# Patient Record
Sex: MALE | Race: BLACK OR AFRICAN AMERICAN | NOT HISPANIC OR LATINO | Employment: OTHER | URBAN - METROPOLITAN AREA
[De-identification: names, ages, dates, MRNs, and addresses within clinical notes are randomized per-mention and may not be internally consistent; named-entity substitution may affect disease eponyms.]

---

## 2017-01-17 ENCOUNTER — HOSPITAL ENCOUNTER (EMERGENCY)
Facility: HOSPITAL | Age: 77
Discharge: HOME | End: 2017-01-17
Attending: EMERGENCY MEDICINE
Payer: MEDICARE

## 2017-01-17 VITALS
RESPIRATION RATE: 20 BRPM | WEIGHT: 170 LBS | TEMPERATURE: 98 F | BODY MASS INDEX: 25.76 KG/M2 | DIASTOLIC BLOOD PRESSURE: 65 MMHG | HEIGHT: 68 IN | OXYGEN SATURATION: 98 % | HEART RATE: 87 BPM | SYSTOLIC BLOOD PRESSURE: 131 MMHG

## 2017-01-17 DIAGNOSIS — S62.633B OPEN DISPLACED FRACTURE OF DISTAL PHALANX OF LEFT MIDDLE FINGER, INITIAL ENCOUNTER: Primary | ICD-10-CM

## 2017-01-17 DIAGNOSIS — S62.635B OPEN DISPLACED FRACTURE OF DISTAL PHALANX OF LEFT RING FINGER, INITIAL ENCOUNTER: ICD-10-CM

## 2017-01-17 PROCEDURE — 25000003 PHARM REV CODE 250: Performed by: EMERGENCY MEDICINE

## 2017-01-17 PROCEDURE — 99284 EMERGENCY DEPT VISIT MOD MDM: CPT | Mod: 25

## 2017-01-17 PROCEDURE — 63600175 PHARM REV CODE 636 W HCPCS: Performed by: EMERGENCY MEDICINE

## 2017-01-17 PROCEDURE — 12042 INTMD RPR N-HF/GENIT2.6-7.5: CPT

## 2017-01-17 PROCEDURE — 96372 THER/PROPH/DIAG INJ SC/IM: CPT

## 2017-01-17 PROCEDURE — 12032 INTMD RPR S/A/T/EXT 2.6-7.5: CPT

## 2017-01-17 RX ORDER — CLOPIDOGREL BISULFATE 75 MG/1
75 TABLET ORAL DAILY
Qty: 30 TABLET | Refills: 11 | Status: SHIPPED | OUTPATIENT
Start: 2017-01-17 | End: 2021-09-01

## 2017-01-17 RX ORDER — DOXYCYCLINE 100 MG/1
100 CAPSULE ORAL 2 TIMES DAILY
Qty: 20 CAPSULE | Refills: 0 | Status: SHIPPED | OUTPATIENT
Start: 2017-01-17 | End: 2017-01-27

## 2017-01-17 RX ORDER — ASPIRIN 81 MG/1
81 TABLET ORAL DAILY
Qty: 30 TABLET | Refills: 11 | Status: SHIPPED | OUTPATIENT
Start: 2017-01-17 | End: 2023-08-29

## 2017-01-17 RX ORDER — LIDOCAINE HYDROCHLORIDE 10 MG/ML
5 INJECTION INFILTRATION; PERINEURAL
Status: COMPLETED | OUTPATIENT
Start: 2017-01-17 | End: 2017-01-17

## 2017-01-17 RX ORDER — SULFAMETHOXAZOLE AND TRIMETHOPRIM 800; 160 MG/1; MG/1
1 TABLET ORAL 2 TIMES DAILY
Qty: 14 TABLET | Refills: 0 | Status: SHIPPED | OUTPATIENT
Start: 2017-01-17 | End: 2017-01-24

## 2017-01-17 RX ORDER — AMLODIPINE BESYLATE 10 MG/1
10 TABLET ORAL DAILY
Qty: 60 TABLET | Refills: 11 | Status: ON HOLD | OUTPATIENT
Start: 2017-01-17 | End: 2024-02-28 | Stop reason: HOSPADM

## 2017-01-17 RX ORDER — ATORVASTATIN CALCIUM 80 MG/1
80 TABLET, FILM COATED ORAL DAILY
Qty: 30 TABLET | Refills: 11 | Status: SHIPPED | OUTPATIENT
Start: 2017-01-17 | End: 2021-09-01

## 2017-01-17 RX ORDER — CEFTRIAXONE 1 G/1
1 INJECTION, POWDER, FOR SOLUTION INTRAMUSCULAR; INTRAVENOUS
Status: COMPLETED | OUTPATIENT
Start: 2017-01-17 | End: 2017-01-17

## 2017-01-17 RX ADMIN — LIDOCAINE HYDROCHLORIDE 5 ML: 10 INJECTION, SOLUTION INFILTRATION; PERINEURAL at 01:01

## 2017-01-17 RX ADMIN — CEFTRIAXONE 1 G: 1 INJECTION, POWDER, FOR SOLUTION INTRAMUSCULAR; INTRAVENOUS at 02:01
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
## 2017-01-17 NOTE — ED AVS SNAPSHOT
[facility]  [address]  [place] [address]               Jevon Rajan   2017  1:04 PM   ED    Description:  Male : 1940   Department:  [facility]           Your Care was Coordinated By:     Provider Role From To    Panda Santiago MD Attending Provider 17 [id number] --      Reason for Visit     Laceration           Diagnoses this Visit        Comments    Open displaced fracture of distal phalanx of left middle finger, initial encounter    -  Primary     Open displaced fracture of distal phalanx of left ring finger, initial encounter           ED Disposition     ED Disposition Condition Comment    Discharge             To Do List           Follow-up Information     Follow up with Agustin Feliciano MD In 3 days.    Specialty:  Orthopedic Surgery    Contact information:    [address]  [address]  [place] [address]  [phone]         These Medications        Disp Refills Start End    clopidogrel (PLAVIX) 75 mg tablet 30 tablet 11 2017    Take 1 tablet (75 mg total) by mouth once daily. - Oral    aspirin (ECOTRIN) 81 MG EC tablet 30 tablet 11 2017    Take 1 tablet (81 mg total) by mouth once daily. - Oral    amlodipine (NORVASC) 10 MG tablet 60 tablet 11 2017    Take 1 tablet (10 mg total) by mouth once daily. - Oral    atorvastatin (LIPITOR) 80 MG tablet 30 tablet 11 2017    Take 1 tablet (80 mg total) by mouth once daily. - Oral    sulfamethoxazole-trimethoprim 800-160mg (BACTRIM DS) 800-160 mg Tab 14 tablet 0 2017    Take 1 tablet by mouth 2 (two) times daily. - Oral    doxycycline (VIBRAMYCIN) 100 MG Cap 20 capsule 0 2017    Take 1 capsule (100 mg total) by mouth 2 (two) times daily. - Oral      [facility] -  Assistance  Registered nurses in the [facility] provide clinical advisement,  health education, appointment booking, and other advisory services.  Call for this free service at [phone].             Medications           Message regarding Medications     Verify the changes and/or additions to your medication regime listed below are the same as discussed with your clinician today.  If any of these changes or additions are incorrect, please notify your healthcare provider.        START taking these NEW medications        Refills    sulfamethoxazole-trimethoprim 800-160mg (BACTRIM DS) 800-160 mg Tab 0    Sig: Take 1 tablet by mouth 2 (two) times daily.    Class: Print    Route: Oral    doxycycline (VIBRAMYCIN) 100 MG Cap 0    Sig: Take 1 capsule (100 mg total) by mouth 2 (two) times daily.    Class: Print    Route: Oral      These medications were administered today        Dose Freq    lidocaine HCL 10 mg/ml (1%) injection 5 mL 5 mL ED 1 Time    Si mLs by Infiltration route ED 1 Time.    Class: Normal    Route: Infiltration    cefTRIAXone injection 1 g 1 g ED 1 Time    Sig: Inject 1 g into the muscle ED 1 Time.    Class: Normal    Route: Intramuscular      STOP taking these medications     furosemide (LASIX) 40 MG tablet Take 1 tablet (40 mg total) by mouth once daily.           Verify that the below list of medications is an accurate representation of the medications you are currently taking.  If none reported, the list may be blank. If incorrect, please contact your healthcare provider. Carry this list with you in case of emergency.           Current Medications     acarbose (PRECOSE) 50 MG Tab Take 100 mg by mouth 3 (three) times daily with meals.     amlodipine (NORVASC) 10 MG tablet Take 1 tablet (10 mg total) by mouth once daily.    aspirin (ECOTRIN) 81 MG EC tablet Take 1 tablet (81 mg total) by mouth once daily.    atorvastatin (LIPITOR) 80 MG tablet Take 1 tablet (80 mg total) by mouth once daily.    carvedilol (COREG) 6.25 MG tablet Take 6.25 mg by mouth 2 (two) times daily  "with meals.    clopidogrel (PLAVIX) 75 mg tablet Take 1 tablet (75 mg total) by mouth once daily.    doxycycline (VIBRAMYCIN) 100 MG Cap Take 1 capsule (100 mg total) by mouth 2 (two) times daily.    finasteride (PROSCAR) 5 mg tablet Take 5 mg by mouth every evening.     glipiZIDE (GLUCOTROL) 10 MG TR24 Take 5 mg by mouth 2 (two) times daily.     insulin NPH-insulin regular, 70/30, (NOVOLIN 70/30) 100 unit/mL (70-30) injection Inject 10 Units into the skin 2 (two) times daily.    levothyroxine (SYNTHROID) 25 MCG tablet Take 25 mcg by mouth once daily.    losartan (COZAAR) 50 MG tablet Take 50 mg by mouth once daily.    metoprolol succinate (TOPROL-XL) 50 MG 24 hr tablet Take 1 tablet (50 mg total) by mouth once daily.    sulfamethoxazole-trimethoprim 800-160mg (BACTRIM DS) 800-160 mg Tab Take 1 tablet by mouth 2 (two) times daily.    tacrolimus (PROTOPIC) 0.1 % ointment Apply topically 2 (two) times daily.    tamsulosin (FLOMAX) 0.4 mg Cp24 Take 0.4 mg by mouth once daily.           Clinical Reference Information           Your Vitals Were     BP Pulse Temp Resp Height Weight    131/65 87 98 °F (36.7 °C) 20 5' 8" (1.727 m) 77.1 kg (170 lb)    SpO2 BMI             98% 25.85 kg/m2         Allergies as of 1/17/2017        Reactions    Ace Inhibitors Itching    Is not sure which medication it was      Immunizations Administered on Date of Encounter - 1/17/2017     None      ED Micro, Lab, POCT     None      ED Imaging Orders     Start Ordered       Status Ordering Provider    01/17/17 1314 01/17/17 1314  X-Ray Hand 3 view Left  1 time imaging      Final result       [facility] Sign-Up     Activating your [facility] account is as easy as 1-2-3!     1) Visit [email or web address], select Sign Up Now, enter this activation code and your date of birth, then select Next.  [email or web address]  Expires: 3/3/2017  2:43 PM      2) Create a username and password to use when you visit [facility] in the future and select a security question in " case you lose your password and select Next.    3) Enter your e-mail address and click Sign Up!    Additional Information  If you have questions, please e-mail [email or web address] or call [phone] to talk to our [facility] staff. Remember, [facility] is NOT to be used for urgent needs. For medical emergencies, dial 911.          [facility] complies with applicable Federal civil rights laws and does not discriminate on the basis of race, color, national origin, age, disability, or sex.        Language Assistance Services     ATTENTION: Language assistance services are available, free of charge. Please call [phone].      ATENCIÓN: Si habla español, tiene a hernandez disposición servicios gratuitos de asistencia lingüística. Llame al [phone].     CHÚ Ý: N?u b?n nói Ti?ng Vi?t, có các d?ch v? h? tr? ngôn ng? mi?n phí dành cho b?n. G?i s? [phone].

## 2019-04-05 ENCOUNTER — HOSPITAL ENCOUNTER (EMERGENCY)
Facility: HOSPITAL | Age: 79
Discharge: HOME | End: 2019-04-05
Attending: FAMILY MEDICINE
Payer: OTHER GOVERNMENT

## 2019-04-05 VITALS
OXYGEN SATURATION: 95 % | DIASTOLIC BLOOD PRESSURE: 70 MMHG | BODY MASS INDEX: 24.25 KG/M2 | HEART RATE: 83 BPM | WEIGHT: 160 LBS | TEMPERATURE: 98 F | HEIGHT: 68 IN | RESPIRATION RATE: 17 BRPM | SYSTOLIC BLOOD PRESSURE: 154 MMHG

## 2019-04-05 DIAGNOSIS — E16.2 HYPOGLYCEMIA: ICD-10-CM

## 2019-04-05 DIAGNOSIS — E11.649 HYPOGLYCEMIA ASSOCIATED WITH DIABETES: Primary | ICD-10-CM

## 2019-04-05 LAB
ALBUMIN SERPL BCP-MCNC: 4.9 G/DL (ref 3.5–5.2)
ALP SERPL-CCNC: 61 U/L (ref 38–126)
ALT SERPL W/O P-5'-P-CCNC: 18 U/L (ref 10–44)
ANION GAP SERPL CALC-SCNC: 16 MMOL/L (ref 8–16)
APTT BLDCRRT: 34.8 SEC (ref 21–32)
AST SERPL-CCNC: 27 U/L (ref 15–46)
BASOPHILS # BLD AUTO: 0.02 K/UL (ref 0–0.2)
BASOPHILS NFR BLD: 0.2 % (ref 0–1.9)
BILIRUB SERPL-MCNC: 0.7 MG/DL (ref 0.1–1)
BUN SERPL-MCNC: 46 MG/DL (ref 2–20)
CALCIUM SERPL-MCNC: 10.1 MG/DL (ref 8.7–10.5)
CHLORIDE SERPL-SCNC: 96 MMOL/L (ref 95–110)
CO2 SERPL-SCNC: 28 MMOL/L (ref 23–29)
CREAT SERPL-MCNC: 9.56 MG/DL (ref 0.5–1.4)
DIFFERENTIAL METHOD: ABNORMAL
EOSINOPHIL # BLD AUTO: 0 K/UL (ref 0–0.5)
EOSINOPHIL NFR BLD: 0.1 % (ref 0–8)
ERYTHROCYTE [DISTWIDTH] IN BLOOD BY AUTOMATED COUNT: 14 % (ref 11.5–14.5)
EST. GFR  (AFRICAN AMERICAN): 5.3 ML/MIN/1.73 M^2
EST. GFR  (NON AFRICAN AMERICAN): 4.6 ML/MIN/1.73 M^2
GLUCOSE SERPL-MCNC: 55 MG/DL (ref 70–110)
HCT VFR BLD AUTO: 36.4 % (ref 40–54)
HGB BLD-MCNC: 12.2 G/DL (ref 14–18)
INR PPP: 1.1 (ref 0.8–1.2)
LACTATE SERPL-SCNC: 1.5 MMOL/L (ref 0.5–2.2)
LYMPHOCYTES # BLD AUTO: 1.4 K/UL (ref 1–4.8)
LYMPHOCYTES NFR BLD: 10.9 % (ref 18–48)
MCH RBC QN AUTO: 32 PG (ref 27–31)
MCHC RBC AUTO-ENTMCNC: 33.5 G/DL (ref 32–36)
MCV RBC AUTO: 96 FL (ref 82–98)
MONOCYTES # BLD AUTO: 1 K/UL (ref 0.3–1)
MONOCYTES NFR BLD: 7.2 % (ref 4–15)
NEUTROPHILS # BLD AUTO: 10.7 K/UL (ref 1.8–7.7)
NEUTROPHILS NFR BLD: 81.4 % (ref 38–73)
PLATELET # BLD AUTO: 157 K/UL (ref 150–350)
PMV BLD AUTO: 10.6 FL (ref 9.2–12.9)
POCT GLUCOSE: 154 MG/DL (ref 70–110)
POCT GLUCOSE: 242 MG/DL (ref 70–110)
POCT GLUCOSE: 27 MG/DL (ref 70–110)
POCT GLUCOSE: 35 MG/DL (ref 70–110)
POTASSIUM SERPL-SCNC: 3.6 MMOL/L (ref 3.5–5.1)
PROT SERPL-MCNC: 8.9 G/DL (ref 6–8.4)
PROTHROMBIN TIME: 11.9 SEC (ref 9–12.5)
RBC # BLD AUTO: 3.81 M/UL (ref 4.6–6.2)
SODIUM SERPL-SCNC: 140 MMOL/L (ref 136–145)
TROPONIN I SERPL DL<=0.01 NG/ML-MCNC: 0.03 NG/ML (ref 0.01–0.03)
WBC # BLD AUTO: 13.17 K/UL (ref 3.9–12.7)

## 2019-04-05 PROCEDURE — 93010 ELECTROCARDIOGRAM REPORT: CPT | Mod: ,,, | Performed by: INTERNAL MEDICINE

## 2019-04-05 PROCEDURE — 83605 ASSAY OF LACTIC ACID: CPT | Mod: ER

## 2019-04-05 PROCEDURE — 93005 ELECTROCARDIOGRAM TRACING: CPT | Mod: ER

## 2019-04-05 PROCEDURE — 85610 PROTHROMBIN TIME: CPT | Mod: ER

## 2019-04-05 PROCEDURE — 25000003 PHARM REV CODE 250: Mod: ER | Performed by: FAMILY MEDICINE

## 2019-04-05 PROCEDURE — 84484 ASSAY OF TROPONIN QUANT: CPT | Mod: ER

## 2019-04-05 PROCEDURE — 87040 BLOOD CULTURE FOR BACTERIA: CPT | Mod: ER

## 2019-04-05 PROCEDURE — 80053 COMPREHEN METABOLIC PANEL: CPT | Mod: ER

## 2019-04-05 PROCEDURE — 96374 THER/PROPH/DIAG INJ IV PUSH: CPT | Mod: ER

## 2019-04-05 PROCEDURE — 85025 COMPLETE CBC W/AUTO DIFF WBC: CPT | Mod: ER

## 2019-04-05 PROCEDURE — 85730 THROMBOPLASTIN TIME PARTIAL: CPT | Mod: ER

## 2019-04-05 PROCEDURE — 93010 EKG 12-LEAD: ICD-10-PCS | Mod: ,,, | Performed by: INTERNAL MEDICINE

## 2019-04-05 PROCEDURE — 99284 EMERGENCY DEPT VISIT MOD MDM: CPT | Mod: 25,ER

## 2019-04-05 RX ORDER — DEXTROSE 50 % IN WATER (D50W) INTRAVENOUS SYRINGE
Status: DISCONTINUED
Start: 2019-04-05 | End: 2019-04-06 | Stop reason: HOSPADM

## 2019-04-05 RX ORDER — DEXTROSE 50 % IN WATER (D50W) INTRAVENOUS SYRINGE
25
Status: COMPLETED | OUTPATIENT
Start: 2019-04-05 | End: 2019-04-05

## 2019-04-05 RX ORDER — DEXTROSE 50 % IN WATER (D50W) INTRAVENOUS SYRINGE
25
Status: DISCONTINUED | OUTPATIENT
Start: 2019-04-05 | End: 2019-04-05

## 2019-04-05 RX ORDER — GLUCAGON 1 MG
1 KIT INJECTION
Status: DISCONTINUED | OUTPATIENT
Start: 2019-04-05 | End: 2019-04-05

## 2019-04-05 RX ADMIN — DEXTROSE MONOHYDRATE 25 G: 25 INJECTION, SOLUTION INTRAVENOUS at 08:04
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
## 2019-04-12 LAB
BACTERIA BLD CULT: NORMAL
BACTERIA BLD CULT: NORMAL

## 2019-11-23 DIAGNOSIS — R50.9 FEVER OF UNKNOWN ORIGIN: Primary | ICD-10-CM

## 2019-11-23 PROBLEM — I82.4Z2 ACUTE DEEP VEIN THROMBOSIS (DVT) OF DISTAL VEIN OF LEFT LOWER EXTREMITY: Status: ACTIVE | Noted: 2019-11-23

## 2019-11-24 PROBLEM — R31.9 HEMATURIA: Status: ACTIVE | Noted: 2019-11-24

## 2019-11-24 PROBLEM — Z99.2 ESRD ON HEMODIALYSIS: Status: ACTIVE | Noted: 2019-11-24

## 2019-11-24 PROBLEM — R82.90 ABNORMAL URINALYSIS: Status: ACTIVE | Noted: 2019-11-24

## 2019-11-24 PROBLEM — N18.6 ESRD ON HEMODIALYSIS: Status: ACTIVE | Noted: 2019-11-24

## 2019-11-24 PROBLEM — R50.9 FEVER: Status: ACTIVE | Noted: 2019-11-24

## 2019-11-25 PROBLEM — N28.89 RENAL MASS, LEFT: Status: ACTIVE | Noted: 2019-11-25

## 2019-11-26 ENCOUNTER — HOSPITAL ENCOUNTER (INPATIENT)
Facility: HOSPITAL | Age: 79
LOS: 2 days | Discharge: HOME | DRG: 252 | End: 2019-11-28
Attending: SURGERY | Admitting: SURGERY
Payer: MEDICARE

## 2019-11-26 DIAGNOSIS — T82.868A THROMBOSIS OF KIDNEY DIALYSIS ARTERIOVENOUS GRAFT, INITIAL ENCOUNTER: ICD-10-CM

## 2019-11-26 DIAGNOSIS — Z99.2 ESRD ON DIALYSIS: ICD-10-CM

## 2019-11-26 DIAGNOSIS — N18.6 ESRD ON DIALYSIS: ICD-10-CM

## 2019-11-26 DIAGNOSIS — T82.868A: Primary | ICD-10-CM

## 2019-11-26 PROBLEM — R31.9 HEMATURIA: Status: RESOLVED | Noted: 2019-11-24 | Resolved: 2019-11-26

## 2019-11-26 PROCEDURE — 11000001 HC ACUTE MED/SURG PRIVATE ROOM

## 2019-11-26 NOTE — Clinical Note
30 ml injected throughout the case. 20 mL total wasted during the case. 50 mL total used in the case.

## 2019-11-26 NOTE — Clinical Note
Angiography performed post intervention of the middle Lft AV fistula. via hand injection with 5 mL of contrast.

## 2019-11-27 PROBLEM — N18.6 ESRD ON DIALYSIS: Status: ACTIVE | Noted: 2019-11-27

## 2019-11-27 PROBLEM — T82.868A: Status: ACTIVE | Noted: 2019-11-27

## 2019-11-27 PROBLEM — Z99.2 ESRD ON DIALYSIS: Status: ACTIVE | Noted: 2019-11-27

## 2019-11-27 LAB
ALBUMIN SERPL BCP-MCNC: 2.6 G/DL (ref 3.5–5.2)
ALP SERPL-CCNC: 48 U/L (ref 55–135)
ALT SERPL W/O P-5'-P-CCNC: 10 U/L (ref 10–44)
ANION GAP SERPL CALC-SCNC: 14 MMOL/L (ref 8–16)
APTT BLDCRRT: 31.4 SEC (ref 21–32)
AST SERPL-CCNC: 16 U/L (ref 10–40)
BASOPHILS # BLD AUTO: 0.03 K/UL (ref 0–0.2)
BASOPHILS NFR BLD: 0.4 % (ref 0–1.9)
BILIRUB SERPL-MCNC: 0.4 MG/DL (ref 0.1–1)
BUN SERPL-MCNC: 77 MG/DL (ref 8–23)
CALCIUM SERPL-MCNC: 9 MG/DL (ref 8.7–10.5)
CHLORIDE SERPL-SCNC: 104 MMOL/L (ref 95–110)
CO2 SERPL-SCNC: 21 MMOL/L (ref 23–29)
CREAT SERPL-MCNC: 12.8 MG/DL (ref 0.5–1.4)
DIFFERENTIAL METHOD: ABNORMAL
EOSINOPHIL # BLD AUTO: 0.2 K/UL (ref 0–0.5)
EOSINOPHIL NFR BLD: 2.9 % (ref 0–8)
ERYTHROCYTE [DISTWIDTH] IN BLOOD BY AUTOMATED COUNT: 16.2 % (ref 11.5–14.5)
EST. GFR  (AFRICAN AMERICAN): 3.7 ML/MIN/1.73 M^2
EST. GFR  (NON AFRICAN AMERICAN): 3.2 ML/MIN/1.73 M^2
GLUCOSE SERPL-MCNC: 159 MG/DL (ref 70–110)
HCT VFR BLD AUTO: 26.6 % (ref 40–54)
HGB BLD-MCNC: 8 G/DL (ref 14–18)
IMM GRANULOCYTES # BLD AUTO: 0.03 K/UL (ref 0–0.04)
IMM GRANULOCYTES NFR BLD AUTO: 0.4 % (ref 0–0.5)
INR PPP: 1 (ref 0.8–1.2)
LYMPHOCYTES # BLD AUTO: 1.6 K/UL (ref 1–4.8)
LYMPHOCYTES NFR BLD: 20.3 % (ref 18–48)
MAGNESIUM SERPL-MCNC: 2.3 MG/DL (ref 1.6–2.6)
MCH RBC QN AUTO: 29.1 PG (ref 27–31)
MCHC RBC AUTO-ENTMCNC: 30.1 G/DL (ref 32–36)
MCV RBC AUTO: 97 FL (ref 82–98)
MONOCYTES # BLD AUTO: 0.7 K/UL (ref 0.3–1)
MONOCYTES NFR BLD: 9.1 % (ref 4–15)
NEUTROPHILS # BLD AUTO: 5.3 K/UL (ref 1.8–7.7)
NEUTROPHILS NFR BLD: 66.9 % (ref 38–73)
NRBC BLD-RTO: 0 /100 WBC
PHOSPHATE SERPL-MCNC: 7.2 MG/DL (ref 2.7–4.5)
PLATELET # BLD AUTO: 252 K/UL (ref 150–350)
PMV BLD AUTO: 9.7 FL (ref 9.2–12.9)
POCT GLUCOSE: 173 MG/DL (ref 70–110)
POTASSIUM SERPL-SCNC: 5.1 MMOL/L (ref 3.5–5.1)
PROT SERPL-MCNC: 6.8 G/DL (ref 6–8.4)
PROTHROMBIN TIME: 10.3 SEC (ref 9–12.5)
RBC # BLD AUTO: 2.75 M/UL (ref 4.6–6.2)
SODIUM SERPL-SCNC: 139 MMOL/L (ref 136–145)
WBC # BLD AUTO: 7.87 K/UL (ref 3.9–12.7)

## 2019-11-27 PROCEDURE — 80053 COMPREHEN METABOLIC PANEL: CPT

## 2019-11-27 PROCEDURE — 36906 THRMBC/NFS DIALYSIS CIRCUIT: CPT | Mod: ,,, | Performed by: SURGERY

## 2019-11-27 PROCEDURE — 85025 COMPLETE CBC W/AUTO DIFF WBC: CPT

## 2019-11-27 PROCEDURE — 63600175 PHARM REV CODE 636 W HCPCS: Performed by: SURGERY

## 2019-11-27 PROCEDURE — 63600175 PHARM REV CODE 636 W HCPCS: Performed by: STUDENT IN AN ORGANIZED HEALTH CARE EDUCATION/TRAINING PROGRAM

## 2019-11-27 PROCEDURE — 36415 COLL VENOUS BLD VENIPUNCTURE: CPT

## 2019-11-27 PROCEDURE — 99152 MOD SED SAME PHYS/QHP 5/>YRS: CPT | Mod: ,,, | Performed by: SURGERY

## 2019-11-27 PROCEDURE — 84100 ASSAY OF PHOSPHORUS: CPT

## 2019-11-27 PROCEDURE — 99223 1ST HOSP IP/OBS HIGH 75: CPT | Mod: 25,AI,, | Performed by: SURGERY

## 2019-11-27 PROCEDURE — 36906 PR MECH THROMBECTOMY/INFUS, DIALYSIS CIRCUIT W/TRANSCATH PLCMNT, STENT: ICD-10-PCS | Mod: ,,, | Performed by: SURGERY

## 2019-11-27 PROCEDURE — 36906 THRMBC/NFS DIALYSIS CIRCUIT: CPT | Performed by: SURGERY

## 2019-11-27 PROCEDURE — 11000001 HC ACUTE MED/SURG PRIVATE ROOM

## 2019-11-27 PROCEDURE — 99153 MOD SED SAME PHYS/QHP EA: CPT | Performed by: SURGERY

## 2019-11-27 PROCEDURE — C1876 STENT, NON-COA/NON-COV W/DEL: HCPCS | Performed by: SURGERY

## 2019-11-27 PROCEDURE — 85730 THROMBOPLASTIN TIME PARTIAL: CPT

## 2019-11-27 PROCEDURE — C1769 GUIDE WIRE: HCPCS | Performed by: SURGERY

## 2019-11-27 PROCEDURE — 99223 1ST HOSP IP/OBS HIGH 75: CPT | Mod: 25,,, | Performed by: NURSE PRACTITIONER

## 2019-11-27 PROCEDURE — 83735 ASSAY OF MAGNESIUM: CPT

## 2019-11-27 PROCEDURE — C1725 CATH, TRANSLUMIN NON-LASER: HCPCS | Performed by: SURGERY

## 2019-11-27 PROCEDURE — C1894 INTRO/SHEATH, NON-LASER: HCPCS | Performed by: SURGERY

## 2019-11-27 PROCEDURE — 99152 PR MOD CONSCIOUS SEDATION, SAME PHYS, 5+ YRS, FIRST 15 MIN: ICD-10-PCS | Mod: ,,, | Performed by: SURGERY

## 2019-11-27 PROCEDURE — 94761 N-INVAS EAR/PLS OXIMETRY MLT: CPT

## 2019-11-27 PROCEDURE — C1757 CATH, THROMBECTOMY/EMBOLECT: HCPCS | Performed by: SURGERY

## 2019-11-27 PROCEDURE — 85610 PROTHROMBIN TIME: CPT

## 2019-11-27 PROCEDURE — 99152 MOD SED SAME PHYS/QHP 5/>YRS: CPT | Performed by: SURGERY

## 2019-11-27 PROCEDURE — 99223 PR INITIAL HOSPITAL CARE,LEVL III: ICD-10-PCS | Mod: 25,,, | Performed by: NURSE PRACTITIONER

## 2019-11-27 PROCEDURE — 90935 PR HEMODIALYSIS, ONE EVALUATION: ICD-10-PCS | Mod: ,,, | Performed by: NURSE PRACTITIONER

## 2019-11-27 PROCEDURE — 99223 PR INITIAL HOSPITAL CARE,LEVL III: ICD-10-PCS | Mod: 25,AI,, | Performed by: SURGERY

## 2019-11-27 PROCEDURE — 27201423 OPTIME MED/SURG SUP & DEVICES STERILE SUPPLY: Performed by: SURGERY

## 2019-11-27 PROCEDURE — 90935 HEMODIALYSIS ONE EVALUATION: CPT

## 2019-11-27 PROCEDURE — 90935 HEMODIALYSIS ONE EVALUATION: CPT | Mod: ,,, | Performed by: NURSE PRACTITIONER

## 2019-11-27 DEVICE — LIFESTENT®  BILIARY STENT, 9MM X 40MM (80CM CATHETER LENGTH)
Type: IMPLANTABLE DEVICE | Site: ARM | Status: FUNCTIONAL
Brand: LIFESTENT® BILIARY STENT

## 2019-11-27 DEVICE — LIFESTENT®  BILIARY STENT, 8MM X 40MM (80CM CATHETER LENGTH)
Type: IMPLANTABLE DEVICE | Site: ARM | Status: FUNCTIONAL
Brand: LIFESTENT® BILIARY STENT

## 2019-11-27 RX ORDER — OXYCODONE HYDROCHLORIDE 5 MG/1
5 TABLET ORAL EVERY 4 HOURS PRN
Status: DISCONTINUED | OUTPATIENT
Start: 2019-11-27 | End: 2019-11-28 | Stop reason: HOSPADM

## 2019-11-27 RX ORDER — SODIUM CHLORIDE 9 MG/ML
INJECTION, SOLUTION INTRAVENOUS CONTINUOUS
Status: DISCONTINUED | OUTPATIENT
Start: 2019-11-27 | End: 2019-11-27

## 2019-11-27 RX ORDER — TALC
6 POWDER (GRAM) TOPICAL NIGHTLY PRN
Status: DISCONTINUED | OUTPATIENT
Start: 2019-11-27 | End: 2019-11-28 | Stop reason: HOSPADM

## 2019-11-27 RX ORDER — LIDOCAINE HYDROCHLORIDE 10 MG/ML
1 INJECTION, SOLUTION EPIDURAL; INFILTRATION; INTRACAUDAL; PERINEURAL ONCE
Status: DISCONTINUED | OUTPATIENT
Start: 2019-11-27 | End: 2019-11-28 | Stop reason: HOSPADM

## 2019-11-27 RX ORDER — ACETAMINOPHEN 325 MG/1
650 TABLET ORAL EVERY 4 HOURS PRN
Status: DISCONTINUED | OUTPATIENT
Start: 2019-11-27 | End: 2019-11-28 | Stop reason: HOSPADM

## 2019-11-27 RX ORDER — MIDAZOLAM HYDROCHLORIDE 1 MG/ML
INJECTION, SOLUTION INTRAMUSCULAR; INTRAVENOUS
Status: DISCONTINUED | OUTPATIENT
Start: 2019-11-27 | End: 2019-11-27 | Stop reason: HOSPADM

## 2019-11-27 RX ORDER — SODIUM CHLORIDE 0.9 % (FLUSH) 0.9 %
10 SYRINGE (ML) INJECTION
Status: DISCONTINUED | OUTPATIENT
Start: 2019-11-27 | End: 2019-11-28 | Stop reason: HOSPADM

## 2019-11-27 RX ORDER — OXYCODONE HYDROCHLORIDE 5 MG/1
5 TABLET ORAL EVERY 4 HOURS PRN
Qty: 21 TABLET | Refills: 0 | Status: SHIPPED | OUTPATIENT
Start: 2019-11-27 | End: 2019-11-27 | Stop reason: HOSPADM

## 2019-11-27 RX ORDER — FENTANYL CITRATE 50 UG/ML
INJECTION, SOLUTION INTRAMUSCULAR; INTRAVENOUS
Status: DISCONTINUED | OUTPATIENT
Start: 2019-11-27 | End: 2019-11-27 | Stop reason: HOSPADM

## 2019-11-27 RX ORDER — SODIUM CHLORIDE 9 MG/ML
INJECTION, SOLUTION INTRAVENOUS ONCE
Status: DISCONTINUED | OUTPATIENT
Start: 2019-11-27 | End: 2019-11-28 | Stop reason: HOSPADM

## 2019-11-27 RX ORDER — HEPARIN SODIUM 1000 [USP'U]/ML
INJECTION, SOLUTION INTRAVENOUS; SUBCUTANEOUS
Status: DISCONTINUED | OUTPATIENT
Start: 2019-11-27 | End: 2019-11-27 | Stop reason: HOSPADM

## 2019-11-27 RX ORDER — ONDANSETRON 8 MG/1
8 TABLET, ORALLY DISINTEGRATING ORAL EVERY 8 HOURS PRN
Status: DISCONTINUED | OUTPATIENT
Start: 2019-11-27 | End: 2019-11-28 | Stop reason: HOSPADM

## 2019-11-27 RX ORDER — ACETAMINOPHEN 325 MG/1
650 TABLET ORAL EVERY 8 HOURS PRN
Status: DISCONTINUED | OUTPATIENT
Start: 2019-11-27 | End: 2019-11-28 | Stop reason: HOSPADM

## 2019-11-27 RX ADMIN — SODIUM CHLORIDE: 0.9 INJECTION, SOLUTION INTRAVENOUS at 02:11

## 2019-11-27 NOTE — ASSESSMENT & PLAN NOTE
Mr. Rajan is our 80yo male with ESRD (T,Th,S), HTN, DM2, and a history of previous MI's who presents with a thrombosed AV fistula.  Imaging confirms thrombus throughout the AV fistula.      - Will plan for fistulogram on 11/27/19 with placement of tunneled hemodialysis catheter to allow for dialysis  - Consult nephrology for ESRD and dialysis management  - Consent obtained, case request submitted  - NPO  - PRN pain/nausea meds  - SCDs

## 2019-11-27 NOTE — DISCHARGE SUMMARY
[facility]  Vascular Surgery  Discharge Summary      Patient Name: Jevon Rajan  MRN: [id number]  Admission Date: 11/26/2019  Hospital Length of Stay: 2 days  Discharge Date and Time:  11/28/2019 5:14 PM  Attending Physician: MELANY Brenner III, MD   Discharging Provider: Amy Bhatia MD  Primary Care Provider: Felipe Martino MD     HPI: Mr. Rajan is our 80yo male with a history of DM2, HTN, Hyperlipidemia, ESRD (T,Th,S) who initially presented at outside hospital for leg pain and fever.  He states that the pain was located under his left knee and was constant.  The ED at the outside hospital obtained an venous US which they described the findings as echogenic noncompressible partially occlusive mass within the left superficial femoral vein.  The patient states that he had some mild leg swelling at that time but both the pain and swelling have resolved since then.  At the outside hospital he was started on the heparin drip.  On Saturday when he was to get his dialysis the patient states that they ran into issues accessing his fistula however outside notes state that the issues with dialysis began 11/25.  An ultrasound evaluation of the fistula identified thrombus throughout.  Due to this he has been transferred to [facility] for evaluation and treatment.  The patient denies having issues with accessing the fistula until Saturday.  He denies any arm swelling, pain, or paraesthesia.  The patient still produces urine and denies any pain or burning on urination.  No headaches, fevers, chills, chest pain, SOB, abdominal pain, or extremity weakness/numbness.     He endorses having had 2 previous MI's in the past the last occurring in 1999.  He has never had a stroke.       He smoked 2 packs of cigarettes a day for 20 years and quit 20 years ago.    Procedure(s) (LRB):  Fistulogram, AVG declot, possible permacath (Left)     Hospital Course: Jevon Rajan underwent the above procedure on 11/27/19 as  treatment for thrombosed fistula. He tolerated the procedure well and his post-op course was uncomplicated. Prior to discharge home on 11/28/2019 his pain was well controlled on oral medications, tolerated diet, ambulated, spontaneously voided and experienced return of bowel function. He was discharged home in good condition on POD#1.      Consults:   Consults (From admission, onward)        Status Ordering Provider     Inpatient consult to Nephrology  Once     Provider:  (Not yet assigned)    Completed DARÍO MARTINEZ          Significant Diagnostic Studies: Labs:   CMP   Recent Labs   Lab 11/27/19  0145      K 5.1      CO2 21*   *   BUN 77*   CREATININE 12.8*   CALCIUM 9.0   PROT 6.8   ALBUMIN 2.6*   BILITOT 0.4   ALKPHOS 48*   AST 16   ALT 10   ANIONGAP 14   ESTGFRAFRICA 3.7*   EGFRNONAA 3.2*    and CBC   Recent Labs   Lab 11/27/19  0145   WBC 7.87   HGB 8.0*   HCT 26.6*        Physical exam:  General: NAD  Neuro: AAOx4  Cardio: S1 and S2, RRR  Resp: Moving air appropriately, breathing even and unlabored  Abd: Soft, NT, ND  Ext: Warm and well perfused, LUE AVG with palpable thrill    Pending Diagnostic Studies:     None        Final Active Diagnoses:    Diagnosis Date Noted POA    PRINCIPAL PROBLEM:  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    Specialty:  Vascular Surgery  Why:  Post-op  Contact information:  [address] [place] [facility] [id number]  [phone]                   Patient Instructions:      Diet renal     Lifting restrictions   Order Comments: Do not lift anything heavier than 10lbs for six weeks.     Notify your health care provider if you experience any of the following:   temperature >100.4     Notify your health care provider if you experience any of the following:  severe uncontrolled pain     Notify your health care provider if you experience any of the following:  redness, tenderness, or signs of infection (pain, swelling, redness, odor or green/yellow discharge around incision site)     Remove dressing in 48 hours     Activity as tolerated     Shower on day dressing removed (No bath)   Order Comments: Shower after 48 hours after surgery     Medications:  Reconciled Home Medications:      Medication List      CONTINUE taking these medications    acarbose 50 MG Tab  Commonly known as:  PRECOSE  Take 100 mg by mouth 3 (three) times daily with meals.     amLODIPine 10 MG tablet  Commonly known as:  NORVASC  Take 1 tablet (10 mg total) by mouth once daily.     apixaban 5 mg Tab  Commonly known as:  ELIQUIS  Take 1 tablet (5 mg total) by mouth 2 (two) times daily.     aspirin 81 MG EC tablet  Commonly known as:  ECOTRIN  Take 1 tablet (81 mg total) by mouth once daily.     atorvastatin 80 MG tablet  Commonly known as:  LIPITOR  Take 1 tablet (80 mg total) by mouth once daily.     carvedilol 6.25 MG tablet  Commonly known as:  COREG  Take 6.25 mg by mouth 2 (two) times daily with meals.     clopidogrel 75 mg tablet  Commonly known as:  PLAVIX  Take 1 tablet (75 mg total) by mouth once daily.     finasteride 5 mg tablet  Commonly known as:  PROSCAR  Take 5 mg by mouth every evening.     glipiZIDE 10 MG Tr24  Commonly known as:  GLUCOTROL  Take 5 mg by mouth 2 (two) times daily.     insulin NPH-insulin regular (70/30) 100 unit/mL (70-30) injection  Commonly known as:  NOVOLIN 70/30  Inject 10 Units into the skin 2 (two) times daily.     levothyroxine 25 MCG tablet  Commonly known as:  SYNTHROID  Take 25 mcg by mouth once daily.     losartan 50 MG tablet  Commonly known as:  COZAAR  Take 50 mg by mouth once daily.     metoprolol succinate 50 MG 24 hr tablet  Commonly known as:   TOPROL-XL  Take 1 tablet (50 mg total) by mouth once daily.     tacrolimus 0.1 % ointment  Commonly known as:  PROTOPIC  Apply topically 2 (two) times daily.     tamsulosin 0.4 mg Cap  Commonly known as:  FLOMAX  Take 0.4 mg by mouth once daily.            Amy Bhatia MD  Vascular Surgery  [facility]

## 2019-11-27 NOTE — PLAN OF CARE
11/27/19 0959   Discharge Assessment   Assessment Type Discharge Planning Assessment   Confirmed/corrected address and phone number on facesheet? Yes   Assessment information obtained from? Patient   Expected Length of Stay (days) 3   Communicated expected length of stay with patient/caregiver yes   Prior to hospitilization cognitive status: Alert/Oriented   Prior to hospitalization functional status: Independent   Current cognitive status: Alert/Oriented   Current Functional Status: Independent   Facility Arrived From: [facility]   Lives With alone   Able to Return to Prior Arrangements yes   Is patient able to care for self after discharge? Yes   Who are your caregiver(s) and their phone number(s)? pt lives alone, he states his brothers and sisters help him if he needs it.  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 No   Patient currently receives any other outside agency services? No   Equipment Currently Used at Home none   Do you have any problems affording any of your prescribed medications? No  [facility]   Is the patient taking medications as prescribed? yes   Transportation Anticipated family or friend will provide  (He states he came here in an ambulance and will try to call his sister, )   Dialysis Name and Scheduled days [facility], Tues, Thurs and Sat   Does the patient receive services at the [facility]? No   Discharge Plan A Home   Discharge Plan B Home Health   DME Needed Upon Discharge  none   Patient/Family in Agreement with Plan yes     Extended Emergency Contact Information  Primary Emergency Contact: Bhavna Smyth [place] [address] [place]  Home Phone: [phone]  Relation:  Sister  Secondary Emergency Contact: Tracey [place] [address] [place]  Home Phone: [phone]  Relation: Sister    Felipe Martino MD    Future Appointments   Date Time Provider Department Center   11/27/2019 12:45 PM DIALYSIS, [facility]

## 2019-11-27 NOTE — BRIEF OP NOTE
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 Perc declot, L AVG (possis)  2. Stent placement x 2, L AVG (8x40, 9x40 Lifestents)  3. Conscious Sedation      Anesthesia: RN IV Sedation    Description of Procedure: Succesful declot;  Due to signif mid pseudoaneurysm and distal stents, will likely need a completely new AVG in the near future    Description of the findings of the procedure: MAy cannulate on the venous-half side of the AVG (nearer the axilla)    Estimated Blood Loss: 10cc         Specimens:   Specimen (12h ago, onward)    None

## 2019-11-27 NOTE — HPI
The patient is an 79 year-old AAM with a medical history of T2DM, HTN, hypothyroidism, BPH, and ESRD( on HD, T/TH/S)  who presented to [facility] on 11/23 with complaints of L leg pain x~3 days and fever (101.3). He was found to have a DVT of his L superficial femoral vein on US. He was subsequently started on a heparin gtt but transitioned to Eliquis a day later after developing hematuria. Dialysis was attempted Saturday and Monday via his AVG. The patient states that the HD nurses had issues accessing his fistula both days.  An ultrasound evaluation of the fistula identified thrombus throughout. The patient was transferred to [facility] for a declot. According to the patient, he had a declot 1 month ago (sutures still in place). He currently denies any arm swelling, pain, or paraesthesia. 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 He is scheduled for a L perc declot today and possible perm-a-cath placement if declot is unsuccessful. The patient is a T/Th/Sat patient at [facility] in [place]. His typically dialyzes for 4 hrs via WILLIS AVG. His dry weight is around 73 kg. He still makes good urine, he states that he urinates 3-4x/day. Nephrology consulted for management of ESRD and HD treatment.

## 2019-11-27 NOTE — PLAN OF CARE
Problem: Adult Inpatient Plan of Care  Goal: Plan of Care Review  Outcome: Ongoing, Progressing  Goal: Patient-Specific Goal (Individualization)  Outcome: Ongoing, Progressing  Goal: Absence of Hospital-Acquired Illness or Injury  Outcome: Ongoing, Progressing  Goal: Optimal Comfort and Wellbeing  Outcome: Ongoing, Progressing  Goal: Readiness for Transition of Care  Outcome: Ongoing, Progressing  Goal: Rounds/Family Conference  Outcome: Ongoing, Progressing     Problem: Diabetes Comorbidity  Goal: Blood Glucose Level Within Desired Range  Outcome: Ongoing, Progressing     Problem: Fall Injury Risk  Goal: Absence of Fall and Fall-Related Injury  Outcome: Ongoing, Progressing     Problem: Skin Injury Risk Increased  Goal: Skin Health and Integrity  Outcome: Ongoing, Progressing     Problem: Device-Related Complication Risk (Hemodialysis)  Goal: Safe, Effective Therapy Delivery  Outcome: Ongoing, Progressing     Problem: Hemodynamic Instability (Hemodialysis)  Goal: Vital Signs Remain in Desired Range  Outcome: Ongoing, Progressing     Problem: Infection (Hemodialysis)  Goal: Absence of Infection Signs/Symptoms  Outcome: Ongoing, Progressing

## 2019-11-27 NOTE — HPI
Mr. Rajan is our 78yo male with a history of DM2, HTN, Hyperlipidemia, ESRD (T,Th,S) who initially presented at outside hospital for leg pain and fever.  He states that the pain was located under his left knee and was constant.  The ED at the outside hospital obtained an venous US which they described the findings as echogenic noncompressible partially occlusive mass within the left superficial femoral vein.  The patient states that he had some mild leg swelling at that time but both the pain and swelling have resolved since then.  At the outside hospital he was started on the heparin drip.  On Saturday when he was to get his dialysis the patient states that they ran into issues accessing his fistula.  An ultrasound evaluation of the fistula identified thrombus throughout.  Due to this he has been transferred to [facility] for evaluation and treatment.  The patient denies having issues with accessing the fistula until Saturday.  He denies any arm swelling, pain, or paraesthesia.  The patient still produces urine and denies any pain or burning on urination.  No headaches, fevers, chills, chest pain, SOB, abdominal pain, or extremity weakness/numbness.    He endorses having had 2 previous MI's in the past the last occurring in 1999.  He has never had a stroke.      He smoked 2 packs of cigarettes a day for 20 years and quit 20 years ago.

## 2019-11-27 NOTE — ASSESSMENT & PLAN NOTE
The patient is a 80 yo AAM who was transferred to [facility] for a declot after being found with nonfunctioning AVG at [facility]. The patient was admitted there with LLE DVT.     T/Th/Sat  [facility]   4 hrs  73 kg  Oliguric     Assessment:  - Will plan for HD today after declot procedure today with vascular. Will dialyze for metabolic clearance and volume management.  - Target UF 2-3 L as tolerated, keep MAP >65.  - Recommend renal diet when appropriate  - Phos 7.2, restart binders with meals when appropriate  - Recommend daily renal function panels   - Hb 8.0, likely receives NAKIA outpatient, will obtain OP records   - Continue to monitor intake and output, daily weights   - Avoid nephrotoxic medication and renal dose medications to GFR  - No urgent need for RRT at this moment  - Will discuss with staff

## 2019-11-27 NOTE — H&P
[facility]  Vascular Surgery  History and Physical     Patient Name: Jevon Rajan  MRN: [id number]  Admission Date: 11/26/2019  Code Status: Full Code   Attending Physician: Sunil Brenner MD  Primary Care Physician: Felipe Martino MD    Subjective:     Chief Complaint/Reason for Admission: Thrombosed AV Fistula     HPI:  Mr. Rajan is our 80yo male with a history of DM2, HTN, Hyperlipidemia, ESRD (T,Th,S) who initially presented at outside hospital for leg pain and fever.  He states that the pain was located under his left knee and was constant.  The ED at the outside hospital obtained an venous US which they described the findings as echogenic noncompressible partially occlusive mass within the left superficial femoral vein.  The patient states that he had some mild leg swelling at that time but both the pain and swelling have resolved since then.  At the outside hospital he was started on the heparin drip.  On Saturday when he was to get his dialysis the patient states that they ran into issues accessing his fistula however outside notes state that the issues with dialysis began 11/25.  An ultrasound evaluation of the fistula identified thrombus throughout.  Due to this he has been transferred to [facility] for evaluation and treatment.  The patient denies having issues with accessing the fistula until Saturday.  He denies any arm swelling, pain, or paraesthesia.  The patient still produces urine and denies any pain or burning on urination.  No headaches, fevers, chills, chest pain, SOB, abdominal pain, or extremity weakness/numbness.    He endorses having had 2 previous MI's in the past the last occurring in 1999.  He has never had a stroke.      He smoked 2 packs of cigarettes a day for 20 years and quit 20 years ago.    Medications Prior to Admission   Medication Sig Dispense Refill Last Dose    acarbose (PRECOSE) 50 MG Tab Take 100 mg by mouth 3 (three) times daily with meals.    Taking     amlodipine (NORVASC) 10 MG tablet Take 1 tablet (10 mg total) by mouth once daily. 60 tablet 11     apixaban (ELIQUIS) 5 mg Tab Take 1 tablet (5 mg total) by mouth 2 (two) times daily. 60 tablet 1     aspirin (ECOTRIN) 81 MG EC tablet Take 1 tablet (81 mg total) by mouth once daily. 30 tablet 11     atorvastatin (LIPITOR) 80 MG tablet Take 1 tablet (80 mg total) by mouth once daily. 30 tablet 11     carvedilol (COREG) 6.25 MG tablet Take 6.25 mg by mouth 2 (two) times daily with meals.       clopidogrel (PLAVIX) 75 mg tablet Take 1 tablet (75 mg total) by mouth once daily. 30 tablet 11     finasteride (PROSCAR) 5 mg tablet Take 5 mg by mouth every evening.    Taking    glipiZIDE (GLUCOTROL) 10 MG TR24 Take 5 mg by mouth 2 (two) times daily.    Taking    insulin NPH-insulin regular, 70/30, (NOVOLIN 70/30) 100 unit/mL (70-30) injection Inject 10 Units into the skin 2 (two) times daily.       levothyroxine (SYNTHROID) 25 MCG tablet Take 25 mcg by mouth once daily.   Taking    losartan (COZAAR) 50 MG tablet Take 50 mg by mouth once daily.   Taking    metoprolol succinate (TOPROL-XL) 50 MG 24 hr tablet Take 1 tablet (50 mg total) by mouth once daily. 30 tablet 11 Taking    tacrolimus (PROTOPIC) 0.1 % ointment Apply topically 2 (two) times daily.       tamsulosin (FLOMAX) 0.4 mg Cp24 Take 0.4 mg by mouth once daily.   Taking       Review of patient's allergies indicates:   Allergen Reactions    Ace inhibitors Itching     Is not sure which medication it was       Past Medical History:   Diagnosis Date    BPH (benign prostatic hyperplasia)     Diabetes mellitus     Hemodialysis patient     Hypertension     Thyroid disease      Past Surgical History:   Procedure Laterality Date    bilateral foot surgery      eyelid surgery      INSERTION OF DIALYSIS CATHETER      MOH's  12/5/13    right hand surgery      stents       Family History     None        Tobacco Use    Smoking status: Never Smoker     Smokeless tobacco: Never Used   Substance and Sexual Activity    Alcohol use: No    Drug use: No    Sexual activity: Not on file     Review of Systems   Constitutional: Negative for activity change, chills, fatigue and fever.   HENT: Negative.    Eyes: Negative.    Respiratory: Positive for cough. Negative for chest tightness and shortness of breath.    Cardiovascular: Negative for chest pain and leg swelling.   Gastrointestinal: Negative for abdominal distention, abdominal pain, blood in stool, constipation, diarrhea, nausea and vomiting.   Genitourinary: Negative for dysuria.   Musculoskeletal: Negative for back pain.   Skin: Negative for color change and rash.   Neurological: Negative for dizziness, syncope, numbness and headaches.     Objective:     Vital Signs (Most Recent):  Temp: 97.9 °F (36.6 °C) (11/26/19 2336)  Pulse: 86 (11/26/19 2336)  Resp: 16 (11/26/19 2336)  BP: 132/63 (11/26/19 2336)  SpO2: 98 % (11/26/19 2336) Vital Signs (24h Range):  Temp:  [97.1 °F (36.2 °C)-98.2 °F (36.8 °C)] 97.9 °F (36.6 °C)  Pulse:  [77-86] 86  Resp:  [16-19] 16  SpO2:  [93 %-98 %] 98 %  BP: (109-132)/(55-63) 132/63        There is no height or weight on file to calculate BMI.    Physical Exam   Constitutional: He is oriented to person, place, and time. He appears well-developed.   HENT:   Head: Normocephalic and atraumatic.   Eyes: EOM are normal.   Neck: Normal range of motion.   Cardiovascular: Normal rate, regular rhythm and intact distal pulses.   Pulses:       Carotid pulses are 2+ on the right side, and 2+ on the left side.       Radial pulses are 2+ on the right side, and 2+ on the left side.        Femoral pulses are 2+ on the right side, and 2+ on the left side.       Popliteal pulses are 2+ on the right side, and 2+ on the left side.        Dorsalis pedis pulses are 2+ on the right side, and 2+ on the left side.        Posterior tibial pulses are 2+ on the right side, and 2+ on the left side.   AV fistula of  left upper arm.  Pulsatile in quality.  No thrill identified.   Pulmonary/Chest: Effort normal and breath sounds normal.   Abdominal: Soft. He exhibits no distension. There is no tenderness.   Musculoskeletal: Normal range of motion.   Neurological: He is alert and oriented to person, place, and time.   Skin: Skin is warm and dry.       Significant Labs:  ABGs: No results for input(s): PH, PCO2, PO2, HCO3, POCSATURATED, BE in the last 48 hours.  BMP:   Recent Labs   Lab 11/26/19  0619   *      K 4.7      CO2 21*   BUN 66*   CREATININE 11.04*   CALCIUM 9.2     CBC:   Recent Labs   Lab 11/26/19 0619   WBC 7.62   RBC 2.75*   HGB 8.1*   HCT 26.1*      MCV 95   MCH 29.5   MCHC 31.0*     Coagulation: No results for input(s): LABPROT, INR, APTT in the last 48 hours.  LFTs:   Recent Labs   Lab 11/26/19 0619   ALBUMIN 3.5       Significant Diagnostics:  Reviewed.    Assessment and Plan:     Thrombosis of kidney dialysis arteriovenous graft  Mr. Rajan is our 80yo male with ESRD (T,Th,S), HTN, DM2, and a history of previous MI's who presents with a thrombosed AV fistula.  Imaging confirms thrombus throughout the AV fistula.      - Will plan for fistulogram on 11/27/19 with placement of tunneled hemodialysis catheter to allow for dialysis  - Consult nephrology for ESRD and dialysis management  - Consent obtained, case request submitted  - NPO  - PRN pain/nausea meds  - SCDs        Daryl Harden MD  Vascular Surgery  [facility]

## 2019-11-27 NOTE — CONSULTS
[facility]  Nephrology  Consult Note    Patient Name: Jevon Rajan  MRN: [id number]  Admission Date: 11/26/2019  Hospital Length of Stay: 1 days  Attending Provider: MELANY Brenner III, MD   Primary Care Physician: Felipe Martino MD  Principal Problem:<principal problem not specified>    Inpatient consult to Nephrology  Consult performed by: Sadie Preez, MATT, FNP-C  Consult ordered by: Daryl Harden MD  Reason for consult: ESRD Management        Subjective:     HPI: The patient is an 79 year-old AAM with a medical history of T2DM, HTN, hypothyroidism, BPH, and ESRD( on HD, T/TH/S)  who presented to [facility] on 11/23 with complaints of L leg pain x~3 days and fever (101.3). He was found to have a DVT of his L superficial femoral vein on US. He was subsequently started on a heparin gtt but transitioned to Eliquis a day later after developing hematuria. Dialysis was attempted Saturday and Monday via his AVG. The patient states that the HD nurses had issues accessing his fistula both days.  An ultrasound evaluation of the fistula identified thrombus throughout. [place] [facility] [place]. According to the patient, he had a declot 1 month ago (sutures still in place). He currently denies any arm swelling, pain, or paraesthesia. 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 He is scheduled for a L perc declot today and possible perm-a-cath placement if declot is unsuccessful. The patient is a T/Th/Sat patient at [facility] in [place]. His typically dialyzes for 4 hrs via WILLIS AVG. His dry weight is around 73 kg. He still makes good urine, he states that he urinates 3-4x/day. Nephrology consulted for management of ESRD and HD treatment.     Past Medical History:   Diagnosis Date    BPH (benign prostatic hyperplasia)     Diabetes mellitus     Hemodialysis patient     Hypertension     Thyroid disease        Past Surgical History:    Procedure Laterality Date    bilateral foot surgery      eyelid surgery      INSERTION OF DIALYSIS CATHETER      [facility]  [place]    right hand surgery      stents         Review of patient's allergies indicates:   Allergen Reactions    Ace inhibitors Itching     Is not sure which medication it was     Current Facility-Administered Medications   Medication Frequency    0.9%  NaCl infusion Once    acetaminophen tablet 650 mg Q8H PRN    acetaminophen tablet 650 mg Q4H PRN    lidocaine (PF) 10 mg/ml (1%) injection 10 mg Once    melatonin tablet 6 mg Nightly PRN    ondansetron disintegrating tablet 8 mg Q8H PRN    oxyCODONE immediate release tablet 5 mg Q4H PRN    sodium chloride 0.9% flush 10 mL PRN     Family History     None        Tobacco Use    Smoking status: Never Smoker    Smokeless tobacco: Never Used   Substance and Sexual Activity    Alcohol use: No    Drug use: No    Sexual activity: Not on file     Review of Systems   Constitutional: Negative for activity change, chills, fatigue and fever.   HENT: Negative for hearing loss.    Eyes: Negative.  Negative for visual disturbance.   Respiratory: Negative for cough, chest tightness and shortness of breath.    Cardiovascular: Negative for chest pain and leg swelling.   Gastrointestinal: Negative for abdominal distention, abdominal pain, blood in stool, constipation, diarrhea, nausea and vomiting.   Genitourinary: Positive for decreased urine volume. Negative for dysuria.   Musculoskeletal: Negative for back pain.   Skin: Negative for color change and rash.   Neurological: Negative for dizziness, syncope, numbness and headaches.   Psychiatric/Behavioral: Negative for agitation, behavioral problems, confusion and decreased concentration.     Objective:     Vital Signs (Most Recent):  Temp: 98.3 °F (36.8 °C) (11/27/19 0425)  Pulse: 82 (11/27/19 0800)  Resp: 18 (11/27/19 0425)  BP: 136/67 (11/27/19 0800)  SpO2: 95 % (11/27/19 0800) Vital Signs (24h  Range):  Temp:  [97.1 °F (36.2 °C)-98.3 °F (36.8 °C)] 98.3 °F (36.8 °C)  Pulse:  [79-86] 82  Resp:  [16-18] 18  SpO2:  [94 %-98 %] 95 %  BP: (109-136)/(55-70) 136/67        There is no height or weight on file to calculate BMI.  There is no height or weight on file to calculate BSA.    No intake/output data recorded.    Physical Exam   Constitutional: He is oriented to person, place, and time. He appears well-developed.   HENT:   Head: Normocephalic and atraumatic.   Right Ear: External ear normal.   Left Ear: External ear normal.   Eyes: EOM are normal.   Neck: Normal range of motion.   Cardiovascular: Normal rate, regular rhythm and intact distal pulses.   AV fistula of left upper arm. No thrill identified.   Pulmonary/Chest: Effort normal and breath sounds normal. He has no decreased breath sounds. He has no rhonchi.   Abdominal: Soft. He exhibits no distension. There is no tenderness.   Musculoskeletal: Normal range of motion. He exhibits no edema or deformity.   Neurological: He is alert and oriented to person, place, and time.   Skin: Skin is warm and dry.       Significant Labs:  CBC:   Recent Labs   Lab 11/27/19  0145   WBC 7.87   RBC 2.75*   HGB 8.0*   HCT 26.6*      MCV 97   MCH 29.1   MCHC 30.1*     CMP:   Recent Labs   Lab 11/27/19  0145   *   CALCIUM 9.0   ALBUMIN 2.6*   PROT 6.8      K 5.1   CO2 21*      BUN 77*   CREATININE 12.8*   ALKPHOS 48*   ALT 10   AST 16   BILITOT 0.4         Assessment/Plan:     ESRD on dialysis  The patient is a 78 yo AAM who was transferred to [facility] for a declot after being found with nonfunctioning AVG at [facility]. The patient was admitted there with LLE DVT.     T/Th/Sat  [facility]   4 hrs  73 kg  Oliguric     Assessment:  - Will plan for HD today after declot procedure today with vascular. Will dialyze for metabolic clearance and volume management.  - Target UF 2-3 L as tolerated, keep MAP >65.  - Recommend renal diet when appropriate  - Phos 7.2,  restart binders with meals when appropriate  - Recommend daily renal function panels   - Hb 8.0, likely receives NAKIA outpatient, will obtain OP records   - Continue to monitor intake and output, daily weights   - Avoid nephrotoxic medication and renal dose medications to GFR  - No urgent need for RRT at this moment  - Will discuss with staff      Thrombosis of kidney dialysis arteriovenous graft  - Being followed by primary care team     Thank you for your consult. I will follow-up with patient. Please contact us if you have any additional questions.    Sadie Perez DNP, FNP-C  Nephrology  [facility]

## 2019-11-27 NOTE — SUBJECTIVE & OBJECTIVE
Past Medical History:   Diagnosis Date    BPH (benign prostatic hyperplasia)     Diabetes mellitus     Hemodialysis patient     Hypertension     Thyroid disease        Past Surgical History:   Procedure Laterality Date    bilateral foot surgery      eyelid surgery      INSERTION OF DIALYSIS CATHETER      [facility]  12/5/13    right hand surgery      stents         Review of patient's allergies indicates:   Allergen Reactions    Ace inhibitors Itching     Is not sure which medication it was     Current Facility-Administered Medications   Medication Frequency    0.9%  NaCl infusion Once    acetaminophen tablet 650 mg Q8H PRN    acetaminophen tablet 650 mg Q4H PRN    lidocaine (PF) 10 mg/ml (1%) injection 10 mg Once    melatonin tablet 6 mg Nightly PRN    ondansetron disintegrating tablet 8 mg Q8H PRN    oxyCODONE immediate release tablet 5 mg Q4H PRN    sodium chloride 0.9% flush 10 mL PRN     Family History     None        Tobacco Use    Smoking status: Never Smoker    Smokeless tobacco: Never Used   Substance and Sexual Activity    Alcohol use: No    Drug use: No    Sexual activity: Not on file     Review of Systems   Constitutional: Negative for activity change, chills, fatigue and fever.   HENT: Negative for hearing loss.    Eyes: Negative.  Negative for visual disturbance.   Respiratory: Negative for cough, chest tightness and shortness of breath.    Cardiovascular: Negative for chest pain and leg swelling.   Gastrointestinal: Negative for abdominal distention, abdominal pain, blood in stool, constipation, diarrhea, nausea and vomiting.   Genitourinary: Positive for decreased urine volume. Negative for dysuria.   Musculoskeletal: Negative for back pain.   Skin: Negative for color change and rash.   Neurological: Negative for dizziness, syncope, numbness and headaches.   Psychiatric/Behavioral: Negative for agitation, behavioral problems, confusion and decreased concentration.     Objective:      Vital Signs (Most Recent):  Temp: 98.3 °F (36.8 °C) (11/27/19 0425)  Pulse: 82 (11/27/19 0800)  Resp: 18 (11/27/19 0425)  BP: 136/67 (11/27/19 0800)  SpO2: 95 % (11/27/19 0800) Vital Signs (24h Range):  Temp:  [97.1 °F (36.2 °C)-98.3 °F (36.8 °C)] 98.3 °F (36.8 °C)  Pulse:  [79-86] 82  Resp:  [16-18] 18  SpO2:  [94 %-98 %] 95 %  BP: (109-136)/(55-70) 136/67        There is no height or weight on file to calculate BMI.  There is no height or weight on file to calculate BSA.    No intake/output data recorded.    Physical Exam   Constitutional: He is oriented to person, place, and time. He appears well-developed.   HENT:   Head: Normocephalic and atraumatic.   Right Ear: External ear normal.   Left Ear: External ear normal.   Eyes: EOM are normal.   Neck: Normal range of motion.   Cardiovascular: Normal rate, regular rhythm and intact distal pulses.   AV fistula of left upper arm. No thrill identified.   Pulmonary/Chest: Effort normal and breath sounds normal. He has no decreased breath sounds. He has no rhonchi.   Abdominal: Soft. He exhibits no distension. There is no tenderness.   Musculoskeletal: Normal range of motion. He exhibits no edema or deformity.   Neurological: He is alert and oriented to person, place, and time.   Skin: Skin is warm and dry.       Significant Labs:  CBC:   Recent Labs   Lab 11/27/19 0145   WBC 7.87   RBC 2.75*   HGB 8.0*   HCT 26.6*      MCV 97   MCH 29.1   MCHC 30.1*     CMP:   Recent Labs   Lab 11/27/19 0145   *   CALCIUM 9.0   ALBUMIN 2.6*   PROT 6.8      K 5.1   CO2 21*      BUN 77*   CREATININE 12.8*   ALKPHOS 48*   ALT 10   AST 16   BILITOT 0.4

## 2019-11-27 NOTE — PLAN OF CARE
11/27/19 1113   Post-Acute Status   Post-Acute Authorization Other   Other Status No Post-Acute Service Needs       SW is following this Pt for DC planning needs. There are no identified needs at this time.      Lisa Huff LMSW  [facility]  [id number]

## 2019-11-27 NOTE — SUBJECTIVE & OBJECTIVE
Medications Prior to Admission   Medication Sig Dispense Refill Last Dose    acarbose (PRECOSE) 50 MG Tab Take 100 mg by mouth 3 (three) times daily with meals.    Taking    amlodipine (NORVASC) 10 MG tablet Take 1 tablet (10 mg total) by mouth once daily. 60 tablet 11     apixaban (ELIQUIS) 5 mg Tab Take 1 tablet (5 mg total) by mouth 2 (two) times daily. 60 tablet 1     aspirin (ECOTRIN) 81 MG EC tablet Take 1 tablet (81 mg total) by mouth once daily. 30 tablet 11     atorvastatin (LIPITOR) 80 MG tablet Take 1 tablet (80 mg total) by mouth once daily. 30 tablet 11     carvedilol (COREG) 6.25 MG tablet Take 6.25 mg by mouth 2 (two) times daily with meals.       clopidogrel (PLAVIX) 75 mg tablet Take 1 tablet (75 mg total) by mouth once daily. 30 tablet 11     finasteride (PROSCAR) 5 mg tablet Take 5 mg by mouth every evening.    Taking    glipiZIDE (GLUCOTROL) 10 MG TR24 Take 5 mg by mouth 2 (two) times daily.    Taking    insulin NPH-insulin regular, 70/30, (NOVOLIN 70/30) 100 unit/mL (70-30) injection Inject 10 Units into the skin 2 (two) times daily.       levothyroxine (SYNTHROID) 25 MCG tablet Take 25 mcg by mouth once daily.   Taking    losartan (COZAAR) 50 MG tablet Take 50 mg by mouth once daily.   Taking    metoprolol succinate (TOPROL-XL) 50 MG 24 hr tablet Take 1 tablet (50 mg total) by mouth once daily. 30 tablet 11 Taking    tacrolimus (PROTOPIC) 0.1 % ointment Apply topically 2 (two) times daily.       tamsulosin (FLOMAX) 0.4 mg Cp24 Take 0.4 mg by mouth once daily.   Taking       Review of patient's allergies indicates:   Allergen Reactions    Ace inhibitors Itching     Is not sure which medication it was       Past Medical History:   Diagnosis Date    BPH (benign prostatic hyperplasia)     Diabetes mellitus     Hemodialysis patient     Hypertension     Thyroid disease      Past Surgical History:   Procedure Laterality Date    bilateral foot surgery      eyelid surgery       INSERTION OF DIALYSIS CATHETER      [facility]    right hand surgery      stents       Family History     None        Tobacco Use    Smoking status: Never Smoker    Smokeless tobacco: Never Used   Substance and Sexual Activity    Alcohol use: No    Drug use: No    Sexual activity: Not on file     Review of Systems   Constitutional: Negative for activity change, chills, fatigue and fever.   HENT: Negative.    Eyes: Negative.    Respiratory: Positive for cough. Negative for chest tightness and shortness of breath.    Cardiovascular: Negative for chest pain and leg swelling.   Gastrointestinal: Negative for abdominal distention, abdominal pain, blood in stool, constipation, diarrhea, nausea and vomiting.   Genitourinary: Negative for dysuria.   Musculoskeletal: Negative for back pain.   Skin: Negative for color change and rash.   Neurological: Negative for dizziness, syncope, numbness and headaches.     Objective:     Vital Signs (Most Recent):  Temp: 97.9 °F (36.6 °C) (11/26/19 2336)  Pulse: 86 (11/26/19 2336)  Resp: 16 (11/26/19 2336)  BP: 132/63 (11/26/19 2336)  SpO2: 98 % (11/26/19 2336) Vital Signs (24h Range):  Temp:  [97.1 °F (36.2 °C)-98.2 °F (36.8 °C)] 97.9 °F (36.6 °C)  Pulse:  [77-86] 86  Resp:  [16-19] 16  SpO2:  [93 %-98 %] 98 %  BP: (109-132)/(55-63) 132/63        There is no height or weight on file to calculate BMI.    Physical Exam   Constitutional: He is oriented to person, place, and time. He appears well-developed.   HENT:   Head: Normocephalic and atraumatic.   Eyes: EOM are normal.   Neck: Normal range of motion.   Cardiovascular: Normal rate, regular rhythm and intact distal pulses.   Pulses:       Carotid pulses are 2+ on the right side, and 2+ on the left side.       Radial pulses are 2+ on the right side, and 2+ on the left side.        Femoral pulses are 2+ on the right side, and 2+ on the left side.       Popliteal pulses are 2+ on the right side, and 2+ on the left side.         Dorsalis pedis pulses are 2+ on the right side, and 2+ on the left side.        Posterior tibial pulses are 2+ on the right side, and 2+ on the left side.   AV fistula of left upper arm.  Pulsatile in quality.  No thrill identified.   Pulmonary/Chest: Effort normal and breath sounds normal.   Abdominal: Soft. He exhibits no distension. There is no tenderness.   Musculoskeletal: Normal range of motion.   Neurological: He is alert and oriented to person, place, and time.   Skin: Skin is warm and dry.       Significant Labs:  ABGs: No results for input(s): PH, PCO2, PO2, HCO3, POCSATURATED, BE in the last 48 hours.  BMP:   Recent Labs   Lab 11/26/19 0619   *      K 4.7      CO2 21*   BUN 66*   CREATININE 11.04*   CALCIUM 9.2     CBC:   Recent Labs   Lab 11/26/19 0619   WBC 7.62   RBC 2.75*   HGB 8.1*   HCT 26.1*      MCV 95   MCH 29.5   MCHC 31.0*     Coagulation: No results for input(s): LABPROT, INR, APTT in the last 48 hours.  LFTs:   Recent Labs   Lab 11/26/19 0619   ALBUMIN 3.5       Significant Diagnostics:  None.

## 2019-11-28 VITALS
DIASTOLIC BLOOD PRESSURE: 54 MMHG | TEMPERATURE: 97 F | HEART RATE: 90 BPM | RESPIRATION RATE: 18 BRPM | OXYGEN SATURATION: 95 % | SYSTOLIC BLOOD PRESSURE: 104 MMHG

## 2019-11-28 PROCEDURE — 94761 N-INVAS EAR/PLS OXIMETRY MLT: CPT

## 2019-11-28 NOTE — NURSING
Pt discharged. Discharged instructions reviewed and explained, pt verbalized understanding. IV removed, catheter tip intact. Pt waiting on transportation. Will continue to monitor pt.

## 2019-11-28 NOTE — PROGRESS NOTES
Transported from unit to [facility] in wheelchair by writer.    Report given to Giovanni Ramos

## 2019-11-28 NOTE — PROGRESS NOTES
Hd Tx ended. 2L removed during 3.5Hr Tx. Tolerated well. Blood returned via WILLIS AVG. 15g needles removed x2. Gauze and tape applied. Pressure held for 5mins. Hemostasis achieved.

## 2019-12-02 ENCOUNTER — PATIENT OUTREACH (OUTPATIENT)
Dept: ADMINISTRATIVE | Facility: CLINIC | Age: 79
End: 2019-12-02

## 2019-12-02 NOTE — OP NOTE
11/27/2019      Pre-operative Diagnosis:    1. ESRD on Dialysis   2. AV graft thrombosis    Post-operative Diagnosis:   same.    Procedures:  1. Perc declot, L AVG (possis)  2. Stent placement x 2, L AVG (8x40, 9x40 Lifestents)  3. Conscious Sedation    Surgeon: MELANY Brenner III, MD, MD     Assistants: Jesus Moreland MD    Anesthesia: RN IV sedation    Findings/Key elements:   Succesful declot;  Due to signif mid pseudoaneurysm and distal stents, will likely need a completely new AVG in the near future    MAy cannulate on the venous-half side of the AVG (nearer the axilla)           Procedure Details:    After an informed consent was obtained, the patient was brought to the Cath Lab and placed in the supine psition. The L upper extremity was prepped and draped in the standard surgical fashion. The area overlying the planned access site was anesthetized using 1% lidocaine.  The distal aspect of the L AVG (by the arterial anastomosis) was accessed in the Venous facing direction with a micropuncture needle and wire, with confirmation of placement with fluoroscopy. This was followed by the micropuncture sheath. The micropuncture wire was exchanged for a J wire and the micropuncture sheath was exchanged for a 6 [place] short sheath. A 0.035 in Glidewire with Glide cathteer was used to advance wire into the central venous system. The patient was anticoagulated with IV heparin. A 7x40 dorado balloon was used at the outflow stent. Next the Possis angioget catheter was used in pulse spray mode to instill tPA throughout entire length of graft.      Next attention turned to arterial facing direction of the LAVG. The area overlying the planned access site was anesthetized using 1% lidocaine. Using palpation, the distal aspect of the L AVG was accessed in an Arterial facing direction with a micropuncture needle and wire, with placement confirmed by fluoroscopy, followed by the micropuncture sheath. The micropuncture wire was  exchanged for a J wire and the micropuncture sheath was exchanged for a 6 [place]ari short sheath. The J wire was exchanged for an 0.018 Advantage wire and with the assistance of an angled glide catheter the wire was threaded through the thrombosed AV graft and into the brachial artery. An #4 over the wire kelly was placed through the arterial facing sheath over the wire and carefully inflated while drawing back toward the sheath to perform thrombectomy of the arterial facing end of the AV graft . This step was repeated three times til all thrombus was felt to be removed.   Next the Possis Angiojet AVX catheter was placed through the venous facing sheath and the clot was aspirated with percutaneous mechanical thrombectomy. This was repeated several times to ensure adequate thrombectomy.  Follow up fistulogram demonstrated residual thrombus throughout the midportion of the graft, as well as a re-stenosis in the proximal stent. The angiojet was used again, but persistent thrombus was noted. At this point we placed an 8x40 and 9x40 life stent in the AV graft, extending into the prior stent. It was post dilated with the 7x40 dorado. Completion fistulogram demonstrated good flow, with brisk washout of contrast. Two U stitches of 3-0 Monocryl were placed around sheaths and sheaths were removed.  Pressure held on access sites for 5 minutes with good hemostasis.  Sterile dressing applied and patient taken to recovery    Dr Brenner was scrubbed and present for the entire procedure.    MODERATE SEDATION IN CATH LAB   CARMELO Brenner III, MD was present for moderate sedation  CARMELO Brenner III, MD monitored the patients cardio respiratory functions during the moderate sedation   See nurses notes for Intra-service start and end times and for the log of the name of the RN who administered the medicines for the moderate sedation, including their doseage and route.    Time of sedation:  60 mins.    EBL:  10 mL            Complications:  None; patient tolerated the procedure well.           Disposition: stable to recovery.    Jesus Moreland MD   Fellow, Vascular/Endovascular Surgery

## 2019-12-02 NOTE — PATIENT INSTRUCTIONS
Caring for Your Hemodialysis Access  A problem such as an infection or a blood clot may make the access unusable. Typically, this happens more often with an arteriovenous graft than with an arteriovenous fistula. If this happens, youll need a new access. To help your access last, you will need to follow certain guidelines.  Watching for problems  Call your healthcare provider right away if you:  Cant feel the blood flowing in the access (this sensation is called a thrill)  Have pain or numbness in your hand or arm  Have bleeding, redness, bluish discoloration, or warmth around your access  Notice your access suddenly bulging out more than usual (a slight bulge is normal)  Have a fever of 100.4°F (38°C) or higher, or as directed by your healthcare provider   Follow these and any other guidelines youre given  Dont wear tight clothes or jewelry around your access.  Dont let anyone take your blood pressure on or draw blood from the arm with the access. Also, dont let anyone put IV lines into it.  Protect your access from being hit or cut.  Wash your hands often and keep the area around the access clean.  Do not carry anything heavy or do anything that would put pressure on the access.  Feeling for your thrill    If you put your fingers over your access, you should feel the blood rushing through it. This is called a thrill, and it feels like a vibration. Feel for the thrill as often as you're told, usually once or twice a day. If you can't feel it, tell your healthcare provider right away. Blood may not be flowing through your access the way it should.  Important numbers  Write the names and numbers of your healthcare providers below. That way you will know how to get in touch with them.  Doctor:  Name ___________________ Phone ___________________  Surgeon:  Name ___________________ Phone ___________________  Dialysis Center:  Name ___________________ Phone ___________________   © 9492-7520 [employer],  [employer]. [address] [place] [address]. All rights reserved. This information is not intended as a substitute for professional medical care. Always follow your healthcare professional's instructions.     Hemodialysis Access Bleeding  You have a hemodialysis access in your arm, either an arteriovenous (AV) fistula or an artery to vein graft. It has been bleeding. Blood needs to flow freely through the fistula or graft. As part of your treatment, you are also taking medicine that thins your blood. This makes you bleed more easily. It is important to stop your fistula or graft from bleeding as soon as possible--you can quickly bleed to death from a rapidly bleeding hemodialysis access.     For a quickly bleeding fistula or graft, use firm pressure with a gauze or cloth until it stops.   Home care  If your fistula or graft site is bleeding:  · If it is bleeding quickly, immediately put pressure right on the spot that is bleeding with a gauze or cloth. Push hard till it stops. Then, get medical help right away.  · If it is just oozing a small amount of blood, follow these directions:  · Wash your hands. Dry them on a clean towel.  · Put a small piece of sterile gauze on the area that is bleeding.  · Press gently with your fingertips only on the area that is bleeding. Hold your fingers there for 30 minutes. Do not press on the whole forearm. Do not wrap anything around the wrist or arm, including bandages.  · After 30 minutes, take your fingers off the area that was bleeding.  · Wash and dry your hands again.  · If it is still bleeding, call your healthcare provider or go to a hospital.  General access site care  The following guidelines will help you care for your access site:  · Wash your hands often. Keep the access site clean.  · Check the fistula or graft for the pulsing feeling (thrill) every morning and night.  · Do not:  ¨ Let anyone take your blood pressure on your hemodialysis access arm.  ¨ Let anyone  take blood from or inject medication into the arm.  ¨ Wear jewelry or tight sleeves over the access site.  ¨ Wiggle the fistula or graft, or pick at your skin near the access.  ¨ Carry anything over the arm or lift heavy objects with that arm.  ¨ Sleep on your arm with the access site.  Follow-up care  Follow up with your healthcare provider, or as advised.  Call 911  Call 911 if any of the following occur:  · Pain, cold, or numbness in the hand that won't go away  · Pale color of the hand that won't go back to pink  · Any major bleeding, or minor bleeding from the access site that won't stop  When to seek medical advice  Call your healthcare provider right away if you have any of the following:  · Fever of 100.4°F (38°C) or higher, or as directed by your healthcare provider  · Red, hot, or swollen area around the access site  · Light-colored fluid coming from the area around the access site  · Pain or hardness around the access site  · Loss of pulsing feeling (thrill) over the fistula or graft    © 0178-7544 [employer]. [address] [place] [address]. All rights reserved. This information is not intended as a substitute for professional medical care. Always follow your healthcare professional's instructions.

## 2020-02-25 ENCOUNTER — HOSPITAL ENCOUNTER (EMERGENCY)
Facility: HOSPITAL | Age: 80
Discharge: HOME | End: 2020-02-25
Attending: EMERGENCY MEDICINE
Payer: MEDICARE

## 2020-02-25 VITALS
SYSTOLIC BLOOD PRESSURE: 147 MMHG | TEMPERATURE: 98 F | DIASTOLIC BLOOD PRESSURE: 69 MMHG | HEART RATE: 74 BPM | RESPIRATION RATE: 15 BRPM | OXYGEN SATURATION: 94 %

## 2020-02-25 DIAGNOSIS — R41.82 AMS (ALTERED MENTAL STATUS): ICD-10-CM

## 2020-02-25 DIAGNOSIS — E16.2 HYPOGLYCEMIA: Primary | ICD-10-CM

## 2020-02-25 LAB
ALBUMIN SERPL BCP-MCNC: 3.6 G/DL (ref 3.5–5.2)
ALP SERPL-CCNC: 45 U/L (ref 55–135)
ALT SERPL W/O P-5'-P-CCNC: 7 U/L (ref 10–44)
ANION GAP SERPL CALC-SCNC: 19 MMOL/L (ref 8–16)
AST SERPL-CCNC: 17 U/L (ref 10–40)
BASOPHILS # BLD AUTO: 0.04 K/UL (ref 0–0.2)
BASOPHILS NFR BLD: 0.6 % (ref 0–1.9)
BILIRUB SERPL-MCNC: 0.3 MG/DL (ref 0.1–1)
BUN SERPL-MCNC: 53 MG/DL (ref 8–23)
CALCIUM SERPL-MCNC: 8.8 MG/DL (ref 8.7–10.5)
CHLORIDE SERPL-SCNC: 102 MMOL/L (ref 95–110)
CO2 SERPL-SCNC: 20 MMOL/L (ref 23–29)
CREAT SERPL-MCNC: 11.3 MG/DL (ref 0.5–1.4)
DIFFERENTIAL METHOD: ABNORMAL
EOSINOPHIL # BLD AUTO: 0.1 K/UL (ref 0–0.5)
EOSINOPHIL NFR BLD: 0.8 % (ref 0–8)
ERYTHROCYTE [DISTWIDTH] IN BLOOD BY AUTOMATED COUNT: 16.4 % (ref 11.5–14.5)
EST. GFR  (AFRICAN AMERICAN): 4 ML/MIN/1.73 M^2
EST. GFR  (NON AFRICAN AMERICAN): 4 ML/MIN/1.73 M^2
GLUCOSE SERPL-MCNC: 31 MG/DL (ref 70–110)
HCT VFR BLD AUTO: 33 % (ref 40–54)
HGB BLD-MCNC: 10.4 G/DL (ref 14–18)
IMM GRANULOCYTES # BLD AUTO: 0.01 K/UL (ref 0–0.04)
IMM GRANULOCYTES NFR BLD AUTO: 0.1 % (ref 0–0.5)
LYMPHOCYTES # BLD AUTO: 1.6 K/UL (ref 1–4.8)
LYMPHOCYTES NFR BLD: 21.8 % (ref 18–48)
MCH RBC QN AUTO: 29.4 PG (ref 27–31)
MCHC RBC AUTO-ENTMCNC: 31.5 G/DL (ref 32–36)
MCV RBC AUTO: 93 FL (ref 82–98)
MONOCYTES # BLD AUTO: 0.5 K/UL (ref 0.3–1)
MONOCYTES NFR BLD: 6.7 % (ref 4–15)
NEUTROPHILS # BLD AUTO: 5 K/UL (ref 1.8–7.7)
NEUTROPHILS NFR BLD: 70 % (ref 38–73)
NRBC BLD-RTO: 0 /100 WBC
PLATELET # BLD AUTO: 217 K/UL (ref 150–350)
PMV BLD AUTO: 9.5 FL (ref 9.2–12.9)
POCT GLUCOSE: 123 MG/DL (ref 70–110)
POCT GLUCOSE: 31 MG/DL (ref 70–110)
POTASSIUM SERPL-SCNC: 3.7 MMOL/L (ref 3.5–5.1)
PROT SERPL-MCNC: 7.5 G/DL (ref 6–8.4)
RBC # BLD AUTO: 3.54 M/UL (ref 4.6–6.2)
SODIUM SERPL-SCNC: 141 MMOL/L (ref 136–145)
WBC # BLD AUTO: 7.2 K/UL (ref 3.9–12.7)

## 2020-02-25 PROCEDURE — 25000003 PHARM REV CODE 250

## 2020-02-25 PROCEDURE — 36410 VNPNXR 3YR/> PHY/QHP DX/THER: CPT

## 2020-02-25 PROCEDURE — 93005 ELECTROCARDIOGRAM TRACING: CPT

## 2020-02-25 PROCEDURE — 93010 ELECTROCARDIOGRAM REPORT: CPT | Mod: ,,, | Performed by: INTERNAL MEDICINE

## 2020-02-25 PROCEDURE — 93010 EKG 12-LEAD: ICD-10-PCS | Mod: ,,, | Performed by: INTERNAL MEDICINE

## 2020-02-25 PROCEDURE — 85025 COMPLETE CBC W/AUTO DIFF WBC: CPT

## 2020-02-25 PROCEDURE — 80053 COMPREHEN METABOLIC PANEL: CPT

## 2020-02-25 PROCEDURE — 96374 THER/PROPH/DIAG INJ IV PUSH: CPT

## 2020-02-25 PROCEDURE — 99285 EMERGENCY DEPT VISIT HI MDM: CPT | Mod: 25

## 2020-02-25 PROCEDURE — 82962 GLUCOSE BLOOD TEST: CPT

## 2020-02-25 RX ORDER — DEXTROSE 50 % IN WATER (D50W) INTRAVENOUS SYRINGE
Status: COMPLETED
Start: 2020-02-25 | End: 2020-02-25

## 2020-02-25 RX ADMIN — DEXTROSE MONOHYDRATE 50 ML: 25 INJECTION, SOLUTION INTRAVENOUS at 09:02

## 2020-02-25 NOTE — ED NOTES
"Pt sitting up eating meal tray, states last night " my sugar was 300, I took an extra pill, then went to sleep"   "

## 2020-02-25 NOTE — ED TRIAGE NOTES
Pt is a dialysis pt, when pt did not show up to dialysis today, dialysis nurse called pt's sister, sister went into house and found pt on floor and called EMS, pt combative, upon arrival, CBG=31, ER MD at bedside upon arrival, 2- PIV's started and amp D50 given, pt then returned to baseline and aaox3

## 2020-02-25 NOTE — ED PROVIDER NOTES
"Encounter Date: 2/25/2020    SCRIBE #1 NOTE: IBarney, am scribing for, and in the presence of, Jessie Mclaughlin MD.       History     Chief Complaint   Patient presents with    Altered Mental Status     Found on floor next to bed by family member after he did not arrive at dialysis today. EMS reports combative at scene with initial low O2 sats. Improved with high-flow O2. On arrival, awake, non-verbal.      Jevon Rajan is a 79 y.o. male who  has a past medical history of BPH (benign prostatic hyperplasia), Diabetes mellitus, Hemodialysis patient, Hypertension, and Thyroid disease.    The patient presents to the ED via EMS due to AMS. Patient was found by his sister at his home, on the floor next to his bed after he did not arrive at dialysis today. EMS reports patient was combative at the scene. His initial SpO2 was reportedly "low" with improvement on high-flow O2. On arrival to the ER, patient is nonverbal but groaning and thrashing around.    The history is provided by the EMS personnel.     Review of patient's allergies indicates:   Allergen Reactions    Ace inhibitors Itching     Is not sure which medication it was     Past Medical History:   Diagnosis Date    BPH (benign prostatic hyperplasia)     Diabetes mellitus     Hemodialysis patient     Hypertension     Thyroid disease      Past Surgical History:   Procedure Laterality Date    bilateral foot surgery      eyelid surgery      FISTULOGRAM Left 11/27/2019    Procedure: Fistulogram;  Surgeon: MELANY Brenner III, MD;  Location: [facility] OR [address] [place] [address];  Service: Peripheral Vascular;  Laterality: Left;    FISTULOGRAM Left 11/27/2019    Procedure: Fistulogram, AVG declot, possible permacath;  Surgeon: MELANY Brenner III, MD;  Location: [facility] CATH LAB;  Service: Peripheral Vascular;  Laterality: Left;    INSERTION OF DIALYSIS CATHETER      [facility]    right hand surgery      stents       History reviewed. No pertinent family history.  Social " History     Tobacco Use    Smoking status: Never Smoker    Smokeless tobacco: Never Used   Substance Use Topics    Alcohol use: No    Drug use: No     Review of Systems   Unable to perform ROS: Mental status change       Physical Exam     Initial Vitals   BP Pulse Resp Temp SpO2   02/25/20 0913 02/25/20 0856 02/25/20 0856 02/25/20 0914 02/25/20 0913   (!) 180/79 86 14 98.3 °F (36.8 °C) (!) 94 %      MAP       --                Physical Exam    Nursing note and vitals reviewed.  Constitutional: He appears well-developed and well-nourished.   Unresponsive.  Thrashing around   HENT:   Head: Normocephalic and atraumatic.   Eyes: EOM are normal. Pupils are equal, round, and reactive to light.   Neck: Normal range of motion. Neck supple.   Cardiovascular: Normal rate, regular rhythm, normal heart sounds and intact distal pulses.   Pulmonary/Chest: Breath sounds normal. No respiratory distress. He has no wheezes.   Abdominal: Soft. He exhibits no distension.   Musculoskeletal: Normal range of motion. He exhibits no edema.   Dialysis access to LUE with palpable thrill   Neurological:   Unresponsive   Skin: Skin is warm and dry.   Severe ectopic dermatitis         ED Course   External Jugular IV  Date/Time: 2/25/2020 8:57 AM  Performed by: Jessie Mclaughlin MD  Authorized by: Jessie Mclaughlin MD   Location (Ext Jugular): Left.  Catheter Size: 18 ga.  Number of attempts: 1  Fixation/Dressing: Taped in place.  Patient tolerance: Patient tolerated the procedure well with no immediate complications        Labs Reviewed   CBC W/ AUTO DIFFERENTIAL - Abnormal; Notable for the following components:       Result Value    RBC 3.54 (*)     Hemoglobin 10.4 (*)     Hematocrit 33.0 (*)     Mean Corpuscular Hemoglobin Conc 31.5 (*)     RDW 16.4 (*)     All other components within normal limits   COMPREHENSIVE METABOLIC PANEL - Abnormal; Notable for the following components:    CO2 20 (*)     Glucose 31 (*)     BUN, Bld 53 (*)     Creatinine  11.3 (*)     Alkaline Phosphatase 45 (*)     ALT 7 (*)     Anion Gap 19 (*)     eGFR if  4 (*)     eGFR if non  4 (*)     All other components within normal limits    Narrative:      GLU  critical result(s) called and verbal readback obtained from   Heike Rausch RN by EDYTA 2020 09:51   POCT GLUCOSE - Abnormal; Notable for the following components:    POCT Glucose 31 (*)     All other components within normal limits   POCT GLUCOSE - Abnormal; Notable for the following components:    POCT Glucose 123 (*)     All other components within normal limits   POCT GLUCOSE MONITORING CONTINUOUS     EKG Readings: (Independently Interpreted)    EK:55 a.m.  Rate 85 beats per minute  Normal sinus rhythm nonspecific ST T wave changes.       Imaging Results          X-Ray Chest 1 View (Final result)  Result time 20 09:22:58    Final result by Maddy Maki MD (20 09:22:58)                 Impression:      No acute cardiopulmonary abnormality.      Electronically signed by: Maddy Maki  Date:    2020  Time:    09:22             Narrative:    EXAMINATION:  XR CHEST 1 VIEW    CLINICAL HISTORY:  AMS;    TECHNIQUE:  Single view of the chest was obtained.    COMPARISON:  Multiple priors, most recent 2019    FINDINGS:  Atherosclerotic calcification of the aortic arch.    Normal cardiomediastinal contour. No focal consolidation, pleural effusion or pneumothorax.                                 Medical Decision Making:   Clinical Tests:   Lab Tests: Ordered and Reviewed  Radiological Study: Reviewed and Ordered  Medical Tests: Reviewed and Ordered                   ED Course as of  114   Tue 2020   0908 EMS reported accucheck of 84 en route. Accucheck in the ED was 31. Patient given an amp of D50 and he was awakened, knows his name,  and is wondering what happened.    [ST]      ED Course User Index  [ST] Jessie Mclaughlin MD                Clinical  Impression:       ICD-10-CM ICD-9-CM   1. Hypoglycemia E16.2 251.2   2. AMS (altered mental status) R41.82 780.97               ED Disposition Condition    Discharge Stable        ED Prescriptions     None        Follow-up Information     Follow up With Specialties Details Why Contact Info    Felipe Martino MD Dermatology Schedule an appointment as soon as possible for a visit   [address]  [place] [address]  [phone]                      I, Jessie Mclaughlin, personally performed the services described in this documentation. All medical record entries made by the scribe were at my direction and in my presence.  I have reviewed the chart and agree that the record reflects my personal performance and is accurate and complete. Jessie Mclaughlin M.D. 11:49 AM02/25/2020                 Jessie Mclaughlin MD  02/25/20 1150

## 2020-04-15 ENCOUNTER — TELEPHONE (OUTPATIENT)
Dept: UROLOGY | Facility: CLINIC | Age: 80
End: 2020-04-15

## 2020-04-15 NOTE — TELEPHONE ENCOUNTER
----- Message from Natalia Sommer sent at 4/15/2020  8:06 AM CDT -----  Contact: patient  Type:  Needs Medical Advice    Who Called:  Jevon  Would the patient rather a call back or a response via [facility]?  Call back  Best Call Back Number: [phone]  Additional Information:  Needs to make an appointment as his catheter was placed on yesterday

## 2021-03-08 ENCOUNTER — OFFICE VISIT (OUTPATIENT)
Dept: PODIATRY | Facility: CLINIC | Age: 81
End: 2021-03-08
Payer: MEDICARE

## 2021-03-08 VITALS
WEIGHT: 155 LBS | HEART RATE: 83 BPM | HEIGHT: 68 IN | SYSTOLIC BLOOD PRESSURE: 138 MMHG | BODY MASS INDEX: 23.49 KG/M2 | DIASTOLIC BLOOD PRESSURE: 67 MMHG

## 2021-03-08 DIAGNOSIS — Z79.4 TYPE 2 DIABETES MELLITUS WITH CHRONIC KIDNEY DISEASE ON CHRONIC DIALYSIS, WITH LONG-TERM CURRENT USE OF INSULIN: Primary | ICD-10-CM

## 2021-03-08 DIAGNOSIS — N18.6 TYPE 2 DIABETES MELLITUS WITH CHRONIC KIDNEY DISEASE ON CHRONIC DIALYSIS, WITH LONG-TERM CURRENT USE OF INSULIN: Primary | ICD-10-CM

## 2021-03-08 DIAGNOSIS — E11.22 TYPE 2 DIABETES MELLITUS WITH CHRONIC KIDNEY DISEASE ON CHRONIC DIALYSIS, WITH LONG-TERM CURRENT USE OF INSULIN: Primary | ICD-10-CM

## 2021-03-08 DIAGNOSIS — Z99.2 TYPE 2 DIABETES MELLITUS WITH CHRONIC KIDNEY DISEASE ON CHRONIC DIALYSIS, WITH LONG-TERM CURRENT USE OF INSULIN: Primary | ICD-10-CM

## 2021-03-08 DIAGNOSIS — M21.619 BUNION: ICD-10-CM

## 2021-03-08 DIAGNOSIS — M20.40 HAMMER TOE, UNSPECIFIED LATERALITY: ICD-10-CM

## 2021-03-08 PROCEDURE — 1125F AMNT PAIN NOTED PAIN PRSNT: CPT | Mod: S$GLB,,, | Performed by: PODIATRIST

## 2021-03-08 PROCEDURE — 99203 PR OFFICE/OUTPT VISIT, NEW, LEVL III, 30-44 MIN: ICD-10-PCS | Mod: S$GLB,,, | Performed by: PODIATRIST

## 2021-03-08 PROCEDURE — 99999 PR PBB SHADOW E&M-EST. PATIENT-LVL III: CPT | Mod: PBBFAC,,, | Performed by: PODIATRIST

## 2021-03-08 PROCEDURE — 1159F PR MEDICATION LIST DOCUMENTED IN MEDICAL RECORD: ICD-10-PCS | Mod: S$GLB,,, | Performed by: PODIATRIST

## 2021-03-08 PROCEDURE — 1159F MED LIST DOCD IN RCRD: CPT | Mod: S$GLB,,, | Performed by: PODIATRIST

## 2021-03-08 PROCEDURE — 3288F FALL RISK ASSESSMENT DOCD: CPT | Mod: CPTII,S$GLB,, | Performed by: PODIATRIST

## 2021-03-08 PROCEDURE — 1101F PT FALLS ASSESS-DOCD LE1/YR: CPT | Mod: CPTII,S$GLB,, | Performed by: PODIATRIST

## 2021-03-08 PROCEDURE — 99203 OFFICE O/P NEW LOW 30 MIN: CPT | Mod: S$GLB,,, | Performed by: PODIATRIST

## 2021-03-08 PROCEDURE — 1101F PR PT FALLS ASSESS DOC 0-1 FALLS W/OUT INJ PAST YR: ICD-10-PCS | Mod: CPTII,S$GLB,, | Performed by: PODIATRIST

## 2021-03-08 PROCEDURE — 99999 PR PBB SHADOW E&M-EST. PATIENT-LVL III: ICD-10-PCS | Mod: PBBFAC,,, | Performed by: PODIATRIST

## 2021-03-08 PROCEDURE — 3288F PR FALLS RISK ASSESSMENT DOCUMENTED: ICD-10-PCS | Mod: CPTII,S$GLB,, | Performed by: PODIATRIST

## 2021-03-08 PROCEDURE — 1125F PR PAIN SEVERITY QUANTIFIED, PAIN PRESENT: ICD-10-PCS | Mod: S$GLB,,, | Performed by: PODIATRIST

## 2021-03-08 RX ORDER — CICLOPIROX 80 MG/ML
SOLUTION TOPICAL NIGHTLY
Qty: 1 BOTTLE | Refills: 3 | Status: SHIPPED | OUTPATIENT
Start: 2021-03-08

## 2021-09-01 ENCOUNTER — HOSPITAL ENCOUNTER (EMERGENCY)
Facility: HOSPITAL | Age: 81
Discharge: ANOTHER HOSPITAL | End: 2021-09-03
Attending: EMERGENCY MEDICINE
Payer: MEDICARE

## 2021-09-01 DIAGNOSIS — Z99.2 ESRD ON DIALYSIS: Primary | ICD-10-CM

## 2021-09-01 DIAGNOSIS — N18.6 ESRD ON DIALYSIS: Primary | ICD-10-CM

## 2021-09-01 LAB
BUN SERPL-MCNC: 69 MG/DL (ref 6–30)
CHLORIDE SERPL-SCNC: 102 MMOL/L (ref 95–110)
CREAT SERPL-MCNC: 18.2 MG/DL (ref 0.5–1.4)
CTP QC/QA: YES
GLUCOSE SERPL-MCNC: 177 MG/DL (ref 70–110)
GLUCOSE SERPL-MCNC: 80 MG/DL (ref 70–110)
HCT VFR BLD CALC: 28 %PCV (ref 36–54)
POC IONIZED CALCIUM: 1.07 MMOL/L (ref 1.06–1.42)
POC TCO2 (MEASURED): 23 MMOL/L (ref 23–29)
POTASSIUM BLD-SCNC: 4.4 MMOL/L (ref 3.5–5.1)
SAMPLE: ABNORMAL
SARS-COV-2 RDRP RESP QL NAA+PROBE: NEGATIVE
SODIUM BLD-SCNC: 138 MMOL/L (ref 136–145)

## 2021-09-01 PROCEDURE — U0002 COVID-19 LAB TEST NON-CDC: HCPCS | Performed by: EMERGENCY MEDICINE

## 2021-09-01 PROCEDURE — 99283 EMERGENCY DEPT VISIT LOW MDM: CPT | Mod: 25

## 2021-09-01 PROCEDURE — 82962 GLUCOSE BLOOD TEST: CPT

## 2021-09-01 PROCEDURE — 80047 BASIC METABLC PNL IONIZED CA: CPT

## 2021-09-01 PROCEDURE — 99284 PR EMERGENCY DEPT VISIT,LEVEL IV: ICD-10-PCS | Mod: CS,,, | Performed by: EMERGENCY MEDICINE

## 2021-09-01 PROCEDURE — 99284 EMERGENCY DEPT VISIT MOD MDM: CPT | Mod: CS,,, | Performed by: EMERGENCY MEDICINE

## 2021-09-02 LAB
BUN SERPL-MCNC: 72 MG/DL (ref 6–30)
CHLORIDE SERPL-SCNC: 104 MMOL/L (ref 95–110)
CREAT SERPL-MCNC: 19.2 MG/DL (ref 0.5–1.4)
ESTIMATED AVG GLUCOSE: 146 MG/DL (ref 68–131)
GLUCOSE SERPL-MCNC: 87 MG/DL (ref 70–110)
HBA1C MFR BLD: 6.7 % (ref 4–5.6)
HCT VFR BLD CALC: 32 %PCV (ref 36–54)
POC IONIZED CALCIUM: 1.14 MMOL/L (ref 1.06–1.42)
POC TCO2 (MEASURED): 21 MMOL/L (ref 23–29)
POCT GLUCOSE: 143 MG/DL (ref 70–110)
POTASSIUM BLD-SCNC: 4.6 MMOL/L (ref 3.5–5.1)
SAMPLE: ABNORMAL
SODIUM BLD-SCNC: 138 MMOL/L (ref 136–145)
TSH SERPL DL<=0.005 MIU/L-ACNC: 2.55 UIU/ML (ref 0.4–4)

## 2021-09-02 PROCEDURE — 84443 ASSAY THYROID STIM HORMONE: CPT | Performed by: EMERGENCY MEDICINE

## 2021-09-02 PROCEDURE — 80047 BASIC METABLC PNL IONIZED CA: CPT

## 2021-09-02 PROCEDURE — 82962 GLUCOSE BLOOD TEST: CPT

## 2021-09-02 PROCEDURE — 83036 HEMOGLOBIN GLYCOSYLATED A1C: CPT | Performed by: EMERGENCY MEDICINE

## 2021-09-02 RX ORDER — PANTOPRAZOLE SODIUM 40 MG/1
40 TABLET, DELAYED RELEASE ORAL DAILY
Qty: 30 TABLET | Refills: 11 | Status: SHIPPED | OUTPATIENT
Start: 2021-09-02 | End: 2022-09-02

## 2021-09-03 ENCOUNTER — HOSPITAL ENCOUNTER (EMERGENCY)
Facility: HOSPITAL | Age: 81
Discharge: HOME | End: 2021-09-04
Attending: EMERGENCY MEDICINE
Payer: MEDICARE

## 2021-09-03 VITALS
HEART RATE: 77 BPM | DIASTOLIC BLOOD PRESSURE: 63 MMHG | RESPIRATION RATE: 18 BRPM | TEMPERATURE: 98 F | OXYGEN SATURATION: 94 % | SYSTOLIC BLOOD PRESSURE: 136 MMHG

## 2021-09-03 DIAGNOSIS — Z13.9 ENCOUNTER FOR MEDICAL SCREENING EXAMINATION: Primary | ICD-10-CM

## 2021-09-03 LAB
BUN SERPL-MCNC: 84 MG/DL (ref 6–30)
CHLORIDE SERPL-SCNC: 107 MMOL/L (ref 95–110)
CREAT SERPL-MCNC: >20 MG/DL (ref 0.5–1.4)
GLUCOSE SERPL-MCNC: 77 MG/DL (ref 70–110)
HCT VFR BLD CALC: 29 %PCV (ref 36–54)
POC IONIZED CALCIUM: 1.11 MMOL/L (ref 1.06–1.42)
POC TCO2 (MEASURED): 19 MMOL/L (ref 23–29)
POCT GLUCOSE: 177 MG/DL (ref 70–110)
POTASSIUM BLD-SCNC: 4.5 MMOL/L (ref 3.5–5.1)
SAMPLE: ABNORMAL
SODIUM BLD-SCNC: 139 MMOL/L (ref 136–145)

## 2021-09-03 PROCEDURE — 99281 EMR DPT VST MAYX REQ PHY/QHP: CPT | Mod: ,,, | Performed by: EMERGENCY MEDICINE

## 2021-09-03 PROCEDURE — 99282 EMERGENCY DEPT VISIT SF MDM: CPT

## 2021-09-03 PROCEDURE — 99281 PR EMERGENCY DEPT VISIT,LEVEL I: ICD-10-PCS | Mod: ,,, | Performed by: EMERGENCY MEDICINE

## 2021-09-03 PROCEDURE — 80047 BASIC METABLC PNL IONIZED CA: CPT

## 2021-09-04 VITALS
RESPIRATION RATE: 16 BRPM | HEART RATE: 72 BPM | DIASTOLIC BLOOD PRESSURE: 80 MMHG | OXYGEN SATURATION: 99 % | SYSTOLIC BLOOD PRESSURE: 132 MMHG | TEMPERATURE: 98 F

## 2021-09-04 RX ORDER — ATORVASTATIN CALCIUM 40 MG/1
80 TABLET, FILM COATED ORAL DAILY
Status: DISCONTINUED | OUTPATIENT
Start: 2021-09-04 | End: 2021-09-04 | Stop reason: HOSPADM

## 2021-09-04 RX ORDER — CARVEDILOL 3.12 MG/1
6.25 TABLET ORAL 2 TIMES DAILY WITH MEALS
Status: DISCONTINUED | OUTPATIENT
Start: 2021-09-04 | End: 2021-09-04 | Stop reason: HOSPADM

## 2021-11-08 ENCOUNTER — TELEPHONE (OUTPATIENT)
Dept: NEUROLOGY | Facility: CLINIC | Age: 81
End: 2021-11-08
Payer: MEDICARE

## 2021-11-29 ENCOUNTER — DOCUMENTATION ONLY (OUTPATIENT)
Dept: NEPHROLOGY | Facility: CLINIC | Age: 81
End: 2021-11-29
Payer: MEDICARE

## 2022-08-13 ENCOUNTER — HOSPITAL ENCOUNTER (EMERGENCY)
Facility: HOSPITAL | Age: 82
Discharge: HOME | End: 2022-08-13
Attending: FAMILY MEDICINE
Payer: MEDICARE

## 2022-08-13 VITALS
BODY MASS INDEX: 22.81 KG/M2 | DIASTOLIC BLOOD PRESSURE: 65 MMHG | WEIGHT: 150 LBS | RESPIRATION RATE: 15 BRPM | TEMPERATURE: 98 F | SYSTOLIC BLOOD PRESSURE: 130 MMHG | OXYGEN SATURATION: 100 % | HEART RATE: 62 BPM

## 2022-08-13 DIAGNOSIS — M79.642 BILATERAL HAND PAIN: Primary | ICD-10-CM

## 2022-08-13 DIAGNOSIS — M25.512 ACUTE PAIN OF LEFT SHOULDER: ICD-10-CM

## 2022-08-13 DIAGNOSIS — M79.641 BILATERAL HAND PAIN: Primary | ICD-10-CM

## 2022-08-13 LAB
ALBUMIN SERPL BCP-MCNC: 4.3 G/DL (ref 3.5–5.2)
ALP SERPL-CCNC: 51 U/L (ref 38–126)
ALT SERPL W/O P-5'-P-CCNC: 15 U/L (ref 10–44)
ANION GAP SERPL CALC-SCNC: 13 MMOL/L (ref 8–16)
AST SERPL-CCNC: 15 U/L (ref 15–46)
BASOPHILS # BLD AUTO: 0.03 K/UL (ref 0–0.2)
BASOPHILS NFR BLD: 0.5 % (ref 0–1.9)
BILIRUB SERPL-MCNC: 0.6 MG/DL (ref 0.1–1)
CALCIUM SERPL-MCNC: 9.2 MG/DL (ref 8.7–10.5)
CHLORIDE SERPL-SCNC: 95 MMOL/L (ref 95–110)
CO2 SERPL-SCNC: 31 MMOL/L (ref 23–29)
CREAT SERPL-MCNC: 8.6 MG/DL (ref 0.5–1.4)
DIFFERENTIAL METHOD: ABNORMAL
EOSINOPHIL # BLD AUTO: 0.1 K/UL (ref 0–0.5)
EOSINOPHIL NFR BLD: 1.3 % (ref 0–8)
ERYTHROCYTE [DISTWIDTH] IN BLOOD BY AUTOMATED COUNT: 17.5 % (ref 11.5–14.5)
EST. GFR  (NO RACE VARIABLE): 5.6 ML/MIN/1.73 M^2
GLUCOSE SERPL-MCNC: 146 MG/DL (ref 70–110)
HCT VFR BLD AUTO: 38.2 % (ref 40–54)
HGB BLD-MCNC: 12.2 G/DL (ref 14–18)
IMM GRANULOCYTES # BLD AUTO: 0.02 K/UL (ref 0–0.04)
IMM GRANULOCYTES NFR BLD AUTO: 0.3 % (ref 0–0.5)
LYMPHOCYTES # BLD AUTO: 1.2 K/UL (ref 1–4.8)
LYMPHOCYTES NFR BLD: 18.1 % (ref 18–48)
MCH RBC QN AUTO: 29.8 PG (ref 27–31)
MCHC RBC AUTO-ENTMCNC: 31.9 G/DL (ref 32–36)
MCV RBC AUTO: 93 FL (ref 82–98)
MONOCYTES # BLD AUTO: 0.5 K/UL (ref 0.3–1)
MONOCYTES NFR BLD: 8.2 % (ref 4–15)
NEUTROPHILS # BLD AUTO: 4.5 K/UL (ref 1.8–7.7)
NEUTROPHILS NFR BLD: 71.6 % (ref 38–73)
NRBC BLD-RTO: 0 /100 WBC
PLATELET # BLD AUTO: 213 K/UL (ref 150–450)
PMV BLD AUTO: 10 FL (ref 9.2–12.9)
POTASSIUM SERPL-SCNC: 4 MMOL/L (ref 3.5–5.1)
PROT SERPL-MCNC: 8.1 G/DL (ref 6–8.4)
RBC # BLD AUTO: 4.09 M/UL (ref 4.6–6.2)
SODIUM SERPL-SCNC: 139 MMOL/L (ref 136–145)
URATE SERPL-MCNC: 3.6 MG/DL (ref 3.4–7)
UUN UR-MCNC: 34 MG/DL (ref 2–20)
WBC # BLD AUTO: 6.34 K/UL (ref 3.9–12.7)

## 2022-08-13 PROCEDURE — 99284 EMERGENCY DEPT VISIT MOD MDM: CPT | Mod: 25,ER

## 2022-08-13 PROCEDURE — 63600175 PHARM REV CODE 636 W HCPCS: Mod: ER | Performed by: FAMILY MEDICINE

## 2022-08-13 PROCEDURE — 96374 THER/PROPH/DIAG INJ IV PUSH: CPT | Mod: ER

## 2022-08-13 PROCEDURE — 80053 COMPREHEN METABOLIC PANEL: CPT | Mod: ER | Performed by: FAMILY MEDICINE

## 2022-08-13 PROCEDURE — 85025 COMPLETE CBC W/AUTO DIFF WBC: CPT | Mod: ER | Performed by: FAMILY MEDICINE

## 2022-08-13 PROCEDURE — 96375 TX/PRO/DX INJ NEW DRUG ADDON: CPT | Mod: ER

## 2022-08-13 PROCEDURE — 84550 ASSAY OF BLOOD/URIC ACID: CPT | Performed by: FAMILY MEDICINE

## 2022-08-13 RX ORDER — KETOROLAC TROMETHAMINE 30 MG/ML
10 INJECTION, SOLUTION INTRAMUSCULAR; INTRAVENOUS
Status: COMPLETED | OUTPATIENT
Start: 2022-08-13 | End: 2022-08-13

## 2022-08-13 RX ORDER — KETOROLAC TROMETHAMINE 30 MG/ML
10 INJECTION, SOLUTION INTRAMUSCULAR; INTRAVENOUS
Status: DISCONTINUED | OUTPATIENT
Start: 2022-08-13 | End: 2022-08-13

## 2022-08-13 RX ORDER — DEXAMETHASONE SODIUM PHOSPHATE 4 MG/ML
4 INJECTION, SOLUTION INTRA-ARTICULAR; INTRALESIONAL; INTRAMUSCULAR; INTRAVENOUS; SOFT TISSUE
Status: COMPLETED | OUTPATIENT
Start: 2022-08-13 | End: 2022-08-13

## 2022-08-13 RX ORDER — TRAMADOL HYDROCHLORIDE 50 MG/1
50 TABLET ORAL EVERY 8 HOURS PRN
Qty: 9 TABLET | Refills: 0 | Status: SHIPPED | OUTPATIENT
Start: 2022-08-13

## 2022-08-13 RX ADMIN — DEXAMETHASONE SODIUM PHOSPHATE 4 MG: 4 INJECTION, SOLUTION INTRA-ARTICULAR; INTRALESIONAL; INTRAMUSCULAR; INTRAVENOUS; SOFT TISSUE at 02:08

## 2022-08-13 RX ADMIN — KETOROLAC TROMETHAMINE 10 MG: 30 INJECTION, SOLUTION INTRAMUSCULAR; INTRAVENOUS at 01:08

## 2022-08-13 NOTE — ED PROVIDER NOTES
Encounter Date: 8/13/2022       History     Chief Complaint   Patient presents with    Arm Pain           Hand Pain     I am having both my hands are hurting. I am having left shoulder pain also for two days. [facility] creme for my pain but it didn't help.      82-year-old male with history of end-stage renal disease on dialysis presents from dialysis by EMS complaining of pain in his hands and left shoulder for last 2 days.  Denies injury.  Pain in his Left 2nd MCP joint area.  Right 5th digit, left shoulder.  Pain increases with movement of joints.  Patient had old graft in his left plantar aspect.  [facility] and was given cream to apply to his hands which did not make him better.    The history is provided by the patient.     Review of patient's allergies indicates:   Allergen Reactions    Ace inhibitors Itching     Is not sure which medication it was    Captopril      Past Medical History:   Diagnosis Date    BPH (benign prostatic hyperplasia)     Diabetes mellitus     Hemodialysis patient     Hypertension     Thyroid disease      Past Surgical History:   Procedure Laterality Date    bilateral foot surgery      eyelid surgery      FISTULOGRAM Left 11/27/2019    Procedure: Fistulogram;  Surgeon: MELANY Brenner III, MD;  Location: [facility] OR [address] [place] [address];  Service: Peripheral Vascular;  Laterality: Left;    FISTULOGRAM Left 11/27/2019    Procedure: Fistulogram, AVG declot, possible permacath;  Surgeon: MELANY Brenner III, MD;  Location: [facility] CATH LAB;  Service: Peripheral Vascular;  Laterality: Left;    INSERTION OF DIALYSIS CATHETER      [facility]    right hand surgery      stents       History reviewed. No pertinent family history.  Social History     Tobacco Use    Smoking status: Never Smoker    Smokeless tobacco: Never Used   Substance Use Topics    Alcohol use: No    Drug use: No     Review of Systems   Constitutional:  Negative for fever.   HENT: Negative for sore throat.    Respiratory: Negative for shortness of breath.    Cardiovascular: Negative for chest pain.   Gastrointestinal: Negative for nausea.   Genitourinary: Negative for dysuria.   Musculoskeletal: Positive for arthralgias. Negative for back pain.   Skin: Negative for rash.   Neurological: Negative for weakness.   Hematological: Does not bruise/bleed easily.   All other systems reviewed and are negative.      Physical Exam     Initial Vitals [08/13/22 1252]   BP Pulse Resp Temp SpO2   130/65 62 15 97.6 °F (36.4 °C) 100 %      MAP       --         Physical Exam    Nursing note and vitals reviewed.  Constitutional: He appears well-developed and well-nourished. He is not diaphoretic. No distress.   HENT:   Head: Normocephalic.   Eyes: Conjunctivae are normal.   Cardiovascular: Normal rate.   Pulmonary/Chest: Breath sounds normal. No respiratory distress. He has no wheezes.   Abdominal: Abdomen is soft. Bowel sounds are normal.   Musculoskeletal:         General: Normal range of motion.      Comments: Left 1st MCP joint pain and tenderness with mild swelling.  Right 5th digit- with flexion deformity.  Left palm with previous graft intact.    Left shoulder tenderness with movement of shoulder.  Dialysis graft in left arm.       Neurological: He is oriented to person, place, and time. He has normal strength. GCS score is 15. GCS eye subscore is 4. GCS verbal subscore is 5. GCS motor subscore is 6.   Skin: Skin is warm. Capillary refill takes less than 2 seconds.   Psychiatric: He has a normal mood and affect.         ED Course   Procedures  Labs Reviewed   CBC W/ AUTO DIFFERENTIAL - Abnormal; Notable for the following components:       Result Value    RBC 4.09 (*)     Hemoglobin 12.2 (*)     Hematocrit 38.2 (*)     MCHC 31.9 (*)     RDW 17.5 (*)     All other components within normal limits   COMPREHENSIVE METABOLIC PANEL - Abnormal; Notable for the following components:     CO2 31 (*)     Glucose 146 (*)     BUN 34 (*)     Creatinine 8.60 (*)     eGFR 5.6 (*)     All other components within normal limits   URIC ACID          Imaging Results    None          Medications   ketorolac injection 9.999 mg (9.999 mg Intravenous Given 8/13/22 1314)   dexamethasone injection 4 mg (4 mg Intravenous Given 8/13/22 1410)     Medical Decision Making:   Initial Assessment:   History of arthritis with joint pains.- more for last 2 days.  No chest pain or shortness of breath.  Receive dialysis today.  Differential Diagnosis:   Arthritis, gout, muscle strain.  Clinical Tests:   Radiological Study: Ordered and Reviewed  ED Management:  Arthritis treated with Toradol in ED.  Along with Decadron.  Advised to control his blood sugar since it can go up with steroid.  , pain medication as needed.  Follow-up with primary care physician.  ED with any worsening symptoms.                      Clinical Impression:   Final diagnoses:  [M79.641, M79.642] Bilateral hand pain (Primary)  [M25.512] Acute pain of left shoulder          ED Disposition Condition    Discharge Stable        ED Prescriptions     Medication Sig Dispense Start Date End Date Auth. Provider    traMADoL (ULTRAM) 50 mg tablet Take 1 tablet (50 mg total) by mouth every 8 (eight) hours as needed for Pain. 9 tablet 8/13/2022  Sebastien Jessica MD        Follow-up Information     Follow up With Specialties Details Why Contact Info    Felipe Martino MD Dermatology Schedule an appointment as soon as possible for a visit in 1 week If symptoms worsen [address]  [place] [address]  [phone]             Sebastien Jessica MD  08/13/22 4858

## 2022-11-04 ENCOUNTER — TELEPHONE (OUTPATIENT)
Dept: ADMINISTRATIVE | Facility: OTHER | Age: 82
End: 2022-11-04
Payer: MEDICARE

## 2022-12-25 ENCOUNTER — HOSPITAL ENCOUNTER (EMERGENCY)
Facility: HOSPITAL | Age: 82
Discharge: HOME | End: 2022-12-25
Attending: FAMILY MEDICINE
Payer: MEDICARE

## 2022-12-25 VITALS
BODY MASS INDEX: 24.01 KG/M2 | HEIGHT: 67 IN | WEIGHT: 153 LBS | RESPIRATION RATE: 20 BRPM | HEART RATE: 114 BPM | SYSTOLIC BLOOD PRESSURE: 134 MMHG | TEMPERATURE: 98 F | OXYGEN SATURATION: 99 % | DIASTOLIC BLOOD PRESSURE: 78 MMHG

## 2022-12-25 DIAGNOSIS — Z99.2 ESRD ON DIALYSIS: ICD-10-CM

## 2022-12-25 DIAGNOSIS — E87.5 HYPERKALEMIA: ICD-10-CM

## 2022-12-25 DIAGNOSIS — M79.642 PAIN OF LEFT HAND: Primary | ICD-10-CM

## 2022-12-25 DIAGNOSIS — N18.6 ESRD ON DIALYSIS: ICD-10-CM

## 2022-12-25 LAB
ALBUMIN SERPL BCP-MCNC: 4.4 G/DL (ref 3.5–5.2)
ALP SERPL-CCNC: 66 U/L (ref 38–126)
ALT SERPL W/O P-5'-P-CCNC: 12 U/L (ref 10–44)
ANION GAP SERPL CALC-SCNC: 13 MMOL/L (ref 8–16)
ANION GAP SERPL CALC-SCNC: 15 MMOL/L (ref 8–16)
AST SERPL-CCNC: 14 U/L (ref 15–46)
BASOPHILS # BLD AUTO: 0.04 K/UL (ref 0–0.2)
BASOPHILS NFR BLD: 0.4 % (ref 0–1.9)
BILIRUB SERPL-MCNC: 0.4 MG/DL (ref 0.1–1)
CALCIUM SERPL-MCNC: 8.5 MG/DL (ref 8.7–10.5)
CALCIUM SERPL-MCNC: 9.1 MG/DL (ref 8.7–10.5)
CHLORIDE SERPL-SCNC: 105 MMOL/L (ref 95–110)
CHLORIDE SERPL-SCNC: 105 MMOL/L (ref 95–110)
CO2 SERPL-SCNC: 23 MMOL/L (ref 23–29)
CO2 SERPL-SCNC: 23 MMOL/L (ref 23–29)
CREAT SERPL-MCNC: 13.92 MG/DL (ref 0.5–1.4)
CREAT SERPL-MCNC: 14.39 MG/DL (ref 0.5–1.4)
DIFFERENTIAL METHOD: ABNORMAL
EOSINOPHIL # BLD AUTO: 0.1 K/UL (ref 0–0.5)
EOSINOPHIL NFR BLD: 0.9 % (ref 0–8)
ERYTHROCYTE [DISTWIDTH] IN BLOOD BY AUTOMATED COUNT: 15.9 % (ref 11.5–14.5)
EST. GFR  (NO RACE VARIABLE): 3 ML/MIN/1.73 M^2
EST. GFR  (NO RACE VARIABLE): 3.1 ML/MIN/1.73 M^2
GLUCOSE SERPL-MCNC: 149 MG/DL (ref 70–110)
GLUCOSE SERPL-MCNC: 154 MG/DL (ref 70–110)
HCT VFR BLD AUTO: 29.6 % (ref 40–54)
HGB BLD-MCNC: 9.3 G/DL (ref 14–18)
IMM GRANULOCYTES # BLD AUTO: 0.03 K/UL (ref 0–0.04)
IMM GRANULOCYTES NFR BLD AUTO: 0.3 % (ref 0–0.5)
LYMPHOCYTES # BLD AUTO: 1.6 K/UL (ref 1–4.8)
LYMPHOCYTES NFR BLD: 15.3 % (ref 18–48)
MCH RBC QN AUTO: 31.1 PG (ref 27–31)
MCHC RBC AUTO-ENTMCNC: 31.4 G/DL (ref 32–36)
MCV RBC AUTO: 99 FL (ref 82–98)
MONOCYTES # BLD AUTO: 0.8 K/UL (ref 0.3–1)
MONOCYTES NFR BLD: 7.1 % (ref 4–15)
NEUTROPHILS # BLD AUTO: 8 K/UL (ref 1.8–7.7)
NEUTROPHILS NFR BLD: 76 % (ref 38–73)
NRBC BLD-RTO: 0 /100 WBC
PLATELET # BLD AUTO: 244 K/UL (ref 150–450)
PMV BLD AUTO: 11.1 FL (ref 9.2–12.9)
POTASSIUM SERPL-SCNC: 5.1 MMOL/L (ref 3.5–5.1)
POTASSIUM SERPL-SCNC: 5.4 MMOL/L (ref 3.5–5.1)
PROT SERPL-MCNC: 7.2 G/DL (ref 6–8.4)
RBC # BLD AUTO: 2.99 M/UL (ref 4.6–6.2)
SODIUM SERPL-SCNC: 141 MMOL/L (ref 136–145)
SODIUM SERPL-SCNC: 143 MMOL/L (ref 136–145)
UUN UR-MCNC: 57 MG/DL (ref 2–20)
UUN UR-MCNC: 58 MG/DL (ref 2–20)
WBC # BLD AUTO: 10.5 K/UL (ref 3.9–12.7)

## 2022-12-25 PROCEDURE — 94640 AIRWAY INHALATION TREATMENT: CPT | Mod: ER

## 2022-12-25 PROCEDURE — 80053 COMPREHEN METABOLIC PANEL: CPT | Mod: ER | Performed by: FAMILY MEDICINE

## 2022-12-25 PROCEDURE — 80048 BASIC METABOLIC PNL TOTAL CA: CPT | Mod: ER,XB | Performed by: FAMILY MEDICINE

## 2022-12-25 PROCEDURE — 99900035 HC TECH TIME PER 15 MIN (STAT): Mod: ER

## 2022-12-25 PROCEDURE — 96375 TX/PRO/DX INJ NEW DRUG ADDON: CPT | Mod: ER

## 2022-12-25 PROCEDURE — 63600175 PHARM REV CODE 636 W HCPCS: Mod: ER | Performed by: FAMILY MEDICINE

## 2022-12-25 PROCEDURE — 93010 EKG 12-LEAD: ICD-10-PCS | Mod: ,,, | Performed by: INTERNAL MEDICINE

## 2022-12-25 PROCEDURE — 25000003 PHARM REV CODE 250: Mod: ER | Performed by: FAMILY MEDICINE

## 2022-12-25 PROCEDURE — 99285 EMERGENCY DEPT VISIT HI MDM: CPT | Mod: 25,ER

## 2022-12-25 PROCEDURE — 96365 THER/PROPH/DIAG IV INF INIT: CPT | Mod: ER

## 2022-12-25 PROCEDURE — 25000242 PHARM REV CODE 250 ALT 637 W/ HCPCS: Mod: ER | Performed by: FAMILY MEDICINE

## 2022-12-25 PROCEDURE — 85025 COMPLETE CBC W/AUTO DIFF WBC: CPT | Mod: ER | Performed by: FAMILY MEDICINE

## 2022-12-25 PROCEDURE — 93010 ELECTROCARDIOGRAM REPORT: CPT | Mod: ,,, | Performed by: INTERNAL MEDICINE

## 2022-12-25 PROCEDURE — 93005 ELECTROCARDIOGRAM TRACING: CPT | Mod: ER

## 2022-12-25 RX ORDER — ALBUTEROL SULFATE 2.5 MG/.5ML
10 SOLUTION RESPIRATORY (INHALATION)
Status: COMPLETED | OUTPATIENT
Start: 2022-12-25 | End: 2022-12-25

## 2022-12-25 RX ORDER — HYDROCODONE BITARTRATE AND ACETAMINOPHEN 5; 325 MG/1; MG/1
1 TABLET ORAL EVERY 8 HOURS PRN
Qty: 9 TABLET | Refills: 0 | Status: SHIPPED | OUTPATIENT
Start: 2022-12-25

## 2022-12-25 RX ORDER — CALCIUM GLUCONATE 20 MG/ML
1 INJECTION, SOLUTION INTRAVENOUS
Status: COMPLETED | OUTPATIENT
Start: 2022-12-25 | End: 2022-12-25

## 2022-12-25 RX ORDER — SODIUM BICARBONATE 1 MEQ/ML
50 SYRINGE (ML) INTRAVENOUS
Status: COMPLETED | OUTPATIENT
Start: 2022-12-25 | End: 2022-12-25

## 2022-12-25 RX ORDER — HYDROMORPHONE HYDROCHLORIDE 1 MG/ML
1 INJECTION, SOLUTION INTRAMUSCULAR; INTRAVENOUS; SUBCUTANEOUS
Status: COMPLETED | OUTPATIENT
Start: 2022-12-25 | End: 2022-12-25

## 2022-12-25 RX ADMIN — CALCIUM GLUCONATE 1 G: 20 INJECTION, SOLUTION INTRAVENOUS at 08:12

## 2022-12-25 RX ADMIN — SODIUM BICARBONATE 50 MEQ: 84 INJECTION, SOLUTION INTRAVENOUS at 08:12

## 2022-12-25 RX ADMIN — SODIUM ZIRCONIUM CYCLOSILICATE 10 G: 10 POWDER, FOR SUSPENSION ORAL at 08:12

## 2022-12-25 RX ADMIN — HYDROMORPHONE HYDROCHLORIDE 1 MG: 1 INJECTION, SOLUTION INTRAMUSCULAR; INTRAVENOUS; SUBCUTANEOUS at 07:12

## 2022-12-25 RX ADMIN — ALBUTEROL SULFATE 10 MG: 2.5 SOLUTION RESPIRATORY (INHALATION) at 08:12

## 2022-12-26 NOTE — ED PROVIDER NOTES
Encounter Date: 12/25/2022       History     Chief Complaint   Patient presents with    Arm Pain     Pt C/O LUE pain.  Pt denies injury. Dialysis shunt in place to LUE. Thrills and Bruits present.      82-year-old male with history of ESRD on dialysis complains of his left dorsum of hand pain since this morning.  Denies injury.  No weakness.  Pain radiating to upper arm.  Denies pain in his shunt.  Normal sensation of distal fingers.  Did not take any medication for pain.  He did miss dialysis on Saturday due to lack of transportation.  He denies nausea, vomiting, abdominal pain.  No shortness of breath.  No chest pain.    The history is provided by the patient.   Review of patient's allergies indicates:   Allergen Reactions    Ace inhibitors Itching     Is not sure which medication it was    Captopril      Past Medical History:   Diagnosis Date    BPH (benign prostatic hyperplasia)     Diabetes mellitus     Hemodialysis patient     Hypertension     Thyroid disease      Past Surgical History:   Procedure Laterality Date    bilateral foot surgery      eyelid surgery      FISTULOGRAM Left 11/27/2019    Procedure: Fistulogram;  Surgeon: MELANY Brenner III, MD;  Location: [facility] OR [address] [place] [address];  Service: Peripheral Vascular;  Laterality: Left;    FISTULOGRAM Left 11/27/2019    Procedure: Fistulogram, AVG declot, possible permacath;  Surgeon: MELANY Brenner III, MD;  Location: [facility] CATH LAB;  Service: Peripheral Vascular;  Laterality: Left;    INSERTION OF DIALYSIS CATHETER      [facility]    right hand surgery      stents       No family history on file.  Social History     Tobacco Use    Smoking status: Never    Smokeless tobacco: Never   Substance Use Topics    Alcohol use: No    Drug use: No     Review of Systems   Constitutional:  Negative for fever.   HENT:  Negative for sore throat.    Respiratory:  Negative for cough and shortness of breath.    Cardiovascular:  Negative for chest pain.   Gastrointestinal:   Negative for nausea.   Genitourinary:  Negative for dysuria.   Musculoskeletal:  Negative for back pain.   Skin:  Negative for rash.   Neurological:  Negative for weakness.   Hematological:  Does not bruise/bleed easily.   All other systems reviewed and are negative.    Physical Exam     Initial Vitals [12/25/22 1832]   BP Pulse Resp Temp SpO2   (!) 146/71 88 19 97.8 °F (36.6 °C) 100 %      MAP       --         Physical Exam    Nursing note and vitals reviewed.  Constitutional: He appears well-developed and well-nourished. He is not diaphoretic. No distress.   HENT:   Head: Normocephalic.   Nose: Nose normal.   Mouth/Throat: Oropharynx is clear and moist.   Eyes: Conjunctivae and EOM are normal. Pupils are equal, round, and reactive to light.   Cardiovascular:  Normal rate and regular rhythm.           Pulmonary/Chest: No respiratory distress. He has no wheezes. He has rales.   Abdominal: Abdomen is soft. Bowel sounds are normal. He exhibits no distension.   Musculoskeletal:         General: Normal range of motion.      Left hand: Tenderness present. Normal capillary refill. Normal pulse.      Comments: Left hand dorsal aspect with pain and tenderness.  Mild swelling.  Normal distal sensation.  Radial and ulnar palpable.  Old graft to thenar eminence.     Neurological: He is oriented to person, place, and time. He has normal strength. GCS score is 15. GCS eye subscore is 4. GCS verbal subscore is 5. GCS motor subscore is 6.   Skin: Skin is warm. Capillary refill takes less than 2 seconds.   Psychiatric: He has a normal mood and affect.       ED Course   Procedures  Labs Reviewed   COMPREHENSIVE METABOLIC PANEL - Abnormal; Notable for the following components:       Result Value    Potassium 5.4 (*)     Glucose 149 (*)     BUN 57 (*)     Creatinine 13.92 (*)     Calcium 8.5 (*)     AST 14 (*)     eGFR 3.1 (*)     All other components within normal limits   CBC W/ AUTO DIFFERENTIAL - Abnormal; Notable for the  following components:    RBC 2.99 (*)     Hemoglobin 9.3 (*)     Hematocrit 29.6 (*)     MCV 99 (*)     MCH 31.1 (*)     MCHC 31.4 (*)     RDW 15.9 (*)     Gran # (ANC) 8.0 (*)     Gran % 76.0 (*)     Lymph % 15.3 (*)     All other components within normal limits   BASIC METABOLIC PANEL - Abnormal; Notable for the following components:    Glucose 154 (*)     BUN 58 (*)     Creatinine 14.39 (*)     eGFR 3.0 (*)     All other components within normal limits     EKG Readings: (Independently Interpreted)   Initial Reading: No STEMI. Rhythm: Normal Sinus Rhythm. Heart Rate: 86. Ectopy: No Ectopy. Conduction: Normal. ST Segments: Non-Specific ST Segment Depression. T Waves: Normal. Clinical Impression: Normal Sinus Rhythm     Imaging Results              X-Ray Hand 3 View Left (Final result)  Result time 12/25/22 19:05:35      Final result by Jazlyn Way MD (12/25/22 19:05:35)                   Impression:      No acute process      Electronically signed by: Seamus Hobson  Date:    12/25/2022  Time:    19:05               Narrative:    EXAMINATION:  XR HAND COMPLETE 3 VIEW LEFT    CLINICAL HISTORY:  pain;    TECHNIQUE:  PA, lateral, and oblique views of the left hand were performed.    COMPARISON:  None    FINDINGS:  No acute fracture or dislocation.  Postsurgical changes in the region of the 1st 2nd metacarpal soft tissues.  Punctate density along the 2nd proximal phalanx and distal phalanx of the thumb of uncertain clinical origin.  Mild degenerative joint disease.  No acute process.                                       Medications   HYDROmorphone injection 1 mg (1 mg Intravenous Given 12/25/22 1908)   sodium zirconium cyclosilicate packet 10 g (10 g Oral Given 12/25/22 2023)   sodium bicarbonate 8.4 % (1 mEq/mL) injection 50 mEq (50 mEq Intravenous Given 12/25/22 2019)   calcium gluconate 1 g in NS IVPB (premixed) (0 g Intravenous Stopped 12/25/22 2119)   albuterol sulfate nebulizer solution 10 mg (10 mg  Nebulization Given 12/25/22 2041)     Medical Decision Making:   Initial Assessment:   Left hand pain and tenderness.  No injury.  Differential Diagnosis:   Contusion, fracture, dislocation,  Gout  Clinical Tests:   Lab Tests: Ordered and Reviewed  Radiological Study: Ordered and Reviewed  Medical Tests: Ordered and Reviewed  ED Management:  Looks like patient has old injury in his 1st and 2nd metacarpal area.  With some arthritis.  No acute fracture.  Pain improved.  Slightly elevated potassium to 5.4.  Patient is given medications for his hyperkalemia along with prescription for Kayexalate.  He is not in any respiratory distress or hypoxia.  Does not require immediate dialysis.   Patient has dialysis on Tuesday.  He is advised to contact dialysis tomorrow if they can get him for dialysis..  Otherwise advised to follow-up ED with any shortness of breath.                        Clinical Impression:   Final diagnoses:  [M79.642] Pain of left hand (Primary)  [E87.5] Hyperkalemia  [N18.6, Z99.2] ESRD on dialysis        ED Disposition Condition    Discharge Stable          ED Prescriptions       Medication Sig Dispense Start Date End Date Auth. Provider    sodium polystyrene (KAYEXALATE) 15 gram/60 mL Susp Take 20 mLs (5 g total) by mouth once daily. 60 mL 12/25/2022 -- Sebastien Jessica MD    HYDROcodone-acetaminophen (NORCO) 5-325 mg per tablet Take 1 tablet by mouth every 8 (eight) hours as needed for Pain. 9 tablet 12/25/2022 -- Sebastien Jessica MD          Follow-up Information       Follow up With Specialties Details Why Contact Info    Felipe Martino MD Dermatology Schedule an appointment as soon as possible for a visit in 3 days Call dialysis tomorrow. [facility] [address]  [phone]               Sebastien Jessica MD  12/26/22 0139       Sebastien Jessica MD  12/26/22 0140       Sebastien Jessica MD  12/26/22 0140

## 2023-03-28 PROBLEM — J81.0 ACUTE PULMONARY EDEMA: Status: ACTIVE | Noted: 2023-03-28

## 2023-03-28 PROBLEM — R79.89 ELEVATED TROPONIN: Status: ACTIVE | Noted: 2023-03-28

## 2023-03-29 PROBLEM — J81.0 ACUTE PULMONARY EDEMA: Status: RESOLVED | Noted: 2023-03-28 | Resolved: 2023-03-29

## 2023-03-29 PROBLEM — R79.89 ELEVATED TROPONIN: Status: RESOLVED | Noted: 2023-03-28 | Resolved: 2023-03-29

## 2023-08-29 ENCOUNTER — OFFICE VISIT (OUTPATIENT)
Dept: CARDIOLOGY | Facility: CLINIC | Age: 83
End: 2023-08-29
Payer: OTHER GOVERNMENT

## 2023-08-29 VITALS
OXYGEN SATURATION: 98 % | SYSTOLIC BLOOD PRESSURE: 126 MMHG | WEIGHT: 151.88 LBS | HEART RATE: 86 BPM | DIASTOLIC BLOOD PRESSURE: 71 MMHG | BODY MASS INDEX: 23.02 KG/M2 | HEIGHT: 68 IN

## 2023-08-29 DIAGNOSIS — I70.0 AORTIC ATHEROSCLEROSIS: ICD-10-CM

## 2023-08-29 DIAGNOSIS — I25.10 CORONARY ARTERY DISEASE INVOLVING NATIVE CORONARY ARTERY OF NATIVE HEART WITHOUT ANGINA PECTORIS: ICD-10-CM

## 2023-08-29 DIAGNOSIS — Z99.2 TYPE 2 DIABETES MELLITUS WITH CHRONIC KIDNEY DISEASE ON CHRONIC DIALYSIS, WITH LONG-TERM CURRENT USE OF INSULIN: ICD-10-CM

## 2023-08-29 DIAGNOSIS — I25.118 CORONARY ARTERY DISEASE WITH STABLE ANGINA PECTORIS, UNSPECIFIED VESSEL OR LESION TYPE, UNSPECIFIED WHETHER NATIVE OR TRANSPLANTED HEART: Primary | ICD-10-CM

## 2023-08-29 DIAGNOSIS — Z99.2 ESRD ON HEMODIALYSIS: ICD-10-CM

## 2023-08-29 DIAGNOSIS — E11.22 TYPE 2 DIABETES MELLITUS WITH CHRONIC KIDNEY DISEASE ON CHRONIC DIALYSIS, WITH LONG-TERM CURRENT USE OF INSULIN: ICD-10-CM

## 2023-08-29 DIAGNOSIS — E78.5 HYPERLIPIDEMIA, UNSPECIFIED HYPERLIPIDEMIA TYPE: Chronic | ICD-10-CM

## 2023-08-29 DIAGNOSIS — Z79.4 TYPE 2 DIABETES MELLITUS WITH CHRONIC KIDNEY DISEASE ON CHRONIC DIALYSIS, WITH LONG-TERM CURRENT USE OF INSULIN: ICD-10-CM

## 2023-08-29 DIAGNOSIS — N18.6 TYPE 2 DIABETES MELLITUS WITH CHRONIC KIDNEY DISEASE ON CHRONIC DIALYSIS, WITH LONG-TERM CURRENT USE OF INSULIN: ICD-10-CM

## 2023-08-29 DIAGNOSIS — N18.6 ESRD ON HEMODIALYSIS: ICD-10-CM

## 2023-08-29 DIAGNOSIS — I10 ESSENTIAL HYPERTENSION: Chronic | ICD-10-CM

## 2023-08-29 PROBLEM — I82.4Z2 ACUTE DEEP VEIN THROMBOSIS (DVT) OF DISTAL VEIN OF LEFT LOWER EXTREMITY: Status: RESOLVED | Noted: 2019-11-23 | Resolved: 2023-08-29

## 2023-08-29 PROCEDURE — 99999 PR PBB SHADOW E&M-EST. PATIENT-LVL III: CPT | Mod: PBBFAC,,, | Performed by: INTERNAL MEDICINE

## 2023-08-29 PROCEDURE — 99204 OFFICE O/P NEW MOD 45 MIN: CPT | Mod: S$PBB,,, | Performed by: INTERNAL MEDICINE

## 2023-08-29 PROCEDURE — 99213 OFFICE O/P EST LOW 20 MIN: CPT | Mod: PBBFAC,PN | Performed by: INTERNAL MEDICINE

## 2023-08-29 PROCEDURE — 99204 PR OFFICE/OUTPT VISIT, NEW, LEVL IV, 45-59 MIN: ICD-10-PCS | Mod: S$PBB,,, | Performed by: INTERNAL MEDICINE

## 2023-08-29 PROCEDURE — 99999 PR PBB SHADOW E&M-EST. PATIENT-LVL III: ICD-10-PCS | Mod: PBBFAC,,, | Performed by: INTERNAL MEDICINE

## 2023-08-29 NOTE — PROGRESS NOTES
[facility] Cardiology     Subjective:    Patient ID:  Jevon Rajan is a 83 y.o. male who presents for evaluation of Hemodialysis Access, Hypertension, Hyperlipidemia, Coronary Artery Disease, and Diabetes Mellitus    Review of patient's allergies indicates:   Allergen Reactions    Ace inhibitors Itching     Is not sure which medication it was    Captopril       [facility] care to this [facility].  In 2014 he had stent to LAD.  He states he has had 2 heart attacks.  He does not get angina.  There was 90% left anterior descending artery stenosis undergoing stenting.  There was also a circumflex marginal branch stenosis of 70%.  There are no recent evaluations for ejection fraction.  He has a history of systolic dysfunction.  His last echo in the chart is from 2014-normal ejection fraction.    He is diabetic.  He has been on hemodialysis for 3 years.  He lives alone.  He states he walks 2 miles a day.  Last hemoglobin A1c 6.2.  He does take insulin.  For hypertension he is on carvedilol and losartan.  He is on high-dose atorvastatin.  There are no labs available cholesterol wise.  He may have had a DVT in the past, he used to take Eliquis he no longer takes it.  He is not a smoker.  He is retired .  He was in the [employer].  He lives alone.  He has been  from his wife for more than 20 years and she lives in [place].        Review of Systems   Constitutional: Negative for chills, decreased appetite, diaphoresis, fever, malaise/fatigue, night sweats, weight gain and weight loss.   HENT:  Negative for congestion, ear discharge, ear pain, hearing loss, hoarse voice, nosebleeds, odynophagia, sore throat, stridor and tinnitus.    Eyes:  Negative for blurred vision, discharge, double vision, pain, photophobia, redness, vision loss in left eye, vision loss in right eye, visual disturbance and  visual halos.   Cardiovascular:  Negative for chest pain, claudication, cyanosis, dyspnea on exertion, irregular heartbeat, leg swelling, near-syncope, orthopnea, palpitations, paroxysmal nocturnal dyspnea and syncope.   Respiratory:  Negative for cough, hemoptysis, shortness of breath, sleep disturbances due to breathing, snoring, sputum production and wheezing.    Endocrine: Negative for cold intolerance, heat intolerance, polydipsia, polyphagia and polyuria.   Hematologic/Lymphatic: Negative for adenopathy and bleeding problem. Does not bruise/bleed easily.   Skin:  Negative for color change, dry skin, flushing, itching, nail changes, poor wound healing, rash, skin cancer, suspicious lesions and unusual hair distribution.   Musculoskeletal:  Negative for arthritis, back pain, falls, gout, joint pain, joint swelling, muscle cramps, muscle weakness, myalgias, neck pain and stiffness.   Gastrointestinal:  Negative for bloating, abdominal pain, anorexia, change in bowel habit, bowel incontinence, constipation, diarrhea, dysphagia, excessive appetite, flatus, heartburn, hematemesis, hematochezia, hemorrhoids, jaundice, melena, nausea and vomiting.   Genitourinary:  Negative for bladder incontinence, decreased libido, dysuria, flank pain, frequency, genital sores, hematuria, hesitancy, incomplete emptying, nocturia and urgency.   Neurological:  Negative for aphonia, brief paralysis, difficulty with concentration, disturbances in coordination, excessive daytime sleepiness, dizziness, focal weakness, headaches, light-headedness, loss of balance, numbness, paresthesias, seizures, sensory change, tremors, vertigo and weakness.   Psychiatric/Behavioral:  Negative for altered mental status, depression, hallucinations, memory loss, substance abuse, suicidal ideas and thoughts of violence. The patient does not have insomnia and is not nervous/anxious.    Allergic/Immunologic: Negative for hives and persistent infections.       "  Objective:       Vitals:    08/29/23 1436   BP: 126/71   Pulse: 86   SpO2: 98%   Weight: 68.9 kg (151 lb 14.4 oz)   Height: 5' 8" (1.727 m)    Physical Exam  Constitutional:       General: He is not in acute distress.     Appearance: He is well-developed. He is not diaphoretic.   HENT:      Head: Normocephalic and atraumatic.      Nose: Nose normal.   Eyes:      General: No scleral icterus.        Right eye: No discharge.      Conjunctiva/sclera: Conjunctivae normal.      Pupils: Pupils are equal, round, and reactive to light.   Neck:      Thyroid: No thyromegaly.      Vascular: No JVD.      Trachea: No tracheal deviation.   Cardiovascular:      Rate and Rhythm: Normal rate and regular rhythm.      Pulses:           Carotid pulses are 2+ on the right side and 2+ on the left side.       Radial pulses are 2+ on the right side and 2+ on the left side.        Dorsalis pedis pulses are 2+ on the right side and 2+ on the left side.        Posterior tibial pulses are 2+ on the right side and 2+ on the left side.      Heart sounds: Normal heart sounds. No murmur heard.     No friction rub. No gallop.   Pulmonary:      Effort: Pulmonary effort is normal. No respiratory distress.      Breath sounds: Normal breath sounds. No stridor. No wheezing or rales.   Chest:      Chest wall: No tenderness.   Abdominal:      General: Bowel sounds are normal. There is no distension.      Palpations: Abdomen is soft. There is no mass.      Tenderness: There is no abdominal tenderness. There is no guarding or rebound.   Musculoskeletal:         General: No tenderness. Normal range of motion.      Cervical back: Normal range of motion and neck supple.   Lymphadenopathy:      Cervical: No cervical adenopathy.   Skin:     General: Skin is warm and dry.      Coloration: Skin is not pale.      Findings: No erythema or rash.   Neurological:      Mental Status: He is alert and oriented to person, place, and time.      Cranial Nerves: No cranial " nerve deficit.      Coordination: Coordination normal.   Psychiatric:         Behavior: Behavior normal.         Thought Content: Thought content normal.         Judgment: Judgment normal.           Assessment:       1. 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 Coronary artery disease involving native coronary artery of native heart without angina pectoris    3. Hyperlipidemia, unspecified hyperlipidemia type    4. Unspecified essential hypertension    5. ESRD on hemodialysis    6. Type 2 diabetes mellitus with chronic kidney disease on chronic dialysis, with long-term current use of insulin    7. Aortic atherosclerosis      Results for orders placed or performed during the hospital encounter of 06/15/23   CBC auto differential   Result Value Ref Range    WBC 8.06 3.90 - 12.70 K/uL    RBC 3.42 (L) 4.60 - 6.20 M/uL    Hemoglobin 10.4 (L) 14.0 - 18.0 g/dL    Hematocrit 32.3 (L) 40.0 - 54.0 %    MCV 94 82 - 98 fL    MCH 30.4 27.0 - 31.0 pg    MCHC 32.2 32.0 - 36.0 g/dL    RDW 19.3 (H) 11.5 - 14.5 %    Platelets 228 150 - 450 K/uL    MPV 10.1 9.2 - 12.9 fL    Immature Granulocytes 0.2 0.0 - 0.5 %    Gran # (ANC) 5.7 1.8 - 7.7 K/uL    Immature Grans (Abs) 0.02 0.00 - 0.04 K/uL    Lymph # 1.5 1.0 - 4.8 K/uL    Mono # 0.6 0.3 - 1.0 K/uL    Eos # 0.2 0.0 - 0.5 K/uL    Baso # 0.05 0.00 - 0.20 K/uL    nRBC 0 0 /100 WBC    Gran % 70.6 38.0 - 73.0 %    Lymph % 18.7 18.0 - 48.0 %    Mono % 7.4 4.0 - 15.0 %    Eosinophil % 2.5 0.0 - 8.0 %    Basophil % 0.6 0.0 - 1.9 %    Differential Method Automated    Comprehensive metabolic panel   Result Value Ref Range    Sodium 139 136 - 145 mmol/L    Potassium 3.8 3.5 - 5.1 mmol/L    Chloride 95 95 - 110 mmol/L    CO2 24 23 - 29 mmol/L    Glucose 174 (H) 70 - 110 mg/dL    BUN 60 (H) 2 - 20 mg/dL    Creatinine 11.89 (H) 0.50 - 1.40 mg/dL    Calcium 9.1 8.7 - 10.5 mg/dL    Total Protein 7.7  6.0 - 8.4 g/dL    Albumin 4.2 3.5 - 5.2 g/dL    Total Bilirubin 0.5 0.1 - 1.0 mg/dL    Alkaline Phosphatase 60 38 - 126 U/L    AST 19 15 - 46 U/L    ALT 22 10 - 44 U/L    Anion Gap 20 (H) 8 - 16 mmol/L    eGFR 3.8 (A) >60 mL/min/1.73 m^2   Troponin I   Result Value Ref Range    Troponin I 0.045 (H) 0.012 - 0.034 ng/mL         Current Outpatient Medications:     acarbose (PRECOSE) 50 MG Tab, Take 100 mg by mouth 3 (three) times daily with meals. , Disp: , Rfl:     amlodipine (NORVASC) 10 MG tablet, Take 1 tablet (10 mg total) by mouth once daily., Disp: 60 tablet, Rfl: 11    atorvastatin (LIPITOR) 80 MG tablet, Take 1 tablet (80 mg total) by mouth once daily., Disp: 30 tablet, Rfl: 11    carvedilol (COREG) 6.25 MG tablet, Take 6.25 mg by mouth 2 (two) times daily with meals., Disp: , Rfl:     ciclopirox (PENLAC) 8 % Soln, Apply topically nightly., Disp: 1 Bottle, Rfl: 3    clopidogrel (PLAVIX) 75 mg tablet, Take 1 tablet (75 mg total) by mouth once daily., Disp: 30 tablet, Rfl: 11    finasteride (PROSCAR) 5 mg tablet, Take 5 mg by mouth every evening. , Disp: , Rfl:     glipiZIDE (GLUCOTROL) 10 MG TR24, Take 5 mg by mouth 2 (two) times daily. , Disp: , Rfl:     HYDROcodone-acetaminophen (NORCO) 5-325 mg per tablet, Take 1 tablet by mouth every 8 (eight) hours as needed for Pain., Disp: 9 tablet, Rfl: 0    insulin NPH-insulin regular, 70/30, (NOVOLIN 70/30) 100 unit/mL (70-30) injection, Inject 10 Units into the skin 2 (two) times daily., Disp: , Rfl:     levothyroxine (SYNTHROID) 25 MCG tablet, Take 25 mcg by mouth once daily., Disp: , Rfl:     lidocaine (LIDODERM) 5 %, Place 1 patch onto the skin once daily. Remove & Discard patch within 12 hours or as directed by MD, Disp: 30 patch, Rfl: 0    losartan (COZAAR) 50 MG tablet, Take 50 mg by mouth once daily., Disp: , Rfl:     metoprolol succinate (TOPROL-XL) 50 MG 24 hr tablet, Take 1 tablet (50 mg total) by mouth once daily., Disp: 30 tablet, Rfl: 11    ondansetron  (ZOFRAN-ODT) 4 MG TbDL, Take 1 tablet (4 mg total) by mouth every 6 (six) hours as needed (nausea)., Disp: 15 tablet, Rfl: 0    pantoprazole (PROTONIX) 40 MG tablet, Take 1 tablet (40 mg total) by mouth once daily., Disp: 30 tablet, Rfl: 11    sodium polystyrene (KAYEXALATE) 15 gram/60 mL Susp, Take 20 mLs (5 g total) by mouth once daily., Disp: 60 mL, Rfl: 0    tacrolimus (PROTOPIC) 0.1 % ointment, Apply topically 2 (two) times daily., Disp: , Rfl:     tamsulosin (FLOMAX) 0.4 mg Cap, Take 1 capsule (0.4 mg total) by mouth once daily., Disp: 30 capsule, Rfl: 0    traMADoL (ULTRAM) 50 mg tablet, Take 1 tablet (50 mg total) by mouth every 8 (eight) hours as needed for Pain., Disp: 9 tablet, Rfl: 0     Lab Results   Component Value Date    WBC 8.06 06/15/2023    RBC 3.42 (L) 06/15/2023    HGB 10.4 (L) 06/15/2023    HCT 32.3 (L) 06/15/2023    MCV 94 06/15/2023    MCH 30.4 06/15/2023    MCHC 32.2 06/15/2023    RDW 19.3 (H) 06/15/2023     06/15/2023    MPV 10.1 06/15/2023    GRAN 5.7 06/15/2023    GRAN 70.6 06/15/2023    LYMPH 1.5 06/15/2023    LYMPH 18.7 06/15/2023    MONO 0.6 06/15/2023    MONO 7.4 06/15/2023    EOS 0.2 06/15/2023    BASO 0.05 06/15/2023    EOSINOPHIL 2.5 06/15/2023    BASOPHIL 0.6 06/15/2023    MG 1.8 05/14/2023        CMP  Lab Results   Component Value Date     06/15/2023    K 3.8 06/15/2023    CL 95 06/15/2023    CO2 24 06/15/2023     (H) 06/15/2023    BUN 60 (H) 06/15/2023    CREATININE 11.89 (H) 06/15/2023    CALCIUM 9.1 06/15/2023    PROT 7.7 06/15/2023    ALBUMIN 4.2 06/15/2023    BILITOT 0.5 06/15/2023    ALKPHOS 60 06/15/2023    AST 19 06/15/2023    ALT 22 06/15/2023    ANIONGAP 20 (H) 06/15/2023    ESTGFRAFRICA 3 05/27/2022    EGFRNONAA 4.0 (A) 06/06/2020        Lab Results   Component Value Date    LABBLOO No growth after 5 days. 11/23/2019    LABURIN No significant growth 11/24/2019            Results for orders placed or performed during the hospital encounter of  06/15/23   EKG 12-lead    Collection Time: 06/15/23 11:13 AM    Narrative    Test Reason : R07.9,    Vent. Rate : 081 BPM     Atrial Rate : 081 BPM     P-R Int : 154 ms          QRS Dur : 098 ms      QT Int : 388 ms       P-R-T Axes : 049 -35 116 degrees     QTc Int : 450 ms    Normal sinus rhythm  Possible Left atrial enlargement  Left axis deviation  Cannot rule out Anterior infarct ,age undetermined  Abnormal ECG  When compared with ECG of 14-MAY-2023 21:24,  The axis Shifted left  Confirmed by Pedro Luis RIVERA MD, Gurjit SILVER ([id number]) on 6/16/2023 8:55:59 AM    Referred By: RUTHY MYLES           Confirmed By:Gurjit Cloud III, MD                  Plan:       Problem List Items Addressed This Visit          Cardiac/Vascular    Unspecified essential hypertension (Chronic)     He will continue amlodipine, losartan, carvedilol.  Condition controlled.         HLD (hyperlipidemia) (Chronic)     The patient is on atorvastatin 80 mg daily.  There are no labs [facility]  It is well tolerated.         Coronary artery disease involving native coronary artery of native heart without angina pectoris - Primary     He has normal exercise tolerance.  He is on blood pressure therapy including beta-blockers and calcium channel blockers.    He has previous stent to LAD 2014.  There is remote PCI 1999.  Current ejection fraction unknown.  Echo ordered.  He reports 2 heart attacks in the past.    He is on clopidogrel chronically.         Aortic atherosclerosis     Condition stable.            Renal/    ESRD on hemodialysis     He has been on dialysis for 3 years.  He is diabetic.  It is tolerated well.            Endocrine    Type 2 diabetes mellitus with chronic kidney disease on chronic dialysis, with long-term current use of insulin     Current A1c 6.2.  He takes glipizide and insulin.  He lives alone.  Does his own cooking.  The bus to dialysis.                 Plavix will be continued.   He is currently not taking aspirin or Eliquis.  Echocardiogram ordered.  I do not know what his ejection fraction is currently.    Thank you for allowing me to participate in your patient's care.             Gonzalez Rosas MD  08/30/2023   2:55 PM

## 2023-08-30 PROBLEM — I70.0 AORTIC ATHEROSCLEROSIS: Status: ACTIVE | Noted: 2023-08-30

## 2023-08-30 NOTE — ASSESSMENT & PLAN NOTE
He has normal exercise tolerance.  He is on blood pressure therapy including beta-blockers and calcium channel blockers.    He has previous stent to LAD 2014.  There is remote PCI 1999.  Current ejection fraction unknown.  Echo ordered.  He reports 2 heart attacks in the past.    He is on clopidogrel chronically.

## 2023-08-30 NOTE — ASSESSMENT & PLAN NOTE
Current A1c 6.2.  He takes glipizide and insulin.  He lives alone.  Does his own cooking.  The bus to dialysis.

## 2023-08-30 NOTE — ASSESSMENT & PLAN NOTE
The patient is on atorvastatin 80 mg daily.  There are no labs available [facility].  It is well tolerated.

## 2023-09-11 ENCOUNTER — TELEPHONE (OUTPATIENT)
Dept: PODIATRY | Facility: CLINIC | Age: 83
End: 2023-09-11
Payer: MEDICARE

## 2023-09-11 NOTE — TELEPHONE ENCOUNTER
CHIEF COMPLAINT:    Chief Complaint   Patient presents with   • Derm Problem        SUBJECTIVE:    Ryan Melendez is a 62 year old male, who presents with a complaint of rash for the last day.  He used a new soap and noted the rash started on his hands. It is now in the armpits, groin, feet. Denies trunk evolvement.  He used topical banking soda slurry that was cold to help with the itching.  Difficulty sleeping due to itch.  Denies fever  No other URI symptoms.     HISTORIES:    ALLERGIES:  No Known Allergies    Current Outpatient Medications   Medication Sig Dispense Refill   • atorvastatin (LIPITOR) 20 MG tablet Take 1 tablet by mouth daily. 90 tablet 3   • doxazosin (CARDURA) 8 MG tablet Take 1 tablet by mouth daily. 90 tablet 3   • lisinopril-hydroCHLOROthiazide (PRINZIDE,ZESTORETIC) 20-25 MG per tablet Take 1 tablet by mouth daily. 90 tablet 3   • methylPREDNISolone (MEDROL DOSEPAK) 4 MG tablet Take 1 tablet by mouth as directed. follow package directions 21 tablet 0     No current facility-administered medications for this visit.        I have reviewed the past medical history, medications and allergies listed in the medical record as obtained by my nursing staff and support staff.  I also reviewed RN's note today.    OBJECTIVE:    PHYSICAL EXAM:    Visit Vitals  /78   Pulse 60   Temp 97 °F (36.1 °C)   Resp 12   Ht 6' 2\" (1.88 m)   Wt 104.8 kg   SpO2 97%   BMI 29.66 kg/m²      General:  Well-hydrated, well-nourished male who appears in no acute distress.    Skin: macular papular rash on  Arms, under the arms and some on the lower legs. Not appreciated on the feet.  There was positive varicose veins on the left ankle.     ASSESSMENT:  There are no diagnoses linked to this encounter.  Contact dermatitis (see patient handout)   Will forward to PCP for follow up about the nubmness of the LE and hands during sleep,. History of HTN and HDL.   Looks like he has vericose veins as well..     Supervising Physician  Called patient to make them aware that Dr. Kirby will be in the [place] office not [place].  Left patient a message on her voicemail at 9/11/23 @ 4:33.    Fina   Dr. Musa Elam

## 2023-09-13 ENCOUNTER — OFFICE VISIT (OUTPATIENT)
Dept: PODIATRY | Facility: CLINIC | Age: 83
End: 2023-09-13
Payer: OTHER GOVERNMENT

## 2023-09-13 VITALS — BODY MASS INDEX: 23.95 KG/M2 | WEIGHT: 158 LBS | HEIGHT: 68 IN

## 2023-09-13 DIAGNOSIS — M21.612 BILATERAL BUNIONS: ICD-10-CM

## 2023-09-13 DIAGNOSIS — N18.6 TYPE 2 DIABETES MELLITUS WITH CHRONIC KIDNEY DISEASE ON CHRONIC DIALYSIS, WITH LONG-TERM CURRENT USE OF INSULIN: Primary | ICD-10-CM

## 2023-09-13 DIAGNOSIS — E11.22 TYPE 2 DIABETES MELLITUS WITH CHRONIC KIDNEY DISEASE ON CHRONIC DIALYSIS, WITH LONG-TERM CURRENT USE OF INSULIN: Primary | ICD-10-CM

## 2023-09-13 DIAGNOSIS — L84 CALLUS OF FOOT: ICD-10-CM

## 2023-09-13 DIAGNOSIS — Z99.2 TYPE 2 DIABETES MELLITUS WITH CHRONIC KIDNEY DISEASE ON CHRONIC DIALYSIS, WITH LONG-TERM CURRENT USE OF INSULIN: Primary | ICD-10-CM

## 2023-09-13 DIAGNOSIS — B35.1 ONYCHOMYCOSIS DUE TO DERMATOPHYTE: ICD-10-CM

## 2023-09-13 DIAGNOSIS — E11.42 TYPE 2 DM WITH DIABETIC NEUROPATHY AFFECTING BOTH SIDES OF BODY: ICD-10-CM

## 2023-09-13 DIAGNOSIS — M21.611 BILATERAL BUNIONS: ICD-10-CM

## 2023-09-13 DIAGNOSIS — Z79.4 TYPE 2 DIABETES MELLITUS WITH CHRONIC KIDNEY DISEASE ON CHRONIC DIALYSIS, WITH LONG-TERM CURRENT USE OF INSULIN: Primary | ICD-10-CM

## 2023-09-13 PROCEDURE — 11057 PARNG/CUTG B9 HYPRKR LES >4: CPT | Mod: S$PBB,,, | Performed by: PODIATRIST

## 2023-09-13 PROCEDURE — 99213 PR OFFICE/OUTPT VISIT, EST, LEVL III, 20-29 MIN: ICD-10-PCS | Mod: S$PBB,25,, | Performed by: PODIATRIST

## 2023-09-13 PROCEDURE — 99213 OFFICE O/P EST LOW 20 MIN: CPT | Mod: S$PBB,25,, | Performed by: PODIATRIST

## 2023-09-13 PROCEDURE — 11721 DEBRIDE NAIL 6 OR MORE: CPT | Mod: 59,PBBFAC,PN | Performed by: PODIATRIST

## 2023-09-13 PROCEDURE — 99215 OFFICE O/P EST HI 40 MIN: CPT | Mod: PBBFAC,PN | Performed by: PODIATRIST

## 2023-09-13 PROCEDURE — 99999 PR PBB SHADOW E&M-EST. PATIENT-LVL V: CPT | Mod: PBBFAC,,, | Performed by: PODIATRIST

## 2023-09-13 PROCEDURE — 99999 PR PBB SHADOW E&M-EST. PATIENT-LVL V: ICD-10-PCS | Mod: PBBFAC,,, | Performed by: PODIATRIST

## 2023-09-13 PROCEDURE — 11057 ROUTINE FOOT CARE: ICD-10-PCS | Mod: S$PBB,,, | Performed by: PODIATRIST

## 2023-09-13 PROCEDURE — 11721 ROUTINE FOOT CARE: ICD-10-PCS | Mod: S$PBB,59,, | Performed by: PODIATRIST

## 2023-09-13 NOTE — PROGRESS NOTES
"[facility]  [address], [address]  [place] [address]  Dept: [phone]  Dept Fax: [phone]    Ed Schneider Jr., DPM     Assessment:   MDM    Coding  1. Type 2 diabetes mellitus with chronic kidney disease on chronic dialysis, with long-term current use of insulin  Foot Exam Performed    Ambulatory referral/consult to Diabetes Education    Routine Foot Care      2. Type 2 DM with diabetic neuropathy affecting both sides of body  Foot Exam Performed    Ambulatory referral/consult to Diabetes Education    DIABETIC SHOES FOR HOME USE    Routine Foot Care      3. Bilateral bunions  X-Ray Foot Complete Bilateral    Foot Exam Performed    DIABETIC SHOES FOR HOME USE    Routine Foot Care      4. Callus of foot  Foot Exam Performed    DIABETIC SHOES FOR HOME USE    Routine Foot Care      5. Onychomycosis due to dermatophyte  Routine Foot Care          Plan:     Routine Foot Care    Date/Time: 9/13/2023 1:59 PM    Performed by: Ed Schneider Jr., DPM  Authorized by: Ed Schneider Jr., DPM    Time out: Immediately prior to procedure a "time out" was called to verify the correct patient, procedure, equipment, support staff and site/side marked as required.    Consent Done?:  Yes (Verbal)  Hyperkeratotic Skin Lesions?: Yes    Number of trimmed lesions:  6  Location(s):  Left 1st Metatarsal Head, Right 1st Metatarsal Head, Left 2nd Metatarsal Head, Right 2nd Metatarsal Head, Left 3rd Metatarsal Head and Right 3rd Metatarsal Head    Nail Care Type:  Debride  Location(s): All  (Left 1st Toe, Left 3rd Toe, Left 2nd Toe, Left 4th Toe, Left 5th Toe, Right 1st Toe, Right 2nd Toe, Right 3rd Toe, Right 4th Toe and Right 5th Toe)  Patient tolerance:  Patient tolerated the procedure well with no immediate complications    1. Type 2 diabetes mellitus with chronic kidney disease on chronic dialysis, with long-term current use of insulin  -     Foot Exam " Performed  -     Ambulatory referral/consult to Diabetes Education; Future; Expected date: 09/20/2023  -     Routine Foot Care    2. Type 2 DM with diabetic neuropathy affecting both sides of body  -     Foot Exam Performed  -     Ambulatory referral/consult to Diabetes Education; Future; Expected date: 09/20/2023  -     DIABETIC SHOES FOR HOME USE  -     Routine Foot Care    3. Bilateral bunions  -     X-Ray Foot Complete Bilateral; Future; Expected date: 09/13/2023  -     Foot Exam Performed  -     DIABETIC SHOES FOR HOME USE  -     Routine Foot Care    4. Callus of foot  -     Foot Exam Performed  -     DIABETIC SHOES FOR HOME USE  -     Routine Foot Care    5. Onychomycosis due to dermatophyte  -     Routine Foot Care      Jevon was seen today for diabetic foot exam.    Diagnoses and all orders for this visit:    Type 2 diabetes mellitus with chronic kidney disease on chronic dialysis, with long-term current use of insulin  -     Foot Exam Performed  -     Ambulatory referral/consult to Diabetes Education; Future  -     Routine Foot Care    Type 2 DM with diabetic neuropathy affecting both sides of body  -     Foot Exam Performed  -     Ambulatory referral/consult to Diabetes Education; Future  -     DIABETIC SHOES FOR HOME USE  -     Routine Foot Care    Bilateral bunions  -     X-Ray Foot Complete Bilateral; Future  -     Foot Exam Performed  -     DIABETIC SHOES FOR HOME USE  -     Routine Foot Care    Callus of foot  -     Foot Exam Performed  -     DIABETIC SHOES FOR HOME USE  -     Routine Foot Care    Onychomycosis due to dermatophyte  -     Routine Foot Care      ADA Risk Classification: LOPS with or without deformity - 1: rtc 3-6 months     -pt seen, evaluated, and managed  -dx discussed in detail. All questions/concerns addressed  -all tx options discussed. All alternatives, risks, benefits of all txs discussed  -comprehensive diabetic foot exam with risk assessment performed today  -the patient was  educated about the diagnosis and discussed reducing caloric intake, increase physical activity  -We discussed conservative care options possible including but not limited to shoe wear and/or padding, bracing/strapping, at home ROM, formal PT, medical therapy, injection therapy  -xr/imaging on way out--> will review at nxt visit  -labs reviewed by me: a1c of 6.2. recs as below  -implemented icing/stretching regimen  -offloading pads dispensed  -Shoe inspection. Diabetic Foot Education. Patient reminded of the importance of good nutrition and blood sugar control to help prevent podiatric complications of diabetes. Patient instructed on proper foot hygeine. We discussed wearing proper shoe gear, daily foot inspections, never walking without protective shoe gear, never putting sharp instruments to feet.  -rxs dispensed: dm shoe rx  -referrals: dm education  -WB: wbat        Follow up in about 6 months (around 3/13/2024), or if symptoms worsen or fail to improve.    Subjective:      Patient ID: Jevon Rajan is a 83 y.o. male.    Chief Complaint:   Chief Complaint   Patient presents with    Diabetic Foot Exam     Diabteic foot exam   Last anitra with pcp n/a       Jevon Rajan presents to the clinic upon referral from Dr. Quick  for evaluation and treatment of diabetic feet. Patient relates no major problem with feet. Only complaints today consist of needing foot exam.      HPI    Last Podiatry Enc: Visit date not found  Last Enc w/ Me: Visit date not found    Outside reports reviewed: historical medical records.  Family hx: as below  Past Medical History:   Diagnosis Date    BPH (benign prostatic hyperplasia)     Thyroid disease      Past Surgical History:   Procedure Laterality Date    bilateral foot surgery      eyelid surgery      FISTULOGRAM Left 11/27/2019    Procedure: Fistulogram;  Surgeon: MELANY Brenner III, MD;  Location: [facility] OR [address] [place] [address];  Service: Peripheral Vascular;  Laterality: Left;    FISTULOGRAM  Left 11/27/2019    Procedure: Fistulogram, AVG declot, possible permacath;  Surgeon: MELANY Brenner III, MD;  Location: [facility] CATH LAB;  Service: Peripheral Vascular;  Laterality: Left;    INSERTION OF DIALYSIS CATHETER      [facility]  12/5/13    right hand surgery      stents       No family history on file.  Current Outpatient Medications   Medication Sig Dispense Refill    acarbose (PRECOSE) 50 MG Tab Take 100 mg by mouth 3 (three) times daily with meals.       carvedilol (COREG) 6.25 MG tablet Take 6.25 mg by mouth 2 (two) times daily with meals.      ciclopirox (PENLAC) 8 % Soln Apply topically nightly. 1 Bottle 3    finasteride (PROSCAR) 5 mg tablet Take 5 mg by mouth every evening.       glipiZIDE (GLUCOTROL) 10 MG TR24 Take 5 mg by mouth 2 (two) times daily.       HYDROcodone-acetaminophen (NORCO) 5-325 mg per tablet Take 1 tablet by mouth every 8 (eight) hours as needed for Pain. 9 tablet 0    insulin NPH-insulin regular, 70/30, (NOVOLIN 70/30) 100 unit/mL (70-30) injection Inject 10 Units into the skin 2 (two) times daily.      levothyroxine (SYNTHROID) 25 MCG tablet Take 25 mcg by mouth once daily.      lidocaine (LIDODERM) 5 % Place 1 patch onto the skin once daily. Remove & Discard patch within 12 hours or as directed by MD 30 patch 0    losartan (COZAAR) 50 MG tablet Take 50 mg by mouth once daily.      ondansetron (ZOFRAN-ODT) 4 MG TbDL Take 1 tablet (4 mg total) by mouth every 6 (six) hours as needed (nausea). 15 tablet 0    sodium polystyrene (KAYEXALATE) 15 gram/60 mL Susp Take 20 mLs (5 g total) by mouth once daily. 60 mL 0    tacrolimus (PROTOPIC) 0.1 % ointment Apply topically 2 (two) times daily.      tamsulosin (FLOMAX) 0.4 mg Cap Take 1 capsule (0.4 mg total) by mouth once daily. 30 capsule 0    traMADoL (ULTRAM) 50 mg tablet Take 1 tablet (50 mg total) by mouth every 8 (eight) hours as needed for Pain. 9 tablet 0    amlodipine (NORVASC) 10 MG tablet Take 1 tablet (10 mg total) by mouth once  daily. 60 tablet 11    atorvastatin (LIPITOR) 80 MG tablet Take 1 tablet (80 mg total) by mouth once daily. 30 tablet 11    clopidogrel (PLAVIX) 75 mg tablet Take 1 tablet (75 mg total) by mouth once daily. 30 tablet 11    metoprolol succinate (TOPROL-XL) 50 MG 24 hr tablet Take 1 tablet (50 mg total) by mouth once daily. 30 tablet 11    pantoprazole (PROTONIX) 40 MG tablet Take 1 tablet (40 mg total) by mouth once daily. 30 tablet 11     No current facility-administered medications for this visit.     Review of patient's allergies indicates:   Allergen Reactions    Ace inhibitors Itching     Is not sure which medication it was    Captopril      Social History     Socioeconomic History    Marital status: Single   Tobacco Use    Smoking status: Never    Smokeless tobacco: Never   Substance and Sexual Activity    Alcohol use: No    Drug use: No    Sexual activity: Not Currently       ROS    REVIEW OF SYSTEMS: Negative as documented below as well as positive findings in bold.       Constitutional  Respiratory  Gastrointestinal  Skin   - Fever - Cough - Heartburn - Rash   - Chills - Spit blood - Nausea - Itching   - Weight Loss - Shortness of breath - Vomiting - Nail pain   - Malaise/Fatigue - Wheezing - Abdominal Pain  Wound/Ulcer   - Weight Gain   - Blood in Stool  Poor wound healing       - Diarrhea          Cardiovascular  Genitourinary  Neurological  HEENT   - Chest Pain - Dysuria - Burning Sensation of feet - Headache   - Palpitations - Hematuria - Tingling / Paresthesia - Congestion   - Pain at night in legs - Flank Pain - Dizziness - Sore Throat   - Cramping   - Tremor - Blurred Vision   - Leg Swelling   - Sensory Change - Double Vision   - Dizzy when standing   - Speech Change - Eye Redness       - Focal Weakness - Dry Eyes       - Loss of Consciousness          Endocrine  Musculoskeletal  Psychiatric   - Cold intolerance - Muscle Pain - Depression   - Heat intolerance - Neck Pain - Insomnia   - Anemia - Joint  "Pain - Memory Loss   -  Easy bruising, bleeding - Heel pain - Anxiety      Toe Pain        Leg/Ankle/Foot Pain         Objective:     Ht 5' 8" (1.727 m)   Wt 71.7 kg (158 lb)   BMI 24.02 kg/m²   Vitals:    09/13/23 1333   Weight: 71.7 kg (158 lb)   Height: 5' 8" (1.727 m)   PainSc:   9   PainLoc: Foot       Physical Exam    General Appearance:   Patient appears well developed, well nourished  Patient appears stated age    Psychiatric:   Patient is oriented to time, place, and person.  Patient has appropriate mood and affect    Neck:  Trachea Midline  No visible masses    Respiratory/Ears:  No distress or labored breathing.  Able to differentiate between normal talking voice and whisper.  Able to follow commands    Eyes:  Visual Acuity intact  Lids and conjunctivae normal. No discoloration noted.    Physical Exam  Ortho Exam  Ortho/SPM Exam  Foot Exam  Foot/Ankle Musculoskeletal Exam    B/l LE exam con't:  V:  DP 2/4, PT 2/4   CRT< 3s to all digits tested   Tibial and popliteal lymph nodes are w/o abnormality    hair growth absent bilaterally, coloration increased, edema absent bilaterally, varicosities absent bilaterally    N:  Patient displays normal ankle reflexes   sensory deficit present    Ortho: +Motor EHL/FHL/TA/GA   no TTP of foot or ankles   Compartments soft/compressible. No pain on passive stretch of big toe. No calf  Pain.   observed lateral deviation of the hallux with bunion deformity present b/l and hammertoe deformity present 2-5 b/l    Derm:  skin intact, skin warm and dry, skin without ulcers or lesions, skin without induration, nails normal, nails discolored, no erythema, and no ecchymosis    Imaging / Labs:    Hemoglobin A1C   Date Value Ref Range Status   03/28/2023 6.2 (H) 4.0 - 5.6 % Final     Comment:     ADA Screening Guidelines:  5.7-6.4%  Consistent with prediabetes  >or=6.5%  Consistent with diabetes    High levels of fetal hemoglobin interfere with the HbA1C  assay. Heterozygous " hemoglobin variants (HbS, HgC, etc)do  not significantly interfere with this assay.   However, presence of multiple variants may affect accuracy.     05/25/2022 6.6 (H) <=6.5 % Final   09/02/2021 6.7 (H) 4.0 - 5.6 % Final     Comment:     ADA Screening Guidelines:  5.7-6.4%  Consistent with prediabetes  >or=6.5%  Consistent with diabetes    High levels of fetal hemoglobin interfere with the HbA1C  assay. Heterozygous hemoglobin variants (HbS, HgC, etc)do  not significantly interfere with this assay.   However, presence of multiple variants may affect accuracy.     11/23/2019 5.4 4.0 - 5.6 % Final     Comment:     ADA Screening Guidelines:  5.7-6.4%  Consistent with prediabetes  >or=6.5%  Consistent with diabetes  High levels of fetal hemoglobin interfere with the HbA1C  assay. Heterozygous hemoglobin variants (HbS, HgC, etc)do  not significantly interfere with this assay.   However, presence of multiple variants may affect accuracy.             Note: This was dictated using a computer transcription program. Although proofread, it may contain computer transcription errors and phonetic errors. Other human proofreading errors may also exist. Corrections may be performed at a later time. Please contact us for any clarification if needed.    Ed Schneider DPM  [facility]

## 2023-09-13 NOTE — PATIENT INSTRUCTIONS
Diabetes: Inspecting Your Feet      Diabetes increases your chances of developing foot problems. So inspect your feet every day. This helps you find small skin irritations before they become serious ulcers or infections. If you have trouble seeing the bottoms of your feet, use a mirror or ask a family member or friend to help.  How to check your feet  Below are tips to help you look for foot problems. Try to check your feet at the same time each day, such as when you get out of bed in the morning:  Check the top of each foot. The tops of toes, back of the heel, and outer edge of the foot can get a lot of rubbing from poor-fitting shoes.  Check the bottom of each foot. Daily wear and tear often leads to problems at pressure spots.  Check the toes and nails. Fungal infections often occur between toes. Toenail problems can also be a sign of fungal infections or lead to breaks in the skin.  Check your shoes, too. Loose objects inside a shoe can injure the foot. Use your hand to feel inside your shoes for things like ashwini, loose stitching, or rough areas that could irritate your skin.  Warning signs  Look for any color changes in the foot. Redness with streaks can signal a severe infection, which needs immediate medical attention. Tell your healthcare provider right away if you have any of these problems:  Swelling, sometimes with color changes, may be a sign of poor blood flow or infection. Symptoms include tenderness and an increase in the size of your foot.  Warm or hot areas on your feet may be signs of infection. A foot that is cold may not be getting enough blood.  Sensations such as burning, tingling, or pins and needles can be signs of a problem. Also check for areas that may be numb.  Hot spots are caused by friction or pressure. Look for hot spots in areas that get a lot of rubbing. Hot spots can turn into blisters, calluses, or sores.  Cracks and sores are caused by dry or irritated skin. They are a sign  that the skin is breaking down, which can lead to infection.  Toenail problems to watch for include nails growing into the skin (ingrown toenail) and causing redness or pain. Thick, yellow, or discolored nails can signal a fungal infection.  Drainage and odor can develop from untreated sores and ulcers. Call your healthcare provider right away if you notice white or yellow drainage, bleeding, or unpleasant odor.   Date Last Reviewed: 6/1/2016 © 2000-2017 [employer] [address] [place] [id number]. All rights reserved. This information is not intended as a substitute for professional medical care. Always follow your healthcare professional's instructions.    Long-Term Complications of Diabetes    Diabetes can cause health problems over time. These are called complications. They are more likely to happen if your blood sugar is often too high. Over time, high blood sugar can damage blood vessels in your body. It is important to keep your blood sugar in your target range. This can help prevent or delay complications from diabetes.  Possible complications  Complications of diabetes include:    Eye problems, including damage to the blood vessels in the eyes (retinopathy), pressure in the eye (glaucoma), and clouding of the eye's lens (a cataract). Eye problems can eventually lead to irreversible blindness.   Tooth and gum problems (periodontal disease), causing loss of teeth and bone  Blood vessel (vascular) disease leading to circulation problems, heart attack or stroke, or a need for amputation of a limb   Problems with sexual function leading to erectile dysfunction in men and sexual discomfort in women   Kidney disease (nephropathy) can eventually lead to kidney failure, which may require dialysis or kidney transplant   Nerve problems (neuropathy), causing pain or loss of feeling in your feet and other parts of your body, potentially leading to an amputation of a limb   High blood pressure  (hypertension), putting strain on your heart and blood vessels  Serious infections, possibly leading to loss of toes, feet, or limbs    How to avoid complications  The serious consequences of these complications may be avoidable for most people with diabetes by managing your blood glucose, blood pressure, and cholesterol levels. This can help you feel better and stay healthy. You can manage diabetes by tracking your blood sugar. You can also eat healthy and exercise to avoid gaining weight. And you should take medicine if directed by your healthcare provider.      Diabetes Foot Care Guidelines  Diabetic foot care is essential as diabetes can be dangerous to your feet--even a small cut can produce serious consequences. Diabetes may cause nerve damage that takes away the feeling in your feet. Diabetes may also reduce blood flow to the feet, making it harder to heal an injury or resist infection. Because of these problems, you may not notice a foreign object in your shoe. As a result, you could develop a blister or a sore. This could lead to an infection or a nonhealing wound that could put you at risk for an amputation.    To avoid serious foot problems that could result in losing a toe, foot or leg, follow these guidelines.    Inspect your feet daily. Check for cuts, blisters, redness, swelling or nail problems. Use a magnifying hand mirror to look at the bottom of your feet. Call your doctor if you notice anything.    Bathe feet in lukewarm, never hot, water. Keep your feet clean by washing them daily. Use only lukewarm water--the temperature you would use on a  baby.    Be gentle when bathing your feet. Wash them using a soft washcloth or sponge. Dry by blotting or patting and carefully dry between the toes.    Moisturize your feet but not between your toes. Use a moisturizer daily to keep dry skin from itching or cracking. But don't moisturize between the toes--that could encourage a fungal  infection.    Cut nails carefully. Cut them straight across and file the edges. Dont cut nails too short, as this could lead to ingrown toenails. If you have concerns about your nails, consult your doctor.    Never treat corns or calluses yourself. No bathroom surgery or medicated pads. Visit your doctor for appropriate treatment.    Wear clean, dry socks. Change them daily.    Consider socks made specifically for patients living with diabetes. These socks have extra cushioning, do not have elastic tops, are higher than the ankle and are made from fibers that wick moisture away from the skin.    Wear socks to bed. If your feet get cold at night, wear socks. Never use a heating pad or a hot water bottle.    Shake out your shoes and feel the inside before wearing. Remember, your feet may not be able to feel a pebble or other foreign object, so always inspect your shoes before putting them on.    Keep your feet warm and dry. Dont let your feet get wet in snow or rain. Wear warm socks and shoes in winter.    Consider using an antiperspirant on the soles of your feet. This is helpful if you have excessive sweating of the feet.    Never walk barefoot. Not even at home! Always wear shoes or slippers. You could step on something and get a scratch or cut.    Take care of your diabetes. Keep your blood sugar levels under control.    Do not smoke. Smoking restricts blood flow in your feet.    Get periodic foot exams. Seeing your foot and ankle surgeon on a regular basis can help prevent the foot complications of diabetes.

## 2023-09-14 ENCOUNTER — TELEPHONE (OUTPATIENT)
Dept: DIABETES | Facility: CLINIC | Age: 83
End: 2023-09-14
Payer: MEDICARE

## 2023-09-20 ENCOUNTER — TELEPHONE (OUTPATIENT)
Dept: FAMILY MEDICINE | Facility: CLINIC | Age: 83
End: 2023-09-20
Payer: MEDICARE

## 2023-09-20 NOTE — TELEPHONE ENCOUNTER
Called [employer] in regards of forms on patient. Spoke with Linda in regards of patient. Linda informed me that they are needing you to sign off on patient's form, so that he can get his shoes. Informed Linda that Dr. Bell has never seen this patient and has never treated him at all for diabetes . She informed me that on 9/13/2023 that   Dr. Bell signed off on an order for patient's shoes that is why they are needing her to sign forms for patient. Please advise forms on your desk.

## 2023-09-20 NOTE — TELEPHONE ENCOUNTER
Please relay to them that I am not PCP pt has NO PCP within [facility] so this will need to be reprocessed with whomever he is seeing to manage his diabetes. If an order was cosigned this was by mistake and will need to be reordered with his PCP or diabetic doctor cosign

## 2023-09-20 NOTE — TELEPHONE ENCOUNTER
Called [facility] and spoke Linda in regards of forms for patient. Informed her that pt has never seen Dr. Bell. Pt does not see anyone within [facility] for PCP. [facility] verbalized understanding of message.

## 2024-01-15 PROBLEM — R06.02 SOB (SHORTNESS OF BREATH): Status: ACTIVE | Noted: 2024-01-15

## 2024-01-15 PROBLEM — R79.89 ELEVATED TROPONIN LEVEL NOT DUE TO ACUTE CORONARY SYNDROME: Status: ACTIVE | Noted: 2024-01-15

## 2024-01-15 PROBLEM — R07.9 CHEST PAIN: Status: ACTIVE | Noted: 2024-01-15

## 2024-01-15 PROBLEM — D64.9 SYMPTOMATIC ANEMIA: Status: ACTIVE | Noted: 2024-01-15

## 2024-01-15 PROBLEM — E03.4 HYPOTHYROIDISM DUE TO ACQUIRED ATROPHY OF THYROID: Status: ACTIVE | Noted: 2024-01-15

## 2024-01-16 PROBLEM — R06.02 SOB (SHORTNESS OF BREATH): Status: ACTIVE | Noted: 2024-01-16

## 2024-01-18 PROBLEM — D64.9 SYMPTOMATIC ANEMIA: Status: RESOLVED | Noted: 2024-01-15 | Resolved: 2024-01-18

## 2024-01-18 PROBLEM — R07.9 CHEST PAIN: Status: RESOLVED | Noted: 2024-01-15 | Resolved: 2024-01-18

## 2024-02-25 PROBLEM — K92.1 BLOOD IN STOOL: Status: ACTIVE | Noted: 2024-02-25

## 2024-02-26 PROBLEM — R16.0 LIVER MASS: Status: ACTIVE | Noted: 2024-02-26

## 2024-02-28 ENCOUNTER — TELEPHONE (OUTPATIENT)
Dept: OPHTHALMOLOGY | Facility: CLINIC | Age: 84
End: 2024-02-28
Payer: MEDICARE

## 2024-02-28 ENCOUNTER — TELEPHONE (OUTPATIENT)
Dept: FAMILY MEDICINE | Facility: CLINIC | Age: 84
End: 2024-02-28
Payer: MEDICARE

## 2024-02-28 ENCOUNTER — TELEPHONE (OUTPATIENT)
Dept: HEPATOLOGY | Facility: CLINIC | Age: 84
End: 2024-02-28
Payer: MEDICARE

## 2024-02-28 ENCOUNTER — TELEPHONE (OUTPATIENT)
Dept: INTERVENTIONAL RADIOLOGY/VASCULAR | Facility: CLINIC | Age: 84
End: 2024-02-28
Payer: MEDICARE

## 2024-02-28 NOTE — TELEPHONE ENCOUNTER
"Patient was contacted to schedule an appointment from referral.  The patient stated "he will be discharge from the hospital on today."  An appointment have been scheduled for Tuesday, March 12, 2024 at 10:30AM.  A copy of the appointment have been mailed out.    "

## 2024-02-28 NOTE — TELEPHONE ENCOUNTER
----- Message from Aidee Gibson sent at 2/28/2024 12:40 PM CST -----  Regarding: Appt Request  Donna  about scheduling pt from referral for Blepharitis of left lower eyelid, unspecified type to ophthalmology.    Pts call back- [phone]

## 2024-02-28 NOTE — TELEPHONE ENCOUNTER
----- Message from Donna Weaver LMSW sent at 2/28/2024 12:05 PM CST -----  Regarding: Hospital follow up appointment  Good afternoon, patient will be discharged to home today.  Please contact patient with a one week hospital follow up appointment.    Thanks

## 2024-02-29 ENCOUNTER — TELEPHONE (OUTPATIENT)
Dept: GASTROENTEROLOGY | Facility: CLINIC | Age: 84
End: 2024-02-29
Payer: MEDICARE

## 2024-02-29 NOTE — TELEPHONE ENCOUNTER
LM for pt to call office back at [phone] to schedule an office visit with NP SG in 3-4 weeks for HFU/discuss colonoscopy.

## 2024-03-04 ENCOUNTER — TELEPHONE (OUTPATIENT)
Dept: INTERVENTIONAL RADIOLOGY/VASCULAR | Facility: CLINIC | Age: 84
End: 2024-03-04
Payer: MEDICARE

## 2024-03-08 ENCOUNTER — TELEPHONE (OUTPATIENT)
Dept: OPHTHALMOLOGY | Facility: CLINIC | Age: 84
End: 2024-03-08
Payer: MEDICARE

## 2024-03-08 NOTE — TELEPHONE ENCOUNTER
----- Message from Teodora Avery sent at 3/8/2024 11:22 AM CST -----  Good morning,    The patient have a referral from med surg with a diagnosis of Blepharitis of left lower eyelid, unspecified type. Please assist with scheduling the patient.    Thank You

## 2024-03-12 ENCOUNTER — TELEPHONE (OUTPATIENT)
Dept: HEPATOLOGY | Facility: CLINIC | Age: 84
End: 2024-03-12

## 2024-03-12 NOTE — TELEPHONE ENCOUNTER
Patient was contacted to reschedule missed appointment.  No answer, an voicemail was left with the call back number .

## 2024-04-01 PROBLEM — I15.0 RENOVASCULAR HYPERTENSION: Status: ACTIVE | Noted: 2024-04-01

## 2024-04-01 PROBLEM — D64.9 SYMPTOMATIC ANEMIA: Status: RESOLVED | Noted: 2024-01-15 | Resolved: 2024-04-01

## 2024-04-17 PROBLEM — R06.02 SOB (SHORTNESS OF BREATH): Status: RESOLVED | Noted: 2024-01-16 | Resolved: 2024-04-17

## 2024-04-17 PROBLEM — R79.89 ELEVATED TROPONIN LEVEL NOT DUE TO ACUTE CORONARY SYNDROME: Status: RESOLVED | Noted: 2024-01-15 | Resolved: 2024-04-17

## 2024-04-17 PROBLEM — R50.9 FEVER: Status: RESOLVED | Noted: 2019-11-24 | Resolved: 2024-04-17

## 2024-05-28 ENCOUNTER — HOSPITAL ENCOUNTER (OUTPATIENT)
Facility: HOSPITAL | Age: 84
Discharge: HOME | End: 2024-05-29
Attending: STUDENT IN AN ORGANIZED HEALTH CARE EDUCATION/TRAINING PROGRAM | Admitting: FAMILY MEDICINE
Payer: MEDICARE

## 2024-05-28 DIAGNOSIS — E87.70 FLUID OVERLOAD: ICD-10-CM

## 2024-05-28 DIAGNOSIS — E87.5 HYPERKALEMIA: Primary | ICD-10-CM

## 2024-05-28 DIAGNOSIS — R07.9 CHEST PAIN: ICD-10-CM

## 2024-05-28 DIAGNOSIS — E83.39 HYPERPHOSPHATEMIA: ICD-10-CM

## 2024-05-28 DIAGNOSIS — R06.02 SHORTNESS OF BREATH: ICD-10-CM

## 2024-05-28 LAB
ALBUMIN SERPL BCP-MCNC: 3.9 G/DL (ref 3.5–5.2)
ALP SERPL-CCNC: 64 U/L (ref 55–135)
ALT SERPL W/O P-5'-P-CCNC: 25 U/L (ref 10–44)
ANION GAP SERPL CALC-SCNC: 20 MMOL/L (ref 8–16)
AST SERPL-CCNC: 21 U/L (ref 10–40)
BASOPHILS # BLD AUTO: 0.03 K/UL (ref 0–0.2)
BASOPHILS NFR BLD: 0.3 % (ref 0–1.9)
BILIRUB SERPL-MCNC: 0.6 MG/DL (ref 0.1–1)
BNP SERPL-MCNC: 1591 PG/ML (ref 0–99)
BUN SERPL-MCNC: 84 MG/DL (ref 8–23)
CALCIUM SERPL-MCNC: 10.3 MG/DL (ref 8.7–10.5)
CHLORIDE SERPL-SCNC: 98 MMOL/L (ref 95–110)
CO2 SERPL-SCNC: 22 MMOL/L (ref 23–29)
CREAT SERPL-MCNC: 12.7 MG/DL (ref 0.5–1.4)
CTP QC/QA: YES
DIFFERENTIAL METHOD BLD: ABNORMAL
EOSINOPHIL # BLD AUTO: 0.3 K/UL (ref 0–0.5)
EOSINOPHIL NFR BLD: 2.8 % (ref 0–8)
ERYTHROCYTE [DISTWIDTH] IN BLOOD BY AUTOMATED COUNT: 18.4 % (ref 11.5–14.5)
EST. GFR  (NO RACE VARIABLE): 3 ML/MIN/1.73 M^2
GLUCOSE SERPL-MCNC: 153 MG/DL (ref 70–110)
HCT VFR BLD AUTO: 27 % (ref 40–54)
HGB BLD-MCNC: 8.6 G/DL (ref 14–18)
IMM GRANULOCYTES # BLD AUTO: 0.03 K/UL (ref 0–0.04)
IMM GRANULOCYTES NFR BLD AUTO: 0.3 % (ref 0–0.5)
LYMPHOCYTES # BLD AUTO: 0.8 K/UL (ref 1–4.8)
LYMPHOCYTES NFR BLD: 8.3 % (ref 18–48)
MAGNESIUM SERPL-MCNC: 2.5 MG/DL (ref 1.6–2.6)
MCH RBC QN AUTO: 30.6 PG (ref 27–31)
MCHC RBC AUTO-ENTMCNC: 31.9 G/DL (ref 32–36)
MCV RBC AUTO: 96 FL (ref 82–98)
MONOCYTES # BLD AUTO: 0.5 K/UL (ref 0.3–1)
MONOCYTES NFR BLD: 5.2 % (ref 4–15)
NEUTROPHILS # BLD AUTO: 7.5 K/UL (ref 1.8–7.7)
NEUTROPHILS NFR BLD: 83.1 % (ref 38–73)
NRBC BLD-RTO: 0 /100 WBC
PHOSPHATE SERPL-MCNC: 7.1 MG/DL (ref 2.7–4.5)
PLATELET # BLD AUTO: 161 K/UL (ref 150–450)
PMV BLD AUTO: 11.4 FL (ref 9.2–12.9)
POTASSIUM SERPL-SCNC: 5.9 MMOL/L (ref 3.5–5.1)
PROT SERPL-MCNC: 8 G/DL (ref 6–8.4)
RBC # BLD AUTO: 2.81 M/UL (ref 4.6–6.2)
SARS-COV-2 RDRP RESP QL NAA+PROBE: NEGATIVE
SODIUM SERPL-SCNC: 140 MMOL/L (ref 136–145)
TROPONIN I SERPL DL<=0.01 NG/ML-MCNC: 0.05 NG/ML (ref 0–0.03)
WBC # BLD AUTO: 8.99 K/UL (ref 3.9–12.7)

## 2024-05-28 PROCEDURE — 84100 ASSAY OF PHOSPHORUS: CPT | Performed by: STUDENT IN AN ORGANIZED HEALTH CARE EDUCATION/TRAINING PROGRAM

## 2024-05-28 PROCEDURE — 84484 ASSAY OF TROPONIN QUANT: CPT | Performed by: STUDENT IN AN ORGANIZED HEALTH CARE EDUCATION/TRAINING PROGRAM

## 2024-05-28 PROCEDURE — 83735 ASSAY OF MAGNESIUM: CPT | Performed by: STUDENT IN AN ORGANIZED HEALTH CARE EDUCATION/TRAINING PROGRAM

## 2024-05-28 PROCEDURE — G0378 HOSPITAL OBSERVATION PER HR: HCPCS

## 2024-05-28 PROCEDURE — 93005 ELECTROCARDIOGRAM TRACING: CPT

## 2024-05-28 PROCEDURE — 99285 EMERGENCY DEPT VISIT HI MDM: CPT | Mod: 25

## 2024-05-28 PROCEDURE — 80053 COMPREHEN METABOLIC PANEL: CPT | Performed by: STUDENT IN AN ORGANIZED HEALTH CARE EDUCATION/TRAINING PROGRAM

## 2024-05-28 PROCEDURE — 93010 ELECTROCARDIOGRAM REPORT: CPT | Mod: ,,, | Performed by: INTERNAL MEDICINE

## 2024-05-28 PROCEDURE — 83880 ASSAY OF NATRIURETIC PEPTIDE: CPT | Performed by: STUDENT IN AN ORGANIZED HEALTH CARE EDUCATION/TRAINING PROGRAM

## 2024-05-28 PROCEDURE — 85025 COMPLETE CBC W/AUTO DIFF WBC: CPT | Performed by: STUDENT IN AN ORGANIZED HEALTH CARE EDUCATION/TRAINING PROGRAM

## 2024-05-28 PROCEDURE — 87635 SARS-COV-2 COVID-19 AMP PRB: CPT | Performed by: STUDENT IN AN ORGANIZED HEALTH CARE EDUCATION/TRAINING PROGRAM

## 2024-05-28 RX ORDER — SODIUM CHLORIDE 0.9 % (FLUSH) 0.9 %
10 SYRINGE (ML) INJECTION EVERY 12 HOURS PRN
Status: DISCONTINUED | OUTPATIENT
Start: 2024-05-29 | End: 2024-05-29 | Stop reason: HOSPADM

## 2024-05-28 RX ORDER — LOSARTAN POTASSIUM 50 MG/1
50 TABLET ORAL DAILY
Status: DISCONTINUED | OUTPATIENT
Start: 2024-05-29 | End: 2024-05-29 | Stop reason: HOSPADM

## 2024-05-28 RX ORDER — FUROSEMIDE 10 MG/ML
80 INJECTION INTRAMUSCULAR; INTRAVENOUS ONCE
Status: COMPLETED | OUTPATIENT
Start: 2024-05-28 | End: 2024-05-29

## 2024-05-28 RX ORDER — GLUCAGON 1 MG
1 KIT INJECTION
Status: DISCONTINUED | OUTPATIENT
Start: 2024-05-29 | End: 2024-05-29 | Stop reason: HOSPADM

## 2024-05-28 RX ORDER — FINASTERIDE 5 MG/1
5 TABLET, FILM COATED ORAL NIGHTLY
Status: DISCONTINUED | OUTPATIENT
Start: 2024-05-29 | End: 2024-05-29 | Stop reason: HOSPADM

## 2024-05-28 RX ORDER — LEVOTHYROXINE SODIUM 25 UG/1
25 TABLET ORAL
Status: DISCONTINUED | OUTPATIENT
Start: 2024-05-29 | End: 2024-05-29

## 2024-05-28 RX ORDER — PANTOPRAZOLE SODIUM 40 MG/1
40 TABLET, DELAYED RELEASE ORAL DAILY
Status: DISCONTINUED | OUTPATIENT
Start: 2024-05-29 | End: 2024-05-29 | Stop reason: HOSPADM

## 2024-05-28 RX ORDER — NALOXONE HCL 0.4 MG/ML
0.02 VIAL (ML) INJECTION
Status: DISCONTINUED | OUTPATIENT
Start: 2024-05-29 | End: 2024-05-29 | Stop reason: HOSPADM

## 2024-05-28 RX ORDER — ISOSORBIDE MONONITRATE 30 MG/1
30 TABLET, EXTENDED RELEASE ORAL NIGHTLY
Status: DISCONTINUED | OUTPATIENT
Start: 2024-05-29 | End: 2024-05-29 | Stop reason: HOSPADM

## 2024-05-28 RX ORDER — CLOPIDOGREL BISULFATE 75 MG/1
75 TABLET ORAL DAILY
Status: DISCONTINUED | OUTPATIENT
Start: 2024-05-29 | End: 2024-05-29 | Stop reason: HOSPADM

## 2024-05-28 RX ORDER — INSULIN ASPART 100 [IU]/ML
0-5 INJECTION, SOLUTION INTRAVENOUS; SUBCUTANEOUS
Status: DISCONTINUED | OUTPATIENT
Start: 2024-05-29 | End: 2024-05-29 | Stop reason: HOSPADM

## 2024-05-28 RX ORDER — IBUPROFEN 200 MG
24 TABLET ORAL
Status: DISCONTINUED | OUTPATIENT
Start: 2024-05-29 | End: 2024-05-29 | Stop reason: HOSPADM

## 2024-05-28 RX ORDER — IBUPROFEN 200 MG
16 TABLET ORAL
Status: DISCONTINUED | OUTPATIENT
Start: 2024-05-29 | End: 2024-05-29 | Stop reason: HOSPADM

## 2024-05-28 RX ORDER — NAPROXEN SODIUM 220 MG/1
81 TABLET, FILM COATED ORAL NIGHTLY
Status: DISCONTINUED | OUTPATIENT
Start: 2024-05-29 | End: 2024-05-29 | Stop reason: HOSPADM

## 2024-05-29 VITALS
OXYGEN SATURATION: 98 % | TEMPERATURE: 97 F | HEART RATE: 78 BPM | HEIGHT: 68 IN | RESPIRATION RATE: 17 BRPM | SYSTOLIC BLOOD PRESSURE: 143 MMHG | BODY MASS INDEX: 25.04 KG/M2 | DIASTOLIC BLOOD PRESSURE: 65 MMHG

## 2024-05-29 PROBLEM — E87.70 FLUID OVERLOAD: Status: RESOLVED | Noted: 2024-05-28 | Resolved: 2024-05-29

## 2024-05-29 PROBLEM — E87.5 HYPERKALEMIA: Status: RESOLVED | Noted: 2024-05-28 | Resolved: 2024-05-29

## 2024-05-29 LAB
ALBUMIN SERPL BCP-MCNC: 3.4 G/DL (ref 3.5–5.2)
ALP SERPL-CCNC: 50 U/L (ref 55–135)
ALT SERPL W/O P-5'-P-CCNC: 21 U/L (ref 10–44)
ANION GAP SERPL CALC-SCNC: 17 MMOL/L (ref 8–16)
AST SERPL-CCNC: 13 U/L (ref 10–40)
BASOPHILS # BLD AUTO: 0.02 K/UL (ref 0–0.2)
BASOPHILS NFR BLD: 0.3 % (ref 0–1.9)
BILIRUB SERPL-MCNC: 0.7 MG/DL (ref 0.1–1)
BUN SERPL-MCNC: 85 MG/DL (ref 8–23)
CALCIUM SERPL-MCNC: 9.9 MG/DL (ref 8.7–10.5)
CHLORIDE SERPL-SCNC: 102 MMOL/L (ref 95–110)
CO2 SERPL-SCNC: 24 MMOL/L (ref 23–29)
CREAT SERPL-MCNC: 12.9 MG/DL (ref 0.5–1.4)
DIFFERENTIAL METHOD BLD: ABNORMAL
EOSINOPHIL # BLD AUTO: 0.3 K/UL (ref 0–0.5)
EOSINOPHIL NFR BLD: 3.4 % (ref 0–8)
ERYTHROCYTE [DISTWIDTH] IN BLOOD BY AUTOMATED COUNT: 18.4 % (ref 11.5–14.5)
EST. GFR  (NO RACE VARIABLE): 3 ML/MIN/1.73 M^2
GLUCOSE SERPL-MCNC: 75 MG/DL (ref 70–110)
HCT VFR BLD AUTO: 24.5 % (ref 40–54)
HGB BLD-MCNC: 7.8 G/DL (ref 14–18)
IMM GRANULOCYTES # BLD AUTO: 0.02 K/UL (ref 0–0.04)
IMM GRANULOCYTES NFR BLD AUTO: 0.3 % (ref 0–0.5)
LYMPHOCYTES # BLD AUTO: 1.5 K/UL (ref 1–4.8)
LYMPHOCYTES NFR BLD: 19.4 % (ref 18–48)
MCH RBC QN AUTO: 30.6 PG (ref 27–31)
MCHC RBC AUTO-ENTMCNC: 31.8 G/DL (ref 32–36)
MCV RBC AUTO: 96 FL (ref 82–98)
MONOCYTES # BLD AUTO: 0.6 K/UL (ref 0.3–1)
MONOCYTES NFR BLD: 8 % (ref 4–15)
NEUTROPHILS # BLD AUTO: 5.2 K/UL (ref 1.8–7.7)
NEUTROPHILS NFR BLD: 68.6 % (ref 38–73)
NRBC BLD-RTO: 0 /100 WBC
OHS QRS DURATION: 86 MS
OHS QTC CALCULATION: 449 MS
PLATELET # BLD AUTO: 149 K/UL (ref 150–450)
PMV BLD AUTO: 10.5 FL (ref 9.2–12.9)
POCT GLUCOSE: 245 MG/DL (ref 70–110)
POTASSIUM SERPL-SCNC: 5.1 MMOL/L (ref 3.5–5.1)
PROT SERPL-MCNC: 6.7 G/DL (ref 6–8.4)
RBC # BLD AUTO: 2.55 M/UL (ref 4.6–6.2)
SODIUM SERPL-SCNC: 143 MMOL/L (ref 136–145)
TROPONIN I SERPL DL<=0.01 NG/ML-MCNC: 0.05 NG/ML (ref 0–0.03)
WBC # BLD AUTO: 7.63 K/UL (ref 3.9–12.7)

## 2024-05-29 PROCEDURE — 80053 COMPREHEN METABOLIC PANEL: CPT | Performed by: STUDENT IN AN ORGANIZED HEALTH CARE EDUCATION/TRAINING PROGRAM

## 2024-05-29 PROCEDURE — 96375 TX/PRO/DX INJ NEW DRUG ADDON: CPT | Mod: 59

## 2024-05-29 PROCEDURE — 90935 HEMODIALYSIS ONE EVALUATION: CPT

## 2024-05-29 PROCEDURE — 25000003 PHARM REV CODE 250: Performed by: STUDENT IN AN ORGANIZED HEALTH CARE EDUCATION/TRAINING PROGRAM

## 2024-05-29 PROCEDURE — 25000003 PHARM REV CODE 250: Performed by: FAMILY MEDICINE

## 2024-05-29 PROCEDURE — 96365 THER/PROPH/DIAG IV INF INIT: CPT

## 2024-05-29 PROCEDURE — 63600175 PHARM REV CODE 636 W HCPCS: Performed by: STUDENT IN AN ORGANIZED HEALTH CARE EDUCATION/TRAINING PROGRAM

## 2024-05-29 PROCEDURE — 85025 COMPLETE CBC W/AUTO DIFF WBC: CPT | Performed by: STUDENT IN AN ORGANIZED HEALTH CARE EDUCATION/TRAINING PROGRAM

## 2024-05-29 PROCEDURE — G0378 HOSPITAL OBSERVATION PER HR: HCPCS

## 2024-05-29 PROCEDURE — 82962 GLUCOSE BLOOD TEST: CPT

## 2024-05-29 PROCEDURE — 84484 ASSAY OF TROPONIN QUANT: CPT | Performed by: STUDENT IN AN ORGANIZED HEALTH CARE EDUCATION/TRAINING PROGRAM

## 2024-05-29 PROCEDURE — G0257 UNSCHED DIALYSIS ESRD PT HOS: HCPCS

## 2024-05-29 RX ORDER — ATORVASTATIN CALCIUM 20 MG/1
80 TABLET, FILM COATED ORAL DAILY
Status: DISCONTINUED | OUTPATIENT
Start: 2024-05-29 | End: 2024-05-29 | Stop reason: HOSPADM

## 2024-05-29 RX ORDER — SODIUM CHLORIDE 9 MG/ML
INJECTION, SOLUTION INTRAVENOUS
Status: DISCONTINUED | OUTPATIENT
Start: 2024-05-29 | End: 2024-05-29 | Stop reason: HOSPADM

## 2024-05-29 RX ORDER — LEVOTHYROXINE SODIUM 50 UG/1
50 TABLET ORAL
Status: DISCONTINUED | OUTPATIENT
Start: 2024-05-30 | End: 2024-05-29 | Stop reason: HOSPADM

## 2024-05-29 RX ORDER — HEPARIN SODIUM 5000 [USP'U]/ML
5000 INJECTION, SOLUTION INTRAVENOUS; SUBCUTANEOUS EVERY 8 HOURS
Status: DISCONTINUED | OUTPATIENT
Start: 2024-05-29 | End: 2024-05-29 | Stop reason: HOSPADM

## 2024-05-29 RX ORDER — LEVOTHYROXINE SODIUM 25 UG/1
25 TABLET ORAL ONCE
Status: DISCONTINUED | OUTPATIENT
Start: 2024-05-29 | End: 2024-05-29 | Stop reason: HOSPADM

## 2024-05-29 RX ORDER — SEVELAMER CARBONATE 800 MG/1
800 TABLET, FILM COATED ORAL
Status: DISCONTINUED | OUTPATIENT
Start: 2024-05-29 | End: 2024-05-29 | Stop reason: HOSPADM

## 2024-05-29 RX ORDER — MUPIROCIN 20 MG/G
OINTMENT TOPICAL 2 TIMES DAILY
Status: DISCONTINUED | OUTPATIENT
Start: 2024-05-29 | End: 2024-05-29 | Stop reason: HOSPADM

## 2024-05-29 RX ORDER — SODIUM CHLORIDE 9 MG/ML
INJECTION, SOLUTION INTRAVENOUS ONCE
Status: DISCONTINUED | OUTPATIENT
Start: 2024-05-29 | End: 2024-05-29 | Stop reason: HOSPADM

## 2024-05-29 RX ADMIN — HUMAN INSULIN 5 UNITS: 100 INJECTION, SOLUTION SUBCUTANEOUS at 12:05

## 2024-05-29 RX ADMIN — PANTOPRAZOLE SODIUM 40 MG: 40 TABLET, DELAYED RELEASE ORAL at 08:05

## 2024-05-29 RX ADMIN — LOSARTAN POTASSIUM 50 MG: 50 TABLET, FILM COATED ORAL at 08:05

## 2024-05-29 RX ADMIN — CLOPIDOGREL BISULFATE 75 MG: 75 TABLET ORAL at 08:05

## 2024-05-29 RX ADMIN — FUROSEMIDE 80 MG: 10 INJECTION, SOLUTION INTRAVENOUS at 12:05

## 2024-05-29 RX ADMIN — DEXTROSE MONOHYDRATE 500 ML: 100 INJECTION, SOLUTION INTRAVENOUS at 12:05

## 2024-05-29 RX ADMIN — LEVOTHYROXINE SODIUM 25 MCG: 25 TABLET ORAL at 06:05

## 2024-05-29 NOTE — PROGRESS NOTES
05/29/24 1345   Vital Signs   Temp 97.3 °F (36.3 °C)   Pulse 71   /63   Post-Hemodialysis Assessment   Rinseback Volume (mL) 250 mL   Blood Volume Processed (Liters) 72.5 L   Dialyzer Clearance Lightly streaked   Duration of Treatment 180 minutes   Total UF (mL) 2500 mL   Net Fluid Removal 2000   Patient Response to Treatment tolerated well   Post-Hemodialysis Comments needles pulled x2 pressure held till hemostasis achieved no bleeding noted at sites new dressing applied to each site

## 2024-05-29 NOTE — H&P
[facility] Medicine  History & Physical    Patient Name: Jevon Rajan  MRN: [id number]  Patient Class: OP- Observation  Admission Date: 5/28/2024  Attending Physician: Sina Ceron MD  Primary Care Provider: Melia Primary Doctor         Patient information was obtained from patient, past medical records, and ER records.     Subjective:     Principal Problem:Fluid overload    Chief Complaint:   Chief Complaint   Patient presents with    Shortness of Breath     83 y/o M to the ER via EMS with c/o worsening SOB after missing dialysis today. Pt reports his van transportation did not come pick him up.         HPI: Mr. Jevon Rajan is a 83y/o M w/ PMH BPH, DM2, HTN, ESRD on HD T/Th/Sat via left brachial AVF, anemia, CAD, and hypothyroidism who presented to the hospital complaining of dyspnea in the setting of missed dialysis session today due to transportation issues. Denies any chest pain, fever, chills, nausea, vomiting, cough.     In the ED, EKG shows normal sinus rhythm with no hyperkalemia induced EKG changes.  Labs were notable for K 5.9, creatinine 12.7.     Admitted for fluid overload.     Past Medical History:   Diagnosis Date    BPH (benign prostatic hyperplasia)     Coronary artery disease     Diabetes mellitus, type 2     ESRD (end stage renal disease) on dialysis     ESRD on HD TTS via left brachial AVF    Hypertension     Hypothyroidism, unspecified     Symptomatic anemia 01/15/2024       Past Surgical History:   Procedure Laterality Date    bilateral foot surgery      ESOPHAGOGASTRODUODENOSCOPY N/A 2/27/2024    Procedure: EGD (ESOPHAGOGASTRODUODENOSCOPY);  Surgeon: Gemma Almendarez MD;  Location: [facility];  Service: Endoscopy;  Laterality: N/A;    eyelid surgery      FISTULOGRAM Left 11/27/2019    Procedure: Fistulogram;  Surgeon: MELANY Brenner III, MD;  Location: [address];  Service: Peripheral Vascular;  Laterality: Left;    FISTULOGRAM Left 11/27/2019    Procedure:  Fistulogram, AVG declot, possible permacath;  Surgeon: MELANY Brenner III, MD;  Location: [facility] CATH LAB;  Service: Peripheral Vascular;  Laterality: Left;    INSERTION OF DIALYSIS CATHETER      [facility]'s  12/5/13    right hand surgery      stents         Review of patient's allergies indicates:   Allergen Reactions    Ace inhibitors Itching     Is not sure which medication it was    Captopril        No current facility-administered medications on file prior to encounter.     Current Outpatient Medications on File Prior to Encounter   Medication Sig    acarbose (PRECOSE) 50 MG Tab Take 100 mg by mouth 3 (three) times daily with meals.     aspirin 81 MG Chew Take 1 tablet (81 mg total) by mouth every evening.    ciclopirox (PENLAC) 8 % Soln Apply topically nightly.    clopidogrel (PLAVIX) 75 mg tablet Take 1 tablet (75 mg total) by mouth once daily.    finasteride (PROSCAR) 5 mg tablet Take 5 mg by mouth every evening.     glipiZIDE (GLUCOTROL) 10 MG TR24 Take 5 mg by mouth 2 (two) times daily.     HYDROcodone-acetaminophen (NORCO) 5-325 mg per tablet Take 1 tablet by mouth every 8 (eight) hours as needed for Pain.    isosorbide mononitrate (IMDUR) 30 MG 24 hr tablet Take 1 tablet (30 mg total) by mouth every evening.    levothyroxine (SYNTHROID) 25 MCG tablet Take 25 mcg by mouth once daily.    lidocaine (LIDODERM) 5 % Place 1 patch onto the skin once daily. Remove & Discard patch within 12 hours or as directed by MD    losartan (COZAAR) 50 MG tablet Take 50 mg by mouth once daily.    ondansetron (ZOFRAN-ODT) 4 MG TbDL Take 1 tablet (4 mg total) by mouth every 8 (eight) hours as needed (nausea).    pantoprazole (PROTONIX) 40 MG tablet Take 1 tablet (40 mg total) by mouth once daily.    sodium polystyrene (KAYEXALATE) 15 gram/60 mL Susp Take 20 mLs (5 g total) by mouth once daily.    traMADoL (ULTRAM) 50 mg tablet Take 1 tablet (50 mg total) by mouth every 8 (eight) hours as needed for Pain.     Family History    None        Tobacco Use    Smoking status: Never    Smokeless tobacco: Never   Substance and Sexual Activity    Alcohol use: No    Drug use: No    Sexual activity: Not Currently     Review of Systems 12 point ROS negative except for HPI  Objective:     Vital Signs (Most Recent):  Temp: 98.4 °F (36.9 °C) (05/28/24 2030)  Pulse: 79 (05/28/24 2151)  Resp: 19 (05/28/24 2151)  BP: 133/60 (05/28/24 2151)  SpO2: 100 % (05/28/24 2151) Vital Signs (24h Range):  Temp:  [98.4 °F (36.9 °C)] 98.4 °F (36.9 °C)  Pulse:  [79-83] 79  Resp:  [14-20] 19  SpO2:  [97 %-100 %] 100 %  BP: (133-150)/(60-78) 133/60        Body mass index is 25.04 kg/m².     Physical Exam  Constitutional:       Comments: Alert, Oriented, No acute distress   HENT:      Head: Normocephalic and atraumatic.   Eyes:      Conjunctiva/sclera: Conjunctivae normal.   Cardiovascular:      Rate and Rhythm: Normal rate and regular rhythm.   Pulmonary:      Comments: Normal effort, decreased breath sounds bilateral lower lobe  Abdominal:      Comments: Soft, Non-tender, Non-distended   Musculoskeletal:      Cervical back: Normal range of motion.      Comments: No peripheral edema   Skin:     Comments: Dry, Intact, Warm, Capillary refill <2 seconds.    Neurological:      Mental Status: He is alert and oriented to person, place, and time.      Comments: Normal speech   Psychiatric:         Mood and Affect: Mood and affect normal.                Significant Labs: All pertinent labs within the past 24 hours have been reviewed.    Significant Imaging: I have reviewed all pertinent imaging results/findings within the past 24 hours.  Assessment/Plan:     * Fluid overload  Most likely secondary to missed dialysis.    - nephrology consulted to resume dialysis sessions.  - will give IV lasix 80mg dose.       Hyperkalemia  No EKG changes. Will give regular insulin, d10 and lasix.         Hypothyroidism due to acquired atrophy of thyroid  Continue levothyroxine      ESRD on  hemodialysis  Nephrology consulted to resume hemodialysis.    Coronary artery disease involving native coronary artery of native heart without angina pectoris  Continue aspirin and Plavix    Type 2 diabetes mellitus, without long-term current use of insulin  Hold home antihyperglycemics.  Continue mild sliding scale.      HLD (hyperlipidemia)        HTN (hypertension)  - continue home BP meds.       VTE Risk Mitigation (From admission, onward)           Ordered     IP VTE HIGH RISK PATIENT  Once         05/28/24 2302     Place sequential compression device  Until discontinued         05/28/24 2302                       On 05/28/2024, patient should be placed in hospital observation services under my care.             Sina Ceron MD  Department of Hospital Medicine  [facility] - Emergency Dept

## 2024-05-29 NOTE — ASSESSMENT & PLAN NOTE
Most likely secondary to missed dialysis.    - nephrology consulted to resume dialysis sessions.  - will give IV lasix 80mg dose.

## 2024-05-29 NOTE — ED PROVIDER NOTES
ED Provider Note - 5/28/2024    History     Chief Complaint   Patient presents with    Shortness of Breath     85 y/o M to the ER via EMS with c/o worsening SOB after missing dialysis today. Pt reports his van transportation did not come pick him up.        HPI     Jevon Rajan is a 84 y.o. year old male with past medical and surgical history as seen below, presenting with chief complaint of shortness of breath. Missed dialysis this morning. His transportation did not show up. Has no other means to get to dialysis. Began feeling SOB this evening so called ambulance to come to ED.        Past Medical History:   Diagnosis Date    BPH (benign prostatic hyperplasia)     Coronary artery disease     Diabetes mellitus, type 2     ESRD (end stage renal disease) on dialysis     ESRD on HD TTS via left brachial AVF    Hypertension     Hypothyroidism, unspecified     Symptomatic anemia 01/15/2024     Past Surgical History:   Procedure Laterality Date    bilateral foot surgery      ESOPHAGOGASTRODUODENOSCOPY N/A 2/27/2024    Procedure: EGD (ESOPHAGOGASTRODUODENOSCOPY);  Surgeon: Gemma Almendarez MD;  Location: [facility] ENDO;  Service: Endoscopy;  Laterality: N/A;    eyelid surgery      FISTULOGRAM Left 11/27/2019    Procedure: Fistulogram;  Surgeon: MELANY Brenner III, MD;  Location: [facility] OR [address] [place] [address];  Service: Peripheral Vascular;  Laterality: Left;    FISTULOGRAM Left 11/27/2019    Procedure: Fistulogram, AVG declot, possible permacath;  Surgeon: MELANY Brenner III, MD;  Location: [facility] CATH LAB;  Service: Peripheral Vascular;  Laterality: Left;    INSERTION OF DIALYSIS CATHETER      [facility]'s  12/5/13    right hand surgery      stents           No family history on file.  Social History     Tobacco Use    Smoking status: Never    Smokeless tobacco: Never   Substance Use Topics    Alcohol use: No    Drug use: No     Social Determinants of Health with Concerns     Food Insecurity: Patient Declined (12/9/2023)    [facility]  [facility], [facility]    Hunger Vital Sign     Worried About Running Out of Food in the Last Year: Patient declined     Ran Out of Food in the Last Year: Patient declined   Physical Activity: Not on file   Stress: Stress Concern Present (11/27/2023)    [facility], [facility]    [facility] of Occupational Health - Occupational Stress Questionnaire     Feeling of Stress : To some extent   Housing Stability: Unknown (12/9/2023)    [facility], [facility]    Housing Stability Vital Sign     Unable to Pay for Housing in the Last Year: Patient refused     Number of Places Lived in the Last Year: 1     In the last 12 months, was there a time when you did not have a steady place to sleep or slept in a shelter (including now)?: No   Health Literacy: Not on file   Social Isolation: Not on file      Review of patient's allergies indicates:   Allergen Reactions    Ace inhibitors Itching     Is not sure which medication it was    Captopril        Review of Systems     A full Review of Systems (ROS) was performed and was negative unless otherwise stated in the HPI.      Physical Exam     Vitals:    05/28/24 2015 05/28/24 2017 05/28/24 2030 05/28/24 2035   BP:  (!) 150/78 133/61 133/61   Pulse:  83 81 83   Resp:  20 14 20   Temp:   98.4 °F (36.9 °C)    TempSrc:   Oral    SpO2:  100% 97% 99%   Height: 5' 8" (1.727 m)           Physical Exam    Nursing note and vitals reviewed.  Constitutional: He appears well-developed and well-nourished. No distress. "   HENT:   Head: Normocephalic and atraumatic.   Right Ear: External ear normal.   Left Ear: External ear normal.   Nose: Nose normal.   Mouth/Throat: Oropharynx is clear and moist.   Eyes: Conjunctivae and EOM are normal. Pupils are equal, round, and reactive to light.   Neck: Neck supple.   Normal range of motion.  Cardiovascular:  Normal rate, regular rhythm and intact distal pulses.           No murmur heard.  Palpable thrill LUE   Pulmonary/Chest: Breath sounds normal. No stridor. No respiratory distress. He has no wheezes. He has no rhonchi. He has no rales.   Abdominal: Abdomen is soft. Bowel sounds are normal. There is no abdominal tenderness.   Musculoskeletal:         General: No edema. Normal range of motion.      Cervical back: Normal range of motion and neck supple.     Neurological: He is alert and oriented to person, place, and time. He has normal strength. No cranial nerve deficit or sensory deficit.   Skin: Skin is warm and dry. No rash noted.   Psychiatric: He has a normal mood and affect. Thought content normal.         Lab Results- Independently reviewed by myself      Labs Reviewed   CBC W/ AUTO DIFFERENTIAL - Abnormal; Notable for the following components:       Result Value    RBC 2.81 (*)     Hemoglobin 8.6 (*)     Hematocrit 27.0 (*)     MCHC 31.9 (*)     RDW 18.4 (*)     Lymph # 0.8 (*)     Gran % 83.1 (*)     Lymph % 8.3 (*)     All other components within normal limits   B-TYPE NATRIURETIC PEPTIDE - Abnormal; Notable for the following components:    BNP 1,591 (*)     All other components within normal limits   COMPREHENSIVE METABOLIC PANEL - Abnormal; Notable for the following components:    Potassium 5.9 (*)     CO2 22 (*)     Glucose 153 (*)     BUN 84 (*)     Creatinine 12.7 (*)     eGFR 3 (*)     Anion Gap 20 (*)     All other components within normal limits   TROPONIN I - Abnormal; Notable for the following components:    Troponin I 0.049 (*)     All other components within normal  limits   PHOSPHORUS - Abnormal; Notable for the following components:    Phosphorus 7.1 (*)     All other components within normal limits   TROPONIN I - Abnormal; Notable for the following components:    Troponin I 0.049 (*)     All other components within normal limits   CBC W/ AUTO DIFFERENTIAL - Abnormal; Notable for the following components:    RBC 2.55 (*)     Hemoglobin 7.8 (*)     Hematocrit 24.5 (*)     MCHC 31.8 (*)     RDW 18.4 (*)     Platelets 149 (*)     All other components within normal limits   COMPREHENSIVE METABOLIC PANEL - Abnormal; Notable for the following components:    BUN 85 (*)     Creatinine 12.9 (*)     Albumin 3.4 (*)     Alkaline Phosphatase 50 (*)     eGFR 3 (*)     Anion Gap 17 (*)     All other components within normal limits   POCT GLUCOSE - Abnormal; Notable for the following components:    POCT Glucose 245 (*)     All other components within normal limits   MAGNESIUM   SARS-COV-2 RDRP GENE           Imaging     Imaging Results              X-Ray Chest 1 View (Final result)  Result time 05/28/24 22:32:08      Final result by Delfin Mclaughlin DO (05/28/24 22:32:08)                   Impression:      No acute abnormality.      Electronically signed by: Delfin Mclaughlin  Date:    05/28/2024  Time:    22:32               Narrative:    EXAMINATION:  XR CHEST 1 VIEW    CLINICAL HISTORY:  shortness of breath;    TECHNIQUE:  Single frontal view of the chest was performed.    COMPARISON:  05/16/2024.    FINDINGS:  The lungs are well expanded and clear. No focal opacities are seen. The pleural spaces are clear. The cardiac silhouette is enlarged.  There are calcifications of the aortic arch.  The visualized osseous structures demonstrate degenerative changes.  There is a vascular stent.                                    X-Rays:   Independently Interpreted Readings:   Chest X-Ray: No acute abnormalities.  No infiltrates.      EKG Readings: (Independently Interpreted)   Initial Reading: No  STEMI. Rhythm: Normal Sinus Rhythm. Heart Rate: 79. Ectopy: No Ectopy. ST Segment Depression: V4, V5, V6, II, III and AVR. Axis: Normal.           ED Course         Procedures         Orders Placed This Encounter    X-Ray Chest 1 View    CBC Auto Differential    Comprehensive Metabolic Panel    Troponin I    Brain Natriuretic Peptide    Magnesium    Phosphorus    Cardiac Monitoring - Adult    Pulse Oximetry Continuous    POCT COVID-19 Rapid Screening    EKG 12-lead                      Medical Decision Making       The patient's list of active medical problems, social history, medications, and allergies as documented per RN staff has been reviewed.           Medical Decision Making  85 yo M presenting with SOB after missing dialysis    Ddx: hyperkalemia, hyperphosphatemia, pneumonia, acs, PE, ptx, chf    Due to inability to manage reschedule and transportation to dialysis on short notice, d/w HM to continue eval and management for nonemergent dialysis and monitoring of electrolytes.    Amount and/or Complexity of Data Reviewed  Independent Historian: EMS  External Data Reviewed: labs and notes.  Labs: ordered.  Radiology: ordered and independent interpretation performed.  ECG/medicine tests: ordered and independent interpretation performed.    Risk  Decision regarding hospitalization.  Diagnosis or treatment significantly limited by social determinants of health.                      Clinical Impression           Disposition   ED Disposition Condition    Observation Stable            Diagnosis    ICD-10-CM ICD-9-CM   1. Hyperkalemia  E87.5 276.7   2. Shortness of breath  R06.02 786.05   3. Hyperphosphatemia  E83.39 275.3   4. Chest pain  R07.9 786.50   5. Fluid overload  E87.70 276.69           Zaid Dominique MD        05/28/2024          DISCLAIMER: This note was prepared with [employer] voice recognition transcription software. Garbled syntax, mangled pronouns, and other bizarre constructions may be attributed to that  software system.       Zaid Dominique MD  05/29/24 2078

## 2024-05-29 NOTE — PROGRESS NOTES
NEPHROLOGY CONSULT NOTE    HPI & INTERVAL HISTORY:    Past Medical History:   Diagnosis Date    BPH (benign prostatic hyperplasia)     Coronary artery disease     Diabetes mellitus, type 2     ESRD (end stage renal disease) on dialysis     ESRD on HD TTS via left brachial AVF    Hypertension     Hypothyroidism, unspecified     Symptomatic anemia 01/15/2024      Past Surgical History:   Procedure Laterality Date    bilateral foot surgery      ESOPHAGOGASTRODUODENOSCOPY N/A 2/27/2024    Procedure: EGD (ESOPHAGOGASTRODUODENOSCOPY);  Surgeon: Gemma Almendarez MD;  Location: [facility] ENDO;  Service: Endoscopy;  Laterality: N/A;    eyelid surgery      FISTULOGRAM Left 11/27/2019    Procedure: Fistulogram;  Surgeon: MELANY Brenner III, MD;  Location: [facility] OR [address] [place] [address];  Service: Peripheral Vascular;  Laterality: Left;    FISTULOGRAM Left 11/27/2019    Procedure: Fistulogram, AVG declot, possible permacath;  Surgeon: MELANY Brenner III, MD;  Location: [facility] CATH LAB;  Service: Peripheral Vascular;  Laterality: Left;    INSERTION OF DIALYSIS CATHETER      [facility]  12/5/13    right hand surgery      stents        Review of patient's allergies indicates:   Allergen Reactions    Ace inhibitors Itching     Is not sure which medication it was    Captopril       (Not in a hospital admission)      Social History     Socioeconomic History    Marital status: Single   Tobacco Use    Smoking status: Never    Smokeless tobacco: Never   Substance and Sexual Activity    Alcohol use: No    Drug use: No    Sexual activity: Not Currently     Social Determinants of Health     Financial Resource Strain: Low Risk  (12/9/2023)    Received from [facility], [facility]    Overall Financial Resource Strain (CARDIA)     Difficulty of Paying Living Expenses: Not hard at all   Food Insecurity: Patient  Declined (12/9/2023)    [facility], [facility]    Hunger Vital Sign     Worried About Running Out of Food in the Last Year: Patient declined     Ran Out of Food in the Last Year: Patient declined   Transportation Needs: No Transportation Needs (12/9/2023)    [facility]    PRAPARE - Transportation     Lack of Transportation (Medical): No     Lack of Transportation (Non-Medical): No   Stress: Stress Concern Present (11/27/2023)    [facility], [facility]    [employer] [place] [employer] - Occupational Stress Questionnaire     Feeling of Stress : To some extent   Housing Stability: Unknown (12/9/2023)    [facility], [facility]    Housing Stability Vital Sign     Unable to Pay for Housing in the Last Year: Patient refused     Number of Places Lived in the Last Year: 1     In the last 12 months, was there a time when you did not have a steady place to sleep or slept in a shelter (including now)?: No        MEDS   sodium chloride 0.9%   Intravenous Once    aspirin  81 mg Oral QHS    clopidogreL  75 mg Oral Daily    finasteride  5 mg Oral QHS    isosorbide mononitrate  30 mg Oral QHS    levothyroxine  25 mcg Oral Before breakfast    losartan  50 mg Oral Daily    mupirocin   Nasal BID    pantoprazole  40 mg Oral Daily               CONTINOUS INFUSIONS:    No  "intake or output data in the 24 hours ending 05/29/24 0959     HEMODYNAMICS:    Temp:  [98.2 °F (36.8 °C)-98.4 °F (36.9 °C)] 98.2 °F (36.8 °C)  Pulse:  [77-86] 77  Resp:  [13-20] 14  SpO2:  [95 %-100 %] 100 %  BP: (121-157)/(60-78) 125/61   General :   NAD  SOB on admission  No cough  No CP  No SOB  No fever  No chills  No nausea  No vomiting  No diarrhea  Cardiology : pulse 77  Pulmonary: diminished breath sounds  Abdomen soft   Extremities : No edema  Skin: dry  LABS   Lab Results   Component Value Date    WBC 7.63 05/29/2024    HGB 7.8 (L) 05/29/2024    HCT 24.5 (L) 05/29/2024    MCV 96 05/29/2024     (L) 05/29/2024        Recent Labs   Lab 05/29/24  0645   GLU 75   CALCIUM 9.9   ALBUMIN 3.4*   PROT 6.7      K 5.1   CO2 24      BUN 85*   CREATININE 12.9*   ALKPHOS 50*   ALT 21   AST 13   BILITOT 0.7      Lab Results   Component Value Date    .7 (H) 01/17/2024    CALCIUM 9.9 05/29/2024    PHOS 7.1 (H) 05/28/2024      Lab Results   Component Value Date    IRON 47 11/28/2023    TIBC 208 11/28/2023        ABG  No results for input(s): "PH", "PO2", "PCO2", "HCO3", "BE" in the last 168 hours.      IMAGING:  CXR    ASSESSMENT / PLAN  ESRD  Diabetes Mellitus  Left arm AV fistula  Hyperkalemia  K 5.9 - 5.1  Gap Metabolic Acidosis  Metabolic bone disease  Hyperphosphatemia  Anemia multifactorial   Hb 7.8  Poor nutrition  Albumin 3.4  BNP 1,591  Troponin 0.049  Blood Pressure 125/61  Weight daily  I and O  Avoid nephrotoxic agents, hypotension, hypovolemia  Renal diet as tolerated  Dialysis   "

## 2024-05-29 NOTE — HPI
Mr. Jevon Rajan is a 85y/o M w/ PMH BPH, DM2, HTN, ESRD on HD T/Th/Sat via left brachial AVF, anemia, CAD, and hypothyroidism who presented to the hospital complaining of dyspnea in the setting of missed dialysis session today due to transportation issues. Denies any chest pain, fever, chills, nausea, vomiting, cough.     In the ED, EKG shows normal sinus rhythm with no hyperkalemia induced EKG changes.  Labs were notable for K 5.9, creatinine 12.7.     Admitted for fluid overload.

## 2024-05-29 NOTE — DISCHARGE SUMMARY
"[facility]  [facility] Medicine  Discharge Summary      Patient Name: Jevon Rajan  MRN: [id number]  MARCY: [id number]  Patient Class: OP- Observation  Admission Date: 5/28/2024  Hospital Length of Stay: 0 days  Discharge Date and Time: 5/29/24  Attending Physician: Naida Hooks MD   Discharging Provider: Naida Hooks MD  Primary Care Provider: Melia, Primary Doctor    Primary Care Team: Networked reference to record PCT     HPI:   Mr. Jevon Rajan is a 83y/o M w/ PMH BPH, DM2, HTN, ESRD on HD T/Th/Sat via left brachial AVF, anemia, CAD, and hypothyroidism who presented to the hospital complaining of dyspnea in the setting of missed dialysis session today due to transportation issues. Denies any chest pain, fever, chills, nausea, vomiting, cough.     In the ED, EKG shows normal sinus rhythm with no hyperkalemia induced EKG changes.  Labs were notable for K 5.9, creatinine 12.7.     Admitted for fluid overload.     * No surgery found *      Hospital Course:    84-year-old male with PMH of BPH, type 2 diabetes mellitus, hypertension, ESRD on HD TTS via AVF, CAD, hypothyroidism presented to ER with shortness of breath after missing 1 session of HD.  States he missed the bus for HD but usually is compliant with the sessions.  Otherwise with fairly stable lab work keeping in with ESRD diagnosis and stable vital signs.  Patient okay to DC after HD session per Nephrology.    /63   Pulse 71   Temp 97.3 °F (36.3 °C)   Resp 16   Ht 5' 8" (1.727 m)   SpO2 95%   BMI 25.04 kg/m²     Physical Exam  Constitutional:       Comments: Alert, Oriented, No acute distress   Cardiovascular:      Rate and Rhythm: Normal rate and regular rhythm.   Pulmonary:      Comments: Normal effort, decreased breath sounds bilateral lower lobe  Abdominal:      Comments: Soft, Non-tender, Non-distended   Musculoskeletal:      Comments: No peripheral edema   Skin:     Comments: Dry, Intact, Warm,   Neurological:      Mental " Status: He is alert and oriented to person, place, and time.   Psychiatric:         Mood and Affect: Mood and affect normal.      Goals of Care Treatment Preferences:  Code Status: Full Code          What is most important right now is to focus on avoiding the hospital.  Accordingly, we have decided that the best plan to meet the patient's goals includes continuing with treatment.      Consults:   Consults (From admission, onward)          Status Ordering Provider     Inpatient consult to [facility] (Teofilo Ceron, Adalid)  Once        Provider:  (Not yet assigned)    KYLEE Guevara            Final Active Diagnoses:    Diagnosis Date Noted POA    Hypothyroidism due to acquired atrophy of thyroid [E03.4] 01/15/2024 Yes    ESRD on hemodialysis [N18.6, Z99.2] 11/24/2019 Not Applicable    Type 2 diabetes mellitus, without long-term current use of insulin [E11.9] 12/30/2014 Yes    Coronary artery disease involving native coronary artery of native heart without angina pectoris [I25.10] 12/30/2014 Yes    HTN (hypertension) [I10] 12/29/2014 Yes      Problems Resolved During this Admission:    Diagnosis Date Noted Date Resolved POA    PRINCIPAL PROBLEM:  Fluid overload [E87.70] 05/28/2024 05/29/2024 Yes    Hyperkalemia [E87.5] 05/28/2024 05/29/2024 Yes       Discharged Condition: stable    Disposition: Home or Self Care    Follow Up:    Patient Instructions:   No discharge procedures on file.    Significant Diagnostic Studies: Labs: BMP:   Recent Labs   Lab 05/28/24  2217 05/29/24  0645   * 75    143   K 5.9* 5.1   CL 98 102   CO2 22* 24   BUN 84* 85*   CREATININE 12.7* 12.9*   CALCIUM 10.3 9.9   MG 2.5  --     and CBC   Recent Labs   Lab 05/28/24  2151 05/29/24  0645   WBC 8.99 7.63   HGB 8.6* 7.8*   HCT 27.0* 24.5*    149*       Pending Diagnostic Studies:       None           Medications:  Reconciled Home Medications:      Medication List        CONTINUE taking these  medications      acarbose 50 MG Tab  Commonly known as: PRECOSE  Take 100 mg by mouth 3 (three) times daily with meals.     aspirin 81 MG Chew  Take 1 tablet (81 mg total) by mouth every evening.     ciclopirox 8 % Soln  Commonly known as: PENLAC  Apply topically nightly.     clopidogreL 75 mg tablet  Commonly known as: PLAVIX  Take 1 tablet (75 mg total) by mouth once daily.     finasteride 5 mg tablet  Commonly known as: PROSCAR  Take 5 mg by mouth every evening.     glipiZIDE 10 MG Tr24  Commonly known as: GLUCOTROL  Take 5 mg by mouth 2 (two) times daily.     HYDROcodone-acetaminophen 5-325 mg per tablet  Commonly known as: NORCO  Take 1 tablet by mouth every 8 (eight) hours as needed for Pain.     isosorbide mononitrate 30 MG 24 hr tablet  Commonly known as: IMDUR  Take 1 tablet (30 mg total) by mouth every evening.     levothyroxine 25 MCG tablet  Commonly known as: SYNTHROID  Take 25 mcg by mouth once daily.     LIDOcaine 5 %  Commonly known as: LIDODERM  Place 1 patch onto the skin once daily. Remove & Discard patch within 12 hours or as directed by MD     losartan 50 MG tablet  Commonly known as: COZAAR  Take 50 mg by mouth once daily.     ondansetron 4 MG Tbdl  Commonly known as: ZOFRAN-ODT  Take 1 tablet (4 mg total) by mouth every 8 (eight) hours as needed (nausea).     pantoprazole 40 MG tablet  Commonly known as: PROTONIX  Take 1 tablet (40 mg total) by mouth once daily.     sodium polystyrene sulfonate 15 gram/60 mL Susp 0.25 g/mL oral liquid  Commonly known as: Kayexalate  Take 20 mLs (5 g total) by mouth once daily.     traMADoL 50 mg tablet  Commonly known as: ULTRAM  Take 1 tablet (50 mg total) by mouth every 8 (eight) hours as needed for Pain.              Indwelling Lines/Drains at time of discharge:   Lines/Drains/Airways       Drain  Duration                  Hemodialysis AV Fistula Left upper arm -- days                    Time spent on the discharge of patient: 38 minutes         Naida RENDON  MD [place]  Department of Hospital Medicine  [facility] - Emergency Dept

## 2024-05-29 NOTE — SUBJECTIVE & OBJECTIVE
Past Medical History:   Diagnosis Date    BPH (benign prostatic hyperplasia)     Coronary artery disease     Diabetes mellitus, type 2     ESRD (end stage renal disease) on dialysis     ESRD on HD TTS via left brachial AVF    Hypertension     Hypothyroidism, unspecified     Symptomatic anemia 01/15/2024       Past Surgical History:   Procedure Laterality Date    bilateral foot surgery      ESOPHAGOGASTRODUODENOSCOPY N/A 2/27/2024    Procedure: EGD (ESOPHAGOGASTRODUODENOSCOPY);  Surgeon: Gemma Almendarez MD;  Location: [facility] ENDO;  Service: Endoscopy;  Laterality: N/A;    eyelid surgery      FISTULOGRAM Left 11/27/2019    Procedure: Fistulogram;  Surgeon: MELANY Brenner III, MD;  Location: [facility] OR [address] [place] [address];  Service: Peripheral Vascular;  Laterality: Left;    FISTULOGRAM Left 11/27/2019    Procedure: Fistulogram, AVG declot, possible permacath;  Surgeon: MELANY Brenner III, MD;  Location: [facility] CATH LAB;  Service: Peripheral Vascular;  Laterality: Left;    INSERTION OF DIALYSIS CATHETER      [facility]/5/13    right hand surgery      stents         Review of patient's allergies indicates:   Allergen Reactions    Ace inhibitors Itching     Is not sure which medication it was    Captopril        No current facility-administered medications on file prior to encounter.     Current Outpatient Medications on File Prior to Encounter   Medication Sig    acarbose (PRECOSE) 50 MG Tab Take 100 mg by mouth 3 (three) times daily with meals.     aspirin 81 MG Chew Take 1 tablet (81 mg total) by mouth every evening.    ciclopirox (PENLAC) 8 % Soln Apply topically nightly.    clopidogrel (PLAVIX) 75 mg tablet Take 1 tablet (75 mg total) by mouth once daily.    finasteride (PROSCAR) 5 mg tablet Take 5 mg by mouth every evening.     glipiZIDE (GLUCOTROL) 10 MG TR24 Take 5 mg by mouth 2 (two) times daily.     HYDROcodone-acetaminophen (NORCO) 5-325 mg per tablet Take 1 tablet by mouth every 8 (eight) hours as needed for Pain.     isosorbide mononitrate (IMDUR) 30 MG 24 hr tablet Take 1 tablet (30 mg total) by mouth every evening.    levothyroxine (SYNTHROID) 25 MCG tablet Take 25 mcg by mouth once daily.    lidocaine (LIDODERM) 5 % Place 1 patch onto the skin once daily. Remove & Discard patch within 12 hours or as directed by MD    losartan (COZAAR) 50 MG tablet Take 50 mg by mouth once daily.    ondansetron (ZOFRAN-ODT) 4 MG TbDL Take 1 tablet (4 mg total) by mouth every 8 (eight) hours as needed (nausea).    pantoprazole (PROTONIX) 40 MG tablet Take 1 tablet (40 mg total) by mouth once daily.    sodium polystyrene (KAYEXALATE) 15 gram/60 mL Susp Take 20 mLs (5 g total) by mouth once daily.    traMADoL (ULTRAM) 50 mg tablet Take 1 tablet (50 mg total) by mouth every 8 (eight) hours as needed for Pain.     Family History    None       Tobacco Use    Smoking status: Never    Smokeless tobacco: Never   Substance and Sexual Activity    Alcohol use: No    Drug use: No    Sexual activity: Not Currently     Review of Systems 12 point ROS negative except for HPI  Objective:     Vital Signs (Most Recent):  Temp: 98.4 °F (36.9 °C) (05/28/24 2030)  Pulse: 79 (05/28/24 2151)  Resp: 19 (05/28/24 2151)  BP: 133/60 (05/28/24 2151)  SpO2: 100 % (05/28/24 2151) Vital Signs (24h Range):  Temp:  [98.4 °F (36.9 °C)] 98.4 °F (36.9 °C)  Pulse:  [79-83] 79  Resp:  [14-20] 19  SpO2:  [97 %-100 %] 100 %  BP: (133-150)/(60-78) 133/60        Body mass index is 25.04 kg/m².     Physical Exam  Constitutional:       Comments: Alert, Oriented, No acute distress   HENT:      Head: Normocephalic and atraumatic.   Eyes:      Conjunctiva/sclera: Conjunctivae normal.   Cardiovascular:      Rate and Rhythm: Normal rate and regular rhythm.   Pulmonary:      Comments: Normal effort, decreased breath sounds bilateral lower lobe  Abdominal:      Comments: Soft, Non-tender, Non-distended   Musculoskeletal:      Cervical back: Normal range of motion.      Comments: No peripheral  edema   Skin:     Comments: Dry, Intact, Warm, Capillary refill <2 seconds.    Neurological:      Mental Status: He is alert and oriented to person, place, and time.      Comments: Normal speech   Psychiatric:         Mood and Affect: Mood and affect normal.                Significant Labs: All pertinent labs within the past 24 hours have been reviewed.    Significant Imaging: I have reviewed all pertinent imaging results/findings within the past 24 hours.

## 2024-05-29 NOTE — HOSPITAL COURSE
" 84-year-old male with PMH of BPH, type 2 diabetes mellitus, hypertension, ESRD on HD TTS via AVF, CAD, hypothyroidism presented to ER with shortness of breath after missing 1 session of HD.  States he missed the bus for HD but usually is compliant with the sessions.  Otherwise with fairly stable lab work keeping in with ESRD diagnosis and stable vital signs.  Patient okay to DC after HD session per Nephrology.    /63   Pulse 71   Temp 97.3 °F (36.3 °C)   Resp 16   Ht 5' 8" (1.727 m)   SpO2 95%   BMI 25.04 kg/m²     Physical Exam  Constitutional:       Comments: Alert, Oriented, No acute distress   Cardiovascular:      Rate and Rhythm: Normal rate and regular rhythm.   Pulmonary:      Comments: Normal effort, decreased breath sounds bilateral lower lobe  Abdominal:      Comments: Soft, Non-tender, Non-distended   Musculoskeletal:      Comments: No peripheral edema   Skin:     Comments: Dry, Intact, Warm,   Neurological:      Mental Status: He is alert and oriented to person, place, and time.   Psychiatric:         Mood and Affect: Mood and affect normal.   "

## 2024-05-29 NOTE — PLAN OF CARE
"[facility] - Emergency Dept  Final Discharge Note    SW spoke with pt at bedside. SW addressed the fact that pt states he "missed the bus that usually takes him." Pt believes this may have been due to the holiday. Pt usually is picked up by senior care bus and knows the regular .     Pt lives independently, reporting he lives alone but often has his younger neighbor "like a daughter" Aida look in on him in [place]. No DME needs noted. Pt goes to TTS [facility] usually. No home health needs. Pt will need ambulatory van assistance to get home. ETA 6:30p  [facility] Care Provider: [facility]    Admission Diagnosis: Hyperkalemia [E87.5]    Admission Date: 5/28/2024  Expected Discharge Date: 5/29/2024    Transition of Care Barriers: (P) None    Payor: [employer] MEDICARE / Plan: [employer] MEDICARE HMO / Product Type: Capitation /     Extended Emergency Contact Information  Primary Emergency Contact: Bhavna [place] [address] [facility]  Home Phone: [phone]  Relation: Sister  Secondary Emergency Contact: Tracey Clark [place] [address] [facility]  Home Phone: [phone]  Relation: Sister    Discharge Plan A: (P) Home  Discharge Plan B: (P) Home with family      [facility] [address] [facility] [place] - [address]  [address]  [facility] [address]  Phone: [phone] Fax: [phone]    [facility] [address]  [address] [facility] [address]  Phone: [phone] Fax: [phone]      Initial Assessment (most recent)       Adult Discharge Assessment - 05/29/24 1508          Discharge Assessment    Assessment Type Discharge Planning Assessment     Confirmed/corrected address, phone number and insurance Yes     Confirmed Demographics Correct on Facesheet     Source of Information patient     People in Home alone     Do you expect to return to your current living situation? Yes     Do " you have help at home or someone to help you manage your care at home? Yes     Prior to hospitilization cognitive status: Alert/Oriented (P)      Current cognitive status: Alert/Oriented (P)      Walking or Climbing Stairs Difficulty no (P)      Dressing/Bathing Difficulty no (P)      Home Layout Able to live on 1st floor (P)      Equipment Currently Used at Home none (P)      Do you currently have service(s) that help you manage your care at home? No (P)      Do you take prescription medications? Yes (P)      Do you have prescription coverage? Yes (P)      Coverage [employer] Medicare (P)      Do you have any problems affording any of your prescribed medications? No (P)      Is the patient taking medications as prescribed? yes (P)      Who is going to help you get home at discharge? pt will need a ride home (P)      How do you get to doctors appointments? health plan transportation;public transportation (P)      Are you on dialysis? Yes (P)      Dialysis Name and Scheduled days [facility]) TTS (P)      Do you take coumadin? No (P)      Discharge Plan A Home (P)      Discharge Plan B Home with family (P)      DME Needed Upon Discharge  none (P)      Discharge Plan discussed with: Patient (P)      Transition of Care Barriers None (P)         Physical Activity    On average, how many days per week do you engage in moderate to strenuous exercise (like a brisk walk)? 1 day (P)      On average, how many minutes do you engage in exercise at this level? 20 min (P)         Transportation Needs    Has the lack of transportation kept you from medical appointments, meetings, work or from getting things needed for daily living? No (P)         Social Isolation    How often do you feel lonely or isolated from those around you?  Rarely (P)

## 2024-05-30 ENCOUNTER — PATIENT OUTREACH (OUTPATIENT)
Dept: ADMINISTRATIVE | Facility: CLINIC | Age: 84
End: 2024-05-30
Payer: MEDICARE

## 2024-05-30 LAB
OHS QRS DURATION: 98 MS
OHS QTC CALCULATION: 444 MS

## 2024-05-30 NOTE — PROGRESS NOTES
C3 nurse attempted to contact Jevon Rajan  for a TCC post hospital discharge follow up call. No answer. The patient does not have a scheduled HOSFU appointment, and the pt does not have an [facility] PCP.

## 2024-05-31 NOTE — PROGRESS NOTES
3rd attempt made to reach patient for TCC call. Left voicemail please call [phone] leave first name, last name, and  and I will return your call.

## 2024-05-31 NOTE — PROGRESS NOTES
2nd attempt made to reach patient for TCC call. Left voicemail please call [phone] leave first name, last name, and  and I will return your call.

## 2024-06-01 ENCOUNTER — HOSPITAL ENCOUNTER (INPATIENT)
Facility: HOSPITAL | Age: 84
LOS: 1 days | Discharge: HOME | DRG: 291 | End: 2024-06-03
Attending: STUDENT IN AN ORGANIZED HEALTH CARE EDUCATION/TRAINING PROGRAM | Admitting: INTERNAL MEDICINE
Payer: MEDICARE

## 2024-06-01 DIAGNOSIS — Z99.2 ESRD ON DIALYSIS: Primary | ICD-10-CM

## 2024-06-01 DIAGNOSIS — R06.02 SHORTNESS OF BREATH: ICD-10-CM

## 2024-06-01 DIAGNOSIS — R07.9 CHEST PAIN: ICD-10-CM

## 2024-06-01 DIAGNOSIS — N18.6 ESRD ON DIALYSIS: Primary | ICD-10-CM

## 2024-06-01 LAB
ALBUMIN SERPL BCP-MCNC: 3.6 G/DL (ref 3.5–5.2)
ALP SERPL-CCNC: 49 U/L (ref 55–135)
ALT SERPL W/O P-5'-P-CCNC: 13 U/L (ref 10–44)
ANION GAP SERPL CALC-SCNC: 17 MMOL/L (ref 8–16)
AST SERPL-CCNC: 17 U/L (ref 10–40)
BASOPHILS # BLD AUTO: 0.05 K/UL (ref 0–0.2)
BASOPHILS NFR BLD: 0.7 % (ref 0–1.9)
BILIRUB SERPL-MCNC: 0.5 MG/DL (ref 0.1–1)
BNP SERPL-MCNC: 843 PG/ML (ref 0–99)
BUN SERPL-MCNC: 70 MG/DL (ref 8–23)
CALCIUM SERPL-MCNC: 9.8 MG/DL (ref 8.7–10.5)
CHLORIDE SERPL-SCNC: 97 MMOL/L (ref 95–110)
CO2 SERPL-SCNC: 26 MMOL/L (ref 23–29)
CREAT SERPL-MCNC: 10.9 MG/DL (ref 0.5–1.4)
DIFFERENTIAL METHOD BLD: ABNORMAL
EOSINOPHIL # BLD AUTO: 0.3 K/UL (ref 0–0.5)
EOSINOPHIL NFR BLD: 4.1 % (ref 0–8)
ERYTHROCYTE [DISTWIDTH] IN BLOOD BY AUTOMATED COUNT: 18.3 % (ref 11.5–14.5)
EST. GFR  (NO RACE VARIABLE): 4 ML/MIN/1.73 M^2
GLUCOSE SERPL-MCNC: 185 MG/DL (ref 70–110)
HCT VFR BLD AUTO: 23.3 % (ref 40–54)
HGB BLD-MCNC: 7.5 G/DL (ref 14–18)
IMM GRANULOCYTES # BLD AUTO: 0.02 K/UL (ref 0–0.04)
IMM GRANULOCYTES NFR BLD AUTO: 0.3 % (ref 0–0.5)
LYMPHOCYTES # BLD AUTO: 0.9 K/UL (ref 1–4.8)
LYMPHOCYTES NFR BLD: 13.3 % (ref 18–48)
MAGNESIUM SERPL-MCNC: 2.1 MG/DL (ref 1.6–2.6)
MCH RBC QN AUTO: 30.9 PG (ref 27–31)
MCHC RBC AUTO-ENTMCNC: 32.2 G/DL (ref 32–36)
MCV RBC AUTO: 96 FL (ref 82–98)
MONOCYTES # BLD AUTO: 0.7 K/UL (ref 0.3–1)
MONOCYTES NFR BLD: 9.7 % (ref 4–15)
NEUTROPHILS # BLD AUTO: 4.9 K/UL (ref 1.8–7.7)
NEUTROPHILS NFR BLD: 71.9 % (ref 38–73)
NRBC BLD-RTO: 0 /100 WBC
PHOSPHATE SERPL-MCNC: 6.1 MG/DL (ref 2.7–4.5)
PLATELET # BLD AUTO: 169 K/UL (ref 150–450)
PMV BLD AUTO: 11.4 FL (ref 9.2–12.9)
POTASSIUM SERPL-SCNC: 5.1 MMOL/L (ref 3.5–5.1)
PROT SERPL-MCNC: 7.4 G/DL (ref 6–8.4)
RBC # BLD AUTO: 2.43 M/UL (ref 4.6–6.2)
SODIUM SERPL-SCNC: 140 MMOL/L (ref 136–145)
TROPONIN I SERPL DL<=0.01 NG/ML-MCNC: 0.05 NG/ML (ref 0–0.03)
WBC # BLD AUTO: 6.79 K/UL (ref 3.9–12.7)

## 2024-06-01 PROCEDURE — 99285 EMERGENCY DEPT VISIT HI MDM: CPT | Mod: 25

## 2024-06-01 PROCEDURE — 93010 ELECTROCARDIOGRAM REPORT: CPT | Mod: ,,, | Performed by: INTERNAL MEDICINE

## 2024-06-01 PROCEDURE — 84100 ASSAY OF PHOSPHORUS: CPT | Performed by: STUDENT IN AN ORGANIZED HEALTH CARE EDUCATION/TRAINING PROGRAM

## 2024-06-01 PROCEDURE — 93005 ELECTROCARDIOGRAM TRACING: CPT

## 2024-06-01 PROCEDURE — 80053 COMPREHEN METABOLIC PANEL: CPT | Performed by: STUDENT IN AN ORGANIZED HEALTH CARE EDUCATION/TRAINING PROGRAM

## 2024-06-01 PROCEDURE — 83735 ASSAY OF MAGNESIUM: CPT | Performed by: STUDENT IN AN ORGANIZED HEALTH CARE EDUCATION/TRAINING PROGRAM

## 2024-06-01 PROCEDURE — 83880 ASSAY OF NATRIURETIC PEPTIDE: CPT | Performed by: STUDENT IN AN ORGANIZED HEALTH CARE EDUCATION/TRAINING PROGRAM

## 2024-06-01 PROCEDURE — 84484 ASSAY OF TROPONIN QUANT: CPT | Performed by: STUDENT IN AN ORGANIZED HEALTH CARE EDUCATION/TRAINING PROGRAM

## 2024-06-01 PROCEDURE — 85025 COMPLETE CBC W/AUTO DIFF WBC: CPT | Performed by: STUDENT IN AN ORGANIZED HEALTH CARE EDUCATION/TRAINING PROGRAM

## 2024-06-02 PROBLEM — Z75.8 DISCHARGE PLANNING ISSUES: Status: ACTIVE | Noted: 2024-06-02

## 2024-06-02 PROBLEM — E11.9 DM2 (DIABETES MELLITUS, TYPE 2): Status: ACTIVE | Noted: 2024-06-02

## 2024-06-02 LAB
ANION GAP SERPL CALC-SCNC: 17 MMOL/L (ref 8–16)
BASOPHILS # BLD AUTO: 0.03 K/UL (ref 0–0.2)
BASOPHILS NFR BLD: 0.5 % (ref 0–1.9)
BUN SERPL-MCNC: 72 MG/DL (ref 8–23)
CALCIUM SERPL-MCNC: 9.6 MG/DL (ref 8.7–10.5)
CHLORIDE SERPL-SCNC: 99 MMOL/L (ref 95–110)
CO2 SERPL-SCNC: 23 MMOL/L (ref 23–29)
CREAT SERPL-MCNC: 11.2 MG/DL (ref 0.5–1.4)
DIFFERENTIAL METHOD BLD: ABNORMAL
EOSINOPHIL # BLD AUTO: 0.2 K/UL (ref 0–0.5)
EOSINOPHIL NFR BLD: 3.3 % (ref 0–8)
ERYTHROCYTE [DISTWIDTH] IN BLOOD BY AUTOMATED COUNT: 18.1 % (ref 11.5–14.5)
EST. GFR  (NO RACE VARIABLE): 4 ML/MIN/1.73 M^2
GLUCOSE SERPL-MCNC: 109 MG/DL (ref 70–110)
HCT VFR BLD AUTO: 23.1 % (ref 40–54)
HGB BLD-MCNC: 7.1 G/DL (ref 14–18)
IMM GRANULOCYTES # BLD AUTO: 0.02 K/UL (ref 0–0.04)
IMM GRANULOCYTES NFR BLD AUTO: 0.3 % (ref 0–0.5)
LYMPHOCYTES # BLD AUTO: 1.3 K/UL (ref 1–4.8)
LYMPHOCYTES NFR BLD: 19.8 % (ref 18–48)
MAGNESIUM SERPL-MCNC: 2.3 MG/DL (ref 1.6–2.6)
MCH RBC QN AUTO: 30.1 PG (ref 27–31)
MCHC RBC AUTO-ENTMCNC: 30.7 G/DL (ref 32–36)
MCV RBC AUTO: 98 FL (ref 82–98)
MONOCYTES # BLD AUTO: 0.7 K/UL (ref 0.3–1)
MONOCYTES NFR BLD: 11 % (ref 4–15)
NEUTROPHILS # BLD AUTO: 4.3 K/UL (ref 1.8–7.7)
NEUTROPHILS NFR BLD: 65.1 % (ref 38–73)
NRBC BLD-RTO: 0 /100 WBC
PHOSPHATE SERPL-MCNC: 6 MG/DL (ref 2.7–4.5)
PLATELET # BLD AUTO: ABNORMAL K/UL (ref 150–450)
PLATELET BLD QL SMEAR: ABNORMAL
PMV BLD AUTO: 11.9 FL (ref 9.2–12.9)
POCT GLUCOSE: 103 MG/DL (ref 70–110)
POCT GLUCOSE: 157 MG/DL (ref 70–110)
POCT GLUCOSE: 94 MG/DL (ref 70–110)
POCT GLUCOSE: 96 MG/DL (ref 70–110)
POTASSIUM SERPL-SCNC: 4.9 MMOL/L (ref 3.5–5.1)
RBC # BLD AUTO: 2.36 M/UL (ref 4.6–6.2)
SODIUM SERPL-SCNC: 139 MMOL/L (ref 136–145)
WBC # BLD AUTO: 6.63 K/UL (ref 3.9–12.7)

## 2024-06-02 PROCEDURE — 63600175 PHARM REV CODE 636 W HCPCS: Performed by: REGISTERED NURSE

## 2024-06-02 PROCEDURE — 96372 THER/PROPH/DIAG INJ SC/IM: CPT | Performed by: REGISTERED NURSE

## 2024-06-02 PROCEDURE — 93005 ELECTROCARDIOGRAM TRACING: CPT

## 2024-06-02 PROCEDURE — 84100 ASSAY OF PHOSPHORUS: CPT | Performed by: REGISTERED NURSE

## 2024-06-02 PROCEDURE — 83735 ASSAY OF MAGNESIUM: CPT | Performed by: REGISTERED NURSE

## 2024-06-02 PROCEDURE — 93010 ELECTROCARDIOGRAM REPORT: CPT | Mod: ,,, | Performed by: INTERNAL MEDICINE

## 2024-06-02 PROCEDURE — 80048 BASIC METABOLIC PNL TOTAL CA: CPT | Performed by: REGISTERED NURSE

## 2024-06-02 PROCEDURE — 11000001 HC ACUTE MED/SURG PRIVATE ROOM

## 2024-06-02 PROCEDURE — 36415 COLL VENOUS BLD VENIPUNCTURE: CPT | Performed by: REGISTERED NURSE

## 2024-06-02 PROCEDURE — 85025 COMPLETE CBC W/AUTO DIFF WBC: CPT | Performed by: REGISTERED NURSE

## 2024-06-02 PROCEDURE — 25000003 PHARM REV CODE 250: Performed by: INTERNAL MEDICINE

## 2024-06-02 RX ORDER — IBUPROFEN 200 MG
16 TABLET ORAL
Status: DISCONTINUED | OUTPATIENT
Start: 2024-06-02 | End: 2024-06-02 | Stop reason: SDUPTHER

## 2024-06-02 RX ORDER — SODIUM CHLORIDE 9 MG/ML
INJECTION, SOLUTION INTRAVENOUS
Status: DISCONTINUED | OUTPATIENT
Start: 2024-06-02 | End: 2024-06-03 | Stop reason: HOSPADM

## 2024-06-02 RX ORDER — HEPARIN SODIUM 5000 [USP'U]/ML
5000 INJECTION, SOLUTION INTRAVENOUS; SUBCUTANEOUS EVERY 8 HOURS
Status: DISCONTINUED | OUTPATIENT
Start: 2024-06-02 | End: 2024-06-03 | Stop reason: HOSPADM

## 2024-06-02 RX ORDER — GLUCAGON 1 MG
1 KIT INJECTION
Status: DISCONTINUED | OUTPATIENT
Start: 2024-06-02 | End: 2024-06-02 | Stop reason: SDUPTHER

## 2024-06-02 RX ORDER — SODIUM CHLORIDE 0.9 % (FLUSH) 0.9 %
10 SYRINGE (ML) INJECTION EVERY 12 HOURS PRN
Status: DISCONTINUED | OUTPATIENT
Start: 2024-06-02 | End: 2024-06-03 | Stop reason: HOSPADM

## 2024-06-02 RX ORDER — GLUCAGON 1 MG
1 KIT INJECTION
Status: DISCONTINUED | OUTPATIENT
Start: 2024-06-02 | End: 2024-06-03 | Stop reason: HOSPADM

## 2024-06-02 RX ORDER — IBUPROFEN 200 MG
24 TABLET ORAL
Status: DISCONTINUED | OUTPATIENT
Start: 2024-06-02 | End: 2024-06-03 | Stop reason: HOSPADM

## 2024-06-02 RX ORDER — NALOXONE HCL 0.4 MG/ML
0.02 VIAL (ML) INJECTION
Status: DISCONTINUED | OUTPATIENT
Start: 2024-06-02 | End: 2024-06-03 | Stop reason: HOSPADM

## 2024-06-02 RX ORDER — INSULIN ASPART 100 [IU]/ML
0-5 INJECTION, SOLUTION INTRAVENOUS; SUBCUTANEOUS
Status: DISCONTINUED | OUTPATIENT
Start: 2024-06-02 | End: 2024-06-03 | Stop reason: HOSPADM

## 2024-06-02 RX ORDER — SODIUM CHLORIDE 9 MG/ML
INJECTION, SOLUTION INTRAVENOUS ONCE
Status: DISCONTINUED | OUTPATIENT
Start: 2024-06-02 | End: 2024-06-03 | Stop reason: HOSPADM

## 2024-06-02 RX ORDER — IBUPROFEN 200 MG
24 TABLET ORAL
Status: DISCONTINUED | OUTPATIENT
Start: 2024-06-02 | End: 2024-06-02 | Stop reason: SDUPTHER

## 2024-06-02 RX ORDER — MUPIROCIN 20 MG/G
OINTMENT TOPICAL 2 TIMES DAILY
Status: DISCONTINUED | OUTPATIENT
Start: 2024-06-02 | End: 2024-06-03 | Stop reason: HOSPADM

## 2024-06-02 RX ORDER — NITROGLYCERIN 0.4 MG/1
0.4 TABLET SUBLINGUAL EVERY 5 MIN PRN
Status: DISCONTINUED | OUTPATIENT
Start: 2024-06-02 | End: 2024-06-03 | Stop reason: HOSPADM

## 2024-06-02 RX ORDER — IBUPROFEN 200 MG
16 TABLET ORAL
Status: DISCONTINUED | OUTPATIENT
Start: 2024-06-02 | End: 2024-06-03 | Stop reason: HOSPADM

## 2024-06-02 RX ADMIN — HEPARIN SODIUM 5000 UNITS: 5000 INJECTION INTRAVENOUS; SUBCUTANEOUS at 09:06

## 2024-06-02 RX ADMIN — HEPARIN SODIUM 5000 UNITS: 5000 INJECTION INTRAVENOUS; SUBCUTANEOUS at 06:06

## 2024-06-02 RX ADMIN — HEPARIN SODIUM 5000 UNITS: 5000 INJECTION INTRAVENOUS; SUBCUTANEOUS at 02:06

## 2024-06-02 RX ADMIN — MUPIROCIN: 20 OINTMENT TOPICAL at 08:06

## 2024-06-02 NOTE — ASSESSMENT & PLAN NOTE
Chronic, controlled. Latest blood pressure and vitals reviewed-     Temp:  [97.7 °F (36.5 °C)-97.9 °F (36.6 °C)]   Pulse:  [81-88]   Resp:  [13-19]   BP: (127-148)/(61-67)   SpO2:  [96 %-99 %] .   Home meds for hypertension were reviewed and noted below.   Hypertension Medications               isosorbide mononitrate (IMDUR) 30 MG 24 hr tablet Take 1 tablet (30 mg total) by mouth every evening.    losartan (COZAAR) 50 MG tablet Take 50 mg by mouth once daily.            While in the hospital, will manage blood pressure as follows; Continue home antihypertensive regimen    Will utilize p.r.n. blood pressure medication only if patient's blood pressure greater than 180/110 and he develops symptoms such as worsening chest pain or shortness of breath.

## 2024-06-02 NOTE — PLAN OF CARE
Problem: Adult Inpatient Plan of Care  Goal: Plan of Care Review  Outcome: Progressing      VIRTUAL NURSE: Pt arrived to unit. Permission received per patient to turn camera to view patient. VIP model explained; patient informed this VN will be working with bedside nurse and the rest of the care team. Plan of care reviewed with patient.  Educated patient on VTE, fall risk and fall risk precautions in place. Call light within reach, side rails up x2. Admission questions completed. Patient instucted to ask staff for assistance. Patient verbalized complete understanding. Patient denies complaints or any needs at this time. Will continue to be available and intervene as needed.  Message bed side nurse that the telemetry is not coming in.    Labs, notes, orders, and careplan reviewed.

## 2024-06-02 NOTE — CONSULTS
NEPHROLOGY CONSULT NOTE    HPI & INTERVAL HISTORY:    Past Medical History:   Diagnosis Date    BPH (benign prostatic hyperplasia)     Coronary artery disease     Diabetes mellitus, type 2     ESRD (end stage renal disease) on dialysis     ESRD on HD TTS via left brachial AVF    Hypertension     Hypothyroidism, unspecified     Symptomatic anemia 01/15/2024      Past Surgical History:   Procedure Laterality Date    bilateral foot surgery      ESOPHAGOGASTRODUODENOSCOPY N/A 2/27/2024    Procedure: EGD (ESOPHAGOGASTRODUODENOSCOPY);  Surgeon: Gemma Almendarez MD;  Location: [facility] ENDO;  Service: Endoscopy;  Laterality: N/A;    eyelid surgery      FISTULOGRAM Left 11/27/2019    Procedure: Fistulogram;  Surgeon: MELANY Brenner III, MD;  Location: [facility] OR [address] [place] [address];  Service: Peripheral Vascular;  Laterality: Left;    FISTULOGRAM Left 11/27/2019    Procedure: Fistulogram, AVG declot, possible permacath;  Surgeon: MELANY Brenner III, MD;  Location: [facility] CATH LAB;  Service: Peripheral Vascular;  Laterality: Left;    INSERTION OF DIALYSIS CATHETER      [facility]'s  12/5/13    right hand surgery      stents        Review of patient's allergies indicates:   Allergen Reactions    Ace inhibitors Itching     Is not sure which medication it was    Captopril       Medications Prior to Admission   Medication Sig Dispense Refill Last Dose    acarbose (PRECOSE) 50 MG Tab Take 100 mg by mouth 3 (three) times daily with meals.        aspirin 81 MG Chew Take 1 tablet (81 mg total) by mouth every evening. 90 tablet 0     ciclopirox (PENLAC) 8 % Soln Apply topically nightly. 1 Bottle 3     clopidogrel (PLAVIX) 75 mg tablet Take 1 tablet (75 mg total) by mouth once daily. 30 tablet 11     finasteride (PROSCAR) 5 mg tablet Take 5 mg by mouth every evening.        glipiZIDE (GLUCOTROL) 10 MG TR24 Take 5 mg by mouth 2 (two) times daily.        HYDROcodone-acetaminophen (NORCO) 5-325 mg per tablet Take 1 tablet by mouth every 8 (eight) hours as  needed for Pain. 9 tablet 0     isosorbide mononitrate (IMDUR) 30 MG 24 hr tablet Take 1 tablet (30 mg total) by mouth every evening. 30 tablet 2     levothyroxine (SYNTHROID) 25 MCG tablet Take 25 mcg by mouth once daily.       lidocaine (LIDODERM) 5 % Place 1 patch onto the skin once daily. Remove & Discard patch within 12 hours or as directed by MD 30 patch 0     losartan (COZAAR) 50 MG tablet Take 50 mg by mouth once daily.       ondansetron (ZOFRAN-ODT) 4 MG TbDL Take 1 tablet (4 mg total) by mouth every 8 (eight) hours as needed (nausea). 12 tablet 0     pantoprazole (PROTONIX) 40 MG tablet Take 1 tablet (40 mg total) by mouth once daily. 90 tablet 0     sodium polystyrene (KAYEXALATE) 15 gram/60 mL Susp Take 20 mLs (5 g total) by mouth once daily. 60 mL 0     traMADoL (ULTRAM) 50 mg tablet Take 1 tablet (50 mg total) by mouth every 8 (eight) hours as needed for Pain. 9 tablet 0        Social History     Socioeconomic History    Marital status: Single   Tobacco Use    Smoking status: Never    Smokeless tobacco: Never   Substance and Sexual Activity    Alcohol use: No    Drug use: No    Sexual activity: Not Currently     Social Determinants of Health     Financial Resource Strain: Low Risk  (12/9/2023)    Received from [employer] [facility], [employer] [facility]    Overall Financial Resource Strain (CARDIA)     Difficulty of Paying Living Expenses: Not hard at all   Food Insecurity: Patient Declined (12/9/2023)    Received from [employer] [facility], [employer] [facility]    Hunger Vital Sign     Worried About Running Out of Food in the Last Year: Patient declined     Ran Out of Food in the Last Year: Patient declined   Transportation Needs: No Transportation  Needs (5/29/2024)    TRANSPORTATION NEEDS     Transportation : No   Physical Activity: Insufficiently Active (5/29/2024)    Exercise Vital Sign     Days of Exercise per Week: 1 day     Minutes of Exercise per Session: 20 min   Stress: Stress Concern Present (11/27/2023)    Received from [facility]idiaries [facility], [facility]    [employer] [place] [employer] - Occupational Stress Questionnaire     Feeling of Stress : To some extent   Housing Stability: Unknown (12/9/2023)    Received from [facility], [facility]    Housing Stability Vital Sign     Unable to Pay for Housing in the Last Year: Patient refused     Number of Places Lived in the Last Year: 1     In the last 12 months, was there a time when you did not have a steady place to sleep or slept in a shelter (including now)?: No        MEDS   heparin (porcine)  5,000 Units Subcutaneous Q8H             CONTINOUS INFUSIONS:      Intake/Output Summary (Last 24 hours) at 6/2/2024 1128  Last data filed at 6/2/2024 0837  Gross per 24 hour   Intake --   Output 300 ml   Net -300 ml        HEMODYNAMICS:    Temp:  [97.2 °F (36.2 °C)-97.9 °F (36.6 °C)] 97.8 °F (36.6 °C)  Pulse:  [78-88] 78  Resp:  [13-19] 18  SpO2:  [96 %-99 %] 98 %  BP: (127-157)/(61-70) 133/67   General :   Admit with SOB  No CP  No cough  No fever  No chills   Cardiology : pulse 78  Pulmonary : RR 18  Pulse oximeter 98 % O2  Abdomen soft   Extremities edema     LABS   Lab Results   Component Value Date    WBC 6.63 06/02/2024    HGB 7.1 (L) 06/02/2024    HCT 23.1 (L) 06/02/2024    MCV 98 06/02/2024    PLT SEE COMMENT 06/02/2024        Recent Labs   Lab 06/01/24  2210 06/02/24  0244   * 109   CALCIUM 9.8 9.6  "  ALBUMIN 3.6  --    PROT 7.4  --     139   K 5.1 4.9   CO2 26 23   CL 97 99   BUN 70* 72*   CREATININE 10.9* 11.2*   ALKPHOS 49*  --    ALT 13  --    AST 17  --    BILITOT 0.5  --       Lab Results   Component Value Date    .7 (H) 01/17/2024    CALCIUM 9.6 06/02/2024    PHOS 6.0 (H) 06/02/2024      Lab Results   Component Value Date    IRON 47 11/28/2023    TIBC 208 11/28/2023        ABG  No results for input(s): "PH", "PO2", "PCO2", "HCO3", "BE" in the last 168 hours.      IMAGING:  CXR    ASSESSMENT / PLAN  ESRD  Left arm AV fistula  K 4.9  Metabolic bone disease  Hyperphosphatemia 6  Binders  Nutrition  Albumin 3.6  Anemia multifactorial  Hb 7.1  Iron stydy  Hypertension   Blood pressure 133/67  2023  Estimated ejection fraction of 50 - 55%. Grade I diastolic dysfunction.     Troponin 0.053  CXR  No abnormality  Weight daily  I and O  Renal diet as tolerated  Dialysis  "

## 2024-06-02 NOTE — ED TRIAGE NOTES
Jevon Rajan, a 84 y.o. male presents to the ED w/ complaint of SOB after missing dialysis this morning due to not having enough money for the bus.    Triage note:  Chief Complaint   Patient presents with    Shortness of Breath     X1 hour. Missed dialysis today. Denies CP.      Review of patient's allergies indicates:   Allergen Reactions    Ace inhibitors Itching     Is not sure which medication it was    Captopril      Past Medical History:   Diagnosis Date    BPH (benign prostatic hyperplasia)     Coronary artery disease     Diabetes mellitus, type 2     ESRD (end stage renal disease) on dialysis     ESRD on HD TTS via left brachial AVF    Hypertension     Hypothyroidism, unspecified     Symptomatic anemia 01/15/2024

## 2024-06-02 NOTE — PLAN OF CARE
Inpatient Upgrade Note    Jevon Rajan has warranted treatment spanning two or more midnights of hospital level care for the management of heart failure and end stage kidney failure . He continues to require monitoring of vital signs, further evaluation by consultants, and hemodialysis . His condition is also complicated by the following comorbidities: Hypertension and Diabetes.

## 2024-06-02 NOTE — PLAN OF CARE
Problem: Adult Inpatient Plan of Care  Goal: Plan of Care Review  Outcome: Progressing     Problem: Hemodialysis  Goal: Safe, Effective Therapy Delivery  Outcome: Progressing     Problem: Fall Injury Risk  Goal: Absence of Fall and Fall-Related Injury  Outcome: Progressing

## 2024-06-02 NOTE — ASSESSMENT & PLAN NOTE
Social work consulted for discharge planning  Patient without phone and transportation issues to dialysis

## 2024-06-02 NOTE — HPI
Patient is a 84 y.o. year old male with history of ESRD, DM 2 who presented to ED today with shortness breath Starting over the past hour.  Missed dialysis today.  Patient was recently seen in ED 3 days ago for the same presentation as he was not able to pay for transportation to dialysis, tonight he did not have money to pay for transportation again.  Patient also has been complaints of nausea and vomiting which started an hour prior to arrival.  Labs consistent with ESRD patient is a TTS.  No need for emergent dialysis we will consult Nephrology have HD in a.m..  Patient be admitted to Hospital Medicine.  Social work will be consulted for discharge planning and assistance with transportation

## 2024-06-02 NOTE — ASSESSMENT & PLAN NOTE
Creatine stable for now. BMP reviewed- noted Estimated Creatinine Clearance: 4.7 mL/min (A) (based on SCr of 10.9 mg/dL (H)). according to latest data. 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  Monitor UOP and serial BMP and adjust therapy as needed. Renally dose meds. Avoid nephrotoxic medications and procedures.

## 2024-06-02 NOTE — ASSESSMENT & PLAN NOTE
"Patient's FSGs are controlled on current medication regimen.  Last A1c reviewed-   Lab Results   Component Value Date    HGBA1C 5.6 04/01/2024     Most recent fingerstick glucose reviewed- No results for input(s): "POCTGLUCOSE" in the last 24 hours.  Current correctional scale  Low  Maintain anti-hyperglycemic dose as follows-   Antihyperglycemics (From admission, onward)      Start     Stop Route Frequency Ordered    06/02/24 0404  insulin aspart U-100 pen 0-5 Units         -- SubQ Before meals & nightly PRN 06/02/24 0306          Hold Oral hypoglycemics while patient is in the hospital.  "

## 2024-06-02 NOTE — H&P
[facility] Medicine  History & Physical    Patient Name: Jevon Rajan  MRN: [id number]  Patient Class: OP- Observation  Admission Date: 6/1/2024  Attending Physician: Naida Hooks MD   Primary Care Provider: Melia Primary Doctor         Patient information was obtained from patient and ER records.     Subjective:     Principal Problem:ESRD on dialysis    Chief Complaint:   Chief Complaint   Patient presents with    Shortness of Breath     X1 hour. Missed dialysis today. Denies CP.         HPI: Patient is a 84 y.o. year old male with history of ESRD, DM 2 who presented to ED today with shortness breath Starting over the past hour.  Missed dialysis today.  Patient was recently seen in ED 3 days ago for the same presentation as he was not able to pay for transportation to dialysis, tonight he did not have money to pay for transportation again.  Patient also has been complaints of nausea and vomiting which started an hour prior to arrival.  Labs consistent with ESRD patient is a TTS.  No need for emergent dialysis we will consult Nephrology have HD in a.m..  Patient be admitted to Hospital Medicine.  Social work will be consulted for discharge planning and assistance with transportation    Past Medical History:   Diagnosis Date    BPH (benign prostatic hyperplasia)     Coronary artery disease     Diabetes mellitus, type 2     ESRD (end stage renal disease) on dialysis     ESRD on HD TTS via left brachial AVF    Hypertension     Hypothyroidism, unspecified     Symptomatic anemia 01/15/2024       Past Surgical History:   Procedure Laterality Date    bilateral foot surgery      ESOPHAGOGASTRODUODENOSCOPY N/A 2/27/2024    Procedure: EGD (ESOPHAGOGASTRODUODENOSCOPY);  Surgeon: Gemma Almendarez MD;  Location: [facility];  Service: Endoscopy;  Laterality: N/A;    eyelid surgery      FISTULOGRAM Left 11/27/2019    Procedure: Fistulogram;  Surgeon: MELANY Brenner III, MD;  Location: [facility] OR [address] [place] [address];   Service: Peripheral Vascular;  Laterality: Left;    FISTULOGRAM Left 11/27/2019    Procedure: Fistulogram, AVG declot, possible permacath;  Surgeon: MELANY Brenner III, MD;  Location: [facility] CATH LAB;  Service: Peripheral Vascular;  Laterality: Left;    INSERTION OF DIALYSIS CATHETER      [facility]'s  12/5/13    right hand surgery      stents         Review of patient's allergies indicates:   Allergen Reactions    Ace inhibitors Itching     Is not sure which medication it was    Captopril        No current facility-administered medications on file prior to encounter.     Current Outpatient Medications on File Prior to Encounter   Medication Sig    acarbose (PRECOSE) 50 MG Tab Take 100 mg by mouth 3 (three) times daily with meals.     aspirin 81 MG Chew Take 1 tablet (81 mg total) by mouth every evening.    ciclopirox (PENLAC) 8 % Soln Apply topically nightly.    clopidogrel (PLAVIX) 75 mg tablet Take 1 tablet (75 mg total) by mouth once daily.    finasteride (PROSCAR) 5 mg tablet Take 5 mg by mouth every evening.     glipiZIDE (GLUCOTROL) 10 MG TR24 Take 5 mg by mouth 2 (two) times daily.     HYDROcodone-acetaminophen (NORCO) 5-325 mg per tablet Take 1 tablet by mouth every 8 (eight) hours as needed for Pain.    isosorbide mononitrate (IMDUR) 30 MG 24 hr tablet Take 1 tablet (30 mg total) by mouth every evening.    levothyroxine (SYNTHROID) 25 MCG tablet Take 25 mcg by mouth once daily.    lidocaine (LIDODERM) 5 % Place 1 patch onto the skin once daily. Remove & Discard patch within 12 hours or as directed by MD    losartan (COZAAR) 50 MG tablet Take 50 mg by mouth once daily.    ondansetron (ZOFRAN-ODT) 4 MG TbDL Take 1 tablet (4 mg total) by mouth every 8 (eight) hours as needed (nausea).    pantoprazole (PROTONIX) 40 MG tablet Take 1 tablet (40 mg total) by mouth once daily.    sodium polystyrene (KAYEXALATE) 15 gram/60 mL Susp Take 20 mLs (5 g total) by mouth once daily.    traMADoL (ULTRAM) 50 mg tablet Take 1 tablet  (50 mg total) by mouth every 8 (eight) hours as needed for Pain.     Family History    None       Tobacco Use    Smoking status: Never    Smokeless tobacco: Never   Substance and Sexual Activity    Alcohol use: No    Drug use: No    Sexual activity: Not Currently     Review of Systems   All other systems reviewed and are negative.    Objective:     Vital Signs (Most Recent):  Temp: 97.7 °F (36.5 °C) (06/02/24 0139)  Pulse: 82 (06/02/24 0209)  Resp: 16 (06/02/24 0139)  BP: 137/61 (06/02/24 0139)  SpO2: 96 % (06/02/24 0139) Vital Signs (24h Range):  Temp:  [97.7 °F (36.5 °C)-97.9 °F (36.6 °C)] 97.7 °F (36.5 °C)  Pulse:  [81-88] 82  Resp:  [13-19] 16  SpO2:  [96 %-99 %] 96 %  BP: (127-148)/(61-67) 137/61     Weight: 69.9 kg (154 lb 1.6 oz)  Body mass index is 23.43 kg/m².     Physical Exam  Vitals reviewed.   Constitutional:       Appearance: He is ill-appearing (chronic).   HENT:      Head: Normocephalic.      Nose: Nose normal.      Mouth/Throat:      Pharynx: Oropharynx is clear.   Eyes:      Pupils: Pupils are equal, round, and reactive to light.   Cardiovascular:      Rate and Rhythm: Normal rate and regular rhythm.      Pulses: Normal pulses.      Heart sounds: Normal heart sounds.   Pulmonary:      Effort: Pulmonary effort is normal.   Abdominal:      General: Bowel sounds are normal.      Palpations: Abdomen is soft.   Musculoskeletal:         General: Normal range of motion.      Cervical back: Normal range of motion.   Skin:     General: Skin is warm.      Capillary Refill: Capillary refill takes less than 2 seconds.   Neurological:      General: No focal deficit present.      Mental Status: He is lethargic.   Psychiatric:         Mood and Affect: Mood normal.         Behavior: Behavior normal.              CRANIAL NERVES     CN III, IV, VI   Pupils are equal, round, and reactive to light.       Significant Labs: All pertinent labs within the past 24 hours have been reviewed.  Recent Lab Results          06/01/24  2210        Albumin 3.6       ALP 49       ALT 13       Anion Gap 17       AST 17       Baso # 0.05       Basophil % 0.7       BILIRUBIN TOTAL 0.5  Comment: For infants and newborns, interpretation of results should be based  on gestational age, weight and in agreement with clinical  observations.    Premature Infant recommended reference ranges:  Up to 24 hours.............<8.0 mg/dL  Up to 48 hours............<12.0 mg/dL  3-5 days..................<15.0 mg/dL  6-29 days.................<15.0 mg/dL           Comment: Values of less than 100 pg/ml are consistent with non-CHF populations.       BUN 70       Calcium 9.8       Chloride 97       CO2 26       Creatinine 10.9       Differential Method Automated       eGFR 4       Eos # 0.3       Eos % 4.1       Glucose 185       Gran # (ANC) 4.9       Gran % 71.9       Hematocrit 23.3       Hemoglobin 7.5       Immature Grans (Abs) 0.02  Comment: Mild elevation in immature granulocytes is non specific and   can be seen in a variety of conditions including stress response,   acute inflammation, trauma and pregnancy. Correlation with other   laboratory and clinical findings is essential.         Immature Granulocytes 0.3       Lymph # 0.9       Lymph % 13.3       Magnesium  2.1       MCH 30.9       MCHC 32.2       MCV 96       Mono # 0.7       Mono % 9.7       MPV 11.4       nRBC 0       Phosphorus Level 6.1       Platelet Count 169       Potassium 5.1       PROTEIN TOTAL 7.4       RBC 2.43       RDW 18.3       Sodium 140       Troponin I 0.053  Comment: The reference interval for Troponin I represents the 99th percentile   cutoff   for our facility and is consistent with 3rd generation assay   performance.         WBC 6.79               Significant Imaging: I have reviewed all pertinent imaging results/findings within the past 24 hours.  I have reviewed and interpreted all pertinent imaging results/findings within the past 24 hours.  Assessment/Plan:     *  "ESRD on dialysis  Creatine stable for now. BMP reviewed- noted Estimated Creatinine Clearance: 4.7 mL/min (A) (based on SCr of 10.9 mg/dL (H)). according to latest data. 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  Monitor UOP and serial BMP and adjust therapy as needed. Renally dose meds. Avoid nephrotoxic medications and procedures.    Discharge planning issues  Social work consulted for discharge planning  Patient without phone and transportation issues to dialysis      DM2 (diabetes mellitus, type 2)  Patient's FSGs are controlled on current medication regimen.  Last A1c reviewed-   Lab Results   Component Value Date    HGBA1C 5.6 04/01/2024     Most recent fingerstick glucose reviewed- No results for input(s): "POCTGLUCOSE" in the last 24 hours.  Current correctional scale  Low  Maintain anti-hyperglycemic dose as follows-   Antihyperglycemics (From admission, onward)      Start     Stop Route Frequency Ordered    06/02/24 0404  insulin aspart U-100 pen 0-5 Units         -- SubQ Before meals & nightly PRN 06/02/24 0306          Hold Oral hypoglycemics while patient is in the hospital.    HTN (hypertension)  Chronic, controlled. Latest blood pressure and vitals reviewed-     Temp:  [97.7 °F (36.5 °C)-97.9 °F (36.6 °C)]   Pulse:  [81-88]   Resp:  [13-19]   BP: (127-148)/(61-67)   SpO2:  [96 %-99 %] .   Home meds for hypertension were reviewed and noted below.   Hypertension Medications               isosorbide mononitrate (IMDUR) 30 MG 24 hr tablet Take 1 tablet (30 mg total) by mouth every evening.    losartan (COZAAR) 50 MG tablet Take 50 mg by mouth once daily.            While in the hospital, will manage blood pressure as follows; Continue home antihypertensive regimen    Will utilize p.r.n. blood pressure medication only if patient's blood pressure greater than 180/110 and he develops symptoms such as worsening chest pain or shortness of breath.      VTE Risk Mitigation (From admission, onward)           " Ordered     heparin (porcine) injection 5,000 Units  Every 8 hours         06/02/24 0210     IP VTE HIGH RISK PATIENT  Once         06/02/24 0210     Place sequential compression device  Until discontinued         06/02/24 0210                         On 06/02/2024, patient should be placed in hospital observation services under my care in collaboration with Dr. Hooks.           Zane Rice, NP  Department of [facility] Medicine  [facility]

## 2024-06-02 NOTE — ED PROVIDER NOTES
ED Provider Note - 6/1/2024    History     Chief Complaint   Patient presents with    Shortness of Breath     X1 hour. Missed dialysis today. Denies CP.        HPI     Jevon Rajan is a 84 y.o. year old male with past medical and surgical history as seen below, presenting with chief complaint of shortness of breath.  Starting over the past hour.  Missed dialysis today.  Known to myself from visit 3 days ago when patient missed dialysis due to transportation not coming by.  Tonight he did not have money to pay for transportation.  Also complaining of some nausea and vomiting that started 1-2 hours prior to presentation.        Past Medical History:   Diagnosis Date    BPH (benign prostatic hyperplasia)     Coronary artery disease     Diabetes mellitus, type 2     ESRD (end stage renal disease) on dialysis     ESRD on HD TTS via left brachial AVF    Hypertension     Hypothyroidism, unspecified     Symptomatic anemia 01/15/2024     Past Surgical History:   Procedure Laterality Date    bilateral foot surgery      ESOPHAGOGASTRODUODENOSCOPY N/A 2/27/2024    Procedure: EGD (ESOPHAGOGASTRODUODENOSCOPY);  Surgeon: Gemma Almendarez MD;  Location: [facility] ENDO;  Service: Endoscopy;  Laterality: N/A;    eyelid surgery      FISTULOGRAM Left 11/27/2019    Procedure: Fistulogram;  Surgeon: MELANY Brenner III, MD;  Location: [facility] OR [address] [place] [address];  Service: Peripheral Vascular;  Laterality: Left;    FISTULOGRAM Left 11/27/2019    Procedure: Fistulogram, AVG declot, possible permacath;  Surgeon: MELANY Brenner III, MD;  Location: [facility] CATH LAB;  Service: Peripheral Vascular;  Laterality: Left;    INSERTION OF DIALYSIS CATHETER      [facility]  [place]    right hand surgery      stents           No family history on file.  Social History     Tobacco Use    Smoking status: Never    Smokeless tobacco: Never   Substance Use Topics    Alcohol use: No    Drug use: No     Social Determinants of Health with Concerns     Food Insecurity: Patient  Declined (12/9/2023)    Received from [facility], [facility]    Hunger Vital Sign     Worried About Running Out of Food in the Last Year: Patient declined     Ran Out of Food in the Last Year: Patient declined   Physical Activity: Insufficiently Active (5/29/2024)    Exercise Vital Sign     Days of Exercise per Week: 1 day     Minutes of Exercise per Session: 20 min   Stress: Stress Concern Present (11/27/2023)    [facility], [facility]    [employer] [place] [employer] - Occupational Stress Questionnaire     Feeling of Stress : To some extent   Housing Stability: Unknown (12/9/2023)    Received from [facility], [facility]    Housing Stability Vital Sign     Unable to Pay for Housing in the Last Year: Patient refused     Number of Places Lived in the Last Year: 1     In the last 12 months, was there a time when you did not have a steady place to sleep or slept in a shelter (including now)?: No   Health Literacy: Not on file      Review of patient's allergies indicates:   Allergen Reactions    Ace inhibitors Itching     Is not sure which medication it was    Captopril        Review of Systems     A full Review of Systems (ROS) was performed and was negative unless otherwise stated in the HPI.      Physical Exam     Vitals:    06/02/24 0813 06/02/24 1207 06/02/24 1214 06/02/24 1606   BP: 133/67 (!) 124/58     BP Location: Right arm Right arm     Patient Position: Lying Lying     Pulse: 78 77 90 80   Resp: 18 20     Temp: 97.8 °F (36.6 °C) 97.7 °F (36.5  °C)     TempSrc: Oral Oral     SpO2: 98% 96%     Weight:       Height:            Physical Exam    Nursing note and vitals reviewed.  Constitutional: He appears well-developed and well-nourished. No distress.   HENT:   Head: Normocephalic and atraumatic.   Right Ear: External ear normal.   Left Ear: External ear normal.   Nose: Nose normal.   Mouth/Throat: Oropharynx is clear and moist.   Eyes: Conjunctivae and EOM are normal. Pupils are equal, round, and reactive to light.   Neck: Neck supple.   Normal range of motion.  Cardiovascular:  Normal rate and regular rhythm.           No murmur heard.  Pulmonary/Chest: Breath sounds normal. No stridor. No respiratory distress. He has no wheezes. He has no rhonchi. He has no rales.   Abdominal: Abdomen is soft. Bowel sounds are normal. There is no abdominal tenderness.   Musculoskeletal:         General: No edema. Normal range of motion.      Cervical back: Normal range of motion and neck supple.      Comments: Palpable thrill left arm     Neurological: He is alert and oriented to person, place, and time. He has normal strength. No cranial nerve deficit or sensory deficit.   Skin: Skin is warm and dry. No rash noted.   Psychiatric: He has a normal mood and affect. Thought content normal.         Lab Results- Independently reviewed by myself      Labs Reviewed   CBC W/ AUTO DIFFERENTIAL - Abnormal; Notable for the following components:       Result Value    RBC 2.43 (*)     Hemoglobin 7.5 (*)     Hematocrit 23.3 (*)     RDW 18.3 (*)     Lymph # 0.9 (*)     Lymph % 13.3 (*)     All other components within normal limits   COMPREHENSIVE METABOLIC PANEL - Abnormal; Notable for the following components:    Glucose 185 (*)     BUN 70 (*)     Creatinine 10.9 (*)     Alkaline Phosphatase 49 (*)     eGFR 4 (*)     Anion Gap 17 (*)     All other components within normal limits   TROPONIN I - Abnormal; Notable for the following components:    Troponin I 0.053 (*)     All other  components within normal limits   B-TYPE NATRIURETIC PEPTIDE - Abnormal; Notable for the following components:     (*)     All other components within normal limits   PHOSPHORUS - Abnormal; Notable for the following components:    Phosphorus 6.1 (*)     All other components within normal limits   MAGNESIUM           Imaging     Imaging Results              X-Ray Chest AP Portable (Final result)  Result time 06/01/24 22:20:36      Final result by Delfin Mclaughlin DO (06/01/24 22:20:36)                   Impression:      No acute abnormality.      Electronically signed by: Delfin Mclaughlin  Date:    06/01/2024  Time:    22:20               Narrative:    EXAMINATION:  XR CHEST AP PORTABLE    CLINICAL HISTORY:  Shortness of breath;    TECHNIQUE:  Single frontal view of the chest was performed.    COMPARISON:  05/28/2024.    FINDINGS:  The lungs are well expanded and clear. No focal opacities are seen. The pleural spaces are clear. The cardiac silhouette is unremarkable.  There are calcifications of the aortic arch.  The visualized osseous structures are unremarkable.                                        EKG Readings: (Independently Interpreted)   Normal sinus rhythm   Rate 85   Normal axis      No STEMI           ED Course         Procedures         Orders Placed This Encounter    X-Ray Chest AP Portable    CBC auto differential    Comprehensive metabolic panel    Magnesium    Troponin I    Brain natriuretic peptide    Phosphorus    Basic Metabolic Panel (BMP)    Magnesium    Phosphorus    CBC with Automated Differential    Diet Renal    Cardiac Monitoring - Adult    Vital signs    Bladder scan    Notify Physician    Place sequential compression device    Recheck Blood Glucose:    If any glucose result is less than 50 or greater than 400:    If 2nd result is less than 50 or greater than 400:    If glucose greater than 400 mg/dL treat per correction scale.  If glucose remains elevated above 400 mg/dL at  "next scheduled check, notify provider    Do not admin Aspart correction between scheduled prandial Aspart    Recheck Blood Glucose:    Chlorhexidine Bath    Chlorohexidine Gluconate Bath    Full code    [facility]    Inpatient consult to Social Work    EKG 12-lead    EKG 12-lead    Saline lock IV    Prepare patient for dialysis    Hemodialysis inpatient If "per protocol" is selected for one or more ingredients (K+, Ca++, Na+, Bicarb) for the dialysate bath solution, select the hyperlink for the protocol instructions.    Possible Hospitalization    Place in Observation    Admit to Inpatient    POCT glucose    POCT glucose    POCT glucose    POCT glucose    sodium chloride 0.9% flush 10 mL    naloxone 0.4 mg/mL injection 0.02 mg    heparin (porcine) injection 5,000 Units    glucose chewable tablet 16 g    glucose chewable tablet 24 g    glucagon (human recombinant) injection 1 mg    insulin aspart U-100 pen 0-5 Units    dextrose 10% bolus 125 mL 125 mL    dextrose 10% bolus 250 mL 250 mL    nitroGLYCERIN SL tablet 0.4 mg    mupirocin 2 % ointment    0.9%  NaCl infusion    0.9%  NaCl infusion    sodium chloride 0.9% bolus 250 mL 250 mL    IP VTE HIGH RISK PATIENT    Progressive Mobility Protocol (mobilize patient to their highest level of functioning at least twice daily)          ED Course as of 06/02/24 1713   Sat Jun 01, 2024 2229 CBC auto differential(!)  No significant change from 3 days ago [KB]   2229 X-Ray Chest AP Portable  Independent interpretation of chest x-ray:  No consolidations, pneumothorax, or other acute findings   [KB]   2244 Troponin I(!): 0.053  At baseline [KB]   2244 Phosphorus Level(!): 6.1  Improved from 4 days ago [KB]   2244 BNP(!): 843  Improved from 4 days ago [KB]   2244 Potassium: 5.1  Improved from 4 days ago [KB]      ED Course User Index  [KB] Zaid Dominique MD              Medical Decision Making       The patient's list of active medical " problems, social history, medications, and allergies as documented per RN staff has been reviewed.     Comorbidities taken into consideration for development of diagnosis and treatment plan include CHF, HTN, and ESRD.      Medical Decision Making  84-year-old male presents after missing dialysis.  Patient has exhibited for ability to manage outpatient health system.  Does not have means of transportation or seemingly ability to set up transportation and as such requires significant help through social work/care management.  Does not meet requirement for emergent overnight dialysis but would seem to benefit from inpatient dialysis.  Discussed with Hospital Medicine Service to help continue management in this regard.    Amount and/or Complexity of Data Reviewed  Independent Historian: EMS  External Data Reviewed: labs, ECG and notes.  Labs: ordered. Decision-making details documented in ED Course.  Radiology: ordered and independent interpretation performed. Decision-making details documented in ED Course.  ECG/medicine tests: ordered and independent interpretation performed.    Risk  Decision regarding hospitalization.  Diagnosis or treatment significantly limited by social determinants of health.                  Clinical Impression         Disposition   ED Disposition Condition    Observation             Diagnosis    ICD-10-CM ICD-9-CM   1. Shortness of breath  R06.02 786.05   2. Chest pain  R07.9 786.50           Zaid Dominique MD        06/02/2024          DISCLAIMER: This note was prepared with [employer] voice recognition transcription software. Garbled syntax, mangled pronouns, and other bizarre constructions may be attributed to that software system.       Zaid Dominique MD  06/02/24 9645

## 2024-06-02 NOTE — PROGRESS NOTES
06/02/24 6833   Post-Hemodialysis Assessment   Rinseback Volume (mL) 250 mL   Blood Volume Processed (Liters) 35 L   Dialyzer Clearance Clear   Duration of Treatment 120 minutes   Additional Fluid Intake (mL) 0 mL   Total UF (mL) 2500 mL   Net Fluid Removal 2000   Patient Response to Treatment tolerated well   Post-Hemodialysis Comments HD completed, blood returned, needles removed, hemostasis achieved, dressign applied.

## 2024-06-02 NOTE — SUBJECTIVE & OBJECTIVE
Past Medical History:   Diagnosis Date    BPH (benign prostatic hyperplasia)     Coronary artery disease     Diabetes mellitus, type 2     ESRD (end stage renal disease) on dialysis     ESRD on HD TTS via left brachial AVF    Hypertension     Hypothyroidism, unspecified     Symptomatic anemia 01/15/2024       Past Surgical History:   Procedure Laterality Date    bilateral foot surgery      ESOPHAGOGASTRODUODENOSCOPY N/A 2/27/2024    Procedure: EGD (ESOPHAGOGASTRODUODENOSCOPY);  Surgeon: Gemma Almendarez MD;  Location: [facility] ENDO;  Service: Endoscopy;  Laterality: N/A;    eyelid surgery      FISTULOGRAM Left 11/27/2019    Procedure: Fistulogram;  Surgeon: MELANY Brenner III, MD;  Location: [facility] OR [address] [place] [address];  Service: Peripheral Vascular;  Laterality: Left;    FISTULOGRAM Left 11/27/2019    Procedure: Fistulogram, AVG declot, possible permacath;  Surgeon: MELANY Brenner III, MD;  Location: [facility] CATH LAB;  Service: Peripheral Vascular;  Laterality: Left;    [place] OF D[place]YSIS [place]      [facility]  [place]    right hand surgery      stents         Review of patient's allergies indicates:   Allergen Reactions    Ace inhibitors Itching     Is not sure which medication it was    Captopril        No current facility-administered medications on file prior to encounter.     Current Outpatient Medications on File Prior to Encounter   Medication Sig    acarbose (PRECOSE) 50 MG Tab Take 100 mg by mouth 3 (three) times daily with meals.     aspirin 81 MG Chew Take 1 tablet (81 mg total) by mouth every evening.    ciclopirox (PENLAC) 8 % Soln Apply topically nightly.    clopidogrel (PLAVIX) 75 mg tablet Take 1 tablet (75 mg total) by mouth once daily.    finasteride (PROSCAR) 5 mg tablet Take 5 mg by mouth every evening.     glipiZIDE (GLUCOTROL) 10 MG TR24 Take 5 mg by mouth 2 (two) times daily.     HYDROcodone-acetaminophen (NORCO) 5-325 mg per tablet Take 1 tablet by mouth every 8 (eight) hours as needed for Pain.     isosorbide mononitrate (IMDUR) 30 MG 24 hr tablet Take 1 tablet (30 mg total) by mouth every evening.    levothyroxine (SYNTHROID) 25 MCG tablet Take 25 mcg by mouth once daily.    lidocaine (LIDODERM) 5 % Place 1 patch onto the skin once daily. Remove & Discard patch within 12 hours or as directed by MD    losartan (COZAAR) 50 MG tablet Take 50 mg by mouth once daily.    ondansetron (ZOFRAN-ODT) 4 MG TbDL Take 1 tablet (4 mg total) by mouth every 8 (eight) hours as needed (nausea).    pantoprazole (PROTONIX) 40 MG tablet Take 1 tablet (40 mg total) by mouth once daily.    sodium polystyrene (KAYEXALATE) 15 gram/60 mL Susp Take 20 mLs (5 g total) by mouth once daily.    traMADoL (ULTRAM) 50 mg tablet Take 1 tablet (50 mg total) by mouth every 8 (eight) hours as needed for Pain.     Family History    None       Tobacco Use    Smoking status: Never    Smokeless tobacco: Never   Substance and Sexual Activity    Alcohol use: No    Drug use: No    Sexual activity: Not Currently     Review of Systems   All other systems reviewed and are negative.    Objective:     Vital Signs (Most Recent):  Temp: 97.7 °F (36.5 °C) (06/02/24 0139)  Pulse: 82 (06/02/24 0209)  Resp: 16 (06/02/24 0139)  BP: 137/61 (06/02/24 0139)  SpO2: 96 % (06/02/24 0139) Vital Signs (24h Range):  Temp:  [97.7 °F (36.5 °C)-97.9 °F (36.6 °C)] 97.7 °F (36.5 °C)  Pulse:  [81-88] 82  Resp:  [13-19] 16  SpO2:  [96 %-99 %] 96 %  BP: (127-148)/(61-67) 137/61     Weight: 69.9 kg (154 lb 1.6 oz)  Body mass index is 23.43 kg/m².     Physical Exam  Vitals reviewed.   Constitutional:       Appearance: He is ill-appearing (chronic).   HENT:      Head: Normocephalic.      Nose: Nose normal.      Mouth/Throat:      Pharynx: Oropharynx is clear.   Eyes:      Pupils: Pupils are equal, round, and reactive to light.   Cardiovascular:      Rate and Rhythm: Normal rate and regular rhythm.      Pulses: Normal pulses.      Heart sounds: Normal heart sounds.   Pulmonary:       Effort: Pulmonary effort is normal.   Abdominal:      General: Bowel sounds are normal.      Palpations: Abdomen is soft.   Musculoskeletal:         General: Normal range of motion.      Cervical back: Normal range of motion.   Skin:     General: Skin is warm.      Capillary Refill: Capillary refill takes less than 2 seconds.   Neurological:      General: No focal deficit present.      Mental Status: He is lethargic.   Psychiatric:         Mood and Affect: Mood normal.         Behavior: Behavior normal.              CRANIAL NERVES     CN III, IV, VI   Pupils are equal, round, and reactive to light.       Significant Labs: All pertinent labs within the past 24 hours have been reviewed.  Recent Lab Results         06/01/24  2210        Albumin 3.6       ALP 49       ALT 13       Anion Gap 17       AST 17       Baso # 0.05       Basophil % 0.7       BILIRUBIN TOTAL 0.5  Comment: For infants and newborns, interpretation of results should be based  on gestational age, weight and in agreement with clinical  observations.    Premature Infant recommended reference ranges:  Up to 24 hours.............<8.0 mg/dL  Up to 48 hours............<12.0 mg/dL  3-5 days..................<15.0 mg/dL  6-29 days.................<15.0 mg/dL           Comment: Values of less than 100 pg/ml are consistent with non-CHF populations.       BUN 70       Calcium 9.8       Chloride 97       CO2 26       Creatinine 10.9       Differential Method Automated       eGFR 4       Eos # 0.3       Eos % 4.1       Glucose 185       Gran # (ANC) 4.9       Gran % 71.9       Hematocrit 23.3       Hemoglobin 7.5       Immature Grans (Abs) 0.02  Comment: Mild elevation in immature granulocytes is non specific and   can be seen in a variety of conditions including stress response,   acute inflammation, trauma and pregnancy. Correlation with other   laboratory and clinical findings is essential.         Immature Granulocytes 0.3       Lymph # 0.9        Lymph % 13.3       Magnesium  2.1       MCH 30.9       MCHC 32.2       MCV 96       Mono # 0.7       Mono % 9.7       MPV 11.4       nRBC 0       Phosphorus Level 6.1       Platelet Count 169       Potassium 5.1       PROTEIN TOTAL 7.4       RBC 2.43       RDW 18.3       Sodium 140       Troponin I 0.053  Comment: The reference interval for Troponin I represents the 99th percentile   cutoff   for our facility and is consistent with 3rd generation assay   performance.         WBC 6.79               Significant Imaging: I have reviewed all pertinent imaging results/findings within the past 24 hours.  I have reviewed and interpreted all pertinent imaging results/findings within the past 24 hours.

## 2024-06-03 VITALS
SYSTOLIC BLOOD PRESSURE: 119 MMHG | TEMPERATURE: 98 F | RESPIRATION RATE: 18 BRPM | OXYGEN SATURATION: 96 % | HEIGHT: 68 IN | DIASTOLIC BLOOD PRESSURE: 59 MMHG | WEIGHT: 154.13 LBS | HEART RATE: 88 BPM | BODY MASS INDEX: 23.36 KG/M2

## 2024-06-03 LAB
ANION GAP SERPL CALC-SCNC: 14 MMOL/L (ref 8–16)
BASOPHILS # BLD AUTO: 0.05 K/UL (ref 0–0.2)
BASOPHILS NFR BLD: 0.9 % (ref 0–1.9)
BUN SERPL-MCNC: 51 MG/DL (ref 8–23)
CALCIUM SERPL-MCNC: 9.4 MG/DL (ref 8.7–10.5)
CHLORIDE SERPL-SCNC: 97 MMOL/L (ref 95–110)
CO2 SERPL-SCNC: 27 MMOL/L (ref 23–29)
CREAT SERPL-MCNC: 9.5 MG/DL (ref 0.5–1.4)
DIFFERENTIAL METHOD BLD: ABNORMAL
EOSINOPHIL # BLD AUTO: 0.3 K/UL (ref 0–0.5)
EOSINOPHIL NFR BLD: 5.5 % (ref 0–8)
ERYTHROCYTE [DISTWIDTH] IN BLOOD BY AUTOMATED COUNT: 17.8 % (ref 11.5–14.5)
EST. GFR  (NO RACE VARIABLE): 5 ML/MIN/1.73 M^2
GLUCOSE SERPL-MCNC: 108 MG/DL (ref 70–110)
HCT VFR BLD AUTO: 24.2 % (ref 40–54)
HGB BLD-MCNC: 7.8 G/DL (ref 14–18)
IMM GRANULOCYTES # BLD AUTO: 0.02 K/UL (ref 0–0.04)
IMM GRANULOCYTES NFR BLD AUTO: 0.4 % (ref 0–0.5)
LYMPHOCYTES # BLD AUTO: 1.3 K/UL (ref 1–4.8)
LYMPHOCYTES NFR BLD: 24.6 % (ref 18–48)
MAGNESIUM SERPL-MCNC: 2.1 MG/DL (ref 1.6–2.6)
MCH RBC QN AUTO: 30.7 PG (ref 27–31)
MCHC RBC AUTO-ENTMCNC: 32.2 G/DL (ref 32–36)
MCV RBC AUTO: 95 FL (ref 82–98)
MONOCYTES # BLD AUTO: 0.5 K/UL (ref 0.3–1)
MONOCYTES NFR BLD: 9.6 % (ref 4–15)
NEUTROPHILS # BLD AUTO: 3.1 K/UL (ref 1.8–7.7)
NEUTROPHILS NFR BLD: 59 % (ref 38–73)
NRBC BLD-RTO: 0 /100 WBC
PHOSPHATE SERPL-MCNC: 4.9 MG/DL (ref 2.7–4.5)
PLATELET # BLD AUTO: 168 K/UL (ref 150–450)
PMV BLD AUTO: 11.1 FL (ref 9.2–12.9)
POCT GLUCOSE: 85 MG/DL (ref 70–110)
POCT GLUCOSE: 92 MG/DL (ref 70–110)
POTASSIUM SERPL-SCNC: 4.4 MMOL/L (ref 3.5–5.1)
RBC # BLD AUTO: 2.54 M/UL (ref 4.6–6.2)
SODIUM SERPL-SCNC: 138 MMOL/L (ref 136–145)
WBC # BLD AUTO: 5.29 K/UL (ref 3.9–12.7)

## 2024-06-03 PROCEDURE — 90935 HEMODIALYSIS ONE EVALUATION: CPT

## 2024-06-03 PROCEDURE — 84100 ASSAY OF PHOSPHORUS: CPT | Performed by: REGISTERED NURSE

## 2024-06-03 PROCEDURE — 5A1D70Z PERFORMANCE OF URINARY FILTRATION, INTERMITTENT, LESS THAN 6 HOURS PER DAY: ICD-10-PCS | Performed by: STUDENT IN AN ORGANIZED HEALTH CARE EDUCATION/TRAINING PROGRAM

## 2024-06-03 PROCEDURE — 25000003 PHARM REV CODE 250: Performed by: INTERNAL MEDICINE

## 2024-06-03 PROCEDURE — 63600175 PHARM REV CODE 636 W HCPCS: Performed by: REGISTERED NURSE

## 2024-06-03 PROCEDURE — 25000003 PHARM REV CODE 250: Performed by: REGISTERED NURSE

## 2024-06-03 PROCEDURE — 36415 COLL VENOUS BLD VENIPUNCTURE: CPT | Performed by: REGISTERED NURSE

## 2024-06-03 PROCEDURE — 83735 ASSAY OF MAGNESIUM: CPT | Performed by: REGISTERED NURSE

## 2024-06-03 PROCEDURE — 80048 BASIC METABOLIC PNL TOTAL CA: CPT | Performed by: REGISTERED NURSE

## 2024-06-03 PROCEDURE — 85025 COMPLETE CBC W/AUTO DIFF WBC: CPT | Performed by: REGISTERED NURSE

## 2024-06-03 RX ORDER — SEVELAMER CARBONATE 800 MG/1
800 TABLET, FILM COATED ORAL
Qty: 90 TABLET | Refills: 0 | Status: SHIPPED | OUTPATIENT
Start: 2024-06-03 | End: 2024-07-03

## 2024-06-03 RX ORDER — LEVOTHYROXINE SODIUM 50 UG/1
50 TABLET ORAL
Qty: 30 TABLET | Refills: 0 | Status: SHIPPED | OUTPATIENT
Start: 2024-06-04 | End: 2024-07-04

## 2024-06-03 RX ORDER — PANTOPRAZOLE SODIUM 40 MG/1
40 TABLET, DELAYED RELEASE ORAL DAILY
Qty: 90 TABLET | Refills: 0 | Status: ON HOLD | OUTPATIENT
Start: 2024-06-03 | End: 2024-06-12

## 2024-06-03 RX ORDER — ISOSORBIDE MONONITRATE 30 MG/1
30 TABLET, EXTENDED RELEASE ORAL NIGHTLY
Qty: 30 TABLET | Refills: 2 | Status: SHIPPED | OUTPATIENT
Start: 2024-06-03 | End: 2024-09-01

## 2024-06-03 RX ORDER — NAPROXEN SODIUM 220 MG/1
81 TABLET, FILM COATED ORAL NIGHTLY
Qty: 90 TABLET | Refills: 0 | Status: SHIPPED | OUTPATIENT
Start: 2024-06-03 | End: 2024-09-01

## 2024-06-03 RX ORDER — LEVOTHYROXINE SODIUM 50 UG/1
50 TABLET ORAL
Status: DISCONTINUED | OUTPATIENT
Start: 2024-06-04 | End: 2024-06-03 | Stop reason: HOSPADM

## 2024-06-03 RX ORDER — ATORVASTATIN CALCIUM 40 MG/1
80 TABLET, FILM COATED ORAL NIGHTLY
Status: DISCONTINUED | OUTPATIENT
Start: 2024-06-03 | End: 2024-06-03 | Stop reason: HOSPADM

## 2024-06-03 RX ORDER — SEVELAMER CARBONATE 800 MG/1
800 TABLET, FILM COATED ORAL
Status: DISCONTINUED | OUTPATIENT
Start: 2024-06-03 | End: 2024-06-03 | Stop reason: HOSPADM

## 2024-06-03 RX ORDER — CLOPIDOGREL BISULFATE 75 MG/1
75 TABLET ORAL DAILY
Qty: 30 TABLET | Refills: 11 | Status: SHIPPED | OUTPATIENT
Start: 2024-06-03 | End: 2025-06-03

## 2024-06-03 RX ORDER — ATORVASTATIN CALCIUM 80 MG/1
80 TABLET, FILM COATED ORAL NIGHTLY
Qty: 30 TABLET | Refills: 0 | Status: SHIPPED | OUTPATIENT
Start: 2024-06-03 | End: 2024-07-03

## 2024-06-03 RX ORDER — TRAMADOL HYDROCHLORIDE 50 MG/1
50 TABLET ORAL EVERY 8 HOURS PRN
Status: DISCONTINUED | OUTPATIENT
Start: 2024-06-03 | End: 2024-06-03 | Stop reason: HOSPADM

## 2024-06-03 RX ADMIN — SEVELAMER CARBONATE 800 MG: 800 TABLET, FILM COATED ORAL at 12:06

## 2024-06-03 RX ADMIN — HEPARIN SODIUM 5000 UNITS: 5000 INJECTION INTRAVENOUS; SUBCUTANEOUS at 05:06

## 2024-06-03 RX ADMIN — TRAMADOL HYDROCHLORIDE 50 MG: 50 TABLET, COATED ORAL at 12:06

## 2024-06-03 NOTE — PLAN OF CARE
Problem: Adult Inpatient Plan of Care  Goal: Plan of Care Review  Outcome: Progressing     Problem: Diabetes Comorbidity  Goal: Blood Glucose Level Within Targeted Range  Outcome: Progressing     Problem: Hemodialysis  Goal: Safe, Effective Therapy Delivery  Outcome: Progressing     Problem: Hemodialysis  Goal: Safe, Effective Therapy Delivery  Outcome: Progressing

## 2024-06-03 NOTE — PROCEDURES
Patient seen and examined on dialysis. Tolerating HD well  Vitals:    06/03/24 1110 06/03/24 1125 06/03/24 1139 06/03/24 1210   BP: (!) 110/57 114/61 (!) 119/59    BP Location:  Right arm Right arm    Patient Position:  Lying Lying    Pulse: 80 72 76 88   Resp:  16 18    Temp:  97.3 °F (36.3 °C) 97.5 °F (36.4 °C)    TempSrc:  Temporal Oral    SpO2:   96%    Weight:       Height:           With any question please call  [phone]  ABDIAS Oates MD    [facility]     RACHNA Red MD,   OMD ABDIAS Hernández MD E. V. Harmon, NP I.Goldvarg-Abud, NP    [address]  [place], [address]

## 2024-06-03 NOTE — PLAN OF CARE
Introduced as VN and will be reviewing discharge instructions.  Educated patient on reason for admission, home medication list, and discharge instructions including when to return to ED and the following doctor appointments.  Education per flowsheet.  Opportunity given for questions and questions answered. Nurse  notified of   completion of discharge education. Transport at bedside to take patient home

## 2024-06-03 NOTE — PLAN OF CARE
Problem: Adult Inpatient Plan of Care  Goal: Plan of Care Review  Outcome: Progressing   VIRTUAL NURSE:  Labs, notes, orders, and careplan reviewed.

## 2024-06-03 NOTE — PLAN OF CARE
Discharge orders noted. No DME or Home Health ordered. PCP follow-up requested. Patient will have transport set up with [facility]. No further Case Management needs.      contacted dez Chadwick and updated her transport will be here soon to transport patient home. She told me she would  patient's meds at [facility] pharmacy when I asked how they would like to get medications.  met with patient and I also updated him. He thanked  for the assistance.    Patient Contacts    Name Relation Home Work Mobile   Bhavna Smyth Sister [phone]     Tracey Clark Sister [phone]       Future Appointments   Date Time Provider Department Center   6/25/2024 10:30 AM Radha Quach MD [facility] HEPAT [facility]        Follow-Up with PCP           Next Steps: Follow up  Instructions:  is working on Hospital Follow-Up appointment. Phone#[phone] Contact  if you have not received call back.        06/03/24 1256   Final Note   Assessment Type Final Discharge Note   Anticipated Discharge Disposition Home   Hospital Resources/Appts/Education Provided Appointments scheduled and added to AVS   Post-Acute Status   Discharge Delays (!) Ambulance Transport/Facility Transport     Ashely Jose RN    [phone]

## 2024-06-03 NOTE — PLAN OF CARE
went to meet with patient. Patient back from HD at this time. [facility] I was in [facility]. [facility] will be working on setting up [facility] with [facility] [place] [facility] for meals. Both patient and I explained to her why patient was in hospital. She will follow-up at a later time with patient. Patient reports he is independent and lives at home alone. He does have family who live nearby to assist him. Patient has a cane and a glucometer at home. He tells me he rarely uses any equipment and walks about 2 miles/day. Patient has a cell phone and I spoke with his niece Aida via phone. Niece helps patient with his groceries and rides if needed. Patient goes to [facility] T/TH/SAT. He uses the [facility] to get to appointments and HD. I did speak with Tan at [facility] and she confirmed patient does not have any issues with rides etc. Patient just did not feel well enough to go to HD that day (patient confirmed as well). [facility], but he is agreeable to follow-up with [facility] clinic if able. Patient will need a ride home at discharge. Aida reports she has to  her children from camp and unable to come out this way. She will reschedule patient's GI appointment that was for today. Patient/Family encouraged to call with any questions or concerns.  will continue to follow patient through transitions of care and assist with any discharge needs.     Patient Contacts    Name Relation Home Work Mobile   Bhavna Smyth Sister [phone]     Tracey Clark Sister [phone]       Future Appointments   Date Time Provider Department Center   6/25/2024 10:30 AM Radha Quach MD [facility]         06/03/24 1243   Discharge Assessment   Assessment Type Discharge Planning Assessment   Confirmed/corrected address, phone number and insurance Yes   Confirmed Demographics Correct on Facesheet   Source of Information patient;family   People in Home alone    Facility Arrived From: Home   Do you expect to return to your current living situation? Yes   Do you have help at home or someone to help you manage your care at home? Yes   Who are your caregiver(s) and their phone number(s)? Aida Chua Phone#[phone]   Prior to hospitilization cognitive status: Alert/Oriented   Current cognitive status: Alert/Oriented   Walking or Climbing Stairs Difficulty yes   Walking or Climbing Stairs ambulation difficulty, requires equipment   Dressing/Bathing Difficulty no   Equipment Currently Used at Home cane, straight;glucometer   Do you take prescription medications? Yes   Do you have prescription coverage? Yes   Do you have any problems affording any of your prescribed medications? No   Is the patient taking medications as prescribed? yes   Who is going to help you get home at discharge? Patient will need transportation at discharge.   How do you get to doctors appointments? agency;family or friend will provide   Are you on dialysis? Yes   Dialysis Name and Scheduled days [facility] ([place] Phone#[phone] T/TH/SAT at 09:00 am   Discharge Plan A Home   Discharge Plan B Home;Home Health   DME Needed Upon Discharge  none   Discharge Plan discussed with: Patient  (Harshil Parra)   Transition of Care Barriers None   Physical Activity   On average, how many days per week do you engage in moderate to strenuous exercise (like a brisk walk)? 6 days   On average, how many minutes do you engage in exercise at this level? 40 min   Financial Resource Strain   How hard is it for you to pay for the very basics like food, housing, medical care, and heating? Pt Declined   Housing Stability   In the last 12 months, was there a time when you were not able to pay the mortgage or rent on time? N   At any time in the past 12 months, were you homeless or living in a shelter (including now)? N   Transportation Needs   Has the lack of transportation kept you from medical appointments, meetings,  work or from getting things needed for daily living? No   Food Insecurity   Within the past 12 months, you worried that your food would run out before you got the money to buy more. Never true   Within the past 12 months, the food you bought just didn't last and you didn't have money to get more. Never true   Stress   Do you feel stress - tense, restless, nervous, or anxious, or unable to sleep at night because your mind is troubled all the time - these days? Pt Declined   Social Isolation   How often do you feel lonely or isolated from those around you?  Patient declined   Alcohol Use   Q1: How often do you have a drink containing alcohol? Pt Declined   Q2: How many drinks containing alcohol do you have on a typical day when you are drinking? Pt Declined   Q3: How often do you have six or more drinks on one occasion? Pt Declined   Utilities   In the past 12 months has the electric, gas, oil, or water company threatened to shut off services in your home? Pt Declined   Health Literacy   How often do you need to have someone help you when you read instructions, pamphlets, or other written material from your doctor or pharmacy? Patient declines to respond     Ashely Jose RN    [phone]

## 2024-06-03 NOTE — PROGRESS NOTES
06/03/24 1125   Vital Signs   Temp 97.3 °F (36.3 °C)   Temp Source Temporal   Pulse 72   Heart Rate Source Monitor   Resp 16   /61   BP Location Right arm   BP Method Automatic   Patient Position Lying   Post-Hemodialysis Assessment   Rinseback Volume (mL) 250 mL   Blood Volume Processed (Liters) 70.8 L   Dialyzer Clearance Lightly streaked   Duration of Treatment 180 minutes   Total UF (mL) 2500 mL   Net Fluid Removal 2000   Patient Response to Treatment tolerated well   Post-Hemodialysis Comments needles pulled x2 pressure held till hemostasis achieved no bleeding noted at sites new dressing applied to each site

## 2024-06-04 LAB
OHS QRS DURATION: 90 MS
OHS QRS DURATION: 94 MS
OHS QTC CALCULATION: 467 MS
OHS QTC CALCULATION: 478 MS

## 2024-06-07 NOTE — DISCHARGE SUMMARY
"[facility] Discharge Summary    Attending Physician: Sadaf    Date of Admit: 6/1/2024  Date of Discharge: 6/3/2024    Discharge to: Home  Condition: Stable    Discharge Diagnoses     ESRD on HD  Missed HD  CAD  HTN  HLD  DM    Consultants and Procedures     Consultants:  Nephrology    Procedures:   None    Brief History of Present Illness      Patient is a 84 y.o. year old male with history of ESRD, DM 2 who presented to ED today with shortness breath Starting over the past hour.  Missed dialysis today.  Patient was recently seen in ED 3 days ago for the same presentation as he was not able to pay for transportation to dialysis, tonight he did not have money to pay for transportation again.  Patient also has been complaints of nausea and vomiting which started an hour prior to arrival.  Labs consistent with ESRD patient is a TTS.  No need for emergent dialysis we will consult Nephrology have HD in a.m..  Patient be admitted to Hospital Medicine.  Social work will be consulted for discharge planning and assistance with transportation    For the full HPI please refer to the History & Physical from this admission.    Hospital Course By Problem with Pertinent Findings      ESRD on dialysis  Missed 2 sessions  HD performed with improvement in symptoms   Discharge home, resume HD schedule     Discharge planning issues  Social work consulted for discharge planning  Patient without phone and transportation issues to dialysis       DM2 (diabetes mellitus, type 2)  Patient's FSGs are controlled on current medication regimen.  Last A1c reviewed-         Lab Results   Component Value Date     HGBA1C 5.6 04/01/2024        HTN (hypertension)  Chronic, controlled    Discharge Time: Greater than 30 minutes?    BP (!) 119/59 (BP Location: Right arm, Patient Position: Lying)   Pulse 88   Temp 97.5 °F (36.4 °C) (Oral)   Resp 18   Ht 5' 8" (1.727 m)   Wt 69.9 kg (154 lb 1.6 oz)   SpO2 96%   BMI 23.43 kg/m² "       Discharge Medications        Medication List        START taking these medications      atorvastatin 80 MG tablet  Commonly known as: LIPITOR  Take 1 tablet (80 mg total) by mouth every evening.     sevelamer carbonate 800 mg Tab  Commonly known as: RENVELA  Take 1 tablet (800 mg total) by mouth 3 (three) times daily with meals.            CHANGE how you take these medications      levothyroxine 50 MCG tablet  Commonly known as: SYNTHROID  Take 1 tablet (50 mcg total) by mouth before breakfast.  What changed:   medication strength  how much to take  when to take this            CONTINUE taking these medications      acarbose 50 MG Tab  Commonly known as: PRECOSE     aspirin 81 MG Chew  Take 1 tablet (81 mg total) by mouth every evening.     ciclopirox 8 % Soln  Commonly known as: PENLAC  Apply topically nightly.     clopidogreL 75 mg tablet  Commonly known as: PLAVIX  Take 1 tablet (75 mg total) by mouth once daily.     finasteride 5 mg tablet  Commonly known as: PROSCAR     isosorbide mononitrate 30 MG 24 hr tablet  Commonly known as: IMDUR  Take 1 tablet (30 mg total) by mouth every evening.     LIDOcaine 5 %  Commonly known as: LIDODERM  Place 1 patch onto the skin once daily. Remove & Discard patch within 12 hours or as directed by MD     ondansetron 4 MG Tbdl  Commonly known as: ZOFRAN-ODT  Take 1 tablet (4 mg total) by mouth every 8 (eight) hours as needed (nausea).     pantoprazole 40 MG tablet  Commonly known as: PROTONIX  Take 1 tablet (40 mg total) by mouth once daily.            STOP taking these medications      glipiZIDE 10 MG Tr24  Commonly known as: GLUCOTROL     HYDROcodone-acetaminophen 5-325 mg per tablet  Commonly known as: NORCO     losartan 50 MG tablet  Commonly known as: COZAAR     sodium polystyrene sulfonate 15 gram/60 mL Susp 0.25 g/mL oral liquid  Commonly known as: Kayexalate     traMADoL 50 mg tablet  Commonly known as: ULTRAM               Where to Get Your Medications         These medications were sent to [id number] - [place] - [id number]  [id number], [place] [id number]      Phone: [phone]   aspirin 81 MG Chew  atorvastatin 80 MG tablet  clopidogreL 75 mg tablet  isosorbide mononitrate 30 MG 24 hr tablet  levothyroxine 50 MCG tablet  pantoprazole 40 MG tablet  sevelamer carbonate 800 mg Tab         Discharge Information:   Diet:  Renal    Physical Activity:  As tolerated    Instructions:  1. Take all medications as prescribed  2. Keep all follow-up appointments  3. Return to the hospital or call your primary care physicians if any worsening symptoms such as weakness, SOB, chest pain, increased edema or other concerns occur.      Follow-Up Appointments:  PCP in 1 week  HD as previously scheduled      Follow up items for PCP and tests that have not resulted at time of discharge:   None      Malini Talavera MD  [facility] Medicine

## 2024-06-11 ENCOUNTER — HOSPITAL ENCOUNTER (OUTPATIENT)
Facility: HOSPITAL | Age: 84
Discharge: HOME | End: 2024-06-12
Attending: EMERGENCY MEDICINE | Admitting: FAMILY MEDICINE
Payer: MEDICARE

## 2024-06-11 DIAGNOSIS — R07.9 CHEST PAIN: ICD-10-CM

## 2024-06-11 DIAGNOSIS — N18.6 ESRD (END STAGE RENAL DISEASE) ON DIALYSIS: ICD-10-CM

## 2024-06-11 DIAGNOSIS — Z99.2 ESRD ON DIALYSIS: Primary | ICD-10-CM

## 2024-06-11 DIAGNOSIS — N18.6 ESRD ON DIALYSIS: Primary | ICD-10-CM

## 2024-06-11 DIAGNOSIS — Z99.2 ESRD (END STAGE RENAL DISEASE) ON DIALYSIS: ICD-10-CM

## 2024-06-11 LAB
ALBUMIN SERPL BCP-MCNC: 3.1 G/DL (ref 3.5–5.2)
ALP SERPL-CCNC: 43 U/L (ref 55–135)
ALT SERPL W/O P-5'-P-CCNC: 10 U/L (ref 10–44)
ANION GAP SERPL CALC-SCNC: 15 MMOL/L (ref 8–16)
AST SERPL-CCNC: 10 U/L (ref 10–40)
BASOPHILS # BLD AUTO: 0.02 K/UL (ref 0–0.2)
BASOPHILS NFR BLD: 0.4 % (ref 0–1.9)
BILIRUB SERPL-MCNC: 0.5 MG/DL (ref 0.1–1)
BNP SERPL-MCNC: 1110 PG/ML (ref 0–99)
BUN SERPL-MCNC: 49 MG/DL (ref 8–23)
CALCIUM SERPL-MCNC: 9 MG/DL (ref 8.7–10.5)
CHLORIDE SERPL-SCNC: 103 MMOL/L (ref 95–110)
CO2 SERPL-SCNC: 22 MMOL/L (ref 23–29)
CREAT SERPL-MCNC: 13.3 MG/DL (ref 0.5–1.4)
DIFFERENTIAL METHOD BLD: ABNORMAL
EOSINOPHIL # BLD AUTO: 0.3 K/UL (ref 0–0.5)
EOSINOPHIL NFR BLD: 4.8 % (ref 0–8)
ERYTHROCYTE [DISTWIDTH] IN BLOOD BY AUTOMATED COUNT: 17.6 % (ref 11.5–14.5)
EST. GFR  (NO RACE VARIABLE): 3 ML/MIN/1.73 M^2
GLUCOSE SERPL-MCNC: 138 MG/DL (ref 70–110)
HCT VFR BLD AUTO: 25.8 % (ref 40–54)
HGB BLD-MCNC: 8.2 G/DL (ref 14–18)
IMM GRANULOCYTES # BLD AUTO: 0.01 K/UL (ref 0–0.04)
IMM GRANULOCYTES NFR BLD AUTO: 0.2 % (ref 0–0.5)
LYMPHOCYTES # BLD AUTO: 0.7 K/UL (ref 1–4.8)
LYMPHOCYTES NFR BLD: 13 % (ref 18–48)
MAGNESIUM SERPL-MCNC: 1.9 MG/DL (ref 1.6–2.6)
MCH RBC QN AUTO: 30.5 PG (ref 27–31)
MCHC RBC AUTO-ENTMCNC: 31.8 G/DL (ref 32–36)
MCV RBC AUTO: 96 FL (ref 82–98)
MONOCYTES # BLD AUTO: 0.5 K/UL (ref 0.3–1)
MONOCYTES NFR BLD: 8.3 % (ref 4–15)
NEUTROPHILS # BLD AUTO: 4 K/UL (ref 1.8–7.7)
NEUTROPHILS NFR BLD: 73.3 % (ref 38–73)
NRBC BLD-RTO: 0 /100 WBC
PLATELET # BLD AUTO: 166 K/UL (ref 150–450)
PMV BLD AUTO: 9.8 FL (ref 9.2–12.9)
POTASSIUM SERPL-SCNC: 4.4 MMOL/L (ref 3.5–5.1)
PROT SERPL-MCNC: 6.2 G/DL (ref 6–8.4)
RBC # BLD AUTO: 2.69 M/UL (ref 4.6–6.2)
SODIUM SERPL-SCNC: 140 MMOL/L (ref 136–145)
TROPONIN I SERPL DL<=0.01 NG/ML-MCNC: 0.07 NG/ML (ref 0–0.03)
WBC # BLD AUTO: 5.45 K/UL (ref 3.9–12.7)

## 2024-06-11 PROCEDURE — 99285 EMERGENCY DEPT VISIT HI MDM: CPT | Mod: 25

## 2024-06-11 PROCEDURE — 80053 COMPREHEN METABOLIC PANEL: CPT | Performed by: EMERGENCY MEDICINE

## 2024-06-11 PROCEDURE — 83880 ASSAY OF NATRIURETIC PEPTIDE: CPT | Performed by: EMERGENCY MEDICINE

## 2024-06-11 PROCEDURE — 93010 ELECTROCARDIOGRAM REPORT: CPT | Mod: ,,, | Performed by: INTERNAL MEDICINE

## 2024-06-11 PROCEDURE — 85025 COMPLETE CBC W/AUTO DIFF WBC: CPT | Performed by: EMERGENCY MEDICINE

## 2024-06-11 PROCEDURE — 84484 ASSAY OF TROPONIN QUANT: CPT | Performed by: EMERGENCY MEDICINE

## 2024-06-11 PROCEDURE — 93005 ELECTROCARDIOGRAM TRACING: CPT

## 2024-06-11 PROCEDURE — 83735 ASSAY OF MAGNESIUM: CPT | Performed by: EMERGENCY MEDICINE

## 2024-06-12 VITALS
HEIGHT: 68 IN | RESPIRATION RATE: 18 BRPM | OXYGEN SATURATION: 96 % | TEMPERATURE: 98 F | BODY MASS INDEX: 21.22 KG/M2 | WEIGHT: 140 LBS | HEART RATE: 76 BPM | DIASTOLIC BLOOD PRESSURE: 58 MMHG | SYSTOLIC BLOOD PRESSURE: 132 MMHG

## 2024-06-12 PROBLEM — R06.02 SHORTNESS OF BREATH: Status: ACTIVE | Noted: 2024-06-12

## 2024-06-12 LAB
ANION GAP SERPL CALC-SCNC: 16 MMOL/L (ref 8–16)
BASOPHILS # BLD AUTO: 0.03 K/UL (ref 0–0.2)
BASOPHILS NFR BLD: 0.5 % (ref 0–1.9)
BUN SERPL-MCNC: 52 MG/DL (ref 8–23)
CALCIUM SERPL-MCNC: 9.1 MG/DL (ref 8.7–10.5)
CHLORIDE SERPL-SCNC: 103 MMOL/L (ref 95–110)
CO2 SERPL-SCNC: 22 MMOL/L (ref 23–29)
CREAT SERPL-MCNC: 13.7 MG/DL (ref 0.5–1.4)
DIFFERENTIAL METHOD BLD: ABNORMAL
EOSINOPHIL # BLD AUTO: 0.2 K/UL (ref 0–0.5)
EOSINOPHIL NFR BLD: 4.3 % (ref 0–8)
ERYTHROCYTE [DISTWIDTH] IN BLOOD BY AUTOMATED COUNT: 17.4 % (ref 11.5–14.5)
EST. GFR  (NO RACE VARIABLE): 3 ML/MIN/1.73 M^2
GLUCOSE SERPL-MCNC: 83 MG/DL (ref 70–110)
HCT VFR BLD AUTO: 22.8 % (ref 40–54)
HGB BLD-MCNC: 7.1 G/DL (ref 14–18)
IMM GRANULOCYTES # BLD AUTO: 0.02 K/UL (ref 0–0.04)
IMM GRANULOCYTES NFR BLD AUTO: 0.4 % (ref 0–0.5)
LYMPHOCYTES # BLD AUTO: 1.1 K/UL (ref 1–4.8)
LYMPHOCYTES NFR BLD: 20.4 % (ref 18–48)
MAGNESIUM SERPL-MCNC: 1.9 MG/DL (ref 1.6–2.6)
MCH RBC QN AUTO: 29.7 PG (ref 27–31)
MCHC RBC AUTO-ENTMCNC: 31.1 G/DL (ref 32–36)
MCV RBC AUTO: 95 FL (ref 82–98)
MONOCYTES # BLD AUTO: 0.4 K/UL (ref 0.3–1)
MONOCYTES NFR BLD: 7.3 % (ref 4–15)
NEUTROPHILS # BLD AUTO: 3.7 K/UL (ref 1.8–7.7)
NEUTROPHILS NFR BLD: 67.1 % (ref 38–73)
NRBC BLD-RTO: 0 /100 WBC
OHS QRS DURATION: 76 MS
OHS QTC CALCULATION: 442 MS
PHOSPHATE SERPL-MCNC: 6.4 MG/DL (ref 2.7–4.5)
PLATELET # BLD AUTO: 199 K/UL (ref 150–450)
PMV BLD AUTO: 10 FL (ref 9.2–12.9)
POCT GLUCOSE: 106 MG/DL (ref 70–110)
POCT GLUCOSE: 133 MG/DL (ref 70–110)
POCT GLUCOSE: 79 MG/DL (ref 70–110)
POCT GLUCOSE: 97 MG/DL (ref 70–110)
POTASSIUM SERPL-SCNC: 4.7 MMOL/L (ref 3.5–5.1)
RBC # BLD AUTO: 2.39 M/UL (ref 4.6–6.2)
SODIUM SERPL-SCNC: 141 MMOL/L (ref 136–145)
WBC # BLD AUTO: 5.58 K/UL (ref 3.9–12.7)

## 2024-06-12 PROCEDURE — 83735 ASSAY OF MAGNESIUM: CPT | Performed by: REGISTERED NURSE

## 2024-06-12 PROCEDURE — 84100 ASSAY OF PHOSPHORUS: CPT | Performed by: REGISTERED NURSE

## 2024-06-12 PROCEDURE — G0378 HOSPITAL OBSERVATION PER HR: HCPCS

## 2024-06-12 PROCEDURE — 80048 BASIC METABOLIC PNL TOTAL CA: CPT | Performed by: REGISTERED NURSE

## 2024-06-12 PROCEDURE — 82962 GLUCOSE BLOOD TEST: CPT

## 2024-06-12 PROCEDURE — 85025 COMPLETE CBC W/AUTO DIFF WBC: CPT | Performed by: REGISTERED NURSE

## 2024-06-12 PROCEDURE — 25000003 PHARM REV CODE 250: Performed by: REGISTERED NURSE

## 2024-06-12 PROCEDURE — 96372 THER/PROPH/DIAG INJ SC/IM: CPT | Performed by: REGISTERED NURSE

## 2024-06-12 PROCEDURE — 63600175 PHARM REV CODE 636 W HCPCS: Performed by: REGISTERED NURSE

## 2024-06-12 PROCEDURE — G0257 UNSCHED DIALYSIS ESRD PT HOS: HCPCS

## 2024-06-12 PROCEDURE — 80100014 HC HEMODIALYSIS 1:1

## 2024-06-12 RX ORDER — HEPARIN SODIUM 5000 [USP'U]/ML
5000 INJECTION, SOLUTION INTRAVENOUS; SUBCUTANEOUS EVERY 8 HOURS
Status: DISCONTINUED | OUTPATIENT
Start: 2024-06-12 | End: 2024-06-12 | Stop reason: HOSPADM

## 2024-06-12 RX ORDER — NAPROXEN SODIUM 220 MG/1
81 TABLET, FILM COATED ORAL NIGHTLY
Status: DISCONTINUED | OUTPATIENT
Start: 2024-06-12 | End: 2024-06-12 | Stop reason: HOSPADM

## 2024-06-12 RX ORDER — SODIUM CHLORIDE 9 MG/ML
INJECTION, SOLUTION INTRAVENOUS
Status: DISCONTINUED | OUTPATIENT
Start: 2024-06-12 | End: 2024-06-12 | Stop reason: HOSPADM

## 2024-06-12 RX ORDER — GLUCAGON 1 MG
1 KIT INJECTION
Status: DISCONTINUED | OUTPATIENT
Start: 2024-06-12 | End: 2024-06-12 | Stop reason: HOSPADM

## 2024-06-12 RX ORDER — CLOPIDOGREL BISULFATE 75 MG/1
75 TABLET ORAL DAILY
Status: DISCONTINUED | OUTPATIENT
Start: 2024-06-12 | End: 2024-06-12 | Stop reason: HOSPADM

## 2024-06-12 RX ORDER — PANTOPRAZOLE SODIUM 20 MG/1
20 TABLET, DELAYED RELEASE ORAL 2 TIMES DAILY PRN
COMMUNITY
Start: 2024-04-18

## 2024-06-12 RX ORDER — IBUPROFEN 200 MG
16 TABLET ORAL
Status: DISCONTINUED | OUTPATIENT
Start: 2024-06-12 | End: 2024-06-12 | Stop reason: HOSPADM

## 2024-06-12 RX ORDER — LACTOSE-REDUCED FOOD
240 LIQUID (ML) ORAL 2 TIMES DAILY
COMMUNITY
Start: 2024-04-16

## 2024-06-12 RX ORDER — SEVELAMER CARBONATE 800 MG/1
800 TABLET, FILM COATED ORAL
Status: DISCONTINUED | OUTPATIENT
Start: 2024-06-12 | End: 2024-06-12 | Stop reason: HOSPADM

## 2024-06-12 RX ORDER — MUPIROCIN 20 MG/G
OINTMENT TOPICAL 2 TIMES DAILY
Status: DISCONTINUED | OUTPATIENT
Start: 2024-06-12 | End: 2024-06-12 | Stop reason: HOSPADM

## 2024-06-12 RX ORDER — ONDANSETRON HYDROCHLORIDE 2 MG/ML
4 INJECTION, SOLUTION INTRAVENOUS EVERY 8 HOURS PRN
Status: DISCONTINUED | OUTPATIENT
Start: 2024-06-12 | End: 2024-06-12 | Stop reason: HOSPADM

## 2024-06-12 RX ORDER — ATORVASTATIN CALCIUM 40 MG/1
80 TABLET, FILM COATED ORAL NIGHTLY
Status: DISCONTINUED | OUTPATIENT
Start: 2024-06-12 | End: 2024-06-12 | Stop reason: HOSPADM

## 2024-06-12 RX ORDER — ISOSORBIDE MONONITRATE 30 MG/1
30 TABLET, EXTENDED RELEASE ORAL NIGHTLY
Status: DISCONTINUED | OUTPATIENT
Start: 2024-06-12 | End: 2024-06-12 | Stop reason: HOSPADM

## 2024-06-12 RX ORDER — IBUPROFEN 200 MG
24 TABLET ORAL
Status: DISCONTINUED | OUTPATIENT
Start: 2024-06-12 | End: 2024-06-12 | Stop reason: HOSPADM

## 2024-06-12 RX ORDER — PANTOPRAZOLE SODIUM 40 MG/1
40 TABLET, DELAYED RELEASE ORAL DAILY
Status: DISCONTINUED | OUTPATIENT
Start: 2024-06-12 | End: 2024-06-12 | Stop reason: HOSPADM

## 2024-06-12 RX ORDER — INSULIN ASPART 100 [IU]/ML
0-5 INJECTION, SOLUTION INTRAVENOUS; SUBCUTANEOUS
Status: DISCONTINUED | OUTPATIENT
Start: 2024-06-12 | End: 2024-06-12 | Stop reason: HOSPADM

## 2024-06-12 RX ORDER — FINASTERIDE 5 MG/1
5 TABLET, FILM COATED ORAL NIGHTLY
Status: DISCONTINUED | OUTPATIENT
Start: 2024-06-12 | End: 2024-06-12 | Stop reason: HOSPADM

## 2024-06-12 RX ORDER — LEVOTHYROXINE SODIUM 50 UG/1
50 TABLET ORAL
Status: DISCONTINUED | OUTPATIENT
Start: 2024-06-12 | End: 2024-06-12 | Stop reason: HOSPADM

## 2024-06-12 RX ORDER — LIDOCAINE 50 MG/G
1 PATCH TOPICAL DAILY PRN
COMMUNITY
Start: 2024-06-03

## 2024-06-12 RX ORDER — SITAGLIPTIN 25 MG/1
25 TABLET ORAL DAILY
COMMUNITY
Start: 2024-06-03

## 2024-06-12 RX ORDER — CARVEDILOL 12.5 MG/1
6.25 TABLET ORAL 2 TIMES DAILY
COMMUNITY
Start: 2023-12-07

## 2024-06-12 RX ORDER — SODIUM CHLORIDE 0.9 % (FLUSH) 0.9 %
10 SYRINGE (ML) INJECTION EVERY 12 HOURS PRN
Status: DISCONTINUED | OUTPATIENT
Start: 2024-06-12 | End: 2024-06-12 | Stop reason: HOSPADM

## 2024-06-12 RX ORDER — NALOXONE HCL 0.4 MG/ML
0.02 VIAL (ML) INJECTION
Status: DISCONTINUED | OUTPATIENT
Start: 2024-06-12 | End: 2024-06-12 | Stop reason: HOSPADM

## 2024-06-12 RX ORDER — SODIUM CHLORIDE 9 MG/ML
INJECTION, SOLUTION INTRAVENOUS ONCE
Status: DISCONTINUED | OUTPATIENT
Start: 2024-06-12 | End: 2024-06-12 | Stop reason: HOSPADM

## 2024-06-12 RX ADMIN — ATORVASTATIN CALCIUM 80 MG: 40 TABLET, FILM COATED ORAL at 02:06

## 2024-06-12 RX ADMIN — LEVOTHYROXINE SODIUM 50 MCG: 50 TABLET ORAL at 06:06

## 2024-06-12 RX ADMIN — SEVELAMER CARBONATE 800 MG: 800 TABLET, FILM COATED ORAL at 08:06

## 2024-06-12 RX ADMIN — CLOPIDOGREL BISULFATE 75 MG: 75 TABLET ORAL at 08:06

## 2024-06-12 RX ADMIN — SEVELAMER CARBONATE 800 MG: 800 TABLET, FILM COATED ORAL at 04:06

## 2024-06-12 RX ADMIN — ISOSORBIDE MONONITRATE 30 MG: 30 TABLET, EXTENDED RELEASE ORAL at 02:06

## 2024-06-12 RX ADMIN — FINASTERIDE 5 MG: 5 TABLET, FILM COATED ORAL at 02:06

## 2024-06-12 RX ADMIN — ASPIRIN 81 MG: 81 TABLET, CHEWABLE ORAL at 02:06

## 2024-06-12 RX ADMIN — PANTOPRAZOLE SODIUM 40 MG: 40 TABLET, DELAYED RELEASE ORAL at 08:06

## 2024-06-12 RX ADMIN — HEPARIN SODIUM 5000 UNITS: 5000 INJECTION INTRAVENOUS; SUBCUTANEOUS at 06:06

## 2024-06-12 NOTE — ASSESSMENT & PLAN NOTE
"Patient's FSGs are controlled on current medication regimen.  Last A1c reviewed-   Lab Results   Component Value Date    HGBA1C 5.6 04/01/2024     Most recent fingerstick glucose reviewed- No results for input(s): "POCTGLUCOSE" in the last 24 hours.  Current correctional scale  Low  Maintain anti-hyperglycemic dose as follows-   Antihyperglycemics (From admission, onward)      None          Hold Oral hypoglycemics while patient is in the hospital.  "

## 2024-06-12 NOTE — H&P
[facility] Emergency Dept  [facility] Medicine  History & Physical    Patient Name: Jevon Rajan  MRN: [id number]  Patient Class: OP- Observation  Admission Date: 6/11/2024  Attending Physician: Shannan Herman   Primary Care Provider: Melia, Primary Doctor         Patient information was obtained from patient and ER records.     Subjective:     Principal Problem:<principal problem not specified>    Chief Complaint:   Chief Complaint   Patient presents with    Nausea    Shortness of Breath    Vomiting     Pt arrived via [facility] from home with c/o N/V and SOB with a missed dialysis appt today. Pt given 4 mg zofran IV, 18 ga right forearm         HPI: Patient is an 84-year-old male with a history of ESRD on HD, CAD, DM 2 who presented to ED today with shortness a breath.  Patient is scheduled for HD on Tuesday Thursday Saturday which his last dose was Saturday.  Patient reports today he was waiting on the bus for transportation which did not show up.  He reports sudden shortness of breath at home which he activated EMS.  Vitals stable on admit.  Labs consistent with chronic anemia and ESRD.  BNP elevated at 1110, troponin chronically elevated, today at 0.073.  Patient denies any chest pain.  EKG without any acute changes.  Chest x-ray without any consolidation or infiltrates.  SpO2 99% on room air.  Patient will be admitted to Hospital Medicine will consult Nephrology for HD management.    Past Medical History:   Diagnosis Date    BPH (benign prostatic hyperplasia)     Coronary artery disease     Diabetes mellitus, type 2     ESRD (end stage renal disease) on dialysis     ESRD on HD TTS via left brachial AVF    Hypertension     Hypothyroidism, unspecified     Symptomatic anemia 01/15/2024       Past Surgical History:   Procedure Laterality Date    bilateral foot surgery      ESOPHAGOGASTRODUODENOSCOPY N/A 2/27/2024    Procedure: EGD (ESOPHAGOGASTRODUODENOSCOPY);  Surgeon: Gemma Almendarez MD;  Location: [facility]  ENDO;  Service: Endoscopy;  Laterality: N/A;    eyelid surgery      FISTULOGRAM Left 11/27/2019    Procedure: Fistulogram;  Surgeon: MELANY Brenner III, MD;  Location: [facility] OR [address] [place] [address];  Service: Peripheral Vascular;  Laterality: Left;    FISTULOGRAM Left 11/27/2019    Procedure: Fistulogram, AVG declot, possible permacath;  Surgeon: MELANY Brenner III, MD;  Location: [facility] CATH LAB;  Service: Peripheral Vascular;  Laterality: Left;    INSERTION OF DIALYSIS CATHETER      [facility]  12/5/13    right hand surgery      stents         Review of patient's allergies indicates:   Allergen Reactions    Ace inhibitors Itching     Is not sure which medication it was    Captopril        No current facility-administered medications on file prior to encounter.     Current Outpatient Medications on File Prior to Encounter   Medication Sig    acarbose (PRECOSE) 50 MG Tab Take 100 mg by mouth 3 (three) times daily with meals.     aspirin 81 MG Chew Take 1 tablet (81 mg total) by mouth every evening.    atorvastatin (LIPITOR) 80 MG tablet Take 1 tablet (80 mg total) by mouth every evening.    ciclopirox (PENLAC) 8 % Soln Apply topically nightly.    clopidogreL (PLAVIX) 75 mg tablet Take 1 tablet (75 mg total) by mouth once daily.    finasteride (PROSCAR) 5 mg tablet Take 5 mg by mouth every evening.     isosorbide mononitrate (IMDUR) 30 MG 24 hr tablet Take 1 tablet (30 mg total) by mouth every evening.    levothyroxine (SYNTHROID) 50 MCG tablet Take 1 tablet (50 mcg total) by mouth before breakfast.    lidocaine (LIDODERM) 5 % Place 1 patch onto the skin once daily. Remove & Discard patch within 12 hours or as directed by MD    ondansetron (ZOFRAN-ODT) 4 MG TbDL Take 1 tablet (4 mg total) by mouth every 8 (eight) hours as needed (nausea).    pantoprazole (PROTONIX) 40 MG tablet Take 1 tablet (40 mg total) by mouth once daily.    sevelamer carbonate (RENVELA) 800 mg Tab Take 1 tablet (800 mg total) by mouth 3 (three) times daily with  meals.     Family History    None       Tobacco Use    Smoking status: Never    Smokeless tobacco: Never   Substance and Sexual Activity    Alcohol use: No    Drug use: No    Sexual activity: Not Currently     Review of Systems   All other systems reviewed and are negative.    Objective:     Vital Signs (Most Recent):  Temp: 98.1 °F (36.7 °C) (06/11/24 2150)  Pulse: 75 (06/12/24 0305)  Resp: 11 (06/12/24 0305)  BP: 136/65 (06/12/24 0305)  SpO2: 100 % (06/12/24 0305) Vital Signs (24h Range):  Temp:  [98.1 °F (36.7 °C)] 98.1 °F (36.7 °C)  Pulse:  [69-81] 75  Resp:  [10-20] 11  SpO2:  [95 %-100 %] 100 %  BP: (109-136)/(56-68) 136/65     Weight: 63.5 kg (140 lb)  Body mass index is 21.29 kg/m².     Physical Exam  Constitutional:       Appearance: Normal appearance. He is ill-appearing (Chronic).   HENT:      Head: Normocephalic.      Mouth/Throat:      Mouth: Mucous membranes are dry.   Eyes:      Pupils: Pupils are equal, round, and reactive to light.   Cardiovascular:      Rate and Rhythm: Normal rate and regular rhythm.   Pulmonary:      Effort: Pulmonary effort is normal.   Abdominal:      General: Bowel sounds are normal.      Palpations: Abdomen is soft.   Musculoskeletal:         General: Normal range of motion.        Arms:       Cervical back: Normal range of motion.   Skin:     General: Skin is warm and dry.      Capillary Refill: Capillary refill takes less than 2 seconds.   Neurological:      General: No focal deficit present.      Mental Status: He is alert and oriented to person, place, and time. Mental status is at baseline.   Psychiatric:         Mood and Affect: Mood normal.         Behavior: Behavior normal.              CRANIAL NERVES     CN III, IV, VI   Pupils are equal, round, and reactive to light.       Significant Labs: All pertinent labs within the past 24 hours have been reviewed.  Recent Lab Results         06/11/24  2239        Albumin 3.1       ALP 43       ALT 10       Anion Gap 15        AST 10       Baso # 0.02       Basophil % 0.4       BILIRUBIN TOTAL 0.5  Comment: For infants and newborns, interpretation of results should be based  on gestational age, weight and in agreement with clinical  observations.    Premature Infant recommended reference ranges:  Up to 24 hours.............<8.0 mg/dL  Up to 48 hours............<12.0 mg/dL  3-5 days..................<15.0 mg/dL  6-29 days.................<15.0 mg/dL         BNP 1,110  Comment: Values of less than 100 pg/ml are consistent with non-CHF populations.       BUN 49       Calcium 9.0       Chloride 103       CO2 22       Creatinine 13.3       Differential Method Automated       eGFR 3       Eos # 0.3       Eos % 4.8       Glucose 138       Gran # (ANC) 4.0       Gran % 73.3       Hematocrit 25.8       Hemoglobin 8.2       Immature Grans (Abs) 0.01  Comment: Mild elevation in immature granulocytes is non specific and   can be seen in a variety of conditions including stress response,   acute inflammation, trauma and pregnancy. Correlation with other   laboratory and clinical findings is essential.         Immature Granulocytes 0.2       Lymph # 0.7       Lymph % 13.0       Magnesium  1.9       MCH 30.5       MCHC 31.8       MCV 96       Mono # 0.5       Mono % 8.3       MPV 9.8       nRBC 0       Platelet Count 166       Potassium 4.4       PROTEIN TOTAL 6.2       RBC 2.69       RDW 17.6       Sodium 140       Troponin I 0.073  Comment: The reference interval for Troponin I represents the 99th percentile   cutoff   for our facility and is consistent with 3rd generation assay   performance.         WBC 5.45               Significant Imaging: I have reviewed all pertinent imaging results/findings within the past 24 hours.  I have reviewed and interpreted all pertinent imaging results/findings within the past 24 hours.  Assessment/Plan:     Shortness of breath  Shortness of breath prior to arrival, resolved since being in ED  No interventions  No  "chest pain  BNP elevated troponin slightly bumped which has been chronic  Chest x-ray without acute abnormality  SpO2 99% on room air        DM2 (diabetes mellitus, type 2)  Patient's FSGs are controlled on current medication regimen.  Last A1c reviewed-   Lab Results   Component Value Date    HGBA1C 5.6 04/01/2024     Most recent fingerstick glucose reviewed- No results for input(s): "POCTGLUCOSE" in the last 24 hours.  Current correctional scale  Low  Maintain anti-hyperglycemic dose as follows-   Antihyperglycemics (From admission, onward)      None          Hold Oral hypoglycemics while patient is in the hospital.    Hypothyroidism due to acquired atrophy of thyroid  Continue home Synthroid      ESRD on dialysis  Creatine stable for now. BMP reviewed- noted Estimated Creatinine Clearance: 3.6 mL/min (A) (based on SCr of 13.3 mg/dL (H)). according to latest data. 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  Monitor UOP and serial BMP and adjust therapy as needed. Renally dose meds. Avoid nephrotoxic medications and procedures.  Scheduled TTS  Last HD was Saturday June 8th, missed today on Tuesday  Consult nephrology  Plan for HD in a.m.      VTE Risk Mitigation (From admission, onward)           Ordered     IP VTE HIGH RISK PATIENT  Once         06/12/24 0048     Place sequential compression device  Until discontinued         06/12/24 0048                         On 06/12/2024, patient should be placed in hospital observation services under my care in collaboration with Dr. Hardy.           Zane Rice NP  Department of Hospital Medicine  [place] - Emergency Dept          "

## 2024-06-12 NOTE — ASSESSMENT & PLAN NOTE
Shortness of breath prior to arrival, resolved since being in ED  No interventions  No chest pain  BNP elevated troponin slightly bumped which has been chronic  Chest x-ray without acute abnormality  SpO2 99% on room air

## 2024-06-12 NOTE — ED NOTES
Patient transferred to ED 23. Patient asleep, but easy to arouse. RR even and unlabored. VSS. NADN. Remains hooked up to cardiac and O2 monitoring.

## 2024-06-12 NOTE — PHARMACY MED REC
"  [facility]           Pharmacy  Admission Medication History     The home medication history was taken by Maggy Ferrara.      Medication history obtained from Medications listed below were obtained from: Medical records    Based on information gathered for medication list, you may go to "Admission" then "Reconcile Home Medications" tabs to review and/or act upon those items.     The home medication list has been updated by the Pharmacy department.   Please read ALL comments highlighted in yellow.   Please address this information as you see fit.    Feel free to contact us if you have any questions or require assistance.    The medications listed below were removed from the home medication list.  Please reorder if appropriate:    Patient reports NOT TAKING the following medication(s):  [place] soln  Proscar 5mg      Current Outpatient Medications on File Prior to Encounter   Medication Sig Dispense Refill    aspirin 81 MG Chew Take 1 tablet (81 mg total) by mouth every evening. 90 tablet 0    atorvastatin (LIPITOR) 80 MG tablet Take 1 tablet (80 mg total) by mouth every evening. 30 tablet 0    carvediloL (COREG) 12.5 MG tablet Take 6.25 mg by mouth 2 (two) times daily.      clopidogreL (PLAVIX) 75 mg tablet Take 1 tablet (75 mg total) by mouth once daily. 30 tablet 11    isosorbide mononitrate (IMDUR) 30 MG 24 hr tablet Take 1 tablet (30 mg total) by mouth every evening. 30 tablet 2    levothyroxine (SYNTHROID) 50 MCG tablet Take 1 tablet (50 mcg total) by mouth before breakfast. 30 tablet 0    LIDOcaine (LIDODERM) 5 % Apply 1 patch topically daily as needed (pain).      nut.tx.imp.renal fxn,lac-reduc (NEPRO CARB STEADY) 0.08 gram-1.8 kcal/mL Liqd Take 240 mLs by mouth 2 (two) times a day.      pantoprazole (PROTONIX) 20 MG tablet Take 20 mg by mouth 2 (two) times daily as needed.      sevelamer carbonate (RENVELA) 800 mg Tab Take 1 tablet (800 mg total) by mouth 3 (three) times daily with " meals. 90 tablet 0    SITagliptin (ZITUVIO) 25 mg Tab Take 25 mg by mouth once daily.      ondansetron (ZOFRAN-ODT) 4 MG TbDL Take 1 tablet (4 mg total) by mouth every 8 (eight) hours as needed (nausea). 12 tablet 0       Please address this information as you see fit.  Feel free to contact us if you have any questions or require assistance.    Maggy Ferrara  [phone]                .

## 2024-06-12 NOTE — ASSESSMENT & PLAN NOTE
Creatine stable for now. BMP reviewed- noted Estimated Creatinine Clearance: 3.6 mL/min (A) (based on SCr of 13.3 mg/dL (H)). according to latest data. 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  Monitor UOP and serial BMP and adjust therapy as needed. Renally dose meds. Avoid nephrotoxic medications and procedures.  Scheduled TTS  Last HD was Saturday June 8th, missed today on Tuesday  Consult nephrology  Plan for HD in a.m.

## 2024-06-12 NOTE — HPI
Patient is an 84-year-old male with a history of ESRD on HD, CAD, DM 2 who presented to ED today with shortness a breath.  Patient is scheduled for HD on Tuesday Thursday Saturday which his last dose was Saturday.  Patient reports today he was waiting on the bus for transportation which did not show up.  He reports sudden shortness of breath at home which he activated EMS.  Vitals stable on admit.  Labs consistent with chronic anemia and ESRD.  BNP elevated at 1110, troponin chronically elevated, today at 0.073.  Patient denies any chest pain.  EKG without any acute changes.  Chest x-ray without any consolidation or infiltrates.  SpO2 99% on room air.  Patient will be admitted to Hospital Medicine will consult Nephrology for HD management.

## 2024-06-12 NOTE — CONSULTS
NEPHROLOGY CONSULT NOTE    HPI & INTERVAL HISTORY:    Past Medical History:   Diagnosis Date    BPH (benign prostatic hyperplasia)     Coronary artery disease     Diabetes mellitus, type 2     ESRD (end stage renal disease) on dialysis     ESRD on HD TTS via left brachial AVF    Hypertension     Hypothyroidism, unspecified     Symptomatic anemia 01/15/2024      Past Surgical History:   Procedure Laterality Date    bilateral foot surgery      ESOPHAGOGASTRODUODENOSCOPY N/A 2/27/2024    Procedure: EGD (ESOPHAGOGASTRODUODENOSCOPY);  Surgeon: Gemma Almendarez MD;  Location: [facility] ENDO;  Service: Endoscopy;  Laterality: N/A;    eyelid surgery      FISTULOGRAM Left 11/27/2019    Procedure: Fistulogram;  Surgeon: MELANY Brenner III, MD;  Location: [facility] OR [address] [place] [address];  Service: Peripheral Vascular;  Laterality: Left;    FISTULOGRAM Left 11/27/2019    Procedure: Fistulogram, AVG declot, possible permacath;  Surgeon: MELANY Brenner III, MD;  Location: [facility] CATH LAB;  Service: Peripheral Vascular;  Laterality: Left;    INSERTION OF DIALYSIS CATHETER      [facility]  12/5/13    right hand surgery      stents        Review of patient's allergies indicates:   Allergen Reactions    Ace inhibitors Itching     Is not sure which medication it was    Captopril       Medications Prior to Admission   Medication Sig Dispense Refill Last Dose    acarbose (PRECOSE) 50 MG Tab Take 100 mg by mouth 3 (three) times daily with meals.        aspirin 81 MG Chew Take 1 tablet (81 mg total) by mouth every evening. 90 tablet 0     atorvastatin (LIPITOR) 80 MG tablet Take 1 tablet (80 mg total) by mouth every evening. 30 tablet 0     ciclopirox (PENLAC) 8 % Soln Apply topically nightly. 1 Bottle 3     clopidogreL (PLAVIX) 75 mg tablet Take 1 tablet (75 mg total) by mouth once daily. 30 tablet 11     finasteride (PROSCAR) 5 mg tablet Take 5 mg by mouth every evening.        isosorbide mononitrate (IMDUR) 30 MG 24 hr tablet Take 1 tablet (30 mg total) by  mouth every evening. 30 tablet 2     levothyroxine (SYNTHROID) 50 MCG tablet Take 1 tablet (50 mcg total) by mouth before breakfast. 30 tablet 0     lidocaine (LIDODERM) 5 % Place 1 patch onto the skin once daily. Remove & Discard patch within 12 hours or as directed by MD 30 patch 0     ondansetron (ZOFRAN-ODT) 4 MG TbDL Take 1 tablet (4 mg total) by mouth every 8 (eight) hours as needed (nausea). 12 tablet 0     pantoprazole (PROTONIX) 40 MG tablet Take 1 tablet (40 mg total) by mouth once daily. 90 tablet 0     sevelamer carbonate (RENVELA) 800 mg Tab Take 1 tablet (800 mg total) by mouth 3 (three) times daily with meals. 90 tablet 0        Social History     Socioeconomic History    Marital status: Single   Tobacco Use    Smoking status: Never    Smokeless tobacco: Never   Substance and Sexual Activity    Alcohol use: No    Drug use: No    Sexual activity: Not Currently     Social Determinants of Health     Financial Resource Strain: Patient Declined (6/3/2024)    Overall Financial Resource Strain (CARDIA)     Difficulty of Paying Living Expenses: Patient declined   Food Insecurity: No Food Insecurity (6/3/2024)    Hunger Vital Sign     Worried About Running Out of Food in the Last Year: Never true     Ran Out of Food in the Last Year: Never true   Transportation Needs: No Transportation Needs (6/3/2024)    TRANSPORTATION NEEDS     Transportation : No   Physical Activity: Sufficiently Active (6/3/2024)    Exercise Vital Sign     Days of Exercise per Week: 6 days     Minutes of Exercise per Session: 40 min   Recent Concern: Physical Activity - Insufficiently Active (5/29/2024)    Exercise Vital Sign     Days of Exercise per Week: 1 day     Minutes of Exercise per Session: 20 min   Stress: Patient Declined (6/3/2024)    [employer] [place] [employer] - Occupational Stress Questionnaire     Feeling of Stress : Patient declined   Housing Stability: Low Risk  (6/3/2024)    Housing Stability Vital Sign      "Unable to Pay for Housing in the Last Year: No     Homeless in the Last Year: No        MEDS   aspirin  81 mg Oral QHS    atorvastatin  80 mg Oral QHS    clopidogreL  75 mg Oral Daily    finasteride  5 mg Oral QHS    heparin (porcine)  5,000 Units Subcutaneous Q8H    isosorbide mononitrate  30 mg Oral QHS    levothyroxine  50 mcg Oral Before breakfast    pantoprazole  40 mg Oral Daily    sevelamer carbonate  800 mg Oral TID WM        ROS:          CONTINOUS INFUSIONS:      Intake/Output Summary (Last 24 hours) at 6/12/2024 1045  Last data filed at 6/12/2024 0837  Gross per 24 hour   Intake 240 ml   Output --   Net 240 ml        HEMODYNAMICS:    Temp:  [97.7 °F (36.5 °C)-98.1 °F (36.7 °C)] 97.7 °F (36.5 °C)  Pulse:  [69-81] 72  Resp:  [10-20] 15  SpO2:  [95 %-100 %] 100 %  BP: (105-136)/(52-68) 105/52   General :   No SOB  No CP  No cough  No fever   No chills  No nausea   No vomiting  No diarrhea  Cardiology: pulse 69  Pulmonary : RR 20  Pulse oximeter 100 % O2  Abdomen soft   Extremities :no edema   Skin: dry  LABS   Lab Results   Component Value Date    WBC 5.58 06/12/2024    HGB 7.1 (L) 06/12/2024    HCT 22.8 (L) 06/12/2024    MCV 95 06/12/2024     06/12/2024        Recent Labs   Lab 06/11/24  2239 06/12/24  0518   * 83   CALCIUM 9.0 9.1   ALBUMIN 3.1*  --    PROT 6.2  --     141   K 4.4 4.7   CO2 22* 22*    103   BUN 49* 52*   CREATININE 13.3* 13.7*   ALKPHOS 43*  --    ALT 10  --    AST 10  --    BILITOT 0.5  --       Lab Results   Component Value Date    .7 (H) 01/17/2024    CALCIUM 9.1 06/12/2024    PHOS 6.4 (H) 06/12/2024      Lab Results   Component Value Date    IRON 47 11/28/2023    TIBC 208 11/28/2023        ABG  No results for input(s): "PH", "PO2", "PCO2", "HCO3", "BE" in the last 168 hours.      IMAGING:  CXR    ASSESSMENT / PLAN  ESRD  Left arm AV fistula  K 4.7  Metabolic bone disease  Hyperphosphatemia 6.4  Binders  Nutrition  Albumin 3.1  Anemia multifactorial  Hb " 7.1  Iron study  Hypotension   Blood pressure 102/52  2023  Estimated ejection fraction of 50 - 55%. Grade I diastolic dysfunction.   BNP 1110  Troponin 0.073  CXR  No abnormality  Weight daily  I and O  Renal diet as tolerated  Dialysis

## 2024-06-12 NOTE — ED NOTES
Patient ambulated to bathroom independently, steady gait noted. PO morning meds tolerated well. VSS. NADN.

## 2024-06-12 NOTE — ASSESSMENT & PLAN NOTE
Creatine stable for now. BMP reviewed- noted Estimated Creatinine Clearance: 3.5 mL/min (A) (based on SCr of 13.7 mg/dL (H)). according to latest data. 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  Monitor UOP and serial BMP and adjust therapy as needed. Renally dose meds. Avoid nephrotoxic medications and procedures.  Scheduled TTS  Last HD was Saturday June 8th, missed today on Tuesday  Consult nephrology  Plan for HD today

## 2024-06-12 NOTE — PLAN OF CARE
Problem: Adult Inpatient Plan of Care  Goal: Plan of Care Review  Outcome: Progressing  VN note:   Patient's chart, labs and vital signs reviewed, will be available to intervene if needed.

## 2024-06-12 NOTE — ED TRIAGE NOTES
Jevon Rajan, a 84 y.o. male presents to the ED w/ complaint of vomiting and SOB.  PT says symptoms started earlier this morning.  PT denies symptoms currently.  PT received zofran via EMS    Triage note:  Chief Complaint   Patient presents with    Nausea    Shortness of Breath    Vomiting     Pt arrived via [facility] from home with c/o N/V and SOB with a missed dialysis appt today. Pt given 4 mg zofran IV, 18 ga right forearm      Review of patient's allergies indicates:   Allergen Reactions    Ace inhibitors Itching     Is not sure which medication it was    Captopril      Past Medical History:   Diagnosis Date    BPH (benign prostatic hyperplasia)     Coronary artery disease     Diabetes mellitus, type 2     ESRD (end stage renal disease) on dialysis     ESRD on HD TTS via left brachial AVF    Hypertension     Hypothyroidism, unspecified     Symptomatic anemia 01/15/2024

## 2024-06-12 NOTE — ASSESSMENT & PLAN NOTE
Patient's FSGs are controlled on current medication regimen.  Last A1c reviewed-   Lab Results   Component Value Date    HGBA1C 5.6 04/01/2024     Most recent fingerstick glucose reviewed-   Recent Labs   Lab 06/12/24  0637 06/12/24  0815   POCTGLUCOSE 79 106     Current correctional scale  Low  Maintain anti-hyperglycemic dose as follows-   Antihyperglycemics (From admission, onward)    Start     Stop Route Frequency Ordered    06/12/24 0540  insulin aspart U-100 pen 0-5 Units         -- SubQ Before meals & nightly PRN 06/12/24 0440        Hold Oral hypoglycemics while patient is in the hospital.

## 2024-06-12 NOTE — PLAN OF CARE
Discharge orders noted. AVS prepared with medication list, importance of medication compliance, follow up appointments, diet, home care instructions, treatment plan, self management, and when to seek medical attention. Detailed clinical reference list attached. AVS printed and handed to patient by bedside nurse. VN unable to review discharge instructions with patient, he has very blurred vision, unable to see AVS, gave VN permission to review AVS with sister by telephone.  VN reviewed AVS with grover Huggins's sister by telephone and Allowed time for questions, all questions answered.   Discharge instructions complete.  No new meds,   [facility] transport present, Bedside nurse notified.

## 2024-06-12 NOTE — ED PROVIDER NOTES
Encounter Date: 6/11/2024       History     Chief Complaint   Patient presents with    Nausea    Shortness of Breath    Vomiting     Pt arrived via [facility] from home with c/o N/V and SOB with a missed dialysis appt today. Pt given 4 mg zofran IV, 18 ga right forearm      Jevon Rajan is a 84 y.o. male who  has a past medical history of BPH (benign prostatic hyperplasia), Coronary artery disease, Diabetes mellitus, type 2, ESRD (end stage renal disease) on dialysis, Hypertension, Hypothyroidism, unspecified, and Symptomatic anemia (01/15/2024).    The patient presents to the ED due to shortness of breath.  Patient arrives by ambulance from home.  He does dialysis Tuesday Thursday Saturday and today he states that the bus an up pick him up to attend his dialysis appointment.  Just before activating EMS couple of hours ago he reported sudden onset of shortness of breath as well as vomiting.  He still feels short of breath he has not vomiting does me pain.  No other concerns noted today.    The history is provided by the patient and the EMS personnel.     Review of patient's allergies indicates:   Allergen Reactions    Ace inhibitors Itching     Is not sure which medication it was    Captopril      Past Medical History:   Diagnosis Date    BPH (benign prostatic hyperplasia)     Coronary artery disease     Diabetes mellitus, type 2     ESRD (end stage renal disease) on dialysis     ESRD on HD TTS via left brachial AVF    Hypertension     Hypothyroidism, unspecified     Symptomatic anemia 01/15/2024     Past Surgical History:   Procedure Laterality Date    bilateral foot surgery      ESOPHAGOGASTRODUODENOSCOPY N/A 2/27/2024    Procedure: EGD (ESOPHAGOGASTRODUODENOSCOPY);  Surgeon: Gemma Almendarez MD;  Location: [facility];  Service: Endoscopy;  Laterality: N/A;    eyelid surgery      FISTULOGRAM Left 11/27/2019    Procedure: Fistulogram;  Surgeon: MELANY Brenner III, MD;  Location: [facility] OR [address] [place] [address];  Service:  Peripheral Vascular;  Laterality: Left;    FISTULOGRAM Left 11/27/2019    Procedure: Fistulogram, AVG declot, possible permacath;  Surgeon: MELANY Brenner III, MD;  Location: [facility] CATH LAB;  Service: Peripheral Vascular;  Laterality: Left;    INSERTION OF DIALYSIS CATHETER      [facility]  12/5/13    right hand surgery      stents       No family history on file.  Social History     Tobacco Use    Smoking status: Never    Smokeless tobacco: Never   Substance Use Topics    Alcohol use: No    Drug use: No     Review of Systems   Constitutional:  Negative for fever.   HENT:  Negative for sore throat.    Respiratory:  Positive for shortness of breath.    Cardiovascular:  Negative for chest pain.   Gastrointestinal:  Negative for nausea.   Genitourinary:  Negative for dysuria.   Musculoskeletal:  Negative for back pain.   Skin:  Negative for rash.   Neurological:  Negative for weakness.   Hematological:  Does not bruise/bleed easily.       Physical Exam     Initial Vitals [06/11/24 2150]   BP Pulse Resp Temp SpO2   132/68 78 20 98.1 °F (36.7 °C) 99 %      MAP       --         Physical Exam    Nursing note and vitals reviewed.  Constitutional: He appears well-developed and well-nourished. He is not diaphoretic. No distress.   HENT:   Head: Normocephalic and atraumatic.   Mouth/Throat: Oropharynx is clear and moist.   Eyes: EOM are normal. Pupils are equal, round, and reactive to light.   Neck: No tracheal deviation present. No JVD present.   Cardiovascular:  Normal rate, regular rhythm, normal heart sounds and intact distal pulses.           AV fistula with thrill   Pulmonary/Chest: Breath sounds normal. No stridor. No respiratory distress.   Abdominal: Abdomen is soft. He exhibits no distension and no mass. There is no abdominal tenderness.   Musculoskeletal:         General: No edema. Normal range of motion.     Neurological: He is alert and oriented to person, place, and time. No cranial nerve deficit or sensory deficit.    Skin: Skin is warm and dry. Capillary refill takes less than 2 seconds. No rash noted.   Psychiatric: He has a normal mood and affect.         ED Course   Procedures  Labs Reviewed - No data to display       Imaging Results    None       X-Rays:   Independently Interpreted Readings:   Chest X-Ray: No acute abnormalities.     Medications - No data to display  Medical Decision Making  Differential Diagnosis includes, but is not limited to:  PE, MI/ACS, pneumothorax, pericardial effusion/tamonade, pneumonia, lung abscess, pericarditis/myocarditis, pleural effusion, lung mass, CHF exacerbation, asthma exacerbation, COPD exacerbation, aspirated/ingested foreign body, airway obstruction, CO poisoning, anemia, metabolic derangement, allergy/atopy, influenza, viral URI, viral syndrome.      Amount and/or Complexity of Data Reviewed  Labs: ordered. Decision-making details documented in ED Course.  Radiology: ordered.    Risk  Decision regarding hospitalization.  Diagnosis or treatment significantly limited by social determinants of health.  Risk Details: Patient presents today with shortness of breath fungus session of dialysis.  Patient is hemodynamically stable.  Chest x-ray without overt pulmonary edema, electrolytes including potassium grossly normal.  Patient at this time does not meet criteria for emergent dialysis however given his age comorbidities and lack of reliable transportation to dialysis I think he would benefit from observation and nephrology evaluation tomorrow for dialysis in addition to assistance regarding transportation to and from dialysis.  He will be admitted to [facility] medicine.               ED Course as of 06/12/24 0047   Tue Jun 11, 2024   2302 CBC auto differential(!) [RN]   2330 Brain natriuretic peptide(!) [RN]   2330 Comprehensive metabolic panel(!) [RN]   2330 Troponin I(!) [RN]   2330 Magnesium [RN]   2330 CBC auto differential(!) [RN]      ED Course User Index  [RN] Bubba Early  MD Pasha                           Clinical Impression:  Final diagnoses:  [N18.6, Z99.2] ESRD (end stage renal disease) on dialysis       Portions of this note were dictated using voice recognition software and may contain dictation related errors in spelling/grammar/syntax not found on text review            Bubba Early Jr., MD  06/12/24 0048

## 2024-06-12 NOTE — SUBJECTIVE & OBJECTIVE
Past Medical History:   Diagnosis Date    BPH (benign prostatic hyperplasia)     Coronary artery disease     Diabetes mellitus, type 2     ESRD (end stage renal disease) on dialysis     ESRD on HD TTS via left brachial AVF    Hypertension     Hypothyroidism, unspecified     Symptomatic anemia 01/15/2024       Past Surgical History:   Procedure Laterality Date    bilateral foot surgery      ESOPHAGOGASTRODUODENOSCOPY N/A 2/27/2024    Procedure: EGD (ESOPHAGOGASTRODUODENOSCOPY);  Surgeon: Gemma Almendarez MD;  Location: [facility] ENDO;  Service: Endoscopy;  Laterality: N/A;    eyelid surgery      FISTULOGRAM Left 11/27/2019    Procedure: Fistulogram;  Surgeon: MELANY Brenner III, MD;  Location: [facility] OR [address] [place] [address];  Service: Peripheral Vascular;  Laterality: Left;    FISTULOGRAM Left 11/27/2019    Procedure: Fistulogram, AVG declot, possible permacath;  Surgeon: MELANY Brenner III, MD;  Location: [facility] CATH LAB;  Service: Peripheral Vascular;  Laterality: Left;    INSERTION OF DIALYSIS CATHETER      [facility]  12/5/13    right hand surgery      stents         Review of patient's allergies indicates:   Allergen Reactions    Ace inhibitors Itching     Is not sure which medication it was    Captopril        No current facility-administered medications on file prior to encounter.     Current Outpatient Medications on File Prior to Encounter   Medication Sig    acarbose (PRECOSE) 50 MG Tab Take 100 mg by mouth 3 (three) times daily with meals.     aspirin 81 MG Chew Take 1 tablet (81 mg total) by mouth every evening.    atorvastatin (LIPITOR) 80 MG tablet Take 1 tablet (80 mg total) by mouth every evening.    ciclopirox (PENLAC) 8 % Soln Apply topically nightly.    clopidogreL (PLAVIX) 75 mg tablet Take 1 tablet (75 mg total) by mouth once daily.    finasteride (PROSCAR) 5 mg tablet Take 5 mg by mouth every evening.     isosorbide mononitrate (IMDUR) 30 MG 24 hr tablet Take 1 tablet (30 mg total) by mouth every evening.     levothyroxine (SYNTHROID) 50 MCG tablet Take 1 tablet (50 mcg total) by mouth before breakfast.    lidocaine (LIDODERM) 5 % Place 1 patch onto the skin once daily. Remove & Discard patch within 12 hours or as directed by MD    ondansetron (ZOFRAN-ODT) 4 MG TbDL Take 1 tablet (4 mg total) by mouth every 8 (eight) hours as needed (nausea).    pantoprazole (PROTONIX) 40 MG tablet Take 1 tablet (40 mg total) by mouth once daily.    sevelamer carbonate (RENVELA) 800 mg Tab Take 1 tablet (800 mg total) by mouth 3 (three) times daily with meals.     Family History    None       Tobacco Use    Smoking status: Never    Smokeless tobacco: Never   Substance and Sexual Activity    Alcohol use: No    Drug use: No    Sexual activity: Not Currently     Review of Systems   All other systems reviewed and are negative.    Objective:     Vital Signs (Most Recent):  Temp: 98.1 °F (36.7 °C) (06/11/24 2150)  Pulse: 75 (06/12/24 0305)  Resp: 11 (06/12/24 0305)  BP: 136/65 (06/12/24 0305)  SpO2: 100 % (06/12/24 0305) Vital Signs (24h Range):  Temp:  [98.1 °F (36.7 °C)] 98.1 °F (36.7 °C)  Pulse:  [69-81] 75  Resp:  [10-20] 11  SpO2:  [95 %-100 %] 100 %  BP: (109-136)/(56-68) 136/65     Weight: 63.5 kg (140 lb)  Body mass index is 21.29 kg/m².     Physical Exam  Constitutional:       Appearance: Normal appearance. He is ill-appearing (Chronic).   HENT:      Head: Normocephalic.      Mouth/Throat:      Mouth: Mucous membranes are dry.   Eyes:      Pupils: Pupils are equal, round, and reactive to light.   Cardiovascular:      Rate and Rhythm: Normal rate and regular rhythm.   Pulmonary:      Effort: Pulmonary effort is normal.   Abdominal:      General: Bowel sounds are normal.      Palpations: Abdomen is soft.   Musculoskeletal:         General: Normal range of motion.        Arms:       Cervical back: Normal range of motion.   Skin:     General: Skin is warm and dry.      Capillary Refill: Capillary refill takes less than 2 seconds.    Neurological:      General: No focal deficit present.      Mental Status: He is alert and oriented to person, place, and time. Mental status is at baseline.   Psychiatric:         Mood and Affect: Mood normal.         Behavior: Behavior normal.              CRANIAL NERVES     CN III, IV, VI   Pupils are equal, round, and reactive to light.       Significant Labs: All pertinent labs within the past 24 hours have been reviewed.  Recent Lab Results         06/11/24  2239        Albumin 3.1       ALP 43       ALT 10       Anion Gap 15       AST 10       Baso # 0.02       Basophil % 0.4       BILIRUBIN TOTAL 0.5  Comment: For infants and newborns, interpretation of results should be based  on gestational age, weight and in agreement with clinical  observations.    Premature Infant recommended reference ranges:  Up to 24 hours.............<8.0 mg/dL  Up to 48 hours............<12.0 mg/dL  3-5 days..................<15.0 mg/dL  6-29 days.................<15.0 mg/dL         BNP 1,110  Comment: Values of less than 100 pg/ml are consistent with non-CHF populations.       BUN 49       Calcium 9.0       Chloride 103       CO2 22       Creatinine 13.3       Differential Method Automated       eGFR 3       Eos # 0.3       Eos % 4.8       Glucose 138       Gran # (ANC) 4.0       Gran % 73.3       Hematocrit 25.8       Hemoglobin 8.2       Immature Grans (Abs) 0.01  Comment: Mild elevation in immature granulocytes is non specific and   can be seen in a variety of conditions including stress response,   acute inflammation, trauma and pregnancy. Correlation with other   laboratory and clinical findings is essential.         Immature Granulocytes 0.2       Lymph # 0.7       Lymph % 13.0       Magnesium  1.9       MCH 30.5       MCHC 31.8       MCV 96       Mono # 0.5       Mono % 8.3       MPV 9.8       nRBC 0       Platelet Count 166       Potassium 4.4       PROTEIN TOTAL 6.2       RBC 2.69       RDW 17.6       Sodium 140        Troponin I 0.073  Comment: The reference interval for Troponin I represents the 99th percentile   cutoff   for our facility and is consistent with 3rd generation assay   performance.         WBC 5.45               Significant Imaging: I have reviewed all pertinent imaging results/findings within the past 24 hours.  I have reviewed and interpreted all pertinent imaging results/findings within the past 24 hours.

## 2024-06-14 ENCOUNTER — PATIENT OUTREACH (OUTPATIENT)
Dept: ADMINISTRATIVE | Facility: CLINIC | Age: 84
End: 2024-06-14
Payer: MEDICARE

## 2024-06-14 NOTE — PROGRESS NOTES
C3 nurse attempted to contact Jevon Rajna  for a TCC post hospital discharge follow up call. No answer. The patient does not have a scheduled HOSFU appointment, and the pt does not have an [facility] PCP.

## 2024-06-17 NOTE — PROGRESS NOTES
3rd Attempt made to reach patient for TCC call. Left voicemail please call [phone] leave first name, last name, and  for Karmen I will return your call.

## 2024-07-03 ENCOUNTER — HOSPITAL ENCOUNTER (EMERGENCY)
Facility: HOSPITAL | Age: 84
Discharge: HOME | End: 2024-07-04
Attending: STUDENT IN AN ORGANIZED HEALTH CARE EDUCATION/TRAINING PROGRAM
Payer: MEDICARE

## 2024-07-03 DIAGNOSIS — N18.6 ESRD (END STAGE RENAL DISEASE): ICD-10-CM

## 2024-07-03 DIAGNOSIS — K56.41 FECAL IMPACTION: ICD-10-CM

## 2024-07-03 DIAGNOSIS — R11.2 NAUSEA AND VOMITING, UNSPECIFIED VOMITING TYPE: Primary | ICD-10-CM

## 2024-07-03 DIAGNOSIS — C22.0 HEPATOCELLULAR CARCINOMA: ICD-10-CM

## 2024-07-03 DIAGNOSIS — R10.9 ABDOMINAL PAIN: ICD-10-CM

## 2024-07-03 LAB
ALBUMIN SERPL BCP-MCNC: 3.5 G/DL (ref 3.5–5.2)
ALP SERPL-CCNC: 68 U/L (ref 55–135)
ALT SERPL W/O P-5'-P-CCNC: 10 U/L (ref 10–44)
ANION GAP SERPL CALC-SCNC: 16 MMOL/L (ref 8–16)
AST SERPL-CCNC: 16 U/L (ref 10–40)
BACTERIA #/AREA URNS HPF: NORMAL /HPF
BASOPHILS # BLD AUTO: 0.04 K/UL (ref 0–0.2)
BASOPHILS NFR BLD: 0.5 % (ref 0–1.9)
BILIRUB SERPL-MCNC: 0.6 MG/DL (ref 0.1–1)
BILIRUB UR QL STRIP: NEGATIVE
BUN SERPL-MCNC: 62 MG/DL (ref 8–23)
CALCIUM SERPL-MCNC: 10.8 MG/DL (ref 8.7–10.5)
CHLORIDE SERPL-SCNC: 99 MMOL/L (ref 95–110)
CLARITY UR: CLEAR
CO2 SERPL-SCNC: 26 MMOL/L (ref 23–29)
COLOR UR: YELLOW
CREAT SERPL-MCNC: 10.6 MG/DL (ref 0.5–1.4)
DIFFERENTIAL METHOD BLD: ABNORMAL
EOSINOPHIL # BLD AUTO: 0.4 K/UL (ref 0–0.5)
EOSINOPHIL NFR BLD: 4.3 % (ref 0–8)
ERYTHROCYTE [DISTWIDTH] IN BLOOD BY AUTOMATED COUNT: 18.9 % (ref 11.5–14.5)
EST. GFR  (NO RACE VARIABLE): 4 ML/MIN/1.73 M^2
GLUCOSE SERPL-MCNC: 139 MG/DL (ref 70–110)
GLUCOSE UR QL STRIP: ABNORMAL
HCT VFR BLD AUTO: 22.5 % (ref 40–54)
HGB BLD-MCNC: 7.1 G/DL (ref 14–18)
HGB UR QL STRIP: ABNORMAL
HYALINE CASTS #/AREA URNS LPF: 0 /LPF
IMM GRANULOCYTES # BLD AUTO: 0.04 K/UL (ref 0–0.04)
IMM GRANULOCYTES NFR BLD AUTO: 0.5 % (ref 0–0.5)
KETONES UR QL STRIP: NEGATIVE
LEUKOCYTE ESTERASE UR QL STRIP: NEGATIVE
LIPASE SERPL-CCNC: 60 U/L (ref 4–60)
LYMPHOCYTES # BLD AUTO: 1 K/UL (ref 1–4.8)
LYMPHOCYTES NFR BLD: 12.5 % (ref 18–48)
MCH RBC QN AUTO: 31.7 PG (ref 27–31)
MCHC RBC AUTO-ENTMCNC: 31.6 G/DL (ref 32–36)
MCV RBC AUTO: 100 FL (ref 82–98)
MICROSCOPIC COMMENT: NORMAL
MONOCYTES # BLD AUTO: 0.9 K/UL (ref 0.3–1)
MONOCYTES NFR BLD: 10.7 % (ref 4–15)
NEUTROPHILS # BLD AUTO: 5.8 K/UL (ref 1.8–7.7)
NEUTROPHILS NFR BLD: 71.5 % (ref 38–73)
NITRITE UR QL STRIP: NEGATIVE
NRBC BLD-RTO: 1 /100 WBC
PH UR STRIP: >8 [PH] (ref 5–8)
PLATELET # BLD AUTO: 171 K/UL (ref 150–450)
PMV BLD AUTO: 10.8 FL (ref 9.2–12.9)
POTASSIUM SERPL-SCNC: 4.3 MMOL/L (ref 3.5–5.1)
PROT SERPL-MCNC: 6.7 G/DL (ref 6–8.4)
PROT UR QL STRIP: ABNORMAL
RBC # BLD AUTO: 2.24 M/UL (ref 4.6–6.2)
RBC #/AREA URNS HPF: 0 /HPF (ref 0–4)
SODIUM SERPL-SCNC: 141 MMOL/L (ref 136–145)
SP GR UR STRIP: 1.01 (ref 1–1.03)
SQUAMOUS #/AREA URNS HPF: 0 /HPF
TROPONIN I SERPL DL<=0.01 NG/ML-MCNC: 0.06 NG/ML (ref 0–0.03)
TROPONIN I SERPL DL<=0.01 NG/ML-MCNC: 0.06 NG/ML (ref 0–0.03)
URN SPEC COLLECT METH UR: ABNORMAL
UROBILINOGEN UR STRIP-ACNC: NEGATIVE EU/DL
WBC # BLD AUTO: 8.11 K/UL (ref 3.9–12.7)
WBC #/AREA URNS HPF: 4 /HPF (ref 0–5)

## 2024-07-03 PROCEDURE — 25000003 PHARM REV CODE 250: Performed by: STUDENT IN AN ORGANIZED HEALTH CARE EDUCATION/TRAINING PROGRAM

## 2024-07-03 PROCEDURE — 80053 COMPREHEN METABOLIC PANEL: CPT | Performed by: STUDENT IN AN ORGANIZED HEALTH CARE EDUCATION/TRAINING PROGRAM

## 2024-07-03 PROCEDURE — 81000 URINALYSIS NONAUTO W/SCOPE: CPT | Performed by: STUDENT IN AN ORGANIZED HEALTH CARE EDUCATION/TRAINING PROGRAM

## 2024-07-03 PROCEDURE — 85025 COMPLETE CBC W/AUTO DIFF WBC: CPT | Performed by: STUDENT IN AN ORGANIZED HEALTH CARE EDUCATION/TRAINING PROGRAM

## 2024-07-03 PROCEDURE — 99285 EMERGENCY DEPT VISIT HI MDM: CPT | Mod: 25

## 2024-07-03 PROCEDURE — 84484 ASSAY OF TROPONIN QUANT: CPT | Performed by: STUDENT IN AN ORGANIZED HEALTH CARE EDUCATION/TRAINING PROGRAM

## 2024-07-03 PROCEDURE — 93005 ELECTROCARDIOGRAM TRACING: CPT

## 2024-07-03 PROCEDURE — 93010 ELECTROCARDIOGRAM REPORT: CPT | Mod: ,,, | Performed by: INTERNAL MEDICINE

## 2024-07-03 PROCEDURE — 83690 ASSAY OF LIPASE: CPT | Performed by: STUDENT IN AN ORGANIZED HEALTH CARE EDUCATION/TRAINING PROGRAM

## 2024-07-03 RX ORDER — POLYETHYLENE GLYCOL 3350 17 G/17G
17 POWDER, FOR SOLUTION ORAL DAILY
Qty: 30 EACH | Refills: 0 | Status: SHIPPED | OUTPATIENT
Start: 2024-07-03 | End: 2024-08-02

## 2024-07-03 RX ORDER — PSEUDOEPHEDRINE/ACETAMINOPHEN 30MG-500MG
100 TABLET ORAL
Status: COMPLETED | OUTPATIENT
Start: 2024-07-03 | End: 2024-07-03

## 2024-07-03 RX ORDER — SYRING-NEEDL,DISP,INSUL,0.3 ML 29 G X1/2"
296 SYRINGE, EMPTY DISPOSABLE MISCELLANEOUS
Status: COMPLETED | OUTPATIENT
Start: 2024-07-03 | End: 2024-07-03

## 2024-07-03 RX ADMIN — Medication 100 ML: at 10:07

## 2024-07-03 RX ADMIN — SODIUM CHLORIDE 500 ML: 9 INJECTION, SOLUTION INTRAVENOUS at 10:07

## 2024-07-03 RX ADMIN — MAGNESIUM CITRATE 296 ML: 1.75 LIQUID ORAL at 10:07

## 2024-07-04 VITALS
HEIGHT: 68 IN | BODY MASS INDEX: 21.29 KG/M2 | DIASTOLIC BLOOD PRESSURE: 63 MMHG | SYSTOLIC BLOOD PRESSURE: 133 MMHG | HEART RATE: 76 BPM | RESPIRATION RATE: 15 BRPM | OXYGEN SATURATION: 94 % | TEMPERATURE: 98 F

## 2024-07-04 NOTE — ED NOTES
Introduced self to patient, provided warm blanket per request. Patient alert & orientated to person, place & time, denies abdominal pain. Patient requesting crackers - educated on need to be NPO until results of tests & imaging are confirmed by doctor and ordered to be able to eat. Patient agreeable to same. No visible distress or discomfort. No other voiced concerns when asked.

## 2024-07-04 NOTE — ED NOTES
Patient aware of discharge order and wait for transportation home. No voiced concerns when asked, no visible distress or discomfort. Siderails up, room darkened, TV on, call bell in reach.

## 2024-07-04 NOTE — ED PROVIDER NOTES
NAME:  Jevon Rajan  CSN:     [id number]  MRN:    [id number]  ADMIT DATE: 7/3/2024        eMERGENCY dEPARTMENT eNCOUnter    CHIEF COMPLAINT    Chief Complaint   Patient presents with    Abdominal Pain     [facility] with c/o abdominal pain, nausea, and vomiting after eating some rice about 5pm. EMS established an IV and administered 4 mg Zofran.        HPI    Jevon Rajan is a 84 y.o. male with a past medical history of  has a past medical history of BPH (benign prostatic hyperplasia), Coronary artery disease, Diabetes mellitus, type 2, ESRD (end stage renal disease) on dialysis, Hypertension, Hypothyroidism, unspecified, and Symptomatic anemia (01/15/2024).     he presents to the ED due to episode at home in which he states he felt short winded and nauseated.  Is not able to provide much history otherwise but states that he felt improved after the medics arrived and gave him nausea medicine.  At the time of my exam he states he was not have any symptoms at all.  Notes that this happens to him often when I specify what he means he states he often feels short winded with nausea.  He does have ESRD with normal course of dialysis yesterday.  Notes he was due for dialysis tomorrow.  He specifically denies any pain at this time including chest pain or abdominal pain.  He notes he has been having some small firm bowel movements but does not like to take laxative or stool softener because he does not want to have uncontrollable bowel movements.      HPI       PAST MEDICAL HISTORY  Past Medical History:   Diagnosis Date    BPH (benign prostatic hyperplasia)     Coronary artery disease     Diabetes mellitus, type 2     ESRD (end stage renal disease) on dialysis     ESRD on HD TTS via left brachial AVF    Hypertension     Hypothyroidism, unspecified     Symptomatic anemia 01/15/2024       SURGICAL HISTORY    Past Surgical History:   Procedure Laterality Date    bilateral foot surgery       ESOPHAGOGASTRODUODENOSCOPY N/A 2/27/2024    Procedure: EGD (ESOPHAGOGASTRODUODENOSCOPY);  Surgeon: Gemma Almendarez MD;  Location: [facility] ENDO;  Service: Endoscopy;  Laterality: N/A;    eyelid surgery      FISTULOGRAM Left 11/27/2019    Procedure: Fistulogram;  Surgeon: MELANY Brenner III, MD;  Location: [facility] FLR;  Service: Peripheral Vascular;  Laterality: Left;    FISTULOGRAM Left 11/27/2019    Procedure: Fistulogram, AVG declot, possible permacath;  Surgeon: MELANY Brenner III, MD;  Location: [facility] CATH LAB;  Service: Peripheral Vascular;  Laterality: Left;    INSERTION OF DIALYSIS CATHETER      [facility]  12/5/13    right hand surgery      stents         FAMILY HISTORY    No family history on file.    SOCIAL HISTORY    Social History     Socioeconomic History    Marital status: Single   Tobacco Use    Smoking status: Never    Smokeless tobacco: Never   Substance and Sexual Activity    Alcohol use: No    Drug use: No    Sexual activity: Not Currently     Social Determinants of Health     Financial Resource Strain: Patient Declined (6/3/2024)    Overall Financial Resource Strain (CARDIA)     Difficulty of Paying Living Expenses: Patient declined   Food Insecurity: No Food Insecurity (6/3/2024)    Hunger Vital Sign     Worried About Running Out of Food in the Last Year: Never true     Ran Out of Food in the Last Year: Never true   Transportation Needs: No Transportation Needs (6/3/2024)    TRANSPORTATION NEEDS     Transportation : No   Physical Activity: Sufficiently Active (6/3/2024)    Exercise Vital Sign     Days of Exercise per Week: 6 days     Minutes of Exercise per Session: 40 min   Recent Concern: Physical Activity - Insufficiently Active (5/29/2024)    Exercise Vital Sign     Days of Exercise per Week: 1 day     Minutes of Exercise per Session: 20 min   Stress: Patient Declined (6/3/2024)    [employer] - Occupational Stress Questionnaire     Feeling of Stress : Patient  "declined   Housing Stability: Low Risk  (6/3/2024)    Housing Stability Vital Sign     Unable to Pay for Housing in the Last Year: No     Homeless in the Last Year: No       MEDICATIONS  Current Outpatient Medications   Medication Instructions    aspirin 81 mg, Oral, Nightly    atorvastatin (LIPITOR) 80 mg, Oral, Nightly    carvediloL (COREG) 6.25 mg, Oral, 2 times daily    clopidogreL (PLAVIX) 75 mg, Oral, Daily    isosorbide mononitrate (IMDUR) 30 mg, Oral, Nightly    levothyroxine (SYNTHROID) 50 mcg, Oral, Before breakfast    LIDOcaine (LIDODERM) 5 % 1 patch, Topical, Daily PRN    nut.tx.imp.renal fxn,lac-reduc (NEPRO CARB STEADY) 0.08 gram-1.8 kcal/mL Liqd 240 mLs, Oral, 2 times daily    ondansetron (ZOFRAN-ODT) 4 mg, Oral, Every 8 hours PRN    pantoprazole (PROTONIX) 20 mg, Oral, 2 times daily PRN    polyethylene glycol (GLYCOLAX) 17 g, Oral, Daily    sevelamer carbonate (RENVELA) 800 mg, Oral, 3 times daily with meals    SITagliptin (ZITUVIO) 25 mg, Oral, Daily       ALLERGIES    Review of patient's allergies indicates:   Allergen Reactions    Ace inhibitors Itching     Is not sure which medication it was    Captopril          REVIEW OF SYSTEMS   Review of Systems       PHYSICAL EXAM    Reviewed Triage Note    VITAL SIGNS:   ED Triage Vitals [07/03/24 1834]   Enc Vitals Group      /60      Pulse 81      Resp 16      Temp 97.4 °F (36.3 °C)      Temp Source Oral      SpO2 97 %      Weight       Height 5' 8"      Head Circumference       Peak Flow       Pain Score       Pain Loc       Pain Education       Exclude from Growth Chart        Patient Vitals for the past 24 hrs:   BP Temp Temp src Pulse Resp SpO2 Height   07/04/24 0129 -- 97.9 °F (36.6 °C) Oral -- -- -- --   07/04/24 0125 133/63 -- -- 76 -- (!) 94 % --   07/04/24 0101 (!) 128/59 -- -- -- -- -- --   07/04/24 0100 -- -- -- 74 15 95 % --   07/03/24 2202 (!) 118/51 -- -- 73 15 95 % --   07/03/24 2124 -- 98 °F (36.7 °C) Oral 75 17 99 % --   07/03/24 " "2100 (!) 143/63 -- -- 78 18 96 % --   07/03/24 2015 128/60 -- -- 81 18 98 % --   07/03/24 1834 135/60 97.4 °F (36.3 °C) Oral 81 16 97 % 5' 8" (1.727 m)       Physical Exam    Nursing note and vitals reviewed.  Constitutional: He appears well-developed and well-nourished.   HENT:   Head: Normocephalic and atraumatic.   Eyes: EOM are normal. Pupils are equal, round, and reactive to light.   Neck: Neck supple.   Normal range of motion.  Cardiovascular:  Normal rate and regular rhythm.           Dialysis fistula to left upper extremity with palpable thrill.   Pulmonary/Chest: Breath sounds normal. No respiratory distress.   Abdominal: Abdomen is soft. There is no abdominal tenderness.   Musculoskeletal:         General: Normal range of motion.      Cervical back: Normal range of motion and neck supple.     Neurological: He is alert and oriented to person, place, and time.   Skin: Skin is warm and dry.   Psychiatric: He has a normal mood and affect.          EKG     Interpreted by EM physician if performed:   Sinus rhythm at a rate of 82.  There is T-wave inversion with mild depression inferior laterally.  No ST elevation.  Overall unchanged when compared from previous in June of 2024.            LABS  Pertinent labs reviewed. (See chart for details)   Labs Reviewed   CBC W/ AUTO DIFFERENTIAL - Abnormal; Notable for the following components:       Result Value    RBC 2.24 (*)     Hemoglobin 7.1 (*)     Hematocrit 22.5 (*)      (*)     MCH 31.7 (*)     MCHC 31.6 (*)     RDW 18.9 (*)     nRBC 1 (*)     Lymph % 12.5 (*)     All other components within normal limits   COMPREHENSIVE METABOLIC PANEL - Abnormal; Notable for the following components:    Glucose 139 (*)     BUN 62 (*)     Creatinine 10.6 (*)     Calcium 10.8 (*)     eGFR 4 (*)     All other components within normal limits   URINALYSIS, REFLEX TO URINE CULTURE - Abnormal; Notable for the following components:    pH, UA >8.0 (*)     Protein, UA 2+ (*)     " Glucose, UA 1+ (*)     Occult Blood UA Trace (*)     All other components within normal limits    Narrative:     Specimen Source->Urine   TROPONIN I - Abnormal; Notable for the following components:    Troponin I 0.059 (*)     All other components within normal limits   TROPONIN I - Abnormal; Notable for the following components:    Troponin I 0.057 (*)     All other components within normal limits   LIPASE   TROPONIN I   URINALYSIS MICROSCOPIC    Narrative:     Specimen Source->Urine         RADIOLOGY          Imaging Results               CT Chest Abdomen Pelvis Without Contrast (XPD) (Final result)  Result time 07/03/24 21:47:31      Final result by Veronica Miller MD (07/03/24 21:47:31)                   Impression:      Distal fecal impaction.  No bowel obstruction.    2 liver lesions.  One is enlarged measuring is 3.8 cm in this patient history of is hepatocellular carcinoma.  Index lesion is stable measuring 4 cm.    Enlarged adrenal glands bilaterally are stable.    Marked prostatomegaly.  No bladder obstruction.    Innumerable renal cystic lesions several of which are complex.  One possible solid lesion in the lower pole of the left kidney is stable.  Follow-up urged.    1.6 cm nodular focus in the inferior right perirenal space may represent enlarged lymph node is nonspecific and stable.    4 mm right upper lobe nodule which is nonspecific.  For a solid nodule <6 mm, Fleischner Society 2017 guidelines recommend no routine follow up for a low risk patient, or follow-up with non-contrast chest CT at 12 months in a high risk patient.    Centrilobular emphysema.    This report was flagged in Epic as abnormal.      Electronically signed by: Veronica Miller  Date:    07/03/2024  Time:    21:47               Narrative:    EXAMINATION:  CT CHEST ABDOMEN PELVIS WITHOUT CONTRAST(XPD)    CLINICAL HISTORY:  Weight loss, unintended;    TECHNIQUE:  Low dose axial images, sagittal and coronal reformations were  obtained from the thoracic inlet to the pubic synthesis .  Oral contrast was not administered.    COMPARISON:  None    FINDINGS:  Thoracic soft tissues: No significant abnormality.    Aorta: Normal in course and caliber.  There is significant atherosclerotic plaque. There are three branching vessels at the arch.    Heart: Normal in size. No pericardial effusion. There is heavy atherosclerotic calcification along the coronary arteries.  There is aortic and mitral valve calcification noted.    Geetha/Mediastinum: No significant lymphadenopathy    Lungs: There is a 4 mm nodule in the right apex.  There is moderate to severe centrilobular emphysema., there is no consolidation or pleural fluid.    Liver: There is an stable 4 cm in maximal dimension lesion abutting the gallbladder previously measuring 4.2 cm.  There is also enlarged 3.8 cm lesion in the inferior right lobe .  Small probable cysts noted in the right lobe near the dome of the diaphragm.  There is a 6 mm low density in the lateral left lobe previously measuring 9 mm axial image 113 series 2. There is high density of the liver suggesting amiodarone therapy.    Gallbladder: No calcified gallstones.    Bile Ducts: No evidence of dilated ducts.    Pancreas: No mass or peripancreatic fat stranding.    Spleen: Unremarkable.    Adrenals: Adrenal glands appear enlarged the right measuring 2 cm in left measuring 2.1 cm maximal dimension similar to prior exam is is.  This may represent hypertrophy..    Kidneys/ Ureters: Multiple lesions noted in the is kidney cortices bilaterally is which appear relatively atrophic.  Suspicious lesion measures 12 mm at the lower pole of the left kidney with high density rim.  Several high density  cysts noted bilaterally.  Innumerable cysts noted many of which are too small to characterize.  There are vascular calcifications bilaterally in the kidneys.  Punctate nonobstructing calculi may be present.  No hydronephrosis.  No ureteral  dilatation.    Bladder: Bladder contains wall thickening but is relatively collapsed.    Reproductive organs: There is an enlarged prostate measuring 7 cm impressing upon the base the bladder.    GI Tract/Mesentery: There is distal fecal impaction.  There is no associated significant is mucosal thickening.  No evidence of bowel obstruction or inflammation.    Peritoneal Space: No ascites. No free air.    Retroperitoneum: No significant adenopathy. There is a mass in the inferior perirenal space on the right which appear stable measuring 1.6 cm is abutting the appendix possibly separate from it.  This is a nonspecific appearance and is stable.  Enlarged lymph node is a consideration.    Abdominal wall: Tiny fat containing umbilical hernia is noted.    Vasculature: Heavy aortic atherosclerosis and splanchnic atherosclerosis.    Bones: Spine alignment is intact.  No suspicious osseous lesions are seen.    Is                                        PROCEDURES    Procedures      ED COURSE & MEDICAL DECISION MAKING    Pertinent Labs & Imaging studies reviewed. (See chart for details and specific orders.)          Summary of review of records:   [facility]  Last visit here on June 11th    Medical Decision Making  Amount and/or Complexity of Data Reviewed  Labs: ordered. Decision-making details documented in ED Course.  Radiology: ordered. Decision-making details documented in ED Course.  ECG/medicine tests: ordered and independent interpretation performed. Decision-making details documented in ED Course.    Risk  OTC drugs.  Diagnosis or treatment significantly limited by social determinants of health.  Risk Details: Poor health literacy, difficulty accessing healthcare      Jevon Rajan is a 84 y.o. male who presents with an episode today in which he had some nausea and felt short winded.  Notes it has resolved and denies any symptoms at the time of my  assessment.    Differential includes but is not limited to electrolyte derangement, worsening cancer, acute intra-abdominal infection, low suspicion for ACS given history and exam.              Medications   glycerin 99.5% topical solution 100 mL (100 mLs Rectal Given 7/3/24 2243)     And   magnesium citrate solution 296 mL (296 mLs Rectal Given 7/3/24 2243)     And   sodium chloride 0.9% bolus 500 mL 500 mL (0 mLs Rectal Stopped 7/3/24 2300)       ED Course as of 07/04/24 0525   Wed Jul 03, 2024 2058 Creatinine(!): 10.6  Consistent with history of ESRD.  Due for dialysis tomorrow.  No electrolyte derangement.  Clinically no evidence of volume overload at this time. [HL]   2059 Hemoglobin(!): 7.1  Chronic stable anemia likely due to underlying renal disease, unchanged from 3 weeks ago. [HL]   2059 Lipase: 60  Within normal limits [HL]   2106 Troponin I(!): 0.059  Chronic, baseline. Asymptomatic at this time.  [HL]   2145 Urinalysis, Reflex to Urine Culture Urine, Clean Catch(!)  No evidence of infection. [HL]   2208 CT Chest Abdomen Pelvis Without Contrast (XPD)(!)  Distal fecal impaction.  No bowel obstruction.     2 liver lesions.  One is enlarged measuring is 3.8 cm in this patient history of is hepatocellular carcinoma.  Index lesion is stable measuring 4 cm.     Enlarged adrenal glands bilaterally are stable.     Marked prostatomegaly.  No bladder obstruction.     Innumerable renal cystic lesions several of which are complex.  One possible solid lesion in the lower pole of the left kidney is stable.  Follow-up urged.     1.6 cm nodular focus in the inferior right perirenal space may represent enlarged lymph node is nonspecific and stable.     4 mm right upper lobe nodule which is nonspecific.  For a solid nodule <6 mm, Fleischner Society 2017 guidelines recommend no routine follow up for a low risk patient, or follow-up with non-contrast chest CT at 12 months in a high risk patient.     Centrilobular  emphysema.      [HL]   2338 Troponin I(!): 0.057  Flat, chronic, remains asymptomatic.  [HL]      ED Course User Index  [HL] Kaylen Basurto DO       Patient had large bowel movement after enema, feels ready to go home.  Again states he was asymptomatic.  Encouraged to go to dialysis tomorrow as scheduled.  Prescription of MiraLax provided.  All questions addressed and stable at time of discharge.      FINAL IMPRESSION    Final diagnoses:  [R10.9] Abdominal pain  [R11.2] Nausea and vomiting, unspecified vomiting type (Primary)  [K56.41] Fecal impaction  [N18.6] ESRD (end stage renal disease)       DISPOSITION  Patient discharge in stable condition        ED Prescriptions       Medication Sig Dispense Start Date End Date Auth. Provider    polyethylene glycol (GLYCOLAX) 17 gram PwPk Take 17 g by mouth once daily. 30 each 7/3/2024 8/2/2024 Kaylen Basurto DO          Follow-up Information       Follow up With Specialties Details Why Contact Info    Your Primary Care Physician  Schedule an appointment as soon as possible for a visit       [facility] Emergency Dept Emergency Medicine  As needed, If symptoms worsen [address] [place] [id number] [phone]              DISCLAIMER: This note was prepared with [employer] voice recognition transcription software. Garbled syntax, mangled pronouns, and other bizarre constructions may be attributed to that software system.             Kaylen Basurto,   07/04/24 [id number]

## 2024-07-04 NOTE — ED NOTES
Enema given with effect. Linens changed, patient reconnected to monitor. Patient states feeling itchy on arms, cleansing wipe relieved same. No other voiced concerns when asked, no visible distress or discomfort.

## 2024-07-08 LAB
OHS QRS DURATION: 90 MS
OHS QRS DURATION: 92 MS
OHS QTC CALCULATION: 396 MS
OHS QTC CALCULATION: 439 MS

## 2024-07-09 PROBLEM — D63.8 ANEMIA OF CHRONIC DISEASE: Status: ACTIVE | Noted: 2024-01-15

## 2024-07-10 PROBLEM — E63.8 INADEQUATE DIETARY INTAKE OF PROTEIN: Status: ACTIVE | Noted: 2024-07-10

## 2024-08-10 ENCOUNTER — HOSPITAL ENCOUNTER (OUTPATIENT)
Facility: HOSPITAL | Age: 84
Discharge: HOME | End: 2024-08-12
Attending: STUDENT IN AN ORGANIZED HEALTH CARE EDUCATION/TRAINING PROGRAM | Admitting: INTERNAL MEDICINE
Payer: OTHER GOVERNMENT

## 2024-08-10 DIAGNOSIS — I20.0 UNSTABLE ANGINA: ICD-10-CM

## 2024-08-10 DIAGNOSIS — R07.9 CHEST PAIN: ICD-10-CM

## 2024-08-10 DIAGNOSIS — N18.6 ESRD ON HEMODIALYSIS: Primary | ICD-10-CM

## 2024-08-10 DIAGNOSIS — Z99.2 ESRD ON HEMODIALYSIS: Primary | ICD-10-CM

## 2024-08-10 PROBLEM — D64.9 SYMPTOMATIC ANEMIA: Status: ACTIVE | Noted: 2024-08-10

## 2024-08-10 PROBLEM — R21 RASH: Status: ACTIVE | Noted: 2024-08-10

## 2024-08-10 PROBLEM — H04.123 DRY EYES: Status: ACTIVE | Noted: 2024-08-10

## 2024-08-10 PROBLEM — E87.5 HYPERKALEMIA: Status: RESOLVED | Noted: 2024-05-28 | Resolved: 2024-08-10

## 2024-08-10 LAB
POCT GLUCOSE: 113 MG/DL (ref 70–110)
POCT GLUCOSE: 97 MG/DL (ref 70–110)

## 2024-08-10 PROCEDURE — G0379 DIRECT REFER HOSPITAL OBSERV: HCPCS

## 2024-08-10 PROCEDURE — 94761 N-INVAS EAR/PLS OXIMETRY MLT: CPT

## 2024-08-10 PROCEDURE — G0378 HOSPITAL OBSERVATION PER HR: HCPCS

## 2024-08-10 PROCEDURE — 93005 ELECTROCARDIOGRAM TRACING: CPT

## 2024-08-10 PROCEDURE — 96372 THER/PROPH/DIAG INJ SC/IM: CPT | Performed by: INTERNAL MEDICINE

## 2024-08-10 PROCEDURE — 99900035 HC TECH TIME PER 15 MIN (STAT)

## 2024-08-10 PROCEDURE — 25000003 PHARM REV CODE 250: Performed by: INTERNAL MEDICINE

## 2024-08-10 PROCEDURE — 93010 ELECTROCARDIOGRAM REPORT: CPT | Mod: ,,, | Performed by: INTERNAL MEDICINE

## 2024-08-10 PROCEDURE — 63600175 PHARM REV CODE 636 W HCPCS: Performed by: INTERNAL MEDICINE

## 2024-08-10 RX ORDER — CARVEDILOL 6.25 MG/1
6.25 TABLET ORAL 2 TIMES DAILY
Status: DISCONTINUED | OUTPATIENT
Start: 2024-08-10 | End: 2024-08-12 | Stop reason: HOSPADM

## 2024-08-10 RX ORDER — SODIUM CHLORIDE 0.9 % (FLUSH) 0.9 %
10 SYRINGE (ML) INJECTION
Status: DISCONTINUED | OUTPATIENT
Start: 2024-08-10 | End: 2024-08-12 | Stop reason: HOSPADM

## 2024-08-10 RX ORDER — LEVOTHYROXINE SODIUM 50 UG/1
50 TABLET ORAL
Status: DISCONTINUED | OUTPATIENT
Start: 2024-08-11 | End: 2024-08-12 | Stop reason: HOSPADM

## 2024-08-10 RX ORDER — INSULIN ASPART 100 [IU]/ML
0-5 INJECTION, SOLUTION INTRAVENOUS; SUBCUTANEOUS
Status: DISCONTINUED | OUTPATIENT
Start: 2024-08-10 | End: 2024-08-12 | Stop reason: HOSPADM

## 2024-08-10 RX ORDER — BISACODYL 10 MG/1
10 SUPPOSITORY RECTAL DAILY PRN
Status: DISCONTINUED | OUTPATIENT
Start: 2024-08-10 | End: 2024-08-12 | Stop reason: HOSPADM

## 2024-08-10 RX ORDER — GLUCAGON 1 MG
1 KIT INJECTION
Status: DISCONTINUED | OUTPATIENT
Start: 2024-08-10 | End: 2024-08-12 | Stop reason: HOSPADM

## 2024-08-10 RX ORDER — ALUMINUM HYDROXIDE, MAGNESIUM HYDROXIDE, AND SIMETHICONE 1200; 120; 1200 MG/30ML; MG/30ML; MG/30ML
30 SUSPENSION ORAL 4 TIMES DAILY PRN
Status: DISCONTINUED | OUTPATIENT
Start: 2024-08-10 | End: 2024-08-12 | Stop reason: HOSPADM

## 2024-08-10 RX ORDER — ISOSORBIDE MONONITRATE 30 MG/1
30 TABLET, EXTENDED RELEASE ORAL NIGHTLY
Status: DISCONTINUED | OUTPATIENT
Start: 2024-08-10 | End: 2024-08-12 | Stop reason: HOSPADM

## 2024-08-10 RX ORDER — SODIUM CHLORIDE 9 MG/ML
INJECTION, SOLUTION INTRAVENOUS ONCE
Status: DISCONTINUED | OUTPATIENT
Start: 2024-08-10 | End: 2024-08-12 | Stop reason: HOSPADM

## 2024-08-10 RX ORDER — SEVELAMER CARBONATE 800 MG/1
800 TABLET, FILM COATED ORAL
Status: DISCONTINUED | OUTPATIENT
Start: 2024-08-10 | End: 2024-08-12 | Stop reason: HOSPADM

## 2024-08-10 RX ORDER — HEPARIN SODIUM 5000 [USP'U]/ML
5000 INJECTION, SOLUTION INTRAVENOUS; SUBCUTANEOUS EVERY 8 HOURS
Status: DISCONTINUED | OUTPATIENT
Start: 2024-08-10 | End: 2024-08-12 | Stop reason: HOSPADM

## 2024-08-10 RX ORDER — AMMONIUM LACTATE 12 G/100G
LOTION TOPICAL 2 TIMES DAILY PRN
Status: DISCONTINUED | OUTPATIENT
Start: 2024-08-10 | End: 2024-08-12 | Stop reason: HOSPADM

## 2024-08-10 RX ORDER — NALOXONE HCL 0.4 MG/ML
0.02 VIAL (ML) INJECTION
Status: DISCONTINUED | OUTPATIENT
Start: 2024-08-10 | End: 2024-08-12 | Stop reason: HOSPADM

## 2024-08-10 RX ORDER — MUPIROCIN 20 MG/G
OINTMENT TOPICAL 2 TIMES DAILY
Status: DISCONTINUED | OUTPATIENT
Start: 2024-08-10 | End: 2024-08-12 | Stop reason: HOSPADM

## 2024-08-10 RX ORDER — HYDRALAZINE HYDROCHLORIDE 20 MG/ML
10 INJECTION INTRAMUSCULAR; INTRAVENOUS EVERY 6 HOURS PRN
Status: DISCONTINUED | OUTPATIENT
Start: 2024-08-10 | End: 2024-08-12 | Stop reason: HOSPADM

## 2024-08-10 RX ORDER — CYPROHEPTADINE HYDROCHLORIDE 4 MG/1
4 TABLET ORAL 2 TIMES DAILY
Status: DISCONTINUED | OUTPATIENT
Start: 2024-08-10 | End: 2024-08-12 | Stop reason: HOSPADM

## 2024-08-10 RX ORDER — SODIUM CHLORIDE 9 MG/ML
INJECTION, SOLUTION INTRAVENOUS
Status: DISCONTINUED | OUTPATIENT
Start: 2024-08-10 | End: 2024-08-12 | Stop reason: HOSPADM

## 2024-08-10 RX ORDER — CLOPIDOGREL BISULFATE 75 MG/1
75 TABLET ORAL DAILY
Status: DISCONTINUED | OUTPATIENT
Start: 2024-08-11 | End: 2024-08-12 | Stop reason: HOSPADM

## 2024-08-10 RX ORDER — ONDANSETRON HYDROCHLORIDE 2 MG/ML
4 INJECTION, SOLUTION INTRAVENOUS EVERY 8 HOURS PRN
Status: DISCONTINUED | OUTPATIENT
Start: 2024-08-10 | End: 2024-08-12 | Stop reason: HOSPADM

## 2024-08-10 RX ORDER — POLYETHYLENE GLYCOL 3350 17 G/17G
17 POWDER, FOR SOLUTION ORAL DAILY PRN
Status: DISCONTINUED | OUTPATIENT
Start: 2024-08-10 | End: 2024-08-12 | Stop reason: HOSPADM

## 2024-08-10 RX ORDER — ACETAMINOPHEN 325 MG/1
650 TABLET ORAL EVERY 4 HOURS PRN
Status: DISCONTINUED | OUTPATIENT
Start: 2024-08-10 | End: 2024-08-12 | Stop reason: HOSPADM

## 2024-08-10 RX ORDER — ATORVASTATIN CALCIUM 40 MG/1
80 TABLET, FILM COATED ORAL NIGHTLY
Status: DISCONTINUED | OUTPATIENT
Start: 2024-08-10 | End: 2024-08-12 | Stop reason: HOSPADM

## 2024-08-10 RX ORDER — AMOXICILLIN 250 MG
1 CAPSULE ORAL 2 TIMES DAILY
Status: DISCONTINUED | OUTPATIENT
Start: 2024-08-10 | End: 2024-08-12 | Stop reason: HOSPADM

## 2024-08-10 RX ORDER — IPRATROPIUM BROMIDE AND ALBUTEROL SULFATE 2.5; .5 MG/3ML; MG/3ML
3 SOLUTION RESPIRATORY (INHALATION) EVERY 6 HOURS PRN
Status: DISCONTINUED | OUTPATIENT
Start: 2024-08-10 | End: 2024-08-12 | Stop reason: HOSPADM

## 2024-08-10 RX ORDER — IBUPROFEN 200 MG
24 TABLET ORAL
Status: DISCONTINUED | OUTPATIENT
Start: 2024-08-10 | End: 2024-08-12 | Stop reason: HOSPADM

## 2024-08-10 RX ORDER — NAPROXEN SODIUM 220 MG/1
81 TABLET, FILM COATED ORAL NIGHTLY
Status: DISCONTINUED | OUTPATIENT
Start: 2024-08-10 | End: 2024-08-12 | Stop reason: HOSPADM

## 2024-08-10 RX ORDER — IBUPROFEN 200 MG
16 TABLET ORAL
Status: DISCONTINUED | OUTPATIENT
Start: 2024-08-10 | End: 2024-08-12 | Stop reason: HOSPADM

## 2024-08-10 RX ADMIN — CYPROHEPTADINE HYDROCHLORIDE 4 MG: 4 TABLET ORAL at 10:08

## 2024-08-10 RX ADMIN — HEPARIN SODIUM 5000 UNITS: 5000 INJECTION INTRAVENOUS; SUBCUTANEOUS at 10:08

## 2024-08-10 RX ADMIN — MUPIROCIN: 20 OINTMENT TOPICAL at 10:08

## 2024-08-10 RX ADMIN — ATORVASTATIN CALCIUM 80 MG: 40 TABLET, FILM COATED ORAL at 10:08

## 2024-08-10 RX ADMIN — ISOSORBIDE MONONITRATE 30 MG: 30 TABLET, EXTENDED RELEASE ORAL at 10:08

## 2024-08-10 RX ADMIN — ASPIRIN 81 MG 81 MG: 81 TABLET ORAL at 09:08

## 2024-08-10 RX ADMIN — CARVEDILOL 6.25 MG: 6.25 TABLET, FILM COATED ORAL at 10:08

## 2024-08-10 NOTE — PLAN OF CARE
08/10/24 1631   Admission   Initial VN Admission Questions Complete   Communication Issues? None   Shift   Virtual Nurse - Rounding Complete   Pain Management Interventions care clustered;quiet environment facilitated   Virtual Nurse - Patient Verbalized Approval Of Camera Use;VN Rounding   Type of Frequent Check   Type Patient Rounds   Safety/Activity   Patient Rounds bed in low position;call light in patient/parent reach;clutter free environment maintained;ID band on;placement of personal items at bedside;visualized patient   Safety Promotion/Fall Prevention assistive device/personal item within reach;Fall Risk reviewed with patient/family;Fall Risk signage in place;instructed to call staff for mobility;medications reviewed;patient expresses understanding of fall risk and prevention   Positioning   Body Position supine;sitting up in bed;position changed independently   Head of Bed (HOB) Positioning HOB elevated   Pain/Comfort/Sleep   Preferred Pain Scale number (Numeric Rating Pain Scale)   Comfort/Acceptable Pain Level 0   Sleep/Rest/Relaxation no problem identified   Cardiac   Cardiac/Telemetry Monitor On Yes      VIRTUAL NURSE: Pt arrived to unit. Permission received to turn camera to view patient. VIP model explained; Patient informed this VN will be working with bedside nurse and the rest of the care team.      Admission questions completed.  Plan of care reviewed with patient.  Educated patient on VTE and fall risk. Safety precautions in place. Call light within reach, side rails up x2.     Patient instucted to ask staff for assistance. Patient verbalized complete understanding.  Will continue to be available and intervene as needed.    Labs, notes, orders, and careplan reviewed.

## 2024-08-10 NOTE — ASSESSMENT & PLAN NOTE
-possibly due to patient with end-stage renal disease on hemodialysis  -we will try Aquaphor for now  -[facility]

## 2024-08-10 NOTE — SUBJECTIVE & OBJECTIVE
Past Medical History:   Diagnosis Date    BPH (benign prostatic hyperplasia)     Coronary artery disease     Diabetes mellitus, type 2     ESRD (end stage renal disease) on dialysis     ESRD on HD TTS via left brachial AVF    Hypertension     Hypothyroidism, unspecified     Symptomatic anemia 01/15/2024       Past Surgical History:   Procedure Laterality Date    bilateral foot surgery      ESOPHAGOGASTRODUODENOSCOPY N/A 2/27/2024    Procedure: EGD (ESOPHAGOGASTRODUODENOSCOPY);  Surgeon: Gemma Almendarez MD;  Location: [facility] ENDO;  Service: Endoscopy;  Laterality: N/A;    eyelid surgery      FISTULOGRAM Left 11/27/2019    Procedure: Fistulogram;  Surgeon: MELANY Brenner III, MD;  Location: [facility] OR [address] [place] [address];  Service: Peripheral Vascular;  Laterality: Left;    FISTULOGRAM Left 11/27/2019    Procedure: Fistulogram, AVG declot, possible permacath;  Surgeon: MELANY Brenner III, MD;  Location: [facility] CATH LAB;  Service: Peripheral Vascular;  Laterality: Left;    INSERTION OF DIALYSIS CATHETER      [facility]  [place]    right hand surgery      stents         Review of patient's allergies indicates:   Allergen Reactions    Ace inhibitors Itching     Is not sure which medication it was    Captopril        Current Facility-Administered Medications on File Prior to Encounter   Medication    [COMPLETED] aluminum-magnesium hydroxide-simethicone 200-200-20 mg/5 mL suspension 5 mL    [COMPLETED] aspirin tablet 325 mg    [COMPLETED] fentaNYL 50 mcg/mL injection 50 mcg    [COMPLETED] nitroGLYCERIN SL tablet 0.4 mg    [COMPLETED] nitroGLYCERIN SL tablet 0.4 mg    [COMPLETED] pantoprazole injection 40 mg    [DISCONTINUED] 0.9%  NaCl infusion (for blood administration)    [DISCONTINUED] 0.9%  NaCl infusion (for blood administration)    [DISCONTINUED] aspirin chewable tablet 81 mg    [DISCONTINUED] atorvastatin tablet 80 mg    [DISCONTINUED] carvediloL tablet 6.25 mg    [DISCONTINUED] clopidogreL tablet 75 mg    [DISCONTINUED]  cyproheptadine 4 mg tablet 4 mg    [DISCONTINUED] dextrose 10% bolus 125 mL 125 mL    [DISCONTINUED] dextrose 10% bolus 250 mL 250 mL    [DISCONTINUED] glucagon (human recombinant) injection 1 mg    [DISCONTINUED] glucose chewable tablet 16 g    [DISCONTINUED] glucose chewable tablet 24 g    [DISCONTINUED] insulin aspart U-100 pen 0-5 Units    [DISCONTINUED] isosorbide mononitrate 24 hr tablet 30 mg    [DISCONTINUED] levothyroxine tablet 50 mcg    [DISCONTINUED] ondansetron disintegrating tablet 4 mg    [DISCONTINUED] pantoprazole EC tablet 40 mg    [DISCONTINUED] sevelamer carbonate tablet 800 mg     Current Outpatient Medications on File Prior to Encounter   Medication Sig    aspirin 81 MG Chew Take 1 tablet (81 mg total) by mouth every evening.    atorvastatin (LIPITOR) 80 MG tablet Take 1 tablet (80 mg total) by mouth every evening.    carvediloL (COREG) 12.5 MG tablet Take 6.25 mg by mouth 2 (two) times daily.    clopidogreL (PLAVIX) 75 mg tablet Take 1 tablet (75 mg total) by mouth once daily.    cyproheptadine (PERIACTIN) 4 mg tablet Take 1 tablet (4 mg total) by mouth 2 (two) times daily.    isosorbide mononitrate (IMDUR) 30 MG 24 hr tablet Take 1 tablet (30 mg total) by mouth every evening.    levothyroxine (SYNTHROID) 50 MCG tablet Take 1 tablet (50 mcg total) by mouth before breakfast.    LIDOcaine (LIDODERM) 5 % Apply 1 patch topically daily as needed (pain).    nut.tx.imp.renal fxn,lac-reduc (NEPRO CARB STEADY) 0.08 gram-1.8 kcal/mL Liqd Take 240 mLs by mouth 2 (two) times a day.    ondansetron (ZOFRAN-ODT) 4 MG TbDL Take 1 tablet (4 mg total) by mouth every 8 (eight) hours as needed (nausea).    pantoprazole (PROTONIX) 20 MG tablet Take 20 mg by mouth 2 (two) times daily as needed.    sevelamer carbonate (RENVELA) 800 mg Tab Take 1 tablet (800 mg total) by mouth 3 (three) times daily with meals.    SITagliptin (ZITUVIO) 25 mg Tab Take 25 mg by mouth once daily.    [DISCONTINUED] ondansetron  (ZOFRAN-ODT) 4 MG TbDL Take 1 tablet (4 mg total) by mouth every 8 (eight) hours as needed.     Family History    None       Tobacco Use    Smoking status: Never    Smokeless tobacco: Never   Substance and Sexual Activity    Alcohol use: No    Drug use: No    Sexual activity: Not Currently     Review of Systems   Constitutional:  Negative for activity change, chills and fever.   HENT:  Negative for congestion, rhinorrhea and sore throat.    Eyes:  Positive for itching. Negative for discharge and redness.   Respiratory:  Positive for shortness of breath. Negative for cough, chest tightness and wheezing.    Cardiovascular:  Positive for chest pain. Negative for palpitations and leg swelling.   Gastrointestinal:  Positive for constipation. Negative for abdominal pain, diarrhea, nausea and vomiting.   Genitourinary:  Negative for dysuria, flank pain and hematuria.   Musculoskeletal:  Negative for back pain, myalgias and neck pain.   Skin:  Positive for rash. Negative for pallor and wound.        Rash on the back of his neck, bilateral arms and legs   Neurological:  Negative for dizziness, tremors, syncope, weakness, numbness and headaches.   Psychiatric/Behavioral:  Negative for agitation, confusion and hallucinations.      Objective:     Vital Signs (Most Recent):  Temp: 97.6 °F (36.4 °C) (08/10/24 1558)  Pulse: 73 (08/10/24 1626)  Resp: 18 (08/10/24 1558)  BP: (!) 134/104 (08/10/24 1558)  SpO2: 99 % (08/10/24 1558) Vital Signs (24h Range):  Temp:  [97.2 °F (36.2 °C)-98.5 °F (36.9 °C)] 97.6 °F (36.4 °C)  Pulse:  [69-97] 73  Resp:  [11-37] 18  SpO2:  [95 %-100 %] 99 %  BP: (114-224)/() 134/104        There is no height or weight on file to calculate BMI.     Physical Exam  Vitals and nursing note reviewed.   Constitutional:       General: He is not in acute distress.  HENT:      Head: Normocephalic and atraumatic.      Mouth/Throat:      Mouth: Mucous membranes are moist.   Eyes:      General:         Right eye:  No discharge.         Left eye: No discharge.      Conjunctiva/sclera: Conjunctivae normal.   Cardiovascular:      Rate and Rhythm: Normal rate and regular rhythm.      Pulses: Normal pulses.   Pulmonary:      Effort: Pulmonary effort is normal.      Breath sounds: Normal breath sounds.   Abdominal:      General: Bowel sounds are normal.      Palpations: Abdomen is soft.      Tenderness: There is no abdominal tenderness.   Musculoskeletal:         General: No swelling. Normal range of motion.      Cervical back: Normal range of motion and neck supple.   Skin:     General: Skin is warm and dry.      Findings: Rash present.      Comments: Dry rash on back of neck and bilateral arms and legs.  Scratch marks present.   Neurological:      Mental Status: He is alert and oriented to person, place, and time. Mental status is at baseline.   Psychiatric:         Mood and Affect: Mood normal.                Significant Labs: All pertinent labs within the past 24 hours have been reviewed.    Significant Imaging: I have reviewed all pertinent imaging results/findings within the past 24 hours.

## 2024-08-10 NOTE — ASSESSMENT & PLAN NOTE
Chronic, uncontrolled. Latest blood pressure and vitals reviewed-     Temp:  [97.2 °F (36.2 °C)-98.5 °F (36.9 °C)]   Pulse:  [69-97]   Resp:  [11-37]   BP: (114-224)/()   SpO2:  [95 %-100 %] .   Home meds for hypertension were reviewed and noted below.   Hypertension Medications               carvediloL (COREG) 12.5 MG tablet Take 6.25 mg by mouth 2 (two) times daily.    isosorbide mononitrate (IMDUR) 30 MG 24 hr tablet Take 1 tablet (30 mg total) by mouth every evening.            While in the hospital, will manage blood pressure as follows; Continue home antihypertensive regimen    Will utilize p.r.n. blood pressure medication only if patient's blood pressure greater than 160/100 and he develops symptoms such as worsening chest pain or shortness of breath.    -hydralazine p.r.n. for systolic greater than 160

## 2024-08-10 NOTE — ASSESSMENT & PLAN NOTE
-resolved on its own  -chest x-ray does not show any acute abnormality  -supplemental oxygen as needed  -bronchodilators p.r.n.  -follow up BNP

## 2024-08-10 NOTE — ASSESSMENT & PLAN NOTE
Creatine stable for now. BMP reviewed- noted CrCl cannot be calculated (Unknown ideal weight.). according to latest data. 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  Monitor UOP and serial BMP and adjust therapy as needed. Renally dose meds. Avoid nephrotoxic medications and procedures.    -hemodialysis on Tuesday, Thursday and Saturdays  -nephrology consult placed

## 2024-08-10 NOTE — H&P
[facility] Medicine  History & Physical    Patient Name: Jevon Rajan  MRN: [id number]  Patient Class: OP- Observation  Admission Date: 8/10/2024  Attending Physician: Antonella Bob MD   Primary Care Provider: Melia Primary Doctor         Patient information was obtained from patient and ER records.     Subjective:     Principal Problem:Unstable angina    Chief Complaint:   Chief Complaint   Patient presents with    Chest Pain    Shortness of Breath        HPI: The patient was an 84-year-old male with a past medical history of end-stage renal disease on hemodialysis, CAD, type 2 diabetes, liver mass [facility], anemia with multiple transfusions.  The patient came to emergency department because of shortness of breath which she states started a few months ago but worsened yesterday.  He states it was difficult for him to take deep breath and he became concerned.  The shortness of breath was later associated with chest pain focused below his neck area.  He stated it felt like pressure but was episodic.  He denies radiation.  Patient also stated he had nausea with vomiting yesterday.  He has a history of constipation.  Patient was also stated that he was noticed a rash on his bilateral arms, legs and the back of his neck.  He was rescheduled [facility] an [facility].  On further questioning the patient stated he vomited once and it contained food contents.  The patient was seen at [facility] but was later transferred to [place] for Cardiology evaluation of his chest pain.  On arrival patient denies shortness of breath and states his chest pain is episodic.  He denies chest pain at the moment.  He also denies nausea and vomiting at this time.  The patient was main complaints were the rash on his extremities and constipation.          Past Medical History:   Diagnosis Date    BPH (benign prostatic hyperplasia)     Coronary  artery disease     Diabetes mellitus, type 2     ESRD (end stage renal disease) on dialysis     ESRD on HD TTS via left brachial AVF    Hypertension     Hypothyroidism, unspecified     Symptomatic anemia 01/15/2024       Past Surgical History:   Procedure Laterality Date    bilateral foot surgery      ESOPHAGOGASTRODUODENOSCOPY N/A 2/27/2024    Procedure: EGD (ESOPHAGOGASTRODUODENOSCOPY);  Surgeon: Gemma Almendarez MD;  Location: [facility] ENDO;  Service: Endoscopy;  Laterality: N/A;    eyelid surgery      FISTULOGRAM Left 11/27/2019    Procedure: Fistulogram;  Surgeon: MELANY Brenner III, MD;  [address];  Service: Peripheral Vascular;  Laterality: Left;    FISTULOGRAM Left 11/27/2019    Procedure: Fistulogram, AVG declot, possible permacath;  Surgeon: MELANY Brenner III, MD;  Location: [facility] CATH LAB;  Service: Peripheral Vascular;  Laterality: Left;    INSERTION OF DIALYSIS CATHETER      [facility]  12/5/13    right hand surgery      stents         Review of patient's allergies indicates:   Allergen Reactions    Ace inhibitors Itching     Is not sure which medication it was    Captopril        Current Facility-Administered Medications on File Prior to Encounter   Medication    [COMPLETED] aluminum-magnesium hydroxide-simethicone 200-200-20 mg/5 mL suspension 5 mL    [COMPLETED] aspirin tablet 325 mg    [COMPLETED] fentaNYL 50 mcg/mL injection 50 mcg    [COMPLETED] nitroGLYCERIN SL tablet 0.4 mg    [COMPLETED] nitroGLYCERIN SL tablet 0.4 mg    [COMPLETED] pantoprazole injection 40 mg    [DISCONTINUED] 0.9%  NaCl infusion (for blood administration)    [DISCONTINUED] 0.9%  NaCl infusion (for blood administration)    [DISCONTINUED] aspirin chewable tablet 81 mg    [DISCONTINUED] atorvastatin tablet 80 mg    [DISCONTINUED] carvediloL tablet 6.25 mg    [DISCONTINUED] clopidogreL tablet 75 mg    [DISCONTINUED] cyproheptadine 4 mg tablet 4 mg    [DISCONTINUED] dextrose 10% bolus 125 mL 125 mL    [DISCONTINUED]  dextrose 10% bolus 250 mL 250 mL    [DISCONTINUED] glucagon (human recombinant) injection 1 mg    [DISCONTINUED] glucose chewable tablet 16 g    [DISCONTINUED] glucose chewable tablet 24 g    [DISCONTINUED] insulin aspart U-100 pen 0-5 Units    [DISCONTINUED] isosorbide mononitrate 24 hr tablet 30 mg    [DISCONTINUED] levothyroxine tablet 50 mcg    [DISCONTINUED] ondansetron disintegrating tablet 4 mg    [DISCONTINUED] pantoprazole EC tablet 40 mg    [DISCONTINUED] sevelamer carbonate tablet 800 mg     Current Outpatient Medications on File Prior to Encounter   Medication Sig    aspirin 81 MG Chew Take 1 tablet (81 mg total) by mouth every evening.    atorvastatin (LIPITOR) 80 MG tablet Take 1 tablet (80 mg total) by mouth every evening.    carvediloL (COREG) 12.5 MG tablet Take 6.25 mg by mouth 2 (two) times daily.    clopidogreL (PLAVIX) 75 mg tablet Take 1 tablet (75 mg total) by mouth once daily.    cyproheptadine (PERIACTIN) 4 mg tablet Take 1 tablet (4 mg total) by mouth 2 (two) times daily.    isosorbide mononitrate (IMDUR) 30 MG 24 hr tablet Take 1 tablet (30 mg total) by mouth every evening.    levothyroxine (SYNTHROID) 50 MCG tablet Take 1 tablet (50 mcg total) by mouth before breakfast.    LIDOcaine (LIDODERM) 5 % Apply 1 patch topically daily as needed (pain).    nut.tx.imp.renal fxn,lac-reduc (NEPRO CARB STEADY) 0.08 gram-1.8 kcal/mL Liqd Take 240 mLs by mouth 2 (two) times a day.    ondansetron (ZOFRAN-ODT) 4 MG TbDL Take 1 tablet (4 mg total) by mouth every 8 (eight) hours as needed (nausea).    pantoprazole (PROTONIX) 20 MG tablet Take 20 mg by mouth 2 (two) times daily as needed.    sevelamer carbonate (RENVELA) 800 mg Tab Take 1 tablet (800 mg total) by mouth 3 (three) times daily with meals.    SITagliptin (ZITUVIO) 25 mg Tab Take 25 mg by mouth once daily.    [DISCONTINUED] ondansetron (ZOFRAN-ODT) 4 MG TbDL Take 1 tablet (4 mg total) by mouth every 8 (eight) hours as needed.     Family History     None       Tobacco Use    Smoking status: Never    Smokeless tobacco: Never   Substance and Sexual Activity    Alcohol use: No    Drug use: No    Sexual activity: Not Currently     Review of Systems   Constitutional:  Negative for activity change, chills and fever.   HENT:  Negative for congestion, rhinorrhea and sore throat.    Eyes:  Positive for itching. Negative for discharge and redness.   Respiratory:  Positive for shortness of breath. Negative for cough, chest tightness and wheezing.    Cardiovascular:  Positive for chest pain. Negative for palpitations and leg swelling.   Gastrointestinal:  Positive for constipation. Negative for abdominal pain, diarrhea, nausea and vomiting.   Genitourinary:  Negative for dysuria, flank pain and hematuria.   Musculoskeletal:  Negative for back pain, myalgias and neck pain.   Skin:  Positive for rash. Negative for pallor and wound.        Rash on the back of his neck, bilateral arms and legs   Neurological:  Negative for dizziness, tremors, syncope, weakness, numbness and headaches.   Psychiatric/Behavioral:  Negative for agitation, confusion and hallucinations.      Objective:     Vital Signs (Most Recent):  Temp: 97.6 °F (36.4 °C) (08/10/24 1558)  Pulse: 73 (08/10/24 1626)  Resp: 18 (08/10/24 1558)  BP: (!) 134/104 (08/10/24 1558)  SpO2: 99 % (08/10/24 1558) Vital Signs (24h Range):  Temp:  [97.2 °F (36.2 °C)-98.5 °F (36.9 °C)] 97.6 °F (36.4 °C)  Pulse:  [69-97] 73  Resp:  [11-37] 18  SpO2:  [95 %-100 %] 99 %  BP: (114-224)/() 134/104        There is no height or weight on file to calculate BMI.     Physical Exam  Vitals and nursing note reviewed.   Constitutional:       General: He is not in acute distress.  HENT:      Head: Normocephalic and atraumatic.      Mouth/Throat:      Mouth: Mucous membranes are moist.   Eyes:      General:         Right eye: No discharge.         Left eye: No discharge.      Conjunctiva/sclera: Conjunctivae normal.   Cardiovascular:       Rate and Rhythm: Normal rate and regular rhythm.      Pulses: Normal pulses.   Pulmonary:      Effort: Pulmonary effort is normal.      Breath sounds: Normal breath sounds.   Abdominal:      General: Bowel sounds are normal.      Palpations: Abdomen is soft.      Tenderness: There is no abdominal tenderness.   Musculoskeletal:         General: No swelling. Normal range of motion.      Cervical back: Normal range of motion and neck supple.   Skin:     General: Skin is warm and dry.      Findings: Rash present.      Comments: Dry rash on back of neck and bilateral arms and legs.  Scratch marks present.   Neurological:      Mental Status: He is alert and oriented to person, place, and time. Mental status is at baseline.   Psychiatric:         Mood and Affect: Mood normal.                Significant Labs: All pertinent labs within the past 24 hours have been reviewed.    Significant Imaging: I have reviewed all pertinent imaging results/findings within the past 24 hours.  Assessment/Plan:     * Unstable angina    -cardiology consulted  -NPO for now  -continue with aspirin, beta-blockers, statins  -telemetry monitor  -chest pain resolved for now, though episodic    Dry eyes  -continue with artificial tears  -monitor      Rash  -possibly due to patient with end-stage renal disease on hemodialysis  -we will try Aquaphor for now  -[facility]      Symptomatic anemia  Anemia is likely due to chronic disease due to ESRD. Most recent hemoglobin and hematocrit are listed below.  Recent Labs     08/09/24  1945 08/10/24  1215   HGB 6.6* 9.0*   HCT 21.2* 27.9*     Plan  - Monitor serial CBC: Daily  - Transfuse PRBC if patient becomes hemodynamically unstable, symptomatic or H/H drops below 7/21.  - Patient has received 1 units of PRBCs on 08/10/24  - Patient's anemia is currently stable  - follow up anemia workup  - stool occult    Shortness of breath  -resolved on its own  -chest x-ray does not  show any acute abnormality  -supplemental oxygen as needed  -bronchodilators p.r.n.  -follow up BNP      Hypothyroidism due to acquired atrophy of thyroid    -continue with home meds  -follow up TSH    ESRD on hemodialysis  Creatine stable for now. BMP reviewed- noted CrCl cannot be calculated (Unknown ideal weight.). according to latest data. 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  Monitor UOP and serial BMP and adjust therapy as needed. Renally dose meds. Avoid nephrotoxic medications and procedures.    -hemodialysis on Tuesday, Thursday and Saturdays  -nephrology consult placed    Type 2 diabetes mellitus, without long-term current use of insulin    -sliding scale  -glucometer checks  -patient currently NPO until cleared by Cardiology    HLD (hyperlipidemia)  -continue with home meds  -follow up lipid panel      HTN (hypertension)  Chronic, uncontrolled. Latest blood pressure and vitals reviewed-     Temp:  [97.2 °F (36.2 °C)-98.5 °F (36.9 °C)]   Pulse:  [69-97]   Resp:  [11-37]   BP: (114-224)/()   SpO2:  [95 %-100 %] .   Home meds for hypertension were reviewed and noted below.   Hypertension Medications               carvediloL (COREG) 12.5 MG tablet Take 6.25 mg by mouth 2 (two) times daily.    isosorbide mononitrate (IMDUR) 30 MG 24 hr tablet Take 1 tablet (30 mg total) by mouth every evening.            While in the hospital, will manage blood pressure as follows; Continue home antihypertensive regimen    Will utilize p.r.n. blood pressure medication only if patient's blood pressure greater than 160/100 and he develops symptoms such as worsening chest pain or shortness of breath.    -hydralazine p.r.n. for systolic greater than 160      VTE Risk Mitigation (From admission, onward)           Ordered     heparin (porcine) injection 5,000 Units  Every 8 hours         08/10/24 1652     IP VTE HIGH RISK PATIENT  Once         08/10/24 1652     Place sequential compression device  Until discontinued          08/10/24 1652                       On 08/10/2024, patient should be placed in hospital observation services under my care.             Luis A Abbott MD  Department of Hospital Medicine  [place] [facility]

## 2024-08-10 NOTE — HPI
The patient was an 84-year-old male with a past medical history of end-stage renal disease on hemodialysis, CAD, type 2 diabetes, liver mass for which [facility], anemia with multiple transfusions.  The patient came to emergency department because of shortness of breath which she states started a few months ago but worsened yesterday.  He states it was difficult for him to take deep breath and he became concerned.  The shortness of breath was later associated with chest pain focused below his neck area.  He stated it felt like pressure but was episodic.  He denies radiation.  Patient also stated he had nausea with vomiting yesterday.  He has a history of constipation.  Patient was also stated that he was noticed a rash on his bilateral arms, legs and the back of his neck.  He was rescheduled for an [facility].  On further questioning the patient stated he vomited once and it contained food contents.  The patient was seen at [facility] but was later transferred to [place] for Cardiology evaluation of his chest pain.  On arrival patient denies shortness of breath and states his chest pain is episodic.  He denies chest pain at the moment.  He also denies nausea and vomiting at this time.  The patient was main complaints were the rash on his extremities and constipation.

## 2024-08-10 NOTE — ASSESSMENT & PLAN NOTE
-cardiology consulted  -NPO for now  -continue with aspirin, beta-blockers, statins  -telemetry monitor  -chest pain resolved for now, though episodic

## 2024-08-10 NOTE — ASSESSMENT & PLAN NOTE
Anemia is likely due to chronic disease due to ESRD. Most recent hemoglobin and hematocrit are listed below.  Recent Labs     08/09/24  1945 08/10/24  1215   HGB 6.6* 9.0*   HCT 21.2* 27.9*     Plan  - Monitor serial CBC: Daily  - Transfuse PRBC if patient becomes hemodynamically unstable, symptomatic or H/H drops below 7/21.  - Patient has received 1 units of PRBCs on 08/10/24  - Patient's anemia is currently stable  - follow up anemia workup  - stool occult

## 2024-08-11 LAB
ANION GAP SERPL CALC-SCNC: 14 MMOL/L (ref 8–16)
BNP SERPL-MCNC: 2178 PG/ML (ref 0–99)
BUN SERPL-MCNC: 73 MG/DL (ref 8–23)
CALCIUM SERPL-MCNC: 8.9 MG/DL (ref 8.7–10.5)
CHLORIDE SERPL-SCNC: 101 MMOL/L (ref 95–110)
CHOLEST SERPL-MCNC: 110 MG/DL (ref 120–199)
CHOLEST/HDLC SERPL: 4.2 {RATIO} (ref 2–5)
CO2 SERPL-SCNC: 24 MMOL/L (ref 23–29)
CREAT SERPL-MCNC: 12 MG/DL (ref 0.5–1.4)
ERYTHROCYTE [DISTWIDTH] IN BLOOD BY AUTOMATED COUNT: 20.5 % (ref 11.5–14.5)
EST. GFR  (NO RACE VARIABLE): 4 ML/MIN/1.73 M^2
ESTIMATED AVG GLUCOSE: 103 MG/DL (ref 68–131)
FERRITIN SERPL-MCNC: 979 NG/ML (ref 20–300)
FOLATE SERPL-MCNC: 12.1 NG/ML (ref 4–24)
GLUCOSE SERPL-MCNC: 82 MG/DL (ref 70–110)
HBA1C MFR BLD: 5.2 % (ref 4–5.6)
HCT VFR BLD AUTO: 25.7 % (ref 40–54)
HDLC SERPL-MCNC: 26 MG/DL (ref 40–75)
HDLC SERPL: 23.6 % (ref 20–50)
HGB BLD-MCNC: 8.2 G/DL (ref 14–18)
IRON SERPL-MCNC: 39 UG/DL (ref 45–160)
LDLC SERPL CALC-MCNC: 58.8 MG/DL (ref 63–159)
MAGNESIUM SERPL-MCNC: 2.1 MG/DL (ref 1.6–2.6)
MCH RBC QN AUTO: 28.8 PG (ref 27–31)
MCHC RBC AUTO-ENTMCNC: 31.9 G/DL (ref 32–36)
MCV RBC AUTO: 90 FL (ref 82–98)
NONHDLC SERPL-MCNC: 84 MG/DL
PHOSPHATE SERPL-MCNC: 5.1 MG/DL (ref 2.7–4.5)
PLATELET # BLD AUTO: 181 K/UL (ref 150–450)
PMV BLD AUTO: 10.7 FL (ref 9.2–12.9)
POCT GLUCOSE: 79 MG/DL (ref 70–110)
POCT GLUCOSE: 80 MG/DL (ref 70–110)
POCT GLUCOSE: 94 MG/DL (ref 70–110)
POCT GLUCOSE: 95 MG/DL (ref 70–110)
POTASSIUM SERPL-SCNC: 4.5 MMOL/L (ref 3.5–5.1)
RBC # BLD AUTO: 2.85 M/UL (ref 4.6–6.2)
SATURATED IRON: 19 % (ref 20–50)
SODIUM SERPL-SCNC: 139 MMOL/L (ref 136–145)
TOTAL IRON BINDING CAPACITY: 206 UG/DL (ref 250–450)
TRANSFERRIN SERPL-MCNC: 139 MG/DL (ref 200–375)
TRIGL SERPL-MCNC: 126 MG/DL (ref 30–150)
TSH SERPL DL<=0.005 MIU/L-ACNC: 3.95 UIU/ML (ref 0.4–4)
VIT B12 SERPL-MCNC: 670 PG/ML (ref 210–950)
WBC # BLD AUTO: 7.32 K/UL (ref 3.9–12.7)

## 2024-08-11 PROCEDURE — 80061 LIPID PANEL: CPT | Performed by: INTERNAL MEDICINE

## 2024-08-11 PROCEDURE — 96372 THER/PROPH/DIAG INJ SC/IM: CPT | Performed by: INTERNAL MEDICINE

## 2024-08-11 PROCEDURE — G0378 HOSPITAL OBSERVATION PER HR: HCPCS

## 2024-08-11 PROCEDURE — 80100014 HC HEMODIALYSIS 1:1

## 2024-08-11 PROCEDURE — 83036 HEMOGLOBIN GLYCOSYLATED A1C: CPT | Performed by: INTERNAL MEDICINE

## 2024-08-11 PROCEDURE — 80048 BASIC METABOLIC PNL TOTAL CA: CPT | Performed by: INTERNAL MEDICINE

## 2024-08-11 PROCEDURE — 25000003 PHARM REV CODE 250: Performed by: INTERNAL MEDICINE

## 2024-08-11 PROCEDURE — 63600175 PHARM REV CODE 636 W HCPCS: Performed by: INTERNAL MEDICINE

## 2024-08-11 PROCEDURE — 25000003 PHARM REV CODE 250

## 2024-08-11 PROCEDURE — 85027 COMPLETE CBC AUTOMATED: CPT | Performed by: INTERNAL MEDICINE

## 2024-08-11 PROCEDURE — G0257 UNSCHED DIALYSIS ESRD PT HOS: HCPCS

## 2024-08-11 PROCEDURE — 82746 ASSAY OF FOLIC ACID SERUM: CPT | Performed by: INTERNAL MEDICINE

## 2024-08-11 PROCEDURE — 83880 ASSAY OF NATRIURETIC PEPTIDE: CPT | Performed by: INTERNAL MEDICINE

## 2024-08-11 PROCEDURE — 83540 ASSAY OF IRON: CPT | Performed by: INTERNAL MEDICINE

## 2024-08-11 PROCEDURE — 94761 N-INVAS EAR/PLS OXIMETRY MLT: CPT

## 2024-08-11 PROCEDURE — 82607 VITAMIN B-12: CPT | Performed by: INTERNAL MEDICINE

## 2024-08-11 PROCEDURE — 36415 COLL VENOUS BLD VENIPUNCTURE: CPT | Performed by: INTERNAL MEDICINE

## 2024-08-11 PROCEDURE — 82728 ASSAY OF FERRITIN: CPT | Performed by: INTERNAL MEDICINE

## 2024-08-11 PROCEDURE — 83735 ASSAY OF MAGNESIUM: CPT | Performed by: INTERNAL MEDICINE

## 2024-08-11 PROCEDURE — 99900035 HC TECH TIME PER 15 MIN (STAT)

## 2024-08-11 PROCEDURE — 84100 ASSAY OF PHOSPHORUS: CPT | Performed by: INTERNAL MEDICINE

## 2024-08-11 PROCEDURE — 84443 ASSAY THYROID STIM HORMONE: CPT | Performed by: INTERNAL MEDICINE

## 2024-08-11 RX ORDER — SODIUM CHLORIDE 9 MG/ML
INJECTION, SOLUTION INTRAVENOUS
Status: DISCONTINUED | OUTPATIENT
Start: 2024-08-11 | End: 2024-08-12 | Stop reason: HOSPADM

## 2024-08-11 RX ORDER — SODIUM CHLORIDE 9 MG/ML
INJECTION, SOLUTION INTRAVENOUS ONCE
Status: DISCONTINUED | OUTPATIENT
Start: 2024-08-11 | End: 2024-08-12 | Stop reason: HOSPADM

## 2024-08-11 RX ADMIN — CYPROHEPTADINE HYDROCHLORIDE 4 MG: 4 TABLET ORAL at 09:08

## 2024-08-11 RX ADMIN — HYPROMELLOSE 2910 1 DROP: 5 SOLUTION/ DROPS OPHTHALMIC at 09:08

## 2024-08-11 RX ADMIN — SEVELAMER CARBONATE 800 MG: 800 TABLET, FILM COATED ORAL at 06:08

## 2024-08-11 RX ADMIN — ASPIRIN 81 MG 81 MG: 81 TABLET ORAL at 09:08

## 2024-08-11 RX ADMIN — HEPARIN SODIUM 5000 UNITS: 5000 INJECTION INTRAVENOUS; SUBCUTANEOUS at 09:08

## 2024-08-11 RX ADMIN — LEVOTHYROXINE SODIUM 50 MCG: 50 TABLET ORAL at 05:08

## 2024-08-11 RX ADMIN — AMMONIUM LACTATE: 12 LOTION TOPICAL at 09:08

## 2024-08-11 RX ADMIN — ATORVASTATIN CALCIUM 80 MG: 40 TABLET, FILM COATED ORAL at 09:08

## 2024-08-11 RX ADMIN — CLOPIDOGREL BISULFATE 75 MG: 75 TABLET ORAL at 09:08

## 2024-08-11 RX ADMIN — HYPROMELLOSE 2910 1 DROP: 5 SOLUTION/ DROPS OPHTHALMIC at 06:08

## 2024-08-11 RX ADMIN — HEPARIN SODIUM 5000 UNITS: 5000 INJECTION INTRAVENOUS; SUBCUTANEOUS at 05:08

## 2024-08-11 RX ADMIN — CARVEDILOL 6.25 MG: 6.25 TABLET, FILM COATED ORAL at 09:08

## 2024-08-11 RX ADMIN — ISOSORBIDE MONONITRATE 30 MG: 30 TABLET, EXTENDED RELEASE ORAL at 09:08

## 2024-08-11 RX ADMIN — AMMONIUM LACTATE: 12 LOTION TOPICAL at 05:08

## 2024-08-11 RX ADMIN — MUPIROCIN: 20 OINTMENT TOPICAL at 09:08

## 2024-08-11 NOTE — ASSESSMENT & PLAN NOTE
Anemia is likely due to chronic disease due to ESRD. Most recent hemoglobin and hematocrit are listed below.  Recent Labs     08/09/24  1945 08/10/24  1215 08/11/24  0332   HGB 6.6* 9.0* 8.2*   HCT 21.2* 27.9* 25.7*       Plan  - Monitor serial CBC: Daily  - Transfuse PRBC if patient becomes hemodynamically unstable, symptomatic or H/H drops below 7/21.  - Patient has received 1 units of PRBCs on 08/10/24  - Patient's anemia is currently stable  - follow up anemia workup  - stool occult pending

## 2024-08-11 NOTE — ASSESSMENT & PLAN NOTE
-resolved on its own  -chest x-ray does not show any acute abnormality  -supplemental oxygen as needed  -bronchodilators p.r.n.  -BNP 2170

## 2024-08-11 NOTE — CONSULTS
HPI:         Patient is a 84-year-old male with past medical history of end-stage renal disease on hemodialysis via left forearm fistula, history of CAD with MI in December 20, 2023 status post PCI of the OM, at the time patient noted to have patent LAD stents, also noted to have severe RCA stenosis, history of diabetes, echocardiogram in December 20, 2023 showed EF of 45% with mild mitral and aortic regurgitation, admitted to the hospital with shortness of breath, has had significant anemia, chronic weight loss and possible diagnosis of liver mass which is scheduled to undergo biopsy in the coming days, Cardiology was consulted for episode of chest pain and pressure that woke him up from sleep and was resolved with nitroglycerin.  Significant improvement in chest pain with nitroglycerin, starting aspirin therapy and given PRBC transfusion.          This morning the patient is chest pain-free.  No obvious source of bleeding identified.  I have discussed continuing to monitor hemoglobin while he is on dual antiplatelet therapy.    Results for orders placed during the hospital encounter of 09/11/23    Echo    Interpretation Summary    Left Ventricle: The left ventricle is normal in size. Increased wall thickness. Mild basal septal thickening. See diagram for wall motion findings. There is low normal systolic function with a visually estimated ejection fraction of 50 - 55%. Grade I diastolic dysfunction.    Left Atrium: Left atrium is mildly dilated.    Right Ventricle: Normal right ventricular cavity size. Wall thickness is normal. Right ventricle wall motion  is normal. Systolic function is normal.    Pulmonic Valve: There is mild regurgitation.    Pulmonary Artery: No pulmonary hypertension.      No results found for this or any previous visit.      Results for orders placed during the hospital encounter of 11/26/19    Cardiac catheterization    Narrative  Procedure performed in the Invasive Lab  - See Procedure  Log link below for nursing documentation  - See OpNote on Surgeries Tab for physician findings      [unfilled]       Patient Active Problem List    Diagnosis Date Noted    Symptomatic anemia 08/10/2024    Unstable angina 08/10/2024    Rash 08/10/2024    Dry eyes 08/10/2024    Inadequate dietary intake of protein 07/10/2024    Shortness of breath 06/12/2024    DM2 (diabetes mellitus, type 2) 06/02/2024    Discharge planning issues 06/02/2024    Renovascular hypertension 04/01/2024    Liver mass 02/26/2024    Anemia of chronic disease 01/15/2024    Chest pain 01/15/2024    Elevated troponin 01/15/2024    Hypothyroidism due to acquired atrophy of thyroid 01/15/2024    Aortic atherosclerosis 08/30/2023    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   -LHC (12/30/14) nstemi: pLAD 90%, OM1 70% ISR, RCA li's, LVEDP 39   -resolute 3.0 x 18 XU to prox LAD post-dilated with 3.0 NC  -TTE (12/29/14): EF 55%, E/e; 9      HTN (hypertension) 12/29/2014     Dx updated per 2019 IMO Load      HLD (hyperlipidemia) 12/29/2014    Mohs defect of ala nasi 01/07/2014                    LAST HbA1c  Lab Results   Component Value Date    HGBA1C 5.2 08/11/2024       Lipid panel  Lab Results   Component Value Date    CHOL 110 (L) 08/11/2024    CHOL 209 (H) 12/30/2014     Lab Results   Component Value Date    HDL 26 (L) 08/11/2024    HDL 37 (L) 12/30/2014     Lab Results   Component Value Date    LDLCALC 58.8 (L) 08/11/2024    LDLCALC 158.6 12/30/2014     Lab Results   Component Value Date    TRIG 126 08/11/2024    TRIG 67  12/30/2014     Lab Results   Component Value Date    CHOLHDL 23.6 08/11/2024    CHOLHDL 17.7 (L) 12/30/2014            Review of Systems   Constitutional: Negative for chills and fever.   HENT:  Negative for hearing loss and nosebleeds.    Eyes:  Negative for blurred vision.   Cardiovascular:  Negative for leg swelling and palpitations.   Respiratory:  Negative for hemoptysis and shortness of breath.    Hematologic/Lymphatic: Negative for bleeding problem.   Skin:  Negative for itching.   Musculoskeletal:  Negative for falls.   Gastrointestinal:  Negative for abdominal pain and hematochezia.   Genitourinary:  Negative for hematuria.   Neurological:  Negative for dizziness and loss of balance.   Psychiatric/Behavioral:  Negative for altered mental status and depression.        Objective:   Physical Exam  Constitutional:       Appearance: He is well-developed.   HENT:      Head: Normocephalic and atraumatic.   Eyes:      Conjunctiva/sclera: Conjunctivae normal.   Neck:      Vascular: No carotid bruit or JVD.   Cardiovascular:      Rate and Rhythm: Normal rate and regular rhythm.      Pulses:           Carotid pulses are 2+ on the right side and 2+ on the left side.       Radial pulses are 2+ on the right side and 2+ on the left side.      Heart sounds: Murmur heard.      No friction rub. No gallop.   Pulmonary:      Effort: Pulmonary effort is normal. No respiratory distress.      Breath sounds: Normal breath sounds. No stridor. No wheezing.   Musculoskeletal:      Cervical back: Neck supple.      Comments:   Left forearm fistula   Skin:     General: Skin is warm and dry.   Neurological:      Mental Status: He is alert and oriented to person, place, and time.   Psychiatric:         Behavior: Behavior normal.         Assessment:     1. Unstable angina    2. Chest pain        Plan:       -  likely etiology of the patient's chest pain seems to be a combination of multiple risk factors including known history of RCA  stenosis which has not been treated yet in the presence of significant anemia.  I do not see any urgent indication for invasive angiogram at this point.  I recommend continuing to monitor hemoglobin while he has restarted back on his dual antiplatelet therapy with aspirin and Plavix.  -   I recommend workup of his liver mass considering significant weight loss and anorexia with significant anemia in the last few months.  PCI of the RCA can be scheduled in the future if hemoglobin remains  stable.  -  patient chest pain-free.  Continue Lipitor 80 mg.  LDL of 58.  -   Agree with continuing isosorbide mononitrate.  -   We will reassess clinical condition tomorrow morning to decide on further steps.    Orders Placed This Encounter   Procedures    TSH     Standing Status:   Standing     Number of Occurrences:   1    Basic Metabolic Panel (BMP)     Standing Status:   Standing     Number of Occurrences:   3    Magnesium     Standing Status:   Standing     Number of Occurrences:   3    Phosphorus     Standing Status:   Standing     Number of Occurrences:   3    Lipid panel     Standing Status:   Standing     Number of Occurrences:   1    Brain natriuretic peptide     Standing Status:   Standing     Number of Occurrences:   1    CBC Without Differential     Standing Status:   Standing     Number of Occurrences:   3    Hemoglobin A1c     Standing Status:   Standing     Number of Occurrences:   1    Ferritin     Standing Status:   Standing     Number of Occurrences:   1    Iron and TIBC     Standing Status:   Standing     Number of Occurrences:   1    Vitamin B12     Standing Status:   Standing     Number of Occurrences:   1    Folate     Standing Status:   Standing     Number of Occurrences:   1    Occult blood x 1, stool     Standing Status:   Standing     Number of Occurrences:   1    Diet Renal Cardiac (Low Na/Chol)     Standing Status:   Standing     Number of Occurrences:   1     Order Specific Question:   Additional  Diet Options:     Answer:   Cardiac (Low Na/Chol)    Chlorhexidine Bath     Standing Status:   Standing     Number of Occurrences:   4    Chlorohexidine Gluconate Bath     Standing Status:   Standing     Number of Occurrences:   1    Bladder scan     If patient is unable to void     Standing Status:   Standing     Number of Occurrences:   60183    Recheck Blood Glucose:     In 15 minutes. If Blood Glucose remains less than 70 mg/dL, retreat as directed above and NOTIFY MD IMMEDIATELY.     Standing Status:   Standing     Number of Occurrences:   1    Place sequential compression device     Standing Status:   Standing     Number of Occurrences:   1    If any glucose result is less than 50 or greater than 400:     repeat finger stick and test using new strip.     Standing Status:   Standing     Number of Occurrences:   1    If 2nd result is less than 50 or greater than 400:     treat per order set & place order with lab for stat serum glucose level.     Standing Status:   Standing     Number of Occurrences:   1    If glucose greater than 400 mg/dL treat per correction scale.  If glucose remains elevated above 400 mg/dL at next scheduled check, notify provider     Standing Status:   Standing     Number of Occurrences:   1    Re-check Blood Glucose     In 15 minutes. If Blood Glucose remains less than 70 mg/dL, retreat as directed above and NOTIFY MD IMMEDIATELY.     Standing Status:   Standing     Number of Occurrences:   1    Vital signs     Standing Status:   Standing     Number of Occurrences:   1    Cardiac Monitoring - Adult     Notify Physician If:     Standing Status:   Standing     Number of Occurrences:   1     Order Specific Question:   HR>=     Answer:   120/min     Order Specific Question:   HR<=     Answer:   50/min     Order Specific Question:   PVC>=     Answer:   10/min     Order Specific Question:   ST Segment:     Answer:   Baseline = No Elevation or Depression. Notify MD for changes from baseline.     Intake and output     Standing Status:   Standing     Number of Occurrences:   12    Chlorhexidine Bath     Standing Status:   Standing     Number of Occurrences:   4    Chlorohexidine Gluconate Bath     Standing Status:   Standing     Number of Occurrences:   1    Full code     Standing Status:   Standing     Number of Occurrences:   1    Inpatient consult to [facility]iology[facility]     Standing Status:   Standing     Number of Occurrences:   1     Order Specific Question:   Reason for Consult?     Answer:   Chest pain    Inpatient consult to [facility] (Teofilo Ceron, [place])     Standing Status:   Standing     Number of Occurrences:   1     Order Specific Question:   Reason for Consult?     Answer:   ESRD on HD TThS    Pulse Oximetry Q4H     Standing Status:   Standing     Number of Occurrences:   37    Inhalation Treatment Q6H PRN     Standing Status:   Standing     Number of Occurrences:   77899    Oxygen Continuous     Face mask     Standing Status:   Standing     Number of Occurrences:   1     Order Specific Question:   Device type:     Answer:   Low flow     Order Specific Question:   Device:     Answer:   Nasal Cannula (1- 5 Liters)     Order Specific Question:   LPM:     Answer:   2     Order Specific Question:   Titrate O2 per Oxygen Titration Protocol:     Answer:   Yes     Order Specific Question:   To maintain SpO2 goal of:     Answer:   >= 90%     Order Specific Question:   Notify MD of:     Answer:   Inability to achieve desired SpO2; Sudden change in patient status and requires 20% increase in FiO2; Patient requires >60% FiO2    EKG 12-lead     For new or worsening of chest pain.     Standing Status:   Standing     Number of Occurrences:   93621     Order Specific Question:   Diagnosis     Answer:   Chest pain [112657]    EKG 12-LEAD     Standing Status:   Standing     Number of Occurrences:   1     Order Specific Question:   Diagnosis     Answer:   Chest pain [252033]    Saline lock  "IV     Standing Status:   Standing     Number of Occurrences:   1    Prepare patient for dialysis     Standing Status:   Standing     Number of Occurrences:   1    Hemodialysis inpatient If "per protocol" is selected for one or more ingredients (K+, Ca++, Na+, Bicarb) for the dialysate bath solution, select the hyperlink for the protocol instructions.     If "per protocol" is selected for one or more ingredients (K+, Ca++, Na+, Bicarb) for the dialysate bath solution, select the hyperlink for the protocol instructions.     Standing Status:   Standing     Number of Occurrences:   3     Order Specific Question:   Antibiotics on HD?     Answer:   No     Order Specific Question:   Duration of Treatment     Answer:   3.5 hours     Order Specific Question:   Dialyzer     Answer:   [id number]     Order Specific Question:   Dialysate Temperature (C)     Answer:   37     Order Specific Question:   Target BFR     Answer:   300 mL/min     Order Specific Question:   If unable to maintain flow due to inadequate vascular access patency, patient intolerance (i.e. chest pain, access discomfort) or elevated venous pressure, adjust blood flow rate to a minimum of _____mL/min     Answer:   100     Order Specific Question:   DFR     Answer:   600 mL/min     Order Specific Question:   K+     Answer:   Potassium per Protocol     Order Specific Question:   Ca++     Answer:   Calcium per Protocol     Order Specific Question:   Na+     Answer:   Sodium per Protocol     Order Specific Question:   Bicarb     Answer:   Bicarbonate per Protocol     Order Specific Question:   Access to be used     Answer:   AVF     Order Specific Question:   Needle gauge     Answer:   16 gauge     Order Specific Question:   Location     Answer:   Arm     Order Specific Question:   Laterality     Answer:   Left     Order Specific Question:   Target UF     Answer:   2L     Order Specific Question:   If unable to maintain this UFR due to patient intolerance (i.e. " hypotension, chest pain, muscle cramping, nausea or vomiting), adjust UFR to achieve a minimum of _______ liters of UF     Answer:   0     Order Specific Question:   Fluid Removal Instructions     Answer:   maintain SBP > 90 mmHG    Place in Observation     Standing Status:   Standing     Number of Occurrences:   1     Order Specific Question:   Diagnosis     Answer:   Unstable angina [032808]     Order Specific Question:   Future Attending Provider     Answer:   RICA GUERRERO [[id number]]     Order Specific Question:   Special Needs:     Answer:   Continuous IV Infusion Other [10]       Follow up as scheduled.     He expressed verbal understanding and agreed with the plan    Current Discharge Medication List        CONTINUE these medications which have NOT CHANGED    Details   aspirin 81 MG Chew Take 1 tablet (81 mg total) by mouth every evening.  Qty: 90 tablet, Refills: 0      atorvastatin (LIPITOR) 80 MG tablet Take 1 tablet (80 mg total) by mouth every evening.  Qty: 30 tablet, Refills: 0      carvediloL (COREG) 12.5 MG tablet Take 6.25 mg by mouth 2 (two) times daily.      clopidogreL (PLAVIX) 75 mg tablet Take 1 tablet (75 mg total) by mouth once daily.  Qty: 30 tablet, Refills: 11      cyproheptadine (PERIACTIN) 4 mg tablet Take 1 tablet (4 mg total) by mouth 2 (two) times daily.  Qty: 120 tablet, Refills: 0      isosorbide mononitrate (IMDUR) 30 MG 24 hr tablet Take 1 tablet (30 mg total) by mouth every evening.  Qty: 30 tablet, Refills: 2      levothyroxine (SYNTHROID) 50 MCG tablet Take 1 tablet (50 mcg total) by mouth before breakfast.  Qty: 30 tablet, Refills: 0      LIDOcaine (LIDODERM) 5 % Apply 1 patch topically daily as needed (pain).      nut.tx.imp.renal fxn,lac-reduc (NEPRO CARB STEADY) 0.08 gram-1.8 kcal/mL Liqd Take 240 mLs by mouth 2 (two) times a day.      ondansetron (ZOFRAN-ODT) 4 MG TbDL Take 1 tablet (4 mg total) by mouth every 8 (eight) hours as needed (nausea).  Qty: 12 tablet,  Refills: 0      pantoprazole (PROTONIX) 20 MG tablet Take 20 mg by mouth 2 (two) times daily as needed.      sevelamer carbonate (RENVELA) 800 mg Tab Take 1 tablet (800 mg total) by mouth 3 (three) times daily with meals.  Qty: 90 tablet, Refills: 0      SITagliptin (ZITUVIO) 25 mg Tab Take 25 mg by mouth once daily.              Giacomo Pittman M.D

## 2024-08-11 NOTE — ASSESSMENT & PLAN NOTE
Creatine stable for now. BMP reviewed- noted Estimated Creatinine Clearance: 4.3 mL/min (A) (based on SCr of 12 mg/dL (H)). according to latest data. 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  Monitor UOP and serial BMP and adjust therapy as needed. Renally dose meds. Avoid nephrotoxic medications and procedures.    -hemodialysis on Tuesday, Thursday and Saturdays  -nephrologist following.  To be dialyzed today

## 2024-08-11 NOTE — ASSESSMENT & PLAN NOTE
-possibly due to patient with end-stage renal disease on hemodialysis  -Aquaphor  -patient has appointment with D[facility]atology at [facility]  -continue with ammonium lactate and sevelamer

## 2024-08-11 NOTE — ASSESSMENT & PLAN NOTE
-cardiology consulted  -continue with aspirin, beta-blockers, statins  -telemetry monitor  -chest pain resolved for now, though episodic

## 2024-08-11 NOTE — PROGRESS NOTES
Nephrology Consult  H&P      Consult Requested By: Luis A Abbott MD  Reason for Consult: ESRD      SUBJECTIVE:     History of Present Illness:  Jevon Rajan is a 84 y.o.   male who  has a past medical history of BPH (benign prostatic hyperplasia), Coronary artery disease, Diabetes mellitus, type 2, ESRD (end stage renal disease) on dialysis, Hypertension, Hypothyroidism, unspecified, and Symptomatic anemia (01/15/2024).. The patient presented to the [facility] on 8/10/2024 with a primary complaint of CP SOB now all resolved he is a trasfer for Cardiology evaluation. Last HD was Thursday.   ?    Review of Systems   Constitutional:  Negative for chills, fever and weight loss.   HENT:  Negative for congestion and tinnitus.    Eyes:  Negative for blurred vision, double vision and photophobia.   Respiratory:  Negative for cough and shortness of breath.    Cardiovascular:  Negative for chest pain, palpitations, orthopnea and leg swelling.   Gastrointestinal:  Negative for nausea and vomiting.   Genitourinary:  Negative for frequency and urgency.   Musculoskeletal:  Negative for back pain, falls and joint pain.   Skin:  Negative for itching and rash.   Neurological:  Negative for dizziness, tingling, tremors, sensory change, speech change, focal weakness, seizures, loss of consciousness, weakness and headaches.   Endo/Heme/Allergies:  Does not bruise/bleed easily.   Psychiatric/Behavioral:  Negative for depression. The patient does not have insomnia.        Past Medical History:   Diagnosis Date    BPH (benign prostatic hyperplasia)     Coronary artery disease     Diabetes mellitus, type 2     ESRD (end stage renal disease) on dialysis     ESRD on HD TTS via left brachial AVF    Hypertension     Hypothyroidism, unspecified     Symptomatic anemia 01/15/2024     Past Surgical History:   Procedure Laterality Date    bilateral foot surgery      ESOPHAGOGASTRODUODENOSCOPY N/A 2/27/2024    Procedure: EGD  (ESOPHAGOGASTRODUODENOSCOPY);  Surgeon: Gemma Almendarez MD;  [facility];  Service: Endoscopy;  Laterality: N/A;    eyelid surgery      FISTULOGRAM Left 11/27/2019    Procedure: Fistulogram;  Surgeon: MELANY Brenner III, MD;  [facility];  Service: Peripheral Vascular;  Laterality: Left;    FISTULOGRAM Left 11/27/2019    Procedure: Fistulogram, AVG declot, possible permacath;  Surgeon: MELANY Brenner III, MD;  Location: [facility] CATH LAB;  Service: Peripheral Vascular;  Laterality: Left;    INSERTION OF DIALYSIS CATHETER      [facility]  12/5/13    right hand surgery      stents       No family history on file.  Social History     Tobacco Use    Smoking status: Never    Smokeless tobacco: Never   Substance Use Topics    Alcohol use: No    Drug use: No       Review of patient's allergies indicates:   Allergen Reactions    Ace inhibitors Itching     Is not sure which medication it was    Captopril             OBJECTIVE:     Vital Signs (Most Recent)  Vitals:    08/11/24 0408 08/11/24 0701 08/11/24 0744 08/11/24 0756   BP:    127/60   BP Location:    Right arm   Patient Position:    Lying   Pulse: 78 73  71   Resp:    18   Temp:    98.2 °F (36.8 °C)   TempSrc:    Tympanic   SpO2:   96% 96%   Weight:       Height:             Date 08/11/24 0700 - 08/12/24 0659   Shift 0899-8646 2892-4046 8232-0427 24 Hour Total   INTAKE   P.O. 240   240   Shift Total(mL/kg) 240(3.5)   240(3.5)   OUTPUT   Urine(mL/kg/hr) 100   100   Shift Total(mL/kg) 100(1.4)   100(1.4)   Weight (kg) 69.1 69.1 69.1 69.1           Medications:   0.9% NaCl   Intravenous Once    artificial tears  1 drop Both Eyes TID    aspirin  81 mg Oral QHS    atorvastatin  80 mg Oral QHS    carvediloL  6.25 mg Oral BID    clopidogreL  75 mg Oral Daily    cyproheptadine  4 mg Oral BID    heparin (porcine)  5,000 Units Subcutaneous Q8H    isosorbide mononitrate  30 mg Oral QHS    levothyroxine  50 mcg Oral Before breakfast    mupirocin   Nasal BID     senna-docusate 8.6-50 mg  1 tablet Oral BID    sevelamer carbonate  800 mg Oral TID WM           Physical Exam  Vitals and nursing note reviewed.   Constitutional:       General: He is not in acute distress.     Appearance: He is not diaphoretic.   HENT:      Head: Normocephalic and atraumatic.      Mouth/Throat:      Pharynx: No oropharyngeal exudate.   Eyes:      General: No scleral icterus.     Conjunctiva/sclera: Conjunctivae normal.      Pupils: Pupils are equal, round, and reactive to light.   Cardiovascular:      Rate and Rhythm: Normal rate and regular rhythm.      Heart sounds: Normal heart sounds. No murmur heard.  Pulmonary:      Effort: Pulmonary effort is normal. No respiratory distress.      Breath sounds: Normal breath sounds.   Abdominal:      General: Bowel sounds are normal. There is no distension.      Palpations: Abdomen is soft.      Tenderness: There is no abdominal tenderness.   Musculoskeletal:         General: Normal range of motion.      Cervical back: Normal range of motion and neck supple.      Comments: LUE AVF    Skin:     General: Skin is warm and dry.      Findings: Rash present. No erythema.   Neurological:      Mental Status: He is alert and oriented to person, place, and time.      Cranial Nerves: No cranial nerve deficit.   Psychiatric:         Mood and Affect: Affect normal.         Cognition and Memory: Memory normal.         Judgment: Judgment normal.         Laboratory:  Recent Labs   Lab 08/05/24  0138 08/09/24  1945 08/10/24  1215 08/11/24  0332   WBC 8.91 9.27 8.00 7.32   HGB 6.4* 6.6* 9.0* 8.2*   HCT 21.1* 21.2* 27.9* 25.7*    217 189 181   MONO 9.1  0.8 9.9  0.9 9.5  0.8  --    EOSINOPHIL 5.5 3.3 4.0  --      Recent Labs   Lab 08/09/24  1945 08/10/24  1215 08/11/24  0331    142 139   K 4.0 4.5 4.5   CL 99 100 101   CO2 28 26 24   BUN 56* 64* 73*   CREATININE 9.3* 10.5* 12.0*   CALCIUM 9.8 9.3 8.9   PHOS  --  4.8* 5.1*       Diagnostic Results:  X-Ray:  "Reviewed  US: Reviewed  Echo: Reviewed  ASSESSMENT/PLAN:     1. ESRD on HD TTS  - Dr Carla Aguilar Dion   Didn't get HD yesterday due to Chest pain now Chest pain free - pending Cardiology eval   Plan for HD today then TTS   -- Daily Renal Function Panel  -- Avoid Hypotension.  -- Renally dose all meds     Chest pain   -- Cardiology consulted pending eval   -- Now Chest pain free  -- Elevated BNP highest  he ever had 2178  suspect overload   -- trop close to his baseline 0.07  HD today with UF 2L     2. HTN  - controlled   3. Anemia of chronic kidney disease treated with NAKIA    EPogen   with each HD - hold today pending ACS work up   Recent Labs   Lab 08/09/24  1945 08/10/24  1215 08/11/24  0332   HGB 6.6* 9.0* 8.2*   HCT 21.2* 27.9* 25.7*    189 181       Iron   Lab Results   Component Value Date    IRON 47 11/28/2023    TIBC 208 11/28/2023    FERRITIN 979 (H) 08/11/2024       4. MBD    Recent Labs   Lab 08/11/24  0331   CALCIUM 8.9   PHOS 5.1*   -- Binders TIDWM   Recent Labs   Lab 08/10/24  1215 08/11/24  0331   MG 2.1 2.1       Lab Results   Component Value Date    .7 (H) 01/17/2024    CALCIUM 8.9 08/11/2024    PHOS 5.1 (H) 08/11/2024     No results found for: "[id number]"    Lab Results   Component Value Date    CO2 24 08/11/2024       5. Nutrition/Hypoalbuminemia   Recent Labs   Lab 08/09/24  1945 08/10/24  1215   ALBUMIN 3.2* 3.0*     [place] with meals TID. Renal vitamins daily      Thank you for allowing me to participate in care of your patient  With any question please call   Vivienne Oates MD     [facility]  RACHNA Red MD,   MD GINO Milner MD [address], [facility]  [address]   [place], [address] [phone]  After hours answering service: [phone]   "

## 2024-08-11 NOTE — ASSESSMENT & PLAN NOTE
Chronic, uncontrolled. Latest blood pressure and vitals reviewed-     Temp:  [97.2 °F (36.2 °C)-98.2 °F (36.8 °C)]   Pulse:  [69-80]   Resp:  [14-24]   BP: (104-154)/()   SpO2:  [95 %-99 %] .   Home meds for hypertension were reviewed and noted below.   Hypertension Medications               carvediloL (COREG) 12.5 MG tablet Take 6.25 mg by mouth 2 (two) times daily.    isosorbide mononitrate (IMDUR) 30 MG 24 hr tablet Take 1 tablet (30 mg total) by mouth every evening.            While in the hospital, will manage blood pressure as follows; Continue home antihypertensive regimen    Will utilize p.r.n. blood pressure medication only if patient's blood pressure greater than 160/100 and he develops symptoms such as worsening chest pain or shortness of breath.    -hydralazine p.r.n. for systolic greater than 160

## 2024-08-11 NOTE — NURSING
Pt back on unit from dialysis. PIV inadvertently removed by dialysis nurse via BP cuff.Dressing applied to site to control bleeding. VS and blood glucose assessed. Dinner tray warmed and provided to patient. Pharmacist at bedside administering medications.SR x2. Call light, bedside table and personal belongs within reach. Instructed to call nurse for assistance. Verbalized understanding. NADN. Continue POC.

## 2024-08-11 NOTE — SUBJECTIVE & OBJECTIVE
Interval History:  Patient was examined in his bed.  He was calm and not in distress.  He denies chest pain, palpitation, shortness for breath,  symptoms.  His only complaint is rash with itching on his extremities and back.    Review of Systems   Constitutional:  Negative for activity change, chills and fever.   HENT:  Negative for congestion, rhinorrhea and sore throat.    Eyes:  Positive for itching. Negative for discharge and redness.   Respiratory:  Negative for cough, chest tightness, shortness of breath and wheezing.    Cardiovascular:  Negative for chest pain, palpitations and leg swelling.   Gastrointestinal:  Positive for constipation. Negative for abdominal pain, diarrhea, nausea and vomiting.   Genitourinary:  Negative for dysuria, flank pain and hematuria.   Musculoskeletal:  Negative for back pain, myalgias and neck pain.   Skin:  Positive for rash. Negative for pallor and wound.        Rash on the back of his neck, bilateral arms and legs   Neurological:  Negative for dizziness, tremors, syncope, weakness, numbness and headaches.   Psychiatric/Behavioral:  Negative for agitation, confusion and hallucinations.      Objective:     Vital Signs (Most Recent):  Temp: 98.2 °F (36.8 °C) (08/11/24 0756)  Pulse: 71 (08/11/24 0756)  Resp: 18 (08/11/24 0756)  BP: 127/60 (08/11/24 0756)  SpO2: 96 % (08/11/24 0756) Vital Signs (24h Range):  Temp:  [97.2 °F (36.2 °C)-98.2 °F (36.8 °C)] 98.2 °F (36.8 °C)  Pulse:  [69-80] 71  Resp:  [14-24] 18  SpO2:  [95 %-99 %] 96 %  BP: (104-154)/() 127/60     Weight: 69.1 kg (152 lb 5.4 oz)  Body mass index is 23.16 kg/m².    Intake/Output Summary (Last 24 hours) at 8/11/2024 1044  Last data filed at 8/11/2024 1018  Gross per 24 hour   Intake 315 ml   Output 100 ml   Net 215 ml         Physical Exam  Vitals and nursing note reviewed.   Constitutional:       General: He is not in acute distress.  HENT:      Head: Normocephalic and atraumatic.      Mouth/Throat:      Mouth:  Mucous membranes are moist.   Eyes:      General:         Right eye: No discharge.         Left eye: No discharge.      Conjunctiva/sclera: Conjunctivae normal.   Cardiovascular:      Rate and Rhythm: Normal rate and regular rhythm.      Pulses: Normal pulses.   Pulmonary:      Effort: Pulmonary effort is normal.      Breath sounds: Normal breath sounds.   Abdominal:      General: Bowel sounds are normal.      Palpations: Abdomen is soft.      Tenderness: There is no abdominal tenderness.   Musculoskeletal:         General: No swelling. Normal range of motion.      Cervical back: Normal range of motion and neck supple.   Skin:     General: Skin is warm and dry.      Findings: Rash present.      Comments: Dry rash on back of neck and bilateral arms and legs.  Scratch marks present.   Neurological:      Mental Status: He is alert and oriented to person, place, and time. Mental status is at baseline.   Psychiatric:         Mood and Affect: Mood normal.             Significant Labs: All pertinent labs within the past 24 hours have been reviewed.    Significant Imaging: I have reviewed all pertinent imaging results/findings within the past 24 hours.

## 2024-08-11 NOTE — PLAN OF CARE
Problem: Adult Inpatient Plan of Care  Goal: Plan of Care Review  Outcome: Progressing  Goal: Patient-Specific Goal (Individualized)  Outcome: Progressing  Goal: Absence of Hospital-Acquired Illness or Injury  Outcome: Progressing  Goal: Optimal Comfort and Wellbeing  Outcome: Progressing  Goal: Readiness for Transition of Care  Outcome: Progressing     Problem: Diabetes Comorbidity  Goal: Blood Glucose Level Within Targeted Range  Outcome: Progressing     Problem: Fall Injury Risk  Goal: Absence of Fall and Fall-Related Injury  Outcome: Progressing     Problem: Chronic Kidney Disease  Goal: Optimal Coping with Chronic Illness  Outcome: Progressing  Goal: Electrolyte Balance  Outcome: Progressing  Goal: Fluid Balance  Outcome: Progressing  Goal: Optimal Functional Ability  Outcome: Progressing  Goal: Absence of Anemia Signs and Symptoms  Outcome: Progressing  Goal: Optimal Oral Intake  Outcome: Progressing  Goal: Acceptable Pain Control  Outcome: Progressing  Goal: Minimize Renal Failure Effects  Outcome: Progressing     Problem: Hemodialysis  Goal: Safe, Effective Therapy Delivery  Outcome: Progressing  Goal: Effective Tissue Perfusion  Outcome: Progressing  Goal: Absence of Infection Signs and Symptoms  Outcome: Progressing     Problem: Gas Exchange Impaired  Goal: Optimal Gas Exchange  Outcome: Progressing

## 2024-08-11 NOTE — PROGRESS NOTES
[facility] Medicine  Progress Note    Patient Name: Jevon Rajan  MRN: [id number]  Patient Class: OP- Observation   Admission Date: 8/10/2024  Length of Stay: 0 days  Attending Physician: Luis A Abbott MD  Primary Care Provider: Melia, Primary Doctor        Subjective:     Principal Problem:Unstable angina        HPI:  The patient was an 84-year-old male with a past medical history of end-stage renal disease on hemodialysis, CAD, type 2 diabetes, liver mass for which [facility], anemia with multiple transfusions.  The patient came to emergency department because of shortness of breath which she states started a few months ago but worsened yesterday.  He states it was difficult for him to take deep breath and he became concerned.  The shortness of breath was later associated with chest pain focused below his neck area.  He stated it felt like pressure but was episodic.  He denies radiation.  Patient also stated he had nausea with vomiting yesterday.  He has a history of constipation.  Patient was also stated that he was noticed a rash on his bilateral arms, legs and the back of his neck.  He was rescheduled for an [facility].  On further questioning the patient stated he vomited once and it contained food contents.  The patient was seen at [facility] but was later transferred to [facility] for Cardiology evaluation of his chest pain.  On arrival patient denies shortness of breath and states his chest pain is episodic.  He denies chest pain at the moment.  He also denies nausea and vomiting at this time.  The patient was main complaints were the rash on his extremities and constipation.          Overview/Hospital Course:  No notes on file    Interval History:  Patient was examined in his bed.  He was calm and not in distress.  He denies chest pain, palpitation, shortness for breath,  symptoms.  His only complaint is rash with itching on  his extremities and back.    Review of Systems   Constitutional:  Negative for activity change, chills and fever.   HENT:  Negative for congestion, rhinorrhea and sore throat.    Eyes:  Positive for itching. Negative for discharge and redness.   Respiratory:  Negative for cough, chest tightness, shortness of breath and wheezing.    Cardiovascular:  Negative for chest pain, palpitations and leg swelling.   Gastrointestinal:  Positive for constipation. Negative for abdominal pain, diarrhea, nausea and vomiting.   Genitourinary:  Negative for dysuria, flank pain and hematuria.   Musculoskeletal:  Negative for back pain, myalgias and neck pain.   Skin:  Positive for rash. Negative for pallor and wound.        Rash on the back of his neck, bilateral arms and legs   Neurological:  Negative for dizziness, tremors, syncope, weakness, numbness and headaches.   Psychiatric/Behavioral:  Negative for agitation, confusion and hallucinations.      Objective:     Vital Signs (Most Recent):  Temp: 98.2 °F (36.8 °C) (08/11/24 0756)  Pulse: 71 (08/11/24 0756)  Resp: 18 (08/11/24 0756)  BP: 127/60 (08/11/24 0756)  SpO2: 96 % (08/11/24 0756) Vital Signs (24h Range):  Temp:  [97.2 °F (36.2 °C)-98.2 °F (36.8 °C)] 98.2 °F (36.8 °C)  Pulse:  [69-80] 71  Resp:  [14-24] 18  SpO2:  [95 %-99 %] 96 %  BP: (104-154)/() 127/60     Weight: 69.1 kg (152 lb 5.4 oz)  Body mass index is 23.16 kg/m².    Intake/Output Summary (Last 24 hours) at 8/11/2024 1044  Last data filed at 8/11/2024 1018  Gross per 24 hour   Intake 315 ml   Output 100 ml   Net 215 ml         Physical Exam  Vitals and nursing note reviewed.   Constitutional:       General: He is not in acute distress.  HENT:      Head: Normocephalic and atraumatic.      Mouth/Throat:      Mouth: Mucous membranes are moist.   Eyes:      General:         Right eye: No discharge.         Left eye: No discharge.      Conjunctiva/sclera: Conjunctivae normal.   Cardiovascular:      Rate and Rhythm:  Normal rate and regular rhythm.      Pulses: Normal pulses.   Pulmonary:      Effort: Pulmonary effort is normal.      Breath sounds: Normal breath sounds.   Abdominal:      General: Bowel sounds are normal.      Palpations: Abdomen is soft.      Tenderness: There is no abdominal tenderness.   Musculoskeletal:         General: No swelling. Normal range of motion.      Cervical back: Normal range of motion and neck supple.   Skin:     General: Skin is warm and dry.      Findings: Rash present.      Comments: Dry rash on back of neck and bilateral arms and legs.  Scratch marks present.   Neurological:      Mental Status: He is alert and oriented to person, place, and time. Mental status is at baseline.   Psychiatric:         Mood and Affect: Mood normal.             Significant Labs: All pertinent labs within the past 24 hours have been reviewed.    Significant Imaging: I have reviewed all pertinent imaging results/findings within the past 24 hours.    Assessment/Plan:      * Unstable angina    -cardiology consulted  -continue with aspirin, beta-blockers, statins  -telemetry monitor  -chest pain resolved for now, though episodic    Dry eyes  -continue with artificial tears  -monitor      Rash  -possibly due to patient with end-stage renal disease on hemodialysis  -Aquaphor  -[facility]  -continue with ammonium lactate and sevelamer      Symptomatic anemia  Anemia is likely due to chronic disease due to ESRD. Most recent hemoglobin and hematocrit are listed below.  Recent Labs     08/09/24  1945 08/10/24  1215 08/11/24  0332   HGB 6.6* 9.0* 8.2*   HCT 21.2* 27.9* 25.7*       Plan  - Monitor serial CBC: Daily  - Transfuse PRBC if patient becomes hemodynamically unstable, symptomatic or H/H drops below 7/21.  - Patient has received 1 units of PRBCs on 08/10/24  - Patient's anemia is currently stable  - follow up anemia workup  - stool occult pending    Shortness of breath  -resolved on  its own  -chest x-ray does not show any acute abnormality  -supplemental oxygen as needed  -bronchodilators p.r.n.  -BNP 2178      Hypothyroidism due to acquired atrophy of thyroid    -continue with home meds  -TSH wnl    ESRD on hemodialysis  Creatine stable for now. BMP reviewed- noted Estimated Creatinine Clearance: 4.3 mL/min (A) (based on SCr of 12 mg/dL (H)). according to latest data. 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  Monitor UOP and serial BMP and adjust therapy as needed. Renally dose meds. Avoid nephrotoxic medications and procedures.    -hemodialysis on Tuesday, Thursday and Saturdays  -nephrologist following.  To be dialyzed today    Type 2 diabetes mellitus, without long-term current use of insulin  -sliding scale  -glucometer checks  -Cardiology consult pending  -A1c pending    HLD (hyperlipidemia)  -continue with home meds  -lipid panel reviewed      HTN (hypertension)  Chronic, uncontrolled. Latest blood pressure and vitals reviewed-     Temp:  [97.2 °F (36.2 °C)-98.2 °F (36.8 °C)]   Pulse:  [69-80]   Resp:  [14-24]   BP: (104-154)/()   SpO2:  [95 %-99 %] .   Home meds for hypertension were reviewed and noted below.   Hypertension Medications               carvediloL (COREG) 12.5 MG tablet Take 6.25 mg by mouth 2 (two) times daily.    isosorbide mononitrate (IMDUR) 30 MG 24 hr tablet Take 1 tablet (30 mg total) by mouth every evening.            While in the hospital, will manage blood pressure as follows; Continue home antihypertensive regimen    Will utilize p.r.n. blood pressure medication only if patient's blood pressure greater than 160/100 and he develops symptoms such as worsening chest pain or shortness of breath.    -hydralazine p.r.n. for systolic greater than 160      VTE Risk Mitigation (From admission, onward)           Ordered     heparin (porcine) injection 5,000 Units  Every 8 hours         08/10/24 1652     IP VTE HIGH RISK PATIENT  Once         08/10/24 1652      Place sequential compression device  Until discontinued         08/10/24 9955                    Discharge Planning   BAYRON:      Code Status: Full Code   Is the patient medically ready for discharge?:     Reason for patient still in hospital (select all that apply): Patient trending condition, Laboratory test, Treatment, and Consult recommendations                     Luis A Abbott MD  Department of [facility] Medicine   [facility]

## 2024-08-11 NOTE — PLAN OF CARE
1900 Pt appeared to be in no distress. Reported no pain.     2100 accu ck: 113    Tele: SR,  HR  79,  No alarms.     Bed in lowest position, wheels locked, non skid socks, ID band worn, personal items and call bell with in reach, bed alarm set.

## 2024-08-12 VITALS
BODY MASS INDEX: 23.08 KG/M2 | OXYGEN SATURATION: 97 % | HEART RATE: 71 BPM | DIASTOLIC BLOOD PRESSURE: 66 MMHG | HEIGHT: 68 IN | SYSTOLIC BLOOD PRESSURE: 140 MMHG | WEIGHT: 152.31 LBS | RESPIRATION RATE: 18 BRPM | TEMPERATURE: 98 F

## 2024-08-12 LAB
ANION GAP SERPL CALC-SCNC: 12 MMOL/L (ref 8–16)
BUN SERPL-MCNC: 39 MG/DL (ref 8–23)
CALCIUM SERPL-MCNC: 8.9 MG/DL (ref 8.7–10.5)
CHLORIDE SERPL-SCNC: 104 MMOL/L (ref 95–110)
CO2 SERPL-SCNC: 23 MMOL/L (ref 23–29)
CREAT SERPL-MCNC: 8.5 MG/DL (ref 0.5–1.4)
ERYTHROCYTE [DISTWIDTH] IN BLOOD BY AUTOMATED COUNT: 19.9 % (ref 11.5–14.5)
EST. GFR  (NO RACE VARIABLE): 6 ML/MIN/1.73 M^2
GLUCOSE SERPL-MCNC: 129 MG/DL (ref 70–110)
HCT VFR BLD AUTO: 26.9 % (ref 40–54)
HGB BLD-MCNC: 8.6 G/DL (ref 14–18)
MAGNESIUM SERPL-MCNC: 2 MG/DL (ref 1.6–2.6)
MCH RBC QN AUTO: 29 PG (ref 27–31)
MCHC RBC AUTO-ENTMCNC: 32 G/DL (ref 32–36)
MCV RBC AUTO: 91 FL (ref 82–98)
OHS QRS DURATION: 96 MS
OHS QTC CALCULATION: 419 MS
PHOSPHATE SERPL-MCNC: 3.9 MG/DL (ref 2.7–4.5)
PLATELET # BLD AUTO: 184 K/UL (ref 150–450)
PMV BLD AUTO: 10.4 FL (ref 9.2–12.9)
POCT GLUCOSE: 107 MG/DL (ref 70–110)
POCT GLUCOSE: 119 MG/DL (ref 70–110)
POCT GLUCOSE: 128 MG/DL (ref 70–110)
POTASSIUM SERPL-SCNC: 4.1 MMOL/L (ref 3.5–5.1)
RBC # BLD AUTO: 2.97 M/UL (ref 4.6–6.2)
SODIUM SERPL-SCNC: 139 MMOL/L (ref 136–145)
WBC # BLD AUTO: 7.17 K/UL (ref 3.9–12.7)

## 2024-08-12 PROCEDURE — 63600175 PHARM REV CODE 636 W HCPCS: Performed by: INTERNAL MEDICINE

## 2024-08-12 PROCEDURE — 84100 ASSAY OF PHOSPHORUS: CPT | Performed by: INTERNAL MEDICINE

## 2024-08-12 PROCEDURE — 85027 COMPLETE CBC AUTOMATED: CPT | Performed by: INTERNAL MEDICINE

## 2024-08-12 PROCEDURE — 96372 THER/PROPH/DIAG INJ SC/IM: CPT | Performed by: INTERNAL MEDICINE

## 2024-08-12 PROCEDURE — 94761 N-INVAS EAR/PLS OXIMETRY MLT: CPT

## 2024-08-12 PROCEDURE — 83735 ASSAY OF MAGNESIUM: CPT | Performed by: INTERNAL MEDICINE

## 2024-08-12 PROCEDURE — 25000003 PHARM REV CODE 250: Performed by: INTERNAL MEDICINE

## 2024-08-12 PROCEDURE — 99900035 HC TECH TIME PER 15 MIN (STAT)

## 2024-08-12 PROCEDURE — 80048 BASIC METABOLIC PNL TOTAL CA: CPT | Performed by: INTERNAL MEDICINE

## 2024-08-12 PROCEDURE — 36415 COLL VENOUS BLD VENIPUNCTURE: CPT | Performed by: INTERNAL MEDICINE

## 2024-08-12 PROCEDURE — G0378 HOSPITAL OBSERVATION PER HR: HCPCS

## 2024-08-12 RX ORDER — SEVELAMER CARBONATE 800 MG/1
800 TABLET, FILM COATED ORAL
Qty: 42 TABLET | Refills: 0 | Status: SHIPPED | OUTPATIENT
Start: 2024-08-12 | End: 2024-08-26

## 2024-08-12 RX ORDER — LEVOTHYROXINE SODIUM 50 UG/1
50 TABLET ORAL
Qty: 14 TABLET | Refills: 0 | Status: SHIPPED | OUTPATIENT
Start: 2024-08-13 | End: 2024-08-27

## 2024-08-12 RX ORDER — AMMONIUM LACTATE 12 G/100G
CREAM TOPICAL 2 TIMES DAILY PRN
Qty: 280 G | Refills: 0 | Status: SHIPPED | OUTPATIENT
Start: 2024-08-12 | End: 2024-08-27

## 2024-08-12 RX ADMIN — SEVELAMER CARBONATE 800 MG: 800 TABLET, FILM COATED ORAL at 05:08

## 2024-08-12 RX ADMIN — CARVEDILOL 6.25 MG: 6.25 TABLET, FILM COATED ORAL at 08:08

## 2024-08-12 RX ADMIN — SEVELAMER CARBONATE 800 MG: 800 TABLET, FILM COATED ORAL at 08:08

## 2024-08-12 RX ADMIN — CLOPIDOGREL BISULFATE 75 MG: 75 TABLET ORAL at 08:08

## 2024-08-12 RX ADMIN — HYPROMELLOSE 2910 1 DROP: 5 SOLUTION/ DROPS OPHTHALMIC at 02:08

## 2024-08-12 RX ADMIN — SEVELAMER CARBONATE 800 MG: 800 TABLET, FILM COATED ORAL at 11:08

## 2024-08-12 RX ADMIN — HEPARIN SODIUM 5000 UNITS: 5000 INJECTION INTRAVENOUS; SUBCUTANEOUS at 05:08

## 2024-08-12 RX ADMIN — CYPROHEPTADINE HYDROCHLORIDE 4 MG: 4 TABLET ORAL at 08:08

## 2024-08-12 RX ADMIN — HYPROMELLOSE 2910 1 DROP: 5 SOLUTION/ DROPS OPHTHALMIC at 08:08

## 2024-08-12 RX ADMIN — MUPIROCIN: 20 OINTMENT TOPICAL at 08:08

## 2024-08-12 RX ADMIN — LEVOTHYROXINE SODIUM 50 MCG: 50 TABLET ORAL at 05:08

## 2024-08-12 NOTE — ASSESSMENT & PLAN NOTE
Anemia is likely due to chronic disease due to ESRD. Most recent hemoglobin and hematocrit are listed below.  Recent Labs     08/10/24  1215 08/11/24  0332 08/12/24  0233   HGB 9.0* 8.2* 8.6*   HCT 27.9* 25.7* 26.9*       Plan  - Monitor serial CBC: Daily  - Transfuse PRBC if patient becomes hemodynamically unstable, symptomatic or H/H drops below 7/21.  - Patient has received 1 units of PRBCs on 08/10/24  - Patient's anemia is currently stable  - follow up anemia workup  - stool occult pending

## 2024-08-12 NOTE — PROGRESS NOTES
Nephrology Progress Note       Consult Requested By: Luis A Abbott MD  Reason for Consult: ESRD      SUBJECTIVE:        ?    Review of Systems   Constitutional:  Negative for chills, fever and weight loss.   HENT:  Negative for congestion and tinnitus.    Eyes:  Negative for blurred vision, double vision and photophobia.   Respiratory:  Negative for cough and shortness of breath.    Cardiovascular:  Negative for chest pain, palpitations, orthopnea and leg swelling.   Gastrointestinal:  Negative for nausea and vomiting.   Genitourinary:  Negative for frequency and urgency.   Musculoskeletal:  Negative for back pain, falls and joint pain.   Skin:  Negative for itching and rash.   Neurological:  Negative for dizziness, tingling, tremors, sensory change, speech change, focal weakness, seizures, loss of consciousness, weakness and headaches.   Endo/Heme/Allergies:  Does not bruise/bleed easily.   Psychiatric/Behavioral:  Negative for depression. The patient does not have insomnia.        Past Medical History:   Diagnosis Date    BPH (benign prostatic hyperplasia)     Coronary artery disease     Diabetes mellitus, type 2     ESRD (end stage renal disease) on dialysis     ESRD on HD TTS via left brachial AVF    Hypertension     Hypothyroidism, unspecified     Symptomatic anemia 01/15/2024          OBJECTIVE:     Vital Signs (Most Recent)  Vitals:    08/12/24 0353 08/12/24 0720 08/12/24 0724 08/12/24 1128   BP:   (!) 108/56 (!) 100/53   BP Location:   Right arm Right arm   Patient Position:   Lying Lying   Pulse: 72 66 62 61   Resp:   18 18   Temp:   97.9 °F (36.6 °C) 97.9 °F (36.6 °C)   TempSrc:   Oral Oral   SpO2:   96% 98%   Weight:       Height:             Date 08/12/24 0700 - 08/13/24 0659   Shift 5313-0366 0377-0400 9557-9843 24 Hour Total   INTAKE   P.O. 125   125   Shift Total(mL/kg) 125(1.8)   125(1.8)   OUTPUT   Shift Total(mL/kg)       Weight (kg) 69.1 69.1 69.1 69.1           Medications:   0.9% NaCl    Intravenous Once    0.9% NaCl   Intravenous Once    artificial tears  1 drop Both Eyes TID    aspirin  81 mg Oral QHS    atorvastatin  80 mg Oral QHS    carvediloL  6.25 mg Oral BID    clopidogreL  75 mg Oral Daily    cyproheptadine  4 mg Oral BID    heparin (porcine)  5,000 Units Subcutaneous Q8H    isosorbide mononitrate  30 mg Oral QHS    levothyroxine  50 mcg Oral Before breakfast    mupirocin   Nasal BID    senna-docusate 8.6-50 mg  1 tablet Oral BID    sevelamer carbonate  800 mg Oral TID WM           Physical Exam  Vitals and nursing note reviewed.   Constitutional:       General: He is not in acute distress.     Appearance: He is not diaphoretic.   HENT:      Head: Normocephalic and atraumatic.      Mouth/Throat:      Pharynx: No oropharyngeal exudate.   Eyes:      General: No scleral icterus.     Conjunctiva/sclera: Conjunctivae normal.      Pupils: Pupils are equal, round, and reactive to light.   Cardiovascular:      Rate and Rhythm: Normal rate and regular rhythm.      Heart sounds: Normal heart sounds. No murmur heard.  Pulmonary:      Effort: Pulmonary effort is normal. No respiratory distress.      Breath sounds: Normal breath sounds.   Abdominal:      General: Bowel sounds are normal. There is no distension.      Palpations: Abdomen is soft.      Tenderness: There is no abdominal tenderness.   Musculoskeletal:         General: Normal range of motion.      Cervical back: Normal range of motion and neck supple.      Comments: LUE AVF    Skin:     General: Skin is warm and dry.      Findings: Rash present. No erythema.   Neurological:      Mental Status: He is alert and oriented to person, place, and time.      Cranial Nerves: No cranial nerve deficit.   Psychiatric:         Mood and Affect: Affect normal.         Cognition and Memory: Memory normal.         Judgment: Judgment normal.         Laboratory:  Recent Labs   Lab 08/09/24  1945 08/10/24  1215 08/11/24  0332 08/12/24  0233   WBC 9.27 8.00 7.32  "7.17   HGB 6.6* 9.0* 8.2* 8.6*   HCT 21.2* 27.9* 25.7* 26.9*    189 181 184   MONO 9.9  0.9 9.5  0.8  --   --    EOSINOPHIL 3.3 4.0  --   --      Recent Labs   Lab 08/10/24  1215 08/11/24  0331 08/12/24  0233    139 139   K 4.5 4.5 4.1    101 104   CO2 26 24 23   BUN 64* 73* 39*   CREATININE 10.5* 12.0* 8.5*   CALCIUM 9.3 8.9 8.9   PHOS 4.8* 5.1* 3.9       Diagnostic Results:  X-Ray: Reviewed  US: Reviewed  Echo: Reviewed  ASSESSMENT/PLAN:     1. ESRD on HD TTS  - Dr Carla Ashley   Didn't get HD Saturday  due to Chest pain here  Chest pain free -  s/p  HD Sunday with UF 2L no issues - continue HD    TTS   -- Daily Renal Function Panel  -- Avoid Hypotension.  -- Renally dose all meds     Chest pain   -- Cardiology consulted         2. HTN  - controlled   3. Anemia of chronic kidney disease treated with NAKIA    EPogen   with each HD    Recent Labs   Lab 08/10/24  1215 08/11/24  0332 08/12/24  0233   HGB 9.0* 8.2* 8.6*   HCT 27.9* 25.7* 26.9*    181 184       Iron   Lab Results   Component Value Date    IRON 39 (L) 08/11/2024    TIBC 206 (L) 08/11/2024    FERRITIN 979 (H) 08/11/2024       4. MBD    Recent Labs   Lab 08/12/24  0233   CALCIUM 8.9   PHOS 3.9   -- Binders TIDWM   Recent Labs   Lab 08/10/24  1215 08/11/24  0331 08/12/24  0233   MG 2.1 2.1 2.0       Lab Results   Component Value Date    .7 (H) 01/17/2024    CALCIUM 8.9 08/12/2024    PHOS 3.9 08/12/2024     No results found for: "[id number]"    Lab Results   Component Value Date    CO2 23 08/12/2024       5. Nutrition/Hypoalbuminemia   Recent Labs   Lab 08/09/24  1945 08/10/24  1215   ALBUMIN 3.2* 3.0*     Nepro with meals TID. Renal vitamins daily      Thank you for allowing me to participate in care of your patient  With any question please call   Vivienne Oates MD     [facility]  RACHNA Red MD,   OMD GINO Hernández MD E. V. Harmon, NP  [address]   [place], [address]  [phone]  After hours answering service: [phone]

## 2024-08-12 NOTE — ASSESSMENT & PLAN NOTE
Creatine stable for now. BMP reviewed- noted Estimated Creatinine Clearance: 6 mL/min (A) (based on SCr of 8.5 mg/dL (H)). according to latest data. 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  Monitor UOP and serial BMP and adjust therapy as needed. Renally dose meds. Avoid nephrotoxic medications and procedures.    -hemodialysis on Tuesday, Thursday and Saturdays  -nephrologist following.

## 2024-08-12 NOTE — PROGRESS NOTES
Virtual Nurse:Discharge orders noted; additional clinical references attached.  and pharmacy tech notified.  Patient's discharge instruction packet given by bedside RN.    Cued into patient's room.  Permission received per patient to turn camera to view patient.  Introduced as VN that will be instructing on discharge instructions. Educated patient on reason for admission; medications to hold, continue, and start, appointment to follow-up with doctor, and when to return to ED. Teach back method used. Verbalized understanding  Number given for 24/7 Nurse Line. Opportunity given for questions and questions answered.  Bedside nurse updated.        08/12/24 1501   Admission   Communication Issues? Patient Hearing   Shift   Virtual Nurse - Patient Verbalized Approval Of Camera Use;VN Rounding   Type of Frequent Check   Type Patient Rounds   Safety/Activity   Patient Rounds visualized patient   Activity Assistance Provided independent   Positioning   Body Position sitting up in bed

## 2024-08-12 NOTE — HOSPITAL COURSE
84-year-old male with a past medical history of end-stage renal disease on hemodialysis, CAD, type 2 diabetes, liver [facility] which [facility], anemia with multiple transfusions.  The patient came to emergency department because of shortness of breath which she states started a few months ago but worsened yesterday.  He states it was difficult for him to take deep breath and he became concerned.  The shortness of breath was later associated with chest pain focused below his neck area.  He stated it felt like pressure but was episodic.  Patient was transferred to [facility] for cardiology consult.  Troponin was monitored and he was admitted on telemetry floor.  By the time he arrived his chest pain had resolved.  Patient was seen by cardiologist who recommended continuing aspirin and Plavix.  Patient was also on beta-blockers.  Hemoglobin she will be monitored closely after discharge.   He was also seen by nephrologist and hemodialysis during admission.  Patient complained of rash on his extremities and back, he was prescribed ammonium lactate.  He was also prescribed artificial tears for itchy eyes. Hepatic lesion was discussed with patient, he stated he will [facility].  He was examined in his bed today, he was calm and not in distress.  Patient was to be discharged today with strict instructions to follow up with PCP within 3-5 days.  He was to also follow up with cardiologist after discharge.  Patient was to continue his hemodialysis schedule after discharge.  It was emphasized that patient was to [facility], due to hepatic lesion as soon as possible.

## 2024-08-12 NOTE — HOSPITAL COURSE
Per Dr. Pittman consult note 8/11/2024 83 yo  male with ESRD on HD, CAD s/p WINNIE and severe RCA stenosis 12/2023, DMII admitted to the hospital with SOB, anemia, weight loss and liver mass. Cardiology  consulted for evaluation of chest pain relieved with NTG and PRBC transfusion.   Assessment  USA  Chest pain   Plan:   -  likely etiology of the patient's chest pain seems to be a combination of multiple risk factors including known history of RCA stenosis which has not been treated yet in the presence of significant anemia.  I do not see any urgent indication for invasive angiogram at this point.  I recommend continuing to monitor hemoglobin while he has restarted back on his dual antiplatelet therapy with aspirin and Plavix.  -   I recommend workup of his liver mass considering significant weight loss and anorexia with significant anemia in the last few months.  PCI of the RCA can be scheduled in the future if hemoglobin remains  stable.  -  patient chest pain-free.  Continue Lipitor 80 mg.  LDL of 58.  -   Agree with continuing isosorbide mononitrate.  -   We will reassess clinical condition tomorrow morning to decide on further steps.

## 2024-08-12 NOTE — PLAN OF CARE
0920  CM received a call from Fabiola ([phone]) w/[facility] stated that the pt lives in a family home that does not have running water or air conditioning, that [facility] with NP Smair Gonzalez, & that he needs a hospfu appt regarding his liver mass.      08/12/24 1000   Rounds   Attendance Nurse ;Provider   Discharge Plan A Home   Transition of Care Barriers Transportation       1000  CM was informed by Dr Abbott that the pt might be medically stable to discharge home today pending cardiology clearance.       [facility] - Telemetry  Initial Discharge Assessment       Primary Care Provider: Melia, Primary Doctor    Admission Diagnosis: Unstable angina [I20.0]    Admission Date: 8/10/2024  Expected Discharge Date: 8/12/2024    Transition of Care Barriers: Transportation    Payor: [employer] / Plan: [facility] [employer]UM / Product Type: Government /     Extended Emergency Contact Information  Primary Emergency Contact: Bhavna S[place] [address] [facility]  Home Phone: [phone]  Relation: Sister  Secondary Emergency Contact: Tracey [place] [address] [facility]  Home Phone: [phone]  Relation: Sister    Discharge Plan A: Home  Discharge Plan B: Home Health      [facility] [address] [facility] - [address] [address]  [address]  [facility] [address]  Phone: [phone] Fax: [phone]    [facility] [address]  [address] [facility] [address]  Phone: [phone] Fax: [phone]      Initial Assessment (most recent)       Adult Discharge Assessment - 08/12/24 1005          Discharge Assessment    Assessment Type Discharge Planning Assessment     Confirmed/corrected address, phone number and insurance Yes     Confirmed Demographics Correct on Facesheet     Source of Information patient     Communicated BAYRON with patient/caregiver Yes     People in Home alone      Do you expect to return to your current living situation? Yes     Do you have help at home or someone to help you manage your care at home? No     Prior to hospitilization cognitive status: Alert/Oriented     Current cognitive status: Alert/Oriented     Equipment Currently Used at Home cane, straight     Readmission within 30 days? No     Patient currently being followed by outpatient case management? No     Do you currently have service(s) that help you manage your care at home? No     Do you take prescription medications? Yes     Do you have prescription coverage? Yes     Do you have any problems affording any of your prescribed medications? No     Is the patient taking medications as prescribed? yes     Are you on dialysis? Yes     Dialysis Name and Scheduled days [facility] left AVF; Dr Carla Laureano     Do you take coumadin? No     Discharge Plan A Home     Discharge Plan B Home Health     DME Needed Upon Discharge  none     Transition of Care Barriers Transportation        Physical Activity    On average, how many days per week do you engage in moderate to strenuous exercise (like a brisk walk)? 3 days     On average, how many minutes do you engage in exercise at this level? 20 min        Financial Resource Strain    How hard is it for you to pay for the very basics like food, housing, medical care, and heating? Not hard at all        Housing Stability    In the last 12 months, was there a time when you were not able to pay the mortgage or rent on time? No     At any time in the past 12 months, were you homeless or living in a shelter (including now)? No        Transportation Needs    Has the lack of transportation kept you from medical appointments, meetings, work or from getting things needed for daily living? No        Food Insecurity    Within the past 12 months, you worried that your food would run out before you got the money to buy more. Never true     Within the past 12 months, the food you  bought just didn't last and you didn't have money to get more. Never true        Stress    Do you feel stress - tense, restless, nervous, or anxious, or unable to sleep at night because your mind is troubled all the time - these days? Not at all        Social Isolation    How often do you feel lonely or isolated from those around you?  Never        Alcohol Use    Q1: How often do you have a drink containing alcohol? Never     Q2: How many drinks containing alcohol do you have on a typical day when you are drinking? Patient does not drink     Q3: How often do you have six or more drinks on one occasion? Never        Utilities    In the past 12 months has the electric, gas, oil, or water company threatened to shut off services in your home? No        Health Literacy    How often do you need to have someone help you when you read instructions, pamphlets, or other written material from your doctor or pharmacy? Never                   1005   Patient resting quietly in bed when CM rounded. No family present. Patient was admitted with unstable angina & is being followed by cards and neph.    Patient lives alone, uses a cane to assist with ambulation, [facility] HD [facility]  & will need assistance with "[employer]" transportation at time of discharge. Pt stated he take a bus to his HD sessions. Pt stated he has a key to his house. CM informed CM Supervisor Edwin [employer] Transportation needed.     Pt stated that all of his medical care & prescriptions are provided by the [place] and [facility] to[facility]from [facility].     Pt stated that he does not have running water in his house but does have air conditioning.     CM updated patient's whiteboard with CM name & contact information.     1210  Transportation packet left at the nurse's station.     1300  CM was informed by CRIS Gonzalez ([phone]) w[facility] informed of a hospfu appt home visit & hep  appt needed. Samir stated that the pt has multiple appts scheduled & transportation arranged for urol 8/19/2024, MRI 8/21/2024, CT (A/P) 8/23/024, hem/onc 9/6/2024 & hep 9/23/2024. CM to remind pt of above.     1310  Message sent to CRIS Galdamez questioning if the pt is cleared by cards to dc today. Awaiting response.     1350  Message sent to Dr Abbott requesting HH orders. Awaiting response.     1415  [facility] transportation scheduled for the pt thru the [facility] with requested pickup at 1515.     Message sent to nurse Amy, charge nurse Dara, & virtual nurse Ana informing that the pt is cleared to discharge & of [facility] transportation scheduled.       Will continue to follow.

## 2024-08-13 NOTE — PLAN OF CARE
[facility] - Telemetry  Discharge Final Note    Primary Care Provider: No, Primary Doctor    Expected Discharge Date: 8/12/2024    Final Discharge Note (most recent)       Final Note - 08/13/24 0716          Final Note    Assessment Type Final Discharge Note (P)      Anticipated Discharge Disposition Home or Self Care (P)      Hospital Resources/Appts/Education Provided Appointments scheduled and added to AVS (P)         Post-Acute Status    Discharge Delays [facility] Arranged Transportation (P)                      Contact Info       [facility]    [address] [facility] [address]       Next Steps: Follow up on 9/23/2024    Instructions: at 10:00 AM; hepatology appointment for the patient's liver mass          Discharge summary faxed to CRIS Justice ([facility] f [phone].

## 2024-08-27 PROBLEM — N18.6 ANEMIA IN ESRD (END-STAGE RENAL DISEASE): Status: ACTIVE | Noted: 2024-01-15

## 2024-08-27 PROBLEM — D63.1 ANEMIA IN ESRD (END-STAGE RENAL DISEASE): Status: ACTIVE | Noted: 2024-01-15

## 2024-08-27 PROBLEM — E87.5 HYPERKALEMIA: Status: ACTIVE | Noted: 2024-08-27

## 2024-08-29 ENCOUNTER — TELEPHONE (OUTPATIENT)
Dept: HEPATOLOGY | Facility: CLINIC | Age: 84
End: 2024-08-29
Payer: OTHER GOVERNMENT

## 2024-08-29 NOTE — TELEPHONE ENCOUNTER
----- Message from Lisa Beaver sent at 8/28/2024 11:46 AM CDT -----  Type:  Needs Medical Advice  HFU- discharged today     Who Called: [facility]   Symptoms (please be specific): pt needs to get in as soon as possible for HFU appt, for hospital follow up getting discharged today  Coronary artery disease involving native coronary artery of native heart with refractory angina pectoris [I25.112]   Liver mass [R16.0]   Discharge planning issues     Would the patient rather a call back or a response via [facility]? call  Best Call Back Number:  [phone]  Additional Information:

## 2024-10-16 NOTE — Clinical Note
Discharge instructions given to patient. Patient significant other Jose Luis is transporting patient to [place]. Called [place] and gave report to Marni INGRAM.    dry, intact, no bleeding and no hematoma.

## 2025-01-07 RX ORDER — GABAPENTIN 100 MG/1
100 CAPSULE ORAL NIGHTLY
Qty: 30 CAPSULE | Refills: 11 | Status: SHIPPED | OUTPATIENT
Start: 2025-01-07 | End: 2026-01-07

## 2025-01-13 ENCOUNTER — HOSPITAL ENCOUNTER (INPATIENT)
Facility: HOSPITAL | Age: 85
LOS: 3 days | Discharge: HOME HEALTH | DRG: 323 | End: 2025-01-16
Attending: FAMILY MEDICINE | Admitting: INTERNAL MEDICINE
Payer: MEDICARE

## 2025-01-13 ENCOUNTER — ANESTHESIA EVENT (OUTPATIENT)
Dept: CARDIOLOGY | Facility: HOSPITAL | Age: 85
DRG: 323 | End: 2025-01-13
Payer: MEDICARE

## 2025-01-13 DIAGNOSIS — Z99.2 ESRD ON DIALYSIS: Primary | ICD-10-CM

## 2025-01-13 DIAGNOSIS — I20.0 UNSTABLE ANGINA: ICD-10-CM

## 2025-01-13 DIAGNOSIS — I21.4 NSTEMI (NON-ST ELEVATED MYOCARDIAL INFARCTION): ICD-10-CM

## 2025-01-13 DIAGNOSIS — R79.89 ELEVATED TROPONIN: ICD-10-CM

## 2025-01-13 DIAGNOSIS — Z98.890 STATUS POST CARDIAC CATHETERIZATION: ICD-10-CM

## 2025-01-13 DIAGNOSIS — N18.6 ESRD ON DIALYSIS: Primary | ICD-10-CM

## 2025-01-13 LAB
APTT PPP: 65.8 SEC (ref 21–32)
POC ACTIVATED CLOTTING TIME K: 245 SEC (ref 74–137)
POC ACTIVATED CLOTTING TIME K: 273 SEC (ref 74–137)
POC ACTIVATED CLOTTING TIME K: 285 SEC (ref 74–137)
POCT GLUCOSE: 103 MG/DL (ref 70–110)
SAMPLE: ABNORMAL
TROPONIN I SERPL DL<=0.01 NG/ML-MCNC: 2.88 NG/ML (ref 0–0.03)

## 2025-01-13 PROCEDURE — 92978 ENDOLUMINL IVUS OCT C 1ST: CPT | Mod: LD | Performed by: STUDENT IN AN ORGANIZED HEALTH CARE EDUCATION/TRAINING PROGRAM

## 2025-01-13 PROCEDURE — 84484 ASSAY OF TROPONIN QUANT: CPT | Mod: 91 | Performed by: INTERNAL MEDICINE

## 2025-01-13 PROCEDURE — 94761 N-INVAS EAR/PLS OXIMETRY MLT: CPT

## 2025-01-13 PROCEDURE — 4A023N7 MEASUREMENT OF CARDIAC SAMPLING AND PRESSURE, LEFT HEART, PERCUTANEOUS APPROACH: ICD-10-PCS | Performed by: STUDENT IN AN ORGANIZED HEALTH CARE EDUCATION/TRAINING PROGRAM

## 2025-01-13 PROCEDURE — B240ZZ3 ULTRASONOGRAPHY OF SINGLE CORONARY ARTERY, INTRAVASCULAR: ICD-10-PCS | Performed by: STUDENT IN AN ORGANIZED HEALTH CARE EDUCATION/TRAINING PROGRAM

## 2025-01-13 PROCEDURE — B211YZZ FLUOROSCOPY OF MULTIPLE CORONARY ARTERIES USING OTHER CONTRAST: ICD-10-PCS | Performed by: STUDENT IN AN ORGANIZED HEALTH CARE EDUCATION/TRAINING PROGRAM

## 2025-01-13 PROCEDURE — 85730 THROMBOPLASTIN TIME PARTIAL: CPT | Mod: 91 | Performed by: FAMILY MEDICINE

## 2025-01-13 PROCEDURE — C1887 CATHETER, GUIDING: HCPCS | Performed by: STUDENT IN AN ORGANIZED HEALTH CARE EDUCATION/TRAINING PROGRAM

## 2025-01-13 PROCEDURE — 63600175 PHARM REV CODE 636 W HCPCS: Performed by: INTERNAL MEDICINE

## 2025-01-13 PROCEDURE — 63600175 PHARM REV CODE 636 W HCPCS: Performed by: STUDENT IN AN ORGANIZED HEALTH CARE EDUCATION/TRAINING PROGRAM

## 2025-01-13 PROCEDURE — 92928 PRQ TCAT PLMT NTRAC ST 1 LES: CPT | Mod: LD,,, | Performed by: STUDENT IN AN ORGANIZED HEALTH CARE EDUCATION/TRAINING PROGRAM

## 2025-01-13 PROCEDURE — C1761 OPTIME CATH, TRANSLUMINAL INTRAVASC LITHO, CORONARY: HCPCS | Performed by: STUDENT IN AN ORGANIZED HEALTH CARE EDUCATION/TRAINING PROGRAM

## 2025-01-13 PROCEDURE — 25000003 PHARM REV CODE 250: Performed by: INTERNAL MEDICINE

## 2025-01-13 PROCEDURE — 93458 L HRT ARTERY/VENTRICLE ANGIO: CPT | Mod: XU | Performed by: STUDENT IN AN ORGANIZED HEALTH CARE EDUCATION/TRAINING PROGRAM

## 2025-01-13 PROCEDURE — 027034Z DILATION OF CORONARY ARTERY, ONE ARTERY WITH DRUG-ELUTING INTRALUMINAL DEVICE, PERCUTANEOUS APPROACH: ICD-10-PCS | Performed by: STUDENT IN AN ORGANIZED HEALTH CARE EDUCATION/TRAINING PROGRAM

## 2025-01-13 PROCEDURE — 92972 PERQ TRLUML CORONRY LITHOTRP: CPT | Mod: ,,, | Performed by: STUDENT IN AN ORGANIZED HEALTH CARE EDUCATION/TRAINING PROGRAM

## 2025-01-13 PROCEDURE — 94660 CPAP INITIATION&MGMT: CPT

## 2025-01-13 PROCEDURE — 25000003 PHARM REV CODE 250: Performed by: STUDENT IN AN ORGANIZED HEALTH CARE EDUCATION/TRAINING PROGRAM

## 2025-01-13 PROCEDURE — 11000001 HC ACUTE MED/SURG PRIVATE ROOM

## 2025-01-13 PROCEDURE — 27000190 HC CPAP FULL FACE MASK W/VALVE

## 2025-01-13 PROCEDURE — 5A1D70Z PERFORMANCE OF URINARY FILTRATION, INTERMITTENT, LESS THAN 6 HOURS PER DAY: ICD-10-PCS | Performed by: STUDENT IN AN ORGANIZED HEALTH CARE EDUCATION/TRAINING PROGRAM

## 2025-01-13 PROCEDURE — 85347 COAGULATION TIME ACTIVATED: CPT | Performed by: STUDENT IN AN ORGANIZED HEALTH CARE EDUCATION/TRAINING PROGRAM

## 2025-01-13 PROCEDURE — 92978 ENDOLUMINL IVUS OCT C 1ST: CPT | Mod: 26,LD,, | Performed by: STUDENT IN AN ORGANIZED HEALTH CARE EDUCATION/TRAINING PROGRAM

## 2025-01-13 PROCEDURE — 27201423 OPTIME MED/SURG SUP & DEVICES STERILE SUPPLY: Performed by: STUDENT IN AN ORGANIZED HEALTH CARE EDUCATION/TRAINING PROGRAM

## 2025-01-13 PROCEDURE — 93005 ELECTROCARDIOGRAM TRACING: CPT

## 2025-01-13 PROCEDURE — 90935 HEMODIALYSIS ONE EVALUATION: CPT

## 2025-01-13 PROCEDURE — C1725 CATH, TRANSLUMIN NON-LASER: HCPCS | Performed by: STUDENT IN AN ORGANIZED HEALTH CARE EDUCATION/TRAINING PROGRAM

## 2025-01-13 PROCEDURE — 93010 ELECTROCARDIOGRAM REPORT: CPT | Mod: 76,,, | Performed by: INTERNAL MEDICINE

## 2025-01-13 PROCEDURE — C1876 STENT, NON-COA/NON-COV W/DEL: HCPCS | Performed by: STUDENT IN AN ORGANIZED HEALTH CARE EDUCATION/TRAINING PROGRAM

## 2025-01-13 PROCEDURE — C1753 CATH, INTRAVAS ULTRASOUND: HCPCS | Performed by: STUDENT IN AN ORGANIZED HEALTH CARE EDUCATION/TRAINING PROGRAM

## 2025-01-13 PROCEDURE — 02F03ZZ FRAGMENTATION IN CORONARY ARTERY, ONE ARTERY, PERCUTANEOUS APPROACH: ICD-10-PCS | Performed by: STUDENT IN AN ORGANIZED HEALTH CARE EDUCATION/TRAINING PROGRAM

## 2025-01-13 PROCEDURE — C1894 INTRO/SHEATH, NON-LASER: HCPCS | Performed by: STUDENT IN AN ORGANIZED HEALTH CARE EDUCATION/TRAINING PROGRAM

## 2025-01-13 PROCEDURE — C1769 GUIDE WIRE: HCPCS | Performed by: STUDENT IN AN ORGANIZED HEALTH CARE EDUCATION/TRAINING PROGRAM

## 2025-01-13 PROCEDURE — 25000003 PHARM REV CODE 250: Performed by: NURSE PRACTITIONER

## 2025-01-13 PROCEDURE — 36415 COLL VENOUS BLD VENIPUNCTURE: CPT | Performed by: INTERNAL MEDICINE

## 2025-01-13 PROCEDURE — 92972 PERQ TRLUML CORONRY LITHOTRP: CPT | Performed by: STUDENT IN AN ORGANIZED HEALTH CARE EDUCATION/TRAINING PROGRAM

## 2025-01-13 PROCEDURE — 02703ZZ DILATION OF CORONARY ARTERY, ONE ARTERY, PERCUTANEOUS APPROACH: ICD-10-PCS | Performed by: STUDENT IN AN ORGANIZED HEALTH CARE EDUCATION/TRAINING PROGRAM

## 2025-01-13 PROCEDURE — 93458 L HRT ARTERY/VENTRICLE ANGIO: CPT | Mod: 26,XU,51, | Performed by: STUDENT IN AN ORGANIZED HEALTH CARE EDUCATION/TRAINING PROGRAM

## 2025-01-13 PROCEDURE — 99223 1ST HOSP IP/OBS HIGH 75: CPT | Mod: 25,GC,, | Performed by: NURSE PRACTITIONER

## 2025-01-13 PROCEDURE — 25500020 PHARM REV CODE 255: Performed by: STUDENT IN AN ORGANIZED HEALTH CARE EDUCATION/TRAINING PROGRAM

## 2025-01-13 PROCEDURE — C9600 PERC DRUG-EL COR STENT SING: HCPCS | Mod: LD | Performed by: STUDENT IN AN ORGANIZED HEALTH CARE EDUCATION/TRAINING PROGRAM

## 2025-01-13 PROCEDURE — 93010 ELECTROCARDIOGRAM REPORT: CPT | Mod: ,,, | Performed by: INTERNAL MEDICINE

## 2025-01-13 PROCEDURE — 99900035 HC TECH TIME PER 15 MIN (STAT)

## 2025-01-13 DEVICE — EVEROLIMUS-ELUTING PLATINUM CHROMIUM CORONARY STENT SYSTEM
Type: IMPLANTABLE DEVICE | Site: CORONARY | Status: FUNCTIONAL
Brand: SYNERGY MEGATRON™

## 2025-01-13 RX ORDER — LIDOCAINE HYDROCHLORIDE 10 MG/ML
INJECTION, SOLUTION EPIDURAL; INFILTRATION; INTRACAUDAL; PERINEURAL
Status: DISCONTINUED | OUTPATIENT
Start: 2025-01-13 | End: 2025-01-13 | Stop reason: HOSPADM

## 2025-01-13 RX ORDER — SODIUM CHLORIDE 9 MG/ML
INJECTION, SOLUTION INTRAVENOUS
OUTPATIENT
Start: 2025-01-13

## 2025-01-13 RX ORDER — DIPHENHYDRAMINE HYDROCHLORIDE 50 MG/ML
25 INJECTION INTRAMUSCULAR; INTRAVENOUS ONCE
Status: COMPLETED | OUTPATIENT
Start: 2025-01-13 | End: 2025-01-13

## 2025-01-13 RX ORDER — ACETAMINOPHEN 325 MG/1
650 TABLET ORAL EVERY 4 HOURS PRN
Status: DISCONTINUED | OUTPATIENT
Start: 2025-01-13 | End: 2025-01-16 | Stop reason: HOSPADM

## 2025-01-13 RX ORDER — IODIXANOL 320 MG/ML
INJECTION, SOLUTION INTRAVASCULAR
Status: DISCONTINUED | OUTPATIENT
Start: 2025-01-13 | End: 2025-01-13 | Stop reason: HOSPADM

## 2025-01-13 RX ORDER — DIPHENHYDRAMINE HYDROCHLORIDE 50 MG/ML
25 INJECTION INTRAMUSCULAR; INTRAVENOUS ONCE
Status: DISCONTINUED | OUTPATIENT
Start: 2025-01-13 | End: 2025-01-13

## 2025-01-13 RX ORDER — ATORVASTATIN CALCIUM 40 MG/1
80 TABLET, FILM COATED ORAL NIGHTLY
Status: DISCONTINUED | OUTPATIENT
Start: 2025-01-13 | End: 2025-01-16 | Stop reason: HOSPADM

## 2025-01-13 RX ORDER — NAPROXEN SODIUM 220 MG/1
81 TABLET, FILM COATED ORAL DAILY
Status: DISCONTINUED | OUTPATIENT
Start: 2025-01-13 | End: 2025-01-13

## 2025-01-13 RX ORDER — NAPROXEN SODIUM 220 MG/1
TABLET, FILM COATED ORAL
Status: DISCONTINUED | OUTPATIENT
Start: 2025-01-13 | End: 2025-01-13 | Stop reason: HOSPADM

## 2025-01-13 RX ORDER — MUPIROCIN 20 MG/G
OINTMENT TOPICAL 2 TIMES DAILY
OUTPATIENT
Start: 2025-01-13 | End: 2025-01-18

## 2025-01-13 RX ORDER — NITROGLYCERIN 400 UG/1
SPRAY ORAL
Status: DISCONTINUED | OUTPATIENT
Start: 2025-01-13 | End: 2025-01-13 | Stop reason: HOSPADM

## 2025-01-13 RX ORDER — HEPARIN SODIUM 1000 [USP'U]/ML
INJECTION, SOLUTION INTRAVENOUS; SUBCUTANEOUS
Status: DISCONTINUED | OUTPATIENT
Start: 2025-01-13 | End: 2025-01-13 | Stop reason: HOSPADM

## 2025-01-13 RX ORDER — ONDANSETRON 8 MG/1
8 TABLET, ORALLY DISINTEGRATING ORAL EVERY 8 HOURS PRN
Status: DISCONTINUED | OUTPATIENT
Start: 2025-01-13 | End: 2025-01-16 | Stop reason: HOSPADM

## 2025-01-13 RX ORDER — CLOPIDOGREL BISULFATE 75 MG/1
600 TABLET ORAL ONCE
Status: COMPLETED | OUTPATIENT
Start: 2025-01-13 | End: 2025-01-13

## 2025-01-13 RX ORDER — VERAPAMIL HYDROCHLORIDE 2.5 MG/ML
INJECTION, SOLUTION INTRAVENOUS
Status: DISCONTINUED | OUTPATIENT
Start: 2025-01-13 | End: 2025-01-13 | Stop reason: HOSPADM

## 2025-01-13 RX ORDER — HEPARIN SODIUM 200 [USP'U]/100ML
INJECTION, SOLUTION INTRAVENOUS
Status: COMPLETED | OUTPATIENT
Start: 2025-01-13 | End: 2025-01-13

## 2025-01-13 RX ORDER — CLOPIDOGREL BISULFATE 75 MG/1
75 TABLET ORAL DAILY
Status: DISCONTINUED | OUTPATIENT
Start: 2025-01-14 | End: 2025-01-16 | Stop reason: HOSPADM

## 2025-01-13 RX ORDER — FENTANYL CITRATE 50 UG/ML
INJECTION, SOLUTION INTRAMUSCULAR; INTRAVENOUS
Status: DISCONTINUED | OUTPATIENT
Start: 2025-01-13 | End: 2025-01-13 | Stop reason: HOSPADM

## 2025-01-13 RX ORDER — CARVEDILOL 6.25 MG/1
6.25 TABLET ORAL 2 TIMES DAILY
Status: DISCONTINUED | OUTPATIENT
Start: 2025-01-13 | End: 2025-01-16 | Stop reason: HOSPADM

## 2025-01-13 RX ORDER — NAPROXEN SODIUM 220 MG/1
81 TABLET, FILM COATED ORAL DAILY
Status: DISCONTINUED | OUTPATIENT
Start: 2025-01-14 | End: 2025-01-16 | Stop reason: HOSPADM

## 2025-01-13 RX ORDER — SODIUM CHLORIDE 9 MG/ML
INJECTION, SOLUTION INTRAVENOUS ONCE
OUTPATIENT
Start: 2025-01-13 | End: 2025-01-13

## 2025-01-13 RX ORDER — FUROSEMIDE 10 MG/ML
120 INJECTION INTRAMUSCULAR; INTRAVENOUS ONCE
Status: COMPLETED | OUTPATIENT
Start: 2025-01-13 | End: 2025-01-13

## 2025-01-13 RX ORDER — HEPARIN SODIUM,PORCINE/D5W 25000/250
0-40 INTRAVENOUS SOLUTION INTRAVENOUS CONTINUOUS
Status: DISCONTINUED | OUTPATIENT
Start: 2025-01-13 | End: 2025-01-14

## 2025-01-13 RX ADMIN — FUROSEMIDE 120 MG: 10 INJECTION, SOLUTION INTRAMUSCULAR; INTRAVENOUS at 05:01

## 2025-01-13 RX ADMIN — CARVEDILOL 6.25 MG: 6.25 TABLET, FILM COATED ORAL at 12:01

## 2025-01-13 RX ADMIN — DIPHENHYDRAMINE HYDROCHLORIDE 25 MG: 50 INJECTION, SOLUTION INTRAMUSCULAR; INTRAVENOUS at 01:01

## 2025-01-13 RX ADMIN — CLOPIDOGREL BISULFATE 600 MG: 75 TABLET ORAL at 01:01

## 2025-01-13 RX ADMIN — HEPARIN SODIUM 10 UNITS/KG/HR: 10000 INJECTION, SOLUTION INTRAVENOUS at 12:01

## 2025-01-13 RX ADMIN — HEPARIN SODIUM 10 UNITS/KG/HR: 10000 INJECTION, SOLUTION INTRAVENOUS at 11:01

## 2025-01-13 RX ADMIN — ATORVASTATIN CALCIUM 80 MG: 40 TABLET, FILM COATED ORAL at 10:01

## 2025-01-13 RX ADMIN — CARVEDILOL 6.25 MG: 6.25 TABLET, FILM COATED ORAL at 10:01

## 2025-01-13 RX ADMIN — DIPHENHYDRAMINE HYDROCHLORIDE, ZINC ACETATE: 2; .1 CREAM TOPICAL at 03:01

## 2025-01-13 RX ADMIN — DIPHENHYDRAMINE HYDROCHLORIDE, ZINC ACETATE: 2; .1 CREAM TOPICAL at 10:01

## 2025-01-13 RX ADMIN — DIPHENHYDRAMINE HYDROCHLORIDE 25 MG: 50 INJECTION INTRAMUSCULAR; INTRAVENOUS at 05:01

## 2025-01-13 NOTE — PROGRESS NOTES
VIRTUAL NURSE: Cued into patient's room. Permission received per patient to turn camera to view patient. Introduced as VN that will be working with floor nurse this shift. Educated the patient on VN's role in patient care. Plan of care reviewed with patient. Informed patient that staff will round on them every 2 hours but to use call light for any other needs they may have. Also informed of fall risk and fall precautions. Patient verbalized understanding. Call light within reach; bed siderails up x2. Opportunity given for questions and questions answered. Admission assessment questions completed. Patient denies complaints or any needs at this time.     01/13/25 1134   Nurse Notification   Bedside Nurse Notified? Yes   Name of Bedside Nurse AUNDREA Urbina RN   Nurse Notfication Method Secure Chat   Nurse Notified Of Other  (pt is [facility])   Admission   Initial VN Admission Questions Complete   Communication Issues? Patient Hearing   Shift   Virtual Nurse - Patient Verbalized Approval Of Camera Use   Safety/Activity   Safety Promotion/Fall Prevention assistive device/personal item within reach;Fall Risk reviewed with patient/family;medications reviewed;instructed to call staff for mobility;side rails raised x 2   Positioning   Body Position position changed independently

## 2025-01-13 NOTE — PLAN OF CARE
Resp rate increasing, pt states that he can't breathe.  Non rebreather applied and Dr. Garcia notified.

## 2025-01-13 NOTE — PLAN OF CARE
"Pt thrashing in bed, hollering "my skin, my skin".  Dr. Garcia called and new orders given.  Pt had small, formed dark bowel movement.  "

## 2025-01-13 NOTE — Clinical Note
300 ml of contrast were injected throughout the case. 150 mL of contrast was the total wasted during the case. 450 mL was the total amount used during the case.

## 2025-01-13 NOTE — ASSESSMENT & PLAN NOTE
Troponin up to 5 this AM, repeat pending  Known CAD with residual RCA stenosis  Symptoms concerning   Loaded with Plavix, asa, statin continued- on Heparin gtt  NPO for [facility]  Repeat TTE

## 2025-01-13 NOTE — H&P (VIEW-ONLY)
Nephrology Consult  Note       Consult Requested By: Malini Talavera MD  Reason for Consult: ESRD      SUBJECTIVE:   History of Present Illness:  Jevon Rajan is a 84 y.o.   male who  has a past medical history of BPH (benign prostatic hyperplasia), Coronary artery disease, Diabetes mellitus, type 2, ESRD (end stage renal disease) on dialysis, Hypertension, Hypothyroidism, unspecified, and Symptomatic anemia (01/15/2024).. The patient presented to the [facility] on 1/13/2025 with a primary complaint of nausea for past 2 days last full dialysis was Thursday Saturday cut short due to itching patient has prurigo nodularis, now here K+ elevated BP elevated SOB on O2 needs HD.        ?    Review of Systems   Constitutional:  Negative for chills, fever and weight loss.   HENT:  Negative for congestion and tinnitus.    Eyes:  Negative for blurred vision, double vision and photophobia.   Respiratory:  Positive for shortness of breath. Negative for cough.    Cardiovascular:  Negative for chest pain, palpitations, orthopnea and leg swelling.   Gastrointestinal:  Negative for nausea and vomiting.   Genitourinary:  Negative for frequency and urgency.   Musculoskeletal:  Negative for back pain, falls and joint pain.   Skin:  Negative for itching and rash.   Neurological:  Negative for dizziness, tingling, tremors, sensory change, speech change, focal weakness, seizures, loss of consciousness, weakness and headaches.   Endo/Heme/Allergies:  Does not bruise/bleed easily.   Psychiatric/Behavioral:  Negative for depression. The patient does not have insomnia.        Past Medical History:   Diagnosis Date    BPH (benign prostatic hyperplasia)     Coronary artery disease     [facility]. In 2014 he had stent to LAD. He states he has had 2 heart attacks. He does not get angina. There was 90% left anterior descending artery stenosis undergoing stenting. There  was also a circumflex marginal branch stenosis of 70%.    Diabetes mellitus, type 2     ESRD (end stage renal disease) on dialysis     ESRD on HD TTS via left brachial AVF    Hypertension     Hypothyroidism, unspecified     Symptomatic anemia 01/15/2024          OBJECTIVE:     Vital Signs (Most Recent)  Vitals:    01/13/25 1141   BP: 136/63   BP Location: Right arm   Patient Position: Lying   Pulse: 80   Resp: 18   Temp: 97.5 °F (36.4 °C)   TempSrc: Oral   SpO2: 97%                   Medications:   [START ON 1/14/2025] aspirin  81 mg Oral Daily    atorvastatin  80 mg Oral QHS    carvediloL  6.25 mg Oral BID    clopidogreL  600 mg Oral Once    [START ON 1/14/2025] clopidogreL  75 mg Oral Daily           Physical Exam  Vitals and nursing note reviewed.   Constitutional:       General: He is not in acute distress.     Appearance: He is not diaphoretic.   HENT:      Head: Normocephalic and atraumatic.      Mouth/Throat:      Pharynx: No oropharyngeal exudate.   Eyes:      General: No scleral icterus.     Conjunctiva/sclera: Conjunctivae normal.      Pupils: Pupils are equal, round, and reactive to light.   Cardiovascular:      Rate and Rhythm: Normal rate and regular rhythm.      Heart sounds: Normal heart sounds. No murmur heard.  Pulmonary:      Effort: No respiratory distress.      Breath sounds: Rales present.   Abdominal:      General: Bowel sounds are normal. There is no distension.      Palpations: Abdomen is soft.      Tenderness: There is no abdominal tenderness.   Musculoskeletal:         General: Normal range of motion.      Cervical back: Normal range of motion and neck supple.      Comments: LUE AVF    Skin:     General: Skin is warm and dry.      Findings: Rash present. No erythema.   Neurological:      Mental Status: He is alert and oriented to person, place, and time.      Cranial Nerves: No cranial nerve deficit.   Psychiatric:         Mood and Affect: Affect normal.         Cognition and Memory: Memory  "normal.         Judgment: Judgment normal.         Laboratory:  Recent Labs   Lab 01/12/25  0550 01/13/25  0458   WBC 6.16 8.27   HGB 8.3* 7.8*   HCT 25.7* 24.5*    189   MONO 2.0*  CANCELED 6.7  0.6   EOSINOPHIL 0.0 6.5     Recent Labs   Lab 01/12/25  0453 01/12/25  1134    146*   K 5.3* 4.4    101   CO2 21* 23   BUN 73* 78*   CREATININE 13.6* 13.8*   CALCIUM 10.6* 10.2       Diagnostic Results:  X-Ray: Reviewed  US: Reviewed  Echo: Reviewed  ASSESSMENT/PLAN:     1. ESRD on HD TTS  - Dr Carla Ashley   Didn't get HD Saturday  due to itching  HD today and tomorrow with  UF 2L  then keep     TTS   -- Daily Renal Function Panel  -- Avoid Hypotension.  -- Renally dose all meds     Elevated Troponin   -- denies Chest pain now   -- SOB crackles on exam HTN goes with volume overload   -- plan for HD for volume and electrolytes optimization after LHC  -- currently on Heparin drip        2. HTN  - controlled volume overloaded   3. Anemia of chronic kidney disease treated with NAKIA    EPogen   with each HD - hold today will do Tomorrow    Recent Labs   Lab 01/12/25  0550 01/13/25  0458   HGB 8.3* 7.8*   HCT 25.7* 24.5*    189       Iron   Lab Results   Component Value Date    IRON 39 (L) 08/11/2024    TIBC 206 (L) 08/11/2024    FERRITIN 979 (H) 08/11/2024       4. MBD    Recent Labs   Lab 01/12/25  1134   CALCIUM 10.2   -- Binders TIDWM   No results for input(s): "MG" in the last 168 hours.      Lab Results   Component Value Date    .7 (H) 01/17/2024    CALCIUM 10.2 01/12/2025    PHOS 3.5 08/28/2024     No results found for: "[id number]"    Lab Results   Component Value Date    CO2 23 01/12/2025       5. Nutrition/Hypoalbuminemia   Recent Labs   Lab 01/12/25  0453 01/12/25  1316   LABPROT  --  11.4   ALBUMIN 3.5  --      Nepro with meals TID. Renal vitamins daily      Thank you for allowing me to participate in care of your patient  With any question please call   Vivienne Oates" MD     [facility]  RACHNA Red MD,   MD GINO Milner MD E. V. Harmon, NP  [address]   [place], [address] [phone]  After hours answering service: [phone]

## 2025-01-13 NOTE — HPI
84M CAD, HTN, ESRD on HD initially presents for nausea.  Last dialysis was on Thursday.  Patient states unable to complete dialysis yesterday due to itching.  He is being treated for prurigo nodularis.  [facility], unclear if he was dialyzed and if not, why.  Patient awoke after being transported home yesterday and called 911.  EMS activated for nausea.  He was treated with Zofran.  That improved.  Now he is complaining of chest pain and dyspnea.  Described as a pressure-like sensation. Patient afebrile, hypertensive, upper 90s on RA, labs without leucocytosis, HgB 8.3 (at baseline). CMP in keeping with ESRD, initially with hyperkalemia resolved with shifting. BNP ~2K and troponin 0.1-0.3-1.0. XR chest and abdomen without acute abnl. ECG with c/f inferolateral ST changes. Patient responded to medication for nausea, thought took multimodal approach. Due to elevated TnI patient started on heparin infusion and ASA. Patient loaded with Plavix. He is NPO for [facility]. Transferred to [facility].

## 2025-01-13 NOTE — SUBJECTIVE & OBJECTIVE
Past Medical History:   Diagnosis Date    BPH (benign prostatic hyperplasia)     Coronary artery disease     [facility] and is now transferring his care to this clinic. In  he had stent to LAD. He states he has had 2 heart attacks. He does not get angina. There was 90% left anterior descending artery stenosis undergoing stenting. There was also a circumflex marginal branch stenosis of 70%.    Diabetes mellitus, type 2     ESRD (end stage renal disease) on dialysis     ESRD on HD TTS via left brachial AVF    Hypertension     Hypothyroidism, unspecified     Symptomatic anemia 01/15/2024       Past Surgical History:   Procedure Laterality Date    bilateral foot surgery      ESOPHAGOGASTRODUODENOSCOPY N/A 2024    Procedure: EGD (ESOPHAGOGASTRODUODENOSCOPY);  Surgeon: Gemma Almendarez MD;  Location: [facility] ENDO;  Service: Endoscopy;  Laterality: N/A;    eyelid surgery      FISTULOGRAM Left 2019    Procedure: Fistulogram;  Surgeon: MELANY Brenner III, MD;  Location: [facility] OR [address] [place] [address];  Service: Peripheral Vascular;  Laterality: Left;    FISTULOGRAM Left 2019    Procedure: Fistulogram, AVG declot, possible permacath;  Surgeon: MELANY Brenner III, MD;  Location: [facility] CATH LAB;  Service: Peripheral Vascular;  Laterality: Left;    INSERTION OF DIALYSIS CAT[place]      [facility]  [facility]    right hand surgery      stents         Review of patient's allergies indicates:   Allergen Reactions    Ace inhibitors Hives, Shortness Of Breath and Itching     Is not sure which medication it was    Captopril        Current Facility-Administered Medications on File Prior to Encounter   Medication    [COMPLETED] aspirin tablet 325 mg    [COMPLETED] heparin 25,000 units in dextrose 5% (100 units/ml) IV bolus from bag LOW INTENSITY nomogram - [facility]    [] nitroGLYCERIN 2% TD oint ointment 1 inch    [DISCONTINUED] aspirin chewable tablet 81 mg    [DISCONTINUED] atorvastatin tablet 80 mg     [DISCONTINUED] carvediloL tablet 6.25 mg    [DISCONTINUED] heparin 25,000 units in dextrose 5% (100 units/ml) IV bolus from bag LOW INTENSITY nomogram - [facility]    [DISCONTINUED] heparin 25,000 units in dextrose 5% (100 units/ml) IV bolus from bag LOW INTENSITY nomogram - [facility]    [DISCONTINUED] heparin 25,000 units in dextrose 5% 250 mL (100 units/mL) infusion LOW INTENSITY nomogram - [facility]     Current Outpatient Medications on File Prior to Encounter   Medication Sig    ammonium lactate 12 % Crea Apply topically 2 (two) times a day.    artificial tears,hypromellose,,GENTEAL/SUSTANE, 0.3 % Gel Apply to eye nightly.    aspirin (ECOTRIN) 81 MG EC tablet Take 81 mg by mouth once daily.    atorvastatin (LIPITOR) 80 MG tablet Take 1 tablet (80 mg total) by mouth every evening.    camphor-menthol 0.5-0.5% (SARNA) lotion Apply topically once daily. APPLY SMALL AMOUNT TOPICALLY TO AFFECTED AREA(S) AS NEEDED FOR ITCHING KEEP IN FRIDGE    carboxymethylcellulose sodium 0.5 % Drop Apply 1 drop to eye nightly as needed (dry eyes).    carvediloL (COREG) 12.5 MG tablet Take 6.25 mg by mouth 2 (two) times daily.    clobetasol 0.05% (TEMOVATE) 0.05 % Oint Apply topically once daily.    clopidogreL (PLAVIX) 75 mg tablet Take 1 tablet (75 mg total) by mouth once daily.    gabapentin (NEURONTIN) 100 MG capsule Take 1 capsule (100 mg total) by mouth every evening.    hydrophilic ointment (AQUABASE) Apply topically as needed for Dry Skin. APPLY MODERATE AMOUNT TOPICALLY TO AFFECTED AREA(S) TWICE A DAY AS NEEDED FOR DRY SKIN APPLY TO AREAS OF DRY SKIN OR OVER ENTIRE BODY.    hydrOXYzine pamoate (VISTARIL) 50 MG Cap Take 1 capsule (50 mg total) by mouth every 8 (eight) hours as needed (itching).    isosorbide mononitrate (IMDUR) 30 MG 24 hr tablet Take 15 mg by mouth once daily.    lanolin alcohol-mineral oil-white petrolatum-ceres (EUCERIN) Crea cream Apply topically 2 (two) times daily as needed.    levothyroxine (SYNTHROID) 50 MCG tablet Take  1 tablet (50 mcg total) by mouth before breakfast. for 14 days (Patient not taking: Reported on 1/12/2025)    LIDOcaine (LIDODERM) 5 % Apply 1 patch topically daily as needed (pain). (Patient not taking: Reported on 1/12/2025)    loratadine (CLARITIN) 10 mg tablet Take 10 mg by mouth every 48 hours.    nut.tx.imp.renal fxn,lac-reduc (NEPRO CARB STEADY) 0.08 gram-1.8 kcal/mL Liqd Take 240 mLs by mouth 2 (two) times a day.    ondansetron (ZOFRAN-ODT) 4 MG TbDL Take 1 tablet (4 mg total) by mouth every 8 (eight) hours as needed (nausea).    pantoprazole (PROTONIX) 20 MG tablet Take 20 mg by mouth 2 (two) times daily as needed.    pramoxine 1 % Lotn Apply topically 2 (two) times daily as needed (itching).    sevelamer carbonate (RENVELA) 800 mg Tab Take 1 tablet (800 mg total) by mouth 3 (three) times daily with meals. for 14 days (Patient not taking: Reported on 1/12/2025)    SITagliptin (ZITUVIO) 25 mg Tab Take 25 mg by mouth once daily. (Patient not taking: Reported on 1/12/2025)    triamcinolone acetonide 0.1% (KENALOG) 0.1 % cream Apply topically 2 (two) times daily as needed (itching).     Family History    None       Tobacco Use    Smoking status: Never    Smokeless tobacco: Never   Substance and Sexual Activity    Alcohol use: No    Drug use: No    Sexual activity: Not Currently     Review of Systems   Constitutional: Negative.   Cardiovascular:  Negative for chest pain, near-syncope, orthopnea, palpitations and paroxysmal nocturnal dyspnea.   Gastrointestinal:  Positive for nausea.   Neurological: Negative.      Objective:     Vital Signs (Most Recent):  Temp: 97.5 °F (36.4 °C) (01/13/25 1141)  Pulse: 80 (01/13/25 1141)  Resp: 18 (01/13/25 1141)  BP: 136/63 (01/13/25 1141)  SpO2: 97 % (01/13/25 1141) Vital Signs (24h Range):  Temp:  [97.5 °F (36.4 °C)-98.7 °F (37.1 °C)] 97.5 °F (36.4 °C)  Pulse:  [78-96] 80  Resp:  [9-18] 18  SpO2:  [94 %-98 %] 97 %  BP: (106-175)/(52-94) 136/63        There is no height or  "weight on file to calculate BMI.    SpO2: 97 %       No intake or output data in the 24 hours ending 01/13/25 1220    Lines/Drains/Airways       Peripheral Intravenous Line  Duration                  Hemodialysis AV Fistula Left upper arm -- days         Peripheral IV - Single Lumen Right Antecubital -- days                     Physical Exam  Constitutional:       General: He is not in acute distress.     Appearance: He is not diaphoretic.   HENT:      Head: Atraumatic.   Eyes:      General:         Right eye: No discharge.         Left eye: No discharge.   Cardiovascular:      Rate and Rhythm: Normal rate and regular rhythm.   Pulmonary:      Effort: Pulmonary effort is normal.      Breath sounds: Normal breath sounds.   Abdominal:      General: Bowel sounds are normal.      Palpations: Abdomen is soft.   Skin:     General: Skin is warm and dry.   Neurological:      Mental Status: He is alert. Mental status is at baseline.          Significant Labs: BMP:   Recent Labs   Lab 01/12/25  0453 01/12/25  1134   * 262*    146*   K 5.3* 4.4    101   CO2 21* 23   BUN 73* 78*   CREATININE 13.6* 13.8*   CALCIUM 10.6* 10.2   , CMP   Recent Labs   Lab 01/12/25  0453 01/12/25  1134    146*   K 5.3* 4.4    101   CO2 21* 23   * 262*   BUN 73* 78*   CREATININE 13.6* 13.8*   CALCIUM 10.6* 10.2   PROT 8.0  --    ALBUMIN 3.5  --    BILITOT 0.6  --    ALKPHOS 54  --    AST 26  --    ALT 23  --    ANIONGAP 21* 22*   , CBC   Recent Labs   Lab 01/12/25  0550 01/13/25  0458   WBC 6.16 8.27   HGB 8.3* 7.8*   HCT 25.7* 24.5*    189   , INR   Recent Labs   Lab 01/12/25  1316   INR 1.0   , Lipid Panel No results for input(s): "CHOL", "HDL", "LDLCALC", "TRIG", "CHOLHDL" in the last 48 hours., Troponin No results for input(s): "TROPONINIHS" in the last 48 hours., and All pertinent lab results from the last 24 hours have been reviewed.    Significant Imaging: Echocardiogram: Transthoracic echo (TTE) " complete (Cupid Only):   Results for orders placed or performed during the hospital encounter of 09/11/23   Echo   Result Value Ref Range    BSA 1.85 m2    Fry's Biplane MOD Ejection Fraction 52 %    LVOT stroke volume 58.41 cm3    LVIDd 3.69 3.5 - 6.0 cm    LV Systolic Volume 28.69 mL    LV Systolic Volume Index 15.5 mL/m2    LVIDs 2.77 2.1 - 4.0 cm    LV Diastolic Volume 57.72 mL    LV Diastolic Volume Index 31.20 mL/m2    IVS 1.04 0.6 - 1.1 cm    LVOT diameter 2.13 cm    LVOT area 3.6 cm2    FS 25 (A) 28 - 44 %    Left Ventricle Relative Wall Thickness 0.57 cm    PW 1.05 0.6 - 1.1 cm    LV mass 119.46 g    LV Mass Index 65 g/m2    MV Peak E Aldair 0.41 m/s    TDI LATERAL 0.13 m/s    TDI SEPTAL 0.05 m/s    E/E' ratio 4.56 m/s    MV Peak A [place] 0.89 m/s    E/A ratio 0.46     LV SEPTAL E/E' RATIO 8.20 m/s    LV LATERAL E/E' RATIO 3.15 m/s    LVOT peak aldair 0.90 m/s    Left Ventricular Outflow Tract Mean Velocity 0.73 cm/s    Left Ventricular Outflow Tract Mean Gradient 2.25 mmHg    LA Vol (MOD) 33.50 cm3    ROLANDO (MOD) 18.1 mL/m2    LA size 3.88 cm    Left Atrium Major Axis 4.64 cm    Left Atrium Minor Axis 3.32 cm    RVDD 1.75 cm    RV Basal Diameter 6.30 cm    RV mid diameter 1.48 cm    RA Area 10.0 cm2    RA Major Axis 4.46 cm    RA Width 2.20 cm    RA Vol 18.87 mL    AV regurgitation pressure 1/2 time [id number] ms    AR Max Aldair 3.22 m/s    AV mean gradient 3 mmHg    AV peak gradient 6 mmHg    Ao peak aldair 1.22 m/s    Ao VTI 22.50 cm    LVOT peak VTI 16.40 cm    AV valve area 2.60 cm²    AV Velocity Ratio 0.74     AV index (prosthetic) 0.73     CARINE by Velocity Ratio 2.63 cm²    MV mean gradient 2 mmHg    MV peak gradient 5 mmHg    MV valve area by continuity eq 3.38 cm2    MV VTI 17.3 cm    PV PEAK VELOCITY 0.91 m/s    PV peak gradient 3 mmHg    Pulmonary Valve Mean Velocity 0.54 m/s    Ao root annulus 3.03 cm    Sinus 3.31 cm    STJ 2.72 cm    Ascending aorta 3.16 cm    IVC diameter 1.20 cm    Mean e'  0.09 m/s    ZLVIDS -1.07     ZLVIDD -3.31     AORTIC VALVE CUSP SEPERATION 1.89 cm    ROLANDO 20.0 mL/m2    LA Vol 37.02 cm3    LA WIDTH 2.9 cm    Est. RA pres 3 mmHg    Narrative      Left Ventricle: The left ventricle is normal in size. Increased wall   thickness. Mild basal septal thickening. See diagram for wall motion   findings. There is low normal systolic function with a visually estimated   ejection fraction of 50 - 55%. Grade I diastolic dysfunction.    Left Atrium: Left atrium is mildly dilated.    Right Ventricle: Normal right ventricular cavity size. Wall thickness   is normal. Right ventricle wall motion  is normal. Systolic function is   normal.    Pulmonic Valve: There is mild regurgitation.    Pulmonary Artery: No pulmonary hypertension.

## 2025-01-13 NOTE — Clinical Note
The catheter was inserted in the right ventricle. The transvenous pacing lead was secured in place in the RV and was inserted into the RV. The pacer was paced at 50 BPM 10 mA 10 mV.

## 2025-01-13 NOTE — INTERVAL H&P NOTE
The patient has been examined and the H&P has been reviewed:    I concur with the findings and no changes have occurred since H&P was written.    Procedure risks, benefits and alternative options discussed and understood by patient/family.          Active Hospital Problems    Diagnosis  POA    Unstable angina [I20.0]  Yes    Symptomatic anemia [D64.9]  Yes    HTN (hypertension) [I10]  Yes     Dx updated per 2019 IMO Load        Resolved Hospital Problems   No resolved problems to display.

## 2025-01-13 NOTE — DIALYSIS - OUTPATIENT INFORMATION
[facility] Medicine H&P Note     Attending Physician: Malini Talavera MD    Date of Admit: 1/13/2025    Chief Complaint     Transfer NSTEMI     Assessment/Plan:     Jevon Rajan is a 84 y.o. male with:  Patient Active Problem List    Diagnosis Date Noted    Hyperkalemia 08/27/2024    Symptomatic anemia 08/10/2024    Unstable angina 08/10/2024    Rash 08/10/2024    Dry eyes 08/10/2024    Inadequate dietary intake of protein 07/10/2024    Shortness of breath 06/12/2024    DM2 (diabetes mellitus, type 2) 06/02/2024    Discharge planning issues 06/02/2024    Renovascular hypertension 04/01/2024    Liver mass 02/26/2024    Anemia in ESRD (end-stage renal disease) 01/15/2024    Chest pain 01/15/2024    Elevated troponin 01/15/2024    Hypothyroidism due to acquired atrophy of thyroid 01/15/2024    Aortic atherosclerosis 08/30/2023    Thrombosis of kidney dialysis arteriovenous graft 11/27/2019    ESRD on dialysis 11/27/2019    AV graft thrombosis 11/26/2019    Renal mass, left 11/25/2019    ESRD on hemodialysis 11/24/2019    Abnormal urinalysis 11/24/2019    Type 2 diabetes mellitus, without long-term current use of insulin 12/30/2014    Coronary artery disease involving native coronary artery of native heart with refractory angina pectoris 12/30/2014    HTN (hypertension) 12/29/2014    HLD (hyperlipidemia) 12/29/2014    Mohs defect of ala nasi 01/07/2014     NSTEMI  Transferred from OSH for acute NSTEMI, chest pain free at this time arrive on a heparin gtt  EKG Normal sinus rhythm ST and T wave abnormality, consider inferolateral ischemia   Troponin peaked at 5.132-->2.876  BNP 2,043  Continue asa, heparin gtt  Cardiology consulted for [facility]    Normocytic Anemia  Monitor    Diffuse Papular Rash  Prurigo Nodularis  Undergoing tx at the [facility] topical for now  Consider capsaicin cream if not improving    ESRD on HD  Nephrology consulted    Admit to inpatient under my care    Subjective:      History  of Present Illness:  Jevon Rajan is a 84 y.o.  male who  has a past medical history of BPH (benign prostatic hyperplasia), Coronary artery disease, Diabetes mellitus, type 2, ESRD (end stage renal disease) on dialysis, Hypertension, Hypothyroidism, unspecified, and Symptomatic anemia (01/15/2024)..       84M CAD, HTN, ESRD on HD initially presents for nausea.  Last dialysis was on Thursday.  Patient states unable to complete dialysis yesterday due to itching.  He is being treated for prurigo nodularis.  [facility] not [facility], unclear if he was dialyzed and if not, why.  Patient awoke after being transported home yesterday and called 911.  EMS activated for nausea.  He was treated with Zofran.  That improved.  Now he is complaining of chest pain and dyspnea.  Described as a pressure-like sensation. Patient afebrile, hypertensive, upper 90s on RA, labs without leucocytosis, HgB 8.3 (at baseline). CMP in keeping with ESRD, initially with hyperkalemia resolved with shifting. BNP ~2K and troponin 0.1-0.3-1.0. XR chest and abdomen without acute abnl. ECG with c/f inferolateral ST changes. Patient responded to medication for nausea, thought took multimodal approach. Due to elevated TnI patient started on heparin infusion and ASA. Cardiology at [facility] aware and will consult.       Past Medical History:   Diagnosis Date    BPH (benign prostatic hyperplasia)     Coronary artery disease     [facility] his care to this clinic. In 2014 he had stent to LAD. He states he has had 2 heart attacks. He does not get angina. There was 90% left anterior descending artery stenosis undergoing stenting. There was also a circumflex marginal branch stenosis of 70%.    Diabetes mellitus, type 2     ESRD (end stage renal disease) on dialysis     ESRD on HD TTS via left brachial AVF    Hypertension     Hypothyroidism, unspecified     Symptomatic  anemia 01/15/2024       Past Surgical History:   Procedure Laterality Date    bilateral foot surgery      ESOPHAGOGASTRODUODENOSCOPY N/A 2/27/2024    Procedure: EGD (ESOPHAGOGASTRODUODENOSCOPY);  Surgeon: Gemma Almendarez MD;  Location: [facility] ENDO;  Service: Endoscopy;  Laterality: N/A;    eyelid surgery      FISTULOGRAM Left 11/27/2019    Procedure: Fistulogram;  Surgeon: MELANY Brenner III, MD;  Location: [address];  Service: Peripheral Vascular;  Laterality: Left;    FISTULOGRAM Left 11/27/2019    Procedure: Fistulogram, AVG declot, possible permacath;  Surgeon: MELANY Brnener III, MD;  Location: [facility] CATH LAB;  Service: Peripheral Vascular;  Laterality: Left;    INSERTION OF DIALYSIS CATHETER      [facility]  12/5/13    right hand surgery      stents         Allergies:  Review of patient's allergies indicates:   Allergen Reactions    Ace inhibitors Hives, Shortness Of Breath and Itching     Is not sure which medication it was    Captopril        Home Medications:  Prior to Admission medications    Medication Sig Start Date End Date Taking? Authorizing Provider   ammonium lactate 12 % Crea Apply topically 2 (two) times a day. 12/18/24   Provider, Historical   artificial tears,hypromellose,,GENTEAL/SUSTANE, 0.3 % Gel Apply to eye nightly. 8/9/24   Provider, Historical   aspirin (ECOTRIN) 81 MG EC tablet Take 81 mg by mouth once daily. 7/19/24   Provider, Historical   atorvastatin (LIPITOR) 80 MG tablet Take 1 tablet (80 mg total) by mouth every evening. 8/28/24 11/26/24  Jeremiah Adam MD   camphor-menthol 0.5-0.5% (SARNA) lotion Apply topically once daily. APPLY SMALL AMOUNT TOPICALLY TO AFFECTED AREA(S) AS NEEDED FOR ITCHING KEEP IN FRIDGE 1/9/25   Stacey Judge PA-C   carboxymethylcellulose sodium 0.5 % Drop Apply 1 drop to eye nightly as needed (dry eyes). 8/9/24   Provider, Historical   carvediloL (COREG) 12.5 MG tablet Take 6.25 mg by mouth 2 (two) times daily. 12/7/23   Provider, Historical    clobetasol 0.05% (TEMOVATE) 0.05 % Oint Apply topically once daily. 12/27/24   Provider, Historical   clopidogreL (PLAVIX) 75 mg tablet Take 1 tablet (75 mg total) by mouth once daily. 6/3/24 6/3/25  Malini Talavera MD   gabapentin (NEURONTIN) 100 MG capsule Take 1 capsule (100 mg total) by mouth every evening. 1/7/25 1/7/26  Shania Colin MD   hydrophilic ointment (AQUABASE) Apply topically as needed for Dry Skin. APPLY MODERATE AMOUNT TOPICALLY TO AFFECTED AREA(S) TWICE A DAY AS NEEDED FOR DRY SKIN APPLY TO AREAS OF DRY SKIN OR OVER ENTIRE BODY. 1/9/25   Stacey Judge PA-C   hydrOXYzine pamoate (VISTARIL) 50 MG Cap Take 1 capsule (50 mg total) by mouth every 8 (eight) hours as needed (itching). 1/5/25 2/4/25  Magdalene Decker MD   isosorbide mononitrate (IMDUR) 30 MG 24 hr tablet Take 15 mg by mouth once daily. 6/3/24   Provider, Historical   lanolin alcohol-mineral oil-white petrolatum-ceres (EUCERIN) Crea cream Apply topically 2 (two) times daily as needed. 12/27/24   Provider, Historical   levothyroxine (SYNTHROID) 50 MCG tablet Take 1 tablet (50 mcg total) by mouth before breakfast. for 14 days  Patient not taking: Reported on 1/12/2025 8/13/24 8/27/24  Luis A Abbott MD   LIDOcaine (LIDODERM) 5 % Apply 1 patch topically daily as needed (pain).  Patient not taking: Reported on 1/12/2025 6/3/24   Provider, Historical   loratadine (CLARITIN) 10 mg tablet Take 10 mg by mouth every 48 hours. 12/19/24   Provider, Historical   nut.tx.imp.renal fxn,lac-reduc (NEPRO CARB STEADY) 0.08 gram-1.8 kcal/mL Liqd Take 240 mLs by mouth 2 (two) times a day. 4/16/24   Provider, Historical   ondansetron (ZOFRAN-ODT) 4 MG TbDL Take 1 tablet (4 mg total) by mouth every 8 (eight) hours as needed (nausea). 1/10/24   Noel Keys, DO   pantoprazole (PROTONIX) 20 MG tablet Take 20 mg by mouth 2 (two) times daily as needed. 4/18/24   Provider, Historical   pramoxine 1 % Lotn Apply topically 2 (two) times daily as  needed (itching). 24   Provider, Historical   sevelamer carbonate (RENVELA) 800 mg Tab Take 1 tablet (800 mg total) by mouth 3 (three) times daily with meals. for 14 days  Patient not taking: Reported on 2025  Luis A Abbott MD   SITagliptin (ZITUVIO) 25 mg Tab Take 25 mg by mouth once daily.  Patient not taking: Reported on 2025 6/3/24   Provider, Historical   triamcinolone acetonide 0.1% (KENALOG) 0.1 % cream Apply topically 2 (two) times daily as needed (itching). 24   Provider, Historical       Family History:  No family history on file.    Social History     Tobacco Use    Smoking status: Never    Smokeless tobacco: Never   Substance Use Topics    Alcohol use: No    Drug use: No       Review of Systems   Constitutional:  Negative for chills and fever.   Respiratory:  Positive for shortness of breath.    Cardiovascular:  Positive for chest pain.   Gastrointestinal:  Positive for nausea. Negative for abdominal pain.   Skin:  Positive for itching and rash.   Neurological:  Negative for dizziness and headaches.          Objective:   Last 24 Hour Vital Signs:  BP  Min: 106/52  Max: 175/94  Temp  Av °F (36.7 °C)  Min: 97.5 °F (36.4 °C)  Max: 98.7 °F (37.1 °C)  Pulse  Av.5  Min: 78  Max: 90  Resp  Avg: 15  Min: 10  Max: 18  SpO2  Av %  Min: 94 %  Max: 98 %  There is no height or weight on file to calculate BMI.  No intake/output data recorded.    Physical Exam  Constitutional:       General: He is not in acute distress.     Appearance: Normal appearance. He is not ill-appearing.   HENT:      Head: Normocephalic and atraumatic.   Cardiovascular:      Rate and Rhythm: Normal rate and regular rhythm.   Pulmonary:      Effort: Pulmonary effort is normal. No respiratory distress.      Breath sounds: Normal breath sounds.   Abdominal:      General: Abdomen is flat. Bowel sounds are normal. There is no distension.   Skin:     Findings: Rash present.      Comments: Diffuse  pruritic papular rash, excoriations   Neurological:      General: No focal deficit present.      Mental Status: He is alert and oriented to person, place, and time.         Laboratory:  Most Recent Data:  CBC:   Lab Results   Component Value Date    WBC 8.27 01/13/2025    HGB 7.8 (L) 01/13/2025    HCT 24.5 (L) 01/13/2025     01/13/2025    MCV 89 01/13/2025    RDW 18.1 (H) 01/13/2025       BMP:   Lab Results   Component Value Date     (H) 01/12/2025    K 4.4 01/12/2025     01/12/2025    CO2 23 01/12/2025    BUN 78 (H) 01/12/2025    CREATININE 13.8 (H) 01/12/2025     (H) 01/12/2025    CALCIUM 10.2 01/12/2025    MG 2.3 01/05/2025    PHOS 3.5 08/28/2024     LFTs:   Lab Results   Component Value Date    PROT 8.0 01/12/2025    ALBUMIN 3.5 01/12/2025    BILITOT 0.6 01/12/2025    AST 26 01/12/2025    ALKPHOS 54 01/12/2025    ALT 23 01/12/2025     Coags:   Lab Results   Component Value Date    INR 1.0 01/12/2025     FLP:   Lab Results   Component Value Date    CHOL 110 (L) 08/11/2024    HDL 26 (L) 08/11/2024    LDLCALC 58.8 (L) 08/11/2024    TRIG 126 08/11/2024    CHOLHDL 23.6 08/11/2024     DM:   Lab Results   Component Value Date    HGBA1C 5.2 08/11/2024    HGBA1C 5.1 07/09/2024    HGBA1C 5.6 04/01/2024    LDLCALC 58.8 (L) 08/11/2024    CREATININE 13.8 (H) 01/12/2025     Thyroid:   Lab Results   Component Value Date    TSH 3.949 08/11/2024    FREET4 1.01 07/09/2024     Anemia:   Lab Results   Component Value Date    IRON 39 (L) 08/11/2024    TIBC 206 (L) 08/11/2024    FERRITIN 979 (H) 08/11/2024    YSPUANAZ03 670 08/11/2024    FOLATE 12.1 08/11/2024     Cardiac:   Lab Results   Component Value Date    TROPONINI 5.132 (H) 01/13/2025    BNP 2,043 (H) 01/12/2025     Urinalysis:   Lab Results   Component Value Date    LABURIN No significant growth 11/24/2019    COLORU Yellow 07/03/2024    SPECGRAV 1.010 07/03/2024    NITRITE Negative 07/03/2024    KETONESU Negative 07/03/2024    UROBILINOGEN Negative  07/03/2024    WBCUA 4 07/03/2024       Trended Lab Data:  Recent Labs   Lab 01/12/25  0453 01/12/25  0550 01/12/25  1134 01/13/25  0458   WBC  --  6.16  --  8.27   HGB  --  8.3*  --  7.8*   HCT  --  25.7*  --  24.5*   PLT  --  217  --  189   MCV  --  90  --  89   RDW  --  17.8*  --  18.1*     --  146*  --    K 5.3*  --  4.4  --      --  101  --    CO2 21*  --  23  --    BUN 73*  --  78*  --    CREATININE 13.6*  --  13.8*  --    *  --  262*  --    PROT 8.0  --   --   --    ALBUMIN 3.5  --   --   --    BILITOT 0.6  --   --   --    AST 26  --   --   --    ALKPHOS 54  --   --   --    ALT 23  --   --   --        Trended Cardiac Data:  Recent Labs   Lab 01/12/25  0550 01/12/25  0750 01/12/25  1134 01/12/25 1956 01/13/25  0558   TROPONINI  --    < > 1.031* 4.169* 5.132*   BNP 2,043*  --   --   --   --     < > = values in this interval not displayed.       Microbiology Data:      Other Results:  EKG (my interpretation): normal sinus rhythm, nonspecific ST and T waves changes.    Radiology:            Code Status:     Full    Malini Talavera MD  [facility] Medicine

## 2025-01-13 NOTE — ASSESSMENT & PLAN NOTE
Patient's blood pressure range in the last 24 hours was: BP  Min: 106/52  Max: 175/94.The patient's inpatient anti-hypertensive regimen is listed below:  Current Antihypertensives  carvediloL tablet 6.25 mg, 2 times daily, Oral    Plan  - BP is controlled, no changes needed to their regimen

## 2025-01-13 NOTE — CONSULTS
Nephrology Consult  Note       Consult Requested By: Malini Talavera MD  Reason for Consult: ESRD      SUBJECTIVE:   History of Present Illness:  Jevon Rajan is a 84 y.o.   male who  has a past medical history of BPH (benign prostatic hyperplasia), Coronary artery disease, Diabetes mellitus, type 2, ESRD (end stage renal disease) on dialysis, Hypertension, Hypothyroidism, unspecified, and Symptomatic anemia (01/15/2024).. The patient presented to the [facility] on 1/13/2025 with a primary complaint of nausea for past 2 days last full dialysis was Thursday Saturday cut short due to itching patient has prurigo nodularis, now here K+ elevated BP elevated SOB on O2 needs HD.        ?    Review of Systems   Constitutional:  Negative for chills, fever and weight loss.   HENT:  Negative for congestion and tinnitus.    Eyes:  Negative for blurred vision, double vision and photophobia.   Respiratory:  Positive for shortness of breath. Negative for cough.    Cardiovascular:  Negative for chest pain, palpitations, orthopnea and leg swelling.   Gastrointestinal:  Negative for nausea and vomiting.   Genitourinary:  Negative for frequency and urgency.   Musculoskeletal:  Negative for back pain, falls and joint pain.   Skin:  Negative for itching and rash.   Neurological:  Negative for dizziness, tingling, tremors, sensory change, speech change, focal weakness, seizures, loss of consciousness, weakness and headaches.   Endo/Heme/Allergies:  Does not bruise/bleed easily.   Psychiatric/Behavioral:  Negative for depression. The patient does not have insomnia.        Past Medical History:   Diagnosis Date    BPH (benign prostatic hyperplasia)     Coronary artery disease     [facility]. In 2014 he had stent to LAD. He states he has had 2 heart attacks. He does not get angina. There was 90% left anterior descending artery stenosis undergoing stenting. There  was also a circumflex marginal branch stenosis of 70%.    Diabetes mellitus, type 2     ESRD (end stage renal disease) on dialysis     ESRD on HD TTS via left brachial AVF    Hypertension     Hypothyroidism, unspecified     Symptomatic anemia 01/15/2024          OBJECTIVE:     Vital Signs (Most Recent)  Vitals:    01/13/25 1141   BP: 136/63   BP Location: Right arm   Patient Position: Lying   Pulse: 80   Resp: 18   Temp: 97.5 °F (36.4 °C)   TempSrc: Oral   SpO2: 97%                   Medications:   [START ON 1/14/2025] aspirin  81 mg Oral Daily    atorvastatin  80 mg Oral QHS    carvediloL  6.25 mg Oral BID    clopidogreL  600 mg Oral Once    [START ON 1/14/2025] clopidogreL  75 mg Oral Daily           Physical Exam  Vitals and nursing note reviewed.   Constitutional:       General: He is not in acute distress.     Appearance: He is not diaphoretic.   HENT:      Head: Normocephalic and atraumatic.      Mouth/Throat:      Pharynx: No oropharyngeal exudate.   Eyes:      General: No scleral icterus.     Conjunctiva/sclera: Conjunctivae normal.      Pupils: Pupils are equal, round, and reactive to light.   Cardiovascular:      Rate and Rhythm: Normal rate and regular rhythm.      Heart sounds: Normal heart sounds. No murmur heard.  Pulmonary:      Effort: No respiratory distress.      Breath sounds: Rales present.   Abdominal:      General: Bowel sounds are normal. There is no distension.      Palpations: Abdomen is soft.      Tenderness: There is no abdominal tenderness.   Musculoskeletal:         General: Normal range of motion.      Cervical back: Normal range of motion and neck supple.      Comments: LUE AVF    Skin:     General: Skin is warm and dry.      Findings: Rash present. No erythema.   Neurological:      Mental Status: He is alert and oriented to person, place, and time.      Cranial Nerves: No cranial nerve deficit.   Psychiatric:         Mood and Affect: Affect normal.         Cognition and Memory: Memory  "normal.         Judgment: Judgment normal.         Laboratory:  Recent Labs   Lab 01/12/25  0550 01/13/25  0458   WBC 6.16 8.27   HGB 8.3* 7.8*   HCT 25.7* 24.5*    189   MONO 2.0*  CANCELED 6.7  0.6   EOSINOPHIL 0.0 6.5     Recent Labs   Lab 01/12/25  0453 01/12/25  1134    146*   K 5.3* 4.4    101   CO2 21* 23   BUN 73* 78*   CREATININE 13.6* 13.8*   CALCIUM 10.6* 10.2       Diagnostic Results:  X-Ray: Reviewed  US: Reviewed  Echo: Reviewed  ASSESSMENT/PLAN:     1. ESRD on HD TTS  - Dr Carla Ashley   Didn't get HD Saturday  due to itching  HD today and tomorrow with  UF 2L  then keep     TTS   -- Daily Renal Function Panel  -- Avoid Hypotension.  -- Renally dose all meds     Elevated Troponin   -- denies Chest pain now   -- SOB crackles on exam HTN goes with volume overload   -- plan for HD for volume and electrolytes optimization after LHC  -- currently on Heparin drip        2. HTN  - controlled volume overloaded   3. Anemia of chronic kidney disease treated with NAKIA    EPogen   with each HD - hold today will do Tomorrow    Recent Labs   Lab 01/12/25  0550 01/13/25  0458   HGB 8.3* 7.8*   HCT 25.7* 24.5*    189       Iron   Lab Results   Component Value Date    IRON 39 (L) 08/11/2024    TIBC 206 (L) 08/11/2024    FERRITIN 979 (H) 08/11/2024       4. MBD    Recent Labs   Lab 01/12/25  1134   CALCIUM 10.2   -- Binders TIDWM   No results for input(s): "MG" in the last 168 hours.      Lab Results   Component Value Date    .7 (H) 01/17/2024    CALCIUM 10.2 01/12/2025    PHOS 3.5 08/28/2024     No results found for: "[id number]"    Lab Results   Component Value Date    CO2 23 01/12/2025       5. Nutrition/Hypoalbuminemia   Recent Labs   Lab 01/12/25  0453 01/12/25  1316   LABPROT  --  11.4   ALBUMIN 3.5  --      Nepro with meals TID. Renal vitamins daily      Thank you for allowing me to participate in care of your patient  With any question please call   Vivienne Oates" MD     [facility]  RACHNA Red MD,   MD GINO Milner MD E. V. Harmon, NP  [address]   [place], [address] [phone]  After hours answering service: [phone]

## 2025-01-13 NOTE — H&P (VIEW-ONLY)
[facility] - Telemetry  Cardiology  Consult Note    Patient Name: Jevon Rajan  MRN: [id number]  Admission Date: 1/13/2025  Hospital Length of Stay: 0 days  Code Status: Prior   Attending Provider: Malini Talavera MD   Consulting Provider: Dax Michel NP  Primary Care Physician: Melia, Primary Doctor  Principal Problem:<principal problem not specified>    Patient information was obtained from patient, past medical records, and ER records.     Inpatient consult to [facility]  Consult performed by: Dax Michel NP  Consult ordered by: Malini Talavera MD        Subjective:     Chief Complaint:  Nausea     HPI:   84M CAD, HTN, ESRD on HD initially presents for nausea.  Last dialysis was on Thursday.  Patient states unable to complete dialysis yesterday due to itching.  He is being treated for prurigo nodularis.  [facility] not [facility], unclear if he was dialyzed and if not, why.  Patient awoke after being transported home yesterday and called 911.  EMS activated for nausea.  He was treated with Zofran.  That improved.  Now he is complaining of chest pain and dyspnea.  Described as a pressure-like sensation. Patient afebrile, hypertensive, upper 90s on RA, labs without leucocytosis, HgB 8.3 (at baseline). CMP in keeping with ESRD, initially with hyperkalemia resolved with shifting. BNP ~2K and troponin 0.1-0.3-1.0. XR chest and abdomen without acute abnl. ECG with c/f inferolateral ST changes. Patient responded to medication for nausea, thought took multimodal approach. Due to elevated TnI patient started on heparin infusion and ASA. Patient loaded with Plavix. He is NPO for [facility]. Transferred to [facility].     Past Medical History:   Diagnosis Date    BPH (benign prostatic hyperplasia)     Coronary artery disease     [facility] his care to this [facility]. In 2014 he had stent to LAD. He states he has had 2 heart  attacks. He does not get angina. There was 90% left anterior descending artery stenosis undergoing stenting. There was also a circumflex marginal branch stenosis of 70%.    Diabetes mellitus, type 2     ESRD (end stage renal disease) on dialysis     ESRD on HD TTS via left brachial AVF    Hypertension     Hypothyroidism, unspecified     Symptomatic anemia 01/15/2024       Past Surgical History:   Procedure Laterality Date    bilateral foot surgery      ESOPHAGOGASTRODUODENOSCOPY N/A 2024    Procedure: EGD (ESOPHAGOGASTRODUODENOSCOPY);  Surgeon: Gemma Almendarez MD;  Location: [facility] ENDO;  Service: Endoscopy;  Laterality: N/A;    eyelid surgery      FISTULOGRAM Left 2019    Procedure: Fistulogram;  Surgeon: MELANY Brenner III, MD;  Location: [facility] OR [address] [place] [address];  Service: Peripheral Vascular;  Laterality: Left;    FISTULOGRAM Left 2019    Procedure: Fistulogram, AVG declot, possible permacath;  Surgeon: MELANY Brenner III, MD;  Location: [facility] CATH LAB;  Service: Peripheral Vascular;  Laterality: Left;    INSERTION OF DIALYSIS CATHETER      [facility]  13    right hand surgery      stents         Review of patient's allergies indicates:   Allergen Reactions    Ace inhibitors Hives, Shortness Of Breath and Itching     Is not sure which medication it was    Captopril        Current Facility-Administered Medications on File Prior to Encounter   Medication    [COMPLETED] aspirin tablet 325 mg    [COMPLETED] heparin 25,000 units in dextrose 5% (100 units/ml) IV bolus from bag LOW INTENSITY nomogram - [facility]    [] nitroGLYCERIN 2% TD oint ointment 1 inch    [DISCONTINUED] aspirin chewable tablet 81 mg    [DISCONTINUED] atorvastatin tablet 80 mg    [DISCONTINUED] carvediloL tablet 6.25 mg    [DISCONTINUED] heparin 25,000 units in dextrose 5% (100 units/ml) IV bolus from bag LOW INTENSITY nomogram - [facility]    [DISCONTINUED] heparin 25,000 units in dextrose 5% (100 units/ml) IV bolus from bag LOW INTENSITY  nomogram - [facility]    [DISCONTINUED] heparin 25,000 units in dextrose 5% 250 mL (100 units/mL) infusion LOW INTENSITY nomogram - [facility]     Current Outpatient Medications on File Prior to Encounter   Medication Sig    ammonium lactate 12 % Crea Apply topically 2 (two) times a day.    artificial tears,hypromellose,,GENTEAL/SUSTANE, 0.3 % Gel Apply to eye nightly.    aspirin (ECOTRIN) 81 MG EC tablet Take 81 mg by mouth once daily.    atorvastatin (LIPITOR) 80 MG tablet Take 1 tablet (80 mg total) by mouth every evening.    camphor-menthol 0.5-0.5% (SARNA) lotion Apply topically once daily. APPLY SMALL AMOUNT TOPICALLY TO AFFECTED AREA(S) AS NEEDED FOR ITCHING KEEP IN FRIDGE    carboxymethylcellulose sodium 0.5 % Drop Apply 1 drop to eye nightly as needed (dry eyes).    carvediloL (COREG) 12.5 MG tablet Take 6.25 mg by mouth 2 (two) times daily.    clobetasol 0.05% (TEMOVATE) 0.05 % Oint Apply topically once daily.    clopidogreL (PLAVIX) 75 mg tablet Take 1 tablet (75 mg total) by mouth once daily.    gabapentin (NEURONTIN) 100 MG capsule Take 1 capsule (100 mg total) by mouth every evening.    hydrophilic ointment (AQUABASE) Apply topically as needed for Dry Skin. APPLY MODERATE AMOUNT TOPICALLY TO AFFECTED AREA(S) TWICE A DAY AS NEEDED FOR DRY SKIN APPLY TO AREAS OF DRY SKIN OR OVER ENTIRE BODY.    hydrOXYzine pamoate (VISTARIL) 50 MG Cap Take 1 capsule (50 mg total) by mouth every 8 (eight) hours as needed (itching).    isosorbide mononitrate (IMDUR) 30 MG 24 hr tablet Take 15 mg by mouth once daily.    lanolin alcohol-mineral oil-white petrolatum-ceres (EUCERIN) Crea cream Apply topically 2 (two) times daily as needed.    levothyroxine (SYNTHROID) 50 MCG tablet Take 1 tablet (50 mcg total) by mouth before breakfast. for 14 days (Patient not taking: Reported on 1/12/2025)    LIDOcaine (LIDODERM) 5 % Apply 1 patch topically daily as needed (pain). (Patient not taking: Reported on 1/12/2025)    loratadine (CLARITIN) 10 mg  tablet Take 10 mg by mouth every 48 hours.    nut.tx.imp.renal fxn,lac-reduc (NEPRO CARB STEADY) 0.08 gram-1.8 kcal/mL Liqd Take 240 mLs by mouth 2 (two) times a day.    ondansetron (ZOFRAN-ODT) 4 MG TbDL Take 1 tablet (4 mg total) by mouth every 8 (eight) hours as needed (nausea).    pantoprazole (PROTONIX) 20 MG tablet Take 20 mg by mouth 2 (two) times daily as needed.    pramoxine 1 % Lotn Apply topically 2 (two) times daily as needed (itching).    sevelamer carbonate (RENVELA) 800 mg Tab Take 1 tablet (800 mg total) by mouth 3 (three) times daily with meals. for 14 days (Patient not taking: Reported on 1/12/2025)    SITagliptin (ZITUVIO) 25 mg Tab Take 25 mg by mouth once daily. (Patient not taking: Reported on 1/12/2025)    triamcinolone acetonide 0.1% (KENALOG) 0.1 % cream Apply topically 2 (two) times daily as needed (itching).     Family History    None       Tobacco Use    Smoking status: Never    Smokeless tobacco: Never   Substance and Sexual Activity    Alcohol use: No    Drug use: No    Sexual activity: Not Currently     Review of Systems   Constitutional: Negative.   Cardiovascular:  Negative for chest pain, near-syncope, orthopnea, palpitations and paroxysmal nocturnal dyspnea.   Gastrointestinal:  Positive for nausea.   Neurological: Negative.      Objective:     Vital Signs (Most Recent):  Temp: 97.5 °F (36.4 °C) (01/13/25 1141)  Pulse: 80 (01/13/25 1141)  Resp: 18 (01/13/25 1141)  BP: 136/63 (01/13/25 1141)  SpO2: 97 % (01/13/25 1141) Vital Signs (24h Range):  Temp:  [97.5 °F (36.4 °C)-98.7 °F (37.1 °C)] 97.5 °F (36.4 °C)  Pulse:  [78-96] 80  Resp:  [9-18] 18  SpO2:  [94 %-98 %] 97 %  BP: (106-175)/(52-94) 136/63        There is no height or weight on file to calculate BMI.    SpO2: 97 %       No intake or output data in the 24 hours ending 01/13/25 1220    Lines/Drains/Airways       Peripheral Intravenous Line  Duration                  Hemodialysis AV Fistula Left upper arm -- days          "Peripheral IV - Single Lumen Right Antecubital -- days                     Physical Exam  Constitutional:       General: He is not in acute distress.     Appearance: He is not diaphoretic.   HENT:      Head: Atraumatic.   Eyes:      General:         Right eye: No discharge.         Left eye: No discharge.   Cardiovascular:      Rate and Rhythm: Normal rate and regular rhythm.   Pulmonary:      Effort: Pulmonary effort is normal.      Breath sounds: Normal breath sounds.   Abdominal:      General: Bowel sounds are normal.      Palpations: Abdomen is soft.   Skin:     General: Skin is warm and dry.   Neurological:      Mental Status: He is alert. Mental status is at baseline.          Significant Labs: BMP:   Recent Labs   Lab 01/12/25  0453 01/12/25  1134   * 262*    146*   K 5.3* 4.4    101   CO2 21* 23   BUN 73* 78*   CREATININE 13.6* 13.8*   CALCIUM 10.6* 10.2   , CMP   Recent Labs   Lab 01/12/25  0453 01/12/25  1134    146*   K 5.3* 4.4    101   CO2 21* 23   * 262*   BUN 73* 78*   CREATININE 13.6* 13.8*   CALCIUM 10.6* 10.2   PROT 8.0  --    ALBUMIN 3.5  --    BILITOT 0.6  --    ALKPHOS 54  --    AST 26  --    ALT 23  --    ANIONGAP 21* 22*   , CBC   Recent Labs   Lab 01/12/25  0550 01/13/25  0458   WBC 6.16 8.27   HGB 8.3* 7.8*   HCT 25.7* 24.5*    189   , INR   Recent Labs   Lab 01/12/25  1316   INR 1.0   , Lipid Panel No results for input(s): "CHOL", "HDL", "LDLCALC", "TRIG", "CHOLHDL" in the last 48 hours., Troponin No results for input(s): "TROPONINIHS" in the last 48 hours., and All pertinent lab results from the last 24 hours have been reviewed.    Significant Imaging: Echocardiogram: Transthoracic echo (TTE) complete ([facility] Only):   Results for orders placed or performed during the hospital encounter of 09/11/23   Echo   Result Value Ref Range    BSA 1.85 m2    Fry's Biplane MOD Ejection Fraction 52 %    LVOT stroke volume 58.41 cm3    LVIDd 3.69 3.5 - 6.0 " cm    LV Systolic Volume 28.69 mL    LV Systolic Volume Index 15.5 mL/m2    LVIDs 2.77 2.1 - 4.0 cm    LV Diastolic Volume 57.72 mL    LV Diastolic Volume Index 31.20 mL/m2    IVS 1.04 0.6 - 1.1 cm    LVOT diameter 2.13 cm    LVOT area 3.6 cm2    FS 25 (A) 28 - 44 %    Left Ventricle Relative Wall Thickness 0.57 cm    PW 1.05 0.6 - 1.1 cm    LV mass 119.46 g    LV Mass Index 65 g/m2    MV Peak E [place] 0.41 m/s    TDI LATERAL 0.13 m/s    TDI SEPTAL 0.05 m/s    E/E' ratio 4.56 m/s    MV Peak A [place] 0.89 m/s    E/A ratio 0.46     LV SEPTAL E/E' RATIO 8.20 m/s    LV LATERAL E/E' RATIO 3.15 m/s    LVOT peak aldair 0.90 m/s    Left Ventricular Outflow Tract Mean Velocity 0.73 cm/s    Left Ventricular Outflow Tract Mean Gradient 2.25 mmHg    LA Vol (MOD) 33.50 cm3    ROLANDO (MOD) 18.1 mL/m2    LA size 3.88 cm    Left Atrium Major Axis 4.64 cm    Left Atrium Minor Axis 3.32 cm    RVDD 1.75 cm    RV Basal Diameter 6.30 cm    RV mid diameter 1.48 cm    RA Area 10.0 cm2    RA Major Axis 4.46 cm    RA Width 2.20 cm    RA Vol 18.87 mL    AV regurgitation pressure 1/2 time [id number] ms    AR Max [place] 3.22 m/s    AV mean gradient 3 mmHg    AV peak gradient 6 mmHg    Ao peak aldair 1.22 m/s    Ao VTI 22.50 cm    LVOT peak VTI 16.40 cm    AV valve area 2.60 cm²    AV Velocity Ratio 0.74     AV index (prosthetic) 0.73     CARINE by Velocity Ratio 2.63 cm²    MV mean gradient 2 mmHg    MV peak gradient 5 mmHg    MV valve area by continuity eq 3.38 cm2    MV VTI 17.3 cm    PV PEAK VELOCITY 0.91 m/s    PV peak gradient 3 mmHg    Pulmonary Valve Mean Velocity 0.54 m/s    Ao root annulus 3.03 cm    Sinus 3.31 cm    STJ 2.72 cm    Ascending aorta 3.16 cm    IVC diameter 1.20 cm    Mean e' 0.09 m/s    ZLVIDS -1.07     ZLVIDD -3.31     AORTIC VALVE CUSP SEPERATION 1.89 cm    ROLANDO 20.0 mL/m2    LA Vol 37.02 cm3    LA WIDTH 2.9 cm    Est. RA pres 3 mmHg    Narrative      Left Ventricle: The left ventricle is normal in size. Increased wall    thickness. Mild basal septal thickening. See diagram for wall motion   findings. There is low normal systolic function with a visually estimated   ejection fraction of 50 - 55%. Grade I diastolic dysfunction.    Left Atrium: Left atrium is mildly dilated.    Right Ventricle: Normal right ventricular cavity size. Wall thickness   is normal. Right ventricle wall motion  is normal. Systolic function is   normal.    Pulmonic Valve: There is mild regurgitation.    Pulmonary Artery: No pulmonary hypertension.       Assessment and Plan:     Unstable angina  Troponin up to 5 this AM, repeat pending  Known CAD with residual RCA stenosis  Symptoms concerning   Loaded with Plavix, asa, statin continued- on Heparin gtt  NPO for C  Repeat TTE      Symptomatic anemia  Anemia stable at baseline  Patient with ESRD      HTN (hypertension)  Patient's blood pressure range in the last 24 hours was: BP  Min: 106/52  Max: 175/94.The patient's inpatient anti-hypertensive regimen is listed below:  Current Antihypertensives  carvediloL tablet 6.25 mg, 2 times daily, Oral    Plan  - BP is controlled, no changes needed to their regimen        VTE Risk Mitigation (From admission, onward)           Ordered     heparin 25,000 units in dextrose 5% 250 mL (100 units/mL) infusion LOW INTENSITY nomogram - [facility]  Continuous        Question:  Begin at (units/kg/hr)  Answer:  12    01/13/25 1138     heparin 25,000 units in dextrose 5% (100 units/ml) IV bolus from bag LOW INTENSITY nomogram - [facility]  As needed (PRN)        Question:  Heparin Infusion Adjustment (DO NOT MODIFY ANSWER)  Answer:  [email or web address]\epic\Images\Pharmacy\HeparinInfusions\heparin LOW INTENSITY nomogram for [facility] [id number]    01/13/25 1138     heparin 25,000 units in dextrose 5% (100 units/ml) IV bolus from bag LOW INTENSITY nomogram - [facility]  As needed (PRN)        Question:  Heparin Infusion Adjustment (DO NOT MODIFY ANSWER)  Answer:   [email or web address]\epic\Images\Pharmacy\HeparinInfusions\heparin LOW INTENSITY nomogram for [facility] [id number]    01/13/25 9028                    Thank you for your consult. I will follow-up with patient. Please contact us if you have any additional questions.    Dax Michel NP  Cardiology   [place] - Telemetry

## 2025-01-13 NOTE — CONSULTS
[facility] - Telemetry  Cardiology  Consult Note    Patient Name: Jevon Rajan  MRN: [id number]  Admission Date: 1/13/2025  Hospital Length of Stay: 0 days  Code Status: Prior   Attending Provider: Malini Talavera MD   Consulting Provider: Dax Michel NP  Primary Care Physician: Melia, Primary Doctor  Principal Problem:<principal problem not specified>    Patient information was obtained from patient, past medical records, and ER records.     Inpatient consult to [facility]  Consult performed by: Dax Michel NP  Consult ordered by: Malini Talavera MD        Subjective:     Chief Complaint:  Nausea     HPI:   84M CAD, HTN, ESRD on HD initially presents for nausea.  Last dialysis was on Thursday.  Patient states unable to complete dialysis yesterday due to itching.  He is being treated for prurigo nodularis.  [facility] not [facility], unclear if he was dialyzed and if not, why.  Patient awoke after being transported home yesterday and called 911.  EMS activated for nausea.  He was treated with Zofran.  That improved.  Now he is complaining of chest pain and dyspnea.  Described as a pressure-like sensation. Patient afebrile, hypertensive, upper 90s on RA, labs without leucocytosis, HgB 8.3 (at baseline). CMP in keeping with ESRD, initially with hyperkalemia resolved with shifting. BNP ~2K and troponin 0.1-0.3-1.0. XR chest and abdomen without acute abnl. ECG with c/f inferolateral ST changes. Patient responded to medication for nausea, thought took multimodal approach. Due to elevated TnI patient started on heparin infusion and ASA. Patient loaded with Plavix. He is NPO for [facility]. Transferred to [facility].     Past Medical History:   Diagnosis Date    BPH (benign prostatic hyperplasia)     Coronary artery disease     [facility] his care to this [facility]. In 2014 he had stent to LAD. He states he has had 2 heart  attacks. He does not get angina. There was 90% left anterior descending artery stenosis undergoing stenting. There was also a circumflex marginal branch stenosis of 70%.    Diabetes mellitus, type 2     ESRD (end stage renal disease) on dialysis     ESRD on HD TTS via left brachial AVF    Hypertension     Hypothyroidism, unspecified     Symptomatic anemia 01/15/2024       Past Surgical History:   Procedure Laterality Date    bilateral foot surgery      ESOPHAGOGASTRODUODENOSCOPY N/A 2024    Procedure: EGD (ESOPHAGOGASTRODUODENOSCOPY);  Surgeon: Gemma Almendarez MD;  Location: [facility] ENDO;  Service: Endoscopy;  Laterality: N/A;    eyelid surgery      FISTULOGRAM Left 2019    Procedure: Fistulogram;  Surgeon: MELANY Brenner III, MD;  Location: [facility] OR [address] [place] [address];  Service: Peripheral Vascular;  Laterality: Left;    FISTULOGRAM Left 2019    Procedure: Fistulogram, AVG declot, possible permacath;  Surgeon: MELANY Brenner III, MD;  Location: [facility] CATH LAB;  Service: Peripheral Vascular;  Laterality: Left;    INSERTION OF DIALYSIS CATHETER      [facility]    right hand surgery      stents         Review of patient's allergies indicates:   Allergen Reactions    Ace inhibitors Hives, Shortness Of Breath and Itching     Is not sure which medication it was    Captopril        Current Facility-Administered Medications on File Prior to Encounter   Medication    [COMPLETED] aspirin tablet 325 mg    [COMPLETED] heparin 25,000 units in dextrose 5% (100 units/ml) IV bolus from bag LOW INTENSITY nomogram - [facility]    [] nitroGLYCERIN 2% TD oint ointment 1 inch    [DISCONTINUED] aspirin chewable tablet 81 mg    [DISCONTINUED] atorvastatin tablet 80 mg    [DISCONTINUED] carvediloL tablet 6.25 mg    [DISCONTINUED] heparin 25,000 units in dextrose 5% (100 units/ml) IV bolus from bag LOW INTENSITY nomogram - [facility]    [DISCONTINUED] heparin 25,000 units in dextrose 5% (100 units/ml) IV bolus from bag LOW INTENSITY  nomogram - [facility]    [DISCONTINUED] heparin 25,000 units in dextrose 5% 250 mL (100 units/mL) infusion LOW INTENSITY nomogram - [facility]     Current Outpatient Medications on File Prior to Encounter   Medication Sig    ammonium lactate 12 % Crea Apply topically 2 (two) times a day.    artificial tears,hypromellose,,GENTEAL/SUSTANE, 0.3 % Gel Apply to eye nightly.    aspirin (ECOTRIN) 81 MG EC tablet Take 81 mg by mouth once daily.    atorvastatin (LIPITOR) 80 MG tablet Take 1 tablet (80 mg total) by mouth every evening.    camphor-menthol 0.5-0.5% (SARNA) lotion Apply topically once daily. APPLY SMALL AMOUNT TOPICALLY TO AFFECTED AREA(S) AS NEEDED FOR ITCHING KEEP IN FRIDGE    carboxymethylcellulose sodium 0.5 % Drop Apply 1 drop to eye nightly as needed (dry eyes).    carvediloL (COREG) 12.5 MG tablet Take 6.25 mg by mouth 2 (two) times daily.    clobetasol 0.05% (TEMOVATE) 0.05 % Oint Apply topically once daily.    clopidogreL (PLAVIX) 75 mg tablet Take 1 tablet (75 mg total) by mouth once daily.    gabapentin (NEURONTIN) 100 MG capsule Take 1 capsule (100 mg total) by mouth every evening.    hydrophilic ointment (AQUABASE) Apply topically as needed for Dry Skin. APPLY MODERATE AMOUNT TOPICALLY TO AFFECTED AREA(S) TWICE A DAY AS NEEDED FOR DRY SKIN APPLY TO AREAS OF DRY SKIN OR OVER ENTIRE BODY.    hydrOXYzine pamoate (VISTARIL) 50 MG Cap Take 1 capsule (50 mg total) by mouth every 8 (eight) hours as needed (itching).    isosorbide mononitrate (IMDUR) 30 MG 24 hr tablet Take 15 mg by mouth once daily.    lanolin alcohol-mineral oil-white petrolatum-ceres (EUCERIN) Crea cream Apply topically 2 (two) times daily as needed.    levothyroxine (SYNTHROID) 50 MCG tablet Take 1 tablet (50 mcg total) by mouth before breakfast. for 14 days (Patient not taking: Reported on 1/12/2025)    LIDOcaine (LIDODERM) 5 % Apply 1 patch topically daily as needed (pain). (Patient not taking: Reported on 1/12/2025)    loratadine (CLARITIN) 10 mg  tablet Take 10 mg by mouth every 48 hours.    nut.tx.imp.renal fxn,lac-reduc (NEPRO CARB STEADY) 0.08 gram-1.8 kcal/mL Liqd Take 240 mLs by mouth 2 (two) times a day.    ondansetron (ZOFRAN-ODT) 4 MG TbDL Take 1 tablet (4 mg total) by mouth every 8 (eight) hours as needed (nausea).    pantoprazole (PROTONIX) 20 MG tablet Take 20 mg by mouth 2 (two) times daily as needed.    pramoxine 1 % Lotn Apply topically 2 (two) times daily as needed (itching).    sevelamer carbonate (RENVELA) 800 mg Tab Take 1 tablet (800 mg total) by mouth 3 (three) times daily with meals. for 14 days (Patient not taking: Reported on 1/12/2025)    SITagliptin (ZITUVIO) 25 mg Tab Take 25 mg by mouth once daily. (Patient not taking: Reported on 1/12/2025)    triamcinolone acetonide 0.1% (KENALOG) 0.1 % cream Apply topically 2 (two) times daily as needed (itching).     Family History    None       Tobacco Use    Smoking status: Never    Smokeless tobacco: Never   Substance and Sexual Activity    Alcohol use: No    Drug use: No    Sexual activity: Not Currently     Review of Systems   Constitutional: Negative.   Cardiovascular:  Negative for chest pain, near-syncope, orthopnea, palpitations and paroxysmal nocturnal dyspnea.   Gastrointestinal:  Positive for nausea.   Neurological: Negative.      Objective:     Vital Signs (Most Recent):  Temp: 97.5 °F (36.4 °C) (01/13/25 1141)  Pulse: 80 (01/13/25 1141)  Resp: 18 (01/13/25 1141)  BP: 136/63 (01/13/25 1141)  SpO2: 97 % (01/13/25 1141) Vital Signs (24h Range):  Temp:  [97.5 °F (36.4 °C)-98.7 °F (37.1 °C)] 97.5 °F (36.4 °C)  Pulse:  [78-96] 80  Resp:  [9-18] 18  SpO2:  [94 %-98 %] 97 %  BP: (106-175)/(52-94) 136/63        There is no height or weight on file to calculate BMI.    SpO2: 97 %       No intake or output data in the 24 hours ending 01/13/25 1220    Lines/Drains/Airways       Peripheral Intravenous Line  Duration                  Hemodialysis AV Fistula Left upper arm -- days          "Peripheral IV - Single Lumen Right Antecubital -- days                     Physical Exam  Constitutional:       General: He is not in acute distress.     Appearance: He is not diaphoretic.   HENT:      Head: Atraumatic.   Eyes:      General:         Right eye: No discharge.         Left eye: No discharge.   Cardiovascular:      Rate and Rhythm: Normal rate and regular rhythm.   Pulmonary:      Effort: Pulmonary effort is normal.      Breath sounds: Normal breath sounds.   Abdominal:      General: Bowel sounds are normal.      Palpations: Abdomen is soft.   Skin:     General: Skin is warm and dry.   Neurological:      Mental Status: He is alert. Mental status is at baseline.          Significant Labs: BMP:   Recent Labs   Lab 01/12/25  0453 01/12/25  1134   * 262*    146*   K 5.3* 4.4    101   CO2 21* 23   BUN 73* 78*   CREATININE 13.6* 13.8*   CALCIUM 10.6* 10.2   , CMP   Recent Labs   Lab 01/12/25  0453 01/12/25  1134    146*   K 5.3* 4.4    101   CO2 21* 23   * 262*   BUN 73* 78*   CREATININE 13.6* 13.8*   CALCIUM 10.6* 10.2   PROT 8.0  --    ALBUMIN 3.5  --    BILITOT 0.6  --    ALKPHOS 54  --    AST 26  --    ALT 23  --    ANIONGAP 21* 22*   , CBC   Recent Labs   Lab 01/12/25  0550 01/13/25  0458   WBC 6.16 8.27   HGB 8.3* 7.8*   HCT 25.7* 24.5*    189   , INR   Recent Labs   Lab 01/12/25  1316   INR 1.0   , Lipid Panel No results for input(s): "CHOL", "HDL", "LDLCALC", "TRIG", "CHOLHDL" in the last 48 hours., Troponin No results for input(s): "TROPONINIHS" in the last 48 hours., and All pertinent lab results from the last 24 hours have been reviewed.    Significant Imaging: Echocardiogram: Transthoracic echo (TTE) complete (Cupid Only):   Results for orders placed or performed during the hospital encounter of 09/11/23   Echo   Result Value Ref Range    BSA 1.85 m2    Fry's Biplane MOD Ejection Fraction 52 %    LVOT stroke volume 58.41 cm3    LVIDd 3.69 3.5 - 6.0 " cm    LV Systolic Volume 28.69 mL    LV Systolic Volume Index 15.5 mL/m2    LVIDs 2.77 2.1 - 4.0 cm    LV Diastolic Volume 57.72 mL    LV Diastolic Volume Index 31.20 mL/m2    IVS 1.04 0.6 - 1.1 cm    LVOT diameter 2.13 cm    LVOT area 3.6 cm2    FS 25 (A) 28 - 44 %    Left Ventricle Relative Wall Thickness 0.57 cm    PW 1.05 0.6 - 1.1 cm    LV mass 119.46 g    LV Mass Index 65 g/m2    MV Peak E Aldair 0.41 m/s    TDI LATERAL 0.13 m/s    TDI SEPTAL 0.05 m/s    E/E' ratio 4.56 m/s    MV Peak A Aldair 0.89 m/s    E/A ratio 0.46     LV SEPTAL E/E' RATIO 8.20 m/s    LV LATERAL E/E' RATIO 3.15 m/s    LVOT peak aldair 0.90 m/s    Left Ventricular Outflow Tract Mean Velocity 0.73 cm/s    Left Ventricular Outflow Tract Mean Gradient 2.25 mmHg    LA Vol (MOD) 33.50 cm3    ROLANDO (MOD) 18.1 mL/m2    LA size 3.88 cm    Left Atrium Major Axis 4.64 cm    Left Atrium Minor Axis 3.32 cm    RVDD 1.75 cm    RV Basal Diameter 6.30 cm    RV mid diameter 1.48 cm    RA Area 10.0 cm2    RA Major Axis 4.46 cm    RA Width 2.20 cm    RA Vol 18.87 mL    AV regurgitation pressure 1/2 time [id number] ms    AR Max Aldair 3.22 m/s    AV mean gradient 3 mmHg    AV peak gradient 6 mmHg    Ao peak aldair 1.22 m/s    Ao VTI 22.50 cm    LVOT peak VTI 16.40 cm    AV valve area 2.60 cm²    AV Velocity Ratio 0.74     AV index (prosthetic) 0.73     CARINE by Velocity Ratio 2.63 cm²    MV mean gradient 2 mmHg    MV peak gradient 5 mmHg    MV valve area by continuity eq 3.38 cm2    MV VTI 17.3 cm    PV PEAK VELOCITY 0.91 m/s    PV peak gradient 3 mmHg    Pulmonary Valve Mean Velocity 0.54 m/s    Ao root annulus 3.03 cm    Sinus 3.31 cm    STJ 2.72 cm    Ascending aorta 3.16 cm    IVC diameter 1.20 cm    Mean e' 0.09 m/s    ZLVIDS -1.07     ZLVIDD -3.31     AORTIC VALVE CUSP SEPERATION 1.89 cm    ROLANDO 20.0 mL/m2    LA Vol 37.02 cm3    LA WIDTH 2.9 cm    Est. RA pres 3 mmHg    Narrative      Left Ventricle: The left ventricle is normal in size. Increased wall    thickness. Mild basal septal thickening. See diagram for wall motion   findings. There is low normal systolic function with a visually estimated   ejection fraction of 50 - 55%. Grade I diastolic dysfunction.    Left Atrium: Left atrium is mildly dilated.    Right Ventricle: Normal right ventricular cavity size. Wall thickness   is normal. Right ventricle wall motion  is normal. Systolic function is   normal.    Pulmonic Valve: There is mild regurgitation.    Pulmonary Artery: No pulmonary hypertension.       Assessment and Plan:     Unstable angina  Troponin up to 5 this AM, repeat pending  Known CAD with residual RCA stenosis  Symptoms concerning   Loaded with Plavix, asa, statin continued- on Heparin gtt  NPO for C  Repeat TTE      Symptomatic anemia  Anemia stable at baseline  Patient with ESRD      HTN (hypertension)  Patient's blood pressure range in the last 24 hours was: BP  Min: 106/52  Max: 175/94.The patient's inpatient anti-hypertensive regimen is listed below:  Current Antihypertensives  carvediloL tablet 6.25 mg, 2 times daily, Oral    Plan  - BP is controlled, no changes needed to their regimen        VTE Risk Mitigation (From admission, onward)           Ordered     heparin 25,000 units in dextrose 5% 250 mL (100 units/mL) infusion LOW INTENSITY nomogram - [facility]  Continuous        Question:  Begin at (units/kg/hr)  Answer:  12    01/13/25 1138     heparin 25,000 units in dextrose 5% (100 units/ml) IV bolus from bag LOW INTENSITY nomogram - [facility]  As needed (PRN)        Question:  Heparin Infusion Adjustment (DO NOT MODIFY ANSWER)  Answer:  [email or web address]\epic\Images\Pharmacy\HeparinInfusions\heparin LOW INTENSITY nomogram for [facility] [id number]    01/13/25 1138     heparin 25,000 units in dextrose 5% (100 units/ml) IV bolus from bag LOW INTENSITY nomogram - [facility]  As needed (PRN)        Question:  Heparin Infusion Adjustment (DO NOT MODIFY ANSWER)  Answer:   [email or web address]\epic\Images\Pharmacy\HeparinInfusions\heparin LOW INTENSITY nomogram for [facility] [id number].pdf    01/13/25 1028                    Thank you for your consult. I will follow-up with patient. Please contact us if you have any additional questions.    Dax Michel NP  Cardiology   [facility] - Telemetry

## 2025-01-13 NOTE — NURSING
1300- Pt off unit to cath lab at this time, will reschedule PTT if pt still requires once he returns to unit.

## 2025-01-13 NOTE — PLAN OF CARE
Patient transfered to cath lab bay # 4 via stretcher with side rails up x2. Pt AAOx4 and able to follow commands. Pt is stable when connecting to cardiac monitors. VSS.   Right radial vasc band in place with 15cc of air in band per report. Site is CDI. No redness, bruising, or hematoma noted around site. +2 zack radial pulses palpated. Dopplered zack pedal pulses. Skin normal in color and warm to touch, <3 sec cap refill. C/O being cold, bear hugger applied with warm air. Fall risk precautions given and patient acknowledges.  AIDET completed to pt. Will continue to monitor patient.

## 2025-01-13 NOTE — Clinical Note
350 ml of contrast were injected throughout the case. 100 mL of contrast was the total wasted during the case. 450 mL was the total amount used during the case.

## 2025-01-13 NOTE — Clinical Note
An angiography was performed of the right coronary arteries. The angiography was performed via hand injection with 15 mL of contrast.

## 2025-01-14 ENCOUNTER — ANESTHESIA (OUTPATIENT)
Dept: CARDIOLOGY | Facility: HOSPITAL | Age: 85
DRG: 323 | End: 2025-01-14
Payer: MEDICARE

## 2025-01-14 LAB
ALBUMIN SERPL BCP-MCNC: 3.2 G/DL (ref 3.5–5.2)
ALBUMIN SERPL BCP-MCNC: 3.2 G/DL (ref 3.5–5.2)
ALP SERPL-CCNC: 55 U/L (ref 40–150)
ALT SERPL W/O P-5'-P-CCNC: 22 U/L (ref 10–44)
ANION GAP SERPL CALC-SCNC: 18 MMOL/L (ref 8–16)
ANION GAP SERPL CALC-SCNC: 18 MMOL/L (ref 8–16)
ANION GAP SERPL CALC-SCNC: 19 MMOL/L (ref 8–16)
ANION GAP SERPL CALC-SCNC: 20 MMOL/L (ref 8–16)
APTT PPP: 32.8 SEC (ref 21–32)
APTT PPP: >150 SEC (ref 21–32)
AST SERPL-CCNC: 22 U/L (ref 10–40)
BASOPHILS # BLD AUTO: 0.05 K/UL (ref 0–0.2)
BASOPHILS NFR BLD: 0.5 % (ref 0–1.9)
BILIRUB SERPL-MCNC: 0.7 MG/DL (ref 0.1–1)
BUN SERPL-MCNC: 63 MG/DL (ref 8–23)
BUN SERPL-MCNC: 66 MG/DL (ref 8–23)
BUN SERPL-MCNC: 68 MG/DL (ref 8–23)
BUN SERPL-MCNC: 68 MG/DL (ref 8–23)
CALCIUM SERPL-MCNC: 9.4 MG/DL (ref 8.7–10.5)
CALCIUM SERPL-MCNC: 9.5 MG/DL (ref 8.7–10.5)
CALCIUM SERPL-MCNC: 9.5 MG/DL (ref 8.7–10.5)
CALCIUM SERPL-MCNC: 9.7 MG/DL (ref 8.7–10.5)
CHLORIDE SERPL-SCNC: 100 MMOL/L (ref 95–110)
CHLORIDE SERPL-SCNC: 100 MMOL/L (ref 95–110)
CHLORIDE SERPL-SCNC: 101 MMOL/L (ref 95–110)
CHLORIDE SERPL-SCNC: 99 MMOL/L (ref 95–110)
CO2 SERPL-SCNC: 18 MMOL/L (ref 23–29)
CO2 SERPL-SCNC: 19 MMOL/L (ref 23–29)
CO2 SERPL-SCNC: 19 MMOL/L (ref 23–29)
CO2 SERPL-SCNC: 22 MMOL/L (ref 23–29)
CREAT SERPL-MCNC: 12.8 MG/DL (ref 0.5–1.4)
CREAT SERPL-MCNC: 13.1 MG/DL (ref 0.5–1.4)
CREAT SERPL-MCNC: 13.1 MG/DL (ref 0.5–1.4)
CREAT SERPL-MCNC: 13.3 MG/DL (ref 0.5–1.4)
DIFFERENTIAL METHOD BLD: ABNORMAL
EOSINOPHIL # BLD AUTO: 0.2 K/UL (ref 0–0.5)
EOSINOPHIL NFR BLD: 1.8 % (ref 0–8)
ERYTHROCYTE [DISTWIDTH] IN BLOOD BY AUTOMATED COUNT: 18.4 % (ref 11.5–14.5)
EST. GFR  (NO RACE VARIABLE): 3 ML/MIN/1.73 M^2
GLUCOSE SERPL-MCNC: 135 MG/DL (ref 70–110)
GLUCOSE SERPL-MCNC: 188 MG/DL (ref 70–110)
GLUCOSE SERPL-MCNC: 202 MG/DL (ref 70–110)
GLUCOSE SERPL-MCNC: 203 MG/DL (ref 70–110)
HCT VFR BLD AUTO: 29.5 % (ref 40–54)
HGB BLD-MCNC: 9 G/DL (ref 14–18)
IMM GRANULOCYTES # BLD AUTO: 0.04 K/UL (ref 0–0.04)
IMM GRANULOCYTES NFR BLD AUTO: 0.4 % (ref 0–0.5)
LYMPHOCYTES # BLD AUTO: 0.5 K/UL (ref 1–4.8)
LYMPHOCYTES NFR BLD: 5.5 % (ref 18–48)
MCH RBC QN AUTO: 28.2 PG (ref 27–31)
MCHC RBC AUTO-ENTMCNC: 30.5 G/DL (ref 32–36)
MCV RBC AUTO: 93 FL (ref 82–98)
MONOCYTES # BLD AUTO: 0.7 K/UL (ref 0.3–1)
MONOCYTES NFR BLD: 7.2 % (ref 4–15)
NEUTROPHILS # BLD AUTO: 7.9 K/UL (ref 1.8–7.7)
NEUTROPHILS NFR BLD: 84.6 % (ref 38–73)
NRBC BLD-RTO: 0 /100 WBC
OHS QRS DURATION: 92 MS
OHS QTC CALCULATION: 461 MS
PHOSPHATE SERPL-MCNC: 7.1 MG/DL (ref 2.7–4.5)
PLATELET # BLD AUTO: 221 K/UL (ref 150–450)
PMV BLD AUTO: 10.9 FL (ref 9.2–12.9)
POC ACTIVATED CLOTTING TIME K: 239 SEC (ref 74–137)
POC ACTIVATED CLOTTING TIME K: 239 SEC (ref 74–137)
POC ACTIVATED CLOTTING TIME K: 256 SEC (ref 74–137)
POC ACTIVATED CLOTTING TIME K: 262 SEC (ref 74–137)
POC ACTIVATED CLOTTING TIME K: 268 SEC (ref 74–137)
POC ACTIVATED CLOTTING TIME K: 273 SEC (ref 74–137)
POCT GLUCOSE: 107 MG/DL (ref 70–110)
POCT GLUCOSE: 159 MG/DL (ref 70–110)
POCT GLUCOSE: 212 MG/DL (ref 70–110)
POTASSIUM SERPL-SCNC: 4.9 MMOL/L (ref 3.5–5.1)
POTASSIUM SERPL-SCNC: 5.7 MMOL/L (ref 3.5–5.1)
POTASSIUM SERPL-SCNC: 6.1 MMOL/L (ref 3.5–5.1)
POTASSIUM SERPL-SCNC: 6.1 MMOL/L (ref 3.5–5.1)
POTASSIUM SERPL-SCNC: 6.2 MMOL/L (ref 3.5–5.1)
POTASSIUM SERPL-SCNC: 6.2 MMOL/L (ref 3.5–5.1)
PROT SERPL-MCNC: 7.3 G/DL (ref 6–8.4)
RBC # BLD AUTO: 3.19 M/UL (ref 4.6–6.2)
SAMPLE: ABNORMAL
SODIUM SERPL-SCNC: 137 MMOL/L (ref 136–145)
SODIUM SERPL-SCNC: 137 MMOL/L (ref 136–145)
SODIUM SERPL-SCNC: 139 MMOL/L (ref 136–145)
SODIUM SERPL-SCNC: 140 MMOL/L (ref 136–145)
WBC # BLD AUTO: 9.34 K/UL (ref 3.9–12.7)

## 2025-01-14 PROCEDURE — C1887 CATHETER, GUIDING: HCPCS | Performed by: INTERNAL MEDICINE

## 2025-01-14 PROCEDURE — C1725 CATH, TRANSLUMIN NON-LASER: HCPCS | Performed by: INTERNAL MEDICINE

## 2025-01-14 PROCEDURE — 25000003 PHARM REV CODE 250: Performed by: INTERNAL MEDICINE

## 2025-01-14 PROCEDURE — 80053 COMPREHEN METABOLIC PANEL: CPT | Performed by: INTERNAL MEDICINE

## 2025-01-14 PROCEDURE — 25500020 PHARM REV CODE 255: Performed by: INTERNAL MEDICINE

## 2025-01-14 PROCEDURE — 85730 THROMBOPLASTIN TIME PARTIAL: CPT | Mod: 91 | Performed by: STUDENT IN AN ORGANIZED HEALTH CARE EDUCATION/TRAINING PROGRAM

## 2025-01-14 PROCEDURE — C1769 GUIDE WIRE: HCPCS | Performed by: INTERNAL MEDICINE

## 2025-01-14 PROCEDURE — 37000008 HC ANESTHESIA 1ST 15 MINUTES: Performed by: INTERNAL MEDICINE

## 2025-01-14 PROCEDURE — 80069 RENAL FUNCTION PANEL: CPT | Performed by: STUDENT IN AN ORGANIZED HEALTH CARE EDUCATION/TRAINING PROGRAM

## 2025-01-14 PROCEDURE — C1876 STENT, NON-COA/NON-COV W/DEL: HCPCS | Performed by: INTERNAL MEDICINE

## 2025-01-14 PROCEDURE — 25000003 PHARM REV CODE 250: Performed by: STUDENT IN AN ORGANIZED HEALTH CARE EDUCATION/TRAINING PROGRAM

## 2025-01-14 PROCEDURE — B215YZZ FLUOROSCOPY OF LEFT HEART USING OTHER CONTRAST: ICD-10-PCS | Performed by: INTERNAL MEDICINE

## 2025-01-14 PROCEDURE — 27201423 OPTIME MED/SURG SUP & DEVICES STERILE SUPPLY: Performed by: INTERNAL MEDICINE

## 2025-01-14 PROCEDURE — 027036Z DILATION OF CORONARY ARTERY, ONE ARTERY WITH THREE DRUG-ELUTING INTRALUMINAL DEVICES, PERCUTANEOUS APPROACH: ICD-10-PCS | Performed by: INTERNAL MEDICINE

## 2025-01-14 PROCEDURE — 36415 COLL VENOUS BLD VENIPUNCTURE: CPT | Performed by: STUDENT IN AN ORGANIZED HEALTH CARE EDUCATION/TRAINING PROGRAM

## 2025-01-14 PROCEDURE — 93005 ELECTROCARDIOGRAM TRACING: CPT

## 2025-01-14 PROCEDURE — 92933 PRQ TRLML C ATHRC ST ANGIOP1: CPT | Mod: RC,,, | Performed by: INTERNAL MEDICINE

## 2025-01-14 PROCEDURE — C1753 CATH, INTRAVAS ULTRASOUND: HCPCS | Performed by: INTERNAL MEDICINE

## 2025-01-14 PROCEDURE — C1724 CATH, TRANS ATHEREC,ROTATION: HCPCS | Performed by: INTERNAL MEDICINE

## 2025-01-14 PROCEDURE — 85347 COAGULATION TIME ACTIVATED: CPT | Performed by: INTERNAL MEDICINE

## 2025-01-14 PROCEDURE — 93010 ELECTROCARDIOGRAM REPORT: CPT | Mod: ,,, | Performed by: INTERNAL MEDICINE

## 2025-01-14 PROCEDURE — 25000003 PHARM REV CODE 250

## 2025-01-14 PROCEDURE — C1760 CLOSURE DEV, VASC: HCPCS | Performed by: INTERNAL MEDICINE

## 2025-01-14 PROCEDURE — 85730 THROMBOPLASTIN TIME PARTIAL: CPT | Performed by: INTERNAL MEDICINE

## 2025-01-14 PROCEDURE — P9045 ALBUMIN (HUMAN), 5%, 250 ML: HCPCS | Mod: JZ,TB

## 2025-01-14 PROCEDURE — 11000001 HC ACUTE MED/SURG PRIVATE ROOM

## 2025-01-14 PROCEDURE — 36415 COLL VENOUS BLD VENIPUNCTURE: CPT | Performed by: INTERNAL MEDICINE

## 2025-01-14 PROCEDURE — C1894 INTRO/SHEATH, NON-LASER: HCPCS | Performed by: INTERNAL MEDICINE

## 2025-01-14 PROCEDURE — 4A023N7 MEASUREMENT OF CARDIAC SAMPLING AND PRESSURE, LEFT HEART, PERCUTANEOUS APPROACH: ICD-10-PCS | Performed by: INTERNAL MEDICINE

## 2025-01-14 PROCEDURE — 02C03ZZ EXTIRPATION OF MATTER FROM CORONARY ARTERY, ONE ARTERY, PERCUTANEOUS APPROACH: ICD-10-PCS | Performed by: INTERNAL MEDICINE

## 2025-01-14 PROCEDURE — 80100016 HC MAINTENANCE HEMODIALYSIS

## 2025-01-14 PROCEDURE — 63600175 PHARM REV CODE 636 W HCPCS: Performed by: INTERNAL MEDICINE

## 2025-01-14 PROCEDURE — 99900035 HC TECH TIME PER 15 MIN (STAT)

## 2025-01-14 PROCEDURE — C1885 CATH, TRANSLUMIN ANGIO LASER: HCPCS | Performed by: INTERNAL MEDICINE

## 2025-01-14 PROCEDURE — 25000003 PHARM REV CODE 250: Performed by: NURSE PRACTITIONER

## 2025-01-14 PROCEDURE — 63600175 PHARM REV CODE 636 W HCPCS

## 2025-01-14 PROCEDURE — C9602 PERC D-E COR STENT ATHER S: HCPCS | Performed by: INTERNAL MEDICINE

## 2025-01-14 PROCEDURE — 25000242 PHARM REV CODE 250 ALT 637 W/ HCPCS: Performed by: STUDENT IN AN ORGANIZED HEALTH CARE EDUCATION/TRAINING PROGRAM

## 2025-01-14 PROCEDURE — 27000221 HC OXYGEN, UP TO 24 HOURS

## 2025-01-14 PROCEDURE — B210YZZ FLUOROSCOPY OF SINGLE CORONARY ARTERY USING OTHER CONTRAST: ICD-10-PCS | Performed by: INTERNAL MEDICINE

## 2025-01-14 PROCEDURE — 94640 AIRWAY INHALATION TREATMENT: CPT

## 2025-01-14 PROCEDURE — 37000009 HC ANESTHESIA EA ADD 15 MINS: Performed by: INTERNAL MEDICINE

## 2025-01-14 PROCEDURE — 85025 COMPLETE CBC W/AUTO DIFF WBC: CPT | Performed by: INTERNAL MEDICINE

## 2025-01-14 PROCEDURE — 80048 BASIC METABOLIC PNL TOTAL CA: CPT | Mod: XB | Performed by: STUDENT IN AN ORGANIZED HEALTH CARE EDUCATION/TRAINING PROGRAM

## 2025-01-14 PROCEDURE — 63600175 PHARM REV CODE 636 W HCPCS: Performed by: STUDENT IN AN ORGANIZED HEALTH CARE EDUCATION/TRAINING PROGRAM

## 2025-01-14 PROCEDURE — 94761 N-INVAS EAR/PLS OXIMETRY MLT: CPT

## 2025-01-14 DEVICE — EVEROLIMUS-ELUTING PLATINUM CHROMIUM CORONARY STENT SYSTEM
Type: IMPLANTABLE DEVICE | Site: CHEST | Status: FUNCTIONAL
Brand: SYNERGY MEGATRON™

## 2025-01-14 RX ORDER — LIDOCAINE HYDROCHLORIDE 20 MG/ML
INJECTION INTRAVENOUS
Status: DISCONTINUED | OUTPATIENT
Start: 2025-01-14 | End: 2025-01-14

## 2025-01-14 RX ORDER — HEPARIN SODIUM 200 [USP'U]/100ML
INJECTION, SOLUTION INTRAVENOUS
Status: DISCONTINUED | OUTPATIENT
Start: 2025-01-14 | End: 2025-01-16 | Stop reason: HOSPADM

## 2025-01-14 RX ORDER — ALBUMIN HUMAN 50 G/1000ML
SOLUTION INTRAVENOUS
Status: DISCONTINUED | OUTPATIENT
Start: 2025-01-14 | End: 2025-01-14

## 2025-01-14 RX ORDER — EPINEPHRINE 1 MG/ML
INJECTION, SOLUTION, CONCENTRATE INTRAVENOUS
Status: DISCONTINUED | OUTPATIENT
Start: 2025-01-14 | End: 2025-01-14

## 2025-01-14 RX ORDER — SODIUM NITROPRUSSIDE 25 MG/ML
INJECTION INTRAVENOUS
Status: DISCONTINUED | OUTPATIENT
Start: 2025-01-14 | End: 2025-01-14 | Stop reason: HOSPADM

## 2025-01-14 RX ORDER — HEPARIN SODIUM 1000 [USP'U]/ML
INJECTION, SOLUTION INTRAVENOUS; SUBCUTANEOUS
Status: DISCONTINUED | OUTPATIENT
Start: 2025-01-14 | End: 2025-01-14 | Stop reason: HOSPADM

## 2025-01-14 RX ORDER — CEFAZOLIN SODIUM 1 G/3ML
INJECTION, POWDER, FOR SOLUTION INTRAMUSCULAR; INTRAVENOUS
Status: DISCONTINUED | OUTPATIENT
Start: 2025-01-14 | End: 2025-01-14 | Stop reason: HOSPADM

## 2025-01-14 RX ORDER — EPHEDRINE SULFATE 50 MG/ML
INJECTION, SOLUTION INTRAVENOUS
Status: DISCONTINUED | OUTPATIENT
Start: 2025-01-14 | End: 2025-01-14

## 2025-01-14 RX ORDER — CALCIUM CHLORIDE 100 MG/ML
INJECTION, SOLUTION INTRAVENOUS
Status: DISCONTINUED | OUTPATIENT
Start: 2025-01-14 | End: 2025-01-14

## 2025-01-14 RX ORDER — ALBUTEROL SULFATE 2.5 MG/.5ML
10 SOLUTION RESPIRATORY (INHALATION) ONCE
Status: COMPLETED | OUTPATIENT
Start: 2025-01-14 | End: 2025-01-14

## 2025-01-14 RX ORDER — DEXAMETHASONE SODIUM PHOSPHATE 4 MG/ML
INJECTION, SOLUTION INTRA-ARTICULAR; INTRALESIONAL; INTRAMUSCULAR; INTRAVENOUS; SOFT TISSUE
Status: DISCONTINUED | OUTPATIENT
Start: 2025-01-14 | End: 2025-01-14

## 2025-01-14 RX ORDER — VASOPRESSIN 20 [USP'U]/ML
INJECTION, SOLUTION INTRAMUSCULAR; SUBCUTANEOUS
Status: DISCONTINUED | OUTPATIENT
Start: 2025-01-14 | End: 2025-01-14

## 2025-01-14 RX ORDER — ALBUTEROL SULFATE 2.5 MG/.5ML
10 SOLUTION RESPIRATORY (INHALATION) ONCE
Status: DISCONTINUED | OUTPATIENT
Start: 2025-01-14 | End: 2025-01-14

## 2025-01-14 RX ORDER — NOREPINEPHRINE BITARTRATE 1 MG/ML
INJECTION, SOLUTION INTRAVENOUS
Status: DISCONTINUED | OUTPATIENT
Start: 2025-01-14 | End: 2025-01-14

## 2025-01-14 RX ORDER — PROPOFOL 10 MG/ML
VIAL (ML) INTRAVENOUS
Status: DISCONTINUED | OUTPATIENT
Start: 2025-01-14 | End: 2025-01-14

## 2025-01-14 RX ORDER — LIDOCAINE HYDROCHLORIDE 10 MG/ML
INJECTION, SOLUTION EPIDURAL; INFILTRATION; INTRACAUDAL; PERINEURAL
Status: DISCONTINUED | OUTPATIENT
Start: 2025-01-14 | End: 2025-01-14 | Stop reason: HOSPADM

## 2025-01-14 RX ORDER — PROPOFOL 10 MG/ML
VIAL (ML) INTRAVENOUS CONTINUOUS PRN
Status: DISCONTINUED | OUTPATIENT
Start: 2025-01-14 | End: 2025-01-14

## 2025-01-14 RX ORDER — DEXMEDETOMIDINE HYDROCHLORIDE 100 UG/ML
INJECTION, SOLUTION INTRAVENOUS
Status: DISCONTINUED | OUTPATIENT
Start: 2025-01-14 | End: 2025-01-14

## 2025-01-14 RX ORDER — PHENYLEPHRINE HYDROCHLORIDE 10 MG/ML
INJECTION INTRAVENOUS
Status: DISCONTINUED | OUTPATIENT
Start: 2025-01-14 | End: 2025-01-14

## 2025-01-14 RX ORDER — IODIXANOL 320 MG/ML
INJECTION, SOLUTION INTRAVASCULAR
Status: DISCONTINUED | OUTPATIENT
Start: 2025-01-14 | End: 2025-01-14 | Stop reason: HOSPADM

## 2025-01-14 RX ORDER — VERAPAMIL HYDROCHLORIDE 2.5 MG/ML
INJECTION, SOLUTION INTRAVENOUS
Status: DISCONTINUED | OUTPATIENT
Start: 2025-01-14 | End: 2025-01-14 | Stop reason: HOSPADM

## 2025-01-14 RX ORDER — ONDANSETRON HYDROCHLORIDE 2 MG/ML
INJECTION, SOLUTION INTRAVENOUS
Status: DISCONTINUED | OUTPATIENT
Start: 2025-01-14 | End: 2025-01-14

## 2025-01-14 RX ORDER — ETOMIDATE 2 MG/ML
INJECTION INTRAVENOUS
Status: DISCONTINUED | OUTPATIENT
Start: 2025-01-14 | End: 2025-01-14

## 2025-01-14 RX ADMIN — DEXMEDETOMIDINE 4 MCG: 200 INJECTION, SOLUTION INTRAVENOUS at 11:01

## 2025-01-14 RX ADMIN — CALCIUM CHLORIDE 100 MG: 100 INJECTION, SOLUTION INTRAVENOUS at 11:01

## 2025-01-14 RX ADMIN — PHENYLEPHRINE HYDROCHLORIDE 0.5 MCG/KG/MIN: 10 INJECTION INTRAVENOUS at 11:01

## 2025-01-14 RX ADMIN — PROPOFOL 50 MCG/KG/MIN: 10 INJECTION, EMULSION INTRAVENOUS at 10:01

## 2025-01-14 RX ADMIN — ASPIRIN 81 MG CHEWABLE TABLET 81 MG: 81 TABLET CHEWABLE at 08:01

## 2025-01-14 RX ADMIN — DEXMEDETOMIDINE 4 MCG: 200 INJECTION, SOLUTION INTRAVENOUS at 10:01

## 2025-01-14 RX ADMIN — CALCIUM CHLORIDE 200 MG: 100 INJECTION, SOLUTION INTRAVENOUS at 12:01

## 2025-01-14 RX ADMIN — NOREPINEPHRINE BITARTRATE 8 MCG: 1 INJECTION, SOLUTION, CONCENTRATE INTRAVENOUS at 01:01

## 2025-01-14 RX ADMIN — PHENYLEPHRINE HYDROCHLORIDE 200 MCG: 10 INJECTION INTRAVENOUS at 12:01

## 2025-01-14 RX ADMIN — DIPHENHYDRAMINE HYDROCHLORIDE, ZINC ACETATE: 2; .1 CREAM TOPICAL at 10:01

## 2025-01-14 RX ADMIN — DIPHENHYDRAMINE HYDROCHLORIDE, ZINC ACETATE: 2; .1 CREAM TOPICAL at 08:01

## 2025-01-14 RX ADMIN — VASOPRESSIN 2 UNITS: 20 INJECTION INTRAVENOUS at 12:01

## 2025-01-14 RX ADMIN — PROPOFOL 20 MG: 10 INJECTION, EMULSION INTRAVENOUS at 11:01

## 2025-01-14 RX ADMIN — PHENYLEPHRINE HYDROCHLORIDE 200 MCG: 10 INJECTION INTRAVENOUS at 11:01

## 2025-01-14 RX ADMIN — VASOPRESSIN 1 UNITS: 20 INJECTION INTRAVENOUS at 11:01

## 2025-01-14 RX ADMIN — CLOPIDOGREL BISULFATE 75 MG: 75 TABLET ORAL at 08:01

## 2025-01-14 RX ADMIN — PHENYLEPHRINE HYDROCHLORIDE 100 MCG: 10 INJECTION INTRAVENOUS at 10:01

## 2025-01-14 RX ADMIN — EPINEPHRINE 5 MCG: 1 INJECTION, SOLUTION, CONCENTRATE INTRAVENOUS at 01:01

## 2025-01-14 RX ADMIN — DEXMEDETOMIDINE 8 MCG: 200 INJECTION, SOLUTION INTRAVENOUS at 10:01

## 2025-01-14 RX ADMIN — ETOMIDATE 4 MG: 2 INJECTION, SOLUTION INTRAVENOUS at 10:01

## 2025-01-14 RX ADMIN — CARVEDILOL 6.25 MG: 6.25 TABLET, FILM COATED ORAL at 08:01

## 2025-01-14 RX ADMIN — EPHEDRINE SULFATE 10 MG: 50 INJECTION INTRAVENOUS at 01:01

## 2025-01-14 RX ADMIN — LIDOCAINE HYDROCHLORIDE 60 MG: 20 INJECTION, SOLUTION INTRAVENOUS at 10:01

## 2025-01-14 RX ADMIN — SODIUM ZIRCONIUM CYCLOSILICATE 10 G: 5 POWDER, FOR SUSPENSION ORAL at 08:01

## 2025-01-14 RX ADMIN — CALCIUM CHLORIDE 100 MG: 100 INJECTION, SOLUTION INTRAVENOUS at 12:01

## 2025-01-14 RX ADMIN — EPINEPHRINE 10 MCG: 1 INJECTION, SOLUTION, CONCENTRATE INTRAVENOUS at 01:01

## 2025-01-14 RX ADMIN — SODIUM CHLORIDE: 0.9 INJECTION, SOLUTION INTRAVENOUS at 01:01

## 2025-01-14 RX ADMIN — ONDANSETRON 4 MG: 2 INJECTION, SOLUTION INTRAMUSCULAR; INTRAVENOUS at 10:01

## 2025-01-14 RX ADMIN — GLYCOPYRROLATE 0.2 MG: 0.2 INJECTION, SOLUTION INTRAMUSCULAR; INTRAVITREAL at 10:01

## 2025-01-14 RX ADMIN — HUMAN INSULIN 7.14 UNITS: 100 INJECTION, SOLUTION SUBCUTANEOUS at 08:01

## 2025-01-14 RX ADMIN — PHENYLEPHRINE HYDROCHLORIDE 50 MCG: 10 INJECTION INTRAVENOUS at 10:01

## 2025-01-14 RX ADMIN — PHENYLEPHRINE HYDROCHLORIDE 100 MCG: 10 INJECTION INTRAVENOUS at 12:01

## 2025-01-14 RX ADMIN — VASOPRESSIN 1 UNITS: 20 INJECTION INTRAVENOUS at 12:01

## 2025-01-14 RX ADMIN — EPHEDRINE SULFATE 15 MG: 50 INJECTION INTRAVENOUS at 01:01

## 2025-01-14 RX ADMIN — ALBUMIN (HUMAN) 250 ML: 12.5 SOLUTION INTRAVENOUS at 10:01

## 2025-01-14 RX ADMIN — ALBUTEROL SULFATE 10 MG: 2.5 SOLUTION RESPIRATORY (INHALATION) at 09:01

## 2025-01-14 RX ADMIN — SODIUM CHLORIDE: 0.9 INJECTION, SOLUTION INTRAVENOUS at 10:01

## 2025-01-14 RX ADMIN — ETOMIDATE 4 MG: 2 INJECTION, SOLUTION INTRAVENOUS at 11:01

## 2025-01-14 RX ADMIN — DEXTROSE MONOHYDRATE 50 G: 25 INJECTION, SOLUTION INTRAVENOUS at 08:01

## 2025-01-14 RX ADMIN — EPHEDRINE SULFATE 25 MG: 50 INJECTION INTRAVENOUS at 12:01

## 2025-01-14 RX ADMIN — PROPOFOL 10 MG: 10 INJECTION, EMULSION INTRAVENOUS at 10:01

## 2025-01-14 RX ADMIN — DEXAMETHASONE SODIUM PHOSPHATE 4 MG: 4 INJECTION, SOLUTION INTRA-ARTICULAR; INTRALESIONAL; INTRAMUSCULAR; INTRAVENOUS; SOFT TISSUE at 10:01

## 2025-01-14 NOTE — INTERVAL H&P NOTE
The patient has been examined and the H&P has been reviewed:    I concur with the findings and no changes have occurred since H&P was written.    Anesthesia/Surgery risks, benefits and alternative options discussed and understood by patient/family.    Non ST-elevation MI status post PCI of the LAD, now here for staged PCI of the RCA.  Risks and benefits of the procedure discussed with the patient.      Active Hospital Problems    Diagnosis  POA    Unstable angina [I20.0]  Yes    Symptomatic anemia [D64.9]  Yes    HTN (hypertension) [I10]  Yes     Dx updated per 2019 IMO Load        Resolved Hospital Problems   No resolved problems to display.

## 2025-01-14 NOTE — PLAN OF CARE
01/14/25 1045   Rounds   Attendance Provider;Nurse    Discharge Plan A Home   Why the patient remains in the hospital Requires continued medical care       1045  Pt off the unit in the cath lab when CM participate in SIBR with Dr Pina. No family present. CM will return to completed the discharge planning assessment.     1300  Pt remains off the when CM rounded.       Will continue to follow.

## 2025-01-14 NOTE — PLAN OF CARE
2 cc of air removed from R radial vasc band. No hematoma or bleeding noted. +2 zack radial pulses palpated. Skin is normal in color, warm to touch, < 3 sec cap refill. VSS. Will continue to monitor pt for safety and needs.

## 2025-01-14 NOTE — PLAN OF CARE
"  Problem: Adult Inpatient Plan of Care  Goal: Plan of Care Review  Outcome: Progressing     Problem: Adult Inpatient Plan of Care  Goal: Optimal Comfort and Wellbeing  Outcome: Progressing     Problem: Diabetes Comorbidity  Goal: Blood Glucose Level Within Targeted Range  Outcome: Progressing     Problem: Hemodialysis  Goal: Safe, Effective Therapy Delivery  Outcome: Progressing     Problem: Cardiac Catheterization (Diagnostic/Interventional)  Goal: Absence of Bleeding  Outcome: Progressing     Patient arrived on the unit from Dialysis @ 2200 hrs. Plan of care reviewed with the patient. Scheduled medicines given and the patient tolerated well. On continuous IV Heparin infusion @ 10 U/kg/hr. Fall and safety precautions taken and the standard interventions are in place. On Telemetry monitoring with NSR, no true "red alarms' noted, no acute distress reported either on the shift. Patient's Accu Check was 103 mg/dl. On Oxygen 2L and satting well. Patient is on NPO after Midnight. Advised the patient to call for assistance. Continued monitoring the patient.   "

## 2025-01-14 NOTE — PLAN OF CARE
Update called to nurse Regalado for pts admit bed 419, aware of dialysis nurse Ayan calling for the patient after cath recovery. Aware that labs resulted and the heparin infusion was discontinued.

## 2025-01-14 NOTE — PLAN OF CARE
Patient received in cath recovery per stretcher with nurse Itzel w/ anesthesia. Bedside report received. Patient is sedated, oral airway in place, resp even, quiet and unlabored, vss when connected to monitors. Sinus rhythm on monitor w/o ectopy; forced air warmer applied. Skin wm dry. Color fair pink. Iv clamped rt arm. Right groin soft and drsg cdi, no hematoma, no bleeding. Right radial access site w/ vasc band in place w/ brisk cap refill, palpable pulse, fingers wm and pk, no hematoma, no bleeding. O2 per simple mask at 5 liters. Dp pulses audible w/ doppler bilat; left radial pulse palpable. Will continue to monitor.

## 2025-01-14 NOTE — PLAN OF CARE
Report called to dialysis nurse and Armando RN on fourth floor.  Pt resting quietly at this time.  States that he feels better.  O2 sat increased to 96%

## 2025-01-14 NOTE — PLAN OF CARE
Patient transferred via stretcher  to dialysis 4th floor tele. Placed on assigned cardiac tele monitor. VSS, AAOx4. No c/o pain.     Right radial/groin gauze/tegaderm site CDI. No bleeding no hematoma noted. Site and surrounding area soft.  Assessed by CHARLENE Higgins at bedside.  Report given to Armando CHANG +2 zack radial pulses.  All questions answered.

## 2025-01-14 NOTE — BRIEF OP NOTE
[facility] - Cath Lab ([facility])  Surgery Department  Operative Note    SUMMARY   POST CATH NOTE    Date of Procedure: 1/13/2025     Procedure: Procedure(s) (LRB):  IVUS, Coronary  Angioplasty-coronary  REVASCULARIZATION, ENDOVASCULAR, LE W/ INTRAVASCULAR LITHOTRIPSY  INSERTION, STENT, CORONARY ARTERY (N/A)  Angiogram, Coronary, with Left Heart Cath     Surgeons and Role:     * Steve Bhat MD - Primary    Assisting Surgeon: None    Pre-Operative Diagnosis: Elevated troponin [R79.89]  NSTEMI (non-ST elevated myocardial infarction) [I21.4]    Post-Operative Diagnosis: Post-Op Diagnosis Codes:     * Elevated troponin [R79.89]     * NSTEMI (non-ST elevated myocardial infarction) [I21.4]    s/p catheterization secondary to: NSTEMI    Cath Results:  Access: US guided RRA    Coronary Anatomy:     Left Main Coronary Artery: The left main is a large caliber vessel arising normally from the left sinus of valsalva.  It bifurcates into the left anterior descending and left circumflex coronary artery.  It is free of evidence of obstructive coronary artery disease. ALAN III flow     Left Anterior Descending: The left anterior descending coronary artery is a large caliber vessel arising normally from the left main and extending to the apex.  It gives rise to small to moderate caliber diagonal arteries. Proximal LAD has a severe 80-90% calcified stenosis prior to an under expanded 2.5 mm stent in a 4.0 mm vessel. After the stent there is 40-50% stenosis. ALAN II flow     Left Circumflex: The left circumflex is a large caliber vessel arising normally from the left main coronary artery, it is non-dominant.  It gives rise to moderate caliber obtuse marginal branches. OM stent is patent. ALAN III flow     Right Coronary Artery: The right coronary artery is a large caliber vessel arising normally from the right sinus of valsalva.  It is dominant giving rise to a PDA and PLV branch. Mid RCA with a 95-99% calcified fibrotic  stenosis. ALAN III flow    LVgram: LVEDP 33 mmHg    Intervention:   PCI  PCI procedure:  Heparin administered. ACT was closely monitored. Using a EBU 3.5 guider, this was used to cannulate the left system. Nuno blue PCI wire repshaped outside the body. PCI wire was advanced into the distal LAD. Predilated with 2.0 mm NC, 2.5 mm NC, scoring balloon 2.5 mm, 3.0 mm and 3.5 mm NC. IVUS was advanced for vessel prep/size. Schockwave 3.0 x 12 mm advanced and multiple therapies given. Followed by a 3.5 mm NC. Advanced a 3.5 x 24 mm megatron XU and deployed at nominal pressure. Post dilated with a 4.0 mm NC with great results. After the balloon was removed.  The wire was left in place.  Repeat angiography showed no signs of dissection.  Subsequently the wire was pulled back.   Final angiography showed ALAN-3 flow.  No signs of dissection, perforation, or thrombus.    Closure device: vasc band  Patient tolerated procedure well, no complications    Post Cath Exam:  BP (!) 139/94   Pulse 93   Temp 97.3 °F (36.3 °C)   Resp 18   SpO2 99%     Anesthesia: RN IV Sedation      Assessment:   Successful PCI of LAD with 3.5 x 24 mm XU  mRCA 95-99% stenosis--> staged PCI of RCA  Patent Lcx stent    Plan:   - Stage PCI of RCA tomorrow PM- NPO at [place]   - Patient tolerated procedure well. No immediate complications  - Continue DAPT  - EKG when arrives to floor  - Routine post cath protocol  - Dialysis in pm   - Maximize medical management  - Further care by the primary team

## 2025-01-14 NOTE — ANESTHESIA PREPROCEDURE EVALUATION
01/14/2025  Jevon Rajan is a 84 y.o., male.      Pre-op Assessment    I have reviewed the Patient Summary Reports.     I have reviewed the Nursing Notes.    I have reviewed the Medications.     Review of Systems  Anesthesia Hx:             Denies Family Hx of Anesthesia complications.    Denies Personal Hx of Anesthesia complications.                    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    ESRD on hemodialysis    Abnormal urinalysis    Renal mass, left    AV graft thrombosis    Thrombosis of kidney dialysis arteriovenous graft    ESRD on dialysis    Aortic atherosclerosis    Anemia in ESRD (end-stage renal disease)    Chest pain    Elevated troponin    Hypothyroidism due to acquired atrophy of thyroid    Liver mass    Renovascular hypertension    DM2 (diabetes mellitus, type 2)    Discharge planning issues    Shortness of breath    Inadequate dietary intake of protein    Symptomatic anemia    Unstable angina    Rash    Dry eyes    Hyperkalemia       Past Medical History:   Diagnosis Date    BPH (benign prostatic hyperplasia)     Coronary artery disease     [facility]. In 2014 he had stent to LAD. He states he has had 2 heart attacks. He does not get angina. There was 90% left anterior descending artery stenosis undergoing stenting. There was also a circumflex marginal branch stenosis of 70%.    Diabetes mellitus, type 2     ESRD (end stage renal disease) on dialysis     ESRD on HD TTS via left brachial AVF    Hypertension      Hypothyroidism, unspecified     Symptomatic anemia 01/15/2024       ECHO: Results for orders placed during the hospital encounter of 09/11/23    Echo    Interpretation Summary    Left Ventricle: The left ventricle is normal in size. Increased wall thickness. Mild basal septal thickening. See diagram for wall motion findings. There is low normal systolic function with a visually estimated ejection fraction of 50 - 55%. Grade I diastolic dysfunction.    Left Atrium: Left atrium is mildly dilated.    Right Ventricle: Normal right ventricular cavity size. Wall thickness is normal. Right ventricle wall motion  is normal. Systolic function is normal.    Pulmonic Valve: There is mild regurgitation.    Pulmonary Artery: No pulmonary hypertension.      Body mass index is 23.93 kg/m².    Tobacco Use: Low Risk  (1/14/2025)    Patient History     Smoking Tobacco Use: Never     Smokeless Tobacco Use: Never     Passive Exposure: Not on file       Social History     Substance and Sexual Activity   Drug Use No        Alcohol Use: Not At Risk (8/27/2024)    AUDIT-C     Frequency of Alcohol Consumption: Never     Average Number of Drinks: Patient does not drink     Frequency of Binge Drinking: Never       Review of patient's allergies indicates:   Allergen Reactions    Ace inhibitors Hives, Shortness Of Breath and Itching     Is not sure which medication it was    Captopril          Airway:  No value filed.     Physical Exam  General: Well nourished, Cooperative, Alert and Oriented    Airway:  Mallampati: I   Mouth Opening: Normal  TM Distance: Normal  Tongue: Normal  Neck ROM: Normal ROM    Dental:        Anesthesia Plan  Type of Anesthesia, risks & benefits discussed:    Anesthesia Type: Gen Natural Airway  Intra-op Monitoring Plan: Standard ASA Monitors  Post Op Pain Control Plan: multimodal analgesia  Induction:  IV  Informed Consent: Informed consent signed with the Patient and all parties understand the risks and agree with  anesthesia plan.  All questions answered.   ASA Score: 4  Day of Surgery Review of History & Physical: H&P Update referred to the surgeon/provider.  Anesthesia Plan Notes: Unable to tolerate procedure yesterday with RN IV sedation. Propofol gtt with general natural airway.    Hyperkalemia, will postpone until shifted by primary team.     Ready For Surgery From Anesthesia Perspective.     .

## 2025-01-14 NOTE — PLAN OF CARE
0905  Message sent to the schedulers requesting a hospfu appt with Dr Tovar (cards). Awaiting response.       Will continue to follow.

## 2025-01-14 NOTE — PLAN OF CARE
EKG tech to bedside. Pt still lightly sedated and in no distress. Oral airway in place. O2 per simple mask maintained. MD present and viewed EKG.

## 2025-01-14 NOTE — PLAN OF CARE
Oral airway removed; Phlebotomy lab draw per right hand stick. Pulse ox monitor switched to left index from nasal. Resp even and quiet; sinus on monitor.

## 2025-01-14 NOTE — CONSULTS
[facility] - Cath Lab ([facility])  Wound Care    Patient Name:  Jevon Rajan   MRN:  [id number]  Date: 1/14/2025  Diagnosis: <principal problem not specified>    History:     Past Medical History:   Diagnosis Date    BPH (benign prostatic hyperplasia)     Coronary artery disease     [facility] care [facility]. In 2014 he had stent to LAD. He states he has had 2 heart attacks. He does not get angina. There was 90% left anterior descending artery stenosis undergoing stenting. There was also a circumflex marginal branch stenosis of 70%.    Diabetes mellitus, type 2     ESRD (end stage renal disease) on dialysis     ESRD on HD TTS via left brachial AVF    Hypertension     Hypothyroidism, unspecified     Symptomatic anemia 01/15/2024       Social History     Socioeconomic History    Marital status: Single   Tobacco Use    Smoking status: Never    Smokeless tobacco: Never   Substance and Sexual Activity    Alcohol use: No    Drug use: No    Sexual activity: Not Currently     Social Drivers of Health     Financial Resource Strain: Low Risk  (1/13/2025)    Overall Financial Resource Strain (CARDIA)     Difficulty of Paying Living Expenses: Not hard at all   Food Insecurity: No Food Insecurity (1/13/2025)    Hunger Vital Sign     Worried About Running Out of Food in the Last Year: Never true     Ran Out of Food in the Last Year: Never true   Transportation Needs: Unmet Transportation Needs (1/13/2025)    TRANSPORTATION NEEDS     Transportation : Yes, it has kept me from medical appointments or from getting my medications.   Physical Activity: Insufficiently Active (8/27/2024)    Exercise Vital Sign     Days of Exercise per Week: 2 days     Minutes of Exercise per Session: 10 min   Stress: No Stress Concern Present (1/13/2025)    [employer] - Occupational Stress Questionnaire     Feeling of Stress : Not at all   Housing Stability: Low Risk   (1/13/2025)    Housing Stability Vital Sign     Unable to Pay for Housing in the Last Year: No     Homeless in the Last Year: No       Precautions:     Allergies as of 01/12/2025 - Reviewed 01/12/2025   Allergen Reaction Noted    Ace inhibitors Itching 12/04/2013    Captopril  08/12/2003       [facility] Assessment Details/Treatment     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 Francie Espinosa to possibly start steroid cream or oral medication for itching              01/14/2025

## 2025-01-14 NOTE — TRANSFER OF CARE
"Anesthesia Transfer of Care Note    Patient: Jevon Rajan    Procedure(s) Performed: Procedure(s) (LRB):  Percutaneous coronary intervention (N/A)  Atherectomy-coronary (N/A)  ANGIOPLASTY, CORONARY ARTERY, WITH STENT INSERTION (N/A)  Left heart cath (Left)    Patient location: GI    Anesthesia Type: general    Transport from OR: Transported from OR on 6-10 L/min O2 by face mask with adequate spontaneous ventilation    Post pain: adequate analgesia    Post assessment: no apparent anesthetic complications    Post vital signs: stable    Level of consciousness: awake    Nausea/Vomiting: no nausea/vomiting    Complications: none    Transfer of care protocol was followed      Last vitals: Visit Vitals  BP (!) 147/63 (BP Location: Right leg, Patient Position: Lying)   Pulse 86   Temp 36.8 °C (98.2 °F) (Oral)   Resp 20   Ht 5' 8" (1.727 m)   Wt 71.4 kg (157 lb 6.5 oz)   SpO2 97%   BMI 23.93 kg/m²     "

## 2025-01-14 NOTE — PROGRESS NOTES
Nephrology Progress  Note       Consult Requested By: aNdja Brown MD  Reason for Consult: ESRD      SUBJECTIVE:           ?    Review of Systems   Constitutional:  Negative for chills, fever and weight loss.   HENT:  Negative for congestion and tinnitus.    Eyes:  Negative for blurred vision, double vision and photophobia.   Respiratory:  Positive for shortness of breath. Negative for cough.    Cardiovascular:  Negative for chest pain, palpitations, orthopnea and leg swelling.   Gastrointestinal:  Negative for nausea and vomiting.   Genitourinary:  Negative for frequency and urgency.   Musculoskeletal:  Negative for back pain, falls and joint pain.   Skin:  Negative for itching and rash.   Neurological:  Negative for dizziness, tingling, tremors, sensory change, speech change, focal weakness, seizures, loss of consciousness, weakness and headaches.   Endo/Heme/Allergies:  Does not bruise/bleed easily.   Psychiatric/Behavioral:  Negative for depression. The patient does not have insomnia.        Past Medical History:   Diagnosis Date    BPH (benign prostatic hyperplasia)     Coronary artery disease     [facility]. In 2014 he had stent to LAD. He states he has had 2 heart attacks. He does not get angina. There was 90% left anterior descending artery stenosis undergoing stenting. There was also a circumflex marginal branch stenosis of 70%.    Diabetes mellitus, type 2     ESRD (end stage renal disease) on dialysis     ESRD on HD TTS via left brachial AVF    Hypertension     Hypothyroidism, unspecified     Symptomatic anemia 01/15/2024          OBJECTIVE:     Vital Signs (Most Recent)  Vitals:    01/14/25 0516 01/14/25 0726 01/14/25 0744 01/14/25 0904   BP: 129/61 (!) 147/63     BP Location: Right leg Right leg     Patient Position: Lying Lying     Pulse: 82 79 82 86   Resp: 18 20  20   Temp: 97.9 °F (36.6 °C) 98.2 °F (36.8 °C)     TempSrc:  Oral Oral     SpO2: (!) 94% 95% 95% 97%   Weight:       Height:             Date 01/14/25 0700 - 01/15/25 0659   Shift 5356-9824 3696-8670 4658-6308 24 Hour Total   INTAKE   I.V.(mL/kg) 250(3.5)   250(3.5)   Shift Total(mL/kg) 250(3.5)   250(3.5)   OUTPUT   Shift Total(mL/kg)       Weight (kg) 71.4 71.4 71.4 71.4             Medications:   aspirin  81 mg Oral Daily    atorvastatin  80 mg Oral QHS    carvediloL  6.25 mg Oral BID    clopidogreL  75 mg Oral Daily    diphenhydrAMINE-zinc acetate 2-0.1%   Topical (Top) TID    epoetin mj-epbx  10,000 Units Subcutaneous Every Tues, Thurs, Sat           Physical Exam  Vitals and nursing note reviewed.   Constitutional:       General: He is not in acute distress.     Appearance: He is not diaphoretic.   HENT:      Head: Normocephalic and atraumatic.      Mouth/Throat:      Pharynx: No oropharyngeal exudate.   Eyes:      General: No scleral icterus.     Conjunctiva/sclera: Conjunctivae normal.      Pupils: Pupils are equal, round, and reactive to light.   Cardiovascular:      Rate and Rhythm: Normal rate and regular rhythm.      Heart sounds: Normal heart sounds. No murmur heard.  Pulmonary:      Effort: No respiratory distress.      Breath sounds: Rales present.   Abdominal:      General: Bowel sounds are normal. There is no distension.      Palpations: Abdomen is soft.      Tenderness: There is no abdominal tenderness.   Musculoskeletal:         General: Normal range of motion.      Cervical back: Normal range of motion and neck supple.      Comments: LUE AVF    Skin:     General: Skin is warm and dry.      Findings: Rash present. No erythema.   Neurological:      Mental Status: He is alert and oriented to person, place, and time.      Cranial Nerves: No cranial nerve deficit.   Psychiatric:         Mood and Affect: Affect normal.         Cognition and Memory: Memory normal.         Judgment: Judgment normal.         Laboratory:  Recent Labs   Lab 01/12/25  5618  "01/13/25  0458 01/14/25  0644   WBC 6.16 8.27 9.34   HGB 8.3* 7.8* 9.0*   HCT 25.7* 24.5* 29.5*    189 221   MONO 2.0*  CANCELED 6.7  0.6 7.2  0.7   EOSINOPHIL 0.0 6.5 1.8     Recent Labs   Lab 01/12/25  1134 01/14/25  0644 01/14/25  0931   * 139 140   K 4.4 5.7* 4.9    101 99   CO2 23 18* 22*   BUN 78* 63* 66*   CREATININE 13.8* 12.8* 13.3*   CALCIUM 10.2 9.7 9.5   PHOS  --  7.1*  --        Diagnostic Results:  X-Ray: Reviewed  US: Reviewed  Echo: Reviewed  ASSESSMENT/PLAN:     1. ESRD on HD TTS  - Dr Carla Ashley   Didn't get HD Saturday  due to itching  HD 2hr yesterday  and today after LHC with  UF 2L  then keep     TTS   -- Daily Renal Function Panel  -- Avoid Hypotension.  -- Renally dose all meds     Elevated Troponin   -- denies Chest pain now   -- SOB crackles on exam HTN goes with volume overload   -- plan for HD for volume and electrolytes optimization after [facility] today again   -- currently on Heparin drip        2. HTN  - controlled volume overloaded   3. Anemia of chronic kidney disease treated with NAKIA    EPogen   with each HD - hold today     Recent Labs   Lab 01/12/25  0550 01/13/25  0458 01/14/25  0644   HGB 8.3* 7.8* 9.0*   HCT 25.7* 24.5* 29.5*    189 221       Iron   Lab Results   Component Value Date    IRON 39 (L) 08/11/2024    TIBC 206 (L) 08/11/2024    FERRITIN 979 (H) 08/11/2024       4. MBD    Recent Labs   Lab 01/14/25  0644 01/14/25  0931   CALCIUM 9.7 9.5   PHOS 7.1*  --    -- Binders TIDWM   No results for input(s): "MG" in the last 168 hours.      Lab Results   Component Value Date    .7 (H) 01/17/2024    CALCIUM 9.5 01/14/2025    PHOS 7.1 (H) 01/14/2025     No results found for: "[id number]"    Lab Results   Component Value Date    CO2 22 (L) 01/14/2025       5. Nutrition/Hypoalbuminemia   Recent Labs   Lab 01/12/25  1316 01/14/25  0644 01/14/25  0931   LABPROT 11.4  --   --    ALBUMIN  --  3.2* 3.2*     Nepro with meals TID. Renal " vitamins daily      Thank you for allowing me to participate in care of your patient  With any question please call   Vivienne Oates MD     [facility]  RACHNA Red MD,   MD GINO Milner MD E. V. Harmon, NP  [address]   [place], [id number] [phone]  After hours answering service: [phone]

## 2025-01-14 NOTE — PLAN OF CARE
Transferred patient back to tele bed 419 post cath recovery per stretcher with nurse on tele box monitor; patient easily aroused and reports he feels tired; pt in no distress; Fall socks in place. Transferred to bed from stretcher using slide board and 3 staff members w/o incident. Bedside report to nurse Sabine w/ site checks; drsg cdi right radial site, no bleeding, no hematoma, palpable pulse, fingers wm and pk; drsg intact right groin, soft, no active bleeding, no hematoma, scant drainage on drsg as documented and no change noted. Iv intact rt arm; phone and underwear to bedside on table. Chart to rack and stent card in chart. Bed low and locked, call bell in reach. Meal tray to bedside w/ po fluids; patient has no complaints.pt on room air but nurse will resume nasal o2. No visitor present.

## 2025-01-14 NOTE — PLAN OF CARE
Hand off telephone report called to nurse Regalado for pts tele bed 419; Patient beginning to moan on occasion and moving in bed; sheet across RLE to immobilize. O2 in use. Sinus on monitor; Heparin infusion discontinued as per phone order per Dr Pittman. Rails up x 2. Will continue to monitor. Slowly removing air from right radial vasc band w/o any bleeding, nor hematoma. Drsg cdi to right groin and soft.

## 2025-01-15 PROBLEM — R54 SENILE DEBILITY: Status: ACTIVE | Noted: 2025-01-15

## 2025-01-15 LAB
ALBUMIN SERPL BCP-MCNC: 3 G/DL (ref 3.5–5.2)
ANION GAP SERPL CALC-SCNC: 15 MMOL/L (ref 8–16)
BUN SERPL-MCNC: 30 MG/DL (ref 8–23)
CALCIUM SERPL-MCNC: 9.3 MG/DL (ref 8.7–10.5)
CATH EF QUANTITATIVE: 55 %
CHLORIDE SERPL-SCNC: 99 MMOL/L (ref 95–110)
CO2 SERPL-SCNC: 24 MMOL/L (ref 23–29)
CREAT SERPL-MCNC: 7.1 MG/DL (ref 0.5–1.4)
EST. GFR  (NO RACE VARIABLE): 7 ML/MIN/1.73 M^2
GLUCOSE SERPL-MCNC: 91 MG/DL (ref 70–110)
PHOSPHATE SERPL-MCNC: 5.7 MG/DL (ref 2.7–4.5)
POCT GLUCOSE: 156 MG/DL (ref 70–110)
POCT GLUCOSE: 164 MG/DL (ref 70–110)
POCT GLUCOSE: 177 MG/DL (ref 70–110)
POCT GLUCOSE: 92 MG/DL (ref 70–110)
POTASSIUM SERPL-SCNC: 4.4 MMOL/L (ref 3.5–5.1)
SODIUM SERPL-SCNC: 138 MMOL/L (ref 136–145)

## 2025-01-15 PROCEDURE — 80069 RENAL FUNCTION PANEL: CPT | Performed by: STUDENT IN AN ORGANIZED HEALTH CARE EDUCATION/TRAINING PROGRAM

## 2025-01-15 PROCEDURE — 97166 OT EVAL MOD COMPLEX 45 MIN: CPT

## 2025-01-15 PROCEDURE — 11000001 HC ACUTE MED/SURG PRIVATE ROOM

## 2025-01-15 PROCEDURE — 97161 PT EVAL LOW COMPLEX 20 MIN: CPT

## 2025-01-15 PROCEDURE — 99900035 HC TECH TIME PER 15 MIN (STAT)

## 2025-01-15 PROCEDURE — 25000003 PHARM REV CODE 250: Performed by: INTERNAL MEDICINE

## 2025-01-15 PROCEDURE — 97535 SELF CARE MNGMENT TRAINING: CPT

## 2025-01-15 PROCEDURE — 25000003 PHARM REV CODE 250: Performed by: STUDENT IN AN ORGANIZED HEALTH CARE EDUCATION/TRAINING PROGRAM

## 2025-01-15 PROCEDURE — 94660 CPAP INITIATION&MGMT: CPT

## 2025-01-15 PROCEDURE — 27000221 HC OXYGEN, UP TO 24 HOURS

## 2025-01-15 PROCEDURE — 94761 N-INVAS EAR/PLS OXIMETRY MLT: CPT

## 2025-01-15 PROCEDURE — 25000003 PHARM REV CODE 250: Performed by: NURSE PRACTITIONER

## 2025-01-15 PROCEDURE — 36415 COLL VENOUS BLD VENIPUNCTURE: CPT | Performed by: STUDENT IN AN ORGANIZED HEALTH CARE EDUCATION/TRAINING PROGRAM

## 2025-01-15 RX ORDER — MUPIROCIN 20 MG/G
OINTMENT TOPICAL 2 TIMES DAILY
Status: DISCONTINUED | OUTPATIENT
Start: 2025-01-15 | End: 2025-01-16 | Stop reason: HOSPADM

## 2025-01-15 RX ADMIN — DIPHENHYDRAMINE HYDROCHLORIDE, ZINC ACETATE: 2; .1 CREAM TOPICAL at 08:01

## 2025-01-15 RX ADMIN — CAMPHOR, MENTHOL: .5; .5 LOTION TOPICAL at 08:01

## 2025-01-15 RX ADMIN — DIPHENHYDRAMINE HYDROCHLORIDE, ZINC ACETATE: 2; .1 CREAM TOPICAL at 03:01

## 2025-01-15 RX ADMIN — CARVEDILOL 6.25 MG: 6.25 TABLET, FILM COATED ORAL at 08:01

## 2025-01-15 RX ADMIN — ATORVASTATIN CALCIUM 80 MG: 40 TABLET, FILM COATED ORAL at 12:01

## 2025-01-15 RX ADMIN — CLOPIDOGREL BISULFATE 75 MG: 75 TABLET ORAL at 08:01

## 2025-01-15 RX ADMIN — ASPIRIN 81 MG CHEWABLE TABLET 81 MG: 81 TABLET CHEWABLE at 08:01

## 2025-01-15 RX ADMIN — MUPIROCIN: 20 OINTMENT TOPICAL at 11:01

## 2025-01-15 RX ADMIN — ATORVASTATIN CALCIUM 80 MG: 40 TABLET, FILM COATED ORAL at 08:01

## 2025-01-15 RX ADMIN — MUPIROCIN: 20 OINTMENT TOPICAL at 08:01

## 2025-01-15 RX ADMIN — CAMPHOR, MENTHOL: .5; .5 LOTION TOPICAL at 11:01

## 2025-01-15 NOTE — PT/OT/SLP EVAL
Physical Therapy Evaluation     Patient Name: Jevon Rajan   MRN: [id number]  Recent Surgery: Procedure(s) (LRB):  Percutaneous coronary intervention (N/A)  Atherectomy-coronary (N/A)  ANGIOPLASTY, CORONARY ARTERY, WITH STENT INSERTION (N/A)  Left heart cath (Left) 1 Day Post-Op    Recommendations:     Discharge Recommendations: Low Intensity Therapy   Discharge Equipment Recommendations:  (sc vs RW)       Assessment:     Jevon Rajan is a 84 y.o. male admitted with a medical diagnosis of unstable angina. He presents with the following impairments/functional limitations: impaired functional mobility, gait instability, impaired skin.     Rehab Prognosis: Good; patient would benefit from acute PT services to address these deficits and reach maximum level of function.    Plan:     During this hospitalization, patient to be seen 3 x/week to address the above listed problems via gait training, therapeutic activities, therapeutic exercises    Plan of Care Expires: 01/17/25    Subjective     Chief Complaint: Pt concerned about the spots on his legs.  Patient Comments/Goals: Pt agreeable to PT evaluation.  Pain/Comfort:  Pain Rating 1: 0/10    Social History:  Living Environment: Patient lives alone in a single story home with number of outside stair(s): 3, no HRs  Prior Level of Function: Prior to admission, patient was modified independent  Equipment Used at Home: cane, straight (reports he no longer has cane)  DME owned (not currently used): none  Assistance Upon Discharge: unknown    Objective:     Communicated with nurseSalome prior to session. Patient found HOB elevated with bed alarm, oxygen, telemetry upon PT entry to room.    General Precautions: Standard,     Orthopedic Precautions: N/A   Braces: N/A    Respiratory Status: Nasal cannula, flow 1 L/min    Exams:  Cognition: Patient is oriented to Person, Place, Time, Situation  RLE ROM: WFL  RLE Strength: WFL  LLE ROM: WFL  LLE Strength: WFL      Functional  Mobility:  Gait belt applied - Yes  Bed Mobility  Supine to Sit: supervision for LE management and trunk management  Transfers  Sit to Stand: stand by assistance with no AD  Gait  Patient ambulated 10' with no AD and contact guard assistance. Patient demonstrates steady gait. Pt reports using a straight cane at home but no longer has it, needs another one.   Balance  Sitting: supervision  Standing: contact guard assistance      Therapeutic Activities and Exercises:   Patient educated on role of acute care PT and PT POC and call light usage  Patient performed 1 set(s) of 10 repetitions of the following seated exercises: ankle pumps, long arc quads, marches, and hip adduction squeezes for bilateral LE. Patient required skilled PT for instruction of exercises and appropriate cues to perform exercises safely and appropriately.  Patient educated about importance of OOB mobility and remaining up in chair most of the day.  Pt encouraged to sit and call for nursing staff when ready to get back to bed.    AM-PAC 6 CLICK MOBILITY  Total Score:20    Patient left up in chair with all lines intact, call button in reach, RN notified, and all needs in reach .    GOALS:   Multidisciplinary Problems       Physical Therapy Goals          Problem: Physical Therapy    Goal Priority Disciplines Outcome Interventions   Physical Therapy Goal     PT, PT/OT Progressing    Description: Goals to be met by: 25     Patient will increase functional independence with mobility by performin. Pt to be mod I with bed mobility.  2. Pt to transfer with (S).  3. Pt to ambulate 150' w/sc and SBA.  4. Pt to be (I) with HEP.                         History:     Past Medical History:   Diagnosis Date    BPH (benign prostatic hyperplasia)     Coronary artery disease     [facility]. In  he had stent to LAD. He states he has had 2 heart attacks. He does not get angina. There  was 90% left anterior descending artery stenosis undergoing stenting. There was also a circumflex marginal branch stenosis of 70%.    Diabetes mellitus, type 2     ESRD (end stage renal disease) on dialysis     ESRD on HD TTS via left brachial AVF    Hypertension     Hypothyroidism, unspecified     Symptomatic anemia 01/15/2024       Past Surgical History:   Procedure Laterality Date    ANGIOGRAM, CORONARY, WITH LEFT HEART CATHETERIZATION  1/13/2025    Procedure: Angiogram, Coronary, with Left Heart Cath;  Surgeon: Steve Bhat MD;  Location: [facility] CATH LAB/EP;  Service: Cardiology;;    ATHERECTOMY, CORONARY N/A 1/14/2025    Procedure: Atherectomy-coronary;  Surgeon: Giacomo Pittman MD;  Location: [facility] CATH LAB/EP;  Service: Cardiology;  Laterality: N/A;    bilateral foot surgery      CORONARY ANGIOPLASTY WITH STENT PLACEMENT N/A 1/14/2025    Procedure: ANGIOPLASTY, CORONARY ARTERY, WITH STENT INSERTION;  Surgeon: Giacomo Pittman MD;  Location: [facility] CATH LAB/EP;  Service: Cardiology;  Laterality: N/A;    CORONARY STENT PLACEMENT N/A 1/13/2025    Procedure: INSERTION, STENT, CORONARY ARTERY;  Surgeon: Steve Bhat MD;  Location: [facility] CATH LAB/EP;  Service: Cardiology;  Laterality: N/A;    ESOPHAGOGASTRODUODENOSCOPY N/A 2/27/2024    Procedure: EGD (ESOPHAGOGASTRODUODENOSCOPY);  Surgeon: Gemma Almendarez MD;  Location: [facility];  Service: Endoscopy;  Laterality: N/A;    eyelid surgery      FISTULOGRAM Left 11/27/2019    Procedure: Fistulogram;  Surgeon: MELANY Brenner III, MD;  Location: [facility] OR [address] [place] [address];  Service: Peripheral Vascular;  Laterality: Left;    FISTULOGRAM Left 11/27/2019    Procedure: Fistulogram, AVG declot, possible permacath;  Surgeon: MELANY Brenner III, MD;  Location: [facility] CATH LAB;  Service: Peripheral Vascular;  Laterality: Left;    INSERTION OF DIALYSIS CATHETER      IVUS, CORONARY  1/13/2025    Procedure: IVUS, Coronary;  Surgeon: Steve Bhat MD;  Location: [facility]  CATH LAB/EP;  Service: Cardiology;;    LEFT HEART CATHETERIZATION Left 1/14/2025    Procedure: Left heart cath;  Surgeon: Giacomo Pittman MD;  Location: [facility] CATH LAB/EP;  Service: Cardiology;  Laterality: Left;    MOH's  12/5/13    PERCUTANEOUS CORONARY INTERVENTION, ARTERY N/A 1/14/2025    Procedure: Percutaneous coronary intervention;  Surgeon: Giacomo Pittman MD;  Location: [facility] CATH LAB/EP;  Service: Cardiology;  Laterality: N/A;    PERCUTANEOUS TRANSLUMINAL BALLOON ANGIOPLASTY OF CORONARY ARTERY  1/13/2025    Procedure: Angioplasty-coronary;  Surgeon: Steve Bhat MD;  Location: [facility] CATH LAB/EP;  Service: Cardiology;;    REVASCULARIZATION, ENDOVASCULAR, LE W/ INTRAVASCULAR LITHOTRIPSY  1/13/2025    Procedure: REVASCULARIZATION, ENDOVASCULAR, LE W/ INTRAVASCULAR LITHOTRIPSY;  Surgeon: Steve Bhat MD;  Location: [facility] CATH LAB/EP;  Service: Cardiology;;    right hand surgery      stents         Time Tracking:     PT Received On: 01/15/25  PT Start Time: 1353  PT Stop Time: 1413  PT Total Time (min): 20 min     Billable Minutes: Evaluation 20    1/15/2025

## 2025-01-15 NOTE — PLAN OF CARE
"  Problem: Adult Inpatient Plan of Care  Goal: Plan of Care Review  Outcome: Progressing     Problem: Adult Inpatient Plan of Care  Goal: Optimal Comfort and Wellbeing  Outcome: Progressing     Problem: Diabetes Comorbidity  Goal: Blood Glucose Level Within Targeted Range  Outcome: Progressing     Problem: Hemodialysis  Goal: Safe, Effective Therapy Delivery  Outcome: Progressing     Problem: Cardiac Catheterization (Diagnostic/Interventional)  Goal: Stable Heart Rate and Rhythm  Outcome: Progressing     Patient arrived on the unit @ 2215 hrs from Dialysis. Plan of care reviewed with the patient. Scheduled medicines given and the patient tolerated well. Patient's BP was 103/65 with a HR:86, held Carvedilol 6.25 mg on NP orders. Fall and safety precautions taken and the standard interventions are in place. On Telemetry monitoring with NSR, no true "red alarms' noted, no acute distress reported either on the shift. Patient's Accu Check was 107 mg/dl. On Oxygen 2L and satting well. Advised the patient to call for assistance. Continued monitoring the patient.   "

## 2025-01-15 NOTE — PROGRESS NOTES
[facility] Cath Lab ([facility])  [facility] Medicine  Progress Note    Patient Name: Jevon Rajan  MRN: [id number]  Patient Class: IP- Inpatient   Admission Date: 1/13/2025  Length of Stay: 1 days  Attending Physician: Nadja Brown MD  Primary Care Provider: No, Primary Doctor        Subjective     Principal Problem:<principal problem not specified>        HPI:  No notes on file    Overview/Hospital Course:  No notes on file    Interval History:     Patient wasn't in bed during morning rounds as he went early for cath lab, I checked again in the afternoon and he wasn't back, he had prolonged recovery after his cath.    Review of Systems  Objective:     Vital Signs (Most Recent):  Temp: 97.2 °F (36.2 °C) (01/14/25 1805)  Pulse: 82 (01/14/25 1946)  Resp: 18 (01/14/25 1805)  BP: (!) 121/57 (01/14/25 1946)  SpO2: (!) 93 % (01/14/25 1747) Vital Signs (24h Range):  Temp:  [96.4 °F (35.8 °C)-98.2 °F (36.8 °C)] 97.2 °F (36.2 °C)  Pulse:  [77-93] 82  Resp:  [13-21] 18  SpO2:  [92 %-100 %] 93 %  BP: ()/(49-94) 121/57     Weight: 71.4 kg (157 lb 6.5 oz)  Body mass index is 23.93 kg/m².    Intake/Output Summary (Last 24 hours) at 1/14/2025 1957  Last data filed at 1/14/2025 1419  Gross per 24 hour   Intake 1200 ml   Output 1450 ml   Net -250 ml         Physical Exam    Awake and alert  He has skin nodules around his body  With scratch marks  No LE edema       Significant Labs: All pertinent labs within the past 24 hours have been reviewed.  CBC:   Recent Labs   Lab 01/13/25  0458 01/14/25  0644   WBC 8.27 9.34   HGB 7.8* 9.0*   HCT 24.5* 29.5*    221     CMP:   Recent Labs   Lab 01/14/25  0644 01/14/25  0931 01/14/25  1541    140 137  137   K 5.7* 4.9 6.1*  6.1*  6.2*  6.2*    99 100  100   CO2 18* 22* 19*  19*   * 188* 202*  203*   BUN 63* 66* 68*  68*   CREATININE 12.8* 13.3* 13.1*  13.1*   CALCIUM 9.7 9.5 9.5  9.4   PROT  --  7.3  --    ALBUMIN 3.2* 3.2*  --    BILITOT  --  0.7  --     ALKPHOS  --  55  --    AST  --  22  --    ALT  --  22  --    ANIONGAP 20* 19* 18*  18*       Significant Imaging: I have reviewed all pertinent imaging results/findings within the past 24 hours.    Assessment and Plan     Unstable angina  S/p C with stents placement 01/14    Cont ASA and plavix    Symptomatic anemia  Anemia is likely due to chronic disease due to ESRD. Most recent hemoglobin and hematocrit are listed below.  Recent Labs     01/12/25  0550 01/13/25  0458 01/14/25  0644   HGB 8.3* 7.8* 9.0*   HCT 25.7* 24.5* 29.5*     Plan  - Monitor serial CBC: Daily  - Transfuse PRBC if patient becomes hemodynamically unstable, symptomatic or H/H drops below 7/21.  - Patient has not received any PRBC transfusions to date  - Patient's anemia is currently stable    HTN (hypertension)  Patient's blood pressure range in the last 24 hours was: BP  Min: 91/52  Max: 154/66.The patient's inpatient anti-hypertensive regimen is listed below:  Current Antihypertensives  carvediloL tablet 6.25 mg, 2 times daily, Oral  nitroGLYCERIN in D5W 200 mcg/mL vial, As needed (PRN),   verapamiL injection, As needed (PRN),   nitroPRUSSide injection, As needed (PRN),     Plan  - BP is controlled, no changes needed to their regimen        VTE Risk Mitigation (From admission, onward)           Ordered     heparin (porcine) injection  As needed (PRN)         01/14/25 1103     heparin infusion 1,000 units/500 ml in 0.9% NaCl (pressure line flush)  Intra-op continuous PRN         01/14/25 1032                    Discharge Planning   BYARON:      Code Status: Full Code   Medical Readiness for Discharge Date:   Discharge Plan A: Home                        Nadja Brown MD  Department of Hospital Medicine   [facility] ([facility])

## 2025-01-15 NOTE — SUBJECTIVE & OBJECTIVE
Interval History:     Patient feels fine  Just complaining of his chronic skin lesions that is itchy and painful sometimes    Review of Systems   Constitutional: Negative.    HENT: Negative.     Respiratory: Negative.     Cardiovascular: Negative.    Gastrointestinal: Negative.    Genitourinary: Negative.    Musculoskeletal: Negative.    Skin:  Positive for rash.   Neurological: Negative.    Psychiatric/Behavioral: Negative.       Objective:     Vital Signs (Most Recent):  Temp: 97.8 °F (36.6 °C) (01/15/25 1117)  Pulse: 76 (01/15/25 1458)  Resp: 20 (01/15/25 1117)  BP: (!) 106/49 (01/15/25 1120)  SpO2: 96 % (01/15/25 1120) Vital Signs (24h Range):  Temp:  [96.9 °F (36.1 °C)-98.6 °F (37 °C)] 97.8 °F (36.6 °C)  Pulse:  [76-90] 76  Resp:  [13-21] 20  SpO2:  [92 %-100 %] 96 %  BP: ()/(42-77) 106/49     Weight: 71.4 kg (157 lb 6.5 oz)  Body mass index is 23.93 kg/m².    Intake/Output Summary (Last 24 hours) at 1/15/2025 1511  Last data filed at 1/15/2025 0600  Gross per 24 hour   Intake 500 ml   Output 1900 ml   Net -1400 ml         Physical Exam  Constitutional:       Appearance: He is ill-appearing.   HENT:      Head: Normocephalic and atraumatic.   Cardiovascular:      Rate and Rhythm: Normal rate.   Pulmonary:      Effort: Pulmonary effort is normal.      Breath sounds: Normal breath sounds.   Abdominal:      Palpations: Abdomen is soft.   Skin:     General: Skin is warm.   Neurological:      General: No focal deficit present.      Mental Status: He is alert. Mental status is at baseline.   Psychiatric:         Mood and Affect: Mood normal.         Behavior: Behavior normal.             Significant Labs: All pertinent labs within the past 24 hours have been reviewed.  CBC:   Recent Labs   Lab 01/14/25  0644   WBC 9.34   HGB 9.0*   HCT 29.5*        CMP:   Recent Labs   Lab 01/14/25  0644 01/14/25  0931 01/14/25  1541 01/15/25  0331    140 137  137 138   K 5.7* 4.9 6.1*  6.1*  6.2*  6.2* 4.4   CL  101 99 100  100 99   CO2 18* 22* 19*  19* 24   * 188* 202*  203* 91   BUN 63* 66* 68*  68* 30*   CREATININE 12.8* 13.3* 13.1*  13.1* 7.1*   CALCIUM 9.7 9.5 9.5  9.4 9.3   PROT  --  7.3  --   --    ALBUMIN 3.2* 3.2*  --  3.0*   BILITOT  --  0.7  --   --    ALKPHOS  --  55  --   --    AST  --  22  --   --    ALT  --  22  --   --    ANIONGAP 20* 19* 18*  18* 15       Significant Imaging: I have reviewed all pertinent imaging results/findings within the past 24 hours.

## 2025-01-15 NOTE — ASSESSMENT & PLAN NOTE
Anemia is likely due to chronic disease due to ESRD. Most recent hemoglobin and hematocrit are listed below.  Recent Labs     01/13/25  0458 01/14/25  0644   HGB 7.8* 9.0*   HCT 24.5* 29.5*       Plan  - Monitor serial CBC: Daily  - Transfuse PRBC if patient becomes hemodynamically unstable, symptomatic or H/H drops below 7/21.  - Patient has not received any PRBC transfusions to date  - Patient's anemia is currently stable

## 2025-01-15 NOTE — PROGRESS NOTES
01/14/25 2200   Vital Signs   Temp 97.3 °F (36.3 °C)   Temp Source Temporal   Pulse 83   Heart Rate Source Monitor   Resp 18   Flow (L/min) (Oxygen Therapy) 3   Device (Oxygen Therapy) nasal cannula   BP (!) 111/53   BP Location Left leg   BP Method Automatic   Patient Position Lying   Post-Hemodialysis Assessment   Rinseback Volume (mL) 250 mL   Blood Volume Processed (Liters) 84 L   Dialyzer Clearance Clear   Duration of Treatment 210 minutes   Additional Fluid Intake (mL) 500 mL   Total UF (mL) 1900 mL   Net Fluid Removal 1400   Patient Response to Treatment well   Arterial bleeding stop time (min) 5 min   Venous bleeding stop time (min) 5 min   Post-Hemodialysis Comments Blood returned, lines flushed, needles pulled, manual pressure held until hemostasis achieved

## 2025-01-15 NOTE — SUBJECTIVE & OBJECTIVE
Interval History:     Patient wasn't in bed during morning rounds as he went early for cath lab, I checked again in the afternoon and he wasn't back, he had prolonged recovery and I was not able to see him today.    Review of Systems  Objective:     Vital Signs (Most Recent):  Temp: 97.2 °F (36.2 °C) (01/14/25 1805)  Pulse: 82 (01/14/25 1946)  Resp: 18 (01/14/25 1805)  BP: (!) 121/57 (01/14/25 1946)  SpO2: (!) 93 % (01/14/25 1747) Vital Signs (24h Range):  Temp:  [96.4 °F (35.8 °C)-98.2 °F (36.8 °C)] 97.2 °F (36.2 °C)  Pulse:  [77-93] 82  Resp:  [13-21] 18  SpO2:  [92 %-100 %] 93 %  BP: ()/(49-94) 121/57     Weight: 71.4 kg (157 lb 6.5 oz)  Body mass index is 23.93 kg/m².    Intake/Output Summary (Last 24 hours) at 1/14/2025 1957  Last data filed at 1/14/2025 1419  Gross per 24 hour   Intake 1200 ml   Output 1450 ml   Net -250 ml         Physical Exam        Significant Labs: All pertinent labs within the past 24 hours have been reviewed.  CBC:   Recent Labs   Lab 01/13/25  0458 01/14/25  0644   WBC 8.27 9.34   HGB 7.8* 9.0*   HCT 24.5* 29.5*    221     CMP:   Recent Labs   Lab 01/14/25  0644 01/14/25  0931 01/14/25  1541    140 137  137   K 5.7* 4.9 6.1*  6.1*  6.2*  6.2*    99 100  100   CO2 18* 22* 19*  19*   * 188* 202*  203*   BUN 63* 66* 68*  68*   CREATININE 12.8* 13.3* 13.1*  13.1*   CALCIUM 9.7 9.5 9.5  9.4   PROT  --  7.3  --    ALBUMIN 3.2* 3.2*  --    BILITOT  --  0.7  --    ALKPHOS  --  55  --    AST  --  22  --    ALT  --  22  --    ANIONGAP 20* 19* 18*  18*       Significant Imaging: I have reviewed all pertinent imaging results/findings within the past 24 hours.

## 2025-01-15 NOTE — PLAN OF CARE
Problem: Occupational Therapy  Goal: Occupational Therapy Goal  Description: Goals to be met by: 02/15/2025      Patient will increase functional independence with ADLs by performing:    UE Dressing with Modified [place].  LE Dressing with Modified [place].  Grooming while standing with Modified [place].  Toileting from toilet with Modified [place] for hygiene and clothing management.   Toilet transfer to toilet with Modified [place].  Increased functional strength to WFL for self care.  Upper extremity exercise program x10 reps per handout, with independence.     Outcome: Progressing    Pt would benefit from continued OT to address deficits in self care and functional mobility. Recommending low intensity therapy; DME needs TBD pending progress with therapies

## 2025-01-15 NOTE — ASSESSMENT & PLAN NOTE
Patient's blood pressure range in the last 24 hours was: BP  Min: 96/60  Max: 137/62.The patient's inpatient anti-hypertensive regimen is listed below:  Current Antihypertensives  carvediloL tablet 6.25 mg, 2 times daily, Oral    Plan  - BP is controlled, no changes needed to their regimen

## 2025-01-15 NOTE — ASSESSMENT & PLAN NOTE
Patient's blood pressure range in the last 24 hours was: BP  Min: 91/52  Max: 154/66.The patient's inpatient anti-hypertensive regimen is listed below:  Current Antihypertensives  carvediloL tablet 6.25 mg, 2 times daily, Oral  nitroGLYCERIN in D5W 200 mcg/mL vial, As needed (PRN),   verapamiL injection, As needed (PRN),   nitroPRUSSide injection, As needed (PRN),     Plan  - BP is controlled, no changes needed to their regimen

## 2025-01-15 NOTE — ASSESSMENT & PLAN NOTE
Creatine stable for now. BMP reviewed- noted Estimated Creatinine Clearance: 7.2 mL/min (A) (based on SCr of 7.1 mg/dL (H)). according to latest data. 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  Monitor UOP and serial BMP and adjust therapy as needed. Renally dose meds. Avoid nephrotoxic medications and procedures.    Cont HD while inpatient

## 2025-01-15 NOTE — PLAN OF CARE
0835  Scheduled hospfu appt with Dr Garcia ([facility]) on 1/29/2025 at 1340 noted. Information added to the pt's discharge paperwork.        01/15/25 0915   Rounds   Attendance Provider;Nurse ;Pharmacist;Assigned nurse   Discharge Plan A Home Health   Why the patient remains in the hospital Requires continued medical care       0915  Patient resting quietly in bed when CM participated in SIBR with Dr Brown, pharmacist Wilian, & nurse Salome. No family present. Pt was admitted with an elevated troponin (trop 5.132), is being followed by [facility], neph, wound care, and PT/OT, & is s/p LHC done yesterday.     CM informed the pt that he is not medically stable to discharge home today. Pt verbalized understanding. Awaiting PT/OT recs.       [facility] - Telemetry  Initial Discharge Assessment       Primary Care Provider: Melia, Primary Doctor    Admission Diagnosis: Elevated troponin [R79.89]    Admission Date: 1/13/2025  Expected Discharge Date: 1/16/2025    Consult: card, adriana, wound, & PT/OT    Payor: [employer] MEDICARE / Plan: [employer] HMO PPO SPECIAL NEEDS / Product Type: Medicare Advantage /     Extended Emergency Contact Information  Primary Emergency Contact: Deonna Smyth  Mobile Phone: [phone]  Relation: Relative  Secondary Emergency Contact: Bhavna Smyth [place] [facility]  Home Phone: [phone]  Relation: Sister    Discharge Plan A: (P) Home Health  Discharge Plan B: (P) Skilled Nursing Facility      [facility] [place] - [address]  [address]  [place] [address]  Phone: [phone] Fax: [phone]      Initial Assessment (most recent)       Adult Discharge Assessment - 01/15/25 1115          Discharge Assessment    Assessment Type Discharge Planning Assessment (P)      Confirmed/corrected address, phone number and insurance Yes (P)      Confirmed Demographics Correct on Facesheet (P)      Source of Information patient (P)       Communicated BAYRON with patient/caregiver Date not available/Unable to determine (P)      People in Home alone (P)      Do you expect to return to your current living situation? Yes (P)      Do you have help at home or someone to help you manage your care at home? No (P)      Prior to hospitilization cognitive status: Alert/Oriented (P)      Current cognitive status: Alert/Oriented (P)      Equipment Currently Used at Home glucometer (P)    pt stated he lost his cane    Readmission within 30 days? No (P)      Patient currently being followed by outpatient case management? No (P)      Do you currently have service(s) that help you manage your care at home? No (P)      Do you take prescription medications? Yes (P)      Do you have prescription coverage? Yes (P)      Do you have any problems affording any of your prescribed medications? No (P)      Is the patient taking medications as prescribed? yes (P)      How do you get to doctors appointments? public transportation (P)      Are you on dialysis? Yes- [facility] (TTS) LUE AVF      Do you take coumadin? No (P)      Discharge Plan A Home Health (P)      Discharge Plan B Skilled Nursing Facility (P)      DME Needed Upon Discharge  other (see comments) (P)    tbd    Discharge Plan discussed with: Patient (P)         Physical Activity    On average, how many days per week do you engage in moderate to strenuous exercise (like a brisk walk)? 5 days (P)      On average, how many minutes do you engage in exercise at this level? 20 min (P)         Financial Resource Strain    How hard is it for you to pay for the very basics like food, housing, medical care, and heating? Not very hard (P)         Housing Stability    In the last 12 months, was there a time when you were not able to pay the mortgage or rent on time? No (P)      At any time in the past 12 months, were you homeless or living in a shelter (including now)? No (P)         Transportation Needs    Has the lack of  transportation kept you from medical appointments, meetings, work or from getting things needed for daily living? Yes, it has kept me from medical appointments or from getting my medications. (P)         Food Insecurity    Within the past 12 months, you worried that your food would run out before you got the money to buy more. Never true (P)      Within the past 12 months, the food you bought just didn't last and you didn't have money to get more. Never true (P)         Stress    Do you feel stress - tense, restless, nervous, or anxious, or unable to sleep at night because your mind is troubled all the time - these days? Not at all (P)         Social Isolation    How often do you feel lonely or isolated from those around you?  Never (P)         Alcohol Use    Q1: How often do you have a drink containing alcohol? Never (P)      Q2: How many drinks containing alcohol do you have on a typical day when you are drinking? Patient does not drink (P)      Q3: How often do you have six or more drinks on one occasion? Never (P)         [employer]    In the past 12 months has the electric, gas, oil, or water company threatened to shut off services in your home? No (P)         Health Literacy    How often do you need to have someone help you when you read instructions, pamphlets, or other written material from your doctor or pharmacy? Often (P)    pt needs glasses                  1115  Patient resting quietly in bed when CM rounded. No family present.    Patient lives alone, stated that he lost his cane but is independent of all ADLs, & might need assistance with transportation at time of discharge. Pt stated that his niece, Megan Smyth ([phone]), provides support but that she does not drive.     CM informed the pt of the scheduled hospital follow up appointment with Dr Garcia (cards). Pt verbalized understanding.    CM updated patient's whiteboard with CM name & contact information.     1300  CM informed the  pt's niece, Deonna Smyth ([phone]) & his sister, Bhavna Smyth (h [phone]), via phone of the pt's status. Both verbalized understanding & appreciation. Bhavna requested that the pt receive HH services following discharge but did not have a HH preference.       Will continue to follow.

## 2025-01-15 NOTE — ANESTHESIA POSTPROCEDURE EVALUATION
Anesthesia Post Evaluation    Patient: Jevon Rajan    Procedure(s) Performed: Procedure(s) (LRB):  Percutaneous coronary intervention (N/A)  Atherectomy-coronary (N/A)  ANGIOPLASTY, CORONARY ARTERY, WITH STENT INSERTION (N/A)  Left heart cath (Left)    Final Anesthesia Type: general      Patient location during evaluation: PACU  Patient participation: Yes- Able to Participate  Level of consciousness: awake and alert  Post-procedure vital signs: reviewed and stable  Pain management: adequate  Airway patency: patent    PONV status at discharge: No PONV  Anesthetic complications: no      Cardiovascular status: stable  Respiratory status: spontaneous ventilation  Hydration status: euvolemic  Follow-up not needed.              Vitals Value Taken Time   /49 01/15/25 1120   Temp 36.6 °C (97.8 °F) 01/15/25 1117   Pulse 79 01/15/25 1120   Resp 20 01/15/25 1117   SpO2 96 % 01/15/25 1120         No case tracking events are documented in the log.      Pain/Payton Score: Payton Score: 9 (1/14/2025  5:30 PM)

## 2025-01-15 NOTE — PLAN OF CARE
Problem: Physical Therapy  Goal: Physical Therapy Goal  Description: Goals to be met by: 25     Patient will increase functional independence with mobility by performin. Pt to be mod I with bed mobility.  2. Pt to transfer with (S).  3. Pt to ambulate 150' w/sc and SBA.  4. Pt to be (I) with HEP.    Outcome: Progressing   Initial eval completed, see in chart for details.

## 2025-01-15 NOTE — PROGRESS NOTES
Nephrology Progress  Note       Consult Requested By: Nadja Brown MD  Reason for Consult: ESRD      SUBJECTIVE:   ?    Review of Systems   Constitutional:  Negative for chills, fever and weight loss.   HENT:  Negative for congestion and tinnitus.    Eyes:  Negative for blurred vision, double vision and photophobia.   Respiratory:  Positive for shortness of breath. Negative for cough.    Cardiovascular:  Negative for chest pain, palpitations, orthopnea and leg swelling.   Gastrointestinal:  Negative for nausea and vomiting.   Genitourinary:  Negative for frequency and urgency.   Musculoskeletal:  Negative for back pain, falls and joint pain.   Skin:  Positive for itching and rash.   Neurological:  Negative for dizziness, tingling, tremors, sensory change, speech change, focal weakness, seizures, loss of consciousness, weakness and headaches.   Endo/Heme/Allergies:  Does not bruise/bleed easily.   Psychiatric/Behavioral:  Negative for depression. The patient does not have insomnia.        Past Medical History:   Diagnosis Date    BPH (benign prostatic hyperplasia)     Coronary artery disease     [facility]. In 2014 he had stent to LAD. He states he has had 2 heart attacks. He does not get angina. There was 90% left anterior descending artery stenosis undergoing stenting. There was also a circumflex marginal branch stenosis of 70%.    Diabetes mellitus, type 2     ESRD (end stage renal disease) on dialysis     ESRD on HD TTS via left brachial AVF    Hypertension     Hypothyroidism, unspecified     Symptomatic anemia 01/15/2024          OBJECTIVE:     Vital Signs (Most Recent)  Vitals:    01/15/25 0745 01/15/25 1117 01/15/25 1118 01/15/25 1120   BP: (!) 134/58 (!) 111/43  (!) 106/49   BP Location: Right leg Right arm     Patient Position: Lying Lying     Pulse: 77 80 80 79   Resp: 18 20     Temp: 98.5 °F (36.9 °C) 97.8 °F (36.6 °C)      TempSrc: Oral Oral     SpO2: 99% 96%  96%   Weight:       Height:                   Medications:   aspirin  81 mg Oral Daily    atorvastatin  80 mg Oral QHS    camphor-menthol 0.5-0.5%   Topical (Top) BID    carvediloL  6.25 mg Oral BID    clopidogreL  75 mg Oral Daily    diphenhydrAMINE-zinc acetate 2-0.1%   Topical (Top) TID    epoetin mj-epbx  10,000 Units Subcutaneous Every Tues, Thurs, Sat    mupirocin   Nasal BID           Physical Exam  Vitals and nursing note reviewed.   Constitutional:       General: He is not in acute distress.     Appearance: He is not diaphoretic.   HENT:      Head: Normocephalic and atraumatic.      Mouth/Throat:      Pharynx: No oropharyngeal exudate.   Eyes:      General: No scleral icterus.     Conjunctiva/sclera: Conjunctivae normal.      Pupils: Pupils are equal, round, and reactive to light.   Cardiovascular:      Rate and Rhythm: Normal rate and regular rhythm.      Heart sounds: Normal heart sounds. No murmur heard.  Pulmonary:      Effort: No respiratory distress.      Breath sounds: Rales present.   Abdominal:      General: Bowel sounds are normal. There is no distension.      Palpations: Abdomen is soft.      Tenderness: There is no abdominal tenderness.   Musculoskeletal:         General: Normal range of motion.      Cervical back: Normal range of motion and neck supple.      Comments: LUE AVF    Skin:     General: Skin is warm and dry.      Findings: Rash present. No erythema.   Neurological:      Mental Status: He is alert and oriented to person, place, and time.      Cranial Nerves: No cranial nerve deficit.   Psychiatric:         Mood and Affect: Affect normal.         Cognition and Memory: Memory normal.         Judgment: Judgment normal.         Laboratory:  Recent Labs   Lab 01/12/25  0550 01/13/25  0458 01/14/25  0644   WBC 6.16 8.27 9.34   HGB 8.3* 7.8* 9.0*   HCT 25.7* 24.5* 29.5*    189 221   MONO 2.0*  CANCELED 6.7  0.6 7.2  0.7   EOSINOPHIL 0.0 6.5  "1.8     Recent Labs   Lab 01/14/25  0644 01/14/25  0931 01/14/25  1541 01/15/25  0331    140 137  137 138   K 5.7* 4.9 6.1*  6.1*  6.2*  6.2* 4.4    99 100  100 99   CO2 18* 22* 19*  19* 24   BUN 63* 66* 68*  68* 30*   CREATININE 12.8* 13.3* 13.1*  13.1* 7.1*   CALCIUM 9.7 9.5 9.5  9.4 9.3   PHOS 7.1*  --   --  5.7*       Diagnostic Results:  X-Ray: Reviewed  US: Reviewed  Echo: Reviewed  ASSESSMENT/PLAN:     ESRD on HD TTS  - Dr Carla Ashley   -- S/p HD on 1/13 and 1/14. Resume TTS schedule   -- Daily Renal Function Panel  -- Avoid Hypotension.  -- Renally dose all meds     Elevated Troponin   -- S/p PCI of LAD and RCA  -- denies chest pain now  -- SOB improved after HD   -- continue HD as scheduled for volume removal and electrolyte optimization      HTN - controlled     Anemia of chronic kidney disease treated with NAKIA    EPogen with each HD    Recent Labs   Lab 01/12/25  0550 01/13/25  0458 01/14/25  0644   HGB 8.3* 7.8* 9.0*   HCT 25.7* 24.5* 29.5*    189 221       Iron   Lab Results   Component Value Date    IRON 39 (L) 08/11/2024    TIBC 206 (L) 08/11/2024    FERRITIN 979 (H) 08/11/2024       4. MBD    Recent Labs   Lab 01/15/25  0331   CALCIUM 9.3   PHOS 5.7*   -- Binders TIDWM     No results for input(s): "MG" in the last 168 hours.      Lab Results   Component Value Date    .7 (H) 01/17/2024    CALCIUM 9.3 01/15/2025    PHOS 5.7 (H) 01/15/2025     No results found for: "[id number]"    Lab Results   Component Value Date    CO2 24 01/15/2025       5. Nutrition/Hypoalbuminemia   Recent Labs   Lab 01/12/25  1316 01/14/25  0644 01/14/25  0931 01/15/25  0331   LABPROT 11.4  --   --   --    ALBUMIN  --    < > 3.2* 3.0*    < > = values in this interval not displayed.     Novasource with meals TID      Thank you for the consult, will follow  With any question please call  Rochelle Peña NP    [facility]     MD FAWAD Love MD  V. V. " MD NADEEM Oates NP M. Mayeur, NP    [address]  [place], [address]  [phone]   After hours [phone]

## 2025-01-15 NOTE — ASSESSMENT & PLAN NOTE
Anemia is likely due to chronic disease due to ESRD. Most recent hemoglobin and hematocrit are listed below.  Recent Labs     01/12/25  0550 01/13/25  0458 01/14/25  0644   HGB 8.3* 7.8* 9.0*   HCT 25.7* 24.5* 29.5*     Plan  - Monitor serial CBC: Daily  - Transfuse PRBC if patient becomes hemodynamically unstable, symptomatic or H/H drops below 7/21.  - Patient has not received any PRBC transfusions to date  - Patient's anemia is currently stable

## 2025-01-15 NOTE — PT/OT/SLP EVAL
Occupational Therapy   Evaluation    Name: Jevon Rajan  MRN: [id number]  Admitting Diagnosis: <principal problem not specified>  Recent Surgery: Procedure(s) (LRB):  Percutaneous coronary intervention (N/A)  Atherectomy-coronary (N/A)  ANGIOPLASTY, CORONARY ARTERY, WITH STENT INSERTION (N/A)  Left heart cath (Left) 1 Day Post-Op    Recommendations:     Discharge Recommendations: Low Intensity Therapy  Discharge Equipment Recommendations:   (TBD pending progress with therapies)  Barriers to discharge:  Decreased caregiver support    Assessment:     Jevon Rajan is a 84 y.o. male with a medical diagnosis of <principal problem not specified>.  He presents with deconditioning and slight posterior lean in stance, attempted to urinate but unable to despite urge. Performance deficits affecting function: weakness, impaired endurance, impaired self care skills, impaired functional mobility, gait instability, impaired balance, decreased lower extremity function, impaired skin, impaired cardiopulmonary response to activity.      Rehab Prognosis: Good; patient would benefit from acute skilled OT services to address these deficits and reach maximum level of function.       Plan:     Patient to be seen 3 x/week to address the above listed problems via self-care/home management, therapeutic activities, therapeutic exercises  Plan of Care Expires: 02/15/25  Plan of Care Reviewed with: patient    Subjective     Chief Complaint: Pt reports no complaints at this time  Patient/Family Comments/goals: To return to PLOF    Occupational Profile:  Living Environment: Pt lives alone in [facility], [address] [place] [address] with SC  Previous level of function: Indep to Mod I ADLs and functional mobility. Pt reports he has cane he takes with him but only uses to lean on occasionally; recently lost cane  Roles and Routines: Caretaker to self and home. Sisters assist to cook and bring food, pt able to perform light meal prep to warm food. Pt reports he no longer  "drives, niece assists with grocery shopping. Pt states he enjoys fishing and able to fish from boat.   Equipment Used at Home: shower chair (lost cane)  Assistance upon Discharge: intermittent assistance from family    Pain/Comfort:  Pain Rating 1: 0/10  Location - Orientation 1: lower  Location 1: back  Pain Addressed 1: Reposition, Distraction, Cessation of Activity  Pain Rating Post-Intervention 1:  ("a little")    Patients cultural, spiritual, Gnosticist conflicts given the current situation:      Objective:     Communicated with: nsg prior to session.  Patient found up in chair with peripheral IV, telemetry, oxygen upon OT entry to room.    General Precautions: Standard, fall  Orthopedic Precautions: N/A  Braces: N/A  Respiratory Status: Nasal cannula, flow 2 L/min    Occupational Performance:      Functional Mobility/Transfers:  Patient completed Sit <> Stand Transfer with contact guard assistance  with  no assistive device   Patient completed Toilet Transfer Step Transfer technique with contact guard assistance with  no AD  Functional Mobility: Pt with fair to fair- dynamic seated and standing balance.     Activities of Daily Living:  Lower Body Dressing: moderate assistance and maximal assistance to don/doff B socks seated in bedside chair  Toileting: contact guard assistance for attempt at urination in stance but unable to despite urge     Cognitive/Visual Perceptual:  Cognitive/Psychosocial Skills:     -       Oriented to: Person, Place, Time and Situation   -       Follows Commands/attention:Follows multistep  commands  -       Communication: clear/fluent  -       Memory: No Deficits noted  -       Safety awareness/insight to disability: fairly intact   -       Mood/Affect/Coping skills/emotional control: Appropriate to situation    Physical Exam:  Postural examination/scapula alignment:    -       Rounded shoulders  Skin integrity: Thin, dry; toe nails overgrown and reports he is to have appt with " podiatry to address toe nails  Sensation:    -       Intact  Motor Planning:    -       WFL  Dominant hand:    -       R handed  Upper Extremity Range of Motion: BUE WFL     Upper Extremity Strength:  BUE WFL   Strength:  B hands WFL  Fine Motor Coordination:    -       Intact  Gross motor coordination:   WFL     AMPAC 6 Click ADL:  AMPAC Total Score: 21    Treatment & Education:  Pt educated on role of OT and POC.   Pt performing skills as listed above.     Patient left up in chair with all lines intact, call button in reach, and nsg notified    GOALS:   Multidisciplinary Problems       Occupational Therapy Goals          Problem: Occupational Therapy    Goal Priority Disciplines Outcome Interventions   Occupational Therapy Goal     OT, PT/OT Progressing    Description: Goals to be met by: 02/15/2025      Patient will increase functional independence with ADLs by performing:    UE Dressing with Modified [place].  LE Dressing with Modified [place].  Grooming while standing with Modified [place].  Toileting from toilet with Modified [place] for hygiene and clothing management.   Toilet transfer to toilet with Modified [place].  Increased functional strength to WFL for self care.  Upper extremity exercise program x10 reps per handout, with independence.                          History:     Past Medical History:   Diagnosis Date    BPH (benign prostatic hyperplasia)     Coronary artery disease     [facility]. In 2014 he had stent to LAD. He states he has had 2 heart attacks. He does not get angina. There was 90% left anterior descending artery stenosis undergoing stenting. There was also a circumflex marginal branch stenosis of 70%.    Diabetes mellitus, type 2     ESRD (end stage renal disease) on dialysis     ESRD on HD TTS via left brachial AVF    Hypertension     Hypothyroidism, unspecified     Symptomatic anemia  01/15/2024         Past Surgical History:   Procedure Laterality Date    ANGIOGRAM, CORONARY, WITH LEFT HEART CATHETERIZATION  1/13/2025    Procedure: Angiogram, Coronary, with Left Heart Cath;  Surgeon: Steve Bhat MD;  Location: [facility] CATH LAB/EP;  Service: Cardiology;;    ATHERECTOMY, CORONARY N/A 1/14/2025    Procedure: Atherectomy-coronary;  Surgeon: Giacomo Pittman MD;  Location: [facility] CATH LAB/EP;  Service: Cardiology;  Laterality: N/A;    bilateral foot surgery      CORONARY ANGIOPLASTY WITH STENT PLACEMENT N/A 1/14/2025    Procedure: ANGIOPLASTY, CORONARY ARTERY, WITH STENT INSERTION;  Surgeon: Giacomo Pittman MD;  Location: [facility] CATH LAB/EP;  Service: Cardiology;  Laterality: N/A;    CORONARY STENT PLACEMENT N/A 1/13/2025    Procedure: INSERTION, STENT, CORONARY ARTERY;  Surgeon: Steve Bhat MD;  Location: [facility] CATH LAB/EP;  Service: Cardiology;  Laterality: N/A;    ESOPHAGOGASTRODUODENOSCOPY N/A 2/27/2024    Procedure: EGD (ESOPHAGOGASTRODUODENOSCOPY);  Surgeon: Gemma Almendarez MD;  Location: [facility] ENDO;  Service: Endoscopy;  Laterality: N/A;    eyelid surgery      FISTULOGRAM Left 11/27/2019    Procedure: Fistulogram;  Surgeon: MELANY Brenner III, MD;  Location: [address];  Service: Peripheral Vascular;  Laterality: Left;    FISTULOGRAM Left 11/27/2019    Procedure: Fistulogram, AVG declot, possible permacath;  Surgeon: MELANY Brenner III, MD;  Location: [facility] CATH LAB;  Service: Peripheral Vascular;  Laterality: Left;    INSERTION OF DIALYSIS CATHETER      IVUS, CORONARY  1/13/2025    Procedure: IVUS, Coronary;  Surgeon: Steve Bhat MD;  Location: [facility] CATH LAB/EP;  Service: Cardiology;;    LEFT HEART CATHETERIZATION Left 1/14/2025    Procedure: Left heart cath;  Surgeon: Giacomo Pittman MD;  Location: [facility] CATH LAB/EP;  Service: Cardiology;  Laterality: Left;    MOH's  12/5/13    PERCUTANEOUS CORONARY INTERVENTION, ARTERY N/A 1/14/2025    Procedure: Percutaneous  coronary intervention;  Surgeon: Giacomo Pittman MD;  Location: [facility] CATH LAB/EP;  Service: Cardiology;  Laterality: N/A;    PERCUTANEOUS TRANSLUMINAL BALLOON ANGIOPLASTY OF CORONARY ARTERY  1/13/2025    Procedure: Angioplasty-coronary;  Surgeon: Steve Bhat MD;  Location: [facility] CATH LAB/EP;  Service: Cardiology;;    REVASCULARIZATION, ENDOVASCULAR, LE W/ INTRAVASCULAR LITHOTRIPSY  1/13/2025    Procedure: REVASCULARIZATION, ENDOVASCULAR, LE W/ INTRAVASCULAR LITHOTRIPSY;  Surgeon: Steve Bhat MD;  Location: [facility] CATH LAB/EP;  Service: Cardiology;;    right hand surgery      stents         Time Tracking:     OT Date of Treatment: 01/15/25  OT Start Time: 1416  OT Stop Time: 1436  OT Total Time (min): 20 min    Billable Minutes:Evaluation 10  Self Care/Home Management 10    1/15/2025

## 2025-01-15 NOTE — PROGRESS NOTES
[facility] Medicine  Progress Note    Patient Name: Jevon Rajan  MRN: [id number]  Patient Class: IP- Inpatient   Admission Date: 1/13/2025  Length of Stay: 2 days  Attending Physician: Nadja Brown MD  Primary Care Provider: No, Primary Doctor        Subjective     Principal Problem:<principal problem not specified>        HPI:  No notes on file    Overview/Hospital Course:  No notes on file    Interval History:     Patient feels fine  Just complaining of his chronic skin lesions that is itchy and painful sometimes    Review of Systems   Constitutional: Negative.    HENT: Negative.     Respiratory: Negative.     Cardiovascular: Negative.    Gastrointestinal: Negative.    Genitourinary: Negative.    Musculoskeletal: Negative.    Skin:  Positive for rash.   Neurological: Negative.    Psychiatric/Behavioral: Negative.       Objective:     Vital Signs (Most Recent):  Temp: 97.8 °F (36.6 °C) (01/15/25 1117)  Pulse: 76 (01/15/25 1458)  Resp: 20 (01/15/25 1117)  BP: (!) 106/49 (01/15/25 1120)  SpO2: 96 % (01/15/25 1120) Vital Signs (24h Range):  Temp:  [96.9 °F (36.1 °C)-98.6 °F (37 °C)] 97.8 °F (36.6 °C)  Pulse:  [76-90] 76  Resp:  [13-21] 20  SpO2:  [92 %-100 %] 96 %  BP: ()/(42-77) 106/49     Weight: 71.4 kg (157 lb 6.5 oz)  Body mass index is 23.93 kg/m².    Intake/Output Summary (Last 24 hours) at 1/15/2025 1511  Last data filed at 1/15/2025 0600  Gross per 24 hour   Intake 500 ml   Output 1900 ml   Net -1400 ml         Physical Exam  Constitutional:       Appearance: He is ill-appearing.   HENT:      Head: Normocephalic and atraumatic.   Cardiovascular:      Rate and Rhythm: Normal rate.   Pulmonary:      Effort: Pulmonary effort is normal.      Breath sounds: Normal breath sounds.   Abdominal:      Palpations: Abdomen is soft.   Skin:     General: Skin is warm.   Neurological:      General: No focal deficit present.      Mental Status: He is alert. Mental status is at baseline.    Psychiatric:         Mood and Affect: Mood normal.         Behavior: Behavior normal.             Significant Labs: All pertinent labs within the past 24 hours have been reviewed.  CBC:   Recent Labs   Lab 01/14/25  0644   WBC 9.34   HGB 9.0*   HCT 29.5*        CMP:   Recent Labs   Lab 01/14/25  0644 01/14/25  0931 01/14/25  1541 01/15/25  0331    140 137  137 138   K 5.7* 4.9 6.1*  6.1*  6.2*  6.2* 4.4    99 100  100 99   CO2 18* 22* 19*  19* 24   * 188* 202*  203* 91   BUN 63* 66* 68*  68* 30*   CREATININE 12.8* 13.3* 13.1*  13.1* 7.1*   CALCIUM 9.7 9.5 9.5  9.4 9.3   PROT  --  7.3  --   --    ALBUMIN 3.2* 3.2*  --  3.0*   BILITOT  --  0.7  --   --    ALKPHOS  --  55  --   --    AST  --  22  --   --    ALT  --  22  --   --    ANIONGAP 20* 19* 18*  18* 15       Significant Imaging: I have reviewed all pertinent imaging results/findings within the past 24 hours.    Assessment and Plan     Senile debility  Patient with Acute on chronic debility due to age-related physical debility. The patient's latest AMPAC (Activity Measure for Post Acute Care) Score is listed below.    AM-PAC Score - How much help does the patient need for each activity listed  Basic Mobility Total Score: 20  Turning over in bed (including adjusting bedclothes, sheets and blankets)?: None  Sitting down on and standing up from a chair with arms (e.g., wheelchair, bedside commode, etc.): A little  Moving from lying on back to sitting on the side of the bed?: None  Moving to and from a bed to a chair (including a wheelchair)?: A little  Need to walk in hospital room?: A little  Climbing 3-5 steps with a railing?: A little    Plan  - PT/OT consulted            Unstable angina  S/p C with stents placement 01/14    Cont ASA and plavix    Symptomatic anemia  Anemia is likely due to chronic disease due to ESRD. Most recent hemoglobin and hematocrit are listed below.  Recent Labs     01/13/25  0458 01/14/25  0633    HGB 7.8* 9.0*   HCT 24.5* 29.5*       Plan  - Monitor serial CBC: Daily  - Transfuse PRBC if patient becomes hemodynamically unstable, symptomatic or H/H drops below 7/21.  - Patient has not received any PRBC transfusions to date  - Patient's anemia is currently stable    ESRD on hemodialysis  Creatine stable for now. BMP reviewed- noted Estimated Creatinine Clearance: 7.2 mL/min (A) (based on SCr of 7.1 mg/dL (H)). according to latest data. 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  Monitor UOP and serial BMP and adjust therapy as needed. Renally dose meds. Avoid nephrotoxic medications and procedures.    Cont HD while inpatient    HTN (hypertension)  Patient's blood pressure range in the last 24 hours was: BP  Min: 96/60  Max: 137/62.The patient's inpatient anti-hypertensive regimen is listed below:  Current Antihypertensives  carvediloL tablet 6.25 mg, 2 times daily, Oral    Plan  - BP is controlled, no changes needed to their regimen        VTE Risk Mitigation (From admission, onward)           Ordered     heparin infusion 1,000 units/500 ml in 0.9% NaCl (pressure line flush)  Intra-op continuous PRN         01/14/25 1032                    Discharge Planning   BAYRON: 1/16/2025     Code Status: Full Code   Medical Readiness for Discharge Date:   Discharge Plan A: Home Health                Please place Justification for DME        Nadja Brown MD  Department of Hospital Medicine   [facility]

## 2025-01-15 NOTE — ASSESSMENT & PLAN NOTE
Patient with Acute on chronic debility due to age-related physical debility. The patient's latest AMPAC (Activity Measure for Post Acute Care) Score is listed below.    AM-PAC Score - How much help does the patient need for each activity listed  Basic Mobility Total Score: 20  Turning over in bed (including adjusting bedclothes, sheets and blankets)?: None  Sitting down on and standing up from a chair with arms (e.g., wheelchair, bedside commode, etc.): A little  Moving from lying on back to sitting on the side of the bed?: None  Moving to and from a bed to a chair (including a wheelchair)?: A little  Need to walk in hospital room?: A little  Climbing 3-5 steps with a railing?: A little    Plan  - PT/OT consulted

## 2025-01-16 VITALS
TEMPERATURE: 98 F | HEIGHT: 68 IN | OXYGEN SATURATION: 95 % | BODY MASS INDEX: 23.86 KG/M2 | WEIGHT: 157.44 LBS | DIASTOLIC BLOOD PRESSURE: 52 MMHG | HEART RATE: 72 BPM | SYSTOLIC BLOOD PRESSURE: 116 MMHG | RESPIRATION RATE: 16 BRPM

## 2025-01-16 LAB
ALBUMIN SERPL BCP-MCNC: 2.8 G/DL (ref 3.5–5.2)
ANION GAP SERPL CALC-SCNC: 15 MMOL/L (ref 8–16)
BUN SERPL-MCNC: 44 MG/DL (ref 8–23)
CALCIUM SERPL-MCNC: 8.9 MG/DL (ref 8.7–10.5)
CHLORIDE SERPL-SCNC: 100 MMOL/L (ref 95–110)
CO2 SERPL-SCNC: 25 MMOL/L (ref 23–29)
CREAT SERPL-MCNC: 10 MG/DL (ref 0.5–1.4)
EST. GFR  (NO RACE VARIABLE): 5 ML/MIN/1.73 M^2
GLUCOSE SERPL-MCNC: 141 MG/DL (ref 70–110)
OHS QRS DURATION: 106 MS
OHS QRS DURATION: 84 MS
OHS QTC CALCULATION: 463 MS
OHS QTC CALCULATION: 464 MS
PHOSPHATE SERPL-MCNC: 5.3 MG/DL (ref 2.7–4.5)
POCT GLUCOSE: 181 MG/DL (ref 70–110)
POTASSIUM SERPL-SCNC: 4 MMOL/L (ref 3.5–5.1)
SODIUM SERPL-SCNC: 140 MMOL/L (ref 136–145)

## 2025-01-16 PROCEDURE — 36415 COLL VENOUS BLD VENIPUNCTURE: CPT | Performed by: STUDENT IN AN ORGANIZED HEALTH CARE EDUCATION/TRAINING PROGRAM

## 2025-01-16 PROCEDURE — 99900035 HC TECH TIME PER 15 MIN (STAT)

## 2025-01-16 PROCEDURE — 27000221 HC OXYGEN, UP TO 24 HOURS

## 2025-01-16 PROCEDURE — 25000003 PHARM REV CODE 250: Performed by: NURSE PRACTITIONER

## 2025-01-16 PROCEDURE — 94761 N-INVAS EAR/PLS OXIMETRY MLT: CPT

## 2025-01-16 PROCEDURE — 97110 THERAPEUTIC EXERCISES: CPT | Mod: CQ

## 2025-01-16 PROCEDURE — 80069 RENAL FUNCTION PANEL: CPT | Performed by: STUDENT IN AN ORGANIZED HEALTH CARE EDUCATION/TRAINING PROGRAM

## 2025-01-16 PROCEDURE — 25000003 PHARM REV CODE 250: Performed by: INTERNAL MEDICINE

## 2025-01-16 PROCEDURE — 80100016 HC MAINTENANCE HEMODIALYSIS

## 2025-01-16 PROCEDURE — 63600175 PHARM REV CODE 636 W HCPCS: Mod: JZ,TB | Performed by: STUDENT IN AN ORGANIZED HEALTH CARE EDUCATION/TRAINING PROGRAM

## 2025-01-16 PROCEDURE — 97116 GAIT TRAINING THERAPY: CPT | Mod: CQ

## 2025-01-16 RX ORDER — CARVEDILOL 12.5 MG/1
6.25 TABLET ORAL 2 TIMES DAILY
Qty: 30 TABLET | Refills: 0 | Status: SHIPPED | OUTPATIENT
Start: 2025-01-16 | End: 2025-02-15

## 2025-01-16 RX ORDER — ASPIRIN 81 MG/1
81 TABLET ORAL DAILY
Qty: 30 TABLET | Refills: 0 | Status: SHIPPED | OUTPATIENT
Start: 2025-01-16 | End: 2025-02-15

## 2025-01-16 RX ADMIN — MUPIROCIN: 20 OINTMENT TOPICAL at 08:01

## 2025-01-16 RX ADMIN — CAMPHOR, MENTHOL: .5; .5 LOTION TOPICAL at 08:01

## 2025-01-16 RX ADMIN — CARVEDILOL 6.25 MG: 6.25 TABLET, FILM COATED ORAL at 08:01

## 2025-01-16 RX ADMIN — EPOETIN ALFA-EPBX 10000 UNITS: 10000 INJECTION, SOLUTION INTRAVENOUS; SUBCUTANEOUS at 08:01

## 2025-01-16 RX ADMIN — DIPHENHYDRAMINE HYDROCHLORIDE, ZINC ACETATE: 2; .1 CREAM TOPICAL at 08:01

## 2025-01-16 RX ADMIN — ASPIRIN 81 MG CHEWABLE TABLET 81 MG: 81 TABLET CHEWABLE at 08:01

## 2025-01-16 RX ADMIN — CLOPIDOGREL BISULFATE 75 MG: 75 TABLET ORAL at 08:01

## 2025-01-16 RX ADMIN — DIPHENHYDRAMINE HYDROCHLORIDE, ZINC ACETATE: 2; .1 CREAM TOPICAL at 02:01

## 2025-01-16 NOTE — PROCEDURES
Patient seen and examined on dialysis. Tolerating HD well  Vitals:    01/16/25 0743 01/16/25 0744 01/16/25 0945 01/16/25 1035   BP:  135/60     BP Location:  Left arm     Patient Position:  Sitting     Pulse:  76  65   Resp:  18     Temp:  97.7 °F (36.5 °C)     TempSrc:  Oral     SpO2: 96% 96% 96%    Weight:       Height:           With any question please call  [phone]  ABDIAS Oates MD    [facility]     RACHNA Red MD,   MD ABDIAS Milner MD E. V. Harmon, NP I.Goldvarg-Abud, NP    [address]  [place], [address]

## 2025-01-16 NOTE — PLAN OF CARE
[facility] delivered cane to bedside. Patient signed delivery ticket and was given education packet as well.

## 2025-01-16 NOTE — PLAN OF CARE
Problem: Adult Inpatient Plan of Care  Goal: Plan of Care Review  Outcome: Progressing  Flowsheets (Taken 1/15/2025 2242)  Plan of Care Reviewed With: patient     Problem: Diabetes Comorbidity  Goal: Blood Glucose Level Within Targeted Range  Outcome: Progressing  Intervention: Monitor and Manage Glycemia  Flowsheets (Taken 1/15/2025 2242)  Glycemic Management: blood glucose monitored     Problem: Hemodialysis  Goal: Safe, Effective Therapy Delivery  Outcome: Progressing  Intervention: Optimize Device Care and Function  Flowsheets (Taken 1/15/2025 2242)  Medication Review/Management:   medications reviewed   high-risk medications identified     Problem: Skin Injury Risk Increased  Goal: Skin Health and Integrity  Outcome: Progressing  Intervention: Optimize Skin Protection  Flowsheets (Taken 1/15/2025 2242)  Pressure Reduction Techniques: frequent weight shift encouraged  Skin Protection:   transparent dressing maintained   incontinence pads utilized   skin sealant/moisture barrier applied  Activity Management:   Rolling - L1   Arm raise - L1   Leg kicks - L2  Head of Bed (HOB) Positioning: HOB elevated     Problem: Wound  Goal: Optimal Coping  Outcome: Progressing  Intervention: Support Patient and Family Response  Flowsheets (Taken 1/15/2025 2242)  Supportive Measures:   active listening utilized   relaxation techniques promoted   verbalization of feelings encouraged   self-care encouraged   self-reflection promoted   positive reinforcement provided   self-responsibility promoted   problem-solving facilitated     Problem: Fall Injury Risk  Goal: Absence of Fall and Fall-Related Injury  Outcome: Progressing  Intervention: Identify and Manage Contributors  Flowsheets (Taken 1/15/2025 2242)  Self-Care Promotion:   independence encouraged   BADL personal objects within reach   BADL personal routines maintained  Medication Review/Management:   medications reviewed   high-risk medications identified   Plan of care  progressing.

## 2025-01-16 NOTE — ASSESSMENT & PLAN NOTE
Creatine stable for now. BMP reviewed- noted Estimated Creatinine Clearance: 5.1 mL/min (A) (based on SCr of 10 mg/dL (H)). according to latest data. 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  Monitor UOP and serial BMP and adjust therapy as needed. Renally dose meds. Avoid nephrotoxic medications and procedures.    Last HD was today

## 2025-01-16 NOTE — PT/OT/SLP PROGRESS
Occupational Therapy      Patient Name:  Jevon Rajan   MRN:  [id number]    10:15 AM Patient not seen today secondary to Dialysis. Will follow-up as able.    1/16/2025

## 2025-01-16 NOTE — ASSESSMENT & PLAN NOTE
Anemia is likely due to chronic disease due to ESRD. Most recent hemoglobin and hematocrit are listed below.  Recent Labs     01/14/25  0644   HGB 9.0*   HCT 29.5*     Plan  stable

## 2025-01-16 NOTE — ASSESSMENT & PLAN NOTE
Patient's blood pressure range in the last 24 hours was: BP  Min: 104/52  Max: 135/60.The patient's inpatient anti-hypertensive regimen is listed below:  Current Antihypertensives  carvediloL tablet 6.25 mg, 2 times daily, Oral  carvedilol (COREG) tablet, 2 times daily, Oral    Plan  - BP is controlled, no changes needed to their regimen

## 2025-01-16 NOTE — PLAN OF CARE
[facility] - Telemetry      HOME HEALTH ORDERS  FACE TO FACE ENCOUNTER    Patient Name: Jevon Rajan  YOB: 1940    PCP: Melia, Primary Doctor   PCP Address: None  PCP Phone Number: None  PCP Fax: None    Encounter Date: 1/12/25    Admit to Home Health    Diagnoses:  Active Hospital Problems    Diagnosis  POA    *Unstable angina [I20.0]  Yes    Senile debility [R54]  Yes    Symptomatic anemia [D64.9]  Yes    ESRD on hemodialysis [N18.6, Z99.2]  Not Applicable    HTN (hypertension) [I10]  Yes     Dx updated per 2019 IMO Load        Resolved Hospital Problems   No resolved problems to display.       Follow Up Appointments:  Future Appointments   Date Time Provider Department Center   1/29/2025  1:40 PM Steve Bhat MD [facility]       Allergies:  Review of patient's allergies indicates:   Allergen Reactions    Ace inhibitors Hives, Shortness Of Breath and Itching     Is not sure which medication it was    Captopril        Medications: Review discharge medications with patient and family and provide education.    Current Facility-Administered Medications   Medication Dose Route Frequency Provider Last Rate Last Admin    acetaminophen tablet 650 mg  650 mg Oral Q4H PRN Steve Bhat MD        artificial tears 0.5 % ophthalmic solution 1 drop  1 drop Both Eyes TID PRN Nadja Brown MD        aspirin chewable tablet 81 mg  81 mg Oral Daily Malini Talavera MD   81 mg at 01/16/25 0824    atorvastatin tablet 80 mg  80 mg Oral QHS Malini Talavera MD   80 mg at 01/15/25 2036    camphor-menthol 0.5-0.5% lotion   Topical (Top) BID Nadja Brown MD   Given at 01/16/25 0824    carvediloL tablet 6.25 mg  6.25 mg Oral BID Malini Talavera MD   6.25 mg at 01/16/25 0832    clopidogreL tablet 75 mg  75 mg Oral Daily Dax Michel NP   75 mg at 01/16/25 0824    diphenhydrAMINE-zinc acetate 2-0.1% cream   Topical (Top) TID Malini Talavera MD   Given at 01/16/25  0824    epoetin mj-epbx injection 10,000 Units  10,000 Units Subcutaneous Every Tues, Thurs, Sat Vivienne Oates MD   10,000 Units at 01/16/25 0831    heparin infusion 1,000 units/500 ml in 0.9% NaCl (pressure line flush)    Continuous PRN Giacomo Pittman  mL/hr at 01/14/25 1257 1,000 Units/hr at 01/14/25 1257    mupirocin 2 % ointment   Nasal BID Nadja Brown MD   Given at 01/16/25 0824    ondansetron disintegrating tablet 8 mg  8 mg Oral Q8H PRN Steve Bhat MD            Medication List        CONTINUE taking these medications      ammonium lactate 12 % Crea  Apply topically 2 (two) times a day.     artificial tears(hypromellose)(GENTEAL/SUSTANE) 0.3 % Gel  Apply to eye nightly.     aspirin 81 MG EC tablet  Commonly known as: ECOTRIN  Take 1 tablet (81 mg total) by mouth once daily.     atorvastatin 80 MG tablet  Commonly known as: LIPITOR  Take 1 tablet (80 mg total) by mouth every evening.     camphor-menthol 0.5-0.5% lotion  Commonly known as: SARNA  Apply topically once daily. APPLY SMALL AMOUNT TOPICALLY TO AFFECTED AREA(S) AS NEEDED FOR ITCHING KEEP IN FRIDGE     carboxymethylcellulose sodium 0.5 % Drop  Apply 1 drop to eye nightly as needed (dry eyes).     carvediloL 12.5 MG tablet  Commonly known as: COREG  Take 0.5 tablets (6.25 mg total) by mouth 2 (two) times daily.     clobetasol 0.05% 0.05 % Oint  Commonly known as: TEMOVATE  Apply topically once daily.     clopidogreL 75 mg tablet  Commonly known as: PLAVIX  Take 1 tablet (75 mg total) by mouth once daily.     EUCERIN cream  Generic drug: lanolin alcohol-mineral oil-white petrolatum-ceres  Apply topically 2 (two) times daily as needed.     gabapentin 100 MG capsule  Commonly known as: NEURONTIN  Take 1 capsule (100 mg total) by mouth every evening.     hydrophilic ointment  Commonly known as: AQUABASE  Apply topically as needed for Dry Skin. APPLY MODERATE AMOUNT TOPICALLY TO AFFECTED AREA(S) TWICE A DAY AS NEEDED FOR DRY  SKIN APPLY TO AREAS OF DRY SKIN OR OVER ENTIRE BODY.     hydrOXYzine pamoate 50 MG Cap  Commonly known as: VistariL  Take 1 capsule (50 mg total) by mouth every 8 (eight) hours as needed (itching).     loratadine 10 mg tablet  Commonly known as: CLARITIN  Take 10 mg by mouth every 48 hours.     NEPRO CARB STEADY 0.08 gram-1.8 kcal/mL Liqd  Generic drug: nut.tx.imp.renal fxn,lac-reduc  Take 240 mLs by mouth 2 (two) times a day.     ondansetron 4 MG Tbdl  Commonly known as: ZOFRAN-ODT  Take 1 tablet (4 mg total) by mouth every 8 (eight) hours as needed (nausea).     pantoprazole 20 MG tablet  Commonly known as: PROTONIX  Take 20 mg by mouth 2 (two) times daily as needed.     pramoxine 1 % Lotn  Apply topically 2 (two) times daily as needed (itching).     triamcinolone acetonide 0.1% 0.1 % cream  Commonly known as: KENALOG  Apply topically 2 (two) times daily as needed (itching).            STOP taking these medications      isosorbide mononitrate 30 MG 24 hr tablet  Commonly known as: IMDUR     levothyroxine 50 MCG tablet  Commonly known as: SYNTHROID     LIDOcaine 5 %  Commonly known as: LIDODERM     sevelamer carbonate 800 mg Tab  Commonly known as: RENVELA     ZITUVIO 25 mg Tab  Generic drug: SITagliptin                I have seen and examined this patient within the last 30 days. My clinical findings that support the need for the home health skilled services and home bound status are the following:no   Weakness/numbness causing balance and gait disturbance due to Heart Failure making it taxing to leave home.  Patient with medication mismanagement issues requiring home bound status as evidenced by  Poor adherence to medication regimen/dosage.     Diet:   cardiac diet      Referrals/ Consults  Physical Therapy to evaluate and treat. Evaluate for home safety and equipment needs; Establish/upgrade home exercise program. Perform / instruct on therapeutic exercises, gait training, transfer training, and Range of  Motion.  Occupational Therapy to evaluate and treat. Evaluate home environment for safety and equipment needs. Perform/Instruct on transfers, ADL training, ROM, and therapeutic exercises.   to evaluate for community resources/long-range planning.  Aide to provide assistance with personal care, ADLs, and vital signs.    Activities:   activity as tolerated    Nursing:   Agency to admit patient within 24 hours of hospital discharge unless specified on physician order or at patient request    SN to complete comprehensive assessment including routine vital signs. Instruct on disease process and s/s of complications to report to MD. Review/verify medication list sent home with the patient at time of discharge  and instruct patient/caregiver as needed. Frequency may be adjusted depending on start of care date.     Skilled nurse to perform up to 3 visits PRN for symptoms related to diagnosis    Ok to schedule additional visits based on staff availability and patient request on consecutive days within the home health episode.    When multiple disciplines ordered:    Start of Care occurs on Sunday - Wednesday schedule remaining discipline evaluations as ordered on separate consecutive days following the start of care.    Thursday SOC -schedule subsequent evaluations Friday and Monday the following week.     Friday - Saturday SOC - schedule subsequent discipline evaluations on consecutive days starting Monday of the following week.      Miscellaneous   Heart Failure:      SN to instruct on the following:    Instruct on the definition of CHF.   Instruct on the signs/sympoms of CHF to be reported.   Instruct on and monitor daily weights.   Instruct on factors that cause exacerbation.   Instruct on action, dose, schedule, and side effects of medications.   Instruct on diet as prescribed.   Instruct on activity allowed.   Instruct on life-style modifications for life long management of CHF   SN to assess compliance  with daily weights, diet, medications, fluid retention,    safety precautions, activities permitted and life-style modifications.   Additional 1-2 SN visits per week as needed for signs and symptoms     of CHF exacerbation.    Home Health Aide:  Physical Therapy Three times weekly, Occupational Therapy Three times weekly, Medical Social Work Three times weekly, and Home Health Aide Three times weekly    Wound Care Orders  no    I certify that this patient is confined to his home and needs intermittent skilled nursing care.

## 2025-01-16 NOTE — PT/OT/SLP PROGRESS
Physical Therapy Treatment    Patient Name:  Jevon Rajan   MRN:  [id number]    Recommendations:     Discharge Recommendations: Low Intensity Therapy  Discharge Equipment Recommendations: cane, straight  Barriers to discharge:  decreased mobility,strength and endurance    Assessment:     Jevon Rajan is a 84 y.o. male admitted with a medical diagnosis of Unstable angina.  He presents with the following impairments/functional limitations: weakness, impaired endurance, impaired functional mobility, impaired balance, decreased lower extremity function, impaired coordination, impaired cardiopulmonary response to activity,pt with improving status and will benefit from low intensity therapy upon discharge.    Rehab Prognosis: Good; patient would benefit from acute skilled PT services to address these deficits and reach maximum level of function.    Recent Surgery: Procedure(s) (LRB):  Percutaneous coronary intervention (N/A)  Atherectomy-coronary (N/A)  ANGIOPLASTY, CORONARY ARTERY, WITH STENT INSERTION (N/A)  Left heart cath (Left) 2 Days Post-Op    Plan:     During this hospitalization, patient to be seen 3 x/week to address the identified rehab impairments via gait training, therapeutic activities, therapeutic exercises, neuromuscular re-education and progress toward the following goals:    Plan of Care Expires:  01/17/25    Subjective     Chief Complaint: n/a  Patient/Family Comments/goals: pt agreeable to rx.  Pain/Comfort:  Pain Rating 1: 0/10      Objective:     Communicated with nsg prior to session.  Patient found supine with oxygen, peripheral IV, telemetry upon PT entry to room.     General Precautions: Standard, fall  Orthopedic Precautions: N/A  Braces: N/A  Respiratory Status:  supplemental O2     Functional Mobility:  Bed Mobility:     Supine to Sit: modified independence  Sit to Supine: modified independence  Transfers:     Sit to Stand:  supervision with no AD  Gait: amb ~40' X 2 w/o AD and S/SBA  Balance:  fair standing balance      AM-PAC 6 CLICK MOBILITY  Turning over in bed (including adjusting bedclothes, sheets and blankets)?: 4  Sitting down on and standing up from a chair with arms (e.g., wheelchair, bedside commode, etc.): 3  Moving from lying on back to sitting on the side of the bed?: 4  Moving to and from a bed to a chair (including a wheelchair)?: 3  Need to walk in hospital room?: 3  Climbing 3-5 steps with a railing?: 3  Basic Mobility Total Score: 20       Treatment & Education: le seated ex's x 10 reps inc: ap.laq and hip flex.      Patient left supine with all lines intact and call button in reach..    GOALS: see general POC  Multidisciplinary Problems       Physical Therapy Goals          Problem: Physical Therapy    Goal Priority Disciplines Outcome Interventions   Physical Therapy Goal     PT, PT/OT Progressing    Description: Goals to be met by: 25     Patient will increase functional independence with mobility by performin. Pt to be mod I with bed mobility.  2. Pt to transfer with (S).  3. Pt to ambulate 150' w/sc and SBA.  4. Pt to be (I) with HEP.                             Time Tracking:     PT Received On: 25  PT Start Time: 828     PT Stop Time: 854  PT Total Time (min): 26 min     Billable Minutes: Gait Training 15 and Therapeutic Exercise 11    Treatment Type: Treatment  PT/PTA: PTA     Number of PTA visits since last PT visit: 2025

## 2025-01-16 NOTE — DISCHARGE SUMMARY
[facility] Medicine  Discharge Summary      Patient Name: Jevon Rajan  MRN: [id number]  MARCY: [id number]  Patient Class: IP- Inpatient  Admission Date: 1/13/2025  Hospital Length of Stay: 3 days  Discharge Date and Time:  01/16/2025 1:25 PM  Attending Physician: Nadja Brown MD   Discharging Provider: Nadja Brown MD  Primary Care Provider: Melia, Primary Doctor    Primary Care Team: Networked reference to record PCT     HPI:   No notes on file    Procedure(s) (LRB):  Percutaneous coronary intervention (N/A)  Atherectomy-coronary (N/A)  ANGIOPLASTY, CORONARY ARTERY, WITH STENT INSERTION (N/A)  Left heart cath (Left)      Hospital Course:   Patient was admitted for nausea, was found to have NSTEMI, cardio evaluated the patient and staged PCI Was done, with multiple stents placement, patient has been doing well and received HD while in the hospital. He will be discharged home with      Goals of Care Treatment Preferences:  Code Status: Full Code          What is most important right now is to focus on avoiding the hospital.  Accordingly, we have decided that the best plan to meet the patient's goals includes continuing with treatment.      SDOH Screening:  The patient was screened for food insecurity, housing instability, transportation needs, utility difficulties, and interpersonal safety. The social determinant(s) of health identified as a concern this admission are:  Transportation difficulties    Will discuss with case management and/or community health workers.    Social Drivers of Health with Concerns     Transportation Needs: Unmet Transportation Needs (1/15/2025)        Consults:   Consults (From admission, onward)          Status Ordering Provider     Inpatient consult to [facility]rology[facility] (Teofilo Ceron Nimkevych)  Once        Provider:  (Not yet assigned)    SHERIF Schroeder     [facility]  [facility]        Provider:  (Not  yet assigned)    Completed SHERIF ALANIS            * Unstable angina  S/p LHC and RHC with stents placement 01/14    Cont ASA and plavix    Senile debility  Patient with Acute on chronic debility due to age-related physical debility. The patient's latest AMPAC (Activity Measure for Post Acute Care) Score is listed below.    AM-PAC Score - How much help does the patient need for each activity listed  Basic Mobility Total Score: 20  Turning over in bed (including adjusting bedclothes, sheets and blankets)?: None  Sitting down on and standing up from a chair with arms (e.g., wheelchair, bedside commode, etc.): A little  Moving from lying on back to sitting on the side of the bed?: None  Moving to and from a bed to a chair (including a wheelchair)?: A little  Need to walk in hospital room?: A little  Climbing 3-5 steps with a railing?: A little    Plan  - PT/OT consulted            Symptomatic anemia  Anemia is likely due to chronic disease due to ESRD. Most recent hemoglobin and hematocrit are listed below.  Recent Labs     01/14/25  0644   HGB 9.0*   HCT 29.5*     Plan  stable    ESRD on hemodialysis  Creatine stable for now. BMP reviewed- noted Estimated Creatinine Clearance: 5.1 mL/min (A) (based on SCr of 10 mg/dL (H)). according to latest data. 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  Monitor UOP and serial BMP and adjust therapy as needed. Renally dose meds. Avoid nephrotoxic medications and procedures.    Last HD was today    HTN (hypertension)  Patient's blood pressure range in the last 24 hours was: BP  Min: 104/52  Max: 135/60.The patient's inpatient anti-hypertensive regimen is listed below:  Current Antihypertensives  carvediloL tablet 6.25 mg, 2 times daily, Oral  carvedilol (COREG) tablet, 2 times daily, Oral    Plan  - BP is controlled, no changes needed to their regimen        Final Active Diagnoses:    Diagnosis Date Noted POA    PRINCIPAL PROBLEM:  Unstable angina [I20.0] 08/10/2024 Yes     "Senile debility [R54] 01/15/2025 Yes    Symptomatic anemia [D64.9] 08/10/2024 Yes    ESRD on hemodialysis [N18.6, Z99.2] 11/24/2019 Not Applicable    HTN (hypertension) [I10] 12/29/2014 Yes      Problems Resolved During this Admission:       Discharged Condition: good    Disposition:     Follow Up:   Follow-up Information       Steve Bhat MD Follow up on 1/29/2025.    Specialties: Cardiology, Interventional Cardiology  Why: at 1:40pm; cardiology hospital follow up appointment  Contact information:  [address]  [place] [address]  [phone]                           Patient Instructions:      CANE FOR HOME USE     Order Specific Question Answer Comments   Type of Cane: Straight    Height: 5' 8" (1.727 m)    Weight: 71.4 kg (157 lb 6.5 oz)    Does patient have medical equipment at home? shower chair lost cane   Length of need (1-99 months): 99    Please check all that apply: Patient's condition impairs ambulation.    Please check all that apply: Patient is unable to safely ambulate without equipment.        Significant Diagnostic Studies: Labs: CMP   Recent Labs   Lab 01/14/25  1541 01/15/25  0331 01/16/25  0154     137 138 140   K 6.1*  6.1*  6.2*  6.2* 4.4 4.0     100 99 100   CO2 19*  19* 24 25   *  203* 91 141*   BUN 68*  68* 30* 44*   CREATININE 13.1*  13.1* 7.1* 10.0*   CALCIUM 9.5  9.4 9.3 8.9   ALBUMIN  --  3.0* 2.8*   ANIONGAP 18*  18* 15 15    and CBC No results for input(s): "WBC", "HGB", "HCT", "PLT" in the last 48 hours.    Pending Diagnostic Studies:       None           Medications:  Reconciled Home Medications:      Medication List        CONTINUE taking these medications      ammonium lactate 12 % Crea  Apply topically 2 (two) times a day.     artificial tears(hypromellose)(GENTEAL/SUSTANE) 0.3 % Gel  Apply to eye nightly.     aspirin 81 MG EC tablet  Commonly known as: ECOTRIN  Take 1 tablet (81 mg total) by mouth once daily.     atorvastatin " 80 MG tablet  Commonly known as: LIPITOR  Take 1 tablet (80 mg total) by mouth every evening.     camphor-menthol 0.5-0.5% lotion  Commonly known as: SARNA  Apply topically once daily. APPLY SMALL AMOUNT TOPICALLY TO AFFECTED AREA(S) AS NEEDED FOR ITCHING KEEP IN FRIDGE     carboxymethylcellulose sodium 0.5 % Drop  Apply 1 drop to eye nightly as needed (dry eyes).     carvediloL 12.5 MG tablet  Commonly known as: COREG  Take 0.5 tablets (6.25 mg total) by mouth 2 (two) times daily.     clobetasol 0.05% 0.05 % Oint  Commonly known as: TEMOVATE  Apply topically once daily.     clopidogreL 75 mg tablet  Commonly known as: PLAVIX  Take 1 tablet (75 mg total) by mouth once daily.     EUCERIN cream  Generic drug: lanolin alcohol-mineral oil-white petrolatum-ceres  Apply topically 2 (two) times daily as needed.     gabapentin 100 MG capsule  Commonly known as: NEURONTIN  Take 1 capsule (100 mg total) by mouth every evening.     hydrophilic ointment  Commonly known as: AQUABASE  Apply topically as needed for Dry Skin. APPLY MODERATE AMOUNT TOPICALLY TO AFFECTED AREA(S) TWICE A DAY AS NEEDED FOR DRY SKIN APPLY TO AREAS OF DRY SKIN OR OVER ENTIRE BODY.     hydrOXYzine pamoate 50 MG Cap  Commonly known as: VistariL  Take 1 capsule (50 mg total) by mouth every 8 (eight) hours as needed (itching).     loratadine 10 mg tablet  Commonly known as: CLARITIN  Take 10 mg by mouth every 48 hours.     NEPRO CARB STEADY 0.08 gram-1.8 kcal/mL Liqd  Generic drug: nut.tx.imp.renal fxn,lac-reduc  Take 240 mLs by mouth 2 (two) times a day.     ondansetron 4 MG Tbdl  Commonly known as: ZOFRAN-ODT  Take 1 tablet (4 mg total) by mouth every 8 (eight) hours as needed (nausea).     pantoprazole 20 MG tablet  Commonly known as: PROTONIX  Take 20 mg by mouth 2 (two) times daily as needed.     pramoxine 1 % Lotn  Apply topically 2 (two) times daily as needed (itching).     triamcinolone acetonide 0.1% 0.1 % cream  Commonly known as: KENALOG  Apply  topically 2 (two) times daily as needed (itching).            STOP taking these medications      isosorbide mononitrate 30 MG 24 hr tablet  Commonly known as: IMDUR     levothyroxine 50 MCG tablet  Commonly known as: SYNTHROID     LIDOcaine 5 %  Commonly known as: LIDODERM     sevelamer carbonate 800 mg Tab  Commonly known as: RENVELA     ZITUVIO 25 mg Tab  Generic drug: SITagliptin              Indwelling Lines/Drains at time of discharge:   Lines/Drains/Airways       Drain  Duration                  Hemodialysis AV Fistula Left upper arm -- days                    Time spent on the discharge of patient: 35 minutes         Nadja Brown MD  Department of Hospital Medicine  [facility] - Telemetry

## 2025-01-16 NOTE — HOSPITAL COURSE
Patient was admitted for nausea, was found to have NSTEMI, cardio evaluated the patient and staged PCI Was done, with multiple stents placement, patient has been doing well and received HD while in the hospital. He will be discharged home with HH

## 2025-01-16 NOTE — PLAN OF CARE
Discharge order reconciliation complete by attending.  AVS finalized with medication details.  Secure chat sent to bedside/charge nurse to print avs and bring to pt/caregiver once pt cleared by CM.

## 2025-01-16 NOTE — PLAN OF CARE
VIRTUAL NURSE:  Called patient's Megan garces via telephone; verified spelling of patient's name and birthdate.  Introduced as VN today who will be reviewing D/c instructions. AVS prepared with medication list, importance of medication compliance, follow up appointments, diet, home care instructions, treatment plan, self management, and when to seek medical attention.  Number given for 24/7 Nurse Line.  AVS printed and placed in discharge packet by bedside nurse.  Opportunity given for questions and questions answered. Niece verbalized complete understanding of discharge instructions and voices no concerns.      Discharge instructions complete.  Bedside delivery complete.  Transport ordered by .

## 2025-01-16 NOTE — PLAN OF CARE
01/16/25 0935   Rounds   Attendance Provider;Nurse ;Pharmacist;Assigned nurse   Discharge Plan A Home Health       0935  Patient resting quietly in bed when CM participated in SIBR with Dr Brown, pharmacist Wilian, & nurse Sagar. Pt was admitted with an elevated troponin (trop 5.132), is being followed by cards, neph, wound care, and PT/OT, & is s/p LHC. MD informed the pt that he might be medically stable to discharge home with HH following HD session today if weaned off of O2. Pox 96% on 1L O2 via NC this AM. Pt verbalized understanding & agreement.     1050  CM informed the pt's niece, Deonna Smyth ([phone]), via phone of the pt's discharge status. Deonna verbalized understanding, agreement, & stated that the pt's sister, Bhavna Smyth ([phone]), will provide transportation at time of discharge.     1155  Message sent to Carolyne arreguin/[facility], [facility], & CM Supervisor Edwin informing of order for straight cane for home use entered as recommended by PT/OT. Awaiting response.     1225  Permission obtained from Carolyne to delivered a straight cane from the [facility] office to the pt. Cane will be delivered by [facility].    1355  HH orders sent via [employer]. Awaiting response.     1405  CM was informed by Zachary/[facility]- that [facility] provides services for Sancho GURROLA. Referral sent to [facility] via [facility]. Awaiting response.     DC order noted. CM was informed by Dr Brown that the pt is medically stable to dc today.     CM was informed by the pt's niece, Deonna, that the pt will need assistance with transportation at time of discharge.    Message sent to the CM Supervisors informing of [employer] needed at 1530. CM to bring the transportation packet to the nurse's station.     1420  Message sent to nurse Goel, charge nurse Dara, & virtual nurse Rolanda informing that the pt is cleared to discharge, of [place] [employer] scheduled, & requesting  that Sube notify Deonna when the pt leaves [facility].     1500  Patient awake & alert in bed with nurse Manasa at the bedside when CM rounded. No family present. Patient in agreement with plan to discharge home with [facility] today, of [employer] scheduled, & verbalized understanding regarding the hospital follow up appointment with Dr Garcia (cards). CM verified the pt's home address & received confirmation that he has a pena.       Will continue to follow.

## 2025-01-17 NOTE — PLAN OF CARE
[facility] - Telemetry  Discharge Final Note    Primary Care Provider: No, Primary Doctor    Expected Discharge Date: 1/16/2025    Final Discharge Note (most recent)       Final Note - 01/17/25 0745          Final Note    Assessment Type Final Discharge Note (P)      Anticipated Discharge Disposition Home-Health Care Svc (P)    [facility] Resources/Appts/Education Provided Appointments scheduled and added to AVS (P)         Post-Acute Status    Post-Acute Authorization Home Health;HME (P)      HME Status Set-up Complete/Auth obtained (P)    straight cane delivered to the bedside prior to dc    Home Health Status Set-up Complete/Auth obtained (P)    [facility]                    Contact Info       Steve Bhat MD   Specialty: Cardiology, Interventional Cardiology    [address]  [address]  [facility] [address]   Phone: [phone]       Next Steps: Follow up on 1/29/2025    Instructions: at 1:40pm; cardiology hospital follow up appointment    *[facility] [address], [address]  [facility] [address]   Phone: [phone]       Next Steps: Follow up    Instructions: will provide home health services

## 2025-03-14 ENCOUNTER — EXTERNAL HOME HEALTH (OUTPATIENT)
Dept: HOME HEALTH SERVICES | Facility: HOSPITAL | Age: 85
End: 2025-03-14
Payer: MEDICARE

## 2025-04-02 PROBLEM — A41.9 SEPSIS: Status: ACTIVE | Noted: 2025-04-02

## 2025-04-02 PROBLEM — G93.41 SEPTIC ENCEPHALOPATHY: Status: ACTIVE | Noted: 2025-04-02

## 2025-04-02 PROBLEM — I15.0 RENOVASCULAR HYPERTENSION: Status: RESOLVED | Noted: 2024-04-01 | Resolved: 2025-04-02

## 2025-04-03 ENCOUNTER — HOSPITAL ENCOUNTER (INPATIENT)
Facility: HOSPITAL | Age: 85
LOS: 26 days | Discharge: HOSPICE | DRG: 252 | End: 2025-04-29
Attending: HOSPITALIST | Admitting: HOSPITALIST
Payer: MEDICARE

## 2025-04-03 ENCOUNTER — ANESTHESIA (OUTPATIENT)
Dept: SURGERY | Facility: HOSPITAL | Age: 85
End: 2025-04-03
Payer: MEDICARE

## 2025-04-03 ENCOUNTER — ANESTHESIA EVENT (OUTPATIENT)
Dept: SURGERY | Facility: HOSPITAL | Age: 85
End: 2025-04-03
Payer: MEDICARE

## 2025-04-03 DIAGNOSIS — Z99.2 ESRD ON DIALYSIS: ICD-10-CM

## 2025-04-03 DIAGNOSIS — I33.0 ACUTE BACTERIAL ENDOCARDITIS: ICD-10-CM

## 2025-04-03 DIAGNOSIS — A41.9 SEPTIC SHOCK: ICD-10-CM

## 2025-04-03 DIAGNOSIS — N18.6 END-STAGE RENAL DISEASE (ESRD): Primary | ICD-10-CM

## 2025-04-03 DIAGNOSIS — Z71.89 ACP (ADVANCE CARE PLANNING): ICD-10-CM

## 2025-04-03 DIAGNOSIS — I21.4 NSTEMI (NON-ST ELEVATED MYOCARDIAL INFARCTION): ICD-10-CM

## 2025-04-03 DIAGNOSIS — R79.89 ELEVATED TROPONIN: ICD-10-CM

## 2025-04-03 DIAGNOSIS — N18.6 ESRD ON HEMODIALYSIS: ICD-10-CM

## 2025-04-03 DIAGNOSIS — L29.89 UREMIC PRURITUS: ICD-10-CM

## 2025-04-03 DIAGNOSIS — Z99.2 ESRD ON HEMODIALYSIS: ICD-10-CM

## 2025-04-03 DIAGNOSIS — R07.9 CHEST PAIN: ICD-10-CM

## 2025-04-03 DIAGNOSIS — R31.0 GROSS HEMATURIA: ICD-10-CM

## 2025-04-03 DIAGNOSIS — N13.8 BPH WITH URINARY OBSTRUCTION: ICD-10-CM

## 2025-04-03 DIAGNOSIS — T82.7XXA INFECTION OF ARTERIOVENOUS FISTULA, INITIAL ENCOUNTER: ICD-10-CM

## 2025-04-03 DIAGNOSIS — R65.21 SEPTIC SHOCK: ICD-10-CM

## 2025-04-03 DIAGNOSIS — R33.9 URINARY RETENTION: ICD-10-CM

## 2025-04-03 DIAGNOSIS — N18.6 ANEMIA IN ESRD (END-STAGE RENAL DISEASE): ICD-10-CM

## 2025-04-03 DIAGNOSIS — N18.6 ESRD ON DIALYSIS: ICD-10-CM

## 2025-04-03 DIAGNOSIS — B95.62 MRSA BACTEREMIA: ICD-10-CM

## 2025-04-03 DIAGNOSIS — G93.41 ACUTE METABOLIC ENCEPHALOPATHY: ICD-10-CM

## 2025-04-03 DIAGNOSIS — N40.1 BPH WITH URINARY OBSTRUCTION: ICD-10-CM

## 2025-04-03 DIAGNOSIS — D63.1 ANEMIA IN ESRD (END-STAGE RENAL DISEASE): ICD-10-CM

## 2025-04-03 DIAGNOSIS — R78.81 MRSA BACTEREMIA: ICD-10-CM

## 2025-04-03 DIAGNOSIS — T82.868A THROMBOSIS OF ARTERIOVENOUS GRAFT, INITIAL ENCOUNTER: ICD-10-CM

## 2025-04-03 PROBLEM — I20.0 UNSTABLE ANGINA: Status: RESOLVED | Noted: 2024-08-10 | Resolved: 2025-04-03

## 2025-04-03 PROBLEM — R06.02 SHORTNESS OF BREATH: Status: RESOLVED | Noted: 2024-06-12 | Resolved: 2025-04-03

## 2025-04-03 PROBLEM — Z75.8 DISCHARGE PLANNING ISSUES: Status: RESOLVED | Noted: 2024-06-02 | Resolved: 2025-04-03

## 2025-04-03 PROBLEM — E03.4 HYPOTHYROIDISM DUE TO ACQUIRED ATROPHY OF THYROID: Status: RESOLVED | Noted: 2024-01-15 | Resolved: 2025-04-03

## 2025-04-03 PROBLEM — D64.9 SYMPTOMATIC ANEMIA: Status: RESOLVED | Noted: 2024-08-10 | Resolved: 2025-04-03

## 2025-04-03 PROBLEM — E87.5 HYPERKALEMIA: Status: RESOLVED | Noted: 2024-08-27 | Resolved: 2025-04-03

## 2025-04-03 PROBLEM — R82.90 ABNORMAL URINALYSIS: Status: RESOLVED | Noted: 2019-11-24 | Resolved: 2025-04-03

## 2025-04-03 LAB
ABO + RH BLD: NORMAL
ABO + RH BLD: NORMAL
ABSOLUTE EOSINOPHIL (OHS): 0.05 K/UL
ABSOLUTE EOSINOPHIL (OHS): 0.05 K/UL
ABSOLUTE MONOCYTE (OHS): 0.96 K/UL (ref 0.3–1)
ABSOLUTE MONOCYTE (OHS): 1.17 K/UL (ref 0.3–1)
ABSOLUTE NEUTROPHIL COUNT (OHS): 14.13 K/UL (ref 1.8–7.7)
ABSOLUTE NEUTROPHIL COUNT (OHS): 15.67 K/UL (ref 1.8–7.7)
ALBUMIN SERPL BCP-MCNC: 1.9 G/DL (ref 3.5–5.2)
ALBUMIN SERPL BCP-MCNC: 2 G/DL (ref 3.5–5.2)
ALP SERPL-CCNC: 59 UNIT/L (ref 40–150)
ALP SERPL-CCNC: 83 UNIT/L (ref 40–150)
ALT SERPL W/O P-5'-P-CCNC: 15 UNIT/L (ref 10–44)
ALT SERPL W/O P-5'-P-CCNC: 18 UNIT/L (ref 10–44)
ANION GAP (OHS): 13 MMOL/L (ref 8–16)
ANION GAP (OHS): 14 MMOL/L (ref 8–16)
AST SERPL-CCNC: 24 UNIT/L (ref 11–45)
AST SERPL-CCNC: 25 UNIT/L (ref 11–45)
BASOPHILS # BLD AUTO: 0.03 K/UL
BASOPHILS # BLD AUTO: 0.03 K/UL
BASOPHILS NFR BLD AUTO: 0.2 %
BASOPHILS NFR BLD AUTO: 0.2 %
BILIRUB SERPL-MCNC: 0.3 MG/DL (ref 0.1–1)
BILIRUB SERPL-MCNC: 0.6 MG/DL (ref 0.1–1)
BLD PROD TYP BPU: NORMAL
BLD PROD TYP BPU: NORMAL
BLOOD UNIT EXPIRATION DATE: NORMAL
BLOOD UNIT EXPIRATION DATE: NORMAL
BLOOD UNIT TYPE CODE: 5100
BLOOD UNIT TYPE CODE: 5100
BUN SERPL-MCNC: 62 MG/DL (ref 8–23)
BUN SERPL-MCNC: 70 MG/DL (ref 8–23)
CALCIUM SERPL-MCNC: 7.6 MG/DL (ref 8.7–10.5)
CALCIUM SERPL-MCNC: 8.1 MG/DL (ref 8.7–10.5)
CHLORIDE SERPL-SCNC: 101 MMOL/L (ref 95–110)
CHLORIDE SERPL-SCNC: 105 MMOL/L (ref 95–110)
CO2 SERPL-SCNC: 22 MMOL/L (ref 23–29)
CO2 SERPL-SCNC: 23 MMOL/L (ref 23–29)
CREAT SERPL-MCNC: 8.7 MG/DL (ref 0.5–1.4)
CREAT SERPL-MCNC: 9.3 MG/DL (ref 0.5–1.4)
CROSSMATCH INTERPRETATION: NORMAL
CROSSMATCH INTERPRETATION: NORMAL
DISPENSE STATUS: NORMAL
DISPENSE STATUS: NORMAL
ERYTHROCYTE [DISTWIDTH] IN BLOOD BY AUTOMATED COUNT: 17.7 % (ref 11.5–14.5)
ERYTHROCYTE [DISTWIDTH] IN BLOOD BY AUTOMATED COUNT: 18.7 % (ref 11.5–14.5)
GFR SERPLBLD CREATININE-BSD FMLA CKD-EPI: 5 ML/MIN/1.73/M2
GFR SERPLBLD CREATININE-BSD FMLA CKD-EPI: 5 ML/MIN/1.73/M2
GLUCOSE SERPL-MCNC: 203 MG/DL (ref 70–110)
GLUCOSE SERPL-MCNC: 231 MG/DL (ref 70–110)
HCT VFR BLD AUTO: 22.7 % (ref 40–54)
HCT VFR BLD AUTO: 30.7 % (ref 40–54)
HGB BLD-MCNC: 10.2 GM/DL (ref 14–18)
HGB BLD-MCNC: 7.2 GM/DL (ref 14–18)
HOLD SPECIMEN: NORMAL
HOLD SPECIMEN: NORMAL
HYPOCHROMIA BLD QL SMEAR: ABNORMAL
IMM GRANULOCYTES # BLD AUTO: 0.51 K/UL (ref 0–0.04)
IMM GRANULOCYTES # BLD AUTO: 0.53 K/UL (ref 0–0.04)
IMM GRANULOCYTES NFR BLD AUTO: 2.8 % (ref 0–0.5)
IMM GRANULOCYTES NFR BLD AUTO: 3.3 % (ref 0–0.5)
INDIRECT COOMBS: NORMAL
INR PPP: 1.1 (ref 0.8–1.2)
LACTATE SERPL-SCNC: 0.9 MMOL/L (ref 0.5–2.2)
LYMPHOCYTES # BLD AUTO: 0.53 K/UL (ref 1–4.8)
LYMPHOCYTES # BLD AUTO: 0.6 K/UL (ref 1–4.8)
MAGNESIUM SERPL-MCNC: 2.2 MG/DL (ref 1.6–2.6)
MCH RBC QN AUTO: 28.1 PG (ref 27–31)
MCH RBC QN AUTO: 29.8 PG (ref 27–31)
MCHC RBC AUTO-ENTMCNC: 31.7 G/DL (ref 32–36)
MCHC RBC AUTO-ENTMCNC: 33.2 G/DL (ref 32–36)
MCV RBC AUTO: 89 FL (ref 82–98)
MCV RBC AUTO: 90 FL (ref 82–98)
NUCLEATED RBC (/100WBC) (OHS): 0 /100 WBC
NUCLEATED RBC (/100WBC) (OHS): 0 /100 WBC
PHOSPHATE SERPL-MCNC: 7.2 MG/DL (ref 2.7–4.5)
PLATELET # BLD AUTO: 130 K/UL (ref 150–450)
PLATELET # BLD AUTO: 146 K/UL (ref 150–450)
PLATELET BLD QL SMEAR: ABNORMAL
PMV BLD AUTO: 10.7 FL (ref 9.2–12.9)
PMV BLD AUTO: 10.7 FL (ref 9.2–12.9)
POCT GLUCOSE: 243 MG/DL (ref 70–110)
POIKILOCYTOSIS BLD QL SMEAR: SLIGHT
POTASSIUM SERPL-SCNC: 3.9 MMOL/L (ref 3.5–5.1)
POTASSIUM SERPL-SCNC: 4.8 MMOL/L (ref 3.5–5.1)
PROT SERPL-MCNC: 5.5 GM/DL (ref 6–8.4)
PROT SERPL-MCNC: 5.8 GM/DL (ref 6–8.4)
PROTHROMBIN TIME: 12.1 SECONDS (ref 9–12.5)
RBC # BLD AUTO: 2.56 M/UL (ref 4.6–6.2)
RBC # BLD AUTO: 3.42 M/UL (ref 4.6–6.2)
RELATIVE EOSINOPHIL (OHS): 0.3 %
RELATIVE EOSINOPHIL (OHS): 0.3 %
RELATIVE LYMPHOCYTE (OHS): 3.3 % (ref 18–48)
RELATIVE LYMPHOCYTE (OHS): 3.3 % (ref 18–48)
RELATIVE MONOCYTE (OHS): 5.9 % (ref 4–15)
RELATIVE MONOCYTE (OHS): 6.5 % (ref 4–15)
RELATIVE NEUTROPHIL (OHS): 86.9 % (ref 38–73)
RELATIVE NEUTROPHIL (OHS): 87 % (ref 38–73)
RH BLD: NORMAL
SODIUM SERPL-SCNC: 138 MMOL/L (ref 136–145)
SODIUM SERPL-SCNC: 140 MMOL/L (ref 136–145)
SPECIMEN OUTDATE: NORMAL
TROPONIN I SERPL DL<=0.01 NG/ML-MCNC: 2.03 NG/ML
UNIT NUMBER: NORMAL
UNIT NUMBER: NORMAL
WBC # BLD AUTO: 16.23 K/UL (ref 3.9–12.7)
WBC # BLD AUTO: 18.03 K/UL (ref 3.9–12.7)

## 2025-04-03 PROCEDURE — 27201423 OPTIME MED/SURG SUP & DEVICES STERILE SUPPLY: Performed by: SURGERY

## 2025-04-03 PROCEDURE — 03PY0JZ REMOVAL OF SYNTHETIC SUBSTITUTE FROM UPPER ARTERY, OPEN APPROACH: ICD-10-PCS | Performed by: SURGERY

## 2025-04-03 PROCEDURE — 87075 CULTR BACTERIA EXCEPT BLOOD: CPT | Performed by: SURGERY

## 2025-04-03 PROCEDURE — 84100 ASSAY OF PHOSPHORUS: CPT | Performed by: STUDENT IN AN ORGANIZED HEALTH CARE EDUCATION/TRAINING PROGRAM

## 2025-04-03 PROCEDURE — 25000003 PHARM REV CODE 250: Performed by: STUDENT IN AN ORGANIZED HEALTH CARE EDUCATION/TRAINING PROGRAM

## 2025-04-03 PROCEDURE — 63600175 PHARM REV CODE 636 W HCPCS: Performed by: STUDENT IN AN ORGANIZED HEALTH CARE EDUCATION/TRAINING PROGRAM

## 2025-04-03 PROCEDURE — 87116 MYCOBACTERIA CULTURE: CPT | Performed by: SURGERY

## 2025-04-03 PROCEDURE — 83605 ASSAY OF LACTIC ACID: CPT | Performed by: HOSPITALIST

## 2025-04-03 PROCEDURE — 87205 SMEAR GRAM STAIN: CPT | Performed by: SURGERY

## 2025-04-03 PROCEDURE — 63600175 PHARM REV CODE 636 W HCPCS: Performed by: HOSPITALIST

## 2025-04-03 PROCEDURE — P9016 RBC LEUKOCYTES REDUCED: HCPCS

## 2025-04-03 PROCEDURE — 20000000 HC ICU ROOM

## 2025-04-03 PROCEDURE — 82040 ASSAY OF SERUM ALBUMIN: CPT | Performed by: HOSPITALIST

## 2025-04-03 PROCEDURE — 87206 SMEAR FLUORESCENT/ACID STAI: CPT | Performed by: SURGERY

## 2025-04-03 PROCEDURE — 36000706: Performed by: SURGERY

## 2025-04-03 PROCEDURE — 36000707: Performed by: SURGERY

## 2025-04-03 PROCEDURE — 37000009 HC ANESTHESIA EA ADD 15 MINS: Performed by: SURGERY

## 2025-04-03 PROCEDURE — 86920 COMPATIBILITY TEST SPIN: CPT

## 2025-04-03 PROCEDURE — 83735 ASSAY OF MAGNESIUM: CPT | Performed by: STUDENT IN AN ORGANIZED HEALTH CARE EDUCATION/TRAINING PROGRAM

## 2025-04-03 PROCEDURE — 63600175 PHARM REV CODE 636 W HCPCS: Performed by: SURGERY

## 2025-04-03 PROCEDURE — 36415 COLL VENOUS BLD VENIPUNCTURE: CPT | Performed by: HOSPITALIST

## 2025-04-03 PROCEDURE — 85610 PROTHROMBIN TIME: CPT | Performed by: HOSPITALIST

## 2025-04-03 PROCEDURE — 84484 ASSAY OF TROPONIN QUANT: CPT | Performed by: HOSPITALIST

## 2025-04-03 PROCEDURE — 87102 FUNGUS ISOLATION CULTURE: CPT | Performed by: SURGERY

## 2025-04-03 PROCEDURE — 30233N1 TRANSFUSION OF NONAUTOLOGOUS RED BLOOD CELLS INTO PERIPHERAL VEIN, PERCUTANEOUS APPROACH: ICD-10-PCS | Performed by: INTERNAL MEDICINE

## 2025-04-03 PROCEDURE — 82040 ASSAY OF SERUM ALBUMIN: CPT | Performed by: STUDENT IN AN ORGANIZED HEALTH CARE EDUCATION/TRAINING PROGRAM

## 2025-04-03 PROCEDURE — 85025 COMPLETE CBC W/AUTO DIFF WBC: CPT | Performed by: HOSPITALIST

## 2025-04-03 PROCEDURE — 86901 BLOOD TYPING SEROLOGIC RH(D): CPT | Performed by: HOSPITALIST

## 2025-04-03 PROCEDURE — 37000008 HC ANESTHESIA 1ST 15 MINUTES: Performed by: SURGERY

## 2025-04-03 PROCEDURE — 05PY0JZ REMOVAL OF SYNTHETIC SUBSTITUTE FROM UPPER VEIN, OPEN APPROACH: ICD-10-PCS | Performed by: SURGERY

## 2025-04-03 PROCEDURE — 85025 COMPLETE CBC W/AUTO DIFF WBC: CPT | Performed by: STUDENT IN AN ORGANIZED HEALTH CARE EDUCATION/TRAINING PROGRAM

## 2025-04-03 PROCEDURE — 99223 1ST HOSP IP/OBS HIGH 75: CPT | Mod: 57,,, | Performed by: SURGERY

## 2025-04-03 PROCEDURE — 87186 SC STD MICRODIL/AGAR DIL: CPT | Performed by: SURGERY

## 2025-04-03 RX ORDER — ROCURONIUM BROMIDE 10 MG/ML
INJECTION, SOLUTION INTRAVENOUS
Status: DISCONTINUED | OUTPATIENT
Start: 2025-04-03 | End: 2025-04-03

## 2025-04-03 RX ORDER — ATORVASTATIN CALCIUM 40 MG/1
80 TABLET, FILM COATED ORAL NIGHTLY
Status: DISCONTINUED | OUTPATIENT
Start: 2025-04-03 | End: 2025-04-28

## 2025-04-03 RX ORDER — NALOXONE HCL 0.4 MG/ML
0.02 VIAL (ML) INJECTION
Status: DISCONTINUED | OUTPATIENT
Start: 2025-04-03 | End: 2025-04-29 | Stop reason: HOSPADM

## 2025-04-03 RX ORDER — HYDROCODONE BITARTRATE AND ACETAMINOPHEN 500; 5 MG/1; MG/1
TABLET ORAL
Status: DISCONTINUED | OUTPATIENT
Start: 2025-04-03 | End: 2025-04-07

## 2025-04-03 RX ORDER — HYDROMORPHONE HYDROCHLORIDE 2 MG/ML
0.2 INJECTION, SOLUTION INTRAMUSCULAR; INTRAVENOUS; SUBCUTANEOUS EVERY 5 MIN PRN
Refills: 0 | Status: CANCELLED | OUTPATIENT
Start: 2025-04-03

## 2025-04-03 RX ORDER — SODIUM CHLORIDE 0.9 % (FLUSH) 0.9 %
10 SYRINGE (ML) INJECTION EVERY 8 HOURS
Status: DISCONTINUED | OUTPATIENT
Start: 2025-04-03 | End: 2025-04-18

## 2025-04-03 RX ORDER — HYDROMORPHONE HYDROCHLORIDE 1 MG/ML
0.5 INJECTION, SOLUTION INTRAMUSCULAR; INTRAVENOUS; SUBCUTANEOUS ONCE
Status: COMPLETED | OUTPATIENT
Start: 2025-04-03 | End: 2025-04-03

## 2025-04-03 RX ORDER — SODIUM CHLORIDE 0.9 % (FLUSH) 0.9 %
10 SYRINGE (ML) INJECTION
Status: CANCELLED | OUTPATIENT
Start: 2025-04-03

## 2025-04-03 RX ORDER — FENTANYL CITRATE 50 UG/ML
INJECTION, SOLUTION INTRAMUSCULAR; INTRAVENOUS
Status: DISCONTINUED | OUTPATIENT
Start: 2025-04-03 | End: 2025-04-03

## 2025-04-03 RX ORDER — SUCCINYLCHOLINE CHLORIDE 20 MG/ML
INJECTION INTRAMUSCULAR; INTRAVENOUS
Status: DISCONTINUED | OUTPATIENT
Start: 2025-04-03 | End: 2025-04-03

## 2025-04-03 RX ORDER — VANCOMYCIN HYDROCHLORIDE 1 G/20ML
INJECTION, POWDER, LYOPHILIZED, FOR SOLUTION INTRAVENOUS
Status: DISCONTINUED | OUTPATIENT
Start: 2025-04-03 | End: 2025-04-03 | Stop reason: HOSPADM

## 2025-04-03 RX ORDER — LIDOCAINE HYDROCHLORIDE 20 MG/ML
INJECTION INTRAVENOUS
Status: DISCONTINUED | OUTPATIENT
Start: 2025-04-03 | End: 2025-04-03

## 2025-04-03 RX ORDER — GLUCAGON 1 MG
1 KIT INJECTION
Status: CANCELLED | OUTPATIENT
Start: 2025-04-03

## 2025-04-03 RX ORDER — INSULIN ASPART 100 [IU]/ML
0-5 INJECTION, SOLUTION INTRAVENOUS; SUBCUTANEOUS
Status: DISCONTINUED | OUTPATIENT
Start: 2025-04-03 | End: 2025-04-29 | Stop reason: HOSPADM

## 2025-04-03 RX ORDER — IBUPROFEN 200 MG
16 TABLET ORAL
Status: DISCONTINUED | OUTPATIENT
Start: 2025-04-03 | End: 2025-04-29 | Stop reason: HOSPADM

## 2025-04-03 RX ORDER — ETOMIDATE 2 MG/ML
INJECTION INTRAVENOUS
Status: DISCONTINUED | OUTPATIENT
Start: 2025-04-03 | End: 2025-04-03

## 2025-04-03 RX ORDER — PROCHLORPERAZINE EDISYLATE 5 MG/ML
5 INJECTION INTRAMUSCULAR; INTRAVENOUS EVERY 6 HOURS PRN
Status: DISCONTINUED | OUTPATIENT
Start: 2025-04-03 | End: 2025-04-29 | Stop reason: HOSPADM

## 2025-04-03 RX ORDER — ONDANSETRON HYDROCHLORIDE 2 MG/ML
INJECTION, SOLUTION INTRAVENOUS
Status: DISCONTINUED | OUTPATIENT
Start: 2025-04-03 | End: 2025-04-03

## 2025-04-03 RX ORDER — FENTANYL CITRATE 50 UG/ML
25 INJECTION, SOLUTION INTRAMUSCULAR; INTRAVENOUS EVERY 5 MIN PRN
Refills: 0 | Status: CANCELLED | OUTPATIENT
Start: 2025-04-03

## 2025-04-03 RX ORDER — TALC
6 POWDER (GRAM) TOPICAL NIGHTLY PRN
Status: DISCONTINUED | OUTPATIENT
Start: 2025-04-03 | End: 2025-04-29 | Stop reason: HOSPADM

## 2025-04-03 RX ORDER — AMOXICILLIN 250 MG
1 CAPSULE ORAL 2 TIMES DAILY
Status: DISCONTINUED | OUTPATIENT
Start: 2025-04-03 | End: 2025-04-05

## 2025-04-03 RX ORDER — CLOPIDOGREL BISULFATE 75 MG/1
75 TABLET ORAL DAILY
Status: DISCONTINUED | OUTPATIENT
Start: 2025-04-04 | End: 2025-04-29 | Stop reason: HOSPADM

## 2025-04-03 RX ORDER — GLUCAGON 1 MG
1 KIT INJECTION
Status: DISCONTINUED | OUTPATIENT
Start: 2025-04-03 | End: 2025-04-29 | Stop reason: HOSPADM

## 2025-04-03 RX ORDER — ACETAMINOPHEN 10 MG/ML
1000 INJECTION, SOLUTION INTRAVENOUS ONCE
Status: CANCELLED | OUTPATIENT
Start: 2025-04-03 | End: 2025-04-03

## 2025-04-03 RX ORDER — IBUPROFEN 200 MG
24 TABLET ORAL
Status: DISCONTINUED | OUTPATIENT
Start: 2025-04-03 | End: 2025-04-29 | Stop reason: HOSPADM

## 2025-04-03 RX ORDER — PHENYLEPHRINE HYDROCHLORIDE 10 MG/ML
INJECTION INTRAVENOUS
Status: DISCONTINUED | OUTPATIENT
Start: 2025-04-03 | End: 2025-04-03

## 2025-04-03 RX ORDER — HEPARIN SODIUM 1000 [USP'U]/ML
INJECTION, SOLUTION INTRAVENOUS; SUBCUTANEOUS
Status: DISCONTINUED | OUTPATIENT
Start: 2025-04-03 | End: 2025-04-03 | Stop reason: HOSPADM

## 2025-04-03 RX ORDER — ACETAMINOPHEN 325 MG/1
650 TABLET ORAL EVERY 4 HOURS PRN
Status: DISCONTINUED | OUTPATIENT
Start: 2025-04-03 | End: 2025-04-29 | Stop reason: HOSPADM

## 2025-04-03 RX ORDER — ONDANSETRON HYDROCHLORIDE 2 MG/ML
4 INJECTION, SOLUTION INTRAVENOUS DAILY PRN
Status: CANCELLED | OUTPATIENT
Start: 2025-04-03

## 2025-04-03 RX ORDER — ONDANSETRON HYDROCHLORIDE 2 MG/ML
4 INJECTION, SOLUTION INTRAVENOUS EVERY 6 HOURS PRN
Status: DISCONTINUED | OUTPATIENT
Start: 2025-04-03 | End: 2025-04-29 | Stop reason: HOSPADM

## 2025-04-03 RX ORDER — PANTOPRAZOLE SODIUM 40 MG/1
40 TABLET, DELAYED RELEASE ORAL DAILY
Status: DISCONTINUED | OUTPATIENT
Start: 2025-04-04 | End: 2025-04-04

## 2025-04-03 RX ORDER — CEFTRIAXONE 2 G/1
2 INJECTION, POWDER, FOR SOLUTION INTRAMUSCULAR; INTRAVENOUS
Status: DISCONTINUED | OUTPATIENT
Start: 2025-04-03 | End: 2025-04-04

## 2025-04-03 RX ORDER — ASPIRIN 81 MG/1
81 TABLET ORAL DAILY
Status: DISCONTINUED | OUTPATIENT
Start: 2025-04-04 | End: 2025-04-05

## 2025-04-03 RX ADMIN — PHENYLEPHRINE HYDROCHLORIDE 100 MCG: 10 INJECTION INTRAVENOUS at 05:04

## 2025-04-03 RX ADMIN — HYDROMORPHONE HYDROCHLORIDE 0.5 MG: 1 INJECTION, SOLUTION INTRAMUSCULAR; INTRAVENOUS; SUBCUTANEOUS at 04:04

## 2025-04-03 RX ADMIN — ONDANSETRON 4 MG: 2 INJECTION, SOLUTION INTRAMUSCULAR; INTRAVENOUS at 05:04

## 2025-04-03 RX ADMIN — SUCCINYLCHOLINE CHLORIDE 100 MG: 20 INJECTION, SOLUTION INTRAMUSCULAR; INTRAVENOUS at 05:04

## 2025-04-03 RX ADMIN — PHENYLEPHRINE HYDROCHLORIDE 200 MCG: 10 INJECTION INTRAVENOUS at 05:04

## 2025-04-03 RX ADMIN — FENTANYL CITRATE 100 MCG: 50 INJECTION, SOLUTION INTRAMUSCULAR; INTRAVENOUS at 05:04

## 2025-04-03 RX ADMIN — PHENYLEPHRINE HYDROCHLORIDE 150 MCG: 10 INJECTION INTRAVENOUS at 06:04

## 2025-04-03 RX ADMIN — ETOMIDATE 8 MG: 2 INJECTION, SOLUTION INTRAVENOUS at 05:04

## 2025-04-03 RX ADMIN — SODIUM CHLORIDE, SODIUM LACTATE, POTASSIUM CHLORIDE, AND CALCIUM CHLORIDE: .6; .31; .03; .02 INJECTION, SOLUTION INTRAVENOUS at 05:04

## 2025-04-03 RX ADMIN — INSULIN ASPART 1 UNITS: 100 INJECTION, SOLUTION INTRAVENOUS; SUBCUTANEOUS at 10:04

## 2025-04-03 RX ADMIN — PHENYLEPHRINE HYDROCHLORIDE 100 MCG: 10 INJECTION INTRAVENOUS at 07:04

## 2025-04-03 RX ADMIN — CEFTRIAXONE SODIUM 2 G: 2 INJECTION, POWDER, FOR SOLUTION INTRAMUSCULAR; INTRAVENOUS at 05:04

## 2025-04-03 RX ADMIN — ROCURONIUM BROMIDE 20 MG: 10 INJECTION, SOLUTION INTRAVENOUS at 06:04

## 2025-04-03 RX ADMIN — PHENYLEPHRINE HYDROCHLORIDE 100 MCG: 10 INJECTION INTRAVENOUS at 06:04

## 2025-04-03 RX ADMIN — ROCURONIUM BROMIDE 20 MG: 10 INJECTION, SOLUTION INTRAVENOUS at 05:04

## 2025-04-03 RX ADMIN — LIDOCAINE HYDROCHLORIDE 100 MG: 20 INJECTION, SOLUTION INTRAVENOUS at 05:04

## 2025-04-03 NOTE — ASSESSMENT & PLAN NOTE
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 Most recent hemoglobin and hematocrit are listed below.  Recent Labs     04/01/25  2220 04/02/25  0537 04/03/25  0713   HGB 7.6* 7.1* 8.7*   HCT 23.2* 23.4* 26.5*     Plan  - Monitor serial CBC: Daily and recheck now  - Transfuse PRBC if patient becomes hemodynamically unstable, symptomatic or H/H drops below 7/21.  - Patient has not received any PRBC transfusions to date  - Patient's anemia is currently stable

## 2025-04-03 NOTE — SUBJECTIVE & OBJECTIVE
Past Medical History:   Diagnosis Date    BPH (benign prostatic hyperplasia)     Coronary artery disease     He has followed at the [facility] and is now transferring his care to this clinic. In 2014 he had stent to LAD. He states he has had 2 heart attacks. He does not get angina. There was 90% left anterior descending artery stenosis undergoing stenting. There was also a circumflex marginal branch stenosis of 70%.    Diabetes mellitus, type 2     ESRD (end stage renal disease) on dialysis     ESRD on HD TTS via left brachial AVF    Hypertension     Hypothyroidism, unspecified     Sepsis 4/2/2025    Symptomatic anemia 01/15/2024       Past Surgical History:   Procedure Laterality Date    ANGIOGRAM, CORONARY, WITH LEFT HEART CATHETERIZATION  1/13/2025    Procedure: Angiogram, Coronary, with Left Heart Cath;  Surgeon: Steve Bhat MD;  Location: [facility] CATH LAB/EP;  Service: Cardiology;;    ATHERECTOMY, CORONARY N/A 1/14/2025    Procedure: Atherectomy-coronary;  Surgeon: Giacomo Pittman MD;  Location: [facility] CATH LAB/EP;  Service: Cardiology;  Laterality: N/A;    bilateral foot surgery      CORONARY ANGIOPLASTY WITH STENT PLACEMENT N/A 1/14/2025    Procedure: ANGIOPLASTY, CORONARY ARTERY, WITH STENT INSERTION;  Surgeon: Giacomo Pittman MD;  Location: [facility] CATH LAB/EP;  Service: Cardiology;  Laterality: N/A;    CORONARY STENT PLACEMENT N/A 1/13/2025    Procedure: INSERTION, STENT, CORONARY ARTERY;  Surgeon: Steve Bhat MD;  Location: [facility] CATH LAB/EP;  Service: Cardiology;  Laterality: N/A;    ESOPHAGOGASTRODUODENOSCOPY N/A 2/27/2024    Procedure: EGD (ESOPHAGOGASTRODUODENOSCOPY);  Surgeon: Gemma Almendarez MD;  Location: [facility] ENDO;  Service: Endoscopy;  Laterality: N/A;    eyelid surgery      FISTULOGRAM Left 11/27/2019    Procedure: Fistulogram;  Surgeon: MELANY Brenner III, MD;  Location: [address];  Service: Peripheral Vascular;  Laterality: Left;    FISTULOGRAM Left 11/27/2019     Procedure: Fistulogram, AVG declot, possible permacath;  Surgeon: MELANY Brenner III, MD;  Location: [facility] CATH LAB;  Service: Peripheral Vascular;  Laterality: Left;    INSERTION OF DIALYSIS CATHETER      IVUS, CORONARY  1/13/2025    Procedure: IVUS, Coronary;  Surgeon: Steve Bhat MD;  Location: [facility] CATH LAB/EP;  Service: Cardiology;;    LEFT HEART CATHETERIZATION Left 1/14/2025    Procedure: Left heart cath;  Surgeon: Giacomo Pittman MD;  Location: [facility] CATH LAB/EP;  Service: Cardiology;  Laterality: Left;    MOH's  12/5/13    PERCUTANEOUS CORONARY INTERVENTION, ARTERY N/A 1/14/2025    Procedure: Percutaneous coronary intervention;  Surgeon: Giacomo Pittman MD;  Location: [facility] CATH LAB/EP;  Service: Cardiology;  Laterality: N/A;    PERCUTANEOUS TRANSLUMINAL BALLOON ANGIOPLASTY OF CORONARY ARTERY  1/13/2025    Procedure: Angioplasty-coronary;  Surgeon: Steve Bhat MD;  Location: [facility] CATH LAB/EP;  Service: Cardiology;;    REVASCULARIZATION, ENDOVASCULAR, LE W/ INTRAVASCULAR LITHOTRIPSY  1/13/2025    Procedure: REVASCULARIZATION, ENDOVASCULAR, LE W/ INTRAVASCULAR LITHOTRIPSY;  Surgeon: Steve Bhat MD;  Location: [facility] CATH LAB/EP;  Service: Cardiology;;    right hand surgery      stents         Review of patient's allergies indicates:   Allergen Reactions    Ace inhibitors Hives, Itching, Shortness Of Breath, Other (See Comments) and Rash     Is not sure which medication it was    Captopril        Current Facility-Administered Medications on File Prior to Encounter   Medication    [COMPLETED] albumin human 25% bottle 12.5 g    [COMPLETED] iohexoL (OMNIPAQUE 350) injection 75 mL    [COMPLETED] LIDOcaine (PF) 10 mg/ml (1%) injection 10 mg    [COMPLETED] sodium chloride 0.9% bolus 250 mL 250 mL    [COMPLETED] sodium chloride 0.9% bolus 250 mL 250 mL    [COMPLETED] sodium chloride 0.9% bolus 500 mL 500 mL    [DISCONTINUED] 0.9%  NaCl infusion (for blood administration)    [DISCONTINUED]  acetaminophen suppository 650 mg    [DISCONTINUED] acetaminophen tablet 500 mg    [DISCONTINUED] albumin human 25% bottle 25 g    [DISCONTINUED] albuterol-ipratropium 2.5 mg-0.5 mg/3 mL nebulizer solution 3 mL    [DISCONTINUED] aluminum-magnesium hydroxide-simethicone 200-200-20 mg/5 mL suspension 30 mL    [DISCONTINUED] aspirin EC tablet 81 mg    [DISCONTINUED] atorvastatin tablet 80 mg    [DISCONTINUED] ceFEPIme injection 1 g    [DISCONTINUED] cefTRIAXone injection 2 g    [DISCONTINUED] cetirizine tablet 5 mg    [DISCONTINUED] cetirizine tablet 5 mg    [DISCONTINUED] clopidogreL tablet 75 mg    [DISCONTINUED] dextrose 50% injection 12.5 g    [DISCONTINUED] dextrose 50% injection 25 g    [DISCONTINUED] DOPamine in D5W 400 mg/250 mL (1,600 mcg/mL) PERIPHERAL access infusion    [DISCONTINUED] epoetin mj-epbx injection 20,000 Units    [DISCONTINUED] gabapentin capsule 100 mg    [DISCONTINUED] glucagon (human recombinant) injection 1 mg    [DISCONTINUED] glucose chewable tablet 16 g    [DISCONTINUED] glucose chewable tablet 24 g    [DISCONTINUED] heparin (porcine) injection 5,000 Units    [DISCONTINUED] insulin aspart U-100 pen 0-5 Units    [DISCONTINUED] LIDOcaine (PF) 10 mg/ml (1%) injection 10 mg    [DISCONTINUED] LIDOcaine (PF) 10 mg/ml (1%) injection 10 mg    [DISCONTINUED] melatonin tablet 6 mg    [DISCONTINUED] meropenem (MERREM) 500 mg in 0.9% NaCl 100 mL IVPB (MB+)    [DISCONTINUED] metronidazole IVPB 500 mg    [DISCONTINUED] midodrine tablet 10 mg    [DISCONTINUED] mupirocin 2 % ointment    [DISCONTINUED] naloxone 0.4 mg/mL injection 0.02 mg    [DISCONTINUED] NORepinephrine 4 mg in 0.9% NaCl 250 mL infusion    [DISCONTINUED] NORepinephrine 4 mg in 0.9% NaCl 250 mL infusion    [DISCONTINUED] ondansetron injection 4 mg    [DISCONTINUED] oxyCODONE immediate release tablet 5 mg    [DISCONTINUED] pantoprazole EC tablet 40 mg    [DISCONTINUED] prochlorperazine injection Soln 5 mg    [DISCONTINUED] senna-docusate  8.6-50 mg per tablet 1 tablet    [DISCONTINUED] simethicone chewable tablet 80 mg    [DISCONTINUED] sodium chloride 0.9% flush 10 mL    [DISCONTINUED] triamcinolone acetonide 0.1% cream    [DISCONTINUED] vancomycin - pharmacy to dose    [DISCONTINUED] vancomycin 500 mg in dextrose 5 % 100 mL IVPB (ready to mix)     Current Outpatient Medications on File Prior to Encounter   Medication Sig    ammonium lactate 12 % Crea Apply topically 2 (two) times a day.    artificial tears,hypromellose,,GENTEAL/SUSTANE, 0.3 % Gel Apply to eye nightly.    aspirin (ECOTRIN) 81 MG EC tablet Take 1 tablet (81 mg total) by mouth once daily.    atorvastatin (LIPITOR) 80 MG tablet Take 1 tablet (80 mg total) by mouth every evening.    camphor-menthol 0.5-0.5% (SARNA) lotion Apply topically once daily. APPLY SMALL AMOUNT TOPICALLY TO AFFECTED AREA(S) AS NEEDED FOR ITCHING KEEP IN FRIDGE    carboxymethylcellulose sodium 0.5 % Drop Apply 1 drop to eye nightly as needed (dry eyes).    carvediloL (COREG) 12.5 MG tablet Take 0.5 tablets (6.25 mg total) by mouth 2 (two) times daily.    cetirizine (ZYRTEC) 5 MG tablet Take 1 tablet (5 mg total) by mouth every 48 hours.    clobetasol 0.05% (TEMOVATE) 0.05 % Oint Apply topically 2 (two) times daily.    clopidogreL (PLAVIX) 75 mg tablet Take 1 tablet (75 mg total) by mouth once daily.    erythromycin (ROMYCIN) ophthalmic ointment Place a 1/2 inch ribbon of ointment into the lower eyelid. Four timex daily for 5 days    gabapentin (NEURONTIN) 100 MG capsule Take 1 capsule (100 mg total) by mouth every evening.    hydrophilic ointment (AQUABASE) Apply topically as needed for Dry Skin. APPLY MODERATE AMOUNT TOPICALLY TO AFFECTED AREA(S) TWICE A DAY AS NEEDED FOR DRY SKIN APPLY TO AREAS OF DRY SKIN OR OVER ENTIRE BODY.    lanolin alcohol-mineral oil-white petrolatum-ceres (EUCERIN) Crea cream Apply topically 2 (two) times daily as needed.    LIDOcaine (LIDODERM) 5 % Place 1 patch onto the skin once  daily. Remove & Discard patch within 12 hours or as directed by MD    loratadine (CLARITIN) 10 mg tablet Take 10 mg by mouth daily as needed for Allergies (Every other day).    nut.tx.imp.renal fxn,lac-reduc (NEPRO CARB STEADY) 0.08 gram-1.8 kcal/mL Liqd Take 240 mLs by mouth 2 (two) times a day.    ondansetron (ZOFRAN-ODT) 4 MG TbDL Take 1 tablet (4 mg total) by mouth every 8 (eight) hours as needed (nausea).    oxyCODONE-acetaminophen (PERCOCET) 5-325 mg per tablet Take 1 tablet by mouth every 6 (six) hours as needed.    pantoprazole (PROTONIX) 20 MG tablet Take 20 mg by mouth 2 (two) times daily as needed.    pramoxine 1 % Lotn Apply topically 2 (two) times daily as needed (itching).    triamcinolone acetonide 0.1% (KENALOG) 0.1 % cream Apply topically 2 (two) times daily as needed (itching).     Family History    None       Tobacco Use    Smoking status: Never    Smokeless tobacco: Never   Substance and Sexual Activity    Alcohol use: No    Drug use: No    Sexual activity: Not Currently     Review of Systems   Respiratory:  Negative for shortness of breath.    Gastrointestinal:  Negative for abdominal pain.   Skin:  Positive for wound.   Psychiatric/Behavioral:  Positive for confusion.      Objective:     Vital Signs (Most Recent):    Vital Signs (24h Range):  Temp:  [98.1 °F (36.7 °C)-100.9 °F (38.3 °C)] 98.1 °F (36.7 °C)  Pulse:  [] 85  Resp:  [9-42] 28  SpO2:  [92 %-100 %] 99 %  BP: ()/(40-55) 103/52        There is no height or weight on file to calculate BMI.     Physical Exam  Vitals and nursing note reviewed.   Constitutional:       General: He is in acute distress.      Appearance: He is ill-appearing and toxic-appearing. He is not diaphoretic.   HENT:      Head: Normocephalic and atraumatic.      Nose: Nose normal.      Mouth/Throat:      Mouth: Mucous membranes are dry.   Cardiovascular:      Rate and Rhythm: Normal rate and regular rhythm.   Pulmonary:      Effort: Pulmonary effort is  normal.      Breath sounds: Normal breath sounds.      Comments: RA  Abdominal:      General: Bowel sounds are normal.      Palpations: Abdomen is soft.      Tenderness: There is no abdominal tenderness.      Hernia: A hernia (umbilical, reducible) is present.   Genitourinary:     Comments: Condom cath in place  Musculoskeletal:      Right lower leg: No edema.      Left lower leg: No edema.   Skin:     Comments: Hyperpigmented patches all over his skin. LUE AVF with active pulsatile red blood loss   Neurological:      Comments: Responds to his name, says no when asked about pain, but does not otherwise answer orientation questions                Significant Labs: All pertinent labs within the past 24 hours have been reviewed.    Significant Imaging: I have reviewed all pertinent imaging results/findings within the past 24 hours.

## 2025-04-03 NOTE — H&P
[facility] Medicine  History & Physical    Patient Name: Jevon Rajan  MRN: [id number]  Patient Class: IP- Inpatient  Admission Date: 4/3/2025  Attending Physician: Eileen Jordan MD   Primary Care Provider: No primary care provider on file.         Patient information was obtained from patient, relative(s), past medical records, and ER records.     Subjective:     Principal Problem:Septic shock    Chief Complaint:   Chief Complaint   Patient presents with    Bleeding/Bruising        HPI: Mr Jevon Rajan is a 85 y.o. man with ESRD with LUE AVF, HFpEF last EF 50-55%, CAD, DM who was transferred to [facility] ICU for septic shock due to MRSA bacteremia.     He was admitted at [facility] 4/1-3/2025 with sepsis, worsening to septic shock, then identified source as MRSA. 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     Upon arrival to [facility], he is moaning in pain and his LUE AVF has active pulsatile bright red blood. He does respond to his name. He denies pain anywhere other than his LUE. He is on levophed at 0.05.     Past Medical History:   Diagnosis Date    BPH (benign prostatic hyperplasia)     Coronary artery disease     [facility] care to this clinic. In 2014 he had stent to LAD. He states he has had 2 heart attacks. He does not get angina. There was 90% left anterior descending artery stenosis undergoing stenting. There was also a circumflex marginal branch stenosis of 70%.    Diabetes mellitus, type 2     ESRD (end stage renal disease) on dialysis     ESRD on HD TTS via left brachial AVF    Hypertension     Hypothyroidism, unspecified     Sepsis 4/2/2025    Symptomatic anemia 01/15/2024       Past Surgical History:   Procedure Laterality Date    ANGIOGRAM, CORONARY, WITH LEFT HEART CATHETERIZATION  1/13/2025     Procedure: Angiogram, Coronary, with Left Heart Cath;  Surgeon: Steve Bhat MD;  Location: [facility] CATH LAB/EP;  Service: Cardiology;;    ATHERECTOMY, CORONARY N/A 1/14/2025    Procedure: Atherectomy-coronary;  Surgeon: Giacomo Pittman MD;  Location: [facility] CATH LAB/EP;  Service: Cardiology;  Laterality: N/A;    bilateral foot surgery      CORONARY ANGIOPLASTY WITH STENT PLACEMENT N/A 1/14/2025    Procedure: ANGIOPLASTY, CORONARY ARTERY, WITH STENT INSERTION;  Surgeon: Giacomo Pittman MD;  Location: [facility] CATH LAB/EP;  Service: Cardiology;  Laterality: N/A;    CORONARY STENT PLACEMENT N/A 1/13/2025    Procedure: INSERTION, STENT, CORONARY ARTERY;  Surgeon: Steve Bhat MD;  Location: [facility] CATH LAB/EP;  Service: Cardiology;  Laterality: N/A;    ESOPHAGOGASTRODUODENOSCOPY N/A 2/27/2024    Procedure: EGD (ESOPHAGOGASTRODUODENOSCOPY);  Surgeon: Gemma Almendarez MD;  Location: [facility] ENDO;  Service: Endoscopy;  Laterality: N/A;    eyelid surgery      FISTULOGRAM Left 11/27/2019    Procedure: Fistulogram;  Surgeon: MELANY Brenner III, MD;  Location: [address];  Service: Peripheral Vascular;  Laterality: Left;    FISTULOGRAM Left 11/27/2019    Procedure: Fistulogram, AVG declot, possible permacath;  Surgeon: MELANY Brenner III, MD;  Location: [facility] CATH LAB;  Service: Peripheral Vascular;  Laterality: Left;    INSERTION OF DIALYSIS CATHETER      IVUS, CORONARY  1/13/2025    Procedure: IVUS, Coronary;  Surgeon: Steve Bhat MD;  Location: [facility] CATH LAB/EP;  Service: Cardiology;;    LEFT HEART CATHETERIZATION Left 1/14/2025    Procedure: Left heart cath;  Surgeon: Giacomo Pittman MD;  Location: [facility] CATH LAB/EP;  Service: Cardiology;  Laterality: Left;    MOH's  12/5/13    PERCUTANEOUS CORONARY INTERVENTION, ARTERY N/A 1/14/2025    Procedure: Percutaneous coronary intervention;  Surgeon: Giacomo Pittman MD;  Location: [facility] CATH LAB/EP;  Service: Cardiology;  Laterality: N/A;    PERCUTANEOUS  TRANSLUMINAL BALLOON ANGIOPLASTY OF CORONARY ARTERY  1/13/2025    Procedure: Angioplasty-coronary;  Surgeon: Steve Bhat MD;  Location: [facility] CATH LAB/EP;  Service: Cardiology;;    REVASCULARIZATION, ENDOVASCULAR, LE W/ INTRAVASCULAR LITHOTRIPSY  1/13/2025    Procedure: REVASCULARIZATION, ENDOVASCULAR, LE W/ INTRAVASCULAR LITHOTRIPSY;  Surgeon: Steve Bhat MD;  Location: [facility] CATH LAB/EP;  Service: Cardiology;;    right hand surgery      stents         Review of patient's allergies indicates:   Allergen Reactions    Ace inhibitors Hives, Itching, Shortness Of Breath, Other (See Comments) and Rash     Is not sure which medication it was    Captopril        Current Facility-Administered Medications on File Prior to Encounter   Medication    [COMPLETED] albumin human 25% bottle 12.5 g    [COMPLETED] iohexoL (OMNIPAQUE 350) injection 75 mL    [COMPLETED] LIDOcaine (PF) 10 mg/ml (1%) injection 10 mg    [COMPLETED] sodium chloride 0.9% bolus 250 mL 250 mL    [COMPLETED] sodium chloride 0.9% bolus 250 mL 250 mL    [COMPLETED] sodium chloride 0.9% bolus 500 mL 500 mL    [DISCONTINUED] 0.9%  NaCl infusion (for blood administration)    [DISCONTINUED] acetaminophen suppository 650 mg    [DISCONTINUED] acetaminophen tablet 500 mg    [DISCONTINUED] albumin human 25% bottle 25 g    [DISCONTINUED] albuterol-ipratropium 2.5 mg-0.5 mg/3 mL nebulizer solution 3 mL    [DISCONTINUED] aluminum-magnesium hydroxide-simethicone 200-200-20 mg/5 mL suspension 30 mL    [DISCONTINUED] aspirin EC tablet 81 mg    [DISCONTINUED] atorvastatin tablet 80 mg    [DISCONTINUED] ceFEPIme injection 1 g    [DISCONTINUED] cefTRIAXone injection 2 g    [DISCONTINUED] cetirizine tablet 5 mg    [DISCONTINUED] cetirizine tablet 5 mg    [DISCONTINUED] clopidogreL tablet 75 mg    [DISCONTINUED] dextrose 50% injection 12.5 g    [DISCONTINUED] dextrose 50% injection 25 g    [DISCONTINUED] DOPamine in D5W 400 mg/250 mL (1,600 mcg/mL) PERIPHERAL  access infusion    [DISCONTINUED] epoetin mj-epbx injection 20,000 Units    [DISCONTINUED] gabapentin capsule 100 mg    [DISCONTINUED] glucagon (human recombinant) injection 1 mg    [DISCONTINUED] glucose chewable tablet 16 g    [DISCONTINUED] glucose chewable tablet 24 g    [DISCONTINUED] heparin (porcine) injection 5,000 Units    [DISCONTINUED] insulin aspart U-100 pen 0-5 Units    [DISCONTINUED] LIDOcaine (PF) 10 mg/ml (1%) injection 10 mg    [DISCONTINUED] LIDOcaine (PF) 10 mg/ml (1%) injection 10 mg    [DISCONTINUED] melatonin tablet 6 mg    [DISCONTINUED] meropenem (MERREM) 500 mg in 0.9% NaCl 100 mL IVPB (MB+)    [DISCONTINUED] metronidazole IVPB 500 mg    [DISCONTINUED] midodrine tablet 10 mg    [DISCONTINUED] mupirocin 2 % ointment    [DISCONTINUED] naloxone 0.4 mg/mL injection 0.02 mg    [DISCONTINUED] NORepinephrine 4 mg in 0.9% NaCl 250 mL infusion    [DISCONTINUED] NORepinephrine 4 mg in 0.9% NaCl 250 mL infusion    [DISCONTINUED] ondansetron injection 4 mg    [DISCONTINUED] oxyCODONE immediate release tablet 5 mg    [DISCONTINUED] pantoprazole EC tablet 40 mg    [DISCONTINUED] prochlorperazine injection Soln 5 mg    [DISCONTINUED] senna-docusate 8.6-50 mg per tablet 1 tablet    [DISCONTINUED] simethicone chewable tablet 80 mg    [DISCONTINUED] sodium chloride 0.9% flush 10 mL    [DISCONTINUED] triamcinolone acetonide 0.1% cream    [DISCONTINUED] vancomycin - pharmacy to dose    [DISCONTINUED] vancomycin 500 mg in dextrose 5 % 100 mL IVPB (ready to mix)     Current Outpatient Medications on File Prior to Encounter   Medication Sig    ammonium lactate 12 % Crea Apply topically 2 (two) times a day.    artificial tears,hypromellose,,GENTEAL/SUSTANE, 0.3 % Gel Apply to eye nightly.    aspirin (ECOTRIN) 81 MG EC tablet Take 1 tablet (81 mg total) by mouth once daily.    atorvastatin (LIPITOR) 80 MG tablet Take 1 tablet (80 mg total) by mouth every evening.    camphor-menthol 0.5-0.5% (SARNA) lotion Apply  topically once daily. APPLY SMALL AMOUNT TOPICALLY TO AFFECTED AREA(S) AS NEEDED FOR ITCHING KEEP IN FRIDGE    carboxymethylcellulose sodium 0.5 % Drop Apply 1 drop to eye nightly as needed (dry eyes).    carvediloL (COREG) 12.5 MG tablet Take 0.5 tablets (6.25 mg total) by mouth 2 (two) times daily.    cetirizine (ZYRTEC) 5 MG tablet Take 1 tablet (5 mg total) by mouth every 48 hours.    clobetasol 0.05% (TEMOVATE) 0.05 % Oint Apply topically 2 (two) times daily.    clopidogreL (PLAVIX) 75 mg tablet Take 1 tablet (75 mg total) by mouth once daily.    erythromycin (ROMYCIN) ophthalmic ointment Place a 1/2 inch ribbon of ointment into the lower eyelid. Four timex daily for 5 days    gabapentin (NEURONTIN) 100 MG capsule Take 1 capsule (100 mg total) by mouth every evening.    hydrophilic ointment (AQUABASE) Apply topically as needed for Dry Skin. APPLY MODERATE AMOUNT TOPICALLY TO AFFECTED AREA(S) TWICE A DAY AS NEEDED FOR DRY SKIN APPLY TO AREAS OF DRY SKIN OR OVER ENTIRE BODY.    lanolin alcohol-mineral oil-white petrolatum-ceres (EUCERIN) Crea cream Apply topically 2 (two) times daily as needed.    LIDOcaine (LIDODERM) 5 % Place 1 patch onto the skin once daily. Remove & Discard patch within 12 hours or as directed by MD    loratadine (CLARITIN) 10 mg tablet Take 10 mg by mouth daily as needed for Allergies (Every other day).    nut.tx.imp.renal fxn,lac-reduc (NEPRO CARB STEADY) 0.08 gram-1.8 kcal/mL Liqd Take 240 mLs by mouth 2 (two) times a day.    ondansetron (ZOFRAN-ODT) 4 MG TbDL Take 1 tablet (4 mg total) by mouth every 8 (eight) hours as needed (nausea).    oxyCODONE-acetaminophen (PERCOCET) 5-325 mg per tablet Take 1 tablet by mouth every 6 (six) hours as needed.    pantoprazole (PROTONIX) 20 MG tablet Take 20 mg by mouth 2 (two) times daily as needed.    pramoxine 1 % Lotn Apply topically 2 (two) times daily as needed (itching).    triamcinolone acetonide 0.1% (KENALOG) 0.1 % cream Apply topically 2 (two)  times daily as needed (itching).     Family History    None       Tobacco Use    Smoking status: Never    Smokeless tobacco: Never   Substance and Sexual Activity    Alcohol use: No    Drug use: No    Sexual activity: Not Currently     Review of Systems   Respiratory:  Negative for shortness of breath.    Gastrointestinal:  Negative for abdominal pain.   Skin:  Positive for wound.   Psychiatric/Behavioral:  Positive for confusion.      Objective:     Vital Signs (Most Recent):    Vital Signs (24h Range):  Temp:  [98.1 °F (36.7 °C)-100.9 °F (38.3 °C)] 98.1 °F (36.7 °C)  Pulse:  [] 85  Resp:  [9-42] 28  SpO2:  [92 %-100 %] 99 %  BP: ()/(40-55) 103/52        There is no height or weight on file to calculate BMI.     Physical Exam  Vitals and nursing note reviewed.   Constitutional:       General: He is in acute distress.      Appearance: He is ill-appearing and toxic-appearing. He is not diaphoretic.   HENT:      Head: Normocephalic and atraumatic.      Nose: Nose normal.      Mouth/Throat:      Mouth: Mucous membranes are dry.   Cardiovascular:      Rate and Rhythm: Normal rate and regular rhythm.   Pulmonary:      Effort: Pulmonary effort is normal.      Breath sounds: Normal breath sounds.      Comments: RA  Abdominal:      General: Bowel sounds are normal.      Palpations: Abdomen is soft.      Tenderness: There is no abdominal tenderness.      Hernia: A hernia (umbilical, reducible) is present.   Genitourinary:     Comments: Condom cath in place  Musculoskeletal:      Right lower leg: No edema.      Left lower leg: No edema.   Skin:     Comments: Hyperpigmented patches all over his skin. LUE AVF with active pulsatile red blood loss   Neurological:      Comments: Responds to his name, says no when asked about pain, but does not otherwise answer orientation questions                Significant Labs: All pertinent labs within the past 24 hours have been reviewed.    Significant Imaging: I have reviewed  all pertinent imaging results/findings within the past 24 hours.  Assessment/Plan:     Assessment & Plan  Septic shock  This patient has shock. The type of shock is distributive due to sepsis. The patient has the following evidence of shock: persistent hypotension and altered mental status. The patient will be admitted to an intensive care unit  - source= MRSA bacteremia  - suspect from fistula vs chronic skin wounds  - repeat cultures ordered  - echo ordered  - ID consulted  - continue vanc dosed by pharmacy   HTN (hypertension)  Patient's blood pressure range in the last 24 hours was: BP  Min: 70/42  Max: 117/56.The patient's inpatient anti-hypertensive regimen is listed below:  Current Antihypertensives       Plan  - currently in shock. Hold Bp Rx   HLD (hyperlipidemia)  - continue statin     Type 2 diabetes mellitus, without long-term current use of insulin  A1c:   Lab Results   Component Value Date    HGBA1C 5.7 (H) 04/02/2025     Meds: SSI PRN to maintain goal 140-180  accuchecks, hypoglycemic protocol      Coronary artery disease involving native coronary artery of native heart with refractory angina pectoris  Patient with known CAD s/p stent placement, which is controlled Will continue ASA, Plavix, and Statin and monitor for S/Sx of angina/ACS. Continue to monitor on telemetry.   ESRD on hemodialysis  - ESRD on HD via LUE AVF that is actively bleeding and likely not safe to use for HD  - he has no signs on labs or exam that he emergently needs HD today  - Nephrology is consulted  - repeat CMP ordered  - will need to discuss method of HD tomorrow- iHD vs CRRT if still shock- and may need trialysis placement  - Vascular surgery is emergently consulted for bleeding AVF  Anemia in ESRD (end-stage renal disease)  Anemia is likely due to  ESRD, blood loss . Most recent hemoglobin and hematocrit are listed below.  Recent Labs     04/01/25  2220 04/02/25  0537 04/03/25  0713   HGB 7.6* 7.1* 8.7*   HCT 23.2* 23.4*  26.5*     Plan  - Monitor serial CBC: Daily and recheck now  - Transfuse PRBC if patient becomes hemodynamically unstable, symptomatic or H/H drops below 7/21.  - Patient has not received any PRBC transfusions to date  - Patient's anemia is currently stable    VTE Risk Mitigation (From admission, onward)           Ordered     IP VTE HIGH RISK PATIENT  Once         04/03/25 1516     Place TEMO hose  Until discontinued         04/03/25 1516     Place sequential compression device  Until discontinued         04/03/25 1516     Reason for No Pharmacological VTE Prophylaxis  Once        Question:  Reasons:  Answer:  Physician Provided (leave comment)    04/03/25 1516                  Critical care time spent on the evaluation and treatment of severe organ dysfunction, review of pertinent labs and imaging studies, discussions with consulting providers and discussions with patient/family: 45 minutes.       3:51 PM Called dez Smyth to update her. She says he has been dealing with bleeding fistula on/off for over a week.            Eileen Jordan MD  Department of Hospital Medicine  [facility] - Intensive Care

## 2025-04-03 NOTE — Clinical Note
Right: Chest and Neck.   Scrubbed with Chlorhexidine/Alcohol.    Hair: N/A.  Skin prep dry before draping.  Prepped by: Michele Banegas RT 4/14/2025 9:40 AM.

## 2025-04-03 NOTE — ANESTHESIA PROCEDURE NOTES
Intubation    Date/Time: 4/3/2025 5:26 PM    Performed by: Aleyda Ordonez CRNA  Authorized by: Jus Hatfield MD    Intubation:     Induction:  Rapid sequence induction    Intubated:  Postinduction    Mask Ventilation:  Not attempted    Attempts:  1    Attempted By:  CRNA    Method of Intubation:  Video laryngoscopy    Blade:  Tapia 3    Laryngeal View Grade: Grade I - full view of cords      Difficult Airway Encountered?: No      Complications:  None    Airway Device:  Oral endotracheal tube    Airway Device Size:  7.5    Style/Cuff Inflation:  Cuffed (inflated to minimal occlusive pressure)    Tube secured:  21    Secured at:  The lips    Placement Verified By:  Capnometry    Complicating Factors:  None    Findings Post-Intubation:  BS equal bilateral and atraumatic/condition of teeth unchanged

## 2025-04-03 NOTE — ASSESSMENT & PLAN NOTE
This patient has shock. The type of shock is distributive due to sepsis. The patient has the following evidence of shock: persistent hypotension and altered mental status. The patient will be admitted to an intensive care unit  - source= MRSA bacteremia  - suspect from fistula vs chronic skin wounds  - repeat cultures ordered  - echo ordered  - ID consulted  - continue vanc dosed by pharmacy

## 2025-04-03 NOTE — ASSESSMENT & PLAN NOTE
- ESRD on HD via LUE AVF that is actively bleeding and likely not safe to use for HD  - he has no signs on labs or exam that he emergently needs HD today  - Nephrology is consulted  - repeat CMP ordered  - will need to discuss method of HD tomorrow- iHD vs CRRT if still shock- and may need trialysis placement  - Vascular surgery is emergently consulted for bleeding AVF

## 2025-04-03 NOTE — PLAN OF CARE
Pt came from [place] on RA.  NSR on the monitor.  Afebrile.  Disoriented to time, place and situation.  Upon arrival pt with bright red blood pulsating from fistula site.  MD at bedside and pressure help per MD order.  Vascular MD at bedside with plans to take patient for emergent surgery.  Levophed weaned off, MAP>65 maintained.  Plan of care reviewed with patients niece.  No injuries, falls or skin breakdown occurred.

## 2025-04-03 NOTE — ASSESSMENT & PLAN NOTE
Patient's blood pressure range in the last 24 hours was: BP  Min: 70/42  Max: 117/56.The patient's inpatient anti-hypertensive regimen is listed below:  Current Antihypertensives       Plan  - currently in shock. Hold Bp Rx

## 2025-04-03 NOTE — ASSESSMENT & PLAN NOTE
A1c:   Lab Results   Component Value Date    HGBA1C 5.7 (H) 04/02/2025     Meds: SSI PRN to maintain goal 140-180  accuchecks, hypoglycemic protocol

## 2025-04-03 NOTE — ANESTHESIA PREPROCEDURE EVALUATION
[facility]  Anesthesia Pre-Operative Evaluation         Patient Name: Jevon Rajan  YOB: 1940  MRN: [id number]    SUBJECTIVE:     Pre-operative evaluation for Procedure(s) (LRB):  EXPLORATION, ARTERY, UPPER EXTREMITY (Left)     04/03/2025    Jevon Rajan is a 85 y.o. male w/ a significant PMHx of HTN, HLD, T2DM, HFpEF, CAD s/p XU 01/2025, ESRD on hemodialysis, LUE AVF hemorrhage, anemia    Patient now presents for the above procedure(s).      LDA: None documented.    Prev airway: None documented.    Drips: None documented.    Problem List[1]    Review of patient's allergies indicates:   Allergen Reactions    Ace inhibitors Hives, Itching, Shortness Of Breath, Other (See Comments) and Rash     Is not sure which medication it was    Captopril        Current Outpatient Medications:  Current Medications[2]    Past Surgical History:   Procedure Laterality Date    ANGIOGRAM, CORONARY, WITH LEFT HEART CATHETERIZATION  1/13/2025    Procedure: Angiogram, Coronary, with Left Heart Cath;  Surgeon: Steve Bhat MD;  Location: [facility] CATH LAB/EP;  Service: Cardiology;;    ATHERECTOMY, CORONARY N/A 1/14/2025    Procedure: Atherectomy-coronary;  Surgeon: Giacomo Pittman MD;  Location: [facility] CATH LAB/EP;  Service: Cardiology;  Laterality: N/A;    bilateral foot surgery      CORONARY ANGIOPLASTY WITH STENT PLACEMENT N/A 1/14/2025    Procedure: ANGIOPLASTY, CORONARY ARTERY, WITH STENT INSERTION;  Surgeon: Giacomo Pittman MD;  Location: [facility] CATH LAB/EP;  Service: Cardiology;  Laterality: N/A;    CORONARY STENT PLACEMENT N/A 1/13/2025    Procedure: INSERTION, STENT, CORONARY ARTERY;  Surgeon: Steve Bhat MD;  Location: [facility] CATH LAB/EP;  Service: Cardiology;  Laterality: N/A;    ESOPHAGOGASTRODUODENOSCOPY N/A 2/27/2024    Procedure: EGD (ESOPHAGOGASTRODUODENOSCOPY);  Surgeon: Gemma Almendarez MD;  Location: [facility];  Service: Endoscopy;  Laterality: N/A;    eyelid surgery       FISTULOGRAM Left 11/27/2019    Procedure: Fistulogram;  Surgeon: MELANY Brenner III, MD;  [facility];  Service: Peripheral Vascular;  Laterality: Left;    FISTULOGRAM Left 11/27/2019    Procedure: Fistulogram, AVG declot, possible permacath;  Surgeon: MELANY Brenner III, MD;  Location: [facility] CATH LAB;  Service: Peripheral Vascular;  Laterality: Left;    INSERTION OF DIALYSIS CATHETER      IVUS, CORONARY  1/13/2025    Procedure: IVUS, Coronary;  Surgeon: Steve Bhat MD;  Location: [facility] CATH LAB/EP;  Service: Cardiology;;    LEFT HEART CATHETERIZATION Left 1/14/2025    Procedure: Left heart cath;  Surgeon: Giacomo Pittman MD;  Location: [facility] CATH LAB/EP;  Service: Cardiology;  Laterality: Left;    MOH's  12/5/13    PERCUTANEOUS CORONARY INTERVENTION, ARTERY N/A 1/14/2025    Procedure: Percutaneous coronary intervention;  Surgeon: Giacomo Pittman MD;  Location: [facility] CATH LAB/EP;  Service: Cardiology;  Laterality: N/A;    PERCUTANEOUS TRANSLUMINAL BALLOON ANGIOPLASTY OF CORONARY ARTERY  1/13/2025    Procedure: Angioplasty-coronary;  Surgeon: Steve Bhat MD;  Location: [facility] CATH LAB/EP;  Service: Cardiology;;    REVASCULARIZATION, ENDOVASCULAR, LE W/ INTRAVASCULAR LITHOTRIPSY  1/13/2025    Procedure: REVASCULARIZATION, ENDOVASCULAR, LE W/ INTRAVASCULAR LITHOTRIPSY;  Surgeon: Steve Bhat MD;  Location: [facility] CATH LAB/EP;  Service: Cardiology;;    right hand surgery      stents         Social History[3]    OBJECTIVE:     Vital Signs Range (Last 24H):  Temp:  [36.2 °C (97.2 °F)-38.3 °C (100.9 °F)]   Pulse:  []   Resp:  [9-46]   BP: ()/(41-61)   SpO2:  [92 %-100 %]       Significant Labs:  Lab Results   Component Value Date    WBC 18.03 (H) 04/03/2025    HGB 7.2 (L) 04/03/2025    HCT 22.7 (L) 04/03/2025     (L) 04/03/2025    CHOL 110 (L) 08/11/2024    TRIG 126 08/11/2024    HDL 26 (L) 08/11/2024    ALT 15 04/03/2025    AST 25 04/03/2025     04/03/2025    K  3.9 04/03/2025     04/03/2025    CREATININE 8.7 (H) 04/03/2025    BUN 62 (H) 04/03/2025    CO2 22 (L) 04/03/2025    TSH 4.590 (H) 04/02/2025    INR 1.1 04/03/2025    HGBA1C 5.7 (H) 04/02/2025       Diagnostic Studies: No relevant studies.    EKG:   Results for orders placed or performed during the hospital encounter of 04/01/25   EKG 12-lead    Collection Time: 04/03/25  3:05 AM   Result Value Ref Range    QRS Duration 90 ms    [facility] QTC Calculation 445 ms    Narrative    Test Reason : R07.9,    Vent. Rate :  92 BPM     Atrial Rate :  92 BPM     P-R Int : 148 ms          QRS Dur :  90 ms      QT Int : 360 ms       P-R-T Axes :  60   1  85 degrees    QTcB Int : 445 ms    Normal sinus rhythm  Possible Left atrial enlargement  Cannot rule out Anterior infarct ,age undetermined  T wave abnormality, consider lateral ischemia  Abnormal ECG  When compared with ECG of 02-Apr-2025 22:10,  The axis Shifted right  ST no longer depressed in Inferior leads    Referred By: KARTIK SALGADO           Confirmed By:        2D ECHO:  TTE:  Results for orders placed or performed during the hospital encounter of 09/11/23   Echo   Result Value Ref Range    BSA 1.85 m2    Fry's Biplane MOD Ejection Fraction 52 %    LVOT stroke volume 58.41 cm3    LVIDd 3.69 3.5 - 6.0 cm    LV Systolic Volume 28.69 mL    LV Systolic Volume Index 15.5 mL/m2    LVIDs 2.77 2.1 - 4.0 cm    LV Diastolic Volume 57.72 mL    LV Diastolic Volume Index 31.20 mL/m2    IVS 1.04 0.6 - 1.1 cm    LVOT diameter 2.13 cm    LVOT area 3.6 cm2    FS 25 (A) 28 - 44 %    Left Ventricle Relative Wall Thickness 0.57 cm    PW 1.05 0.6 - 1.1 cm    LV mass 119.46 g    LV Mass Index 65 g/m2    MV Peak E Aldair 0.41 m/s    TDI LATERAL 0.13 m/s    TDI SEPTAL 0.05 m/s    E/E' ratio 4.56 m/s    MV Peak A [place] 0.89 m/s    E/A ratio 0.46     LV SEPTAL E/E' RATIO 8.20 m/s    LV LATERAL E/E' RATIO 3.15 m/s    LVOT peak aldair 0.90 m/s    Left Ventricular Outflow Tract Mean Velocity 0.73 cm/s     Left Ventricular Outflow Tract Mean Gradient 2.25 mmHg    LA Vol (MOD) 33.50 cm3    ROLANDO (MOD) 18.1 mL/m2    LA size 3.88 cm    Left Atrium Major Axis 4.64 cm    Left Atrium Minor Axis 3.32 cm    RVDD 1.75 cm    RV Basal Diameter 6.30 cm    RV mid diameter 1.48 cm    RA Area 10.0 cm2    RA Major Axis 4.46 cm    RA Width 2.20 cm    RA Vol 18.87 mL    AV regurgitation pressure 1/2 time [id number] ms    AR Max Aldair 3.22 m/s    AV mean gradient 3 mmHg    AV peak gradient 6 mmHg    Ao peak aldair 1.22 m/s    Ao VTI 22.50 cm    LVOT peak VTI 16.40 cm    AV valve area 2.60 cm²    AV Velocity Ratio 0.74     AV index (prosthetic) 0.73     CARINE by Velocity Ratio 2.63 cm²    MV mean gradient 2 mmHg    MV peak gradient 5 mmHg    MV valve area by continuity eq 3.38 cm2    MV VTI 17.3 cm    PV PEAK VELOCITY 0.91 m/s    PV peak gradient 3 mmHg    Pulmonary Valve Mean Velocity 0.54 m/s    Ao root annulus 3.03 cm    Sinus 3.31 cm    STJ 2.72 cm    Ascending aorta 3.16 cm    IVC diameter 1.20 cm    Mean e' 0.09 m/s    ZLVIDS -1.07     ZLVIDD -3.31     AORTIC VALVE CUSP SEPERATION 1.89 cm    ROLANDO 20.0 mL/m2    LA Vol 37.02 cm3    LA WIDTH 2.9 cm    Est. RA pres 3 mmHg    Narrative      Left Ventricle: The left ventricle is normal in size. Increased wall   thickness. Mild basal septal thickening. See diagram for wall motion   findings. There is low normal systolic function with a visually estimated   ejection fraction of 50 - 55%. Grade I diastolic dysfunction.    Left Atrium: Left atrium is mildly dilated.    Right Ventricle: Normal right ventricular cavity size. Wall thickness   is normal. Right ventricle wall motion  is normal. Systolic function is   normal.    Pulmonic Valve: There is mild regurgitation.    Pulmonary Artery: No pulmonary hypertension.         GUICHO:  No results found for this or any previous visit.    ASSESSMENT/PLAN:           Pre-op Assessment    I have reviewed the Patient Summary Reports.     I have  reviewed the Nursing Notes. I have reviewed the NPO Status.   I have reviewed the Medications.     Review of Systems  Anesthesia Hx:  No problems with previous Anesthesia                Social:  Non-Smoker, No Alcohol Use       Hematology/Oncology:       -- Anemia (Hgb 7.2):                                  Cardiovascular:     Hypertension   CAD      Angina     hyperlipidemia    TTE 11/30/23: 1. Normal left ventricular cavity size. Mildly depressed left ventricular systolic   function. LVEF 40 - 45%.   2. Normal right ventricular size. Normal right ventricular systolic function.   3. Mild mitral valve regurgitation.   4. Mild aortic valve regurgitation.   5. Moderately dilated aortic root.                              Pulmonary:      Shortness of breath                  Renal/:  Chronic Renal Disease, ESRD, Dialysis  BPH              Neurological:           Encephalophathy                            Endocrine:  Diabetes Hypothyroidism              Physical Exam  General: Alert, Confusion and Cooperative    Airway:  Mallampati: III   Mouth Opening: Normal  TM Distance: Normal  Tongue: Normal  Neck ROM: Normal ROM    Dental:  Partial Dentures    Chest/Lungs:  Normal Respiratory Rate    Heart:  Rate: Normal  Rhythm: Regular Rhythm        Anesthesia Plan  Type of Anesthesia, risks & benefits discussed:    Anesthesia Type: Gen ETT  Intra-op Monitoring Plan: Standard ASA Monitors  Post Op Pain Control Plan: multimodal analgesia and IV/PO Opioids PRN  Induction:  rapid sequence and IV  Airway Plan: Video, Post-Induction  Informed Consent: Informed consent signed with the Patient representative and all parties understand the risks and agree with anesthesia plan.  All questions answered. Patient consented to blood products? Yes  ASA Score: 4 Emergent  Anesthesia Plan Notes: GETA with RSI. Hgb 7.2, plan to infuse 2u pRBCs intraoperatively. Consent obtained from patient's niece via telephone    Ready For Surgery From  Anesthesia Perspective.     .           [1]   Patient Active Problem List  Diagnosis    Mohs defect of nathanael duvall    HTN (hypertension)    HLD (hyperlipidemia)    Type 2 diabetes mellitus, without long-term current use of insulin    Coronary artery disease involving native coronary artery of native heart with refractory angina pectoris    ESRD on hemodialysis    Renal mass, left    AV graft thrombosis    Thrombosis of kidney dialysis arteriovenous graft    ESRD on dialysis    Aortic atherosclerosis    Anemia in ESRD (end-stage renal disease)    Liver mass    DM2 (diabetes mellitus, type 2)    Inadequate dietary intake of protein    Rash    Dry eyes    Senile debility    Septic shock    Septic encephalopathy   [2]   Current Facility-Administered Medications:     0.9%  NaCl infusion (for blood administration), , Intravenous, Q24H PRN, Lisa Hernandez PA-C    acetaminophen tablet 650 mg, 650 mg, Oral, Q4H PRN, Eileen Jordan MD    [START ON 4/4/2025] aspirin EC tablet 81 mg, 81 mg, Oral, Daily, Eileen Jordan MD    atorvastatin tablet 80 mg, 80 mg, Oral, QHS, Eileen Jordan MD    cefTRIAXone injection 2 g, 2 g, Intravenous, Q24H, Eileen Jordan MD, 2 g at 04/03/25 1719    [START ON 4/4/2025] clopidogreL tablet 75 mg, 75 mg, Oral, Daily, Eileen Jordan MD    dextrose 50% injection 12.5 g, 12.5 g, Intravenous, PRN, Eileen Jordan MD    dextrose 50% injection 25 g, 25 g, Intravenous, PRN, Eileen Jordan MD    glucagon (human recombinant) injection 1 mg, 1 mg, Intramuscular, PRN, Eileen Jordan MD    glucose chewable tablet 16 g, 16 g, Oral, PRN, Eileen Jordan MD    glucose chewable tablet 24 g, 24 g, Oral, PRN, Eileen Jordan MD    insulin aspart U-100 pen 0-5 Units, 0-5 Units, Subcutaneous, QID (AC + HS) PRN, Eileen Jordan MD    melatonin tablet 6 mg, 6 mg, Oral, Nightly PRN, Eileen Jordan MD    naloxone 0.4 mg/mL injection 0.02 mg, 0.02 mg, Intravenous, PRN, Van  Eileen Chambers MD    ondansetron injection 4 mg, 4 mg, Intravenous, Q6H PRN, Eileen Jordan MD    [START ON 4/4/2025] pantoprazole EC tablet 40 mg, 40 mg, Oral, Daily, Eileen Jordan MD    prochlorperazine injection Soln 5 mg, 5 mg, Intravenous, Q6H PRN, Eileen Jordan MD    senna-docusate 8.6-50 mg per tablet 1 tablet, 1 tablet, Oral, BID, Eileen Jordan MD    sodium chloride 0.9% flush 10 mL, 10 mL, Intravenous, Q8H, Eileen Jordan MD    Pharmacy to dose Vancomycin consult, , , Once **AND** vancomycin - pharmacy to dose, , Intravenous, pharmacy to manage frequency, Eileen Jordan MD    Facility-Administered Medications Ordered in Other Encounters:     etomidate injection, , Intravenous, PRN, José Luis, Aleyda, CRNA, 8 mg at 04/03/25 1725    fentaNYL 50 mcg/mL injection, , Intravenous, PRN, José Luis, Aleyda, CRNA, 100 mcg at 04/03/25 1725    lactated ringers infusion, , Intravenous, Continuous PRN, José Luis, Aleyda, CRNA, New Bag at 04/03/25 1713    LIDOcaine (cardiac) injection, , Intravenous, PRN, José Luis, Aleyda, CRNA, 100 mg at 04/03/25 1725    PHENYLephrine injection, , Intravenous, PRN, José Luis, Aleyda, CRNA, 100 mcg at 04/03/25 1748    rocuronium injection, , Intravenous, PRN, José Luis, Aleyda, CRNA, 20 mg at 04/03/25 1734    succinylcholine injection, , Intravenous, PRN, José Luis, Aleyda, CRNA, 100 mg at 04/03/25 1725  [3]   Social History  Socioeconomic History    Marital status: Single   Tobacco Use    Smoking status: Never    Smokeless tobacco: Never   Substance and Sexual Activity    Alcohol use: No    Drug use: No    Sexual activity: Not Currently     Social Drivers of Health     Financial Resource Strain: Patient Unable To Answer (4/3/2025)    Overall Financial Resource Strain (CARDIA)     Difficulty of Paying Living Expenses: Patient unable to answer   Food Insecurity: Patient Unable To Answer (4/3/2025)    Hunger Vital Sign     Worried About Running Out of Food in the Last Year: Patient unable to  answer     Ran Out of Food in the Last Year: Patient unable to answer   Transportation Needs: Patient Unable To Answer (4/3/2025)    PRAPARE - Transportation     Lack of Transportation (Medical): Patient unable to answer     Lack of Transportation (Non-Medical): Patient unable to answer   Recent Concern: Transportation Needs - Unmet Transportation Needs (1/15/2025)    TRANSPORTATION NEEDS     Transportation : Yes, it has kept me from medical appointments or from getting my medications.   Physical Activity: Patient Unable To Answer (4/2/2025)    Exercise Vital Sign     Days of Exercise per Week: Patient unable to answer     Minutes of Exercise per Session: Patient unable to answer   Recent Concern: Physical Activity - Insufficiently Active (1/15/2025)    Exercise Vital Sign     Days of Exercise per Week: 5 days     Minutes of Exercise per Session: 20 min   Stress: Patient Unable To Answer (4/3/2025)    [employer] - Occupational Stress Questionnaire     Feeling of Stress : Patient unable to answer   Housing Stability: Patient Unable To Answer (4/3/2025)    Housing Stability Vital Sign     Unable to Pay for Housing in the Last Year: Patient unable to answer     Number of Times Moved in the Last Year: 0     Homeless in the Last Year: Patient unable to answer

## 2025-04-03 NOTE — PROGRESS NOTES
Pharmacokinetic Assessment Follow Up: IV Vancomycin    Vancomycin serum concentration assessment(s):    The random level was drawn correctly and can be used to guide therapy at this time. The measurement is within the desired definitive target range of 10 to 20 mcg/mL.    Vancomycin Regimen Plan:    No dialysis plans for 4/3/25. Will hold dose today since patient is therapeutically in range.   Re-dose when the random level is less than 20 mcg/mL, next level to be drawn at 0300 on 4/4/25    Drug levels (last 3 results):  Recent Labs   Lab Result Units 04/03/25  0713   Vancomycin Random ug/ml 15.4       Pharmacy will continue to follow and monitor vancomycin.    Please contact pharmacy at extension [phone] for questions regarding this assessment.    Thank you for the consult,   Leydi Hernandez       Patient brief summary:  Jevon Rajan is a 85 y.o. male initiated on antimicrobial therapy with IV Vancomycin for treatment of bacteremia        Drug Allergies:   Review of patient's allergies indicates:   Allergen Reactions    Ace inhibitors Hives, Itching, Shortness Of Breath, Other (See Comments) and Rash     Is not sure which medication it was    Captopril        Actual Body Weight:   N/A    Renal Function:   CrCl cannot be calculated (Unknown ideal weight.).,     Dialysis Method (if applicable):  intermittent HD     CBC (last 72 hours):  Recent Labs   Lab Result Units 04/01/25 2220 04/02/25  0537 04/03/25  0713   WBC K/uL 13.19* 14.52* 17.21*   HGB gm/dL 7.6* 7.1* 8.7*   Hemoglobin A1c %  --  5.7*  --    HCT % 23.2* 23.4* 26.5*   Platelet Count K/uL 195 205 147*   Lymph % % 2.7* 1.7* 3.0*   Mono % % 5.3 5.0 7.1   Eos % % 0.0 0.0 0.1   Basophil % % 0.2 0.1 0.2       Metabolic Panel (last 72 hours):  Recent Labs   Lab Result Units 04/01/25 2220 04/02/25  0537 04/02/25  2246 04/03/25  0713   Sodium mmol/L 139 140  --  138   Potassium mmol/L 3.9 3.7  --  4.3   Chloride mmol/L 97 96  --  100   CO2 mmol/L 25 25  --  21*    Glucose mg/dL 175* 138*  --  242*   Glucose, UA   --   --  1+*  --    BUN mg/dL 32* 34*  --  60*   Creatinine mg/dL 6.2* 6.8*  --  8.8*   Albumin g/dL 2.6* 2.5*  --  2.3*   Bilirubin Total mg/dL 0.7 0.7  --  0.5   ALP unit/L 61 60  --  57   AST unit/L 27 26  --  29   ALT unit/L 20 17  --  20   Magnesium  mg/dL 1.9 1.9  --  2.1   Phosphorus Level mg/dL  --  3.2  --  5.8*       Vancomycin Administrations:  vancomycin given in the last 96 hours                     vancomycin 1,500 mg in 0.9% NaCl 250 mL IVPB (admixture device) (mg) 1,500 mg New Bag 04/02/25 0310                    Microbiologic Results:  Microbiology Results (last 7 days)       ** No results found for the last 168 hours. **

## 2025-04-03 NOTE — HPI
Mr Jevon Rajan is a 85 y.o. man with ESRD with LUE AVF, HFpEF last EF 50-55%, CAD, DM who was transferred to [facility] ICU for septic shock due to MRSA bacteremia.     He was admitted at [facility] 4/1-3/2025 with sepsis, worsening to septic shock, then identified source as MRSA. 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     Upon arrival to [facility], he is moaning in pain and his LUE AVF has active pulsatile bright red blood. He does respond to his name. He denies pain anywhere other than his LUE. He is on levophed at 0.05.

## 2025-04-03 NOTE — CONSULTS
[facility]  CONSULT NOTE    Inpatient consult to Vascular Surgery  Consult performed by: Lisa Hernandez PA-C  Consult ordered by: Eileen Jordan MD      Reason for consult: concern about AVF    SUBJECTIVE     HPI: Jevon Rajan is a 85 y.o. male with PMHx significant for ESRD with LUE AV access, HFpEF, CAD who was transferred from [facility] to [facility] ICU for septic shock due to MRSA bacteremia.     He was admitted at [facility] 4/1-3/2025 with sepsis, worsening to septic shock, then identified source as MRSA. 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      Upon arrival to [facility], he is moaning in pain and his LUE AVF has active pulsatile bright red blood. He is on levophed at 0.05. Per ICU nurse, pt has soaked through multiple pressure dressings. Vascular surgery was consulted for emergent management of pulsatile bleeding of AV access.       Past Medical History:   Diagnosis Date    BPH (benign prostatic hyperplasia)     Coronary artery disease     [facility]. In 2014 he had stent to LAD. He states he has had 2 heart attacks. He does not get angina. There was 90% left anterior descending artery stenosis undergoing stenting. There was also a circumflex marginal branch stenosis of 70%.    Diabetes mellitus, type 2     ESRD (end stage renal disease) on dialysis     ESRD on HD TTS via left brachial AVF    Hypertension     Hypothyroidism, unspecified     Sepsis 4/2/2025    Symptomatic anemia 01/15/2024     Past Surgical History:   Procedure Laterality Date    ANGIOGRAM, CORONARY, WITH LEFT HEART CATHETERIZATION  1/13/2025    Procedure: Angiogram, Coronary, with Left Heart Cath;  Surgeon: Steve Bhat MD;  Location: [facility] CATH LAB/EP;  Service: Cardiology;;    ATHERECTOMY, CORONARY N/A 1/14/2025     Procedure: Atherectomy-coronary;  Surgeon: Giacomo Pittman MD;  Location: [facility] CATH LAB/EP;  Service: Cardiology;  Laterality: N/A;    bilateral foot surgery      CORONARY ANGIOPLASTY WITH STENT PLACEMENT N/A 1/14/2025    Procedure: ANGIOPLASTY, CORONARY ARTERY, WITH STENT INSERTION;  Surgeon: Giacomo Pittman MD;  Location: [facility] CATH LAB/EP;  Service: Cardiology;  Laterality: N/A;    CORONARY STENT PLACEMENT N/A 1/13/2025    Procedure: INSERTION, STENT, CORONARY ARTERY;  Surgeon: Steve Bhat MD;  Location: [facility] CATH LAB/EP;  Service: Cardiology;  Laterality: N/A;    ESOPHAGOGASTRODUODENOSCOPY N/A 2/27/2024    Procedure: EGD (ESOPHAGOGASTRODUODENOSCOPY);  Surgeon: Gemma Almendarez MD;  Location: [facility] ENDO;  Service: Endoscopy;  Laterality: N/A;    eyelid surgery      FISTULOGRAM Left 11/27/2019    Procedure: Fistulogram;  Surgeon: MELANY Brenner III, MD;  Location: [address];  Service: Peripheral Vascular;  Laterality: Left;    FISTULOGRAM Left 11/27/2019    Procedure: Fistulogram, AVG declot, possible permacath;  Surgeon: MELANY Brenner III, MD;  Location: [facility] CATH LAB;  Service: Peripheral Vascular;  Laterality: Left;    INSERTION OF DIALYSIS CATHETER      IVUS, CORONARY  1/13/2025    Procedure: IVUS, Coronary;  Surgeon: Steve Bhat MD;  Location: [facility] CATH LAB/EP;  Service: Cardiology;;    LEFT HEART CATHETERIZATION Left 1/14/2025    Procedure: Left heart cath;  Surgeon: Giacomo Pittman MD;  Location: [facility] CATH LAB/EP;  Service: Cardiology;  Laterality: Left;    MOH's  12/5/13    PERCUTANEOUS CORONARY INTERVENTION, ARTERY N/A 1/14/2025    Procedure: Percutaneous coronary intervention;  Surgeon: Giacomo Pittman MD;  Location: [facility] CATH LAB/EP;  Service: Cardiology;  Laterality: N/A;    PERCUTANEOUS TRANSLUMINAL BALLOON ANGIOPLASTY OF CORONARY ARTERY  1/13/2025    Procedure: Angioplasty-coronary;  Surgeon: Steve Bhat MD;  Location: [facility] CATH LAB/EP;  Service: Cardiology;;     REVASCULARIZATION, ENDOVASCULAR, LE W/ INTRAVASCULAR LITHOTRIPSY  1/13/2025    Procedure: REVASCULARIZATION, ENDOVASCULAR, LE W/ INTRAVASCULAR LITHOTRIPSY;  Surgeon: Steve Bhat MD;  Location: [facility] CATH LAB/EP;  Service: Cardiology;;    right hand surgery      stents       Prescriptions Prior to Admission[1]  Review of patient's allergies indicates:   Allergen Reactions    Ace inhibitors Hives, Itching, Shortness Of Breath, Other (See Comments) and Rash     Is not sure which medication it was    Captopril      No family history on file.  Social History[2]  Current Medications[3]    Review of Systems    OBJECTIVE     Vital Signs (Most Recent):  Temp: 97.2 °F (36.2 °C) (04/03/25 1545)  Pulse: 80 (04/03/25 1545)  Resp: (!) 29 (04/03/25 1604)  BP: (!) 117/56 (04/03/25 1545)  SpO2: (!) 93 % (04/03/25 1545) Vital Signs (24h Range):  Temp:  [97.2 °F (36.2 °C)-100.9 °F (38.3 °C)] 97.2 °F (36.2 °C)  Pulse:  [] 80  Resp:  [9-42] 29  SpO2:  [92 %-100 %] 93 %  BP: ()/(41-56) 117/56        Body mass index is 22.55 kg/m².    Physical Exam  Vitals reviewed.   Cardiovascular:      Comments: LUE AV, hemorrhaging   Musculoskeletal:      Comments: LUE: cold, mild weakness   Neurological:      Mental Status: He is alert.         Significant Labs:  CBC:   Recent Labs   Lab 04/03/25  0713   WBC 17.21*   RBC 3.02*   HGB 8.7*   HCT 26.5*   *   MCV 88   MCH 28.8   MCHC 32.8     CMP:   Recent Labs   Lab 04/03/25  0713   CALCIUM 8.4*   ALBUMIN 2.3*      K 4.3   CO2 21*      BUN 60*   CREATININE 8.8*   ALKPHOS 57   ALT 20   AST 29   BILITOT 0.5     All pertinent labs from the last 24 hours have been reviewed.  Lab Results   Component Value Date    K 4.3 04/03/2025    K 3.7 04/02/2025    K 3.9 04/01/2025    CREATININE 8.8 (H) 04/03/2025    CREATININE 6.8 (H) 04/02/2025    CREATININE 6.2 (H) 04/01/2025     Lab Results   Component Value Date    WBC 17.21 (H) 04/03/2025    WBC 14.52 (H) 04/02/2025    WBC  13.19 (H) 04/01/2025    HCT 26.5 (L) 04/03/2025    HCT 23.4 (L) 04/02/2025    HCT 23.2 (L) 04/01/2025     (L) 04/03/2025     04/02/2025     04/01/2025     Lab Results   Component Value Date    HGBA1C 5.7 (H) 04/02/2025    HGBA1C 5.2 08/11/2024    HGBA1C 5.1 07/09/2024       Significant Diagnostics: (I have personally reviewed the images/studies)  I have reviewed all pertinent imaging results/findings within the past 24 hours.       ASSESSMENT/PLAN        85 y.o. male with ESRD with LUE AV access presents with septic shock from MRSA bactermia and also found to be hemorrhaging from LUE AV access. Patient transferred in on levophed; stopped with BP stable. Bedside 2-0 suture placed for temporary control of hemorrhaging.     - To OR for emergent class A LUE AV graft  exploration, possible ligation, possible arterial repair and all other indicated procedures with Dr. Contreras   - Phone consent completed over phone with patient's niece          Thank you for your consult. I will follow-up with patient. Please contact us if you have any additional questions.      Lisa Hernandez PA-C  Vascular & Endovascular Surgery  [facility]         [1]   Medications Prior to Admission   Medication Sig Dispense Refill Last Dose/Taking    ammonium lactate 12 % Crea Apply topically 2 (two) times a day.       artificial tears,hypromellose,,GENTEAL/SUSTANE, 0.3 % Gel Apply to eye nightly.       aspirin (ECOTRIN) 81 MG EC tablet Take 1 tablet (81 mg total) by mouth once daily. 30 tablet 0     atorvastatin (LIPITOR) 80 MG tablet Take 1 tablet (80 mg total) by mouth every evening. 30 tablet 2     camphor-menthol 0.5-0.5% (SARNA) lotion Apply topically once daily. APPLY SMALL AMOUNT TOPICALLY TO AFFECTED AREA(S) AS NEEDED FOR ITCHING KEEP IN FRIDGE 222 mL 0     carboxymethylcellulose sodium 0.5 % Drop Apply 1 drop to eye nightly as needed (dry eyes).       carvediloL (COREG) 12.5 MG tablet Take 0.5  tablets (6.25 mg total) by mouth 2 (two) times daily. 30 tablet 0     cetirizine (ZYRTEC) 5 MG tablet Take 1 tablet (5 mg total) by mouth every 48 hours. 15 tablet 0     clobetasol 0.05% (TEMOVATE) 0.05 % Oint Apply topically 2 (two) times daily. 60 g 1     clopidogreL (PLAVIX) 75 mg tablet Take 1 tablet (75 mg total) by mouth once daily. 30 tablet 11     erythromycin (ROMYCIN) ophthalmic ointment Place a 1/2 inch ribbon of ointment into the lower eyelid. Four timex daily for 5 days 3.5 g 0     gabapentin (NEURONTIN) 100 MG capsule Take 1 capsule (100 mg total) by mouth every evening. 30 capsule 11     hydrophilic ointment (AQUABASE) Apply topically as needed for Dry Skin. APPLY MODERATE AMOUNT TOPICALLY TO AFFECTED AREA(S) TWICE A DAY AS NEEDED FOR DRY SKIN APPLY TO AREAS OF DRY SKIN OR OVER ENTIRE BODY. 113 g 0     lanolin alcohol-mineral oil-white petrolatum-ceres (EUCERIN) Crea cream Apply topically 2 (two) times daily as needed.       LIDOcaine (LIDODERM) 5 % Place 1 patch onto the skin once daily. Remove & Discard patch within 12 hours or as directed by MD 15 patch 0     loratadine (CLARITIN) 10 mg tablet Take 10 mg by mouth daily as needed for Allergies (Every other day).       nut.tx.imp.renal fxn,lac-reduc (NEPRO CARB STEADY) 0.08 gram-1.8 kcal/mL Liqd Take 240 mLs by mouth 2 (two) times a day.       ondansetron (ZOFRAN-ODT) 4 MG TbDL Take 1 tablet (4 mg total) by mouth every 8 (eight) hours as needed (nausea). 12 tablet 0     oxyCODONE-acetaminophen (PERCOCET) 5-325 mg per tablet Take 1 tablet by mouth every 6 (six) hours as needed. 12 tablet 0     pantoprazole (PROTONIX) 20 MG tablet Take 20 mg by mouth 2 (two) times daily as needed.       pramoxine 1 % Lotn Apply topically 2 (two) times daily as needed (itching).       triamcinolone acetonide 0.1% (KENALOG) 0.1 % cream Apply topically 2 (two) times daily as needed (itching).      [2]   Social History  Socioeconomic History    Marital status: Single    Tobacco Use    Smoking status: Never    Smokeless tobacco: Never   Substance and Sexual Activity    Alcohol use: No    Drug use: No    Sexual activity: Not Currently     Social Drivers of Health     Financial Resource Strain: Patient Unable To Answer (4/3/2025)    Overall Financial Resource Strain (CARDIA)     Difficulty of Paying Living Expenses: Patient unable to answer   Food Insecurity: Patient Unable To Answer (4/3/2025)    Hunger Vital Sign     Worried About Running Out of Food in the Last Year: Patient unable to answer     Ran Out of Food in the Last Year: Patient unable to answer   Transportation Needs: Patient Unable To Answer (4/3/2025)    PRAPARE - Transportation     Lack of Transportation (Medical): Patient unable to answer     Lack of Transportation (Non-Medical): Patient unable to answer   Recent Concern: Transportation Needs - Unmet Transportation Needs (1/15/2025)    TRANSPORTATION NEEDS     Transportation : Yes, it has kept me from medical appointments or from getting my medications.   Physical Activity: Patient Unable To Answer (4/2/2025)    Exercise Vital Sign     Days of Exercise per Week: Patient unable to answer     Minutes of Exercise per Session: Patient unable to answer   Recent Concern: Physical Activity - Insufficiently Active (1/15/2025)    Exercise Vital Sign     Days of Exercise per Week: 5 days     Minutes of Exercise per Session: 20 min   Stress: Patient Unable To Answer (4/3/2025)    [employer] - Occupational Stress Questionnaire     Feeling of Stress : Patient unable to answer   Housing Stability: Patient Unable To Answer (4/3/2025)    Housing Stability Vital Sign     Unable to Pay for Housing in the Last Year: Patient unable to answer     Number of Times Moved in the Last Year: 0     Homeless in the Last Year: Patient unable to answer   [3]   Current Facility-Administered Medications:     acetaminophen tablet 650 mg, 650 mg, Oral, Q4H PRN, Van  Eileen Chambers MD    [START ON 4/4/2025] aspirin EC tablet 81 mg, 81 mg, Oral, Daily, Eileen Jordan MD    atorvastatin tablet 80 mg, 80 mg, Oral, QHS, Eileen Jordan MD    cefTRIAXone injection 2 g, 2 g, Intravenous, Q24H, Eileen Jordan MD    [START ON 4/4/2025] clopidogreL tablet 75 mg, 75 mg, Oral, Daily, Eileen Jordan MD    dextrose 50% injection 12.5 g, 12.5 g, Intravenous, PRN, Eileen Jordan MD    dextrose 50% injection 25 g, 25 g, Intravenous, PRN, Eileen Jordan MD    glucagon (human recombinant) injection 1 mg, 1 mg, Intramuscular, PRN, Eileen Jordan MD    glucose chewable tablet 16 g, 16 g, Oral, PRN, Eileen Jordan MD    glucose chewable tablet 24 g, 24 g, Oral, PRN, Eileen Jordan MD    insulin aspart U-100 pen 0-5 Units, 0-5 Units, Subcutaneous, QID (AC + HS) PRN, Eileen Jordan MD    melatonin tablet 6 mg, 6 mg, Oral, Nightly PRN, Eileen Jordan MD    naloxone 0.4 mg/mL injection 0.02 mg, 0.02 mg, Intravenous, PRN, Eileen Jordan MD    ondansetron injection 4 mg, 4 mg, Intravenous, Q6H PRN, Eileen Jordan MD    [START ON 4/4/2025] pantoprazole EC tablet 40 mg, 40 mg, Oral, Daily, Eileen Jordan MD    prochlorperazine injection Soln 5 mg, 5 mg, Intravenous, Q6H PRN, Eileen Jordan MD    senna-docusate 8.6-50 mg per tablet 1 tablet, 1 tablet, Oral, BID, Eileen Jordan MD    sodium chloride 0.9% flush 10 mL, 10 mL, Intravenous, Q8H, Eileen Jordan MD    Pharmacy to dose Vancomycin consult, , , Once **AND** vancomycin - pharmacy to dose, , Intravenous, pharmacy to manage frequency, Eileen Jordan MD

## 2025-04-04 PROBLEM — I33.0 ACUTE BACTERIAL ENDOCARDITIS: Status: ACTIVE | Noted: 2025-04-04

## 2025-04-04 PROBLEM — R78.81 MRSA BACTEREMIA: Status: ACTIVE | Noted: 2025-04-04

## 2025-04-04 PROBLEM — I21.4 NSTEMI (NON-ST ELEVATED MYOCARDIAL INFARCTION): Status: ACTIVE | Noted: 2025-04-04

## 2025-04-04 PROBLEM — Z71.89 ACP (ADVANCE CARE PLANNING): Status: ACTIVE | Noted: 2025-04-04

## 2025-04-04 PROBLEM — B95.62 MRSA BACTEREMIA: Status: ACTIVE | Noted: 2025-04-04

## 2025-04-04 LAB
ABSOLUTE EOSINOPHIL (OHS): 0.13 K/UL
ABSOLUTE MONOCYTE (OHS): 0.96 K/UL (ref 0.3–1)
ABSOLUTE NEUTROPHIL COUNT (OHS): 14.09 K/UL (ref 1.8–7.7)
ALBUMIN SERPL BCP-MCNC: 1.9 G/DL (ref 3.5–5.2)
ALBUMIN SERPL BCP-MCNC: 2 G/DL (ref 3.5–5.2)
ALBUMIN SERPL BCP-MCNC: 2.1 G/DL (ref 3.5–5.2)
ALP SERPL-CCNC: 70 UNIT/L (ref 40–150)
ALT SERPL W/O P-5'-P-CCNC: 17 UNIT/L (ref 10–44)
ANION GAP (OHS): 15 MMOL/L (ref 8–16)
ANION GAP (OHS): 16 MMOL/L (ref 8–16)
ANION GAP (OHS): 19 MMOL/L (ref 8–16)
ASCENDING AORTA: 3.02 CM
AST SERPL-CCNC: 23 UNIT/L (ref 11–45)
AV INDEX (PROSTH): 0.51
AV MEAN GRADIENT: 5 MMHG
AV PEAK GRADIENT: 9 MMHG
AV REGURGITATION PRESSURE HALF TIME: 424 MS
AV VALVE AREA BY VELOCITY RATIO: 1.6 CM²
AV VALVE AREA: 1.8 CM²
AV VELOCITY RATIO: 0.47
BASOPHILS # BLD AUTO: 0.05 K/UL
BASOPHILS NFR BLD AUTO: 0.3 %
BILIRUB SERPL-MCNC: 0.3 MG/DL (ref 0.1–1)
BSA FOR ECHO PROCEDURE: 1.7 M2
BUN SERPL-MCNC: 68 MG/DL (ref 8–23)
BUN SERPL-MCNC: 75 MG/DL (ref 8–23)
BUN SERPL-MCNC: 84 MG/DL (ref 8–23)
CALCIUM SERPL-MCNC: 8.1 MG/DL (ref 8.7–10.5)
CALCIUM SERPL-MCNC: 8.2 MG/DL (ref 8.7–10.5)
CALCIUM SERPL-MCNC: 8.4 MG/DL (ref 8.7–10.5)
CHLORIDE SERPL-SCNC: 101 MMOL/L (ref 95–110)
CHLORIDE SERPL-SCNC: 101 MMOL/L (ref 95–110)
CHLORIDE SERPL-SCNC: 103 MMOL/L (ref 95–110)
CO2 SERPL-SCNC: 19 MMOL/L (ref 23–29)
CO2 SERPL-SCNC: 21 MMOL/L (ref 23–29)
CO2 SERPL-SCNC: 23 MMOL/L (ref 23–29)
CREAT SERPL-MCNC: 10.8 MG/DL (ref 0.5–1.4)
CREAT SERPL-MCNC: 8.5 MG/DL (ref 0.5–1.4)
CREAT SERPL-MCNC: 9.9 MG/DL (ref 0.5–1.4)
CV ECHO LV RWT: 0.35 CM
DOP CALC AO PEAK VEL: 1.5 M/S
DOP CALC AO VTI: 28.4 CM
DOP CALC LVOT AREA: 3.5 CM2
DOP CALC LVOT DIAMETER: 2.1 CM
DOP CALC LVOT PEAK VEL: 0.7 M/S
DOP CALC LVOT STROKE VOLUME: 49.9 CM3
DOP CALCLVOT PEAK VEL VTI: 14.4 CM
E WAVE DECELERATION TIME: 165 MSEC
E/A RATIO: 1.19
E/E' RATIO: 23 M/S
ECHO LV POSTERIOR WALL: 0.9 CM (ref 0.6–1.1)
ERYTHROCYTE [DISTWIDTH] IN BLOOD BY AUTOMATED COUNT: 17.8 % (ref 11.5–14.5)
FRACTIONAL SHORTENING: 27.5 % (ref 28–44)
GFR SERPLBLD CREATININE-BSD FMLA CKD-EPI: 4 ML/MIN/1.73/M2
GFR SERPLBLD CREATININE-BSD FMLA CKD-EPI: 5 ML/MIN/1.73/M2
GFR SERPLBLD CREATININE-BSD FMLA CKD-EPI: 6 ML/MIN/1.73/M2
GLUCOSE SERPL-MCNC: 150 MG/DL (ref 70–110)
GLUCOSE SERPL-MCNC: 181 MG/DL (ref 70–110)
GLUCOSE SERPL-MCNC: 202 MG/DL (ref 70–110)
GRAM STN SPEC: NORMAL
HCT VFR BLD AUTO: 30 % (ref 40–54)
HGB BLD-MCNC: 10 GM/DL (ref 14–18)
IMM GRANULOCYTES # BLD AUTO: 0.25 K/UL (ref 0–0.04)
IMM GRANULOCYTES NFR BLD AUTO: 1.6 % (ref 0–0.5)
INTERVENTRICULAR SEPTUM: 0.9 CM (ref 0.6–1.1)
IVC DIAMETER: 1.64 CM
IVRT: 105 MSEC
LA MAJOR: 6 CM
LA MINOR: 5.6 CM
LA WIDTH: 4.3 CM
LEFT ATRIUM SIZE: 4.5 CM
LEFT ATRIUM VOLUME INDEX: 56 ML/M2
LEFT ATRIUM VOLUME: 95 CM3
LEFT INTERNAL DIMENSION IN SYSTOLE: 3.7 CM (ref 2.1–4)
LEFT VENTRICLE DIASTOLIC VOLUME INDEX: 72.51 ML/M2
LEFT VENTRICLE DIASTOLIC VOLUME: 124 ML
LEFT VENTRICLE MASS INDEX: 95.6 G/M2
LEFT VENTRICLE SYSTOLIC VOLUME INDEX: 34.5 ML/M2
LEFT VENTRICLE SYSTOLIC VOLUME: 59 ML
LEFT VENTRICULAR INTERNAL DIMENSION IN DIASTOLE: 5.1 CM (ref 3.5–6)
LEFT VENTRICULAR MASS: 163.6 G
LV LATERAL E/E' RATIO: 15.8 M/S
LV SEPTAL E/E' RATIO: 42 M/S
LVED V (TEICH): 123.56 ML
LVES V (TEICH): 59.24 ML
LVOT MG: 1.13 MMHG
LVOT MV: 0.51 CM/S
LYMPHOCYTES # BLD AUTO: 0.63 K/UL (ref 1–4.8)
MAGNESIUM SERPL-MCNC: 2.2 MG/DL (ref 1.6–2.6)
MAGNESIUM SERPL-MCNC: 2.3 MG/DL (ref 1.6–2.6)
MCH RBC QN AUTO: 29.7 PG (ref 27–31)
MCHC RBC AUTO-ENTMCNC: 33.3 G/DL (ref 32–36)
MCV RBC AUTO: 89 FL (ref 82–98)
MV PEAK A VEL: 1.06 M/S
MV PEAK E VEL: 1.26 M/S
MV STENOSIS PRESSURE HALF TIME: 47.94 MS
MV VALVE AREA P 1/2 METHOD: 4.59 CM2
NUCLEATED RBC (/100WBC) (OHS): 0 /100 WBC
OHS CV RV/LV RATIO: 0.9 CM
PHOSPHATE SERPL-MCNC: 5.3 MG/DL (ref 2.7–4.5)
PHOSPHATE SERPL-MCNC: 7.1 MG/DL (ref 2.7–4.5)
PHOSPHATE SERPL-MCNC: 7.7 MG/DL (ref 2.7–4.5)
PISA AR MAX VEL: 2.6 M/S
PISA TR MAX VEL: 2.9 M/S
PLATELET # BLD AUTO: 121 K/UL (ref 150–450)
PMV BLD AUTO: 10.9 FL (ref 9.2–12.9)
POCT GLUCOSE: 157 MG/DL (ref 70–110)
POCT GLUCOSE: 173 MG/DL (ref 70–110)
POCT GLUCOSE: 219 MG/DL (ref 70–110)
POCT GLUCOSE: 222 MG/DL (ref 70–110)
POTASSIUM SERPL-SCNC: 4.6 MMOL/L (ref 3.5–5.1)
POTASSIUM SERPL-SCNC: 5 MMOL/L (ref 3.5–5.1)
POTASSIUM SERPL-SCNC: 5.4 MMOL/L (ref 3.5–5.1)
PROT SERPL-MCNC: 5.9 GM/DL (ref 6–8.4)
PV PEAK GRADIENT: 2 MMHG
PV PEAK VELOCITY: 0.73 M/S
RA MAJOR: 5.13 CM
RA PRESSURE ESTIMATED: 8 MMHG
RA WIDTH: 4 CM
RBC # BLD AUTO: 3.37 M/UL (ref 4.6–6.2)
RELATIVE EOSINOPHIL (OHS): 0.8 %
RELATIVE LYMPHOCYTE (OHS): 3.9 % (ref 18–48)
RELATIVE MONOCYTE (OHS): 6 % (ref 4–15)
RELATIVE NEUTROPHIL (OHS): 87.4 % (ref 38–73)
RIGHT VENTRICLE DIASTOLIC BASEL DIMENSION: 4.6 CM
RIGHT VENTRICULAR END-DIASTOLIC DIMENSION: 4.59 CM
RV TB RVSP: 11 MMHG
RV TISSUE DOPPLER FREE WALL SYSTOLIC VELOCITY 1 (APICAL 4 CHAMBER VIEW): 8.07 CM/S
SINUS: 4.17 CM
SODIUM SERPL-SCNC: 139 MMOL/L (ref 136–145)
SODIUM SERPL-SCNC: 139 MMOL/L (ref 136–145)
SODIUM SERPL-SCNC: 140 MMOL/L (ref 136–145)
STJ: 2.95 CM
TDI LATERAL: 0.08 M/S
TDI SEPTAL: 0.03 M/S
TDI: 0.06 M/S
TR MAX PG: 34 MMHG
TRICUSPID ANNULAR PLANE SYSTOLIC EXCURSION: 1.62 CM
TROPONIN I SERPL DL<=0.01 NG/ML-MCNC: 2.91 NG/ML
TV PEAK GRADIENT: 1 MMHG
TV REST PULMONARY ARTERY PRESSURE: 42 MMHG
VANCOMYCIN SERPL-MCNC: 14.7 UG/ML (ref ?–80)
WBC # BLD AUTO: 16.11 K/UL (ref 3.9–12.7)
Z-SCORE OF LEFT VENTRICULAR DIMENSION IN END DIASTOLE: 0.71
Z-SCORE OF LEFT VENTRICULAR DIMENSION IN END SYSTOLE: 1.82

## 2025-04-04 PROCEDURE — 84100 ASSAY OF PHOSPHORUS: CPT | Performed by: INTERNAL MEDICINE

## 2025-04-04 PROCEDURE — 90945 DIALYSIS ONE EVALUATION: CPT

## 2025-04-04 PROCEDURE — 80053 COMPREHEN METABOLIC PANEL: CPT | Performed by: HOSPITALIST

## 2025-04-04 PROCEDURE — 93005 ELECTROCARDIOGRAM TRACING: CPT

## 2025-04-04 PROCEDURE — 99291 CRITICAL CARE FIRST HOUR: CPT | Mod: 25,ICN,, | Performed by: INTERNAL MEDICINE

## 2025-04-04 PROCEDURE — 99291 CRITICAL CARE FIRST HOUR: CPT | Mod: 25,,, | Performed by: INTERNAL MEDICINE

## 2025-04-04 PROCEDURE — 27000923 HC TRIALYSIS CATHETER, ANY SIZE

## 2025-04-04 PROCEDURE — 83735 ASSAY OF MAGNESIUM: CPT | Performed by: HOSPITALIST

## 2025-04-04 PROCEDURE — 63600175 PHARM REV CODE 636 W HCPCS: Performed by: INTERNAL MEDICINE

## 2025-04-04 PROCEDURE — 99291 CRITICAL CARE FIRST HOUR: CPT | Mod: ,,, | Performed by: INTERNAL MEDICINE

## 2025-04-04 PROCEDURE — 25000003 PHARM REV CODE 250: Performed by: HOSPITALIST

## 2025-04-04 PROCEDURE — 84100 ASSAY OF PHOSPHORUS: CPT | Performed by: HOSPITALIST

## 2025-04-04 PROCEDURE — 85025 COMPLETE CBC W/AUTO DIFF WBC: CPT | Performed by: HOSPITALIST

## 2025-04-04 PROCEDURE — 94761 N-INVAS EAR/PLS OXIMETRY MLT: CPT

## 2025-04-04 PROCEDURE — 99291 CRITICAL CARE FIRST HOUR: CPT | Mod: 95,GC,, | Performed by: INTERNAL MEDICINE

## 2025-04-04 PROCEDURE — 80202 ASSAY OF VANCOMYCIN: CPT | Performed by: HOSPITALIST

## 2025-04-04 PROCEDURE — 99497 ADVNCD CARE PLAN 30 MIN: CPT | Mod: 25,,, | Performed by: REGISTERED NURSE

## 2025-04-04 PROCEDURE — 27000221 HC OXYGEN, UP TO 24 HOURS

## 2025-04-04 PROCEDURE — 36556 INSERT NON-TUNNEL CV CATH: CPT | Mod: ,,, | Performed by: INTERNAL MEDICINE

## 2025-04-04 PROCEDURE — 84484 ASSAY OF TROPONIN QUANT: CPT | Performed by: INTERNAL MEDICINE

## 2025-04-04 PROCEDURE — 02HV33Z INSERTION OF INFUSION DEVICE INTO SUPERIOR VENA CAVA, PERCUTANEOUS APPROACH: ICD-10-PCS | Performed by: INTERNAL MEDICINE

## 2025-04-04 PROCEDURE — 99223 1ST HOSP IP/OBS HIGH 75: CPT | Mod: ,,, | Performed by: REGISTERED NURSE

## 2025-04-04 PROCEDURE — 5A1D90Z PERFORMANCE OF URINARY FILTRATION, CONTINUOUS, GREATER THAN 18 HOURS PER DAY: ICD-10-PCS | Performed by: HOSPITALIST

## 2025-04-04 PROCEDURE — 27000207 HC ISOLATION

## 2025-04-04 PROCEDURE — A4216 STERILE WATER/SALINE, 10 ML: HCPCS | Performed by: HOSPITALIST

## 2025-04-04 PROCEDURE — 20000000 HC ICU ROOM

## 2025-04-04 PROCEDURE — 63600175 PHARM REV CODE 636 W HCPCS

## 2025-04-04 PROCEDURE — 36415 COLL VENOUS BLD VENIPUNCTURE: CPT | Performed by: INTERNAL MEDICINE

## 2025-04-04 PROCEDURE — 83735 ASSAY OF MAGNESIUM: CPT | Performed by: INTERNAL MEDICINE

## 2025-04-04 PROCEDURE — 87040 BLOOD CULTURE FOR BACTERIA: CPT | Performed by: INTERNAL MEDICINE

## 2025-04-04 PROCEDURE — 93010 ELECTROCARDIOGRAM REPORT: CPT | Mod: ,,, | Performed by: INTERNAL MEDICINE

## 2025-04-04 PROCEDURE — 63600175 PHARM REV CODE 636 W HCPCS: Performed by: HOSPITALIST

## 2025-04-04 RX ORDER — FENTANYL CITRATE 50 UG/ML
50 INJECTION, SOLUTION INTRAMUSCULAR; INTRAVENOUS
Refills: 0 | Status: DISCONTINUED | OUTPATIENT
Start: 2025-04-04 | End: 2025-04-04

## 2025-04-04 RX ORDER — PANTOPRAZOLE SODIUM 40 MG/10ML
40 INJECTION, POWDER, LYOPHILIZED, FOR SOLUTION INTRAVENOUS DAILY
Status: DISCONTINUED | OUTPATIENT
Start: 2025-04-04 | End: 2025-04-05

## 2025-04-04 RX ORDER — HEPARIN SODIUM 5000 [USP'U]/ML
5000 INJECTION, SOLUTION INTRAVENOUS; SUBCUTANEOUS EVERY 8 HOURS
Status: DISCONTINUED | OUTPATIENT
Start: 2025-04-04 | End: 2025-04-06

## 2025-04-04 RX ORDER — FENTANYL CITRATE 50 UG/ML
INJECTION, SOLUTION INTRAMUSCULAR; INTRAVENOUS
Status: COMPLETED
Start: 2025-04-04 | End: 2025-04-04

## 2025-04-04 RX ORDER — HYDROMORPHONE HYDROCHLORIDE 1 MG/ML
0.5 INJECTION, SOLUTION INTRAMUSCULAR; INTRAVENOUS; SUBCUTANEOUS ONCE
Status: COMPLETED | OUTPATIENT
Start: 2025-04-04 | End: 2025-04-04

## 2025-04-04 RX ORDER — MAGNESIUM SULFATE HEPTAHYDRATE 40 MG/ML
2 INJECTION, SOLUTION INTRAVENOUS
Status: DISCONTINUED | OUTPATIENT
Start: 2025-04-04 | End: 2025-04-05

## 2025-04-04 RX ORDER — MUPIROCIN 20 MG/G
OINTMENT TOPICAL 2 TIMES DAILY
Status: COMPLETED | OUTPATIENT
Start: 2025-04-04 | End: 2025-04-09

## 2025-04-04 RX ORDER — FENTANYL CITRATE 50 UG/ML
50 INJECTION, SOLUTION INTRAMUSCULAR; INTRAVENOUS ONCE
Refills: 0 | Status: COMPLETED | OUTPATIENT
Start: 2025-04-04 | End: 2025-04-04

## 2025-04-04 RX ORDER — BISACODYL 10 MG/1
10 SUPPOSITORY RECTAL ONCE
Status: COMPLETED | OUTPATIENT
Start: 2025-04-04 | End: 2025-04-04

## 2025-04-04 RX ADMIN — FENTANYL CITRATE 50 MCG: 50 INJECTION, SOLUTION INTRAMUSCULAR; INTRAVENOUS at 11:04

## 2025-04-04 RX ADMIN — HEPARIN SODIUM 5000 UNITS: 5000 INJECTION INTRAVENOUS; SUBCUTANEOUS at 02:04

## 2025-04-04 RX ADMIN — VANCOMYCIN HYDROCHLORIDE 500 MG: 500 INJECTION, POWDER, LYOPHILIZED, FOR SOLUTION INTRAVENOUS at 05:04

## 2025-04-04 RX ADMIN — HEPARIN SODIUM 5000 UNITS: 5000 INJECTION INTRAVENOUS; SUBCUTANEOUS at 09:04

## 2025-04-04 RX ADMIN — SODIUM CHLORIDE, PRESERVATIVE FREE 10 ML: 5 INJECTION INTRAVENOUS at 02:04

## 2025-04-04 RX ADMIN — BISACODYL 10 MG: 10 SUPPOSITORY RECTAL at 10:04

## 2025-04-04 RX ADMIN — INSULIN ASPART 2 UNITS: 100 INJECTION, SOLUTION INTRAVENOUS; SUBCUTANEOUS at 12:04

## 2025-04-04 RX ADMIN — SENNOSIDES AND DOCUSATE SODIUM 1 TABLET: 50; 8.6 TABLET ORAL at 09:04

## 2025-04-04 RX ADMIN — HYDROMORPHONE HYDROCHLORIDE 0.5 MG: 1 INJECTION, SOLUTION INTRAMUSCULAR; INTRAVENOUS; SUBCUTANEOUS at 09:04

## 2025-04-04 RX ADMIN — SODIUM CHLORIDE, PRESERVATIVE FREE 10 ML: 5 INJECTION INTRAVENOUS at 05:04

## 2025-04-04 RX ADMIN — MUPIROCIN: 20 OINTMENT TOPICAL at 09:04

## 2025-04-04 RX ADMIN — PANTOPRAZOLE SODIUM 40 MG: 40 INJECTION, POWDER, FOR SOLUTION INTRAVENOUS at 10:04

## 2025-04-04 RX ADMIN — SODIUM CHLORIDE, PRESERVATIVE FREE 10 ML: 5 INJECTION INTRAVENOUS at 09:04

## 2025-04-04 RX ADMIN — ATORVASTATIN CALCIUM 80 MG: 40 TABLET, FILM COATED ORAL at 09:04

## 2025-04-04 NOTE — NURSING
"Message to MD Diaz via secure chat: "xray was done :) please let me know or can you place a nsg communication order when ok to use to start crrt"  "

## 2025-04-04 NOTE — ASSESSMENT & PLAN NOTE
Elev trop c/w NSTEMI, likely type II, doubt ACS.  Known MV CAD/PCI (most recently in Jan 2025)  Cont ASA/Plavix/Statin  No plan for inpat isch eval

## 2025-04-04 NOTE — PLAN OF CARE
Chart review completed.  Patient transferred from [facility] on 4/3/2025.  Last discharge planning assessment completed prior to transfer on 4/2/2025 by Jc ZELAYA.  This  [facility]ified [facility] , [facility], of [facility]'s [facility] to this hospital in effort to coordinate discharge services.

## 2025-04-04 NOTE — ASSESSMENT & PLAN NOTE
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

## 2025-04-04 NOTE — ASSESSMENT & PLAN NOTE
Septic shock with MRSA  Vegetation mv  Not surgical candidate for mitral valve replacement per Dr. Lyles  Antibiotics.  ID consulted

## 2025-04-04 NOTE — ASSESSMENT & PLAN NOTE
- he is oriented to self but otherwise confused  - [facility] 4/1/25 with no acute process  - suspect this is due to sepsis  - he is unable to swallow safely- NGT to be placed

## 2025-04-04 NOTE — PROGRESS NOTES
"[facility]  [facility] Medicine  Progress Note    Patient Name: Jevon Rajan  MRN: [id number]  Patient Class: IP- Inpatient   Admission Date: 4/3/2025  Length of Stay: 1 days  Attending Physician: Eileen Jordan MD  Primary Care Provider: No primary care provider on file.        Subjective     Principal Problem:Septic shock        HPI:  Mr Jevon Rajan is a 85 y.o. man with ESRD with LUE AVF, HFpEF last EF 50-55%, CAD, DM who was transferred to [facility] ICU for septic shock due to MRSA bacteremia.     He was admitted at [facility] 4/1-3/2025 with sepsis, worsening to septic shock, then identified source as MRSA. 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     Upon arrival to [facility], he is moaning in pain and his LUE AVF has active pulsatile bright red blood. He does respond to his name. He denies pain anywhere other than his LUE. He is on levophed at 0.05.     Overview/Hospital Course:  Mr Jevon Rajan is a 85 y.o. man with ESRD on HD with LUE AVF that has been bleeding, CHF, CAD s/p recent stenting 1/2025 who was admitted with MRSA bacteremia and bleeding from his LUE AVF. Continue levophed, vancomycin. Vascular surgery emergently consulted; on 4/3/25 they took him to OR and noted: "well incorporated proximal and central graft without evidence of infection, resected graft and covered proximal and central remnants with multiple layers. Mid graft removed in entirety and sent for culture. Ruptured pseudoaneurysm with infected hematoma, graft completely obliterated at mid segment." Suspect AVF or chronic scratching of pruritic skin is the source of his infection. TTE noted EF 40-45%, G1DD, RV systolic dysfunction, large 1.9x1.2cm fixed mass on the posterior mitral valve leaflet with moder to severe regurgitation, cannot rule out posterior mitral valve leaflet perforation. Trialysis " was placed for HD. Nephrology was consulted. Palliative was consulted.     Interval History: he is responsive to his name. He says he hurts all over. He otherwise is drowsy.     Review of Systems   Constitutional:  Positive for fatigue.   Psychiatric/Behavioral:  Positive for confusion.      Objective:     Vital Signs (Most Recent):  Temp: 97.5 °F (36.4 °C) (04/04/25 0900)  Pulse: 79 (04/04/25 1255)  Resp: 12 (04/04/25 1255)  BP: (!) 103/51 (04/04/25 1255)  SpO2: 96 % (04/04/25 1255) Vital Signs (24h Range):  Temp:  [97.2 °F (36.2 °C)-97.7 °F (36.5 °C)] 97.5 °F (36.4 °C)  Pulse:  [72-86] 79  Resp:  [8-46] 12  SpO2:  [91 %-100 %] 96 %  BP: ()/(46-76) 103/51     Weight: 61.2 kg (135 lb)  Body mass index is 21.14 kg/m².    Intake/Output Summary (Last 24 hours) at 4/4/2025 1312  Last data filed at 4/4/2025 0700  Gross per 24 hour   Intake 1119 ml   Output 85 ml   Net 1034 ml         Physical Exam  Vitals and nursing note reviewed.   Constitutional:       Appearance: He is ill-appearing.      Comments: drowsy   HENT:      Head: Normocephalic and atraumatic.      Nose: Nose normal.      Mouth/Throat:      Mouth: Mucous membranes are dry.   Cardiovascular:      Rate and Rhythm: Normal rate and regular rhythm.   Pulmonary:      Effort: Pulmonary effort is normal.      Breath sounds: Normal breath sounds.      Comments: 3L NC  Abdominal:      General: Bowel sounds are normal.      Palpations: Abdomen is soft.   Musculoskeletal:      Comments: R hand swelling. LUE in bandage, not removed   Skin:     Comments: Very dry skin with hyperpigmented patches   Neurological:      Comments: Knows his name               Significant Labs: All pertinent labs within the past 24 hours have been reviewed.    Significant Imaging: I have reviewed all pertinent imaging results/findings within the past 24 hours.      Assessment & Plan  Septic shock  This patient has shock. The type of shock is distributive due to sepsis. The patient had  the following evidence of shock: persistent hypotension and altered mental status. 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 Shock is now resolved and vasopressors are off.   HTN (hypertension)  Patient's blood pressure range in the last 24 hours was: BP  Min: 93/49  Max: 144/64.The patient's inpatient anti-hypertensive regimen is listed below:  Current Antihypertensives       Plan  - admitted with shock  - now off vasopressors. 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 Continue to monitor on telemetry.   - last C was 1/2025: Severely calcified mid RCA stenosis unable to cross with PTCA balloons, treated initially with 0.9 laser atherectomy, followed by CSI atherectomy system with total of 5 runs (3 at low speed, 2 at high speed), pre-dilated using 2.0 compliant and 3.5 noncompliant balloon followed by 3.5 X 16 mm megatron stent.  Focal plaque rupture/erosion noted in the proximal and ostial RCA that were treated with  3.5 X 28 in the proximal RCA, 4.0 X 16 mm in the ostial RCA.  No residual stenosis post intervention.  Patient had ALAN 3 flow at the end of the procedure  - with elevated troponin likely due to septic shock  Recent Labs   Lab  "04/04/25  1028   TROPONINI 2.913*   - repeat EKG now   - he needs to continue asa, plavix but cannot safely swallow right now--> NGT will be placed   - Cardiology consult  ESRD on hemodialysis  - ESRD on HD via LUE AVF  - LUE AVF was actively bleeding on arrival on 4/3/25. Vascular surgery was consulted. He went emergently to the OR on 4/3/25: "Severely calcified mid RCA stenosis unable to cross with PTCA balloons, treated initially with 0.9 laser atherectomy, followed by CSI atherectomy system with total of 5 runs (3 at low speed, 2 at high speed), pre-dilated using 2.0 compliant and 3.5 noncompliant balloon followed by 3.5 X 16 mm megatron stent.  Focal plaque rupture/erosion noted in the proximal and ostial RCA that were treated with  3.5 X 28 in the proximal RCA, 4.0 X 16 mm in the ostial RCA.  No residual stenosis post intervention.  Patient had ALAN 3 flow at the end of the procedure"  - cultures from AVF are in process  - wound care orders in place for surgical site  - Nephrology is consulted  - trialysis placed on 4/4/25 for HD  - he will need THDC when bacteremia is resolved   Anemia in ESRD (end-stage renal disease)  Anemia is likely due to  ESRD, blood loss . Most recent hemoglobin and hematocrit are listed below.  Recent Labs     04/03/25  1533 04/03/25  2055 04/04/25  0247   HGB 7.2* 10.2* 10.0*   HCT 22.7* 30.7* 30.0*     Plan  - Monitor serial CBC: Daily and recheck now  - Transfuse PRBC if patient becomes hemodynamically unstable, symptomatic or H/H drops below 7/21.  - Patient has not received any PRBC transfusions to date  - Patient's anemia is currently stable  Acute metabolic encephalopathy  - he is oriented to self but otherwise confused  - CTH 4/1/25 with no acute process  - suspect this is due to sepsis  - he is unable to swallow safely- NGT to be placed     ACP (advance care planning)  Advance Care Planning     Date: 04/04/2025  - palliative consulted          Acute bacterial endocarditis  - " "TTE 4/4/25: "1.9x1.2cm large fixed heterogeneous mass present on the posterior leaflet (new finding vs 9/2023 echo). There is moderate to severe regurgitation with an eccentric jet. Cannot exclude severe MR with possible PMVL perforation in association with large vegetation."  - this is in the setting of MRSA bacteremia  - ID is consulted  - repeat cultures are NGTD  - suspect source is skin/chronic scratching vs AVF infection- cultures from resected AVF are pending  - discussed with Cardiac surgery Dr Castro 4/4/25- given age and comorbidities, he is very high risk of mortality with surgery. He recommends medical management at this point.  - on vancomycin      MRSA bacteremia  - suspect source= chronic skin scratching vs infected AVF  - cultures of AVF are in process  - he has endocarditis  - ID consulted  - on vanc     VTE Risk Mitigation (From admission, onward)           Ordered     heparin (porcine) injection 5,000 Units  Every 8 hours         04/04/25 0936     IP VTE HIGH RISK PATIENT  Once         04/03/25 1516     Place TEMO hose  Until discontinued         04/03/25 1516     Place sequential compression device  Until discontinued         04/03/25 1516     Reason for No Pharmacological VTE Prophylaxis  Once        Question:  Reasons:  Answer:  Physician Provided (leave comment)    04/03/25 1516                    Discharge Planning   BAYRON:      Code Status: Full Code   Medical Readiness for Discharge Date:          1:25 PM Called family Deonna Smyth to update her. Discussed NGT, endocarditis, trialysis.       Critical care time spent on the evaluation and treatment of severe organ dysfunction, review of pertinent labs and imaging studies, discussions with consulting providers and discussions with patient/family: 60 minutes.            Eileen Jordan MD  Department of Hospital Medicine   [facility] - [place] - Intensive Care    "

## 2025-04-04 NOTE — SUBJECTIVE & OBJECTIVE
Past Medical History:   Diagnosis Date    BPH (benign prostatic hyperplasia)     Coronary artery disease     He has followed at the [facility] and is now transferring his care to this clinic. In 2014 he had stent to LAD. He states he has had 2 heart attacks. He does not get angina. There was 90% left anterior descending artery stenosis undergoing stenting. There was also a circumflex marginal branch stenosis of 70%.    Diabetes mellitus, type 2     ESRD (end stage renal disease) on dialysis     ESRD on HD TTS via left brachial AVF    Hypertension     Hypothyroidism, unspecified     Sepsis 4/2/2025    Symptomatic anemia 01/15/2024       Past Surgical History:   Procedure Laterality Date    ANGIOGRAM, CORONARY, WITH LEFT HEART CATHETERIZATION  1/13/2025    Procedure: Angiogram, Coronary, with Left Heart Cath;  Surgeon: Steve Bhat MD;  Location: [facility] CATH LAB/EP;  Service: Cardiology;;    ATHERECTOMY, CORONARY N/A 1/14/2025    Procedure: Atherectomy-coronary;  Surgeon: Giacomo Pittman MD;  Location: [facility] CATH LAB/EP;  Service: Cardiology;  Laterality: N/A;    bilateral foot surgery      CORONARY ANGIOPLASTY WITH STENT PLACEMENT N/A 1/14/2025    Procedure: ANGIOPLASTY, CORONARY ARTERY, WITH STENT INSERTION;  Surgeon: Giacomo Pittman MD;  Location: [facility] CATH LAB/EP;  Service: Cardiology;  Laterality: N/A;    CORONARY STENT PLACEMENT N/A 1/13/2025    Procedure: INSERTION, STENT, CORONARY ARTERY;  Surgeon: Steve Bhat MD;  Location: [facility] CATH LAB/EP;  Service: Cardiology;  Laterality: N/A;    ESOPHAGOGASTRODUODENOSCOPY N/A 2/27/2024    Procedure: EGD (ESOPHAGOGASTRODUODENOSCOPY);  Surgeon: Gemma Almendarez MD;  Location: [facility] ENDO;  Service: Endoscopy;  Laterality: N/A;    EXPLORATION, ARTERY, UPPER EXTREMITY Left 4/3/2025    Procedure: EXPLORATION, ARTERY, UPPER EXTREMITY;  Surgeon: Sunil Contreras MD;  Location: [facility] OR;  Service: Vascular;  Laterality: Left;    eyelid surgery       FISTULOGRAM Left 11/27/2019    Procedure: Fistulogram;  Surgeon: MELANY Brenner III, MD;  Location: [facility] OR 2ND FLR;  Service: Peripheral Vascular;  Laterality: Left;    FISTULOGRAM Left 11/27/2019    Procedure: Fistulogram, AVG declot, possible permacath;  Surgeon: MELANY Brenner III, MD;  Location: [facility] CATH LAB;  Service: Peripheral Vascular;  Laterality: Left;    INSERTION OF DIALYSIS CATHETER      IVUS, CORONARY  1/13/2025    Procedure: IVUS, Coronary;  Surgeon: Steve Bhat MD;  Location: [facility] CATH LAB/EP;  Service: Cardiology;;    LEFT HEART CATHETERIZATION Left 1/14/2025    Procedure: Left heart cath;  Surgeon: Giacomo Pittman MD;  Location: [facility] CATH LAB/EP;  Service: Cardiology;  Laterality: Left;    MOH's  12/5/13    PERCUTANEOUS CORONARY INTERVENTION, ARTERY N/A 1/14/2025    Procedure: Percutaneous coronary intervention;  Surgeon: Giacomo Pittman MD;  Location: [facility] CATH LAB/EP;  Service: Cardiology;  Laterality: N/A;    PERCUTANEOUS TRANSLUMINAL BALLOON ANGIOPLASTY OF CORONARY ARTERY  1/13/2025    Procedure: Angioplasty-coronary;  Surgeon: Steve Bhat MD;  Location: [facility] CATH LAB/EP;  Service: Cardiology;;    REMOVAL, GRAFT, ARTERIOVENOUS, UPPER EXTREMITY Left 4/3/2025    Procedure: REMOVAL, GRAFT, ARTERIOVENOUS, UPPER EXTREMITY;  Surgeon: Sunil Contreras MD;  Location: [facility] OR;  Service: Vascular;  Laterality: Left;    REVASCULARIZATION, ENDOVASCULAR, LE W/ INTRAVASCULAR LITHOTRIPSY  1/13/2025    Procedure: REVASCULARIZATION, ENDOVASCULAR, LE W/ INTRAVASCULAR LITHOTRIPSY;  Surgeon: Steve Bhat MD;  Location: [facility] CATH LAB/EP;  Service: Cardiology;;    right hand surgery      stents         Review of patient's allergies indicates:   Allergen Reactions    Ace inhibitors Hives, Itching, Shortness Of Breath, Other (See Comments) and Rash     Is not sure which medication it was    Captopril        Medications:  Medications Prior to Admission   Medication Sig    ammonium  lactate 12 % Crea Apply topically 2 (two) times a day.    artificial tears,hypromellose,,GENTEAL/SUSTANE, 0.3 % Gel Apply to eye nightly.    aspirin (ECOTRIN) 81 MG EC tablet Take 1 tablet (81 mg total) by mouth once daily.    atorvastatin (LIPITOR) 80 MG tablet Take 1 tablet (80 mg total) by mouth every evening.    camphor-menthol 0.5-0.5% (SARNA) lotion Apply topically once daily. APPLY SMALL AMOUNT TOPICALLY TO AFFECTED AREA(S) AS NEEDED FOR ITCHING KEEP IN FRIDGE    carboxymethylcellulose sodium 0.5 % Drop Apply 1 drop to eye nightly as needed (dry eyes).    carvediloL (COREG) 12.5 MG tablet Take 0.5 tablets (6.25 mg total) by mouth 2 (two) times daily.    cetirizine (ZYRTEC) 5 MG tablet Take 1 tablet (5 mg total) by mouth every 48 hours.    clobetasol 0.05% (TEMOVATE) 0.05 % Oint Apply topically 2 (two) times daily.    clopidogreL (PLAVIX) 75 mg tablet Take 1 tablet (75 mg total) by mouth once daily.    erythromycin (ROMYCIN) ophthalmic ointment Place a 1/2 inch ribbon of ointment into the lower eyelid. Four timex daily for 5 days    gabapentin (NEURONTIN) 100 MG capsule Take 1 capsule (100 mg total) by mouth every evening.    hydrophilic ointment (AQUABASE) Apply topically as needed for Dry Skin. APPLY MODERATE AMOUNT TOPICALLY TO AFFECTED AREA(S) TWICE A DAY AS NEEDED FOR DRY SKIN APPLY TO AREAS OF DRY SKIN OR OVER ENTIRE BODY.    lanolin alcohol-mineral oil-white petrolatum-ceres (EUCERIN) Crea cream Apply topically 2 (two) times daily as needed.    LIDOcaine (LIDODERM) 5 % Place 1 patch onto the skin once daily. Remove & Discard patch within 12 hours or as directed by MD    loratadine (CLARITIN) 10 mg tablet Take 10 mg by mouth daily as needed for Allergies (Every other day).    nut.tx.imp.renal fxn,lac-reduc (NEPRO CARB STEADY) 0.08 gram-1.8 kcal/mL Liqd Take 240 mLs by mouth 2 (two) times a day.    ondansetron (ZOFRAN-ODT) 4 MG TbDL Take 1 tablet (4 mg total) by mouth every 8 (eight) hours as needed  "(nausea).    oxyCODONE-acetaminophen (PERCOCET) 5-325 mg per tablet Take 1 tablet by mouth every 6 (six) hours as needed.    pantoprazole (PROTONIX) 20 MG tablet Take 20 mg by mouth 2 (two) times daily as needed.    pramoxine 1 % Lotn Apply topically 2 (two) times daily as needed (itching).    triamcinolone acetonide 0.1% (KENALOG) 0.1 % cream Apply topically 2 (two) times daily as needed (itching).     Antibiotics (From admission, onward)      Start     Stop Route Frequency Ordered    04/04/25 2100  mupirocin 2 % ointment         04/09/25 2059 Nasl 2 times daily 04/04/25 1320    04/03/25 1645  vancomycin - pharmacy to dose  (vancomycin IVPB (PEDS and ADULTS))        Placed in "And" Linked Group    -- IV pharmacy to manage frequency 04/03/25 1545          Antifungals (From admission, onward)      None          Antivirals (From admission, onward)      None             Immunization History   Administered Date(s) Administered    COVID-19, MRNA, LN-S, PF (Pfizer) (Purple Cap) 01/22/2021, 01/22/2021, 02/12/2021, 05/03/2022    COVID-19, mRNA, LNP-S, bivalent booster, PF (PFIZER OMICRON) 11/29/2022    Dtap, Unspecified Formulation 03/05/2012    Hepatitis B, Adult 09/12/2018, 10/18/2018, 03/14/2019    Influenza 12/24/2003, 10/08/2004, 12/08/2005, 11/20/2006, 01/09/2008, 03/13/2009, 10/16/2009, 10/06/2010, 03/05/2012, 10/03/2012, 10/10/2013, 10/28/2015, 09/01/2020, 08/01/2021    Influenza - Quadrivalent 05/15/2017, 11/14/2017    Influenza - Trivalent - Afluria, Fluzone MDV 10/23/2018    Influenza - Trivalent - Fluzone High Dose - PF (65 years and older) 09/24/2019, 09/19/2020, 10/25/2022    PPD Test 12/07/2018, 09/26/2019, 11/21/2020, 05/11/2022, 01/10/2023    Pneumococcal 03/09/2005, 12/08/2005    Pneumococcal Conjugate - 13 Valent 02/10/2016, 07/16/2021    Pneumococcal Polysaccharide - 23 Valent 05/15/2017    Tdap 03/05/2012       Family History    None       Social History     Socioeconomic History    Marital status: " Single   Tobacco Use    Smoking status: Never    Smokeless tobacco: Never   Substance and Sexual Activity    Alcohol use: No    Drug use: No    Sexual activity: Not Currently     Social Drivers of Health     Financial Resource Strain: Patient Unable To Answer (4/3/2025)    Overall Financial Resource Strain (CARDIA)     Difficulty of Paying Living Expenses: Patient unable to answer   Food Insecurity: Patient Unable To Answer (4/3/2025)    Hunger Vital Sign     Worried About Running Out of Food in the Last Year: Patient unable to answer     Ran Out of Food in the Last Year: Patient unable to answer   Transportation Needs: Patient Unable To Answer (4/3/2025)    PRAPARE - Transportation     Lack of Transportation (Medical): Patient unable to answer     Lack of Transportation (Non-Medical): Patient unable to answer   Recent Concern: Transportation Needs - Unmet Transportation Needs (1/15/2025)    TRANSPORTATION NEEDS     Transportation : Yes, it has kept me from medical appointments or from getting my medications.   Physical Activity: Patient Unable To Answer (4/2/2025)    Exercise Vital Sign     Days of Exercise per Week: Patient unable to answer     Minutes of Exercise per Session: Patient unable to answer   Recent Concern: Physical Activity - Insufficiently Active (1/15/2025)    Exercise Vital Sign     Days of Exercise per Week: 5 days     Minutes of Exercise per Session: 20 min   Stress: Patient Unable To Answer (4/3/2025)    [employer] - Occupational Stress Questionnaire     Feeling of Stress : Patient unable to answer   Housing Stability: Patient Unable To Answer (4/3/2025)    Housing Stability Vital Sign     Unable to Pay for Housing in the Last Year: Patient unable to answer     Number of Times Moved in the Last Year: 0     Homeless in the Last Year: Patient unable to answer     Review of Systems   Constitutional:  Positive for fatigue. Negative for chills and fever.   Respiratory:   "Negative for cough.    All other systems reviewed and are negative.    Objective:     Vital Signs (Most Recent):  Temp: 97.5 °F (36.4 °C) (04/04/25 0900)  Pulse: 79 (04/04/25 1400)  Resp: 16 (04/04/25 1400)  BP: (!) 127/91 (04/04/25 1400)  SpO2: 96 % (04/04/25 1400) Vital Signs (24h Range):  Temp:  [97.2 °F (36.2 °C)-97.7 °F (36.5 °C)] 97.5 °F (36.4 °C)  Pulse:  [72-86] 79  Resp:  [8-46] 16  SpO2:  [91 %-100 %] 96 %  BP: ()/(46-91) 127/91     Weight: 61.2 kg (135 lb)  Body mass index is 21.14 kg/m².    Estimated Creatinine Clearance: 4.6 mL/min (A) (based on SCr of 9.9 mg/dL (H)).     Physical Exam  Vitals and nursing note reviewed.   Constitutional:       Appearance: Normal appearance. He is ill-appearing.   HENT:      Head: Normocephalic and atraumatic.      Nose: Nose normal. No congestion.      Mouth/Throat:      Mouth: Mucous membranes are moist.      Pharynx: Oropharynx is clear.   Eyes:      Conjunctiva/sclera: Conjunctivae normal.      Pupils: Pupils are equal, round, and reactive to light.   Cardiovascular:      Rate and Rhythm: Normal rate and regular rhythm.   Pulmonary:      Effort: Pulmonary effort is normal. No respiratory distress.      Breath sounds: Normal breath sounds.      Comments: NC in place.  Abdominal:      General: Abdomen is flat. There is no distension.      Palpations: Abdomen is soft.   Musculoskeletal:         General: No swelling. Normal range of motion.      Cervical back: Normal range of motion and neck supple.      Comments: LUE in clean dressing.   Skin:     General: Skin is warm and dry.   Neurological:      Mental Status: He is alert.      Comments: Oriented to self          Significant Labs: Blood Culture: No results for input(s): "LABBLOO" in the last 4320 hours.  Wound Culture: No results for input(s): "LABAERO" in the last 4320 hours.  All pertinent labs within the past 24 hours have been reviewed.    Significant Imaging: I have reviewed all pertinent imaging " results/findings within the past 24 hours.

## 2025-04-04 NOTE — CONSULTS
[place] - Intensive Care  Infectious Disease  Consult Note    Patient Name: Jevon Rajan  MRN: [id number]  Admission Date: 4/3/2025  Hospital Length of Stay: 1 days  Attending Physician: Eileen Jordan MD  Primary Care Provider: No primary care provider on file.     Isolation Status: Contact    Patient information was obtained from patient, past medical records, and ER records.      Inpatient consult to Infectious Diseases  Consult performed by: Jenni Elias MD  Consult ordered by: Eileen Jordan MD        Assessment/Plan:     Renal/  ESRD on hemodialysis  Renally dose antibiotics    ID  * Septic shock  MRSA bacteremia  MV endocarditis  85 y.o. man with ESRD with LUE AVF, HFpEF, CAD, DM admitted to OSH 4/1- 4/3 with sepsis due to MRSA bacteremia, transferred to [facility] ICU for septic shock  and thrombosis of AV graft on 4/3 s/p exploratory surgery of LUE with graft resection 4/3. Op findings: Ruptured pseudoaneurysm with infected hematoma, graft completely obliterated at mid segment.     BCx 4/2 s aureus    TTE: 1.9x1.2cm large fixed heterogeneous mass present on the posterior leaflet , moderate to severe regurgitation with an eccentric jet. Cannot exclude severe MR with possible PMVL perforation in association with large vegetation     Recommendations:  --continue empiric vanc. Pharm to dose for goal trough 15-20  --f/u susceptibiliities of s aureus  --repeat BCx  --f/u surgical cx  --CTS eval  --appreciate palliative care input        Thank you for your consult. I will follow-up with patient. Please contact us if you have any additional questions.    Jenni Elias MD  Infectious Disease  [facility] - [place] - Intensive Care    Subjective:     Principal Problem: Septic shock    HPI: 85 y.o. man with ESRD with LUE AVF, HFpEF, CAD, DM admitted to OSH 4/1- 4/3 with sepsis due to MRSA bacteremia, transferred to [facility] ICU for septic shock on 4/3.    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     CXR 4/3 with likely pulmonary edema  CT c/a/p 4/3 revealed hepatic hypodensities likely cysts as well some larger lesions that hav decreased in size. Otherwise, no clear infectious source.      ID consulted for: MRSA bacteremia     Past Medical History:   Diagnosis Date    BPH (benign prostatic hyperplasia)     Coronary artery disease     [facility] his care to this clinic. In 2014 he had stent to LAD. He states he has had 2 heart attacks. He does not get angina. There was 90% left anterior descending artery stenosis undergoing stenting. There was also a circumflex marginal branch stenosis of 70%.    Diabetes mellitus, type 2     ESRD (end stage renal disease) on dialysis     ESRD on HD TTS via left brachial AVF    Hypertension     Hypothyroidism, unspecified     Sepsis 4/2/2025    Symptomatic anemia 01/15/2024       Past Surgical History:   Procedure Laterality Date    ANGIOGRAM, CORONARY, WITH LEFT HEART CATHETERIZATION  1/13/2025    Procedure: Angiogram, Coronary, with Left Heart Cath;  Surgeon: Steve Bhat MD;  Location: [facility] CATH LAB/EP;  Service: Cardiology;;    ATHERECTOMY, CORONARY N/A 1/14/2025    Procedure: Atherectomy-coronary;  Surgeon: Giacomo Pittman MD;  Location: [facility] CATH LAB/EP;  Service: Cardiology;  Laterality: N/A;    bilateral foot surgery      CORONARY ANGIOPLASTY WITH STENT PLACEMENT N/A 1/14/2025    Procedure: ANGIOPLASTY, CORONARY ARTERY, WITH STENT INSERTION;  Surgeon: Giacomo Pittman MD;  Location: [facility] CATH LAB/EP;  Service: Cardiology;  Laterality: N/A;    CORONARY STENT PLACEMENT N/A 1/13/2025    Procedure: INSERTION, STENT, CORONARY ARTERY;  Surgeon: Steve Bhat MD;  Location: [facility] CATH LAB/EP;  Service: Cardiology;  Laterality: N/A;    ESOPHAGOGASTRODUODENOSCOPY N/A 2/27/2024    Procedure: EGD  (ESOPHAGOGASTRODUODENOSCOPY);  Surgeon: Gemma Almendarez MD;  Location: [facility] ENDO;  Service: Endoscopy;  Laterality: N/A;    EXPLORATION, ARTERY, UPPER EXTREMITY Left 4/3/2025    Procedure: EXPLORATION, ARTERY, UPPER EXTREMITY;  Surgeon: Sunil Contreras MD;  Location: [facility] OR;  Service: Vascular;  Laterality: Left;    eyelid surgery      FISTULOGRAM Left 11/27/2019    Procedure: Fistulogram;  Surgeon: MELANY Brenner III, MD;  Location: [facility] OR [facility] FLR;  Service: Peripheral Vascular;  Laterality: Left;    FISTULOGRAM Left 11/27/2019    Procedure: Fistulogram, AVG declot, possible permacath;  Surgeon: MELANY Brenner III, MD;  Location: [facility] CATH LAB;  Service: Peripheral Vascular;  Laterality: Left;    INSERTION OF DIALYSIS CATHETER      IVUS, CORONARY  1/13/2025    Procedure: IVUS, Coronary;  Surgeon: Steve Bhat MD;  Location: [facility] CATH LAB/EP;  Service: Cardiology;;    LEFT HEART CATHETERIZATION Left 1/14/2025    Procedure: Left heart cath;  Surgeon: Giacomo Pittman MD;  Location: [facility] CATH LAB/EP;  Service: Cardiology;  Laterality: Left;    MOH's  12/5/13    PERCUTANEOUS CORONARY INTERVENTION, ARTERY N/A 1/14/2025    Procedure: Percutaneous coronary intervention;  Surgeon: Giacomo Pittman MD;  Location: [facility] CATH LAB/EP;  Service: Cardiology;  Laterality: N/A;    PERCUTANEOUS TRANSLUMINAL BALLOON ANGIOPLASTY OF CORONARY ARTERY  1/13/2025    Procedure: Angioplasty-coronary;  Surgeon: Steve Bhat MD;  Location: [facility] CATH LAB/EP;  Service: Cardiology;;    REMOVAL, GRAFT, ARTERIOVENOUS, UPPER EXTREMITY Left 4/3/2025    Procedure: REMOVAL, GRAFT, ARTERIOVENOUS, UPPER EXTREMITY;  Surgeon: Sunil Contreras MD;  Location: [facility] OR;  Service: Vascular;  Laterality: Left;    REVASCULARIZATION, ENDOVASCULAR, LE W/ INTRAVASCULAR LITHOTRIPSY  1/13/2025    Procedure: REVASCULARIZATION, ENDOVASCULAR, LE W/ INTRAVASCULAR LITHOTRIPSY;  Surgeon: Steve Bhat MD;  Location: [facility] CATH  LAB/EP;  Service: Cardiology;;    right hand surgery      stents         Review of patient's allergies indicates:   Allergen Reactions    Ace inhibitors Hives, Itching, Shortness Of Breath, Other (See Comments) and Rash     Is not sure which medication it was    Captopril        Medications:  Medications Prior to Admission   Medication Sig    ammonium lactate 12 % Crea Apply topically 2 (two) times a day.    artificial tears,hypromellose,,GENTEAL/SUSTANE, 0.3 % Gel Apply to eye nightly.    aspirin (ECOTRIN) 81 MG EC tablet Take 1 tablet (81 mg total) by mouth once daily.    atorvastatin (LIPITOR) 80 MG tablet Take 1 tablet (80 mg total) by mouth every evening.    camphor-menthol 0.5-0.5% (SARNA) lotion Apply topically once daily. APPLY SMALL AMOUNT TOPICALLY TO AFFECTED AREA(S) AS NEEDED FOR ITCHING KEEP IN FRIDGE    carboxymethylcellulose sodium 0.5 % Drop Apply 1 drop to eye nightly as needed (dry eyes).    carvediloL (COREG) 12.5 MG tablet Take 0.5 tablets (6.25 mg total) by mouth 2 (two) times daily.    cetirizine (ZYRTEC) 5 MG tablet Take 1 tablet (5 mg total) by mouth every 48 hours.    clobetasol 0.05% (TEMOVATE) 0.05 % Oint Apply topically 2 (two) times daily.    clopidogreL (PLAVIX) 75 mg tablet Take 1 tablet (75 mg total) by mouth once daily.    erythromycin (ROMYCIN) ophthalmic ointment Place a 1/2 inch ribbon of ointment into the lower eyelid. Four timex daily for 5 days    gabapentin (NEURONTIN) 100 MG capsule Take 1 capsule (100 mg total) by mouth every evening.    hydrophilic ointment (AQUABASE) Apply topically as needed for Dry Skin. APPLY MODERATE AMOUNT TOPICALLY TO AFFECTED AREA(S) TWICE A DAY AS NEEDED FOR DRY SKIN APPLY TO AREAS OF DRY SKIN OR OVER ENTIRE BODY.    lanolin alcohol-mineral oil-white petrolatum-ceres (EUCERIN) Crea cream Apply topically 2 (two) times daily as needed.    LIDOcaine (LIDODERM) 5 % Place 1 patch onto the skin once daily. Remove & Discard patch within 12 hours or as  "directed by MD    loratadine (CLARITIN) 10 mg tablet Take 10 mg by mouth daily as needed for Allergies (Every other day).    nut.tx.imp.renal fxn,lac-reduc (NEPRO CARB STEADY) 0.08 gram-1.8 kcal/mL Liqd Take 240 mLs by mouth 2 (two) times a day.    ondansetron (ZOFRAN-ODT) 4 MG TbDL Take 1 tablet (4 mg total) by mouth every 8 (eight) hours as needed (nausea).    oxyCODONE-acetaminophen (PERCOCET) 5-325 mg per tablet Take 1 tablet by mouth every 6 (six) hours as needed.    pantoprazole (PROTONIX) 20 MG tablet Take 20 mg by mouth 2 (two) times daily as needed.    pramoxine 1 % Lotn Apply topically 2 (two) times daily as needed (itching).    triamcinolone acetonide 0.1% (KENALOG) 0.1 % cream Apply topically 2 (two) times daily as needed (itching).     Antibiotics (From admission, onward)      Start     Stop Route Frequency Ordered    04/04/25 2100  mupirocin 2 % ointment         04/09/25 2059 Nasl 2 times daily 04/04/25 1320    04/03/25 1645  vancomycin - pharmacy to dose  (vancomycin IVPB (PEDS and ADULTS))        Placed in "And" Linked Group    -- IV pharmacy to manage frequency 04/03/25 1545          Antifungals (From admission, onward)      None          Antivirals (From admission, onward)      None             Immunization History   Administered Date(s) Administered    COVID-19, MRNA, LN-S, PF (Pfizer) (Purple Cap) 01/22/2021, 01/22/2021, 02/12/2021, 05/03/2022    COVID-19, mRNA, LNP-S, bivalent booster, PF (PFIZER OMICRON) 11/29/2022    Dtap, Unspecified Formulation 03/05/2012    Hepatitis B, Adult 09/12/2018, 10/18/2018, 03/14/2019    Influenza 12/24/2003, 10/08/2004, 12/08/2005, 11/20/2006, 01/09/2008, 03/13/2009, 10/16/2009, 10/06/2010, 03/05/2012, 10/03/2012, 10/10/2013, 10/28/2015, 09/01/2020, 08/01/2021    Influenza - Quadrivalent 05/15/2017, 11/14/2017    Influenza - Trivalent - Afluria, Fluzone MDV 10/23/2018    Influenza - Trivalent - Fluzone High Dose - PF (65 years and older) 09/24/2019, 09/19/2020, " 10/25/2022    PPD Test 12/07/2018, 09/26/2019, 11/21/2020, 05/11/2022, 01/10/2023    Pneumococcal 03/09/2005, 12/08/2005    Pneumococcal Conjugate - 13 Valent 02/10/2016, 07/16/2021    Pneumococcal Polysaccharide - 23 Valent 05/15/2017    Tdap 03/05/2012       Family History    None       Social History     Socioeconomic History    Marital status: Single   Tobacco Use    Smoking status: Never    Smokeless tobacco: Never   Substance and Sexual Activity    Alcohol use: No    Drug use: No    Sexual activity: Not Currently     Social Drivers of Health     Financial Resource Strain: Patient Unable To Answer (4/3/2025)    Overall Financial Resource Strain (CARDIA)     Difficulty of Paying Living Expenses: Patient unable to answer   Food Insecurity: Patient Unable To Answer (4/3/2025)    Hunger Vital Sign     Worried About Running Out of Food in the Last Year: Patient unable to answer     Ran Out of Food in the Last Year: Patient unable to answer   Transportation Needs: Patient Unable To Answer (4/3/2025)    PRAPARE - Transportation     Lack of Transportation (Medical): Patient unable to answer     Lack of Transportation (Non-Medical): Patient unable to answer   Recent Concern: Transportation Needs - Unmet Transportation Needs (1/15/2025)    TRANSPORTATION NEEDS     Transportation : Yes, it has kept me from medical appointments or from getting my medications.   Physical Activity: Patient Unable To Answer (4/2/2025)    Exercise Vital Sign     Days of Exercise per Week: Patient unable to answer     Minutes of Exercise per Session: Patient unable to answer   Recent Concern: Physical Activity - Insufficiently Active (1/15/2025)    Exercise Vital Sign     Days of Exercise per Week: 5 days     Minutes of Exercise per Session: 20 min   Stress: Patient Unable To Answer (4/3/2025)    [employer] - Occupational Stress Questionnaire     Feeling of Stress : Patient unable to answer   Housing Stability:  Patient Unable To Answer (4/3/2025)    Housing Stability Vital Sign     Unable to Pay for Housing in the Last Year: Patient unable to answer     Number of Times Moved in the Last Year: 0     Homeless in the Last Year: Patient unable to answer     Review of Systems   Constitutional:  Positive for fatigue. Negative for chills and fever.   Respiratory:  Negative for cough.    All other systems reviewed and are negative.    Objective:     Vital Signs (Most Recent):  Temp: 97.5 °F (36.4 °C) (04/04/25 0900)  Pulse: 79 (04/04/25 1400)  Resp: 16 (04/04/25 1400)  BP: (!) 127/91 (04/04/25 1400)  SpO2: 96 % (04/04/25 1400) Vital Signs (24h Range):  Temp:  [97.2 °F (36.2 °C)-97.7 °F (36.5 °C)] 97.5 °F (36.4 °C)  Pulse:  [72-86] 79  Resp:  [8-46] 16  SpO2:  [91 %-100 %] 96 %  BP: ()/(46-91) 127/91     Weight: 61.2 kg (135 lb)  Body mass index is 21.14 kg/m².    Estimated Creatinine Clearance: 4.6 mL/min (A) (based on SCr of 9.9 mg/dL (H)).     Physical Exam  Vitals and nursing note reviewed.   Constitutional:       Appearance: Normal appearance. He is ill-appearing.   HENT:      Head: Normocephalic and atraumatic.      Nose: Nose normal. No congestion.      Mouth/Throat:      Mouth: Mucous membranes are moist.      Pharynx: Oropharynx is clear.   Eyes:      Conjunctiva/sclera: Conjunctivae normal.      Pupils: Pupils are equal, round, and reactive to light.   Cardiovascular:      Rate and Rhythm: Normal rate and regular rhythm.   Pulmonary:      Effort: Pulmonary effort is normal. No respiratory distress.      Breath sounds: Normal breath sounds.      Comments: NC in place.  Abdominal:      General: Abdomen is flat. There is no distension.      Palpations: Abdomen is soft.   Musculoskeletal:         General: No swelling. Normal range of motion.      Cervical back: Normal range of motion and neck supple.      Comments: LUE in clean dressing.   Skin:     General: Skin is warm and dry.   Neurological:      Mental Status: He  "is alert.      Comments: Oriented to self          Significant Labs: Blood Culture: No results for input(s): "LABBLOO" in the last 4320 hours.  Wound Culture: No results for input(s): "LABAERO" in the last 4320 hours.  All pertinent labs within the past 24 hours have been reviewed.    Significant Imaging: I have reviewed all pertinent imaging results/findings within the past 24 hours.            Critical care time spent on the evaluation and treatment of severe organ dysfunction, review of pertinent labs and imaging studies, discussions with consulting providers and discussions with patient/family: 30 minutes.    "

## 2025-04-04 NOTE — ASSESSMENT & PLAN NOTE
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  Cont abx.  Prognosis poor.

## 2025-04-04 NOTE — SUBJECTIVE & OBJECTIVE
Past Medical History:   Diagnosis Date    BPH (benign prostatic hyperplasia)     Coronary artery disease     He has followed at the [facility] and is now transferring his care to this clinic. In 2014 he had stent to LAD. He states he has had 2 heart attacks. He does not get angina. There was 90% left anterior descending artery stenosis undergoing stenting. There was also a circumflex marginal branch stenosis of 70%.    Diabetes mellitus, type 2     ESRD (end stage renal disease) on dialysis     ESRD on HD TTS via left brachial AVF    Hypertension     Hypothyroidism, unspecified     Sepsis 4/2/2025    Symptomatic anemia 01/15/2024       Past Surgical History:   Procedure Laterality Date    ANGIOGRAM, CORONARY, WITH LEFT HEART CATHETERIZATION  1/13/2025    Procedure: Angiogram, Coronary, with Left Heart Cath;  Surgeon: Steve Bhat MD;  Location: [facility] CATH LAB/EP;  Service: Cardiology;;    ATHERECTOMY, CORONARY N/A 1/14/2025    Procedure: Atherectomy-coronary;  Surgeon: Giacomo Pittman MD;  Location: [facility] CATH LAB/EP;  Service: Cardiology;  Laterality: N/A;    bilateral foot surgery      CORONARY ANGIOPLASTY WITH STENT PLACEMENT N/A 1/14/2025    Procedure: ANGIOPLASTY, CORONARY ARTERY, WITH STENT INSERTION;  Surgeon: Giacomo Pittman MD;  Location: [facility] CATH LAB/EP;  Service: Cardiology;  Laterality: N/A;    CORONARY STENT PLACEMENT N/A 1/13/2025    Procedure: INSERTION, STENT, CORONARY ARTERY;  Surgeon: Steve Bhat MD;  Location: [facility] CATH LAB/EP;  Service: Cardiology;  Laterality: N/A;    ESOPHAGOGASTRODUODENOSCOPY N/A 2/27/2024    Procedure: EGD (ESOPHAGOGASTRODUODENOSCOPY);  Surgeon: Gemma Almendarez MD;  Location: [facility] ENDO;  Service: Endoscopy;  Laterality: N/A;    EXPLORATION, ARTERY, UPPER EXTREMITY Left 4/3/2025    Procedure: EXPLORATION, ARTERY, UPPER EXTREMITY;  Surgeon: Sunil Contreras MD;  Location: [facility] OR;  Service: Vascular;  Laterality: Left;    eyelid surgery       FISTULOGRAM Left 11/27/2019    Procedure: Fistulogram;  Surgeon: MELANY Brenner III, MD;  Location: [facility] OR 2ND FLR;  Service: Peripheral Vascular;  Laterality: Left;    FISTULOGRAM Left 11/27/2019    Procedure: Fistulogram, AVG declot, possible permacath;  Surgeon: MELANY Brenner III, MD;  Location: [facility] CATH LAB;  Service: Peripheral Vascular;  Laterality: Left;    INSERTION OF DIALYSIS CATHETER      IVUS, CORONARY  1/13/2025    Procedure: IVUS, Coronary;  Surgeon: Steve Bhat MD;  Location: [facility] CATH LAB/EP;  Service: Cardiology;;    LEFT HEART CATHETERIZATION Left 1/14/2025    Procedure: Left heart cath;  Surgeon: Giacomo Pittman MD;  Location: [facility] CATH LAB/EP;  Service: Cardiology;  Laterality: Left;    MOH's  12/5/13    PERCUTANEOUS CORONARY INTERVENTION, ARTERY N/A 1/14/2025    Procedure: Percutaneous coronary intervention;  Surgeon: Giacomo Pittman MD;  Location: [facility] CATH LAB/EP;  Service: Cardiology;  Laterality: N/A;    PERCUTANEOUS TRANSLUMINAL BALLOON ANGIOPLASTY OF CORONARY ARTERY  1/13/2025    Procedure: Angioplasty-coronary;  Surgeon: Steve Bhat MD;  Location: [facility] CATH LAB/EP;  Service: Cardiology;;    REMOVAL, GRAFT, ARTERIOVENOUS, UPPER EXTREMITY Left 4/3/2025    Procedure: REMOVAL, GRAFT, ARTERIOVENOUS, UPPER EXTREMITY;  Surgeon: Sunil Contreras MD;  Location: [facility] OR;  Service: Vascular;  Laterality: Left;    REVASCULARIZATION, ENDOVASCULAR, LE W/ INTRAVASCULAR LITHOTRIPSY  1/13/2025    Procedure: REVASCULARIZATION, ENDOVASCULAR, LE W/ INTRAVASCULAR LITHOTRIPSY;  Surgeon: Steve Bhat MD;  Location: [facility] CATH LAB/EP;  Service: Cardiology;;    right hand surgery      stents         Review of patient's allergies indicates:   Allergen Reactions    Ace inhibitors Hives, Itching, Shortness Of Breath, Other (See Comments) and Rash     Is not sure which medication it was    Captopril      Current Facility-Administered Medications   Medication Frequency    0.9%   NaCl infusion (for blood administration) Q24H PRN    acetaminophen tablet 650 mg Q4H PRN    aspirin EC tablet 81 mg Daily    atorvastatin tablet 80 mg QHS    clopidogreL tablet 75 mg Daily    dextrose 50% injection 12.5 g PRN    dextrose 50% injection 25 g PRN    glucagon (human recombinant) injection 1 mg PRN    glucose chewable tablet 16 g PRN    glucose chewable tablet 24 g PRN    heparin (porcine) injection 5,000 Units Q8H    insulin aspart U-100 pen 0-5 Units QID (AC + HS) PRN    magnesium sulfate 2g in water 50mL IVPB (premix) PRN    melatonin tablet 6 mg Nightly PRN    mupirocin 2 % ointment BID    naloxone 0.4 mg/mL injection 0.02 mg PRN    ondansetron injection 4 mg Q6H PRN    pantoprazole injection 40 mg Daily    prochlorperazine injection Soln 5 mg Q6H PRN    senna-docusate 8.6-50 mg per tablet 1 tablet BID    sodium chloride 0.9% flush 10 mL Q8H    sodium phosphate 20.1 mmol in D5W 250 mL IVPB PRN    sodium phosphate 30 mmol in D5W 250 mL IVPB PRN    sodium phosphate 39.9 mmol in D5W 250 mL IVPB PRN    vancomycin - pharmacy to dose pharmacy to manage frequency     Family History    None       Tobacco Use    Smoking status: Never    Smokeless tobacco: Never   Substance and Sexual Activity    Alcohol use: No    Drug use: No    Sexual activity: Not Currently     Review of Systems   Unable to perform ROS: Other     Objective:     Vital Signs (Most Recent):  Temp: 97.5 °F (36.4 °C) (04/04/25 0900)  Pulse: 79 (04/04/25 1330)  Resp: 12 (04/04/25 1330)  BP: (!) 106/59 (04/04/25 1330)  SpO2: 96 % (04/04/25 1330) Vital Signs (24h Range):  Temp:  [97.2 °F (36.2 °C)-97.7 °F (36.5 °C)] 97.5 °F (36.4 °C)  Pulse:  [72-86] 79  Resp:  [8-46] 12  SpO2:  [91 %-100 %] 96 %  BP: ()/(46-76) 106/59     Weight: 61.2 kg (135 lb) (04/04/25 0904)  Body mass index is 21.14 kg/m².  Body surface area is 1.7 meters squared.    I/O last 3 completed shifts:  In: 1119 [Blood:719; IV Piggyback:400]  Out: 85 [Urine:35; Blood:50]      Physical Exam  Vitals and nursing note reviewed.   Constitutional:       General: He is awake. He is not in acute distress.     Appearance: Normal appearance. He is well-developed.   HENT:      Head: Normocephalic and atraumatic.      Nose: Nose normal.      Mouth/Throat:      Mouth: Mucous membranes are moist.   Eyes:      Extraocular Movements: Extraocular movements intact.      Conjunctiva/sclera: Conjunctivae normal.   Cardiovascular:      Rate and Rhythm: Normal rate and regular rhythm.   Pulmonary:      Effort: Pulmonary effort is normal.      Breath sounds: Normal breath sounds.   Abdominal:      General: There is no distension.      Palpations: Abdomen is soft.   Musculoskeletal:      Right lower leg: No edema.      Left lower leg: No edema.   Skin:     General: Skin is warm and dry.      Findings: Lesion (diffuse) present. No erythema or rash.   Neurological:      Mental Status: He is alert.          Significant Labs:  CBC:   Recent Labs   Lab 04/04/25  0247   WBC 16.11*   RBC 3.37*   HGB 10.0*   HCT 30.0*   *   MCV 89   MCH 29.7   MCHC 33.3     CMP:   Recent Labs   Lab 04/04/25  0247   CALCIUM 8.2*   ALBUMIN 2.0*      K 5.0   CO2 23      BUN 75*   CREATININE 9.9*   ALKPHOS 70   ALT 17   AST 23   BILITOT 0.3     All labs within the past 24 hours have been reviewed.    Significant Imaging:  Labs: Reviewed  CT: Reviewed

## 2025-04-04 NOTE — HOSPITAL COURSE
"Mr Jevon Rajan is a 85 y.o. man with ESRD on HD with LUE AVF that has been bleeding, CHF, CAD s/p recent stenting 1/2025 who was admitted with MRSA bacteremia and bleeding from his LUE AVF. Continued vancomycin; weaned off levophed. Vascular surgery emergently consulted; on 4/3/25 they took him to OR and noted: "well incorporated proximal and central graft without evidence of infection, resected graft and covered proximal and central remnants with multiple layers. Mid graft removed in entirety and sent for culture. Ruptured pseudoaneurysm with infected hematoma, graft completely obliterated at mid segment." Surgical cultures with Staph. Suspect AVF or chronic scratching of pruritic skin is the source of his infection. TTE showed endocarditis: EF 40-45%, G1DD, RV systolic dysfunction, large 1.9x1.2cm fixed mass on the posterior mitral valve leaflet with moder to severe regurgitation, cannot rule out posterior mitral valve leaflet perforation. Discussed with cardiac surgery; he is high mortality risk, and surgery is not advised. ID following. Nephrology consulted; trialysis was placed for HD. Persistently positive for MRSA in blood cultures. He has had urinary retention; Urology consulted, performed bedside cystoscopy on 4/6/25 with large prostate noted. Noted to have clot retention and went urgently to OR with Urology on 4/7/25; asa and DVT ppx held.     WBC normalized. S/p clot removal with Urology, 1U PRBC ordered this morning with Hb 6.1. Soft BPs, will add midodrine for HD to step down to floor. Glucoses high, will increase insulin. Attempted to s/d but BP too low, may still need CRRT rather. 4/8 cx clear so far. Increasing insulin again. Increasing midodrine to 15 mg TID. Hb 6.8, 1U PRBC ordered.     BP appears to be recovering a bit, perhaps will tolerate reg HD, will discuss w Neph- will run him on HD Saturday, if tolerates normal HD will remove central line and give 2 day holiday and place tunnel on Monday. " We will give him HD before dc to facility on Tuesday if accepted by then.     WBC has normalized for the first time, now tolerating reg HD, and Blcx remain clear. Central line removed. NGT removed. Will step down.

## 2025-04-04 NOTE — PLAN OF CARE
Pt requiring CRRT this shift as unable to restart HD 2/2 not being able to place an HD line due to bacteremia. CRRT started, pt tolerating at this time. Otherwise, uneventful shift.    Problem: Adult Inpatient Plan of Care  Goal: Plan of Care Review  Outcome: Progressing  Goal: Patient-Specific Goal (Individualized)  Outcome: Progressing  Goal: Absence of Hospital-Acquired Illness or Injury  Outcome: Progressing  Goal: Optimal Comfort and Wellbeing  Outcome: Progressing     Problem: Diabetes Comorbidity  Goal: Blood Glucose Level Within Targeted Range  Outcome: Progressing     Problem: Sepsis/Septic Shock  Goal: Optimal Coping  Outcome: Progressing  Goal: Absence of Bleeding  Outcome: Progressing  Goal: Blood Glucose Level Within Targeted Range  Outcome: Progressing  Goal: Absence of Infection Signs and Symptoms  Outcome: Progressing  Goal: Optimal Nutrition Intake  Outcome: Progressing     Problem: Infection  Goal: Absence of Infection Signs and Symptoms  Outcome: Progressing     Problem: Fall Injury Risk  Goal: Absence of Fall and Fall-Related Injury  Outcome: Progressing     Problem: Skin Injury Risk Increased  Goal: Skin Health and Integrity  Outcome: Progressing     Problem: Wound  Goal: Optimal Coping  Outcome: Progressing  Goal: Optimal Functional Ability  Outcome: Progressing  Goal: Absence of Infection Signs and Symptoms  Outcome: Progressing  Goal: Improved Oral Intake  Outcome: Progressing  Goal: Optimal Pain Control and Function  Outcome: Progressing  Goal: Skin Health and Integrity  Outcome: Progressing  Goal: Optimal Wound Healing  Outcome: Progressing     Problem: Coping Ineffective  Goal: Effective Coping  Outcome: Progressing     Problem: CRRT (Continuous Renal Replacement Therapy)  Goal: Safe, Effective Therapy Delivery  Outcome: Progressing  Goal: Hemodynamic Stability  Outcome: Progressing  Goal: Body Temperature Maintained in Desired Range  Outcome: Progressing  Goal: Absence of Infection Signs  and Symptoms  Outcome: Progressing

## 2025-04-04 NOTE — PROCEDURES
"Jevon Rajan is a 85 y.o. male patient.    Temp: 97.5 °F (36.4 °C) (04/04/25 0900)  Pulse: 85 (04/04/25 1215)  Resp: 20 (04/04/25 1220)  BP: (!) 95/52 (04/04/25 1220)  SpO2: 99 % (04/04/25 1220)  Weight: 61.2 kg (135 lb) (04/04/25 0904)  Height: 5' 7" (170.2 cm) (04/04/25 0904)       Central Line    Date/Time: 4/4/2025 12:36 PM    Performed by: Akira Diaz MD  Authorized by: Akira Diaz MD    Location procedure was performed:  [facility] ICU  Consent Done ?:  Yes  Time out complete?: Verified correct patient, procedure, equipment, staff, and site/side    Indications:  Hemodialysis  Anesthesia:  Local infiltration  Local anesthetic:  Lidocaine 1% with epinephrine  Preparation:  Skin prepped with chlorhexidine (without alcohol) (skin was prepped with chloropre and copious betadine)  Skin prep agent dried: Skin prep agent completely dried prior to procedure    Sterile barriers: All five maximal sterile barriers used - gloves, gown, cap, mask and large sterile sheet    Hand hygiene: Hand hygiene performed immediately prior to central venous catheter insertion    Location:  Right internal jugular  Catheter size:  13 Fr (attempt at right IJ was not successful.  Unable to pass guide wires.  new trialysis placed via exchanged with TLC on right IJ)  Inserted Catheter Length (cm):  15  Ultrasound guidance: No    Manometry: No    Number of attempts:  2  Securement:  Chlorhexidine patch, sterile dressing applied, line sutured and blood return through all ports  Complications: No    Specimens: No    Implants: No        4/4/2025    "

## 2025-04-04 NOTE — CONSULTS
[place] - Intensive Care  Nephrology  Consult Note    Patient Name: Jevon Rajan  MRN: [id number]  Admission Date: 4/3/2025  Hospital Length of Stay: 1 days  Attending Provider: Eileen Jordan MD   Primary Care Physician: No primary care provider on file.  Principal Problem:Septic shock    Inpatient consult to Nephrology  Consult performed by: Юлия Davis MD  Consult ordered by: Eileen Jordan MD  Reason for consult: ESRD        Subjective:     HPI: Mr. Rajan is an 84 yo male with HTN, T2DM, CAD, ESRD, and liver lesion who was transferred from [facility] for septic shock and impending AVG rupture. He initially presented to [facility] on 4/1 with temp 101.9 and BP 80/30s. He was transferred to our hospital on 4/3/25; it seems his AVG ruptured during transport/shortly after arrival. He emergently went to the OR yesterday with AVG extraction; infected hematoma was noted. Workup also concerning for elevated troponin and cardiac vegetations. He is no longer on pressors. Nephrology consulted for ESRD. Prior records obtained and reviewed. He receives HD TTS at [facility] under the c/o Dr. Colin. He last received HD outpatient on 4/1/25. HD was held at [facility] due to unstable vascular access. Family agreed to proceed with CRRT today.     Past Medical History:   Diagnosis Date    BPH (benign prostatic hyperplasia)     Coronary artery disease     [facility] care to this clinic. In 2014 he had stent to LAD. He states he has had 2 heart attacks. He does not get angina. There was 90% left anterior descending artery stenosis undergoing stenting. There was also a circumflex marginal branch stenosis of 70%.    Diabetes mellitus, type 2     ESRD (end stage renal disease) on dialysis     ESRD on HD TTS via left brachial AVF    Hypertension     Hypothyroidism, unspecified     Sepsis 4/2/2025    Symptomatic anemia 01/15/2024       Past Surgical History:   Procedure Laterality Date     ANGIOGRAM, CORONARY, WITH LEFT HEART CATHETERIZATION  1/13/2025    Procedure: Angiogram, Coronary, with Left Heart Cath;  Surgeon: Steve Bhat MD;  Location: [facility] CATH LAB/EP;  Service: Cardiology;;    ATHERECTOMY, CORONARY N/A 1/14/2025    Procedure: Atherectomy-coronary;  Surgeon: Giacomo Pittman MD;  Location: [facility] CATH LAB/EP;  Service: Cardiology;  Laterality: N/A;    bilateral foot surgery      CORONARY ANGIOPLASTY WITH STENT PLACEMENT N/A 1/14/2025    Procedure: ANGIOPLASTY, CORONARY ARTERY, WITH STENT INSERTION;  Surgeon: Giacomo Pittman MD;  Location: [facility] CATH LAB/EP;  Service: Cardiology;  Laterality: N/A;    CORONARY STENT PLACEMENT N/A 1/13/2025    Procedure: INSERTION, STENT, CORONARY ARTERY;  Surgeon: Steve Bhat MD;  Location: [facility] CATH LAB/EP;  Service: Cardiology;  Laterality: N/A;    ESOPHAGOGASTRODUODENOSCOPY N/A 2/27/2024    Procedure: EGD (ESOPHAGOGASTRODUODENOSCOPY);  Surgeon: Gemma Almendarez MD;  Location: [facility] ENDO;  Service: Endoscopy;  Laterality: N/A;    EXPLORATION, ARTERY, UPPER EXTREMITY Left 4/3/2025    Procedure: EXPLORATION, ARTERY, UPPER EXTREMITY;  Surgeon: Sunil Contreras MD;  Location: [facility] OR;  Service: Vascular;  Laterality: Left;    eyelid surgery      FISTULOGRAM Left 11/27/2019    Procedure: Fistulogram;  Surgeon: MELANY Brenner III, MD;  Location: [address];  Service: Peripheral Vascular;  Laterality: Left;    FISTULOGRAM Left 11/27/2019    Procedure: Fistulogram, AVG declot, possible permacath;  Surgeon: MELANY Brenner III, MD;  Location: [facility] CATH LAB;  Service: Peripheral Vascular;  Laterality: Left;    INSERTION OF DIALYSIS CATHETER      IVUS, CORONARY  1/13/2025    Procedure: IVUS, Coronary;  Surgeon: Steve Bhat MD;  Location: [facility] CATH LAB/EP;  Service: Cardiology;;    LEFT HEART CATHETERIZATION Left 1/14/2025    Procedure: Left heart cath;  Surgeon: Giacomo Pittman MD;  Location: [facility] CATH LAB/EP;  Service:  Cardiology;  Laterality: Left;    [facility]  12/[facility]    PERCUTANEOUS CORONARY INTERVENTION, ARTERY N/A 1/14/2025    Procedure: Percutaneous coronary intervention;  Surgeon: Giacomo Pittman MD;  Location: [facility] CATH LAB/EP;  Service: Cardiology;  Laterality: N/A;    PERCUTANEOUS TRANSLUMINAL BALLOON ANGIOPLASTY OF CORONARY ARTERY  1/13/2025    Procedure: Angioplasty-coronary;  Surgeon: Steve Bhat MD;  Location: [facility] CATH LAB/EP;  Service: Cardiology;;    REMOVAL, GRAFT, ARTERIOVENOUS, UPPER EXTREMITY Left 4/3/2025    Procedure: REMOVAL, GRAFT, ARTERIOVENOUS, UPPER EXTREMITY;  Surgeon: Sunil Contreras MD;  Location: [facility] OR;  Service: Vascular;  Laterality: Left;    REVASCULARIZATION, ENDOVASCULAR, LE W/ INTRAVASCULAR LITHOTRIPSY  1/13/2025    Procedure: REVASCULARIZATION, ENDOVASCULAR, LE W/ INTRAVASCULAR LITHOTRIPSY;  Surgeon: Steve Bhat MD;  Location: [facility] CATH LAB/EP;  Service: Cardiology;;    right hand surgery      stents         Review of patient's allergies indicates:   Allergen Reactions    Ace inhibitors Hives, Itching, Shortness Of Breath, Other (See Comments) and Rash     Is not sure which medication it was    Captopril      Current Facility-Administered Medications   Medication Frequency    0.9%  NaCl infusion (for blood administration) Q24H PRN    acetaminophen tablet 650 mg Q4H PRN    aspirin EC tablet 81 mg Daily    atorvastatin tablet 80 mg QHS    clopidogreL tablet 75 mg Daily    dextrose 50% injection 12.5 g PRN    dextrose 50% injection 25 g PRN    glucagon (human recombinant) injection 1 mg PRN    glucose chewable tablet 16 g PRN    glucose chewable tablet 24 g PRN    heparin (porcine) injection 5,000 Units Q8H    insulin aspart U-100 pen 0-5 Units QID (AC + HS) PRN    magnesium sulfate 2g in water 50mL IVPB (premix) PRN    melatonin tablet 6 mg Nightly PRN    mupirocin 2 % ointment BID    naloxone 0.4 mg/mL injection 0.02 mg PRN    ondansetron injection 4 mg Q6H PRN     pantoprazole injection 40 mg Daily    prochlorperazine injection Soln 5 mg Q6H PRN    senna-docusate 8.6-50 mg per tablet 1 tablet BID    sodium chloride 0.9% flush 10 mL Q8H    sodium phosphate 20.1 mmol in D5W 250 mL IVPB PRN    sodium phosphate 30 mmol in D5W 250 mL IVPB PRN    sodium phosphate 39.9 mmol in D5W 250 mL IVPB PRN    vancomycin - pharmacy to dose pharmacy to manage frequency     Family History    None       Tobacco Use    Smoking status: Never    Smokeless tobacco: Never   Substance and Sexual Activity    Alcohol use: No    Drug use: No    Sexual activity: Not Currently     Review of Systems   Unable to perform ROS: Other     Objective:     Vital Signs (Most Recent):  Temp: 97.5 °F (36.4 °C) (04/04/25 0900)  Pulse: 79 (04/04/25 1330)  Resp: 12 (04/04/25 1330)  BP: (!) 106/59 (04/04/25 1330)  SpO2: 96 % (04/04/25 1330) Vital Signs (24h Range):  Temp:  [97.2 °F (36.2 °C)-97.7 °F (36.5 °C)] 97.5 °F (36.4 °C)  Pulse:  [72-86] 79  Resp:  [8-46] 12  SpO2:  [91 %-100 %] 96 %  BP: ()/(46-76) 106/59     Weight: 61.2 kg (135 lb) (04/04/25 0904)  Body mass index is 21.14 kg/m².  Body surface area is 1.7 meters squared.    I/O last 3 completed shifts:  In: 1119 [Blood:719; IV Piggyback:400]  Out: 85 [Urine:35; Blood:50]     Physical Exam  Vitals and nursing note reviewed.   Constitutional:       General: He is awake. He is not in acute distress.     Appearance: Normal appearance. He is well-developed.   HENT:      Head: Normocephalic and atraumatic.      Nose: Nose normal.      Mouth/Throat:      Mouth: Mucous membranes are moist.   Eyes:      Extraocular Movements: Extraocular movements intact.      Conjunctiva/sclera: Conjunctivae normal.   Cardiovascular:      Rate and Rhythm: Normal rate and regular rhythm.   Pulmonary:      Effort: Pulmonary effort is normal.      Breath sounds: Normal breath sounds.   Abdominal:      General: There is no distension.      Palpations: Abdomen is soft.    Musculoskeletal:      Right lower leg: No edema.      Left lower leg: No edema.   Skin:     General: Skin is warm and dry.      Findings: Lesion (diffuse) present. No erythema or rash.   Neurological:      Mental Status: He is alert.          Significant Labs:  CBC:   Recent Labs   Lab 04/04/25  0247   WBC 16.11*   RBC 3.37*   HGB 10.0*   HCT 30.0*   *   MCV 89   MCH 29.7   MCHC 33.3     CMP:   Recent Labs   Lab 04/04/25  0247   CALCIUM 8.2*   ALBUMIN 2.0*      K 5.0   CO2 23      BUN 75*   CREATININE 9.9*   ALKPHOS 70   ALT 17   AST 23   BILITOT 0.3     All labs within the past 24 hours have been reviewed.    Significant Imaging:  Labs: Reviewed  CT: Reviewed    Assessment/Plan:     ESRD  - receives HD TTS at [facility]; last received HD on 4/1  - s/p AVG resection 4/3  - due for RRT today. Currently too unstable for conventional HD. Requested trialysis catheter placement. Will start CVVHD today x 24 hours  - labs q8h. Trend I/Os    Bacteremia  - not currently a candidate for tunneled HD catheter placement  - ID following    Secondary HPTH  - CCa normal; phos elevated at 7.1  - CRRT today  - no need for phos binders at this time    Anemia of CKD  - hgb at goal; no need for NAKIA at this time      Critical care time spent on the evaluation and treatment of severe organ dysfunction, review of pertinent labs and imaging studies, discussions with Dr. Jordan, Dr. Diaz and nursing staff, and discussions with patient/family: 50 minutes.      Thank you for your consult. I will follow-up with patient. Please contact us if you have any additional questions.    Юлия Davis MD  Nephrology  [facility] - [place] - Intensive Care   4

## 2025-04-04 NOTE — NURSING
[facility]  Nurses Note -- 4 Eyes      4/4/2025       Skin assessed on: Transfer      [x] No Pressure Injuries Present    [x]Prevention Measures Documented    [] Yes LDA  for Pressure Injury Previously documented     [] Yes New Pressure Injury Discovered   [] LDA for New Pressure Injury Added      Attending RN:  Diane Phillips RN     Second RN:  CHARLENE Hagen

## 2025-04-04 NOTE — PROGRESS NOTES
[facility] Intensive Care  Vascular Surgery  Progress Note    Patient Name: Jevon Rajan  MRN: [id number]  Admission Date: 4/3/2025  Primary Care Provider: No primary care provider on file.    Subjective:     Interval History: POD1 s/p LUE exploration and removal of AV graft. Cultures pending.     Post-Op Info:  Procedure(s) (LRB):  EXPLORATION, ARTERY, UPPER EXTREMITY (Left)  REMOVAL, GRAFT, ARTERIOVENOUS, UPPER EXTREMITY (Left)   1 Day Post-Op      Medications:  Continuous Infusions:  Scheduled Meds:   aspirin  81 mg Oral Daily    atorvastatin  80 mg Oral QHS    cefTRIAXone (Rocephin) IV (PEDS and ADULTS)  2 g Intravenous Q24H    clopidogreL  75 mg Oral Daily    HYDROmorphone  0.5 mg Intravenous Once    pantoprazole  40 mg Oral Daily    senna-docusate  1 tablet Oral BID    sodium chloride 0.9%  10 mL Intravenous Q8H     PRN Meds:  Current Facility-Administered Medications:     0.9%  NaCl infusion (for blood administration), , Intravenous, Q24H PRN    acetaminophen, 650 mg, Oral, Q4H PRN    dextrose 50%, 12.5 g, Intravenous, PRN    dextrose 50%, 25 g, Intravenous, PRN    glucagon (human recombinant), 1 mg, Intramuscular, PRN    glucose, 16 g, Oral, PRN    glucose, 24 g, Oral, PRN    insulin aspart U-100, 0-5 Units, Subcutaneous, QID (AC + HS) PRN    melatonin, 6 mg, Oral, Nightly PRN    naloxone, 0.02 mg, Intravenous, PRN    ondansetron, 4 mg, Intravenous, Q6H PRN    prochlorperazine, 5 mg, Intravenous, Q6H PRN    Pharmacy to dose Vancomycin consult, , , Once **AND** vancomycin - pharmacy to dose, , Intravenous, pharmacy to manage frequency     Objective:     Vital Signs (Most Recent):  Temp: 97.7 °F (36.5 °C) (04/04/25 0305)  Pulse: 79 (04/04/25 0837)  Resp: 16 (04/04/25 0837)  BP: (!) 106/52 (04/04/25 0800)  SpO2: 97 % (04/04/25 0837) Vital Signs (24h Range):  Temp:  [97.2 °F (36.2 °C)-98.1 °F (36.7 °C)] 97.7 °F (36.5 °C)  Pulse:  [72-85] 79  Resp:  [8-46] 16  SpO2:  [91 %-100 %] 97 %  BP: ()/(42-76) 106/52      Date 04/04/25 0700 - 04/05/25 0659   Shift 0909-0860 9584-9770 1529-0867 24 Hour Total   INTAKE   IV Piggyback 100   100   Shift Total(mL/kg) 100(1.6)   100(1.6)   OUTPUT   Shift Total(mL/kg)       Weight (kg) 61.4 61.4 61.4 61.4       Physical Exam  Vitals and nursing note reviewed.   Constitutional:       Appearance: He is ill-appearing and toxic-appearing.   Eyes:      Conjunctiva/sclera:      Right eye: Right conjunctiva is injected.      Left eye: Left conjunctiva is injected.   Cardiovascular:      Rate and Rhythm: Normal rate.      Comments: LUE AVG, hemorrhaging (resolved). Trialysis line.   Dopplerable LUE radial, ulnar, and palmar arch   Genitourinary:     Comments: Sharma cath   Musculoskeletal:         General: No swelling.      Comments: LUE: cold, mild weakness   Skin:     General: Skin is cool.      Findings: Wound present.      Comments: LUE: s/p exploration and AVG excision. Wound dressed with betadine-soaked gauze roll, gauze roll, and ACE. Minimal discharge seen on ACE.     Proximal incision closed with staples and island dressing in place.     Distal incision closed with staples and tegederm dressing in place.     Mid bicep wound packed with betadine-soaked gauze roll- removed POD1. Wound bed without gross purulent drainage, + scant serosanguinous drainage, tissue appears healthy. -- repacked with betadine soaked gauze, wrapped with gauze roll, and ACE.     L hand, palmar surface with hyperpigmented raised, soft mass, possible previous skin grafting (?)   Neurological:      Mental Status: He is lethargic.      Comments: Responsive to name and when pain elicited. Otherwise, not engaged in exam         Significant Labs:  CBC:   Recent Labs   Lab 04/04/25  0247   WBC 16.11*   RBC 3.37*   HGB 10.0*   HCT 30.0*   *   MCV 89   MCH 29.7   MCHC 33.3     CMP:   Recent Labs   Lab 04/04/25  0247   CALCIUM 8.2*   ALBUMIN 2.0*      K 5.0   CO2 23      BUN 75*   CREATININE 9.9*   ALKPHOS  70   ALT 17   AST 23   BILITOT 0.3     All pertinent labs from the last 24 hours have been reviewed.    Significant Diagnostics:  I have reviewed and interpreted all pertinent imaging results/findings within the past 24 hours.    Assessment/Plan:     Active Diagnoses:    Diagnosis Date Noted POA    PRINCIPAL PROBLEM:  Septic shock [A41.9, R65.21] 04/02/2025 Yes    Anemia in ESRD (end-stage renal disease) [N18.6, D63.1] 01/15/2024 Yes    ESRD on hemodialysis [N18.6, Z99.2] 11/24/2019 Not Applicable    Type 2 diabetes mellitus, without long-term current use of insulin [E11.9] 12/30/2014 Yes    Coronary artery disease involving native coronary artery of native heart with refractory angina pectoris [I25.112] 12/30/2014 Yes    HTN (hypertension) [I10] 12/29/2014 Yes    HLD (hyperlipidemia) [E78.5] 12/29/2014 Yes     Chronic      Problems Resolved During this Admission:    Diagnosis Date Noted Date Resolved POA    Hypothyroidism due to acquired atrophy of thyroid [E03.4] 01/15/2024 04/03/2025 Yes     85 y.o. male with rupture LUE AVG with exposed graft and PSA defect with hematoma, presented with active, pulsatile bleeding and found to have radial ulnar, and palmar doppler signals present. Taken emergently to OR for LUE exploration, where well incorporated prox and central graft without evidence of infection were resected, also resected entirety of mid graft and sent for culture; rupture psa with infected hematoma evacuated; AVG mid graft segment was found to be completely obliterated. Proximal and central graft resection incision areas closed in multiple layers and skin staples. Mid graft resection incision packed with betadine soaked cause and left for healing via secondary intention.     - Vascular sx wound dressing change completed today. Nursing to complete wound care daily-- order in. Wound care consulted for further assistance  - Cont abx per ID/primary. Cultures pending.   - Consult nephrology. Will need TDC once  optimized, consult IR when appt.   - Pain control per primary      Lisa Hernandez PA-C  Vascular Surgery  [facility] [place] - Intensive Care

## 2025-04-04 NOTE — ANESTHESIA POSTPROCEDURE EVALUATION
Anesthesia Post Evaluation    Patient: Jevon Rajan    Procedure(s) Performed: Procedure(s) (LRB):  EXPLORATION, ARTERY, UPPER EXTREMITY (Left)  REMOVAL, GRAFT, ARTERIOVENOUS, UPPER EXTREMITY (Left)    Final Anesthesia Type: general      Patient location during evaluation: ICU  Patient participation: Yes- Able to Participate  Level of consciousness: awake and confused  Post-procedure vital signs: reviewed and stable  Pain management: adequate  Airway patency: patent    PONV status at discharge: No PONV  Anesthetic complications: no      Cardiovascular status: blood pressure returned to baseline and hemodynamically stable  Respiratory status: unassisted, spontaneous ventilation and room air  Hydration status: euvolemic  Follow-up not needed.              Vitals Value Taken Time   /56 04/03/25 20:32   Temp 36.5 °C (97.7 °F) 04/03/25 20:29   Pulse 78 04/03/25 20:35   Resp 21 04/03/25 20:35   SpO2 100 % 04/03/25 20:35   Vitals shown include unfiled device data.      No case tracking events are documented in the log.      Pain/Payton Score: Pain Rating Prior to Med Admin: 10 (4/3/2025  4:04 PM)  Pain Rating Post Med Admin: 0 (4/3/2025  4:34 PM)

## 2025-04-04 NOTE — ASSESSMENT & PLAN NOTE
4/4/2025 - Consult   - consult received; interval chart reviewed in detail; discussed pt with MDT during ICU rounds and ongoing throughout the day   - met with patient at bedside; introduction to palliative medicine team and role in current care and admission   - pt very lethargic and required encouragement and stimulation for orientation assessment; pt answers to name, able to state his 1st name, identifies he is at the hospital, and when asked who team should call for help with his medical care he answered Kenia (which is consistent with what he told  4/3 as well); quickly falls back asleep   - pt unable to participate in detailed GOC/ACP discussion on his own behalf at this time   - called Kenia (pt's niece) who shares that she has been pt's main caregiver for some time; she confirms pt is not , does not have children, and that he has 3 sisters; she also had pt's sister Bhavna join by phone for update/discussion   - Mrs. Huggins also shared that Jelanisampson takes care of pt and understands his current history, needs, and wishes; per Bhavna, Kenia should be pt's medical decision maker while he lacks capacity, and confirms that pt's others sisters agree with this; they have asked that no other visitors or callers aside from Deshanta and pts sisters (Bhavna, Ronna, and Nora receive updates)   - Kenia and Bhavna confirm that they feel pt would wish to continue HD  - reviewed pt's current full code status, potential interventions, risks, and outcomes; Fatimah agree for pt to remain full code, but were open to further discussion ideally when pt is able to participate in the discussion; updated pt's team of this and added this to EMR   - both shared difficulty pt has had recently and frustration with attempts to seek care to get answers to ongoing health concerns; they both shared appropriate concerns for pt's current condition and are hopeful for answers and improvement in pt's  condition; they shared that they feel the [facility] ICU team is moving quickly and working to improve his condition for which they are appreciative   - emotional support provided   - Allowed time for questions/concerns; all addressed; expressed availability of myself/palliative team for additional questions/concerns   - attempted to call pt's other sister, Ronna, as she had called unit for updates; unable to reach, LM   - updated MDT; ongoing communication and coordination with MDT

## 2025-04-04 NOTE — ASSESSMENT & PLAN NOTE
Patient with known CAD s/p stent placement, which is controlled Will continue ASA, Plavix, and Statin and monitor for S/Sx of angina/ACS. Continue to monitor on telemetry.   - last [facility] was 1/2025: Severely calcified mid RCA stenosis unable to cross with PTCA balloons, treated initially with 0.9 laser atherectomy, followed by CSI atherectomy system with total of 5 runs (3 at low speed, 2 at high speed), pre-dilated using 2.0 compliant and 3.5 noncompliant balloon followed by 3.5 X 16 mm megatron stent.  Focal plaque rupture/erosion noted in the proximal and ostial RCA that were treated with  3.5 X 28 in the proximal RCA, 4.0 X 16 mm in the ostial RCA.  No residual stenosis post intervention.  Patient had ALAN 3 flow at the end of the procedure  - with elevated troponin likely due to septic shock  Recent Labs   Lab 04/04/25  1028   TROPONINI 2.913*   - repeat EKG now   - he needs to continue asa, plavix but cannot safely swallow right now--> NGT will be placed   - Cardiology consult

## 2025-04-04 NOTE — ASSESSMENT & PLAN NOTE
- long term HD pt; family confirmed that they felt pt would wish to continue HD and were agreeable to plan for catheter placement and CRRT 4/4 (pending discussion with Dr. Diaz [facility] for consent and discussion with Dr. Che for HD/CRRT consent)   - pt has been experiencing HD assosciated pruritus for some time (see sepsis); [facility], discussed with Dr. Abdalla (nephrology)   - cont mgmt per  and nephrology

## 2025-04-04 NOTE — ASSESSMENT & PLAN NOTE
"- TTE 4/4/25: "1.9x1.2cm large fixed heterogeneous mass present on the posterior leaflet (new finding vs 9/2023 echo). There is moderate to severe regurgitation with an eccentric jet. Cannot exclude severe MR with possible PMVL perforation in association with large vegetation."  - this is in the setting of MRSA bacteremia  - ID is consulted  - repeat cultures are NGTD  - suspect source is skin/chronic scratching vs AVF infection- cultures from resected AVF are pending  - discussed with Cardiac surgery Dr Castro 4/4/25- given age and comorbidities, he is very high risk of mortality with surgery. He recommends medical management at this point.  - on vancomycin      "

## 2025-04-04 NOTE — TRANSFER OF CARE
"Anesthesia Transfer of Care Note    Patient: Jevon Rajan    Procedure(s) Performed: Procedure(s) (LRB):  EXPLORATION, ARTERY, UPPER EXTREMITY (Left)    Patient location: ICU    Anesthesia Type: general    Transport from OR: Transported from OR on 6-10 L/min O2 by face mask with adequate spontaneous ventilation. Continuous ECG monitoring in transport. Continuous SpO2 monitoring in transport    Post pain: adequate analgesia    Post assessment: no apparent anesthetic complications and tolerated procedure well    Post vital signs: stable    Level of consciousness: awake and alert    Nausea/Vomiting: no nausea/vomiting    Complications: none    Transfer of care protocol was followed      Last vitals: Visit Vitals  BP (!) 108/50 (BP Location: Right arm, Patient Position: Lying)   Pulse 78   Temp 36.5 °C (97.7 °F) (Axillary)   Resp 16   Ht 5' 7" (1.702 m)   Wt 61.4 kg (135 lb 5.8 oz)   SpO2 98%   BMI 21.20 kg/m²     "

## 2025-04-04 NOTE — PROGRESS NOTES
Pharmacokinetic Assessment Follow Up: IV Vancomycin    Vancomycin serum concentration assessment(s):    The random level was drawn correctly and can be used to guide therapy at this time. The measurement is below the desired definitive target range of 15 to 20 mcg/mL.    Vancomycin Regimen Plan:    Give Vancomycin 500 mg x1 dose and obtain random level 4/5 at 0300    Drug levels (last 3 results):  Recent Labs   Lab Result Units 04/03/25  0713 04/04/25  0247   Vancomycin Random ug/ml 15.4 14.7       Pharmacy will continue to follow and monitor vancomycin.    Please contact pharmacy at extension [phone] for questions regarding this assessment.    Thank you for the consult,   Hesham Hernandez       Patient brief summary:  Jevon Rajan is a 85 y.o. male initiated on antimicrobial therapy with IV Vancomycin for treatment of bacteremia    Drug Allergies:   Review of patient's allergies indicates:   Allergen Reactions    Ace inhibitors Hives, Itching, Shortness Of Breath, Other (See Comments) and Rash     Is not sure which medication it was    Captopril        Actual Body Weight:   61.4 kg    Renal Function:   Estimated Creatinine Clearance: 4.6 mL/min (A) (based on SCr of 9.9 mg/dL (H)).,     Dialysis Method (if applicable):  N/A    CBC (last 72 hours):  Recent Labs   Lab Result Units 04/01/25  2220 04/02/25  0537 04/03/25  0713 04/03/25  1533 04/03/25  2055 04/04/25  0247   WBC K/uL 13.19* 14.52* 17.21* 18.03* 16.23* 16.11*   HGB gm/dL 7.6* 7.1* 8.7* 7.2* 10.2* 10.0*   Hemoglobin A1c %  --  5.7*  --   --   --   --    HCT % 23.2* 23.4* 26.5* 22.7* 30.7* 30.0*   Platelet Count K/uL 195 205 147* 146* 130* 121*   Lymph % % 2.7* 1.7* 3.0* 3.3* 3.3* 3.9*   Mono % % 5.3 5.0 7.1 6.5 5.9 6.0   Eos % % 0.0 0.0 0.1 0.3 0.3 0.8   Basophil % % 0.2 0.1 0.2 0.2 0.2 0.3       Metabolic Panel (last 72 hours):  Recent Labs   Lab Result Units 04/01/25  2220 04/02/25  0537 04/02/25  2246 04/03/25  0713 04/03/25  1533 04/03/25 2055  04/04/25  0247   Sodium mmol/L 139 140  --  138 140 138 139   Potassium mmol/L 3.9 3.7  --  4.3 3.9 4.8 5.0   Chloride mmol/L 97 96  --  100 105 101 101   CO2 mmol/L 25 25  --  21* 22* 23 23   Glucose mg/dL 175* 138*  --  242* 203* 231* 202*   Glucose, UA   --   --  1+*  --   --   --   --    BUN mg/dL 32* 34*  --  60* 62* 70* 75*   Creatinine mg/dL 6.2* 6.8*  --  8.8* 8.7* 9.3* 9.9*   Albumin g/dL 2.6* 2.5*  --  2.3* 1.9* 2.0* 2.0*   Bilirubin Total mg/dL 0.7 0.7  --  0.5 0.3 0.6 0.3   ALP unit/L 61 60  --  57 83 59 70   AST unit/L 27 26  --  29 25 24 23   ALT unit/L 20 17  --  20 15 18 17   Magnesium  mg/dL 1.9 1.9  --  2.1  --  2.2 2.3   Phosphorus Level mg/dL  --  3.2  --  5.8*  --  7.2* 7.1*       Vancomycin Administrations:  vancomycin given in the last 96 hours                     vancomycin injection (g) 1 g Given 04/03/25 1820    vancomycin injection (g) 1 g Given 04/03/25 1753    vancomycin 1,500 mg in 0.9% NaCl 250 mL IVPB (admixture device) (mg) 1,500 mg New Bag 04/02/25 0310                    Microbiologic Results:  Microbiology Results (last 7 days)       Procedure Component Value Units Date/Time    Aerobic culture [[id number]] Collected: 04/03/25 1954    Order Status: Sent Specimen: Wound from Arm, Left Updated: 04/03/25 2146    AFB Culture & Smear [[id number]] Collected: 04/03/25 1954    Order Status: Sent Specimen: Wound from Arm, Left Updated: 04/03/25 2107    Gram stain [[id number]] Collected: 04/03/25 1954    Order Status: Sent Specimen: Wound from Arm, Left Updated: 04/03/25 2107    Fungus culture [[id number]] Collected: 04/03/25 1954    Order Status: Sent Specimen: Wound from Arm, Left Updated: 04/03/25 2107    Culture, Anaerobic [[id number]] Collected: 04/03/25 1954    Order Status: Sent Specimen: Wound from Arm, Left Updated: 04/03/25 2107    AFB Culture & Smear [[id number]] Collected: 04/03/25 1943    Order Status: Sent Specimen: Wound from Arm, Left Updated: 04/03/25 2107    Gram stain  [[id number]] Collected: 04/03/25 1943    Order Status: Sent Specimen: Wound from Arm, Left Updated: 04/03/25 2107    Fungus culture [[id number]] Collected: 04/03/25 1943    Order Status: Sent Specimen: Wound from Arm, Left Updated: 04/03/25 2107    Culture, Anaerobic [[id number]] Collected: 04/03/25 1943    Order Status: Sent Specimen: Wound from Arm, Left Updated: 04/03/25 2107    Aerobic culture [[id number]] Collected: 04/03/25 1943    Order Status: Sent Specimen: Wound from Arm, Left Updated: 04/03/25 2107    AFB Culture & Smear [[id number]] Collected: 04/03/25 1934    Order Status: Sent Specimen: Tissue from AV Fistula, Left Updated: 04/03/25 2107    Fungus culture [[id number]] Collected: 04/03/25 1934    Order Status: Sent Specimen: Tissue from AV Fistula, Left Updated: 04/03/25 2107    Culture, Anaerobic [[id number]] Collected: 04/03/25 1934    Order Status: Sent Specimen: Tissue from AV Fistula, Left Updated: 04/03/25 2107    Gram stain [[id number]] Collected: 04/03/25 1935    Order Status: Sent Specimen: Tissue from Arm, Left Updated: 04/03/25 2107    Fungus culture [[id number]] Collected: 04/03/25 1935    Order Status: Sent Specimen: Tissue from Arm, Left Updated: 04/03/25 2107    AFB Culture & Smear [[id number]] Collected: 04/03/25 2011    Order Status: Sent Specimen: Wound from Arm, Left Updated: 04/03/25 2106    Gram stain [[id number]] Collected: 04/03/25 2011    Order Status: Sent Specimen: Wound from Arm, Left Updated: 04/03/25 2106    Fungus culture [[id number]] Collected: 04/03/25 2011    Order Status: Sent Specimen: Wound from Arm, Left Updated: 04/03/25 2106    Culture, Anaerobic [[id number]] Collected: 04/03/25 2011    Order Status: Sent Specimen: Wound from Arm, Left Updated: 04/03/25 2106    Aerobic culture [[id number]] Collected: 04/03/25 2011    Order Status: Sent Specimen: Wound from Arm, Left Updated: 04/03/25 2106    Gram stain [[id number]] Collected: 04/03/25 1821    Order Status: Sent  Specimen: Wound from Arm, Left Updated: 04/03/25 2106    Aerobic culture [[id number]] Collected: 04/03/25 1816    Order Status: Sent Specimen: Wound from Arm, Left Updated: 04/03/25 2106    Fungus culture [[id number]] Collected: 04/03/25 1816    Order Status: Sent Specimen: Wound from Arm, Left Updated: 04/03/25 2106    Culture, Anaerobic [[id number]] Collected: 04/03/25 1816    Order Status: Sent Specimen: Wound from Arm, Left Updated: 04/03/25 2106    AFB Culture & Smear [[id number]] Collected: 04/03/25 1816    Order Status: Sent Specimen: Tissue from AV Fistula, Left Updated: 04/03/25 2106    Gram stain [[id number]] Collected: 04/03/25 1914    Order Status: Sent Specimen: Wound from Arm, Left Updated: 04/03/25 2106    Culture, Anaerobic [[id number]] Collected: 04/03/25 1910    Order Status: Sent Specimen: Tissue from AV Fistula, Left Updated: 04/03/25 2106    AFB Culture & Smear [[id number]] Collected: 04/03/25 1905    Order Status: Sent Specimen: Tissue from AV Fistula, Left Updated: 04/03/25 2106    Fungus culture [[id number]] Collected: 04/03/25 1904    Order Status: Sent Specimen: Tissue from AV Fistula, Left Updated: 04/03/25 2106    Aerobic culture [[id number]] Collected: 04/03/25 1911    Order Status: Sent Specimen: Tissue from Arm, Left Updated: 04/03/25 2106    Aerobic culture [[id number]] Collected: 04/03/25 1926    Order Status: Sent Specimen: Wound from Arm, Left Updated: 04/03/25 2105    Gram stain [[id number]] Collected: 04/03/25 1923    Order Status: Sent Specimen: Tissue from Arm, Left Updated: 04/03/25 2105    AFB Culture & Smear [[id number]] Collected: 04/03/25 1921    Order Status: Sent Specimen: Tissue from hematoma Updated: 04/03/25 2105    Fungus culture [[id number]] Collected: 04/03/25 1921    Order Status: Sent Specimen: Tissue from hematoma Updated: 04/03/25 2105    Culture, Anaerobic [[id number]] Collected: 04/03/25 1921    Order Status: Sent Specimen: Tissue from hematoma Updated:  04/03/25 2103

## 2025-04-04 NOTE — SUBJECTIVE & OBJECTIVE
Interval History: he is responsive to his name. He says he hurts all over. He otherwise is drowsy.     Review of Systems   Constitutional:  Positive for fatigue.   Psychiatric/Behavioral:  Positive for confusion.      Objective:     Vital Signs (Most Recent):  Temp: 97.5 °F (36.4 °C) (04/04/25 0900)  Pulse: 79 (04/04/25 1255)  Resp: 12 (04/04/25 1255)  BP: (!) 103/51 (04/04/25 1255)  SpO2: 96 % (04/04/25 1255) Vital Signs (24h Range):  Temp:  [97.2 °F (36.2 °C)-97.7 °F (36.5 °C)] 97.5 °F (36.4 °C)  Pulse:  [72-86] 79  Resp:  [8-46] 12  SpO2:  [91 %-100 %] 96 %  BP: ()/(46-76) 103/51     Weight: 61.2 kg (135 lb)  Body mass index is 21.14 kg/m².    Intake/Output Summary (Last 24 hours) at 4/4/2025 1312  Last data filed at 4/4/2025 0700  Gross per 24 hour   Intake 1119 ml   Output 85 ml   Net 1034 ml         Physical Exam  Vitals and nursing note reviewed.   Constitutional:       Appearance: He is ill-appearing.      Comments: drowsy   HENT:      Head: Normocephalic and atraumatic.      Nose: Nose normal.      Mouth/Throat:      Mouth: Mucous membranes are dry.   Cardiovascular:      Rate and Rhythm: Normal rate and regular rhythm.   Pulmonary:      Effort: Pulmonary effort is normal.      Breath sounds: Normal breath sounds.      Comments: 3L NC  Abdominal:      General: Bowel sounds are normal.      Palpations: Abdomen is soft.   Musculoskeletal:      Comments: R hand swelling. LUE in bandage, not removed   Skin:     Comments: Very dry skin with hyperpigmented patches   Neurological:      Comments: Knows his name               Significant Labs: All pertinent labs within the past 24 hours have been reviewed.    Significant Imaging: I have reviewed all pertinent imaging results/findings within the past 24 hours.

## 2025-04-04 NOTE — PROGRESS NOTES
Certification of Assistant at Surgery        Surgery Date: 04/03/2025     Participating Surgeons:  Surgeons and Role:     Sunil Contreras MD - Primary     Lamont Wasserman MD PhD - Co-Surgeon       Lisa Hernandez PA-C - Assisting         Procedures:  Procedure(s) (LRB):  EXPLORATION, ARTERY, UPPER EXTREMITY (Left)       Assistant Surgeon's Certification of Necessity:  I understand that section 1842 (b) (6) (d) of the Social Security Act generally prohibits Medicare Part B reasonable charge payment for the services of assistants at surgery in teaching hospitals when qualified residents are available to furnish such services. I certify that the services for which payment is claimed were medically necessary, and that no qualified resident was available to perform the services. I further understand that these services are subject to post-payment review by the Medicare carrier.        Lisa Hernandez PA-C  Vascular & Endovascular Surgery ]

## 2025-04-04 NOTE — ASSESSMENT & PLAN NOTE
MRSA bacteremia  MV endocarditis  85 y.o. man with ESRD with LUE AVF, HFpEF, CAD, DM admitted to OSH 4/1- 4/3 with sepsis due to MRSA bacteremia, transferred to  ICU for septic shock  and thrombosis of AV graft on 4/3 s/p exploratory surgery of LUE with graft resection 4/3. Op findings: Ruptured pseudoaneurysm with infected hematoma, graft completely obliterated at mid segment.     BCx 4/2 s aureus    TTE: 1.9x1.2cm large fixed heterogeneous mass present on the posterior leaflet , moderate to severe regurgitation with an eccentric jet. Cannot exclude severe MR with possible PMVL perforation in association with large vegetation     Recommendations:  --continue empiric vanc. Pharm to dose for goal trough 15-20  --f/u susceptibiliities of s aureus  --repeat BCx  --f/u surgical cx  --CTS eval  --appreciate palliative care input

## 2025-04-04 NOTE — HPI
Patient is 85 y.o. male  has a past medical history of BPH (benign prostatic hyperplasia), Coronary artery disease, Diabetes mellitus, type 2, ESRD (end stage renal disease) on dialysis, Hypertension, Hypothyroidism, unspecified, Sepsis (4/2/2025), and Symptomatic anemia (01/15/2024). presented to [facility] on 4/3/25 as a transfer from [facility] for sepsis.  Upon arrival, patient had ruptured his lue AVF requiring emergent OR for graft resection.  Blood culture with MRSA.  Echo with vegetation of mitral valve.  Pulm/crit was consulted for Trialysis placement for CRRT.      Upon my initial evaluation, patient is somolent but easily arousble.  VSS stable.  Off levophed since  this morning.  Sating well on nasal canula.

## 2025-04-04 NOTE — PLAN OF CARE
[facility] - Intensive Care  Initial Discharge Assessment       Primary Care : [facility] (Dr. Hogan)  Case Management Assessment     PCP: [facility]  Pharmacy: [facility] in [place]    Patient Arrived From: home alone  Existing Help at Home: sister and nieces    Barriers to Discharge: n/a    Discharge Plan:    A. Home with Home Health/Resume Dialysis   B. home      Discharge planning assessment completed with assistance from patient's niece, Deonna Schafer, via telephone.  Patient transferred in from [facility].  Patient lives alone but has family support.  He receives some home care services through the [facility]  Hopefully, services can be verified prior to patient's discharge.  A communication has been sent to .[facility]  [facility], requesting assistance with the coordination of care for discharge.        Admission Diagnosis: Septic shock [A41.9, R65.21]    Admission Date: 4/3/2025  Expected Discharge Date:     Transition of Care Barriers: None    Payor: [employer] MEDICARE / Plan: [employer] PPO SPECIAL NEEDS / Product Type: Medicare Advantage /     Extended Emergency Contact Information  Primary Emergency Contact: Deonna Smyth [place]  Mobile Phone: [phone]  Relation: Relative  Secondary Emergency Contact: Bhavna Smyth [place] [facility]  Home Phone: [phone]  Relation: Sister    Discharge Plan A: Home Health  Discharge Plan B: Home      [id number] - [place] - [address]  [address]  [place] [address]  Phone: [phone] Fax: [phone]      Initial Assessment (most recent)       Adult Discharge Assessment - 04/04/25 1509          Discharge Assessment    Assessment Type Discharge Planning Assessment     Confirmed/corrected address, phone number and insurance Yes     Confirmed Demographics Correct on Facesheet     Source of Information family;health record     If unable to respond/provide information was  family/caregiver contacted? Yes     Contact Name/Number Deonna Mack  [phone]     Reason For Admission Septic Shock (Transferred from [facility])     People in Home alone     Facility Arrived From: Transferred in from [facility]     Do you expect to return to your current living situation? Yes     Do you have help at home or someone to help you manage your care at home? Yes     Who are your caregiver(s) and their phone number(s)? Niece:Deonna Smyth:  [phone] and sister: Bhavna Smyth  [phone]     Prior to hospitilization cognitive status: Alert/Oriented     Current cognitive status: Unable to Assess     Walking or Climbing Stairs Difficulty yes     Walking or Climbing Stairs ambulation difficulty, requires equipment     Mobility Management RW, Cane     Dressing/Bathing Difficulty no     Equipment Currently Used at Home walker, rolling;cane, straight     Readmission within 30 days? Yes     Patient currently being followed by outpatient case management? Unable to determine (comments)     Do you currently have service(s) that help you manage your care at home? Yes     Name and Contact number of agency [facility] nurse visits weekly-CM reached out to [facility]  to obtain name of provider.     Is the pt/caregiver preference to resume services with current agency Yes     Do you take prescription medications? Yes     Do you have prescription coverage? Yes     Coverage Payor: [employer] MANAGED MEDICARE [facility] SPECIAL NEEDS -     Do you have any problems affording any of your prescribed medications? No     Is the patient taking medications as prescribed? yes     Who is going to help you get home at discharge? Patient's niece or sister will provide transportation at discharge if appropriate.     How do you get to doctors appointments? health plan transportation     Are you on dialysis? Yes     Dialysis Name and Scheduled days [facility] in [place]; T, TH,  Sat     Do you take coumadin? No     Discharge Plan A Home Health     Discharge Plan B Home     DME Needed Upon Discharge  none     Discharge Plan discussed with: Caregiver     Name(s) and Number(s) Deonna Smyth (niece) [phone]     Transition of Care Barriers None                          Addendum:  Per [facility] BRUNSON (Dr. Hogan he is home based primary care meaning the disciplines go out to his home)

## 2025-04-04 NOTE — HPI
Mr. Rajan is an 86 yo male with HTN, T2DM, CAD, ESRD, and liver lesion who was transferred from [facility] for septic shock and impending AVG rupture. He initially presented to [facility] on 4/1 with temp 101.9 and BP 80/30s. He was transferred to our hospital on 4/3/25; it seems his AVG ruptured during transport/shortly after arrival. He emergently went to the OR yesterday with AVG extraction; infected hematoma was noted. Workup also concerning for elevated troponin and cardiac vegetations. He is no longer on pressors. Nephrology consulted for ESRD. Prior records obtained and reviewed. He receives HD TTS at [facility] under the c/o Dr. Colin. He last received HD outpatient on 4/1/25. HD was held at [facility] due to unstable vascular access. Family agreed to proceed with CRRT today.

## 2025-04-04 NOTE — CONSULTS
"[facility] [place] [facility]  [place]iology  Consult Note    Patient Name: Jevon Rajan  MRN: [id number]  Admission Date: 4/3/2025  Hospital Length of Stay: 1 days  Code Status: Full Code   Attending Provider: Eileen Jordan MD   Consulting Provider: Apolinar Tyson MD  Primary Care Physician: No primary care provider on file.  Principal Problem:Septic shock    Patient information was obtained from patient and ER records.     Inpatient consult to Cardiology  Consult performed by: Apolinar Tyson MD  Consult ordered by: Eileen Jordan MD  Reason for consult: elev trop        Subjective:     Chief Complaint:  sepsis     HPI:   85 y.o. man with ESRD on HD with LUE AVF that has been bleeding, CHF, CAD s/p recent stenting 1/2025 who was admitted with MRSA bacteremia and bleeding from his LUE AVF. Continue levophed, vancomycin. Vascular surgery emergently consulted; on 4/3/25 they took him to OR and noted: "well incorporated proximal and central graft without evidence of infection, resected graft and covered proximal and central remnants with multiple layers. Mid graft removed in entirety and sent for culture. Ruptured pseudoaneurysm with infected hematoma, graft completely obliterated at mid segment." Suspect AVF or chronic scratching of pruritic skin is the source of his infection. TTE noted EF 40-45%, G1DD, RV systolic dysfunction, large 1.9x1.2cm fixed mass on the posterior mitral valve leaflet with moder to severe regurgitation, cannot rule out posterior mitral valve leaflet perforation. Trialysis was placed for HD. Nephrology was consulted. Palliative was consulted.     Cardiology consulted for "elevated trop in setting of septic shock, MRSA bactremia endocarditis, but did have recent stenting 1/2025"    Pt seen in ICU, case d/w Dr. Jordan.  Poor historian.  No cp/sob at present.  Currently on CRRT.  Turned down for transfer by [facility] CTS for ?MV veg/perforation.    Past Medical History:   Diagnosis Date "    BPH (benign prostatic hyperplasia)     Coronary artery disease     He has followed at the [facility] and is now transferring his care to this clinic. In 2014 he had stent to LAD. He states he has had 2 heart attacks. He does not get angina. There was 90% left anterior descending artery stenosis undergoing stenting. There was also a circumflex marginal branch stenosis of 70%.    Diabetes mellitus, type 2     ESRD (end stage renal disease) on dialysis     ESRD on HD TTS via left brachial AVF    Hypertension     Hypothyroidism, unspecified     Sepsis 4/2/2025    Symptomatic anemia 01/15/2024       Past Surgical History:   Procedure Laterality Date    ANGIOGRAM, CORONARY, WITH LEFT HEART CATHETERIZATION  1/13/2025    Procedure: Angiogram, Coronary, with Left Heart Cath;  Surgeon: Steve Bhat MD;  Location: [facility] CATH LAB/EP;  Service: Cardiology;;    ATHERECTOMY, CORONARY N/A 1/14/2025    Procedure: Atherectomy-coronary;  Surgeon: Giacomo Pittman MD;  Location: [facility] CATH LAB/EP;  Service: Cardiology;  Laterality: N/A;    bilateral foot surgery      CORONARY ANGIOPLASTY WITH STENT PLACEMENT N/A 1/14/2025    Procedure: ANGIOPLASTY, CORONARY ARTERY, WITH STENT INSERTION;  Surgeon: Giacomo Pittman MD;  Location: [facility] CATH LAB/EP;  Service: Cardiology;  Laterality: N/A;    CORONARY STENT PLACEMENT N/A 1/13/2025    Procedure: INSERTION, STENT, CORONARY ARTERY;  Surgeon: Steve Bhat MD;  Location: [facility] CATH LAB/EP;  Service: Cardiology;  Laterality: N/A;    ESOPHAGOGASTRODUODENOSCOPY N/A 2/27/2024    Procedure: EGD (ESOPHAGOGASTRODUODENOSCOPY);  Surgeon: Gemma Almendarez MD;  Location: [facility] ENDO;  Service: Endoscopy;  Laterality: N/A;    EXPLORATION, ARTERY, UPPER EXTREMITY Left 4/3/2025    Procedure: EXPLORATION, ARTERY, UPPER EXTREMITY;  Surgeon: Sunil Contreras MD;  Location: [facility] OR;  Service: Vascular;  Laterality: Left;    eyelid surgery      FISTULOGRAM Left 11/27/2019    Procedure:  Fistulogram;  Surgeon: MELANY Brenner III, MD;  Location: [facility] OR [facility] FLR;  Service: Peripheral Vascular;  Laterality: Left;    FISTULOGRAM Left 11/27/2019    Procedure: Fistulogram, AVG declot, possible permacath;  Surgeon: MELANY Brenner III, MD;  Location: [facility] CATH LAB;  Service: Peripheral Vascular;  Laterality: Left;    INSERTION OF DIALYSIS CATHETER      IVUS, CORONARY  1/13/2025    Procedure: IVUS, Coronary;  Surgeon: Steve Bhat MD;  Location: [facility] CATH LAB/EP;  Service: Cardiology;;    LEFT HEART CATHETERIZATION Left 1/14/2025    Procedure: Left heart cath;  Surgeon: Giacomo Pittman MD;  Location: [facility] CATH LAB/EP;  Service: Cardiology;  Laterality: Left;    MOH's  12/5/13    PERCUTANEOUS CORONARY INTERVENTION, ARTERY N/A 1/14/2025    Procedure: Percutaneous coronary intervention;  Surgeon: Giacomo Pittman MD;  Location: [facility] CATH LAB/EP;  Service: Cardiology;  Laterality: N/A;    PERCUTANEOUS TRANSLUMINAL BALLOON ANGIOPLASTY OF CORONARY ARTERY  1/13/2025    Procedure: Angioplasty-coronary;  Surgeon: Steve Bhat MD;  Location: [facility] CATH LAB/EP;  Service: Cardiology;;    REMOVAL, GRAFT, ARTERIOVENOUS, UPPER EXTREMITY Left 4/3/2025    Procedure: REMOVAL, GRAFT, ARTERIOVENOUS, UPPER EXTREMITY;  Surgeon: Sunil Contreras MD;  Location: [facility] OR;  Service: Vascular;  Laterality: Left;    REVASCULARIZATION, ENDOVASCULAR, LE W/ INTRAVASCULAR LITHOTRIPSY  1/13/2025    Procedure: REVASCULARIZATION, ENDOVASCULAR, LE W/ INTRAVASCULAR LITHOTRIPSY;  Surgeon: Steve Bhat MD;  Location: [facility] CATH LAB/EP;  Service: Cardiology;;    right hand surgery      stents         Review of patient's allergies indicates:   Allergen Reactions    Ace inhibitors Hives, Itching, Shortness Of Breath, Other (See Comments) and Rash     Is not sure which medication it was    Captopril        Current Facility-Administered Medications on File Prior to Encounter   Medication    [DISCONTINUED] 0.9%  NaCl  infusion (for blood administration)    [DISCONTINUED] acetaminophen suppository 650 mg    [DISCONTINUED] acetaminophen tablet 500 mg    [DISCONTINUED] albumin human 25% bottle 25 g    [DISCONTINUED] albuterol-ipratropium 2.5 mg-0.5 mg/3 mL nebulizer solution 3 mL    [DISCONTINUED] aluminum-magnesium hydroxide-simethicone 200-200-20 mg/5 mL suspension 30 mL    [DISCONTINUED] aspirin EC tablet 81 mg    [DISCONTINUED] atorvastatin tablet 80 mg    [DISCONTINUED] cetirizine tablet 5 mg    [DISCONTINUED] clopidogreL tablet 75 mg    [DISCONTINUED] dextrose 50% injection 12.5 g    [DISCONTINUED] dextrose 50% injection 25 g    [DISCONTINUED] epoetin mj-epbx injection 20,000 Units    [DISCONTINUED] gabapentin capsule 100 mg    [DISCONTINUED] glucagon (human recombinant) injection 1 mg    [DISCONTINUED] glucose chewable tablet 16 g    [DISCONTINUED] glucose chewable tablet 24 g    [DISCONTINUED] heparin (porcine) injection 5,000 Units    [DISCONTINUED] insulin aspart U-100 pen 0-5 Units    [DISCONTINUED] melatonin tablet 6 mg    [DISCONTINUED] meropenem (MERREM) 500 mg in 0.9% NaCl 100 mL IVPB (MB+)    [DISCONTINUED] midodrine tablet 10 mg    [DISCONTINUED] mupirocin 2 % ointment    [DISCONTINUED] naloxone 0.4 mg/mL injection 0.02 mg    [DISCONTINUED] NORepinephrine 4 mg in 0.9% NaCl 250 mL infusion    [DISCONTINUED] ondansetron injection 4 mg    [DISCONTINUED] oxyCODONE immediate release tablet 5 mg    [DISCONTINUED] pantoprazole EC tablet 40 mg    [DISCONTINUED] prochlorperazine injection Soln 5 mg    [DISCONTINUED] senna-docusate 8.6-50 mg per tablet 1 tablet    [DISCONTINUED] simethicone chewable tablet 80 mg    [DISCONTINUED] sodium chloride 0.9% flush 10 mL    [DISCONTINUED] triamcinolone acetonide 0.1% cream    [DISCONTINUED] vancomycin - pharmacy to dose    [DISCONTINUED] vancomycin 500 mg in dextrose 5 % 100 mL IVPB (ready to mix)     Current Outpatient Medications on File Prior to Encounter   Medication Sig     ammonium lactate 12 % Crea Apply topically 2 (two) times a day.    artificial tears,hypromellose,,GENTEAL/SUSTANE, 0.3 % Gel Apply to eye nightly.    aspirin (ECOTRIN) 81 MG EC tablet Take 1 tablet (81 mg total) by mouth once daily.    atorvastatin (LIPITOR) 80 MG tablet Take 1 tablet (80 mg total) by mouth every evening.    camphor-menthol 0.5-0.5% (SARNA) lotion Apply topically once daily. APPLY SMALL AMOUNT TOPICALLY TO AFFECTED AREA(S) AS NEEDED FOR ITCHING KEEP IN FRIDGE    carboxymethylcellulose sodium 0.5 % Drop Apply 1 drop to eye nightly as needed (dry eyes).    carvediloL (COREG) 12.5 MG tablet Take 0.5 tablets (6.25 mg total) by mouth 2 (two) times daily.    cetirizine (ZYRTEC) 5 MG tablet Take 1 tablet (5 mg total) by mouth every 48 hours.    clobetasol 0.05% (TEMOVATE) 0.05 % Oint Apply topically 2 (two) times daily.    clopidogreL (PLAVIX) 75 mg tablet Take 1 tablet (75 mg total) by mouth once daily.    erythromycin (ROMYCIN) ophthalmic ointment Place a 1/2 inch ribbon of ointment into the lower eyelid. Four timex daily for 5 days    gabapentin (NEURONTIN) 100 MG capsule Take 1 capsule (100 mg total) by mouth every evening.    hydrophilic ointment (AQUABASE) Apply topically as needed for Dry Skin. APPLY MODERATE AMOUNT TOPICALLY TO AFFECTED AREA(S) TWICE A DAY AS NEEDED FOR DRY SKIN APPLY TO AREAS OF DRY SKIN OR OVER ENTIRE BODY.    lanolin alcohol-mineral oil-white petrolatum-ceres (EUCERIN) Crea cream Apply topically 2 (two) times daily as needed.    LIDOcaine (LIDODERM) 5 % Place 1 patch onto the skin once daily. Remove & Discard patch within 12 hours or as directed by MD    loratadine (CLARITIN) 10 mg tablet Take 10 mg by mouth daily as needed for Allergies (Every other day).    nut.tx.imp.renal fxn,lac-reduc (NEPRO CARB STEADY) 0.08 gram-1.8 kcal/mL Liqd Take 240 mLs by mouth 2 (two) times a day.    ondansetron (ZOFRAN-ODT) 4 MG TbDL Take 1 tablet (4 mg total) by mouth every 8 (eight) hours as  needed (nausea).    oxyCODONE-acetaminophen (PERCOCET) 5-325 mg per tablet Take 1 tablet by mouth every 6 (six) hours as needed.    pantoprazole (PROTONIX) 20 MG tablet Take 20 mg by mouth 2 (two) times daily as needed.    pramoxine 1 % Lotn Apply topically 2 (two) times daily as needed (itching).    triamcinolone acetonide 0.1% (KENALOG) 0.1 % cream Apply topically 2 (two) times daily as needed (itching).     Family History    None       Tobacco Use    Smoking status: Never    Smokeless tobacco: Never   Substance and Sexual Activity    Alcohol use: No    Drug use: No    Sexual activity: Not Currently     Review of Systems   Unable to perform ROS: Mental status change     Objective:     Vital Signs (Most Recent):  Temp: 97.5 °F (36.4 °C) (04/04/25 0900)  Pulse: 79 (04/04/25 1400)  Resp: 16 (04/04/25 1400)  BP: (!) 127/91 (04/04/25 1400)  SpO2: 96 % (04/04/25 1400) Vital Signs (24h Range):  Temp:  [97.2 °F (36.2 °C)-97.7 °F (36.5 °C)] 97.5 °F (36.4 °C)  Pulse:  [72-86] 79  Resp:  [8-46] 16  SpO2:  [91 %-100 %] 96 %  BP: ()/(46-91) 127/91     Weight: 61.2 kg (135 lb)  Body mass index is 21.14 kg/m².    SpO2: 96 %         Intake/Output Summary (Last 24 hours) at 4/4/2025 1455  Last data filed at 4/4/2025 0700  Gross per 24 hour   Intake 1119 ml   Output 85 ml   Net 1034 ml       Lines/Drains/Airways       Central Venous Catheter Line  Duration             Trialysis (Dialysis) Catheter 04/04/25 1208 right internal jugular <1 day              Drain  Duration             Male External Urinary Catheter 04/02/25 1850 Medium 1 day         NG/OG Tube 04/04/25 1415 Rolf 10 Fr. Left nostril <1 day              Peripheral Intravenous Line  Duration                  Hemodialysis AV Fistula Left upper arm -- days         Peripheral IV - Single Lumen 04/02/25 1801 20 G Anterior;Distal;Right Upper Arm 1 day                     Physical Exam  Constitutional:       General: He is not in acute distress.     Appearance: Normal  appearance. He is well-developed. He is ill-appearing. He is not toxic-appearing or diaphoretic.   HENT:      Head: Normocephalic and atraumatic.   Eyes:      General: No scleral icterus.     Extraocular Movements: Extraocular movements intact.      Conjunctiva/sclera: Conjunctivae normal.      Pupils: Pupils are equal, round, and reactive to light.   Neck:      Thyroid: No thyromegaly.      Vascular: No JVD.      Trachea: No tracheal deviation.   Cardiovascular:      Rate and Rhythm: Normal rate and regular rhythm.      Heart sounds: S1 normal and S2 normal. Heart sounds are distant. No murmur heard.     No friction rub. No gallop.   Pulmonary:      Effort: Pulmonary effort is normal. No respiratory distress.      Breath sounds: Normal breath sounds. No stridor. No wheezing, rhonchi or rales.   Chest:      Chest wall: No tenderness.   Abdominal:      General: There is no distension.      Palpations: Abdomen is soft.   Musculoskeletal:         General: No swelling or tenderness. Normal range of motion.      Cervical back: Normal range of motion and neck supple. No rigidity.      Right lower leg: No edema.      Left lower leg: No edema.   Skin:     General: Skin is warm and dry.      Coloration: Skin is not jaundiced.   Neurological:      General: No focal deficit present.      Mental Status: He is alert.      Cranial Nerves: No cranial nerve deficit.   Psychiatric:         Mood and Affect: Mood normal.         Behavior: Behavior normal.          Current Medications:   aspirin  81 mg Oral Daily    atorvastatin  80 mg Oral QHS    clopidogreL  75 mg Oral Daily    heparin (porcine)  5,000 Units Subcutaneous Q8H    mupirocin   Nasal BID    pantoprazole  40 mg Intravenous Daily    senna-docusate  1 tablet Oral BID    sodium chloride 0.9%  10 mL Intravenous Q8H         Current Facility-Administered Medications:     0.9%  NaCl infusion (for blood administration), , Intravenous, Q24H PRN    acetaminophen, 650 mg, Oral, Q4H  PRN    dextrose 50%, 12.5 g, Intravenous, PRN    dextrose 50%, 25 g, Intravenous, PRN    glucagon (human recombinant), 1 mg, Intramuscular, PRN    glucose, 16 g, Oral, PRN    glucose, 24 g, Oral, PRN    insulin aspart U-100, 0-5 Units, Subcutaneous, QID (AC + HS) PRN    magnesium sulfate 2 g IVPB, 2 g, Intravenous, PRN    melatonin, 6 mg, Oral, Nightly PRN    naloxone, 0.02 mg, Intravenous, PRN    ondansetron, 4 mg, Intravenous, Q6H PRN    prochlorperazine, 5 mg, Intravenous, Q6H PRN    sodium phosphate 20.1 mmol in D5W 250 mL IVPB, 20.1 mmol, Intravenous, PRN    sodium phosphate 30 mmol in D5W 250 mL IVPB, 30 mmol, Intravenous, PRN    sodium phosphate 39.9 mmol in D5W 250 mL IVPB, 39.9 mmol, Intravenous, PRN    Pharmacy to dose Vancomycin consult, , , Once **AND** vancomycin - pharmacy to dose, , Intravenous, pharmacy to manage frequency    Laboratory (all labs reviewed):  CBC:  Recent Labs   Lab 04/02/25  0537 04/03/25  0713 04/03/25  1533 04/03/25 2055 04/04/25  0247   WBC 14.52 H 17.21 H 18.03 H 16.23 H 16.11 H   HGB 7.1 L 8.7 L 7.2 L 10.2 L 10.0 L   HCT 23.4 L 26.5 L 22.7 L 30.7 L 30.0 L   Platelet Count 205 147 L 146 L 130 L 121 L       CHEMISTRIES:  Recent Labs   Lab 01/16/25  0154 02/08/25  1014 02/11/25  1325 02/23/25  1638 03/21/25  1808 03/25/25  1108 04/01/25  2220 04/02/25  0537 04/03/25  0713 04/03/25  1533 04/03/25 2055 04/04/25  0247   Glucose 141 H 171 H 225 H 113 H 150 H  --   --   --   --   --   --   --    Sodium 140 138 138 141 141   < > 139 140 138 140 138 139   Potassium 4.0 4.5 4.4 5.1 4.4   < > 3.9 3.7 4.3 3.9 4.8 5.0   BUN 44 H 63 H 75 H 77 H 91 H   < > 32 H 34 H 60 H 62 H 70 H 75 H   Creatinine 10.0 H 8.6 H 8.9 H 9.8 H 10.9 H   < > 6.2 H 6.8 H 8.8 H 8.7 H 9.3 H 9.9 H   eGFR 5 A 5.5 A 5.3 A 4.7 A 4.1 A   < > 8 L 7 L 5 L 5 L 5 L 5 L   Calcium 8.9 10.2 10.1 10.9 H 10.4   < > 9.1 8.9 8.4 L 7.6 L 8.1 L 8.2 L   Magnesium   --   --   --   --   --    < > 1.9 1.9 2.1  --  2.2 2.3   Magnesium  --    --   --   --  2.5  --   --   --   --   --   --   --     < > = values in this interval not displayed.       CARDIAC BIOMARKERS:  Recent Labs   Lab 04/01/24 0251 04/29/24 2211 08/03/24  0015 08/05/24  0138 10/20/24  2310 10/21/24  0113 04/01/25  2220 04/02/25  0846 04/02/25  2055 04/03/25  0303 04/03/25  0718 04/03/25  1533 04/04/25  1028     --  183  --  111  --  382 H  --   --   --   --   --   --    Troponin I  --    < > 0.080 H   < > 0.044 H   < >  --   --   --   --   --   --   --    Troponin-I  --   --   --   --   --   --   --    < > 1.076 H 1.441 H 1.812 H 2.032 H 2.913 H    < > = values in this interval not displayed.       COAGS:  Recent Labs   Lab 11/28/23 2155 04/18/24  0609 01/12/25  1316 03/25/25  1108 04/03/25  1533   INR 1.1 1.0 1.0 1.0 1.1       LIPIDS/LFTS:  Recent Labs   Lab 08/11/24  0331 08/15/24  0203 04/02/25  0537 04/03/25  0713 04/03/25  1533 04/03/25  2055 04/04/25  0247   Cholesterol 110 L  --   --   --   --   --   --    Triglycerides 126  --   --   --   --   --   --    HDL 26 L  --   --   --   --   --   --    LDL Cholesterol 58.8 L  --   --   --   --   --   --    Non-HDL Cholesterol 84  --   --   --   --   --   --    AST  --    < > 26 29 25 24 23   ALT  --    < > 17 20 15 18 17    < > = values in this interval not displayed.       BNP:  Recent Labs   Lab 06/11/24 2239 08/03/24 0015 08/11/24 0332 01/12/25  0550 04/03/25  0303   BNP 1,110 H 624 H 2,178 H 2,043 H 2,988 H       TSH:  Recent Labs   Lab 11/27/23  0519 01/15/24  2135 07/09/24  1428 08/11/24  0332 04/02/25  0537   TSH 3.357 4.600 H 6.963 H 3.949 4.590 H       Free T4:  Recent Labs   Lab 01/15/24  2135 07/09/24  1428 04/02/25  0537   Free T4 0.81 1.01 1.19  1.19       Diagnostic Results:  ECG (personally reviewed and interpreted tracing(s)):  4/4/25 1325 SR 79, PRWP, lat ST abnl ?isch, similar to 3/25/25, lass prom than 4/2/25    Chest X-Ray (personally reviewed and interpreted image(s)): 4/4/25 NAD, aortic  atherosclerosis    Echo: 4/4/25 (images personally reviewed and interpreted)    Left Ventricle: The left ventricle is normal in size. Normal wall thickness. Mild global hypokinesis present. There is mildly reduced systolic function with a visually estimated ejection fraction of 40 - 45%. Grade I diastolic dysfunction.    Right Ventricle: The right ventricle has moderate enlargement. Systolic function is mildly reduced.    Left Atrium: Mildly dilated Cannot exclude PFO (color Doppler with possible flow across IAS).    Aortic Valve: The aortic valve is a trileaflet valve. There is moderate aortic valve sclerosis. Mildly restricted motion. There is mild stenosis. Aortic valve area by VTI is 1.8 cm². Aortic valve peak velocity is 1.5 m/s. Mean gradient is 5 mmHg. The dimensionless index is 0.51. There is mild aortic regurgitation.    Mitral Valve: There is a 1.9x1.2cm large fixed heterogeneous mass present on the posterior leaflet (new finding vs 9/2023 echo). There is moderate to severe regurgitation with an eccentric jet.  Cannot exclude severe MR with possible PMVL perforation in association with large vegetation.    Aorta: Aortic root is mildly to moderately dilated measuring 4.17 cm.    Pulmonary Artery: The estimated pulmonary artery systolic pressure is 42 mmHg.    PCI RCA 1/14/25:    The Ost RCA lesion was 70% stenosed with 0% stenosis post-intervention.    The Prox RCA lesion was 70% stenosed with 0% stenosis post-intervention.    The Mid RCA lesion was 99% stenosed with 0% stenosis post-intervention.    The ejection fraction was calculated to be 55%.    The pre-procedure left ventricular end diastolic pressure was 6.    The post-procedure left ventricular end diastolic pressure was 7.    Mid RCA lesion: A stent was successfully placed at 11 MAYRA for 15 sec.    Prox RCA lesion, Mid RCA lesion: A  at 12 MAYRA for 10 sec.    Ost RCA lesion, Prox RCA lesion: A stent was successfully placed.  Procedure  performed:  Left heart catheterization  Coronary angiogram of the right coronary artery  Right radial artery access   Right superficial femoral artery access  CSI atherectomy of the right mid coronary artery  Laser atherectomy of the right mid coronary artery  PTCA of the right mid coronary artery  XU x3 megatron drug-eluting stent 3.5 X 16 in the mid RCA, 3.5 X 28 in the proximal RCA, 4.0 X 16 mm in the ostial RCA  Findings:  Severely calcified mid RCA stenosis unable to cross with PTCA balloons, treated initially with 0.9 laser atherectomy, followed by CSI atherectomy system with total of 5 runs (3 at low speed, 2 at high speed), pre-dilated using 2.0 compliant and 3.5 noncompliant balloon followed by 3.5 X 16 mm megatron stent.  Focal plaque rupture/erosion noted in the proximal and ostial RCA that were treated with  3.5 X 28 in the proximal RCA, 4.0 X 16 mm in the ostial RCA.  No residual stenosis post intervention.  Patient had ALAN 3 flow at the end of the procedure  LVEDP 6 mm Hg.  EF of around 55%.  No gradient across the aortic valve.  Right SFA access.  High bifurcation.  Mild diffuse disease of the right SFA closed using Perclose closure device  Right radial artery with severe spasm and hence decision was made to switch to right groin access  Recommendations:  Continue dual antiplatelet therapy for at least 1 year.  Consider longer term anticoagulation based on risk factor profile after 1 year  High-intensity statin  Transfer back to telemetry unit    Cath/PCI LAD 1/13/25  Left Main Coronary Artery: The left main is a large caliber vessel arising normally from the left sinus of valsalva.  It bifurcates into the left anterior descending and left circumflex coronary artery.  It is free of evidence of obstructive coronary artery disease. ALAN III flow  Left Anterior Descending: The left anterior descending coronary artery is a large caliber vessel arising normally from the left main and extending to the  apex.  It gives rise to small to moderate caliber diagonal arteries. Proximal LAD has a severe 80-90% calcified stenosis prior to an under expanded 2.5 mm stent in a 4.0 mm vessel. After the stent there is 40-50% stenosis. ALAN II flow  Left Circumflex: The left circumflex is a large caliber vessel arising normally from the left main coronary artery, it is non-dominant.  It gives rise to moderate caliber obtuse marginal branches. OM stent is patent. ALAN III flow  Right Coronary Artery: The right coronary artery is a large caliber vessel arising normally from the right sinus of valsalva.  It is dominant giving rise to a PDA and PLV branch. Mid RCA with a 95-99% calcified fibrotic stenosis. ALAN III flow  LVgram: LVEDP 33 mmHg  PCI procedure:  Heparin administered. ACT was closely monitored. Using a EBU 3.5 guider, this was used to cannulate the left system. Nuno blue PCI wire repshaped outside the body. PCI wire was advanced into the distal LAD. Predilated with 2.0 mm NC, 2.5 mm NC, scoring balloon 2.5 mm, 3.0 mm and 3.5 mm NC. IVUS was advanced for vessel prep/size. Schockwave 3.0 x 12 mm advanced and multiple therapies given. Followed by a 3.5 mm NC. Advanced a 3.5 x 24 mm megatron XU and deployed at nominal pressure. Post dilated with a 4.0 mm NC with great results. After the balloon was removed.  The wire was left in place.  Repeat angiography showed no signs of dissection.  Subsequently the wire was pulled back.   Final angiography showed ALAN-3 flow.  No signs of dissection, perforation, or thrombus.  Assessment:   Successful PCI of LAD with 3.5 x 24 mm XU  mRCA 95-99% stenosis--> staged PCI of RCA  Patent Lcx stent  Plan:   - Stage PCI of RCA tomorrow PM- NPO at [facility]   - Patient tolerated procedure well. No immediate complications  - Continue DAPT  - EKG when arrives to floor  - Routine post cath protocol  - Dialysis in pm   - Maximize medical management  - Further care by the primary team    Cath/PCI OM2  12/4/23  1.  Selective left coronary Angiography.   2.  IVUS of Proximal OM2 coronary artery   3.  PCI of Proximal OM2 coronary artery with one 3.5 x 18 [place] stent   4.  Ultrasound guided right radial arterial access   5. Conscious sedation from 7:11 AM to 8:03 AM (52 minutes of sedation)   Findings:   - Coronary angiography data   Left main: Large caliber vessel, divides into the left anterior descending   and left circumflex. LM is non obstructive.   Left anterior descending: Large caliber vessel giving rise to 2 diagonals.   Patent mid LAD stents.   Left circumflex: Medium caliber vessel giving rise to 2 obtuse marginals.   OM2 has a proximal 99% ISR.   Right coronary artery: Not studied but known mid RCA lesion (see report   from last week)   - IVUS data of OM2    Pre PCI IVUS data:   Proximal reference luminal diameter: 3.6 mm   Distal reference luminal diameter: 3.4 mm   Percutaneous Coronary Intervention   Successful PCI of Proximal OM2 coronary artery 99 % ISR stenosis was   reduced to 0 % (initial ALAN 3 flow) by pre dilatation with 3 x 15 mm   balloon followed by placement of 3.5 x 18 mm [place] stent with excellent   angiographic results with final ALAN 3 flow. IVUS guided.   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   -  Succesful PCI of Proximal OM2 coronary artery with one Michael stent  Recommendations:   - Post operative care as per protocol.   - Aspirin for life and P2Y12 inhibitor for at least a year   - Moderate to high statin therapy.   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  Cont abx.  Prognosis poor.    MRSA bacteremia  As above    Acute bacterial endocarditis  As above    ESRD on hemodialysis  Now on CRRT  Mgmt per IM/neph    Coronary artery  disease involving native coronary artery of native heart without angina pectoris  Elev trop c/w NSTEMI, likely type II, doubt ACS.  Known MV CAD/PCI (most recently in Jan 2025)  Cont ASA/Plavix/Statin  No plan for inpat isch eval    HTN (hypertension)  Med rx on hold    HLD (hyperlipidemia)  Cont statin      Type 2 diabetes mellitus, without long-term current use of insulin  Mgmt per IM        VTE Risk Mitigation (From admission, onward)           Ordered     heparin (porcine) injection 5,000 Units  Every 8 hours         04/04/25 0936     IP VTE HIGH RISK PATIENT  Once         04/03/25 1516     Place TEMO hose  Until discontinued         04/03/25 1516     Place sequential compression device  Until discontinued         04/03/25 1516     Reason for No Pharmacological VTE Prophylaxis  Once        Question:  Reasons:  Answer:  Physician Provided (leave comment)    04/03/25 1516                  Pt seen in ICU, critical care time 40min.    Thank you for your consult. I will follow-up with patient. Please contact us if you have any additional questions.    Apolinar Tyson MD  Cardiology   [facility] [place] - Intensive Care

## 2025-04-04 NOTE — OP NOTE
[facility]   Operative Note    SUMMARY     Surgery Date: 4/3/2025     Surgeons and Role:     * Sunil Contreras MD - Primary     * Lamont Wasserman MD - Co-Surgeon    Assisting:  ST Bar PA-C    Pre-op Diagnosis:    Hemorrhage  Ruptured infected left upper extremity AV graft pseudoaneurysm    Post-op Diagnosis:  Post-Op Diagnosis Codes:  Same    Procedure(s) (LRB):  EXPLORATION, ARTERY, UPPER EXTREMITY (Left)  LEFT UPPER EXTREMITY AV GRAFT EXCISION  LEFT ARM WASHOUT     PROCEDURE IN DETAIL:  Patient was seen by anesthesia preoperatively and was deemed stable for surgery.  He was brought to the operating room placed supine on the operating table.  He was prepped and draped in sterile fashion a time-out performed after general anesthesia was induced by anesthesia.  Ultrasound was used to robb the graft proximally and venous outflow identifying a venous outflow stent.  There was no fluid collection surrounding the proximal and central aspects of the accessed.  The wounds of the graft were covered with Ioban.  Incision was made horizontally over the proximal graft and encircled with a right angle.  There was no evidence of infection and the tissue was well incorporated.  The graft was ligated and oversewn with 2 layers proximally.  The graft was divided and the proximal aspect was covered with multiple layers of tissue.  We irrigated prior to closing over the graft with vancomycin irrigation and placed vancomycin powder.  Hemostasis was achieved with electrocautery and pressure.  We then expose the central aspect of the access were no infection was encountered.  The graft was oversewn with a stick tie centrally and divided.  Irrigation and vancomycin powder was used prior to closing over the central aspect of the graft remnant.  We then covered these incisions with sterile dressings after closing them in multiple layers with Vicryl and skin with staples.  We then turned our  attention to the ruptured pseudoaneurysm and expressed infected hematoma and purulence.  Cultures were taken and specimens were sent as well as graft remnants previously removed.  We opened the wound and removed remaining graft.  This was also sent off as a specimen.  The wound was irrigated, hemostasis was achieved.  We packed the wound with Betadine-soaked gauze and wrapped the arm in a dry Kerlix and Ace bandage.  The patient was transferred back to the ICU in stable condition.    Anesthesia: General/MAC    Implants:  * No implants in log *    Operative Findings: well incorporated proximal and central graft without evidence of infection, resected graft and covered proximal and central remnants with multiple layers.  Mid graft removed in entirety and sent for culture.  Ruptured pseudoaneurysm with infected hematoma, graft completely obliterated at mid segment.    Estimated Blood Loss: * No values recorded between 4/3/2025  5:13 PM and 4/3/2025  8:06 PM *    Estimated Blood Loss has been documented.         Specimens:   Specimen (24h ago, onward)      None          ID Type Source Tests Collected by Time Destination   A : arterial margin Tissue AV Fistula, Left AFB CULTURE & SMEAR Sunil Contreras MD 4/3/2025 1816    B : arterial margin Wound Arm, Left CULTURE, ANAEROBIC Sunil Contreras MD 4/3/2025 1816    C : arterial margin Wound Arm, Left CULTURE, FUNGUS Sunil Contreras MD 4/3/2025 1816    D : arterial margin Wound Arm, Left CULTURE, AEROBIC  (SPECIFY SOURCE) Sunil Contreras MD 4/3/2025 1816    E : arterial margin Wound Arm, Left GRAM STAIN Sunil Contreras MD 4/3/2025 1821    F : venous margin Tissue AV Fistula, Left CULTURE, FUNGUS Sunil Contreras MD 4/3/2025 1904    G : venous margin Tissue AV Fistula, Left AFB CULTURE & SMEAR Sunil Contreras MD 4/3/2025 1905    H : venous margin Tissue AV Fistula, Left CULTURE, ANAEROBIC Sunil Contreras MD 4/3/2025 1910    I :  venous margin Tissue Arm, Left CULTURE, AEROBIC  (SPECIFY SOURCE) Sunil Contreras MD 4/3/2025 1911    J : venous margin Wound Arm, Left GRAM STAIN Sunil Contreras MD 4/3/2025 1914    K : left av fistula Tissue hematoma CULTURE, ANAEROBIC, CULTURE, FUNGUS, AFB CULTURE & SMEAR Sunil Contreras MD 4/3/2025 1921    L : hematoma Tissue Arm, Left GRAM STAIN Sunil Contreras MD 4/3/2025 1923    M :  Wound Arm, Left CULTURE, AEROBIC  (SPECIFY SOURCE) Sunil Contreras MD 4/3/2025 1926    N : mid graft Tissue AV Fistula, Left CULTURE, ANAEROBIC, CULTURE, FUNGUS, AFB CULTURE & SMEAR Sunil Contreras MD 4/3/2025 1934    O : mid graft Tissue Arm, Left CULTURE, FUNGUS, GRAM STAIN Sunil Contreras MD 4/3/2025 1935    P : left arm wound Wound Arm, Left CULTURE, ANAEROBIC, CULTURE, FUNGUS, GRAM STAIN, CULTURE, AEROBIC  (SPECIFY SOURCE), AFB CULTURE & SMEAR Sunil Contreras MD 4/3/2025 1943    Q :  Wound Arm, Left CULTURE, ANAEROBIC, CULTURE, FUNGUS, GRAM STAIN, CULTURE, AEROBIC  (SPECIFY SOURCE), AFB CULTURE & SMEAR Sunil Contreras MD 4/3/2025 1954

## 2025-04-04 NOTE — CONSULTS
[facility] - Intensive Care  Palliative Medicine  Consult Note    Patient Name: Jevon Rajan  MRN: [id number]  Admission Date: 4/3/2025  Hospital Length of Stay: 1 days  Code Status: Full Code   Attending Provider: Eileen Jordan MD  Consulting Provider: Lisa Jacinto NP  Primary Care Physician: No primary care provider on file.  Principal Problem:Septic shock    Patient information was obtained from patient, relative(s), past medical records, ER records, and primary team.      Inpatient consult to Palliative Care  Consult performed by: Lisa Jacinto NP  Consult ordered by: Eileen Jordan MD  Reason for consult: goals of care and advanced care planning        Assessment/Plan:   Palliative Care  Advance Care Planning   4/4/2025 - Consult   - consult received; interval chart reviewed in detail; discussed pt with MDT during ICU rounds and ongoing throughout the day   - met with patient at bedside; introduction to palliative medicine team and role in current care and admission   - pt very lethargic and required encouragement and stimulation for orientation assessment; pt answers to name, able to state his 1st name, identifies he is at the hospital, and when asked who team should call for help with his medical care he answered Kenia (which is consistent with what he told  4/3 as well); quickly falls back asleep   - pt unable to participate in detailed GOC/ACP discussion on his own behalf at this time   - called Kenia (pt's niece) who shares that she has been pt's main caregiver for some time; she confirms pt is not , does not have children, and that he has 3 sisters; she also had pt's sister Bhavna join by phone for update/discussion   - Mrs. Huggins also shared that Kenia takes care of pt and understands his current history, needs, and wishes; per Bhavna, Kenia should be pt's medical decision maker while he lacks capacity, and confirms that pt's others sisters agree with this; they  have asked that no other visitors or callers aside from Kenia and pts sisters (Bhavna, Ronna, and Nora receive updates)   - Kenia and Bhavna confirm that they feel pt would wish to continue HD  - reviewed pt's current full code status, potential interventions, risks, and outcomes; Kenia and Bhavna agree for pt to remain full code, but were open to further discussion ideally when pt is able to participate in the discussion; updated pt's team of this and added this to EMR   - both shared difficulty pt has had recently and frustration with attempts to seek care to get answers to ongoing health concerns; they both shared appropriate concerns for pt's current condition and are hopeful for answers and improvement in pt's condition; they shared that they feel the [facility] ICU team is moving quickly and working to improve his condition for which they are appreciative   - emotional support provided   - Allowed time for questions/concerns; all addressed; expressed availability of myself/palliative team for additional questions/concerns   - attempted to call pt's other sister, Ronna, as she had called unit for updates; unable to reach,    - updated MDT; ongoing communication and coordination with MDT     Renal/  ESRD on hemodialysis  - long term HD pt; family confirmed that they felt pt would wish to continue HD and were agreeable to plan for catheter placement and CRRT 4/4 (pending discussion with Dr. Diaz [facility] for consent and discussion with Dr. Che for HD/CRRT consent)   - pt has been experiencing HD assosciated pruritus for some time (see sepsis); [facility] discussed with Dr. Abdalla (nephrology)   - cont mgmt per  and nephrology     ID  * Septic shock  - MRSA bacteremia; pt presented with bleeding from fistula and suspected potential source  - family shares that pt has angelo suffering form HD pruritus for some time and has [facility] as he is constantly scratching and causing wounds, potential source of MRSA   - now with endocarditis identified on 4/4 echo; [place] [facility]   - cont mgmt per HM and PCCM     Thank you for your consult. I will follow-up with patient. Please contact us if you have any additional questions.    Subjective:     HPI:   From H&P: " Mr Jevon Rajan is a 85 y.o. man with ESRD with LUE AVF, HFpEF last EF 50-55%, CAD, DM who was transferred to [facility] ICU for septic shock due to MRSA bacteremia.      He was admitted at [facility] 4/1-3/2025 with sepsis, worsening to septic shock, then identified source as MRSA. 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      Upon arrival to [facility], he is moaning in pain and his LUE AVF has active pulsatile bright red blood. He does respond to his name. He denies pain anywhere other than his LUE. He is on levophed at 0.05. "     Palliative medicine consulted for goals of care discussion and advance care planning; for details of visit, see advance care planning section of plan.       Hospital Course:  No notes on file      Past Medical History:   Diagnosis Date    BPH (benign prostatic hyperplasia)     Coronary artery disease     [facility] this [facility]. In 2014 he had stent to LAD. He states he has had 2 heart attacks. He does not get angina. There was 90% left anterior descending artery stenosis undergoing stenting. There was also a circumflex marginal branch stenosis of 70%.    Diabetes mellitus, type 2     ESRD (end stage renal disease) on dialysis     ESRD on HD TTS via left brachial AVF    Hypertension     Hypothyroidism, unspecified     Sepsis 4/2/2025    Symptomatic anemia 01/15/2024     Past Surgical History:   Procedure Laterality Date    ANGIOGRAM, CORONARY, WITH LEFT HEART " CATHETERIZATION  1/13/2025    Procedure: Angiogram, Coronary, with Left Heart Cath;  Surgeon: Steve Bhat MD;  Location: [facility] CATH LAB/EP;  Service: Cardiology;;    ATHERECTOMY, CORONARY N/A 1/14/2025    Procedure: Atherectomy-coronary;  Surgeon: Giacomo Pittman MD;  Location: [facility] CATH LAB/EP;  Service: Cardiology;  Laterality: N/A;    bilateral foot surgery      CORONARY ANGIOPLASTY WITH STENT PLACEMENT N/A 1/14/2025    Procedure: ANGIOPLASTY, CORONARY ARTERY, WITH STENT INSERTION;  Surgeon: Giacomo Pittman MD;  Location: [facility] CATH LAB/EP;  Service: Cardiology;  Laterality: N/A;    CORONARY STENT PLACEMENT N/A 1/13/2025    Procedure: INSERTION, STENT, CORONARY ARTERY;  Surgeon: Steve Bhat MD;  Location: [facility] CATH LAB/EP;  Service: Cardiology;  Laterality: N/A;    ESOPHAGOGASTRODUODENOSCOPY N/A 2/27/2024    Procedure: EGD (ESOPHAGOGASTRODUODENOSCOPY);  Surgeon: Gemma Almendarez MD;  Location: [facility] ENDO;  Service: Endoscopy;  Laterality: N/A;    EXPLORATION, ARTERY, UPPER EXTREMITY Left 4/3/2025    Procedure: EXPLORATION, ARTERY, UPPER EXTREMITY;  Surgeon: Sunil Contreras MD;  Location: [facility] OR;  Service: Vascular;  Laterality: Left;    eyelid surgery      FISTULOGRAM Left 11/27/2019    Procedure: Fistulogram;  Surgeon: MELANY Brenner III, MD;  Location: [address];  Service: Peripheral Vascular;  Laterality: Left;    FISTULOGRAM Left 11/27/2019    Procedure: Fistulogram, AVG declot, possible permacath;  Surgeon: MELANY Brenner III, MD;  Location: [facility] CATH LAB;  Service: Peripheral Vascular;  Laterality: Left;    INSERTION OF DIALYSIS CATHETER      IVUS, CORONARY  1/13/2025    Procedure: IVUS, Coronary;  Surgeon: Steve Bhat MD;  Location: [facility] CATH LAB/EP;  Service: Cardiology;;    LEFT HEART CATHETERIZATION Left 1/14/2025    Procedure: Left heart cath;  Surgeon: Giacomo Pittman MD;  Location: [facility] CATH LAB/EP;  Service: Cardiology;  Laterality: Left;    [facility]   12/5/13    PERCUTANEOUS CORONARY INTERVENTION, ARTERY N/A 1/14/2025    Procedure: Percutaneous coronary intervention;  Surgeon: Giacomo Pittman MD;  Location: [facility] CATH LAB/EP;  Service: Cardiology;  Laterality: N/A;    PERCUTANEOUS TRANSLUMINAL BALLOON ANGIOPLASTY OF CORONARY ARTERY  1/13/2025    Procedure: Angioplasty-coronary;  Surgeon: Steve Bhat MD;  Location: [facility] CATH LAB/EP;  Service: Cardiology;;    REMOVAL, GRAFT, ARTERIOVENOUS, UPPER EXTREMITY Left 4/3/2025    Procedure: REMOVAL, GRAFT, ARTERIOVENOUS, UPPER EXTREMITY;  Surgeon: Sunil Contreras MD;  Location: [facility] OR;  Service: Vascular;  Laterality: Left;    REVASCULARIZATION, ENDOVASCULAR, LE W/ INTRAVASCULAR LITHOTRIPSY  1/13/2025    Procedure: REVASCULARIZATION, ENDOVASCULAR, LE W/ INTRAVASCULAR LITHOTRIPSY;  Surgeon: Steve Bhat MD;  Location: [facility] CATH LAB/EP;  Service: Cardiology;;    right hand surgery      stents       Review of patient's allergies indicates:   Allergen Reactions    Ace inhibitors Hives, Itching, Shortness Of Breath, Other (See Comments) and Rash     Is not sure which medication it was    Captopril      Medications:  Continuous Infusions:  Scheduled Meds:   aspirin  81 mg Oral Daily    atorvastatin  80 mg Oral QHS    cefTRIAXone (Rocephin) IV (PEDS and ADULTS)  2 g Intravenous Q24H    clopidogreL  75 mg Oral Daily    heparin (porcine)  5,000 Units Subcutaneous Q8H    pantoprazole  40 mg Intravenous Daily    senna-docusate  1 tablet Oral BID    sodium chloride 0.9%  10 mL Intravenous Q8H     PRN Meds:  Current Facility-Administered Medications:     0.9%  NaCl infusion (for blood administration), , Intravenous, Q24H PRN    acetaminophen, 650 mg, Oral, Q4H PRN    dextrose 50%, 12.5 g, Intravenous, PRN    dextrose 50%, 25 g, Intravenous, PRN    glucagon (human recombinant), 1 mg, Intramuscular, PRN    glucose, 16 g, Oral, PRN    glucose, 24 g, Oral, PRN    insulin aspart U-100, 0-5 Units, Subcutaneous, QID  (AC + HS) PRN    melatonin, 6 mg, Oral, Nightly PRN    naloxone, 0.02 mg, Intravenous, PRN    ondansetron, 4 mg, Intravenous, Q6H PRN    prochlorperazine, 5 mg, Intravenous, Q6H PRN    Pharmacy to dose Vancomycin consult, , , Once **AND** vancomycin - pharmacy to dose, , Intravenous, pharmacy to manage frequency    Family History    None       Tobacco Use    Smoking status: Never    Smokeless tobacco: Never   Substance and Sexual Activity    Alcohol use: No    Drug use: No    Sexual activity: Not Currently     Review of Systems   Unable to perform ROS: Acuity of condition     Objective:     Vital Signs (Most Recent):  Temp: 97.5 °F (36.4 °C) (04/04/25 0900)  Pulse: 86 (04/04/25 1130)  Resp: 20 (04/04/25 1130)  BP: (!) 115/52 (04/04/25 1130)  SpO2: 99 % (04/04/25 1135) Vital Signs (24h Range):  Temp:  [97.2 °F (36.2 °C)-97.7 °F (36.5 °C)] 97.5 °F (36.4 °C)  Pulse:  [72-86] 86  Resp:  [8-46] 20  SpO2:  [91 %-100 %] 99 %  BP: ()/(48-76) 115/52     Weight: 61.2 kg (135 lb)  Body mass index is 21.14 kg/m².     Physical Exam  Vitals and nursing note reviewed.   Constitutional:       Comments: Arouses to name, able to answer simple questions with 1-2 word answers with stimulation, falls asleep quickly    HENT:      Head: Normocephalic and atraumatic.   Pulmonary:      Effort: Pulmonary effort is normal. No respiratory distress.      Comments: NC  Skin:     General: Skin is dry.      Coloration: Skin is pale.      Findings: Lesion present. Bruising: multiple skin lesions in various stages of healing. Erythema: pale/gray for ethnicity.  Neurological:      Mental Status: He is lethargic.      Comments: Answers to his name; answered he was at the hospital; able to provide niece's name (Kenia)    Psychiatric:         Behavior: Behavior is cooperative.        Advance Care Planning   Advance Directives:   Living Will: No    LaPOST: No    Do Not Resuscitate Status: No    Medical Power of : No (4/4 when pt asked  who to call for help with medical needs pt responded Deshanta; HM reports same response on 4/3; will need completion of MPOA when able)      Decision Making:  Family answered questions  Goals of Care: The patient endorses that what is most important right now is to focus on symptom/pain control, curative/life-prolongation and improvement in condition.     Accordingly, we have decided that the best plan to meet the patient's goals includes continuing with treatment       Significant Labs: All pertinent labs within the past 24 hours have been reviewed.  CBC:   Recent Labs   Lab 04/04/25 0247   WBC 16.11*   HGB 10.0*   HCT 30.0*   MCV 89   *     BMP:  Recent Labs   Lab 04/04/25 0247      K 5.0      CO2 23   BUN 75*   CREATININE 9.9*   CALCIUM 8.2*   MG 2.3     LFT:  Lab Results   Component Value Date    AST 23 04/04/2025    ALKPHOS 70 04/04/2025    BILITOT 0.3 04/04/2025     Albumin:   Albumin   Date Value Ref Range Status   04/04/2025 2.0 (L) 3.5 - 5.2 g/dL Final   03/21/2025 3.2 (L) 3.5 - 5.2 g/dL Final     Protein:   Total Protein   Date Value Ref Range Status   03/21/2025 7.5 6.0 - 8.4 g/dL Final     Lactic acid:   Lab Results   Component Value Date    LACTATE 0.9 04/03/2025    LACTATE 2.6 (H) 04/02/2025     Significant Imaging: I have reviewed all pertinent imaging results/findings within the past 24 hours.    Total visit time: 90 minutes    70 min visit time including: face to face time in discussion of symptom assessment, and exploring options and burdens of offered treatments.  This also includes non-face to face time preparing to see the patient including chart review, obtaining and/or reviewing separately obtained history, documenting clinical information in the electronic or other health record, independently interpreting results and communicating results to the patient/family/caregiver, family discussions by phone if not able to be present, coordination of care with other specialists,  and discharge planning.     20 min ACP time spent: goals of care, advanced care planning, emotional support, formulating and communicating prognosis, exploring burden/ benefit of various approaches of treatment.     Lisa Jacinto NP  Palliative Medicine  [facility]      Lisa Jacinto, NP  Palliative Medicine  [facility]

## 2025-04-04 NOTE — SUBJECTIVE & OBJECTIVE
Past Medical History:   Diagnosis Date    BPH (benign prostatic hyperplasia)     Coronary artery disease     He has followed at the [facility] and is now transferring his care to this clinic. In 2014 he had stent to LAD. He states he has had 2 heart attacks. He does not get angina. There was 90% left anterior descending artery stenosis undergoing stenting. There was also a circumflex marginal branch stenosis of 70%.    Diabetes mellitus, type 2     ESRD (end stage renal disease) on dialysis     ESRD on HD TTS via left brachial AVF    Hypertension     Hypothyroidism, unspecified     Sepsis 4/2/2025    Symptomatic anemia 01/15/2024       Past Surgical History:   Procedure Laterality Date    ANGIOGRAM, CORONARY, WITH LEFT HEART CATHETERIZATION  1/13/2025    Procedure: Angiogram, Coronary, with Left Heart Cath;  Surgeon: Steve Bhat MD;  Location: [facility] CATH LAB/EP;  Service: Cardiology;;    ATHERECTOMY, CORONARY N/A 1/14/2025    Procedure: Atherectomy-coronary;  Surgeon: Giacomo Pittman MD;  Location: [facility] CATH LAB/EP;  Service: Cardiology;  Laterality: N/A;    bilateral foot surgery      CORONARY ANGIOPLASTY WITH STENT PLACEMENT N/A 1/14/2025    Procedure: ANGIOPLASTY, CORONARY ARTERY, WITH STENT INSERTION;  Surgeon: Giacomo Pittman MD;  Location: [facility] CATH LAB/EP;  Service: Cardiology;  Laterality: N/A;    CORONARY STENT PLACEMENT N/A 1/13/2025    Procedure: INSERTION, STENT, CORONARY ARTERY;  Surgeon: Steve Bhat MD;  Location: [facility] CATH LAB/EP;  Service: Cardiology;  Laterality: N/A;    ESOPHAGOGASTRODUODENOSCOPY N/A 2/27/2024    Procedure: EGD (ESOPHAGOGASTRODUODENOSCOPY);  Surgeon: Gemma Almendarez MD;  Location: [facility] ENDO;  Service: Endoscopy;  Laterality: N/A;    EXPLORATION, ARTERY, UPPER EXTREMITY Left 4/3/2025    Procedure: EXPLORATION, ARTERY, UPPER EXTREMITY;  Surgeon: Sunil Contreras MD;  Location: [facility] OR;  Service: Vascular;  Laterality: Left;    eyelid surgery       FISTULOGRAM Left 11/27/2019    Procedure: Fistulogram;  Surgeon: MELANY Brenner III, MD;  Location: [facility] OR 2ND FLR;  Service: Peripheral Vascular;  Laterality: Left;    FISTULOGRAM Left 11/27/2019    Procedure: Fistulogram, AVG declot, possible permacath;  Surgeon: MELANY Brenner III, MD;  Location: [facility] CATH LAB;  Service: Peripheral Vascular;  Laterality: Left;    INSERTION OF DIALYSIS CATHETER      IVUS, CORONARY  1/13/2025    Procedure: IVUS, Coronary;  Surgeon: Steve Bhat MD;  Location: [facility] CATH LAB/EP;  Service: Cardiology;;    LEFT HEART CATHETERIZATION Left 1/14/2025    Procedure: Left heart cath;  Surgeon: Giacomo Pittman MD;  Location: [facility] CATH LAB/EP;  Service: Cardiology;  Laterality: Left;    MOH's  12/5/13    PERCUTANEOUS CORONARY INTERVENTION, ARTERY N/A 1/14/2025    Procedure: Percutaneous coronary intervention;  Surgeon: Giacomo Pittman MD;  Location: [facility] CATH LAB/EP;  Service: Cardiology;  Laterality: N/A;    PERCUTANEOUS TRANSLUMINAL BALLOON ANGIOPLASTY OF CORONARY ARTERY  1/13/2025    Procedure: Angioplasty-coronary;  Surgeon: Steve Bhat MD;  Location: [facility] CATH LAB/EP;  Service: Cardiology;;    REMOVAL, GRAFT, ARTERIOVENOUS, UPPER EXTREMITY Left 4/3/2025    Procedure: REMOVAL, GRAFT, ARTERIOVENOUS, UPPER EXTREMITY;  Surgeon: Sunil Contreras MD;  Location: [facility] OR;  Service: Vascular;  Laterality: Left;    REVASCULARIZATION, ENDOVASCULAR, LE W/ INTRAVASCULAR LITHOTRIPSY  1/13/2025    Procedure: REVASCULARIZATION, ENDOVASCULAR, LE W/ INTRAVASCULAR LITHOTRIPSY;  Surgeon: Steve Bhat MD;  Location: [facility] CATH LAB/EP;  Service: Cardiology;;    right hand surgery      stents         Review of patient's allergies indicates:   Allergen Reactions    Ace inhibitors Hives, Itching, Shortness Of Breath, Other (See Comments) and Rash     Is not sure which medication it was    Captopril        Family History    None       Tobacco Use    Smoking status: Never     Smokeless tobacco: Never   Substance and Sexual Activity    Alcohol use: No    Drug use: No    Sexual activity: Not Currently         Review of Systems   Unable to perform ROS: Dementia     Objective:     Vital Signs (Most Recent):  Temp: 97.5 °F (36.4 °C) (04/04/25 0900)  Pulse: 85 (04/04/25 1215)  Resp: 20 (04/04/25 1220)  BP: (!) 95/52 (04/04/25 1220)  SpO2: 99 % (04/04/25 1220) Vital Signs (24h Range):  Temp:  [97.2 °F (36.2 °C)-97.7 °F (36.5 °C)] 97.5 °F (36.4 °C)  Pulse:  [72-86] 85  Resp:  [8-46] 20  SpO2:  [91 %-100 %] 99 %  BP: ()/(46-76) 95/52     Weight: 61.2 kg (135 lb)  Body mass index is 21.14 kg/m².      Intake/Output Summary (Last 24 hours) at 4/4/2025 1229  Last data filed at 4/4/2025 0700  Gross per 24 hour   Intake 1119 ml   Output 85 ml   Net 1034 ml        Physical Exam  Vitals and nursing note reviewed.   Constitutional:       General: He is in acute distress.      Appearance: He is ill-appearing and toxic-appearing. He is not diaphoretic.   HENT:      Head: Normocephalic and atraumatic.      Nose: Nose normal.      Mouth/Throat:      Mouth: Mucous membranes are dry.   Cardiovascular:      Rate and Rhythm: Normal rate and regular rhythm.   Pulmonary:      Effort: Pulmonary effort is normal.      Breath sounds: Normal breath sounds.   Abdominal:      General: Bowel sounds are normal.      Palpations: Abdomen is soft.      Tenderness: There is no abdominal tenderness.      Hernia: A hernia is present.   Genitourinary:     Comments: Condom cath in place  Musculoskeletal:      Right lower leg: No edema.      Left lower leg: No edema.   Skin:     Comments: Dressing of lue.     Neurological:      General: No focal deficit present.      Comments: Responds to his name but not conversant          Vents:       Lines/Drains/Airways       Central Venous Catheter Line  Duration             Trialysis (Dialysis) Catheter 04/04/25 1208 right internal jugular <1 day              Drain  Duration           "   Male External Urinary Catheter 04/02/25 1850 Medium 1 day              Peripheral Intravenous Line  Duration                  Hemodialysis AV Fistula Left upper arm -- days         Peripheral IV - Single Lumen 04/02/25 1801 20 G Anterior;Distal;Right Upper Arm 1 day         Peripheral IV - Single Lumen 04/03/25 0500 20 G Posterior;Right Forearm 1 day                    Significant Labs:    CBC/Anemia Profile:  Recent Labs   Lab 04/03/25  1533 04/03/25 2055 04/04/25  0247   WBC 18.03* 16.23* 16.11*   HGB 7.2* 10.2* 10.0*   HCT 22.7* 30.7* 30.0*   * 130* 121*   MCV 89 90 89   RDW 18.7* 17.7* 17.8*        Chemistries:  Recent Labs   Lab 04/03/25  0713 04/03/25  1533 04/03/25  2055 04/04/25  0247    140 138 139   K 4.3 3.9 4.8 5.0    105 101 101   CO2 21* 22* 23 23   BUN 60* 62* 70* 75*   CREATININE 8.8* 8.7* 9.3* 9.9*   CALCIUM 8.4* 7.6* 8.1* 8.2*   ALBUMIN 2.3* 1.9* 2.0* 2.0*   BILITOT 0.5 0.3 0.6 0.3   ALKPHOS 57 83 59 70   ALT 20 15 18 17   AST 29 25 24 23   GLUCOSE 242* 203* 231* 202*   MG 2.1  --  2.2 2.3   PHOS 5.8*  --  7.2* 7.1*       Blood Culture: No results for input(s): "LABBLOO" in the last 48 hours.    Significant Imaging:   I have reviewed all pertinent imaging results/findings within the past 24 hours.  "

## 2025-04-04 NOTE — PLAN OF CARE
Pt remains free from falls and injuries. R IJ TLC, PIVs, ext cath, NC remain. L arm bandage with small amount of drainage, but nothing seeping through, +pulse/sensation. VSS, no acute issues overnight.

## 2025-04-04 NOTE — HPI
"From H&P: " Mr Jevon Rajan is a 85 y.o. man with ESRD with LUE AVF, HFpEF last EF 50-55%, CAD, DM who was transferred to [facility] ICU for septic shock due to MRSA bacteremia.      He was admitted at [facility] 4/1-3/2025 with sepsis, worsening to septic shock, then identified source as MRSA. 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      Upon arrival to [facility], he is moaning in pain and his LUE AVF has active pulsatile bright red blood. He does respond to his name. He denies pain anywhere other than his LUE. He is on levophed at 0.05. "     Palliative medicine consulted for goals of care discussion and advance care planning; for details of visit, see advance care planning section of plan.     "

## 2025-04-04 NOTE — SUBJECTIVE & OBJECTIVE
Past Medical History:   Diagnosis Date    BPH (benign prostatic hyperplasia)     Coronary artery disease     He has followed at the [facility] and is now transferring his care to this clinic. In 2014 he had stent to LAD. He states he has had 2 heart attacks. He does not get angina. There was 90% left anterior descending artery stenosis undergoing stenting. There was also a circumflex marginal branch stenosis of 70%.    Diabetes mellitus, type 2     ESRD (end stage renal disease) on dialysis     ESRD on HD TTS via left brachial AVF    Hypertension     Hypothyroidism, unspecified     Sepsis 4/2/2025    Symptomatic anemia 01/15/2024       Past Surgical History:   Procedure Laterality Date    ANGIOGRAM, CORONARY, WITH LEFT HEART CATHETERIZATION  1/13/2025    Procedure: Angiogram, Coronary, with Left Heart Cath;  Surgeon: Steve Bhat MD;  Location: [facility] CATH LAB/EP;  Service: Cardiology;;    ATHERECTOMY, CORONARY N/A 1/14/2025    Procedure: Atherectomy-coronary;  Surgeon: Giacomo Pittman MD;  Location: [facility] CATH LAB/EP;  Service: Cardiology;  Laterality: N/A;    bilateral foot surgery      CORONARY ANGIOPLASTY WITH STENT PLACEMENT N/A 1/14/2025    Procedure: ANGIOPLASTY, CORONARY ARTERY, WITH STENT INSERTION;  Surgeon: Giacomo Pittman MD;  Location: [facility] CATH LAB/EP;  Service: Cardiology;  Laterality: N/A;    CORONARY STENT PLACEMENT N/A 1/13/2025    Procedure: INSERTION, STENT, CORONARY ARTERY;  Surgeon: Steve Bhat MD;  Location: [facility] CATH LAB/EP;  Service: Cardiology;  Laterality: N/A;    ESOPHAGOGASTRODUODENOSCOPY N/A 2/27/2024    Procedure: EGD (ESOPHAGOGASTRODUODENOSCOPY);  Surgeon: Gemma Almendarez MD;  Location: [facility] ENDO;  Service: Endoscopy;  Laterality: N/A;    EXPLORATION, ARTERY, UPPER EXTREMITY Left 4/3/2025    Procedure: EXPLORATION, ARTERY, UPPER EXTREMITY;  Surgeon: Sunil Contreras MD;  Location: [facility] OR;  Service: Vascular;  Laterality: Left;    eyelid surgery       FISTULOGRAM Left 11/27/2019    Procedure: Fistulogram;  Surgeon: MELANY Brenner III, MD;  Location: [facility] OR 2ND FLR;  Service: Peripheral Vascular;  Laterality: Left;    FISTULOGRAM Left 11/27/2019    Procedure: Fistulogram, AVG declot, possible permacath;  Surgeon: MELANY Brenner III, MD;  Location: [facility] CATH LAB;  Service: Peripheral Vascular;  Laterality: Left;    INSERTION OF DIALYSIS CATHETER      IVUS, CORONARY  1/13/2025    Procedure: IVUS, Coronary;  Surgeon: Steve Bhat MD;  Location: [facility] CATH LAB/EP;  Service: Cardiology;;    LEFT HEART CATHETERIZATION Left 1/14/2025    Procedure: Left heart cath;  Surgeon: Giacomo Pittman MD;  Location: [facility] CATH LAB/EP;  Service: Cardiology;  Laterality: Left;    MOH's  12/5/13    PERCUTANEOUS CORONARY INTERVENTION, ARTERY N/A 1/14/2025    Procedure: Percutaneous coronary intervention;  Surgeon: Giacomo Pittman MD;  Location: [facility] CATH LAB/EP;  Service: Cardiology;  Laterality: N/A;    PERCUTANEOUS TRANSLUMINAL BALLOON ANGIOPLASTY OF CORONARY ARTERY  1/13/2025    Procedure: Angioplasty-coronary;  Surgeon: Steve Bhat MD;  Location: [facility] CATH LAB/EP;  Service: Cardiology;;    REMOVAL, GRAFT, ARTERIOVENOUS, UPPER EXTREMITY Left 4/3/2025    Procedure: REMOVAL, GRAFT, ARTERIOVENOUS, UPPER EXTREMITY;  Surgeon: Sunil Contreras MD;  Location: [facility] OR;  Service: Vascular;  Laterality: Left;    REVASCULARIZATION, ENDOVASCULAR, LE W/ INTRAVASCULAR LITHOTRIPSY  1/13/2025    Procedure: REVASCULARIZATION, ENDOVASCULAR, LE W/ INTRAVASCULAR LITHOTRIPSY;  Surgeon: Steve Bhat MD;  Location: [facility] CATH LAB/EP;  Service: Cardiology;;    right hand surgery      stents         Review of patient's allergies indicates:   Allergen Reactions    Ace inhibitors Hives, Itching, Shortness Of Breath, Other (See Comments) and Rash     Is not sure which medication it was    Captopril        Current Facility-Administered Medications on File Prior to Encounter    Medication    [DISCONTINUED] 0.9%  NaCl infusion (for blood administration)    [DISCONTINUED] acetaminophen suppository 650 mg    [DISCONTINUED] acetaminophen tablet 500 mg    [DISCONTINUED] albumin human 25% bottle 25 g    [DISCONTINUED] albuterol-ipratropium 2.5 mg-0.5 mg/3 mL nebulizer solution 3 mL    [DISCONTINUED] aluminum-magnesium hydroxide-simethicone 200-200-20 mg/5 mL suspension 30 mL    [DISCONTINUED] aspirin EC tablet 81 mg    [DISCONTINUED] atorvastatin tablet 80 mg    [DISCONTINUED] cetirizine tablet 5 mg    [DISCONTINUED] clopidogreL tablet 75 mg    [DISCONTINUED] dextrose 50% injection 12.5 g    [DISCONTINUED] dextrose 50% injection 25 g    [DISCONTINUED] epoetin mj-epbx injection 20,000 Units    [DISCONTINUED] gabapentin capsule 100 mg    [DISCONTINUED] glucagon (human recombinant) injection 1 mg    [DISCONTINUED] glucose chewable tablet 16 g    [DISCONTINUED] glucose chewable tablet 24 g    [DISCONTINUED] heparin (porcine) injection 5,000 Units    [DISCONTINUED] insulin aspart U-100 pen 0-5 Units    [DISCONTINUED] melatonin tablet 6 mg    [DISCONTINUED] meropenem (MERREM) 500 mg in 0.9% NaCl 100 mL IVPB (MB+)    [DISCONTINUED] midodrine tablet 10 mg    [DISCONTINUED] mupirocin 2 % ointment    [DISCONTINUED] naloxone 0.4 mg/mL injection 0.02 mg    [DISCONTINUED] NORepinephrine 4 mg in 0.9% NaCl 250 mL infusion    [DISCONTINUED] ondansetron injection 4 mg    [DISCONTINUED] oxyCODONE immediate release tablet 5 mg    [DISCONTINUED] pantoprazole EC tablet 40 mg    [DISCONTINUED] prochlorperazine injection Soln 5 mg    [DISCONTINUED] senna-docusate 8.6-50 mg per tablet 1 tablet    [DISCONTINUED] simethicone chewable tablet 80 mg    [DISCONTINUED] sodium chloride 0.9% flush 10 mL    [DISCONTINUED] triamcinolone acetonide 0.1% cream    [DISCONTINUED] vancomycin - pharmacy to dose    [DISCONTINUED] vancomycin 500 mg in dextrose 5 % 100 mL IVPB (ready to mix)     Current Outpatient Medications on File  Prior to Encounter   Medication Sig    ammonium lactate 12 % Crea Apply topically 2 (two) times a day.    artificial tears,hypromellose,,GENTEAL/SUSTANE, 0.3 % Gel Apply to eye nightly.    aspirin (ECOTRIN) 81 MG EC tablet Take 1 tablet (81 mg total) by mouth once daily.    atorvastatin (LIPITOR) 80 MG tablet Take 1 tablet (80 mg total) by mouth every evening.    camphor-menthol 0.5-0.5% (SARNA) lotion Apply topically once daily. APPLY SMALL AMOUNT TOPICALLY TO AFFECTED AREA(S) AS NEEDED FOR ITCHING KEEP IN FRIDGE    carboxymethylcellulose sodium 0.5 % Drop Apply 1 drop to eye nightly as needed (dry eyes).    carvediloL (COREG) 12.5 MG tablet Take 0.5 tablets (6.25 mg total) by mouth 2 (two) times daily.    cetirizine (ZYRTEC) 5 MG tablet Take 1 tablet (5 mg total) by mouth every 48 hours.    clobetasol 0.05% (TEMOVATE) 0.05 % Oint Apply topically 2 (two) times daily.    clopidogreL (PLAVIX) 75 mg tablet Take 1 tablet (75 mg total) by mouth once daily.    erythromycin (ROMYCIN) ophthalmic ointment Place a 1/2 inch ribbon of ointment into the lower eyelid. Four timex daily for 5 days    gabapentin (NEURONTIN) 100 MG capsule Take 1 capsule (100 mg total) by mouth every evening.    hydrophilic ointment (AQUABASE) Apply topically as needed for Dry Skin. APPLY MODERATE AMOUNT TOPICALLY TO AFFECTED AREA(S) TWICE A DAY AS NEEDED FOR DRY SKIN APPLY TO AREAS OF DRY SKIN OR OVER ENTIRE BODY.    lanolin alcohol-mineral oil-white petrolatum-ceres (EUCERIN) Crea cream Apply topically 2 (two) times daily as needed.    LIDOcaine (LIDODERM) 5 % Place 1 patch onto the skin once daily. Remove & Discard patch within 12 hours or as directed by MD    loratadine (CLARITIN) 10 mg tablet Take 10 mg by mouth daily as needed for Allergies (Every other day).    nut.tx.imp.renal fxn,lac-reduc (NEPRO CARB STEADY) 0.08 gram-1.8 kcal/mL Liqd Take 240 mLs by mouth 2 (two) times a day.    ondansetron (ZOFRAN-ODT) 4 MG TbDL Take 1 tablet (4 mg  total) by mouth every 8 (eight) hours as needed (nausea).    oxyCODONE-acetaminophen (PERCOCET) 5-325 mg per tablet Take 1 tablet by mouth every 6 (six) hours as needed.    pantoprazole (PROTONIX) 20 MG tablet Take 20 mg by mouth 2 (two) times daily as needed.    pramoxine 1 % Lotn Apply topically 2 (two) times daily as needed (itching).    triamcinolone acetonide 0.1% (KENALOG) 0.1 % cream Apply topically 2 (two) times daily as needed (itching).     Family History    None       Tobacco Use    Smoking status: Never    Smokeless tobacco: Never   Substance and Sexual Activity    Alcohol use: No    Drug use: No    Sexual activity: Not Currently     Review of Systems   Unable to perform ROS: Mental status change     Objective:     Vital Signs (Most Recent):  Temp: 97.5 °F (36.4 °C) (04/04/25 0900)  Pulse: 79 (04/04/25 1400)  Resp: 16 (04/04/25 1400)  BP: (!) 127/91 (04/04/25 1400)  SpO2: 96 % (04/04/25 1400) Vital Signs (24h Range):  Temp:  [97.2 °F (36.2 °C)-97.7 °F (36.5 °C)] 97.5 °F (36.4 °C)  Pulse:  [72-86] 79  Resp:  [8-46] 16  SpO2:  [91 %-100 %] 96 %  BP: ()/(46-91) 127/91     Weight: 61.2 kg (135 lb)  Body mass index is 21.14 kg/m².    SpO2: 96 %         Intake/Output Summary (Last 24 hours) at 4/4/2025 1455  Last data filed at 4/4/2025 0700  Gross per 24 hour   Intake 1119 ml   Output 85 ml   Net 1034 ml       Lines/Drains/Airways       Central Venous Catheter Line  Duration             Trialysis (Dialysis) Catheter 04/04/25 1208 right internal jugular <1 day              Drain  Duration             Male External Urinary Catheter 04/02/25 1850 Medium 1 day         NG/OG Tube 04/04/25 1415 Rolf 10 Fr. Left nostril <1 day              Peripheral Intravenous Line  Duration                  Hemodialysis AV Fistula Left upper arm -- days         Peripheral IV - Single Lumen 04/02/25 1801 20 G Anterior;Distal;Right Upper Arm 1 day                     Physical Exam  Constitutional:       General: He is not in  acute distress.     Appearance: Normal appearance. He is well-developed. He is ill-appearing. He is not toxic-appearing or diaphoretic.   HENT:      Head: Normocephalic and atraumatic.   Eyes:      General: No scleral icterus.     Extraocular Movements: Extraocular movements intact.      Conjunctiva/sclera: Conjunctivae normal.      Pupils: Pupils are equal, round, and reactive to light.   Neck:      Thyroid: No thyromegaly.      Vascular: No JVD.      Trachea: No tracheal deviation.   Cardiovascular:      Rate and Rhythm: Normal rate and regular rhythm.      Heart sounds: S1 normal and S2 normal. Heart sounds are distant. No murmur heard.     No friction rub. No gallop.   Pulmonary:      Effort: Pulmonary effort is normal. No respiratory distress.      Breath sounds: Normal breath sounds. No stridor. No wheezing, rhonchi or rales.   Chest:      Chest wall: No tenderness.   Abdominal:      General: There is no distension.      Palpations: Abdomen is soft.   Musculoskeletal:         General: No swelling or tenderness. Normal range of motion.      Cervical back: Normal range of motion and neck supple. No rigidity.      Right lower leg: No edema.      Left lower leg: No edema.   Skin:     General: Skin is warm and dry.      Coloration: Skin is not jaundiced.   Neurological:      General: No focal deficit present.      Mental Status: He is alert.      Cranial Nerves: No cranial nerve deficit.   Psychiatric:         Mood and Affect: Mood normal.         Behavior: Behavior normal.          Current Medications:   aspirin  81 mg Oral Daily    atorvastatin  80 mg Oral QHS    clopidogreL  75 mg Oral Daily    heparin (porcine)  5,000 Units Subcutaneous Q8H    mupirocin   Nasal BID    pantoprazole  40 mg Intravenous Daily    senna-docusate  1 tablet Oral BID    sodium chloride 0.9%  10 mL Intravenous Q8H         Current Facility-Administered Medications:     0.9%  NaCl infusion (for blood administration), , Intravenous, Q24H  PRN    acetaminophen, 650 mg, Oral, Q4H PRN    dextrose 50%, 12.5 g, Intravenous, PRN    dextrose 50%, 25 g, Intravenous, PRN    glucagon (human recombinant), 1 mg, Intramuscular, PRN    glucose, 16 g, Oral, PRN    glucose, 24 g, Oral, PRN    insulin aspart U-100, 0-5 Units, Subcutaneous, QID (AC + HS) PRN    magnesium sulfate 2 g IVPB, 2 g, Intravenous, PRN    melatonin, 6 mg, Oral, Nightly PRN    naloxone, 0.02 mg, Intravenous, PRN    ondansetron, 4 mg, Intravenous, Q6H PRN    prochlorperazine, 5 mg, Intravenous, Q6H PRN    sodium phosphate 20.1 mmol in D5W 250 mL IVPB, 20.1 mmol, Intravenous, PRN    sodium phosphate 30 mmol in D5W 250 mL IVPB, 30 mmol, Intravenous, PRN    sodium phosphate 39.9 mmol in D5W 250 mL IVPB, 39.9 mmol, Intravenous, PRN    Pharmacy to dose Vancomycin consult, , , Once **AND** vancomycin - pharmacy to dose, , Intravenous, pharmacy to manage frequency    Laboratory (all labs reviewed):  CBC:  Recent Labs   Lab 04/02/25  0537 04/03/25  0713 04/03/25  1533 04/03/25 2055 04/04/25  0247   WBC 14.52 H 17.21 H 18.03 H 16.23 H 16.11 H   HGB 7.1 L 8.7 L 7.2 L 10.2 L 10.0 L   HCT 23.4 L 26.5 L 22.7 L 30.7 L 30.0 L   Platelet Count 205 147 L 146 L 130 L 121 L       CHEMISTRIES:  Recent Labs   Lab 01/16/25  0154 02/08/25  1014 02/11/25  1325 02/23/25  1638 03/21/25  1808 03/25/25  1108 04/01/25  2220 04/02/25  0537 04/03/25  0713 04/03/25  1533 04/03/25 2055 04/04/25  0247   Glucose 141 H 171 H 225 H 113 H 150 H  --   --   --   --   --   --   --    Sodium 140 138 138 141 141   < > 139 140 138 140 138 139   Potassium 4.0 4.5 4.4 5.1 4.4   < > 3.9 3.7 4.3 3.9 4.8 5.0   BUN 44 H 63 H 75 H 77 H 91 H   < > 32 H 34 H 60 H 62 H 70 H 75 H   Creatinine 10.0 H 8.6 H 8.9 H 9.8 H 10.9 H   < > 6.2 H 6.8 H 8.8 H 8.7 H 9.3 H 9.9 H   eGFR 5 A 5.5 A 5.3 A 4.7 A 4.1 A   < > 8 L 7 L 5 L 5 L 5 L 5 L   Calcium 8.9 10.2 10.1 10.9 H 10.4   < > 9.1 8.9 8.4 L 7.6 L 8.1 L 8.2 L   Magnesium   --   --   --   --   --    < >  1.9 1.9 2.1  --  2.2 2.3   Magnesium  --   --   --   --  2.5  --   --   --   --   --   --   --     < > = values in this interval not displayed.       CARDIAC BIOMARKERS:  Recent Labs   Lab 04/01/24 0251 04/29/24 2211 08/03/24  0015 08/05/24  0138 10/20/24  2310 10/21/24  0113 04/01/25  2220 04/02/25  0846 04/02/25 2055 04/03/25  0303 04/03/25  0718 04/03/25  1533 04/04/25  1028     --  183  --  111  --  382 H  --   --   --   --   --   --    Troponin I  --    < > 0.080 H   < > 0.044 H   < >  --   --   --   --   --   --   --    Troponin-I  --   --   --   --   --   --   --    < > 1.076 H 1.441 H 1.812 H 2.032 H 2.913 H    < > = values in this interval not displayed.       COAGS:  Recent Labs   Lab 11/28/23 2155 04/18/24  0609 01/12/25  1316 03/25/25  1108 04/03/25  1533   INR 1.1 1.0 1.0 1.0 1.1       LIPIDS/LFTS:  Recent Labs   Lab 08/11/24  0331 08/15/24  0203 04/02/25  0537 04/03/25  0713 04/03/25  1533 04/03/25 2055 04/04/25  0247   Cholesterol 110 L  --   --   --   --   --   --    Triglycerides 126  --   --   --   --   --   --    HDL 26 L  --   --   --   --   --   --    LDL Cholesterol 58.8 L  --   --   --   --   --   --    Non-HDL Cholesterol 84  --   --   --   --   --   --    AST  --    < > 26 29 25 24 23   ALT  --    < > 17 20 15 18 17    < > = values in this interval not displayed.       BNP:  Recent Labs   Lab 06/11/24 2239 08/03/24  0015 08/11/24 0332 01/12/25  0550 04/03/25  0303   BNP 1,110 H 624 H 2,178 H 2,043 H 2,988 H       TSH:  Recent Labs   Lab 11/27/23  0519 01/15/24  2135 07/09/24  1428 08/11/24  0332 04/02/25  0537   TSH 3.357 4.600 H 6.963 H 3.949 4.590 H       Free T4:  Recent Labs   Lab 01/15/24  2135 07/09/24  1428 04/02/25  0537   Free T4 0.81 1.01 1.19  1.19       Diagnostic Results:  ECG (personally reviewed and interpreted tracing(s)):  4/4/25 1325 SR 79, PRWP, lat ST abnl ?isch, similar to 3/25/25, lass prom than 4/2/25    Chest X-Ray (personally reviewed and interpreted  image(s)): 4/4/25 NAD, aortic atherosclerosis    Echo: 4/4/25 (images personally reviewed and interpreted)    Left Ventricle: The left ventricle is normal in size. Normal wall thickness. Mild global hypokinesis present. There is mildly reduced systolic function with a visually estimated ejection fraction of 40 - 45%. Grade I diastolic dysfunction.    Right Ventricle: The right ventricle has moderate enlargement. Systolic function is mildly reduced.    Left Atrium: Mildly dilated Cannot exclude PFO (color Doppler with possible flow across IAS).    Aortic Valve: The aortic valve is a trileaflet valve. There is moderate aortic valve sclerosis. Mildly restricted motion. There is mild stenosis. Aortic valve area by VTI is 1.8 cm². Aortic valve peak velocity is 1.5 m/s. Mean gradient is 5 mmHg. The dimensionless index is 0.51. There is mild aortic regurgitation.    Mitral Valve: There is a 1.9x1.2cm large fixed heterogeneous mass present on the posterior leaflet (new finding vs 9/2023 echo). There is moderate to severe regurgitation with an eccentric jet.  Cannot exclude severe MR with possible PMVL perforation in association with large vegetation.    Aorta: Aortic root is mildly to moderately dilated measuring 4.17 cm.    Pulmonary Artery: The estimated pulmonary artery systolic pressure is 42 mmHg.    PCI RCA 1/14/25:    The Ost RCA lesion was 70% stenosed with 0% stenosis post-intervention.    The Prox RCA lesion was 70% stenosed with 0% stenosis post-intervention.    The Mid RCA lesion was 99% stenosed with 0% stenosis post-intervention.    The ejection fraction was calculated to be 55%.    The pre-procedure left ventricular end diastolic pressure was 6.    The post-procedure left ventricular end diastolic pressure was 7.    Mid RCA lesion: A stent was successfully placed at 11 MAYRA for 15 sec.    Prox RCA lesion, Mid RCA lesion: A  at 12 MAYRA for 10 sec.    Ost RCA lesion, Prox RCA lesion: A stent was successfully  placed.  Procedure performed:  Left heart catheterization  Coronary angiogram of the right coronary artery  Right radial artery access   Right superficial femoral artery access  CSI atherectomy of the right mid coronary artery  Laser atherectomy of the right mid coronary artery  PTCA of the right mid coronary artery  XU x3 megatron drug-eluting stent 3.5 X 16 in the mid RCA, 3.5 X 28 in the proximal RCA, 4.0 X 16 mm in the ostial RCA  Findings:  Severely calcified mid RCA stenosis unable to cross with PTCA balloons, treated initially with 0.9 laser atherectomy, followed by CSI atherectomy system with total of 5 runs (3 at low speed, 2 at high speed), pre-dilated using 2.0 compliant and 3.5 noncompliant balloon followed by 3.5 X 16 mm megatron stent.  Focal plaque rupture/erosion noted in the proximal and ostial RCA that were treated with  3.5 X 28 in the proximal RCA, 4.0 X 16 mm in the ostial RCA.  No residual stenosis post intervention.  Patient had ALAN 3 flow at the end of the procedure  LVEDP 6 mm Hg.  EF of around 55%.  No gradient across the aortic valve.  Right SFA access.  High bifurcation.  Mild diffuse disease of the right SFA closed using Perclose closure device  Right radial artery with severe spasm and hence decision was made to switch to right groin access  Recommendations:  Continue dual antiplatelet therapy for at least 1 year.  Consider longer term anticoagulation based on risk factor profile after 1 year  High-intensity statin  Transfer back to telemetry unit    Cath/PCI LAD 1/13/25  Left Main Coronary Artery: The left main is a large caliber vessel arising normally from the left sinus of valsalva.  It bifurcates into the left anterior descending and left circumflex coronary artery.  It is free of evidence of obstructive coronary artery disease. ALAN III flow  Left Anterior Descending: The left anterior descending coronary artery is a large caliber vessel arising normally from the left main and  extending to the apex.  It gives rise to small to moderate caliber diagonal arteries. Proximal LAD has a severe 80-90% calcified stenosis prior to an under expanded 2.5 mm stent in a 4.0 mm vessel. After the stent there is 40-50% stenosis. ALAN II flow  Left Circumflex: The left circumflex is a large caliber vessel arising normally from the left main coronary artery, it is non-dominant.  It gives rise to moderate caliber obtuse marginal branches. OM stent is patent. ALAN III flow  Right Coronary Artery: The right coronary artery is a large caliber vessel arising normally from the right sinus of valsalva.  It is dominant giving rise to a PDA and PLV branch. Mid RCA with a 95-99% calcified fibrotic stenosis. ALAN III flow  LVgram: LVEDP 33 mmHg  PCI procedure:  Heparin administered. ACT was closely monitored. Using a EBU 3.5 guider, this was used to cannulate the left system. Nuno blue PCI wire repshaped outside the body. PCI wire was advanced into the distal LAD. Predilated with 2.0 mm NC, 2.5 mm NC, scoring balloon 2.5 mm, 3.0 mm and 3.5 mm NC. IVUS was advanced for vessel prep/size. Schockwave 3.0 x 12 mm advanced and multiple therapies given. Followed by a 3.5 mm NC. Advanced a 3.5 x 24 mm megatron XU and deployed at nominal pressure. Post dilated with a 4.0 mm NC with great results. After the balloon was removed.  The wire was left in place.  Repeat angiography showed no signs of dissection.  Subsequently the wire was pulled back.   Final angiography showed ALAN-3 flow.  No signs of dissection, perforation, or thrombus.  Assessment:   Successful PCI of LAD with 3.5 x 24 mm XU  mRCA 95-99% stenosis--> staged PCI of RCA  Patent Lcx stent  Plan:   - Stage PCI of RCA tomorrow PM- NPO at [facility]   - Patient tolerated procedure well. No immediate complications  - Continue DAPT  - EKG when arrives to floor  - Routine post cath protocol  - Dialysis in pm   - Maximize medical management  - Further care by the primary  team    Cath/PCI OM2 12/4/23  1.  Selective left coronary Angiography.   2.  IVUS of Proximal OM2 coronary artery   3.  PCI of Proximal OM2 coronary artery with one 3.5 x 18 Michael stent   4.  Ultrasound guided right radial arterial access   5. Conscious sedation from 7:11 AM to 8:03 AM (52 minutes of sedation)   Findings:   - Coronary angiography data   Left main: Large caliber vessel, divides into the left anterior descending   and left circumflex. LM is non obstructive.   Left anterior descending: Large caliber vessel giving rise to 2 diagonals.   Patent mid LAD stents.   Left circumflex: Medium caliber vessel giving rise to 2 obtuse marginals.   OM2 has a proximal 99% ISR.   Right coronary artery: Not studied but known mid RCA lesion (see report   from last week)   - IVUS data of OM2    Pre PCI IVUS data:   Proximal reference luminal diameter: 3.6 mm   Distal reference luminal diameter: 3.4 mm   Percutaneous Coronary Intervention   Successful PCI of Proximal OM2 coronary artery 99 % ISR stenosis was   reduced to 0 % (initial ALAN 3 flow) by pre dilatation with 3 x 15 mm   balloon followed by placement of 3.5 x 18 mm Michael stent with excellent   angiographic results with final ALAN 3 flow. IVUS guided.   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   -  Succesful PCI of Proximal OM2 coronary artery with one [place] stent  Recommendations:   - Post operative care as per protocol.   - Aspirin for life and P2Y12 inhibitor for at least a year   - Moderate to high statin therapy.   - Follow up with Dr Hudson

## 2025-04-04 NOTE — BRIEF OP NOTE
[facility] - Intensive Care  Brief Operative Note    SUMMARY     Surgery Date: 4/3/2025     Surgeons and Role:     * Sunil Contreras MD - Primary     * Lamont Wasserman MD - Co-Surgeon    Assisting:  ST Bar PA-C    Pre-op Diagnosis:  Thrombosis of arteriovenous graft, initial encounter [T82.868A]  ESRD on dialysis [N18.6, Z99.2]  ESRD on hemodialysis [N18.6, Z99.2]    Post-op Diagnosis:  Post-Op Diagnosis Codes:     * Thrombosis of arteriovenous graft, initial encounter [T82.868A]     * ESRD on dialysis [N18.6, Z99.2]     * ESRD on hemodialysis [N18.6, Z99.2]    Procedure(s) (LRB):  EXPLORATION, ARTERY, UPPER EXTREMITY (Left)    Anesthesia: General/MAC    Implants:  * No implants in log *    Operative Findings: well incorporated proximal and central graft without evidence of infection, resected graft and covered proximal and central remnants with multiple layers.  Mid graft removed in entirety and sent for culture.  Ruptured pseudoaneurysm with infected hematoma, graft completely obliterated at mid segment.    Estimated Blood Loss: * No values recorded between 4/3/2025  5:13 PM and 4/3/2025  8:06 PM *    Estimated Blood Loss has been documented.         Specimens:   Specimen (24h ago, onward)      None          ID Type Source Tests Collected by Time Destination   A : arterial margin Tissue AV Fistula, Left AFB CULTURE & SMEAR Sunil Contreras MD 4/3/2025 1816    B : arterial margin Wound Arm, Left CULTURE, ANAEROBIC Sunil Contreras MD 4/3/2025 1816    C : arterial margin Wound Arm, Left CULTURE, FUNGUS Sunil Contreras MD 4/3/2025 1816    D : arterial margin Wound Arm, Left CULTURE, AEROBIC  (SPECIFY SOURCE) Sunil Contreras MD 4/3/2025 1816    E : arterial margin Wound Arm, Left GRAM STAIN Sunil Contreras MD 4/3/2025 1821    F : venous margin Tissue AV Fistula, Left CULTURE, FUNGUS Sunil Contreras MD 4/3/2025 1904    G : venous margin Tissue  AV Fistula, Left AFB CULTURE & SMEAR Sunil Contreras MD 4/3/2025 1905    H : venous margin Tissue AV Fistula, Left CULTURE, ANAEROBIC Sunil Contreras MD 4/3/2025 1910    I : venous margin Tissue Arm, Left CULTURE, AEROBIC  (SPECIFY SOURCE) Sunil Contreras MD 4/3/2025 1911    J : venous margin Wound Arm, Left GRAM STAIN Sunil Contreras MD 4/3/2025 1914    K : left av fistula Tissue hematoma CULTURE, ANAEROBIC, CULTURE, FUNGUS, AFB CULTURE & SMEAR Sunil Contreras MD 4/3/2025 1921    L : hematoma Tissue Arm, Left GRAM STAIN Sunil Contreras MD 4/3/2025 1923    M :  Wound Arm, Left CULTURE, AEROBIC  (SPECIFY SOURCE) Sunil Contreras MD 4/3/2025 1926    N : mid graft Tissue AV Fistula, Left CULTURE, ANAEROBIC, CULTURE, FUNGUS, AFB CULTURE & SMEAR Sunil Contreras MD 4/3/2025 1934    O : mid graft Tissue Arm, Left CULTURE, FUNGUS, GRAM STAIN Sunil Contreras MD 4/3/2025 1935    P : left arm wound Wound Arm, Left CULTURE, ANAEROBIC, CULTURE, FUNGUS, GRAM STAIN, CULTURE, AEROBIC  (SPECIFY SOURCE), AFB CULTURE & SMEAR Sunil Contreras MD 4/3/2025 1943    Q :  Wound Arm, Left CULTURE, ANAEROBIC, CULTURE, FUNGUS, GRAM STAIN, CULTURE, AEROBIC  (SPECIFY SOURCE), AFB CULTURE & SMEAR Sunil Contreras MD 4/3/2025 1954        [id number]

## 2025-04-04 NOTE — CONSULTS
[facility] - Intensive Care  Critical Care Medicine  Consult Note    Patient Name: Jevon Rajan  MRN: [id number]  Admission Date: 4/3/2025  Hospital Length of Stay: 1 days  Code Status: Full Code  Attending Physician: Eileen Jordan MD   Primary Care Provider: No primary care provider on file.   Principal Problem: Septic shock    Inpatient consult to Critical Care Medicine  Consult performed by: Akira Diaz MD  Consult ordered by: Юлия Davis MD        Subjective:     HPI:  Patient is 85 y.o. male  has a past medical history of BPH (benign prostatic hyperplasia), Coronary artery disease, Diabetes mellitus, type 2, ESRD (end stage renal disease) on dialysis, Hypertension, Hypothyroidism, unspecified, Sepsis (4/2/2025), and Symptomatic anemia (01/15/2024). presented to [facility] on 4/3/25 as a transfer from [facility] for sepsis.  Upon arrival, patient had ruptured his lue AVF requiring emergent OR for graft resection.  Blood culture with MRSA.  Echo with vegetation of mitral valve.  Pulm/crit was consulted for Trialysis placement for CRRT.      Upon my initial evaluation, patient is somolent but easily arousble.  VSS stable.  Off levophed since  this morning.  Sating well on nasal canula.      Hospital/ICU Course:  No notes on file    Past Medical History:   Diagnosis Date    BPH (benign prostatic hyperplasia)     Coronary artery disease     [facility] and is now transferring his care to this clinic. In 2014 he had stent to LAD. He states he has had 2 heart attacks. He does not get angina. There was 90% left anterior descending artery stenosis undergoing stenting. There was also a circumflex marginal branch stenosis of 70%.    Diabetes mellitus, type 2     ESRD (end stage renal disease) on dialysis     ESRD on HD TTS via left brachial AVF    Hypertension     Hypothyroidism, unspecified     Sepsis 4/2/2025    Symptomatic anemia 01/15/2024       Past Surgical  History:   Procedure Laterality Date    ANGIOGRAM, CORONARY, WITH LEFT HEART CATHETERIZATION  1/13/2025    Procedure: Angiogram, Coronary, with Left Heart Cath;  Surgeon: Steve Bhat MD;  Location: [facility] CATH LAB/EP;  Service: Cardiology;;    ATHERECTOMY, CORONARY N/A 1/14/2025    Procedure: Atherectomy-coronary;  Surgeon: Giacomo Pittman MD;  Location: [facility] CATH LAB/EP;  Service: Cardiology;  Laterality: N/A;    bilateral foot surgery      CORONARY ANGIOPLASTY WITH STENT PLACEMENT N/A 1/14/2025    Procedure: ANGIOPLASTY, CORONARY ARTERY, WITH STENT INSERTION;  Surgeon: Giacomo Pittman MD;  Location: [facility] CATH LAB/EP;  Service: Cardiology;  Laterality: N/A;    CORONARY STENT PLACEMENT N/A 1/13/2025    Procedure: INSERTION, STENT, CORONARY ARTERY;  Surgeon: Steve Bhat MD;  Location: [facility] CATH LAB/EP;  Service: Cardiology;  Laterality: N/A;    ESOPHAGOGASTRODUODENOSCOPY N/A 2/27/2024    Procedure: EGD (ESOPHAGOGASTRODUODENOSCOPY);  Surgeon: Gemma Almendarez MD;  Location: [facility] ENDO;  Service: Endoscopy;  Laterality: N/A;    EXPLORATION, ARTERY, UPPER EXTREMITY Left 4/3/2025    Procedure: EXPLORATION, ARTERY, UPPER EXTREMITY;  Surgeon: Sunil Contreras MD;  Location: [facility] OR;  Service: Vascular;  Laterality: Left;    eyelid surgery      FISTULOGRAM Left 11/27/2019    Procedure: Fistulogram;  Surgeon: MELANY Brenner III, MD;  Location: [address];  Service: Peripheral Vascular;  Laterality: Left;    FISTULOGRAM Left 11/27/2019    Procedure: Fistulogram, AVG declot, possible permacath;  Surgeon: MELANY Brenner III, MD;  Location: [facility] CATH LAB;  Service: Peripheral Vascular;  Laterality: Left;    INSERTION OF DIALYSIS CATHETER      IVUS, CORONARY  1/13/2025    Procedure: IVUS, Coronary;  Surgeon: Steve Bhat MD;  Location: [facility] CATH LAB/EP;  Service: Cardiology;;    LEFT HEART CATHETERIZATION Left 1/14/2025    Procedure: Left heart cath;  Surgeon: Giacomo Pittman MD;   Location: [facility] CATH LAB/EP;  Service: Cardiology;  Laterality: Left;    MOH's  12/5/13    PERCUTANEOUS CORONARY INTERVENTION, ARTERY N/A 1/14/2025    Procedure: Percutaneous coronary intervention;  Surgeon: Giacomo Pittman MD;  Location: [facility] CATH LAB/EP;  Service: Cardiology;  Laterality: N/A;    PERCUTANEOUS TRANSLUMINAL BALLOON ANGIOPLASTY OF CORONARY ARTERY  1/13/2025    Procedure: Angioplasty-coronary;  Surgeon: Steve Bhat MD;  Location: [facility] CATH LAB/EP;  Service: Cardiology;;    REMOVAL, GRAFT, ARTERIOVENOUS, UPPER EXTREMITY Left 4/3/2025    Procedure: REMOVAL, GRAFT, ARTERIOVENOUS, UPPER EXTREMITY;  Surgeon: Sunil Contreras MD;  Location: [facility] OR;  Service: Vascular;  Laterality: Left;    REVASCULARIZATION, ENDOVASCULAR, LE W/ INTRAVASCULAR LITHOTRIPSY  1/13/2025    Procedure: REVASCULARIZATION, ENDOVASCULAR, LE W/ INTRAVASCULAR LITHOTRIPSY;  Surgeon: Steve Bhat MD;  Location: [facility] CATH LAB/EP;  Service: Cardiology;;    right hand surgery      stents         Review of patient's allergies indicates:   Allergen Reactions    Ace inhibitors Hives, Itching, Shortness Of Breath, Other (See Comments) and Rash     Is not sure which medication it was    Captopril        Family History    None       Tobacco Use    Smoking status: Never    Smokeless tobacco: Never   Substance and Sexual Activity    Alcohol use: No    Drug use: No    Sexual activity: Not Currently         Review of Systems   Unable to perform ROS: Dementia     Objective:     Vital Signs (Most Recent):  Temp: 97.5 °F (36.4 °C) (04/04/25 0900)  Pulse: 85 (04/04/25 1215)  Resp: 20 (04/04/25 1220)  BP: (!) 95/52 (04/04/25 1220)  SpO2: 99 % (04/04/25 1220) Vital Signs (24h Range):  Temp:  [97.2 °F (36.2 °C)-97.7 °F (36.5 °C)] 97.5 °F (36.4 °C)  Pulse:  [72-86] 85  Resp:  [8-46] 20  SpO2:  [91 %-100 %] 99 %  BP: ()/(46-76) 95/52     Weight: 61.2 kg (135 lb)  Body mass index is 21.14 kg/m².      Intake/Output Summary (Last 24  hours) at 4/4/2025 1229  Last data filed at 4/4/2025 0700  Gross per 24 hour   Intake 1119 ml   Output 85 ml   Net 1034 ml        Physical Exam  Vitals and nursing note reviewed.   Constitutional:       General: He is in acute distress.      Appearance: He is ill-appearing and toxic-appearing. He is not diaphoretic.   HENT:      Head: Normocephalic and atraumatic.      Nose: Nose normal.      Mouth/Throat:      Mouth: Mucous membranes are dry.   Cardiovascular:      Rate and Rhythm: Normal rate and regular rhythm.   Pulmonary:      Effort: Pulmonary effort is normal.      Breath sounds: Normal breath sounds.   Abdominal:      General: Bowel sounds are normal.      Palpations: Abdomen is soft.      Tenderness: There is no abdominal tenderness.      Hernia: A hernia is present.   Genitourinary:     Comments: Condom cath in place  Musculoskeletal:      Right lower leg: No edema.      Left lower leg: No edema.   Skin:     Comments: Dressing of lue.     Neurological:      General: No focal deficit present.      Comments: Responds to his name but not conversant          Vents:       Lines/Drains/Airways       Central Venous Catheter Line  Duration             Trialysis (Dialysis) Catheter 04/04/25 1208 right internal jugular <1 day              Drain  Duration             Male External Urinary Catheter 04/02/25 1850 Medium 1 day              Peripheral Intravenous Line  Duration                  Hemodialysis AV Fistula Left upper arm -- days         Peripheral IV - Single Lumen 04/02/25 1801 20 G Anterior;Distal;Right Upper Arm 1 day         Peripheral IV - Single Lumen 04/03/25 0500 20 G Posterior;Right Forearm 1 day                    Significant Labs:    CBC/Anemia Profile:  Recent Labs   Lab 04/03/25  1533 04/03/25  2055 04/04/25  0247   WBC 18.03* 16.23* 16.11*   HGB 7.2* 10.2* 10.0*   HCT 22.7* 30.7* 30.0*   * 130* 121*   MCV 89 90 89   RDW 18.7* 17.7* 17.8*        Chemistries:  Recent Labs   Lab  "04/03/25  0713 04/03/25  1533 04/03/25 2055 04/04/25  0247    140 138 139   K 4.3 3.9 4.8 5.0    105 101 101   CO2 21* 22* 23 23   BUN 60* 62* 70* 75*   CREATININE 8.8* 8.7* 9.3* 9.9*   CALCIUM 8.4* 7.6* 8.1* 8.2*   ALBUMIN 2.3* 1.9* 2.0* 2.0*   BILITOT 0.5 0.3 0.6 0.3   ALKPHOS 57 83 59 70   ALT 20 15 18 17   AST 29 25 24 23   GLUCOSE 242* 203* 231* 202*   MG 2.1  --  2.2 2.3   PHOS 5.8*  --  7.2* 7.1*       Blood Culture: No results for input(s): "LABBLOO" in the last 48 hours.    Significant Imaging:   I have reviewed all pertinent imaging results/findings within the past 24 hours.    ABG  No results for input(s): "PH", "PO2", "PCO2", "HCO3", "BE" in the last 168 hours.  Assessment/Plan:     Renal/  ESRD on hemodialysis  S/p graft resection.   Trialysis placed for temporary HD access.      ID  * Septic shock  Septic shock with MRSA  Vegetation mv  Not surgical candidate for mitral valve replacement per Dr. Lyles  Antibiotics.  ID consulted    Palliative Care  ACP (advance care planning)  Prognosis guarded.    Currently full code.  Palliative is consulted.          Critical Care Time: 35 minutes  Critical secondary to Patient has a condition that poses threat to life and bodily function: septic shock     Critical care was time spent personally by me on the following activities: development of treatment plan with patient or surrogate and bedside caregivers, discussions with consultants, evaluation of patient's response to treatment, examination of patient, ordering and performing treatments and interventions, ordering and review of laboratory studies, ordering and review of radiographic studies, pulse oximetry, re-evaluation of patient's condition. This critical care time did not overlap with that of any other provider or involve time for any procedures.    Thank you for your consult. I will sign off. Please contact us if you have any additional questions.     Akira Diaz MD  Critical Care " Medicine  [facility] - Intensive Care

## 2025-04-04 NOTE — HPI
85 y.o. man with ESRD with LUE AVF, HFpEF, CAD, DM admitted to OSH 4/1- 4/3 with sepsis due to MRSA bacteremia, transferred to  ICU for septic shock on 4/3.    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     CXR 4/3 with likely pulmonary edema  CT c/a/p 4/3 revealed hepatic hypodensities likely cysts as well some larger lesions that hav decreased in size. Otherwise, no clear infectious source.      ID consulted for: MRSA bacteremia

## 2025-04-04 NOTE — ASSESSMENT & PLAN NOTE
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 Most recent hemoglobin and hematocrit are listed below.  Recent Labs     04/03/25  1533 04/03/25  2055 04/04/25  0247   HGB 7.2* 10.2* 10.0*   HCT 22.7* 30.7* 30.0*     Plan  - Monitor serial CBC: Daily and recheck now  - Transfuse PRBC if patient becomes hemodynamically unstable, symptomatic or H/H drops below 7/21.  - Patient has not received any PRBC transfusions to date  - Patient's anemia is currently stable

## 2025-04-04 NOTE — NURSING
Upon assessment, pt requires repeated verbal and tactile stimulation to stay awake. Not safe to admin PO meds at this time. MD Jordan notified in rounds.

## 2025-04-04 NOTE — SUBJECTIVE & OBJECTIVE
Past Medical History:   Diagnosis Date    BPH (benign prostatic hyperplasia)     Coronary artery disease     He has followed at the [facility] and is now transferring his care to this clinic. In 2014 he had stent to LAD. He states he has had 2 heart attacks. He does not get angina. There was 90% left anterior descending artery stenosis undergoing stenting. There was also a circumflex marginal branch stenosis of 70%.    Diabetes mellitus, type 2     ESRD (end stage renal disease) on dialysis     ESRD on HD TTS via left brachial AVF    Hypertension     Hypothyroidism, unspecified     Sepsis 4/2/2025    Symptomatic anemia 01/15/2024     Past Surgical History:   Procedure Laterality Date    ANGIOGRAM, CORONARY, WITH LEFT HEART CATHETERIZATION  1/13/2025    Procedure: Angiogram, Coronary, with Left Heart Cath;  Surgeon: Steve Bhat MD;  Location: [facility] CATH LAB/EP;  Service: Cardiology;;    ATHERECTOMY, CORONARY N/A 1/14/2025    Procedure: Atherectomy-coronary;  Surgeon: Giacomo Pittman MD;  Location: [facility] CATH LAB/EP;  Service: Cardiology;  Laterality: N/A;    bilateral foot surgery      CORONARY ANGIOPLASTY WITH STENT PLACEMENT N/A 1/14/2025    Procedure: ANGIOPLASTY, CORONARY ARTERY, WITH STENT INSERTION;  Surgeon: Giacomo Pittman MD;  Location: [facility] CATH LAB/EP;  Service: Cardiology;  Laterality: N/A;    CORONARY STENT PLACEMENT N/A 1/13/2025    Procedure: INSERTION, STENT, CORONARY ARTERY;  Surgeon: Steve Bhat MD;  Location: [facility] CATH LAB/EP;  Service: Cardiology;  Laterality: N/A;    ESOPHAGOGASTRODUODENOSCOPY N/A 2/27/2024    Procedure: EGD (ESOPHAGOGASTRODUODENOSCOPY);  Surgeon: Gemma Almendarez MD;  Location: [facility] ENDO;  Service: Endoscopy;  Laterality: N/A;    EXPLORATION, ARTERY, UPPER EXTREMITY Left 4/3/2025    Procedure: EXPLORATION, ARTERY, UPPER EXTREMITY;  Surgeon: Sunil Contreras MD;  Location: [facility] OR;  Service: Vascular;  Laterality: Left;    eyelid surgery       FISTULOGRAM Left 11/27/2019    Procedure: Fistulogram;  Surgeon: MELANY Brenner III, MD;  Location: [facility] OR 2ND FLR;  Service: Peripheral Vascular;  Laterality: Left;    FISTULOGRAM Left 11/27/2019    Procedure: Fistulogram, AVG declot, possible permacath;  Surgeon: MELANY Brenner III, MD;  Location: [facility] CATH LAB;  Service: Peripheral Vascular;  Laterality: Left;    INSERTION OF DIALYSIS CATHETER      IVUS, CORONARY  1/13/2025    Procedure: IVUS, Coronary;  Surgeon: Steve Bhat MD;  Location: [facility] CATH LAB/EP;  Service: Cardiology;;    LEFT HEART CATHETERIZATION Left 1/14/2025    Procedure: Left heart cath;  Surgeon: Giacomo Pittman MD;  Location: [facility] CATH LAB/EP;  Service: Cardiology;  Laterality: Left;    MOH's  12/5/13    PERCUTANEOUS CORONARY INTERVENTION, ARTERY N/A 1/14/2025    Procedure: Percutaneous coronary intervention;  Surgeon: Giacomo Pittman MD;  Location: [facility] CATH LAB/EP;  Service: Cardiology;  Laterality: N/A;    PERCUTANEOUS TRANSLUMINAL BALLOON ANGIOPLASTY OF CORONARY ARTERY  1/13/2025    Procedure: Angioplasty-coronary;  Surgeon: Steve Bhat MD;  Location: [facility] CATH LAB/EP;  Service: Cardiology;;    REMOVAL, GRAFT, ARTERIOVENOUS, UPPER EXTREMITY Left 4/3/2025    Procedure: REMOVAL, GRAFT, ARTERIOVENOUS, UPPER EXTREMITY;  Surgeon: Sunil Contreras MD;  Location: [facility];  Service: Vascular;  Laterality: Left;    REVASCULARIZATION, ENDOVASCULAR, LE W/ INTRAVASCULAR LITHOTRIPSY  1/13/2025    Procedure: REVASCULARIZATION, ENDOVASCULAR, LE W/ INTRAVASCULAR LITHOTRIPSY;  Surgeon: Steve Bhat MD;  Location: [facility] CATH LAB/EP;  Service: Cardiology;;    right hand surgery      stents       Review of patient's allergies indicates:   Allergen Reactions    Ace inhibitors Hives, Itching, Shortness Of Breath, Other (See Comments) and Rash     Is not sure which medication it was    Captopril      Medications:  Continuous Infusions:  Scheduled Meds:   aspirin  81 mg Oral  Daily    atorvastatin  80 mg Oral QHS    cefTRIAXone (Rocephin) IV (PEDS and ADULTS)  2 g Intravenous Q24H    clopidogreL  75 mg Oral Daily    heparin (porcine)  5,000 Units Subcutaneous Q8H    pantoprazole  40 mg Intravenous Daily    senna-docusate  1 tablet Oral BID    sodium chloride 0.9%  10 mL Intravenous Q8H     PRN Meds:  Current Facility-Administered Medications:     0.9%  NaCl infusion (for blood administration), , Intravenous, Q24H PRN    acetaminophen, 650 mg, Oral, Q4H PRN    dextrose 50%, 12.5 g, Intravenous, PRN    dextrose 50%, 25 g, Intravenous, PRN    glucagon (human recombinant), 1 mg, Intramuscular, PRN    glucose, 16 g, Oral, PRN    glucose, 24 g, Oral, PRN    insulin aspart U-100, 0-5 Units, Subcutaneous, QID (AC + HS) PRN    melatonin, 6 mg, Oral, Nightly PRN    naloxone, 0.02 mg, Intravenous, PRN    ondansetron, 4 mg, Intravenous, Q6H PRN    prochlorperazine, 5 mg, Intravenous, Q6H PRN    Pharmacy to dose Vancomycin consult, , , Once **AND** vancomycin - pharmacy to dose, , Intravenous, pharmacy to manage frequency    Family History    None       Tobacco Use    Smoking status: Never    Smokeless tobacco: Never   Substance and Sexual Activity    Alcohol use: No    Drug use: No    Sexual activity: Not Currently     Review of Systems   Unable to perform ROS: Acuity of condition     Objective:     Vital Signs (Most Recent):  Temp: 97.5 °F (36.4 °C) (04/04/25 0900)  Pulse: 86 (04/04/25 1130)  Resp: 20 (04/04/25 1130)  BP: (!) 115/52 (04/04/25 1130)  SpO2: 99 % (04/04/25 1135) Vital Signs (24h Range):  Temp:  [97.2 °F (36.2 °C)-97.7 °F (36.5 °C)] 97.5 °F (36.4 °C)  Pulse:  [72-86] 86  Resp:  [8-46] 20  SpO2:  [91 %-100 %] 99 %  BP: ()/(48-76) 115/52     Weight: 61.2 kg (135 lb)  Body mass index is 21.14 kg/m².     Physical Exam  Vitals and nursing note reviewed.   Constitutional:       Comments: Arouses to name, able to answer simple questions with 1-2 word answers with stimulation, falls  asleep quickly    HENT:      Head: Normocephalic and atraumatic.   Pulmonary:      Effort: Pulmonary effort is normal. No respiratory distress.      Comments: NC  Skin:     General: Skin is dry.      Coloration: Skin is pale.      Findings: Lesion present. Bruising: multiple skin lesions in various stages of healing. Erythema: pale/gray for ethnicity.  Neurological:      Mental Status: He is lethargic.      Comments: Answers to his name; answered he was at the hospital; able to provide niece's name (Kenia)    Psychiatric:         Behavior: Behavior is cooperative.        Advance Care Planning   Advance Directives:   Living Will: No    [place]: No    Do Not Resuscitate Status: No    Medical Power of : No (4/4 when pt asked who to call for help with medical needs pt responded [employer]hanta; HM reports same response on 4/3; will need completion of MPOA when able)      Decision Making:  Family answered questions  Goals of Care: The patient endorses that what is most important right now is to focus on symptom/pain control, curative/life-prolongation and improvement in condition.     Accordingly, we have decided that the best plan to meet the patient's goals includes continuing with treatment       Significant Labs: All pertinent labs within the past 24 hours have been reviewed.  CBC:   Recent Labs   Lab 04/04/25 0247   WBC 16.11*   HGB 10.0*   HCT 30.0*   MCV 89   *     BMP:  Recent Labs   Lab 04/04/25  0247      K 5.0      CO2 23   BUN 75*   CREATININE 9.9*   CALCIUM 8.2*   MG 2.3     LFT:  Lab Results   Component Value Date    AST 23 04/04/2025    ALKPHOS 70 04/04/2025    BILITOT 0.3 04/04/2025     Albumin:   Albumin   Date Value Ref Range Status   04/04/2025 2.0 (L) 3.5 - 5.2 g/dL Final   03/21/2025 3.2 (L) 3.5 - 5.2 g/dL Final     Protein:   Total Protein   Date Value Ref Range Status   03/21/2025 7.5 6.0 - 8.4 g/dL Final     Lactic acid:   Lab Results   Component Value Date    LACTATE 0.9  04/03/2025    LACTATE 2.6 (H) 04/02/2025     Significant Imaging: I have reviewed all pertinent imaging results/findings within the past 24 hours.

## 2025-04-04 NOTE — NURSING
Lab notified of order for blood cultures as pt is marked as a unit collect. Lab will come to draw cultures. Spoke with Ria.   We will send in another referral to Pulmonary Rehab.    Labs today: BMP and BNP    Continue your Sildenafil     Increase your portable oxygen to 5 liters while walking.     Follow up in 4 months with Dr. Sutherland, with an echo, 6 min walk and labs.

## 2025-04-04 NOTE — HPI
"85 y.o. man with ESRD on HD with LUE AVF that has been bleeding, CHF, CAD s/p recent stenting 1/2025 who was admitted with MRSA bacteremia and bleeding from his LUE AVF. Continue levophed, vancomycin. Vascular surgery emergently consulted; on 4/3/25 they took him to OR and noted: "well incorporated proximal and central graft without evidence of infection, resected graft and covered proximal and central remnants with multiple layers. Mid graft removed in entirety and sent for culture. Ruptured pseudoaneurysm with infected hematoma, graft completely obliterated at mid segment." Suspect AVF or chronic scratching of pruritic skin is the source of his infection. TTE noted EF 40-45%, G1DD, RV systolic dysfunction, large 1.9x1.2cm fixed mass on the posterior mitral valve leaflet with moder to severe regurgitation, cannot rule out posterior mitral valve leaflet perforation. Trialysis was placed for HD. Nephrology was consulted. Palliative was consulted.     Cardiology consulted for "elevated trop in setting of septic shock, MRSA bactremia endocarditis, but did have recent stenting 1/2025"    Pt seen in ICU, case d/w Dr. Jordan.  Poor historian.  No cp/sob at present.  Currently on CRRT.  Turned down for transfer by [facility] for ?MV veg/perforation.  "

## 2025-04-04 NOTE — ASSESSMENT & PLAN NOTE
This patient has shock. The type of shock is distributive due to sepsis. The patient had the following evidence of shock: persistent hypotension and altered mental status. 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 Shock is now resolved and vasopressors are off.

## 2025-04-04 NOTE — ASSESSMENT & PLAN NOTE
"- ESRD on HD via LUE AVF  - LUE AVF was actively bleeding on arrival on 4/3/25. Vascular surgery was consulted. He went emergently to the OR on 4/3/25: "Severely calcified mid RCA stenosis unable to cross with PTCA balloons, treated initially with 0.9 laser atherectomy, followed by CSI atherectomy system with total of 5 runs (3 at low speed, 2 at high speed), pre-dilated using 2.0 compliant and 3.5 noncompliant balloon followed by 3.5 X 16 mm megatron stent.  Focal plaque rupture/erosion noted in the proximal and ostial RCA that were treated with  3.5 X 28 in the proximal RCA, 4.0 X 16 mm in the ostial RCA.  No residual stenosis post intervention.  Patient had ALAN 3 flow at the end of the procedure"  - cultures from AVF are in process  - wound care orders in place for surgical site  - Nephrology is consulted  - trialysis placed on 4/4/25 for HD  - he will need THDC when bacteremia is resolved   "

## 2025-04-04 NOTE — CONSULTS
1844:   Medical Provider was consulted as this patient's condition required isolation regulations. Medical provider reports that the patient's family attemted to visit with him earlier but was prohibited due to a procedure that the patient was schedulrd for. No visitors returned for the remainder of the day,    Prayers were offered for the patient and his family. The Spiritual Care Team will continue to provide spiritual support to the patient and his family.        Becky Morales,   [phone]

## 2025-04-04 NOTE — ASSESSMENT & PLAN NOTE
- suspect source= chronic skin scratching vs infected AVF  - cultures of AVF are in process  - he has endocarditis  - ID consulted  - on vanc

## 2025-04-04 NOTE — ASSESSMENT & PLAN NOTE
Patient's blood pressure range in the last 24 hours was: BP  Min: 93/49  Max: 144/64.The patient's inpatient anti-hypertensive regimen is listed below:  Current Antihypertensives       Plan  - admitted with shock  - now off vasopressors. Continue to hold BP Rx

## 2025-04-04 NOTE — NURSING
Pt received from OR post AV graft removal. Pt received on face mask, now on 2L NC. R IJ TLC, PIVs, condom cath remain. L arm dressing clean, dry, intact. VSS, pt sleepy but wakes up to verbal stimulation; in no acute distress. Pt's niece, Neerajanta updated via phone.

## 2025-04-05 LAB
ABSOLUTE EOSINOPHIL (OHS): 0.24 K/UL
ABSOLUTE MONOCYTE (OHS): 1.23 K/UL (ref 0.3–1)
ABSOLUTE NEUTROPHIL COUNT (OHS): 14.93 K/UL (ref 1.8–7.7)
ALBUMIN SERPL BCP-MCNC: 1.9 G/DL (ref 3.5–5.2)
ALBUMIN SERPL BCP-MCNC: 1.9 G/DL (ref 3.5–5.2)
ANION GAP (OHS): 12 MMOL/L (ref 8–16)
ANION GAP (OHS): 14 MMOL/L (ref 8–16)
BASOPHILS # BLD AUTO: 0.05 K/UL
BASOPHILS NFR BLD AUTO: 0.3 %
BUN SERPL-MCNC: 49 MG/DL (ref 8–23)
BUN SERPL-MCNC: 58 MG/DL (ref 8–23)
CALCIUM SERPL-MCNC: 8.2 MG/DL (ref 8.7–10.5)
CALCIUM SERPL-MCNC: 8.4 MG/DL (ref 8.7–10.5)
CHLORIDE SERPL-SCNC: 104 MMOL/L (ref 95–110)
CHLORIDE SERPL-SCNC: 104 MMOL/L (ref 95–110)
CO2 SERPL-SCNC: 22 MMOL/L (ref 23–29)
CO2 SERPL-SCNC: 23 MMOL/L (ref 23–29)
CREAT SERPL-MCNC: 6.2 MG/DL (ref 0.5–1.4)
CREAT SERPL-MCNC: 7.3 MG/DL (ref 0.5–1.4)
ERYTHROCYTE [DISTWIDTH] IN BLOOD BY AUTOMATED COUNT: 18.2 % (ref 11.5–14.5)
GFR SERPLBLD CREATININE-BSD FMLA CKD-EPI: 7 ML/MIN/1.73/M2
GFR SERPLBLD CREATININE-BSD FMLA CKD-EPI: 8 ML/MIN/1.73/M2
GLUCOSE SERPL-MCNC: 121 MG/DL (ref 70–110)
GLUCOSE SERPL-MCNC: 153 MG/DL (ref 70–110)
GLUCOSE SERPL-MCNC: 154 MG/DL (ref 70–110)
HCT VFR BLD AUTO: 26.7 % (ref 40–54)
HGB BLD-MCNC: 8.6 GM/DL (ref 14–18)
IMM GRANULOCYTES # BLD AUTO: 0.11 K/UL (ref 0–0.04)
IMM GRANULOCYTES NFR BLD AUTO: 0.6 % (ref 0–0.5)
LYMPHOCYTES # BLD AUTO: 0.62 K/UL (ref 1–4.8)
MAGNESIUM SERPL-MCNC: 2 MG/DL (ref 1.6–2.6)
MAGNESIUM SERPL-MCNC: 2.1 MG/DL (ref 1.6–2.6)
MCH RBC QN AUTO: 28.9 PG (ref 27–31)
MCHC RBC AUTO-ENTMCNC: 32.2 G/DL (ref 32–36)
MCV RBC AUTO: 90 FL (ref 82–98)
NUCLEATED RBC (/100WBC) (OHS): 0 /100 WBC
OHS QRS DURATION: 94 MS
OHS QTC CALCULATION: 447 MS
PHOSPHATE SERPL-MCNC: 3.6 MG/DL (ref 2.7–4.5)
PHOSPHATE SERPL-MCNC: 4.5 MG/DL (ref 2.7–4.5)
PHOSPHATE SERPL-MCNC: 4.6 MG/DL (ref 2.7–4.5)
PLATELET # BLD AUTO: 109 K/UL (ref 150–450)
PMV BLD AUTO: 11.4 FL (ref 9.2–12.9)
POCT GLUCOSE: 134 MG/DL (ref 70–110)
POCT GLUCOSE: 154 MG/DL (ref 70–110)
POCT GLUCOSE: 168 MG/DL (ref 70–110)
POTASSIUM SERPL-SCNC: 4 MMOL/L (ref 3.5–5.1)
POTASSIUM SERPL-SCNC: 4.2 MMOL/L (ref 3.5–5.1)
RBC # BLD AUTO: 2.98 M/UL (ref 4.6–6.2)
RELATIVE EOSINOPHIL (OHS): 1.4 %
RELATIVE LYMPHOCYTE (OHS): 3.6 % (ref 18–48)
RELATIVE MONOCYTE (OHS): 7.2 % (ref 4–15)
RELATIVE NEUTROPHIL (OHS): 86.9 % (ref 38–73)
SODIUM SERPL-SCNC: 139 MMOL/L (ref 136–145)
SODIUM SERPL-SCNC: 140 MMOL/L (ref 136–145)
VANCOMYCIN SERPL-MCNC: 17 UG/ML (ref ?–80)
WBC # BLD AUTO: 17.18 K/UL (ref 3.9–12.7)

## 2025-04-05 PROCEDURE — 27000923 HC TRIALYSIS CATHETER, ANY SIZE

## 2025-04-05 PROCEDURE — 94761 N-INVAS EAR/PLS OXIMETRY MLT: CPT

## 2025-04-05 PROCEDURE — 27000207 HC ISOLATION

## 2025-04-05 PROCEDURE — 80202 ASSAY OF VANCOMYCIN: CPT | Performed by: HOSPITALIST

## 2025-04-05 PROCEDURE — 25000003 PHARM REV CODE 250: Performed by: STUDENT IN AN ORGANIZED HEALTH CARE EDUCATION/TRAINING PROGRAM

## 2025-04-05 PROCEDURE — 80100008 HC CRRT DAILY MAINTENANCE

## 2025-04-05 PROCEDURE — 20000000 HC ICU ROOM

## 2025-04-05 PROCEDURE — 25000003 PHARM REV CODE 250: Performed by: HOSPITALIST

## 2025-04-05 PROCEDURE — 25000003 PHARM REV CODE 250

## 2025-04-05 PROCEDURE — 63600175 PHARM REV CODE 636 W HCPCS: Performed by: STUDENT IN AN ORGANIZED HEALTH CARE EDUCATION/TRAINING PROGRAM

## 2025-04-05 PROCEDURE — 83735 ASSAY OF MAGNESIUM: CPT | Performed by: INTERNAL MEDICINE

## 2025-04-05 PROCEDURE — 97535 SELF CARE MNGMENT TRAINING: CPT

## 2025-04-05 PROCEDURE — 99291 CRITICAL CARE FIRST HOUR: CPT | Mod: ,,, | Performed by: INTERNAL MEDICINE

## 2025-04-05 PROCEDURE — 84100 ASSAY OF PHOSPHORUS: CPT | Performed by: HOSPITALIST

## 2025-04-05 PROCEDURE — 63600175 PHARM REV CODE 636 W HCPCS: Performed by: HOSPITALIST

## 2025-04-05 PROCEDURE — 27000221 HC OXYGEN, UP TO 24 HOURS

## 2025-04-05 PROCEDURE — 99291 CRITICAL CARE FIRST HOUR: CPT | Mod: 95,,, | Performed by: INTERNAL MEDICINE

## 2025-04-05 PROCEDURE — 84100 ASSAY OF PHOSPHORUS: CPT | Performed by: INTERNAL MEDICINE

## 2025-04-05 PROCEDURE — 90945 DIALYSIS ONE EVALUATION: CPT

## 2025-04-05 PROCEDURE — 92610 EVALUATE SWALLOWING FUNCTION: CPT

## 2025-04-05 PROCEDURE — A4216 STERILE WATER/SALINE, 10 ML: HCPCS | Performed by: HOSPITALIST

## 2025-04-05 PROCEDURE — 85025 COMPLETE CBC W/AUTO DIFF WBC: CPT | Performed by: HOSPITALIST

## 2025-04-05 PROCEDURE — 90945 DIALYSIS ONE EVALUATION: CPT | Mod: ,,, | Performed by: INTERNAL MEDICINE

## 2025-04-05 RX ORDER — NAPROXEN SODIUM 220 MG/1
TABLET, FILM COATED ORAL
Status: COMPLETED
Start: 2025-04-05 | End: 2025-04-05

## 2025-04-05 RX ORDER — HEPARIN SODIUM 1000 [USP'U]/ML
1000 INJECTION, SOLUTION INTRAVENOUS; SUBCUTANEOUS
Status: DISCONTINUED | OUTPATIENT
Start: 2025-04-05 | End: 2025-04-29 | Stop reason: HOSPADM

## 2025-04-05 RX ORDER — NAPROXEN SODIUM 220 MG/1
81 TABLET, FILM COATED ORAL DAILY
Status: DISCONTINUED | OUTPATIENT
Start: 2025-04-05 | End: 2025-04-07

## 2025-04-05 RX ORDER — HEPARIN SODIUM 1000 [USP'U]/ML
1000 INJECTION, SOLUTION INTRAVENOUS; SUBCUTANEOUS ONCE
Status: COMPLETED | OUTPATIENT
Start: 2025-04-05 | End: 2025-04-05

## 2025-04-05 RX ORDER — PANTOPRAZOLE SODIUM 40 MG/1
40 TABLET, DELAYED RELEASE ORAL DAILY
Status: DISCONTINUED | OUTPATIENT
Start: 2025-04-06 | End: 2025-04-29 | Stop reason: HOSPADM

## 2025-04-05 RX ORDER — DIPHENHYDRAMINE HCL 25 MG
50 CAPSULE ORAL EVERY 6 HOURS PRN
Status: DISCONTINUED | OUTPATIENT
Start: 2025-04-05 | End: 2025-04-15

## 2025-04-05 RX ADMIN — CLOPIDOGREL BISULFATE 75 MG: 75 TABLET, FILM COATED ORAL at 09:04

## 2025-04-05 RX ADMIN — HEPARIN SODIUM 1000 UNITS: 1000 INJECTION INTRAVENOUS; SUBCUTANEOUS at 09:04

## 2025-04-05 RX ADMIN — MUPIROCIN: 20 OINTMENT TOPICAL at 09:04

## 2025-04-05 RX ADMIN — ASPIRIN 81 MG CHEWABLE TABLET 81 MG: 81 TABLET CHEWABLE at 09:04

## 2025-04-05 RX ADMIN — Medication 6 MG: at 10:04

## 2025-04-05 RX ADMIN — HEPARIN SODIUM 5000 UNITS: 5000 INJECTION INTRAVENOUS; SUBCUTANEOUS at 02:04

## 2025-04-05 RX ADMIN — HEPARIN SODIUM 5000 UNITS: 5000 INJECTION INTRAVENOUS; SUBCUTANEOUS at 09:04

## 2025-04-05 RX ADMIN — PANTOPRAZOLE SODIUM 40 MG: 40 INJECTION, POWDER, FOR SOLUTION INTRAVENOUS at 09:04

## 2025-04-05 RX ADMIN — VANCOMYCIN HYDROCHLORIDE 500 MG: 500 INJECTION, POWDER, LYOPHILIZED, FOR SOLUTION INTRAVENOUS at 04:04

## 2025-04-05 RX ADMIN — SODIUM CHLORIDE, PRESERVATIVE FREE 10 ML: 5 INJECTION INTRAVENOUS at 02:04

## 2025-04-05 RX ADMIN — ATORVASTATIN CALCIUM 80 MG: 40 TABLET, FILM COATED ORAL at 09:04

## 2025-04-05 RX ADMIN — SODIUM CHLORIDE, PRESERVATIVE FREE 10 ML: 5 INJECTION INTRAVENOUS at 05:04

## 2025-04-05 RX ADMIN — HEPARIN SODIUM 5000 UNITS: 5000 INJECTION INTRAVENOUS; SUBCUTANEOUS at 05:04

## 2025-04-05 RX ADMIN — DIPHENHYDRAMINE HYDROCHLORIDE 50 MG: 25 CAPSULE ORAL at 10:04

## 2025-04-05 RX ADMIN — SODIUM CHLORIDE, PRESERVATIVE FREE 10 ML: 5 INJECTION INTRAVENOUS at 09:04

## 2025-04-05 NOTE — ASSESSMENT & PLAN NOTE
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 Most recent hemoglobin and hematocrit are listed below.  Recent Labs     04/03/25 2055 04/04/25  0247 04/05/25  0324   HGB 10.2* 10.0* 8.6*   HCT 30.7* 30.0* 26.7*     Plan  - Monitor serial CBC: Daily and recheck now  - Transfuse PRBC if patient becomes hemodynamically unstable, symptomatic or H/H drops below 7/21.  - Patient has not received any PRBC transfusions to date  - Patient's anemia is currently worsening. Will continue current treatment

## 2025-04-05 NOTE — PROGRESS NOTES
[facility]  Cardiology  Progress Note    Patient Name: Jevon Rajan  MRN: [id number]  Admission Date: 4/3/2025  Hospital Length of Stay: 2 days  Code Status: Full Code   Attending Physician: Eileen Jordan MD   Primary Care Physician: Melia, Primary Doctor  Expected Discharge Date:   Principal Problem:Septic shock    Subjective:     Interval Hx: pt seen in ICU, case d/w RN.  No events overnight.    Tele: SR 80s (personally reviewed and interpreted)      Past Medical History:   Diagnosis Date    BPH (benign prostatic hyperplasia)     Coronary artery disease     He has followed at the [facility] and is now transferring his care to this clinic. In 2014 he had stent to LAD. He states he has had 2 heart attacks. He does not get angina. There was 90% left anterior descending artery stenosis undergoing stenting. There was also a circumflex marginal branch stenosis of 70%.    Diabetes mellitus, type 2     ESRD (end stage renal disease) on dialysis     ESRD on HD TTS via left brachial AVF    Hypertension     Hypothyroidism, unspecified     NSTEMI (non-ST elevated myocardial infarction) 4/4/2025    Sepsis 4/2/2025    Symptomatic anemia 01/15/2024       Past Surgical History:   Procedure Laterality Date    ANGIOGRAM, CORONARY, WITH LEFT HEART CATHETERIZATION  1/13/2025    Procedure: Angiogram, Coronary, with Left Heart Cath;  Surgeon: Steve Bhat MD;  Location: [facility] CATH LAB/EP;  Service: Cardiology;;    ATHERECTOMY, CORONARY N/A 1/14/2025    Procedure: Atherectomy-coronary;  Surgeon: Giacomo Pittman MD;  Location: [facility] CATH LAB/EP;  Service: Cardiology;  Laterality: N/A;    bilateral foot surgery      CORONARY ANGIOPLASTY WITH STENT PLACEMENT N/A 1/14/2025    Procedure: ANGIOPLASTY, CORONARY ARTERY, WITH STENT INSERTION;  Surgeon: Giacomo Pittman MD;  Location: [facility] CATH LAB/EP;  Service: Cardiology;  Laterality: N/A;    CORONARY STENT PLACEMENT N/A 1/13/2025    Procedure: INSERTION, STENT, CORONARY  ARTERY;  Surgeon: Steve Bhat MD;  Location: [facility] CATH LAB/EP;  Service: Cardiology;  Laterality: N/A;    ESOPHAGOGASTRODUODENOSCOPY N/A 2/27/2024    Procedure: EGD (ESOPHAGOGASTRODUODENOSCOPY);  Surgeon: Gemma Almendarez MD;  Location: [facility] ENDO;  Service: Endoscopy;  Laterality: N/A;    EXPLORATION, ARTERY, UPPER EXTREMITY Left 4/3/2025    Procedure: EXPLORATION, ARTERY, UPPER EXTREMITY;  Surgeon: Sunil Contreras MD;  Location: [facility] OR;  Service: Vascular;  Laterality: Left;    eyelid surgery      FISTULOGRAM Left 11/27/2019    Procedure: Fistulogram;  Surgeon: MELANY Brenner III, MD;  Location: [address];  Service: Peripheral Vascular;  Laterality: Left;    FISTULOGRAM Left 11/27/2019    Procedure: Fistulogram, AVG declot, possible permacath;  Surgeon: MELANY Brenner III, MD;  Location: [facility] CATH LAB;  Service: Peripheral Vascular;  Laterality: Left;    INSERTION OF DIALYSIS CATHETER      IVUS, CORONARY  1/13/2025    Procedure: IVUS, Coronary;  Surgeon: Steve Bhat MD;  Location: [facility] CATH LAB/EP;  Service: Cardiology;;    LEFT HEART CATHETERIZATION Left 1/14/2025    Procedure: Left heart cath;  Surgeon: Giacomo Pittman MD;  Location: [facility] CATH LAB/EP;  Service: Cardiology;  Laterality: Left;    MOH's  12/5/13    PERCUTANEOUS CORONARY INTERVENTION, ARTERY N/A 1/14/2025    Procedure: Percutaneous coronary intervention;  Surgeon: Giacomo Pittman MD;  Location: [facility] CATH LAB/EP;  Service: Cardiology;  Laterality: N/A;    PERCUTANEOUS TRANSLUMINAL BALLOON ANGIOPLASTY OF CORONARY ARTERY  1/13/2025    Procedure: Angioplasty-coronary;  Surgeon: Steve Bhat MD;  Location: [facility] CATH LAB/EP;  Service: Cardiology;;    REMOVAL, GRAFT, ARTERIOVENOUS, UPPER EXTREMITY Left 4/3/2025    Procedure: REMOVAL, GRAFT, ARTERIOVENOUS, UPPER EXTREMITY;  Surgeon: Sunil Contreras MD;  Location: [facility] OR;  Service: Vascular;  Laterality: Left;    REVASCULARIZATION, ENDOVASCULAR, LE W/  INTRAVASCULAR LITHOTRIPSY  1/13/2025    Procedure: REVASCULARIZATION, ENDOVASCULAR, LE W/ INTRAVASCULAR LITHOTRIPSY;  Surgeon: Steve Bhat MD;  Location: [facility] CATH LAB/EP;  Service: Cardiology;;    right hand surgery      stents         Review of patient's allergies indicates:   Allergen Reactions    Ace inhibitors Hives, Itching, Shortness Of Breath, Other (See Comments) and Rash     Is not sure which medication it was    Captopril        No current facility-administered medications on file prior to encounter.     Current Outpatient Medications on File Prior to Encounter   Medication Sig    ammonium lactate 12 % Crea Apply topically 2 (two) times a day.    artificial tears,hypromellose,,GENTEAL/SUSTANE, 0.3 % Gel Apply to eye nightly.    aspirin (ECOTRIN) 81 MG EC tablet Take 1 tablet (81 mg total) by mouth once daily.    atorvastatin (LIPITOR) 80 MG tablet Take 1 tablet (80 mg total) by mouth every evening.    camphor-menthol 0.5-0.5% (SARNA) lotion Apply topically once daily. APPLY SMALL AMOUNT TOPICALLY TO AFFECTED AREA(S) AS NEEDED FOR ITCHING KEEP IN FRIDGE    carboxymethylcellulose sodium 0.5 % Drop Apply 1 drop to eye nightly as needed (dry eyes).    carvediloL (COREG) 12.5 MG tablet Take 0.5 tablets (6.25 mg total) by mouth 2 (two) times daily.    cetirizine (ZYRTEC) 5 MG tablet Take 1 tablet (5 mg total) by mouth every 48 hours.    clobetasol 0.05% (TEMOVATE) 0.05 % Oint Apply topically 2 (two) times daily.    clopidogreL (PLAVIX) 75 mg tablet Take 1 tablet (75 mg total) by mouth once daily.    erythromycin (ROMYCIN) ophthalmic ointment Place a 1/2 inch ribbon of ointment into the lower eyelid. Four timex daily for 5 days    gabapentin (NEURONTIN) 100 MG capsule Take 1 capsule (100 mg total) by mouth every evening.    hydrophilic ointment (AQUABASE) Apply topically as needed for Dry Skin. APPLY MODERATE AMOUNT TOPICALLY TO AFFECTED AREA(S) TWICE A DAY AS NEEDED FOR DRY SKIN APPLY TO AREAS OF DRY  SKIN OR OVER ENTIRE BODY.    lanolin alcohol-mineral oil-white petrolatum-ceres (EUCERIN) Crea cream Apply topically 2 (two) times daily as needed.    LIDOcaine (LIDODERM) 5 % Place 1 patch onto the skin once daily. Remove & Discard patch within 12 hours or as directed by MD    loratadine (CLARITIN) 10 mg tablet Take 10 mg by mouth daily as needed for Allergies (Every other day).    nut.tx.imp.renal fxn,lac-reduc (NEPRO CARB STEADY) 0.08 gram-1.8 kcal/mL Liqd Take 240 mLs by mouth 2 (two) times a day.    ondansetron (ZOFRAN-ODT) 4 MG TbDL Take 1 tablet (4 mg total) by mouth every 8 (eight) hours as needed (nausea).    oxyCODONE-acetaminophen (PERCOCET) 5-325 mg per tablet Take 1 tablet by mouth every 6 (six) hours as needed.    pantoprazole (PROTONIX) 20 MG tablet Take 20 mg by mouth 2 (two) times daily as needed.    pramoxine 1 % Lotn Apply topically 2 (two) times daily as needed (itching).    triamcinolone acetonide 0.1% (KENALOG) 0.1 % cream Apply topically 2 (two) times daily as needed (itching).     Family History    None       Tobacco Use    Smoking status: Never    Smokeless tobacco: Never   Substance and Sexual Activity    Alcohol use: No    Drug use: No    Sexual activity: Not Currently     Review of Systems   Unable to perform ROS: Mental status change     Objective:     Vital Signs (Most Recent):  Temp: 97.6 °F (36.4 °C) (04/05/25 0400)  Pulse: 81 (04/05/25 0745)  Resp: 20 (04/05/25 0745)  BP: 106/67 (04/05/25 0745)  SpO2: (!) 94 % (04/05/25 0745) Vital Signs (24h Range):  Temp:  [96.9 °F (36.1 °C)-98 °F (36.7 °C)] 97.6 °F (36.4 °C)  Pulse:  [] 81  Resp:  [11-38] 20  SpO2:  [90 %-100 %] 94 %  BP: ()/(44-95) 106/67     Weight: 61.2 kg (134 lb 14.7 oz)  Body mass index is 21.13 kg/m².    SpO2: (!) 94 %         Intake/Output Summary (Last 24 hours) at 4/5/2025 0758  Last data filed at 4/5/2025 0700  Gross per 24 hour   Intake 99.05 ml   Output 718 ml   Net -618.95 ml       Lines/Drains/Airways        Central Venous Catheter Line  Duration             Trialysis (Dialysis) Catheter 04/04/25 1208 right internal jugular <1 day              Drain  Duration             Male External Urinary Catheter 04/02/25 1850 Medium 2 days         NG/OG Tube 04/04/25 1415 Rolf 10 Fr. Left nostril <1 day              Peripheral Intravenous Line  Duration                  Hemodialysis AV Fistula Left upper arm -- days         Peripheral IV - Single Lumen 04/02/25 1801 20 G Anterior;Distal;Right Upper Arm 2 days                   Exam unchanged vs 4/4/25  Physical Exam  Constitutional:       General: He is not in acute distress.     Appearance: Normal appearance. He is well-developed. He is ill-appearing. He is not toxic-appearing or diaphoretic.   HENT:      Head: Normocephalic and atraumatic.   Eyes:      General: No scleral icterus.     Extraocular Movements: Extraocular movements intact.      Conjunctiva/sclera: Conjunctivae normal.      Pupils: Pupils are equal, round, and reactive to light.   Neck:      Thyroid: No thyromegaly.      Vascular: No JVD.      Trachea: No tracheal deviation.   Cardiovascular:      Rate and Rhythm: Normal rate and regular rhythm.      Heart sounds: S1 normal and S2 normal. Heart sounds are distant. No murmur heard.     No friction rub. No gallop.   Pulmonary:      Effort: Pulmonary effort is normal. No respiratory distress.      Breath sounds: Normal breath sounds. No stridor. No wheezing, rhonchi or rales.   Chest:      Chest wall: No tenderness.   Abdominal:      General: There is no distension.      Palpations: Abdomen is soft.   Musculoskeletal:         General: No swelling or tenderness. Normal range of motion.      Cervical back: Normal range of motion and neck supple. No rigidity.      Right lower leg: No edema.      Left lower leg: No edema.   Skin:     General: Skin is warm and dry.      Coloration: Skin is not jaundiced.   Neurological:      General: No focal deficit present.       Mental Status: He is alert.      Cranial Nerves: No cranial nerve deficit.   Psychiatric:         Mood and Affect: Mood normal.         Behavior: Behavior normal.          Current Medications:   aspirin  81 mg Oral Daily    atorvastatin  80 mg Oral QHS    clopidogreL  75 mg Oral Daily    heparin (porcine)  5,000 Units Subcutaneous Q8H    mupirocin   Nasal BID    pantoprazole  40 mg Intravenous Daily    senna-docusate  1 tablet Oral BID    sodium chloride 0.9%  10 mL Intravenous Q8H         Current Facility-Administered Medications:     0.9%  NaCl infusion (for blood administration), , Intravenous, Q24H PRN    acetaminophen, 650 mg, Oral, Q4H PRN    dextrose 50%, 12.5 g, Intravenous, PRN    dextrose 50%, 25 g, Intravenous, PRN    glucagon (human recombinant), 1 mg, Intramuscular, PRN    glucose, 16 g, Oral, PRN    glucose, 24 g, Oral, PRN    insulin aspart U-100, 0-5 Units, Subcutaneous, QID (AC + HS) PRN    magnesium sulfate 2 g IVPB, 2 g, Intravenous, PRN    melatonin, 6 mg, Oral, Nightly PRN    naloxone, 0.02 mg, Intravenous, PRN    ondansetron, 4 mg, Intravenous, Q6H PRN    prochlorperazine, 5 mg, Intravenous, Q6H PRN    sodium phosphate 20.1 mmol in D5W 250 mL IVPB, 20.1 mmol, Intravenous, PRN    sodium phosphate 30 mmol in D5W 250 mL IVPB, 30 mmol, Intravenous, PRN    sodium phosphate 39.9 mmol in D5W 250 mL IVPB, 39.9 mmol, Intravenous, PRN    Pharmacy to dose Vancomycin consult, , , Once **AND** vancomycin - pharmacy to dose, , Intravenous, pharmacy to manage frequency    Laboratory (all labs reviewed):  CBC:  Recent Labs   Lab 04/03/25  0713 04/03/25  1533 04/03/25  2055 04/04/25  0247 04/05/25  0324   WBC 17.21 H 18.03 H 16.23 H 16.11 H 17.18 H   HGB 8.7 L 7.2 L 10.2 L 10.0 L 8.6 L   HCT 26.5 L 22.7 L 30.7 L 30.0 L 26.7 L   Platelet Count 147 L 146 L 130 L 121 L 109 L       CHEMISTRIES:  Recent Labs   Lab 01/16/25  0154 02/08/25  1014 02/11/25  1325 02/23/25  1638 03/21/25  1808 03/25/25  1108  04/03/25  0713 04/03/25 1533 04/03/25 2055 04/04/25  0247 04/04/25  1439 04/04/25 2001 04/04/25 2151 04/05/25  0324   Glucose 141 H 171 H 225 H 113 H 150 H  --   --   --   --   --   --   --   --   --    Sodium 140 138 138 141 141   < > 138   < > 138 139 139  --  140 140   Potassium 4.0 4.5 4.4 5.1 4.4   < > 4.3   < > 4.8 5.0 5.4 H  --  4.6 4.2   BUN 44 H 63 H 75 H 77 H 91 H   < > 60 H   < > 70 H 75 H 84 H  --  68 H 58 H   Creatinine 10.0 H 8.6 H 8.9 H 9.8 H 10.9 H   < > 8.8 H   < > 9.3 H 9.9 H 10.8 H  --  8.5 H 7.3 H   eGFR 5 A 5.5 A 5.3 A 4.7 A 4.1 A   < > 5 L   < > 5 L 5 L 4 L  --  6 L 7 L   Calcium 8.9 10.2 10.1 10.9 H 10.4   < > 8.4 L   < > 8.1 L 8.2 L 8.1 L  --  8.4 L 8.4 L   Magnesium   --   --   --   --   --    < > 2.1  --  2.2 2.3  --  2.2  --  2.1   Magnesium  --   --   --   --  2.5  --   --   --   --   --   --   --   --   --     < > = values in this interval not displayed.       CARDIAC BIOMARKERS:  Recent Labs   Lab 04/01/24  0251 04/29/24 2211 08/03/24  0015 08/05/24  0138 10/20/24  2310 10/21/24  0113 04/01/25  2220 04/02/25  0846 04/02/25 2055 04/03/25 0303 04/03/25 0718 04/03/25 1533 04/04/25  1028     --  183  --  111  --  382 H  --   --   --   --   --   --    Troponin I  --    < > 0.080 H   < > 0.044 H   < >  --   --   --   --   --   --   --    Troponin-I  --   --   --   --   --   --   --    < > 1.076 H 1.441 H 1.812 H 2.032 H 2.913 H    < > = values in this interval not displayed.       COAGS:  Recent Labs   Lab 11/28/23  2155 04/18/24  0609 01/12/25  1316 03/25/25  1108 04/03/25  1533   INR 1.1 1.0 1.0 1.0 1.1       LIPIDS/LFTS:  Recent Labs   Lab 08/11/24  0331 08/15/24  0203 04/02/25  0537 04/03/25  0713 04/03/25  1533 04/03/25  2055 04/04/25  0247   Cholesterol 110 L  --   --   --   --   --   --    Triglycerides 126  --   --   --   --   --   --    HDL 26 L  --   --   --   --   --   --    LDL Cholesterol 58.8 L  --   --   --   --   --   --    Non-HDL Cholesterol 84  --   --   --    --   --   --    AST  --    < > 26 29 25 24 23   ALT  --    < > 17 20 15 18 17    < > = values in this interval not displayed.       BNP:  Recent Labs   Lab 06/11/24  2239 08/03/24  0015 08/11/24  0332 01/12/25  0550 04/03/25  0303   BNP 1,110 H 624 H 2,178 H 2,043 H 2,988 H       TSH:  Recent Labs   Lab 11/27/23  0519 01/15/24  2135 07/09/24  1428 08/11/24  0332 04/02/25  0537   TSH 3.357 4.600 H 6.963 H 3.949 4.590 H       Free T4:  Recent Labs   Lab 01/15/24  2135 07/09/24  1428 04/02/25  0537   Free T4 0.81 1.01 1.19  1.19       Diagnostic Results:  ECG (personally reviewed and interpreted tracing(s)):  4/4/25 1325 SR 79, PRWP, lat ST abnl ?isch, similar to 3/25/25, lass prom than 4/2/25    Chest X-Ray (personally reviewed and interpreted image(s)): 4/4/25 NAD, aortic atherosclerosis    Echo: 4/4/25 (images personally reviewed and interpreted)    Left Ventricle: The left ventricle is normal in size. Normal wall thickness. Mild global hypokinesis present. There is mildly reduced systolic function with a visually estimated ejection fraction of 40 - 45%. Grade I diastolic dysfunction.    Right Ventricle: The right ventricle has moderate enlargement. Systolic function is mildly reduced.    Left Atrium: Mildly dilated Cannot exclude PFO (color Doppler with possible flow across IAS).    Aortic Valve: The aortic valve is a trileaflet valve. There is moderate aortic valve sclerosis. Mildly restricted motion. There is mild stenosis. Aortic valve area by VTI is 1.8 cm². Aortic valve peak velocity is 1.5 m/s. Mean gradient is 5 mmHg. The dimensionless index is 0.51. There is mild aortic regurgitation.    Mitral Valve: There is a 1.9x1.2cm large fixed heterogeneous mass present on the posterior leaflet (new finding vs 9/2023 echo). There is moderate to severe regurgitation with an eccentric jet.  Cannot exclude severe MR with possible PMVL perforation in association with large vegetation.    Aorta: Aortic root is mildly to  moderately dilated measuring 4.17 cm.    Pulmonary Artery: The estimated pulmonary artery systolic pressure is 42 mmHg.    PCI RCA 1/14/25:    The Ost RCA lesion was 70% stenosed with 0% stenosis post-intervention.    The Prox RCA lesion was 70% stenosed with 0% stenosis post-intervention.    The Mid RCA lesion was 99% stenosed with 0% stenosis post-intervention.    The ejection fraction was calculated to be 55%.    The pre-procedure left ventricular end diastolic pressure was 6.    The post-procedure left ventricular end diastolic pressure was 7.    Mid RCA lesion: A stent was successfully placed at 11 MAYRA for 15 sec.    Prox RCA lesion, Mid RCA lesion: A  at 12 MAYRA for 10 sec.    Ost RCA lesion, Prox RCA lesion: A stent was successfully placed.  Procedure performed:  Left heart catheterization  Coronary angiogram of the right coronary artery  Right radial artery access   Right superficial femoral artery access  CSI atherectomy of the right mid coronary artery  Laser atherectomy of the right mid coronary artery  PTCA of the right mid coronary artery  XU x3 megatron drug-eluting stent 3.5 X 16 in the mid RCA, 3.5 X 28 in the proximal RCA, 4.0 X 16 mm in the ostial RCA  Findings:  Severely calcified mid RCA stenosis unable to cross with PTCA balloons, treated initially with 0.9 laser atherectomy, followed by CSI atherectomy system with total of 5 runs (3 at low speed, 2 at high speed), pre-dilated using 2.0 compliant and 3.5 noncompliant balloon followed by 3.5 X 16 mm megatron stent.  Focal plaque rupture/erosion noted in the proximal and ostial RCA that were treated with  3.5 X 28 in the proximal RCA, 4.0 X 16 mm in the ostial RCA.  No residual stenosis post intervention.  Patient had ALAN 3 flow at the end of the procedure  LVEDP 6 mm Hg.  EF of around 55%.  No gradient across the aortic valve.  Right SFA access.  High bifurcation.  Mild diffuse disease of the right SFA closed using Perclose closure device  Right  radial artery with severe spasm and hence decision was made to switch to right groin access  Recommendations:  Continue dual antiplatelet therapy for at least 1 year.  Consider longer term anticoagulation based on risk factor profile after 1 year  High-intensity statin  Transfer back to telemetry unit    Cath/PCI LAD 1/13/25  Left Main Coronary Artery: The left main is a large caliber vessel arising normally from the left sinus of valsalva.  It bifurcates into the left anterior descending and left circumflex coronary artery.  It is free of evidence of obstructive coronary artery disease. ALAN III flow  Left Anterior Descending: The left anterior descending coronary artery is a large caliber vessel arising normally from the left main and extending to the apex.  It gives rise to small to moderate caliber diagonal arteries. Proximal LAD has a severe 80-90% calcified stenosis prior to an under expanded 2.5 mm stent in a 4.0 mm vessel. After the stent there is 40-50% stenosis. ALAN II flow  Left Circumflex: The left circumflex is a large caliber vessel arising normally from the left main coronary artery, it is non-dominant.  It gives rise to moderate caliber obtuse marginal branches. OM stent is patent. ALAN III flow  Right Coronary Artery: The right coronary artery is a large caliber vessel arising normally from the right sinus of valsalva.  It is dominant giving rise to a PDA and PLV branch. Mid RCA with a 95-99% calcified fibrotic stenosis. ALAN III flow  LVgram: LVEDP 33 mmHg  PCI procedure:  Heparin administered. ACT was closely monitored. Using a EBU 3.5 guider, this was used to cannulate the left system. Nuno blue PCI wire repshaped outside the body. PCI wire was advanced into the distal LAD. Predilated with 2.0 mm NC, 2.5 mm NC, scoring balloon 2.5 mm, 3.0 mm and 3.5 mm NC. IVUS was advanced for vessel prep/size. Schockwave 3.0 x 12 mm advanced and multiple therapies given. Followed by a 3.5 mm NC. Advanced a 3.5  x 24 mm megatron XU and deployed at nominal pressure. Post dilated with a 4.0 mm NC with great results. After the balloon was removed.  The wire was left in place.  Repeat angiography showed no signs of dissection.  Subsequently the wire was pulled back.   Final angiography showed ALAN-3 flow.  No signs of dissection, perforation, or thrombus.  Assessment:   Successful PCI of LAD with 3.5 x 24 mm XU  mRCA 95-99% stenosis--> staged PCI of RCA  Patent Lcx stent  Plan:   - Stage PCI of RCA tomorrow PM- NPO at [facility]   - Patient tolerated procedure well. No immediate complications  - Continue DAPT  - EKG when arrives to floor  - Routine post cath protocol  - Dialysis in pm   - Maximize medical management  - Further care by the primary team    Cath/PCI OM2 12/4/23  1.  Selective left coronary Angiography.   2.  IVUS of Proximal OM2 coronary artery   3.  PCI of Proximal OM2 coronary artery with one 3.5 x 18 Michael stent   4.  Ultrasound guided right radial arterial access   5. Conscious sedation from 7:11 AM to 8:03 AM (52 minutes of sedation)   Findings:   - Coronary angiography data   Left main: Large caliber vessel, divides into the left anterior descending   and left circumflex. LM is non obstructive.   Left anterior descending: Large caliber vessel giving rise to 2 diagonals.   Patent mid LAD stents.   Left circumflex: Medium caliber vessel giving rise to 2 obtuse marginals.   OM2 has a proximal 99% ISR.   Right coronary artery: Not studied but known mid RCA lesion (see report   from last week)   - IVUS data of OM2    Pre PCI IVUS data:   Proximal reference luminal diameter: 3.6 mm   Distal reference luminal diameter: 3.4 mm   Percutaneous Coronary Intervention   Successful PCI of Proximal OM2 coronary artery 99 % ISR stenosis was   reduced to 0 % (initial ALAN 3 flow) by pre dilatation with 3 x 15 mm   balloon followed by placement of 3.5 x 18 mm Michael stent with excellent   angiographic results with final ALAN 3 flow.  IVUS guided.   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   -  Succesful PCI of Proximal OM2 coronary artery with one Michael stent  Recommendations:   - Post operative care as per protocol.   - Aspirin for life and P2Y12 inhibitor for at least a year   - Moderate to high statin therapy.   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  Cont abx.  Prognosis poor.    MRSA bacteremia  As above    Acute bacterial endocarditis  As above    ESRD on hemodialysis  Now on CRRT  Mgmt per IM/neph    Coronary artery disease involving native coronary artery of native heart without angina pectoris  Elev trop c/w NSTEMI, likely type II, doubt ACS.  Known MV CAD/PCI (most recently in Jan 2025)  Cont ASA/Plavix/Statin  No plan for inpat isch eval    HTN (hypertension)  Med rx on hold    HLD (hyperlipidemia)  Cont statin      Type 2 diabetes mellitus, without long-term current use of insulin  Mgmt per IM        VTE Risk Mitigation (From admission, onward)           Ordered     heparin (porcine) injection 5,000 Units  Every 8 hours         04/04/25 0936     IP VTE HIGH RISK PATIENT  Once         04/03/25 1516     Place TEMO hose  Until discontinued         04/03/25 1516     Place sequential compression device  Until discontinued         04/03/25 1516     Reason for No Pharmacological VTE Prophylaxis  Once        Question:  Reasons:  Answer:  Physician Provided (leave comment)    04/03/25 1516                  Pt seen in ICU, critical care time 35min.    Apolinar Tyson MD  Cardiology  [facility] [place] - Intensive Care

## 2025-04-05 NOTE — ASSESSMENT & PLAN NOTE
MRSA bacteremia  MV endocarditis  85 y.o. man with ESRD with LUE AVF, HFpEF, CAD, DM admitted to OSH 4/1- 4/3 with sepsis due to MRSA bacteremia, transferred to  ICU for septic shock due to MRSA bacteremia and thrombosis of AV graft on 4/3 s/p exploratory surgery of LUE with graft resection 4/3. Op findings: Ruptured pseudoaneurysm with infected hematoma, graft completely obliterated at mid segment. Suspect infected graft is the source of bacteremia.     BCx 4/2 MRSA  BCx 4/4 +  LUE surgical cx growing s aureus    TTE: 1.9x1.2cm large fixed heterogeneous mass present on the posterior leaflet , moderate to severe regurgitation with an eccentric jet. Cannot exclude severe MR with possible PMVL perforation in association with large vegetation. Not a surgical candidate.    Recommendations:  --continue empiric vanc. Pharm to dose for goal trough 15-20  --anticipate atleast 6 wks iv abx from culture clearance  --repeat BCx to ensure bacteremia clears  --f/u final culture data  --appreciate palliative care input

## 2025-04-05 NOTE — PLAN OF CARE
Pt taken off of CRRT just shy of 24 hour treatment today per nephrology MD Davis. Pt tolerated treatment. Although patient does remain drowsy, he is more alert/arousable, and talkative today. SLP saw patient today, advancing his diet to pureed/thin liquids. Per SLP: pt very fatigued with eating. Will attempt to feed pt dinner, but was unable to feed pt lunch as his mentation/LOC remained too drowsy for PO intake. Pt started on tube feeds this shift. Pt is tolerating. Multiple BMs this shift, will speak to MD Jordan regarding ordered stool softeners. Family (Senthil) updated x multiple this shift. Pt and family pleasant.    Problem: Adult Inpatient Plan of Care  Goal: Plan of Care Review  Outcome: Progressing  Goal: Patient-Specific Goal (Individualized)  Outcome: Progressing  Goal: Absence of Hospital-Acquired Illness or Injury  Outcome: Progressing  Goal: Optimal Comfort and Wellbeing  Outcome: Progressing     Problem: Diabetes Comorbidity  Goal: Blood Glucose Level Within Targeted Range  Outcome: Progressing     Problem: Sepsis/Septic Shock  Goal: Optimal Coping  Outcome: Progressing  Goal: Absence of Bleeding  Outcome: Progressing  Goal: Blood Glucose Level Within Targeted Range  Outcome: Progressing  Goal: Absence of Infection Signs and Symptoms  Outcome: Progressing  Goal: Optimal Nutrition Intake  Outcome: Progressing     Problem: Infection  Goal: Absence of Infection Signs and Symptoms  Outcome: Progressing     Problem: Fall Injury Risk  Goal: Absence of Fall and Fall-Related Injury  Outcome: Progressing     Problem: Skin Injury Risk Increased  Goal: Skin Health and Integrity  Outcome: Progressing     Problem: Wound  Goal: Optimal Coping  Outcome: Progressing  Goal: Optimal Functional Ability  Outcome: Progressing  Goal: Absence of Infection Signs and Symptoms  Outcome: Progressing  Goal: Improved Oral Intake  Outcome: Progressing  Goal: Optimal Pain Control and Function  Outcome: Progressing  Goal:  Skin Health and Integrity  Outcome: Progressing  Goal: Optimal Wound Healing  Outcome: Progressing     Problem: Coping Ineffective  Goal: Effective Coping  Outcome: Progressing     Problem: CRRT (Continuous Renal Replacement Therapy)  Goal: Safe, Effective Therapy Delivery  Outcome: Progressing  Goal: Hemodynamic Stability  Outcome: Progressing  Goal: Body Temperature Maintained in Desired Range  Outcome: Progressing  Goal: Absence of Infection Signs and Symptoms  Outcome: Progressing

## 2025-04-05 NOTE — ASSESSMENT & PLAN NOTE
- he is oriented to self but otherwise confused  - CTH 4/1/25 with no acute process  - suspect this is due to sepsis  - NGT placed on 4/4/25 so that he could get his asa/plavix  - swallow improving today- start puree. Continue NGT until definitely improving and swallowing Rx

## 2025-04-05 NOTE — ASSESSMENT & PLAN NOTE
- suspect source= chronic skin scratching vs infected AVF  - cultures of AVF with Staph   - he has endocarditis  - ID consulted  - on vanc

## 2025-04-05 NOTE — ASSESSMENT & PLAN NOTE
"- ESRD on HD via LUE AVF  - LUE AVF was actively bleeding on arrival on 4/3/25. Vascular surgery was consulted. He went emergently to the OR on 4/3/25: "Severely calcified mid RCA stenosis unable to cross with PTCA balloons, treated initially with 0.9 laser atherectomy, followed by CSI atherectomy system with total of 5 runs (3 at low speed, 2 at high speed), pre-dilated using 2.0 compliant and 3.5 noncompliant balloon followed by 3.5 X 16 mm megatron stent.  Focal plaque rupture/erosion noted in the proximal and ostial RCA that were treated with  3.5 X 28 in the proximal RCA, 4.0 X 16 mm in the ostial RCA.  No residual stenosis post intervention.  Patient had ALAN 3 flow at the end of the procedure"  - cultures from AVF= Staph   - wound care orders in place for surgical site  - Nephrology is consulted  - trialysis placed on 4/4/25 for CRRT  - he will need THDC when bacteremia is resolved   "

## 2025-04-05 NOTE — PROGRESS NOTES
[facility]  Infectious Disease  Progress Note    Patient Name: Jevon Rajan  MRN: [id number]  Admission Date: 4/3/2025  Length of Stay: 2 days  Attending Physician: Eileen Jordan MD  Primary Care Provider: No, Primary Doctor    Isolation Status: Contact  Assessment/Plan:      ID  * Septic shock  MRSA bacteremia  MV endocarditis  85 y.o. man with ESRD with LUE AVF, HFpEF, CAD, DM admitted to OSH 4/1- 4/3 with sepsis due to MRSA bacteremia, transferred to  ICU for septic shock due to MRSA bacteremia and thrombosis of AV graft on 4/3 s/p exploratory surgery of LUE with graft resection 4/3. Op findings: Ruptured pseudoaneurysm with infected hematoma, graft completely obliterated at mid segment. Suspect infected graft is the source of bacteremia.     BCx 4/2 MRSA  BCx 4/4 +  LUE surgical cx growing s aureus    TTE: 1.9x1.2cm large fixed heterogeneous mass present on the posterior leaflet , moderate to severe regurgitation with an eccentric jet. Cannot exclude severe MR with possible PMVL perforation in association with large vegetation. Not a surgical candidate.    Recommendations:  --continue empiric vanc. Pharm to dose for goal trough 15-20  --anticipate atleast 6 wks iv abx from culture clearance  --repeat BCx to ensure bacteremia clears  --f/u final culture data  --appreciate palliative care input      Critical care time spent on the evaluation and treatment of severe organ dysfunction, review of pertinent labs and imaging studies, discussions with consulting providers and discussions with patient/family: 30 minutes.   Anticipated Disposition: tbd    Thank you for your consult. I will follow-up with patient. Please contact us if you have any additional questions.    Jenni Elias MD  Infectious Disease  [facility] - Intensive Care    Subjective:     Principal Problem:Septic shock    HPI: 85 y.o. man with ESRD with LUE AVF, HFpEF, CAD, DM admitted to OSH 4/1- 4/3 with sepsis due to MRSA  bacteremia, transferred to  ICU for septic shock on 4/3.    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     CXR 4/3 with likely pulmonary edema  CT c/a/p 4/3 revealed hepatic hypodensities likely cysts as well some larger lesions that hav decreased in size. Otherwise, no clear infectious source.      ID consulted for: MRSA bacteremia   Interval History: No acute events overnight. No new complaints.     Review of Systems   Constitutional:  Positive for fatigue. Negative for chills and fever.   Respiratory:  Negative for cough.    All other systems reviewed and are negative.    Objective:     Vital Signs (Most Recent):  Temp: 97.6 °F (36.4 °C) (04/05/25 0400)  Pulse: 82 (04/05/25 1030)  Resp: 20 (04/05/25 1030)  BP: (!) 105/52 (04/05/25 1030)  SpO2: 98 % (04/05/25 1030) Vital Signs (24h Range):  Temp:  [96.9 °F (36.1 °C)-98 °F (36.7 °C)] 97.6 °F (36.4 °C)  Pulse:  [] 82  Resp:  [11-38] 20  SpO2:  [90 %-100 %] 98 %  BP: ()/(44-95) 105/52     Weight: 61.2 kg (134 lb 14.7 oz)  Body mass index is 21.13 kg/m².    Estimated Creatinine Clearance: 6.2 mL/min (A) (based on SCr of 7.3 mg/dL (H)).     Physical Exam  Vitals and nursing note reviewed.   Constitutional:       Appearance: Normal appearance. He is ill-appearing.   HENT:      Head: Normocephalic and atraumatic.      Nose: Nose normal. No congestion.      Mouth/Throat:      Mouth: Mucous membranes are moist.      Pharynx: Oropharynx is clear.   Eyes:      Conjunctiva/sclera: Conjunctivae normal.      Pupils: Pupils are equal, round, and reactive to light.   Cardiovascular:      Rate and Rhythm: Normal rate and regular rhythm.   Pulmonary:      Effort: Pulmonary effort is normal. No respiratory distress.      Breath sounds: Normal breath sounds.      Comments: NC in place.  Abdominal:      General: Abdomen is flat. There is no distension.      Palpations:  "Abdomen is soft.   Musculoskeletal:         General: No swelling. Normal range of motion.      Cervical back: Normal range of motion and neck supple.      Comments: LUE in clean dressing.   Skin:     General: Skin is warm and dry.   Neurological:      Mental Status: He is alert.      Comments: Oriented to self          Significant Labs: Blood Culture: No results for input(s): "LABBLOO" in the last 4320 hours.  All pertinent labs within the past 24 hours have been reviewed.    Significant Imaging: I have reviewed all pertinent imaging results/findings within the past 24 hours.  "

## 2025-04-05 NOTE — ASSESSMENT & PLAN NOTE
This patient has shock. The type of shock is distributive due to sepsis. The patient had the following evidence of shock: persistent hypotension and altered mental status. 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 Shock is now resolved and vasopressors are off. WBC has not yet improved

## 2025-04-05 NOTE — PROGRESS NOTES
Pharmacokinetic Assessment Follow Up: IV Vancomycin    Vancomycin serum concentration assessment(s):    The random level was drawn correctly and can be used to guide therapy at this time. The measurement is within the desired definitive target range of 15 to 20 mcg/mL.    Vancomycin Regimen Plan:    Give Vancomycin 500 mg x1 dose and obtain random level 4/6 at 0300    Drug levels (last 3 results):  Recent Labs   Lab Result Units 04/03/25  0713 04/04/25  0247 04/05/25  0324   Vancomycin Random ug/ml 15.4 14.7 17.0       Pharmacy will continue to follow and monitor vancomycin.    Please contact pharmacy at extension [phone] for questions regarding this assessment.    Thank you for the consult,   Hesham Hernandez       Patient brief summary:  Jevon Rajan is a 85 y.o. male initiated on antimicrobial therapy with IV Vancomycin for treatment of bacteremia    Drug Allergies:   Review of patient's allergies indicates:   Allergen Reactions    Ace inhibitors Hives, Itching, Shortness Of Breath, Other (See Comments) and Rash     Is not sure which medication it was    Captopril        Actual Body Weight:   61.2 kg    Renal Function:   Estimated Creatinine Clearance: 6.2 mL/min (A) (based on SCr of 7.3 mg/dL (H)).,     Dialysis Method (if applicable):  CRRT    CBC (last 72 hours):  Recent Labs   Lab Result Units 04/02/25  0537 04/03/25  0713 04/03/25  1533 04/03/25  2055 04/04/25  0247 04/05/25  0324   WBC K/uL 14.52* 17.21* 18.03* 16.23* 16.11* 17.18*   HGB gm/dL 7.1* 8.7* 7.2* 10.2* 10.0* 8.6*   Hemoglobin A1c % 5.7*  --   --   --   --   --    HCT % 23.4* 26.5* 22.7* 30.7* 30.0* 26.7*   Platelet Count K/uL 205 147* 146* 130* 121* 109*   Lymph % % 1.7* 3.0* 3.3* 3.3* 3.9* 3.6*   Mono % % 5.0 7.1 6.5 5.9 6.0 7.2   Eos % % 0.0 0.1 0.3 0.3 0.8 1.4   Basophil % % 0.1 0.2 0.2 0.2 0.3 0.3       Metabolic Panel (last 72 hours):  Recent Labs   Lab Result Units 04/02/25  0537 04/02/25  2246 04/03/25  0713 04/03/25  1533  04/03/25 2055 04/04/25 0247 04/04/25  1439 04/04/25 2001 04/04/25 2151 04/05/25  0324   Sodium mmol/L 140  --  138 140 138 139 139  --  140 140   Potassium mmol/L 3.7  --  4.3 3.9 4.8 5.0 5.4*  --  4.6 4.2   Chloride mmol/L 96  --  100 105 101 101 101  --  103 104   CO2 mmol/L 25  --  21* 22* 23 23 19*  --  21* 22*   Glucose mg/dL 138*  --  242* 203* 231* 202* 181*  --  150* 121*   Glucose, UA   --  1+*  --   --   --   --   --   --   --   --    BUN mg/dL 34*  --  60* 62* 70* 75* 84*  --  68* 58*   Creatinine mg/dL 6.8*  --  8.8* 8.7* 9.3* 9.9* 10.8*  --  8.5* 7.3*   Albumin g/dL 2.5*  --  2.3* 1.9* 2.0* 2.0* 2.1*  --  1.9* 1.9*   Bilirubin Total mg/dL 0.7  --  0.5 0.3 0.6 0.3  --   --   --   --    ALP unit/L 60  --  57 83 59 70  --   --   --   --    AST unit/L 26  --  29 25 24 23  --   --   --   --    ALT unit/L 17  --  20 15 18 17  --   --   --   --    Magnesium  mg/dL 1.9  --  2.1  --  2.2 2.3  --  2.2  --  2.1   Phosphorus Level mg/dL 3.2  --  5.8*  --  7.2* 7.1* 7.7*  --  5.3* 4.5  4.6*       Vancomycin Administrations:  vancomycin given in the last 96 hours                     vancomycin (VANCOCIN) 500 mg in D5W 100 mL IVPB (MB+) (mg) 500 mg New Bag 04/04/25 0509    vancomycin injection (g) 1 g Given 04/03/25 1820    vancomycin injection (g) 1 g Given 04/03/25 1753    vancomycin 1,500 mg in 0.9% NaCl 250 mL IVPB (admixture device) (mg) 1,500 mg New Bag 04/02/25 0310                    Microbiologic Results:  Microbiology Results (last 7 days)       Procedure Component Value Units Date/Time    Blood culture [[id number]]  (Normal) Collected: 04/04/25 1343    Order Status: Completed Specimen: Blood Updated: 04/04/25 2002     Blood Culture No Growth After 6 Hours    Blood culture [[id number]]  (Normal) Collected: 04/04/25 1044    Order Status: Completed Specimen: Blood Updated: 04/04/25 1702     Blood Culture No Growth After 6 Hours    Aerobic culture [[id number]] Collected: 04/03/25 2011    Order Status:  Completed Specimen: Wound from Arm, Left Updated: 04/04/25 0848     CULTURE, AEROBIC No Growth To Date    Aerobic culture [[id number]] Collected: 04/03/25 1954    Order Status: Completed Specimen: Wound from Arm, Left Updated: 04/04/25 0848     CULTURE, AEROBIC Insufficient incubation, culture in progress    Aerobic culture [[id number]] Collected: 04/03/25 1943    Order Status: Completed Specimen: Wound from Arm, Left Updated: 04/04/25 0845     CULTURE, AEROBIC Insufficient incubation, culture in progress    Aerobic culture [[id number]] Collected: 04/03/25 1926    Order Status: Completed Specimen: Wound from Arm, Left Updated: 04/04/25 0843     CULTURE, AEROBIC Insufficient incubation, culture in progress    Aerobic culture [[id number]] Collected: 04/03/25 1911    Order Status: Completed Specimen: Tissue from Arm, Left Updated: 04/04/25 0841     CULTURE, AEROBIC No Growth To Date    Aerobic culture [[id number]] Collected: 04/03/25 1816    Order Status: Completed Specimen: Wound from Arm, Left Updated: 04/04/25 0837     CULTURE, AEROBIC No Growth To Date    Gram stain [[id number]] Collected: 04/03/25 1923    Order Status: Completed Specimen: Tissue from Arm, Left Updated: 04/04/25 0837     GRAM STAIN Rare WBC seen      Few Gram positive cocci    Gram stain [[id number]] Collected: 04/03/25 1935    Order Status: Completed Specimen: Tissue from Arm, Left Updated: 04/04/25 0836     GRAM STAIN Rare WBC seen      No organisms seen    Gram stain [[id number]] Collected: 04/03/25 1914    Order Status: Completed Specimen: Wound from Arm, Left Updated: 04/04/25 0836     GRAM STAIN Rare WBC seen      No organisms seen    Gram stain [[id number]] Collected: 04/03/25 2011    Order Status: Completed Specimen: Wound from Arm, Left Updated: 04/04/25 0835     GRAM STAIN Rare WBC seen      No organisms seen    Gram stain [[id number]] Collected: 04/03/25 1821    Order Status: Completed Specimen: Wound from Arm, Left Updated: 04/04/25  0835     GRAM STAIN No WBCs      No organisms seen    Gram stain [[id number]] Collected: 04/03/25 1943    Order Status: Completed Specimen: Wound from Arm, Left Updated: 04/04/25 0834     GRAM STAIN Rare WBC seen      No organisms seen    Gram stain [[id number]] Collected: 04/03/25 1954    Order Status: Completed Specimen: Wound from Arm, Left Updated: 04/04/25 0833     GRAM STAIN Rare WBC seen      No organisms seen    AFB Culture & Smear [[id number]] Collected: 04/03/25 1954    Order Status: Sent Specimen: Wound from Arm, Left Updated: 04/03/25 2107    Fungus culture [[id number]] Collected: 04/03/25 1954    Order Status: Sent Specimen: Wound from Arm, Left Updated: 04/03/25 2107    Culture, Anaerobic [[id number]] Collected: 04/03/25 1954    Order Status: Sent Specimen: Wound from Arm, Left Updated: 04/03/25 2107    AFB Culture & Smear [[id number]] Collected: 04/03/25 1943    Order Status: Sent Specimen: Wound from Arm, Left Updated: 04/03/25 2107    Fungus culture [[id number]] Collected: 04/03/25 1943    Order Status: Sent Specimen: Wound from Arm, Left Updated: 04/03/25 2107    Culture, Anaerobic [[id number]] Collected: 04/03/25 1943    Order Status: Sent Specimen: Wound from Arm, Left Updated: 04/03/25 2107    AFB Culture & Smear [[id number]] Collected: 04/03/25 1934    Order Status: Sent Specimen: Tissue from AV Fistula, Left Updated: 04/03/25 2107    Fungus culture [[id number]] Collected: 04/03/25 1934    Order Status: Sent Specimen: Tissue from AV Fistula, Left Updated: 04/03/25 2107    Culture, Anaerobic [[id number]] Collected: 04/03/25 1934    Order Status: Sent Specimen: Tissue from AV Fistula, Left Updated: 04/03/25 2107    Fungus culture [[id number]] Collected: 04/03/25 1935    Order Status: Canceled Specimen: Tissue from Arm, Left Updated: 04/03/25 2107    AFB Culture & Smear [[id number]] Collected: 04/03/25 2011    Order Status: Sent Specimen: Wound from Arm, Left Updated: 04/03/25 2106    Fungus  culture [[id number]] Collected: 04/03/25 2011    Order Status: Sent Specimen: Wound from Arm, Left Updated: 04/03/25 2106    Culture, Anaerobic [[id number]] Collected: 04/03/25 2011    Order Status: Sent Specimen: Wound from Arm, Left Updated: 04/03/25 2106    Fungus culture [[id number]] Collected: 04/03/25 1816    Order Status: Sent Specimen: Wound from Arm, Left Updated: 04/03/25 2106    Culture, Anaerobic [[id number]] Collected: 04/03/25 1816    Order Status: Sent Specimen: Wound from Arm, Left Updated: 04/03/25 2106    AFB Culture & Smear [[id number]] Collected: 04/03/25 1816    Order Status: Sent Specimen: Tissue from AV Fistula, Left Updated: 04/03/25 2106    Culture, Anaerobic [[id number]] Collected: 04/03/25 1910    Order Status: Sent Specimen: Tissue from AV Fistula, Left Updated: 04/03/25 2106    AFB Culture & Smear [[id number]] Collected: 04/03/25 1905    Order Status: Sent Specimen: Tissue from AV Fistula, Left Updated: 04/03/25 2106    Fungus culture [[id number]] Collected: 04/03/25 1904    Order Status: Sent Specimen: Tissue from AV Fistula, Left Updated: 04/03/25 2106    AFB Culture & Smear [[id number]] Collected: 04/03/25 1921    Order Status: Sent Specimen: Tissue from hematoma Updated: 04/03/25 2105    Fungus culture [[id number]] Collected: 04/03/25 1921    Order Status: Sent Specimen: Tissue from hematoma Updated: 04/03/25 2105    Culture, Anaerobic [[id number]] Collected: 04/03/25 1921    Order Status: Sent Specimen: Tissue from hematoma Updated: 04/03/25 2105

## 2025-04-05 NOTE — SUBJECTIVE & OBJECTIVE
Interval History: he is much more awake and alert today. He is hungry and asking for food. He has no pain.     Review of Systems   Constitutional:  Positive for fatigue.   Respiratory:  Negative for shortness of breath.    Cardiovascular:  Negative for chest pain.   Gastrointestinal:  Negative for abdominal pain.     Objective:     Vital Signs (Most Recent):  Temp: 97.6 °F (36.4 °C) (04/05/25 0400)  Pulse: 82 (04/05/25 1030)  Resp: 20 (04/05/25 1030)  BP: (!) 105/52 (04/05/25 1030)  SpO2: 98 % (04/05/25 1030) Vital Signs (24h Range):  Temp:  [96.9 °F (36.1 °C)-98 °F (36.7 °C)] 97.6 °F (36.4 °C)  Pulse:  [] 82  Resp:  [11-38] 20  SpO2:  [90 %-100 %] 98 %  BP: ()/(44-95) 105/52     Weight: 61.2 kg (134 lb 14.7 oz)  Body mass index is 21.13 kg/m².    Intake/Output Summary (Last 24 hours) at 4/5/2025 1044  Last data filed at 4/5/2025 1000  Gross per 24 hour   Intake 149.05 ml   Output 869 ml   Net -719.95 ml         Physical Exam  Vitals and nursing note reviewed.   Constitutional:       Appearance: He is ill-appearing.      Comments: Awakens easily to voice    HENT:      Head: Normocephalic and atraumatic.      Nose:      Comments: NGT in place      Mouth/Throat:      Mouth: Mucous membranes are dry.   Neck:      Comments: RIJ trialysis  Cardiovascular:      Rate and Rhythm: Normal rate and regular rhythm.   Pulmonary:      Effort: Pulmonary effort is normal.      Breath sounds: Normal breath sounds.      Comments: 3L NC  Abdominal:      General: Bowel sounds are normal.      Palpations: Abdomen is soft.   Musculoskeletal:      Comments: R hand swelling. LUE in bandage, not removed   Skin:     Comments: Very dry skin with hyperpigmented patches   Neurological:      Comments: Oriented to self, location               Significant Labs: All pertinent labs within the past 24 hours have been reviewed.    Significant Imaging: I have reviewed all pertinent imaging results/findings within the past 24 hours.

## 2025-04-05 NOTE — CONSULTS
[place] - Intensive Care  Adult Nutrition  Consult Note    SUMMARY     Recommendations    Recommendation:  1. When medically acceptable, initiate TF of Novasource Renal at 10ml/hr and advance as tolerated to goal rate of 40ml/hr which would provide 1920 kcal, 87g protein, & 688ml free water.   2. Monitor weight/labs.   3. RD to follow to monitor nutrition status.    Goals:  TF will be started by RD follow up  Nutrition Goal Status: new  Communication of RD Recs: reviewed with RN    Nutrition Discharge Planning   Nutrition Discharge Planning: Too early to determine, pending clinical course    Assessment and Plan  Nutrition Problem  Inadequate energy intake    Related to (etiology):   AMS, septic shock    Signs and Symptoms (as evidenced by):   NPO, NG tube    Interventions:  Collaboration with other providers    Nutrition Diagnosis Status:   New     Malnutrition Assessment  Weight Loss (Malnutrition):  (12% x 6 months)       Reason for Assessment  Reason For Assessment: consult (tube feedings)  Diagnosis:  (septic shock)  General Information Comments: Pt admitted to [facility] with weakness. Transferred to  for higher level of care. CRRT started. s/p LUE artery exploration and graft removal 4/3. Receives HD outpatient. Manas 11- L arm incision. NG tube. Noted 20lb weight loss x 6 months. Unable to assess NFPE 2/2 RD working remotely for weekend coverage. Pt on contact isolation for MRSA.    Past Medical History:   Diagnosis Date    BPH (benign prostatic hyperplasia)     Coronary artery disease     [facility] and is now transferring his care to this clinic. In 2014 he had stent to LAD. He states he has had 2 heart attacks. He does not get angina. There was 90% left anterior descending artery stenosis undergoing stenting. There was also a circumflex marginal branch stenosis of 70%.    Diabetes mellitus, type 2     ESRD (end stage renal disease) on dialysis     ESRD on HD TTS via  "left brachial AVF    Hypertension     Hypothyroidism, unspecified     NSTEMI (non-ST elevated myocardial infarction) 2025    Sepsis 2025    Symptomatic anemia 01/15/2024        Nutrition/Diet History  Food Preferences: no Gnosticist or cultural food prefs identified  Factors Affecting Nutritional Intake: NPO    Anthropometrics  Height: 5' 7" (170.2 cm)  Height (inches): 67 in  Weight: 61.2 kg (134 lb 14.7 oz)  Weight (lb): 134.92 lb  Weight Method: Bed Scale  Ideal Body Weight (IBW), Male: 148 lb  % Ideal Body Weight, Male (lb): 91.16 %  BMI (Calculated): 21.1  BMI Grade: 18.5-24.9 - normal  Usual Body Weight (UBW), k.9 kg (10/20)  % Usual Body Weight: 87.74  % Weight Change From Usual Weight: -12.45 %    Lab/Procedures/Meds  Pertinent Labs Reviewed: reviewed  Pertinent Labs Comments: K 5.4H, BUN 84H, Crea 10.8H, Glu 181H, Ca 8.1L, Phos 7.7H, Alb 2.1L  Pertinent Medications Reviewed: reviewed  Pertinent Medications Comments: aspirin, heparin, pantoprazole, senna    Estimated/Assessed Needs  Weight Used For Calorie Calculations: 61.2 kg (134 lb 14.7 oz)  Energy Calorie Requirements (kcal): 1836 (30 kcal/kg)  Energy Need Method: Kcal/kg  Protein Requirements: 73g (1.2g/kg)  Weight Used For Protein Calculations: 61.2 kg (134 lb 14.7 oz)  Estimated Fluid Requirement Method: RDA Method  RDA Method (mL): 1836  CHO Requirement: 200g    Nutrition Prescription Ordered  Current Diet Order: NPO    Evaluation of Received Nutrient/Fluid Intake  I/O: 1119/85  Energy Calories Required: not meeting needs  Protein Required: not meeting needs  Fluid Required: not meeting needs  Comments: LBM 4/1  % Intake of Estimated Energy Needs: Other: NPO  % Meal Intake: NPO    Nutrition Risk  Level of Risk/Frequency of Follow-up:  (2xweekly)     Monitor and Evaluation  Monitor and Evaluation: Energy intake     Nutrition Related Social Determinants of Health: SDOH: Adequate food in home environment     Nutrition Follow-Up  RD " Follow-up?: Yes

## 2025-04-05 NOTE — SUBJECTIVE & OBJECTIVE
Past Medical History:   Diagnosis Date    BPH (benign prostatic hyperplasia)     Coronary artery disease     He has followed at the [facility] and is now transferring his care to this clinic. In 2014 he had stent to LAD. He states he has had 2 heart attacks. He does not get angina. There was 90% left anterior descending artery stenosis undergoing stenting. There was also a circumflex marginal branch stenosis of 70%.    Diabetes mellitus, type 2     ESRD (end stage renal disease) on dialysis     ESRD on HD TTS via left brachial AVF    Hypertension     Hypothyroidism, unspecified     NSTEMI (non-ST elevated myocardial infarction) 4/4/2025    Sepsis 4/2/2025    Symptomatic anemia 01/15/2024       Past Surgical History:   Procedure Laterality Date    ANGIOGRAM, CORONARY, WITH LEFT HEART CATHETERIZATION  1/13/2025    Procedure: Angiogram, Coronary, with Left Heart Cath;  Surgeon: Steve Bhat MD;  Location: [facility] CATH LAB/EP;  Service: Cardiology;;    ATHERECTOMY, CORONARY N/A 1/14/2025    Procedure: Atherectomy-coronary;  Surgeon: Giacomo Pittman MD;  Location: [facility] CATH LAB/EP;  Service: Cardiology;  Laterality: N/A;    bilateral foot surgery      CORONARY ANGIOPLASTY WITH STENT PLACEMENT N/A 1/14/2025    Procedure: ANGIOPLASTY, CORONARY ARTERY, WITH STENT INSERTION;  Surgeon: Giacomo Pittman MD;  Location: [facility] CATH LAB/EP;  Service: Cardiology;  Laterality: N/A;    CORONARY STENT PLACEMENT N/A 1/13/2025    Procedure: INSERTION, STENT, CORONARY ARTERY;  Surgeon: Steve Bhat MD;  Location: [facility] CATH LAB/EP;  Service: Cardiology;  Laterality: N/A;    ESOPHAGOGASTRODUODENOSCOPY N/A 2/27/2024    Procedure: EGD (ESOPHAGOGASTRODUODENOSCOPY);  Surgeon: Gemma Almendarez MD;  Location: [facility] ENDO;  Service: Endoscopy;  Laterality: N/A;    EXPLORATION, ARTERY, UPPER EXTREMITY Left 4/3/2025    Procedure: EXPLORATION, ARTERY, UPPER EXTREMITY;  Surgeon: Sunil Contreras MD;  Location: [facility] OR;  Service:  Vascular;  Laterality: Left;    eyelid surgery      FISTULOGRAM Left 11/27/2019    Procedure: Fistulogram;  Surgeon: MELANY Brenner III, MD;  [facility];  Service: Peripheral Vascular;  Laterality: Left;    FISTULOGRAM Left 11/27/2019    Procedure: Fistulogram, AVG declot, possible permacath;  Surgeon: MELANY Brenner III, MD;  Location: [facility] CATH LAB;  Service: Peripheral Vascular;  Laterality: Left;    INSERTION OF DIALYSIS CATHETER      IVUS, CORONARY  1/13/2025    Procedure: IVUS, Coronary;  Surgeon: Steve Bhat MD;  Location: [facility] CATH LAB/EP;  Service: Cardiology;;    LEFT HEART CATHETERIZATION Left 1/14/2025    Procedure: Left heart cath;  Surgeon: Giacomo Pittman MD;  Location: [facility] CATH LAB/EP;  Service: Cardiology;  Laterality: Left;    MOH's  12/5/13    PERCUTANEOUS CORONARY INTERVENTION, ARTERY N/A 1/14/2025    Procedure: Percutaneous coronary intervention;  Surgeon: Giacomo Pittman MD;  Location: [facility] CATH LAB/EP;  Service: Cardiology;  Laterality: N/A;    PERCUTANEOUS TRANSLUMINAL BALLOON ANGIOPLASTY OF CORONARY ARTERY  1/13/2025    Procedure: Angioplasty-coronary;  Surgeon: Steve Bhat MD;  Location: [facility] CATH LAB/EP;  Service: Cardiology;;    REMOVAL, GRAFT, ARTERIOVENOUS, UPPER EXTREMITY Left 4/3/2025    Procedure: REMOVAL, GRAFT, ARTERIOVENOUS, UPPER EXTREMITY;  Surgeon: Sunil Contreras MD;  Location: [facility];  Service: Vascular;  Laterality: Left;    REVASCULARIZATION, ENDOVASCULAR, LE W/ INTRAVASCULAR LITHOTRIPSY  1/13/2025    Procedure: REVASCULARIZATION, ENDOVASCULAR, LE W/ INTRAVASCULAR LITHOTRIPSY;  Surgeon: Steve Bhat MD;  Location: [facility] CATH LAB/EP;  Service: Cardiology;;    right hand surgery      stents         Review of patient's allergies indicates:   Allergen Reactions    Ace inhibitors Hives, Itching, Shortness Of Breath, Other (See Comments) and Rash     Is not sure which medication it was    Captopril        No current  facility-administered medications on file prior to encounter.     Current Outpatient Medications on File Prior to Encounter   Medication Sig    ammonium lactate 12 % Crea Apply topically 2 (two) times a day.    artificial tears,hypromellose,,GENTEAL/SUSTANE, 0.3 % Gel Apply to eye nightly.    aspirin (ECOTRIN) 81 MG EC tablet Take 1 tablet (81 mg total) by mouth once daily.    atorvastatin (LIPITOR) 80 MG tablet Take 1 tablet (80 mg total) by mouth every evening.    camphor-menthol 0.5-0.5% (SARNA) lotion Apply topically once daily. APPLY SMALL AMOUNT TOPICALLY TO AFFECTED AREA(S) AS NEEDED FOR ITCHING KEEP IN FRIDGE    carboxymethylcellulose sodium 0.5 % Drop Apply 1 drop to eye nightly as needed (dry eyes).    carvediloL (COREG) 12.5 MG tablet Take 0.5 tablets (6.25 mg total) by mouth 2 (two) times daily.    cetirizine (ZYRTEC) 5 MG tablet Take 1 tablet (5 mg total) by mouth every 48 hours.    clobetasol 0.05% (TEMOVATE) 0.05 % Oint Apply topically 2 (two) times daily.    clopidogreL (PLAVIX) 75 mg tablet Take 1 tablet (75 mg total) by mouth once daily.    erythromycin (ROMYCIN) ophthalmic ointment Place a 1/2 inch ribbon of ointment into the lower eyelid. Four timex daily for 5 days    gabapentin (NEURONTIN) 100 MG capsule Take 1 capsule (100 mg total) by mouth every evening.    hydrophilic ointment (AQUABASE) Apply topically as needed for Dry Skin. APPLY MODERATE AMOUNT TOPICALLY TO AFFECTED AREA(S) TWICE A DAY AS NEEDED FOR DRY SKIN APPLY TO AREAS OF DRY SKIN OR OVER ENTIRE BODY.    lanolin alcohol-mineral oil-white petrolatum-ceres (EUCERIN) Crea cream Apply topically 2 (two) times daily as needed.    LIDOcaine (LIDODERM) 5 % Place 1 patch onto the skin once daily. Remove & Discard patch within 12 hours or as directed by MD    loratadine (CLARITIN) 10 mg tablet Take 10 mg by mouth daily as needed for Allergies (Every other day).    nut.tx.imp.renal fxn,lac-reduc (NEPRO CARB STEADY) 0.08 gram-1.8 kcal/mL Liqd  Take 240 mLs by mouth 2 (two) times a day.    ondansetron (ZOFRAN-ODT) 4 MG TbDL Take 1 tablet (4 mg total) by mouth every 8 (eight) hours as needed (nausea).    oxyCODONE-acetaminophen (PERCOCET) 5-325 mg per tablet Take 1 tablet by mouth every 6 (six) hours as needed.    pantoprazole (PROTONIX) 20 MG tablet Take 20 mg by mouth 2 (two) times daily as needed.    pramoxine 1 % Lotn Apply topically 2 (two) times daily as needed (itching).    triamcinolone acetonide 0.1% (KENALOG) 0.1 % cream Apply topically 2 (two) times daily as needed (itching).     Family History    None       Tobacco Use    Smoking status: Never    Smokeless tobacco: Never   Substance and Sexual Activity    Alcohol use: No    Drug use: No    Sexual activity: Not Currently     Review of Systems   Unable to perform ROS: Mental status change     Objective:     Vital Signs (Most Recent):  Temp: 97.6 °F (36.4 °C) (04/05/25 0400)  Pulse: 81 (04/05/25 0745)  Resp: 20 (04/05/25 0745)  BP: 106/67 (04/05/25 0745)  SpO2: (!) 94 % (04/05/25 0745) Vital Signs (24h Range):  Temp:  [96.9 °F (36.1 °C)-98 °F (36.7 °C)] 97.6 °F (36.4 °C)  Pulse:  [] 81  Resp:  [11-38] 20  SpO2:  [90 %-100 %] 94 %  BP: ()/(44-95) 106/67     Weight: 61.2 kg (134 lb 14.7 oz)  Body mass index is 21.13 kg/m².    SpO2: (!) 94 %         Intake/Output Summary (Last 24 hours) at 4/5/2025 0758  Last data filed at 4/5/2025 0700  Gross per 24 hour   Intake 99.05 ml   Output 718 ml   Net -618.95 ml       Lines/Drains/Airways       Central Venous Catheter Line  Duration             Trialysis (Dialysis) Catheter 04/04/25 1208 right internal jugular <1 day              Drain  Duration             Male External Urinary Catheter 04/02/25 1850 Medium 2 days         NG/OG Tube 04/04/25 1415 Rolf 10 Fr. Left nostril <1 day              Peripheral Intravenous Line  Duration                  Hemodialysis AV Fistula Left upper arm -- days         Peripheral IV - Single Lumen 04/02/25 1801 20  G Anterior;Distal;Right Upper Arm 2 days                   Exam unchanged vs 4/4/25  Physical Exam  Constitutional:       General: He is not in acute distress.     Appearance: Normal appearance. He is well-developed. He is ill-appearing. He is not toxic-appearing or diaphoretic.   HENT:      Head: Normocephalic and atraumatic.   Eyes:      General: No scleral icterus.     Extraocular Movements: Extraocular movements intact.      Conjunctiva/sclera: Conjunctivae normal.      Pupils: Pupils are equal, round, and reactive to light.   Neck:      Thyroid: No thyromegaly.      Vascular: No JVD.      Trachea: No tracheal deviation.   Cardiovascular:      Rate and Rhythm: Normal rate and regular rhythm.      Heart sounds: S1 normal and S2 normal. Heart sounds are distant. No murmur heard.     No friction rub. No gallop.   Pulmonary:      Effort: Pulmonary effort is normal. No respiratory distress.      Breath sounds: Normal breath sounds. No stridor. No wheezing, rhonchi or rales.   Chest:      Chest wall: No tenderness.   Abdominal:      General: There is no distension.      Palpations: Abdomen is soft.   Musculoskeletal:         General: No swelling or tenderness. Normal range of motion.      Cervical back: Normal range of motion and neck supple. No rigidity.      Right lower leg: No edema.      Left lower leg: No edema.   Skin:     General: Skin is warm and dry.      Coloration: Skin is not jaundiced.   Neurological:      General: No focal deficit present.      Mental Status: He is alert.      Cranial Nerves: No cranial nerve deficit.   Psychiatric:         Mood and Affect: Mood normal.         Behavior: Behavior normal.          Current Medications:   aspirin  81 mg Oral Daily    atorvastatin  80 mg Oral QHS    clopidogreL  75 mg Oral Daily    heparin (porcine)  5,000 Units Subcutaneous Q8H    mupirocin   Nasal BID    pantoprazole  40 mg Intravenous Daily    senna-docusate  1 tablet Oral BID    sodium chloride 0.9%  10  mL Intravenous Q8H         Current Facility-Administered Medications:     0.9%  NaCl infusion (for blood administration), , Intravenous, Q24H PRN    acetaminophen, 650 mg, Oral, Q4H PRN    dextrose 50%, 12.5 g, Intravenous, PRN    dextrose 50%, 25 g, Intravenous, PRN    glucagon (human recombinant), 1 mg, Intramuscular, PRN    glucose, 16 g, Oral, PRN    glucose, 24 g, Oral, PRN    insulin aspart U-100, 0-5 Units, Subcutaneous, QID (AC + HS) PRN    magnesium sulfate 2 g IVPB, 2 g, Intravenous, PRN    melatonin, 6 mg, Oral, Nightly PRN    naloxone, 0.02 mg, Intravenous, PRN    ondansetron, 4 mg, Intravenous, Q6H PRN    prochlorperazine, 5 mg, Intravenous, Q6H PRN    sodium phosphate 20.1 mmol in D5W 250 mL IVPB, 20.1 mmol, Intravenous, PRN    sodium phosphate 30 mmol in D5W 250 mL IVPB, 30 mmol, Intravenous, PRN    sodium phosphate 39.9 mmol in D5W 250 mL IVPB, 39.9 mmol, Intravenous, PRN    Pharmacy to dose Vancomycin consult, , , Once **AND** vancomycin - pharmacy to dose, , Intravenous, pharmacy to manage frequency    Laboratory (all labs reviewed):  CBC:  Recent Labs   Lab 04/03/25  0713 04/03/25  1533 04/03/25 2055 04/04/25  0247 04/05/25  0324   WBC 17.21 H 18.03 H 16.23 H 16.11 H 17.18 H   HGB 8.7 L 7.2 L 10.2 L 10.0 L 8.6 L   HCT 26.5 L 22.7 L 30.7 L 30.0 L 26.7 L   Platelet Count 147 L 146 L 130 L 121 L 109 L       CHEMISTRIES:  Recent Labs   Lab 01/16/25  0154 02/08/25  1014 02/11/25  1325 02/23/25  1638 03/21/25  1808 03/25/25  1108 04/03/25  0713 04/03/25  1533 04/03/25  2055 04/04/25  0247 04/04/25  1439 04/04/25  2001 04/04/25  2151 04/05/25  0324   Glucose 141 H 171 H 225 H 113 H 150 H  --   --   --   --   --   --   --   --   --    Sodium 140 138 138 141 141   < > 138   < > 138 139 139  --  140 140   Potassium 4.0 4.5 4.4 5.1 4.4   < > 4.3   < > 4.8 5.0 5.4 H  --  4.6 4.2   BUN 44 H 63 H 75 H 77 H 91 H   < > 60 H   < > 70 H 75 H 84 H  --  68 H 58 H   Creatinine 10.0 H 8.6 H 8.9 H 9.8 H 10.9 H   < >  8.8 H   < > 9.3 H 9.9 H 10.8 H  --  8.5 H 7.3 H   eGFR 5 A 5.5 A 5.3 A 4.7 A 4.1 A   < > 5 L   < > 5 L 5 L 4 L  --  6 L 7 L   Calcium 8.9 10.2 10.1 10.9 H 10.4   < > 8.4 L   < > 8.1 L 8.2 L 8.1 L  --  8.4 L 8.4 L   Magnesium   --   --   --   --   --    < > 2.1  --  2.2 2.3  --  2.2  --  2.1   Magnesium  --   --   --   --  2.5  --   --   --   --   --   --   --   --   --     < > = values in this interval not displayed.       CARDIAC BIOMARKERS:  Recent Labs   Lab 04/01/24  0251 04/29/24 2211 08/03/24  0015 08/05/24  0138 10/20/24  2310 10/21/24  0113 04/01/25  2220 04/02/25  0846 04/02/25 2055 04/03/25  0303 04/03/25  0718 04/03/25  1533 04/04/25  1028     --  183  --  111  --  382 H  --   --   --   --   --   --    Troponin I  --    < > 0.080 H   < > 0.044 H   < >  --   --   --   --   --   --   --    Troponin-I  --   --   --   --   --   --   --    < > 1.076 H 1.441 H 1.812 H 2.032 H 2.913 H    < > = values in this interval not displayed.       COAGS:  Recent Labs   Lab 11/28/23  2155 04/18/24  0609 01/12/25  1316 03/25/25  1108 04/03/25  1533   INR 1.1 1.0 1.0 1.0 1.1       LIPIDS/LFTS:  Recent Labs   Lab 08/11/24  0331 08/15/24  0203 04/02/25  0537 04/03/25  0713 04/03/25  1533 04/03/25  2055 04/04/25  0247   Cholesterol 110 L  --   --   --   --   --   --    Triglycerides 126  --   --   --   --   --   --    HDL 26 L  --   --   --   --   --   --    LDL Cholesterol 58.8 L  --   --   --   --   --   --    Non-HDL Cholesterol 84  --   --   --   --   --   --    AST  --    < > 26 29 25 24 23   ALT  --    < > 17 20 15 18 17    < > = values in this interval not displayed.       BNP:  Recent Labs   Lab 06/11/24  2239 08/03/24  0015 08/11/24  0332 01/12/25  0550 04/03/25  0303   BNP 1,110 H 624 H 2,178 H 2,043 H 2,988 H       TSH:  Recent Labs   Lab 11/27/23  0519 01/15/24  2135 07/09/24  1428 08/11/24  0332 04/02/25  0537   TSH 3.357 4.600 H 6.963 H 3.949 4.590 H       Free T4:  Recent Labs   Lab 01/15/24  2135  07/09/24  1428 04/02/25  0537   Free T4 0.81 1.01 1.19  1.19       Diagnostic Results:  ECG (personally reviewed and interpreted tracing(s)):  4/4/25 1325 SR 79, PRWP, lat ST abnl ?isch, similar to 3/25/25, lass prom than 4/2/25    Chest X-Ray (personally reviewed and interpreted image(s)): 4/4/25 NAD, aortic atherosclerosis    Echo: 4/4/25 (images personally reviewed and interpreted)    Left Ventricle: The left ventricle is normal in size. Normal wall thickness. Mild global hypokinesis present. There is mildly reduced systolic function with a visually estimated ejection fraction of 40 - 45%. Grade I diastolic dysfunction.    Right Ventricle: The right ventricle has moderate enlargement. Systolic function is mildly reduced.    Left Atrium: Mildly dilated Cannot exclude PFO (color Doppler with possible flow across IAS).    Aortic Valve: The aortic valve is a trileaflet valve. There is moderate aortic valve sclerosis. Mildly restricted motion. There is mild stenosis. Aortic valve area by VTI is 1.8 cm². Aortic valve peak velocity is 1.5 m/s. Mean gradient is 5 mmHg. The dimensionless index is 0.51. There is mild aortic regurgitation.    Mitral Valve: There is a 1.9x1.2cm large fixed heterogeneous mass present on the posterior leaflet (new finding vs 9/2023 echo). There is moderate to severe regurgitation with an eccentric jet.  Cannot exclude severe MR with possible PMVL perforation in association with large vegetation.    Aorta: Aortic root is mildly to moderately dilated measuring 4.17 cm.    Pulmonary Artery: The estimated pulmonary artery systolic pressure is 42 mmHg.    PCI RCA 1/14/25:    The Ost RCA lesion was 70% stenosed with 0% stenosis post-intervention.    The Prox RCA lesion was 70% stenosed with 0% stenosis post-intervention.    The Mid RCA lesion was 99% stenosed with 0% stenosis post-intervention.    The ejection fraction was calculated to be 55%.    The pre-procedure left ventricular end diastolic  pressure was 6.    The post-procedure left ventricular end diastolic pressure was 7.    Mid RCA lesion: A stent was successfully placed at 11 MAYRA for 15 sec.    Prox RCA lesion, Mid RCA lesion: A  at 12 MAYRA for 10 sec.    Ost RCA lesion, Prox RCA lesion: A stent was successfully placed.  Procedure performed:  Left heart catheterization  Coronary angiogram of the right coronary artery  Right radial artery access   Right superficial femoral artery access  CSI atherectomy of the right mid coronary artery  Laser atherectomy of the right mid coronary artery  PTCA of the right mid coronary artery  XU x3 megatron drug-eluting stent 3.5 X 16 in the mid RCA, 3.5 X 28 in the proximal RCA, 4.0 X 16 mm in the ostial RCA  Findings:  Severely calcified mid RCA stenosis unable to cross with PTCA balloons, treated initially with 0.9 laser atherectomy, followed by CSI atherectomy system with total of 5 runs (3 at low speed, 2 at high speed), pre-dilated using 2.0 compliant and 3.5 noncompliant balloon followed by 3.5 X 16 mm megatron stent.  Focal plaque rupture/erosion noted in the proximal and ostial RCA that were treated with  3.5 X 28 in the proximal RCA, 4.0 X 16 mm in the ostial RCA.  No residual stenosis post intervention.  Patient had ALAN 3 flow at the end of the procedure  LVEDP 6 mm Hg.  EF of around 55%.  No gradient across the aortic valve.  Right SFA access.  High bifurcation.  Mild diffuse disease of the right SFA closed using Perclose closure device  Right radial artery with severe spasm and hence decision was made to switch to right groin access  Recommendations:  Continue dual antiplatelet therapy for at least 1 year.  Consider longer term anticoagulation based on risk factor profile after 1 year  High-intensity statin  Transfer back to telemetry unit    Cath/PCI LAD 1/13/25  Left Main Coronary Artery: The left main is a large caliber vessel arising normally from the left sinus of valsalva.  It bifurcates into the  left anterior descending and left circumflex coronary artery.  It is free of evidence of obstructive coronary artery disease. ALAN III flow  Left Anterior Descending: The left anterior descending coronary artery is a large caliber vessel arising normally from the left main and extending to the apex.  It gives rise to small to moderate caliber diagonal arteries. Proximal LAD has a severe 80-90% calcified stenosis prior to an under expanded 2.5 mm stent in a 4.0 mm vessel. After the stent there is 40-50% stenosis. ALAN II flow  Left Circumflex: The left circumflex is a large caliber vessel arising normally from the left main coronary artery, it is non-dominant.  It gives rise to moderate caliber obtuse marginal branches. OM stent is patent. ALAN III flow  Right Coronary Artery: The right coronary artery is a large caliber vessel arising normally from the right sinus of valsalva.  It is dominant giving rise to a PDA and PLV branch. Mid RCA with a 95-99% calcified fibrotic stenosis. ALAN III flow  LVgram: LVEDP 33 mmHg  PCI procedure:  Heparin administered. ACT was closely monitored. Using a EBU 3.5 guider, this was used to cannulate the left system. Nuno blue PCI wire repshaped outside the body. PCI wire was advanced into the distal LAD. Predilated with 2.0 mm NC, 2.5 mm NC, scoring balloon 2.5 mm, 3.0 mm and 3.5 mm NC. IVUS was advanced for vessel prep/size. Schockwave 3.0 x 12 mm advanced and multiple therapies given. Followed by a 3.5 mm NC. Advanced a 3.5 x 24 mm megatron XU and deployed at nominal pressure. Post dilated with a 4.0 mm NC with great results. After the balloon was removed.  The wire was left in place.  Repeat angiography showed no signs of dissection.  Subsequently the wire was pulled back.   Final angiography showed ALAN-3 flow.  No signs of dissection, perforation, or thrombus.  Assessment:   Successful PCI of LAD with 3.5 x 24 mm XU  mRCA 95-99% stenosis--> staged PCI of RCA  Patent Lcx  stent  Plan:   - Stage PCI of RCA tomorrow PM- NPO at [facility]   - Patient tolerated procedure well. No immediate complications  - Continue DAPT  - EKG when arrives to floor  - Routine post cath protocol  - Dialysis in pm   - Maximize medical management  - Further care by the primary team    Cath/PCI OM2 12/4/23  1.  Selective left coronary Angiography.   2.  IVUS of Proximal OM2 coronary artery   3.  PCI of Proximal OM2 coronary artery with one 3.5 x 18 [place] stent   4.  Ultrasound guided right radial arterial access   5. Conscious sedation from 7:11 AM to 8:03 AM (52 minutes of sedation)   Findings:   - Coronary angiography data   Left main: Large caliber vessel, divides into the left anterior descending   and left circumflex. LM is non obstructive.   Left anterior descending: Large caliber vessel giving rise to 2 diagonals.   Patent mid LAD stents.   Left circumflex: Medium caliber vessel giving rise to 2 obtuse marginals.   OM2 has a proximal 99% ISR.   Right coronary artery: Not studied but known mid RCA lesion (see report   from last week)   - IVUS data of OM2    Pre PCI IVUS data:   Proximal reference luminal diameter: 3.6 mm   Distal reference luminal diameter: 3.4 mm   Percutaneous Coronary Intervention   Successful PCI of Proximal OM2 coronary artery 99 % ISR stenosis was   reduced to 0 % (initial ALAN 3 flow) by pre dilatation with 3 x 15 mm   balloon followed by placement of 3.5 x 18 mm [place] stent with excellent   angiographic results with final ALAN 3 flow. IVUS guided.   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   -  Succesful PCI of Proximal OM2 coronary artery with one Michael stent  Recommendations:   - Post operative care as per protocol.   - Aspirin for life and P2Y12 inhibitor for at least a year   - Moderate to high statin therapy.   - Follow up with Dr Hudson

## 2025-04-05 NOTE — PLAN OF CARE
Problem: SLP  Goal: SLP Goal  Description: ST. Pt will tolerate PO diet of pureed consistency with thin liquids without overt s/s of aspiration.    Pt will participate in ongoing assessment of swallow to determine least restrictive diet without overt s/s of aspiration.        Outcome: Progressing       B/s swallow eval completed. ST recs: Pureed diet and thin liquids. Crushed meds. ST following.

## 2025-04-05 NOTE — HOSPITAL COURSE
Interval Hx: pt seen in ICU, MS diminished, UTO ROS.    Tele: SR 80s (personally reviewed and interpreted)

## 2025-04-05 NOTE — NURSING
Pt had multiple family/friends who showed up to bedside. Per note from Lisa Jacinto NP, it was clarified that aside from Deonna, only pt's sisters- Ronna, Nilay, and Nora, may receive information. Pt's sister, Ronna, at bedside. Ronna given update within scope of RN. Other family members not in room. Pt awake, alert and speaking with family members at bedside. Everyone pleasant, cooperative at this time. Charge RN, Surekha, and security notified of potential issue due to complexity of situation.

## 2025-04-05 NOTE — PLAN OF CARE
Recommendation:  1. When medically acceptable, initiate TF of Novasource Renal at 10ml/hr and advance as tolerated to goal rate of 40ml/hr which would provide 1920 kcal, 87g protein, & 688ml free water.   2. Monitor weight/labs.   3. RD to follow to monitor nutrition status.    Goals:  TF will be started by RD follow up  Nutrition Goal Status: new

## 2025-04-05 NOTE — SUBJECTIVE & OBJECTIVE
"Interval History: No acute events overnight. No new complaints.     Review of Systems   Constitutional:  Positive for fatigue. Negative for chills and fever.   Respiratory:  Negative for cough.    All other systems reviewed and are negative.    Objective:     Vital Signs (Most Recent):  Temp: 97.6 °F (36.4 °C) (04/05/25 0400)  Pulse: 82 (04/05/25 1030)  Resp: 20 (04/05/25 1030)  BP: (!) 105/52 (04/05/25 1030)  SpO2: 98 % (04/05/25 1030) Vital Signs (24h Range):  Temp:  [96.9 °F (36.1 °C)-98 °F (36.7 °C)] 97.6 °F (36.4 °C)  Pulse:  [] 82  Resp:  [11-38] 20  SpO2:  [90 %-100 %] 98 %  BP: ()/(44-95) 105/52     Weight: 61.2 kg (134 lb 14.7 oz)  Body mass index is 21.13 kg/m².    Estimated Creatinine Clearance: 6.2 mL/min (A) (based on SCr of 7.3 mg/dL (H)).     Physical Exam  Vitals and nursing note reviewed.   Constitutional:       Appearance: Normal appearance. He is ill-appearing.   HENT:      Head: Normocephalic and atraumatic.      Nose: Nose normal. No congestion.      Mouth/Throat:      Mouth: Mucous membranes are moist.      Pharynx: Oropharynx is clear.   Eyes:      Conjunctiva/sclera: Conjunctivae normal.      Pupils: Pupils are equal, round, and reactive to light.   Cardiovascular:      Rate and Rhythm: Normal rate and regular rhythm.   Pulmonary:      Effort: Pulmonary effort is normal. No respiratory distress.      Breath sounds: Normal breath sounds.      Comments: NC in place.  Abdominal:      General: Abdomen is flat. There is no distension.      Palpations: Abdomen is soft.   Musculoskeletal:         General: No swelling. Normal range of motion.      Cervical back: Normal range of motion and neck supple.      Comments: LUE in clean dressing.   Skin:     General: Skin is warm and dry.   Neurological:      Mental Status: He is alert.      Comments: Oriented to self          Significant Labs: Blood Culture: No results for input(s): "LABBLOO" in the last 4320 hours.  All pertinent labs within " the past 24 hours have been reviewed.    Significant Imaging: I have reviewed all pertinent imaging results/findings within the past 24 hours.

## 2025-04-05 NOTE — PT/OT/SLP EVAL
Speech Language Pathology Evaluation  Bedside Swallow    Patient Name:  Jevon Rajan   MRN:  [id number]  Admitting Diagnosis: Septic shock    Recommendations:                 General Recommendations:  Dysphagia therapy  Diet recommendations:  Puree Diet - IDDSI Level 4, Thin liquids - IDDSI Level 0   Aspiration Precautions: 1 bite/sip at a time, Alternating bites/sips, Assistance with meals, Feed only when awake/alert, Frequent oral care, Meds crushed in puree, and Small bites/sips   General Precautions: Standard, contact, aspiration, pureed diet  Communication strategies:  provide increased time to answer    Assessment:     Jevon Rajan is a 85 y.o. male with a dx of  septic shock. Pt presents with oropharyngeal dysphagia negatively impacted by variable RUSSELL. ST recs: pt may begin pureed/thin liquid diet with crushed meds requiring feeding assistance only when awake/alert. ST will follow pt to provide dysphagia tx and ongoing education regarding safe swallow strategies. Will advance diet when safe and appropriate.    History:     Past Medical History:   Diagnosis Date    BPH (benign prostatic hyperplasia)     Coronary artery disease     [facility] care to this clinic. In 2014 he had stent to LAD. He states he has had 2 heart attacks. He does not get angina. There was 90% left anterior descending artery stenosis undergoing stenting. There was also a circumflex marginal branch stenosis of 70%.    Diabetes mellitus, type 2     ESRD (end stage renal disease) on dialysis     ESRD on HD TTS via left brachial AVF    Hypertension     Hypothyroidism, unspecified     NSTEMI (non-ST elevated myocardial infarction) 4/4/2025    Sepsis 4/2/2025    Symptomatic anemia 01/15/2024       Past Surgical History:   Procedure Laterality Date    ANGIOGRAM, CORONARY, WITH LEFT HEART CATHETERIZATION  1/13/2025    Procedure: Angiogram, Coronary, with Left Heart Cath;  Surgeon: Radha Stratton  MD Steve;  Location: [facility] CATH LAB/EP;  Service: Cardiology;;    ATHERECTOMY, CORONARY N/A 1/14/2025    Procedure: Atherectomy-coronary;  Surgeon: Giacomo Pittman MD;  Location: [facility] CATH LAB/EP;  Service: Cardiology;  Laterality: N/A;    bilateral foot surgery      CORONARY ANGIOPLASTY WITH STENT PLACEMENT N/A 1/14/2025    Procedure: ANGIOPLASTY, CORONARY ARTERY, WITH STENT INSERTION;  Surgeon: Giacomo Pittman MD;  Location: [facility] CATH LAB/EP;  Service: Cardiology;  Laterality: N/A;    CORONARY STENT PLACEMENT N/A 1/13/2025    Procedure: INSERTION, STENT, CORONARY ARTERY;  Surgeon: Steve Bhat MD;  Location: [facility] CATH LAB/EP;  Service: Cardiology;  Laterality: N/A;    ESOPHAGOGASTRODUODENOSCOPY N/A 2/27/2024    Procedure: EGD (ESOPHAGOGASTRODUODENOSCOPY);  Surgeon: Gemma Almendarez MD;  Location: [facility] ENDO;  Service: Endoscopy;  Laterality: N/A;    EXPLORATION, ARTERY, UPPER EXTREMITY Left 4/3/2025    Procedure: EXPLORATION, ARTERY, UPPER EXTREMITY;  Surgeon: Sunil Contreras MD;  Location: [facility] OR;  Service: Vascular;  Laterality: Left;    eyelid surgery      FISTULOGRAM Left 11/27/2019    Procedure: Fistulogram;  Surgeon: MELANY Brenner III, MD;  Location: [address];  Service: Peripheral Vascular;  Laterality: Left;    FISTULOGRAM Left 11/27/2019    Procedure: Fistulogram, AVG declot, possible permacath;  Surgeon: MELANY Brenner III, MD;  Location: [facility] CATH LAB;  Service: Peripheral Vascular;  Laterality: Left;    INSERTION OF DIALYSIS CATHETER      IVUS, CORONARY  1/13/2025    Procedure: IVUS, Coronary;  Surgeon: Steve Bhat MD;  Location: [facility] CATH LAB/EP;  Service: Cardiology;;    LEFT HEART CATHETERIZATION Left 1/14/2025    Procedure: Left heart cath;  Surgeon: Giacomo Pittman MD;  Location: [facility] CATH LAB/EP;  Service: Cardiology;  Laterality: Left;    MOH's  12/5/13    PERCUTANEOUS CORONARY INTERVENTION, ARTERY N/A 1/14/2025    Procedure: Percutaneous coronary  intervention;  Surgeon: Giacomo Pittman MD;  Location: [facility] CATH LAB/EP;  Service: Cardiology;  Laterality: N/A;    PERCUTANEOUS TRANSLUMINAL BALLOON ANGIOPLASTY OF CORONARY ARTERY  1/13/2025    Procedure: Angioplasty-coronary;  Surgeon: Steve Bhat MD;  Location: [facility] CATH LAB/EP;  Service: Cardiology;;    REMOVAL, GRAFT, ARTERIOVENOUS, UPPER EXTREMITY Left 4/3/2025    Procedure: REMOVAL, GRAFT, ARTERIOVENOUS, UPPER EXTREMITY;  Surgeon: Sunil Contreras MD;  Location: [facility] OR;  Service: Vascular;  Laterality: Left;    REVASCULARIZATION, ENDOVASCULAR, LE W/ INTRAVASCULAR LITHOTRIPSY  1/13/2025    Procedure: REVASCULARIZATION, ENDOVASCULAR, LE W/ INTRAVASCULAR LITHOTRIPSY;  Surgeon: Steve Bhat MD;  Location: [facility] CATH LAB/EP;  Service: Cardiology;;    right hand surgery      stents         Social History: Patient lives with niece at home who is caregiver.    Modified Barium Swallow: none per emr    Chest X-Rays: 4/3/25: Right jugular origin vascular catheter is now seen, its tip in the superior vena cava.  No pneumothorax.  Heart size and the appearance of the cardiomediastinal silhouette and pulmonary vascularity have not changed appreciably since the examination referenced above.  Lung zones appear essentially stable allowing for a poorer inspiratory depth level on the current exam, with no significant new areas of airspace consolidation or volume loss evident.  Coronary artery stent is again observed, as is calcification in wall of the aortic arch.     Impression:     Allowing for a poorer inspiratory depth level on the current exam and some differences in patient positioning, there has been no significant detrimental interval change in the appearance of the chest since 04/03/2025.  No post procedure pneumothorax, with vascular placement as above.       Prior diet: unrestricted per pt.      Subjective   Per CHARLENE Sue pt with improved RUSSELL this am.  Pt asleep upon SLP arrival, easily  aroused to ST greeting. No family present at time of eval.  Pt alert and oriented x3, however, unable to state year. Pt stated he is hungry and wants to eat. Pt denied hx of swallowing difficulty. Pt stated he is itching all over. NGT in place for bedside swallow.    Patient goals: tor resume PO diet     Pain/Comfort:  Pain Rating 1: 0/10    Respiratory Status: Nasal cannula, flow 3.5 L/min    Objective:     Oral Musculature Evaluation  Oral Musculature: general weakness  Dentition: scattered dentition, other (see comments) (edentulous upper)  Secretion Management: adequate  Mucosal Quality: good  Mandibular Strength and Mobility: WFL  Oral Labial Strength and Mobility: WFL  Lingual Strength and Mobility: other (see comments) (decreased lingual coordination)  Velar Elevation: WFL  Buccal Strength and Mobility: WFL  Volitional Cough: adequate  Volitional Swallow: functional  Voice Prior to PO Intake: clear; low volume    Bedside Swallow Eval:   Consistencies Assessed: Presented by SLP  Thin liquids via spoon x1; straw x 6 trials  Puree x 4oz  Soft solids x 2 bites      Oral Phase:   Prolonged mastication- soft solids  Lingual residue- soft solids  Slow oral transit time    Pharyngeal Phase:   delayed swallow initation    Compensatory Strategies  None    Treatment: Rec: Pt begin PO diet of pureed consistency with thin liquids.    Please note silent aspiration cannot be ruled out at bedside.    Education: Patient  educated on aspiration precautions. Pt will required reinforcement of safe swallowing education 2/2 inconsistent RUSSELL and confusion.  CHARLENE Sue  and Dr.Van Chambers notified regarding diet recs. White board update in patient's room regarding diet recs.      Handouts attached to echart regarding diet modifications.      Goals:   Multidisciplinary Problems       SLP Goals          Problem: SLP    Goal Priority Disciplines Outcome   SLP Goal     SLP Progressing   Description: ST. Pt will tolerate PO diet of  pureed consistency with thin liquids without overt s/s of aspiration.    Pt will participate in ongoing assessment of swallow to determine least restrictive diet without overt s/s of aspiration.                             Plan:     Patient to be seen:  3 x/week   Plan of Care expires:  04/18/25  Plan of Care reviewed with:  patient   SLP Follow-Up:  Yes       Discharge recommendations:  Moderate Intensity Therapy   Barriers to Discharge:  None    Time Tracking:     SLP Treatment Date:      Speech Start Time:  0826  Speech Stop Time:  0851     Speech Total Time (min):  25 min    Billable Minutes: Eval Swallow and Oral Function 15 min and Self Care/Home Management Training 10 min    04/05/2025

## 2025-04-05 NOTE — ASSESSMENT & PLAN NOTE
"- TTE 4/4/25: "1.9x1.2cm large fixed heterogeneous mass present on the posterior leaflet (new finding vs 9/2023 echo). There is moderate to severe regurgitation with an eccentric jet. Cannot exclude severe MR with possible PMVL perforation in association with large vegetation."  - this is in the setting of MRSA bacteremia  - ID is consulted  - repeat cultures are NGTD  - suspect source is skin/chronic scratching vs AVF infection- cultures from resected AVF with Staph   - discussed with Cardiac surgery Dr Castro 4/4/25- given age and comorbidities, he is very high risk of mortality with surgery. He recommends medical management at this point.  - on vancomycin      "

## 2025-04-05 NOTE — PROGRESS NOTES
Patient seen and examined during CVVHD.  /64, HR 83. . AP -129,  84. Temp HD catheter.  Tolerating treatment well.     ESRD - CRRT x 24 hours; treatment will end this afternoon. Next RRT tentatively planned for Monday. Daily labs.  Bacteremia - ID following.   Secondary HPTH - CCa 10.1, phos 4.5. Normal. Will trend.  Anemia of CKD - hgb 8.6 today; will reassess on Monday and start NAKIA if indicated.      Thank you for allowing me to participate in the care of this patient. Please call with any questions.     Юлия Davis MD  [facility]  [phone]

## 2025-04-05 NOTE — PROGRESS NOTES
"[facility]  [facility] Medicine  Progress Note    Patient Name: Jevon Rajan  MRN: [id number]  Patient Class: IP- Inpatient   Admission Date: 4/3/2025  Length of Stay: 2 days  Attending Physician: Eileen Jordan MD  Primary Care Provider: Melia, Primary Doctor        Subjective     Principal Problem:Septic shock        HPI:  Mr Jevon Rajan is a 85 y.o. man with ESRD with LUE AVF, HFpEF last EF 50-55%, CAD, DM who was transferred to [facility] ICU for septic shock due to MRSA bacteremia.     He was admitted at [facility] 4/1-3/2025 with sepsis, worsening to septic shock, then identified source as MRSA. 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     Upon arrival to [facility], he is moaning in pain and his LUE AVF has active pulsatile bright red blood. He does respond to his name. He denies pain anywhere other than his LUE. He is on levophed at 0.05.     Overview/Hospital Course:  Mr Jevon Rajan is a 85 y.o. man with ESRD on HD with LUE AVF that has been bleeding, CHF, CAD s/p recent stenting 1/2025 who was admitted with MRSA bacteremia and bleeding from his LUE AVF. Continue levophed, vancomycin. Vascular surgery emergently consulted; on 4/3/25 they took him to OR and noted: "well incorporated proximal and central graft without evidence of infection, resected graft and covered proximal and central remnants with multiple layers. Mid graft removed in entirety and sent for culture. Ruptured pseudoaneurysm with infected hematoma, graft completely obliterated at mid segment." Surgical cultures with Staph. Suspect AVF or chronic scratching of pruritic skin is the source of his infection. TTE showed endocarditis: EF 40-45%, G1DD, RV systolic dysfunction, large 1.9x1.2cm fixed mass on the posterior mitral valve leaflet with moder to severe regurgitation, cannot rule out posterior mitral valve " leaflet perforation. Trialysis was placed for HD. Nephrology was consulted. Palliative was consulted.     Interval History: he is much more awake and alert today. He is hungry and asking for food. He has no pain.     Review of Systems   Constitutional:  Positive for fatigue.   Respiratory:  Negative for shortness of breath.    Cardiovascular:  Negative for chest pain.   Gastrointestinal:  Negative for abdominal pain.     Objective:     Vital Signs (Most Recent):  Temp: 97.6 °F (36.4 °C) (04/05/25 0400)  Pulse: 82 (04/05/25 1030)  Resp: 20 (04/05/25 1030)  BP: (!) 105/52 (04/05/25 1030)  SpO2: 98 % (04/05/25 1030) Vital Signs (24h Range):  Temp:  [96.9 °F (36.1 °C)-98 °F (36.7 °C)] 97.6 °F (36.4 °C)  Pulse:  [] 82  Resp:  [11-38] 20  SpO2:  [90 %-100 %] 98 %  BP: ()/(44-95) 105/52     Weight: 61.2 kg (134 lb 14.7 oz)  Body mass index is 21.13 kg/m².    Intake/Output Summary (Last 24 hours) at 4/5/2025 1044  Last data filed at 4/5/2025 1000  Gross per 24 hour   Intake 149.05 ml   Output 869 ml   Net -719.95 ml         Physical Exam  Vitals and nursing note reviewed.   Constitutional:       Appearance: He is ill-appearing.      Comments: Awakens easily to voice    HENT:      Head: Normocephalic and atraumatic.      Nose:      Comments: NGT in place      Mouth/Throat:      Mouth: Mucous membranes are dry.   Neck:      Comments: RIJ trialysis  Cardiovascular:      Rate and Rhythm: Normal rate and regular rhythm.   Pulmonary:      Effort: Pulmonary effort is normal.      Breath sounds: Normal breath sounds.      Comments: 3L NC  Abdominal:      General: Bowel sounds are normal.      Palpations: Abdomen is soft.   Musculoskeletal:      Comments: R hand swelling. LUE in bandage, not removed   Skin:     Comments: Very dry skin with hyperpigmented patches   Neurological:      Comments: Oriented to self, location               Significant Labs: All pertinent labs within the past 24 hours have been  reviewed.    Significant Imaging: I have reviewed all pertinent imaging results/findings within the past 24 hours.      Assessment & Plan  Septic shock  This patient has shock. The type of shock is distributive due to sepsis. The patient had the following evidence of shock: persistent hypotension and altered mental status. 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 Shock is now resolved and vasopressors are off. WBC has not yet improved   HTN (hypertension)  Patient's blood pressure range in the last 24 hours was: BP  Min: 91/50  Max: 151/64.The patient's inpatient anti-hypertensive regimen is listed below:  Current Antihypertensives       Plan  - admitted with shock  - now off vasopressors. Continue to hold BP Rx   HLD (hyperlipidemia)  - continue statin  Type 2 diabetes mellitus, without long-term current use of insulin  A1c:   Lab Results   Component Value Date    HGBA1C 5.7 (H) 04/02/2025     Meds: SSI PRN to maintain goal 140-180  accuchecks, hypoglycemic protocol      Coronary artery disease involving native coronary artery of native heart without angina pectoris  Patient with known CAD s/p stent placement, which is controlled Will continue ASA, Plavix, and Statin and monitor for S/Sx of angina/ACS. Continue to monitor on telemetry.   - last [facility] was 1/2025: Severely calcified mid RCA stenosis unable to cross with PTCA balloons, treated initially with 0.9 laser atherectomy, followed by CSI atherectomy system with total of 5 runs (3 at low speed, 2 at high speed), pre-dilated using 2.0 compliant and 3.5 noncompliant balloon followed by 3.5 X 16 mm megatron stent.  Focal plaque rupture/erosion noted in the proximal and ostial RCA that were treated with  3.5 X  "28 in the proximal RCA, 4.0 X 16 mm in the ostial RCA.  No residual stenosis post intervention.  Patient had ALAN 3 flow at the end of the procedure  - with elevated troponin likely due to septic shock  Recent Labs   Lab 04/04/25  1028   TROPONINI 2.913*   - continue asa, plavix, statin  - Cardiology consult  ESRD on hemodialysis  - ESRD on HD via LUE AVF  - LUE AVF was actively bleeding on arrival on 4/3/25. Vascular surgery was consulted. He went emergently to the OR on 4/3/25: "Severely calcified mid RCA stenosis unable to cross with PTCA balloons, treated initially with 0.9 laser atherectomy, followed by CSI atherectomy system with total of 5 runs (3 at low speed, 2 at high speed), pre-dilated using 2.0 compliant and 3.5 noncompliant balloon followed by 3.5 X 16 mm megatron stent.  Focal plaque rupture/erosion noted in the proximal and ostial RCA that were treated with  3.5 X 28 in the proximal RCA, 4.0 X 16 mm in the ostial RCA.  No residual stenosis post intervention.  Patient had ALAN 3 flow at the end of the procedure"  - cultures from AVF= Staph   - wound care orders in place for surgical site  - Nephrology is consulted  - trialysis placed on 4/4/25 for CRRT  - he will need THDC when bacteremia is resolved   Anemia in ESRD (end-stage renal disease)  Anemia is likely due to  ESRD, blood loss . Most recent hemoglobin and hematocrit are listed below.  Recent Labs     04/03/25  2055 04/04/25  0247 04/05/25  0324   HGB 10.2* 10.0* 8.6*   HCT 30.7* 30.0* 26.7*     Plan  - Monitor serial CBC: Daily and recheck now  - Transfuse PRBC if patient becomes hemodynamically unstable, symptomatic or H/H drops below 7/21.  - Patient has not received any PRBC transfusions to date  - Patient's anemia is currently worsening. Will continue current treatment  Acute metabolic encephalopathy  - he is oriented to self but otherwise confused  - CT 4/1/25 with no acute process  - suspect this is due to sepsis  - NGT placed on 4/4/25 " "so that he could get his asa/plavix  - swallow improving today- start puree. Continue NGT until definitely improving and swallowing Rx     ACP (advance care planning)  Advance Care Planning     Date: 04/04/2025  - palliative consulted          Acute bacterial endocarditis  - TTE 4/4/25: "1.9x1.2cm large fixed heterogeneous mass present on the posterior leaflet (new finding vs 9/2023 echo). There is moderate to severe regurgitation with an eccentric jet. Cannot exclude severe MR with possible PMVL perforation in association with large vegetation."  - this is in the setting of MRSA bacteremia  - ID is consulted  - repeat cultures are NGTD  - suspect source is skin/chronic scratching vs AVF infection- cultures from resected AVF with Staph   - discussed with Cardiac surgery Dr Castro 4/4/25- given age and comorbidities, he is very high risk of mortality with surgery. He recommends medical management at this point.  - on vancomycin      MRSA bacteremia  - suspect source= chronic skin scratching vs infected AVF  - cultures of AVF with Staph   - he has endocarditis  - ID consulted  - on vanc     NSTEMI (non-ST elevated myocardial infarction)  - see CAD    VTE Risk Mitigation (From admission, onward)           Ordered     heparin (porcine) injection 5,000 Units  Every 8 hours         04/04/25 0936     IP VTE HIGH RISK PATIENT  Once         04/03/25 1516     Place TEMO hose  Until discontinued         04/03/25 1516     Place sequential compression device  Until discontinued         04/03/25 1516     Reason for No Pharmacological VTE Prophylaxis  Once        Question:  Reasons:  Answer:  Physician Provided (leave comment)    04/03/25 1516                    Discharge Planning   BAYRON:      Code Status: Full Code   Medical Readiness for Discharge Date:   Discharge Plan A: Home Health        10:53 AM Called dez Smyth to update her. All questions answered.     Advance Care Planning     Date: 04/05/2025  - discussed " code status with [place]anta= full code status  - time spent discussing 5 minutes        Critical care time spent on the evaluation and treatment of severe organ dysfunction, review of pertinent labs and imaging studies, discussions with consulting providers and discussions with patient/family: 40 minutes.      \Eileen Jordan MD  Department of Hospital Medicine   [facility] - Intensive Care

## 2025-04-05 NOTE — NURSING
[facility]  Nurses Note -- 4 Eyes      4/4/2025       Skin assessed on: Q Shift      [x] No Pressure Injuries Present    [x]Prevention Measures Documented    [] Yes LDA  for Pressure Injury Previously documented     [] Yes New Pressure Injury Discovered   [] LDA for New Pressure Injury Added      Attending RN:  Laura Cruz RN     Second RN:  CHARLENE Sue

## 2025-04-05 NOTE — PLAN OF CARE
Pt remains in ICU on CRRT. No issues overnight. VSS. NG in place. Pt confused throughout the night, but redirectable. Pt states he is hungry, informed about swallow study in AM. 1 BM noted. Condom cath in place. Some drainage noted to L arm dressing. CHG bath given, linen & gown changed, incontinence care performed. Plan of care reviewed.     Problem: Adult Inpatient Plan of Care  Goal: Plan of Care Review  Outcome: Progressing  Goal: Patient-Specific Goal (Individualized)  Outcome: Progressing  Goal: Absence of Hospital-Acquired Illness or Injury  Outcome: Progressing  Goal: Optimal Comfort and Wellbeing  Outcome: Progressing  Goal: Readiness for Transition of Care  Outcome: Progressing     Problem: Diabetes Comorbidity  Goal: Blood Glucose Level Within Targeted Range  Outcome: Progressing     Problem: Sepsis/Septic Shock  Goal: Optimal Coping  Outcome: Progressing  Goal: Absence of Bleeding  Outcome: Progressing  Goal: Blood Glucose Level Within Targeted Range  Outcome: Progressing  Goal: Absence of Infection Signs and Symptoms  Outcome: Progressing  Goal: Optimal Nutrition Intake  Outcome: Progressing     Problem: Infection  Goal: Absence of Infection Signs and Symptoms  Outcome: Progressing     Problem: Fall Injury Risk  Goal: Absence of Fall and Fall-Related Injury  Outcome: Progressing     Problem: Skin Injury Risk Increased  Goal: Skin Health and Integrity  Outcome: Progressing     Problem: Wound  Goal: Optimal Coping  Outcome: Progressing  Goal: Optimal Functional Ability  Outcome: Progressing  Goal: Absence of Infection Signs and Symptoms  Outcome: Progressing  Goal: Improved Oral Intake  Outcome: Progressing  Goal: Optimal Pain Control and Function  Outcome: Progressing  Goal: Skin Health and Integrity  Outcome: Progressing  Goal: Optimal Wound Healing  Outcome: Progressing     Problem: Coping Ineffective  Goal: Effective Coping  Outcome: Progressing     Problem: CRRT (Continuous Renal Replacement  Therapy)  Goal: Safe, Effective Therapy Delivery  Outcome: Progressing  Goal: Hemodynamic Stability  Outcome: Progressing  Goal: Body Temperature Maintained in Desired Range  Outcome: Progressing  Goal: Absence of Infection Signs and Symptoms  Outcome: Progressing

## 2025-04-05 NOTE — ASSESSMENT & PLAN NOTE
Patient's blood pressure range in the last 24 hours was: BP  Min: 91/50  Max: 151/64.The patient's inpatient anti-hypertensive regimen is listed below:  Current Antihypertensives       Plan  - admitted with shock  - now off vasopressors. Continue to hold BP Rx

## 2025-04-05 NOTE — ASSESSMENT & PLAN NOTE
Patient with known CAD s/p stent placement, which is controlled Will continue ASA, Plavix, and Statin and monitor for S/Sx of angina/ACS. Continue to monitor on telemetry.   - last [facility] was 1/2025: Severely calcified mid RCA stenosis unable to cross with PTCA balloons, treated initially with 0.9 laser atherectomy, followed by CSI atherectomy system with total of 5 runs (3 at low speed, 2 at high speed), pre-dilated using 2.0 compliant and 3.5 noncompliant balloon followed by 3.5 X 16 mm megatron stent.  Focal plaque rupture/erosion noted in the proximal and ostial RCA that were treated with  3.5 X 28 in the proximal RCA, 4.0 X 16 mm in the ostial RCA.  No residual stenosis post intervention.  Patient had ALAN 3 flow at the end of the procedure  - with elevated troponin likely due to septic shock  Recent Labs   Lab 04/04/25  1028   TROPONINI 2.913*   - continue asa, plavix, statin  - Cardiology consult

## 2025-04-06 PROBLEM — R33.9 URINARY RETENTION: Status: ACTIVE | Noted: 2025-04-06

## 2025-04-06 LAB
ABSOLUTE EOSINOPHIL (OHS): 0.34 K/UL
ABSOLUTE MONOCYTE (OHS): 1.32 K/UL (ref 0.3–1)
ABSOLUTE NEUTROPHIL COUNT (OHS): 12.83 K/UL (ref 1.8–7.7)
ALBUMIN SERPL BCP-MCNC: 2 G/DL (ref 3.5–5.2)
ALP SERPL-CCNC: 66 UNIT/L (ref 40–150)
ALT SERPL W/O P-5'-P-CCNC: 21 UNIT/L (ref 10–44)
ANION GAP (OHS): 12 MMOL/L (ref 8–16)
AST SERPL-CCNC: 25 UNIT/L (ref 11–45)
BACTERIA BLD CULT: ABNORMAL
BACTERIA BLD CULT: ABNORMAL
BACTERIA SPEC AEROBE CULT: ABNORMAL
BACTERIA SPEC AEROBE CULT: NO GROWTH
BACTERIA SPEC AEROBE CULT: NO GROWTH
BASOPHILS # BLD AUTO: 0.03 K/UL
BASOPHILS NFR BLD AUTO: 0.2 %
BILIRUB SERPL-MCNC: 0.6 MG/DL (ref 0.1–1)
BUN SERPL-MCNC: 59 MG/DL (ref 8–23)
CALCIUM SERPL-MCNC: 8.5 MG/DL (ref 8.7–10.5)
CHLORIDE SERPL-SCNC: 105 MMOL/L (ref 95–110)
CO2 SERPL-SCNC: 22 MMOL/L (ref 23–29)
CREAT SERPL-MCNC: 7.6 MG/DL (ref 0.5–1.4)
ERYTHROCYTE [DISTWIDTH] IN BLOOD BY AUTOMATED COUNT: 17.9 % (ref 11.5–14.5)
GFR SERPLBLD CREATININE-BSD FMLA CKD-EPI: 6 ML/MIN/1.73/M2
GLUCOSE SERPL-MCNC: 166 MG/DL (ref 70–110)
GRAM STN SPEC: ABNORMAL
HCT VFR BLD AUTO: 25.8 % (ref 40–54)
HGB BLD-MCNC: 8.2 GM/DL (ref 14–18)
IMM GRANULOCYTES # BLD AUTO: 0.17 K/UL (ref 0–0.04)
IMM GRANULOCYTES NFR BLD AUTO: 1.1 % (ref 0–0.5)
LYMPHOCYTES # BLD AUTO: 0.8 K/UL (ref 1–4.8)
MCH RBC QN AUTO: 28.7 PG (ref 27–31)
MCHC RBC AUTO-ENTMCNC: 31.8 G/DL (ref 32–36)
MCV RBC AUTO: 90 FL (ref 82–98)
NUCLEATED RBC (/100WBC) (OHS): 0 /100 WBC
PHOSPHATE SERPL-MCNC: 3.8 MG/DL (ref 2.7–4.5)
PLATELET # BLD AUTO: 118 K/UL (ref 150–450)
PMV BLD AUTO: 11.1 FL (ref 9.2–12.9)
POCT GLUCOSE: 168 MG/DL (ref 70–110)
POCT GLUCOSE: 190 MG/DL (ref 70–110)
POTASSIUM SERPL-SCNC: 3.7 MMOL/L (ref 3.5–5.1)
PROT SERPL-MCNC: 5.9 GM/DL (ref 6–8.4)
RBC # BLD AUTO: 2.86 M/UL (ref 4.6–6.2)
RELATIVE EOSINOPHIL (OHS): 2.2 %
RELATIVE LYMPHOCYTE (OHS): 5.2 % (ref 18–48)
RELATIVE MONOCYTE (OHS): 8.5 % (ref 4–15)
RELATIVE NEUTROPHIL (OHS): 82.8 % (ref 38–73)
SODIUM SERPL-SCNC: 139 MMOL/L (ref 136–145)
VANCOMYCIN SERPL-MCNC: 19.8 UG/ML (ref ?–80)
WBC # BLD AUTO: 15.49 K/UL (ref 3.9–12.7)

## 2025-04-06 PROCEDURE — 85025 COMPLETE CBC W/AUTO DIFF WBC: CPT | Performed by: HOSPITALIST

## 2025-04-06 PROCEDURE — 99291 CRITICAL CARE FIRST HOUR: CPT | Mod: ,,, | Performed by: INTERNAL MEDICINE

## 2025-04-06 PROCEDURE — 25000003 PHARM REV CODE 250: Performed by: UROLOGY

## 2025-04-06 PROCEDURE — 63600175 PHARM REV CODE 636 W HCPCS: Performed by: HOSPITALIST

## 2025-04-06 PROCEDURE — 99223 1ST HOSP IP/OBS HIGH 75: CPT | Mod: ,,, | Performed by: UROLOGY

## 2025-04-06 PROCEDURE — 52000 CYSTOURETHROSCOPY: CPT | Mod: ,,, | Performed by: UROLOGY

## 2025-04-06 PROCEDURE — 27000221 HC OXYGEN, UP TO 24 HOURS

## 2025-04-06 PROCEDURE — 27000207 HC ISOLATION

## 2025-04-06 PROCEDURE — 51702 INSERT TEMP BLADDER CATH: CPT

## 2025-04-06 PROCEDURE — A4216 STERILE WATER/SALINE, 10 ML: HCPCS | Performed by: HOSPITALIST

## 2025-04-06 PROCEDURE — 27000923 HC TRIALYSIS CATHETER, ANY SIZE

## 2025-04-06 PROCEDURE — 94761 N-INVAS EAR/PLS OXIMETRY MLT: CPT

## 2025-04-06 PROCEDURE — 20000000 HC ICU ROOM

## 2025-04-06 PROCEDURE — 25000003 PHARM REV CODE 250: Performed by: HOSPITALIST

## 2025-04-06 PROCEDURE — 99232 SBSQ HOSP IP/OBS MODERATE 35: CPT | Mod: ,,, | Performed by: INTERNAL MEDICINE

## 2025-04-06 PROCEDURE — 87040 BLOOD CULTURE FOR BACTERIA: CPT | Performed by: HOSPITALIST

## 2025-04-06 PROCEDURE — 80202 ASSAY OF VANCOMYCIN: CPT | Performed by: HOSPITALIST

## 2025-04-06 PROCEDURE — 51701 INSERT BLADDER CATHETER: CPT

## 2025-04-06 PROCEDURE — 84100 ASSAY OF PHOSPHORUS: CPT | Performed by: HOSPITALIST

## 2025-04-06 PROCEDURE — 80053 COMPREHEN METABOLIC PANEL: CPT | Performed by: HOSPITALIST

## 2025-04-06 PROCEDURE — 63600175 PHARM REV CODE 636 W HCPCS: Performed by: INTERNAL MEDICINE

## 2025-04-06 PROCEDURE — 0TCB8ZZ EXTIRPATION OF MATTER FROM BLADDER, VIA NATURAL OR ARTIFICIAL OPENING ENDOSCOPIC: ICD-10-PCS | Performed by: UROLOGY

## 2025-04-06 PROCEDURE — 51700 IRRIGATION OF BLADDER: CPT

## 2025-04-06 PROCEDURE — 25000003 PHARM REV CODE 250: Performed by: STUDENT IN AN ORGANIZED HEALTH CARE EDUCATION/TRAINING PROGRAM

## 2025-04-06 PROCEDURE — 51798 US URINE CAPACITY MEASURE: CPT

## 2025-04-06 RX ORDER — HYDROMORPHONE HYDROCHLORIDE 1 MG/ML
1 INJECTION, SOLUTION INTRAMUSCULAR; INTRAVENOUS; SUBCUTANEOUS EVERY 4 HOURS PRN
Status: DISCONTINUED | OUTPATIENT
Start: 2025-04-06 | End: 2025-04-06

## 2025-04-06 RX ORDER — TAMSULOSIN HYDROCHLORIDE 0.4 MG/1
0.4 CAPSULE ORAL DAILY
Status: DISCONTINUED | OUTPATIENT
Start: 2025-04-06 | End: 2025-04-29 | Stop reason: HOSPADM

## 2025-04-06 RX ORDER — HYDROMORPHONE HYDROCHLORIDE 1 MG/ML
0.5 INJECTION, SOLUTION INTRAMUSCULAR; INTRAVENOUS; SUBCUTANEOUS EVERY 4 HOURS PRN
Status: DISPENSED | OUTPATIENT
Start: 2025-04-06 | End: 2025-04-07

## 2025-04-06 RX ORDER — HYDROMORPHONE HYDROCHLORIDE 1 MG/ML
1 INJECTION, SOLUTION INTRAMUSCULAR; INTRAVENOUS; SUBCUTANEOUS EVERY 6 HOURS PRN
Status: DISCONTINUED | OUTPATIENT
Start: 2025-04-06 | End: 2025-04-06

## 2025-04-06 RX ORDER — LIDOCAINE HYDROCHLORIDE 20 MG/ML
JELLY TOPICAL ONCE AS NEEDED
Status: COMPLETED | OUTPATIENT
Start: 2025-04-06 | End: 2025-04-06

## 2025-04-06 RX ORDER — HYDROMORPHONE HYDROCHLORIDE 1 MG/ML
1 INJECTION, SOLUTION INTRAMUSCULAR; INTRAVENOUS; SUBCUTANEOUS ONCE
Status: COMPLETED | OUTPATIENT
Start: 2025-04-06 | End: 2025-04-06

## 2025-04-06 RX ORDER — HEPARIN SODIUM 5000 [USP'U]/ML
5000 INJECTION, SOLUTION INTRAVENOUS; SUBCUTANEOUS EVERY 8 HOURS
Status: DISCONTINUED | OUTPATIENT
Start: 2025-04-07 | End: 2025-04-07

## 2025-04-06 RX ADMIN — INSULIN ASPART 4 UNITS: 100 INJECTION, SOLUTION INTRAVENOUS; SUBCUTANEOUS at 09:04

## 2025-04-06 RX ADMIN — Medication 6 MG: at 09:04

## 2025-04-06 RX ADMIN — HYDROMORPHONE HYDROCHLORIDE 1 MG: 1 INJECTION, SOLUTION INTRAMUSCULAR; INTRAVENOUS; SUBCUTANEOUS at 04:04

## 2025-04-06 RX ADMIN — SODIUM CHLORIDE, PRESERVATIVE FREE 10 ML: 5 INJECTION INTRAVENOUS at 09:04

## 2025-04-06 RX ADMIN — LIDOCAINE HYDROCHLORIDE 10 ML: 20 JELLY TOPICAL at 04:04

## 2025-04-06 RX ADMIN — HYDROMORPHONE HYDROCHLORIDE 1 MG: 1 INJECTION, SOLUTION INTRAMUSCULAR; INTRAVENOUS; SUBCUTANEOUS at 07:04

## 2025-04-06 RX ADMIN — HYDROMORPHONE HYDROCHLORIDE 1 MG: 1 INJECTION, SOLUTION INTRAMUSCULAR; INTRAVENOUS; SUBCUTANEOUS at 06:04

## 2025-04-06 RX ADMIN — SODIUM CHLORIDE, PRESERVATIVE FREE 10 ML: 5 INJECTION INTRAVENOUS at 06:04

## 2025-04-06 RX ADMIN — PANTOPRAZOLE SODIUM 40 MG: 40 TABLET, DELAYED RELEASE ORAL at 08:04

## 2025-04-06 RX ADMIN — ASPIRIN 81 MG CHEWABLE TABLET 81 MG: 81 TABLET CHEWABLE at 08:04

## 2025-04-06 RX ADMIN — CLOPIDOGREL BISULFATE 75 MG: 75 TABLET, FILM COATED ORAL at 08:04

## 2025-04-06 RX ADMIN — DIPHENHYDRAMINE HYDROCHLORIDE 50 MG: 25 CAPSULE ORAL at 09:04

## 2025-04-06 RX ADMIN — HEPARIN SODIUM 5000 UNITS: 5000 INJECTION INTRAVENOUS; SUBCUTANEOUS at 02:04

## 2025-04-06 RX ADMIN — HEPARIN SODIUM 5000 UNITS: 5000 INJECTION INTRAVENOUS; SUBCUTANEOUS at 06:04

## 2025-04-06 RX ADMIN — MUPIROCIN: 20 OINTMENT TOPICAL at 09:04

## 2025-04-06 RX ADMIN — SODIUM CHLORIDE, PRESERVATIVE FREE 10 ML: 5 INJECTION INTRAVENOUS at 02:04

## 2025-04-06 RX ADMIN — ACETAMINOPHEN 650 MG: 325 TABLET ORAL at 03:04

## 2025-04-06 RX ADMIN — ATORVASTATIN CALCIUM 80 MG: 40 TABLET, FILM COATED ORAL at 09:04

## 2025-04-06 RX ADMIN — MUPIROCIN: 20 OINTMENT TOPICAL at 08:04

## 2025-04-06 NOTE — CONSULTS
[facility] - Intensive Care  Urology  Consult Note    Patient Name: Jevon Rajan  MRN: [id number]  Admission Date: 4/3/2025  Hospital Length of Stay: 3   Code Status: Full Code   Attending Provider: Eileen Jordan MD   Consulting Provider: Agustin Kramer MD  Primary Care Physician: Melia, Primary Doctor  Principal Problem:Septic shock    Inpatient consult to Urology  Consult performed by: Agustin Kramer MD  Consult ordered by: Eileen Jordan MD          Subjective:     HPI:  Urinary Retention  Patient complains of urinary retention. Onset of retention was 1 day ago and was gradual in onset. Patient currently does not have a urinary catheter in place.  300 ml of urine were scanned on bladder scanner Prior to this event voiding symptoms consisted of slow stream. Prior treatments include none. Recent medications that may have affected his voiding include none.   He still makes urine, he tells me an almost normal amount.  He is very uncomfortable at the level of the bladder.  Nursing staff attempted coude catheter was then successfully.  He agrees to allow me to place a catheter.    Past Medical History:   Diagnosis Date    BPH (benign prostatic hyperplasia)     Coronary artery disease     [facility] his care to this clinic. In 2014 he had stent to LAD. He states he has had 2 heart attacks. He does not get angina. There was 90% left anterior descending artery stenosis undergoing stenting. There was also a circumflex marginal branch stenosis of 70%.    Diabetes mellitus, type 2     ESRD (end stage renal disease) on dialysis     ESRD on HD TTS via left brachial AVF    Hypertension     Hypothyroidism, unspecified     NSTEMI (non-ST elevated myocardial infarction) 4/4/2025    Sepsis 4/2/2025    Symptomatic anemia 01/15/2024       Past Surgical History:   Procedure Laterality Date    ANGIOGRAM, CORONARY, WITH LEFT HEART CATHETERIZATION  1/13/2025    Procedure:  Angiogram, Coronary, with Left Heart Cath;  Surgeon: Steve Bhat MD;  Location: [facility] CATH LAB/EP;  Service: Cardiology;;    ATHERECTOMY, CORONARY N/A 1/14/2025    Procedure: Atherectomy-coronary;  Surgeon: Giacomo Pittman MD;  Location: [facility] CATH LAB/EP;  Service: Cardiology;  Laterality: N/A;    bilateral foot surgery      CORONARY ANGIOPLASTY WITH STENT PLACEMENT N/A 1/14/2025    Procedure: ANGIOPLASTY, CORONARY ARTERY, WITH STENT INSERTION;  Surgeon: Giacomo Pittman MD;  Location: [facility] CATH LAB/EP;  Service: Cardiology;  Laterality: N/A;    CORONARY STENT PLACEMENT N/A 1/13/2025    Procedure: INSERTION, STENT, CORONARY ARTERY;  Surgeon: Steve Bhat MD;  Location: [facility] CATH LAB/EP;  Service: Cardiology;  Laterality: N/A;    ESOPHAGOGASTRODUODENOSCOPY N/A 2/27/2024    Procedure: EGD (ESOPHAGOGASTRODUODENOSCOPY);  Surgeon: Gemma Almendarez MD;  Location: [facility] ENDO;  Service: Endoscopy;  Laterality: N/A;    EXPLORATION, ARTERY, UPPER EXTREMITY Left 4/3/2025    Procedure: EXPLORATION, ARTERY, UPPER EXTREMITY;  Surgeon: Sunil Contreras MD;  Location: [facility];  Service: Vascular;  Laterality: Left;    eyelid surgery      FISTULOGRAM Left 11/27/2019    Procedure: Fistulogram;  Surgeon: MELANY Brenner III, MD;  Location: [address];  Service: Peripheral Vascular;  Laterality: Left;    FISTULOGRAM Left 11/27/2019    Procedure: Fistulogram, AVG declot, possible permacath;  Surgeon: MELANY Brenner III, MD;  Location: [facility] CATH LAB;  Service: Peripheral Vascular;  Laterality: Left;    INSERTION OF DIALYSIS CATHETER      IVUS, CORONARY  1/13/2025    Procedure: IVUS, Coronary;  Surgeon: Steve Bhat MD;  Location: [facility] CATH LAB/EP;  Service: Cardiology;;    LEFT HEART CATHETERIZATION Left 1/14/2025    Procedure: Left heart cath;  Surgeon: Giacomo Pittman MD;  Location: [facility] CATH LAB/EP;  Service: Cardiology;  Laterality: Left;    [facility]  12/5/13    PERCUTANEOUS CORONARY INTERVENTION,  ARTERY N/A 1/14/2025    Procedure: Percutaneous coronary intervention;  Surgeon: Giacomo Pittman MD;  Location: [facility] CATH LAB/EP;  Service: Cardiology;  Laterality: N/A;    PERCUTANEOUS TRANSLUMINAL BALLOON ANGIOPLASTY OF CORONARY ARTERY  1/13/2025    Procedure: Angioplasty-coronary;  Surgeon: Steve Bhat MD;  Location: [facility] CATH LAB/EP;  Service: Cardiology;;    REMOVAL, GRAFT, ARTERIOVENOUS, UPPER EXTREMITY Left 4/3/2025    Procedure: REMOVAL, GRAFT, ARTERIOVENOUS, UPPER EXTREMITY;  Surgeon: Sunil Contreras MD;  Location: [facility] OR;  Service: Vascular;  Laterality: Left;    REVASCULARIZATION, ENDOVASCULAR, LE W/ INTRAVASCULAR LITHOTRIPSY  1/13/2025    Procedure: REVASCULARIZATION, ENDOVASCULAR, LE W/ INTRAVASCULAR LITHOTRIPSY;  Surgeon: Steve Bhat MD;  Location: [facility] CATH LAB/EP;  Service: Cardiology;;    right hand surgery      stents         Review of patient's allergies indicates:   Allergen Reactions    Ace inhibitors Hives, Itching, Shortness Of Breath, Other (See Comments) and Rash     Is not sure which medication it was    Captopril        Family History    None         Tobacco Use    Smoking status: Never    Smokeless tobacco: Never   Substance and Sexual Activity    Alcohol use: No    Drug use: No    Sexual activity: Not Currently       Review of Systems   Constitutional:  Negative for fever.   Respiratory:  Negative for chest tightness and wheezing.    Cardiovascular:  Negative for chest pain and palpitations.   Gastrointestinal:  Negative for abdominal pain, diarrhea, nausea and vomiting.   Genitourinary:  Positive for difficulty urinating. Negative for dysuria, flank pain and hematuria.   Musculoskeletal:  Negative for arthralgias.   Neurological:  Negative for dizziness.   Psychiatric/Behavioral:  Negative for confusion.        Objective:     Temp:  [98.1 °F (36.7 °C)-99.1 °F (37.3 °C)] 98.2 °F (36.8 °C)  Pulse:  [] 92  Resp:  [13-29] 14  SpO2:  [90 %-100 %] 92 %  BP:  ()/() 146/65  Weight: 61.2 kg (134 lb 14.7 oz)  Body mass index is 21.13 kg/m².    Date 04/06/25 0700 - 04/07/25 0659   Shift 6822-8916 3724-6950 4148-5545 24 Hour Total   INTAKE   NG/ 40  240   Shift Total(mL/kg) 200(3.3) 40(0.7)  240(3.9)   OUTPUT   Shift Total(mL/kg)       Weight (kg) 61.2 61.2 61.2 61.2     Bladder Scan Volume (mL): (S) 331 mL (refer to nursing note) (04/06/25 1449)    Drains       Drain  Duration                  Hemodialysis AV Fistula Left upper arm -- days         NG/OG Tube 04/04/25 1415 Rolf 10 Fr. Left nostril 2 days    Trialysis (Dialysis) Catheter 04/04/25 1208 right internal jugular 2 days         Urethral Catheter 04/06/25 1718 Coude 16 Fr. <1 day                     Physical Exam  Vitals and nursing note reviewed.   Constitutional:       Appearance: He is well-developed.   HENT:      Head: Normocephalic.   Eyes:      Conjunctiva/sclera: Conjunctivae normal.   Neck:      Thyroid: No thyromegaly.      Trachea: No tracheal deviation.   Cardiovascular:      Rate and Rhythm: Normal rate.      Heart sounds: Normal heart sounds.   Pulmonary:      Effort: Pulmonary effort is normal. No respiratory distress.      Breath sounds: Normal breath sounds. No wheezing.   Abdominal:      General: Bowel sounds are normal.      Palpations: Abdomen is soft.      Tenderness: There is no abdominal tenderness. There is no rebound.      Hernia: No hernia is present.   Genitourinary:     Penis: Normal.       Comments: Eighteen and 12 Somali coude tip catheters were attempted at the bedside unsuccessfully.  Musculoskeletal:         General: No tenderness. Normal range of motion.      Cervical back: Normal range of motion and neck supple.   Lymphadenopathy:      Cervical: No cervical adenopathy.   Skin:     General: Skin is warm and dry.      Findings: No erythema or rash.   Neurological:      Mental Status: He is alert and oriented to person, place, and time.   Psychiatric:          "Behavior: Behavior normal.         Thought Content: Thought content normal.         Judgment: Judgment normal.          Significant Labs:    BMP:  Recent Labs   Lab 04/05/25  0324 04/05/25  1426 04/06/25  0317    139 139   K 4.2 4.0 3.7    104 105   CO2 22* 23 22*   BUN 58* 49* 59*   CREATININE 7.3* 6.2* 7.6*   GLUCOSE 121* 153* 166*   CALCIUM 8.4* 8.2* 8.5*       CBC:  Recent Labs   Lab 04/04/25  0247 04/05/25  0324 04/06/25  0317   WBC 16.11* 17.18* 15.49*   HGB 10.0* 8.6* 8.2*   HCT 30.0* 26.7* 25.8*   * 109* 118*       Blood Culture: No results for input(s): "LABBLOO" in the last 168 hours.  Urine Culture:   Recent Labs   Lab 04/02/25  2246   LABURIN No Significant Growth       Significant Imaging:  CT 4/2: very large prostate, likely TURP defect                    Assessment and Plan:     Urinary retention  Verbally agrees to allow me to proceed with bedside cystoscopy        VTE Risk Mitigation (From admission, onward)           Ordered     heparin (porcine) injection 1,000 Units  As needed (PRN)         04/05/25 2100     heparin (porcine) injection 5,000 Units  Every 8 hours         04/04/25 0936     IP VTE HIGH RISK PATIENT  Once         04/03/25 1516     Place TEMO hose  Until discontinued         04/03/25 1516     Place sequential compression device  Until discontinued         04/03/25 1516     Reason for No Pharmacological VTE Prophylaxis  Once        Question:  Reasons:  Answer:  Physician Provided (leave comment)    04/03/25 1516                    Thank you for your consult. I will follow-up with patient. Please contact us if you have any additional questions.    Agustin Kramer MD  Urology  [facility] [place] - Intensive Care  "

## 2025-04-06 NOTE — PROGRESS NOTES
[facility] - Intensive Care  Nephrology  Progress Note    Patient Name: Jevon Rajan  MRN: [id number]  Admission Date: 4/3/2025  Hospital Length of Stay: 3 days  Attending Provider: Eileen Jordan MD   Primary Care Physician: Melia, Primary Doctor  Principal Problem:Septic shock    Subjective:     HPI: Mr. Rajan is an 84 yo male with HTN, T2DM, CAD, ESRD, and liver lesion who was transferred from [facility] the [facility] for septic shock and impending AVG rupture. He initially presented to [facility] at the [facility] on 4/1 with temp 101.9 and BP 80/30s. He was transferred to our hospital on 4/3/25; it seems his AVG ruptured during transport/shortly after arrival. He emergently went to the OR yesterday with AVG extraction; infected hematoma was noted. Workup also concerning for elevated troponin and cardiac vegetations. He is no longer on pressors. Nephrology consulted for ESRD. Prior records obtained and reviewed. He receives HD TTS at [facility] under the c/o Dr. Colin. He last received HD outpatient on 4/1/25. HD was held at [facility] at the [facility] due to unstable vascular access. Family agreed to proceed with CRRT today.     Interval History: off CRRT since yesterday afternoon. No events overnight. Complained of urinary retention this morning, but resting comfortably at time of my exam.        Review of patient's allergies indicates:   Allergen Reactions    Ace inhibitors Hives, Itching, Shortness Of Breath, Other (See Comments) and Rash     Is not sure which medication it was    Captopril      Current Facility-Administered Medications   Medication Frequency    0.9%  NaCl infusion (for blood administration) Q24H PRN    acetaminophen tablet 650 mg Q4H PRN    aspirin chewable tablet 81 mg Daily    atorvastatin tablet 80 mg QHS    clopidogreL tablet 75 mg Daily    dextrose 50% injection 12.5 g PRN    dextrose 50% injection 25 g PRN    diphenhydrAMINE capsule 50 mg Q6H PRN    glucagon (human recombinant) injection 1 mg PRN    glucose chewable tablet 16 g PRN    glucose  "chewable tablet 24 g PRN    heparin (porcine) injection 1,000 Units PRN    heparin (porcine) injection 5,000 Units Q8H    HYDROmorphone injection 1 mg Q6H PRN    insulin aspart U-100 pen 0-5 Units QID (AC + HS) PRN    melatonin tablet 6 mg Nightly PRN    mupirocin 2 % ointment BID    naloxone 0.4 mg/mL injection 0.02 mg PRN    ondansetron injection 4 mg Q6H PRN    pantoprazole EC tablet 40 mg Daily    prochlorperazine injection Soln 5 mg Q6H PRN    sodium chloride 0.9% flush 10 mL Q8H    vancomycin - pharmacy to dose pharmacy to manage frequency       Objective:     Vital Signs (Most Recent):  Temp: 98.9 °F (37.2 °C) (04/06/25 0400)  Pulse: 90 (04/06/25 1100)  Resp: 19 (04/06/25 1100)  BP: (!) 164/71 (pt moving around in bed, stating "I gotta pee, i gotta pee!" while BP cuff inflating. Pt able to pass small amount of urine, and then calmed back down. Denies pain.) (04/06/25 1100)  SpO2: 97 % (04/06/25 1100) Vital Signs (24h Range):  Temp:  [97.9 °F (36.6 °C)-99.1 °F (37.3 °C)] 98.9 °F (37.2 °C)  Pulse:  [84-95] 90  Resp:  [13-29] 19  SpO2:  [90 %-100 %] 97 %  BP: ()/(47-79) 164/71     Weight: 61.2 kg (134 lb 14.7 oz) (04/04/25 1937)  Body mass index is 21.13 kg/m².  Body surface area is 1.7 meters squared.    I/O last 3 completed shifts:  In: 464.1 [P.O.:20; NG/GT:345; IV Piggyback:99.1]  Out: 895 [Other:892; Stool:3]     Physical Exam  Vitals and nursing note reviewed.   Constitutional:       General: He is sleeping. He is not in acute distress.     Appearance: Normal appearance. He is well-developed.   HENT:      Head: Normocephalic and atraumatic.      Nose: Nose normal.      Mouth/Throat:      Mouth: Mucous membranes are moist.   Eyes:      Extraocular Movements: Extraocular movements intact.      Conjunctiva/sclera: Conjunctivae normal.   Cardiovascular:      Rate and Rhythm: Normal rate and regular rhythm.   Pulmonary:      Effort: Pulmonary effort is normal.      Breath sounds: Normal breath sounds. "   Abdominal:      General: There is no distension.      Palpations: Abdomen is soft.   Musculoskeletal:      Right lower leg: No edema.      Left lower leg: No edema.   Skin:     General: Skin is warm and dry.      Findings: Lesion (diffuse) present. No erythema or rash.          Significant Labs:  CBC:   Recent Labs   Lab 04/06/25  0317   WBC 15.49*   RBC 2.86*   HGB 8.2*   HCT 25.8*   *   MCV 90   MCH 28.7   MCHC 31.8*     CMP:   Recent Labs   Lab 04/06/25  0317   CALCIUM 8.5*   ALBUMIN 2.0*      K 3.7   CO2 22*      BUN 59*   CREATININE 7.6*   ALKPHOS 66   ALT 21   AST 25   BILITOT 0.6     All labs within the past 24 hours have been reviewed.     Significant Imaging:  Labs: Reviewed    Assessment/Plan:     ESRD  - receives HD TTS at [facility]; last received HD on 4/1  - s/p AVG resection 4/3  - received CRRT from 4/4-4/5  - no need for RRT today; will plan for HD tomorrow. Of note, HD will be high risk procedure in setting of elevated troponin and non-operable PMVL perforation; recommend performing HD in the ICU tomorrow for close monitoring  - daily labs. Straight cath PRN.     Bacteremia  - BCx from 4/4 remain positive; not currently a candidate for tunneled HD catheter placement  - ID following    Secondary HPTH  - CCa and phos normal  - no need for phos binders at this time    Anemia of CKD  - hgb 8.2 today; will resume NAKIA with HD tomorrow      Thank you for your consult. I will follow-up with patient. Please contact us if you have any additional questions.    Юлия Davis MD  Nephrology  [facility] - Intensive Care

## 2025-04-06 NOTE — ASSESSMENT & PLAN NOTE
- he is oriented to self but otherwise confused  - CTH 4/1/25 with no acute process  - suspect this is due to sepsis  - NGT placed on 4/4/25 so that he could get his asa/plavix  - swallow- continue puree. Continue NGT until definitely improving and swallowing Rx

## 2025-04-06 NOTE — PLAN OF CARE
Problem: Adult Inpatient Plan of Care  Goal: Plan of Care Review  Outcome: Progressing  Goal: Patient-Specific Goal (Individualized)  Outcome: Progressing  Goal: Absence of Hospital-Acquired Illness or Injury  Outcome: Progressing  Goal: Optimal Comfort and Wellbeing  Outcome: Progressing  Goal: Readiness for Transition of Care  Outcome: Progressing     Problem: Diabetes Comorbidity  Goal: Blood Glucose Level Within Targeted Range  Outcome: Progressing     Problem: Sepsis/Septic Shock  Goal: Optimal Coping  Outcome: Progressing  Goal: Absence of Bleeding  Outcome: Progressing  Goal: Blood Glucose Level Within Targeted Range  Outcome: Progressing  Goal: Absence of Infection Signs and Symptoms  Outcome: Progressing  Goal: Optimal Nutrition Intake  Outcome: Progressing     Problem: Infection  Goal: Absence of Infection Signs and Symptoms  Outcome: Progressing     Problem: Fall Injury Risk  Goal: Absence of Fall and Fall-Related Injury  Outcome: Progressing     Problem: Skin Injury Risk Increased  Goal: Skin Health and Integrity  Outcome: Progressing     Problem: Wound  Goal: Optimal Coping  Outcome: Progressing  Goal: Optimal Functional Ability  Outcome: Progressing  Goal: Absence of Infection Signs and Symptoms  Outcome: Progressing  Goal: Improved Oral Intake  Outcome: Progressing  Goal: Optimal Pain Control and Function  Outcome: Progressing  Goal: Skin Health and Integrity  Outcome: Progressing  Goal: Optimal Wound Healing  Outcome: Progressing     Problem: Coping Ineffective  Goal: Effective Coping  Outcome: Progressing     Problem: CRRT (Continuous Renal Replacement Therapy)  Goal: Safe, Effective Therapy Delivery  Outcome: Progressing  Goal: Hemodynamic Stability  Outcome: Progressing  Goal: Body Temperature Maintained in Desired Range  Outcome: Progressing  Goal: Absence of Infection Signs and Symptoms  Outcome: Progressing

## 2025-04-06 NOTE — NURSING
"Attempted to cath pt with coude x1 unsuccessful attempt. Pt screaming, c/o discomfort, and bleeding noted from urethra. Immediately stopped. Alvaro Chambers sent message in secure chat: "hey doc, attempted to straight cath with coude and pt did not tolerate. he had blood coming from his urethra, and felt the tube coiling, no urine output."  "

## 2025-04-06 NOTE — SUBJECTIVE & OBJECTIVE
Past Medical History:   Diagnosis Date    BPH (benign prostatic hyperplasia)     Coronary artery disease     He has followed at the [facility] and is now transferring his care to this clinic. In 2014 he had stent to LAD. He states he has had 2 heart attacks. He does not get angina. There was 90% left anterior descending artery stenosis undergoing stenting. There was also a circumflex marginal branch stenosis of 70%.    Diabetes mellitus, type 2     ESRD (end stage renal disease) on dialysis     ESRD on HD TTS via left brachial AVF    Hypertension     Hypothyroidism, unspecified     NSTEMI (non-ST elevated myocardial infarction) 4/4/2025    Sepsis 4/2/2025    Symptomatic anemia 01/15/2024       Past Surgical History:   Procedure Laterality Date    ANGIOGRAM, CORONARY, WITH LEFT HEART CATHETERIZATION  1/13/2025    Procedure: Angiogram, Coronary, with Left Heart Cath;  Surgeon: Steve Bhat MD;  Location: [facility] CATH LAB/EP;  Service: Cardiology;;    ATHERECTOMY, CORONARY N/A 1/14/2025    Procedure: Atherectomy-coronary;  Surgeon: Giacomo Pittman MD;  Location: [facility] CATH LAB/EP;  Service: Cardiology;  Laterality: N/A;    bilateral foot surgery      CORONARY ANGIOPLASTY WITH STENT PLACEMENT N/A 1/14/2025    Procedure: ANGIOPLASTY, CORONARY ARTERY, WITH STENT INSERTION;  Surgeon: Giacomo Pittman MD;  Location: [facility] CATH LAB/EP;  Service: Cardiology;  Laterality: N/A;    CORONARY STENT PLACEMENT N/A 1/13/2025    Procedure: INSERTION, STENT, CORONARY ARTERY;  Surgeon: Steve Bhat MD;  Location: [facility] CATH LAB/EP;  Service: Cardiology;  Laterality: N/A;    ESOPHAGOGASTRODUODENOSCOPY N/A 2/27/2024    Procedure: EGD (ESOPHAGOGASTRODUODENOSCOPY);  Surgeon: Gemma Almendarez MD;  Location: [facility] ENDO;  Service: Endoscopy;  Laterality: N/A;    EXPLORATION, ARTERY, UPPER EXTREMITY Left 4/3/2025    Procedure: EXPLORATION, ARTERY, UPPER EXTREMITY;  Surgeon: Sunil Contreras MD;  Location: [facility] OR;  Service:  Vascular;  Laterality: Left;    eyelid surgery      FISTULOGRAM Left 11/27/2019    Procedure: Fistulogram;  Surgeon: MELANY Brenner III, MD;  Location: [facility] OR [address] [place] [address];  Service: Peripheral Vascular;  Laterality: Left;    FISTULOGRAM Left 11/27/2019    Procedure: Fistulogram, AVG declot, possible permacath;  Surgeon: MELANY Brenner III, MD;  Location: [facility] CATH LAB;  Service: Peripheral Vascular;  Laterality: Left;    INSERTION OF DIALYSIS CATHETER      IVUS, CORONARY  1/13/2025    Procedure: IVUS, Coronary;  Surgeon: Steve Bhat MD;  Location: [facility] CATH LAB/EP;  Service: Cardiology;;    LEFT HEART CATHETERIZATION Left 1/14/2025    Procedure: Left heart cath;  Surgeon: Giacomo Pittman MD;  Location: [facility] CATH LAB/EP;  Service: Cardiology;  Laterality: Left;    MOH's  12/5/13    PERCUTANEOUS CORONARY INTERVENTION, ARTERY N/A 1/14/2025    Procedure: Percutaneous coronary intervention;  Surgeon: Giacomo Pittman MD;  Location: [facility] CATH LAB/EP;  Service: Cardiology;  Laterality: N/A;    PERCUTANEOUS TRANSLUMINAL BALLOON ANGIOPLASTY OF CORONARY ARTERY  1/13/2025    Procedure: Angioplasty-coronary;  Surgeon: Steve Bhat MD;  Location: [facility] CATH LAB/EP;  Service: Cardiology;;    REMOVAL, GRAFT, ARTERIOVENOUS, UPPER EXTREMITY Left 4/3/2025    Procedure: REMOVAL, GRAFT, ARTERIOVENOUS, UPPER EXTREMITY;  Surgeon: Sunil Contreras MD;  Location: [facility];  Service: Vascular;  Laterality: Left;    REVASCULARIZATION, ENDOVASCULAR, LE W/ INTRAVASCULAR LITHOTRIPSY  1/13/2025    Procedure: REVASCULARIZATION, ENDOVASCULAR, LE W/ INTRAVASCULAR LITHOTRIPSY;  Surgeon: Steve Bhat MD;  Location: [facility] CATH LAB/EP;  Service: Cardiology;;    right hand surgery      stents         Review of patient's allergies indicates:   Allergen Reactions    Ace inhibitors Hives, Itching, Shortness Of Breath, Other (See Comments) and Rash     Is not sure which medication it was    Captopril        Family History     None         Tobacco Use    Smoking status: Never    Smokeless tobacco: Never   Substance and Sexual Activity    Alcohol use: No    Drug use: No    Sexual activity: Not Currently       Review of Systems   Constitutional:  Negative for fever.   Respiratory:  Negative for chest tightness and wheezing.    Cardiovascular:  Negative for chest pain and palpitations.   Gastrointestinal:  Negative for abdominal pain, diarrhea, nausea and vomiting.   Genitourinary:  Positive for difficulty urinating. Negative for dysuria, flank pain and hematuria.   Musculoskeletal:  Negative for arthralgias.   Neurological:  Negative for dizziness.   Psychiatric/Behavioral:  Negative for confusion.        Objective:     Temp:  [98.1 °F (36.7 °C)-99.1 °F (37.3 °C)] 98.2 °F (36.8 °C)  Pulse:  [] 92  Resp:  [13-29] 14  SpO2:  [90 %-100 %] 92 %  BP: ()/() 146/65  Weight: 61.2 kg (134 lb 14.7 oz)  Body mass index is 21.13 kg/m².    Date 04/06/25 0700 - 04/07/25 0659   Shift 2791-8172 4282-5141 5069-6423 24 Hour Total   INTAKE   NG/ 40  240   Shift Total(mL/kg) 200(3.3) 40(0.7)  240(3.9)   OUTPUT   Shift Total(mL/kg)       Weight (kg) 61.2 61.2 61.2 61.2     Bladder Scan Volume (mL): (S) 331 mL (refer to nursing note) (04/06/25 1449)    Drains       Drain  Duration                  Hemodialysis AV Fistula Left upper arm -- days         NG/OG Tube 04/04/25 1415 Rolf 10 Fr. Left nostril 2 days    Trialysis (Dialysis) Catheter 04/04/25 1208 right internal jugular 2 days         Urethral Catheter 04/06/25 1718 Coude 16 Fr. <1 day                     Physical Exam  Vitals and nursing note reviewed.   Constitutional:       Appearance: He is well-developed.   HENT:      Head: Normocephalic.   Eyes:      Conjunctiva/sclera: Conjunctivae normal.   Neck:      Thyroid: No thyromegaly.      Trachea: No tracheal deviation.   Cardiovascular:      Rate and Rhythm: Normal rate.      Heart sounds: Normal heart sounds.   Pulmonary:       "Effort: Pulmonary effort is normal. No respiratory distress.      Breath sounds: Normal breath sounds. No wheezing.   Abdominal:      General: Bowel sounds are normal.      Palpations: Abdomen is soft.      Tenderness: There is no abdominal tenderness. There is no rebound.      Hernia: No hernia is present.   Genitourinary:     Penis: Normal.       Comments: Eighteen and 12 [place] coude tip catheters were attempted at the bedside unsuccessfully.  Musculoskeletal:         General: No tenderness. Normal range of motion.      Cervical back: Normal range of motion and neck supple.   Lymphadenopathy:      Cervical: No cervical adenopathy.   Skin:     General: Skin is warm and dry.      Findings: No erythema or rash.   Neurological:      Mental Status: He is alert and oriented to person, place, and time.   Psychiatric:         Behavior: Behavior normal.         Thought Content: Thought content normal.         Judgment: Judgment normal.          Significant Labs:    BMP:  Recent Labs   Lab 04/05/25  0324 04/05/25  1426 04/06/25  0317    139 139   K 4.2 4.0 3.7    104 105   CO2 22* 23 22*   BUN 58* 49* 59*   CREATININE 7.3* 6.2* 7.6*   GLUCOSE 121* 153* 166*   CALCIUM 8.4* 8.2* 8.5*       CBC:  Recent Labs   Lab 04/04/25  0247 04/05/25  0324 04/06/25  0317   WBC 16.11* 17.18* 15.49*   HGB 10.0* 8.6* 8.2*   HCT 30.0* 26.7* 25.8*   * 109* 118*       Blood Culture: No results for input(s): "LABBLOO" in the last 168 hours.  Urine Culture:   Recent Labs   Lab 04/02/25  2246   LABURIN No Significant Growth       Significant Imaging:  CT 4/2: very large prostate, likely TURP defect                  "

## 2025-04-06 NOTE — HPI
Urinary Retention  Patient complains of urinary retention. Onset of retention was 1 day ago and was gradual in onset. Patient currently does not have a urinary catheter in place.  300 ml of urine were scanned on bladder scanner Prior to this event voiding symptoms consisted of slow stream. Prior treatments include none. Recent medications that may have affected his voiding include none.   He still makes urine, he tells me an almost normal amount.  He is very uncomfortable at the level of the bladder.  Nursing staff attempted coude catheter was then successfully.  He agrees to allow me to place a catheter.

## 2025-04-06 NOTE — ASSESSMENT & PLAN NOTE
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 Most recent hemoglobin and hematocrit are listed below.  Recent Labs     04/04/25  0247 04/05/25  0324 04/06/25  0317   HGB 10.0* 8.6* 8.2*   HCT 30.0* 26.7* 25.8*     Plan  - Monitor serial CBC: Daily and recheck now  - Transfuse PRBC if patient becomes hemodynamically unstable, symptomatic or H/H drops below 7/21.  - Patient has not received any PRBC transfusions to date  - Patient's anemia is currently worsening. Will continue current treatment

## 2025-04-06 NOTE — ASSESSMENT & PLAN NOTE
This patient has shock. The type of shock is distributive due to sepsis. The patient had the following evidence of shock: persistent hypotension and altered mental status. 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 Shock is now resolved and vasopressors are off. WBC has not yet improved

## 2025-04-06 NOTE — PROGRESS NOTES
"[facility] Intensive Care  [facility] Medicine  Progress Note    Patient Name: Jevon Rajan  MRN: [id number]  Patient Class: IP- Inpatient   Admission Date: 4/3/2025  Length of Stay: 3 days  Attending Physician: Eileen Jordan MD  Primary Care Provider: Melia, Primary Doctor        Subjective     Principal Problem:Septic shock        HPI:  Mr Jevon Rajan is a 85 y.o. man with ESRD with LUE AVF, HFpEF last EF 50-55%, CAD, DM who was transferred to [facility] ICU for septic shock due to MRSA bacteremia.     He was admitted at [facility] 4/1-3/2025 with sepsis, worsening to septic shock, then identified source as MRSA. 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     Upon arrival to [facility], he is moaning in pain and his LUE AVF has active pulsatile bright red blood. He does respond to his name. He denies pain anywhere other than his LUE. He is on levophed at 0.05.     Overview/Hospital Course:  Mr Jevon Rajan is a 85 y.o. man with ESRD on HD with LUE AVF that has been bleeding, CHF, CAD s/p recent stenting 1/2025 who was admitted with MRSA bacteremia and bleeding from his LUE AVF. Continue levophed, vancomycin. Vascular surgery emergently consulted; on 4/3/25 they took him to OR and noted: "well incorporated proximal and central graft without evidence of infection, resected graft and covered proximal and central remnants with multiple layers. Mid graft removed in entirety and sent for culture. Ruptured pseudoaneurysm with infected hematoma, graft completely obliterated at mid segment." Surgical cultures with Staph. Suspect AVF or chronic scratching of pruritic skin is the source of his infection. TTE showed endocarditis: EF 40-45%, G1DD, RV systolic dysfunction, large 1.9x1.2cm fixed mass on the posterior mitral valve leaflet with moder to severe regurgitation, cannot rule out posterior mitral valve " "leaflet perforation. Discussed with cardiac surgery; he is high mortality risk, and surgery is not advised. ID following. Nephrology consulted; trialysis was placed for HD. Persistently positive for MRSA in blood cultures.     Interval History: he awakens easily. Nursing reports that he has not eaten much. He denies pain. He was very upset and agitated when he had to urinate, trying to get out of the bed.     Review of Systems   Constitutional:  Positive for fatigue.   Respiratory:  Negative for shortness of breath.    Cardiovascular:  Negative for chest pain.   Gastrointestinal:  Negative for abdominal pain.   Psychiatric/Behavioral:  Positive for confusion.      Objective:     Vital Signs (Most Recent):  Temp: 98.9 °F (37.2 °C) (04/06/25 0400)  Pulse: 90 (04/06/25 1100)  Resp: 19 (04/06/25 1100)  BP: (!) 164/71 (pt moving around in bed, stating "I gotta pee, i gotta pee!" while BP cuff inflating. Pt able to pass small amount of urine, and then calmed back down. Denies pain.) (04/06/25 1100)  SpO2: 97 % (04/06/25 1100) Vital Signs (24h Range):  Temp:  [97.9 °F (36.6 °C)-99.1 °F (37.3 °C)] 98.9 °F (37.2 °C)  Pulse:  [84-95] 90  Resp:  [13-29] 19  SpO2:  [90 %-100 %] 97 %  BP: ()/(47-79) 164/71     Weight: 61.2 kg (134 lb 14.7 oz)  Body mass index is 21.13 kg/m².    Intake/Output Summary (Last 24 hours) at 4/6/2025 1221  Last data filed at 4/6/2025 1100  Gross per 24 hour   Intake 420 ml   Output 3 ml   Net 417 ml         Physical Exam  Vitals and nursing note reviewed.   Constitutional:       Appearance: He is ill-appearing.      Comments: Awakens easily to voice    HENT:      Head: Normocephalic and atraumatic.      Nose:      Comments: NGT in place      Mouth/Throat:      Mouth: Mucous membranes are dry.   Neck:      Comments: RIJ trialysis  Cardiovascular:      Rate and Rhythm: Normal rate and regular rhythm.   Pulmonary:      Effort: Pulmonary effort is normal.      Breath sounds: Normal breath sounds.     "  Comments: O2 by NC  Abdominal:      General: Bowel sounds are normal.      Palpations: Abdomen is soft.   Musculoskeletal:      Comments: LUE in bandage, not removed   Skin:     Comments: Very dry skin with hyperpigmented patches   Neurological:      Comments: Oriented to self, location               Significant Labs: All pertinent labs within the past 24 hours have been reviewed.    Significant Imaging: I have reviewed all pertinent imaging results/findings within the past 24 hours.      Assessment & Plan  Septic shock  This patient has shock. The type of shock is distributive due to sepsis. The patient had the following evidence of shock: persistent hypotension and altered mental status. 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 Shock is now resolved and vasopressors are off. WBC has not yet improved   HTN (hypertension)  Patient's blood pressure range in the last 24 hours was: BP  Min: 96/52  Max: 164/71.The patient's inpatient anti-hypertensive regimen is listed below:  Current Antihypertensives       Plan  - admitted with shock  - now off vasopressors. Continue to hold BP Rx   HLD (hyperlipidemia)  - continue statin  Type 2 diabetes mellitus, without long-term current use of insulin  A1c:   Lab Results   Component Value Date    HGBA1C 5.7 (H) 04/02/2025     Meds: SSI PRN to maintain goal 140-180  accuchecks, hypoglycemic protocol      Coronary artery disease involving native coronary artery of native heart without angina pectoris  Patient with known CAD s/p stent placement, which is controlled Will continue ASA, Plavix, and Statin and monitor for S/Sx of angina/ACS. Continue to monitor on telemetry.   - last [facility] was 1/2025: Severely calcified  "mid RCA stenosis unable to cross with PTCA balloons, treated initially with 0.9 laser atherectomy, followed by CSI atherectomy system with total of 5 runs (3 at low speed, 2 at high speed), pre-dilated using 2.0 compliant and 3.5 noncompliant balloon followed by 3.5 X 16 mm megatron stent.  Focal plaque rupture/erosion noted in the proximal and ostial RCA that were treated with  3.5 X 28 in the proximal RCA, 4.0 X 16 mm in the ostial RCA.  No residual stenosis post intervention.  Patient had ALAN 3 flow at the end of the procedure  - with elevated troponin likely due to septic shock  Recent Labs   Lab 04/04/25  1028   TROPONINI 2.913*   - continue asa, plavix, statin  - Cardiology consult  ESRD on hemodialysis  - ESRD on HD via LUE AVF  - LUE AVF was actively bleeding on arrival on 4/3/25. Vascular surgery was consulted. He went emergently to the OR on 4/3/25: "Severely calcified mid RCA stenosis unable to cross with PTCA balloons, treated initially with 0.9 laser atherectomy, followed by CSI atherectomy system with total of 5 runs (3 at low speed, 2 at high speed), pre-dilated using 2.0 compliant and 3.5 noncompliant balloon followed by 3.5 X 16 mm megatron stent.  Focal plaque rupture/erosion noted in the proximal and ostial RCA that were treated with  3.5 X 28 in the proximal RCA, 4.0 X 16 mm in the ostial RCA.  No residual stenosis post intervention.  Patient had ALAN 3 flow at the end of the procedure"  - cultures from AVF= Staph   - wound care orders in place for surgical site  - Nephrology is consulted  - trialysis placed on 4/4/25 for CRRT  - he will need THDC when bacteremia is resolved   Anemia in ESRD (end-stage renal disease)  Anemia is likely due to  ESRD, blood loss . Most recent hemoglobin and hematocrit are listed below.  Recent Labs     04/04/25  0247 04/05/25  0324 04/06/25  0317   HGB 10.0* 8.6* 8.2*   HCT 30.0* 26.7* 25.8*     Plan  - Monitor serial CBC: Daily and recheck now  - Transfuse PRBC if " "patient becomes hemodynamically unstable, symptomatic or H/H drops below 7/21.  - Patient has not received any PRBC transfusions to date  - Patient's anemia is currently worsening. Will continue current treatment  Acute metabolic encephalopathy  - he is oriented to self but otherwise confused  - CTH 4/1/25 with no acute process  - suspect this is due to sepsis  - NGT placed on 4/4/25 so that he could get his asa/plavix  - swallow- continue puree. Continue NGT until definitely improving and swallowing Rx     ACP (advance care planning)  Advance Care Planning     Date: 04/04/2025  - palliative consulted          Acute bacterial endocarditis  - TTE 4/4/25: "1.9x1.2cm large fixed heterogeneous mass present on the posterior leaflet (new finding vs 9/2023 echo). There is moderate to severe regurgitation with an eccentric jet. Cannot exclude severe MR with possible PMVL perforation in association with large vegetation."  - this is in the setting of MRSA bacteremia  - ID is consulted  - repeat cultures 4/4 with Staph. Repeat cultures 4/6 pending   - suspect source is skin/chronic scratching vs AVF infection- cultures from resected AVF with Staph   - discussed with Cardiac surgery Dr Castro 4/4/25- given age and comorbidities, he is very high risk of mortality with surgery. He recommends medical management at this point.  - on vancomycin      MRSA bacteremia  - suspect source= chronic skin scratching vs infected AVF  - cultures of AVF with Staph   - he has endocarditis  - ID consulted  - on vanc     NSTEMI (non-ST elevated myocardial infarction)  - see CAD    VTE Risk Mitigation (From admission, onward)           Ordered     heparin (porcine) injection 1,000 Units  As needed (PRN)         04/05/25 2100     heparin (porcine) injection 5,000 Units  Every 8 hours         04/04/25 0936     IP VTE HIGH RISK PATIENT  Once         04/03/25 1516     Place TEMO hose  Until discontinued         04/03/25 1516     Place sequential " compression device  Until discontinued         04/03/25 1516     Reason for No Pharmacological VTE Prophylaxis  Once        Question:  Reasons:  Answer:  Physician Provided (leave comment)    04/03/25 1516                    Discharge Planning   BAYRON: 4/18/2025     Code Status: Full Code   Medical Readiness for Discharge Date:   Discharge Plan A: Home Health            Critical care time spent on the evaluation and treatment of severe organ dysfunction, review of pertinent labs and imaging studies, discussions with consulting providers and discussions with patient/family: 40 minutes.    Please place Justification for DME        Eileen Jordan MD  Department of Hospital Medicine   [facility] - Intensive Care

## 2025-04-06 NOTE — NURSING
Message sent to vascular provider, Lisa, regarding need for pain med as pt is screaming out in pain. Awaiting response.

## 2025-04-06 NOTE — PROGRESS NOTES
Pharmacokinetic Assessment Follow Up: IV Vancomycin    Vancomycin serum concentration assessment(s):    The random level was drawn correctly and can be used to guide therapy at this time. The measurement is within the desired definitive target range of 15 to 20 mcg/mL.    Vancomycin Regimen Plan:    Re-dose when the random level is less than 20 mcg/mL, next level to be drawn at 4/7 on 0300    Drug levels (last 3 results):  Recent Labs   Lab Result Units 04/04/25  0247 04/05/25  0324 04/06/25  0317   Vancomycin Random ug/ml 14.7 17.0 19.8       Pharmacy will continue to follow and monitor vancomycin.    Please contact pharmacy at extension [phone] for questions regarding this assessment.    Thank you for the consult,   Hesham Hernandez       Patient brief summary:  Jevon Rajan is a 85 y.o. male initiated on antimicrobial therapy with IV Vancomycin for treatment of bacteremia    Drug Allergies:   Review of patient's allergies indicates:   Allergen Reactions    Ace inhibitors Hives, Itching, Shortness Of Breath, Other (See Comments) and Rash     Is not sure which medication it was    Captopril        Actual Body Weight:   61.2 kg    Renal Function:   Estimated Creatinine Clearance: 5.9 mL/min (A) (based on SCr of 7.6 mg/dL (H)).,     Dialysis Method (if applicable):  CRRT/iHD    CBC (last 72 hours):  Recent Labs   Lab Result Units 04/03/25  0713 04/03/25  1533 04/03/25  2055 04/04/25  0247 04/05/25  0324 04/06/25  0317   WBC K/uL 17.21* 18.03* 16.23* 16.11* 17.18* 15.49*   HGB gm/dL 8.7* 7.2* 10.2* 10.0* 8.6* 8.2*   HCT % 26.5* 22.7* 30.7* 30.0* 26.7* 25.8*   Platelet Count K/uL 147* 146* 130* 121* 109* 118*   Lymph % % 3.0* 3.3* 3.3* 3.9* 3.6* 5.2*   Mono % % 7.1 6.5 5.9 6.0 7.2 8.5   Eos % % 0.1 0.3 0.3 0.8 1.4 2.2   Basophil % % 0.2 0.2 0.2 0.3 0.3 0.2       Metabolic Panel (last 72 hours):  Recent Labs   Lab Result Units 04/03/25  0713 04/03/25  1533 04/03/25  2055 04/04/25  0247 04/04/25  1439 04/04/25 2001  04/04/25  2151 04/05/25  0324 04/05/25  1159 04/05/25  1426 04/06/25  0317   Sodium mmol/L 138 140 138 139 139  --  140 140  --  139 139   Potassium mmol/L 4.3 3.9 4.8 5.0 5.4*  --  4.6 4.2  --  4.0 3.7   Chloride mmol/L 100 105 101 101 101  --  103 104  --  104 105   CO2 mmol/L 21* 22* 23 23 19*  --  21* 22*  --  23 22*   Glucose mg/dL 242* 203* 231* 202* 181*  --  150* 121*  --  153* 166*   BUN mg/dL 60* 62* 70* 75* 84*  --  68* 58*  --  49* 59*   Creatinine mg/dL 8.8* 8.7* 9.3* 9.9* 10.8*  --  8.5* 7.3*  --  6.2* 7.6*   Albumin g/dL 2.3* 1.9* 2.0* 2.0* 2.1*  --  1.9* 1.9*  --  1.9* 2.0*   Bilirubin Total mg/dL 0.5 0.3 0.6 0.3  --   --   --   --   --   --  0.6   ALP unit/L 57 83 59 70  --   --   --   --   --   --  66   AST unit/L 29 25 24 23  --   --   --   --   --   --  25   ALT unit/L 20 15 18 17  --   --   --   --   --   --  21   Magnesium  mg/dL 2.1  --  2.2 2.3  --  2.2  --  2.1 2.0  --   --    Phosphorus Level mg/dL 5.8*  --  7.2* 7.1* 7.7*  --  5.3* 4.5  4.6*  --  3.6 3.8       Vancomycin Administrations:  vancomycin given in the last 96 hours                     vancomycin (VANCOCIN) 500 mg in D5W 100 mL IVPB (MB+) (mg) 500 mg New Bag 04/05/25 0450    vancomycin (VANCOCIN) 500 mg in D5W 100 mL IVPB (MB+) (mg) 500 mg New Bag 04/04/25 0509    vancomycin injection (g) 1 g Given 04/03/25 1820    vancomycin injection (g) 1 g Given 04/03/25 1753                    Microbiologic Results:  Microbiology Results (last 7 days)       Procedure Component Value Units Date/Time    Blood culture [[id number]]     Order Status: Sent Specimen: Blood     Blood culture [[id number]]     Order Status: Sent Specimen: Blood     Blood culture [[id number]]  (Normal) Collected: 04/04/25 1044    Order Status: Completed Specimen: Blood Updated: 04/05/25 2300     Blood Culture No Growth After 36 Hours    Afb Culture Stain [[id number]] Collected: 04/03/25 1934    Order Status: Sent Specimen: Tissue from AV Fistula, Left Updated:  04/05/25 1843    Afb Culture Stain [[id number]] Collected: 04/03/25 1954    Order Status: Sent Specimen: Wound from Arm, Left Updated: 04/05/25 1843    Afb Culture Stain [[id number]] Collected: 04/03/25 2011    Order Status: Sent Specimen: Wound from Arm, Left Updated: 04/05/25 1843    Afb Culture Stain [[id number]] Collected: 04/03/25 1905    Order Status: Sent Specimen: Tissue from AV Fistula, Left Updated: 04/05/25 1842    Afb Culture Stain [[id number]] Collected: 04/03/25 1921    Order Status: Sent Specimen: Tissue from hematoma Updated: 04/05/25 1842    Afb Culture Stain [[id number]] Collected: 04/03/25 1943    Order Status: Sent Specimen: Wound from Arm, Left Updated: 04/05/25 1842    Afb Culture Stain [[id number]] Collected: 04/03/25 1816    Order Status: Sent Specimen: Tissue from AV Fistula, Left Updated: 04/05/25 1842    Blood culture [[id number]]  (Abnormal) Collected: 04/04/25 1343    Order Status: Completed Specimen: Blood Updated: 04/05/25 1143     Blood Culture Positive - Aerobic/Pediatric Bottle     GRAM STAIN Gram positive cocci in clusters resembling Staph     Comment: Aerobic Bottle Positive    This is an appended report. These results have been appended to a previously preliminary verified report.       Culture, Anaerobic [[id number]] Collected: 04/03/25 2011    Order Status: Completed Specimen: Wound from Arm, Left Updated: 04/05/25 0731     Anaerobe Culture Culture In Progress    Culture, Anaerobic [[id number]] Collected: 04/03/25 1954    Order Status: Completed Specimen: Wound from Arm, Left Updated: 04/05/25 0731     Anaerobe Culture Culture In Progress    Culture, Anaerobic [[id number]] Collected: 04/03/25 1943    Order Status: Completed Specimen: Wound from Arm, Left Updated: 04/05/25 0730     Anaerobe Culture Culture In Progress    Culture, Anaerobic [[id number]] Collected: 04/03/25 1934    Order Status: Completed Specimen: Tissue from AV Fistula, Left Updated: 04/05/25 0730     Anaerobe  Culture Culture In Progress    Culture, Anaerobic [[id number]] Collected: 04/03/25 1921    Order Status: Completed Specimen: Tissue from hematoma Updated: 04/05/25 0729     Anaerobe Culture Culture In Progress    Culture, Anaerobic [[id number]] Collected: 04/03/25 1910    Order Status: Completed Specimen: Tissue from AV Fistula, Left Updated: 04/05/25 0729     Anaerobe Culture Culture In Progress    Culture, Anaerobic [[id number]] Collected: 04/03/25 1816    Order Status: Completed Specimen: Wound from Arm, Left Updated: 04/05/25 0729     Anaerobe Culture Culture In Progress    Aerobic culture [[id number]]  (Abnormal) Collected: 04/03/25 2011    Order Status: Completed Specimen: Wound from Arm, Left Updated: 04/05/25 0632     CULTURE, AEROBIC Few Staphylococcus aureus    Aerobic culture [[id number]]  (Abnormal) Collected: 04/03/25 1954    Order Status: Completed Specimen: Wound from Arm, Left Updated: 04/05/25 0631     CULTURE, AEROBIC Many Staphylococcus aureus    Aerobic culture [[id number]]  (Abnormal) Collected: 04/03/25 1943    Order Status: Completed Specimen: Wound from Arm, Left Updated: 04/05/25 0630     CULTURE, AEROBIC Many Staphylococcus aureus    Narrative:      Identification and Susceptibility to Follow    Aerobic culture [[id number]]  (Abnormal) Collected: 04/03/25 1926    Order Status: Completed Specimen: Wound from Arm, Left Updated: 04/05/25 0627     CULTURE, AEROBIC Many Staphylococcus aureus    Narrative:      Identification and Susceptibility to Follow    Aerobic culture [[id number]] Collected: 04/03/25 1911    Order Status: Completed Specimen: Tissue from Arm, Left Updated: 04/05/25 0623     CULTURE, AEROBIC No Growth To Date    Aerobic culture [[id number]] Collected: 04/03/25 1816    Order Status: Completed Specimen: Wound from Arm, Left Updated: 04/05/25 0622     CULTURE, AEROBIC No Growth To Date    Gram stain [[id number]] Collected: 04/03/25 1923    Order Status: Completed Specimen: Tissue  from Arm, Left Updated: 04/04/25 0837     GRAM STAIN Rare WBC seen      Few Gram positive cocci    Gram stain [[id number]] Collected: 04/03/25 1935    Order Status: Completed Specimen: Tissue from Arm, Left Updated: 04/04/25 0836     GRAM STAIN Rare WBC seen      No organisms seen    Gram stain [[id number]] Collected: 04/03/25 1914    Order Status: Completed Specimen: Wound from Arm, Left Updated: 04/04/25 0836     GRAM STAIN Rare WBC seen      No organisms seen    Gram stain [[id number]] Collected: 04/03/25 2011    Order Status: Completed Specimen: Wound from Arm, Left Updated: 04/04/25 0835     GRAM STAIN Rare WBC seen      No organisms seen    Gram stain [[id number]] Collected: 04/03/25 1821    Order Status: Completed Specimen: Wound from Arm, Left Updated: 04/04/25 0835     GRAM STAIN No WBCs      No organisms seen    Gram stain [[id number]] Collected: 04/03/25 1943    Order Status: Completed Specimen: Wound from Arm, Left Updated: 04/04/25 0834     GRAM STAIN Rare WBC seen      No organisms seen    Gram stain [[id number]] Collected: 04/03/25 1954    Order Status: Completed Specimen: Wound from Arm, Left Updated: 04/04/25 0833     GRAM STAIN Rare WBC seen      No organisms seen    AFB Culture & Smear [[id number]] Collected: 04/03/25 1954    Order Status: Sent Specimen: Wound from Arm, Left Updated: 04/03/25 2107    Fungus culture [[id number]] Collected: 04/03/25 1954    Order Status: Sent Specimen: Wound from Arm, Left Updated: 04/03/25 2107    AFB Culture & Smear [[id number]] Collected: 04/03/25 1943    Order Status: Sent Specimen: Wound from Arm, Left Updated: 04/03/25 2107    Fungus culture [[id number]] Collected: 04/03/25 1943    Order Status: Sent Specimen: Wound from Arm, Left Updated: 04/03/25 2107    AFB Culture & Smear [[id number]] Collected: 04/03/25 1934    Order Status: Sent Specimen: Tissue from AV Fistula, Left Updated: 04/03/25 2107    Fungus culture [[id number]] Collected: 04/03/25 1934    Order  Status: Sent Specimen: Tissue from AV Fistula, Left Updated: 04/03/25 2107    Fungus culture [[id number]] Collected: 04/03/25 1935    Order Status: Canceled Specimen: Tissue from Arm, Left Updated: 04/03/25 2107    AFB Culture & Smear [[id number]] Collected: 04/03/25 2011    Order Status: Sent Specimen: Wound from Arm, Left Updated: 04/03/25 2106    Fungus culture [[id number]] Collected: 04/03/25 2011    Order Status: Sent Specimen: Wound from Arm, Left Updated: 04/03/25 2106    Fungus culture [[id number]] Collected: 04/03/25 1816    Order Status: Sent Specimen: Wound from Arm, Left Updated: 04/03/25 2106    AFB Culture & Smear [[id number]] Collected: 04/03/25 1816    Order Status: Sent Specimen: Tissue from AV Fistula, Left Updated: 04/03/25 2106    AFB Culture & Smear [[id number]] Collected: 04/03/25 1905    Order Status: Sent Specimen: Tissue from AV Fistula, Left Updated: 04/03/25 2106    Fungus culture [[id number]] Collected: 04/03/25 1904    Order Status: Sent Specimen: Tissue from AV Fistula, Left Updated: 04/03/25 2106    AFB Culture & Smear [[id number]] Collected: 04/03/25 1921    Order Status: Sent Specimen: Tissue from hematoma Updated: 04/03/25 2105    Fungus culture [[id number]] Collected: 04/03/25 1921    Order Status: Sent Specimen: Tissue from hematoma Updated: 04/03/25 2105

## 2025-04-06 NOTE — NURSING
[facility], [facility]  Nurses Note -- 4 Eyes      4/5/2025       Skin assessed on: Q Shift      [x] No Pressure Injuries Present    [x]Prevention Measures Documented    [] Yes LDA  for Pressure Injury Previously documented     [] Yes New Pressure Injury Discovered   [] LDA for New Pressure Injury Added      Attending RN:  Laura Cruz RN     Second RN:  CHARLENE Sue

## 2025-04-06 NOTE — SUBJECTIVE & OBJECTIVE
"Interval History: he awakens easily. Nursing reports that he has not eaten much. He denies pain. He was very upset and agitated when he had to urinate, trying to get out of the bed.     Review of Systems   Constitutional:  Positive for fatigue.   Respiratory:  Negative for shortness of breath.    Cardiovascular:  Negative for chest pain.   Gastrointestinal:  Negative for abdominal pain.   Psychiatric/Behavioral:  Positive for confusion.      Objective:     Vital Signs (Most Recent):  Temp: 98.9 °F (37.2 °C) (04/06/25 0400)  Pulse: 90 (04/06/25 1100)  Resp: 19 (04/06/25 1100)  BP: (!) 164/71 (pt moving around in bed, stating "I gotta pee, i gotta pee!" while BP cuff inflating. Pt able to pass small amount of urine, and then calmed back down. Denies pain.) (04/06/25 1100)  SpO2: 97 % (04/06/25 1100) Vital Signs (24h Range):  Temp:  [97.9 °F (36.6 °C)-99.1 °F (37.3 °C)] 98.9 °F (37.2 °C)  Pulse:  [84-95] 90  Resp:  [13-29] 19  SpO2:  [90 %-100 %] 97 %  BP: ()/(47-79) 164/71     Weight: 61.2 kg (134 lb 14.7 oz)  Body mass index is 21.13 kg/m².    Intake/Output Summary (Last 24 hours) at 4/6/2025 1221  Last data filed at 4/6/2025 1100  Gross per 24 hour   Intake 420 ml   Output 3 ml   Net 417 ml         Physical Exam  Vitals and nursing note reviewed.   Constitutional:       Appearance: He is ill-appearing.      Comments: Awakens easily to voice    HENT:      Head: Normocephalic and atraumatic.      Nose:      Comments: NGT in place      Mouth/Throat:      Mouth: Mucous membranes are dry.   Neck:      Comments: RIJ trialysis  Cardiovascular:      Rate and Rhythm: Normal rate and regular rhythm.   Pulmonary:      Effort: Pulmonary effort is normal.      Breath sounds: Normal breath sounds.      Comments: O2 by NC  Abdominal:      General: Bowel sounds are normal.      Palpations: Abdomen is soft.   Musculoskeletal:      Comments: LUE in bandage, not removed   Skin:     Comments: Very dry skin with hyperpigmented " patches   Neurological:      Comments: Oriented to self, location               Significant Labs: All pertinent labs within the past 24 hours have been reviewed.    Significant Imaging: I have reviewed all pertinent imaging results/findings within the past 24 hours.

## 2025-04-06 NOTE — PROGRESS NOTES
[facility] [place] Intensive Care  Cardiology  Progress Note    Patient Name: Jevon Rajan  MRN: [id number]  Admission Date: 4/3/2025  Hospital Length of Stay: 3 days  Code Status: Full Code   Attending Physician: Eileen Jordan MD   Primary Care Physician: Melia, Primary Doctor  Expected Discharge Date: 4/18/2025  Principal Problem:Septic shock    Subjective:     Interval Hx: pt seen in ICU, case Dr. Jordan.  No events overnight.  No cp/sob.    Tele: SR 80s (personally reviewed and interpreted)      Past Medical History:   Diagnosis Date    BPH (benign prostatic hyperplasia)     Coronary artery disease     He has followed at the [facility] and is now transferring his care to this clinic. In 2014 he had stent to LAD. He states he has had 2 heart attacks. He does not get angina. There was 90% left anterior descending artery stenosis undergoing stenting. There was also a circumflex marginal branch stenosis of 70%.    Diabetes mellitus, type 2     ESRD (end stage renal disease) on dialysis     ESRD on HD TTS via left brachial AVF    Hypertension     Hypothyroidism, unspecified     NSTEMI (non-ST elevated myocardial infarction) 4/4/2025    Sepsis 4/2/2025    Symptomatic anemia 01/15/2024       Past Surgical History:   Procedure Laterality Date    ANGIOGRAM, CORONARY, WITH LEFT HEART CATHETERIZATION  1/13/2025    Procedure: Angiogram, Coronary, with Left Heart Cath;  Surgeon: Steve Bhat MD;  Location: [facility] CATH LAB/EP;  Service: Cardiology;;    ATHERECTOMY, CORONARY N/A 1/14/2025    Procedure: Atherectomy-coronary;  Surgeon: Giacomo Pittman MD;  Location: [facility] CATH LAB/EP;  Service: Cardiology;  Laterality: N/A;    bilateral foot surgery      CORONARY ANGIOPLASTY WITH STENT PLACEMENT N/A 1/14/2025    Procedure: ANGIOPLASTY, CORONARY ARTERY, WITH STENT INSERTION;  Surgeon: Giacomo Pittman MD;  Location: [facility] CATH LAB/EP;  Service: Cardiology;  Laterality: N/A;    CORONARY STENT PLACEMENT N/A 1/13/2025    Procedure:  INSERTION, STENT, CORONARY ARTERY;  Surgeon: Steve Bhat MD;  Location: [facility] CATH LAB/EP;  Service: Cardiology;  Laterality: N/A;    ESOPHAGOGASTRODUODENOSCOPY N/A 2/27/2024    Procedure: EGD (ESOPHAGOGASTRODUODENOSCOPY);  Surgeon: Gemma Almendarez MD;  Location: [facility] ENDO;  Service: Endoscopy;  Laterality: N/A;    EXPLORATION, ARTERY, UPPER EXTREMITY Left 4/3/2025    Procedure: EXPLORATION, ARTERY, UPPER EXTREMITY;  Surgeon: Sunil Contreras MD;  Location: [facility] OR;  Service: Vascular;  Laterality: Left;    eyelid surgery      FISTULOGRAM Left 11/27/2019    Procedure: Fistulogram;  Surgeon: MELANY Brenner III, MD;  Location: [address];  Service: Peripheral Vascular;  Laterality: Left;    FISTULOGRAM Left 11/27/2019    Procedure: Fistulogram, AVG declot, possible permacath;  Surgeon: MELANY Brenner III, MD;  Location: [facility] CATH LAB;  Service: Peripheral Vascular;  Laterality: Left;    INSERTION OF DIALYSIS CATHETER      IVUS, CORONARY  1/13/2025    Procedure: IVUS, Coronary;  Surgeon: Steve Bhat MD;  Location: [facility] CATH LAB/EP;  Service: Cardiology;;    LEFT HEART CATHETERIZATION Left 1/14/2025    Procedure: Left heart cath;  Surgeon: Giacomo Pittman MD;  Location: [facility] CATH LAB/EP;  Service: Cardiology;  Laterality: Left;    MOH's  12/5/13    PERCUTANEOUS CORONARY INTERVENTION, ARTERY N/A 1/14/2025    Procedure: Percutaneous coronary intervention;  Surgeon: Giacomo Pittman MD;  Location: [facility] CATH LAB/EP;  Service: Cardiology;  Laterality: N/A;    PERCUTANEOUS TRANSLUMINAL BALLOON ANGIOPLASTY OF CORONARY ARTERY  1/13/2025    Procedure: Angioplasty-coronary;  Surgeon: Steve Bhat MD;  Location: [facility] CATH LAB/EP;  Service: Cardiology;;    REMOVAL, GRAFT, ARTERIOVENOUS, UPPER EXTREMITY Left 4/3/2025    Procedure: REMOVAL, GRAFT, ARTERIOVENOUS, UPPER EXTREMITY;  Surgeon: Sunil Contreras MD;  Location: [facility] OR;  Service: Vascular;  Laterality: Left;     REVASCULARIZATION, ENDOVASCULAR, LE W/ INTRAVASCULAR LITHOTRIPSY  1/13/2025    Procedure: REVASCULARIZATION, ENDOVASCULAR, LE W/ INTRAVASCULAR LITHOTRIPSY;  Surgeon: Steve Bhat MD;  Location: [facility] CATH LAB/EP;  Service: Cardiology;;    right hand surgery      stents         Review of patient's allergies indicates:   Allergen Reactions    Ace inhibitors Hives, Itching, Shortness Of Breath, Other (See Comments) and Rash     Is not sure which medication it was    Captopril        No current facility-administered medications on file prior to encounter.     Current Outpatient Medications on File Prior to Encounter   Medication Sig    ammonium lactate 12 % Crea Apply topically 2 (two) times a day.    artificial tears,hypromellose,,GENTEAL/SUSTANE, 0.3 % Gel Apply to eye nightly.    aspirin (ECOTRIN) 81 MG EC tablet Take 1 tablet (81 mg total) by mouth once daily.    atorvastatin (LIPITOR) 80 MG tablet Take 1 tablet (80 mg total) by mouth every evening.    camphor-menthol 0.5-0.5% (SARNA) lotion Apply topically once daily. APPLY SMALL AMOUNT TOPICALLY TO AFFECTED AREA(S) AS NEEDED FOR ITCHING KEEP IN FRIDGE    carboxymethylcellulose sodium 0.5 % Drop Apply 1 drop to eye nightly as needed (dry eyes).    carvediloL (COREG) 12.5 MG tablet Take 0.5 tablets (6.25 mg total) by mouth 2 (two) times daily.    cetirizine (ZYRTEC) 5 MG tablet Take 1 tablet (5 mg total) by mouth every 48 hours.    clobetasol 0.05% (TEMOVATE) 0.05 % Oint Apply topically 2 (two) times daily.    clopidogreL (PLAVIX) 75 mg tablet Take 1 tablet (75 mg total) by mouth once daily.    erythromycin (ROMYCIN) ophthalmic ointment Place a 1/2 inch ribbon of ointment into the lower eyelid. Four timex daily for 5 days    gabapentin (NEURONTIN) 100 MG capsule Take 1 capsule (100 mg total) by mouth every evening.    hydrophilic ointment (AQUABASE) Apply topically as needed for Dry Skin. APPLY MODERATE AMOUNT TOPICALLY TO AFFECTED AREA(S) TWICE A DAY AS  NEEDED FOR DRY SKIN APPLY TO AREAS OF DRY SKIN OR OVER ENTIRE BODY.    lanolin alcohol-mineral oil-white petrolatum-ceres (EUCERIN) Crea cream Apply topically 2 (two) times daily as needed.    LIDOcaine (LIDODERM) 5 % Place 1 patch onto the skin once daily. Remove & Discard patch within 12 hours or as directed by MD    loratadine (CLARITIN) 10 mg tablet Take 10 mg by mouth daily as needed for Allergies (Every other day).    nut.tx.imp.renal fxn,lac-reduc (NEPRO CARB STEADY) 0.08 gram-1.8 kcal/mL Liqd Take 240 mLs by mouth 2 (two) times a day.    ondansetron (ZOFRAN-ODT) 4 MG TbDL Take 1 tablet (4 mg total) by mouth every 8 (eight) hours as needed (nausea).    oxyCODONE-acetaminophen (PERCOCET) 5-325 mg per tablet Take 1 tablet by mouth every 6 (six) hours as needed.    pantoprazole (PROTONIX) 20 MG tablet Take 20 mg by mouth 2 (two) times daily as needed.    pramoxine 1 % Lotn Apply topically 2 (two) times daily as needed (itching).    triamcinolone acetonide 0.1% (KENALOG) 0.1 % cream Apply topically 2 (two) times daily as needed (itching).     Family History    None       Tobacco Use    Smoking status: Never    Smokeless tobacco: Never   Substance and Sexual Activity    Alcohol use: No    Drug use: No    Sexual activity: Not Currently     Review of Systems   Unable to perform ROS: Mental status change     Objective:     Vital Signs (Most Recent):  Temp: 98.2 °F (36.8 °C) (04/06/25 1200)  Pulse: 89 (04/06/25 1200)  Resp: 16 (04/06/25 1200)  BP: (!) 117/55 (04/06/25 1200)  SpO2: 95 % (04/06/25 1200) Vital Signs (24h Range):  Temp:  [97.9 °F (36.6 °C)-99.1 °F (37.3 °C)] 98.2 °F (36.8 °C)  Pulse:  [85-95] 89  Resp:  [13-29] 16  SpO2:  [90 %-100 %] 95 %  BP: ()/(47-79) 117/55     Weight: 61.2 kg (134 lb 14.7 oz)  Body mass index is 21.13 kg/m².    SpO2: 95 %         Intake/Output Summary (Last 24 hours) at 4/6/2025 1256  Last data filed at 4/6/2025 1200  Gross per 24 hour   Intake 440 ml   Output --   Net 440 ml        Lines/Drains/Airways       Central Venous Catheter Line  Duration             Trialysis (Dialysis) Catheter 04/04/25 1208 right internal jugular 2 days              Drain  Duration             Male External Urinary Catheter 04/02/25 1850 Medium 3 days         NG/OG Tube 04/04/25 1415 Rolf 10 Fr. Left nostril 1 day              Peripheral Intravenous Line  Duration                  Hemodialysis AV Fistula Left upper arm -- days         Peripheral IV - Single Lumen 04/02/25 1801 20 G Anterior;Distal;Right Upper Arm 3 days                   Exam unchanged vs 4/5/25  Physical Exam  Constitutional:       General: He is not in acute distress.     Appearance: Normal appearance. He is well-developed. He is ill-appearing. He is not toxic-appearing or diaphoretic.   HENT:      Head: Normocephalic and atraumatic.   Eyes:      General: No scleral icterus.     Extraocular Movements: Extraocular movements intact.      Conjunctiva/sclera: Conjunctivae normal.      Pupils: Pupils are equal, round, and reactive to light.   Neck:      Thyroid: No thyromegaly.      Vascular: No JVD.      Trachea: No tracheal deviation.   Cardiovascular:      Rate and Rhythm: Normal rate and regular rhythm.      Heart sounds: S1 normal and S2 normal. Heart sounds are distant. No murmur heard.     No friction rub. No gallop.   Pulmonary:      Effort: Pulmonary effort is normal. No respiratory distress.      Breath sounds: Normal breath sounds. No stridor. No wheezing, rhonchi or rales.   Chest:      Chest wall: No tenderness.   Abdominal:      General: There is no distension.      Palpations: Abdomen is soft.   Musculoskeletal:         General: No swelling or tenderness. Normal range of motion.      Cervical back: Normal range of motion and neck supple. No rigidity.      Right lower leg: No edema.      Left lower leg: No edema.   Skin:     General: Skin is warm and dry.      Coloration: Skin is not jaundiced.   Neurological:      General: No  focal deficit present.      Mental Status: He is alert.      Cranial Nerves: No cranial nerve deficit.   Psychiatric:         Mood and Affect: Mood normal.         Behavior: Behavior normal.          Current Medications:   aspirin  81 mg Oral Daily    atorvastatin  80 mg Oral QHS    clopidogreL  75 mg Oral Daily    heparin (porcine)  5,000 Units Subcutaneous Q8H    mupirocin   Nasal BID    pantoprazole  40 mg Oral Daily    sodium chloride 0.9%  10 mL Intravenous Q8H         Current Facility-Administered Medications:     0.9%  NaCl infusion (for blood administration), , Intravenous, Q24H PRN    acetaminophen, 650 mg, Oral, Q4H PRN    dextrose 50%, 12.5 g, Intravenous, PRN    dextrose 50%, 25 g, Intravenous, PRN    diphenhydrAMINE, 50 mg, Oral, Q6H PRN    glucagon (human recombinant), 1 mg, Intramuscular, PRN    glucose, 16 g, Oral, PRN    glucose, 24 g, Oral, PRN    heparin (porcine), 1,000 Units, Intravenous, PRN    HYDROmorphone, 1 mg, Intravenous, Q6H PRN    insulin aspart U-100, 0-5 Units, Subcutaneous, QID (AC + HS) PRN    melatonin, 6 mg, Oral, Nightly PRN    naloxone, 0.02 mg, Intravenous, PRN    ondansetron, 4 mg, Intravenous, Q6H PRN    prochlorperazine, 5 mg, Intravenous, Q6H PRN    Pharmacy to dose Vancomycin consult, , , Once **AND** vancomycin - pharmacy to dose, , Intravenous, pharmacy to manage frequency    Laboratory (all labs reviewed):  CBC:  Recent Labs   Lab 04/03/25  1533 04/03/25  2055 04/04/25  0247 04/05/25  0324 04/06/25  0317   WBC 18.03 H 16.23 H 16.11 H 17.18 H 15.49 H   HGB 7.2 L 10.2 L 10.0 L 8.6 L 8.2 L   HCT 22.7 L 30.7 L 30.0 L 26.7 L 25.8 L   Platelet Count 146 L 130 L 121 L 109 L 118 L       CHEMISTRIES:  Recent Labs   Lab 01/16/25  0154 02/08/25  1014 02/11/25  1325 02/23/25  1638 03/21/25  1808 03/25/25  1108 04/03/25 2055 04/04/25  0247 04/04/25  1439 04/04/25 2001 04/04/25  2151 04/05/25  0324 04/05/25  1159 04/05/25  1426 04/06/25  0317   Glucose 141 H 171 H 225 H 113 H 150 H   --   --   --   --   --   --   --   --   --   --    Sodium 140 138 138 141 141   < > 138 139 139  --  140 140  --  139 139   Potassium 4.0 4.5 4.4 5.1 4.4   < > 4.8 5.0 5.4 H  --  4.6 4.2  --  4.0 3.7   BUN 44 H 63 H 75 H 77 H 91 H   < > 70 H 75 H 84 H  --  68 H 58 H  --  49 H 59 H   Creatinine 10.0 H 8.6 H 8.9 H 9.8 H 10.9 H   < > 9.3 H 9.9 H 10.8 H  --  8.5 H 7.3 H  --  6.2 H 7.6 H   eGFR 5 A 5.5 A 5.3 A 4.7 A 4.1 A   < > 5 L 5 L 4 L  --  6 L 7 L  --  8 L 6 L   Calcium 8.9 10.2 10.1 10.9 H 10.4   < > 8.1 L 8.2 L 8.1 L  --  8.4 L 8.4 L  --  8.2 L 8.5 L   Magnesium   --   --   --   --   --    < > 2.2 2.3  --  2.2  --  2.1 2.0  --   --    Magnesium  --   --   --   --  2.5  --   --   --   --   --   --   --   --   --   --     < > = values in this interval not displayed.       CARDIAC BIOMARKERS:  Recent Labs   Lab 04/01/24  0251 04/29/24 2211 08/03/24  0015 08/05/24  0138 10/20/24  2310 10/21/24  0113 04/01/25  2220 04/02/25  0846 04/02/25  2055 04/03/25  0303 04/03/25  0718 04/03/25  1533 04/04/25  1028     --  183  --  111  --  382 H  --   --   --   --   --   --    Troponin I  --    < > 0.080 H   < > 0.044 H   < >  --   --   --   --   --   --   --    Troponin-I  --   --   --   --   --   --   --    < > 1.076 H 1.441 H 1.812 H 2.032 H 2.913 H    < > = values in this interval not displayed.       COAGS:  Recent Labs   Lab 11/28/23  2155 04/18/24  0609 01/12/25  1316 03/25/25  1108 04/03/25  1533   INR 1.1 1.0 1.0 1.0 1.1       LIPIDS/LFTS:  Recent Labs   Lab 08/11/24  0331 08/15/24  0203 04/03/25  0713 04/03/25  1533 04/03/25  2055 04/04/25  0247 04/06/25  0317   Cholesterol 110 L  --   --   --   --   --   --    Triglycerides 126  --   --   --   --   --   --    HDL 26 L  --   --   --   --   --   --    LDL Cholesterol 58.8 L  --   --   --   --   --   --    Non-HDL Cholesterol 84  --   --   --   --   --   --    AST  --    < > 29 25 24 23 25   ALT  --    < > 20 15 18 17 21    < > = values in this interval not  displayed.       BNP:  Recent Labs   Lab 06/11/24  2239 08/03/24  0015 08/11/24  0332 01/12/25  0550 04/03/25  0303   BNP 1,110 H 624 H 2,178 H 2,043 H 2,988 H       TSH:  Recent Labs   Lab 11/27/23  0519 01/15/24  2135 07/09/24  1428 08/11/24  0332 04/02/25  0537   TSH 3.357 4.600 H 6.963 H 3.949 4.590 H       Free T4:  Recent Labs   Lab 01/15/24  2135 07/09/24  1428 04/02/25  0537   Free T4 0.81 1.01 1.19  1.19       Diagnostic Results:  ECG (personally reviewed and interpreted tracing(s)):  4/4/25 1325 SR 79, PRWP, lat ST abnl ?isch, similar to 3/25/25, lass prom than 4/2/25    Chest X-Ray (personally reviewed and interpreted image(s)): 4/4/25 NAD, aortic atherosclerosis    Echo: 4/4/25 (images prev personally reviewed and interpreted)    Left Ventricle: The left ventricle is normal in size. Normal wall thickness. Mild global hypokinesis present. There is mildly reduced systolic function with a visually estimated ejection fraction of 40 - 45%. Grade I diastolic dysfunction.    Right Ventricle: The right ventricle has moderate enlargement. Systolic function is mildly reduced.    Left Atrium: Mildly dilated Cannot exclude PFO (color Doppler with possible flow across IAS).    Aortic Valve: The aortic valve is a trileaflet valve. There is moderate aortic valve sclerosis. Mildly restricted motion. There is mild stenosis. Aortic valve area by VTI is 1.8 cm². Aortic valve peak velocity is 1.5 m/s. Mean gradient is 5 mmHg. The dimensionless index is 0.51. There is mild aortic regurgitation.    Mitral Valve: There is a 1.9x1.2cm large fixed heterogeneous mass present on the posterior leaflet (new finding vs 9/2023 echo). There is moderate to severe regurgitation with an eccentric jet.  Cannot exclude severe MR with possible PMVL perforation in association with large vegetation.    Aorta: Aortic root is mildly to moderately dilated measuring 4.17 cm.    Pulmonary Artery: The estimated pulmonary artery systolic pressure is  42 mmHg.    PCI RCA 1/14/25:    The Ost RCA lesion was 70% stenosed with 0% stenosis post-intervention.    The Prox RCA lesion was 70% stenosed with 0% stenosis post-intervention.    The Mid RCA lesion was 99% stenosed with 0% stenosis post-intervention.    The ejection fraction was calculated to be 55%.    The pre-procedure left ventricular end diastolic pressure was 6.    The post-procedure left ventricular end diastolic pressure was 7.    Mid RCA lesion: A stent was successfully placed at 11 MAYRA for 15 sec.    Prox RCA lesion, Mid RCA lesion: A  at 12 MAYRA for 10 sec.    Ost RCA lesion, Prox RCA lesion: A stent was successfully placed.  Procedure performed:  Left heart catheterization  Coronary angiogram of the right coronary artery  Right radial artery access   Right superficial femoral artery access  CSI atherectomy of the right mid coronary artery  Laser atherectomy of the right mid coronary artery  PTCA of the right mid coronary artery  XU x3 megatron drug-eluting stent 3.5 X 16 in the mid RCA, 3.5 X 28 in the proximal RCA, 4.0 X 16 mm in the ostial RCA  Findings:  Severely calcified mid RCA stenosis unable to cross with PTCA balloons, treated initially with 0.9 laser atherectomy, followed by CSI atherectomy system with total of 5 runs (3 at low speed, 2 at high speed), pre-dilated using 2.0 compliant and 3.5 noncompliant balloon followed by 3.5 X 16 mm megatron stent.  Focal plaque rupture/erosion noted in the proximal and ostial RCA that were treated with  3.5 X 28 in the proximal RCA, 4.0 X 16 mm in the ostial RCA.  No residual stenosis post intervention.  Patient had ALAN 3 flow at the end of the procedure  LVEDP 6 mm Hg.  EF of around 55%.  No gradient across the aortic valve.  Right SFA access.  High bifurcation.  Mild diffuse disease of the right SFA closed using Perclose closure device  Right radial artery with severe spasm and hence decision was made to switch to right groin  access  Recommendations:  Continue dual antiplatelet therapy for at least 1 year.  Consider longer term anticoagulation based on risk factor profile after 1 year  High-intensity statin  Transfer back to telemetry unit    Cath/PCI LAD 1/13/25  Left Main Coronary Artery: The left main is a large caliber vessel arising normally from the left sinus of valsalva.  It bifurcates into the left anterior descending and left circumflex coronary artery.  It is free of evidence of obstructive coronary artery disease. ALAN III flow  Left Anterior Descending: The left anterior descending coronary artery is a large caliber vessel arising normally from the left main and extending to the apex.  It gives rise to small to moderate caliber diagonal arteries. Proximal LAD has a severe 80-90% calcified stenosis prior to an under expanded 2.5 mm stent in a 4.0 mm vessel. After the stent there is 40-50% stenosis. ALAN II flow  Left Circumflex: The left circumflex is a large caliber vessel arising normally from the left main coronary artery, it is non-dominant.  It gives rise to moderate caliber obtuse marginal branches. OM stent is patent. ALAN III flow  Right Coronary Artery: The right coronary artery is a large caliber vessel arising normally from the right sinus of valsalva.  It is dominant giving rise to a PDA and PLV branch. Mid RCA with a 95-99% calcified fibrotic stenosis. ALAN III flow  LVgram: LVEDP 33 mmHg  PCI procedure:  Heparin administered. ACT was closely monitored. Using a EBU 3.5 guider, this was used to cannulate the left system. Nuno blue PCI wire repshaped outside the body. PCI wire was advanced into the distal LAD. Predilated with 2.0 mm NC, 2.5 mm NC, scoring balloon 2.5 mm, 3.0 mm and 3.5 mm NC. IVUS was advanced for vessel prep/size. Schockwave 3.0 x 12 mm advanced and multiple therapies given. Followed by a 3.5 mm NC. Advanced a 3.5 x 24 mm megatron XU and deployed at nominal pressure. Post dilated with a 4.0 mm NC  with great results. After the balloon was removed.  The wire was left in place.  Repeat angiography showed no signs of dissection.  Subsequently the wire was pulled back.   Final angiography showed ALAN-3 flow.  No signs of dissection, perforation, or thrombus.  Assessment:   Successful PCI of LAD with 3.5 x 24 mm XU  mRCA 95-99% stenosis--> staged PCI of RCA  Patent Lcx stent  Plan:   - Stage PCI of RCA tomorrow PM- NPO at [facility]   - Patient tolerated procedure well. No immediate complications  - Continue DAPT  - EKG when arrives to floor  - Routine post cath protocol  - Dialysis in pm   - Maximize medical management  - Further care by the primary team    Cath/PCI OM2 12/4/23  1.  Selective left coronary Angiography.   2.  IVUS of Proximal OM2 coronary artery   3.  PCI of Proximal OM2 coronary artery with one 3.5 x 18 [place] stent   4.  Ultrasound guided right radial arterial access   5. Conscious sedation from 7:11 AM to 8:03 AM (52 minutes of sedation)   Findings:   - Coronary angiography data   Left main: Large caliber vessel, divides into the left anterior descending   and left circumflex. LM is non obstructive.   Left anterior descending: Large caliber vessel giving rise to 2 diagonals.   Patent mid LAD stents.   Left circumflex: Medium caliber vessel giving rise to 2 obtuse marginals.   OM2 has a proximal 99% ISR.   Right coronary artery: Not studied but known mid RCA lesion (see report   from last week)   - IVUS data of OM2    Pre PCI IVUS data:   Proximal reference luminal diameter: 3.6 mm   Distal reference luminal diameter: 3.4 mm   Percutaneous Coronary Intervention   Successful PCI of Proximal OM2 coronary artery 99 % ISR stenosis was   reduced to 0 % (initial ALAN 3 flow) by pre dilatation with 3 x 15 mm   balloon followed by placement of 3.5 x 18 mm [place] stent with excellent   angiographic results with final ALAN 3 flow. IVUS guided.   Impression:   - NSTEMI secondary to thromboclusive disease of Proximal  OM2 coronary   artery due to ISR of previously placed stent.   -  Succesful PCI of Proximal OM2 coronary artery with one [place] stent  Recommendations:   - Post operative care as per protocol.   - Aspirin for life and P2Y12 inhibitor for at least a year   - Moderate to high statin therapy.   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  Cont abx.  Prognosis poor.    MRSA bacteremia  As above    Acute bacterial endocarditis  As above    ESRD on hemodialysis  Now on CRRT  Mgmt per IM/neph    Coronary artery disease involving native coronary artery of native heart without angina pectoris  Elev trop c/w NSTEMI, likely type II, doubt ACS.  Known MV CAD/PCI (most recently in Jan 2025)  Cont ASA/Plavix/Statin  No plan for inpat isch eval    HTN (hypertension)  Med rx on hold    HLD (hyperlipidemia)  Cont statin      Type 2 diabetes mellitus, without long-term current use of insulin  Mgmt per IM        VTE Risk Mitigation (From admission, onward)           Ordered     heparin (porcine) injection 1,000 Units  As needed (PRN)         04/05/25 2100     heparin (porcine) injection 5,000 Units  Every 8 hours         04/04/25 0936     IP VTE HIGH RISK PATIENT  Once         04/03/25 1516     Place TEMO hose  Until discontinued         04/03/25 1516     Place sequential compression device  Until discontinued         04/03/25 1516     Reason for No Pharmacological VTE Prophylaxis  Once        Question:  Reasons:  Answer:  Physician Provided (leave comment)    04/03/25 1516                  Pt seen in ICU, critical care time 35min.    Apolinar Tyson MD  Cardiology  [facility] - Intensive Care

## 2025-04-06 NOTE — SUBJECTIVE & OBJECTIVE
"Interval History: off CRRT since yesterday afternoon. No events overnight. Complained of urinary retention this morning, but resting comfortably at time of my exam.        Review of patient's allergies indicates:   Allergen Reactions    Ace inhibitors Hives, Itching, Shortness Of Breath, Other (See Comments) and Rash     Is not sure which medication it was    Captopril      Current Facility-Administered Medications   Medication Frequency    0.9%  NaCl infusion (for blood administration) Q24H PRN    acetaminophen tablet 650 mg Q4H PRN    aspirin chewable tablet 81 mg Daily    atorvastatin tablet 80 mg QHS    clopidogreL tablet 75 mg Daily    dextrose 50% injection 12.5 g PRN    dextrose 50% injection 25 g PRN    diphenhydrAMINE capsule 50 mg Q6H PRN    glucagon (human recombinant) injection 1 mg PRN    glucose chewable tablet 16 g PRN    glucose chewable tablet 24 g PRN    heparin (porcine) injection 1,000 Units PRN    heparin (porcine) injection 5,000 Units Q8H    HYDROmorphone injection 1 mg Q6H PRN    insulin aspart U-100 pen 0-5 Units QID (AC + HS) PRN    melatonin tablet 6 mg Nightly PRN    mupirocin 2 % ointment BID    naloxone 0.4 mg/mL injection 0.02 mg PRN    ondansetron injection 4 mg Q6H PRN    pantoprazole EC tablet 40 mg Daily    prochlorperazine injection Soln 5 mg Q6H PRN    sodium chloride 0.9% flush 10 mL Q8H    vancomycin - pharmacy to dose pharmacy to manage frequency       Objective:     Vital Signs (Most Recent):  Temp: 98.9 °F (37.2 °C) (04/06/25 0400)  Pulse: 90 (04/06/25 1100)  Resp: 19 (04/06/25 1100)  BP: (!) 164/71 (pt moving around in bed, stating "I gotta pee, i gotta pee!" while BP cuff inflating. Pt able to pass small amount of urine, and then calmed back down. Denies pain.) (04/06/25 1100)  SpO2: 97 % (04/06/25 1100) Vital Signs (24h Range):  Temp:  [97.9 °F (36.6 °C)-99.1 °F (37.3 °C)] 98.9 °F (37.2 °C)  Pulse:  [84-95] 90  Resp:  [13-29] 19  SpO2:  [90 %-100 %] 97 %  BP: " ()/(47-79) 164/71     Weight: 61.2 kg (134 lb 14.7 oz) (04/04/25 1937)  Body mass index is 21.13 kg/m².  Body surface area is 1.7 meters squared.    I/O last 3 completed shifts:  In: 464.1 [P.O.:20; NG/GT:345; IV Piggyback:99.1]  Out: 895 [Other:892; Stool:3]     Physical Exam  Vitals and nursing note reviewed.   Constitutional:       General: He is sleeping. He is not in acute distress.     Appearance: Normal appearance. He is well-developed.   HENT:      Head: Normocephalic and atraumatic.      Nose: Nose normal.      Mouth/Throat:      Mouth: Mucous membranes are moist.   Eyes:      Extraocular Movements: Extraocular movements intact.      Conjunctiva/sclera: Conjunctivae normal.   Cardiovascular:      Rate and Rhythm: Normal rate and regular rhythm.   Pulmonary:      Effort: Pulmonary effort is normal.      Breath sounds: Normal breath sounds.   Abdominal:      General: There is no distension.      Palpations: Abdomen is soft.   Musculoskeletal:      Right lower leg: No edema.      Left lower leg: No edema.   Skin:     General: Skin is warm and dry.      Findings: Lesion (diffuse) present. No erythema or rash.          Significant Labs:  CBC:   Recent Labs   Lab 04/06/25 0317   WBC 15.49*   RBC 2.86*   HGB 8.2*   HCT 25.8*   *   MCV 90   MCH 28.7   MCHC 31.8*     CMP:   Recent Labs   Lab 04/06/25 0317   CALCIUM 8.5*   ALBUMIN 2.0*      K 3.7   CO2 22*      BUN 59*   CREATININE 7.6*   ALKPHOS 66   ALT 21   AST 25   BILITOT 0.6     All labs within the past 24 hours have been reviewed.     Significant Imaging:  Labs: Reviewed

## 2025-04-06 NOTE — ASSESSMENT & PLAN NOTE
Patient with known CAD s/p stent placement, which is controlled Will continue ASA, Plavix, and Statin and monitor for S/Sx of angina/ACS. Continue to monitor on telemetry.   - last [facility] was 1/2025: Severely calcified mid RCA stenosis unable to cross with PTCA balloons, treated initially with 0.9 laser atherectomy, followed by CSI atherectomy system with total of 5 runs (3 at low speed, 2 at high speed), pre-dilated using 2.0 compliant and 3.5 noncompliant balloon followed by 3.5 X 16 mm megatron stent.  Focal plaque rupture/erosion noted in the proximal and ostial RCA that were treated with  3.5 X 28 in the proximal RCA, 4.0 X 16 mm in the ostial RCA.  No residual stenosis post intervention.  Patient had ALAN 3 flow at the end of the procedure  - with elevated troponin likely due to septic shock  Recent Labs   Lab 04/04/25  1028   TROPONINI 2.913*   - continue asa, plavix, statin  - Cardiology consult

## 2025-04-06 NOTE — PLAN OF CARE
Pt remained drowsy this shift, but easily arousable. Pt had episodes of sudden episodes of agitation and anxiety when he needs to urinate. Pt was bladder scanned, and I/O cath attempted by bedside RN x2 w/o success. Urology saw pt, inserted coude cath after several attempts. Pt tolerated procedure. Resting quietly at this time.     Problem: Adult Inpatient Plan of Care  Goal: Plan of Care Review  Outcome: Progressing  Goal: Patient-Specific Goal (Individualized)  Outcome: Progressing  Goal: Absence of Hospital-Acquired Illness or Injury  Outcome: Progressing  Goal: Optimal Comfort and Wellbeing  Outcome: Progressing  Goal: Readiness for Transition of Care  Outcome: Progressing     Problem: Diabetes Comorbidity  Goal: Blood Glucose Level Within Targeted Range  Outcome: Progressing     Problem: Sepsis/Septic Shock  Goal: Optimal Coping  Outcome: Progressing  Goal: Absence of Bleeding  Outcome: Progressing  Goal: Blood Glucose Level Within Targeted Range  Outcome: Progressing  Goal: Absence of Infection Signs and Symptoms  Outcome: Progressing  Goal: Optimal Nutrition Intake  Outcome: Progressing     Problem: Infection  Goal: Absence of Infection Signs and Symptoms  Outcome: Progressing     Problem: Fall Injury Risk  Goal: Absence of Fall and Fall-Related Injury  Outcome: Progressing     Problem: Skin Injury Risk Increased  Goal: Skin Health and Integrity  Outcome: Progressing     Problem: Wound  Goal: Optimal Coping  Outcome: Progressing  Goal: Optimal Functional Ability  Outcome: Progressing  Goal: Absence of Infection Signs and Symptoms  Outcome: Progressing  Goal: Improved Oral Intake  Outcome: Progressing  Goal: Optimal Pain Control and Function  Outcome: Progressing  Goal: Skin Health and Integrity  Outcome: Progressing  Goal: Optimal Wound Healing  Outcome: Progressing     Problem: Coping Ineffective  Goal: Effective Coping  Outcome: Progressing     Problem: Urinary Elimination Management  Goal: Effective  Urinary Elimination/Continence  Outcome: Progressing     Problem: Urinary Retention  Goal: Effective Urinary Elimination  Outcome: Progressing

## 2025-04-06 NOTE — NURSING
"Pt woke up again, hollering in bed "I gotta pee!" Pt bladder scanned. Bladder scan showed 331 mLs. Attempted to straight cath pt w/o success due to resistance. MD Jordan notified. Awaiting coude cath to arrive to unit to attempt I/O cath per MD.  "

## 2025-04-06 NOTE — NURSING
"Secure chat from MD Kramer: "Take it out since it is causing more pain than without it. Please put a note stating we discussed and that was my advice.     We can look at it again in the AM."  "

## 2025-04-06 NOTE — NURSING
"Pt had minimal UOP in dunaway chamber at 1800 assessment. Pt restless, moaning in pain. Attempted to irrigate dunaway per order w/o success. 50 mL UOP return s/p irrigation. Pt very uncomfortable, and has no other pain meds that can be given at this time. Secure chat to MD Kramer that reads: "Hey doc, I irrigated the dunaway per order, but UOP is minimal. Only 50 cc UOP after irrigation. Multiple large clots coming from around catheter at urethral insertion site. Pt in extreme pain. Please advise."  "

## 2025-04-06 NOTE — ASSESSMENT & PLAN NOTE
"- TTE 4/4/25: "1.9x1.2cm large fixed heterogeneous mass present on the posterior leaflet (new finding vs 9/2023 echo). There is moderate to severe regurgitation with an eccentric jet. Cannot exclude severe MR with possible PMVL perforation in association with large vegetation."  - this is in the setting of MRSA bacteremia  - ID is consulted  - repeat cultures 4/4 with Staph. Repeat cultures 4/6 pending   - suspect source is skin/chronic scratching vs AVF infection- cultures from resected AVF with Staph   - discussed with Cardiac surgery Dr Castro 4/4/25- given age and comorbidities, he is very high risk of mortality with surgery. He recommends medical management at this point.  - on vancomycin      "

## 2025-04-06 NOTE — ASSESSMENT & PLAN NOTE
Patient's blood pressure range in the last 24 hours was: BP  Min: 96/52  Max: 164/71.The patient's inpatient anti-hypertensive regimen is listed below:  Current Antihypertensives       Plan  - admitted with shock  - now off vasopressors. Continue to hold BP Rx

## 2025-04-06 NOTE — SUBJECTIVE & OBJECTIVE
Past Medical History:   Diagnosis Date    BPH (benign prostatic hyperplasia)     Coronary artery disease     He has followed at the [facility] and is now transferring his care to this clinic. In 2014 he had stent to LAD. He states he has had 2 heart attacks. He does not get angina. There was 90% left anterior descending artery stenosis undergoing stenting. There was also a circumflex marginal branch stenosis of 70%.    Diabetes mellitus, type 2     ESRD (end stage renal disease) on dialysis     ESRD on HD TTS via left brachial AVF    Hypertension     Hypothyroidism, unspecified     NSTEMI (non-ST elevated myocardial infarction) 4/4/2025    Sepsis 4/2/2025    Symptomatic anemia 01/15/2024       Past Surgical History:   Procedure Laterality Date    ANGIOGRAM, CORONARY, WITH LEFT HEART CATHETERIZATION  1/13/2025    Procedure: Angiogram, Coronary, with Left Heart Cath;  Surgeon: Steve Bhat MD;  Location: [facility] CATH LAB/EP;  Service: Cardiology;;    ATHERECTOMY, CORONARY N/A 1/14/2025    Procedure: Atherectomy-coronary;  Surgeon: Giacomo Pittman MD;  Location: [facility] CATH LAB/EP;  Service: Cardiology;  Laterality: N/A;    bilateral foot surgery      CORONARY ANGIOPLASTY WITH STENT PLACEMENT N/A 1/14/2025    Procedure: ANGIOPLASTY, CORONARY ARTERY, WITH STENT INSERTION;  Surgeon: Giacomo Pittman MD;  Location: [facility] CATH LAB/EP;  Service: Cardiology;  Laterality: N/A;    CORONARY STENT PLACEMENT N/A 1/13/2025    Procedure: INSERTION, STENT, CORONARY ARTERY;  Surgeon: Steve Bhat MD;  Location: [facility] CATH LAB/EP;  Service: Cardiology;  Laterality: N/A;    ESOPHAGOGASTRODUODENOSCOPY N/A 2/27/2024    Procedure: EGD (ESOPHAGOGASTRODUODENOSCOPY);  Surgeon: Gemma Almendarez MD;  Location: [facility] ENDO;  Service: Endoscopy;  Laterality: N/A;    EXPLORATION, ARTERY, UPPER EXTREMITY Left 4/3/2025    Procedure: EXPLORATION, ARTERY, UPPER EXTREMITY;  Surgeon: Sunil Contreras MD;  Location: [facility] OR;  Service:  Vascular;  Laterality: Left;    eyelid surgery      FISTULOGRAM Left 11/27/2019    Procedure: Fistulogram;  Surgeon: MELANY Brenner III, MD;  [facility];  Service: Peripheral Vascular;  Laterality: Left;    FISTULOGRAM Left 11/27/2019    Procedure: Fistulogram, AVG declot, possible permacath;  Surgeon: MELANY Brenner III, MD;  Location: [facility] CATH LAB;  Service: Peripheral Vascular;  Laterality: Left;    INSERTION OF DIALYSIS CATHETER      IVUS, CORONARY  1/13/2025    Procedure: IVUS, Coronary;  Surgeon: Steve Bhat MD;  Location: [facility] CATH LAB/EP;  Service: Cardiology;;    LEFT HEART CATHETERIZATION Left 1/14/2025    Procedure: Left heart cath;  Surgeon: Giacomo Pittman MD;  Location: [facility] CATH LAB/EP;  Service: Cardiology;  Laterality: Left;    MOH's  12/5/13    PERCUTANEOUS CORONARY INTERVENTION, ARTERY N/A 1/14/2025    Procedure: Percutaneous coronary intervention;  Surgeon: Giacomo Pittman MD;  Location: [facility] CATH LAB/EP;  Service: Cardiology;  Laterality: N/A;    PERCUTANEOUS TRANSLUMINAL BALLOON ANGIOPLASTY OF CORONARY ARTERY  1/13/2025    Procedure: Angioplasty-coronary;  Surgeon: Steve Bhat MD;  Location: [facility] CATH LAB/EP;  Service: Cardiology;;    REMOVAL, GRAFT, ARTERIOVENOUS, UPPER EXTREMITY Left 4/3/2025    Procedure: REMOVAL, GRAFT, ARTERIOVENOUS, UPPER EXTREMITY;  Surgeon: Sunil Contreras MD;  Location: [facility];  Service: Vascular;  Laterality: Left;    REVASCULARIZATION, ENDOVASCULAR, LE W/ INTRAVASCULAR LITHOTRIPSY  1/13/2025    Procedure: REVASCULARIZATION, ENDOVASCULAR, LE W/ INTRAVASCULAR LITHOTRIPSY;  Surgeon: Steve Bhat MD;  Location: [facility] CATH LAB/EP;  Service: Cardiology;;    right hand surgery      stents         Review of patient's allergies indicates:   Allergen Reactions    Ace inhibitors Hives, Itching, Shortness Of Breath, Other (See Comments) and Rash     Is not sure which medication it was    Captopril        No current  facility-administered medications on file prior to encounter.     Current Outpatient Medications on File Prior to Encounter   Medication Sig    ammonium lactate 12 % Crea Apply topically 2 (two) times a day.    artificial tears,hypromellose,,GENTEAL/SUSTANE, 0.3 % Gel Apply to eye nightly.    aspirin (ECOTRIN) 81 MG EC tablet Take 1 tablet (81 mg total) by mouth once daily.    atorvastatin (LIPITOR) 80 MG tablet Take 1 tablet (80 mg total) by mouth every evening.    camphor-menthol 0.5-0.5% (SARNA) lotion Apply topically once daily. APPLY SMALL AMOUNT TOPICALLY TO AFFECTED AREA(S) AS NEEDED FOR ITCHING KEEP IN FRIDGE    carboxymethylcellulose sodium 0.5 % Drop Apply 1 drop to eye nightly as needed (dry eyes).    carvediloL (COREG) 12.5 MG tablet Take 0.5 tablets (6.25 mg total) by mouth 2 (two) times daily.    cetirizine (ZYRTEC) 5 MG tablet Take 1 tablet (5 mg total) by mouth every 48 hours.    clobetasol 0.05% (TEMOVATE) 0.05 % Oint Apply topically 2 (two) times daily.    clopidogreL (PLAVIX) 75 mg tablet Take 1 tablet (75 mg total) by mouth once daily.    erythromycin (ROMYCIN) ophthalmic ointment Place a 1/2 inch ribbon of ointment into the lower eyelid. Four timex daily for 5 days    gabapentin (NEURONTIN) 100 MG capsule Take 1 capsule (100 mg total) by mouth every evening.    hydrophilic ointment (AQUABASE) Apply topically as needed for Dry Skin. APPLY MODERATE AMOUNT TOPICALLY TO AFFECTED AREA(S) TWICE A DAY AS NEEDED FOR DRY SKIN APPLY TO AREAS OF DRY SKIN OR OVER ENTIRE BODY.    lanolin alcohol-mineral oil-white petrolatum-ceres (EUCERIN) Crea cream Apply topically 2 (two) times daily as needed.    LIDOcaine (LIDODERM) 5 % Place 1 patch onto the skin once daily. Remove & Discard patch within 12 hours or as directed by MD    loratadine (CLARITIN) 10 mg tablet Take 10 mg by mouth daily as needed for Allergies (Every other day).    nut.tx.imp.renal fxn,lac-reduc (NEPRO CARB STEADY) 0.08 gram-1.8 kcal/mL Liqd  Take 240 mLs by mouth 2 (two) times a day.    ondansetron (ZOFRAN-ODT) 4 MG TbDL Take 1 tablet (4 mg total) by mouth every 8 (eight) hours as needed (nausea).    oxyCODONE-acetaminophen (PERCOCET) 5-325 mg per tablet Take 1 tablet by mouth every 6 (six) hours as needed.    pantoprazole (PROTONIX) 20 MG tablet Take 20 mg by mouth 2 (two) times daily as needed.    pramoxine 1 % Lotn Apply topically 2 (two) times daily as needed (itching).    triamcinolone acetonide 0.1% (KENALOG) 0.1 % cream Apply topically 2 (two) times daily as needed (itching).     Family History    None       Tobacco Use    Smoking status: Never    Smokeless tobacco: Never   Substance and Sexual Activity    Alcohol use: No    Drug use: No    Sexual activity: Not Currently     Review of Systems   Unable to perform ROS: Mental status change     Objective:     Vital Signs (Most Recent):  Temp: 98.2 °F (36.8 °C) (04/06/25 1200)  Pulse: 89 (04/06/25 1200)  Resp: 16 (04/06/25 1200)  BP: (!) 117/55 (04/06/25 1200)  SpO2: 95 % (04/06/25 1200) Vital Signs (24h Range):  Temp:  [97.9 °F (36.6 °C)-99.1 °F (37.3 °C)] 98.2 °F (36.8 °C)  Pulse:  [85-95] 89  Resp:  [13-29] 16  SpO2:  [90 %-100 %] 95 %  BP: ()/(47-79) 117/55     Weight: 61.2 kg (134 lb 14.7 oz)  Body mass index is 21.13 kg/m².    SpO2: 95 %         Intake/Output Summary (Last 24 hours) at 4/6/2025 1256  Last data filed at 4/6/2025 1200  Gross per 24 hour   Intake 440 ml   Output --   Net 440 ml       Lines/Drains/Airways       Central Venous Catheter Line  Duration             Trialysis (Dialysis) Catheter 04/04/25 1208 right internal jugular 2 days              Drain  Duration             Male External Urinary Catheter 04/02/25 1850 Medium 3 days         NG/OG Tube 04/04/25 1415 Rolf 10 Fr. Left nostril 1 day              Peripheral Intravenous Line  Duration                  Hemodialysis AV Fistula Left upper arm -- days         Peripheral IV - Single Lumen 04/02/25 1801 20 G  Anterior;Distal;Right Upper Arm 3 days                   Exam unchanged vs 4/5/25  Physical Exam  Constitutional:       General: He is not in acute distress.     Appearance: Normal appearance. He is well-developed. He is ill-appearing. He is not toxic-appearing or diaphoretic.   HENT:      Head: Normocephalic and atraumatic.   Eyes:      General: No scleral icterus.     Extraocular Movements: Extraocular movements intact.      Conjunctiva/sclera: Conjunctivae normal.      Pupils: Pupils are equal, round, and reactive to light.   Neck:      Thyroid: No thyromegaly.      Vascular: No JVD.      Trachea: No tracheal deviation.   Cardiovascular:      Rate and Rhythm: Normal rate and regular rhythm.      Heart sounds: S1 normal and S2 normal. Heart sounds are distant. No murmur heard.     No friction rub. No gallop.   Pulmonary:      Effort: Pulmonary effort is normal. No respiratory distress.      Breath sounds: Normal breath sounds. No stridor. No wheezing, rhonchi or rales.   Chest:      Chest wall: No tenderness.   Abdominal:      General: There is no distension.      Palpations: Abdomen is soft.   Musculoskeletal:         General: No swelling or tenderness. Normal range of motion.      Cervical back: Normal range of motion and neck supple. No rigidity.      Right lower leg: No edema.      Left lower leg: No edema.   Skin:     General: Skin is warm and dry.      Coloration: Skin is not jaundiced.   Neurological:      General: No focal deficit present.      Mental Status: He is alert.      Cranial Nerves: No cranial nerve deficit.   Psychiatric:         Mood and Affect: Mood normal.         Behavior: Behavior normal.          Current Medications:   aspirin  81 mg Oral Daily    atorvastatin  80 mg Oral QHS    clopidogreL  75 mg Oral Daily    heparin (porcine)  5,000 Units Subcutaneous Q8H    mupirocin   Nasal BID    pantoprazole  40 mg Oral Daily    sodium chloride 0.9%  10 mL Intravenous Q8H         Current  Facility-Administered Medications:     0.9%  NaCl infusion (for blood administration), , Intravenous, Q24H PRN    acetaminophen, 650 mg, Oral, Q4H PRN    dextrose 50%, 12.5 g, Intravenous, PRN    dextrose 50%, 25 g, Intravenous, PRN    diphenhydrAMINE, 50 mg, Oral, Q6H PRN    glucagon (human recombinant), 1 mg, Intramuscular, PRN    glucose, 16 g, Oral, PRN    glucose, 24 g, Oral, PRN    heparin (porcine), 1,000 Units, Intravenous, PRN    HYDROmorphone, 1 mg, Intravenous, Q6H PRN    insulin aspart U-100, 0-5 Units, Subcutaneous, QID (AC + HS) PRN    melatonin, 6 mg, Oral, Nightly PRN    naloxone, 0.02 mg, Intravenous, PRN    ondansetron, 4 mg, Intravenous, Q6H PRN    prochlorperazine, 5 mg, Intravenous, Q6H PRN    Pharmacy to dose Vancomycin consult, , , Once **AND** vancomycin - pharmacy to dose, , Intravenous, pharmacy to manage frequency    Laboratory (all labs reviewed):  CBC:  Recent Labs   Lab 04/03/25  1533 04/03/25 2055 04/04/25  0247 04/05/25  0324 04/06/25  0317   WBC 18.03 H 16.23 H 16.11 H 17.18 H 15.49 H   HGB 7.2 L 10.2 L 10.0 L 8.6 L 8.2 L   HCT 22.7 L 30.7 L 30.0 L 26.7 L 25.8 L   Platelet Count 146 L 130 L 121 L 109 L 118 L       CHEMISTRIES:  Recent Labs   Lab 01/16/25  0154 02/08/25  1014 02/11/25  1325 02/23/25  1638 03/21/25  1808 03/25/25  1108 04/03/25 2055 04/04/25  0247 04/04/25  1439 04/04/25  2001 04/04/25  2151 04/05/25  0324 04/05/25  1159 04/05/25  1426 04/06/25  0317   Glucose 141 H 171 H 225 H 113 H 150 H  --   --   --   --   --   --   --   --   --   --    Sodium 140 138 138 141 141   < > 138 139 139  --  140 140  --  139 139   Potassium 4.0 4.5 4.4 5.1 4.4   < > 4.8 5.0 5.4 H  --  4.6 4.2  --  4.0 3.7   BUN 44 H 63 H 75 H 77 H 91 H   < > 70 H 75 H 84 H  --  68 H 58 H  --  49 H 59 H   Creatinine 10.0 H 8.6 H 8.9 H 9.8 H 10.9 H   < > 9.3 H 9.9 H 10.8 H  --  8.5 H 7.3 H  --  6.2 H 7.6 H   eGFR 5 A 5.5 A 5.3 A 4.7 A 4.1 A   < > 5 L 5 L 4 L  --  6 L 7 L  --  8 L 6 L   Calcium 8.9 10.2  10.1 10.9 H 10.4   < > 8.1 L 8.2 L 8.1 L  --  8.4 L 8.4 L  --  8.2 L 8.5 L   Magnesium   --   --   --   --   --    < > 2.2 2.3  --  2.2  --  2.1 2.0  --   --    Magnesium  --   --   --   --  2.5  --   --   --   --   --   --   --   --   --   --     < > = values in this interval not displayed.       CARDIAC BIOMARKERS:  Recent Labs   Lab 04/01/24  0251 04/29/24 2211 08/03/24  0015 08/05/24  0138 10/20/24  2310 10/21/24  0113 04/01/25  2220 04/02/25  0846 04/02/25 2055 04/03/25  0303 04/03/25  0718 04/03/25  1533 04/04/25  1028     --  183  --  111  --  382 H  --   --   --   --   --   --    Troponin I  --    < > 0.080 H   < > 0.044 H   < >  --   --   --   --   --   --   --    Troponin-I  --   --   --   --   --   --   --    < > 1.076 H 1.441 H 1.812 H 2.032 H 2.913 H    < > = values in this interval not displayed.       COAGS:  Recent Labs   Lab 11/28/23  2155 04/18/24  0609 01/12/25  1316 03/25/25  1108 04/03/25  1533   INR 1.1 1.0 1.0 1.0 1.1       LIPIDS/LFTS:  Recent Labs   Lab 08/11/24  0331 08/15/24  0203 04/03/25  0713 04/03/25  1533 04/03/25 2055 04/04/25  0247 04/06/25  0317   Cholesterol 110 L  --   --   --   --   --   --    Triglycerides 126  --   --   --   --   --   --    HDL 26 L  --   --   --   --   --   --    LDL Cholesterol 58.8 L  --   --   --   --   --   --    Non-HDL Cholesterol 84  --   --   --   --   --   --    AST  --    < > 29 25 24 23 25   ALT  --    < > 20 15 18 17 21    < > = values in this interval not displayed.       BNP:  Recent Labs   Lab 06/11/24 2239 08/03/24  0015 08/11/24  0332 01/12/25  0550 04/03/25  0303   BNP 1,110 H 624 H 2,178 H 2,043 H 2,988 H       TSH:  Recent Labs   Lab 11/27/23  0519 01/15/24  2135 07/09/24  1428 08/11/24  0332 04/02/25  0537   TSH 3.357 4.600 H 6.963 H 3.949 4.590 H       Free T4:  Recent Labs   Lab 01/15/24  2135 07/09/24  1428 04/02/25  0537   Free T4 0.81 1.01 1.19  1.19       Diagnostic Results:  ECG (personally reviewed and interpreted  tracing(s)):  4/4/25 1325 SR 79, PRWP, lat ST abnl ?isch, similar to 3/25/25, lass prom than 4/2/25    Chest X-Ray (personally reviewed and interpreted image(s)): 4/4/25 NAD, aortic atherosclerosis    Echo: 4/4/25 (images prev personally reviewed and interpreted)    Left Ventricle: The left ventricle is normal in size. Normal wall thickness. Mild global hypokinesis present. There is mildly reduced systolic function with a visually estimated ejection fraction of 40 - 45%. Grade I diastolic dysfunction.    Right Ventricle: The right ventricle has moderate enlargement. Systolic function is mildly reduced.    Left Atrium: Mildly dilated Cannot exclude PFO (color Doppler with possible flow across IAS).    Aortic Valve: The aortic valve is a trileaflet valve. There is moderate aortic valve sclerosis. Mildly restricted motion. There is mild stenosis. Aortic valve area by VTI is 1.8 cm². Aortic valve peak velocity is 1.5 m/s. Mean gradient is 5 mmHg. The dimensionless index is 0.51. There is mild aortic regurgitation.    Mitral Valve: There is a 1.9x1.2cm large fixed heterogeneous mass present on the posterior leaflet (new finding vs 9/2023 echo). There is moderate to severe regurgitation with an eccentric jet.  Cannot exclude severe MR with possible PMVL perforation in association with large vegetation.    Aorta: Aortic root is mildly to moderately dilated measuring 4.17 cm.    Pulmonary Artery: The estimated pulmonary artery systolic pressure is 42 mmHg.    PCI RCA 1/14/25:    The Ost RCA lesion was 70% stenosed with 0% stenosis post-intervention.    The Prox RCA lesion was 70% stenosed with 0% stenosis post-intervention.    The Mid RCA lesion was 99% stenosed with 0% stenosis post-intervention.    The ejection fraction was calculated to be 55%.    The pre-procedure left ventricular end diastolic pressure was 6.    The post-procedure left ventricular end diastolic pressure was 7.    Mid RCA lesion: A stent was  successfully placed at 11 MAYRA for 15 sec.    Prox RCA lesion, Mid RCA lesion: A  at 12 MAYRA for 10 sec.    Ost RCA lesion, Prox RCA lesion: A stent was successfully placed.  Procedure performed:  Left heart catheterization  Coronary angiogram of the right coronary artery  Right radial artery access   Right superficial femoral artery access  CSI atherectomy of the right mid coronary artery  Laser atherectomy of the right mid coronary artery  PTCA of the right mid coronary artery  XU x3 megatron drug-eluting stent 3.5 X 16 in the mid RCA, 3.5 X 28 in the proximal RCA, 4.0 X 16 mm in the ostial RCA  Findings:  Severely calcified mid RCA stenosis unable to cross with PTCA balloons, treated initially with 0.9 laser atherectomy, followed by CSI atherectomy system with total of 5 runs (3 at low speed, 2 at high speed), pre-dilated using 2.0 compliant and 3.5 noncompliant balloon followed by 3.5 X 16 mm megatron stent.  Focal plaque rupture/erosion noted in the proximal and ostial RCA that were treated with  3.5 X 28 in the proximal RCA, 4.0 X 16 mm in the ostial RCA.  No residual stenosis post intervention.  Patient had ALAN 3 flow at the end of the procedure  LVEDP 6 mm Hg.  EF of around 55%.  No gradient across the aortic valve.  Right SFA access.  High bifurcation.  Mild diffuse disease of the right SFA closed using Perclose closure device  Right radial artery with severe spasm and hence decision was made to switch to right groin access  Recommendations:  Continue dual antiplatelet therapy for at least 1 year.  Consider longer term anticoagulation based on risk factor profile after 1 year  High-intensity statin  Transfer back to telemetry unit    Cath/PCI LAD 1/13/25  Left Main Coronary Artery: The left main is a large caliber vessel arising normally from the left sinus of valsalva.  It bifurcates into the left anterior descending and left circumflex coronary artery.  It is free of evidence of obstructive coronary  artery disease. ALAN III flow  Left Anterior Descending: The left anterior descending coronary artery is a large caliber vessel arising normally from the left main and extending to the apex.  It gives rise to small to moderate caliber diagonal arteries. Proximal LAD has a severe 80-90% calcified stenosis prior to an under expanded 2.5 mm stent in a 4.0 mm vessel. After the stent there is 40-50% stenosis. ALAN II flow  Left Circumflex: The left circumflex is a large caliber vessel arising normally from the left main coronary artery, it is non-dominant.  It gives rise to moderate caliber obtuse marginal branches. OM stent is patent. ALAN III flow  Right Coronary Artery: The right coronary artery is a large caliber vessel arising normally from the right sinus of valsalva.  It is dominant giving rise to a PDA and PLV branch. Mid RCA with a 95-99% calcified fibrotic stenosis. ALAN III flow  LVgram: LVEDP 33 mmHg  PCI procedure:  Heparin administered. ACT was closely monitored. Using a EBU 3.5 guider, this was used to cannulate the left system. Nuno blue PCI wire repshaped outside the body. PCI wire was advanced into the distal LAD. Predilated with 2.0 mm NC, 2.5 mm NC, scoring balloon 2.5 mm, 3.0 mm and 3.5 mm NC. IVUS was advanced for vessel prep/size. Schockwave 3.0 x 12 mm advanced and multiple therapies given. Followed by a 3.5 mm NC. Advanced a 3.5 x 24 mm megatron XU and deployed at nominal pressure. Post dilated with a 4.0 mm NC with great results. After the balloon was removed.  The wire was left in place.  Repeat angiography showed no signs of dissection.  Subsequently the wire was pulled back.   Final angiography showed ALAN-3 flow.  No signs of dissection, perforation, or thrombus.  Assessment:   Successful PCI of LAD with 3.5 x 24 mm XU  mRCA 95-99% stenosis--> staged PCI of RCA  Patent Lcx stent  Plan:   - Stage PCI of RCA tomorrow PM- NPO at [place]   - Patient tolerated procedure well. No immediate  complications  - Continue DAPT  - EKG when arrives to floor  - Routine post cath protocol  - Dialysis in pm   - Maximize medical management  - Further care by the primary team    Cath/PCI OM2 12/4/23  1.  Selective left coronary Angiography.   2.  IVUS of Proximal OM2 coronary artery   3.  PCI of Proximal OM2 coronary artery with one 3.5 x 18 Michael stent   4.  Ultrasound guided right radial arterial access   5. Conscious sedation from 7:11 AM to 8:03 AM (52 minutes of sedation)   Findings:   - Coronary angiography data   Left main: Large caliber vessel, divides into the left anterior descending   and left circumflex. LM is non obstructive.   Left anterior descending: Large caliber vessel giving rise to 2 diagonals.   Patent mid LAD stents.   Left circumflex: Medium caliber vessel giving rise to 2 obtuse marginals.   OM2 has a proximal 99% ISR.   Right coronary artery: Not studied but known mid RCA lesion (see report   from last week)   - IVUS data of OM2    Pre PCI IVUS data:   Proximal reference luminal diameter: 3.6 mm   Distal reference luminal diameter: 3.4 mm   Percutaneous Coronary Intervention   Successful PCI of Proximal OM2 coronary artery 99 % ISR stenosis was   reduced to 0 % (initial ALAN 3 flow) by pre dilatation with 3 x 15 mm   balloon followed by placement of 3.5 x 18 mm Hester stent with excellent   angiographic results with final ALAN 3 flow. IVUS guided.   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   -  Succesful PCI of Proximal OM2 coronary artery with one Michael stent  Recommendations:   - Post operative care as per protocol.   - Aspirin for life and P2Y12 inhibitor for at least a year   - Moderate to high statin therapy.   - Follow up with Dr Hudson

## 2025-04-06 NOTE — PROCEDURES
Cystoscopy    Date/Time: 4/6/2025 5:32 PM    Performed by: Agustin Kramer MD  Authorized by: Agustin Kramer MD    Consent Done?:  Yes (Verbal)  Timeout: prior to procedure the correct patient, procedure, and site was verified    Prep: patient was prepped and draped in usual sterile fashion    Local anesthesia used?: Yes    Anesthesia:  Lidocaine jelly  Local anesthetic:  Lidocaine 2% topical gel  Anesthetic total (ml):  10  Indications: BPH    Position:  Supine  Anesthesia:  Lidocaine jelly  Preparation: Patient was prepped and draped in usual sterile fashion    Scope type:  Flexible cystoscope  External exam normal: Yes    Urethra normal: Yes    Prostate normal: No     Hyperplasia   Trilobar  Length (cm):  6  Bladder neck normal: Yes    Bladder normal: Yes     patient tolerated the procedure well with no immediate complications  Comments:      Superstiff wire placed through the scope into the bladder   Sixteen [place] tip catheter placed over the wire without difficulty.    Bladder irrigated with 60 cc of sterile water small clot was evacuated pink urine was seen draining from the catheter.  A proximally 400 mL drained.

## 2025-04-07 ENCOUNTER — ANESTHESIA (OUTPATIENT)
Dept: SURGERY | Facility: HOSPITAL | Age: 85
End: 2025-04-07
Payer: MEDICARE

## 2025-04-07 ENCOUNTER — ANESTHESIA EVENT (OUTPATIENT)
Dept: SURGERY | Facility: HOSPITAL | Age: 85
End: 2025-04-07
Payer: MEDICARE

## 2025-04-07 PROBLEM — D69.6 THROMBOCYTOPENIA: Status: ACTIVE | Noted: 2025-04-07

## 2025-04-07 PROBLEM — T82.7XXA AV FISTULA INFECTION: Status: ACTIVE | Noted: 2025-04-07

## 2025-04-07 PROBLEM — R91.8 PULMONARY NODULES: Status: ACTIVE | Noted: 2025-04-07

## 2025-04-07 PROBLEM — N40.1 BPH WITH URINARY OBSTRUCTION: Status: ACTIVE | Noted: 2025-04-06

## 2025-04-07 PROBLEM — R31.0 GROSS HEMATURIA: Status: ACTIVE | Noted: 2025-04-07

## 2025-04-07 PROBLEM — J43.9 EMPHYSEMA LUNG: Status: ACTIVE | Noted: 2025-04-07

## 2025-04-07 PROBLEM — N13.8 BPH WITH URINARY OBSTRUCTION: Status: ACTIVE | Noted: 2025-04-06

## 2025-04-07 LAB
ABSOLUTE EOSINOPHIL (OHS): 0.19 K/UL
ABSOLUTE MONOCYTE (OHS): 1.61 K/UL (ref 0.3–1)
ABSOLUTE NEUTROPHIL COUNT (OHS): 15 K/UL (ref 1.8–7.7)
ACID FAST MOD KINY STN SPEC: NORMAL
ALBUMIN SERPL BCP-MCNC: 2.1 G/DL (ref 3.5–5.2)
ALP SERPL-CCNC: 72 UNIT/L (ref 40–150)
ALT SERPL W/O P-5'-P-CCNC: 20 UNIT/L (ref 10–44)
ANION GAP (OHS): 15 MMOL/L (ref 8–16)
AST SERPL-CCNC: 20 UNIT/L (ref 11–45)
BACTERIA SPEC ANAEROBE CULT: NORMAL
BASOPHILS # BLD AUTO: 0.05 K/UL
BASOPHILS NFR BLD AUTO: 0.3 %
BILIRUB SERPL-MCNC: 0.5 MG/DL (ref 0.1–1)
BUN SERPL-MCNC: 77 MG/DL (ref 8–23)
CALCIUM SERPL-MCNC: 8.6 MG/DL (ref 8.7–10.5)
CHLORIDE SERPL-SCNC: 104 MMOL/L (ref 95–110)
CO2 SERPL-SCNC: 22 MMOL/L (ref 23–29)
CREAT SERPL-MCNC: 9.7 MG/DL (ref 0.5–1.4)
ERYTHROCYTE [DISTWIDTH] IN BLOOD BY AUTOMATED COUNT: 17.5 % (ref 11.5–14.5)
GFR SERPLBLD CREATININE-BSD FMLA CKD-EPI: 5 ML/MIN/1.73/M2
GLUCOSE SERPL-MCNC: 238 MG/DL (ref 70–110)
HCT VFR BLD AUTO: 25.8 % (ref 40–54)
HGB BLD-MCNC: 8.3 GM/DL (ref 14–18)
IMM GRANULOCYTES # BLD AUTO: 0.48 K/UL (ref 0–0.04)
IMM GRANULOCYTES NFR BLD AUTO: 2.6 % (ref 0–0.5)
INDIRECT COOMBS: NORMAL
LYMPHOCYTES # BLD AUTO: 1.02 K/UL (ref 1–4.8)
MCH RBC QN AUTO: 29.4 PG (ref 27–31)
MCHC RBC AUTO-ENTMCNC: 32.2 G/DL (ref 32–36)
MCV RBC AUTO: 92 FL (ref 82–98)
MYCOBACTERIUM SPEC QL CULT: NORMAL
NUCLEATED RBC (/100WBC) (OHS): 0 /100 WBC
PHOSPHATE SERPL-MCNC: 5.3 MG/DL (ref 2.7–4.5)
PLATELET # BLD AUTO: 141 K/UL (ref 150–450)
PMV BLD AUTO: 10.7 FL (ref 9.2–12.9)
POCT GLUCOSE: 236 MG/DL (ref 70–110)
POCT GLUCOSE: 313 MG/DL (ref 70–110)
POTASSIUM SERPL-SCNC: 4.2 MMOL/L (ref 3.5–5.1)
PROT SERPL-MCNC: 6.3 GM/DL (ref 6–8.4)
RBC # BLD AUTO: 2.82 M/UL (ref 4.6–6.2)
RELATIVE EOSINOPHIL (OHS): 1 %
RELATIVE LYMPHOCYTE (OHS): 5.6 % (ref 18–48)
RELATIVE MONOCYTE (OHS): 8.8 % (ref 4–15)
RELATIVE NEUTROPHIL (OHS): 81.7 % (ref 38–73)
RH BLD: NORMAL
SODIUM SERPL-SCNC: 141 MMOL/L (ref 136–145)
SPECIMEN OUTDATE: NORMAL
VANCOMYCIN SERPL-MCNC: 18 UG/ML (ref ?–80)
WBC # BLD AUTO: 18.35 K/UL (ref 3.9–12.7)

## 2025-04-07 PROCEDURE — 85025 COMPLETE CBC W/AUTO DIFF WBC: CPT | Performed by: HOSPITALIST

## 2025-04-07 PROCEDURE — 36000707: Performed by: STUDENT IN AN ORGANIZED HEALTH CARE EDUCATION/TRAINING PROGRAM

## 2025-04-07 PROCEDURE — 63600175 PHARM REV CODE 636 W HCPCS

## 2025-04-07 PROCEDURE — 27000221 HC OXYGEN, UP TO 24 HOURS

## 2025-04-07 PROCEDURE — 25000003 PHARM REV CODE 250: Performed by: HOSPITALIST

## 2025-04-07 PROCEDURE — 36000706: Performed by: STUDENT IN AN ORGANIZED HEALTH CARE EDUCATION/TRAINING PROGRAM

## 2025-04-07 PROCEDURE — G0545 PR VISIT INHERENT TO INPT OR OBS CARE, INFECTIOUS DISEASE: HCPCS | Mod: ,,, | Performed by: INTERNAL MEDICINE

## 2025-04-07 PROCEDURE — 80100014 HC HEMODIALYSIS 1:1

## 2025-04-07 PROCEDURE — 20000000 HC ICU ROOM

## 2025-04-07 PROCEDURE — 25000003 PHARM REV CODE 250: Performed by: STUDENT IN AN ORGANIZED HEALTH CARE EDUCATION/TRAINING PROGRAM

## 2025-04-07 PROCEDURE — 27000923 HC TRIALYSIS CATHETER, ANY SIZE

## 2025-04-07 PROCEDURE — 5A1D70Z PERFORMANCE OF URINARY FILTRATION, INTERMITTENT, LESS THAN 6 HOURS PER DAY: ICD-10-PCS | Performed by: HOSPITALIST

## 2025-04-07 PROCEDURE — 27201423 OPTIME MED/SURG SUP & DEVICES STERILE SUPPLY: Performed by: STUDENT IN AN ORGANIZED HEALTH CARE EDUCATION/TRAINING PROGRAM

## 2025-04-07 PROCEDURE — 99233 SBSQ HOSP IP/OBS HIGH 50: CPT | Mod: 25,,, | Performed by: STUDENT IN AN ORGANIZED HEALTH CARE EDUCATION/TRAINING PROGRAM

## 2025-04-07 PROCEDURE — 99232 SBSQ HOSP IP/OBS MODERATE 35: CPT | Mod: ,,, | Performed by: INTERNAL MEDICINE

## 2025-04-07 PROCEDURE — 63600175 PHARM REV CODE 636 W HCPCS: Performed by: INTERNAL MEDICINE

## 2025-04-07 PROCEDURE — 0TCD8ZZ EXTIRPATION OF MATTER FROM URETHRA, VIA NATURAL OR ARTIFICIAL OPENING ENDOSCOPIC: ICD-10-PCS | Performed by: STUDENT IN AN ORGANIZED HEALTH CARE EDUCATION/TRAINING PROGRAM

## 2025-04-07 PROCEDURE — 97161 PT EVAL LOW COMPLEX 20 MIN: CPT

## 2025-04-07 PROCEDURE — 80202 ASSAY OF VANCOMYCIN: CPT | Performed by: HOSPITALIST

## 2025-04-07 PROCEDURE — 25000003 PHARM REV CODE 250

## 2025-04-07 PROCEDURE — 52214 CYSTOSCOPY AND TREATMENT: CPT | Mod: ,,, | Performed by: STUDENT IN AN ORGANIZED HEALTH CARE EDUCATION/TRAINING PROGRAM

## 2025-04-07 PROCEDURE — 37000008 HC ANESTHESIA 1ST 15 MINUTES: Performed by: STUDENT IN AN ORGANIZED HEALTH CARE EDUCATION/TRAINING PROGRAM

## 2025-04-07 PROCEDURE — 0TCB8ZZ EXTIRPATION OF MATTER FROM BLADDER, VIA NATURAL OR ARTIFICIAL OPENING ENDOSCOPIC: ICD-10-PCS | Performed by: STUDENT IN AN ORGANIZED HEALTH CARE EDUCATION/TRAINING PROGRAM

## 2025-04-07 PROCEDURE — C1769 GUIDE WIRE: HCPCS | Performed by: STUDENT IN AN ORGANIZED HEALTH CARE EDUCATION/TRAINING PROGRAM

## 2025-04-07 PROCEDURE — 63600175 PHARM REV CODE 636 W HCPCS: Performed by: HOSPITALIST

## 2025-04-07 PROCEDURE — 27000207 HC ISOLATION

## 2025-04-07 PROCEDURE — A4216 STERILE WATER/SALINE, 10 ML: HCPCS | Performed by: HOSPITALIST

## 2025-04-07 PROCEDURE — 99233 SBSQ HOSP IP/OBS HIGH 50: CPT | Mod: ,,, | Performed by: INTERNAL MEDICINE

## 2025-04-07 PROCEDURE — 99223 1ST HOSP IP/OBS HIGH 75: CPT | Mod: ,,,

## 2025-04-07 PROCEDURE — 86901 BLOOD TYPING SEROLOGIC RH(D): CPT | Performed by: HOSPITALIST

## 2025-04-07 PROCEDURE — 37000009 HC ANESTHESIA EA ADD 15 MINS: Performed by: STUDENT IN AN ORGANIZED HEALTH CARE EDUCATION/TRAINING PROGRAM

## 2025-04-07 PROCEDURE — 84100 ASSAY OF PHOSPHORUS: CPT | Performed by: INTERNAL MEDICINE

## 2025-04-07 PROCEDURE — 99233 SBSQ HOSP IP/OBS HIGH 50: CPT | Mod: 25,,, | Performed by: INTERNAL MEDICINE

## 2025-04-07 PROCEDURE — 94761 N-INVAS EAR/PLS OXIMETRY MLT: CPT

## 2025-04-07 PROCEDURE — 63600175 PHARM REV CODE 636 W HCPCS: Mod: JZ,TB | Performed by: INTERNAL MEDICINE

## 2025-04-07 PROCEDURE — 97166 OT EVAL MOD COMPLEX 45 MIN: CPT

## 2025-04-07 PROCEDURE — 80053 COMPREHEN METABOLIC PANEL: CPT | Performed by: HOSPITALIST

## 2025-04-07 PROCEDURE — 99291 CRITICAL CARE FIRST HOUR: CPT | Mod: ,,, | Performed by: INTERNAL MEDICINE

## 2025-04-07 PROCEDURE — 99497 ADVNCD CARE PLAN 30 MIN: CPT | Mod: 25,,, | Performed by: INTERNAL MEDICINE

## 2025-04-07 RX ORDER — EPINEPHRINE 0.1 MG/ML
INJECTION INTRAVENOUS
Status: DISCONTINUED | OUTPATIENT
Start: 2025-04-07 | End: 2025-04-07

## 2025-04-07 RX ORDER — INSULIN GLARGINE 100 [IU]/ML
5 INJECTION, SOLUTION SUBCUTANEOUS DAILY
Status: DISCONTINUED | OUTPATIENT
Start: 2025-04-07 | End: 2025-04-08

## 2025-04-07 RX ORDER — LIDOCAINE HYDROCHLORIDE 20 MG/ML
INJECTION INTRAVENOUS
Status: DISCONTINUED | OUTPATIENT
Start: 2025-04-07 | End: 2025-04-07

## 2025-04-07 RX ORDER — HEPARIN SODIUM 1000 [USP'U]/ML
2000 INJECTION, SOLUTION INTRAVENOUS; SUBCUTANEOUS ONCE
Status: COMPLETED | OUTPATIENT
Start: 2025-04-07 | End: 2025-04-07

## 2025-04-07 RX ORDER — FENTANYL CITRATE 50 UG/ML
INJECTION, SOLUTION INTRAMUSCULAR; INTRAVENOUS
Status: DISCONTINUED | OUTPATIENT
Start: 2025-04-07 | End: 2025-04-07

## 2025-04-07 RX ORDER — MUPIROCIN 20 MG/G
OINTMENT TOPICAL 2 TIMES DAILY
Status: DISCONTINUED | OUTPATIENT
Start: 2025-04-07 | End: 2025-04-07

## 2025-04-07 RX ORDER — SODIUM CHLORIDE 9 MG/ML
INJECTION, SOLUTION INTRAVENOUS ONCE
Status: DISCONTINUED | OUTPATIENT
Start: 2025-04-07 | End: 2025-04-08

## 2025-04-07 RX ORDER — ROCURONIUM BROMIDE 10 MG/ML
INJECTION, SOLUTION INTRAVENOUS
Status: DISCONTINUED | OUTPATIENT
Start: 2025-04-07 | End: 2025-04-07

## 2025-04-07 RX ORDER — PHENYLEPHRINE HYDROCHLORIDE 10 MG/ML
INJECTION INTRAVENOUS
Status: DISCONTINUED | OUTPATIENT
Start: 2025-04-07 | End: 2025-04-07

## 2025-04-07 RX ORDER — SODIUM CHLORIDE 9 MG/ML
INJECTION, SOLUTION INTRAVENOUS
Status: DISCONTINUED | OUTPATIENT
Start: 2025-04-07 | End: 2025-04-09

## 2025-04-07 RX ORDER — EPHEDRINE SULFATE 50 MG/ML
INJECTION, SOLUTION INTRAVENOUS
Status: DISCONTINUED | OUTPATIENT
Start: 2025-04-07 | End: 2025-04-07

## 2025-04-07 RX ORDER — SODIUM CHLORIDE 9 MG/ML
INJECTION, SOLUTION INTRAVENOUS
Status: DISCONTINUED | OUTPATIENT
Start: 2025-04-07 | End: 2025-04-29 | Stop reason: HOSPADM

## 2025-04-07 RX ORDER — PROPOFOL 10 MG/ML
VIAL (ML) INTRAVENOUS
Status: DISCONTINUED | OUTPATIENT
Start: 2025-04-07 | End: 2025-04-07

## 2025-04-07 RX ORDER — LIDOCAINE HYDROCHLORIDE 10 MG/ML
INJECTION, SOLUTION INFILTRATION; PERINEURAL
Status: COMPLETED
Start: 2025-04-07 | End: 2025-04-07

## 2025-04-07 RX ORDER — VASOPRESSIN 20 [USP'U]/ML
INJECTION, SOLUTION INTRAMUSCULAR; SUBCUTANEOUS
Status: DISCONTINUED | OUTPATIENT
Start: 2025-04-07 | End: 2025-04-07

## 2025-04-07 RX ORDER — LIDOCAINE HYDROCHLORIDE 20 MG/ML
JELLY TOPICAL
Status: DISCONTINUED | OUTPATIENT
Start: 2025-04-07 | End: 2025-04-07 | Stop reason: HOSPADM

## 2025-04-07 RX ORDER — ONDANSETRON HYDROCHLORIDE 2 MG/ML
INJECTION, SOLUTION INTRAVENOUS
Status: DISCONTINUED | OUTPATIENT
Start: 2025-04-07 | End: 2025-04-07

## 2025-04-07 RX ORDER — LIDOCAINE HYDROCHLORIDE 10 MG/ML
1 INJECTION, SOLUTION INFILTRATION; PERINEURAL ONCE
Status: COMPLETED | OUTPATIENT
Start: 2025-04-07 | End: 2025-04-07

## 2025-04-07 RX ORDER — SUCCINYLCHOLINE CHLORIDE 20 MG/ML
INJECTION INTRAMUSCULAR; INTRAVENOUS
Status: DISCONTINUED | OUTPATIENT
Start: 2025-04-07 | End: 2025-04-07

## 2025-04-07 RX ADMIN — PANTOPRAZOLE SODIUM 40 MG: 40 TABLET, DELAYED RELEASE ORAL at 08:04

## 2025-04-07 RX ADMIN — HYDROMORPHONE HYDROCHLORIDE 0.5 MG: 1 INJECTION, SOLUTION INTRAMUSCULAR; INTRAVENOUS; SUBCUTANEOUS at 12:04

## 2025-04-07 RX ADMIN — HYDROMORPHONE HYDROCHLORIDE 0.5 MG: 1 INJECTION, SOLUTION INTRAMUSCULAR; INTRAVENOUS; SUBCUTANEOUS at 08:04

## 2025-04-07 RX ADMIN — ROCURONIUM BROMIDE 20 MG: 10 INJECTION, SOLUTION INTRAVENOUS at 03:04

## 2025-04-07 RX ADMIN — SODIUM CHLORIDE, SODIUM LACTATE, POTASSIUM CHLORIDE, AND CALCIUM CHLORIDE: .6; .31; .03; .02 INJECTION, SOLUTION INTRAVENOUS at 02:04

## 2025-04-07 RX ADMIN — HYDROMORPHONE HYDROCHLORIDE 0.5 MG: 1 INJECTION, SOLUTION INTRAMUSCULAR; INTRAVENOUS; SUBCUTANEOUS at 02:04

## 2025-04-07 RX ADMIN — VASOPRESSIN 2 UNITS: 20 INJECTION, SOLUTION INTRAMUSCULAR; SUBCUTANEOUS at 03:04

## 2025-04-07 RX ADMIN — PHENYLEPHRINE HYDROCHLORIDE 100 MCG: 10 INJECTION INTRAVENOUS at 03:04

## 2025-04-07 RX ADMIN — SUCCINYLCHOLINE CHLORIDE 100 MG: 20 INJECTION, SOLUTION INTRAMUSCULAR; INTRAVENOUS at 03:04

## 2025-04-07 RX ADMIN — PROPOFOL 100 MG: 10 INJECTION, EMULSION INTRAVENOUS at 03:04

## 2025-04-07 RX ADMIN — VANCOMYCIN HYDROCHLORIDE 500 MG: 500 INJECTION, POWDER, LYOPHILIZED, FOR SOLUTION INTRAVENOUS at 08:04

## 2025-04-07 RX ADMIN — EPINEPHRINE 10 MCG: 0.1 INJECTION, SOLUTION ENDOTRACHEAL; INTRACARDIAC; INTRAVENOUS at 04:04

## 2025-04-07 RX ADMIN — SODIUM CHLORIDE, PRESERVATIVE FREE 10 ML: 5 INJECTION INTRAVENOUS at 02:04

## 2025-04-07 RX ADMIN — MUPIROCIN: 20 OINTMENT TOPICAL at 09:04

## 2025-04-07 RX ADMIN — SODIUM CHLORIDE, PRESERVATIVE FREE 10 ML: 5 INJECTION INTRAVENOUS at 06:04

## 2025-04-07 RX ADMIN — LIDOCAINE HYDROCHLORIDE 1 ML: 10 INJECTION, SOLUTION INFILTRATION; PERINEURAL at 03:04

## 2025-04-07 RX ADMIN — VASOPRESSIN 2 UNITS: 20 INJECTION, SOLUTION INTRAMUSCULAR; SUBCUTANEOUS at 04:04

## 2025-04-07 RX ADMIN — EPINEPHRINE 10 MCG: 0.1 INJECTION, SOLUTION ENDOTRACHEAL; INTRACARDIAC; INTRAVENOUS at 03:04

## 2025-04-07 RX ADMIN — ASPIRIN 81 MG CHEWABLE TABLET 81 MG: 81 TABLET CHEWABLE at 08:04

## 2025-04-07 RX ADMIN — VASOPRESSIN 1 UNITS: 20 INJECTION, SOLUTION INTRAMUSCULAR; SUBCUTANEOUS at 03:04

## 2025-04-07 RX ADMIN — EPHEDRINE SULFATE 10 MG: 50 INJECTION INTRAVENOUS at 04:04

## 2025-04-07 RX ADMIN — EPHEDRINE SULFATE 10 MG: 50 INJECTION INTRAVENOUS at 03:04

## 2025-04-07 RX ADMIN — ATORVASTATIN CALCIUM 80 MG: 40 TABLET, FILM COATED ORAL at 09:04

## 2025-04-07 RX ADMIN — INSULIN ASPART 4 UNITS: 100 INJECTION, SOLUTION INTRAVENOUS; SUBCUTANEOUS at 11:04

## 2025-04-07 RX ADMIN — ONDANSETRON 4 MG: 2 INJECTION, SOLUTION INTRAMUSCULAR; INTRAVENOUS at 03:04

## 2025-04-07 RX ADMIN — INSULIN ASPART 3 UNITS: 100 INJECTION, SOLUTION INTRAVENOUS; SUBCUTANEOUS at 05:04

## 2025-04-07 RX ADMIN — INSULIN ASPART 1 UNITS: 100 INJECTION, SOLUTION INTRAVENOUS; SUBCUTANEOUS at 04:04

## 2025-04-07 RX ADMIN — MUPIROCIN: 20 OINTMENT TOPICAL at 08:04

## 2025-04-07 RX ADMIN — FENTANYL CITRATE 50 MCG: 50 INJECTION, SOLUTION INTRAMUSCULAR; INTRAVENOUS at 03:04

## 2025-04-07 RX ADMIN — SUGAMMADEX 200 MG: 100 INJECTION, SOLUTION INTRAVENOUS at 03:04

## 2025-04-07 RX ADMIN — INSULIN ASPART 1 UNITS: 100 INJECTION, SOLUTION INTRAVENOUS; SUBCUTANEOUS at 09:04

## 2025-04-07 RX ADMIN — LIDOCAINE HYDROCHLORIDE 100 MG: 20 INJECTION, SOLUTION INTRAVENOUS at 03:04

## 2025-04-07 RX ADMIN — CLOPIDOGREL BISULFATE 75 MG: 75 TABLET, FILM COATED ORAL at 08:04

## 2025-04-07 RX ADMIN — EPOETIN ALFA-EPBX 10000 UNITS: 10000 INJECTION, SOLUTION INTRAVENOUS; SUBCUTANEOUS at 06:04

## 2025-04-07 RX ADMIN — SODIUM CHLORIDE, PRESERVATIVE FREE 10 ML: 5 INJECTION INTRAVENOUS at 09:04

## 2025-04-07 RX ADMIN — CEFAZOLIN SODIUM 2 G: 1 POWDER, FOR SOLUTION INTRAMUSCULAR; INTRAVENOUS at 03:04

## 2025-04-07 RX ADMIN — INSULIN GLARGINE 5 UNITS: 100 INJECTION, SOLUTION SUBCUTANEOUS at 08:04

## 2025-04-07 RX ADMIN — HEPARIN SODIUM 2000 UNITS: 1000 INJECTION INTRAVENOUS; SUBCUTANEOUS at 06:04

## 2025-04-07 NOTE — ASSESSMENT & PLAN NOTE
"- TTE 4/4/25: "1.9x1.2cm large fixed heterogeneous mass present on the posterior leaflet (new finding vs 9/2023 echo). There is moderate to severe regurgitation with an eccentric jet. Cannot exclude severe MR with possible PMVL perforation in association with large vegetation."  - this is in the setting of MRSA bacteremia  - ID is consulted  - repeat blood cultures until clear   - suspect source is skin/chronic scratching vs AVF infection- cultures from resected AVF with Staph   - discussed with Cardiac surgery Dr Castro 4/4/25- given age and comorbidities, he is very high risk of mortality with surgery. He recommends medical management at this point.  - on vancomycin      "

## 2025-04-07 NOTE — PROGRESS NOTES
[facility] [place] Intensive Care  Urology  Progress Note    Patient Name: Jevon Rajan  MRN: [id number]  Admission Date: 4/3/2025  Hospital Length of Stay: 4 days  Code Status: Full Code   Attending Provider: Eileen Jordan MD   Primary Care Physician: Melia, Primary Doctor    Subjective:     HPI:  Urinary Retention  Patient complains of urinary retention. Onset of retention was 1 day ago and was gradual in onset. Patient currently does not have a urinary catheter in place.  300 ml of urine were scanned on bladder scanner Prior to this event voiding symptoms consisted of slow stream. Prior treatments include none. Recent medications that may have affected his voiding include none.   He still makes urine, he tells me an almost normal amount.  He is very uncomfortable at the level of the bladder.  Nursing staff attempted coude catheter was then successfully.  He agrees to allow me to place a catheter.    Interval History: interval dunaway removal    Review of Systems   Unable to perform ROS    Objective:     Temp:  [97.4 °F (36.3 °C)-98.1 °F (36.7 °C)] 97.6 °F (36.4 °C)  Pulse:  [] 89  Resp:  [11-40] 33  SpO2:  [81 %-99 %] 81 %  BP: (103-188)/() 124/50     Body mass index is 21.13 kg/m².      Bladder Scan Volume (mL): 331 mL (04/06/25 1520)    Drains       Drain  Duration                  NG/OG Tube 04/04/25 1415 Rolf 10 Fr. Left nostril 3 days    Trialysis (Dialysis) Catheter 04/04/25 1208 right internal jugular 3 days                     Physical Exam  Nursing note reviewed.   Constitutional:       Appearance: He is ill-appearing.   Abdominal:      General: There is distension.   Genitourinary:     Penis: Normal.       Testes: Normal.   Skin:     General: Skin is warm and dry.   Neurological:      Mental Status: He is disoriented.           Significant Labs:    BMP:  Recent Labs   Lab 04/05/25  1426 04/06/25  0317 04/07/25  0419    139 141   K 4.0 3.7 4.2    105 104   CO2 23 22* 22*   BUN 49* 59*  77*   CREATININE 6.2* 7.6* 9.7*   GLUCOSE 153* 166* 238*   CALCIUM 8.2* 8.5* 8.6*       CBC:   Recent Labs   Lab 04/05/25  0324 04/06/25  0317 04/07/25  0419   WBC 17.18* 15.49* 18.35*   HGB 8.6* 8.2* 8.3*   HCT 26.7* 25.8* 25.8*   * 118* 141*                 Assessment/Plan:     Gross hematuria  Pt has not voided since dunaway removal  Noted to be in clot retention  Bladder scan 350 cc, voiding clots  24 fr dunaway placed, unable to irrigate due to clot retention vs prostatic vessel bleeding. Balloon not inflated due to lack of irrigation  Tube feeds suspended  Plan for OR emergently  Cassandra Solares updated and provided consent via  phone with CHARLENE Cintron as witness    Hold blood thinners        VTE Risk Mitigation (From admission, onward)           Ordered     heparin (porcine) injection 5,000 Units  Every 8 hours         04/06/25 1959     heparin (porcine) injection 1,000 Units  As needed (PRN)         04/05/25 2100     IP VTE HIGH RISK PATIENT  Once         04/03/25 1516     Place TEMO hose  Until discontinued         04/03/25 1516     Place sequential compression device  Until discontinued         04/03/25 1516     Reason for No Pharmacological VTE Prophylaxis  Once        Question:  Reasons:  Answer:  Physician Provided (leave comment)    04/03/25 1516                    Elsie Arnold MD  Urology  [facility] - Intensive Care

## 2025-04-07 NOTE — SUBJECTIVE & OBJECTIVE
Interval History: UOP 325cc +2x yesterday over AM shift. Urinary retention and attempts at catheterization noted. No events overnight. Resting comfortably this morning.       Review of patient's allergies indicates:   Allergen Reactions    Ace inhibitors Hives, Itching, Shortness Of Breath, Other (See Comments) and Rash     Is not sure which medication it was    Captopril      Current Facility-Administered Medications   Medication Frequency    acetaminophen tablet 650 mg Q4H PRN    aspirin chewable tablet 81 mg Daily    atorvastatin tablet 80 mg QHS    clopidogreL tablet 75 mg Daily    dextrose 50% injection 12.5 g PRN    dextrose 50% injection 25 g PRN    diphenhydrAMINE capsule 50 mg Q6H PRN    glucagon (human recombinant) injection 1 mg PRN    glucose chewable tablet 16 g PRN    glucose chewable tablet 24 g PRN    heparin (porcine) injection 1,000 Units PRN    heparin (porcine) injection 5,000 Units Q8H    HYDROmorphone injection 0.5 mg Q4H PRN    insulin aspart U-100 pen 0-5 Units QID (AC + HS) PRN    insulin glargine U-100 (Lantus) pen 5 Units Daily    melatonin tablet 6 mg Nightly PRN    mupirocin 2 % ointment BID    naloxone 0.4 mg/mL injection 0.02 mg PRN    ondansetron injection 4 mg Q6H PRN    pantoprazole EC tablet 40 mg Daily    prochlorperazine injection Soln 5 mg Q6H PRN    sodium chloride 0.9% flush 10 mL Q8H    tamsulosin 24 hr capsule 0.4 mg Daily    vancomycin (VANCOCIN) 500 mg in D5W 100 mL IVPB (MB+) Once    vancomycin - pharmacy to dose pharmacy to manage frequency       Objective:     Vital Signs (Most Recent):  Temp: 97.6 °F (36.4 °C) (04/07/25 1115)  Pulse: 84 (04/07/25 1105)  Resp: 14 (04/07/25 1208)  BP: 136/64 (04/07/25 1105)  SpO2: (!) 93 % (04/07/25 1105) Vital Signs (24h Range):  Temp:  [97.4 °F (36.3 °C)-98.1 °F (36.7 °C)] 97.6 °F (36.4 °C)  Pulse:  [] 84  Resp:  [11-40] 14  SpO2:  [90 %-99 %] 93 %  BP: (103-188)/() 136/64     Weight: 61.2 kg (134 lb 14.7 oz) (04/04/25  1937)  Body mass index is 21.13 kg/m².  Body surface area is 1.7 meters squared.    I/O last 3 completed shifts:  In: 770 [NG/GT:770]  Out: 325 [Urine:325]     Physical Exam  Vitals and nursing note reviewed.   Constitutional:       General: He is sleeping. He is not in acute distress.     Appearance: Normal appearance. He is well-developed.   HENT:      Head: Normocephalic and atraumatic.      Nose: Nose normal.      Mouth/Throat:      Mouth: Mucous membranes are moist.   Eyes:      Extraocular Movements: Extraocular movements intact.      Conjunctiva/sclera: Conjunctivae normal.   Cardiovascular:      Rate and Rhythm: Normal rate and regular rhythm.   Pulmonary:      Effort: Pulmonary effort is normal.      Breath sounds: Normal breath sounds.   Abdominal:      General: There is no distension.      Palpations: Abdomen is soft.   Musculoskeletal:      Right lower leg: No edema.      Left lower leg: No edema.   Skin:     General: Skin is warm and dry.      Findings: Lesion (diffuse) present. No erythema or rash.          Significant Labs:  CBC:   Recent Labs   Lab 04/07/25  0419   WBC 18.35*   RBC 2.82*   HGB 8.3*   HCT 25.8*   *   MCV 92   MCH 29.4   MCHC 32.2     CMP:   Recent Labs   Lab 04/07/25  0419   CALCIUM 8.6*   ALBUMIN 2.1*      K 4.2   CO2 22*      BUN 77*   CREATININE 9.7*   ALKPHOS 72   ALT 20   AST 20   BILITOT 0.5     All labs within the past 24 hours have been reviewed.     Significant Imaging:  Labs: Reviewed

## 2025-04-07 NOTE — ASSESSMENT & PLAN NOTE
4/7/25  - Chart and interval history reviewed; discussed pt during MDT rounds. Pt had cystoscopy and dunaway placement by urology yesterday due to screaming and discomfort from urinary retention; found to have very enlarged prostate. Dunaway placed successfully by urology staff, though this was later removed due to pt complaining of significant pain.   - Pt screaming throughout much of the morning, possibly from urinary retention vs delirium. He was not able to participate in discussion secondary to this.   - Along with Dr Jordan, called and spoke with Kenia (pt's preferred MPOA); she added her mother Erik to the call. Thorough medical update provided; shared transparent concern that pt's blood cultures are persistently positive. While hopeful that the infection starts to clear, there is a chance we may not be able to treat this infection, unfortunately. Additionally, pt has been very uncomfortable over the last 24 hours or so due to urinary retention and/or delirium. They expressed understanding of severity of both his acute and chronic illness, and the fragility that results naturally as a result of his advanced age.   - Discussed plan moving forward, as well as code status. At this time, plan remains to continue with maximal medical therapy in hopes of improvement, though they have agreed to further discuss code status as a family. At this time, would maintain full code.   - Support provided during call   - Encouraged visitation if possible, as perhaps this will be a calming presence for pt  - Will follow up with pt and family for further conversations as clinical course evolves    4/4/25 (Lisa Jacinto NP)   - Consult received; interval chart reviewed in detail; discussed pt with MDT during ICU rounds and ongoing throughout the day   - met with patient at bedside; introduction to palliative medicine team and role in current care and admission   - pt very lethargic and required encouragement and  stimulation for orientation assessment; pt answers to name, able to state his 1st name, identifies he is at the hospital, and when asked who team should call for help with his medical care he answered Kenia (which is consistent with what he told  4/3 as well); quickly falls back asleep   - pt unable to participate in detailed GOC/ACP discussion on his own behalf at this time   - called Jelanijackarchana (pt's niece) who shares that she has been pt's main caregiver for some time; she confirms pt is not , does not have children, and that he has 3 sisters; she also had pt's sister Bhavna join by phone for update/discussion   - Mrs. Huggins also shared that Kenia takes care of pt and understands his current history, needs, and wishes; per Bhavna, Joeta should be pt's medical decision maker while he lacks capacity, and confirms that pt's others sisters agree with this; they have asked that no other visitors or callers aside from Deshanta and pts sisters (Bhavna, Ronna, and Nora receive updates)   - Kenai and Bhavna confirm that they feel pt would wish to continue HD  - reviewed pt's current full code status, potential interventions, risks, and outcomes; Kenia and Bhavna agree for pt to remain full code, but were open to further discussion ideally when pt is able to participate in the discussion; updated pt's team of this and added this to EMR   - both shared difficulty pt has had recently and frustration with attempts to seek care to get answers to ongoing health concerns; they both shared appropriate concerns for pt's current condition and are hopeful for answers and improvement in pt's condition; they shared that they feel the  ICU team is moving quickly and working to improve his condition for which they are appreciative   - emotional support provided   - Allowed time for questions/concerns; all addressed; expressed availability of myself/palliative team for additional questions/concerns   -  attempted to call pt's other sister, Ronna, as she had called unit for updates; unable to reach, LM   - updated MDT; ongoing communication and coordination with MDT

## 2025-04-07 NOTE — SUBJECTIVE & OBJECTIVE
Interval History: interval dunaway removal    Review of Systems   Unable to perform ROS    Objective:     Temp:  [97.4 °F (36.3 °C)-98.1 °F (36.7 °C)] 97.6 °F (36.4 °C)  Pulse:  [] 89  Resp:  [11-40] 33  SpO2:  [81 %-99 %] 81 %  BP: (103-188)/() 124/50     Body mass index is 21.13 kg/m².      Bladder Scan Volume (mL): 331 mL (04/06/25 1520)    Drains       Drain  Duration                  NG/OG Tube 04/04/25 1415 Rolf 10 Fr. Left nostril 3 days    Trialysis (Dialysis) Catheter 04/04/25 1208 right internal jugular 3 days                     Physical Exam  Nursing note reviewed.   Constitutional:       Appearance: He is ill-appearing.   Abdominal:      General: There is distension.   Genitourinary:     Penis: Normal.       Testes: Normal.   Skin:     General: Skin is warm and dry.   Neurological:      Mental Status: He is disoriented.           Significant Labs:    BMP:  Recent Labs   Lab 04/05/25  1426 04/06/25  0317 04/07/25  0419    139 141   K 4.0 3.7 4.2    105 104   CO2 23 22* 22*   BUN 49* 59* 77*   CREATININE 6.2* 7.6* 9.7*   GLUCOSE 153* 166* 238*   CALCIUM 8.2* 8.5* 8.6*       CBC:   Recent Labs   Lab 04/05/25  0324 04/06/25  0317 04/07/25  0419   WBC 17.18* 15.49* 18.35*   HGB 8.6* 8.2* 8.3*   HCT 26.7* 25.8* 25.8*   * 118* 141*

## 2025-04-07 NOTE — PLAN OF CARE
Problem: Adult Inpatient Plan of Care  Goal: Plan of Care Review  Outcome: Progressing  Goal: Patient-Specific Goal (Individualized)  Outcome: Progressing  Goal: Absence of Hospital-Acquired Illness or Injury  Outcome: Progressing  Goal: Optimal Comfort and Wellbeing  Outcome: Progressing  Goal: Readiness for Transition of Care  Outcome: Progressing     Problem: Diabetes Comorbidity  Goal: Blood Glucose Level Within Targeted Range  Outcome: Progressing     Problem: Sepsis/Septic Shock  Goal: Optimal Coping  Outcome: Progressing  Goal: Absence of Bleeding  Outcome: Progressing  Goal: Blood Glucose Level Within Targeted Range  Outcome: Progressing  Goal: Absence of Infection Signs and Symptoms  Outcome: Progressing  Goal: Optimal Nutrition Intake  Outcome: Progressing     Problem: Infection  Goal: Absence of Infection Signs and Symptoms  Outcome: Progressing     Problem: Fall Injury Risk  Goal: Absence of Fall and Fall-Related Injury  Outcome: Progressing     Problem: Skin Injury Risk Increased  Goal: Skin Health and Integrity  Outcome: Progressing     Problem: Wound  Goal: Optimal Coping  Outcome: Progressing  Goal: Optimal Functional Ability  Outcome: Progressing  Goal: Absence of Infection Signs and Symptoms  Outcome: Progressing  Goal: Improved Oral Intake  Outcome: Progressing  Goal: Optimal Pain Control and Function  Outcome: Progressing  Goal: Skin Health and Integrity  Outcome: Progressing  Goal: Optimal Wound Healing  Outcome: Progressing     Problem: Coping Ineffective  Goal: Effective Coping  Outcome: Progressing     Problem: Urinary Elimination Management  Goal: Effective Urinary Elimination/Continence  Outcome: Progressing     Problem: Urinary Retention  Goal: Effective Urinary Elimination  Outcome: Progressing

## 2025-04-07 NOTE — ASSESSMENT & PLAN NOTE
Patient's blood pressure range in the last 24 hours was: BP  Min: 102/52  Max: 188/105.The patient's inpatient anti-hypertensive regimen is listed below:  Current Antihypertensives       Plan  - admitted with shock  - now off vasopressors. Continue to hold BP Rx as BP is well controlled without it

## 2025-04-07 NOTE — SUBJECTIVE & OBJECTIVE
"Interval History: Events noted. "I gotta' poo," he exclaims. Patient reports hurting "all over."   On vancomycin.    Review of Systems   All other systems reviewed and are negative.    Objective:     Vital Signs (Most Recent):  Temp: 97.8 °F (36.6 °C) (04/07/25 0705)  Pulse: 84 (04/07/25 1000)  Resp: (!) 22 (04/07/25 1000)  BP: (!) 119/51 (04/07/25 1000)  SpO2: (!) 94 % (04/07/25 1000) Vital Signs (24h Range):  Temp:  [97.4 °F (36.3 °C)-98.2 °F (36.8 °C)] 97.8 °F (36.6 °C)  Pulse:  [] 84  Resp:  [13-40] 22  SpO2:  [90 %-99 %] 94 %  BP: (103-188)/() 119/51     Weight: 61.2 kg (134 lb 14.7 oz)  Body mass index is 21.13 kg/m².    Estimated Creatinine Clearance: 4.6 mL/min (A) (based on SCr of 9.7 mg/dL (H)).     Physical Exam  Vitals and nursing note reviewed.   Constitutional:       Appearance: Normal appearance.   HENT:      Head: Normocephalic.      Right Ear: External ear normal.      Left Ear: External ear normal.   Eyes:      Pupils: Pupils are equal, round, and reactive to light.   Cardiovascular:      Rate and Rhythm: Normal rate.      Heart sounds: Murmur heard.   Pulmonary:      Breath sounds: No wheezing or rhonchi.   Abdominal:      Tenderness: There is no guarding or rebound.   Neurological:      General: No focal deficit present.      Mental Status: He is alert.   Psychiatric:         Behavior: Behavior normal.          Si  No Growth After 6 Hours      Aerobic Bottle Positive            GRAM STAIN  Panic   Gram positive cocci in clusters resembling Staph   This is an appended report. These results have been appended to a previously preliminary verified report.             BMP:   Recent Labs   Lab 04/05/25  1159 04/05/25  1426 04/07/25  0419   NA  --    < > 141   K  --    < > 4.2   CL  --    < > 104   CO2  --    < > 22*   BUN  --    < > 77*   CREATININE  --    < > 9.7*   CALCIUM  --    < > 8.6*   MG 2.0  --   --     < > = values in this interval not displayed.     CBC:   Recent Labs   Lab " 04/06/25  0317 04/07/25  0419   WBC 15.49* 18.35*   HGB 8.2* 8.3*   HCT 25.8* 25.8*   * 141*       Significant Imaging: I have reviewed all pertinent imaging results/findings within the past 24 hours.

## 2025-04-07 NOTE — PT/OT/SLP EVAL
"Physical Therapy Evaluation    Patient Name:  Jevon Rajan   MRN:  [id number]    Recommendations:     Discharge Recommendations:  (ongoing assessment pending pt progress)   Discharge Equipment Recommendations:  (ongoing assessment pending p tprogress)   Barriers to discharge:  Pt in ICU, unable to follow commands    Assessment:     Jevon Rajan is a 85 y.o. male admitted with a medical diagnosis of Septic shock.  He presents with the following impairments/functional limitations: impaired cognition, decreased safety awareness, pain, impaired functional mobility .    Rehab Prognosis: Poor; patient would benefit from acute skilled PT services to address these deficits and reach maximum level of function.    Recent Surgery: Procedure(s) (LRB):  EXPLORATION, ARTERY, UPPER EXTREMITY (Left)  REMOVAL, GRAFT, ARTERIOVENOUS, UPPER EXTREMITY (Left) 4 Days Post-Op    Plan:     During this hospitalization, patient to be seen 2 x/week to address the identified rehab impairments via therapeutic activities, therapeutic exercises, neuromuscular re-education and progress toward the following goals:    Plan of Care Expires:  04/21/25    Subjective     Chief Complaint: pain  Patient/Family Comments/goals: unable to state  Pain/Comfort:  Pain Rating 1:  (unable to rate, "all over")  Pain Addressed 1: Reposition, Distraction, Cessation of Activity, Nurse notified    Patients cultural, spiritual, Judaism conflicts given the current situation: no    Living Environment:  Alone per chart  Prior to admission, patients level of function was Independent per chart.  Equipment used at home: walker, rolling, cane, straight (per chart).  DME owned (not currently used):  unknown .  Upon discharge, patient will have assistance from [facility]    Objective:     Communicated with nsg prior to session.  Patient found  slid down in the bed with R knee flexed  with blood pressure cuff, pulse ox (continuous), peripheral IV, telemetry, oxygen (Peg Tube)  " upon PT entry to room.    General Precautions: Standard, fall, aspiration, pureed diet  Orthopedic Precautions:N/A   Braces: N/A  Respiratory Status: Nasal cannula, flow 3 L/min    Exams:  Cognitive Exam:  Patient is oriented to Person only, unable to follow commands, appears to be hallucinating, shaking head from side to side and up and down repeatedly  Postural Exam:  Patient presented with the following abnormalities:    -       Rounded shoulders  -       Forward head  Sensation:  unable to test  Skin Integrity/Edema:      -       Skin integrity:  dark Scars on LE's  -       Edema: None noted   RLE ROM: PROM WFL's  RLE Strength: unable to test  LLE ROM: PROM WFL's  LLE Strength: unable to test    Functional Mobility:  Dependent scooting to HOB and dependent with hospital bed for sup<>sit      AM-PAC 6 CLICK MOBILITY  Total Score:8       Treatment & Education:  B LE PROM in available planes    Patient left HOB elevated with all lines intact, call button in reach, and nsg notified.    GOALS:   Multidisciplinary Problems       Physical Therapy Goals          Problem: Physical Therapy    Goal Priority Disciplines Outcome Interventions   Physical Therapy Goal     PT, PT/OT Progressing    Description: Goals to be met by: 25     Patient will increase functional independence with mobility by performin. Supine to sit with MInimal Assistance  2. Rolling to Left and Right with Minimal Assistance.  3. Bed to chair transfer with Moderate   4. Sitting at edge of bed x10 minutes with Contact Guard Assistance  5. Lower extremity exercise program x10 reps per handout, with assistance as needed                         DME Justifications:  No DME recommended requiring DME justifications    History:     Past Medical History:   Diagnosis Date    BPH (benign prostatic hyperplasia)     Coronary artery disease     [facility]. In  he had stent to LAD.  He states he has had 2 heart attacks. He does not get angina. There was 90% left anterior descending artery stenosis undergoing stenting. There was also a circumflex marginal branch stenosis of 70%.    Diabetes mellitus, type 2     ESRD (end stage renal disease) on dialysis     ESRD on HD TTS via left brachial AVF    Hypertension     Hypothyroidism, unspecified     NSTEMI (non-ST elevated myocardial infarction) 4/4/2025    Sepsis 4/2/2025    Symptomatic anemia 01/15/2024       Past Surgical History:   Procedure Laterality Date    ANGIOGRAM, CORONARY, WITH LEFT HEART CATHETERIZATION  1/13/2025    Procedure: Angiogram, Coronary, with Left Heart Cath;  Surgeon: Steve Bhat MD;  Location: [facility] CATH LAB/EP;  Service: Cardiology;;    ATHERECTOMY, CORONARY N/A 1/14/2025    Procedure: Atherectomy-coronary;  Surgeon: Giacomo Pittman MD;  Location: [facility] CATH LAB/EP;  Service: Cardiology;  Laterality: N/A;    bilateral foot surgery      CORONARY ANGIOPLASTY WITH STENT PLACEMENT N/A 1/14/2025    Procedure: ANGIOPLASTY, CORONARY ARTERY, WITH STENT INSERTION;  Surgeon: Giacomo Pittman MD;  Location: [facility] CATH LAB/EP;  Service: Cardiology;  Laterality: N/A;    CORONARY STENT PLACEMENT N/A 1/13/2025    Procedure: INSERTION, STENT, CORONARY ARTERY;  Surgeon: Steve Bhat MD;  Location: [facility] CATH LAB/EP;  Service: Cardiology;  Laterality: N/A;    ESOPHAGOGASTRODUODENOSCOPY N/A 2/27/2024    Procedure: EGD (ESOPHAGOGASTRODUODENOSCOPY);  Surgeon: Gemma Almendarez MD;  Location: [facility] ENDO;  Service: Endoscopy;  Laterality: N/A;    EXPLORATION, ARTERY, UPPER EXTREMITY Left 4/3/2025    Procedure: EXPLORATION, ARTERY, UPPER EXTREMITY;  Surgeon: Sunil Contreras MD;  Location: [facility] OR;  Service: Vascular;  Laterality: Left;    eyelid surgery      FISTULOGRAM Left 11/27/2019    Procedure: Fistulogram;  Surgeon: MELANY Brenner III, MD;  Location: [facility] OR [address] [place] [address];  Service: Peripheral Vascular;  Laterality: Left;     FISTULOGRAM Left 11/27/2019    Procedure: Fistulogram, AVG declot, possible permacath;  Surgeon: MELANY Brenner III, MD;  Location: [facility] CATH LAB;  Service: Peripheral Vascular;  Laterality: Left;    INSERTION OF DIALYSIS CATHETER      IVUS, CORONARY  1/13/2025    Procedure: IVUS, Coronary;  Surgeon: Steve Bhat MD;  Location: [facility] CATH LAB/EP;  Service: Cardiology;;    LEFT HEART CATHETERIZATION Left 1/14/2025    Procedure: Left heart cath;  Surgeon: Giacomo Pittman MD;  Location: [facility] CATH LAB/EP;  Service: Cardiology;  Laterality: Left;    MOH's  12/5/13    PERCUTANEOUS CORONARY INTERVENTION, ARTERY N/A 1/14/2025    Procedure: Percutaneous coronary intervention;  Surgeon: Giacomo Pittman MD;  Location: [facility] CATH LAB/EP;  Service: Cardiology;  Laterality: N/A;    PERCUTANEOUS TRANSLUMINAL BALLOON ANGIOPLASTY OF CORONARY ARTERY  1/13/2025    Procedure: Angioplasty-coronary;  Surgeon: Steve Bhat MD;  Location: [facility] CATH LAB/EP;  Service: Cardiology;;    REMOVAL, GRAFT, ARTERIOVENOUS, UPPER EXTREMITY Left 4/3/2025    Procedure: REMOVAL, GRAFT, ARTERIOVENOUS, UPPER EXTREMITY;  Surgeon: Sunil Contreras MD;  Location: [facility] OR;  Service: Vascular;  Laterality: Left;    REVASCULARIZATION, ENDOVASCULAR, LE W/ INTRAVASCULAR LITHOTRIPSY  1/13/2025    Procedure: REVASCULARIZATION, ENDOVASCULAR, LE W/ INTRAVASCULAR LITHOTRIPSY;  Surgeon: Steve Bhat MD;  Location: [facility] CATH LAB/EP;  Service: Cardiology;;    right hand surgery      stents         Time Tracking:     PT Received On: 04/07/25  PT Start Time: 1132     PT Stop Time: 1140  PT Total Time (min): 8 min     Billable Minutes: Evaluation 8      04/07/2025

## 2025-04-07 NOTE — PHYSICIAN QUERY
Question: Due to conflicting documentation, please clarify the cardiac diagnosis.    Provider Query Response:  NSTEMI/ MI Type 2 due to Septic shock

## 2025-04-07 NOTE — PROGRESS NOTES
04/07/25 1815   Vital Signs   Pulse 88   Resp (!) 9   SpO2 100 %   BP (!) 77/48   MAP (mmHg) 58     1hour out of 3 hours of prescribed HD time completed.    Informed Leena Goldberg PA-C of Patient BP. Per Leena Lopez with Dr Davenport to rinse back blood to the patient and reassess Tomorrow. HD terminated

## 2025-04-07 NOTE — PROGRESS NOTES
[facility] - Intensive Care  Nephrology  Progress Note    Patient Name: Jevon Rajan  MRN: [id number]  Admission Date: 4/3/2025  Hospital Length of Stay: 4 days  Attending Provider: Eileen Jordan MD   Primary Care Physician: Melia, Primary Doctor  Principal Problem:Septic shock    Subjective:     HPI: Mr. Rajan is an 84 yo male with HTN, T2DM, CAD, ESRD, and liver lesion who was transferred from [facility] for septic shock and impending AVG rupture. He initially presented to [facility] on 4/1 with temp 101.9 and BP 80/30s. He was transferred to our hospital on 4/3/25; it seems his AVG ruptured during transport/shortly after arrival. He emergently went to the OR yesterday with AVG extraction; infected hematoma was noted. Workup also concerning for elevated troponin and cardiac vegetations. He is no longer on pressors. Nephrology consulted for ESRD. Prior records obtained and reviewed. He receives HD TTS at [facility] under the c/o Dr. Colin. He last received HD outpatient on 4/1/25. HD was held at [facility] due to unstable vascular access. Family agreed to proceed with CRRT today.     Interval History: UOP 325cc +2x yesterday over AM shift. Urinary retention and attempts at catheterization noted. No events overnight. Resting comfortably this morning.       Review of patient's allergies indicates:   Allergen Reactions    Ace inhibitors Hives, Itching, Shortness Of Breath, Other (See Comments) and Rash     Is not sure which medication it was    Captopril      Current Facility-Administered Medications   Medication Frequency    acetaminophen tablet 650 mg Q4H PRN    aspirin chewable tablet 81 mg Daily    atorvastatin tablet 80 mg QHS    clopidogreL tablet 75 mg Daily    dextrose 50% injection 12.5 g PRN    dextrose 50% injection 25 g PRN    diphenhydrAMINE capsule 50 mg Q6H PRN    glucagon (human recombinant) injection 1 mg PRN    glucose chewable tablet 16 g PRN    glucose chewable tablet 24 g PRN    heparin (porcine) injection  1,000 Units PRN    heparin (porcine) injection 5,000 Units Q8H    HYDROmorphone injection 0.5 mg Q4H PRN    insulin aspart U-100 pen 0-5 Units QID (AC + HS) PRN    insulin glargine U-100 (Lantus) pen 5 Units Daily    melatonin tablet 6 mg Nightly PRN    mupirocin 2 % ointment BID    naloxone 0.4 mg/mL injection 0.02 mg PRN    ondansetron injection 4 mg Q6H PRN    pantoprazole EC tablet 40 mg Daily    prochlorperazine injection Soln 5 mg Q6H PRN    sodium chloride 0.9% flush 10 mL Q8H    tamsulosin 24 hr capsule 0.4 mg Daily    vancomycin (VANCOCIN) 500 mg in D5W 100 mL IVPB (MB+) Once    vancomycin - pharmacy to dose pharmacy to manage frequency       Objective:     Vital Signs (Most Recent):  Temp: 97.6 °F (36.4 °C) (04/07/25 1115)  Pulse: 84 (04/07/25 1105)  Resp: 14 (04/07/25 1208)  BP: 136/64 (04/07/25 1105)  SpO2: (!) 93 % (04/07/25 1105) Vital Signs (24h Range):  Temp:  [97.4 °F (36.3 °C)-98.1 °F (36.7 °C)] 97.6 °F (36.4 °C)  Pulse:  [] 84  Resp:  [11-40] 14  SpO2:  [90 %-99 %] 93 %  BP: (103-188)/() 136/64     Weight: 61.2 kg (134 lb 14.7 oz) (04/04/25 1937)  Body mass index is 21.13 kg/m².  Body surface area is 1.7 meters squared.    I/O last 3 completed shifts:  In: 770 [NG/GT:770]  Out: 325 [Urine:325]     Physical Exam  Vitals and nursing note reviewed.   Constitutional:       General: He is sleeping. He is not in acute distress.     Appearance: Normal appearance. He is well-developed.   HENT:      Head: Normocephalic and atraumatic.      Nose: Nose normal.      Mouth/Throat:      Mouth: Mucous membranes are moist.   Eyes:      Extraocular Movements: Extraocular movements intact.      Conjunctiva/sclera: Conjunctivae normal.   Cardiovascular:      Rate and Rhythm: Normal rate and regular rhythm.   Pulmonary:      Effort: Pulmonary effort is normal.      Breath sounds: Normal breath sounds.   Abdominal:      General: There is no distension.      Palpations: Abdomen is soft.   Musculoskeletal:       Right lower leg: No edema.      Left lower leg: No edema.   Skin:     General: Skin is warm and dry.      Findings: Lesion (diffuse) present. No erythema or rash.          Significant Labs:  CBC:   Recent Labs   Lab 04/07/25  0419   WBC 18.35*   RBC 2.82*   HGB 8.3*   HCT 25.8*   *   MCV 92   MCH 29.4   MCHC 32.2     CMP:   Recent Labs   Lab 04/07/25  0419   CALCIUM 8.6*   ALBUMIN 2.1*      K 4.2   CO2 22*      BUN 77*   CREATININE 9.7*   ALKPHOS 72   ALT 20   AST 20   BILITOT 0.5     All labs within the past 24 hours have been reviewed.     Significant Imaging:  Labs: Reviewed    Assessment/Plan:     ESRD  - receives HD TTS at [facility]; last received HD on 4/1  - s/p AVG resection 4/3  - received CRRT from 4/4-4/5  - HD trial today. Will monitor closely in ICU setting (given concurrent bacteremia and PMVL perforation). If successful, will resume HD MWF  - daily labs. Bladder scan PRN    Bacteremia  - BCx remain positive; not currently a candidate for tunneled HD catheter placement  - ID following    Secondary HPTH  - CCa and phos acceptable  - no need for phos binders at this time  - agree with renal tube feeds    Anemia of CKD  - hgb 8.3 today; starting NAKIA with HD       Thank you for your consult. I will follow-up with patient. Please contact us if you have any additional questions.    Юлия Davis MD  Nephrology  [facility] [place] - Intensive Care

## 2025-04-07 NOTE — ASSESSMENT & PLAN NOTE
A1c:   Lab Results   Component Value Date    HGBA1C 5.7 (H) 04/02/2025     Meds: lantus + SSI PRN to maintain goal 140-180  Tube feeds  accuchecks, hypoglycemic protocol

## 2025-04-07 NOTE — ASSESSMENT & PLAN NOTE
The likely etiology of thrombocytopenia is infection and sepsis. The patients 3 most recent labs are listed below.  Recent Labs     04/05/25  0324 04/06/25  0317 04/07/25  0419   * 118* 141*     Plan  - Will transfuse if platelet count is <10k.

## 2025-04-07 NOTE — PROGRESS NOTES
Pharmacokinetic Assessment Follow Up: IV Vancomycin    Vancomycin serum concentration assessment(s):    The random level was drawn correctly and can be used to guide therapy at this time. The measurement is within the desired definitive target range of 15 to 20 mcg/mL.    Vancomycin Regimen Plan:    Give Vancomycin 500 mg x1 dose after HD today and obtain random level 4/9 at 0400    Drug levels (last 3 results):  Recent Labs   Lab Result Units 04/05/25  0324 04/06/25 0317 04/07/25  0419   Vancomycin Random ug/ml 17.0 19.8 18.0       Pharmacy will continue to follow and monitor vancomycin.    Please contact pharmacy at extension [phone] for questions regarding this assessment.    Thank you for the consult,   Hesham Hernandez       Patient brief summary:  Jevon Rajan is a 85 y.o. male initiated on antimicrobial therapy with IV Vancomycin for treatment of bacteremia    Drug Allergies:   Review of patient's allergies indicates:   Allergen Reactions    Ace inhibitors Hives, Itching, Shortness Of Breath, Other (See Comments) and Rash     Is not sure which medication it was    Captopril        Actual Body Weight:   61.2 kg    Renal Function:   Estimated Creatinine Clearance: 4.6 mL/min (A) (based on SCr of 9.7 mg/dL (H)).,     Dialysis Method (if applicable):  intermittent HD    CBC (last 72 hours):  Recent Labs   Lab Result Units 04/05/25  0324 04/06/25 0317 04/07/25  0419   WBC K/uL 17.18* 15.49* 18.35*   HGB gm/dL 8.6* 8.2* 8.3*   HCT % 26.7* 25.8* 25.8*   Platelet Count K/uL 109* 118* 141*   Lymph % % 3.6* 5.2* 5.6*   Mono % % 7.2 8.5 8.8   Eos % % 1.4 2.2 1.0   Basophil % % 0.3 0.2 0.3       Metabolic Panel (last 72 hours):  Recent Labs   Lab Result Units 04/04/25  1439 04/04/25 2001 04/04/25  2151 04/05/25 0324 04/05/25  1159 04/05/25  1426 04/06/25  0317 04/07/25  0419   Sodium mmol/L 139  --  140 140  --  139 139 141   Potassium mmol/L 5.4*  --  4.6 4.2  --  4.0 3.7 4.2   Chloride mmol/L 101  --  103 104   --  104 105 104   CO2 mmol/L 19*  --  21* 22*  --  23 22* 22*   Glucose mg/dL 181*  --  150* 121*  --  153* 166* 238*   BUN mg/dL 84*  --  68* 58*  --  49* 59* 77*   Creatinine mg/dL 10.8*  --  8.5* 7.3*  --  6.2* 7.6* 9.7*   Albumin g/dL 2.1*  --  1.9* 1.9*  --  1.9* 2.0* 2.1*   Bilirubin Total mg/dL  --   --   --   --   --   --  0.6 0.5   ALP unit/L  --   --   --   --   --   --  66 72   AST unit/L  --   --   --   --   --   --  25 20   ALT unit/L  --   --   --   --   --   --  21 20   Magnesium  mg/dL  --  2.2  --  2.1 2.0  --   --   --    Phosphorus Level mg/dL 7.7*  --  5.3* 4.5  4.6*  --  3.6 3.8 5.3*       Vancomycin Administrations:  vancomycin given in the last 96 hours                     vancomycin (VANCOCIN) 500 mg in D5W 100 mL IVPB (MB+) (mg) 500 mg New Bag 04/05/25 0450    vancomycin (VANCOCIN) 500 mg in D5W 100 mL IVPB (MB+) (mg) 500 mg New Bag 04/04/25 0509    vancomycin injection (g) 1 g Given 04/03/25 1820    vancomycin injection (g) 1 g Given 04/03/25 1753                    Microbiologic Results:  Microbiology Results (last 7 days)       Procedure Component Value Units Date/Time    Blood culture [[id number]]  (Normal) Collected: 04/06/25 0555    Order Status: Completed Specimen: Blood Updated: 04/06/25 1300     Blood Culture No Growth After 6 Hours    Blood culture [[id number]]  (Normal) Collected: 04/06/25 0542    Order Status: Completed Specimen: Blood Updated: 04/06/25 1300     Blood Culture No Growth After 6 Hours    Blood culture [[id number]]  (Normal) Collected: 04/04/25 1044    Order Status: Completed Specimen: Blood Updated: 04/06/25 1101     Blood Culture No Growth After 48 Hours    Blood culture [[id number]]  (Abnormal) Collected: 04/04/25 1343    Order Status: Completed Specimen: Blood Updated: 04/06/25 0657     Blood Culture Positive - Aerobic/Pediatric Bottle      Methicillin resistant Staphylococcus aureus     Comment: <null> has been updated to reportable.        GRAM STAIN Gram  positive cocci in clusters resembling Staph     Comment: Aerobic Bottle Positive    This is an appended report. These results have been appended to a previously preliminary verified report.       Narrative:      For sensitivities, refer to order # [id number]      Aerobic culture [[id number]]  (Abnormal) Collected: 04/03/25 2011    Order Status: Completed Specimen: Wound from Arm, Left Updated: 04/06/25 0556     CULTURE, AEROBIC Few Methicillin resistant Staphylococcus aureus    Narrative:      For sensitivities, refer to order # [id number]    Aerobic culture [[id number]]  (Abnormal) Collected: 04/03/25 1954    Order Status: Completed Specimen: Wound from Arm, Left Updated: 04/06/25 0555     CULTURE, AEROBIC Many Methicillin resistant Staphylococcus aureus    Narrative:      For sensitivities, refer to order # [id number]    Aerobic culture [[id number]]  (Abnormal)  (Susceptibility) Collected: 04/03/25 1943    Order Status: Completed Specimen: Wound from Arm, Left Updated: 04/06/25 0553     CULTURE, AEROBIC Many Methicillin resistant Staphylococcus aureus    Aerobic culture [[id number]]  (Abnormal)  (Susceptibility) Collected: 04/03/25 1926    Order Status: Completed Specimen: Wound from Arm, Left Updated: 04/06/25 0544     CULTURE, AEROBIC Many Methicillin resistant Staphylococcus aureus    Aerobic culture [[id number]] Collected: 04/03/25 1911    Order Status: Completed Specimen: Tissue from Arm, Left Updated: 04/06/25 0543     CULTURE, AEROBIC No Growth    Aerobic culture [[id number]] Collected: 04/03/25 1816    Order Status: Completed Specimen: Wound from Arm, Left Updated: 04/06/25 0543     CULTURE, AEROBIC No Growth    Afb Culture Stain [[id number]] Collected: 04/03/25 1934    Order Status: Sent Specimen: Tissue from AV Fistula, Left Updated: 04/05/25 1843    Afb Culture Stain [[id number]] Collected: 04/03/25 1954    Order Status: Sent Specimen: Wound from Arm, Left Updated: 04/05/25 1843    Afb  Culture Stain [[id number]] Collected: 04/03/25 2011    Order Status: Sent Specimen: Wound from Arm, Left Updated: 04/05/25 1843    Afb Culture Stain [[id number]] Collected: 04/03/25 1905    Order Status: Sent Specimen: Tissue from AV Fistula, Left Updated: 04/05/25 1842    Afb Culture Stain [[id number]] Collected: 04/03/25 1921    Order Status: Sent Specimen: Tissue from hematoma Updated: 04/05/25 1842    Afb Culture Stain [[id number]] Collected: 04/03/25 1943    Order Status: Sent Specimen: Wound from Arm, Left Updated: 04/05/25 1842    Afb Culture Stain [[id number]] Collected: 04/03/25 1816    Order Status: Sent Specimen: Tissue from AV Fistula, Left Updated: 04/05/25 1842    Culture, Anaerobic [[id number]] Collected: 04/03/25 2011    Order Status: Completed Specimen: Wound from Arm, Left Updated: 04/05/25 0731     Anaerobe Culture Culture In Progress    Culture, Anaerobic [[id number]] Collected: 04/03/25 1954    Order Status: Completed Specimen: Wound from Arm, Left Updated: 04/05/25 0731     Anaerobe Culture Culture In Progress    Culture, Anaerobic [[id number]] Collected: 04/03/25 1943    Order Status: Completed Specimen: Wound from Arm, Left Updated: 04/05/25 0730     Anaerobe Culture Culture In Progress    Culture, Anaerobic [[id number]] Collected: 04/03/25 1934    Order Status: Completed Specimen: Tissue from AV Fistula, Left Updated: 04/05/25 0730     Anaerobe Culture Culture In Progress    Culture, Anaerobic [[id number]] Collected: 04/03/25 1921    Order Status: Completed Specimen: Tissue from hematoma Updated: 04/05/25 0729     Anaerobe Culture Culture In Progress    Culture, Anaerobic [[id number]] Collected: 04/03/25 1910    Order Status: Completed Specimen: Tissue from AV Fistula, Left Updated: 04/05/25 0729     Anaerobe Culture Culture In Progress    Culture, Anaerobic [[id number]] Collected: 04/03/25 1816    Order Status: Completed Specimen: Wound from Arm, Left Updated: 04/05/25 0729     Anaerobe  Culture Culture In Progress    Gram stain [[id number]] Collected: 04/03/25 1923    Order Status: Completed Specimen: Tissue from Arm, Left Updated: 04/04/25 0837     GRAM STAIN Rare WBC seen      Few Gram positive cocci    Gram stain [[id number]] Collected: 04/03/25 1935    Order Status: Completed Specimen: Tissue from Arm, Left Updated: 04/04/25 0836     GRAM STAIN Rare WBC seen      No organisms seen    Gram stain [[id number]] Collected: 04/03/25 1914    Order Status: Completed Specimen: Wound from Arm, Left Updated: 04/04/25 0836     GRAM STAIN Rare WBC seen      No organisms seen    Gram stain [[id number]] Collected: 04/03/25 2011    Order Status: Completed Specimen: Wound from Arm, Left Updated: 04/04/25 0835     GRAM STAIN Rare WBC seen      No organisms seen    Gram stain [[id number]] Collected: 04/03/25 1821    Order Status: Completed Specimen: Wound from Arm, Left Updated: 04/04/25 0835     GRAM STAIN No WBCs      No organisms seen    Gram stain [[id number]] Collected: 04/03/25 1943    Order Status: Completed Specimen: Wound from Arm, Left Updated: 04/04/25 0834     GRAM STAIN Rare WBC seen      No organisms seen    Gram stain [[id number]] Collected: 04/03/25 1954    Order Status: Completed Specimen: Wound from Arm, Left Updated: 04/04/25 0833     GRAM STAIN Rare WBC seen      No organisms seen    AFB Culture & Smear [[id number]] Collected: 04/03/25 1954    Order Status: Sent Specimen: Wound from Arm, Left Updated: 04/03/25 2107    Fungus culture [[id number]] Collected: 04/03/25 1954    Order Status: Sent Specimen: Wound from Arm, Left Updated: 04/03/25 2107    AFB Culture & Smear [[id number]] Collected: 04/03/25 1943    Order Status: Sent Specimen: Wound from Arm, Left Updated: 04/03/25 2107    Fungus culture [[id number]] Collected: 04/03/25 1943    Order Status: Sent Specimen: Wound from Arm, Left Updated: 04/03/25 2107    AFB Culture & Smear [[id number]] Collected: 04/03/25 1934    Order Status: Sent  Specimen: Tissue from AV Fistula, Left Updated: 04/03/25 2107    Fungus culture [[id number]] Collected: 04/03/25 1934    Order Status: Sent Specimen: Tissue from AV Fistula, Left Updated: 04/03/25 2107    Fungus culture [[id number]] Collected: 04/03/25 1935    Order Status: Canceled Specimen: Tissue from Arm, Left Updated: 04/03/25 2107    AFB Culture & Smear [[id number]] Collected: 04/03/25 2011    Order Status: Sent Specimen: Wound from Arm, Left Updated: 04/03/25 2106    Fungus culture [[id number]] Collected: 04/03/25 2011    Order Status: Sent Specimen: Wound from Arm, Left Updated: 04/03/25 2106    Fungus culture [[id number]] Collected: 04/03/25 1816    Order Status: Sent Specimen: Wound from Arm, Left Updated: 04/03/25 2106    AFB Culture & Smear [[id number]] Collected: 04/03/25 1816    Order Status: Sent Specimen: Tissue from AV Fistula, Left Updated: 04/03/25 2106    AFB Culture & Smear [[id number]] Collected: 04/03/25 1905    Order Status: Sent Specimen: Tissue from AV Fistula, Left Updated: 04/03/25 2106    Fungus culture [[id number]] Collected: 04/03/25 1904    Order Status: Sent Specimen: Tissue from AV Fistula, Left Updated: 04/03/25 2106    AFB Culture & Smear [[id number]] Collected: 04/03/25 1921    Order Status: Sent Specimen: Tissue from hematoma Updated: 04/03/25 2105    Fungus culture [[id number]] Collected: 04/03/25 1921    Order Status: Sent Specimen: Tissue from hematoma Updated: 04/03/25 2105

## 2025-04-07 NOTE — SUBJECTIVE & OBJECTIVE
Interval History: agitated at times     Medications:  Continuous Infusions:  Scheduled Meds:   aspirin  81 mg Oral Daily    atorvastatin  80 mg Oral QHS    clopidogreL  75 mg Oral Daily    heparin (porcine)  5,000 Units Subcutaneous Q8H    insulin glargine U-100  5 Units Subcutaneous Daily    mupirocin   Nasal BID    pantoprazole  40 mg Oral Daily    sodium chloride 0.9%  10 mL Intravenous Q8H    tamsulosin  0.4 mg Oral Daily    vancomycin (VANCOCIN) IV (PEDS and ADULTS)  500 mg Intravenous Once     PRN Meds:  Current Facility-Administered Medications:     acetaminophen, 650 mg, Oral, Q4H PRN    dextrose 50%, 12.5 g, Intravenous, PRN    dextrose 50%, 25 g, Intravenous, PRN    diphenhydrAMINE, 50 mg, Oral, Q6H PRN    glucagon (human recombinant), 1 mg, Intramuscular, PRN    glucose, 16 g, Oral, PRN    glucose, 24 g, Oral, PRN    heparin (porcine), 1,000 Units, Intravenous, PRN    HYDROmorphone, 0.5 mg, Intravenous, Q4H PRN    insulin aspart U-100, 0-5 Units, Subcutaneous, QID (AC + HS) PRN    melatonin, 6 mg, Oral, Nightly PRN    naloxone, 0.02 mg, Intravenous, PRN    ondansetron, 4 mg, Intravenous, Q6H PRN    prochlorperazine, 5 mg, Intravenous, Q6H PRN    Pharmacy to dose Vancomycin consult, , , Once **AND** vancomycin - pharmacy to dose, , Intravenous, pharmacy to manage frequency    Objective:     Vital Signs (Most Recent):  Temp: 97.6 °F (36.4 °C) (04/07/25 1115)  Pulse: 84 (04/07/25 1105)  Resp: 14 (04/07/25 1208)  BP: 136/64 (04/07/25 1105)  SpO2: (!) 93 % (04/07/25 1105) Vital Signs (24h Range):  Temp:  [97.4 °F (36.3 °C)-98.1 °F (36.7 °C)] 97.6 °F (36.4 °C)  Pulse:  [] 84  Resp:  [11-40] 14  SpO2:  [90 %-99 %] 93 %  BP: (103-188)/() 136/64     Weight: 61.2 kg (134 lb 14.7 oz)  Body mass index is 21.13 kg/m².       Physical Exam  Constitutional:       Comments: Lying in bed, elderly and frail appearing, agitated at times and yelling loudly enough to be heard through the ICU        Significant  Labs: All pertinent labs within the past 24 hours have been reviewed.  CBC:   Recent Labs   Lab 04/07/25 0419   WBC 18.35*   HGB 8.3*   HCT 25.8*   MCV 92   *     BMP:  Recent Labs   Lab 04/07/25 0419      K 4.2      CO2 22*   BUN 77*   CREATININE 9.7*   CALCIUM 8.6*     LFT:  Lab Results   Component Value Date    AST 20 04/07/2025    ALKPHOS 72 04/07/2025    BILITOT 0.5 04/07/2025     Albumin:   Albumin   Date Value Ref Range Status   04/07/2025 2.1 (L) 3.5 - 5.2 g/dL Final   03/21/2025 3.2 (L) 3.5 - 5.2 g/dL Final     Protein:   Total Protein   Date Value Ref Range Status   03/21/2025 7.5 6.0 - 8.4 g/dL Final     Lactic acid:   Lab Results   Component Value Date    LACTATE 0.9 04/03/2025    LACTATE 2.6 (H) 04/02/2025       Significant Imaging: I have reviewed all pertinent imaging results/findings within the past 24 hours.

## 2025-04-07 NOTE — ASSESSMENT & PLAN NOTE
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 Most recent hemoglobin and hematocrit are listed below.  Recent Labs     04/05/25  0324 04/06/25  0317 04/07/25  0419   HGB 8.6* 8.2* 8.3*   HCT 26.7* 25.8* 25.8*     Plan  - Monitor serial CBC: Daily and recheck now  - Transfuse PRBC if patient becomes hemodynamically unstable, symptomatic or H/H drops below 7/21.  - Patient has not received any PRBC transfusions to date  - Patient's anemia is currently stable

## 2025-04-07 NOTE — ASSESSMENT & PLAN NOTE
- Nephrology consulted and following for RRT needs; has temporary line in place though potentially may need line holiday due to persistent positive cultures.

## 2025-04-07 NOTE — SUBJECTIVE & OBJECTIVE
"Interval History: He seems confused today. He says "number 1" over and over again.     Review of Systems   Constitutional:  Positive for fatigue.   Respiratory:  Negative for shortness of breath.    Cardiovascular:  Negative for chest pain.   Gastrointestinal:  Negative for abdominal pain.   Genitourinary:  Positive for difficulty urinating.   Psychiatric/Behavioral:  Positive for confusion.      Objective:     Vital Signs (Most Recent):  Temp: 97.8 °F (36.6 °C) (04/07/25 0705)  Pulse: 86 (04/07/25 0801)  Resp: 18 (04/07/25 0823)  BP: 135/65 (04/07/25 0705)  SpO2: (!) 90 % (04/07/25 0801) Vital Signs (24h Range):  Temp:  [97.4 °F (36.3 °C)-98.2 °F (36.8 °C)] 97.8 °F (36.6 °C)  Pulse:  [] 86  Resp:  [13-40] 18  SpO2:  [90 %-99 %] 90 %  BP: (102-188)/() 135/65     Weight: 61.2 kg (134 lb 14.7 oz)  Body mass index is 21.13 kg/m².    Intake/Output Summary (Last 24 hours) at 4/7/2025 0945  Last data filed at 4/7/2025 0500  Gross per 24 hour   Intake 470 ml   Output 325 ml   Net 145 ml         Physical Exam  Vitals and nursing note reviewed.   Constitutional:       Appearance: He is ill-appearing.      Comments: Awakens to voice    HENT:      Head: Normocephalic and atraumatic.      Nose:      Comments: NGT in place      Mouth/Throat:      Mouth: Mucous membranes are dry.   Neck:      Comments: RIJ trialysis  Cardiovascular:      Rate and Rhythm: Normal rate and regular rhythm.   Pulmonary:      Effort: Pulmonary effort is normal.      Breath sounds: Normal breath sounds.      Comments: O2 by NC  Abdominal:      General: Bowel sounds are normal.      Palpations: Abdomen is soft.   Musculoskeletal:      Comments: LUE in bandage, not removed   Skin:     Comments: Very dry skin with hyperpigmented patches   Neurological:      Comments: Oriented to self               Significant Labs: All pertinent labs within the past 24 hours have been reviewed.    Significant Imaging: I have reviewed all pertinent imaging " results/findings within the past 24 hours.

## 2025-04-07 NOTE — ASSESSMENT & PLAN NOTE
This patient has shock. The type of shock is distributive due to sepsis. The patient had the following evidence of shock: persistent hypotension and altered mental status. 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 Shock is now resolved and vasopressors are off. WBC worsening

## 2025-04-07 NOTE — PROGRESS NOTES
[facility] Intensive Care  Infectious Disease  Progress Note    Patient Name: Jevon Rajan  MRN: [id number]  Admission Date: 4/3/2025  Length of Stay: 4 days  Attending Physician: Eileen Jordan MD  Primary Care Provider: No, Primary Doctor    Isolation Status: Contact  Assessment/Plan:      ID  * Septic shock  MRSA bacteremia  MV endocarditis  85 y.o. man with ESRD with LUE AVF, HFpEF, CAD, DM admitted to OSH 4/1- 4/3 with sepsis due to MRSA bacteremia, transferred to  ICU for septic shock due to MRSA bacteremia and thrombosis of AV graft on 4/3 s/p exploratory surgery of LUE with graft resection 4/3. Op findings: Ruptured pseudoaneurysm with infected hematoma, graft completely obliterated at mid segment. Suspect infected graft is the source of bacteremia.     BCx 4/2 MRSA  BCx 4/4 +  LUE surgical cx growing s aureus    TTE: 1.9x1.2cm large fixed heterogeneous mass present on the posterior leaflet , moderate to severe regurgitation with an eccentric jet. Cannot exclude severe MR with possible PMVL perforation in association with large vegetation. Not a surgical candidate.    Persistently positive blood cultures in the setting of acute IE. Deemed a poor surgical candidate.    If unable to sterilize his blood cultures, then his prognosis is very poor.       Recommendations:  --continue empiric vanc. Pharm to dose for goal trough 15-20  --anticipate atleast 6 wks iv abx from culture clearance  --if his repeat blood cultures from today still yield MRSA, can consider salvage therapy with ceftaroline and daptomycin  --only time will tell but I am not optimistic  --consider DNR status in the meantime given that the burdens of heroic resuscitation outweighing benefits      Lamont Steele MD  Infectious Disease  [facility] [place] - Intensive Care    Subjective:     Principal Problem:Septic shock    HPI: 85 y.o. man with ESRD with LUE AVF, HFpEF, CAD, DM admitted to OSH 4/1- 4/3 with sepsis due to MRSA bacteremia,  "transferred to  ICU for septic shock on 4/3.    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     CXR 4/3 with likely pulmonary edema  CT c/a/p 4/3 revealed hepatic hypodensities likely cysts as well some larger lesions that hav decreased in size. Otherwise, no clear infectious source.      ID consulted for: MRSA bacteremia   Interval History: Events noted. "I gotta' poo," he exclaims. Patient reports hurting "all over."   On vancomycin.    Review of Systems   All other systems reviewed and are negative.    Objective:     Vital Signs (Most Recent):  Temp: 97.8 °F (36.6 °C) (04/07/25 0705)  Pulse: 84 (04/07/25 1000)  Resp: (!) 22 (04/07/25 1000)  BP: (!) 119/51 (04/07/25 1000)  SpO2: (!) 94 % (04/07/25 1000) Vital Signs (24h Range):  Temp:  [97.4 °F (36.3 °C)-98.2 °F (36.8 °C)] 97.8 °F (36.6 °C)  Pulse:  [] 84  Resp:  [13-40] 22  SpO2:  [90 %-99 %] 94 %  BP: (103-188)/() 119/51     Weight: 61.2 kg (134 lb 14.7 oz)  Body mass index is 21.13 kg/m².    Estimated Creatinine Clearance: 4.6 mL/min (A) (based on SCr of 9.7 mg/dL (H)).     Physical Exam  Vitals and nursing note reviewed.   Constitutional:       Appearance: Normal appearance.   HENT:      Head: Normocephalic.      Right Ear: External ear normal.      Left Ear: External ear normal.   Eyes:      Pupils: Pupils are equal, round, and reactive to light.   Cardiovascular:      Rate and Rhythm: Normal rate.      Heart sounds: Murmur heard.   Pulmonary:      Breath sounds: No wheezing or rhonchi.   Abdominal:      Tenderness: There is no guarding or rebound.   Neurological:      General: No focal deficit present.      Mental Status: He is alert.   Psychiatric:         Behavior: Behavior normal.          Si  No Growth After 6 Hours      Aerobic Bottle Positive            GRAM STAIN  Panic   Gram positive cocci in clusters resembling Staph   This is an " appended report. These results have been appended to a previously preliminary verified report.             BMP:   Recent Labs   Lab 04/05/25  1159 04/05/25  1426 04/07/25  0419   NA  --    < > 141   K  --    < > 4.2   CL  --    < > 104   CO2  --    < > 22*   BUN  --    < > 77*   CREATININE  --    < > 9.7*   CALCIUM  --    < > 8.6*   MG 2.0  --   --     < > = values in this interval not displayed.     CBC:   Recent Labs   Lab 04/06/25  0317 04/07/25  0419   WBC 15.49* 18.35*   HGB 8.2* 8.3*   HCT 25.8* 25.8*   * 141*       Significant Imaging: I have reviewed all pertinent imaging results/findings within the past 24 hours.

## 2025-04-07 NOTE — PROGRESS NOTES
[facility] [place] Intensive Care  Vascular Surgery  Progress Note    Patient Name: Jevon Rajan  MRN: [id number]  Admission Date: 4/3/2025  Primary Care Provider: Melia, Primary Doctor    Subjective:     Interval History: Nursing completed dressing changes over weekend; notes significant pain with change, reports wound bed appears healthy without any purulent drainage. LUE wound cx and blood cultures +MRSA. 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 Nephrology initiated CRRT. Patient seen at bedside on rounds this AM. Appears confused/delirious.     Post-Op Info:  Procedure(s) (LRB):  EXPLORATION, ARTERY, UPPER EXTREMITY (Left)  REMOVAL, GRAFT, ARTERIOVENOUS, UPPER EXTREMITY (Left)   4 Days Post-Op      Medications:  Continuous Infusions:  Scheduled Meds:   aspirin  81 mg Oral Daily    atorvastatin  80 mg Oral QHS    clopidogreL  75 mg Oral Daily    heparin (porcine)  5,000 Units Subcutaneous Q8H    insulin glargine U-100  5 Units Subcutaneous Daily    mupirocin   Nasal BID    pantoprazole  40 mg Oral Daily    sodium chloride 0.9%  10 mL Intravenous Q8H    tamsulosin  0.4 mg Oral Daily    vancomycin (VANCOCIN) IV (PEDS and ADULTS)  500 mg Intravenous Once     PRN Meds:  Current Facility-Administered Medications:     acetaminophen, 650 mg, Oral, Q4H PRN    dextrose 50%, 12.5 g, Intravenous, PRN    dextrose 50%, 25 g, Intravenous, PRN    diphenhydrAMINE, 50 mg, Oral, Q6H PRN    glucagon (human recombinant), 1 mg, Intramuscular, PRN    glucose, 16 g, Oral, PRN    glucose, 24 g, Oral, PRN    heparin (porcine), 1,000 Units, Intravenous, PRN    HYDROmorphone, 0.5 mg, Intravenous, Q4H PRN    insulin aspart U-100, 0-5 Units, Subcutaneous, QID (AC + HS) PRN    melatonin, 6 mg, Oral, Nightly PRN    naloxone, 0.02 mg, Intravenous, PRN    ondansetron, 4 mg, Intravenous, Q6H PRN    prochlorperazine, 5 mg, Intravenous, Q6H PRN    Pharmacy to dose Vancomycin consult, , , Once **AND** vancomycin -  pharmacy to dose, , Intravenous, pharmacy to manage frequency     Objective:     Vital Signs (Most Recent):  Temp: 97.6 °F (36.4 °C) (04/07/25 1115)  Pulse: 84 (04/07/25 1105)  Resp: 14 (04/07/25 1208)  BP: 136/64 (04/07/25 1105)  SpO2: (!) 93 % (04/07/25 1105) Vital Signs (24h Range):  Temp:  [97.4 °F (36.3 °C)-98.1 °F (36.7 °C)] 97.6 °F (36.4 °C)  Pulse:  [] 84  Resp:  [11-40] 14  SpO2:  [90 %-99 %] 93 %  BP: (103-188)/() 136/64         Physical Exam  Vitals and nursing note reviewed.   Constitutional:       Appearance: He is ill-appearing and toxic-appearing.   Eyes:      Conjunctiva/sclera:      Right eye: Right conjunctiva is injected.      Left eye: Left conjunctiva is injected.   Cardiovascular:      Rate and Rhythm: Normal rate.      Comments: LUE AVG, hemorrhaging (resolved). Trialysis line.   Dopplerable LUE radial, ulnar, and palmar arch   Genitourinary:     Comments: Sharma cath   Musculoskeletal:         General: No swelling.      Comments: LUE: cold, mild weakness   Skin:     General: Skin is cool.      Findings: Wound present.      Comments: LUE: s/p exploration and AVG excision. Wound dressed with betadine-soaked gauze roll, gauze roll, and ACE. Minimal discharge seen on ACE.     Proximal incision closed with staples and island dressing in place.     Distal incision closed with staples and tegederm dressing in place.     Mid bicep wound packed with betadine-soaked gauze roll- removed POD1. Wound bed without gross purulent drainage, + scant serosanguinous drainage, tissue appears healthy. -- repacked with betadine soaked gauze, wrapped with gauze roll, and ACE.     L hand, palmar surface with hyperpigmented raised, soft mass, possible previous skin grafting (?)   Neurological:      Mental Status: He is lethargic.      Comments: Responsive to name and when pain elicited. Otherwise, not engaged in exam         Significant Labs:  CBC:   Recent Labs   Lab 04/07/25  0419   WBC 18.35*   RBC 2.82*    HGB 8.3*   HCT 25.8*   *   MCV 92   MCH 29.4   MCHC 32.2     CMP:   Recent Labs   Lab 04/07/25  0419   CALCIUM 8.6*   ALBUMIN 2.1*      K 4.2   CO2 22*      BUN 77*   CREATININE 9.7*   ALKPHOS 72   ALT 20   AST 20   BILITOT 0.5     All pertinent labs from the last 24 hours have been reviewed.    Significant Diagnostics:  I have reviewed and interpreted all pertinent imaging results/findings within the past 24 hours.    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] 04/07/2025 Yes    Emphysema lung [J43.9] 04/07/2025 Yes    Pulmonary nodules [R91.8] 04/07/2025 Yes    BPH with urinary obstruction [N40.1, N13.8] 04/06/2025 Yes    ACP (advance care planning) [Z71.89] 04/04/2025 Not Applicable    Acute bacterial endocarditis [I33.0] 04/04/2025 Yes    MRSA bacteremia [R78.81, B95.62] 04/04/2025 Yes    NSTEMI (non-ST elevated myocardial infarction) [I21.4] 04/04/2025 Yes    Acute metabolic encephalopathy [G93.41] 04/02/2025 Yes    Anemia in ESRD (end-stage renal disease) [N18.6, D63.1] 01/15/2024 Yes    ESRD on hemodialysis [N18.6, Z99.2] 11/24/2019 Not Applicable    Type 2 diabetes mellitus with hyperglycemia, without long-term current use of insulin [E11.65] 12/30/2014 Yes    Coronary artery disease involving native coronary artery of native heart without angina pectoris [I25.10] 12/30/2014 Yes    HTN (hypertension) [I10] 12/29/2014 Yes    HLD (hyperlipidemia) [E78.5] 12/29/2014 Yes     Chronic      Problems Resolved During this Admission:    Diagnosis Date Noted Date Resolved POA    Hypothyroidism due to acquired atrophy of thyroid [E03.4] 01/15/2024 04/03/2025 Yes     85 y.o. male with septic shock (HoTN, VIJI) and found to have ruptured LUE AVG with exposed graft and PSA defect with hematoma, presented with active, pulsatile bleeding  and found to have radial ulnar, and palmar doppler signals present. Taken emergently to OR for LUE exploration, where well incorporated prox and central graft without evidence of infection were resected, also resected entirety of mid graft and sent for culture; rupture psa with infected hematoma evacuated; AVG mid graft segment was found to be completely obliterated. Proximal and central graft resection incision areas closed in multiple layers and skin staples. Mid graft resection incision packed with betadine soaked cause and left for healing via secondary intention. Wound care following.     Shock resolved with d/c of vasopressors; however, leukocytosis worsening (18.35) today. LUE wound cx and blood cx + MRSA. TTE w/ evidence of MV endocarditis. Cardiology and CTS consulted and concluded pt not surgical candidate, rec medical mgmt. ID following. + Vanc IV Abx. Nephrology following. Trialysis line placed for CRRT. New AUR developed. Urology following. Palliative also following. No acute vascular surgery intervention indicated at this time. Agree with consulted teams' management of complex situation.     - Cont LUE wound care per wound care recs. Appreciate assistance.  - Cont abx per ID   - Pain control per primary  - Rest of care per teams mentioned above      Lisa Hernandez PA-C  Vascular Surgery  [facility] [place] [facility]

## 2025-04-07 NOTE — ASSESSMENT & PLAN NOTE
- Mgmt per primary team/ID/cardiology; while he has a known valvular mass, he is being treated by antibiotics alone as he is not a surgical candidate. Prognosis is poor if cultures do not clear.   - Illness trajectory education provided

## 2025-04-07 NOTE — ANESTHESIA POSTPROCEDURE EVALUATION
Anesthesia Post Evaluation    Patient: Jevon Rajan    Procedure(s) Performed: Procedure(s) (LRB):  CYSTOSCOPY WITH CLOT EVACUATION, FULGURATION, GARCES PLACEMENT (N/A)    Final Anesthesia Type: general      Patient location during evaluation: PACU  Patient participation: Yes- Able to Participate  Level of consciousness: awake and alert  Post-procedure vital signs: reviewed and stable  Pain management: adequate  Airway patency: patent    PONV status at discharge: No PONV  Anesthetic complications: no      Respiratory status: spontaneous ventilation and room air  Hydration status: euvolemic  Follow-up not needed.              Vitals Value Taken Time   /54 04/07/25 17:22   Temp 36.5 °C (97.7 °F) 04/07/25 16:43   Pulse 80 04/07/25 17:24   Resp 14 04/07/25 17:24   SpO2 100 % 04/07/25 17:24   Vitals shown include unfiled device data.      No case tracking events are documented in the log.      Pain/Payton Score: Pain Rating Prior to Med Admin: 7 (4/7/2025 12:08 PM)  Pain Rating Post Med Admin: 0 (4/7/2025 12:14 PM)

## 2025-04-07 NOTE — PT/OT/SLP EVAL
"Occupational Therapy   Evaluation and Discharge Note    Name: Jevon Rajan  MRN: [id number]  Admitting Diagnosis: Septic shock  Recent Surgery: Procedure(s) (LRB):  EXPLORATION, ARTERY, UPPER EXTREMITY (Left)  REMOVAL, GRAFT, ARTERIOVENOUS, UPPER EXTREMITY (Left) 4 Days Post-Op    Recommendations:     Discharge Recommendations:  (Unable to be determined on initial eval 2/2 pt unable to follow commands; please re-consult OT when medically appropriate)  Discharge Equipment Recommendations:  (Unable to be determined 2/2 pt unable to follow commands on initial eval; please re-consult when medically appropriate)  Barriers to discharge:   (poor command following, increased need of assist)    Assessment:     Jevon Rajan is a 85 y.o. male with a medical diagnosis of Septic shock. OT initial eval completed. Pt unable to follow commands and lethargic at end of eval. Total Ax2 for all bed mobility, limited eye contact/scanning environment, shaking head laterally and into flx/ext repeatedly. OT to sign off at this time. Please re-consult once medically appropriate.     Plan:     During this hospitalization, patient does not require further acute OT services.  Please re-consult if situation changes.    Plan of Care Reviewed with: patient    Subjective     Chief Complaint: pain "all over"  Patient/Family Comments/goals: pt with poor command following. Only able to state his name when asked and state feeling pain all over; pt repeating his name during further orientation questions and not verbally responsive despite cueing and instructions rest of eval    Occupational Profile: per chart; pt unable to provide info  Living Environment: Lives in [facility], [facility], [facility]  Previous level of function: Per chart, pt may be living alone, but his sisters are nearby and bring him food. He has a niece who is supportive and also supplies groceries. Pt was Mod I, according to chart. Dialysis TTS  Equipment Used at home: walker, rolling, cane, straight, " "shower chair (per chart)  Assistance upon Discharge: family?    Pain/Comfort:  Pain Rating 1:  (unable to rate; "all over")  Pain Addressed 1: Reposition, Distraction, Cessation of Activity, Nurse notified    Objective:     Communicated with: RN prior to session.  Patient found HOB elevated with blood pressure cuff, telemetry, pulse ox (continuous), peripheral IV, oxygen, NG tube upon OT entry to room.    General Precautions: Standard, fall, aspiration, respiratory, contact  Orthopedic Precautions: N/A  Braces: N/A  Respiratory Status: Nasal cannula, flow 3 L/min satting 88-90s% with motion detected during desat during dependent bed mob; unsure accuracy    Occupational Performance:    Bed Mobility:    Patient completed Rolling/Turning to Right with dependent  Patient completed Scooting/Bridging with dependent and 2 persons    Functional Mobility/Transfers:  Functional Mobility: Dependent bed mobility; unable to assess further mobility. Dependent sup>sit via hospital bed. 136/64, 84bpm, 93% SpO2    Activities of Daily Living:  No AROM/initiation upon request; pt able to bring LUE to face to scratch his cheek    Cognitive/Visual Perceptual:  Cognitive/Psychosocial Skills:     -       Oriented to: Person   -       Follows Commands/attention:poor; only verbally responsive to 2 questions (name and pain); no command following rest of eval  -       Communication: clear/fluent; paucity of speech and responsiveness  -       Memory: impaired  -       Safety awareness/insight to disability: impaired   -       Mood/Affect/Coping skills/emotional control: Anxious  Visual/Perceptual:  unable to test; limited scanning and able to make eye contact 2x despite cueing    Physical Exam:  Postural examination/scapula alignment:    -       Rounded shoulders  -       Forward head  Sensation: unable to test despite max vc/tcs  Upper Extremity Range of Motion:  PROM WFL  Upper Extremity Strength: unable to test   Strength: unable to " test despite max vc/tcs  Gross motor coordination: unable to test    AMPAC 6 Click ADL:  AMPAC Total Score: 6    Treatment & Education:  BUE PROM 1x6 in all available planes.  Dark scars noted to BLEs.  Pt repositioned into modified R sidelying with pillows to offload trunk/hip.  Pt maintained eyes open entirety of eval until very end when pt noted to be in light sleep state.    Patient left HOB elevated with all lines intact, call button in reach, bed alarm on, RN notified    GOALS:   Multidisciplinary Problems       Occupational Therapy Goals          Problem: Occupational Therapy    Goal Priority Disciplines Outcome Interventions   Occupational Therapy Goal     OT, PT/OT Adequate for Care Transition    Description: Goals to be met by: 4/7/2025     Patient will increase functional independence with ADLs by performing:    Bed level with Total Ax2, fair tolerance of ROM.                         History:     Past Medical History:   Diagnosis Date    BPH (benign prostatic hyperplasia)     Coronary artery disease     [facility]. In 2014 he had stent to LAD. He states he has had 2 heart attacks. He does not get angina. There was 90% left anterior descending artery stenosis undergoing stenting. There was also a circumflex marginal branch stenosis of 70%.    Diabetes mellitus, type 2     ESRD (end stage renal disease) on dialysis     ESRD on HD TTS via left brachial AVF    Hypertension     Hypothyroidism, unspecified     NSTEMI (non-ST elevated myocardial infarction) 4/4/2025    Sepsis 4/2/2025    Symptomatic anemia 01/15/2024         Past Surgical History:   Procedure Laterality Date    ANGIOGRAM, CORONARY, WITH LEFT HEART CATHETERIZATION  1/13/2025    Procedure: Angiogram, Coronary, with Left Heart Cath;  Surgeon: Steve Bhat MD;  Location: [facility] CATH LAB/EP;  Service: Cardiology;;    ATHERECTOMY, CORONARY N/A 1/14/2025    Procedure:  Atherectomy-coronary;  Surgeon: Giacomo Pittman MD;  Location: [facility] CATH LAB/EP;  Service: Cardiology;  Laterality: N/A;    bilateral foot surgery      CORONARY ANGIOPLASTY WITH STENT PLACEMENT N/A 1/14/2025    Procedure: ANGIOPLASTY, CORONARY ARTERY, WITH STENT INSERTION;  Surgeon: Giacomo Pittman MD;  Location: [facility] CATH LAB/EP;  Service: Cardiology;  Laterality: N/A;    CORONARY STENT PLACEMENT N/A 1/13/2025    Procedure: INSERTION, STENT, CORONARY ARTERY;  Surgeon: Steve Bhat MD;  Location: [facility] CATH LAB/EP;  Service: Cardiology;  Laterality: N/A;    ESOPHAGOGASTRODUODENOSCOPY N/A 2/27/2024    Procedure: EGD (ESOPHAGOGASTRODUODENOSCOPY);  Surgeon: Gemma Almendarez MD;  Location: [facility] ENDO;  Service: Endoscopy;  Laterality: N/A;    EXPLORATION, ARTERY, UPPER EXTREMITY Left 4/3/2025    Procedure: EXPLORATION, ARTERY, UPPER EXTREMITY;  Surgeon: Sunil Contreras MD;  Location: [facility] OR;  Service: Vascular;  Laterality: Left;    eyelid surgery      FISTULOGRAM Left 11/27/2019    Procedure: Fistulogram;  Surgeon: MELANY Brenner III, MD;  Location: [address];  Service: Peripheral Vascular;  Laterality: Left;    FISTULOGRAM Left 11/27/2019    Procedure: Fistulogram, AVG declot, possible permacath;  Surgeon: MELANY Brenner III, MD;  Location: [facility] CATH LAB;  Service: Peripheral Vascular;  Laterality: Left;    INSERTION OF DIALYSIS CATHETER      IVUS, CORONARY  1/13/2025    Procedure: IVUS, Coronary;  Surgeon: Steve Bhat MD;  Location: [facility] CATH LAB/EP;  Service: Cardiology;;    LEFT HEART CATHETERIZATION Left 1/14/2025    Procedure: Left heart cath;  Surgeon: Giacomo Pittman MD;  Location: [facility] CATH LAB/EP;  Service: Cardiology;  Laterality: Left;    MOH's  12/5/13    PERCUTANEOUS CORONARY INTERVENTION, ARTERY N/A 1/14/2025    Procedure: Percutaneous coronary intervention;  Surgeon: Giacomo Pittman MD;  Location: [facility] CATH LAB/EP;  Service: Cardiology;  Laterality: N/A;     PERCUTANEOUS TRANSLUMINAL BALLOON ANGIOPLASTY OF CORONARY ARTERY  1/13/2025    Procedure: Angioplasty-coronary;  Surgeon: Steve Bhat MD;  Location: [facility] CATH LAB/EP;  Service: Cardiology;;    REMOVAL, GRAFT, ARTERIOVENOUS, UPPER EXTREMITY Left 4/3/2025    Procedure: REMOVAL, GRAFT, ARTERIOVENOUS, UPPER EXTREMITY;  Surgeon: Sunil Contreras MD;  Location: [facility] OR;  Service: Vascular;  Laterality: Left;    REVASCULARIZATION, ENDOVASCULAR, LE W/ INTRAVASCULAR LITHOTRIPSY  1/13/2025    Procedure: REVASCULARIZATION, ENDOVASCULAR, LE W/ INTRAVASCULAR LITHOTRIPSY;  Surgeon: Steve Bhat MD;  Location: [facility] CATH LAB/EP;  Service: Cardiology;;    right hand surgery      stents         Time Tracking:     OT Date of Treatment: 04/07/25  OT Start Time: 1133  OT Stop Time: 1141  OT Total Time (min): 8 min    Billable Minutes:Evaluation 8    4/7/2025

## 2025-04-07 NOTE — ASSESSMENT & PLAN NOTE
- Mgmt per urology and primary; not able to take flomax due to encephalopathy and inability to give this via NG

## 2025-04-07 NOTE — ASSESSMENT & PLAN NOTE
"- ZHENGE AVF was actively bleeding on arrival on 4/3/25. Vascular surgery was consulted. He went emergently to the OR on 4/3/25: "Severely calcified mid RCA stenosis unable to cross with PTCA balloons, treated initially with 0.9 laser atherectomy, followed by CSI atherectomy system with total of 5 runs (3 at low speed, 2 at high speed), pre-dilated using 2.0 compliant and 3.5 noncompliant balloon followed by 3.5 X 16 mm megatron stent.  Focal plaque rupture/erosion noted in the proximal and ostial RCA that were treated with  3.5 X 28 in the proximal RCA, 4.0 X 16 mm in the ostial RCA.  No residual stenosis post intervention.  Patient had ALAN 3 flow at the end of the procedure"  - cultures from AVF= Staph   - wound care orders in place for surgical site  - trialysis currently in place for HD    "

## 2025-04-07 NOTE — ASSESSMENT & PLAN NOTE
MRSA bacteremia  MV endocarditis  85 y.o. man with ESRD with LUE AVF, HFpEF, CAD, DM admitted to OSH 4/1- 4/3 with sepsis due to MRSA bacteremia, transferred to  ICU for septic shock due to MRSA bacteremia and thrombosis of AV graft on 4/3 s/p exploratory surgery of LUE with graft resection 4/3. Op findings: Ruptured pseudoaneurysm with infected hematoma, graft completely obliterated at mid segment. Suspect infected graft is the source of bacteremia.     BCx 4/2 MRSA  BCx 4/4 +  LUE surgical cx growing s aureus    TTE: 1.9x1.2cm large fixed heterogeneous mass present on the posterior leaflet , moderate to severe regurgitation with an eccentric jet. Cannot exclude severe MR with possible PMVL perforation in association with large vegetation. Not a surgical candidate.    Persistently positive blood cultures in the setting of acute IE. Deemed a poor surgical candidate.    If unable to sterilize his blood cultures, then his prognosis is very poor.       Recommendations:  --continue empiric vanc. Pharm to dose for goal trough 15-20  --anticipate atleast 6 wks iv abx from culture clearance  --if his repeat blood cultures from today still yield MRSA, can consider salvage therapy with ceftaroline and daptomycin  --only time will tell but I am not optimistic  --consider DNR status in the meantime given that the burdens of heroic resuscitation outweighing benefits

## 2025-04-07 NOTE — PROGRESS NOTES
Pt has not voided since dunaway removal  Noted to be in clot retention  24 fr dunaway placed, unable to irrigate due to clot retention  Tube feeds suspended  Plan for OR

## 2025-04-07 NOTE — PLAN OF CARE
Pt remains in ICU. Post-catheter removal, 4 big clots noted. Pt intermittently yelling out in pain, PRN pain medication given. Tube feeds continue. Dressing on R arm w/ some drainage, no dressing change orders active. CHG bath given, linen & gown changed x2, incontinence care performed.  Plan of care reviewed with pts Deonna garces, over the phone. Plans for HD today.    Problem: Adult Inpatient Plan of Care  Goal: Plan of Care Review  Outcome: Progressing  Goal: Patient-Specific Goal (Individualized)  Outcome: Progressing  Goal: Absence of Hospital-Acquired Illness or Injury  Outcome: Progressing  Goal: Optimal Comfort and Wellbeing  Outcome: Progressing  Goal: Readiness for Transition of Care  Outcome: Progressing     Problem: Diabetes Comorbidity  Goal: Blood Glucose Level Within Targeted Range  Outcome: Progressing     Problem: Sepsis/Septic Shock  Goal: Optimal Coping  Outcome: Progressing  Goal: Absence of Bleeding  Outcome: Progressing  Goal: Blood Glucose Level Within Targeted Range  Outcome: Progressing  Goal: Absence of Infection Signs and Symptoms  Outcome: Progressing  Goal: Optimal Nutrition Intake  Outcome: Progressing     Problem: Infection  Goal: Absence of Infection Signs and Symptoms  Outcome: Progressing     Problem: Fall Injury Risk  Goal: Absence of Fall and Fall-Related Injury  Outcome: Progressing     Problem: Skin Injury Risk Increased  Goal: Skin Health and Integrity  Outcome: Progressing     Problem: Wound  Goal: Optimal Coping  Outcome: Progressing  Goal: Optimal Functional Ability  Outcome: Progressing  Goal: Absence of Infection Signs and Symptoms  Outcome: Progressing  Goal: Improved Oral Intake  Outcome: Progressing  Goal: Optimal Pain Control and Function  Outcome: Progressing  Goal: Skin Health and Integrity  Outcome: Progressing  Goal: Optimal Wound Healing  Outcome: Progressing     Problem: Coping Ineffective  Goal: Effective Coping  Outcome: Progressing     Problem: Urinary  Elimination Management  Goal: Effective Urinary Elimination/Continence  Outcome: Progressing     Problem: Urinary Retention  Goal: Effective Urinary Elimination  Outcome: Progressing

## 2025-04-07 NOTE — PROGRESS NOTES
[facility] - Intensive Care  Palliative Medicine  Progress Note    Patient Name: Jevon Rajan  MRN: [id number]  Admission Date: 4/3/2025  Hospital Length of Stay: 4 days  Code Status: Full Code   Attending Provider: Eileen Jordan MD  Consulting Provider: Joan Antunez MD  Primary Care Physician: No, Primary Doctor  Principal Problem:Septic shock    Assessment/Plan:     Advance Care Planning    4/7/25  - Chart and interval history reviewed; discussed pt during MDT rounds. Pt had cystoscopy and dunaway placement by urology yesterday due to screaming and discomfort from urinary retention; found to have very enlarged prostate. Dunaway placed successfully by urology staff, though this was later removed due to pt complaining of significant pain.   - Pt screaming throughout much of the morning, possibly from urinary retention vs delirium. He was not able to participate in discussion secondary to this.   - Along with Dr Jordan, called and spoke with Kenia (pt's preferred MPOA); she added her mother Erik part of the way through the call. Thorough medical update provided; shared transparent concern that pt's blood cultures are persistently positive.  - While hopeful that cultures clear, there is a chance we may not be able to treat this infection, unfortunately. Additionally, pt has been very uncomfortable over the last 24 hours or so due to urinary retention and/or delirium. They expressed understanding of severity of both his acute and chronic illness, and the fragility that results naturally as a result of his advanced age.   - Discussed plan moving forward, as well as code status. At this time, plan remains to continue with maximal medical therapy in hopes of improvement, though they have agreed to further discuss code status as a family. At this time, would maintain full code.   - Support provided during call   - Encouraged visitation if possible, as perhaps this will be a calming presence for pt  - Will follow  up with pt and family for further conversations as clinical course evolves    4/4/25 (Lisa Jacinto, CRIS)   - Consult received; interval chart reviewed in detail; discussed pt with MDT during ICU rounds and ongoing throughout the day   - met with patient at bedside; introduction to palliative medicine team and role in current care and admission   - pt very lethargic and required encouragement and stimulation for orientation assessment; pt answers to name, able to state his 1st name, identifies he is at the hospital, and when asked who team should call for help with his medical care he answered Kenia (which is consistent with what he told  4/3 as well); quickly falls back asleep   - pt unable to participate in detailed GOC/ACP discussion on his own behalf at this time   - called Kenia (pt's niece) who shares that she has been pt's main caregiver for some time; she confirms pt is not , does not have children, and that he has 3 sisters; she also had pt's sister Bhavna join by phone for update/discussion   - Mrs. Huggins also shared that Joeta takes care of pt and understands his current history, needs, and wishes; per Bhavna, Kenia should be pt's medical decision maker while he lacks capacity, and confirms that pt's others sisters agree with this; they have asked that no other visitors or callers aside from Deshanta and pts sisters (Bhavna, Ronna, and Nora receive updates)   - Kenia and Bhavna confirm that they feel pt would wish to continue HD  - reviewed pt's current full code status, potential interventions, risks, and outcomes; Kenia and Bhavna agree for pt to remain full code, but were open to further discussion ideally when pt is able to participate in the discussion; updated pt's team of this and added this to EMR   - both shared difficulty pt has had recently and frustration with attempts to seek care to get answers to ongoing health concerns; they both shared appropriate  concerns for pt's current condition and are hopeful for answers and improvement in pt's condition; they shared that they feel the  ICU team is moving quickly and working to improve his condition for which they are appreciative   - emotional support provided   - Allowed time for questions/concerns; all addressed; expressed availability of myself/palliative team for additional questions/concerns   - attempted to call pt's other sister, Ronna, as she had called unit for updates; unable to reach,    - updated MDT; ongoing communication and coordination with MDT     Neuro  Acute metabolic encephalopathy  - Evaluation and management per primary team     Cardiac/Vascular  Acute bacterial endocarditis  - Mgmt per primary team/ID/cardiology; while he has a known valvular mass, he is being treated by antibiotics alone as he is not a surgical candidate. Prognosis is poor if cultures do not clear, and family understands he is at high risk for complications from this valvular mass.   - Illness trajectory education provided     Renal/  BPH with urinary obstruction  - Mgmt per urology and primary; not able to take flomax due to encephalopathy and inability to give this via NG     ESRD on hemodialysis  - Nephrology consulted and following for RRT needs; has temporary line in place though potentially may need line holiday due to persistent positive cultures.     ID  Septic shock  - Secondary to MRSA bacteremia from infected fistula     AV fistula infection  - Abx, mgmt per primary team/vascular surgery/ID; s/p excision of infected graft     MRSA bacteremia  - Abx, mgmt per primary team and ID; per ID, prognosis is poor if cultures do not clear, given that he is not a surgical candidate.   - Illness trajectory education provided to family      I will follow-up with patient. Please contact us if you have any additional questions.    TOSHA Li  Palliative Medicine Staff   [phone]    Total visit time: 51  minutes    > 50% of 35 min visit spent in chart review, face to face discussion of symptom assessment, coordination of care with other specialists, and discharge planning.    16 min ACP time spent: goals of care, emotional support, formulating and communicating prognosis, exploring burden/ benefit of various approaches of treatment.      Subjective:     Interval History: agitated at times     Medications:  Continuous Infusions:  Scheduled Meds:   aspirin  81 mg Oral Daily    atorvastatin  80 mg Oral QHS    clopidogreL  75 mg Oral Daily    heparin (porcine)  5,000 Units Subcutaneous Q8H    insulin glargine U-100  5 Units Subcutaneous Daily    mupirocin   Nasal BID    pantoprazole  40 mg Oral Daily    sodium chloride 0.9%  10 mL Intravenous Q8H    tamsulosin  0.4 mg Oral Daily    vancomycin (VANCOCIN) IV (PEDS and ADULTS)  500 mg Intravenous Once     PRN Meds:  Current Facility-Administered Medications:     acetaminophen, 650 mg, Oral, Q4H PRN    dextrose 50%, 12.5 g, Intravenous, PRN    dextrose 50%, 25 g, Intravenous, PRN    diphenhydrAMINE, 50 mg, Oral, Q6H PRN    glucagon (human recombinant), 1 mg, Intramuscular, PRN    glucose, 16 g, Oral, PRN    glucose, 24 g, Oral, PRN    heparin (porcine), 1,000 Units, Intravenous, PRN    HYDROmorphone, 0.5 mg, Intravenous, Q4H PRN    insulin aspart U-100, 0-5 Units, Subcutaneous, QID (AC + HS) PRN    melatonin, 6 mg, Oral, Nightly PRN    naloxone, 0.02 mg, Intravenous, PRN    ondansetron, 4 mg, Intravenous, Q6H PRN    prochlorperazine, 5 mg, Intravenous, Q6H PRN    Pharmacy to dose Vancomycin consult, , , Once **AND** vancomycin - pharmacy to dose, , Intravenous, pharmacy to manage frequency    Objective:     Vital Signs (Most Recent):  Temp: 97.6 °F (36.4 °C) (04/07/25 1115)  Pulse: 84 (04/07/25 1105)  Resp: 14 (04/07/25 1208)  BP: 136/64 (04/07/25 1105)  SpO2: (!) 93 % (04/07/25 1105) Vital Signs (24h Range):  Temp:  [97.4 °F (36.3 °C)-98.1 °F (36.7 °C)] 97.6 °F (36.4  °C)  Pulse:  [] 84  Resp:  [11-40] 14  SpO2:  [90 %-99 %] 93 %  BP: (103-188)/() 136/64     Weight: 61.2 kg (134 lb 14.7 oz)  Body mass index is 21.13 kg/m².       Physical Exam  Constitutional:       Comments: Lying in bed, elderly and frail appearing, agitated at times and yelling loudly enough to be heard through the ICU        Significant Labs: All pertinent labs within the past 24 hours have been reviewed.  CBC:   Recent Labs   Lab 04/07/25 0419   WBC 18.35*   HGB 8.3*   HCT 25.8*   MCV 92   *     BMP:  Recent Labs   Lab 04/07/25 0419      K 4.2      CO2 22*   BUN 77*   CREATININE 9.7*   CALCIUM 8.6*     LFT:  Lab Results   Component Value Date    AST 20 04/07/2025    ALKPHOS 72 04/07/2025    BILITOT 0.5 04/07/2025     Albumin:   Albumin   Date Value Ref Range Status   04/07/2025 2.1 (L) 3.5 - 5.2 g/dL Final   03/21/2025 3.2 (L) 3.5 - 5.2 g/dL Final     Protein:   Total Protein   Date Value Ref Range Status   03/21/2025 7.5 6.0 - 8.4 g/dL Final     Lactic acid:   Lab Results   Component Value Date    LACTATE 0.9 04/03/2025    LACTATE 2.6 (H) 04/02/2025       Significant Imaging: I have reviewed all pertinent imaging results/findings within the past 24 hours.

## 2025-04-07 NOTE — OP NOTE
OPERATIVE   Surgery Date: 4/7/2025     Surgeons and Role:     * Elsie Arnold MD - Primary    Assisting Surgeon: None    Pre-op Diagnosis:  Shock, septic [A41.9, R65.21]    Post-op Diagnosis:  Post-Op Diagnosis Codes:     * Shock, septic [A41.9, R65.21]    Procedure(s) (LRB):  CYSTOSCOPY WITH CLOT EVACUATION, FULGURATION, DUNAWAY PLACEMENT (N/A)    Anesthesia: Choice    Implants:  *24 fr dunaway, irrigation    Operative Findings:   300 cc of old blood noted in bladder  Prostatic urethra trauma  Prostatic varices buzzed at the left bladder neck    Estimated Blood Loss: 300 mL    Estimated Blood Loss has been documented.         Specimens:   Specimen (24h ago, onward)      None            Drains:24 fr dunaway    Procedure details: After informed consent was obtained the patient was taken to the cystoscopy suite and placed in supine position.  The genitalia was prepped and draped  in the usual sterile fashion.  Patient was positioned in the dorsal lithotomy position. After time out was performed procedure commenced.  A 24 fr cystourethroscope was placed into the urethra and passed into the bladder visualizing the urethra along its entire course. There was no evidence of anterior or posterior urethral stricture.  There was evidence of prostatic urethral trauma and adherent clot. 300 cc of old blood noted in urinary bladder, evacuated with elik evacuator. Once bladder free of clot, left bladder neck noted to have active bleeding vessel/ varices. I fulgurated the bladder neck and prostatic urethra. Prominent median lobe obscured the ureteral orifices on both sides, no fulguration took place in the vicinity of the ureteral orifices. No evidence of bladder rupture on direct inspection.   I removed the clot in the prostatic urethra to ensure no active bleeding beneath. I then introduced a zip wire into the bladder. Removed the scope and passed a 24 fr 3 way catheter, balloon filled with 30 cc. The bladder was left to  continuous bladder irrigation.     Disposition: return to the ICU

## 2025-04-07 NOTE — PLAN OF CARE
Problem: Physical Therapy  Goal: Physical Therapy Goal  Description: Goals to be met by: 25     Patient will increase functional independence with mobility by performin. Supine to sit with MInimal Assistance  2. Rolling to Left and Right with Minimal Assistance.  3. Bed to chair transfer with Moderate   4. Sitting at edge of bed x10 minutes with Contact Guard Assistance  5. Lower extremity exercise program x10 reps per handout, with assistance as needed    Outcome: Progressing   Ongoing assessment pending pt progress for disposition and DME

## 2025-04-07 NOTE — PLAN OF CARE
Changes in medical condition or discharge plan: Patient remains in the ICU with treatment ongoing     Does patient need new DME? tbd    Follow up appts needed: Patient will need followup appointments with PCP and addtional appointments as ordered by the discharging provider.      Medically stable: Not at this time.  Care continuing in the ICU  Consults include:  Vascular surgery emergently for fistula pseudoaneurysm,  Nephrology for CRRT, ID for MV Endocarditis (on Vanc), Pulm for trialysis cath, Urology (clots)     Estimated Discharge Date: unknown       04/07/25 1431   Rounds   Attendance Provider;Nurse ;Assigned nurse;Charge nurse;Pharmacist  (RT, Pulm/critical care team & Palliative care team)   Discharge Plan [facility]  [facility]   Why the patient remains in the hospital Requires continued medical care   Transition of Care Barriers None

## 2025-04-07 NOTE — CONSULTS
[facility] - Intensive Care  Wound Care  [facility]    Patient Name:  Jevon Rajan   MRN:  [id number]  Date: 4/7/2025  Diagnosis: Septic shock    History:     Past Medical History:   Diagnosis Date    BPH (benign prostatic hyperplasia)     Coronary artery disease     [facility] his care to this clinic. In 2014 he had stent to LAD. He states he has had 2 heart attacks. He does not get angina. There was 90% left anterior descending artery stenosis undergoing stenting. There was also a circumflex marginal branch stenosis of 70%.    Diabetes mellitus, type 2     ESRD (end stage renal disease) on dialysis     ESRD on HD TTS via left brachial AVF    Hypertension     Hypothyroidism, unspecified     NSTEMI (non-ST elevated myocardial infarction) 4/4/2025    Sepsis 4/2/2025    Symptomatic anemia 01/15/2024       Social History[1]    Precautions:     Allergies as of 04/03/2025 - Reviewed 04/03/2025   Allergen Reaction Noted    Ace inhibitors Hives, Itching, Shortness Of Breath, Other (See Comments), and Rash 12/04/2013    Captopril  08/12/2003       [facility] Assessment Details/Treatment     Active Problem List with Overview Notes    Diagnosis Date Noted    Thrombocytopenia 04/07/2025    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    Emphysema lung 04/07/2025    Pulmonary nodules 04/07/2025    BPH with urinary obstruction 04/06/2025    ACP (advance care planning) 04/04/2025    Acute bacterial endocarditis 04/04/2025    MRSA bacteremia 04/04/2025    NSTEMI (non-ST elevated myocardial infarction) 04/04/2025    Septic shock 04/02/2025    Acute metabolic encephalopathy 04/02/2025    Senile debility 01/15/2025    Rash 08/10/2024    Dry eyes 08/10/2024    Inadequate dietary intake of protein 07/10/2024    DM2 (diabetes mellitus, type 2) 06/02/2024    Liver mass 02/26/2024    Anemia in ESRD (end-stage renal disease) 01/15/2024    Aortic atherosclerosis 08/30/2023    Thrombosis of kidney dialysis arteriovenous  graft 11/27/2019    ESRD on dialysis 11/27/2019    AV graft thrombosis 11/26/2019    Renal mass, left 11/25/2019    ESRD on hemodialysis 11/24/2019    Type 2 diabetes mellitus with hyperglycemia, without long-term current use of insulin 12/30/2014    Coronary artery disease involving native coronary artery of native heart without angina pectoris 12/30/2014     S/p PCI in [facility] @ [facility].   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 Admitted 4/1/25 to [facility] with complaint of weakness after dialysis and fall at home. ESRD on HD TThS via LUE AVF  4/7 WBC 18.35 Hgb 8.3 Hct 25.8 Alb 2.1 Weight 61.2 kg   On Isolibrium mattress; Manas score 14  4/3 10:00 pm 4 Eyes Skin Assessment- No Pressure Injuries Present  Presented to [facility] with aneurysmal  dilation of LUE AVF causing Nephrology to be concerned for spontaneous   On presentation to [facility], Vascular Surgery consulted for emergent management of pulsatile bleeding AV access  4/3 S/P Exploration LUE artery; LUE AV graft excision; left arm washout for hemorrhage and ruptured infected LUE AV graft pseudoaneurysm per Joann Contreras and Lisy  4/6 Change dressing daily order with betadine dressing after cleansing with Vashe  Assessment:  POD# 4 Excision AVG left upper extremity and washout  Photodocumentation    Left upper arm-Incision from AVG excision 4/3   Open wound 10 cm(L) 8 cm(W) with pale base. Dressing removed saturated with serosanguineous drainage. No purulent  drainage on dressing removed.   Skin staples distal to open wound covered with transparent film dressing  Skin staples proximal to wound open to air without drainage.   No edema left upper arm, soft to touch  Treatment/Plan:  Cleansed wound with Vashe. Applied 3M Cavilon No Sting Film Barrier to periwound skin. Filled wound with Vashe moistened gauze and covered with dry gauze secured with cast padding and ACE wrap.   Vashe dressings for treatment of infected wound.   If large amount of drainage, may change dressing to hydrofiber with silver to contain drainage.   Nursing to change dressing daily.   Recommendations made to primary team. Orders placed.     04/07/2025         [1]   Social History  Socioeconomic History    Marital status: Single   Tobacco Use    Smoking status: Never    Smokeless tobacco: Never   Substance and Sexual Activity    Alcohol use: No    Drug use: No    Sexual activity: Not Currently     Social Drivers of Health     Financial Resource Strain: Low Risk  (4/5/2025)    Overall Financial Resource Strain (CARDIA)     Difficulty of Paying Living Expenses: Not very hard   Food Insecurity: No Food Insecurity (4/5/2025)    Hunger Vital Sign     Worried About Running Out of Food in the Last Year: Never true     Ran Out of Food in the Last Year: Never true   Transportation Needs: Patient Unable To Answer (4/3/2025)    PRAPARE - Transportation     Lack of Transportation (Medical): Patient unable to answer     Lack of Transportation (Non-Medical): Patient unable to answer   Recent Concern: Transportation Needs - Unmet Transportation Needs (1/15/2025)    TRANSPORTATION NEEDS     Transportation : Yes, it has kept me from medical appointments or from getting my medications.   Physical Activity: Patient Unable To Answer (4/2/2025)    Exercise Vital Sign     Days of Exercise per Week: Patient unable to answer     Minutes of Exercise per Session: Patient unable to answer   Recent Concern: Physical Activity -  Insufficiently Active (1/15/2025)    Exercise Vital Sign     Days of Exercise per Week: 5 days     Minutes of Exercise per Session: 20 min   Stress: No Stress Concern Present (4/5/2025)    [employer] [place] [employer] - Occupational Stress Questionnaire     Feeling of Stress : Not at all   Housing Stability: Low Risk  (4/5/2025)    Housing Stability Vital Sign     Unable to Pay for Housing in the Last Year: No     Number of Times Moved in the Last Year: 0     Homeless in the Last Year: No

## 2025-04-07 NOTE — PROGRESS NOTES
"[facility] [place] [facility]ensive [facility]  [facility] Medicine  Progress Note    Patient Name: Jevon Rajan  MRN: [id number]  Patient Class: IP- Inpatient   Admission Date: 4/3/2025  Length of Stay: 4 days  Attending Physician: Eileen Jordan MD  Primary Care Provider: Melia, Primary Doctor        Subjective     Principal Problem:Septic shock        HPI:  Mr Jevon Rajan is a 85 y.o. man with ESRD with LUE AVF, HFpEF last EF 50-55%, CAD, DM who was transferred to [facility] ICU for septic shock due to MRSA bacteremia.     He was admitted at [facility] 4/1-3/2025 with sepsis, worsening to septic shock, then identified source as MRSA. 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     Upon arrival to [facility], he is moaning in pain and his LUE AVF has active pulsatile bright red blood. He does respond to his name. He denies pain anywhere other than his LUE. He is on levophed at 0.05.     Overview/Hospital Course:  Mr Jevon Rajan is a 85 y.o. man with ESRD on HD with LUE AVF that has been bleeding, CHF, CAD s/p recent stenting 1/2025 who was admitted with MRSA bacteremia and bleeding from his LUE AVF. Continued vancomycin; weaned off levophed. Vascular surgery emergently consulted; on 4/3/25 they took him to OR and noted: "well incorporated proximal and central graft without evidence of infection, resected graft and covered proximal and central remnants with multiple layers. Mid graft removed in entirety and sent for culture. Ruptured pseudoaneurysm with infected hematoma, graft completely obliterated at mid segment." Surgical cultures with Staph. Suspect AVF or chronic scratching of pruritic skin is the source of his infection. TTE showed endocarditis: EF 40-45%, G1DD, RV systolic dysfunction, large 1.9x1.2cm fixed mass on the posterior mitral valve leaflet with moder to severe regurgitation, cannot rule out posterior " "mitral valve leaflet perforation. Discussed with cardiac surgery; he is high mortality risk, and surgery is not advised. ID following. Nephrology consulted; trialysis was placed for HD. Persistently positive for MRSA in blood cultures. He has had urinary retention; Urology consulted, performed bedside cystoscopy on 4/6/25 with large prostate noted.     Interval History: He seems confused today. He says "number 1" over and over again.     Review of Systems   Constitutional:  Positive for fatigue.   Respiratory:  Negative for shortness of breath.    Cardiovascular:  Negative for chest pain.   Gastrointestinal:  Negative for abdominal pain.   Genitourinary:  Positive for difficulty urinating.   Psychiatric/Behavioral:  Positive for confusion.      Objective:     Vital Signs (Most Recent):  Temp: 97.8 °F (36.6 °C) (04/07/25 0705)  Pulse: 86 (04/07/25 0801)  Resp: 18 (04/07/25 0823)  BP: 135/65 (04/07/25 0705)  SpO2: (!) 90 % (04/07/25 0801) Vital Signs (24h Range):  Temp:  [97.4 °F (36.3 °C)-98.2 °F (36.8 °C)] 97.8 °F (36.6 °C)  Pulse:  [] 86  Resp:  [13-40] 18  SpO2:  [90 %-99 %] 90 %  BP: (102-188)/() 135/65     Weight: 61.2 kg (134 lb 14.7 oz)  Body mass index is 21.13 kg/m².    Intake/Output Summary (Last 24 hours) at 4/7/2025 0945  Last data filed at 4/7/2025 0500  Gross per 24 hour   Intake 470 ml   Output 325 ml   Net 145 ml         Physical Exam  Vitals and nursing note reviewed.   Constitutional:       Appearance: He is ill-appearing.      Comments: Awakens to voice    HENT:      Head: Normocephalic and atraumatic.      Nose:      Comments: NGT in place      Mouth/Throat:      Mouth: Mucous membranes are dry.   Neck:      Comments: RIJ trialysis  Cardiovascular:      Rate and Rhythm: Normal rate and regular rhythm.   Pulmonary:      Effort: Pulmonary effort is normal.      Breath sounds: Normal breath sounds.      Comments: O2 by NC  Abdominal:      General: Bowel sounds are normal.      Palpations: " Abdomen is soft.   Musculoskeletal:      Comments: LUE in bandage, not removed   Skin:     Comments: Very dry skin with hyperpigmented patches   Neurological:      Comments: Oriented to self               Significant Labs: All pertinent labs within the past 24 hours have been reviewed.    Significant Imaging: I have reviewed all pertinent imaging results/findings within the past 24 hours.      Assessment & Plan  Septic shock  This patient has shock. The type of shock is distributive due to sepsis. The patient had the following evidence of shock: persistent hypotension and altered mental status. 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 Shock is now resolved and vasopressors are off. WBC worsening  HTN (hypertension)  Patient's blood pressure range in the last 24 hours was: BP  Min: 102/52  Max: 188/105.The patient's inpatient anti-hypertensive regimen is listed below:  Current Antihypertensives       Plan  - admitted with shock  - now off vasopressors. Continue to hold BP Rx as BP is well controlled without it   HLD (hyperlipidemia)  - continue statin  Type 2 diabetes mellitus with hyperglycemia, without long-term current use of insulin  A1c:   Lab Results   Component Value Date    HGBA1C 5.7 (H) 04/02/2025     Meds: lantus + SSI PRN to maintain goal 140-180  Tube feeds  accuchecks, hypoglycemic protocol      Coronary artery disease involving native coronary artery of native heart without angina pectoris  Patient with known CAD s/p stent placement, which is controlled Will continue ASA, Plavix, and Statin and monitor for S/Sx of angina/ACS. Continue to monitor on telemetry.   - last [facility] was 1/2025: Severely calcified mid RCA stenosis unable to cross with PTCA  "balloons, treated initially with 0.9 laser atherectomy, followed by CSI atherectomy system with total of 5 runs (3 at low speed, 2 at high speed), pre-dilated using 2.0 compliant and 3.5 noncompliant balloon followed by 3.5 X 16 mm megatron stent.  Focal plaque rupture/erosion noted in the proximal and ostial RCA that were treated with  3.5 X 28 in the proximal RCA, 4.0 X 16 mm in the ostial RCA.  No residual stenosis post intervention.  Patient had ALAN 3 flow at the end of the procedure  - with elevated troponin likely due to septic shock  Recent Labs   Lab 04/04/25  1028   TROPONINI 2.913*   - continue asa, plavix, statin  - Cardiology consulted  - no plans for ischemic evaluation at this time   ESRD on hemodialysis  - ESRD on HD via LUE AVF  - LUE AVF was actively bleeding on arrival on 4/3/25. Vascular surgery was consulted. He went emergently to the OR on 4/3/25: "Severely calcified mid RCA stenosis unable to cross with PTCA balloons, treated initially with 0.9 laser atherectomy, followed by CSI atherectomy system with total of 5 runs (3 at low speed, 2 at high speed), pre-dilated using 2.0 compliant and 3.5 noncompliant balloon followed by 3.5 X 16 mm megatron stent.  Focal plaque rupture/erosion noted in the proximal and ostial RCA that were treated with  3.5 X 28 in the proximal RCA, 4.0 X 16 mm in the ostial RCA.  No residual stenosis post intervention.  Patient had ALAN 3 flow at the end of the procedure"  - cultures from AVF= Staph   - wound care orders in place for surgical site  - Nephrology is consulted  - trialysis placed on 4/4/25 for CRRT  - he will need THDC when bacteremia is resolved   Anemia in ESRD (end-stage renal disease)  Anemia is likely due to  ESRD, blood loss . Most recent hemoglobin and hematocrit are listed below.  Recent Labs     04/05/25  0324 04/06/25  0317 04/07/25  0419   HGB 8.6* 8.2* 8.3*   HCT 26.7* 25.8* 25.8*     Plan  - Monitor serial CBC: Daily and recheck now  - Transfuse " "PRBC if patient becomes hemodynamically unstable, symptomatic or H/H drops below 7/21.  - Patient has not received any PRBC transfusions to date  - Patient's anemia is currently stable  Acute metabolic encephalopathy  - he is oriented to self but otherwise confused  - CTH 4/1/25 with no acute process  - suspect this is due to sepsis  - NGT placed on 4/4/25 so that he could get his asa/plavix  - swallow- continue puree. Continue NGT until definitely improving and swallowing Rx     ACP (advance care planning)  Advance Care Planning     Date: 04/04/2025  - palliative consulted   - Mr Escoto specifically told Dr Jordan to contact Kenia Smyth for all updates  - discussed code status with Kenia. She states she has spoken with Mr Escoto's sisters and they all want full code status.          Acute bacterial endocarditis  - TTE 4/4/25: "1.9x1.2cm large fixed heterogeneous mass present on the posterior leaflet (new finding vs 9/2023 echo). There is moderate to severe regurgitation with an eccentric jet. Cannot exclude severe MR with possible PMVL perforation in association with large vegetation."  - this is in the setting of MRSA bacteremia  - ID is consulted  - repeat blood cultures until clear   - suspect source is skin/chronic scratching vs AVF infection- cultures from resected AVF with Staph   - discussed with Cardiac surgery Dr Castro 4/4/25- given age and comorbidities, he is very high risk of mortality with surgery. He recommends medical management at this point.  - on vancomycin      MRSA bacteremia  - suspect source= chronic skin scratching vs infected AVF  - cultures of AVF with Staph   - he has endocarditis  - ID consulted  - on vanc     NSTEMI (non-ST elevated myocardial infarction)  - see CAD    BPH with urinary obstruction  - noted 4/6/25 when patient became very agitated and screaming he couldn't urinate  - nursing unable to place dunaway/coude  - Urology consulted. Bedside cystoscopy on 4/6/25 " "noted clots, large prostate. Catheter was placed but was then removed due to pain  - 4/7 he is again agitated that he cannot urinate  - follow up with Urology   - tamsulosin ordered if he is awake enough to swallow     Thrombocytopenia  The likely etiology of thrombocytopenia is infection and sepsis. The patients 3 most recent labs are listed below.  Recent Labs     04/05/25  0324 04/06/25  0317 04/07/25  0419   * 118* 141*     Plan  - Will transfuse if platelet count is <10k.    AV fistula infection  - LUE AVF was actively bleeding on arrival on 4/3/25. Vascular surgery was consulted. He went emergently to the OR on 4/3/25: "Severely calcified mid RCA stenosis unable to cross with PTCA balloons, treated initially with 0.9 laser atherectomy, followed by CSI atherectomy system with total of 5 runs (3 at low speed, 2 at high speed), pre-dilated using 2.0 compliant and 3.5 noncompliant balloon followed by 3.5 X 16 mm megatron stent.  Focal plaque rupture/erosion noted in the proximal and ostial RCA that were treated with  3.5 X 28 in the proximal RCA, 4.0 X 16 mm in the ostial RCA.  No residual stenosis post intervention.  Patient had ALAN 3 flow at the end of the procedure"  - cultures from AVF= Staph   - wound care orders in place for surgical site  - trialysis currently in place for HD    Emphysema lung  - emphysema noted on CT  - no signs of COPD exacerbation  Pulmonary nodules  - CT 4/3/25 with few small pulmonary nodules  - will need outpatient follow up     VTE Risk Mitigation (From admission, onward)           Ordered     heparin (porcine) injection 5,000 Units  Every 8 hours         04/06/25 1959     heparin (porcine) injection 1,000 Units  As needed (PRN)         04/05/25 2100     IP VTE HIGH RISK PATIENT  Once         04/03/25 1516     Place TEMO hose  Until discontinued         04/03/25 1516     Place sequential compression device  Until discontinued         04/03/25 1516     Reason for No " Pharmacological VTE Prophylaxis  Once        Question:  Reasons:  Answer:  Physician Provided (leave comment)    04/03/25 1516                    Discharge Planning   BAYRON:      Code Status: Full Code   Medical Readiness for Discharge Date:   Discharge Plan A: Home Health        12:22 PM Called Deonna to update along with Dr Antunez. Discussed continued pain from inability to urinate, poor oral intake, and persistent bacteremia. She added her mother Erik (the patient's niece). Updated her as well.     3:14 PM  Emergently to OR with Urology this afternoon for clot retention. Urology, Cardiology discussed. Last [facility] with stents placed 1/2025 with NSTEMI type II this admission. OK to hold DAPT but resume plavix ASAP per Cardiology. Stopped asa and heparin SQ DVT ppx.           Critical care time spent on the evaluation and treatment of severe organ dysfunction, review of pertinent labs and imaging studies, discussions with consulting providers and discussions with patient/family: 80 minutes.          Eileen Jordan MD  Department of Hospital Medicine   [facility] [place] - Intensive Care

## 2025-04-07 NOTE — ASSESSMENT & PLAN NOTE
- Abx, mgmt per primary team and ID; per ID, prognosis is poor if cultures do not clear, given that he is not a surgical candidate.   - Illness trajectory education provided to family

## 2025-04-07 NOTE — NURSING
[facility], [facility]  Nurses Note -- 4 Eyes      4/7/2025       Skin assessed on: Q Shift      [x] No Pressure Injuries Present    [x]Prevention Measures Documented    [] Yes LDA  for Pressure Injury Previously documented     [] Yes New Pressure Injury Discovered   [] LDA for New Pressure Injury Added      Attending RN:  Libby Cruz, RN     Second RN:  Laura

## 2025-04-07 NOTE — ASSESSMENT & PLAN NOTE
Patient with known CAD s/p stent placement, which is controlled Will continue ASA, Plavix, and Statin and monitor for S/Sx of angina/ACS. Continue to monitor on telemetry.   - last [facility] was 1/2025: Severely calcified mid RCA stenosis unable to cross with PTCA balloons, treated initially with 0.9 laser atherectomy, followed by CSI atherectomy system with total of 5 runs (3 at low speed, 2 at high speed), pre-dilated using 2.0 compliant and 3.5 noncompliant balloon followed by 3.5 X 16 mm megatron stent.  Focal plaque rupture/erosion noted in the proximal and ostial RCA that were treated with  3.5 X 28 in the proximal RCA, 4.0 X 16 mm in the ostial RCA.  No residual stenosis post intervention.  Patient had ALAN 3 flow at the end of the procedure  - with elevated troponin likely due to septic shock  Recent Labs   Lab 04/04/25  1028   TROPONINI 2.913*   - continue asa, plavix, statin  - Cardiology consulted  - no plans for ischemic evaluation at this time

## 2025-04-07 NOTE — ANESTHESIA PROCEDURE NOTES
Intubation    Date/Time: 4/7/2025 3:17 PM    Performed by: Sunil Taylor CRNA  Authorized by: Roverto Gaines II, MD    Intubation:     Induction:  Rapid sequence induction    Intubated:  Postinduction    Mask Ventilation:  Not attempted    Attempts:  1    Attempted By:  CRNA    Method of Intubation:  Video laryngoscopy    Blade:  Tapia 3    Laryngeal View Grade: Grade I - full view of cords      Difficult Airway Encountered?: No      Complications:  None    Airway Device Size:  7.5    Style/Cuff Inflation:  Cuffed (inflated to minimal occlusive pressure)    Inflation Amount (mL):  6    Tube secured:  22    Secured at:  The lips    Placement Verified By:  Capnometry    Complicating Factors:  None    Findings Post-Intubation:  BS equal bilateral and atraumatic/condition of teeth unchanged

## 2025-04-07 NOTE — ASSESSMENT & PLAN NOTE
Advance Care Planning     Date: 04/04/2025  - palliative consulted   - Mr Escoto specifically told Dr Jordan to contact Kenia Smyth for all updates  - discussed code status with Kenia. She states she has spoken with Mr Escoto's sisters and they all want full code status.

## 2025-04-07 NOTE — TRANSFER OF CARE
"Anesthesia Transfer of Care Note    Patient: Jevon Rajan    Procedure(s) Performed: Procedure(s) (LRB):  CYSTOSCOPY WITH CLOT EVACUATION, FULGURATION, GARCES PLACEMENT (N/A)    Patient location: ICU    Anesthesia Type: general    Transport from OR: Transported from OR on 6-10 L/min O2 by face mask with adequate spontaneous ventilation    Post pain: adequate analgesia    Post assessment: no apparent anesthetic complications and tolerated procedure well    Post vital signs: stable    Level of consciousness: confused and responds to stimulation    Nausea/Vomiting: no nausea/vomiting    Complications: none    Transfer of care protocol was followed      Last vitals: Visit Vitals  /76 (BP Location: Right forearm, Patient Position: Lying)   Pulse 79   Temp 36.5 °C (97.7 °F) (Oral)   Resp 12   Ht 5' 7" (1.702 m)   Wt 61.2 kg (134 lb 14.7 oz)   SpO2 (!) 94%   BMI 21.13 kg/m²     "

## 2025-04-07 NOTE — PROGRESS NOTES
[facility]  Cardiology  Progress Note    Patient Name: Jevon Rajan  MRN: [id number]  Admission Date: 4/3/2025  Hospital Length of Stay: 4 days  Code Status: Full Code   Attending Physician: Eileen Jordan MD   Primary Care Physician: Melia, Primary Doctor  Expected Discharge Date: 4/18/2025  Principal Problem:Septic shock    Subjective:     Interval Hx: pt seen in ICU, case Dr. Jordan. No cp/sob.  Hematuria/clots noted, urology on board.    Tele: SR 80s, PACs (personally reviewed and interpreted)      Past Medical History:   Diagnosis Date    BPH (benign prostatic hyperplasia)     Coronary artery disease     [facility] and [facility] now [facility] his care to this clinic. In 2014 he had stent to LAD. He states he has had 2 heart attacks. He does not get angina. There was 90% left anterior descending artery stenosis undergoing stenting. There was also a circumflex marginal branch stenosis of 70%.    Diabetes mellitus, type 2     ESRD (end stage renal disease) on dialysis     ESRD on HD TTS via left brachial AVF    Hypertension     Hypothyroidism, unspecified     NSTEMI (non-ST elevated myocardial infarction) 4/4/2025    Sepsis 4/2/2025    Symptomatic anemia 01/15/2024       Past Surgical History:   Procedure Laterality Date    ANGIOGRAM, CORONARY, WITH LEFT HEART CATHETERIZATION  1/13/2025    Procedure: Angiogram, Coronary, with Left Heart Cath;  Surgeon: Steve Bhat MD;  Location: [facility] CATH LAB/EP;  Service: Cardiology;;    ATHERECTOMY, CORONARY N/A 1/14/2025    Procedure: Atherectomy-coronary;  Surgeon: Giacomo Pittman MD;  Location: [facility] CATH LAB/EP;  Service: Cardiology;  Laterality: N/A;    bilateral foot surgery      CORONARY ANGIOPLASTY WITH STENT PLACEMENT N/A 1/14/2025    Procedure: ANGIOPLASTY, CORONARY ARTERY, WITH STENT INSERTION;  Surgeon: Gicaomo Pittman MD;  Location: [facility] CATH LAB/EP;  Service: Cardiology;  Laterality: N/A;    CORONARY STENT PLACEMENT N/A  1/13/2025    Procedure: INSERTION, STENT, CORONARY ARTERY;  Surgeon: Steve Bhat MD;  Location: [facility] CATH LAB/EP;  Service: Cardiology;  Laterality: N/A;    ESOPHAGOGASTRODUODENOSCOPY N/A 2/27/2024    Procedure: EGD (ESOPHAGOGASTRODUODENOSCOPY);  Surgeon: Gemma Almendarez MD;  [facility];  Service: Endoscopy;  Laterality: N/A;    EXPLORATION, ARTERY, UPPER EXTREMITY Left 4/3/2025    Procedure: EXPLORATION, ARTERY, UPPER EXTREMITY;  Surgeon: Sunil Contreras MD;  Location: [facility] OR;  Service: Vascular;  Laterality: Left;    eyelid surgery      FISTULOGRAM Left 11/27/2019    Procedure: Fistulogram;  Surgeon: MELANY Brenner III, MD;  Location: [address];  Service: Peripheral Vascular;  Laterality: Left;    FISTULOGRAM Left 11/27/2019    Procedure: Fistulogram, AVG declot, possible permacath;  Surgeon: MELANY Brenner III, MD;  Location: [facility] CATH LAB;  Service: Peripheral Vascular;  Laterality: Left;    INSERTION OF DIALYSIS CATHETER      IVUS, CORONARY  1/13/2025    Procedure: IVUS, Coronary;  Surgeon: Steve Bhat MD;  Location: [facility] CATH LAB/EP;  Service: Cardiology;;    LEFT HEART CATHETERIZATION Left 1/14/2025    Procedure: Left heart cath;  Surgeon: Giacomo Pittman MD;  Location: [facility] CATH LAB/EP;  Service: Cardiology;  Laterality: Left;    MOH's  12/5/13    PERCUTANEOUS CORONARY INTERVENTION, ARTERY N/A 1/14/2025    Procedure: Percutaneous coronary intervention;  Surgeon: Giacomo Pittman MD;  Location: [facility] CATH LAB/EP;  Service: Cardiology;  Laterality: N/A;    PERCUTANEOUS TRANSLUMINAL BALLOON ANGIOPLASTY OF CORONARY ARTERY  1/13/2025    Procedure: Angioplasty-coronary;  Surgeon: Steve Bhat MD;  Location: [facility] CATH LAB/EP;  Service: Cardiology;;    REMOVAL, GRAFT, ARTERIOVENOUS, UPPER EXTREMITY Left 4/3/2025    Procedure: REMOVAL, GRAFT, ARTERIOVENOUS, UPPER EXTREMITY;  Surgeon: Sunil Contreras MD;  Location: [facility] OR;  Service: Vascular;  Laterality:  Left;    REVASCULARIZATION, ENDOVASCULAR, LE W/ INTRAVASCULAR LITHOTRIPSY  1/13/2025    Procedure: REVASCULARIZATION, ENDOVASCULAR, LE W/ INTRAVASCULAR LITHOTRIPSY;  Surgeon: Steve Bhat MD;  Location: [facility] CATH LAB/EP;  Service: Cardiology;;    right hand surgery      stents         Review of patient's allergies indicates:   Allergen Reactions    Ace inhibitors Hives, Itching, Shortness Of Breath, Other (See Comments) and Rash     Is not sure which medication it was    Captopril        No current facility-administered medications on file prior to encounter.     Current Outpatient Medications on File Prior to Encounter   Medication Sig    ammonium lactate 12 % Crea Apply topically 2 (two) times a day.    artificial tears,hypromellose,,GENTEAL/SUSTANE, 0.3 % Gel Apply to eye nightly.    aspirin (ECOTRIN) 81 MG EC tablet Take 1 tablet (81 mg total) by mouth once daily.    atorvastatin (LIPITOR) 80 MG tablet Take 1 tablet (80 mg total) by mouth every evening.    camphor-menthol 0.5-0.5% (SARNA) lotion Apply topically once daily. APPLY SMALL AMOUNT TOPICALLY TO AFFECTED AREA(S) AS NEEDED FOR ITCHING KEEP IN FRIDGE    carboxymethylcellulose sodium 0.5 % Drop Apply 1 drop to eye nightly as needed (dry eyes).    carvediloL (COREG) 12.5 MG tablet Take 0.5 tablets (6.25 mg total) by mouth 2 (two) times daily.    cetirizine (ZYRTEC) 5 MG tablet Take 1 tablet (5 mg total) by mouth every 48 hours.    clobetasol 0.05% (TEMOVATE) 0.05 % Oint Apply topically 2 (two) times daily.    clopidogreL (PLAVIX) 75 mg tablet Take 1 tablet (75 mg total) by mouth once daily.    erythromycin (ROMYCIN) ophthalmic ointment Place a 1/2 inch ribbon of ointment into the lower eyelid. Four timex daily for 5 days    gabapentin (NEURONTIN) 100 MG capsule Take 1 capsule (100 mg total) by mouth every evening.    hydrophilic ointment (AQUABASE) Apply topically as needed for Dry Skin. APPLY MODERATE AMOUNT TOPICALLY TO AFFECTED AREA(S) TWICE A  DAY AS NEEDED FOR DRY SKIN APPLY TO AREAS OF DRY SKIN OR OVER ENTIRE BODY.    lanolin alcohol-mineral oil-white petrolatum-ceres (EUCERIN) Crea cream Apply topically 2 (two) times daily as needed.    LIDOcaine (LIDODERM) 5 % Place 1 patch onto the skin once daily. Remove & Discard patch within 12 hours or as directed by MD    loratadine (CLARITIN) 10 mg tablet Take 10 mg by mouth daily as needed for Allergies (Every other day).    nut.tx.imp.renal fxn,lac-reduc (NEPRO CARB STEADY) 0.08 gram-1.8 kcal/mL Liqd Take 240 mLs by mouth 2 (two) times a day.    ondansetron (ZOFRAN-ODT) 4 MG TbDL Take 1 tablet (4 mg total) by mouth every 8 (eight) hours as needed (nausea).    oxyCODONE-acetaminophen (PERCOCET) 5-325 mg per tablet Take 1 tablet by mouth every 6 (six) hours as needed.    pantoprazole (PROTONIX) 20 MG tablet Take 20 mg by mouth 2 (two) times daily as needed.    pramoxine 1 % Lotn Apply topically 2 (two) times daily as needed (itching).    triamcinolone acetonide 0.1% (KENALOG) 0.1 % cream Apply topically 2 (two) times daily as needed (itching).     Family History    None       Tobacco Use    Smoking status: Never    Smokeless tobacco: Never   Substance and Sexual Activity    Alcohol use: No    Drug use: No    Sexual activity: Not Currently     Review of Systems   Gastrointestinal:  Negative for melena.   Genitourinary:  Positive for hematuria.     Objective:     Vital Signs (Most Recent):  Temp: 97.8 °F (36.6 °C) (04/07/25 0705)  Pulse: 86 (04/07/25 0801)  Resp: 18 (04/07/25 0823)  BP: 135/65 (04/07/25 0705)  SpO2: (!) 90 % (04/07/25 0801) Vital Signs (24h Range):  Temp:  [97.4 °F (36.3 °C)-98.2 °F (36.8 °C)] 97.8 °F (36.6 °C)  Pulse:  [] 86  Resp:  [13-40] 18  SpO2:  [90 %-99 %] 90 %  BP: (101-188)/() 135/65     Weight: 61.2 kg (134 lb 14.7 oz)  Body mass index is 21.13 kg/m².    SpO2: (!) 90 %         Intake/Output Summary (Last 24 hours) at 4/7/2025 0865  Last data filed at 4/7/2025 0500  Gross  per 24 hour   Intake 550 ml   Output 325 ml   Net 225 ml       Lines/Drains/Airways       Central Venous Catheter Line  Duration             [facility] (Dialysis) Catheter 04/04/25 1208 right internal jugular 2 days              Drain  Duration                  NG/OG Tube 04/04/25 1415 Rolf 10 Fr. Left nostril 2 days              Peripheral Intravenous Line  Duration                  Peripheral IV - Single Lumen 04/02/25 1801 20 G Anterior;Distal;Right Upper Arm 4 days                   Exam unchanged vs 4/6/25  Physical Exam  Constitutional:       General: He is not in acute distress.     Appearance: Normal appearance. He is well-developed. He is ill-appearing. He is not toxic-appearing or diaphoretic.   HENT:      Head: Normocephalic and atraumatic.   Eyes:      General: No scleral icterus.     Extraocular Movements: Extraocular movements intact.      Conjunctiva/sclera: Conjunctivae normal.      Pupils: Pupils are equal, round, and reactive to light.   Neck:      Thyroid: No thyromegaly.      Vascular: No JVD.      Trachea: No tracheal deviation.   Cardiovascular:      Rate and Rhythm: Normal rate and regular rhythm.      Heart sounds: S1 normal and S2 normal. Heart sounds are distant. No murmur heard.     No friction rub. No gallop.   Pulmonary:      Effort: Pulmonary effort is normal. No respiratory distress.      Breath sounds: Normal breath sounds. No stridor. No wheezing, rhonchi or rales.   Chest:      Chest wall: No tenderness.   Abdominal:      General: There is no distension.      Palpations: Abdomen is soft.   Musculoskeletal:         General: No swelling or tenderness. Normal range of motion.      Cervical back: Normal range of motion and neck supple. No rigidity.      Right lower leg: No edema.      Left lower leg: No edema.   Skin:     General: Skin is warm and dry.      Coloration: Skin is not jaundiced.   Neurological:      General: No focal deficit present.      Mental Status: He is alert.       Cranial Nerves: No cranial nerve deficit.   Psychiatric:         Mood and Affect: Mood normal.         Behavior: Behavior normal.          Current Medications:   aspirin  81 mg Oral Daily    atorvastatin  80 mg Oral QHS    clopidogreL  75 mg Oral Daily    heparin (porcine)  5,000 Units Subcutaneous Q8H    insulin glargine U-100  5 Units Subcutaneous Daily    mupirocin   Nasal BID    pantoprazole  40 mg Oral Daily    sodium chloride 0.9%  10 mL Intravenous Q8H    tamsulosin  0.4 mg Oral Daily    vancomycin (VANCOCIN) IV (PEDS and ADULTS)  500 mg Intravenous Once         Current Facility-Administered Medications:     acetaminophen, 650 mg, Oral, Q4H PRN    dextrose 50%, 12.5 g, Intravenous, PRN    dextrose 50%, 25 g, Intravenous, PRN    diphenhydrAMINE, 50 mg, Oral, Q6H PRN    glucagon (human recombinant), 1 mg, Intramuscular, PRN    glucose, 16 g, Oral, PRN    glucose, 24 g, Oral, PRN    heparin (porcine), 1,000 Units, Intravenous, PRN    HYDROmorphone, 0.5 mg, Intravenous, Q4H PRN    insulin aspart U-100, 0-5 Units, Subcutaneous, QID (AC + HS) PRN    melatonin, 6 mg, Oral, Nightly PRN    naloxone, 0.02 mg, Intravenous, PRN    ondansetron, 4 mg, Intravenous, Q6H PRN    prochlorperazine, 5 mg, Intravenous, Q6H PRN    Pharmacy to dose Vancomycin consult, , , Once **AND** vancomycin - pharmacy to dose, , Intravenous, pharmacy to manage frequency    Laboratory (all labs reviewed):  CBC:  Recent Labs   Lab 04/03/25  2055 04/04/25  0247 04/05/25  0324 04/06/25  0317 04/07/25  0419   WBC 16.23 H 16.11 H 17.18 H 15.49 H 18.35 H   HGB 10.2 L 10.0 L 8.6 L 8.2 L 8.3 L   HCT 30.7 L 30.0 L 26.7 L 25.8 L 25.8 L   Platelet Count 130 L 121 L 109 L 118 L 141 L       CHEMISTRIES:  Recent Labs   Lab 01/16/25  0154 02/08/25  1014 02/11/25  1325 02/23/25  1638 03/21/25  1808 03/25/25  1108 04/03/25  2055 04/04/25  0247 04/04/25  1439 04/04/25  2001 04/04/25  2151 04/05/25  0324 04/05/25  1159 04/05/25  1426 04/06/25  0317 04/07/25  0419    Glucose 141 H 171 H 225 H 113 H 150 H  --   --   --   --   --   --   --   --   --   --   --    Sodium 140 138 138 141 141   < > 138 139   < >  --  140 140  --  139 139 141   Potassium 4.0 4.5 4.4 5.1 4.4   < > 4.8 5.0   < >  --  4.6 4.2  --  4.0 3.7 4.2   BUN 44 H 63 H 75 H 77 H 91 H   < > 70 H 75 H   < >  --  68 H 58 H  --  49 H 59 H 77 H   Creatinine 10.0 H 8.6 H 8.9 H 9.8 H 10.9 H   < > 9.3 H 9.9 H   < >  --  8.5 H 7.3 H  --  6.2 H 7.6 H 9.7 H   eGFR 5 A 5.5 A 5.3 A 4.7 A 4.1 A   < > 5 L 5 L   < >  --  6 L 7 L  --  8 L 6 L 5 L   Calcium 8.9 10.2 10.1 10.9 H 10.4   < > 8.1 L 8.2 L   < >  --  8.4 L 8.4 L  --  8.2 L 8.5 L 8.6 L   Magnesium   --   --   --   --   --    < > 2.2 2.3  --  2.2  --  2.1 2.0  --   --   --    Magnesium  --   --   --   --  2.5  --   --   --   --   --   --   --   --   --   --   --     < > = values in this interval not displayed.       CARDIAC BIOMARKERS:  Recent Labs   Lab 04/01/24  0251 04/29/24 2211 08/03/24  0015 08/05/24  0138 10/20/24  2310 10/21/24  0113 04/01/25  2220 04/02/25  0846 04/02/25  2055 04/03/25  0303 04/03/25  0718 04/03/25  1533 04/04/25  1028     --  183  --  111  --  382 H  --   --   --   --   --   --    Troponin I  --    < > 0.080 H   < > 0.044 H   < >  --   --   --   --   --   --   --    Troponin-I  --   --   --   --   --   --   --    < > 1.076 H 1.441 H 1.812 H 2.032 H 2.913 H    < > = values in this interval not displayed.       COAGS:  Recent Labs   Lab 11/28/23  2155 04/18/24  0609 01/12/25  1316 03/25/25  1108 04/03/25  1533   INR 1.1 1.0 1.0 1.0 1.1       LIPIDS/LFTS:  Recent Labs   Lab 08/11/24  0331 08/15/24  0203 04/03/25  1533 04/03/25  2055 04/04/25  0247 04/06/25  0317 04/07/25  0419   Cholesterol 110 L  --   --   --   --   --   --    Triglycerides 126  --   --   --   --   --   --    HDL 26 L  --   --   --   --   --   --    LDL Cholesterol 58.8 L  --   --   --   --   --   --    Non-HDL Cholesterol 84  --   --   --   --   --   --    AST  --    < >  25 24 23 25 20   ALT  --    < > 15 18 17 21 20    < > = values in this interval not displayed.       BNP:  Recent Labs   Lab 06/11/24  2239 08/03/24  0015 08/11/24  0332 01/12/25  0550 04/03/25  0303   BNP 1,110 H 624 H 2,178 H 2,043 H 2,988 H       TSH:  Recent Labs   Lab 11/27/23  0519 01/15/24  2135 07/09/24  1428 08/11/24  0332 04/02/25  0537   TSH 3.357 4.600 H 6.963 H 3.949 4.590 H       Free T4:  Recent Labs   Lab 01/15/24  2135 07/09/24  1428 04/02/25  0537   Free T4 0.81 1.01 1.19  1.19       Diagnostic Results:  ECG (personally reviewed and interpreted tracing(s)):  4/4/25 1325 SR 79, PRWP, lat ST abnl ?isch, similar to 3/25/25, lass prom than 4/2/25    Chest X-Ray (personally reviewed and interpreted image(s)): 4/4/25 NAD, aortic atherosclerosis    Echo: 4/4/25 (images prev personally reviewed and interpreted)    Left Ventricle: The left ventricle is normal in size. Normal wall thickness. Mild global hypokinesis present. There is mildly reduced systolic function with a visually estimated ejection fraction of 40 - 45%. Grade I diastolic dysfunction.    Right Ventricle: The right ventricle has moderate enlargement. Systolic function is mildly reduced.    Left Atrium: Mildly dilated Cannot exclude PFO (color Doppler with possible flow across IAS).    Aortic Valve: The aortic valve is a trileaflet valve. There is moderate aortic valve sclerosis. Mildly restricted motion. There is mild stenosis. Aortic valve area by VTI is 1.8 cm². Aortic valve peak velocity is 1.5 m/s. Mean gradient is 5 mmHg. The dimensionless index is 0.51. There is mild aortic regurgitation.    Mitral Valve: There is a 1.9x1.2cm large fixed heterogeneous mass present on the posterior leaflet (new finding vs 9/2023 echo). There is moderate to severe regurgitation with an eccentric jet.  Cannot exclude severe MR with possible PMVL perforation in association with large vegetation.    Aorta: Aortic root is mildly to moderately dilated  measuring 4.17 cm.    Pulmonary Artery: The estimated pulmonary artery systolic pressure is 42 mmHg.    PCI RCA 1/14/25:    The Ost RCA lesion was 70% stenosed with 0% stenosis post-intervention.    The Prox RCA lesion was 70% stenosed with 0% stenosis post-intervention.    The Mid RCA lesion was 99% stenosed with 0% stenosis post-intervention.    The ejection fraction was calculated to be 55%.    The pre-procedure left ventricular end diastolic pressure was 6.    The post-procedure left ventricular end diastolic pressure was 7.    Mid RCA lesion: A stent was successfully placed at 11 MAYRA for 15 sec.    Prox RCA lesion, Mid RCA lesion: A  at 12 MAYRA for 10 sec.    Ost RCA lesion, Prox RCA lesion: A stent was successfully placed.  Procedure performed:  Left heart catheterization  Coronary angiogram of the right coronary artery  Right radial artery access   Right superficial femoral artery access  CSI atherectomy of the right mid coronary artery  Laser atherectomy of the right mid coronary artery  PTCA of the right mid coronary artery  XU x3 megatron drug-eluting stent 3.5 X 16 in the mid RCA, 3.5 X 28 in the proximal RCA, 4.0 X 16 mm in the ostial RCA  Findings:  Severely calcified mid RCA stenosis unable to cross with PTCA balloons, treated initially with 0.9 laser atherectomy, followed by CSI atherectomy system with total of 5 runs (3 at low speed, 2 at high speed), pre-dilated using 2.0 compliant and 3.5 noncompliant balloon followed by 3.5 X 16 mm megatron stent.  Focal plaque rupture/erosion noted in the proximal and ostial RCA that were treated with  3.5 X 28 in the proximal RCA, 4.0 X 16 mm in the ostial RCA.  No residual stenosis post intervention.  Patient had ALAN 3 flow at the end of the procedure  LVEDP 6 mm Hg.  EF of around 55%.  No gradient across the aortic valve.  Right SFA access.  High bifurcation.  Mild diffuse disease of the right SFA closed using Perclose closure device  Right radial artery with  severe spasm and hence decision was made to switch to right groin access  Recommendations:  Continue dual antiplatelet therapy for at least 1 year.  Consider longer term anticoagulation based on risk factor profile after 1 year  High-intensity statin  Transfer back to telemetry unit    Cath/PCI LAD 1/13/25  Left Main Coronary Artery: The left main is a large caliber vessel arising normally from the left sinus of valsalva.  It bifurcates into the left anterior descending and left circumflex coronary artery.  It is free of evidence of obstructive coronary artery disease. ALAN III flow  Left Anterior Descending: The left anterior descending coronary artery is a large caliber vessel arising normally from the left main and extending to the apex.  It gives rise to small to moderate caliber diagonal arteries. Proximal LAD has a severe 80-90% calcified stenosis prior to an under expanded 2.5 mm stent in a 4.0 mm vessel. After the stent there is 40-50% stenosis. ALAN II flow  Left Circumflex: The left circumflex is a large caliber vessel arising normally from the left main coronary artery, it is non-dominant.  It gives rise to moderate caliber obtuse marginal branches. OM stent is patent. ALAN III flow  Right Coronary Artery: The right coronary artery is a large caliber vessel arising normally from the right sinus of valsalva.  It is dominant giving rise to a PDA and PLV branch. Mid RCA with a 95-99% calcified fibrotic stenosis. ALAN III flow  LVgram: LVEDP 33 mmHg  PCI procedure:  Heparin administered. ACT was closely monitored. Using a EBU 3.5 guider, this was used to cannulate the left system. Nuno blue PCI wire repshaped outside the body. PCI wire was advanced into the distal LAD. Predilated with 2.0 mm NC, 2.5 mm NC, scoring balloon 2.5 mm, 3.0 mm and 3.5 mm NC. IVUS was advanced for vessel prep/size. Schockwave 3.0 x 12 mm advanced and multiple therapies given. Followed by a 3.5 mm NC. Advanced a 3.5 x 24 mm megatron  XU and deployed at nominal pressure. Post dilated with a 4.0 mm NC with great results. After the balloon was removed.  The wire was left in place.  Repeat angiography showed no signs of dissection.  Subsequently the wire was pulled back.   Final angiography showed ALAN-3 flow.  No signs of dissection, perforation, or thrombus.  Assessment:   Successful PCI of LAD with 3.5 x 24 mm XU  mRCA 95-99% stenosis--> staged PCI of RCA  Patent Lcx stent  Plan:   - Stage PCI of RCA tomorrow PM- NPO at [facility]   - Patient tolerated procedure well. No immediate complications  - Continue DAPT  - EKG when arrives to floor  - Routine post cath protocol  - Dialysis in pm   - Maximize medical management  - Further care by the primary team    Cath/PCI OM2 12/4/23  1.  Selective left coronary Angiography.   2.  IVUS of Proximal OM2 coronary artery   3.  PCI of Proximal OM2 coronary artery with one 3.5 x 18 Fargo stent   4.  Ultrasound guided right radial arterial access   5. Conscious sedation from 7:11 AM to 8:03 AM (52 minutes of sedation)   Findings:   - Coronary angiography data   Left main: Large caliber vessel, divides into the left anterior descending   and left circumflex. LM is non obstructive.   Left anterior descending: Large caliber vessel giving rise to 2 diagonals.   Patent mid LAD stents.   Left circumflex: Medium caliber vessel giving rise to 2 obtuse marginals.   OM2 has a proximal 99% ISR.   Right coronary artery: Not studied but known mid RCA lesion (see report   from last week)   - IVUS data of OM2    Pre PCI IVUS data:   Proximal reference luminal diameter: 3.6 mm   Distal reference luminal diameter: 3.4 mm   Percutaneous Coronary Intervention   Successful PCI of Proximal OM2 coronary artery 99 % ISR stenosis was   reduced to 0 % (initial ALAN 3 flow) by pre dilatation with 3 x 15 mm   balloon followed by placement of 3.5 x 18 mm Michael stent with excellent   angiographic results with final ALAN 3 flow. IVUS guided.    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   -  Succesful PCI of Proximal OM2 coronary artery with one Michael stent  Recommendations:   - Post operative care as per protocol.   - Aspirin for life and P2Y12 inhibitor for at least a year   - Moderate to high statin therapy.   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  Cont abx.  Prognosis poor.    MRSA bacteremia  As above    Acute bacterial endocarditis  As above    ESRD on hemodialysis  Now on CRRT  Mgmt per IM/neph    Coronary artery disease involving native coronary artery of native heart without angina pectoris  Elev trop c/w NSTEMI, likely type II, doubt ACS.  Known MV CAD/PCI (most recently in Jan 2025)  Cont ASA/Plavix/Statin  No plan for inpat isch eval    HTN (hypertension)  Med rx on hold    HLD (hyperlipidemia)  Cont statin      Type 2 diabetes mellitus, without long-term current use of insulin  Mgmt per IM        VTE Risk Mitigation (From admission, onward)           Ordered     heparin (porcine) injection 5,000 Units  Every 8 hours         04/06/25 1959     heparin (porcine) injection 1,000 Units  As needed (PRN)         04/05/25 2100     IP VTE HIGH RISK PATIENT  Once         04/03/25 1516     Place TEMO hose  Until discontinued         04/03/25 1516     Place sequential compression device  Until discontinued         04/03/25 1516     Reason for No Pharmacological VTE Prophylaxis  Once        Question:  Reasons:  Answer:  Physician Provided (leave comment)    04/03/25 1516                  Pt seen in ICU, critical care time 35min.    Apolinar Tyson MD  Cardiology  [facility] - [place], [place] - Intensive Care

## 2025-04-07 NOTE — BRIEF OP NOTE
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         Specimens:   Specimen (24h ago, onward)      None          * No specimens in log *    [id number]

## 2025-04-07 NOTE — ANESTHESIA PREPROCEDURE EVALUATION
04/07/2025  Jevon Rajan is a 85 y.o., male.    Past Medical History:   Diagnosis Date    BPH (benign prostatic hyperplasia)     Coronary artery disease     [facility] and is now transferring his care to this clinic. In 2014 he had stent to LAD. He states he has had 2 heart attacks. He does not get angina. There was 90% left anterior descending artery stenosis undergoing stenting. There was also a circumflex marginal branch stenosis of 70%.    Diabetes mellitus, type 2     ESRD (end stage renal disease) on dialysis     ESRD on HD TTS via left brachial AVF    Hypertension     Hypothyroidism, unspecified     NSTEMI (non-ST elevated myocardial infarction) 4/4/2025    Sepsis 4/2/2025    Symptomatic anemia 01/15/2024       Pre-op Assessment          Review of Systems  Cardiovascular:     Hypertension  Past MI CAD               NSTEMI in setting of sepsis     Coronary Artery Disease:          Hx of Myocardial Infarction                  Hypertension         Pulmonary:   COPD         Chronic Obstructive Pulmonary Disease (COPD):                      Renal/:  Chronic Renal Disease, ESRD   Last HD - friday     Kidney Function/Disease             Musculoskeletal:  Musculoskeletal Normal                Neurological:  Neurology Normal                                      Endocrine:  Diabetes, type 2 Hypothyroidism   Diabetes                   Hypothyroidism          Dermatological:  Skin Normal    Psych:  Psychiatric Normal                  Lab Results   Component Value Date    WBC 18.35 (H) 04/07/2025    HGB 8.3 (L) 04/07/2025    HCT 25.8 (L) 04/07/2025    MCV 92 04/07/2025     (L) 04/07/2025           Physical Exam  General: Well nourished    Airway:  Mallampati: II   Mouth Opening: Normal  Tongue: Normal  Neck ROM: Normal ROM    Dental:  Intact    Chest/Lungs:  Clear to  auscultation    Heart:  Rate: Normal  Rhythm: Regular Rhythm  Sounds: Normal        Anesthesia Plan  Type of Anesthesia, risks & benefits discussed:    Anesthesia Type: Gen ETT  Intra-op Monitoring Plan: Standard ASA Monitors  Post Op Pain Control Plan: multimodal analgesia  Induction:  IV and rapid sequence  Informed Consent: Informed consent signed with the Patient and all parties understand the risks and agree with anesthesia plan.  All questions answered. Patient consented to blood products? Yes  ASA Score: 4 Emergent  Anesthesia Plan Notes: - tube feeds just turned off  -crossmatch for 2 units of blood    Ready For Surgery From Anesthesia Perspective.     .

## 2025-04-07 NOTE — NURSING
Dr. Castaneda contacted for pain medication to give pt prior to removing catheter. Dr. Castaneda at bedside. One time dose of Dilaudid ordered & given. Catheter removed with blood clots noted, pt tolerated procedure well. Bleeding noted from urethra post catheter removal, brief applied. CHG bath given, linen & gown changed, incontinence care performed. PRN pain medication adjusted per Dr. Castaneda. Pt now resting comfortably.

## 2025-04-07 NOTE — SUBJECTIVE & OBJECTIVE
Past Medical History:   Diagnosis Date    BPH (benign prostatic hyperplasia)     Coronary artery disease     He has followed at the [facility] and is now transferring his care to this clinic. In 2014 he had stent to LAD. He states he has had 2 heart attacks. He does not get angina. There was 90% left anterior descending artery stenosis undergoing stenting. There was also a circumflex marginal branch stenosis of 70%.    Diabetes mellitus, type 2     ESRD (end stage renal disease) on dialysis     ESRD on HD TTS via left brachial AVF    Hypertension     Hypothyroidism, unspecified     NSTEMI (non-ST elevated myocardial infarction) 4/4/2025    Sepsis 4/2/2025    Symptomatic anemia 01/15/2024       Past Surgical History:   Procedure Laterality Date    ANGIOGRAM, CORONARY, WITH LEFT HEART CATHETERIZATION  1/13/2025    Procedure: Angiogram, Coronary, with Left Heart Cath;  Surgeon: Steve Bhat MD;  Location: [facility] CATH LAB/EP;  Service: Cardiology;;    ATHERECTOMY, CORONARY N/A 1/14/2025    Procedure: Atherectomy-coronary;  Surgeon: Giacomo Pittman MD;  Location: [facility] CATH LAB/EP;  Service: Cardiology;  Laterality: N/A;    bilateral foot surgery      CORONARY ANGIOPLASTY WITH STENT PLACEMENT N/A 1/14/2025    Procedure: ANGIOPLASTY, CORONARY ARTERY, WITH STENT INSERTION;  Surgeon: Giacomo Pittman MD;  Location: [facility] CATH LAB/EP;  Service: Cardiology;  Laterality: N/A;    CORONARY STENT PLACEMENT N/A 1/13/2025    Procedure: INSERTION, STENT, CORONARY ARTERY;  Surgeon: Steve Bhat MD;  Location: [facility] CATH LAB/EP;  Service: Cardiology;  Laterality: N/A;    ESOPHAGOGASTRODUODENOSCOPY N/A 2/27/2024    Procedure: EGD (ESOPHAGOGASTRODUODENOSCOPY);  Surgeon: Gemma Almendarez MD;  Location: [facility] ENDO;  Service: Endoscopy;  Laterality: N/A;    EXPLORATION, ARTERY, UPPER EXTREMITY Left 4/3/2025    Procedure: EXPLORATION, ARTERY, UPPER EXTREMITY;  Surgeon: Sunil Contreras MD;  Location: [facility] OR;  Service:  Vascular;  Laterality: Left;    eyelid surgery      FISTULOGRAM Left 11/27/2019    Procedure: Fistulogram;  Surgeon: MELANY Brenner III, MD;  [facility];  Service: Peripheral Vascular;  Laterality: Left;    FISTULOGRAM Left 11/27/2019    Procedure: Fistulogram, AVG declot, possible permacath;  Surgeon: MELANY Brenner III, MD;  Location: [facility] CATH LAB;  Service: Peripheral Vascular;  Laterality: Left;    INSERTION OF DIALYSIS CATHETER      IVUS, CORONARY  1/13/2025    Procedure: IVUS, Coronary;  Surgeon: Steve Bhat MD;  Location: [facility] CATH LAB/EP;  Service: Cardiology;;    LEFT HEART CATHETERIZATION Left 1/14/2025    Procedure: Left heart cath;  Surgeon: Giacomo Pittman MD;  Location: [facility] CATH LAB/EP;  Service: Cardiology;  Laterality: Left;    MOH's  12/5/13    PERCUTANEOUS CORONARY INTERVENTION, ARTERY N/A 1/14/2025    Procedure: Percutaneous coronary intervention;  Surgeon: Giacomo Pittman MD;  Location: [facility] CATH LAB/EP;  Service: Cardiology;  Laterality: N/A;    PERCUTANEOUS TRANSLUMINAL BALLOON ANGIOPLASTY OF CORONARY ARTERY  1/13/2025    Procedure: Angioplasty-coronary;  Surgeon: Steve Bhat MD;  Location: [facility] CATH LAB/EP;  Service: Cardiology;;    REMOVAL, GRAFT, ARTERIOVENOUS, UPPER EXTREMITY Left 4/3/2025    Procedure: REMOVAL, GRAFT, ARTERIOVENOUS, UPPER EXTREMITY;  Surgeon: Sunil Contreras MD;  Location: [facility];  Service: Vascular;  Laterality: Left;    REVASCULARIZATION, ENDOVASCULAR, LE W/ INTRAVASCULAR LITHOTRIPSY  1/13/2025    Procedure: REVASCULARIZATION, ENDOVASCULAR, LE W/ INTRAVASCULAR LITHOTRIPSY;  Surgeon: Steve Bhat MD;  Location: [facility] CATH LAB/EP;  Service: Cardiology;;    right hand surgery      stents         Review of patient's allergies indicates:   Allergen Reactions    Ace inhibitors Hives, Itching, Shortness Of Breath, Other (See Comments) and Rash     Is not sure which medication it was    Captopril        No current  facility-administered medications on file prior to encounter.     Current Outpatient Medications on File Prior to Encounter   Medication Sig    ammonium lactate 12 % Crea Apply topically 2 (two) times a day.    artificial tears,hypromellose,,GENTEAL/SUSTANE, 0.3 % Gel Apply to eye nightly.    aspirin (ECOTRIN) 81 MG EC tablet Take 1 tablet (81 mg total) by mouth once daily.    atorvastatin (LIPITOR) 80 MG tablet Take 1 tablet (80 mg total) by mouth every evening.    camphor-menthol 0.5-0.5% (SARNA) lotion Apply topically once daily. APPLY SMALL AMOUNT TOPICALLY TO AFFECTED AREA(S) AS NEEDED FOR ITCHING KEEP IN FRIDGE    carboxymethylcellulose sodium 0.5 % Drop Apply 1 drop to eye nightly as needed (dry eyes).    carvediloL (COREG) 12.5 MG tablet Take 0.5 tablets (6.25 mg total) by mouth 2 (two) times daily.    cetirizine (ZYRTEC) 5 MG tablet Take 1 tablet (5 mg total) by mouth every 48 hours.    clobetasol 0.05% (TEMOVATE) 0.05 % Oint Apply topically 2 (two) times daily.    clopidogreL (PLAVIX) 75 mg tablet Take 1 tablet (75 mg total) by mouth once daily.    erythromycin (ROMYCIN) ophthalmic ointment Place a 1/2 inch ribbon of ointment into the lower eyelid. Four timex daily for 5 days    gabapentin (NEURONTIN) 100 MG capsule Take 1 capsule (100 mg total) by mouth every evening.    hydrophilic ointment (AQUABASE) Apply topically as needed for Dry Skin. APPLY MODERATE AMOUNT TOPICALLY TO AFFECTED AREA(S) TWICE A DAY AS NEEDED FOR DRY SKIN APPLY TO AREAS OF DRY SKIN OR OVER ENTIRE BODY.    lanolin alcohol-mineral oil-white petrolatum-ceres (EUCERIN) Crea cream Apply topically 2 (two) times daily as needed.    LIDOcaine (LIDODERM) 5 % Place 1 patch onto the skin once daily. Remove & Discard patch within 12 hours or as directed by MD    loratadine (CLARITIN) 10 mg tablet Take 10 mg by mouth daily as needed for Allergies (Every other day).    nut.tx.imp.renal fxn,lac-reduc (NEPRO CARB STEADY) 0.08 gram-1.8 kcal/mL Liqd  Take 240 mLs by mouth 2 (two) times a day.    ondansetron (ZOFRAN-ODT) 4 MG TbDL Take 1 tablet (4 mg total) by mouth every 8 (eight) hours as needed (nausea).    oxyCODONE-acetaminophen (PERCOCET) 5-325 mg per tablet Take 1 tablet by mouth every 6 (six) hours as needed.    pantoprazole (PROTONIX) 20 MG tablet Take 20 mg by mouth 2 (two) times daily as needed.    pramoxine 1 % Lotn Apply topically 2 (two) times daily as needed (itching).    triamcinolone acetonide 0.1% (KENALOG) 0.1 % cream Apply topically 2 (two) times daily as needed (itching).     Family History    None       Tobacco Use    Smoking status: Never    Smokeless tobacco: Never   Substance and Sexual Activity    Alcohol use: No    Drug use: No    Sexual activity: Not Currently     Review of Systems   Gastrointestinal:  Negative for melena.   Genitourinary:  Positive for hematuria.     Objective:     Vital Signs (Most Recent):  Temp: 97.8 °F (36.6 °C) (04/07/25 0705)  Pulse: 86 (04/07/25 0801)  Resp: 18 (04/07/25 0823)  BP: 135/65 (04/07/25 0705)  SpO2: (!) 90 % (04/07/25 0801) Vital Signs (24h Range):  Temp:  [97.4 °F (36.3 °C)-98.2 °F (36.8 °C)] 97.8 °F (36.6 °C)  Pulse:  [] 86  Resp:  [13-40] 18  SpO2:  [90 %-99 %] 90 %  BP: (101-188)/() 135/65     Weight: 61.2 kg (134 lb 14.7 oz)  Body mass index is 21.13 kg/m².    SpO2: (!) 90 %         Intake/Output Summary (Last 24 hours) at 4/7/2025 0843  Last data filed at 4/7/2025 0500  Gross per 24 hour   Intake 550 ml   Output 325 ml   Net 225 ml       Lines/Drains/Airways       Central Venous Catheter Line  Duration             Trialysis (Dialysis) Catheter 04/04/25 1208 right internal jugular 2 days              Drain  Duration                  NG/OG Tube 04/04/25 1415 Rolf 10 Fr. Left nostril 2 days              Peripheral Intravenous Line  Duration                  Peripheral IV - Single Lumen 04/02/25 1801 20 G Anterior;Distal;Right Upper Arm 4 days                   Exam unchanged vs  4/6/25  Physical Exam  Constitutional:       General: He is not in acute distress.     Appearance: Normal appearance. He is well-developed. He is ill-appearing. He is not toxic-appearing or diaphoretic.   HENT:      Head: Normocephalic and atraumatic.   Eyes:      General: No scleral icterus.     Extraocular Movements: Extraocular movements intact.      Conjunctiva/sclera: Conjunctivae normal.      Pupils: Pupils are equal, round, and reactive to light.   Neck:      Thyroid: No thyromegaly.      Vascular: No JVD.      Trachea: No tracheal deviation.   Cardiovascular:      Rate and Rhythm: Normal rate and regular rhythm.      Heart sounds: S1 normal and S2 normal. Heart sounds are distant. No murmur heard.     No friction rub. No gallop.   Pulmonary:      Effort: Pulmonary effort is normal. No respiratory distress.      Breath sounds: Normal breath sounds. No stridor. No wheezing, rhonchi or rales.   Chest:      Chest wall: No tenderness.   Abdominal:      General: There is no distension.      Palpations: Abdomen is soft.   Musculoskeletal:         General: No swelling or tenderness. Normal range of motion.      Cervical back: Normal range of motion and neck supple. No rigidity.      Right lower leg: No edema.      Left lower leg: No edema.   Skin:     General: Skin is warm and dry.      Coloration: Skin is not jaundiced.   Neurological:      General: No focal deficit present.      Mental Status: He is alert.      Cranial Nerves: No cranial nerve deficit.   Psychiatric:         Mood and Affect: Mood normal.         Behavior: Behavior normal.          Current Medications:   aspirin  81 mg Oral Daily    atorvastatin  80 mg Oral QHS    clopidogreL  75 mg Oral Daily    heparin (porcine)  5,000 Units Subcutaneous Q8H    insulin glargine U-100  5 Units Subcutaneous Daily    mupirocin   Nasal BID    pantoprazole  40 mg Oral Daily    sodium chloride 0.9%  10 mL Intravenous Q8H    tamsulosin  0.4 mg Oral Daily    vancomycin  (VANCOCIN) IV (PEDS and ADULTS)  500 mg Intravenous Once         Current Facility-Administered Medications:     acetaminophen, 650 mg, Oral, Q4H PRN    dextrose 50%, 12.5 g, Intravenous, PRN    dextrose 50%, 25 g, Intravenous, PRN    diphenhydrAMINE, 50 mg, Oral, Q6H PRN    glucagon (human recombinant), 1 mg, Intramuscular, PRN    glucose, 16 g, Oral, PRN    glucose, 24 g, Oral, PRN    heparin (porcine), 1,000 Units, Intravenous, PRN    HYDROmorphone, 0.5 mg, Intravenous, Q4H PRN    insulin aspart U-100, 0-5 Units, Subcutaneous, QID (AC + HS) PRN    melatonin, 6 mg, Oral, Nightly PRN    naloxone, 0.02 mg, Intravenous, PRN    ondansetron, 4 mg, Intravenous, Q6H PRN    prochlorperazine, 5 mg, Intravenous, Q6H PRN    Pharmacy to dose Vancomycin consult, , , Once **AND** vancomycin - pharmacy to dose, , Intravenous, pharmacy to manage frequency    Laboratory (all labs reviewed):  CBC:  Recent Labs   Lab 04/03/25 2055 04/04/25 0247 04/05/25  0324 04/06/25  0317 04/07/25  0419   WBC 16.23 H 16.11 H 17.18 H 15.49 H 18.35 H   HGB 10.2 L 10.0 L 8.6 L 8.2 L 8.3 L   HCT 30.7 L 30.0 L 26.7 L 25.8 L 25.8 L   Platelet Count 130 L 121 L 109 L 118 L 141 L       CHEMISTRIES:  Recent Labs   Lab 01/16/25  0154 02/08/25  1014 02/11/25  1325 02/23/25  1638 03/21/25  1808 03/25/25  1108 04/03/25 2055 04/04/25  0247 04/04/25  1439 04/04/25  2001 04/04/25  2151 04/05/25  0324 04/05/25  1159 04/05/25  1426 04/06/25  0317 04/07/25  0419   Glucose 141 H 171 H 225 H 113 H 150 H  --   --   --   --   --   --   --   --   --   --   --    Sodium 140 138 138 141 141   < > 138 139   < >  --  140 140  --  139 139 141   Potassium 4.0 4.5 4.4 5.1 4.4   < > 4.8 5.0   < >  --  4.6 4.2  --  4.0 3.7 4.2   BUN 44 H 63 H 75 H 77 H 91 H   < > 70 H 75 H   < >  --  68 H 58 H  --  49 H 59 H 77 H   Creatinine 10.0 H 8.6 H 8.9 H 9.8 H 10.9 H   < > 9.3 H 9.9 H   < >  --  8.5 H 7.3 H  --  6.2 H 7.6 H 9.7 H   eGFR 5 A 5.5 A 5.3 A 4.7 A 4.1 A   < > 5 L 5 L   < >  --   6 L 7 L  --  8 L 6 L 5 L   Calcium 8.9 10.2 10.1 10.9 H 10.4   < > 8.1 L 8.2 L   < >  --  8.4 L 8.4 L  --  8.2 L 8.5 L 8.6 L   Magnesium   --   --   --   --   --    < > 2.2 2.3  --  2.2  --  2.1 2.0  --   --   --    Magnesium  --   --   --   --  2.5  --   --   --   --   --   --   --   --   --   --   --     < > = values in this interval not displayed.       CARDIAC BIOMARKERS:  Recent Labs   Lab 04/01/24  0251 04/29/24 2211 08/03/24  0015 08/05/24  0138 10/20/24  2310 10/21/24  0113 04/01/25  2220 04/02/25  0846 04/02/25 2055 04/03/25  0303 04/03/25  0718 04/03/25  1533 04/04/25  1028     --  183  --  111  --  382 H  --   --   --   --   --   --    Troponin I  --    < > 0.080 H   < > 0.044 H   < >  --   --   --   --   --   --   --    Troponin-I  --   --   --   --   --   --   --    < > 1.076 H 1.441 H 1.812 H 2.032 H 2.913 H    < > = values in this interval not displayed.       COAGS:  Recent Labs   Lab 11/28/23  2155 04/18/24  0609 01/12/25  1316 03/25/25  1108 04/03/25  1533   INR 1.1 1.0 1.0 1.0 1.1       LIPIDS/LFTS:  Recent Labs   Lab 08/11/24  0331 08/15/24  0203 04/03/25  1533 04/03/25  2055 04/04/25  0247 04/06/25  0317 04/07/25  0419   Cholesterol 110 L  --   --   --   --   --   --    Triglycerides 126  --   --   --   --   --   --    HDL 26 L  --   --   --   --   --   --    LDL Cholesterol 58.8 L  --   --   --   --   --   --    Non-HDL Cholesterol 84  --   --   --   --   --   --    AST  --    < > 25 24 23 25 20   ALT  --    < > 15 18 17 21 20    < > = values in this interval not displayed.       BNP:  Recent Labs   Lab 06/11/24 2239 08/03/24  0015 08/11/24  0332 01/12/25  0550 04/03/25  0303   BNP 1,110 H 624 H 2,178 H 2,043 H 2,988 H       TSH:  Recent Labs   Lab 11/27/23  0519 01/15/24  2135 07/09/24  1428 08/11/24  0332 04/02/25  0537   TSH 3.357 4.600 H 6.963 H 3.949 4.590 H       Free T4:  Recent Labs   Lab 01/15/24  2135 07/09/24  1428 04/02/25  0537   Free T4 0.81 1.01 1.19  1.19        Diagnostic Results:  ECG (personally reviewed and interpreted tracing(s)):  4/4/25 1325 SR 79, PRWP, lat ST abnl ?isch, similar to 3/25/25, lass prom than 4/2/25    Chest X-Ray (personally reviewed and interpreted image(s)): 4/4/25 NAD, aortic atherosclerosis    Echo: 4/4/25 (images prev personally reviewed and interpreted)    Left Ventricle: The left ventricle is normal in size. Normal wall thickness. Mild global hypokinesis present. There is mildly reduced systolic function with a visually estimated ejection fraction of 40 - 45%. Grade I diastolic dysfunction.    Right Ventricle: The right ventricle has moderate enlargement. Systolic function is mildly reduced.    Left Atrium: Mildly dilated Cannot exclude PFO (color Doppler with possible flow across IAS).    Aortic Valve: The aortic valve is a trileaflet valve. There is moderate aortic valve sclerosis. Mildly restricted motion. There is mild stenosis. Aortic valve area by VTI is 1.8 cm². Aortic valve peak velocity is 1.5 m/s. Mean gradient is 5 mmHg. The dimensionless index is 0.51. There is mild aortic regurgitation.    Mitral Valve: There is a 1.9x1.2cm large fixed heterogeneous mass present on the posterior leaflet (new finding vs 9/2023 echo). There is moderate to severe regurgitation with an eccentric jet.  Cannot exclude severe MR with possible PMVL perforation in association with large vegetation.    Aorta: Aortic root is mildly to moderately dilated measuring 4.17 cm.    Pulmonary Artery: The estimated pulmonary artery systolic pressure is 42 mmHg.    PCI RCA 1/14/25:    The Ost RCA lesion was 70% stenosed with 0% stenosis post-intervention.    The Prox RCA lesion was 70% stenosed with 0% stenosis post-intervention.    The Mid RCA lesion was 99% stenosed with 0% stenosis post-intervention.    The ejection fraction was calculated to be 55%.    The pre-procedure left ventricular end diastolic pressure was 6.    The post-procedure left ventricular end  diastolic pressure was 7.    Mid RCA lesion: A stent was successfully placed at 11 MAYRA for 15 sec.    Prox RCA lesion, Mid RCA lesion: A  at 12 MAYRA for 10 sec.    Ost RCA lesion, Prox RCA lesion: A stent was successfully placed.  Procedure performed:  Left heart catheterization  Coronary angiogram of the right coronary artery  Right radial artery access   Right superficial femoral artery access  CSI atherectomy of the right mid coronary artery  Laser atherectomy of the right mid coronary artery  PTCA of the right mid coronary artery  XU x3 megatron drug-eluting stent 3.5 X 16 in the mid RCA, 3.5 X 28 in the proximal RCA, 4.0 X 16 mm in the ostial RCA  Findings:  Severely calcified mid RCA stenosis unable to cross with PTCA balloons, treated initially with 0.9 laser atherectomy, followed by CSI atherectomy system with total of 5 runs (3 at low speed, 2 at high speed), pre-dilated using 2.0 compliant and 3.5 noncompliant balloon followed by 3.5 X 16 mm megatron stent.  Focal plaque rupture/erosion noted in the proximal and ostial RCA that were treated with  3.5 X 28 in the proximal RCA, 4.0 X 16 mm in the ostial RCA.  No residual stenosis post intervention.  Patient had ALAN 3 flow at the end of the procedure  LVEDP 6 mm Hg.  EF of around 55%.  No gradient across the aortic valve.  Right SFA access.  High bifurcation.  Mild diffuse disease of the right SFA closed using Perclose closure device  Right radial artery with severe spasm and hence decision was made to switch to right groin access  Recommendations:  Continue dual antiplatelet therapy for at least 1 year.  Consider longer term anticoagulation based on risk factor profile after 1 year  High-intensity statin  Transfer back to telemetry unit    Cath/PCI LAD 1/13/25  Left Main Coronary Artery: The left main is a large caliber vessel arising normally from the left sinus of valsalva.  It bifurcates into the left anterior descending and left circumflex coronary  artery.  It is free of evidence of obstructive coronary artery disease. ALAN III flow  Left Anterior Descending: The left anterior descending coronary artery is a large caliber vessel arising normally from the left main and extending to the apex.  It gives rise to small to moderate caliber diagonal arteries. Proximal LAD has a severe 80-90% calcified stenosis prior to an under expanded 2.5 mm stent in a 4.0 mm vessel. After the stent there is 40-50% stenosis. ALAN II flow  Left Circumflex: The left circumflex is a large caliber vessel arising normally from the left main coronary artery, it is non-dominant.  It gives rise to moderate caliber obtuse marginal branches. OM stent is patent. ALAN III flow  Right Coronary Artery: The right coronary artery is a large caliber vessel arising normally from the right sinus of valsalva.  It is dominant giving rise to a PDA and PLV branch. Mid RCA with a 95-99% calcified fibrotic stenosis. ALAN III flow  LVgram: LVEDP 33 mmHg  PCI procedure:  Heparin administered. ACT was closely monitored. Using a EBU 3.5 guider, this was used to cannulate the left system. Nuno blue PCI wire repshaped outside the body. PCI wire was advanced into the distal LAD. Predilated with 2.0 mm NC, 2.5 mm NC, scoring balloon 2.5 mm, 3.0 mm and 3.5 mm NC. IVUS was advanced for vessel prep/size. Schockwave 3.0 x 12 mm advanced and multiple therapies given. Followed by a 3.5 mm NC. Advanced a 3.5 x 24 mm megatron XU and deployed at nominal pressure. Post dilated with a 4.0 mm NC with great results. After the balloon was removed.  The wire was left in place.  Repeat angiography showed no signs of dissection.  Subsequently the wire was pulled back.   Final angiography showed ALAN-3 flow.  No signs of dissection, perforation, or thrombus.  Assessment:   Successful PCI of LAD with 3.5 x 24 mm XU  mRCA 95-99% stenosis--> staged PCI of RCA  Patent Lcx stent  Plan:   - Stage PCI of RCA tomorrow PM- NPO at [facility]   -  Patient tolerated procedure well. No immediate complications  - Continue DAPT  - EKG when arrives to floor  - Routine post cath protocol  - Dialysis in pm   - Maximize medical management  - Further care by the primary team    Cath/PCI OM2 12/4/23  1.  Selective left coronary Angiography.   2.  IVUS of Proximal OM2 coronary artery   3.  PCI of Proximal OM2 coronary artery with one 3.5 x 18 Michael stent   4.  Ultrasound guided right radial arterial access   5. Conscious sedation from 7:11 AM to 8:03 AM (52 minutes of sedation)   Findings:   - Coronary angiography data   Left main: Large caliber vessel, divides into the left anterior descending   and left circumflex. LM is non obstructive.   Left anterior descending: Large caliber vessel giving rise to 2 diagonals.   Patent mid LAD stents.   Left circumflex: Medium caliber vessel giving rise to 2 obtuse marginals.   OM2 has a proximal 99% ISR.   Right coronary artery: Not studied but known mid RCA lesion (see report   from last week)   - IVUS data of OM2    Pre PCI IVUS data:   Proximal reference luminal diameter: 3.6 mm   Distal reference luminal diameter: 3.4 mm   Percutaneous Coronary Intervention   Successful PCI of Proximal OM2 coronary artery 99 % ISR stenosis was   reduced to 0 % (initial ALAN 3 flow) by pre dilatation with 3 x 15 mm   balloon followed by placement of 3.5 x 18 mm Phoenix stent with excellent   angiographic results with final ALAN 3 flow. IVUS guided.   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   -  Succesful PCI of Proximal OM2 coronary artery with one Phoenix stent  Recommendations:   - Post operative care as per protocol.   - Aspirin for life and P2Y12 inhibitor for at least a year   - Moderate to high statin therapy.   - Follow up with Dr Hudson

## 2025-04-07 NOTE — ASSESSMENT & PLAN NOTE
Pt has not voided since dunaway removal  Noted to be in clot retention  Bladder scan 350 cc, voiding clots  24 fr dunaway placed, unable to irrigate due to clot retention vs prostatic vessel bleeding  Tube feeds suspended  Plan for OR emergently  Cassandra Solares updated and provided consent via  phone with CHARLENE Cintron as witness

## 2025-04-07 NOTE — ASSESSMENT & PLAN NOTE
- noted 4/6/25 when patient became very agitated and screaming he couldn't urinate  - nursing unable to place dunaway/coude  - Urology consulted. Bedside cystoscopy on 4/6/25 noted clots, large prostate. Catheter was placed but was then removed due to pain  - 4/7 he is again agitated that he cannot urinate  - follow up with Urology   - tamsulosin ordered if he is awake enough to swallow

## 2025-04-07 NOTE — NURSING
[facility]  Nurses Note -- 4 Eyes      4/6/2025       Skin assessed on: Q Shift      [x] No Pressure Injuries Present    [x]Prevention Measures Documented    [] Yes LDA  for Pressure Injury Previously documented     [] Yes New Pressure Injury Discovered   [] LDA for New Pressure Injury Added      Attending RN:  Laura Cruz RN     Second RN:  CHARLENE Sue

## 2025-04-07 NOTE — PLAN OF CARE
Problem: Occupational Therapy  Goal: Occupational Therapy Goal  Description: Goals to be met by: 4/7/2025     Patient will increase functional independence with ADLs by performing:    Bed level with Total Ax2, fair tolerance of ROM.    Outcome: Adequate for Care Transition     OT initial eval completed. Pt unable to follow commands and lethargic at end of eval. Total Ax2 for all bed mobility, limited eye contact/scanning environment, shaking head laterally and into flx/ext repeatedly. OT to sign off at this time. Please re-consult once medically appropriate.

## 2025-04-08 PROBLEM — I47.29 NSVT (NONSUSTAINED VENTRICULAR TACHYCARDIA): Status: ACTIVE | Noted: 2025-04-08

## 2025-04-08 LAB
ABO + RH BLD: NORMAL
ABSOLUTE EOSINOPHIL (OHS): 0.44 K/UL
ABSOLUTE MONOCYTE (OHS): 0.74 K/UL (ref 0.3–1)
ABSOLUTE NEUTROPHIL COUNT (OHS): 9.15 K/UL (ref 1.8–7.7)
ALBUMIN SERPL BCP-MCNC: 1.9 G/DL (ref 3.5–5.2)
ALP SERPL-CCNC: 67 UNIT/L (ref 40–150)
ALT SERPL W/O P-5'-P-CCNC: 13 UNIT/L (ref 10–44)
ANION GAP (OHS): 10 MMOL/L (ref 8–16)
AST SERPL-CCNC: 16 UNIT/L (ref 11–45)
BASOPHILS # BLD AUTO: 0.03 K/UL
BASOPHILS NFR BLD AUTO: 0.3 %
BILIRUB SERPL-MCNC: 0.2 MG/DL (ref 0.1–1)
BLD PROD TYP BPU: NORMAL
BLOOD UNIT EXPIRATION DATE: NORMAL
BLOOD UNIT TYPE CODE: 5100
BUN SERPL-MCNC: 70 MG/DL (ref 8–23)
CALCIUM SERPL-MCNC: 8.1 MG/DL (ref 8.7–10.5)
CHLORIDE SERPL-SCNC: 104 MMOL/L (ref 95–110)
CO2 SERPL-SCNC: 26 MMOL/L (ref 23–29)
CREAT SERPL-MCNC: 8.8 MG/DL (ref 0.5–1.4)
CROSSMATCH INTERPRETATION: NORMAL
DISPENSE STATUS: NORMAL
ERYTHROCYTE [DISTWIDTH] IN BLOOD BY AUTOMATED COUNT: 17.8 % (ref 11.5–14.5)
GFR SERPLBLD CREATININE-BSD FMLA CKD-EPI: 5 ML/MIN/1.73/M2
GLUCOSE SERPL-MCNC: 264 MG/DL (ref 70–110)
HCT VFR BLD AUTO: 19.7 % (ref 40–54)
HGB BLD-MCNC: 6.1 GM/DL (ref 14–18)
HGB BLD-MCNC: 7.2 GM/DL (ref 14–18)
IMM GRANULOCYTES # BLD AUTO: 0.26 K/UL (ref 0–0.04)
IMM GRANULOCYTES NFR BLD AUTO: 2.3 % (ref 0–0.5)
LYMPHOCYTES # BLD AUTO: 0.64 K/UL (ref 1–4.8)
MCH RBC QN AUTO: 28.9 PG (ref 27–31)
MCHC RBC AUTO-ENTMCNC: 31 G/DL (ref 32–36)
MCV RBC AUTO: 93 FL (ref 82–98)
NUCLEATED RBC (/100WBC) (OHS): 1 /100 WBC
PHOSPHATE SERPL-MCNC: 4.8 MG/DL (ref 2.7–4.5)
PLATELET # BLD AUTO: 134 K/UL (ref 150–450)
PMV BLD AUTO: 11.7 FL (ref 9.2–12.9)
POCT GLUCOSE: 199 MG/DL (ref 70–110)
POCT GLUCOSE: 233 MG/DL (ref 70–110)
POCT GLUCOSE: 268 MG/DL (ref 70–110)
POCT GLUCOSE: 283 MG/DL (ref 70–110)
POCT GLUCOSE: 286 MG/DL (ref 70–110)
POCT GLUCOSE: 288 MG/DL (ref 70–110)
POCT GLUCOSE: 307 MG/DL (ref 70–110)
POCT GLUCOSE: 308 MG/DL (ref 70–110)
POTASSIUM SERPL-SCNC: 3.9 MMOL/L (ref 3.5–5.1)
PROT SERPL-MCNC: 5.6 GM/DL (ref 6–8.4)
RBC # BLD AUTO: 2.11 M/UL (ref 4.6–6.2)
RELATIVE EOSINOPHIL (OHS): 3.9 %
RELATIVE LYMPHOCYTE (OHS): 5.7 % (ref 18–48)
RELATIVE MONOCYTE (OHS): 6.6 % (ref 4–15)
RELATIVE NEUTROPHIL (OHS): 81.2 % (ref 38–73)
SODIUM SERPL-SCNC: 140 MMOL/L (ref 136–145)
UNIT NUMBER: NORMAL
WBC # BLD AUTO: 11.26 K/UL (ref 3.9–12.7)

## 2025-04-08 PROCEDURE — 36415 COLL VENOUS BLD VENIPUNCTURE: CPT | Performed by: HOSPITALIST

## 2025-04-08 PROCEDURE — 11000001 HC ACUTE MED/SURG PRIVATE ROOM

## 2025-04-08 PROCEDURE — 25000003 PHARM REV CODE 250: Performed by: INTERNAL MEDICINE

## 2025-04-08 PROCEDURE — 99233 SBSQ HOSP IP/OBS HIGH 50: CPT | Mod: ,,, | Performed by: INTERNAL MEDICINE

## 2025-04-08 PROCEDURE — 63600175 PHARM REV CODE 636 W HCPCS: Performed by: STUDENT IN AN ORGANIZED HEALTH CARE EDUCATION/TRAINING PROGRAM

## 2025-04-08 PROCEDURE — 99291 CRITICAL CARE FIRST HOUR: CPT | Mod: ,,, | Performed by: INTERNAL MEDICINE

## 2025-04-08 PROCEDURE — A4216 STERILE WATER/SALINE, 10 ML: HCPCS | Performed by: HOSPITALIST

## 2025-04-08 PROCEDURE — 86920 COMPATIBILITY TEST SPIN: CPT | Performed by: SURGERY

## 2025-04-08 PROCEDURE — 99232 SBSQ HOSP IP/OBS MODERATE 35: CPT | Mod: ,,, | Performed by: UROLOGY

## 2025-04-08 PROCEDURE — 63600175 PHARM REV CODE 636 W HCPCS: Performed by: INTERNAL MEDICINE

## 2025-04-08 PROCEDURE — 84100 ASSAY OF PHOSPHORUS: CPT | Performed by: INTERNAL MEDICINE

## 2025-04-08 PROCEDURE — 27000221 HC OXYGEN, UP TO 24 HOURS

## 2025-04-08 PROCEDURE — 99232 SBSQ HOSP IP/OBS MODERATE 35: CPT | Mod: ,,, | Performed by: INTERNAL MEDICINE

## 2025-04-08 PROCEDURE — 25000003 PHARM REV CODE 250: Performed by: HOSPITALIST

## 2025-04-08 PROCEDURE — 82310 ASSAY OF CALCIUM: CPT | Performed by: HOSPITALIST

## 2025-04-08 PROCEDURE — 27000923 HC TRIALYSIS CATHETER, ANY SIZE

## 2025-04-08 PROCEDURE — 94761 N-INVAS EAR/PLS OXIMETRY MLT: CPT

## 2025-04-08 PROCEDURE — 87040 BLOOD CULTURE FOR BACTERIA: CPT | Performed by: HOSPITALIST

## 2025-04-08 PROCEDURE — P9047 ALBUMIN (HUMAN), 25%, 50ML: HCPCS | Performed by: INTERNAL MEDICINE

## 2025-04-08 PROCEDURE — 99497 ADVNCD CARE PLAN 30 MIN: CPT | Mod: ,,, | Performed by: REGISTERED NURSE

## 2025-04-08 PROCEDURE — P9016 RBC LEUKOCYTES REDUCED: HCPCS | Performed by: SURGERY

## 2025-04-08 PROCEDURE — 99498 ADVNCD CARE PLAN ADDL 30 MIN: CPT | Mod: ,,, | Performed by: REGISTERED NURSE

## 2025-04-08 PROCEDURE — 99233 SBSQ HOSP IP/OBS HIGH 50: CPT | Mod: ,,, | Performed by: REGISTERED NURSE

## 2025-04-08 PROCEDURE — G0545 PR VISIT INHERENT TO INPT OR OBS CARE, INFECTIOUS DISEASE: HCPCS | Mod: ,,, | Performed by: INTERNAL MEDICINE

## 2025-04-08 PROCEDURE — 85018 HEMOGLOBIN: CPT | Performed by: SURGERY

## 2025-04-08 PROCEDURE — 80100014 HC HEMODIALYSIS 1:1

## 2025-04-08 PROCEDURE — 27000207 HC ISOLATION

## 2025-04-08 PROCEDURE — 25000003 PHARM REV CODE 250: Performed by: STUDENT IN AN ORGANIZED HEALTH CARE EDUCATION/TRAINING PROGRAM

## 2025-04-08 PROCEDURE — 85025 COMPLETE CBC W/AUTO DIFF WBC: CPT | Performed by: HOSPITALIST

## 2025-04-08 RX ORDER — HYDROCODONE BITARTRATE AND ACETAMINOPHEN 500; 5 MG/1; MG/1
TABLET ORAL
Status: DISCONTINUED | OUTPATIENT
Start: 2025-04-08 | End: 2025-04-29 | Stop reason: HOSPADM

## 2025-04-08 RX ORDER — TRAMADOL HYDROCHLORIDE 50 MG/1
100 TABLET ORAL ONCE
Status: DISCONTINUED | OUTPATIENT
Start: 2025-04-09 | End: 2025-04-08

## 2025-04-08 RX ORDER — INSULIN ASPART 100 [IU]/ML
4 INJECTION, SOLUTION INTRAVENOUS; SUBCUTANEOUS
Status: DISCONTINUED | OUTPATIENT
Start: 2025-04-08 | End: 2025-04-09

## 2025-04-08 RX ORDER — OXYCODONE HCL 5 MG/5 ML
5 SOLUTION, ORAL ORAL ONCE
Refills: 0 | Status: COMPLETED | OUTPATIENT
Start: 2025-04-08 | End: 2025-04-09

## 2025-04-08 RX ORDER — MIDODRINE HYDROCHLORIDE 5 MG/1
10 TABLET ORAL ONCE
Status: COMPLETED | OUTPATIENT
Start: 2025-04-08 | End: 2025-04-08

## 2025-04-08 RX ORDER — MIDODRINE HYDROCHLORIDE 5 MG/1
10 TABLET ORAL EVERY 8 HOURS
Status: DISCONTINUED | OUTPATIENT
Start: 2025-04-08 | End: 2025-04-09

## 2025-04-08 RX ORDER — ALBUMIN HUMAN 250 G/1000ML
25 SOLUTION INTRAVENOUS ONCE
Status: COMPLETED | OUTPATIENT
Start: 2025-04-08 | End: 2025-04-08

## 2025-04-08 RX ORDER — INSULIN GLARGINE 100 [IU]/ML
14 INJECTION, SOLUTION SUBCUTANEOUS DAILY
Status: DISCONTINUED | OUTPATIENT
Start: 2025-04-08 | End: 2025-04-09

## 2025-04-08 RX ADMIN — CLOPIDOGREL BISULFATE 75 MG: 75 TABLET, FILM COATED ORAL at 08:04

## 2025-04-08 RX ADMIN — PANTOPRAZOLE SODIUM 40 MG: 40 TABLET, DELAYED RELEASE ORAL at 08:04

## 2025-04-08 RX ADMIN — MIDODRINE HYDROCHLORIDE 10 MG: 5 TABLET ORAL at 01:04

## 2025-04-08 RX ADMIN — INSULIN ASPART 1 UNITS: 100 INJECTION, SOLUTION INTRAVENOUS; SUBCUTANEOUS at 10:04

## 2025-04-08 RX ADMIN — INSULIN ASPART 3 UNITS: 100 INJECTION, SOLUTION INTRAVENOUS; SUBCUTANEOUS at 11:04

## 2025-04-08 RX ADMIN — ATORVASTATIN CALCIUM 80 MG: 40 TABLET, FILM COATED ORAL at 09:04

## 2025-04-08 RX ADMIN — INSULIN ASPART 4 UNITS: 100 INJECTION, SOLUTION INTRAVENOUS; SUBCUTANEOUS at 08:04

## 2025-04-08 RX ADMIN — SODIUM CHLORIDE, PRESERVATIVE FREE 10 ML: 5 INJECTION INTRAVENOUS at 10:04

## 2025-04-08 RX ADMIN — MIDODRINE HYDROCHLORIDE 10 MG: 5 TABLET ORAL at 08:04

## 2025-04-08 RX ADMIN — INSULIN GLARGINE 14 UNITS: 100 INJECTION, SOLUTION SUBCUTANEOUS at 08:04

## 2025-04-08 RX ADMIN — ALBUMIN (HUMAN) 25 G: 12.5 SOLUTION INTRAVENOUS at 12:04

## 2025-04-08 RX ADMIN — MUPIROCIN: 20 OINTMENT TOPICAL at 08:04

## 2025-04-08 RX ADMIN — MUPIROCIN: 20 OINTMENT TOPICAL at 09:04

## 2025-04-08 RX ADMIN — SODIUM CHLORIDE, PRESERVATIVE FREE 10 ML: 5 INJECTION INTRAVENOUS at 01:04

## 2025-04-08 RX ADMIN — MIDODRINE HYDROCHLORIDE 10 MG: 5 TABLET ORAL at 09:04

## 2025-04-08 RX ADMIN — INSULIN ASPART 4 UNITS: 100 INJECTION, SOLUTION INTRAVENOUS; SUBCUTANEOUS at 11:04

## 2025-04-08 RX ADMIN — SODIUM CHLORIDE, PRESERVATIVE FREE 10 ML: 5 INJECTION INTRAVENOUS at 05:04

## 2025-04-08 NOTE — PROGRESS NOTES
Patient required albumin bolus and midodrine 10mg to complete dialysis treatment, but was able to tolerate the full 3 hours of dialysis and gentle UF.   Okay to stepdown from a renal standpoint.    Will f/u AM labs but next HD tentatively planned for Thursday to resume home TTS schedule.      Юлия Davis MD  [facility]  [phone]

## 2025-04-08 NOTE — PROGRESS NOTES
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 man with ESRD with LUE AVF, HFpEF, CAD, DM admitted to OSH 4/1- 4/3 with sepsis due to MRSA bacteremia, transferred to  ICU for septic shock due to MRSA bacteremia and thrombosis of AV graft on 4/3 s/p exploratory surgery of LUE with graft resection 4/3. Op findings: Ruptured pseudoaneurysm with infected hematoma, graft completely obliterated at mid segment. Suspect infected graft is the source of bacteremia.     BCx 4/2 MRSA  BCx 4/4 MRSA  BCx 4/8: Pending  LUE surgical cx growing s aureus    TTE: 1.9x1.2cm large fixed heterogeneous mass present on the posterior leaflet , moderate to severe regurgitation with an eccentric jet. Cannot exclude severe MR with possible PMVL perforation in association with large vegetation.     Persistently positive blood cultures in the setting of acute IE. Deemed a poor surgical candidate.    If unable to sterilize his blood cultures, then his prognosis is very poor.     Despite this, the patient is feeling better and his WBC is improved.      Recommendations:  --continue vancopmycin  --anticipate at least 6 wks iv abx from culture clearance, when/if that occurs  --if his repeat blood cultures from today (4/8) still yield MRSA, will consider salvage therapy with ceftaroline and daptomycin (see reference below)  --time will still tell but he remains persistently bacteremia  --consider DNR status              Lamont Steele MD  Infectious Disease  [facility] - [place] - Intensive Care    Subjective:     Principal Problem:Septic shock    HPI: 85 y.o. man with ESRD with LUE AVF, HFpEF, CAD, DM admitted to OSH 4/1- 4/3 with  sepsis due to MRSA bacteremia, transferred to  ICU for septic shock on 4/3.    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     CXR 4/3 with likely pulmonary edema  CT c/a/p 4/3 revealed hepatic hypodensities likely cysts as well some larger lesions that hav decreased in size. Otherwise, no clear infectious source.      ID consulted for: MRSA bacteremia   Interval History: Events noted. Sharma placed - on CBI. Feels a little better today. Repeat cultures positive - repeated today.     Review of Systems   All other systems reviewed and are negative.    Objective:     Vital Signs (Most Recent):  Temp: 97.8 °F (36.6 °C) (04/08/25 0745)  Pulse: 77 (04/08/25 0745)  Resp: 11 (04/08/25 0745)  BP: (!) 98/52 (04/08/25 0745)  SpO2: 100 % (04/08/25 0745) Vital Signs (24h Range):  Temp:  [94.2 °F (34.6 °C)-98.6 °F (37 °C)] 97.8 °F (36.6 °C)  Pulse:  [76-93] 77  Resp:  [7-33] 11  SpO2:  [81 %-100 %] 100 %  BP: ()/(44-76) 98/52     Weight: 61.2 kg (134 lb 14.7 oz)  Body mass index is 21.13 kg/m².    Estimated Creatinine Clearance: 5.1 mL/min (A) (based on SCr of 8.8 mg/dL (H)).     Physical Exam  Vitals and nursing note reviewed.   Constitutional:       Appearance: Normal appearance.   HENT:      Head: Normocephalic and atraumatic.      Mouth/Throat:      Mouth: Mucous membranes are moist.      Comments: Poor dentition  Eyes:      Extraocular Movements: Extraocular movements intact.      Pupils: Pupils are equal, round, and reactive to light.   Cardiovascular:      Rate and Rhythm: Normal rate.      Heart sounds: Murmur heard.   Pulmonary:      Breath sounds: No wheezing, rhonchi or rales.   Abdominal:      General: There is no distension.      Tenderness: There is no abdominal tenderness. There is no right CVA tenderness, left CVA tenderness or guarding.   Musculoskeletal:         General: No swelling, tenderness or  "deformity.      Right lower leg: No edema.      Left lower leg: No edema.   Skin:     Findings: No erythema.   Neurological:      General: No focal deficit present.      Mental Status: He is alert.   Psychiatric:         Mood and Affect: Mood normal.         Behavior: Behavior normal.     Trialysis catheter, RIJ: placed 4/4/25     Significant Labs: Blood Culture: No results for input(s): "LABBLOO" in the last 4320 hours.  CBC:   Recent Labs   Lab 04/07/25  0419 04/08/25  0243   WBC 18.35* 11.26   HGB 8.3* 6.1*   HCT 25.8* 19.7*   * 134*     Microbiology Results (last 7 days)       Procedure Component Value Units Date/Time    Blood culture [[id number]]  (Abnormal) Collected: 04/06/25 0555    Order Status: Completed Specimen: Blood Updated: 04/08/25 0827     Blood Culture Positive - Aerobic/Pediatric Bottle     GRAM STAIN Gram positive cocci in clusters resembling Staph     Comment: Aerobic Bottle Positive   This is an appended report. These results have been appended to a previously preliminary verified report.       Blood culture [[id number]]  (Abnormal) Collected: 04/06/25 0542    Order Status: Completed Specimen: Blood Updated: 04/08/25 0827     Blood Culture Positive - Aerobic/Pediatric Bottle      Staphylococcus aureus     Comment: <null> has been updated to reportable.        GRAM STAIN Gram positive cocci in clusters resembling Staph     Comment: This is an appended report. These results have been appended to a previously preliminary verified report.       Narrative:      Aerobic Bottle Positive   ID Consult Strongly Recommended    Blood culture [[id number]] Collected: 04/08/25 0506    Order Status: Resulted Specimen: Blood Updated: 04/08/25 0511    Blood culture [[id number]] Collected: 04/08/25 0401    Order Status: Resulted Specimen: Blood Updated: 04/08/25 0506    Afb Culture Stain [[id number]] Collected: 04/03/25 1943    Order Status: Completed Specimen: Wound from Arm, Left Updated: 04/07/25 1637 "     ACID FAST STAIN  No acid fast bacilli seen    Afb Culture Stain [[id number]] Collected: 04/03/25 2011    Order Status: Completed Specimen: Wound from Arm, Left Updated: 04/07/25 1637     ACID FAST STAIN  No acid fast bacilli seen    Afb Culture Stain [[id number]] Collected: 04/03/25 1905    Order Status: Completed Specimen: Tissue from AV Fistula, Left Updated: 04/07/25 1623     ACID FAST STAIN  No acid fast bacilli seen    Afb Culture Stain [[id number]] Collected: 04/03/25 1954    Order Status: Completed Specimen: Wound from Arm, Left Updated: 04/07/25 1623     ACID FAST STAIN  No acid fast bacilli seen    Afb Culture Stain [[id number]] Collected: 04/03/25 1816    Order Status: Completed Specimen: Tissue from AV Fistula, Left Updated: 04/07/25 1623     ACID FAST STAIN  No acid fast bacilli seen    Afb Culture Stain [[id number]] Collected: 04/03/25 1921    Order Status: Completed Specimen: Tissue from hematoma Updated: 04/07/25 1623     ACID FAST STAIN  No acid fast bacilli seen    Afb Culture Stain [[id number]] Collected: 04/03/25 1934    Order Status: Completed Specimen: Tissue from AV Fistula, Left Updated: 04/07/25 1618     ACID FAST STAIN  No acid fast bacilli seen    Blood culture [[id number]]  (Normal) Collected: 04/04/25 1044    Order Status: Completed Specimen: Blood Updated: 04/07/25 1100     Blood Culture No Growth After 72 Hours    Fungus culture [[id number]]  (Normal) Collected: 04/03/25 1943    Order Status: Completed Specimen: Wound from Arm, Left Updated: 04/07/25 0935     Fungal Culture Culture In Progress    Fungus culture [[id number]]  (Normal) Collected: 04/03/25 1954    Order Status: Completed Specimen: Wound from Arm, Left Updated: 04/07/25 0935     Fungal Culture Culture In Progress    AFB Culture & Smear [[id number]] Collected: 04/03/25 1816    Order Status: Completed Specimen: Tissue from AV Fistula, Left Updated: 04/07/25 0815     CULTURE, AFB  Culture In Progress    AFB Culture & Smear  [[id number]] Collected: 04/03/25 1905    Order Status: Completed Specimen: Tissue from AV Fistula, Left Updated: 04/07/25 0815     CULTURE, AFB  Culture In Progress    AFB Culture & Smear [[id number]] Collected: 04/03/25 1921    Order Status: Completed Specimen: Tissue from hematoma Updated: 04/07/25 0815     CULTURE, AFB  Culture In Progress    AFB Culture & Smear [[id number]] Collected: 04/03/25 1934    Order Status: Completed Specimen: Tissue from AV Fistula, Left Updated: 04/07/25 0815     CULTURE, AFB  Culture In Progress    AFB Culture & Smear [[id number]] Collected: 04/03/25 1943    Order Status: Completed Specimen: Wound from Arm, Left Updated: 04/07/25 0815     CULTURE, AFB  Culture In Progress    AFB Culture & Smear [[id number]] Collected: 04/03/25 1954    Order Status: Completed Specimen: Wound from Arm, Left Updated: 04/07/25 0815     CULTURE, AFB  Culture In Progress    AFB Culture & Smear [[id number]] Collected: 04/03/25 2011    Order Status: Completed Specimen: Wound from Arm, Left Updated: 04/07/25 0815     CULTURE, AFB  Culture In Progress    Culture, Anaerobic [[id number]] Collected: 04/03/25 2011    Order Status: Completed Specimen: Wound from Arm, Left Updated: 04/07/25 0748     Anaerobe Culture No Anaerobes Isolated    Culture, Anaerobic [[id number]] Collected: 04/03/25 1954    Order Status: Completed Specimen: Wound from Arm, Left Updated: 04/07/25 0747     Anaerobe Culture No Anaerobes Isolated    Culture, Anaerobic [[id number]] Collected: 04/03/25 1943    Order Status: Completed Specimen: Wound from Arm, Left Updated: 04/07/25 0747     Anaerobe Culture No Anaerobes Isolated    Culture, Anaerobic [[id number]] Collected: 04/03/25 1934    Order Status: Completed Specimen: Tissue from AV Fistula, Left Updated: 04/07/25 0746     Anaerobe Culture No Anaerobes Isolated    Culture, Anaerobic [[id number]] Collected: 04/03/25 1921    Order Status: Completed Specimen: Tissue from hematoma Updated:  04/07/25 0746     Anaerobe Culture No Anaerobes Isolated    Culture, Anaerobic [[id number]] Collected: 04/03/25 1910    Order Status: Completed Specimen: Tissue from AV Fistula, Left Updated: 04/07/25 0745     Anaerobe Culture No Anaerobes Isolated    Culture, Anaerobic [[id number]] Collected: 04/03/25 1816    Order Status: Completed Specimen: Wound from Arm, Left Updated: 04/07/25 0744     Anaerobe Culture No Anaerobes Isolated    Blood culture [[id number]]  (Abnormal) Collected: 04/04/25 1343    Order Status: Completed Specimen: Blood Updated: 04/06/25 0657     Blood Culture Positive - Aerobic/Pediatric Bottle      Methicillin resistant Staphylococcus aureus     Comment: <null> has been updated to reportable.        GRAM STAIN Gram positive cocci in clusters resembling Staph     Comment: Aerobic Bottle Positive    This is an appended report. These results have been appended to a previously preliminary verified report.       Narrative:      For sensitivities, refer to order # [id number]      Aerobic culture [[id number]]  (Abnormal) Collected: 04/03/25 2011    Order Status: Completed Specimen: Wound from Arm, Left Updated: 04/06/25 0556     CULTURE, AEROBIC Few Methicillin resistant Staphylococcus aureus    Narrative:      For sensitivities, refer to order # [id number]    Aerobic culture [[id number]]  (Abnormal) Collected: 04/03/25 1954    Order Status: Completed Specimen: Wound from Arm, Left Updated: 04/06/25 0555     CULTURE, AEROBIC Many Methicillin resistant Staphylococcus aureus    Narrative:      For sensitivities, refer to order # [id number]    Aerobic culture [[id number]]  (Abnormal)  (Susceptibility) Collected: 04/03/25 1943    Order Status: Completed Specimen: Wound from Arm, Left Updated: 04/06/25 0553     CULTURE, AEROBIC Many Methicillin resistant Staphylococcus aureus    Aerobic culture [[id number]]  (Abnormal)  (Susceptibility) Collected: 04/03/25 1926    Order Status: Completed  Specimen: Wound from Arm, Left Updated: 04/06/25 0544     CULTURE, AEROBIC Many Methicillin resistant Staphylococcus aureus    Aerobic culture [[id number]] Collected: 04/03/25 1911    Order Status: Completed Specimen: Tissue from Arm, Left Updated: 04/06/25 0543     CULTURE, AEROBIC No Growth    Aerobic culture [[id number]] Collected: 04/03/25 1816    Order Status: Completed Specimen: Wound from Arm, Left Updated: 04/06/25 0543     CULTURE, AEROBIC No Growth    Gram stain [[id number]] Collected: 04/03/25 1923    Order Status: Completed Specimen: Tissue from Arm, Left Updated: 04/04/25 0837     GRAM STAIN Rare WBC seen      Few Gram positive cocci    Gram stain [[id number]] Collected: 04/03/25 1935    Order Status: Completed Specimen: Tissue from Arm, Left Updated: 04/04/25 0836     GRAM STAIN Rare WBC seen      No organisms seen    Gram stain [[id number]] Collected: 04/03/25 1914    Order Status: Completed Specimen: Wound from Arm, Left Updated: 04/04/25 0836     GRAM STAIN Rare WBC seen      No organisms seen    Gram stain [[id number]] Collected: 04/03/25 2011    Order Status: Completed Specimen: Wound from Arm, Left Updated: 04/04/25 0835     GRAM STAIN Rare WBC seen      No organisms seen    Gram stain [[id number]] Collected: 04/03/25 1821    Order Status: Completed Specimen: Wound from Arm, Left Updated: 04/04/25 0835     GRAM STAIN No WBCs      No organisms seen    Gram stain [[id number]] Collected: 04/03/25 1943    Order Status: Completed Specimen: Wound from Arm, Left Updated: 04/04/25 0834     GRAM STAIN Rare WBC seen      No organisms seen    Gram stain [[id number]] Collected: 04/03/25 1954    Order Status: Completed Specimen: Wound from Arm, Left Updated: 04/04/25 0833     GRAM STAIN Rare WBC seen      No organisms seen    Fungus culture [[id number]] Collected: 04/03/25 1934    Order Status: Sent Specimen: Tissue from AV Fistula, Left Updated: 04/03/25 2107    Fungus culture [[id number]] Collected:  04/03/25 1935    Order Status: Canceled Specimen: Tissue from Arm, Left Updated: 04/03/25 2107    Fungus culture [[id number]] Collected: 04/03/25 2011    Order Status: Sent Specimen: Wound from Arm, Left Updated: 04/03/25 2106    Fungus culture [[id number]] Collected: 04/03/25 1816    Order Status: Sent Specimen: Wound from Arm, Left Updated: 04/03/25 2106    Fungus culture [[id number]] Collected: 04/03/25 1904    Order Status: Sent Specimen: Tissue from AV Fistula, Left Updated: 04/03/25 2106    Fungus culture [[id number]] Collected: 04/03/25 1921    Order Status: Sent Specimen: Tissue from hematoma Updated: 04/03/25 2105            Significant Imaging: I have reviewed all pertinent imaging results/findings within the past 24 hours.

## 2025-04-08 NOTE — SUBJECTIVE & OBJECTIVE
Medications:  Continuous Infusions:  Scheduled Meds:   atorvastatin  80 mg Oral QHS    clopidogreL  75 mg Oral Daily    epoetin mj-epbx  10,000 Units Intravenous Every Mon, Wed, Fri    insulin aspart U-100  4 Units Subcutaneous TIDWM    insulin glargine U-100  14 Units Subcutaneous Daily    midodrine  10 mg Oral Q8H    mupirocin   Nasal BID    pantoprazole  40 mg Oral Daily    sodium chloride 0.9%  10 mL Intravenous Q8H    tamsulosin  0.4 mg Oral Daily     PRN Meds:  Current Facility-Administered Medications:     0.9%  NaCl infusion (for blood administration), , Intravenous, Q24H PRN    0.9% NaCl, , Intravenous, PRN    0.9% NaCl, , Intravenous, PRN    acetaminophen, 650 mg, Oral, Q4H PRN    dextrose 50%, 12.5 g, Intravenous, PRN    dextrose 50%, 25 g, Intravenous, PRN    diphenhydrAMINE, 50 mg, Oral, Q6H PRN    glucagon (human recombinant), 1 mg, Intramuscular, PRN    glucose, 16 g, Oral, PRN    glucose, 24 g, Oral, PRN    heparin (porcine), 1,000 Units, Intravenous, PRN    insulin aspart U-100, 0-5 Units, Subcutaneous, QID (AC + HS) PRN    melatonin, 6 mg, Oral, Nightly PRN    naloxone, 0.02 mg, Intravenous, PRN    ondansetron, 4 mg, Intravenous, Q6H PRN    prochlorperazine, 5 mg, Intravenous, Q6H PRN    sodium chloride 0.9%, 250 mL, Intravenous, PRN    Pharmacy to dose Vancomycin consult, , , Once **AND** vancomycin - pharmacy to dose, , Intravenous, pharmacy to manage frequency    Objective:     Vital Signs (Most Recent):  Temp: 97.9 °F (36.6 °C) (04/08/25 1530)  Pulse: 81 (04/08/25 1545)  Resp: (!) 43 (04/08/25 1545)  BP: (!) 93/55 (04/08/25 1545)  SpO2: 100 % (04/08/25 1545) Vital Signs (24h Range):  Temp:  [94.2 °F (34.6 °C)-98.6 °F (37 °C)] 97.9 °F (36.6 °C)  Pulse:  [] 81  Resp:  [7-43] 43  SpO2:  [88 %-100 %] 100 %  BP: ()/(44-76) 93/55     Weight: 61.2 kg (134 lb 14.7 oz)  Body mass index is 21.13 kg/m².       Physical Exam  Vitals and nursing note reviewed.   Constitutional:       Comments:  Arouses to name, able to answer simple questions with 1-2 word answers with stimulation, falls asleep quickly    HENT:      Head: Normocephalic and atraumatic.   Pulmonary:      Effort: Pulmonary effort is normal. No respiratory distress.      Comments: NC  Skin:     General: Skin is dry.      Coloration: Skin is pale.      Findings: Lesion present. Bruising: multiple skin lesions in various stages of healing. Erythema: pale/gray for ethnicity.  Neurological:      Mental Status: He is lethargic.      Comments: Answers to his name; less oriented overall, decreased awareness of location    Psychiatric:         Behavior: Behavior is cooperative.     Advance Care Planning   Advance Directives:   Living Will: No    LaPOST: No    Do Not Resuscitate Status: No    Medical Power of : No (Pt reported niece Konstantin as who should be called for medical decisions on admission; pts capacity has been a concern and decreased currently for further discussion; pt's sisters are legal next of kin otherwise. No prior ACP docs)      Decision Making:  Family answered questions  Goals of Care: What is most important right now is to focus on symptom/pain control, curative/life-prolongation (regardless of treatment burdens), improvement in condition but with limits to invasive therapies. Accordingly, we have decided that the best plan to meet the patient's goals includes continuing with treatment.       Significant Labs: All pertinent labs within the past 24 hours have been reviewed.  CBC:   Recent Labs   Lab 04/08/25  0243 04/08/25  1025   WBC 11.26  --    HGB 6.1* 7.2*   HCT 19.7*  --    MCV 93  --    *  --      BMP:  Recent Labs   Lab 04/08/25  0243      K 3.9      CO2 26   BUN 70*   CREATININE 8.8*   CALCIUM 8.1*     LFT:  Lab Results   Component Value Date    AST 16 04/08/2025    ALKPHOS 67 04/08/2025    BILITOT 0.2 04/08/2025     Albumin:   Albumin   Date Value Ref Range Status   04/08/2025 1.9 (L) 3.5 - 5.2  g/dL Final   03/21/2025 3.2 (L) 3.5 - 5.2 g/dL Final     Protein:   Total Protein   Date Value Ref Range Status   03/21/2025 7.5 6.0 - 8.4 g/dL Final     Lactic acid:   Lab Results   Component Value Date    LACTATE 0.9 04/03/2025    LACTATE 2.6 (H) 04/02/2025       Significant Imaging: I have reviewed all pertinent imaging results/findings within the past 24 hours.

## 2025-04-08 NOTE — PROGRESS NOTES
"[facility]  [facility]  Progress Note    Patient Name: Jevon Rajan  MRN: [id number]  Patient Class: IP- Inpatient   Admission Date: 4/3/2025  Length of Stay: 5 days  Attending Physician: Gonzalez Reagan MD  Primary Care Provider: No, Primary Doctor        Subjective     Principal Problem:Septic shock        HPI:  Mr Jevon Rajan is a 85 y.o. man with ESRD with LUE AVF, HFpEF last EF 50-55%, CAD, DM who was transferred to [facility] ICU for septic shock due to MRSA bacteremia.     He was admitted at [place] 4/1-3/2025 with sepsis, worsening to septic shock, then identified source as MRSA. 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     Upon arrival to [facility], he is moaning in pain and his LUE AVF has active pulsatile bright red blood. He does respond to his name. He denies pain anywhere other than his LUE. He is on levophed at 0.05.     Overview/Hospital Course:  Mr Jevon Rajan is a 85 y.o. man with ESRD on HD with LUE AVF that has been bleeding, CHF, CAD s/p recent stenting 1/2025 who was admitted with MRSA bacteremia and bleeding from his LUE AVF. Continued vancomycin; weaned off levophed. Vascular surgery emergently consulted; on 4/3/25 they took him to OR and noted: "well incorporated proximal and central graft without evidence of infection, resected graft and covered proximal and central remnants with multiple layers. Mid graft removed in entirety and sent for culture. Ruptured pseudoaneurysm with infected hematoma, graft completely obliterated at mid segment." Surgical cultures with Staph. Suspect AVF or chronic scratching of pruritic skin is the source of his infection. TTE showed endocarditis: EF 40-45%, G1DD, RV systolic dysfunction, large 1.9x1.2cm fixed mass on the posterior mitral valve leaflet with moder to severe regurgitation, cannot rule out posterior mitral " valve leaflet perforation. Discussed with cardiac surgery; he is high mortality risk, and surgery is not advised. ID following. Nephrology consulted; trialysis was placed for HD. Persistently positive for MRSA in blood cultures. He has had urinary retention; Urology consulted, performed bedside cystoscopy on 4/6/25 with large prostate noted. Noted to have clot retention and went urgently to OR with Urology on 4/7/25; asa and DVT ppx held.     WBC normalized. S/p clot removal with Urology, 1U PRBC ordered this morning with Hb 6.1. Soft BPs, will add midodrine for HD to step down to floor. Glucoses high, will increase insulin.     Interval History:  NAEON.  No new issues.   CC- Fatigue  All questions answered and updates on care given.       ROS:  General: Negative for fevers   Cardiac: Negative for chest pain   Pulmonary: Negative for wheezing  GI: Negative for abdominal distention      Vitals:    04/08/25 0545 04/08/25 0600 04/08/25 0615 04/08/25 0622   BP: (!) 99/51 (!) 98/50 (!) 97/52    BP Location:  Right arm     Patient Position:  Lying     Pulse: 78 77 77 78   Resp: 11 14 11 13   Temp:    98.1 °F (36.7 °C)   TempSrc:    Axillary   SpO2: 100% 100% 100% 100%   Weight:       Height:              Body mass index is 21.13 kg/m².      PHYSICAL EXAM:  GENERAL APPEARANCE: alert and cooperative.     HEAD: NC/AT  CARDIAC: There is no cyanosis or pallor.   LUNGS: No apparent wheezing or stridor.  ABDOMEN: Non-distended. No guarding.  PSYCHIATRIC: No tangential speech. No Hyperactive features.        Recent Results (from the past 24 hours)   POCT glucose    Collection Time: 04/07/25 11:33 AM   Result Value Ref Range    POCT Glucose 308 (H) 70 - 110 mg/dL   POCT glucose    Collection Time: 04/07/25  5:04 PM   Result Value Ref Range    POCT Glucose 283 (H) 70 - 110 mg/dL   POCT glucose    Collection Time: 04/07/25  9:42 PM   Result Value Ref Range    POCT Glucose 288 (H) 70 - 110 mg/dL   Comprehensive Metabolic Panel     Collection Time: 04/08/25  2:43 AM   Result Value Ref Range    Sodium 140 136 - 145 mmol/L    Potassium 3.9 3.5 - 5.1 mmol/L    Chloride 104 95 - 110 mmol/L    CO2 26 23 - 29 mmol/L    Glucose 264 (H) 70 - 110 mg/dL    BUN 70 (H) 8 - 23 mg/dL    Creatinine 8.8 (H) 0.5 - 1.4 mg/dL    Calcium 8.1 (L) 8.7 - 10.5 mg/dL    Protein Total 5.6 (L) 6.0 - 8.4 gm/dL    Albumin 1.9 (L) 3.5 - 5.2 g/dL    Bilirubin Total 0.2 0.1 - 1.0 mg/dL    ALP 67 40 - 150 unit/L    AST 16 11 - 45 unit/L    ALT 13 10 - 44 unit/L    Anion Gap 10 8 - 16 mmol/L    eGFR 5 (L) >60 mL/min/1.73/m2   Phosphorus    Collection Time: 04/08/25  2:43 AM   Result Value Ref Range    Phosphorus Level 4.8 (H) 2.7 - 4.5 mg/dL   CBC with Differential    Collection Time: 04/08/25  2:43 AM   Result Value Ref Range    WBC 11.26 3.90 - 12.70 K/uL    RBC 2.11 (L) 4.60 - 6.20 M/uL    HGB 6.1 (L) 14.0 - 18.0 gm/dL    HCT 19.7 (LL) 40.0 - 54.0 %    MCV 93 82 - 98 fL    MCH 28.9 27.0 - 31.0 pg    MCHC 31.0 (L) 32.0 - 36.0 g/dL    RDW 17.8 (H) 11.5 - 14.5 %    Platelet Count 134 (L) 150 - 450 K/uL    MPV 11.7 9.2 - 12.9 fL    Nucleated RBC 1 (H) <=0 /100 WBC    Neut % 81.2 (H) 38 - 73 %    Lymph % 5.7 (L) 18 - 48 %    Mono % 6.6 4 - 15 %    Eos % 3.9 <=8 %    Basophil % 0.3 <=1.9 %    Imm Grans % 2.3 (H) 0.0 - 0.5 %    Neut # 9.15 (H) 1.8 - 7.7 K/uL    Lymph # 0.64 (L) 1 - 4.8 K/uL    Mono # 0.74 0.3 - 1 K/uL    Eos # 0.44 <=0.5 K/uL    Baso # 0.03 <=0.2 K/uL    Imm Grans # 0.26 (H) 0.00 - 0.04 K/uL   POCT glucose    Collection Time: 04/08/25  4:01 AM   Result Value Ref Range    POCT Glucose 286 (H) 70 - 110 mg/dL       Microbiology Results (last 7 days)       Procedure Component Value Units Date/Time    Blood culture [[id number]] Collected: 04/08/25 0506    Order Status: Resulted Specimen: Blood Updated: 04/08/25 0511    Blood culture [[id number]] Collected: 04/08/25 0401    Order Status: Resulted Specimen: Blood Updated: 04/08/25 0506    Blood culture [[id number]]   (Normal) Collected: 04/06/25 0555    Order Status: Completed Specimen: Blood Updated: 04/07/25 1901     Blood Culture No Growth After 36 Hours    Afb Culture Stain [[id number]] Collected: 04/03/25 1943    Order Status: Completed Specimen: Wound from Arm, Left Updated: 04/07/25 1637     ACID FAST STAIN  No acid fast bacilli seen    Afb Culture Stain [[id number]] Collected: 04/03/25 2011    Order Status: Completed Specimen: Wound from Arm, Left Updated: 04/07/25 1637     ACID FAST STAIN  No acid fast bacilli seen    Afb Culture Stain [[id number]] Collected: 04/03/25 1905    Order Status: Completed Specimen: Tissue from AV Fistula, Left Updated: 04/07/25 1623     ACID FAST STAIN  No acid fast bacilli seen    Afb Culture Stain [[id number]] Collected: 04/03/25 1954    Order Status: Completed Specimen: Wound from Arm, Left Updated: 04/07/25 1623     ACID FAST STAIN  No acid fast bacilli seen    Afb Culture Stain [[id number]] Collected: 04/03/25 1816    Order Status: Completed Specimen: Tissue from AV Fistula, Left Updated: 04/07/25 1623     ACID FAST STAIN  No acid fast bacilli seen    Afb Culture Stain [[id number]] Collected: 04/03/25 1921    Order Status: Completed Specimen: Tissue from hematoma Updated: 04/07/25 1623     ACID FAST STAIN  No acid fast bacilli seen    Afb Culture Stain [[id number]] Collected: 04/03/25 1934    Order Status: Completed Specimen: Tissue from AV Fistula, Left Updated: 04/07/25 1618     ACID FAST STAIN  No acid fast bacilli seen    Blood culture [[id number]]  (Normal) Collected: 04/04/25 1044    Order Status: Completed Specimen: Blood Updated: 04/07/25 1100     Blood Culture No Growth After 72 Hours    Fungus culture [[id number]]  (Normal) Collected: 04/03/25 1943    Order Status: Completed Specimen: Wound from Arm, Left Updated: 04/07/25 0935     Fungal Culture Culture In Progress    Fungus culture [[id number]]  (Normal) Collected: 04/03/25 1954    Order Status: Completed Specimen: Wound from  Arm, Left Updated: 04/07/25 0935     Fungal Culture Culture In Progress    AFB Culture & Smear [[id number]] Collected: 04/03/25 1816    Order Status: Completed Specimen: Tissue from AV Fistula, Left Updated: 04/07/25 0815     CULTURE, AFB  Culture In Progress    AFB Culture & Smear [[id number]] Collected: 04/03/25 1905    Order Status: Completed Specimen: Tissue from AV Fistula, Left Updated: 04/07/25 0815     CULTURE, AFB  Culture In Progress    AFB Culture & Smear [[id number]] Collected: 04/03/25 1921    Order Status: Completed Specimen: Tissue from hematoma Updated: 04/07/25 0815     CULTURE, AFB  Culture In Progress    AFB Culture & Smear [[id number]] Collected: 04/03/25 1934    Order Status: Completed Specimen: Tissue from AV Fistula, Left Updated: 04/07/25 0815     CULTURE, AFB  Culture In Progress    AFB Culture & Smear [[id number]] Collected: 04/03/25 1943    Order Status: Completed Specimen: Wound from Arm, Left Updated: 04/07/25 0815     CULTURE, AFB  Culture In Progress    AFB Culture & Smear [[id number]] Collected: 04/03/25 1954    Order Status: Completed Specimen: Wound from Arm, Left Updated: 04/07/25 0815     CULTURE, AFB  Culture In Progress    AFB Culture & Smear [[id number]] Collected: 04/03/25 2011    Order Status: Completed Specimen: Wound from Arm, Left Updated: 04/07/25 0815     CULTURE, AFB  Culture In Progress    Culture, Anaerobic [[id number]] Collected: 04/03/25 2011    Order Status: Completed Specimen: Wound from Arm, Left Updated: 04/07/25 0748     Anaerobe Culture No Anaerobes Isolated    Culture, Anaerobic [[id number]] Collected: 04/03/25 1954    Order Status: Completed Specimen: Wound from Arm, Left Updated: 04/07/25 0747     Anaerobe Culture No Anaerobes Isolated    Culture, Anaerobic [[id number]] Collected: 04/03/25 1943    Order Status: Completed Specimen: Wound from Arm, Left Updated: 04/07/25 0747     Anaerobe Culture No Anaerobes Isolated    Culture, Anaerobic [[id number]]  Collected: 04/03/25 1934    Order Status: Completed Specimen: Tissue from AV Fistula, Left Updated: 04/07/25 0746     Anaerobe Culture No Anaerobes Isolated    Culture, Anaerobic [[id number]] Collected: 04/03/25 1921    Order Status: Completed Specimen: Tissue from hematoma Updated: 04/07/25 0746     Anaerobe Culture No Anaerobes Isolated    Culture, Anaerobic [[id number]] Collected: 04/03/25 1910    Order Status: Completed Specimen: Tissue from AV Fistula, Left Updated: 04/07/25 0745     Anaerobe Culture No Anaerobes Isolated    Culture, Anaerobic [[id number]] Collected: 04/03/25 1816    Order Status: Completed Specimen: Wound from Arm, Left Updated: 04/07/25 0744     Anaerobe Culture No Anaerobes Isolated    Blood culture [[id number]]  (Abnormal) Collected: 04/06/25 0542    Order Status: Completed Specimen: Blood Updated: 04/07/25 0705     Blood Culture No Growth After 6 Hours     GRAM STAIN Gram positive cocci in clusters resembling Staph     Comment: This is an appended report. These results have been appended to a previously preliminary verified report.       Narrative:      Aerobic Bottle Positive     Blood culture [[id number]]  (Abnormal) Collected: 04/04/25 1343    Order Status: Completed Specimen: Blood Updated: 04/06/25 0657     Blood Culture Positive - Aerobic/Pediatric Bottle      Methicillin resistant Staphylococcus aureus     Comment: <null> has been updated to reportable.        GRAM STAIN Gram positive cocci in clusters resembling Staph     Comment: Aerobic Bottle Positive    This is an appended report. These results have been appended to a previously preliminary verified report.       Narrative:      For sensitivities, refer to order # [id number]      Aerobic culture [[id number]]  (Abnormal) Collected: 04/03/25 2011    Order Status: Completed Specimen: Wound from Arm, Left Updated: 04/06/25 0556     CULTURE, AEROBIC Few Methicillin resistant Staphylococcus aureus    Narrative:      For  sensitivities, refer to order # [id number]    Aerobic culture [[id number]]  (Abnormal) Collected: 04/03/25 1954    Order Status: Completed Specimen: Wound from Arm, Left Updated: 04/06/25 0555     CULTURE, AEROBIC Many Methicillin resistant Staphylococcus aureus    Narrative:      For sensitivities, refer to order # [id number]    Aerobic culture [[id number]]  (Abnormal)  (Susceptibility) Collected: 04/03/25 1943    Order Status: Completed Specimen: Wound from Arm, Left Updated: 04/06/25 0553     CULTURE, AEROBIC Many Methicillin resistant Staphylococcus aureus    Aerobic culture [[id number]]  (Abnormal)  (Susceptibility) Collected: 04/03/25 1926    Order Status: Completed Specimen: Wound from Arm, Left Updated: 04/06/25 0544     CULTURE, AEROBIC Many Methicillin resistant Staphylococcus aureus    Aerobic culture [[id number]] Collected: 04/03/25 1911    Order Status: Completed Specimen: Tissue from Arm, Left Updated: 04/06/25 0543     CULTURE, AEROBIC No Growth    Aerobic culture [[id number]] Collected: 04/03/25 1816    Order Status: Completed Specimen: Wound from Arm, Left Updated: 04/06/25 0543     CULTURE, AEROBIC No Growth    Gram stain [[id number]] Collected: 04/03/25 1923    Order Status: Completed Specimen: Tissue from Arm, Left Updated: 04/04/25 0837     GRAM STAIN Rare WBC seen      Few Gram positive cocci    Gram stain [[id number]] Collected: 04/03/25 1935    Order Status: Completed Specimen: Tissue from Arm, Left Updated: 04/04/25 0836     GRAM STAIN Rare WBC seen      No organisms seen    Gram stain [[id number]] Collected: 04/03/25 1914    Order Status: Completed Specimen: Wound from Arm, Left Updated: 04/04/25 0836     GRAM STAIN Rare WBC seen      No organisms seen    Gram stain [[id number]] Collected: 04/03/25 2011    Order Status: Completed Specimen: Wound from Arm, Left Updated: 04/04/25 0835     GRAM STAIN Rare WBC seen      No organisms seen    Gram stain [[id number]] Collected: 04/03/25  1821    Order Status: Completed Specimen: Wound from Arm, Left Updated: 04/04/25 0835     GRAM STAIN No WBCs      No organisms seen    Gram stain [[id number]] Collected: 04/03/25 1943    Order Status: Completed Specimen: Wound from Arm, Left Updated: 04/04/25 0834     GRAM STAIN Rare WBC seen      No organisms seen    Gram stain [[id number]] Collected: 04/03/25 1954    Order Status: Completed Specimen: Wound from Arm, Left Updated: 04/04/25 0833     GRAM STAIN Rare WBC seen      No organisms seen    Fungus culture [[id number]] Collected: 04/03/25 1934    Order Status: Sent Specimen: Tissue from AV Fistula, Left Updated: 04/03/25 2107    Fungus culture [[id number]] Collected: 04/03/25 1935    Order Status: Canceled Specimen: Tissue from Arm, Left Updated: 04/03/25 2107    Fungus culture [[id number]] Collected: 04/03/25 2011    Order Status: Sent Specimen: Wound from Arm, Left Updated: 04/03/25 2106    Fungus culture [[id number]] Collected: 04/03/25 1816    Order Status: Sent Specimen: Wound from Arm, Left Updated: 04/03/25 2106    Fungus culture [[id number]] Collected: 04/03/25 1904    Order Status: Sent Specimen: Tissue from AV Fistula, Left Updated: 04/03/25 2106    Fungus culture [[id number]] Collected: 04/03/25 1921    Order Status: Sent Specimen: Tissue from hematoma Updated: 04/03/25 2105                          Assessment & Plan  Septic shock  This patient has shock. The type of shock is distributive due to sepsis. The patient had the following evidence of shock: persistent hypotension and altered mental status. 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 Shock is now resolved and vasopressors are off. WBC worsening  HTN (hypertension)  Patient's blood pressure  "range in the last 24 hours was: BP  Min: 67/47  Max: 143/63.The patient's inpatient anti-hypertensive regimen is listed below:  Current Antihypertensives       Plan  - admitted with shock  - now off vasopressors. Continue to hold BP Rx as BP is well controlled without it   HLD (hyperlipidemia)  - continue statin  Type 2 diabetes mellitus with hyperglycemia, without long-term current use of insulin  A1c:   Lab Results   Component Value Date    HGBA1C 5.7 (H) 04/02/2025     Meds: lantus + SSI PRN to maintain goal 140-180  Tube feeds  accuchecks, hypoglycemic protocol      Coronary artery disease involving native coronary artery of native heart without angina pectoris  Patient with known CAD s/p stent placement, which is controlled Will continue ASA, Plavix, and Statin and monitor for S/Sx of angina/ACS. Continue to monitor on telemetry.   - last C was 1/2025: Severely calcified mid RCA stenosis unable to cross with PTCA balloons, treated initially with 0.9 laser atherectomy, followed by CSI atherectomy system with total of 5 runs (3 at low speed, 2 at high speed), pre-dilated using 2.0 compliant and 3.5 noncompliant balloon followed by 3.5 X 16 mm megatron stent.  Focal plaque rupture/erosion noted in the proximal and ostial RCA that were treated with  3.5 X 28 in the proximal RCA, 4.0 X 16 mm in the ostial RCA.  No residual stenosis post intervention.  Patient had ALAN 3 flow at the end of the procedure  - with elevated troponin likely due to septic shock  Recent Labs   Lab 04/04/25  1028   TROPONINI 2.913*   - continue asa, plavix, statin  - Cardiology consulted  - no plans for ischemic evaluation at this time   ESRD on hemodialysis  - ESRD on HD via LUE AVF  - LUE AVF was actively bleeding on arrival on 4/3/25. Vascular surgery was consulted. He went emergently to the OR on 4/3/25: "Severely calcified mid RCA stenosis unable to cross with PTCA balloons, treated initially with 0.9 laser atherectomy, followed by " "CSI atherectomy system with total of 5 runs (3 at low speed, 2 at high speed), pre-dilated using 2.0 compliant and 3.5 noncompliant balloon followed by 3.5 X 16 mm megatron stent.  Focal plaque rupture/erosion noted in the proximal and ostial RCA that were treated with  3.5 X 28 in the proximal RCA, 4.0 X 16 mm in the ostial RCA.  No residual stenosis post intervention.  Patient had ALNA 3 flow at the end of the procedure"  - cultures from AVF= Staph   - wound care orders in place for surgical site  - Nephrology is consulted  - trialysis placed on 4/4/25 for CRRT  - he will need THDC when bacteremia is resolved   Anemia in ESRD (end-stage renal disease)  Anemia is likely due to  ESRD, blood loss . Most recent hemoglobin and hematocrit are listed below.  Recent Labs     04/06/25  0317 04/07/25  0419 04/08/25  0243   HGB 8.2* 8.3* 6.1*   HCT 25.8* 25.8* 19.7*     Plan  - Monitor serial CBC: Daily and recheck now  - Transfuse PRBC if patient becomes hemodynamically unstable, symptomatic or H/H drops below 7/21.  - Patient has not received any PRBC transfusions to date  - Patient's anemia is currently stable  Acute metabolic encephalopathy  - he is oriented to self but otherwise confused  - CTH 4/1/25 with no acute process  - suspect this is due to sepsis  - NGT placed on 4/4/25 so that he could get his asa/plavix  - swallow- continue puree. Continue NGT until definitely improving and swallowing Rx     ACP (advance care planning)  Advance Care Planning     Date: 04/04/2025  - palliative consulted   - Mr Escoto specifically told Dr Jordan to contact Kenia Smyth for all updates  - discussed code status with Kenia. She states she has spoken with Mr Escoto's sisters and they all want full code status.          Acute bacterial endocarditis  - TTE 4/4/25: "1.9x1.2cm large fixed heterogeneous mass present on the posterior leaflet (new finding vs 9/2023 echo). There is moderate to severe regurgitation with an " "eccentric jet. Cannot exclude severe MR with possible PMVL perforation in association with large vegetation."  - this is in the setting of MRSA bacteremia  - ID is consulted  - repeat blood cultures until clear   - suspect source is skin/chronic scratching vs AVF infection- cultures from resected AVF with Staph   - discussed with Cardiac surgery Dr Castro 4/4/25- given age and comorbidities, he is very high risk of mortality with surgery. He recommends medical management at this point.  - on vancomycin      MRSA bacteremia  - suspect source= chronic skin scratching vs infected AVF  - cultures of AVF with Staph   - he has endocarditis  - ID consulted  - on vanc     NSTEMI (non-ST elevated myocardial infarction)  - see CAD    BPH with urinary obstruction  - noted 4/6/25 when patient became very agitated and screaming he couldn't urinate  - nursing unable to place dunaway/coude  - Urology consulted. Bedside cystoscopy on 4/6/25 noted clots, large prostate. Catheter was placed but was then removed due to pain  - 4/7 he is again agitated that he cannot urinate  - follow up with Urology   - tamsulosin ordered if he is awake enough to swallow     Thrombocytopenia  The likely etiology of thrombocytopenia is infection and sepsis. The patients 3 most recent labs are listed below.  Recent Labs     04/06/25  0317 04/07/25  0419 04/08/25  0243   * 141* 134*     Plan  - Will transfuse if platelet count is <10k.    AV fistula infection  - LUE AVF was actively bleeding on arrival on 4/3/25. Vascular surgery was consulted. He went emergently to the OR on 4/3/25: "Severely calcified mid RCA stenosis unable to cross with PTCA balloons, treated initially with 0.9 laser atherectomy, followed by CSI atherectomy system with total of 5 runs (3 at low speed, 2 at high speed), pre-dilated using 2.0 compliant and 3.5 noncompliant balloon followed by 3.5 X 16 mm megatron stent.  Focal plaque rupture/erosion noted in the proximal and " "ostial RCA that were treated with  3.5 X 28 in the proximal RCA, 4.0 X 16 mm in the ostial RCA.  No residual stenosis post intervention.  Patient had ALAN 3 flow at the end of the procedure"  - cultures from AVF= Staph   - wound care orders in place for surgical site  - trialysis currently in place for HD    Emphysema lung  - emphysema noted on CT  - no signs of COPD exacerbation  Pulmonary nodules  - CT 4/3/25 with few small pulmonary nodules  - will need outpatient follow up     Gross hematuria      VTE Risk Mitigation (From admission, onward)           Ordered     heparin (porcine) injection 1,000 Units  As needed (PRN)         04/05/25 2100     IP VTE HIGH RISK PATIENT  Once         04/03/25 1516     Place TEMO hose  Until discontinued         04/03/25 1516     Place sequential compression device  Until discontinued         04/03/25 1516     Reason for No Pharmacological VTE Prophylaxis  Once        Question:  Reasons:  Answer:  Physician Provided (leave comment)    04/03/25 1516                    Discharge Planning   BAYRON:      Code Status: Full Code   Medical Readiness for Discharge Date:   Discharge Plan A: [place] [facility] [place]            Critical care time spent on the evaluation and treatment of severe organ dysfunction, review of pertinent labs and imaging studies, discussions with consulting providers and discussions with patient/family: 35 minutes.            Gonzalez Reagan MD  Department of Hospital Medicine   [facility] [place] - Intensive Care    "

## 2025-04-08 NOTE — NURSING
[facility], [facility]  Nurses Note -- 4 Eyes      4/7/2025       Skin assessed on: Q Shift      [x] No Pressure Injuries Present    [x]Prevention Measures Documented    [] Yes LDA  for Pressure Injury Previously documented     [] Yes New Pressure Injury Discovered   [] LDA for New Pressure Injury Added      Attending RN:  Hannah Peralta RN     Second RN:  CHARLENE Solano

## 2025-04-08 NOTE — ASSESSMENT & PLAN NOTE
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 man with ESRD with LUE AVF, HFpEF, CAD, DM admitted to OSH 4/1- 4/3 with sepsis due to MRSA bacteremia, transferred to  ICU for septic shock due to MRSA bacteremia and thrombosis of AV graft on 4/3 s/p exploratory surgery of LUE with graft resection 4/3. Op findings: Ruptured pseudoaneurysm with infected hematoma, graft completely obliterated at mid segment. Suspect infected graft is the source of bacteremia.     BCx 4/2 MRSA  BCx 4/4 +  LUE surgical cx growing s aureus    TTE: 1.9x1.2cm large fixed heterogeneous mass present on the posterior leaflet , moderate to severe regurgitation with an eccentric jet. Cannot exclude severe MR with possible PMVL perforation in association with large vegetation. Not a surgical candidate.    Persistently positive blood cultures in the setting of acute IE. Deemed a poor surgical candidate.    If unable to sterilize his blood cultures, then his prognosis is very poor.       Recommendations:  --continue vancopmycin  --anticipate atleast 6 wks iv abx from culture clearance, if that occurs  --if his repeat blood cultures from today still yield MRSA, can consider salvage therapy with ceftaroline and daptomycin (see reference below)  --time will still tell but he remains persistently bacteremia  --consider DNR status

## 2025-04-08 NOTE — PROGRESS NOTES
[facility] Intensive Care  Cardiology  Progress Note    Patient Name: Jevon Rajan  MRN: [id number]  Admission Date: 4/3/2025  Hospital Length of Stay: 5 days  Code Status: Full Code   Attending Physician: Gonzalez Reagan MD   Primary Care Physician: Melia, Primary Doctor  Expected Discharge Date:   Principal Problem:Septic shock    Subjective:     Interval Hx: pt seen in ICU, case c/w RN. No cp/sob.  Cysto yesterday, now with CBI.    Tele: SR 80s, NSVT 28 beats (personally reviewed and interpreted)      Past Medical History:   Diagnosis Date    BPH (benign prostatic hyperplasia)     Coronary artery disease     He has followed at the [facility] and is now transferring his care to this clinic. In 2014 he had stent to LAD. He states he has had 2 heart attacks. He does not get angina. There was 90% left anterior descending artery stenosis undergoing stenting. There was also a circumflex marginal branch stenosis of 70%.    Diabetes mellitus, type 2     ESRD (end stage renal disease) on dialysis     ESRD on HD TTS via left brachial AVF    Hypertension     Hypothyroidism, unspecified     NSTEMI (non-ST elevated myocardial infarction) 4/4/2025    Sepsis 4/2/2025    Symptomatic anemia 01/15/2024       Past Surgical History:   Procedure Laterality Date    ANGIOGRAM, CORONARY, WITH LEFT HEART CATHETERIZATION  1/13/2025    Procedure: Angiogram, Coronary, with Left Heart Cath;  Surgeon: Steve Bhat MD;  Location: [facility] CATH LAB/EP;  Service: Cardiology;;    ATHERECTOMY, CORONARY N/A 1/14/2025    Procedure: Atherectomy-coronary;  Surgeon: Giacomo Pittman MD;  Location: [facility] CATH LAB/EP;  Service: Cardiology;  Laterality: N/A;    bilateral foot surgery      CORONARY ANGIOPLASTY WITH STENT PLACEMENT N/A 1/14/2025    Procedure: ANGIOPLASTY, CORONARY ARTERY, WITH STENT INSERTION;  Surgeon: Giacomo Pittman MD;  Location: [facility] CATH LAB/EP;  Service: Cardiology;  Laterality: N/A;    CORONARY STENT PLACEMENT N/A 1/13/2025     Procedure: INSERTION, STENT, CORONARY ARTERY;  Surgeon: Steve Bhat MD;  Location: [facility] CATH LAB/EP;  Service: Cardiology;  Laterality: N/A;    ESOPHAGOGASTRODUODENOSCOPY N/A 2/27/2024    Procedure: EGD (ESOPHAGOGASTRODUODENOSCOPY);  Surgeon: Gemma Almendarez MD;  Location: [facility] ENDO;  Service: Endoscopy;  Laterality: N/A;    EXPLORATION, ARTERY, UPPER EXTREMITY Left 4/3/2025    Procedure: EXPLORATION, ARTERY, UPPER EXTREMITY;  Surgeon: Sunil Contreras MD;  Location: [facility] OR;  Service: Vascular;  Laterality: Left;    eyelid surgery      FISTULOGRAM Left 11/27/2019    Procedure: Fistulogram;  Surgeon: MELANY Brenner III, MD;  Location: [address];  Service: Peripheral Vascular;  Laterality: Left;    FISTULOGRAM Left 11/27/2019    Procedure: Fistulogram, AVG declot, possible permacath;  Surgeon: MELANY Brenner III, MD;  Location: [facility] CATH LAB;  Service: Peripheral Vascular;  Laterality: Left;    INSERTION OF DIALYSIS CATHETER      IVUS, CORONARY  1/13/2025    Procedure: IVUS, Coronary;  Surgeon: Steve Bhat MD;  Location: [facility] CATH LAB/EP;  Service: Cardiology;;    LEFT HEART CATHETERIZATION Left 1/14/2025    Procedure: Left heart cath;  Surgeon: Giacomo Pittman MD;  Location: [facility] CATH LAB/EP;  Service: Cardiology;  Laterality: Left;    MOH's  12/5/13    PERCUTANEOUS CORONARY INTERVENTION, ARTERY N/A 1/14/2025    Procedure: Percutaneous coronary intervention;  Surgeon: Giacomo Pittman MD;  Location: [facility] CATH LAB/EP;  Service: Cardiology;  Laterality: N/A;    PERCUTANEOUS TRANSLUMINAL BALLOON ANGIOPLASTY OF CORONARY ARTERY  1/13/2025    Procedure: Angioplasty-coronary;  Surgeon: Steve Bhat MD;  Location: [facility] CATH LAB/EP;  Service: Cardiology;;    REMOVAL, GRAFT, ARTERIOVENOUS, UPPER EXTREMITY Left 4/3/2025    Procedure: REMOVAL, GRAFT, ARTERIOVENOUS, UPPER EXTREMITY;  Surgeon: Sunil Contreras MD;  Location: [facility] OR;  Service: Vascular;  Laterality: Left;     REVASCULARIZATION, ENDOVASCULAR, LE W/ INTRAVASCULAR LITHOTRIPSY  1/13/2025    Procedure: REVASCULARIZATION, ENDOVASCULAR, LE W/ INTRAVASCULAR LITHOTRIPSY;  Surgeon: Steve Bhat MD;  Location: [facility] CATH LAB/EP;  Service: Cardiology;;    right hand surgery      stents         Review of patient's allergies indicates:   Allergen Reactions    Ace inhibitors Hives, Itching, Shortness Of Breath, Other (See Comments) and Rash     Is not sure which medication it was    Captopril        No current facility-administered medications on file prior to encounter.     Current Outpatient Medications on File Prior to Encounter   Medication Sig    ammonium lactate 12 % Crea Apply topically 2 (two) times a day.    artificial tears,hypromellose,,GENTEAL/SUSTANE, 0.3 % Gel Apply to eye nightly.    aspirin (ECOTRIN) 81 MG EC tablet Take 1 tablet (81 mg total) by mouth once daily.    atorvastatin (LIPITOR) 80 MG tablet Take 1 tablet (80 mg total) by mouth every evening.    camphor-menthol 0.5-0.5% (SARNA) lotion Apply topically once daily. APPLY SMALL AMOUNT TOPICALLY TO AFFECTED AREA(S) AS NEEDED FOR ITCHING KEEP IN FRIDGE    carboxymethylcellulose sodium 0.5 % Drop Apply 1 drop to eye nightly as needed (dry eyes).    carvediloL (COREG) 12.5 MG tablet Take 0.5 tablets (6.25 mg total) by mouth 2 (two) times daily.    cetirizine (ZYRTEC) 5 MG tablet Take 1 tablet (5 mg total) by mouth every 48 hours.    clobetasol 0.05% (TEMOVATE) 0.05 % Oint Apply topically 2 (two) times daily.    clopidogreL (PLAVIX) 75 mg tablet Take 1 tablet (75 mg total) by mouth once daily.    erythromycin (ROMYCIN) ophthalmic ointment Place a 1/2 inch ribbon of ointment into the lower eyelid. Four timex daily for 5 days    gabapentin (NEURONTIN) 100 MG capsule Take 1 capsule (100 mg total) by mouth every evening.    hydrophilic ointment (AQUABASE) Apply topically as needed for Dry Skin. APPLY MODERATE AMOUNT TOPICALLY TO AFFECTED AREA(S) TWICE A DAY AS  NEEDED FOR DRY SKIN APPLY TO AREAS OF DRY SKIN OR OVER ENTIRE BODY.    lanolin alcohol-mineral oil-white petrolatum-ceres (EUCERIN) Crea cream Apply topically 2 (two) times daily as needed.    LIDOcaine (LIDODERM) 5 % Place 1 patch onto the skin once daily. Remove & Discard patch within 12 hours or as directed by MD    loratadine (CLARITIN) 10 mg tablet Take 10 mg by mouth daily as needed for Allergies (Every other day).    nut.tx.imp.renal fxn,lac-reduc (NEPRO CARB STEADY) 0.08 gram-1.8 kcal/mL Liqd Take 240 mLs by mouth 2 (two) times a day.    ondansetron (ZOFRAN-ODT) 4 MG TbDL Take 1 tablet (4 mg total) by mouth every 8 (eight) hours as needed (nausea).    oxyCODONE-acetaminophen (PERCOCET) 5-325 mg per tablet Take 1 tablet by mouth every 6 (six) hours as needed.    pantoprazole (PROTONIX) 20 MG tablet Take 20 mg by mouth 2 (two) times daily as needed.    pramoxine 1 % Lotn Apply topically 2 (two) times daily as needed (itching).    triamcinolone acetonide 0.1% (KENALOG) 0.1 % cream Apply topically 2 (two) times daily as needed (itching).     Family History    None       Tobacco Use    Smoking status: Never    Smokeless tobacco: Never   Substance and Sexual Activity    Alcohol use: No    Drug use: No    Sexual activity: Not Currently     Review of Systems   Gastrointestinal:  Negative for melena.   Genitourinary:  Positive for hematuria.     Objective:     Vital Signs (Most Recent):  Temp: 97.8 °F (36.6 °C) (04/08/25 0745)  Pulse: 79 (04/08/25 0858)  Resp: 12 (04/08/25 0858)  BP: (!) 98/52 (04/08/25 0745)  SpO2: 100 % (04/08/25 0858) Vital Signs (24h Range):  Temp:  [94.2 °F (34.6 °C)-98.6 °F (37 °C)] 97.8 °F (36.6 °C)  Pulse:  [76-93] 79  Resp:  [7-33] 12  SpO2:  [81 %-100 %] 100 %  BP: ()/(44-76) 98/52     Weight: 61.2 kg (134 lb 14.7 oz)  Body mass index is 21.13 kg/m².    SpO2: 100 %         Intake/Output Summary (Last 24 hours) at 4/8/2025 0930  Last data filed at 4/8/2025 0932  Gross per 24 hour    Intake 2602.34 ml   Output 2084 ml   Net 518.34 ml       Lines/Drains/Airways       Central Venous Catheter Line  Duration             Trialysis (Dialysis) Catheter 04/04/25 1208 right internal jugular 3 days              Drain  Duration                  NG/OG Tube 04/04/25 1415 Rolf 10 Fr. Left nostril 3 days         Urethral Catheter 04/07/25 1558 Triple-lumen;Latex 24 Fr. <1 day              Peripheral Intravenous Line  Duration                  Peripheral IV - Single Lumen 04/02/25 1801 20 G Anterior;Distal;Right Upper Arm 5 days                   Exam unchanged vs 4/7/25  Physical Exam  Constitutional:       General: He is not in acute distress.     Appearance: Normal appearance. He is well-developed. He is ill-appearing. He is not toxic-appearing or diaphoretic.   HENT:      Head: Normocephalic and atraumatic.   Eyes:      General: No scleral icterus.     Extraocular Movements: Extraocular movements intact.      Conjunctiva/sclera: Conjunctivae normal.      Pupils: Pupils are equal, round, and reactive to light.   Neck:      Thyroid: No thyromegaly.      Vascular: No JVD.      Trachea: No tracheal deviation.   Cardiovascular:      Rate and Rhythm: Normal rate and regular rhythm.      Heart sounds: S1 normal and S2 normal. Heart sounds are distant. No murmur heard.     No friction rub. No gallop.   Pulmonary:      Effort: Pulmonary effort is normal. No respiratory distress.      Breath sounds: Normal breath sounds. No stridor. No wheezing, rhonchi or rales.   Chest:      Chest wall: No tenderness.   Abdominal:      General: There is no distension.      Palpations: Abdomen is soft.   Musculoskeletal:         General: No swelling or tenderness. Normal range of motion.      Cervical back: Normal range of motion and neck supple. No rigidity.      Right lower leg: No edema.      Left lower leg: No edema.   Skin:     General: Skin is warm and dry.      Coloration: Skin is not jaundiced.   Neurological:       General: No focal deficit present.      Mental Status: He is alert.      Cranial Nerves: No cranial nerve deficit.   Psychiatric:         Mood and Affect: Mood normal.         Behavior: Behavior normal.          Current Medications:   albumin human 25%  25 g Intravenous Once    atorvastatin  80 mg Oral QHS    clopidogreL  75 mg Oral Daily    epoetin mj-epbx  10,000 Units Intravenous Every Mon, Wed, Fri    insulin aspart U-100  4 Units Subcutaneous TIDWM    insulin glargine U-100  14 Units Subcutaneous Daily    midodrine  10 mg Oral Q8H    mupirocin   Nasal BID    pantoprazole  40 mg Oral Daily    sodium chloride 0.9%  10 mL Intravenous Q8H    tamsulosin  0.4 mg Oral Daily         Current Facility-Administered Medications:     0.9%  NaCl infusion (for blood administration), , Intravenous, Q24H PRN    0.9% NaCl, , Intravenous, PRN    0.9% NaCl, , Intravenous, PRN    acetaminophen, 650 mg, Oral, Q4H PRN    dextrose 50%, 12.5 g, Intravenous, PRN    dextrose 50%, 25 g, Intravenous, PRN    diphenhydrAMINE, 50 mg, Oral, Q6H PRN    glucagon (human recombinant), 1 mg, Intramuscular, PRN    glucose, 16 g, Oral, PRN    glucose, 24 g, Oral, PRN    heparin (porcine), 1,000 Units, Intravenous, PRN    insulin aspart U-100, 0-5 Units, Subcutaneous, QID (AC + HS) PRN    melatonin, 6 mg, Oral, Nightly PRN    naloxone, 0.02 mg, Intravenous, PRN    ondansetron, 4 mg, Intravenous, Q6H PRN    prochlorperazine, 5 mg, Intravenous, Q6H PRN    sodium chloride 0.9%, 250 mL, Intravenous, PRN    Pharmacy to dose Vancomycin consult, , , Once **AND** vancomycin - pharmacy to dose, , Intravenous, pharmacy to manage frequency    Laboratory (all labs reviewed):  CBC:  Recent Labs   Lab 04/04/25  0247 04/05/25  0324 04/06/25  0317 04/07/25  0419 04/08/25  0243   WBC 16.11 H 17.18 H 15.49 H 18.35 H 11.26   HGB 10.0 L 8.6 L 8.2 L 8.3 L 6.1 L   HCT 30.0 L 26.7 L 25.8 L 25.8 L 19.7 LL   Platelet Count 121 L 109 L 118 L 141 L 134 L        CHEMISTRIES:  Recent Labs   Lab 01/16/25  0154 02/08/25  1014 02/11/25  1325 02/23/25  1638 03/21/25  1808 03/25/25  1108 04/03/25 2055 04/04/25  0247 04/04/25  1439 04/04/25 2001 04/04/25  2151 04/05/25  0324 04/05/25  1159 04/05/25  1426 04/06/25  0317 04/07/25  0419 04/08/25  0243   Glucose 141 H 171 H 225 H 113 H 150 H  --   --   --   --   --   --   --   --   --   --   --   --    Sodium 140 138 138 141 141   < > 138 139   < >  --    < > 140  --  139 139 141 140   Potassium 4.0 4.5 4.4 5.1 4.4   < > 4.8 5.0   < >  --    < > 4.2  --  4.0 3.7 4.2 3.9   BUN 44 H 63 H 75 H 77 H 91 H   < > 70 H 75 H   < >  --    < > 58 H  --  49 H 59 H 77 H 70 H   Creatinine 10.0 H 8.6 H 8.9 H 9.8 H 10.9 H   < > 9.3 H 9.9 H   < >  --    < > 7.3 H  --  6.2 H 7.6 H 9.7 H 8.8 H   eGFR 5 A 5.5 A 5.3 A 4.7 A 4.1 A   < > 5 L 5 L   < >  --    < > 7 L  --  8 L 6 L 5 L 5 L   Calcium 8.9 10.2 10.1 10.9 H 10.4   < > 8.1 L 8.2 L   < >  --    < > 8.4 L  --  8.2 L 8.5 L 8.6 L 8.1 L   Magnesium   --   --   --   --   --    < > 2.2 2.3  --  2.2  --  2.1 2.0  --   --   --   --    Magnesium  --   --   --   --  2.5  --   --   --   --   --   --   --   --   --   --   --   --     < > = values in this interval not displayed.       CARDIAC BIOMARKERS:  Recent Labs   Lab 04/01/24  0251 04/29/24  2211 08/03/24  0015 08/05/24  0138 10/20/24  2310 10/21/24  0113 04/01/25  2220 04/02/25  0846 04/02/25  2055 04/03/25  0303 04/03/25  0718 04/03/25  1533 04/04/25  1028     --  183  --  111  --  382 H  --   --   --   --   --   --    Troponin I  --    < > 0.080 H   < > 0.044 H   < >  --   --   --   --   --   --   --    Troponin-I  --   --   --   --   --   --   --    < > 1.076 H 1.441 H 1.812 H 2.032 H 2.913 H    < > = values in this interval not displayed.       COAGS:  Recent Labs   Lab 11/28/23  2155 04/18/24  0609 01/12/25  1316 03/25/25  1108 04/03/25  1533   INR 1.1 1.0 1.0 1.0 1.1       LIPIDS/LFTS:  Recent Labs   Lab 08/11/24  0331 08/15/24  0203  04/03/25 2055 04/04/25 0247 04/06/25  0317 04/07/25  0419 04/08/25  0243   Cholesterol 110 L  --   --   --   --   --   --    Triglycerides 126  --   --   --   --   --   --    HDL 26 L  --   --   --   --   --   --    LDL Cholesterol 58.8 L  --   --   --   --   --   --    Non-HDL Cholesterol 84  --   --   --   --   --   --    AST  --    < > 24 23 25 20 16   ALT  --    < > 18 17 21 20 13    < > = values in this interval not displayed.       BNP:  Recent Labs   Lab 06/11/24  2239 08/03/24  0015 08/11/24  0332 01/12/25  0550 04/03/25  0303   BNP 1,110 H 624 H 2,178 H 2,043 H 2,988 H       TSH:  Recent Labs   Lab 11/27/23  0519 01/15/24  2135 07/09/24  1428 08/11/24  0332 04/02/25  0537   TSH 3.357 4.600 H 6.963 H 3.949 4.590 H       Free T4:  Recent Labs   Lab 01/15/24  2135 07/09/24  1428 04/02/25  0537   Free T4 0.81 1.01 1.19  1.19       Diagnostic Results:  ECG (personally reviewed and interpreted tracing(s)):  4/4/25 1325 SR 79, PRWP, lat ST abnl ?isch, similar to 3/25/25, lass prom than 4/2/25    Chest X-Ray (personally reviewed and interpreted image(s)): 4/4/25 NAD, aortic atherosclerosis    Echo: 4/4/25 (images prev personally reviewed and interpreted)    Left Ventricle: The left ventricle is normal in size. Normal wall thickness. Mild global hypokinesis present. There is mildly reduced systolic function with a visually estimated ejection fraction of 40 - 45%. Grade I diastolic dysfunction.    Right Ventricle: The right ventricle has moderate enlargement. Systolic function is mildly reduced.    Left Atrium: Mildly dilated Cannot exclude PFO (color Doppler with possible flow across IAS).    Aortic Valve: The aortic valve is a trileaflet valve. There is moderate aortic valve sclerosis. Mildly restricted motion. There is mild stenosis. Aortic valve area by VTI is 1.8 cm². Aortic valve peak velocity is 1.5 m/s. Mean gradient is 5 mmHg. The dimensionless index is 0.51. There is mild aortic regurgitation.    Mitral  Valve: There is a 1.9x1.2cm large fixed heterogeneous mass present on the posterior leaflet (new finding vs 9/2023 echo). There is moderate to severe regurgitation with an eccentric jet.  Cannot exclude severe MR with possible PMVL perforation in association with large vegetation.    Aorta: Aortic root is mildly to moderately dilated measuring 4.17 cm.    Pulmonary Artery: The estimated pulmonary artery systolic pressure is 42 mmHg.    PCI RCA 1/14/25:    The Ost RCA lesion was 70% stenosed with 0% stenosis post-intervention.    The Prox RCA lesion was 70% stenosed with 0% stenosis post-intervention.    The Mid RCA lesion was 99% stenosed with 0% stenosis post-intervention.    The ejection fraction was calculated to be 55%.    The pre-procedure left ventricular end diastolic pressure was 6.    The post-procedure left ventricular end diastolic pressure was 7.    Mid RCA lesion: A stent was successfully placed at 11 MAYRA for 15 sec.    Prox RCA lesion, Mid RCA lesion: A  at 12 MAYRA for 10 sec.    Ost RCA lesion, Prox RCA lesion: A stent was successfully placed.  Procedure performed:  Left heart catheterization  Coronary angiogram of the right coronary artery  Right radial artery access   Right superficial femoral artery access  CSI atherectomy of the right mid coronary artery  Laser atherectomy of the right mid coronary artery  PTCA of the right mid coronary artery  XU x3 megatron drug-eluting stent 3.5 X 16 in the mid RCA, 3.5 X 28 in the proximal RCA, 4.0 X 16 mm in the ostial RCA  Findings:  Severely calcified mid RCA stenosis unable to cross with PTCA balloons, treated initially with 0.9 laser atherectomy, followed by CSI atherectomy system with total of 5 runs (3 at low speed, 2 at high speed), pre-dilated using 2.0 compliant and 3.5 noncompliant balloon followed by 3.5 X 16 mm megatron stent.  Focal plaque rupture/erosion noted in the proximal and ostial RCA that were treated with  3.5 X 28 in the proximal RCA,  4.0 X 16 mm in the ostial RCA.  No residual stenosis post intervention.  Patient had ALAN 3 flow at the end of the procedure  LVEDP 6 mm Hg.  EF of around 55%.  No gradient across the aortic valve.  Right SFA access.  High bifurcation.  Mild diffuse disease of the right SFA closed using Perclose closure device  Right radial artery with severe spasm and hence decision was made to switch to right groin access  Recommendations:  Continue dual antiplatelet therapy for at least 1 year.  Consider longer term anticoagulation based on risk factor profile after 1 year  High-intensity statin  Transfer back to telemetry unit    Cath/PCI LAD 1/13/25  Left Main Coronary Artery: The left main is a large caliber vessel arising normally from the left sinus of valsalva.  It bifurcates into the left anterior descending and left circumflex coronary artery.  It is free of evidence of obstructive coronary artery disease. ALAN III flow  Left Anterior Descending: The left anterior descending coronary artery is a large caliber vessel arising normally from the left main and extending to the apex.  It gives rise to small to moderate caliber diagonal arteries. Proximal LAD has a severe 80-90% calcified stenosis prior to an under expanded 2.5 mm stent in a 4.0 mm vessel. After the stent there is 40-50% stenosis. ALAN II flow  Left Circumflex: The left circumflex is a large caliber vessel arising normally from the left main coronary artery, it is non-dominant.  It gives rise to moderate caliber obtuse marginal branches. OM stent is patent. ALAN III flow  Right Coronary Artery: The right coronary artery is a large caliber vessel arising normally from the right sinus of valsalva.  It is dominant giving rise to a PDA and PLV branch. Mid RCA with a 95-99% calcified fibrotic stenosis. ALAN III flow  LVgram: LVEDP 33 mmHg  PCI procedure:  Heparin administered. ACT was closely monitored. Using a EBU 3.5 guider, this was used to cannulate the left  system. Nuno blue PCI wire repshaped outside the body. PCI wire was advanced into the distal LAD. Predilated with 2.0 mm NC, 2.5 mm NC, scoring balloon 2.5 mm, 3.0 mm and 3.5 mm NC. IVUS was advanced for vessel prep/size. Schockwave 3.0 x 12 mm advanced and multiple therapies given. Followed by a 3.5 mm NC. Advanced a 3.5 x 24 mm megatron XU and deployed at nominal pressure. Post dilated with a 4.0 mm NC with great results. After the balloon was removed.  The wire was left in place.  Repeat angiography showed no signs of dissection.  Subsequently the wire was pulled back.   Final angiography showed ALAN-3 flow.  No signs of dissection, perforation, or thrombus.  Assessment:   Successful PCI of LAD with 3.5 x 24 mm XU  mRCA 95-99% stenosis--> staged PCI of RCA  Patent Lcx stent  Plan:   - Stage PCI of RCA tomorrow PM- NPO at [facility]   - Patient tolerated procedure well. No immediate complications  - Continue DAPT  - EKG when arrives to floor  - Routine post cath protocol  - Dialysis in pm   - Maximize medical management  - Further care by the primary team    Cath/PCI OM2 12/4/23  1.  Selective left coronary Angiography.   2.  IVUS of Proximal OM2 coronary artery   3.  PCI of Proximal OM2 coronary artery with one 3.5 x 18 Michael stent   4.  Ultrasound guided right radial arterial access   5. Conscious sedation from 7:11 AM to 8:03 AM (52 minutes of sedation)   Findings:   - Coronary angiography data   Left main: Large caliber vessel, divides into the left anterior descending   and left circumflex. LM is non obstructive.   Left anterior descending: Large caliber vessel giving rise to 2 diagonals.   Patent mid LAD stents.   Left circumflex: Medium caliber vessel giving rise to 2 obtuse marginals.   OM2 has a proximal 99% ISR.   Right coronary artery: Not studied but known mid RCA lesion (see report   from last week)   - IVUS data of OM2    Pre PCI IVUS data:   Proximal reference luminal diameter: 3.6 mm   Distal reference  luminal diameter: 3.4 mm   Percutaneous Coronary Intervention   Successful PCI of Proximal OM2 coronary artery 99 % ISR stenosis was   reduced to 0 % (initial ALAN 3 flow) by pre dilatation with 3 x 15 mm   balloon followed by placement of 3.5 x 18 mm Kilgore stent with excellent   angiographic results with final ALAN 3 flow. IVUS guided.   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   -  Succesful PCI of Proximal OM2 coronary artery with one Kilgore stent  Recommendations:   - Post operative care as per protocol.   - Aspirin for life and P2Y12 inhibitor for at least a year   - Moderate to high statin therapy.   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  Cont abx.  Prognosis poor.    MRSA bacteremia  As above    Acute bacterial endocarditis  As above    NSVT (nonsustained ventricular tachycardia)  28 beats NSVT noted overnight  BP too low for BBL  Consider amio as contingency    Coronary artery disease involving native coronary artery of native heart without angina pectoris  Elev trop c/w NSTEMI, likely type II, doubt ACS.  Known MV CAD/PCI (most recently in Jan 2025)  Cont ASA/Plavix/Statin  No plan for inpat isch eval    ESRD on hemodialysis  Now on CRRT  Mgmt per IM/neph    HTN (hypertension)  Med rx on hold    HLD (hyperlipidemia)  Cont statin      Type 2 diabetes mellitus with hyperglycemia, without long-term current use of insulin  Mgmt per IM        VTE Risk Mitigation (From admission, onward)           Ordered     heparin (porcine) injection 1,000 Units  As needed (PRN)         04/05/25 2100     IP VTE HIGH RISK PATIENT  Once         04/03/25 1516     Place TEMO hose  Until discontinued         04/03/25 1516     Place sequential compression device  Until  discontinued         04/03/25 1516     Reason for No Pharmacological VTE Prophylaxis  Once        Question:  Reasons:  Answer:  Physician Provided (leave comment)    04/03/25 1516                  Pt seen in ICU, critical care time 35min.    Apolinar Tyson MD  Cardiology  [facility] - Intensive Care

## 2025-04-08 NOTE — SUBJECTIVE & OBJECTIVE
"Interval History: Events noted. Sharma placed - on CBI. Feels a little better today. Repeat cultures positive - repeated today.     Review of Systems   All other systems reviewed and are negative.    Objective:     Vital Signs (Most Recent):  Temp: 97.8 °F (36.6 °C) (04/08/25 0745)  Pulse: 77 (04/08/25 0745)  Resp: 11 (04/08/25 0745)  BP: (!) 98/52 (04/08/25 0745)  SpO2: 100 % (04/08/25 0745) Vital Signs (24h Range):  Temp:  [94.2 °F (34.6 °C)-98.6 °F (37 °C)] 97.8 °F (36.6 °C)  Pulse:  [76-93] 77  Resp:  [7-33] 11  SpO2:  [81 %-100 %] 100 %  BP: ()/(44-76) 98/52     Weight: 61.2 kg (134 lb 14.7 oz)  Body mass index is 21.13 kg/m².    Estimated Creatinine Clearance: 5.1 mL/min (A) (based on SCr of 8.8 mg/dL (H)).     Physical Exam  Vitals and nursing note reviewed.   Constitutional:       Appearance: Normal appearance.   HENT:      Head: Normocephalic and atraumatic.      Mouth/Throat:      Mouth: Mucous membranes are moist.      Comments: Poor dentition  Eyes:      Extraocular Movements: Extraocular movements intact.      Pupils: Pupils are equal, round, and reactive to light.   Cardiovascular:      Rate and Rhythm: Normal rate.      Heart sounds: Murmur heard.   Pulmonary:      Breath sounds: No wheezing, rhonchi or rales.   Abdominal:      General: There is no distension.      Tenderness: There is no abdominal tenderness. There is no right CVA tenderness, left CVA tenderness or guarding.   Musculoskeletal:         General: No swelling, tenderness or deformity.      Right lower leg: No edema.      Left lower leg: No edema.   Skin:     Findings: No erythema.   Neurological:      General: No focal deficit present.      Mental Status: He is alert.   Psychiatric:         Mood and Affect: Mood normal.         Behavior: Behavior normal.     Trialysis catheter, RIJ: placed 4/4/25     Significant Labs: Blood Culture: No results for input(s): "LABBLOO" in the last 4320 hours.  CBC:   Recent Labs   Lab 04/07/25  2779 " 04/08/25  0243   WBC 18.35* 11.26   HGB 8.3* 6.1*   HCT 25.8* 19.7*   * 134*     Microbiology Results (last 7 days)       Procedure Component Value Units Date/Time    Blood culture [[id number]]  (Abnormal) Collected: 04/06/25 0555    Order Status: Completed Specimen: Blood Updated: 04/08/25 0827     Blood Culture Positive - Aerobic/Pediatric Bottle     GRAM STAIN Gram positive cocci in clusters resembling Staph     Comment: Aerobic Bottle Positive   This is an appended report. These results have been appended to a previously preliminary verified report.       Blood culture [[id number]]  (Abnormal) Collected: 04/06/25 0542    Order Status: Completed Specimen: Blood Updated: 04/08/25 0827     Blood Culture Positive - Aerobic/Pediatric Bottle      Staphylococcus aureus     Comment: <null> has been updated to reportable.        GRAM STAIN Gram positive cocci in clusters resembling Staph     Comment: This is an appended report. These results have been appended to a previously preliminary verified report.       Narrative:      Aerobic Bottle Positive   ID Consult Strongly Recommended    Blood culture [[id number]] Collected: 04/08/25 0506    Order Status: Resulted Specimen: Blood Updated: 04/08/25 0511    Blood culture [[id number]] Collected: 04/08/25 0401    Order Status: Resulted Specimen: Blood Updated: 04/08/25 0506    Afb Culture Stain [[id number]] Collected: 04/03/25 1943    Order Status: Completed Specimen: Wound from Arm, Left Updated: 04/07/25 1637     ACID FAST STAIN  No acid fast bacilli seen    Afb Culture Stain [[id number]] Collected: 04/03/25 2011    Order Status: Completed Specimen: Wound from Arm, Left Updated: 04/07/25 1637     ACID FAST STAIN  No acid fast bacilli seen    Afb Culture Stain [[id number]] Collected: 04/03/25 1905    Order Status: Completed Specimen: Tissue from AV Fistula, Left Updated: 04/07/25 1623     ACID FAST STAIN  No acid fast bacilli seen    Afb Culture Stain [[id number]]  Collected: 04/03/25 1954    Order Status: Completed Specimen: Wound from Arm, Left Updated: 04/07/25 1623     ACID FAST STAIN  No acid fast bacilli seen    Afb Culture Stain [[id number]] Collected: 04/03/25 1816    Order Status: Completed Specimen: Tissue from AV Fistula, Left Updated: 04/07/25 1623     ACID FAST STAIN  No acid fast bacilli seen    Afb Culture Stain [[id number]] Collected: 04/03/25 1921    Order Status: Completed Specimen: Tissue from hematoma Updated: 04/07/25 1623     ACID FAST STAIN  No acid fast bacilli seen    Afb Culture Stain [[id number]] Collected: 04/03/25 1934    Order Status: Completed Specimen: Tissue from AV Fistula, Left Updated: 04/07/25 1618     ACID FAST STAIN  No acid fast bacilli seen    Blood culture [[id number]]  (Normal) Collected: 04/04/25 1044    Order Status: Completed Specimen: Blood Updated: 04/07/25 1100     Blood Culture No Growth After 72 Hours    Fungus culture [[id number]]  (Normal) Collected: 04/03/25 1943    Order Status: Completed Specimen: Wound from Arm, Left Updated: 04/07/25 0935     Fungal Culture Culture In Progress    Fungus culture [[id number]]  (Normal) Collected: 04/03/25 1954    Order Status: Completed Specimen: Wound from Arm, Left Updated: 04/07/25 0935     Fungal Culture Culture In Progress    AFB Culture & Smear [[id number]] Collected: 04/03/25 1816    Order Status: Completed Specimen: Tissue from AV Fistula, Left Updated: 04/07/25 0815     CULTURE, AFB  Culture In Progress    AFB Culture & Smear [[id number]] Collected: 04/03/25 1905    Order Status: Completed Specimen: Tissue from AV Fistula, Left Updated: 04/07/25 0815     CULTURE, AFB  Culture In Progress    AFB Culture & Smear [[id number]] Collected: 04/03/25 1921    Order Status: Completed Specimen: Tissue from hematoma Updated: 04/07/25 0815     CULTURE, AFB  Culture In Progress    AFB Culture & Smear [[id number]] Collected: 04/03/25 1934    Order Status: Completed Specimen: Tissue from AV  Fistula, Left Updated: 04/07/25 0815     CULTURE, AFB  Culture In Progress    AFB Culture & Smear [[id number]] Collected: 04/03/25 1943    Order Status: Completed Specimen: Wound from Arm, Left Updated: 04/07/25 0815     CULTURE, AFB  Culture In Progress    AFB Culture & Smear [[id number]] Collected: 04/03/25 1954    Order Status: Completed Specimen: Wound from Arm, Left Updated: 04/07/25 0815     CULTURE, AFB  Culture In Progress    AFB Culture & Smear [[id number]] Collected: 04/03/25 2011    Order Status: Completed Specimen: Wound from Arm, Left Updated: 04/07/25 0815     CULTURE, AFB  Culture In Progress    Culture, Anaerobic [[id number]] Collected: 04/03/25 2011    Order Status: Completed Specimen: Wound from Arm, Left Updated: 04/07/25 0748     Anaerobe Culture No Anaerobes Isolated    Culture, Anaerobic [[id number]] Collected: 04/03/25 1954    Order Status: Completed Specimen: Wound from Arm, Left Updated: 04/07/25 0747     Anaerobe Culture No Anaerobes Isolated    Culture, Anaerobic [[id number]] Collected: 04/03/25 1943    Order Status: Completed Specimen: Wound from Arm, Left Updated: 04/07/25 0747     Anaerobe Culture No Anaerobes Isolated    Culture, Anaerobic [[id number]] Collected: 04/03/25 1934    Order Status: Completed Specimen: Tissue from AV Fistula, Left Updated: 04/07/25 0746     Anaerobe Culture No Anaerobes Isolated    Culture, Anaerobic [[id number]] Collected: 04/03/25 1921    Order Status: Completed Specimen: Tissue from hematoma Updated: 04/07/25 0746     Anaerobe Culture No Anaerobes Isolated    Culture, Anaerobic [[id number]] Collected: 04/03/25 1910    Order Status: Completed Specimen: Tissue from AV Fistula, Left Updated: 04/07/25 0745     Anaerobe Culture No Anaerobes Isolated    Culture, Anaerobic [[id number]] Collected: 04/03/25 1816    Order Status: Completed Specimen: Wound from Arm, Left Updated: 04/07/25 0744     Anaerobe Culture No Anaerobes Isolated    Blood culture [[id number]]   (Abnormal) Collected: 04/04/25 1343    Order Status: Completed Specimen: Blood Updated: 04/06/25 0657     Blood Culture Positive - Aerobic/Pediatric Bottle      Methicillin resistant Staphylococcus aureus     Comment: <null> has been updated to reportable.        GRAM STAIN Gram positive cocci in clusters resembling Staph     Comment: Aerobic Bottle Positive    This is an appended report. These results have been appended to a previously preliminary verified report.       Narrative:      For sensitivities, refer to order # [id number]      Aerobic culture [[id number]]  (Abnormal) Collected: 04/03/25 2011    Order Status: Completed Specimen: Wound from Arm, Left Updated: 04/06/25 0556     CULTURE, AEROBIC Few Methicillin resistant Staphylococcus aureus    Narrative:      For sensitivities, refer to order # [id number]    Aerobic culture [[id number]]  (Abnormal) Collected: 04/03/25 1954    Order Status: Completed Specimen: Wound from Arm, Left Updated: 04/06/25 0555     CULTURE, AEROBIC Many Methicillin resistant Staphylococcus aureus    Narrative:      For sensitivities, refer to order # [id number]    Aerobic culture [[id number]]  (Abnormal)  (Susceptibility) Collected: 04/03/25 1943    Order Status: Completed Specimen: Wound from Arm, Left Updated: 04/06/25 0553     CULTURE, AEROBIC Many Methicillin resistant Staphylococcus aureus    Aerobic culture [[id number]]  (Abnormal)  (Susceptibility) Collected: 04/03/25 1926    Order Status: Completed Specimen: Wound from Arm, Left Updated: 04/06/25 0544     CULTURE, AEROBIC Many Methicillin resistant Staphylococcus aureus    Aerobic culture [[id number]] Collected: 04/03/25 1911    Order Status: Completed Specimen: Tissue from Arm, Left Updated: 04/06/25 0543     CULTURE, AEROBIC No Growth    Aerobic culture [[id number]] Collected: 04/03/25 1816    Order Status: Completed Specimen: Wound from Arm, Left Updated: 04/06/25 0543     CULTURE, AEROBIC No Growth    Gram  stain [[id number]] Collected: 04/03/25 1923    Order Status: Completed Specimen: Tissue from Arm, Left Updated: 04/04/25 0837     GRAM STAIN Rare WBC seen      Few Gram positive cocci    Gram stain [[id number]] Collected: 04/03/25 1935    Order Status: Completed Specimen: Tissue from Arm, Left Updated: 04/04/25 0836     GRAM STAIN Rare WBC seen      No organisms seen    Gram stain [[id number]] Collected: 04/03/25 1914    Order Status: Completed Specimen: Wound from Arm, Left Updated: 04/04/25 0836     GRAM STAIN Rare WBC seen      No organisms seen    Gram stain [[id number]] Collected: 04/03/25 2011    Order Status: Completed Specimen: Wound from Arm, Left Updated: 04/04/25 0835     GRAM STAIN Rare WBC seen      No organisms seen    Gram stain [[id number]] Collected: 04/03/25 1821    Order Status: Completed Specimen: Wound from Arm, Left Updated: 04/04/25 0835     GRAM STAIN No WBCs      No organisms seen    Gram stain [[id number]] Collected: 04/03/25 1943    Order Status: Completed Specimen: Wound from Arm, Left Updated: 04/04/25 0834     GRAM STAIN Rare WBC seen      No organisms seen    Gram stain [[id number]] Collected: 04/03/25 1954    Order Status: Completed Specimen: Wound from Arm, Left Updated: 04/04/25 0833     GRAM STAIN Rare WBC seen      No organisms seen    Fungus culture [[id number]] Collected: 04/03/25 1934    Order Status: Sent Specimen: Tissue from AV Fistula, Left Updated: 04/03/25 2107    Fungus culture [[id number]] Collected: 04/03/25 1935    Order Status: Canceled Specimen: Tissue from Arm, Left Updated: 04/03/25 2107    Fungus culture [[id number]] Collected: 04/03/25 2011    Order Status: Sent Specimen: Wound from Arm, Left Updated: 04/03/25 2106    Fungus culture [[id number]] Collected: 04/03/25 1816    Order Status: Sent Specimen: Wound from Arm, Left Updated: 04/03/25 2106    Fungus culture [[id number]] Collected: 04/03/25 1904    Order Status: Sent Specimen: Tissue from AV Fistula, Left  Updated: 04/03/25 2106    Fungus culture [[id number]] Collected: 04/03/25 1921    Order Status: Sent Specimen: Tissue from hematoma Updated: 04/03/25 2105            Significant Imaging: I have reviewed all pertinent imaging results/findings within the past 24 hours.

## 2025-04-08 NOTE — ASSESSMENT & PLAN NOTE
Patient's blood pressure range in the last 24 hours was: BP  Min: 67/47  Max: 143/63.The patient's inpatient anti-hypertensive regimen is listed below:  Current Antihypertensives       Plan  - admitted with shock  - now off vasopressors. Continue to hold BP Rx as BP is well controlled without it    98

## 2025-04-08 NOTE — SUBJECTIVE & OBJECTIVE
Interval History: CBI started yesterday.  Getting pRBCs now.      Review of Systems   Constitutional:  Negative for fever.   Respiratory:  Negative for chest tightness and wheezing.    Cardiovascular:  Negative for chest pain and palpitations.   Gastrointestinal:  Negative for abdominal pain, diarrhea, nausea and vomiting.   Genitourinary:  Negative for difficulty urinating, dysuria, flank pain and hematuria.   Musculoskeletal:  Negative for arthralgias.   Neurological:  Negative for dizziness.   Psychiatric/Behavioral:  Negative for confusion.      Objective:     Temp:  [94.2 °F (34.6 °C)-98.6 °F (37 °C)] 98.1 °F (36.7 °C)  Pulse:  [76-93] 78  Resp:  [7-33] 13  SpO2:  [81 %-100 %] 100 %  BP: ()/(44-76) 97/52     Body mass index is 21.13 kg/m².      Bladder Scan Volume (mL): 331 mL (04/06/25 1520)    Drains       Drain  Duration                  NG/OG Tube 04/04/25 1415 Rolf 10 Fr. Left nostril 3 days    Trialysis (Dialysis) Catheter 04/04/25 1208 right internal jugular 3 days         Urethral Catheter 04/07/25 1558 Triple-lumen;Latex 24 Fr. <1 day                     Physical Exam  Vitals and nursing note reviewed.   Constitutional:       Appearance: He is well-developed.   HENT:      Head: Normocephalic.   Eyes:      Conjunctiva/sclera: Conjunctivae normal.   Neck:      Thyroid: No thyromegaly.      Trachea: No tracheal deviation.   Cardiovascular:      Rate and Rhythm: Normal rate.      Heart sounds: Normal heart sounds.   Pulmonary:      Effort: Pulmonary effort is normal. No respiratory distress.      Breath sounds: Normal breath sounds. No wheezing.   Abdominal:      General: Bowel sounds are normal.      Palpations: Abdomen is soft.      Tenderness: There is no abdominal tenderness. There is no rebound.      Hernia: No hernia is present.   Genitourinary:     Comments: CBI, moderate drip, light pink  Musculoskeletal:         General: No tenderness. Normal range of motion.      Cervical back: Normal  "range of motion and neck supple.   Lymphadenopathy:      Cervical: No cervical adenopathy.   Skin:     General: Skin is warm and dry.      Findings: No erythema or rash.   Neurological:      Mental Status: He is alert and oriented to person, place, and time.   Psychiatric:         Behavior: Behavior normal.         Thought Content: Thought content normal.         Judgment: Judgment normal.           Significant Labs:    BMP:  Recent Labs   Lab 04/06/25 0317 04/07/25 0419 04/08/25  0243    141 140   K 3.7 4.2 3.9    104 104   CO2 22* 22* 26   BUN 59* 77* 70*   CREATININE 7.6* 9.7* 8.8*   GLUCOSE 166* 238* 264*   CALCIUM 8.5* 8.6* 8.1*       CBC:   Recent Labs   Lab 04/06/25 0317 04/07/25 0419 04/08/25  0243   WBC 15.49* 18.35* 11.26   HGB 8.2* 8.3* 6.1*   HCT 25.8* 25.8* 19.7*   * 141* 134*       Blood Culture: No results for input(s): "LABBLOO" in the last 168 hours.  Urine Culture:   Recent Labs   Lab 04/02/25  4666   LABURIN No Significant Growth       Significant Imaging:                  "

## 2025-04-08 NOTE — ASSESSMENT & PLAN NOTE
The likely etiology of thrombocytopenia is infection and sepsis. The patients 3 most recent labs are listed below.  Recent Labs     04/06/25  0317 04/07/25  0419 04/08/25  0243   * 141* 134*     Plan  - Will transfuse if platelet count is <10k.

## 2025-04-08 NOTE — EICU
eICU Physician Virtual/Remote Brief Evaluation Note      Telephone call bedside RN   Hemoglobin 6.1, hematocrit 19.7   Chart reviewed /47 (68), P 83, RR 12, O2 sat 99  Previous hemoglobin 8.3, platelets 134  Patient on HD noon  Status post cystoscopy with clot evaluation, fulguration and Sharma placement yesterday with 300 mL blood loss and 300 mL old blood in the bladder  Still on CBI and Sharma is bloody but without clots  Transfuse 1 unit PRBCs.  Repeat hemoglobin at 10:00 a.m.      ROD Salvador MD  eICU Attending  [phone]    This report has been created through the use of [employer] dictation software. Typographical and content errors may occur with this process. While efforts are made to detect and correct such errors, in some cases errors will persist. For this reason, wording in this document should be considered in the proper context and not strictly verbatim

## 2025-04-08 NOTE — PLAN OF CARE
Patient remain in the ICU. S/P Cystoscopy  with blood evacuation done yesterday Vitals signs are stable. Patient is on continuous bladder irrigation. No clots noted. Drainage appeared pinkish. CBC shows  Hemoglobin  was 6.1 and HCT 19.7 DR. Mod[place] MORALES , notified. One unit RBC  started at 0522, on flow.  Problem: Adult Inpatient Plan of Care  Goal: Plan of Care Review  Outcome: Progressing  Goal: Patient-Specific Goal (Individualized)  Outcome: Progressing  Goal: Absence of Hospital-Acquired Illness or Injury  Outcome: Progressing  Goal: Optimal Comfort and Wellbeing  Outcome: Progressing  Goal: Readiness for Transition of Care  Outcome: Progressing     Problem: Diabetes Comorbidity  Goal: Blood Glucose Level Within Targeted Range  Outcome: Progressing     Problem: Sepsis/Septic Shock  Goal: Optimal Coping  Outcome: Progressing  Goal: Absence of Bleeding  Outcome: Progressing  Goal: Blood Glucose Level Within Targeted Range  Outcome: Progressing  Goal: Absence of Infection Signs and Symptoms  Outcome: Progressing  Goal: Optimal Nutrition Intake  Outcome: Progressing     Problem: Infection  Goal: Absence of Infection Signs and Symptoms  Outcome: Progressing     Problem: Fall Injury Risk  Goal: Absence of Fall and Fall-Related Injury  Outcome: Progressing     Problem: Skin Injury Risk Increased  Goal: Skin Health and Integrity  Outcome: Progressing     Problem: Wound  Goal: Optimal Coping  Outcome: Progressing  Goal: Optimal Functional Ability  Outcome: Progressing  Goal: Absence of Infection Signs and Symptoms  Outcome: Progressing  Goal: Improved Oral Intake  Outcome: Progressing  Goal: Optimal Pain Control and Function  Outcome: Progressing  Goal: Skin Health and Integrity  Outcome: Progressing  Goal: Optimal Wound Healing  Outcome: Progressing     Problem: Coping Ineffective  Goal: Effective Coping  Outcome: Progressing     Problem: Urinary Elimination Management  Goal: Effective Urinary  Elimination/Continence  Outcome: Progressing     Problem: Urinary Retention  Goal: Effective Urinary Elimination  Outcome: Progressing

## 2025-04-08 NOTE — ASSESSMENT & PLAN NOTE
Patient with known CAD s/p stent placement, which is controlled Will continue ASA, Plavix, and Statin and monitor for S/Sx of angina/ACS. Continue to monitor on telemetry.   - last [facility] was 1/2025: Severely calcified mid RCA stenosis unable to cross with PTCA balloons, treated initially with 0.9 laser atherectomy, followed by CSI atherectomy system with total of 5 runs (3 at low speed, 2 at high speed), pre-dilated using 2.0 compliant and 3.5 noncompliant balloon followed by 3.5 X 16 mm megatron stent.  Focal plaque rupture/erosion noted in the proximal and ostial RCA that were treated with  3.5 X 28 in the proximal RCA, 4.0 X 16 mm in the ostial RCA.  No residual stenosis post intervention.  Patient had ALAN 3 flow at the end of the procedure  - with elevated troponin likely due to septic shock  Recent Labs   Lab 04/04/25  1028   TROPONINI 2.913*   - continue asa, plavix, statin  - Cardiology consulted  - no plans for ischemic evaluation at this time

## 2025-04-08 NOTE — PROGRESS NOTES
[facility] - [place] Intensive Care  Urology  Progress Note    Patient Name: Jevon Rajan  MRN: [id number]  Admission Date: 4/3/2025  Hospital Length of Stay: 5 days  Code Status: Full Code   Attending Provider: Gonzalez Reagan MD   Primary Care Physician: Melia, Primary Doctor    Subjective:     HPI:  Urinary Retention  Patient complains of urinary retention. Onset of retention was 1 day ago and was gradual in onset. Patient currently does not have a urinary catheter in place.  300 ml of urine were scanned on bladder scanner Prior to this event voiding symptoms consisted of slow stream. Prior treatments include none. Recent medications that may have affected his voiding include none.   He still makes urine, he tells me an almost normal amount.  He is very uncomfortable at the level of the bladder.  Nursing staff attempted coude catheter was then successfully.  He agrees to allow me to place a catheter.    Interval History: CBI started yesterday.  Getting pRBCs now.      Review of Systems   Constitutional:  Negative for fever.   Respiratory:  Negative for chest tightness and wheezing.    Cardiovascular:  Negative for chest pain and palpitations.   Gastrointestinal:  Negative for abdominal pain, diarrhea, nausea and vomiting.   Genitourinary:  Negative for difficulty urinating, dysuria, flank pain and hematuria.   Musculoskeletal:  Negative for arthralgias.   Neurological:  Negative for dizziness.   Psychiatric/Behavioral:  Negative for confusion.      Objective:     Temp:  [94.2 °F (34.6 °C)-98.6 °F (37 °C)] 98.1 °F (36.7 °C)  Pulse:  [76-93] 78  Resp:  [7-33] 13  SpO2:  [81 %-100 %] 100 %  BP: ()/(44-76) 97/52     Body mass index is 21.13 kg/m².      Bladder Scan Volume (mL): 331 mL (04/06/25 1520)    Drains       Drain  Duration                  NG/OG Tube 04/04/25 1415 Rolf 10 Fr. Left nostril 3 days    Trialysis (Dialysis) Catheter 04/04/25 1208 right internal jugular 3 days         Urethral Catheter 04/07/25 1558  "Triple-lumen;Latex 24 Fr. <1 day                     Physical Exam  Vitals and nursing note reviewed.   Constitutional:       Appearance: He is well-developed.   HENT:      Head: Normocephalic.   Eyes:      Conjunctiva/sclera: Conjunctivae normal.   Neck:      Thyroid: No thyromegaly.      Trachea: No tracheal deviation.   Cardiovascular:      Rate and Rhythm: Normal rate.      Heart sounds: Normal heart sounds.   Pulmonary:      Effort: Pulmonary effort is normal. No respiratory distress.      Breath sounds: Normal breath sounds. No wheezing.   Abdominal:      General: Bowel sounds are normal.      Palpations: Abdomen is soft.      Tenderness: There is no abdominal tenderness. There is no rebound.      Hernia: No hernia is present.   Genitourinary:     Comments: CBI, moderate drip, light pink  Musculoskeletal:         General: No tenderness. Normal range of motion.      Cervical back: Normal range of motion and neck supple.   Lymphadenopathy:      Cervical: No cervical adenopathy.   Skin:     General: Skin is warm and dry.      Findings: No erythema or rash.   Neurological:      Mental Status: He is alert and oriented to person, place, and time.   Psychiatric:         Behavior: Behavior normal.         Thought Content: Thought content normal.         Judgment: Judgment normal.           Significant Labs:    BMP:  Recent Labs   Lab 04/06/25 0317 04/07/25 0419 04/08/25  0243    141 140   K 3.7 4.2 3.9    104 104   CO2 22* 22* 26   BUN 59* 77* 70*   CREATININE 7.6* 9.7* 8.8*   GLUCOSE 166* 238* 264*   CALCIUM 8.5* 8.6* 8.1*       CBC:   Recent Labs   Lab 04/06/25 0317 04/07/25  0419 04/08/25  0243   WBC 15.49* 18.35* 11.26   HGB 8.2* 8.3* 6.1*   HCT 25.8* 25.8* 19.7*   * 141* 134*       Blood Culture: No results for input(s): "LABBLOO" in the last 168 hours.  Urine Culture:   Recent Labs   Lab 04/02/25  0563   LABURIN No Significant Growth       Significant " Imaging:                    Assessment/Plan:     Gross hematuria  Tolerating CBI  Wean towards off  Hold anticoagulation, if possible  following    BPH with urinary obstruction  Will need bedside cystoscopy          VTE Risk Mitigation (From admission, onward)           Ordered     heparin (porcine) injection 1,000 Units  As needed (PRN)         04/05/25 2100     IP VTE HIGH RISK PATIENT  Once         04/03/25 1516     Place TEMO hose  Until discontinued         04/03/25 1516     Place sequential compression device  Until discontinued         04/03/25 1516     Reason for No Pharmacological VTE Prophylaxis  Once        Question:  Reasons:  Answer:  Physician Provided (leave comment)    04/03/25 1516                    Agustin Kramer MD  Urology  [facility] - [place] - Intensive Care

## 2025-04-08 NOTE — PT/OT/SLP PROGRESS
Physical Therapy      Patient Name:  Jevon Rajan   MRN:  [id number]    Patient not seen today secondary to Other (medical hold per RN). Will follow-up as able.

## 2025-04-08 NOTE — PROGRESS NOTES
[facility] - [place] Intensive Care  Nephrology  Progress Note    Patient Name: Jevon Rajan  MRN: [id number]  Admission Date: 4/3/2025  Hospital Length of Stay: 5 days  Attending Provider: Gonzalez Reagan MD   Primary Care Physician: Melia, Primary Doctor  Principal Problem:Septic shock    Subjective:     HPI: Mr. Rajan is an 84 yo male with HTN, T2DM, CAD, ESRD, and liver lesion who was transferred from [facility] for septic shock and impending AVG rupture. He initially presented to [facility] on 4/1 with temp 101.9 and BP 80/30s. He was transferred to our hospital on 4/3/25; it seems his AVG ruptured during transport/shortly after arrival. He emergently went to the OR yesterday with AVG extraction; infected hematoma was noted. Workup also concerning for elevated troponin and cardiac vegetations. He is no longer on pressors. Nephrology consulted for ESRD. Prior records obtained and reviewed. He receives HD TTS at [facility] under the c/o Dr. Colin. He last received HD outpatient on 4/1/25. HD was held at [facility] due to unstable vascular access. Family agreed to proceed with CRRT today.     Interval History: HD ended after 1 hour yesterday due to SBP in the 70s. Of note, HD was started after cystoscopy, dunaway placement, and blood loss. No events overnight. Received 1 unit pRBC this morning. BP 90s/50s. Planning for HD trial this PM.       Review of patient's allergies indicates:   Allergen Reactions    Ace inhibitors Hives, Itching, Shortness Of Breath, Other (See Comments) and Rash     Is not sure which medication it was    Captopril      Current Facility-Administered Medications   Medication Frequency    0.9%  NaCl infusion (for blood administration) Q24H PRN    0.9% NaCl infusion PRN    0.9% NaCl infusion PRN    acetaminophen tablet 650 mg Q4H PRN    atorvastatin tablet 80 mg QHS    clopidogreL tablet 75 mg Daily    dextrose 50% injection 12.5 g PRN    dextrose 50% injection 25 g PRN    diphenhydrAMINE capsule 50 mg Q6H PRN    epoetin  mj-epbx injection 10,000 Units Every Mon, Wed, Fri    glucagon (human recombinant) injection 1 mg PRN    glucose chewable tablet 16 g PRN    glucose chewable tablet 24 g PRN    heparin (porcine) injection 1,000 Units PRN    insulin aspart U-100 pen 0-5 Units QID (AC + HS) PRN    insulin aspart U-100 pen 4 Units TIDWM    insulin glargine U-100 (Lantus) pen 14 Units Daily    melatonin tablet 6 mg Nightly PRN    midodrine tablet 10 mg Q8H    mupirocin 2 % ointment BID    naloxone 0.4 mg/mL injection 0.02 mg PRN    ondansetron injection 4 mg Q6H PRN    pantoprazole EC tablet 40 mg Daily    prochlorperazine injection Soln 5 mg Q6H PRN    sodium chloride 0.9% bolus 250 mL 250 mL PRN    sodium chloride 0.9% flush 10 mL Q8H    tamsulosin 24 hr capsule 0.4 mg Daily    vancomycin - pharmacy to dose pharmacy to manage frequency       Objective:     Vital Signs (Most Recent):  Temp: 97.6 °F (36.4 °C) (04/08/25 1115)  Pulse: 79 (04/08/25 1115)  Resp: 15 (04/08/25 1115)  BP: (!) 91/52 (04/08/25 1115)  SpO2: 100 % (04/08/25 1115) Vital Signs (24h Range):  Temp:  [94.2 °F (34.6 °C)-98.6 °F (37 °C)] 97.6 °F (36.4 °C)  Pulse:  [76-93] 79  Resp:  [7-33] 15  SpO2:  [81 %-100 %] 100 %  BP: ()/(44-76) 91/52     Weight: 61.2 kg (134 lb 14.7 oz) (04/04/25 1937)  Body mass index is 21.13 kg/m².  Body surface area is 1.7 meters squared.    I/O last 3 completed shifts:  In: 2539.8 [Other:500; NG/GT:1130; IV Piggyback:909.8]  Out: 1984 [Urine:1350; Other:334; Blood:300]     Physical Exam  Vitals and nursing note reviewed.   Constitutional:       General: He is sleeping. He is not in acute distress.     Appearance: Normal appearance. He is well-developed.   HENT:      Head: Normocephalic and atraumatic.      Nose: Nose normal.      Mouth/Throat:      Mouth: Mucous membranes are moist.   Eyes:      Extraocular Movements: Extraocular movements intact.      Conjunctiva/sclera: Conjunctivae normal.   Cardiovascular:      Rate and Rhythm:  Normal rate and regular rhythm.   Pulmonary:      Effort: Pulmonary effort is normal.      Breath sounds: Normal breath sounds.   Abdominal:      General: There is no distension.      Palpations: Abdomen is soft.   Musculoskeletal:      Right lower leg: No edema.      Left lower leg: No edema.   Skin:     General: Skin is warm and dry.      Findings: Lesion (diffuse) present. No erythema or rash.          Significant Labs:  CBC:   Recent Labs   Lab 04/08/25  0243 04/08/25  1025   WBC 11.26  --    RBC 2.11*  --    HGB 6.1* 7.2*   HCT 19.7*  --    *  --    MCV 93  --    MCH 28.9  --    MCHC 31.0*  --      CMP:   Recent Labs   Lab 04/08/25  0243   CALCIUM 8.1*   ALBUMIN 1.9*      K 3.9   CO2 26      BUN 70*   CREATININE 8.8*   ALKPHOS 67   ALT 13   AST 16   BILITOT 0.2     All labs within the past 24 hours have been reviewed.     Significant Imaging:  Labs: Reviewed    Assessment/Plan:     ESRD  - receives HD TTS at [facility]; last received HD on 4/1  - s/p AVG resection 4/3  - received CRRT from 4/4-4/5  - failed HD trial yesterday due to hypotension  - now s/p blood transfusion. Will try HD again today x 2 hours. Will give albumin bolus at start of treatment  - if HD not tolerated today, will need to discuss resuming CRRT vs withdrawal of care due to inability to tolerate conventional hemodialysis.   - daily labs. Trend UOP    Bacteremia  - BCx remain positive; not currently a candidate for tunneled HD catheter placement  - ID following  - happy to coordinate a line free period if this becomes needed     Secondary HPTH  - CCa and phos acceptable  - no need for phos binders at this time    Anemia of CKD  - hgb 6.1 today; received blood transfusion  - NAKIA with HD MWF      Thank you for your consult. I will follow-up with patient. Please contact us if you have any additional questions.    Юлия Davis MD  Nephrology  [facility] - [place] - Intensive Care

## 2025-04-08 NOTE — SUBJECTIVE & OBJECTIVE
Past Medical History:   Diagnosis Date    BPH (benign prostatic hyperplasia)     Coronary artery disease     He has followed at the [facility] and is now transferring his care to this clinic. In 2014 he had stent to LAD. He states he has had 2 heart attacks. He does not get angina. There was 90% left anterior descending artery stenosis undergoing stenting. There was also a circumflex marginal branch stenosis of 70%.    Diabetes mellitus, type 2     ESRD (end stage renal disease) on dialysis     ESRD on HD TTS via left brachial AVF    Hypertension     Hypothyroidism, unspecified     NSTEMI (non-ST elevated myocardial infarction) 4/4/2025    Sepsis 4/2/2025    Symptomatic anemia 01/15/2024       Past Surgical History:   Procedure Laterality Date    ANGIOGRAM, CORONARY, WITH LEFT HEART CATHETERIZATION  1/13/2025    Procedure: Angiogram, Coronary, with Left Heart Cath;  Surgeon: Steve Bhat MD;  Location: [facility] CATH LAB/EP;  Service: Cardiology;;    ATHERECTOMY, CORONARY N/A 1/14/2025    Procedure: Atherectomy-coronary;  Surgeon: Giacomo Pittman MD;  Location: [facility] CATH LAB/EP;  Service: Cardiology;  Laterality: N/A;    bilateral foot surgery      CORONARY ANGIOPLASTY WITH STENT PLACEMENT N/A 1/14/2025    Procedure: ANGIOPLASTY, CORONARY ARTERY, WITH STENT INSERTION;  Surgeon: Giacomo Pittman MD;  Location: [facility] CATH LAB/EP;  Service: Cardiology;  Laterality: N/A;    CORONARY STENT PLACEMENT N/A 1/13/2025    Procedure: INSERTION, STENT, CORONARY ARTERY;  Surgeon: Steve Bhat MD;  Location: [facility] CATH LAB/EP;  Service: Cardiology;  Laterality: N/A;    ESOPHAGOGASTRODUODENOSCOPY N/A 2/27/2024    Procedure: EGD (ESOPHAGOGASTRODUODENOSCOPY);  Surgeon: Gemma Almendarez MD;  Location: [facility] ENDO;  Service: Endoscopy;  Laterality: N/A;    EXPLORATION, ARTERY, UPPER EXTREMITY Left 4/3/2025    Procedure: EXPLORATION, ARTERY, UPPER EXTREMITY;  Surgeon: Sunil Contreras MD;  Location: [facility] OR;  Service:  Vascular;  Laterality: Left;    eyelid surgery      FISTULOGRAM Left 11/27/2019    Procedure: Fistulogram;  Surgeon: MELANY Brenner III, MD;  [facility];  Service: Peripheral Vascular;  Laterality: Left;    FISTULOGRAM Left 11/27/2019    Procedure: Fistulogram, AVG declot, possible permacath;  Surgeon: MELANY Brenner III, MD;  Location: [facility] CATH LAB;  Service: Peripheral Vascular;  Laterality: Left;    INSERTION OF DIALYSIS CATHETER      IVUS, CORONARY  1/13/2025    Procedure: IVUS, Coronary;  Surgeon: Steve Bhat MD;  Location: [facility] CATH LAB/EP;  Service: Cardiology;;    LEFT HEART CATHETERIZATION Left 1/14/2025    Procedure: Left heart cath;  Surgeon: Giacomo Pittman MD;  Location: [facility] CATH LAB/EP;  Service: Cardiology;  Laterality: Left;    MOH's  12/5/13    PERCUTANEOUS CORONARY INTERVENTION, ARTERY N/A 1/14/2025    Procedure: Percutaneous coronary intervention;  Surgeon: Giacomo Pittman MD;  Location: [facility] CATH LAB/EP;  Service: Cardiology;  Laterality: N/A;    PERCUTANEOUS TRANSLUMINAL BALLOON ANGIOPLASTY OF CORONARY ARTERY  1/13/2025    Procedure: Angioplasty-coronary;  Surgeon: Steve hBat MD;  Location: [facility] CATH LAB/EP;  Service: Cardiology;;    REMOVAL, GRAFT, ARTERIOVENOUS, UPPER EXTREMITY Left 4/3/2025    Procedure: REMOVAL, GRAFT, ARTERIOVENOUS, UPPER EXTREMITY;  Surgeon: Sunil Contreras MD;  Location: [facility];  Service: Vascular;  Laterality: Left;    REVASCULARIZATION, ENDOVASCULAR, LE W/ INTRAVASCULAR LITHOTRIPSY  1/13/2025    Procedure: REVASCULARIZATION, ENDOVASCULAR, LE W/ INTRAVASCULAR LITHOTRIPSY;  Surgeon: Steve Bhat MD;  Location: [facility] CATH LAB/EP;  Service: Cardiology;;    right hand surgery      stents         Review of patient's allergies indicates:   Allergen Reactions    Ace inhibitors Hives, Itching, Shortness Of Breath, Other (See Comments) and Rash     Is not sure which medication it was    Captopril        No current  facility-administered medications on file prior to encounter.     Current Outpatient Medications on File Prior to Encounter   Medication Sig    ammonium lactate 12 % Crea Apply topically 2 (two) times a day.    artificial tears,hypromellose,,GENTEAL/SUSTANE, 0.3 % Gel Apply to eye nightly.    aspirin (ECOTRIN) 81 MG EC tablet Take 1 tablet (81 mg total) by mouth once daily.    atorvastatin (LIPITOR) 80 MG tablet Take 1 tablet (80 mg total) by mouth every evening.    camphor-menthol 0.5-0.5% (SARNA) lotion Apply topically once daily. APPLY SMALL AMOUNT TOPICALLY TO AFFECTED AREA(S) AS NEEDED FOR ITCHING KEEP IN FRIDGE    carboxymethylcellulose sodium 0.5 % Drop Apply 1 drop to eye nightly as needed (dry eyes).    carvediloL (COREG) 12.5 MG tablet Take 0.5 tablets (6.25 mg total) by mouth 2 (two) times daily.    cetirizine (ZYRTEC) 5 MG tablet Take 1 tablet (5 mg total) by mouth every 48 hours.    clobetasol 0.05% (TEMOVATE) 0.05 % Oint Apply topically 2 (two) times daily.    clopidogreL (PLAVIX) 75 mg tablet Take 1 tablet (75 mg total) by mouth once daily.    erythromycin (ROMYCIN) ophthalmic ointment Place a 1/2 inch ribbon of ointment into the lower eyelid. Four timex daily for 5 days    gabapentin (NEURONTIN) 100 MG capsule Take 1 capsule (100 mg total) by mouth every evening.    hydrophilic ointment (AQUABASE) Apply topically as needed for Dry Skin. APPLY MODERATE AMOUNT TOPICALLY TO AFFECTED AREA(S) TWICE A DAY AS NEEDED FOR DRY SKIN APPLY TO AREAS OF DRY SKIN OR OVER ENTIRE BODY.    lanolin alcohol-mineral oil-white petrolatum-ceres (EUCERIN) Crea cream Apply topically 2 (two) times daily as needed.    LIDOcaine (LIDODERM) 5 % Place 1 patch onto the skin once daily. Remove & Discard patch within 12 hours or as directed by MD    loratadine (CLARITIN) 10 mg tablet Take 10 mg by mouth daily as needed for Allergies (Every other day).    nut.tx.imp.renal fxn,lac-reduc (NEPRO CARB STEADY) 0.08 gram-1.8 kcal/mL Liqd  Take 240 mLs by mouth 2 (two) times a day.    ondansetron (ZOFRAN-ODT) 4 MG TbDL Take 1 tablet (4 mg total) by mouth every 8 (eight) hours as needed (nausea).    oxyCODONE-acetaminophen (PERCOCET) 5-325 mg per tablet Take 1 tablet by mouth every 6 (six) hours as needed.    pantoprazole (PROTONIX) 20 MG tablet Take 20 mg by mouth 2 (two) times daily as needed.    pramoxine 1 % Lotn Apply topically 2 (two) times daily as needed (itching).    triamcinolone acetonide 0.1% (KENALOG) 0.1 % cream Apply topically 2 (two) times daily as needed (itching).     Family History    None       Tobacco Use    Smoking status: Never    Smokeless tobacco: Never   Substance and Sexual Activity    Alcohol use: No    Drug use: No    Sexual activity: Not Currently     Review of Systems   Gastrointestinal:  Negative for melena.   Genitourinary:  Positive for hematuria.     Objective:     Vital Signs (Most Recent):  Temp: 97.8 °F (36.6 °C) (04/08/25 0745)  Pulse: 79 (04/08/25 0858)  Resp: 12 (04/08/25 0858)  BP: (!) 98/52 (04/08/25 0745)  SpO2: 100 % (04/08/25 0858) Vital Signs (24h Range):  Temp:  [94.2 °F (34.6 °C)-98.6 °F (37 °C)] 97.8 °F (36.6 °C)  Pulse:  [76-93] 79  Resp:  [7-33] 12  SpO2:  [81 %-100 %] 100 %  BP: ()/(44-76) 98/52     Weight: 61.2 kg (134 lb 14.7 oz)  Body mass index is 21.13 kg/m².    SpO2: 100 %         Intake/Output Summary (Last 24 hours) at 4/8/2025 0946  Last data filed at 4/8/2025 0932  Gross per 24 hour   Intake 2602.34 ml   Output 2084 ml   Net 518.34 ml       Lines/Drains/Airways       Central Venous Catheter Line  Duration             Trialysis (Dialysis) Catheter 04/04/25 1208 right internal jugular 3 days              Drain  Duration                  NG/OG Tube 04/04/25 1415 Rolf 10 Fr. Left nostril 3 days         Urethral Catheter 04/07/25 1558 Triple-lumen;Latex 24 Fr. <1 day              Peripheral Intravenous Line  Duration                  Peripheral IV - Single Lumen 04/02/25 1801 20 G  Anterior;Distal;Right Upper Arm 5 days                   Exam unchanged vs 4/7/25  Physical Exam  Constitutional:       General: He is not in acute distress.     Appearance: Normal appearance. He is well-developed. He is ill-appearing. He is not toxic-appearing or diaphoretic.   HENT:      Head: Normocephalic and atraumatic.   Eyes:      General: No scleral icterus.     Extraocular Movements: Extraocular movements intact.      Conjunctiva/sclera: Conjunctivae normal.      Pupils: Pupils are equal, round, and reactive to light.   Neck:      Thyroid: No thyromegaly.      Vascular: No JVD.      Trachea: No tracheal deviation.   Cardiovascular:      Rate and Rhythm: Normal rate and regular rhythm.      Heart sounds: S1 normal and S2 normal. Heart sounds are distant. No murmur heard.     No friction rub. No gallop.   Pulmonary:      Effort: Pulmonary effort is normal. No respiratory distress.      Breath sounds: Normal breath sounds. No stridor. No wheezing, rhonchi or rales.   Chest:      Chest wall: No tenderness.   Abdominal:      General: There is no distension.      Palpations: Abdomen is soft.   Musculoskeletal:         General: No swelling or tenderness. Normal range of motion.      Cervical back: Normal range of motion and neck supple. No rigidity.      Right lower leg: No edema.      Left lower leg: No edema.   Skin:     General: Skin is warm and dry.      Coloration: Skin is not jaundiced.   Neurological:      General: No focal deficit present.      Mental Status: He is alert.      Cranial Nerves: No cranial nerve deficit.   Psychiatric:         Mood and Affect: Mood normal.         Behavior: Behavior normal.          Current Medications:   albumin human 25%  25 g Intravenous Once    atorvastatin  80 mg Oral QHS    clopidogreL  75 mg Oral Daily    epoetin mj-epbx  10,000 Units Intravenous Every Mon, Wed, Fri    insulin aspart U-100  4 Units Subcutaneous TIDWM    insulin glargine U-100  14 Units Subcutaneous  Daily    midodrine  10 mg Oral Q8H    mupirocin   Nasal BID    pantoprazole  40 mg Oral Daily    sodium chloride 0.9%  10 mL Intravenous Q8H    tamsulosin  0.4 mg Oral Daily         Current Facility-Administered Medications:     0.9%  NaCl infusion (for blood administration), , Intravenous, Q24H PRN    0.9% NaCl, , Intravenous, PRN    0.9% NaCl, , Intravenous, PRN    acetaminophen, 650 mg, Oral, Q4H PRN    dextrose 50%, 12.5 g, Intravenous, PRN    dextrose 50%, 25 g, Intravenous, PRN    diphenhydrAMINE, 50 mg, Oral, Q6H PRN    glucagon (human recombinant), 1 mg, Intramuscular, PRN    glucose, 16 g, Oral, PRN    glucose, 24 g, Oral, PRN    heparin (porcine), 1,000 Units, Intravenous, PRN    insulin aspart U-100, 0-5 Units, Subcutaneous, QID (AC + HS) PRN    melatonin, 6 mg, Oral, Nightly PRN    naloxone, 0.02 mg, Intravenous, PRN    ondansetron, 4 mg, Intravenous, Q6H PRN    prochlorperazine, 5 mg, Intravenous, Q6H PRN    sodium chloride 0.9%, 250 mL, Intravenous, PRN    Pharmacy to dose Vancomycin consult, , , Once **AND** vancomycin - pharmacy to dose, , Intravenous, pharmacy to manage frequency    Laboratory (all labs reviewed):  CBC:  Recent Labs   Lab 04/04/25  0247 04/05/25  0324 04/06/25  0317 04/07/25  0419 04/08/25  0243   WBC 16.11 H 17.18 H 15.49 H 18.35 H 11.26   HGB 10.0 L 8.6 L 8.2 L 8.3 L 6.1 L   HCT 30.0 L 26.7 L 25.8 L 25.8 L 19.7 LL   Platelet Count 121 L 109 L 118 L 141 L 134 L       CHEMISTRIES:  Recent Labs   Lab 01/16/25  0154 02/08/25  1014 02/11/25  1325 02/23/25  1638 03/21/25  1808 03/25/25  1108 04/03/25  2055 04/04/25  0247 04/04/25  1439 04/04/25 2001 04/04/25 2151 04/05/25  0324 04/05/25  1159 04/05/25  1426 04/06/25  0317 04/07/25  0419 04/08/25  0243   Glucose 141 H 171 H 225 H 113 H 150 H  --   --   --   --   --   --   --   --   --   --   --   --    Sodium 140 138 138 141 141   < > 138 139   < >  --    < > 140  --  139 139 141 140   Potassium 4.0 4.5 4.4 5.1 4.4   < > 4.8 5.0   < >   --    < > 4.2  --  4.0 3.7 4.2 3.9   BUN 44 H 63 H 75 H 77 H 91 H   < > 70 H 75 H   < >  --    < > 58 H  --  49 H 59 H 77 H 70 H   Creatinine 10.0 H 8.6 H 8.9 H 9.8 H 10.9 H   < > 9.3 H 9.9 H   < >  --    < > 7.3 H  --  6.2 H 7.6 H 9.7 H 8.8 H   eGFR 5 A 5.5 A 5.3 A 4.7 A 4.1 A   < > 5 L 5 L   < >  --    < > 7 L  --  8 L 6 L 5 L 5 L   Calcium 8.9 10.2 10.1 10.9 H 10.4   < > 8.1 L 8.2 L   < >  --    < > 8.4 L  --  8.2 L 8.5 L 8.6 L 8.1 L   Magnesium   --   --   --   --   --    < > 2.2 2.3  --  2.2  --  2.1 2.0  --   --   --   --    Magnesium  --   --   --   --  2.5  --   --   --   --   --   --   --   --   --   --   --   --     < > = values in this interval not displayed.       CARDIAC BIOMARKERS:  Recent Labs   Lab 04/01/24  0251 04/29/24 2211 08/03/24  0015 08/05/24  0138 10/20/24  2310 10/21/24  0113 04/01/25  2220 04/02/25  0846 04/02/25  2055 04/03/25  0303 04/03/25  0718 04/03/25  1533 04/04/25  1028     --  183  --  111  --  382 H  --   --   --   --   --   --    Troponin I  --    < > 0.080 H   < > 0.044 H   < >  --   --   --   --   --   --   --    Troponin-I  --   --   --   --   --   --   --    < > 1.076 H 1.441 H 1.812 H 2.032 H 2.913 H    < > = values in this interval not displayed.       COAGS:  Recent Labs   Lab 11/28/23  2155 04/18/24  0609 01/12/25  1316 03/25/25  1108 04/03/25  1533   INR 1.1 1.0 1.0 1.0 1.1       LIPIDS/LFTS:  Recent Labs   Lab 08/11/24  0331 08/15/24  0203 04/03/25  2055 04/04/25  0247 04/06/25  0317 04/07/25  0419 04/08/25  0243   Cholesterol 110 L  --   --   --   --   --   --    Triglycerides 126  --   --   --   --   --   --    HDL 26 L  --   --   --   --   --   --    LDL Cholesterol 58.8 L  --   --   --   --   --   --    Non-HDL Cholesterol 84  --   --   --   --   --   --    AST  --    < > 24 23 25 20 16   ALT  --    < > 18 17 21 20 13    < > = values in this interval not displayed.       BNP:  Recent Labs   Lab 06/11/24  2239 08/03/24  0015 08/11/24  0332 01/12/25  0550  04/03/25  0303   BNP 1,110 H 624 H 2,178 H 2,043 H 2,988 H       TSH:  Recent Labs   Lab 11/27/23  0519 01/15/24  2135 07/09/24  1428 08/11/24  0332 04/02/25  0537   TSH 3.357 4.600 H 6.963 H 3.949 4.590 H       Free T4:  Recent Labs   Lab 01/15/24  2135 07/09/24  1428 04/02/25  0537   Free T4 0.81 1.01 1.19  1.19       Diagnostic Results:  ECG (personally reviewed and interpreted tracing(s)):  4/4/25 1325 SR 79, PRWP, lat ST abnl ?isch, similar to 3/25/25, lass prom than 4/2/25    Chest X-Ray (personally reviewed and interpreted image(s)): 4/4/25 NAD, aortic atherosclerosis    Echo: 4/4/25 (images prev personally reviewed and interpreted)    Left Ventricle: The left ventricle is normal in size. Normal wall thickness. Mild global hypokinesis present. There is mildly reduced systolic function with a visually estimated ejection fraction of 40 - 45%. Grade I diastolic dysfunction.    Right Ventricle: The right ventricle has moderate enlargement. Systolic function is mildly reduced.    Left Atrium: Mildly dilated Cannot exclude PFO (color Doppler with possible flow across IAS).    Aortic Valve: The aortic valve is a trileaflet valve. There is moderate aortic valve sclerosis. Mildly restricted motion. There is mild stenosis. Aortic valve area by VTI is 1.8 cm². Aortic valve peak velocity is 1.5 m/s. Mean gradient is 5 mmHg. The dimensionless index is 0.51. There is mild aortic regurgitation.    Mitral Valve: There is a 1.9x1.2cm large fixed heterogeneous mass present on the posterior leaflet (new finding vs 9/2023 echo). There is moderate to severe regurgitation with an eccentric jet.  Cannot exclude severe MR with possible PMVL perforation in association with large vegetation.    Aorta: Aortic root is mildly to moderately dilated measuring 4.17 cm.    Pulmonary Artery: The estimated pulmonary artery systolic pressure is 42 mmHg.    PCI RCA 1/14/25:    The Ost RCA lesion was 70% stenosed with 0% stenosis  post-intervention.    The Prox RCA lesion was 70% stenosed with 0% stenosis post-intervention.    The Mid RCA lesion was 99% stenosed with 0% stenosis post-intervention.    The ejection fraction was calculated to be 55%.    The pre-procedure left ventricular end diastolic pressure was 6.    The post-procedure left ventricular end diastolic pressure was 7.    Mid RCA lesion: A stent was successfully placed at 11 MAYRA for 15 sec.    Prox RCA lesion, Mid RCA lesion: A  at 12 MAYRA for 10 sec.    Ost RCA lesion, Prox RCA lesion: A stent was successfully placed.  Procedure performed:  Left heart catheterization  Coronary angiogram of the right coronary artery  Right radial artery access   Right superficial femoral artery access  CSI atherectomy of the right mid coronary artery  Laser atherectomy of the right mid coronary artery  PTCA of the right mid coronary artery  XU x3 megatron drug-eluting stent 3.5 X 16 in the mid RCA, 3.5 X 28 in the proximal RCA, 4.0 X 16 mm in the ostial RCA  Findings:  Severely calcified mid RCA stenosis unable to cross with PTCA balloons, treated initially with 0.9 laser atherectomy, followed by CSI atherectomy system with total of 5 runs (3 at low speed, 2 at high speed), pre-dilated using 2.0 compliant and 3.5 noncompliant balloon followed by 3.5 X 16 mm megatron stent.  Focal plaque rupture/erosion noted in the proximal and ostial RCA that were treated with  3.5 X 28 in the proximal RCA, 4.0 X 16 mm in the ostial RCA.  No residual stenosis post intervention.  Patient had ALAN 3 flow at the end of the procedure  LVEDP 6 mm Hg.  EF of around 55%.  No gradient across the aortic valve.  Right SFA access.  High bifurcation.  Mild diffuse disease of the right SFA closed using Perclose closure device  Right radial artery with severe spasm and hence decision was made to switch to right groin access  Recommendations:  Continue dual antiplatelet therapy for at least 1 year.  Consider longer term  anticoagulation based on risk factor profile after 1 year  High-intensity statin  Transfer back to telemetry unit    Cath/PCI LAD 1/13/25  Left Main Coronary Artery: The left main is a large caliber vessel arising normally from the left sinus of valsalva.  It bifurcates into the left anterior descending and left circumflex coronary artery.  It is free of evidence of obstructive coronary artery disease. ALAN III flow  Left Anterior Descending: The left anterior descending coronary artery is a large caliber vessel arising normally from the left main and extending to the apex.  It gives rise to small to moderate caliber diagonal arteries. Proximal LAD has a severe 80-90% calcified stenosis prior to an under expanded 2.5 mm stent in a 4.0 mm vessel. After the stent there is 40-50% stenosis. ALAN II flow  Left Circumflex: The left circumflex is a large caliber vessel arising normally from the left main coronary artery, it is non-dominant.  It gives rise to moderate caliber obtuse marginal branches. OM stent is patent. ALAN III flow  Right Coronary Artery: The right coronary artery is a large caliber vessel arising normally from the right sinus of valsalva.  It is dominant giving rise to a PDA and PLV branch. Mid RCA with a 95-99% calcified fibrotic stenosis. ALAN III flow  LVgram: LVEDP 33 mmHg  PCI procedure:  Heparin administered. ACT was closely monitored. Using a EBU 3.5 guider, this was used to cannulate the left system. Nuno blue PCI wire repshaped outside the body. PCI wire was advanced into the distal LAD. Predilated with 2.0 mm NC, 2.5 mm NC, scoring balloon 2.5 mm, 3.0 mm and 3.5 mm NC. IVUS was advanced for vessel prep/size. Schockwave 3.0 x 12 mm advanced and multiple therapies given. Followed by a 3.5 mm NC. Advanced a 3.5 x 24 mm megatron XU and deployed at nominal pressure. Post dilated with a 4.0 mm NC with great results. After the balloon was removed.  The wire was left in place.  Repeat angiography  showed no signs of dissection.  Subsequently the wire was pulled back.   Final angiography showed ALAN-3 flow.  No signs of dissection, perforation, or thrombus.  Assessment:   Successful PCI of LAD with 3.5 x 24 mm XU  mRCA 95-99% stenosis--> staged PCI of RCA  Patent Lcx stent  Plan:   - Stage PCI of RCA tomorrow PM- NPO at [facility]   - Patient tolerated procedure well. No immediate complications  - Continue DAPT  - EKG when arrives to floor  - Routine post cath protocol  - Dialysis in pm   - Maximize medical management  - Further care by the primary team    Cath/PCI OM2 12/4/23  1.  Selective left coronary Angiography.   2.  IVUS of Proximal OM2 coronary artery   3.  PCI of Proximal OM2 coronary artery with one 3.5 x 18 Michael stent   4.  Ultrasound guided right radial arterial access   5. Conscious sedation from 7:11 AM to 8:03 AM (52 minutes of sedation)   Findings:   - Coronary angiography data   Left main: Large caliber vessel, divides into the left anterior descending   and left circumflex. LM is non obstructive.   Left anterior descending: Large caliber vessel giving rise to 2 diagonals.   Patent mid LAD stents.   Left circumflex: Medium caliber vessel giving rise to 2 obtuse marginals.   OM2 has a proximal 99% ISR.   Right coronary artery: Not studied but known mid RCA lesion (see report   from last week)   - IVUS data of OM2    Pre PCI IVUS data:   Proximal reference luminal diameter: 3.6 mm   Distal reference luminal diameter: 3.4 mm   Percutaneous Coronary Intervention   Successful PCI of Proximal OM2 coronary artery 99 % ISR stenosis was   reduced to 0 % (initial ALAN 3 flow) by pre dilatation with 3 x 15 mm   balloon followed by placement of 3.5 x 18 mm [place] stent with excellent   angiographic results with final ALAN 3 flow. IVUS guided.   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   -  Succesful PCI of Proximal OM2  coronary artery with one Michael stent  Recommendations:   - Post operative care as per protocol.   - Aspirin for life and P2Y12 inhibitor for at least a year   - Moderate to high statin therapy.   - Follow up with Dr Hudson

## 2025-04-08 NOTE — ASSESSMENT & PLAN NOTE
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 Most recent hemoglobin and hematocrit are listed below.  Recent Labs     04/06/25  0317 04/07/25  0419 04/08/25  0243   HGB 8.2* 8.3* 6.1*   HCT 25.8* 25.8* 19.7*     Plan  - Monitor serial CBC: Daily and recheck now  - Transfuse PRBC if patient becomes hemodynamically unstable, symptomatic or H/H drops below 7/21.  - Patient has not received any PRBC transfusions to date  - Patient's anemia is currently stable

## 2025-04-08 NOTE — SUBJECTIVE & OBJECTIVE
Interval History: HD ended after 1 hour yesterday due to SBP in the 70s. Of note, HD was started after cystoscopy, dunaway placement, and blood loss. No events overnight. Received 1 unit pRBC this morning. BP 90s/50s. Planning for HD trial this PM.       Review of patient's allergies indicates:   Allergen Reactions    Ace inhibitors Hives, Itching, Shortness Of Breath, Other (See Comments) and Rash     Is not sure which medication it was    Captopril      Current Facility-Administered Medications   Medication Frequency    0.9%  NaCl infusion (for blood administration) Q24H PRN    0.9% NaCl infusion PRN    0.9% NaCl infusion PRN    acetaminophen tablet 650 mg Q4H PRN    atorvastatin tablet 80 mg QHS    clopidogreL tablet 75 mg Daily    dextrose 50% injection 12.5 g PRN    dextrose 50% injection 25 g PRN    diphenhydrAMINE capsule 50 mg Q6H PRN    epoetin mj-epbx injection 10,000 Units Every Mon, Wed, Fri    glucagon (human recombinant) injection 1 mg PRN    glucose chewable tablet 16 g PRN    glucose chewable tablet 24 g PRN    heparin (porcine) injection 1,000 Units PRN    insulin aspart U-100 pen 0-5 Units QID (AC + HS) PRN    insulin aspart U-100 pen 4 Units TIDWM    insulin glargine U-100 (Lantus) pen 14 Units Daily    melatonin tablet 6 mg Nightly PRN    midodrine tablet 10 mg Q8H    mupirocin 2 % ointment BID    naloxone 0.4 mg/mL injection 0.02 mg PRN    ondansetron injection 4 mg Q6H PRN    pantoprazole EC tablet 40 mg Daily    prochlorperazine injection Soln 5 mg Q6H PRN    sodium chloride 0.9% bolus 250 mL 250 mL PRN    sodium chloride 0.9% flush 10 mL Q8H    tamsulosin 24 hr capsule 0.4 mg Daily    vancomycin - pharmacy to dose pharmacy to manage frequency       Objective:     Vital Signs (Most Recent):  Temp: 97.6 °F (36.4 °C) (04/08/25 1115)  Pulse: 79 (04/08/25 1115)  Resp: 15 (04/08/25 1115)  BP: (!) 91/52 (04/08/25 1115)  SpO2: 100 % (04/08/25 1115) Vital Signs (24h Range):  Temp:  [94.2 °F (34.6  °C)-98.6 °F (37 °C)] 97.6 °F (36.4 °C)  Pulse:  [76-93] 79  Resp:  [7-33] 15  SpO2:  [81 %-100 %] 100 %  BP: ()/(44-76) 91/52     Weight: 61.2 kg (134 lb 14.7 oz) (04/04/25 1937)  Body mass index is 21.13 kg/m².  Body surface area is 1.7 meters squared.    I/O last 3 completed shifts:  In: 2539.8 [Other:500; NG/GT:1130; IV Piggyback:909.8]  Out: 1984 [Urine:1350; Other:334; Blood:300]     Physical Exam  Vitals and nursing note reviewed.   Constitutional:       General: He is sleeping. He is not in acute distress.     Appearance: Normal appearance. He is well-developed.   HENT:      Head: Normocephalic and atraumatic.      Nose: Nose normal.      Mouth/Throat:      Mouth: Mucous membranes are moist.   Eyes:      Extraocular Movements: Extraocular movements intact.      Conjunctiva/sclera: Conjunctivae normal.   Cardiovascular:      Rate and Rhythm: Normal rate and regular rhythm.   Pulmonary:      Effort: Pulmonary effort is normal.      Breath sounds: Normal breath sounds.   Abdominal:      General: There is no distension.      Palpations: Abdomen is soft.   Musculoskeletal:      Right lower leg: No edema.      Left lower leg: No edema.   Skin:     General: Skin is warm and dry.      Findings: Lesion (diffuse) present. No erythema or rash.          Significant Labs:  CBC:   Recent Labs   Lab 04/08/25  0243 04/08/25  1025   WBC 11.26  --    RBC 2.11*  --    HGB 6.1* 7.2*   HCT 19.7*  --    *  --    MCV 93  --    MCH 28.9  --    MCHC 31.0*  --      CMP:   Recent Labs   Lab 04/08/25  0243   CALCIUM 8.1*   ALBUMIN 1.9*      K 3.9   CO2 26      BUN 70*   CREATININE 8.8*   ALKPHOS 67   ALT 13   AST 16   BILITOT 0.2     All labs within the past 24 hours have been reviewed.     Significant Imaging:  Labs: Reviewed

## 2025-04-08 NOTE — NURSING
[facility]  Nurses Note -- 4 Eyes      4/8/2025       Skin assessed on: Q Shift      [x] No Pressure Injuries Present    [x]Prevention Measures Documented    [] Yes LDA  for Pressure Injury Previously documented     [] Yes New Pressure Injury Discovered   [] LDA for New Pressure Injury Added      Attending RN:  Libby Cruz, RN     Second RN:  Hannah

## 2025-04-08 NOTE — PROGRESS NOTES
Vancomycin consult follow-up:    Patient reviewed, dialysis scheduled every Mon, Wed, Fri, no new levels, no dose today; Next levels due: random due 4/9/2025 at 0300

## 2025-04-09 LAB
ABO + RH BLD: NORMAL
ABSOLUTE EOSINOPHIL (OHS): 0.45 K/UL
ABSOLUTE EOSINOPHIL (OHS): 0.49 K/UL
ABSOLUTE MONOCYTE (OHS): 0.91 K/UL (ref 0.3–1)
ABSOLUTE MONOCYTE (OHS): 1.05 K/UL (ref 0.3–1)
ABSOLUTE NEUTROPHIL COUNT (OHS): 11.18 K/UL (ref 1.8–7.7)
ABSOLUTE NEUTROPHIL COUNT (OHS): 12.82 K/UL (ref 1.8–7.7)
ALBUMIN SERPL BCP-MCNC: 2.3 G/DL (ref 3.5–5.2)
ALP SERPL-CCNC: 79 UNIT/L (ref 40–150)
ALT SERPL W/O P-5'-P-CCNC: 9 UNIT/L (ref 10–44)
ANION GAP (OHS): 10 MMOL/L (ref 8–16)
AST SERPL-CCNC: 15 UNIT/L (ref 11–45)
BACTERIA BLD CULT: ABNORMAL
BACTERIA BLD CULT: NORMAL
BASOPHILS # BLD AUTO: 0.03 K/UL
BASOPHILS # BLD AUTO: 0.05 K/UL
BASOPHILS NFR BLD AUTO: 0.2 %
BASOPHILS NFR BLD AUTO: 0.3 %
BILIRUB SERPL-MCNC: 0.3 MG/DL (ref 0.1–1)
BLD PROD TYP BPU: NORMAL
BLOOD UNIT EXPIRATION DATE: NORMAL
BLOOD UNIT TYPE CODE: 5100
BUN SERPL-MCNC: 38 MG/DL (ref 8–23)
CALCIUM SERPL-MCNC: 8 MG/DL (ref 8.7–10.5)
CHLORIDE SERPL-SCNC: 103 MMOL/L (ref 95–110)
CO2 SERPL-SCNC: 24 MMOL/L (ref 23–29)
CREAT SERPL-MCNC: 5.9 MG/DL (ref 0.5–1.4)
CROSSMATCH INTERPRETATION: NORMAL
DISPENSE STATUS: NORMAL
ERYTHROCYTE [DISTWIDTH] IN BLOOD BY AUTOMATED COUNT: 17 % (ref 11.5–14.5)
ERYTHROCYTE [DISTWIDTH] IN BLOOD BY AUTOMATED COUNT: 17.5 % (ref 11.5–14.5)
GFR SERPLBLD CREATININE-BSD FMLA CKD-EPI: 9 ML/MIN/1.73/M2
GLUCOSE SERPL-MCNC: 261 MG/DL (ref 70–110)
GRAM STN SPEC: ABNORMAL
GRAM STN SPEC: ABNORMAL
HCT VFR BLD AUTO: 20.7 % (ref 40–54)
HCT VFR BLD AUTO: 26.2 % (ref 40–54)
HGB BLD-MCNC: 6.8 GM/DL (ref 14–18)
HGB BLD-MCNC: 8.4 GM/DL (ref 14–18)
IMM GRANULOCYTES # BLD AUTO: 0.38 K/UL (ref 0–0.04)
IMM GRANULOCYTES # BLD AUTO: 0.43 K/UL (ref 0–0.04)
IMM GRANULOCYTES NFR BLD AUTO: 2.5 % (ref 0–0.5)
IMM GRANULOCYTES NFR BLD AUTO: 3.2 % (ref 0–0.5)
LYMPHOCYTES # BLD AUTO: 0.6 K/UL (ref 1–4.8)
LYMPHOCYTES # BLD AUTO: 0.61 K/UL (ref 1–4.8)
MCH RBC QN AUTO: 29.3 PG (ref 27–31)
MCH RBC QN AUTO: 30 PG (ref 27–31)
MCHC RBC AUTO-ENTMCNC: 32.1 G/DL (ref 32–36)
MCHC RBC AUTO-ENTMCNC: 32.9 G/DL (ref 32–36)
MCV RBC AUTO: 91 FL (ref 82–98)
MCV RBC AUTO: 91 FL (ref 82–98)
NUCLEATED RBC (/100WBC) (OHS): 1 /100 WBC
NUCLEATED RBC (/100WBC) (OHS): 1 /100 WBC
PHOSPHATE SERPL-MCNC: 2.1 MG/DL (ref 2.7–4.5)
PLATELET # BLD AUTO: 151 K/UL (ref 150–450)
PLATELET # BLD AUTO: 169 K/UL (ref 150–450)
PMV BLD AUTO: 11 FL (ref 9.2–12.9)
PMV BLD AUTO: 11.3 FL (ref 9.2–12.9)
POCT GLUCOSE: 104 MG/DL (ref 70–110)
POCT GLUCOSE: 147 MG/DL (ref 70–110)
POCT GLUCOSE: 233 MG/DL (ref 70–110)
POCT GLUCOSE: 293 MG/DL (ref 70–110)
POTASSIUM SERPL-SCNC: 3.6 MMOL/L (ref 3.5–5.1)
PROT SERPL-MCNC: 5.8 GM/DL (ref 6–8.4)
RBC # BLD AUTO: 2.27 M/UL (ref 4.6–6.2)
RBC # BLD AUTO: 2.87 M/UL (ref 4.6–6.2)
RELATIVE EOSINOPHIL (OHS): 3.2 %
RELATIVE EOSINOPHIL (OHS): 3.3 %
RELATIVE LYMPHOCYTE (OHS): 4 % (ref 18–48)
RELATIVE LYMPHOCYTE (OHS): 4.4 % (ref 18–48)
RELATIVE MONOCYTE (OHS): 6.7 % (ref 4–15)
RELATIVE MONOCYTE (OHS): 6.8 % (ref 4–15)
RELATIVE NEUTROPHIL (OHS): 82.2 % (ref 38–73)
RELATIVE NEUTROPHIL (OHS): 83.2 % (ref 38–73)
SODIUM SERPL-SCNC: 137 MMOL/L (ref 136–145)
UNIT NUMBER: NORMAL
VANCOMYCIN SERPL-MCNC: 18.6 UG/ML (ref ?–80)
WBC # BLD AUTO: 13.6 K/UL (ref 3.9–12.7)
WBC # BLD AUTO: 15.4 K/UL (ref 3.9–12.7)

## 2025-04-09 PROCEDURE — A4216 STERILE WATER/SALINE, 10 ML: HCPCS | Performed by: HOSPITALIST

## 2025-04-09 PROCEDURE — 85025 COMPLETE CBC W/AUTO DIFF WBC: CPT | Performed by: HOSPITALIST

## 2025-04-09 PROCEDURE — 99232 SBSQ HOSP IP/OBS MODERATE 35: CPT | Mod: ,,, | Performed by: UROLOGY

## 2025-04-09 PROCEDURE — 94761 N-INVAS EAR/PLS OXIMETRY MLT: CPT

## 2025-04-09 PROCEDURE — 63600175 PHARM REV CODE 636 W HCPCS: Performed by: REGISTERED NURSE

## 2025-04-09 PROCEDURE — 27000221 HC OXYGEN, UP TO 24 HOURS

## 2025-04-09 PROCEDURE — 25000003 PHARM REV CODE 250: Performed by: INTERNAL MEDICINE

## 2025-04-09 PROCEDURE — 11000001 HC ACUTE MED/SURG PRIVATE ROOM

## 2025-04-09 PROCEDURE — G0545 PR VISIT INHERENT TO INPT OR OBS CARE, INFECTIOUS DISEASE: HCPCS | Mod: ,,, | Performed by: INTERNAL MEDICINE

## 2025-04-09 PROCEDURE — 97110 THERAPEUTIC EXERCISES: CPT

## 2025-04-09 PROCEDURE — 99291 CRITICAL CARE FIRST HOUR: CPT | Mod: ,,, | Performed by: INTERNAL MEDICINE

## 2025-04-09 PROCEDURE — 99233 SBSQ HOSP IP/OBS HIGH 50: CPT | Mod: ,,, | Performed by: INTERNAL MEDICINE

## 2025-04-09 PROCEDURE — 25000003 PHARM REV CODE 250: Performed by: HOSPITALIST

## 2025-04-09 PROCEDURE — 99497 ADVNCD CARE PLAN 30 MIN: CPT | Mod: ,,, | Performed by: REGISTERED NURSE

## 2025-04-09 PROCEDURE — P9016 RBC LEUKOCYTES REDUCED: HCPCS | Performed by: INTERNAL MEDICINE

## 2025-04-09 PROCEDURE — 25000003 PHARM REV CODE 250: Performed by: UROLOGY

## 2025-04-09 PROCEDURE — 63600175 PHARM REV CODE 636 W HCPCS: Performed by: SURGERY

## 2025-04-09 PROCEDURE — 99233 SBSQ HOSP IP/OBS HIGH 50: CPT | Mod: ,,, | Performed by: REGISTERED NURSE

## 2025-04-09 PROCEDURE — 63600175 PHARM REV CODE 636 W HCPCS: Mod: JZ,TB | Performed by: INTERNAL MEDICINE

## 2025-04-09 PROCEDURE — 99233 SBSQ HOSP IP/OBS HIGH 50: CPT | Mod: ,,,

## 2025-04-09 PROCEDURE — 80053 COMPREHEN METABOLIC PANEL: CPT | Performed by: HOSPITALIST

## 2025-04-09 PROCEDURE — 99232 SBSQ HOSP IP/OBS MODERATE 35: CPT | Mod: ,,, | Performed by: INTERNAL MEDICINE

## 2025-04-09 PROCEDURE — 25000242 PHARM REV CODE 250 ALT 637 W/ HCPCS: Performed by: INTERNAL MEDICINE

## 2025-04-09 PROCEDURE — 25000003 PHARM REV CODE 250: Performed by: STUDENT IN AN ORGANIZED HEALTH CARE EDUCATION/TRAINING PROGRAM

## 2025-04-09 PROCEDURE — 27000207 HC ISOLATION

## 2025-04-09 PROCEDURE — 85025 COMPLETE CBC W/AUTO DIFF WBC: CPT | Performed by: STUDENT IN AN ORGANIZED HEALTH CARE EDUCATION/TRAINING PROGRAM

## 2025-04-09 PROCEDURE — 25000003 PHARM REV CODE 250: Performed by: SURGERY

## 2025-04-09 PROCEDURE — 84100 ASSAY OF PHOSPHORUS: CPT | Performed by: INTERNAL MEDICINE

## 2025-04-09 PROCEDURE — 86920 COMPATIBILITY TEST SPIN: CPT | Performed by: INTERNAL MEDICINE

## 2025-04-09 PROCEDURE — 80202 ASSAY OF VANCOMYCIN: CPT | Performed by: HOSPITALIST

## 2025-04-09 PROCEDURE — 99498 ADVNCD CARE PLAN ADDL 30 MIN: CPT | Mod: ,,, | Performed by: REGISTERED NURSE

## 2025-04-09 RX ORDER — ERYTHROMYCIN 5 MG/G
OINTMENT OPHTHALMIC EVERY 8 HOURS
Status: COMPLETED | OUTPATIENT
Start: 2025-04-09 | End: 2025-04-19

## 2025-04-09 RX ORDER — SODIUM,POTASSIUM PHOSPHATES 280-250MG
2 POWDER IN PACKET (EA) ORAL ONCE
Status: COMPLETED | OUTPATIENT
Start: 2025-04-09 | End: 2025-04-09

## 2025-04-09 RX ORDER — HYDROMORPHONE HYDROCHLORIDE 1 MG/ML
1 INJECTION, SOLUTION INTRAMUSCULAR; INTRAVENOUS; SUBCUTANEOUS EVERY 4 HOURS PRN
Status: DISCONTINUED | OUTPATIENT
Start: 2025-04-09 | End: 2025-04-14

## 2025-04-09 RX ORDER — MORPHINE SULFATE 4 MG/ML
2 INJECTION, SOLUTION INTRAMUSCULAR; INTRAVENOUS
Status: DISCONTINUED | OUTPATIENT
Start: 2025-04-09 | End: 2025-04-09

## 2025-04-09 RX ORDER — INSULIN GLARGINE 100 [IU]/ML
24 INJECTION, SOLUTION SUBCUTANEOUS DAILY
Status: DISCONTINUED | OUTPATIENT
Start: 2025-04-09 | End: 2025-04-10

## 2025-04-09 RX ORDER — MIDODRINE HYDROCHLORIDE 5 MG/1
5 TABLET ORAL ONCE
Status: COMPLETED | OUTPATIENT
Start: 2025-04-09 | End: 2025-04-09

## 2025-04-09 RX ORDER — HYOSCYAMINE SULFATE 0.12 MG/1
0.12 TABLET SUBLINGUAL EVERY 4 HOURS PRN
Status: DISCONTINUED | OUTPATIENT
Start: 2025-04-09 | End: 2025-04-26

## 2025-04-09 RX ORDER — OXYBUTYNIN CHLORIDE 5 MG/1
5 TABLET ORAL 3 TIMES DAILY
Status: DISCONTINUED | OUTPATIENT
Start: 2025-04-09 | End: 2025-04-09

## 2025-04-09 RX ORDER — MIDODRINE HYDROCHLORIDE 5 MG/1
15 TABLET ORAL EVERY 8 HOURS
Status: DISCONTINUED | OUTPATIENT
Start: 2025-04-09 | End: 2025-04-29 | Stop reason: HOSPADM

## 2025-04-09 RX ORDER — OXYBUTYNIN CHLORIDE 5 MG/1
5 TABLET ORAL 3 TIMES DAILY PRN
Status: DISCONTINUED | OUTPATIENT
Start: 2025-04-09 | End: 2025-04-09

## 2025-04-09 RX ORDER — ALBUMIN HUMAN 250 G/1000ML
25 SOLUTION INTRAVENOUS DAILY PRN
Status: DISCONTINUED | OUTPATIENT
Start: 2025-04-09 | End: 2025-04-29 | Stop reason: HOSPADM

## 2025-04-09 RX ORDER — SENNOSIDES 8.6 MG/1
8.6 TABLET ORAL DAILY PRN
Status: DISCONTINUED | OUTPATIENT
Start: 2025-04-09 | End: 2025-04-29 | Stop reason: HOSPADM

## 2025-04-09 RX ORDER — ATROPA BELLADONNA AND OPIUM 16.2; 3 MG/1; MG/1
30 SUPPOSITORY RECTAL EVERY 12 HOURS PRN
Refills: 0 | Status: DISCONTINUED | OUTPATIENT
Start: 2025-04-09 | End: 2025-04-09

## 2025-04-09 RX ORDER — INSULIN ASPART 100 [IU]/ML
6 INJECTION, SOLUTION INTRAVENOUS; SUBCUTANEOUS
Status: DISCONTINUED | OUTPATIENT
Start: 2025-04-09 | End: 2025-04-10

## 2025-04-09 RX ADMIN — MORPHINE SULFATE 2 MG: 4 INJECTION INTRAVENOUS at 08:04

## 2025-04-09 RX ADMIN — MIDODRINE HYDROCHLORIDE 15 MG: 5 TABLET ORAL at 01:04

## 2025-04-09 RX ADMIN — SODIUM CHLORIDE, PRESERVATIVE FREE 10 ML: 5 INJECTION INTRAVENOUS at 01:04

## 2025-04-09 RX ADMIN — ERYTHROMYCIN: 5 OINTMENT OPHTHALMIC at 10:04

## 2025-04-09 RX ADMIN — MUPIROCIN: 20 OINTMENT TOPICAL at 08:04

## 2025-04-09 RX ADMIN — INSULIN ASPART 6 UNITS: 100 INJECTION, SOLUTION INTRAVENOUS; SUBCUTANEOUS at 11:04

## 2025-04-09 RX ADMIN — TAMSULOSIN HYDROCHLORIDE 0.4 MG: 0.4 CAPSULE ORAL at 08:04

## 2025-04-09 RX ADMIN — SODIUM CHLORIDE, PRESERVATIVE FREE 10 ML: 5 INJECTION INTRAVENOUS at 10:04

## 2025-04-09 RX ADMIN — EPOETIN ALFA-EPBX 10000 UNITS: 10000 INJECTION, SOLUTION INTRAVENOUS; SUBCUTANEOUS at 08:04

## 2025-04-09 RX ADMIN — HYDROMORPHONE HYDROCHLORIDE 1 MG: 1 INJECTION, SOLUTION INTRAMUSCULAR; INTRAVENOUS; SUBCUTANEOUS at 10:04

## 2025-04-09 RX ADMIN — CLOPIDOGREL BISULFATE 75 MG: 75 TABLET, FILM COATED ORAL at 08:04

## 2025-04-09 RX ADMIN — HYDROMORPHONE HYDROCHLORIDE 1 MG: 1 INJECTION, SOLUTION INTRAMUSCULAR; INTRAVENOUS; SUBCUTANEOUS at 07:04

## 2025-04-09 RX ADMIN — PANTOPRAZOLE SODIUM 40 MG: 40 TABLET, DELAYED RELEASE ORAL at 08:04

## 2025-04-09 RX ADMIN — INSULIN ASPART 4 UNITS: 100 INJECTION, SOLUTION INTRAVENOUS; SUBCUTANEOUS at 07:04

## 2025-04-09 RX ADMIN — INSULIN GLARGINE 24 UNITS: 100 INJECTION, SOLUTION SUBCUTANEOUS at 08:04

## 2025-04-09 RX ADMIN — Medication 2 PACKET: at 10:04

## 2025-04-09 RX ADMIN — MIDODRINE HYDROCHLORIDE 5 MG: 5 TABLET ORAL at 08:04

## 2025-04-09 RX ADMIN — Medication 6 MG: at 10:04

## 2025-04-09 RX ADMIN — SODIUM CHLORIDE, PRESERVATIVE FREE 10 ML: 5 INJECTION INTRAVENOUS at 06:04

## 2025-04-09 RX ADMIN — INSULIN ASPART 2 UNITS: 100 INJECTION, SOLUTION INTRAVENOUS; SUBCUTANEOUS at 11:04

## 2025-04-09 RX ADMIN — HYOSCYAMINE SULFATE 0.12 MG: 0.12 TABLET SUBLINGUAL at 02:04

## 2025-04-09 RX ADMIN — MIDODRINE HYDROCHLORIDE 10 MG: 5 TABLET ORAL at 06:04

## 2025-04-09 RX ADMIN — MIDODRINE HYDROCHLORIDE 15 MG: 5 TABLET ORAL at 10:04

## 2025-04-09 RX ADMIN — OXYBUTYNIN CHLORIDE 5 MG: 5 TABLET ORAL at 03:04

## 2025-04-09 RX ADMIN — OXYCODONE HYDROCHLORIDE 5 MG: 5 SOLUTION ORAL at 02:04

## 2025-04-09 RX ADMIN — INSULIN ASPART 1 UNITS: 100 INJECTION, SOLUTION INTRAVENOUS; SUBCUTANEOUS at 03:04

## 2025-04-09 RX ADMIN — ATORVASTATIN CALCIUM 80 MG: 40 TABLET, FILM COATED ORAL at 10:04

## 2025-04-09 NOTE — PROGRESS NOTES
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 man with ESRD with LUE AVF, HFpEF, CAD, DM admitted to OSH 4/1- 4/3 with sepsis due to MRSA bacteremia, transferred to [facility] ICU for septic shock due to MRSA bacteremia and thrombosis of AV graft on 4/3 s/p exploratory surgery of LUE with graft resection 4/3. Op findings: Ruptured pseudoaneurysm with infected hematoma, graft completely obliterated at mid segment. Suspect infected graft is the source of bacteremia.     BCx 4/2 MRSA  BCx 4/4 MRSA  BCx 4/6 MRSA  BCx 4/7: NGTD    LUE surgical cx growing s aureus    TTE: 1.9x1.2cm large fixed heterogeneous mass present on the posterior leaflet , moderate to severe regurgitation with an eccentric jet. Cannot exclude severe MR with possible PMVL perforation in association with large vegetation. Not a surgical candidate.    Persistently positive blood cultures in the setting of acute IE. Deemed a poor surgical candidate.    If unable to sterilize his blood cultures, then his long term prognosis remains quite poor.     Recommendations:  --continue vancomycin  --anticipate atleast 6 wks iv abx from culture clearance, if that occurs  --if his repeat blood cultures from today still yield MRSA, can consider salvage therapy with ceftaroline and daptomycin (see reference below) and line change  --time will tell, indeed      Lamont Steele MD  Infectious Disease  [facility] [place] - Intensive Care    Subjective:     Principal Problem:Septic shock    HPI: 85 y.o. man with ESRD with LUE AVF, HFpEF, CAD, DM admitted to OSH 4/1- 4/3 with sepsis due to MRSA bacteremia, transferred to [facility] ICU for septic shock  on 4/3.    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     CXR 4/3 with likely pulmonary edema  CT c/a/p 4/3 revealed hepatic hypodensities likely cysts as well some larger lesions that hav decreased in size. Otherwise, no clear infectious source.      ID consulted for: MRSA bacteremia   Interval History: No new issues. Feeling about the same.     Review of Systems  Objective:     Vital Signs (Most Recent):  Temp: 98 °F (36.7 °C) (04/09/25 0701)  Pulse: 75 (04/09/25 0900)  Resp: (!) 21 (04/09/25 1034)  BP: (!) 110/56 (04/09/25 0900)  SpO2: 97 % (04/09/25 0900) Vital Signs (24h Range):  Temp:  [97.6 °F (36.4 °C)-98.2 °F (36.8 °C)] 98 °F (36.7 °C)  Pulse:  [] 75  Resp:  [9-47] 21  SpO2:  [71 %-100 %] 97 %  BP: ()/(48-60) 110/56     Weight: 61.2 kg (134 lb 14.7 oz)  Body mass index is 21.13 kg/m².    Estimated Creatinine Clearance: 7.6 mL/min (A) (based on SCr of 5.9 mg/dL (H)).     Physical Exam  Vitals and nursing note reviewed.   Constitutional:       General: He is not in acute distress.     Appearance: Normal appearance. He is not ill-appearing, toxic-appearing or diaphoretic.   HENT:      Head: Normocephalic and atraumatic.      Mouth/Throat:      Mouth: Mucous membranes are moist.      Comments: Poor dentition  Eyes:      Pupils: Pupils are equal, round, and reactive to light.   Cardiovascular:      Rate and Rhythm: Normal rate.      Heart sounds: Murmur heard.   Abdominal:      General: There is no distension.      Tenderness: There is no guarding.   Musculoskeletal:         General: No swelling.      Right lower leg: No edema.      Left lower leg: No edema.   Skin:     Findings: No erythema.   Neurological:      General: No focal deficit present.      Mental Status: He is alert.   Psychiatric:         Mood and Affect: Mood normal.          Significant Labs: CBC:   Recent Labs   Lab 04/08/25  7371  04/08/25  1025 04/09/25  0341   WBC 11.26  --  13.60*   HGB 6.1* 7.2* 6.8*   HCT 19.7*  --  20.7*   *  --  151     Microbiology Results (last 7 days)       Procedure Component Value Units Date/Time    Blood culture [[id number]]  (Normal) Collected: 04/04/25 1044    Order Status: Completed Specimen: Blood Updated: 04/09/25 1100     Blood Culture No Growth After 5 Days    Blood culture [[id number]]  (Abnormal) Collected: 04/06/25 0555    Order Status: Completed Specimen: Blood Updated: 04/09/25 0720     Blood Culture Positive - Aerobic/Pediatric Bottle      Methicillin resistant Staphylococcus aureus     Comment: <null> has been updated to reportable.        GRAM STAIN Gram positive cocci in clusters resembling Staph     Comment: Aerobic Bottle Positive   This is an appended report. These results have been appended to a previously preliminary verified report.       Narrative:      For susceptibility refer to order [id number]  ID Consult Strongly Recommended    Blood culture [[id number]]  (Abnormal)  (Susceptibility) Collected: 04/06/25 0542    Order Status: Completed Specimen: Blood Updated: 04/09/25 0719     Blood Culture Positive - Aerobic/Pediatric Bottle      Methicillin resistant Staphylococcus aureus     Comment: <null> has been updated to reportable.        GRAM STAIN Gram positive cocci in clusters resembling Staph     Comment: This is an appended report. These results have been appended to a previously preliminary verified report.       Narrative:      Aerobic Bottle Positive   ID Consult Strongly Recommended    Blood culture [[id number]]  (Normal) Collected: 04/08/25 0401    Order Status: Completed Specimen: Blood Updated: 04/09/25 0600     Blood Culture No Growth After 24 Hours    Blood culture [[id number]]  (Normal) Collected: 04/08/25 0506    Order Status: Completed Specimen: Blood Updated: 04/09/25 0600     Blood Culture No Growth After 24 Hours    Afb Culture Stain [[id number]] Collected:  04/03/25 1943    Order Status: Completed Specimen: Wound from Arm, Left Updated: 04/07/25 1637     ACID FAST STAIN  No acid fast bacilli seen    Afb Culture Stain [[id number]] Collected: 04/03/25 2011    Order Status: Completed Specimen: Wound from Arm, Left Updated: 04/07/25 1637     ACID FAST STAIN  No acid fast bacilli seen    Afb Culture Stain [[id number]] Collected: 04/03/25 1905    Order Status: Completed Specimen: Tissue from AV Fistula, Left Updated: 04/07/25 1623     ACID FAST STAIN  No acid fast bacilli seen    Afb Culture Stain [[id number]] Collected: 04/03/25 1954    Order Status: Completed Specimen: Wound from Arm, Left Updated: 04/07/25 1623     ACID FAST STAIN  No acid fast bacilli seen    Afb Culture Stain [[id number]] Collected: 04/03/25 1816    Order Status: Completed Specimen: Tissue from AV Fistula, Left Updated: 04/07/25 1623     ACID FAST STAIN  No acid fast bacilli seen    Afb Culture Stain [[id number]] Collected: 04/03/25 1921    Order Status: Completed Specimen: Tissue from hematoma Updated: 04/07/25 1623     ACID FAST STAIN  No acid fast bacilli seen    Afb Culture Stain [[id number]] Collected: 04/03/25 1934    Order Status: Completed Specimen: Tissue from AV Fistula, Left Updated: 04/07/25 1618     ACID FAST STAIN  No acid fast bacilli seen    Fungus culture [[id number]]  (Normal) Collected: 04/03/25 1943    Order Status: Completed Specimen: Wound from Arm, Left Updated: 04/07/25 0935     Fungal Culture Culture In Progress    Fungus culture [[id number]]  (Normal) Collected: 04/03/25 1954    Order Status: Completed Specimen: Wound from Arm, Left Updated: 04/07/25 0935     Fungal Culture Culture In Progress    AFB Culture & Smear [[id number]] Collected: 04/03/25 1816    Order Status: Completed Specimen: Tissue from AV Fistula, Left Updated: 04/07/25 0815     CULTURE, AFB  Culture In Progress    AFB Culture & Smear [[id number]] Collected: 04/03/25 1905    Order Status: Completed Specimen:  Tissue from AV Fistula, Left Updated: 04/07/25 0815     CULTURE, AFB  Culture In Progress    AFB Culture & Smear [[id number]] Collected: 04/03/25 1921    Order Status: Completed Specimen: Tissue from hematoma Updated: 04/07/25 0815     CULTURE, AFB  Culture In Progress    AFB Culture & Smear [[id number]] Collected: 04/03/25 1934    Order Status: Completed Specimen: Tissue from AV Fistula, Left Updated: 04/07/25 0815     CULTURE, AFB  Culture In Progress    AFB Culture & Smear [[id number]] Collected: 04/03/25 1943    Order Status: Completed Specimen: Wound from Arm, Left Updated: 04/07/25 0815     CULTURE, AFB  Culture In Progress    AFB Culture & Smear [[id number]] Collected: 04/03/25 1954    Order Status: Completed Specimen: Wound from Arm, Left Updated: 04/07/25 0815     CULTURE, AFB  Culture In Progress    AFB Culture & Smear [[id number]] Collected: 04/03/25 2011    Order Status: Completed Specimen: Wound from Arm, Left Updated: 04/07/25 0815     CULTURE, AFB  Culture In Progress    Culture, Anaerobic [[id number]] Collected: 04/03/25 2011    Order Status: Completed Specimen: Wound from Arm, Left Updated: 04/07/25 0748     Anaerobe Culture No Anaerobes Isolated    Culture, Anaerobic [[id number]] Collected: 04/03/25 1954    Order Status: Completed Specimen: Wound from Arm, Left Updated: 04/07/25 0747     Anaerobe Culture No Anaerobes Isolated    Culture, Anaerobic [[id number]] Collected: 04/03/25 1943    Order Status: Completed Specimen: Wound from Arm, Left Updated: 04/07/25 0747     Anaerobe Culture No Anaerobes Isolated    Culture, Anaerobic [[id number]] Collected: 04/03/25 1934    Order Status: Completed Specimen: Tissue from AV Fistula, Left Updated: 04/07/25 0746     Anaerobe Culture No Anaerobes Isolated    Culture, Anaerobic [[id number]] Collected: 04/03/25 1921    Order Status: Completed Specimen: Tissue from hematoma Updated: 04/07/25 0746     Anaerobe Culture No Anaerobes Isolated    Culture, Anaerobic  [[id number]] Collected: 04/03/25 1910    Order Status: Completed Specimen: Tissue from AV Fistula, Left Updated: 04/07/25 0745     Anaerobe Culture No Anaerobes Isolated    Culture, Anaerobic [[id number]] Collected: 04/03/25 1816    Order Status: Completed Specimen: Wound from Arm, Left Updated: 04/07/25 0744     Anaerobe Culture No Anaerobes Isolated    Blood culture [[id number]]  (Abnormal) Collected: 04/04/25 1343    Order Status: Completed Specimen: Blood Updated: 04/06/25 0657     Blood Culture Positive - Aerobic/Pediatric Bottle      Methicillin resistant Staphylococcus aureus     Comment: <null> has been updated to reportable.        GRAM STAIN Gram positive cocci in clusters resembling Staph     Comment: Aerobic Bottle Positive    This is an appended report. These results have been appended to a previously preliminary verified report.       Narrative:      For sensitivities, refer to order # [id number]      Aerobic culture [[id number]]  (Abnormal) Collected: 04/03/25 2011    Order Status: Completed Specimen: Wound from Arm, Left Updated: 04/06/25 0556     CULTURE, AEROBIC Few Methicillin resistant Staphylococcus aureus    Narrative:      For sensitivities, refer to order # [id number]    Aerobic culture [[id number]]  (Abnormal) Collected: 04/03/25 1954    Order Status: Completed Specimen: Wound from Arm, Left Updated: 04/06/25 0555     CULTURE, AEROBIC Many Methicillin resistant Staphylococcus aureus    Narrative:      For sensitivities, refer to order # [id number]    Aerobic culture [[id number]]  (Abnormal)  (Susceptibility) Collected: 04/03/25 1943    Order Status: Completed Specimen: Wound from Arm, Left Updated: 04/06/25 0553     CULTURE, AEROBIC Many Methicillin resistant Staphylococcus aureus    Aerobic culture [[id number]]  (Abnormal)  (Susceptibility) Collected: 04/03/25 1926    Order Status: Completed Specimen: Wound from Arm, Left Updated: 04/06/25 0544     CULTURE, AEROBIC Many  Methicillin resistant Staphylococcus aureus    Aerobic culture [[id number]] Collected: 04/03/25 1911    Order Status: Completed Specimen: Tissue from Arm, Left Updated: 04/06/25 0543     CULTURE, AEROBIC No Growth    Aerobic culture [[id number]] Collected: 04/03/25 1816    Order Status: Completed Specimen: Wound from Arm, Left Updated: 04/06/25 0543     CULTURE, AEROBIC No Growth    Gram stain [[id number]] Collected: 04/03/25 1923    Order Status: Completed Specimen: Tissue from Arm, Left Updated: 04/04/25 0837     GRAM STAIN Rare WBC seen      Few Gram positive cocci    Gram stain [[id number]] Collected: 04/03/25 1935    Order Status: Completed Specimen: Tissue from Arm, Left Updated: 04/04/25 0836     GRAM STAIN Rare WBC seen      No organisms seen    Gram stain [[id number]] Collected: 04/03/25 1914    Order Status: Completed Specimen: Wound from Arm, Left Updated: 04/04/25 0836     GRAM STAIN Rare WBC seen      No organisms seen    Gram stain [[id number]] Collected: 04/03/25 2011    Order Status: Completed Specimen: Wound from Arm, Left Updated: 04/04/25 0835     GRAM STAIN Rare WBC seen      No organisms seen    Gram stain [[id number]] Collected: 04/03/25 1821    Order Status: Completed Specimen: Wound from Arm, Left Updated: 04/04/25 0835     GRAM STAIN No WBCs      No organisms seen    Gram stain [[id number]] Collected: 04/03/25 1943    Order Status: Completed Specimen: Wound from Arm, Left Updated: 04/04/25 0834     GRAM STAIN Rare WBC seen      No organisms seen    Gram stain [[id number]] Collected: 04/03/25 1954    Order Status: Completed Specimen: Wound from Arm, Left Updated: 04/04/25 0833     GRAM STAIN Rare WBC seen      No organisms seen    Fungus culture [[id number]] Collected: 04/03/25 1934    Order Status: Sent Specimen: Tissue from AV Fistula, Left Updated: 04/03/25 2107    Fungus culture [[id number]] Collected: 04/03/25 1935    Order Status: Canceled Specimen: Tissue from Arm, Left Updated:  04/03/25 2107    Fungus culture [[id number]] Collected: 04/03/25 2011    Order Status: Sent Specimen: Wound from Arm, Left Updated: 04/03/25 2106    Fungus culture [[id number]] Collected: 04/03/25 1816    Order Status: Sent Specimen: Wound from Arm, Left Updated: 04/03/25 2106    Fungus culture [[id number]] Collected: 04/03/25 1904    Order Status: Sent Specimen: Tissue from AV Fistula, Left Updated: 04/03/25 2106    Fungus culture [[id number]] Collected: 04/03/25 1921    Order Status: Sent Specimen: Tissue from hematoma Updated: 04/03/25 2105            Significant Imaging: I have reviewed all pertinent imaging results/findings within the past 24 hours.

## 2025-04-09 NOTE — SUBJECTIVE & OBJECTIVE
Past Medical History:   Diagnosis Date    BPH (benign prostatic hyperplasia)     Coronary artery disease     He has followed at the [facility] and is now transferring his care to this clinic. In 2014 he had stent to LAD. He states he has had 2 heart attacks. He does not get angina. There was 90% left anterior descending artery stenosis undergoing stenting. There was also a circumflex marginal branch stenosis of 70%.    Diabetes mellitus, type 2     ESRD (end stage renal disease) on dialysis     ESRD on HD TTS via left brachial AVF    Hypertension     Hypothyroidism, unspecified     NSTEMI (non-ST elevated myocardial infarction) 4/4/2025    Sepsis 4/2/2025    Symptomatic anemia 01/15/2024       Past Surgical History:   Procedure Laterality Date    ANGIOGRAM, CORONARY, WITH LEFT HEART CATHETERIZATION  1/13/2025    Procedure: Angiogram, Coronary, with Left Heart Cath;  Surgeon: Steve Bhat MD;  Location: [facility] CATH LAB/EP;  Service: Cardiology;;    ATHERECTOMY, CORONARY N/A 1/14/2025    Procedure: Atherectomy-coronary;  Surgeon: Giacomo Pittman MD;  Location: [facility] CATH LAB/EP;  Service: Cardiology;  Laterality: N/A;    bilateral foot surgery      CORONARY ANGIOPLASTY WITH STENT PLACEMENT N/A 1/14/2025    Procedure: ANGIOPLASTY, CORONARY ARTERY, WITH STENT INSERTION;  Surgeon: Giacomo Pittman MD;  Location: [facility] CATH LAB/EP;  Service: Cardiology;  Laterality: N/A;    CORONARY STENT PLACEMENT N/A 1/13/2025    Procedure: INSERTION, STENT, CORONARY ARTERY;  Surgeon: Steve Bhat MD;  Location: [facility] CATH LAB/EP;  Service: Cardiology;  Laterality: N/A;    CYSTOSCOPY N/A 4/7/2025    Procedure: CYSTOSCOPY WITH CLOT EVACUATION, FULGURATION, GARCES PLACEMENT;  Surgeon: Elsie Arnold MD;  Location: [facility] OR;  Service: Urology;  Laterality: N/A;    ESOPHAGOGASTRODUODENOSCOPY N/A 2/27/2024    Procedure: EGD (ESOPHAGOGASTRODUODENOSCOPY);  Surgeon: Gemma Almendarez MD;  Location: [facility] ENDO;  Service:  Endoscopy;  Laterality: N/A;    EXPLORATION, ARTERY, UPPER EXTREMITY Left 4/3/2025    Procedure: EXPLORATION, ARTERY, UPPER EXTREMITY;  Surgeon: Sunil Contreras MD;  Location: [facility] OR;  Service: Vascular;  Laterality: Left;    eyelid surgery      FISTULOGRAM Left 11/27/2019    Procedure: Fistulogram;  Surgeon: MELANY Brenner III, MD;  Location: [facility] OR 2ND FLR;  Service: Peripheral Vascular;  Laterality: Left;    FISTULOGRAM Left 11/27/2019    Procedure: Fistulogram, AVG declot, possible permacath;  Surgeon: MELANY Brenner III, MD;  Location: [facility] CATH LAB;  Service: Peripheral Vascular;  Laterality: Left;    INSERTION OF DIALYSIS CATHETER      IVUS, CORONARY  1/13/2025    Procedure: IVUS, Coronary;  Surgeon: Steve Bhat MD;  Location: [facility] CATH LAB/EP;  Service: Cardiology;;    LEFT HEART CATHETERIZATION Left 1/14/2025    Procedure: Left heart cath;  Surgeon: Giacomo Pittman MD;  Location: [facility] CATH LAB/EP;  Service: Cardiology;  Laterality: Left;    MOH's  12/5/13    PERCUTANEOUS CORONARY INTERVENTION, ARTERY N/A 1/14/2025    Procedure: Percutaneous coronary intervention;  Surgeon: Giacomo Pittman MD;  Location: [facility] CATH LAB/EP;  Service: Cardiology;  Laterality: N/A;    PERCUTANEOUS TRANSLUMINAL BALLOON ANGIOPLASTY OF CORONARY ARTERY  1/13/2025    Procedure: Angioplasty-coronary;  Surgeon: Steve Bhat MD;  Location: [facility] CATH LAB/EP;  Service: Cardiology;;    REMOVAL, GRAFT, ARTERIOVENOUS, UPPER EXTREMITY Left 4/3/2025    Procedure: REMOVAL, GRAFT, ARTERIOVENOUS, UPPER EXTREMITY;  Surgeon: Sunil Contreras MD;  Location: [facility] OR;  Service: Vascular;  Laterality: Left;    REVASCULARIZATION, ENDOVASCULAR, LE W/ INTRAVASCULAR LITHOTRIPSY  1/13/2025    Procedure: REVASCULARIZATION, ENDOVASCULAR, LE W/ INTRAVASCULAR LITHOTRIPSY;  Surgeon: Steve Bhat MD;  Location: [facility] CATH LAB/EP;  Service: Cardiology;;    right hand surgery      stents         Review of patient's  allergies indicates:   Allergen Reactions    Ace inhibitors Hives, Itching, Shortness Of Breath, Other (See Comments) and Rash     Is not sure which medication it was    Captopril        No current facility-administered medications on file prior to encounter.     Current Outpatient Medications on File Prior to Encounter   Medication Sig    ammonium lactate 12 % Crea Apply topically 2 (two) times a day.    artificial tears,hypromellose,,GENTEAL/SUSTANE, 0.3 % Gel Apply to eye nightly.    aspirin (ECOTRIN) 81 MG EC tablet Take 1 tablet (81 mg total) by mouth once daily.    atorvastatin (LIPITOR) 80 MG tablet Take 1 tablet (80 mg total) by mouth every evening.    camphor-menthol 0.5-0.5% (SARNA) lotion Apply topically once daily. APPLY SMALL AMOUNT TOPICALLY TO AFFECTED AREA(S) AS NEEDED FOR ITCHING KEEP IN FRIDGE    carboxymethylcellulose sodium 0.5 % Drop Apply 1 drop to eye nightly as needed (dry eyes).    carvediloL (COREG) 12.5 MG tablet Take 0.5 tablets (6.25 mg total) by mouth 2 (two) times daily.    cetirizine (ZYRTEC) 5 MG tablet Take 1 tablet (5 mg total) by mouth every 48 hours.    clobetasol 0.05% (TEMOVATE) 0.05 % Oint Apply topically 2 (two) times daily.    clopidogreL (PLAVIX) 75 mg tablet Take 1 tablet (75 mg total) by mouth once daily.    erythromycin (ROMYCIN) ophthalmic ointment Place a 1/2 inch ribbon of ointment into the lower eyelid. Four timex daily for 5 days    gabapentin (NEURONTIN) 100 MG capsule Take 1 capsule (100 mg total) by mouth every evening.    hydrophilic ointment (AQUABASE) Apply topically as needed for Dry Skin. APPLY MODERATE AMOUNT TOPICALLY TO AFFECTED AREA(S) TWICE A DAY AS NEEDED FOR DRY SKIN APPLY TO AREAS OF DRY SKIN OR OVER ENTIRE BODY.    lanolin alcohol-mineral oil-white petrolatum-ceres (EUCERIN) Crea cream Apply topically 2 (two) times daily as needed.    LIDOcaine (LIDODERM) 5 % Place 1 patch onto the skin once daily. Remove & Discard patch within 12 hours or as  directed by MD    loratadine (CLARITIN) 10 mg tablet Take 10 mg by mouth daily as needed for Allergies (Every other day).    nut.tx.imp.renal fxn,lac-reduc (NEPRO CARB STEADY) 0.08 gram-1.8 kcal/mL Liqd Take 240 mLs by mouth 2 (two) times a day.    ondansetron (ZOFRAN-ODT) 4 MG TbDL Take 1 tablet (4 mg total) by mouth every 8 (eight) hours as needed (nausea).    oxyCODONE-acetaminophen (PERCOCET) 5-325 mg per tablet Take 1 tablet by mouth every 6 (six) hours as needed.    pantoprazole (PROTONIX) 20 MG tablet Take 20 mg by mouth 2 (two) times daily as needed.    pramoxine 1 % Lotn Apply topically 2 (two) times daily as needed (itching).    triamcinolone acetonide 0.1% (KENALOG) 0.1 % cream Apply topically 2 (two) times daily as needed (itching).     Family History    None       Tobacco Use    Smoking status: Never    Smokeless tobacco: Never   Substance and Sexual Activity    Alcohol use: No    Drug use: No    Sexual activity: Not Currently     Review of Systems   Unable to perform ROS: Mental status change   Gastrointestinal:  Negative for melena.   Genitourinary:  Positive for hematuria.     Objective:     Vital Signs (Most Recent):  Temp: 98 °F (36.7 °C) (04/09/25 0701)  Pulse: 75 (04/09/25 0900)  Resp: (!) 21 (04/09/25 1034)  BP: (!) 110/56 (04/09/25 0900)  SpO2: 97 % (04/09/25 0900) Vital Signs (24h Range):  Temp:  [97.6 °F (36.4 °C)-98.2 °F (36.8 °C)] 98 °F (36.7 °C)  Pulse:  [] 75  Resp:  [9-47] 21  SpO2:  [71 %-100 %] 97 %  BP: ()/(48-60) 110/56     Weight: 61.2 kg (134 lb 14.7 oz)  Body mass index is 21.13 kg/m².    SpO2: 97 %         Intake/Output Summary (Last 24 hours) at 4/9/2025 1120  Last data filed at 4/9/2025 0600  Gross per 24 hour   Intake 895 ml   Output 375 ml   Net 520 ml       Lines/Drains/Airways       Central Venous Catheter Line  Duration             Trialysis (Dialysis) Catheter 04/04/25 1208 right internal jugular 4 days              Drain  Duration                  NG/OG Tube  04/04/25 1415 Rolf 10 Fr. Left nostril 4 days         Urethral Catheter 04/07/25 1558 Triple-lumen;Latex 24 Fr. 1 day              Peripheral Intravenous Line  Duration                  Peripheral IV - Single Lumen 04/02/25 1801 20 G Anterior;Distal;Right Upper Arm 6 days                   Exam unchanged vs 4/7/25  Physical Exam  Constitutional:       General: He is not in acute distress.     Appearance: He is well-developed. He is ill-appearing. He is not toxic-appearing or diaphoretic.   HENT:      Head: Normocephalic and atraumatic.   Eyes:      General: No scleral icterus.  Neck:      Thyroid: No thyromegaly.      Vascular: No JVD.      Trachea: No tracheal deviation.   Cardiovascular:      Rate and Rhythm: Normal rate and regular rhythm.      Heart sounds: S1 normal and S2 normal. Heart sounds are distant. No murmur heard.     No friction rub. No gallop.   Pulmonary:      Effort: Pulmonary effort is normal. No respiratory distress.      Breath sounds: Normal breath sounds. No stridor. No wheezing, rhonchi or rales.   Chest:      Chest wall: No tenderness.   Abdominal:      General: There is no distension.      Palpations: Abdomen is soft.   Musculoskeletal:         General: No swelling or tenderness. Normal range of motion.      Cervical back: Normal range of motion and neck supple. No rigidity.      Right lower leg: No edema.      Left lower leg: No edema.   Skin:     General: Skin is warm and dry.      Coloration: Skin is not jaundiced.   Neurological:      Comments: UTO   Psychiatric:      Comments: UTO          Current Medications:   atorvastatin  80 mg Oral QHS    clopidogreL  75 mg Oral Daily    epoetin mj-epbx  10,000 Units Intravenous Every Mon, Wed, Fri    erythromycin   Both Eyes Q8H    insulin aspart U-100  6 Units Subcutaneous TIDWM    insulin glargine U-100  24 Units Subcutaneous Daily    midodrine  15 mg Oral Q8H    pantoprazole  40 mg Oral Daily    sodium chloride 0.9%  10 mL Intravenous Q8H     tamsulosin  0.4 mg Oral Daily         Current Facility-Administered Medications:     0.9%  NaCl infusion (for blood administration), , Intravenous, Q24H PRN    0.9% NaCl, , Intravenous, PRN    acetaminophen, 650 mg, Oral, Q4H PRN    dextrose 50%, 12.5 g, Intravenous, PRN    dextrose 50%, 25 g, Intravenous, PRN    diphenhydrAMINE, 50 mg, Oral, Q6H PRN    glucagon (human recombinant), 1 mg, Intramuscular, PRN    glucose, 16 g, Oral, PRN    glucose, 24 g, Oral, PRN    heparin (porcine), 1,000 Units, Intravenous, PRN    HYDROmorphone, 1 mg, Intravenous, Q4H PRN    insulin aspart U-100, 0-5 Units, Subcutaneous, QID (AC + HS) PRN    melatonin, 6 mg, Oral, Nightly PRN    naloxone, 0.02 mg, Intravenous, PRN    ondansetron, 4 mg, Intravenous, Q6H PRN    oxybutynin, 5 mg, Oral, TID PRN    prochlorperazine, 5 mg, Intravenous, Q6H PRN    senna, 8.6 mg, Oral, Daily PRN    sodium chloride 0.9%, 250 mL, Intravenous, PRN    Pharmacy to dose Vancomycin consult, , , Once **AND** vancomycin - pharmacy to dose, , Intravenous, pharmacy to manage frequency    Laboratory (all labs reviewed):  CBC:  Recent Labs   Lab 04/05/25  0324 04/06/25  0317 04/07/25  0419 04/08/25  0243 04/08/25  1025 04/09/25  0341   WBC 17.18 H 15.49 H 18.35 H 11.26  --  13.60 H   HGB 8.6 L 8.2 L 8.3 L 6.1 L 7.2 L 6.8 L   HCT 26.7 L 25.8 L 25.8 L 19.7 LL  --  20.7 L   Platelet Count 109 L 118 L 141 L 134 L  --  151       CHEMISTRIES:  Recent Labs   Lab 01/16/25  0154 02/08/25  1014 02/11/25  1325 02/23/25  1638 03/21/25  1808 03/25/25  1108 04/03/25  2055 04/04/25  0247 04/04/25  1439 04/04/25 2001 04/04/25  2151 04/05/25  0324 04/05/25  1159 04/05/25  1426 04/06/25 0317 04/07/25  0419 04/08/25 0243 04/09/25  0341   Glucose 141 H 171 H 225 H 113 H 150 H  --   --   --   --   --   --   --   --   --   --   --   --   --    Sodium 140 138 138 141 141   < > 138 139   < >  --    < > 140  --  139 139 141 140 137   Potassium 4.0 4.5 4.4 5.1 4.4   < > 4.8 5.0   < >  --     < > 4.2  --  4.0 3.7 4.2 3.9 3.6   BUN 44 H 63 H 75 H 77 H 91 H   < > 70 H 75 H   < >  --    < > 58 H  --  49 H 59 H 77 H 70 H 38 H   Creatinine 10.0 H 8.6 H 8.9 H 9.8 H 10.9 H   < > 9.3 H 9.9 H   < >  --    < > 7.3 H  --  6.2 H 7.6 H 9.7 H 8.8 H 5.9 H   eGFR 5 A 5.5 A 5.3 A 4.7 A 4.1 A   < > 5 L 5 L   < >  --    < > 7 L  --  8 L 6 L 5 L 5 L 9 L   Calcium 8.9 10.2 10.1 10.9 H 10.4   < > 8.1 L 8.2 L   < >  --    < > 8.4 L  --  8.2 L 8.5 L 8.6 L 8.1 L 8.0 L   Magnesium   --   --   --   --   --    < > 2.2 2.3  --  2.2  --  2.1 2.0  --   --   --   --   --    Magnesium  --   --   --   --  2.5  --   --   --   --   --   --   --   --   --   --   --   --   --     < > = values in this interval not displayed.       CARDIAC BIOMARKERS:  Recent Labs   Lab 04/01/24  0251 04/29/24 2211 08/03/24  0015 08/05/24  0138 10/20/24  2310 10/21/24  0113 04/01/25  2220 04/02/25  0846 04/02/25  2055 04/03/25  0303 04/03/25  0718 04/03/25  1533 04/04/25  1028     --  183  --  111  --  382 H  --   --   --   --   --   --    Troponin I  --    < > 0.080 H   < > 0.044 H   < >  --   --   --   --   --   --   --    Troponin-I  --   --   --   --   --   --   --    < > 1.076 H 1.441 H 1.812 H 2.032 H 2.913 H    < > = values in this interval not displayed.       COAGS:  Recent Labs   Lab 11/28/23  2155 04/18/24  0609 01/12/25  1316 03/25/25  1108 04/03/25  1533   INR 1.1 1.0 1.0 1.0 1.1       LIPIDS/LFTS:  Recent Labs   Lab 08/11/24  0331 08/15/24  0203 04/04/25  0247 04/06/25  0317 04/07/25  0419 04/08/25  0243 04/09/25  0341   Cholesterol 110 L  --   --   --   --   --   --    Triglycerides 126  --   --   --   --   --   --    HDL 26 L  --   --   --   --   --   --    LDL Cholesterol 58.8 L  --   --   --   --   --   --    Non-HDL Cholesterol 84  --   --   --   --   --   --    AST  --    < > 23 25 20 16 15   ALT  --    < > 17 21 20 13 9 L    < > = values in this interval not displayed.       BNP:  Recent Labs   Lab 06/11/24  2239 08/03/24  0015  08/11/24  0332 01/12/25  0550 04/03/25  0303   BNP 1,110 H 624 H 2,178 H 2,043 H 2,988 H       TSH:  Recent Labs   Lab 11/27/23  0519 01/15/24  2135 07/09/24  1428 08/11/24  0332 04/02/25  0537   TSH 3.357 4.600 H 6.963 H 3.949 4.590 H       Free T4:  Recent Labs   Lab 01/15/24  2135 07/09/24  1428 04/02/25  0537   Free T4 0.81 1.01 1.19  1.19       Diagnostic Results:  ECG (personally reviewed and interpreted tracing(s)):  4/4/25 1325 SR 79, PRWP, lat ST abnl ?isch, similar to 3/25/25, lass prom than 4/2/25    Chest X-Ray (personally reviewed and interpreted image(s)): 4/4/25 NAD, aortic atherosclerosis    Echo: 4/4/25 (images prev personally reviewed and interpreted)    Left Ventricle: The left ventricle is normal in size. Normal wall thickness. Mild global hypokinesis present. There is mildly reduced systolic function with a visually estimated ejection fraction of 40 - 45%. Grade I diastolic dysfunction.    Right Ventricle: The right ventricle has moderate enlargement. Systolic function is mildly reduced.    Left Atrium: Mildly dilated Cannot exclude PFO (color Doppler with possible flow across IAS).    Aortic Valve: The aortic valve is a trileaflet valve. There is moderate aortic valve sclerosis. Mildly restricted motion. There is mild stenosis. Aortic valve area by VTI is 1.8 cm². Aortic valve peak velocity is 1.5 m/s. Mean gradient is 5 mmHg. The dimensionless index is 0.51. There is mild aortic regurgitation.    Mitral Valve: There is a 1.9x1.2cm large fixed heterogeneous mass present on the posterior leaflet (new finding vs 9/2023 echo). There is moderate to severe regurgitation with an eccentric jet.  Cannot exclude severe MR with possible PMVL perforation in association with large vegetation.    Aorta: Aortic root is mildly to moderately dilated measuring 4.17 cm.    Pulmonary Artery: The estimated pulmonary artery systolic pressure is 42 mmHg.    PCI RCA 1/14/25:    The Ost RCA lesion was 70% stenosed  with 0% stenosis post-intervention.    The Prox RCA lesion was 70% stenosed with 0% stenosis post-intervention.    The Mid RCA lesion was 99% stenosed with 0% stenosis post-intervention.    The ejection fraction was calculated to be 55%.    The pre-procedure left ventricular end diastolic pressure was 6.    The post-procedure left ventricular end diastolic pressure was 7.    Mid RCA lesion: A stent was successfully placed at 11 MAYRA for 15 sec.    Prox RCA lesion, Mid RCA lesion: A  at 12 MAYRA for 10 sec.    Ost RCA lesion, Prox RCA lesion: A stent was successfully placed.  Procedure performed:  Left heart catheterization  Coronary angiogram of the right coronary artery  Right radial artery access   Right superficial femoral artery access  CSI atherectomy of the right mid coronary artery  Laser atherectomy of the right mid coronary artery  PTCA of the right mid coronary artery  XU x3 megatron drug-eluting stent 3.5 X 16 in the mid RCA, 3.5 X 28 in the proximal RCA, 4.0 X 16 mm in the ostial RCA  Findings:  Severely calcified mid RCA stenosis unable to cross with PTCA balloons, treated initially with 0.9 laser atherectomy, followed by CSI atherectomy system with total of 5 runs (3 at low speed, 2 at high speed), pre-dilated using 2.0 compliant and 3.5 noncompliant balloon followed by 3.5 X 16 mm megatron stent.  Focal plaque rupture/erosion noted in the proximal and ostial RCA that were treated with  3.5 X 28 in the proximal RCA, 4.0 X 16 mm in the ostial RCA.  No residual stenosis post intervention.  Patient had ALAN 3 flow at the end of the procedure  LVEDP 6 mm Hg.  EF of around 55%.  No gradient across the aortic valve.  Right SFA access.  High bifurcation.  Mild diffuse disease of the right SFA closed using Perclose closure device  Right radial artery with severe spasm and hence decision was made to switch to right groin access  Recommendations:  Continue dual antiplatelet therapy for at least 1 year.  Consider  longer term anticoagulation based on risk factor profile after 1 year  High-intensity statin  Transfer back to telemetry unit    Cath/PCI LAD 1/13/25  Left Main Coronary Artery: The left main is a large caliber vessel arising normally from the left sinus of valsalva.  It bifurcates into the left anterior descending and left circumflex coronary artery.  It is free of evidence of obstructive coronary artery disease. ALAN III flow  Left Anterior Descending: The left anterior descending coronary artery is a large caliber vessel arising normally from the left main and extending to the apex.  It gives rise to small to moderate caliber diagonal arteries. Proximal LAD has a severe 80-90% calcified stenosis prior to an under expanded 2.5 mm stent in a 4.0 mm vessel. After the stent there is 40-50% stenosis. ALAN II flow  Left Circumflex: The left circumflex is a large caliber vessel arising normally from the left main coronary artery, it is non-dominant.  It gives rise to moderate caliber obtuse marginal branches. OM stent is patent. ALAN III flow  Right Coronary Artery: The right coronary artery is a large caliber vessel arising normally from the right sinus of valsalva.  It is dominant giving rise to a PDA and PLV branch. Mid RCA with a 95-99% calcified fibrotic stenosis. ALAN III flow  LVgram: LVEDP 33 mmHg  PCI procedure:  Heparin administered. ACT was closely monitored. Using a EBU 3.5 guider, this was used to cannulate the left system. Nuno blue PCI wire repshaped outside the body. PCI wire was advanced into the distal LAD. Predilated with 2.0 mm NC, 2.5 mm NC, scoring balloon 2.5 mm, 3.0 mm and 3.5 mm NC. IVUS was advanced for vessel prep/size. Schockwave 3.0 x 12 mm advanced and multiple therapies given. Followed by a 3.5 mm NC. Advanced a 3.5 x 24 mm megatron XU and deployed at nominal pressure. Post dilated with a 4.0 mm NC with great results. After the balloon was removed.  The wire was left in place.  Repeat  angiography showed no signs of dissection.  Subsequently the wire was pulled back.   Final angiography showed ALAN-3 flow.  No signs of dissection, perforation, or thrombus.  Assessment:   Successful PCI of LAD with 3.5 x 24 mm XU  mRCA 95-99% stenosis--> staged PCI of RCA  Patent Lcx stent  Plan:   - Stage PCI of RCA tomorrow PM- NPO at [facility]   - Patient tolerated procedure well. No immediate complications  - Continue DAPT  - EKG when arrives to floor  - Routine post cath protocol  - Dialysis in pm   - Maximize medical management  - Further care by the primary team    Cath/PCI OM2 12/4/23  1.  Selective left coronary Angiography.   2.  IVUS of Proximal OM2 coronary artery   3.  PCI of Proximal OM2 coronary artery with one 3.5 x 18 Republic stent   4.  Ultrasound guided right radial arterial access   5. Conscious sedation from 7:11 AM to 8:03 AM (52 minutes of sedation)   Findings:   - Coronary angiography data   Left main: Large caliber vessel, divides into the left anterior descending   and left circumflex. LM is non obstructive.   Left anterior descending: Large caliber vessel giving rise to 2 diagonals.   Patent mid LAD stents.   Left circumflex: Medium caliber vessel giving rise to 2 obtuse marginals.   OM2 has a proximal 99% ISR.   Right coronary artery: Not studied but known mid RCA lesion (see report   from last week)   - IVUS data of OM2    Pre PCI IVUS data:   Proximal reference luminal diameter: 3.6 mm   Distal reference luminal diameter: 3.4 mm   Percutaneous Coronary Intervention   Successful PCI of Proximal OM2 coronary artery 99 % ISR stenosis was   reduced to 0 % (initial ALAN 3 flow) by pre dilatation with 3 x 15 mm   balloon followed by placement of 3.5 x 18 mm Republic stent with excellent   angiographic results with final ALAN 3 flow. IVUS guided.   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   -  Succesful PCI of  Proximal OM2 coronary artery with one Michael stent  Recommendations:   - Post operative care as per protocol.   - Aspirin for life and P2Y12 inhibitor for at least a year   - Moderate to high statin therapy.   - Follow up with Dr Hudson

## 2025-04-09 NOTE — ASSESSMENT & PLAN NOTE
2025  - interval chart reviewed; discussion pt with MDT during ICU rounds  - pt very uncomfortable/in pain throughout AM see BPH   - extensive time sent achieving pt comfort in AM; communicated with dez Solomon for medical update, along with Dr. Joan Antunez, palliative attending; also allowed Neha to talk with pt to reassure and calm   - family continues to have good insight into overall condition; hopeful for improvement but pt QOL and comfort continue to be a priority in GOC   - family continuing discussions regarding code status and overall GOC; understand HD tolerance is a big factor in prognosis currently and are open to discussions if team feels he is no longer tolerating HD; continued encouragement of shared family decision making as they all seem to have consistent goals for pt   - updated family information; pt has 3 sisters and 1 brother confirmed by Neha   - of concern, pt shared seeing/speaking with his  sister; Jelanijackkacy is aware that this is not typically a good indicator of prognosis which she shares is concerning for her   - reassured pt and Neha that we will continue to prioritize his comfort while continuing to try to improve overall condition   - pt is a [place], [facility] for [facility] and or [facility] placement if pt were to no longer qualify for HD in the future   - emotional support provided to pt and family; answered all questions   - continued communication and coordination with MDT     2025  - interval chart reviewed; discussion pt with MDT during ICU rounds   - visited pt at bedside in AM with bedside RN and provided comfort and redirection during episode of delirium; pt is able to be calmed with reassurance; pt remains oriented only to self at this time; can verbalize needs such as when he feels the sensation to void but not oriented to having a catheter in place   - later in morning pt's sisters Ronna  and Nora at bedside; brief medical update provided and reviewed pt's prognosis; Ronna and Nora shared that they thought pt previously had a DNR in place and/or had expressed wishes against resuscitation or aggressive end of life measures, but had wished to continue HD as long as tolerated; they also shared poor condition of pt's home and lack of home support that placement would be necessary in their opinion if pt's condition were to improve to allow for discharge  - later called pt's niece, Neha, who also later added her grandmother, pt's sister Bhavna, to the call; brief medical update provided; Neha and Bhavna were not aware of pt's prior wishes for DNR; discussed full code vs DNR, potential interventions, risks, and outcomes; Neha expressed considering DNR as long as full treatment and otherwise escalation of care would continue; Mrs. Huggins however expressed not wishing to be involved with that decision for pt  - encouraged family discussion with pt's niece and sisters, Nora Friend, and Bhavna (if she wished to participate)   - emotional support provided to pt and family throughout all interactions   - allowed time for questions/concerns of family   - updated MDT of family discussions     4/7/25 (Dr. Joan Antunez)   - Chart and interval history reviewed; discussed pt during MDT rounds. Pt had cystoscopy and dunaway placement by urology yesterday due to screaming and discomfort from urinary retention; found to have very enlarged prostate. Dunaway placed successfully by urology staff, though this was later removed due to pt complaining of significant pain.   - Pt screaming throughout much of the morning, possibly from urinary retention vs delirium. He was not able to participate in discussion secondary to this.   - Along with Dr Jordan, called and spoke with Kenia (pt's preferred MPOA); she added her mother Erik to the call. Thorough medical update provided; shared transparent  concern that pt's blood cultures are persistently positive. While hopeful that the infection starts to clear, there is a chance we may not be able to treat this infection, unfortunately. Additionally, pt has been very uncomfortable over the last 24 hours or so due to urinary retention and/or delirium. They expressed understanding of severity of both his acute and chronic illness, and the fragility that results naturally as a result of his advanced age.   - Discussed plan moving forward, as well as code status. At this time, plan remains to continue with maximal medical therapy in hopes of improvement, though they have agreed to further discuss code status as a family. At this time, would maintain full code.   - Support provided during call   - Encouraged visitation if possible, as perhaps this will be a calming presence for pt  - Will follow up with pt and family for further conversations as clinical course evolves    4/4/25 (Lisa Jacinto, CRIS)   - Consult received; interval chart reviewed in detail; discussed pt with MDT during ICU rounds and ongoing throughout the day   - met with patient at bedside; introduction to palliative medicine team and role in current care and admission   - pt very lethargic and required encouragement and stimulation for orientation assessment; pt answers to name, able to state his 1st name, identifies he is at the hospital, and when asked who team should call for help with his medical care he answered Kenia (which is consistent with what he told  4/3 as well); quickly falls back asleep   - pt unable to participate in detailed GOC/ACP discussion on his own behalf at this time   - called Jelanijackarchana (pt's niece) who shares that she has been pt's main caregiver for some time; she confirms pt is not , does not have children, and that he has 3 sisters; she also had pt's sister Bhavna join by phone for update/discussion   - Mrs. Huggins also shared that Kenia takes care of pt and  understands his current history, needs, and wishes; per Bhavna, Kenia should be pt's medical decision maker while he lacks capacity, and confirms that pt's others sisters agree with this; they have asked that no other visitors or callers aside from Kenia and pts sisters (Bhavna, Ronna, and Nora receive updates)   - Kenia and Bhavna confirm that they feel pt would wish to continue HD  - reviewed pt's current full code status, potential interventions, risks, and outcomes; Kenia and Bhavna agree for pt to remain full code, but were open to further discussion ideally when pt is able to participate in the discussion; updated pt's team of this and added this to EMR   - both shared difficulty pt has had recently and frustration with attempts to seek care to get answers to ongoing health concerns; they both shared appropriate concerns for pt's current condition and are hopeful for answers and improvement in pt's condition; they shared that they feel the [facility] ICU team is moving quickly and working to improve his condition for which they are appreciative   - emotional support provided   - Allowed time for questions/concerns; all addressed; expressed availability of myself/palliative team for additional questions/concerns   - attempted to call pt's other sister, Ronna, as she had called unit for updates; unable to reach,    - updated MDT; ongoing communication and coordination with MDT

## 2025-04-09 NOTE — NURSING
[facility] [place]  Nurses Note -- 4 Eyes      4/9/2025       Skin assessed on: Q Shift      [x] No Pressure Injuries Present    [x]Prevention Measures Documented    [] Yes LDA  for Pressure Injury Previously documented     [] Yes New Pressure Injury Discovered   [] LDA for New Pressure Injury Added      Attending RN:  Elaine ZARCO RN     Second RN:  Manuela CHANG

## 2025-04-09 NOTE — PROGRESS NOTES
"[facility] [place] [facility]  Nephrology  Progress Note    Patient Name: Jevon Rajan  MRN: [id number]  Admission Date: 4/3/2025  Hospital Length of Stay: 6 days  Attending Provider: Gonzalez Reagan MD   Primary Care Physician: Melia, Primary Doctor  Principal Problem:Septic shock    Subjective:     HPI: Mr. Rajan is an 86 yo male with HTN, T2DM, CAD, ESRD, and liver lesion who was transferred from [facility] for septic shock and impending AVG rupture. He initially presented to [facility] on 4/1 with temp 101.9 and BP 80/30s. He was transferred to our hospital on 4/3/25; it seems his AVG ruptured during transport/shortly after arrival. He emergently went to the OR yesterday with AVG extraction; infected hematoma was noted. Workup also concerning for elevated troponin and cardiac vegetations. He is no longer on pressors. Nephrology consulted for ESRD. Prior records obtained and reviewed. He receives HD TTS at [facility] under the c/o Dr. Colin. He last received HD outpatient on 4/1/25. HD was held at [facility] due to unstable vascular access. Family agreed to proceed with CRRT today.     Interval History: s/p HD yesterday x 3 hours; 0.5-1L removed. Required BP support via albumin and midodrine to complete treatment. No events overnight. Midodrine dose increased. Patient resting comfortably at time of my exam this AM; however upon awakening reports he is "ready for this part to be over". He quickly fell back asleep (s/p pain medication).       Review of patient's allergies indicates:   Allergen Reactions    Ace inhibitors Hives, Itching, Shortness Of Breath, Other (See Comments) and Rash     Is not sure which medication it was    Captopril      Current Facility-Administered Medications   Medication Frequency    0.9%  NaCl infusion (for blood administration) Q24H PRN    0.9% NaCl infusion PRN    acetaminophen tablet 650 mg Q4H PRN    atorvastatin tablet 80 mg QHS    clopidogreL tablet 75 mg Daily    dextrose 50% injection 12.5 g PRN    " dextrose 50% injection 25 g PRN    diphenhydrAMINE capsule 50 mg Q6H PRN    epoetin mj-epbx injection 10,000 Units Every Mon, Wed, Fri    erythromycin 5 mg/gram (0.5 %) ophthalmic ointment Q8H    glucagon (human recombinant) injection 1 mg PRN    glucose chewable tablet 16 g PRN    glucose chewable tablet 24 g PRN    heparin (porcine) injection 1,000 Units PRN    HYDROmorphone injection 1 mg Q4H PRN    hyoscyamine SL tablet 0.125 mg Q4H PRN    insulin aspart U-100 pen 0-5 Units QID (AC + HS) PRN    insulin aspart U-100 pen 6 Units TIDWM    insulin glargine U-100 (Lantus) pen 24 Units Daily    melatonin tablet 6 mg Nightly PRN    midodrine tablet 15 mg Q8H    naloxone 0.4 mg/mL injection 0.02 mg PRN    ondansetron injection 4 mg Q6H PRN    pantoprazole EC tablet 40 mg Daily    prochlorperazine injection Soln 5 mg Q6H PRN    senna tablet 8.6 mg Daily PRN    sodium chloride 0.9% bolus 250 mL 250 mL PRN    sodium chloride 0.9% flush 10 mL Q8H    tamsulosin 24 hr capsule 0.4 mg Daily    vancomycin - pharmacy to dose pharmacy to manage frequency       Objective:     Vital Signs (Most Recent):  Temp: 97.8 °F (36.6 °C) (04/09/25 1101)  Pulse: 78 (04/09/25 1101)  Resp: 12 (04/09/25 1101)  BP: (!) 106/58 (04/09/25 1101)  SpO2: 98 % (04/09/25 1101) Vital Signs (24h Range):  Temp:  [97.7 °F (36.5 °C)-98.2 °F (36.8 °C)] 97.8 °F (36.6 °C)  Pulse:  [] 78  Resp:  [9-47] 12  SpO2:  [71 %-100 %] 98 %  BP: ()/(48-60) 106/58     Weight: 61.2 kg (134 lb 14.7 oz) (04/04/25 1937)  Body mass index is 21.13 kg/m².  Body surface area is 1.7 meters squared.    I/O last 3 completed shifts:  In: 2207.3 [Blood:392.5; NG/GT:1715; IV Piggyback:99.8]  Out: 1325 [Urine:325; Other:1000]     Physical Exam  Vitals and nursing note reviewed.   Constitutional:       General: He is sleeping. He is not in acute distress.     Appearance: Normal appearance. He is well-developed.   HENT:      Head: Normocephalic and atraumatic.      Nose: Nose  normal.      Mouth/Throat:      Mouth: Mucous membranes are moist.   Eyes:      Extraocular Movements: Extraocular movements intact.      Conjunctiva/sclera: Conjunctivae normal.   Cardiovascular:      Rate and Rhythm: Normal rate and regular rhythm.   Pulmonary:      Effort: Pulmonary effort is normal.      Breath sounds: Normal breath sounds. No wheezing or rales.   Abdominal:      General: There is no distension.      Palpations: Abdomen is soft.   Musculoskeletal:      Right lower leg: No edema.      Left lower leg: No edema.   Skin:     General: Skin is warm and dry.      Findings: Lesion (diffuse) present. No erythema or rash.   Neurological:      Mental Status: He is easily aroused.        Significant Labs:  CBC:   Recent Labs   Lab 04/09/25  0341   WBC 13.60*   RBC 2.27*   HGB 6.8*   HCT 20.7*      MCV 91   MCH 30.0   MCHC 32.9     CMP:   Recent Labs   Lab 04/09/25  0341   CALCIUM 8.0*   ALBUMIN 2.3*      K 3.6   CO2 24      BUN 38*   CREATININE 5.9*   ALKPHOS 79   ALT 9*   AST 15   BILITOT 0.3     All labs within the past 24 hours have been reviewed.     Significant Imaging:  Labs: Reviewed    Assessment/Plan:     ESRD  - receives HD TTS at [facility]; last received HD on 4/1  - s/p AVG resection 4/3; received CRRT from 4/4-4/5  - s/p HD yesterday x 3 hours; required midodrine and albumin  - holding RRT today  - will plan for HD again tomorrow x 3 hours. Albumin bolus PRN  - daily labs. Trend UOP    Bacteremia  - BCx remain positive; not currently a candidate for tunneled HD catheter placement  - ID following  - happy to coordinate a line free period if this becomes needed     Secondary HPTH  - CCa normal; phos low at 2.1  - ordered PhosNak x 2 packets    Anemia of CKD  - hgb 6.8 today; agree with blood transfusion  - NAKIA with HD       Thank you for your consult. I will follow-up with patient. Please contact us if you have any additional questions.    Юлия Davis,  MD  Nephrology  [facility] - [place] - Intensive Care

## 2025-04-09 NOTE — SUBJECTIVE & OBJECTIVE
----- Message from Luis Abrams sent at 6/23/2022  1:25 PM CDT -----  Please call ob pt regarding her appt her last appt was on 6/21 and her next appt is 7/05 she was told she needs to be seen every week [phone]     Medications:  Continuous Infusions:  Scheduled Meds:   atorvastatin  80 mg Oral QHS    clopidogreL  75 mg Oral Daily    epoetin mj-epbx  10,000 Units Intravenous Every Mon, Wed, Fri    erythromycin   Both Eyes Q8H    insulin aspart U-100  6 Units Subcutaneous TIDWM    insulin glargine U-100  24 Units Subcutaneous Daily    midodrine  15 mg Oral Q8H    pantoprazole  40 mg Oral Daily    sodium chloride 0.9%  10 mL Intravenous Q8H    tamsulosin  0.4 mg Oral Daily     PRN Meds:  Current Facility-Administered Medications:     0.9%  NaCl infusion (for blood administration), , Intravenous, Q24H PRN    0.9% NaCl, , Intravenous, PRN    acetaminophen, 650 mg, Oral, Q4H PRN    dextrose 50%, 12.5 g, Intravenous, PRN    dextrose 50%, 25 g, Intravenous, PRN    diphenhydrAMINE, 50 mg, Oral, Q6H PRN    glucagon (human recombinant), 1 mg, Intramuscular, PRN    glucose, 16 g, Oral, PRN    glucose, 24 g, Oral, PRN    heparin (porcine), 1,000 Units, Intravenous, PRN    HYDROmorphone, 1 mg, Intravenous, Q4H PRN    hyoscyamine, 0.125 mg, Sublingual, Q4H PRN    insulin aspart U-100, 0-5 Units, Subcutaneous, QID (AC + HS) PRN    melatonin, 6 mg, Oral, Nightly PRN    naloxone, 0.02 mg, Intravenous, PRN    ondansetron, 4 mg, Intravenous, Q6H PRN    prochlorperazine, 5 mg, Intravenous, Q6H PRN    senna, 8.6 mg, Oral, Daily PRN    sodium chloride 0.9%, 250 mL, Intravenous, PRN    Pharmacy to dose Vancomycin consult, , , Once **AND** vancomycin - pharmacy to dose, , Intravenous, pharmacy to manage frequency    Objective:     Vital Signs (Most Recent):  Temp: 98 °F (36.7 °C) (04/09/25 0701)  Pulse: 85 (04/09/25 1000)  Resp: (!) 21 (04/09/25 1034)  BP: (!) 102/53 (04/09/25 1000)  SpO2: 97 % (04/09/25 1000) Vital Signs (24h Range):  Temp:  [97.6 °F (36.4 °C)-98.2 °F (36.8 °C)] 98 °F (36.7 °C)  Pulse:  [] 85  Resp:  [9-47] 21  SpO2:  [71 %-100 %] 97 %  BP: ()/(48-60) 102/53     Weight: 61.2 kg (134 lb 14.7 oz)  Body mass index is 21.13  kg/m².     Physical Exam  Vitals and nursing note reviewed.   Constitutional:       Appearance: He is ill-appearing.      Comments: Arouses to name, able to answer simple questions with 1-2 word answers with stimulation, falls asleep quickly; varying levels of alertness and lethargy throughout the day; calling out in pain and confusion in AM but improved by later morning    HENT:      Head: Normocephalic and atraumatic.   Pulmonary:      Effort: Pulmonary effort is normal. No respiratory distress.      Comments: NC  Skin:     General: Skin is dry.      Coloration: Skin is pale.      Findings: Lesion present. Bruising: multiple skin lesions in various stages of healing. Erythema: pale/gray for ethnicity.  Neurological:      Mental Status: He is lethargic.      Comments: Answers to his name; less oriented overall, decreased awareness of location at time but able to reorient; oriented to niece by voice on phone A/P)     Psychiatric:         Behavior: Behavior is cooperative.          Advance Care Planning   Advance Directives:   Living Will: No    LaPOST: No    Do Not Resuscitate Status: No    Medical Power of : No      Decision Making:  Family answered questions and Patient answered questions  Goals of Care: What is most important right now is to focus on symptom/pain control, curative/life-prolongation (regardless of treatment burdens), improvement in condition but with limits to invasive therapies. Accordingly, we have decided that the best plan to meet the patient's goals includes continuing with treatment.       Significant Labs: All pertinent labs within the past 24 hours have been reviewed.  CBC:   Recent Labs   Lab 04/09/25  0341   WBC 13.60*   HGB 6.8*   HCT 20.7*   MCV 91        BMP:  Recent Labs   Lab 04/09/25  0341      K 3.6      CO2 24   BUN 38*   CREATININE 5.9*   CALCIUM 8.0*     LFT:  Lab Results   Component Value Date    AST 15 04/09/2025    ALKPHOS 79 04/09/2025    BILITOT  0.3 04/09/2025     Albumin:   Albumin   Date Value Ref Range Status   04/09/2025 2.3 (L) 3.5 - 5.2 g/dL Final   03/21/2025 3.2 (L) 3.5 - 5.2 g/dL Final     Protein:   Total Protein   Date Value Ref Range Status   03/21/2025 7.5 6.0 - 8.4 g/dL Final     Lactic acid:   Lab Results   Component Value Date    LACTATE 0.9 04/03/2025    LACTATE 2.6 (H) 04/02/2025     Significant Imaging: I have reviewed all pertinent imaging results/findings within the past 24 hours.

## 2025-04-09 NOTE — SUBJECTIVE & OBJECTIVE
Interval History: No new issues. Feeling about the same.     Review of Systems  Objective:     Vital Signs (Most Recent):  Temp: 98 °F (36.7 °C) (04/09/25 0701)  Pulse: 75 (04/09/25 0900)  Resp: (!) 21 (04/09/25 1034)  BP: (!) 110/56 (04/09/25 0900)  SpO2: 97 % (04/09/25 0900) Vital Signs (24h Range):  Temp:  [97.6 °F (36.4 °C)-98.2 °F (36.8 °C)] 98 °F (36.7 °C)  Pulse:  [] 75  Resp:  [9-47] 21  SpO2:  [71 %-100 %] 97 %  BP: ()/(48-60) 110/56     Weight: 61.2 kg (134 lb 14.7 oz)  Body mass index is 21.13 kg/m².    Estimated Creatinine Clearance: 7.6 mL/min (A) (based on SCr of 5.9 mg/dL (H)).     Physical Exam  Vitals and nursing note reviewed.   Constitutional:       General: He is not in acute distress.     Appearance: Normal appearance. He is not ill-appearing, toxic-appearing or diaphoretic.   HENT:      Head: Normocephalic and atraumatic.      Mouth/Throat:      Mouth: Mucous membranes are moist.      Comments: Poor dentition  Eyes:      Pupils: Pupils are equal, round, and reactive to light.   Cardiovascular:      Rate and Rhythm: Normal rate.      Heart sounds: Murmur heard.   Abdominal:      General: There is no distension.      Tenderness: There is no guarding.   Musculoskeletal:         General: No swelling.      Right lower leg: No edema.      Left lower leg: No edema.   Skin:     Findings: No erythema.   Neurological:      General: No focal deficit present.      Mental Status: He is alert.   Psychiatric:         Mood and Affect: Mood normal.          Significant Labs: CBC:   Recent Labs   Lab 04/08/25  0243 04/08/25  1025 04/09/25  0341   WBC 11.26  --  13.60*   HGB 6.1* 7.2* 6.8*   HCT 19.7*  --  20.7*   *  --  151     Microbiology Results (last 7 days)       Procedure Component Value Units Date/Time    Blood culture [[id number]]  (Normal) Collected: 04/04/25 1044    Order Status: Completed Specimen: Blood Updated: 04/09/25 1100     Blood Culture No Growth After 5 Days    Blood  culture [[id number]]  (Abnormal) Collected: 04/06/25 0555    Order Status: Completed Specimen: Blood Updated: 04/09/25 0720     Blood Culture Positive - Aerobic/Pediatric Bottle      Methicillin resistant Staphylococcus aureus     Comment: <null> has been updated to reportable.        GRAM STAIN Gram positive cocci in clusters resembling Staph     Comment: Aerobic Bottle Positive   This is an appended report. These results have been appended to a previously preliminary verified report.       Narrative:      For susceptibility refer to order [id number]  ID Consult Strongly Recommended    Blood culture [[id number]]  (Abnormal)  (Susceptibility) Collected: 04/06/25 0542    Order Status: Completed Specimen: Blood Updated: 04/09/25 0719     Blood Culture Positive - Aerobic/Pediatric Bottle      Methicillin resistant Staphylococcus aureus     Comment: <null> has been updated to reportable.        GRAM STAIN Gram positive cocci in clusters resembling Staph     Comment: This is an appended report. These results have been appended to a previously preliminary verified report.       Narrative:      Aerobic Bottle Positive   ID Consult Strongly Recommended    Blood culture [[id number]]  (Normal) Collected: 04/08/25 0401    Order Status: Completed Specimen: Blood Updated: 04/09/25 0600     Blood Culture No Growth After 24 Hours    Blood culture [[id number]]  (Normal) Collected: 04/08/25 0506    Order Status: Completed Specimen: Blood Updated: 04/09/25 0600     Blood Culture No Growth After 24 Hours    Afb Culture Stain [[id number]] Collected: 04/03/25 1943    Order Status: Completed Specimen: Wound from Arm, Left Updated: 04/07/25 1637     ACID FAST STAIN  No acid fast bacilli seen    Afb Culture Stain [[id number]] Collected: 04/03/25 2011    Order Status: Completed Specimen: Wound from Arm, Left Updated: 04/07/25 1637     ACID FAST STAIN  No acid fast bacilli seen    Afb Culture Stain [[id number]] Collected: 04/03/25 1905     Order Status: Completed Specimen: Tissue from AV Fistula, Left Updated: 04/07/25 1623     ACID FAST STAIN  No acid fast bacilli seen    Afb Culture Stain [[id number]] Collected: 04/03/25 1954    Order Status: Completed Specimen: Wound from Arm, Left Updated: 04/07/25 1623     ACID FAST STAIN  No acid fast bacilli seen    Afb Culture Stain [[id number]] Collected: 04/03/25 1816    Order Status: Completed Specimen: Tissue from AV Fistula, Left Updated: 04/07/25 1623     ACID FAST STAIN  No acid fast bacilli seen    Afb Culture Stain [[id number]] Collected: 04/03/25 1921    Order Status: Completed Specimen: Tissue from hematoma Updated: 04/07/25 1623     ACID FAST STAIN  No acid fast bacilli seen    Afb Culture Stain [[id number]] Collected: 04/03/25 1934    Order Status: Completed Specimen: Tissue from AV Fistula, Left Updated: 04/07/25 1618     ACID FAST STAIN  No acid fast bacilli seen    Fungus culture [[id number]]  (Normal) Collected: 04/03/25 1943    Order Status: Completed Specimen: Wound from Arm, Left Updated: 04/07/25 0935     Fungal Culture Culture In Progress    Fungus culture [[id number]]  (Normal) Collected: 04/03/25 1954    Order Status: Completed Specimen: Wound from Arm, Left Updated: 04/07/25 0935     Fungal Culture Culture In Progress    AFB Culture & Smear [[id number]] Collected: 04/03/25 1816    Order Status: Completed Specimen: Tissue from AV Fistula, Left Updated: 04/07/25 0815     CULTURE, AFB  Culture In Progress    AFB Culture & Smear [[id number]] Collected: 04/03/25 1905    Order Status: Completed Specimen: Tissue from AV Fistula, Left Updated: 04/07/25 0815     CULTURE, AFB  Culture In Progress    AFB Culture & Smear [[id number]] Collected: 04/03/25 1921    Order Status: Completed Specimen: Tissue from hematoma Updated: 04/07/25 0815     CULTURE, AFB  Culture In Progress    AFB Culture & Smear [[id number]] Collected: 04/03/25 1934    Order Status: Completed Specimen: Tissue from AV Fistula,  Left Updated: 04/07/25 0815     CULTURE, AFB  Culture In Progress    AFB Culture & Smear [[id number]] Collected: 04/03/25 1943    Order Status: Completed Specimen: Wound from Arm, Left Updated: 04/07/25 0815     CULTURE, AFB  Culture In Progress    AFB Culture & Smear [[id number]] Collected: 04/03/25 1954    Order Status: Completed Specimen: Wound from Arm, Left Updated: 04/07/25 0815     CULTURE, AFB  Culture In Progress    AFB Culture & Smear [[id number]] Collected: 04/03/25 2011    Order Status: Completed Specimen: Wound from Arm, Left Updated: 04/07/25 0815     CULTURE, AFB  Culture In Progress    Culture, Anaerobic [[id number]] Collected: 04/03/25 2011    Order Status: Completed Specimen: Wound from Arm, Left Updated: 04/07/25 0748     Anaerobe Culture No Anaerobes Isolated    Culture, Anaerobic [[id number]] Collected: 04/03/25 1954    Order Status: Completed Specimen: Wound from Arm, Left Updated: 04/07/25 0747     Anaerobe Culture No Anaerobes Isolated    Culture, Anaerobic [[id number]] Collected: 04/03/25 1943    Order Status: Completed Specimen: Wound from Arm, Left Updated: 04/07/25 0747     Anaerobe Culture No Anaerobes Isolated    Culture, Anaerobic [[id number]] Collected: 04/03/25 1934    Order Status: Completed Specimen: Tissue from AV Fistula, Left Updated: 04/07/25 0746     Anaerobe Culture No Anaerobes Isolated    Culture, Anaerobic [[id number]] Collected: 04/03/25 1921    Order Status: Completed Specimen: Tissue from hematoma Updated: 04/07/25 0746     Anaerobe Culture No Anaerobes Isolated    Culture, Anaerobic [[id number]] Collected: 04/03/25 1910    Order Status: Completed Specimen: Tissue from AV Fistula, Left Updated: 04/07/25 0745     Anaerobe Culture No Anaerobes Isolated    Culture, Anaerobic [[id number]] Collected: 04/03/25 1816    Order Status: Completed Specimen: Wound from Arm, Left Updated: 04/07/25 0744     Anaerobe Culture No Anaerobes Isolated    Blood culture [[id number]]   (Abnormal) Collected: 04/04/25 1343    Order Status: Completed Specimen: Blood Updated: 04/06/25 0657     Blood Culture Positive - Aerobic/Pediatric Bottle      Methicillin resistant Staphylococcus aureus     Comment: <null> has been updated to reportable.        GRAM STAIN Gram positive cocci in clusters resembling Staph     Comment: Aerobic Bottle Positive    This is an appended report. These results have been appended to a previously preliminary verified report.       Narrative:      For sensitivities, refer to order # [id number]      Aerobic culture [[id number]]  (Abnormal) Collected: 04/03/25 2011    Order Status: Completed Specimen: Wound from Arm, Left Updated: 04/06/25 0556     CULTURE, AEROBIC Few Methicillin resistant Staphylococcus aureus    Narrative:      For sensitivities, refer to order # [id number]    Aerobic culture [[id number]]  (Abnormal) Collected: 04/03/25 1954    Order Status: Completed Specimen: Wound from Arm, Left Updated: 04/06/25 0555     CULTURE, AEROBIC Many Methicillin resistant Staphylococcus aureus    Narrative:      For sensitivities, refer to order # [id number]    Aerobic culture [[id number]]  (Abnormal)  (Susceptibility) Collected: 04/03/25 1943    Order Status: Completed Specimen: Wound from Arm, Left Updated: 04/06/25 0553     CULTURE, AEROBIC Many Methicillin resistant Staphylococcus aureus    Aerobic culture [[id number]]  (Abnormal)  (Susceptibility) Collected: 04/03/25 1926    Order Status: Completed Specimen: Wound from Arm, Left Updated: 04/06/25 0544     CULTURE, AEROBIC Many Methicillin resistant Staphylococcus aureus    Aerobic culture [[id number]] Collected: 04/03/25 1911    Order Status: Completed Specimen: Tissue from Arm, Left Updated: 04/06/25 0543     CULTURE, AEROBIC No Growth    Aerobic culture [[id number]] Collected: 04/03/25 1816    Order Status: Completed Specimen: Wound from Arm, Left Updated: 04/06/25 0543     CULTURE, AEROBIC No Growth    Gram  stain [[id number]] Collected: 04/03/25 1923    Order Status: Completed Specimen: Tissue from Arm, Left Updated: 04/04/25 0837     GRAM STAIN Rare WBC seen      Few Gram positive cocci    Gram stain [[id number]] Collected: 04/03/25 1935    Order Status: Completed Specimen: Tissue from Arm, Left Updated: 04/04/25 0836     GRAM STAIN Rare WBC seen      No organisms seen    Gram stain [[id number]] Collected: 04/03/25 1914    Order Status: Completed Specimen: Wound from Arm, Left Updated: 04/04/25 0836     GRAM STAIN Rare WBC seen      No organisms seen    Gram stain [[id number]] Collected: 04/03/25 2011    Order Status: Completed Specimen: Wound from Arm, Left Updated: 04/04/25 0835     GRAM STAIN Rare WBC seen      No organisms seen    Gram stain [[id number]] Collected: 04/03/25 1821    Order Status: Completed Specimen: Wound from Arm, Left Updated: 04/04/25 0835     GRAM STAIN No WBCs      No organisms seen    Gram stain [[id number]] Collected: 04/03/25 1943    Order Status: Completed Specimen: Wound from Arm, Left Updated: 04/04/25 0834     GRAM STAIN Rare WBC seen      No organisms seen    Gram stain [[id number]] Collected: 04/03/25 1954    Order Status: Completed Specimen: Wound from Arm, Left Updated: 04/04/25 0833     GRAM STAIN Rare WBC seen      No organisms seen    Fungus culture [[id number]] Collected: 04/03/25 1934    Order Status: Sent Specimen: Tissue from AV Fistula, Left Updated: 04/03/25 2107    Fungus culture [[id number]] Collected: 04/03/25 1935    Order Status: Canceled Specimen: Tissue from Arm, Left Updated: 04/03/25 2107    Fungus culture [[id number]] Collected: 04/03/25 2011    Order Status: Sent Specimen: Wound from Arm, Left Updated: 04/03/25 2106    Fungus culture [[id number]] Collected: 04/03/25 1816    Order Status: Sent Specimen: Wound from Arm, Left Updated: 04/03/25 2106    Fungus culture [[id number]] Collected: 04/03/25 1904    Order Status: Sent Specimen: Tissue from AV Fistula, Left  Updated: 04/03/25 2106    Fungus culture [[id number]] Collected: 04/03/25 1921    Order Status: Sent Specimen: Tissue from hematoma Updated: 04/03/25 2105            Significant Imaging: I have reviewed all pertinent imaging results/findings within the past 24 hours.

## 2025-04-09 NOTE — ASSESSMENT & PLAN NOTE
- Evaluation and management per primary team   - ongoing and worsening; recommend transition to room with window and delirium precautions   - pt requires frequent calming and reassuring of safety, forgets location of hospital and events leading to hospitalization  - when reoriented is able to have appropriate discussions, and is able to identify Neha garces by voice on the phone   - of note, on , pt did share with palliative team and dez Solomon by phone that his sister Eileen visited him yesterday (Eileen has been  for several years)

## 2025-04-09 NOTE — ASSESSMENT & PLAN NOTE
Tolerating CBI  Wean towards off  D/C once urine is clear  Change oxybutynin to Levsin    Hold anticoagulation, if possible  following

## 2025-04-09 NOTE — SUBJECTIVE & OBJECTIVE
"Interval History: s/p HD yesterday x 3 hours; 0.5-1L removed. Required BP support via albumin and midodrine to complete treatment. No events overnight. Midodrine dose increased. Patient resting comfortably at time of my exam this AM; however upon awakening reports he is "ready for this part to be over". He quickly fell back asleep (s/p pain medication).       Review of patient's allergies indicates:   Allergen Reactions    Ace inhibitors Hives, Itching, Shortness Of Breath, Other (See Comments) and Rash     Is not sure which medication it was    Captopril      Current Facility-Administered Medications   Medication Frequency    0.9%  NaCl infusion (for blood administration) Q24H PRN    0.9% NaCl infusion PRN    acetaminophen tablet 650 mg Q4H PRN    atorvastatin tablet 80 mg QHS    clopidogreL tablet 75 mg Daily    dextrose 50% injection 12.5 g PRN    dextrose 50% injection 25 g PRN    diphenhydrAMINE capsule 50 mg Q6H PRN    epoetin mj-epbx injection 10,000 Units Every Mon, Wed, Fri    erythromycin 5 mg/gram (0.5 %) ophthalmic ointment Q8H    glucagon (human recombinant) injection 1 mg PRN    glucose chewable tablet 16 g PRN    glucose chewable tablet 24 g PRN    heparin (porcine) injection 1,000 Units PRN    HYDROmorphone injection 1 mg Q4H PRN    hyoscyamine SL tablet 0.125 mg Q4H PRN    insulin aspart U-100 pen 0-5 Units QID (AC + HS) PRN    insulin aspart U-100 pen 6 Units TIDWM    insulin glargine U-100 (Lantus) pen 24 Units Daily    melatonin tablet 6 mg Nightly PRN    midodrine tablet 15 mg Q8H    naloxone 0.4 mg/mL injection 0.02 mg PRN    ondansetron injection 4 mg Q6H PRN    pantoprazole EC tablet 40 mg Daily    prochlorperazine injection Soln 5 mg Q6H PRN    senna tablet 8.6 mg Daily PRN    sodium chloride 0.9% bolus 250 mL 250 mL PRN    sodium chloride 0.9% flush 10 mL Q8H    tamsulosin 24 hr capsule 0.4 mg Daily    vancomycin - pharmacy to dose pharmacy to manage frequency       Objective:     Vital " Signs (Most Recent):  Temp: 97.8 °F (36.6 °C) (04/09/25 1101)  Pulse: 78 (04/09/25 1101)  Resp: 12 (04/09/25 1101)  BP: (!) 106/58 (04/09/25 1101)  SpO2: 98 % (04/09/25 1101) Vital Signs (24h Range):  Temp:  [97.7 °F (36.5 °C)-98.2 °F (36.8 °C)] 97.8 °F (36.6 °C)  Pulse:  [] 78  Resp:  [9-47] 12  SpO2:  [71 %-100 %] 98 %  BP: ()/(48-60) 106/58     Weight: 61.2 kg (134 lb 14.7 oz) (04/04/25 1937)  Body mass index is 21.13 kg/m².  Body surface area is 1.7 meters squared.    I/O last 3 completed shifts:  In: 2207.3 [Blood:392.5; NG/GT:1715; IV Piggyback:99.8]  Out: 1325 [Urine:325; Other:1000]     Physical Exam  Vitals and nursing note reviewed.   Constitutional:       General: He is sleeping. He is not in acute distress.     Appearance: Normal appearance. He is well-developed.   HENT:      Head: Normocephalic and atraumatic.      Nose: Nose normal.      Mouth/Throat:      Mouth: Mucous membranes are moist.   Eyes:      Extraocular Movements: Extraocular movements intact.      Conjunctiva/sclera: Conjunctivae normal.   Cardiovascular:      Rate and Rhythm: Normal rate and regular rhythm.   Pulmonary:      Effort: Pulmonary effort is normal.      Breath sounds: Normal breath sounds. No wheezing or rales.   Abdominal:      General: There is no distension.      Palpations: Abdomen is soft.   Musculoskeletal:      Right lower leg: No edema.      Left lower leg: No edema.   Skin:     General: Skin is warm and dry.      Findings: Lesion (diffuse) present. No erythema or rash.   Neurological:      Mental Status: He is easily aroused.        Significant Labs:  CBC:   Recent Labs   Lab 04/09/25  0341   WBC 13.60*   RBC 2.27*   HGB 6.8*   HCT 20.7*      MCV 91   MCH 30.0   MCHC 32.9     CMP:   Recent Labs   Lab 04/09/25  0341   CALCIUM 8.0*   ALBUMIN 2.3*      K 3.6   CO2 24      BUN 38*   CREATININE 5.9*   ALKPHOS 79   ALT 9*   AST 15   BILITOT 0.3     All labs within the past 24 hours have been  reviewed.     Significant Imaging:  Labs: Reviewed

## 2025-04-09 NOTE — ASSESSMENT & PLAN NOTE
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 man with ESRD with LUE AVF, HFpEF, CAD, DM admitted to OSH 4/1- 4/3 with sepsis due to MRSA bacteremia, transferred to  ICU for septic shock due to MRSA bacteremia and thrombosis of AV graft on 4/3 s/p exploratory surgery of LUE with graft resection 4/3. Op findings: Ruptured pseudoaneurysm with infected hematoma, graft completely obliterated at mid segment. Suspect infected graft is the source of bacteremia.     BCx 4/2 MRSA  BCx 4/4 MRSA  BCx 4/6 MRSA  BCx 4/7: NGTD    LUE surgical cx growing s aureus    TTE: 1.9x1.2cm large fixed heterogeneous mass present on the posterior leaflet , moderate to severe regurgitation with an eccentric jet. Cannot exclude severe MR with possible PMVL perforation in association with large vegetation. Not a surgical candidate.    Persistently positive blood cultures in the setting of acute IE. Deemed a poor surgical candidate.    If unable to sterilize his blood cultures, then his long term prognosis remains quite poor.     Recommendations:  --continue vancomycin  --anticipate atleast 6 wks iv abx from culture clearance, if that occurs  --if his repeat blood cultures from today still yield MRSA, can consider salvage therapy with ceftaroline and daptomycin (see reference below) and line change  --time will tell, indeed

## 2025-04-09 NOTE — ASSESSMENT & PLAN NOTE
Patient's blood pressure range in the last 24 hours was: BP  Min: 83/50  Max: 132/60.The patient's inpatient anti-hypertensive regimen is listed below:  Current Antihypertensives       Plan  - admitted with shock  - now off vasopressors. Continue to hold BP Rx as BP is well controlled without it

## 2025-04-09 NOTE — PROGRESS NOTES
Pharmacokinetic Assessment Follow Up: IV Vancomycin    Vancomycin serum concentration assessment(s):    The random level was drawn correctly and can be used to guide therapy at this time. The measurement is within the desired definitive target range of 15 to 20 mcg/mL.    Vancomycin Regimen Plan:    No dose today  Plan for random level 4/10 at 0400  Will dose after HD TTS    Drug levels (last 3 results):  Recent Labs   Lab Result Units 04/07/25 0419 04/09/25  0341   Vancomycin Random ug/ml 18.0 18.6       Pharmacy will continue to follow and monitor vancomycin.    Please contact pharmacy at extension [phone] for questions regarding this assessment.    Thank you for the consult,   Hesham Hernandez       Patient brief summary:  Jevon Rajan is a 85 y.o. male initiated on antimicrobial therapy with IV Vancomycin for treatment of bacteremia    Drug Allergies:   Review of patient's allergies indicates:   Allergen Reactions    Ace inhibitors Hives, Itching, Shortness Of Breath, Other (See Comments) and Rash     Is not sure which medication it was    Captopril        Actual Body Weight:   61.2 kg    Renal Function:   Estimated Creatinine Clearance: 7.6 mL/min (A) (based on SCr of 5.9 mg/dL (H)).,     Dialysis Method (if applicable):  intermittent HD    CBC (last 72 hours):  Recent Labs   Lab Result Units 04/07/25 0419 04/08/25 0243 04/08/25  1025 04/09/25  0341   WBC K/uL 18.35* 11.26  --  13.60*   HGB gm/dL 8.3* 6.1* 7.2* 6.8*   HCT % 25.8* 19.7*  --  20.7*   Platelet Count K/uL 141* 134*  --  151   Lymph % % 5.6* 5.7*  --  4.4*   Mono % % 8.8 6.6  --  6.7   Eos % % 1.0 3.9  --  3.3   Basophil % % 0.3 0.3  --  0.2       Metabolic Panel (last 72 hours):  Recent Labs   Lab Result Units 04/07/25 0419 04/08/25  0243 04/09/25  0341   Sodium mmol/L 141 140 137   Potassium mmol/L 4.2 3.9 3.6   Chloride mmol/L 104 104 103   CO2 mmol/L 22* 26 24   Glucose mg/dL 238* 264* 261*   BUN mg/dL 77* 70* 38*   Creatinine mg/dL 9.7*  8.8* 5.9*   Albumin g/dL 2.1* 1.9* 2.3*   Bilirubin Total mg/dL 0.5 0.2 0.3   ALP unit/L 72 67 79   AST unit/L 20 16 15   ALT unit/L 20 13 9*   Phosphorus Level mg/dL 5.3* 4.8* 2.1*       Vancomycin Administrations:  vancomycin given in the last 96 hours                     vancomycin (VANCOCIN) 500 mg in D5W 100 mL IVPB (MB+) (mg) 500 mg New Bag 04/07/25 2013    vancomycin (VANCOCIN) 500 mg in D5W 100 mL IVPB (MB+) (mg) 500 mg New Bag 04/05/25 0450                    Microbiologic Results:  Microbiology Results (last 7 days)       Procedure Component Value Units Date/Time    Blood culture [[id number]]  (Normal) Collected: 04/08/25 0401    Order Status: Completed Specimen: Blood Updated: 04/08/25 1200     Blood Culture No Growth After 6 Hours    Blood culture [[id number]]  (Normal) Collected: 04/08/25 0506    Order Status: Completed Specimen: Blood Updated: 04/08/25 1200     Blood Culture No Growth After 6 Hours    Blood culture [[id number]]  (Normal) Collected: 04/04/25 1044    Order Status: Completed Specimen: Blood Updated: 04/08/25 1100     Blood Culture No Growth After 96 hours    Blood culture [[id number]]  (Abnormal) Collected: 04/06/25 0555    Order Status: Completed Specimen: Blood Updated: 04/08/25 0827     Blood Culture Positive - Aerobic/Pediatric Bottle     GRAM STAIN Gram positive cocci in clusters resembling Staph     Comment: Aerobic Bottle Positive   This is an appended report. These results have been appended to a previously preliminary verified report.       Blood culture [[id number]]  (Abnormal) Collected: 04/06/25 0542    Order Status: Completed Specimen: Blood Updated: 04/08/25 0827     Blood Culture Positive - Aerobic/Pediatric Bottle      Staphylococcus aureus     Comment: <null> has been updated to reportable.        GRAM STAIN Gram positive cocci in clusters resembling Staph     Comment: This is an appended report. These results have been appended to a previously preliminary verified  report.       Narrative:      Aerobic Bottle Positive   ID Consult Strongly Recommended    Afb Culture Stain [[id number]] Collected: 04/03/25 1943    Order Status: Completed Specimen: Wound from Arm, Left Updated: 04/07/25 1637     ACID FAST STAIN  No acid fast bacilli seen    Afb Culture Stain [[id number]] Collected: 04/03/25 2011    Order Status: Completed Specimen: Wound from Arm, Left Updated: 04/07/25 1637     ACID FAST STAIN  No acid fast bacilli seen    Afb Culture Stain [[id number]] Collected: 04/03/25 1905    Order Status: Completed Specimen: Tissue from AV Fistula, Left Updated: 04/07/25 1623     ACID FAST STAIN  No acid fast bacilli seen    Afb Culture Stain [[id number]] Collected: 04/03/25 1954    Order Status: Completed Specimen: Wound from Arm, Left Updated: 04/07/25 1623     ACID FAST STAIN  No acid fast bacilli seen    Afb Culture Stain [[id number]] Collected: 04/03/25 1816    Order Status: Completed Specimen: Tissue from AV Fistula, Left Updated: 04/07/25 1623     ACID FAST STAIN  No acid fast bacilli seen    Afb Culture Stain [[id number]] Collected: 04/03/25 1921    Order Status: Completed Specimen: Tissue from hematoma Updated: 04/07/25 1623     ACID FAST STAIN  No acid fast bacilli seen    Afb Culture Stain [[id number]] Collected: 04/03/25 1934    Order Status: Completed Specimen: Tissue from AV Fistula, Left Updated: 04/07/25 1618     ACID FAST STAIN  No acid fast bacilli seen    Fungus culture [[id number]]  (Normal) Collected: 04/03/25 1943    Order Status: Completed Specimen: Wound from Arm, Left Updated: 04/07/25 0935     Fungal Culture Culture In Progress    Fungus culture [[id number]]  (Normal) Collected: 04/03/25 1954    Order Status: Completed Specimen: Wound from Arm, Left Updated: 04/07/25 0935     Fungal Culture Culture In Progress    AFB Culture & Smear [[id number]] Collected: 04/03/25 1816    Order Status: Completed Specimen: Tissue from AV Fistula, Left Updated: 04/07/25 0815      CULTURE, AFB  Culture In Progress    AFB Culture & Smear [[id number]] Collected: 04/03/25 1905    Order Status: Completed Specimen: Tissue from AV Fistula, Left Updated: 04/07/25 0815     CULTURE, AFB  Culture In Progress    AFB Culture & Smear [[id number]] Collected: 04/03/25 1921    Order Status: Completed Specimen: Tissue from hematoma Updated: 04/07/25 0815     CULTURE, AFB  Culture In Progress    AFB Culture & Smear [[id number]] Collected: 04/03/25 1934    Order Status: Completed Specimen: Tissue from AV Fistula, Left Updated: 04/07/25 0815     CULTURE, AFB  Culture In Progress    AFB Culture & Smear [[id number]] Collected: 04/03/25 1943    Order Status: Completed Specimen: Wound from Arm, Left Updated: 04/07/25 0815     CULTURE, AFB  Culture In Progress    AFB Culture & Smear [[id number]] Collected: 04/03/25 1954    Order Status: Completed Specimen: Wound from Arm, Left Updated: 04/07/25 0815     CULTURE, AFB  Culture In Progress    AFB Culture & Smear [[id number]] Collected: 04/03/25 2011    Order Status: Completed Specimen: Wound from Arm, Left Updated: 04/07/25 0815     CULTURE, AFB  Culture In Progress    Culture, Anaerobic [[id number]] Collected: 04/03/25 2011    Order Status: Completed Specimen: Wound from Arm, Left Updated: 04/07/25 0748     Anaerobe Culture No Anaerobes Isolated    Culture, Anaerobic [[id number]] Collected: 04/03/25 1954    Order Status: Completed Specimen: Wound from Arm, Left Updated: 04/07/25 0747     Anaerobe Culture No Anaerobes Isolated    Culture, Anaerobic [[id number]] Collected: 04/03/25 1943    Order Status: Completed Specimen: Wound from Arm, Left Updated: 04/07/25 0747     Anaerobe Culture No Anaerobes Isolated    Culture, Anaerobic [[id number]] Collected: 04/03/25 1934    Order Status: Completed Specimen: Tissue from AV Fistula, Left Updated: 04/07/25 0746     Anaerobe Culture No Anaerobes Isolated    Culture, Anaerobic [[id number]] Collected: 04/03/25 1921    Order  Status: Completed Specimen: Tissue from hematoma Updated: 04/07/25 0746     Anaerobe Culture No Anaerobes Isolated    Culture, Anaerobic [[id number]] Collected: 04/03/25 1910    Order Status: Completed Specimen: Tissue from AV Fistula, Left Updated: 04/07/25 0745     Anaerobe Culture No Anaerobes Isolated    Culture, Anaerobic [[id number]] Collected: 04/03/25 1816    Order Status: Completed Specimen: Wound from Arm, Left Updated: 04/07/25 0744     Anaerobe Culture No Anaerobes Isolated    Blood culture [[id number]]  (Abnormal) Collected: 04/04/25 1343    Order Status: Completed Specimen: Blood Updated: 04/06/25 0657     Blood Culture Positive - Aerobic/Pediatric Bottle      Methicillin resistant Staphylococcus aureus     Comment: <null> has been updated to reportable.        GRAM STAIN Gram positive cocci in clusters resembling Staph     Comment: Aerobic Bottle Positive    This is an appended report. These results have been appended to a previously preliminary verified report.       Narrative:      For sensitivities, refer to order # [id number]      Aerobic culture [[id number]]  (Abnormal) Collected: 04/03/25 2011    Order Status: Completed Specimen: Wound from Arm, Left Updated: 04/06/25 0556     CULTURE, AEROBIC Few Methicillin resistant Staphylococcus aureus    Narrative:      For sensitivities, refer to order # [id number]    Aerobic culture [[id number]]  (Abnormal) Collected: 04/03/25 1954    Order Status: Completed Specimen: Wound from Arm, Left Updated: 04/06/25 0555     CULTURE, AEROBIC Many Methicillin resistant Staphylococcus aureus    Narrative:      For sensitivities, refer to order # [id number]    Aerobic culture [[id number]]  (Abnormal)  (Susceptibility) Collected: 04/03/25 1943    Order Status: Completed Specimen: Wound from Arm, Left Updated: 04/06/25 0553     CULTURE, AEROBIC Many Methicillin resistant Staphylococcus aureus    Aerobic culture [[id number]]  (Abnormal)  (Susceptibility)  Collected: 04/03/25 1926    Order Status: Completed Specimen: Wound from Arm, Left Updated: 04/06/25 0544     CULTURE, AEROBIC Many Methicillin resistant Staphylococcus aureus    Aerobic culture [[id number]] Collected: 04/03/25 1911    Order Status: Completed Specimen: Tissue from Arm, Left Updated: 04/06/25 0543     CULTURE, AEROBIC No Growth    Aerobic culture [[id number]] Collected: 04/03/25 1816    Order Status: Completed Specimen: Wound from Arm, Left Updated: 04/06/25 0543     CULTURE, AEROBIC No Growth    Gram stain [[id number]] Collected: 04/03/25 1923    Order Status: Completed Specimen: Tissue from Arm, Left Updated: 04/04/25 0837     GRAM STAIN Rare WBC seen      Few Gram positive cocci    Gram stain [[id number]] Collected: 04/03/25 1935    Order Status: Completed Specimen: Tissue from Arm, Left Updated: 04/04/25 0836     GRAM STAIN Rare WBC seen      No organisms seen    Gram stain [[id number]] Collected: 04/03/25 1914    Order Status: Completed Specimen: Wound from Arm, Left Updated: 04/04/25 0836     GRAM STAIN Rare WBC seen      No organisms seen    Gram stain [[id number]] Collected: 04/03/25 2011    Order Status: Completed Specimen: Wound from Arm, Left Updated: 04/04/25 0835     GRAM STAIN Rare WBC seen      No organisms seen    Gram stain [[id number]] Collected: 04/03/25 1821    Order Status: Completed Specimen: Wound from Arm, Left Updated: 04/04/25 0835     GRAM STAIN No WBCs      No organisms seen    Gram stain [[id number]] Collected: 04/03/25 1943    Order Status: Completed Specimen: Wound from Arm, Left Updated: 04/04/25 0834     GRAM STAIN Rare WBC seen      No organisms seen    Gram stain [[id number]] Collected: 04/03/25 1954    Order Status: Completed Specimen: Wound from Arm, Left Updated: 04/04/25 0833     GRAM STAIN Rare WBC seen      No organisms seen    Fungus culture [[id number]] Collected: 04/03/25 1934    Order Status: Sent Specimen: Tissue from AV Fistula, Left Updated: 04/03/25  2107    Fungus culture [[id number]] Collected: 04/03/25 1935    Order Status: Canceled Specimen: Tissue from Arm, Left Updated: 04/03/25 2107    Fungus culture [[id number]] Collected: 04/03/25 2011    Order Status: Sent Specimen: Wound from Arm, Left Updated: 04/03/25 2106    Fungus culture [[id number]] Collected: 04/03/25 1816    Order Status: Sent Specimen: Wound from Arm, Left Updated: 04/03/25 2106    Fungus culture [[id number]] Collected: 04/03/25 1904    Order Status: Sent Specimen: Tissue from AV Fistula, Left Updated: 04/03/25 2106    Fungus culture [[id number]] Collected: 04/03/25 1921    Order Status: Sent Specimen: Tissue from hematoma Updated: 04/03/25 2105

## 2025-04-09 NOTE — EICU
eICU Physician Virtual/Remote Brief Evaluation Note      Message from pharmacy   Belladonna opium suppository not available   Chart reviewed, discussed with pharmacist   Oxybutynin PRN ordered for bladder spasm   Morphine 2  mg IV q.3h p.r.n. for bladder pain ordered      ROD Salvador MD  eICU Attending  [phone]    This report has been created through the use of [employer] dictation software. Typographical and content errors may occur with this process. While efforts are made to detect and correct such errors, in some cases errors will persist. For this reason, wording in this document should be considered in the proper context and not strictly verbatim

## 2025-04-09 NOTE — ASSESSMENT & PLAN NOTE
- Mgmt per urology and primary; not able to take flomax due to encephalopathy and inability to give this via NG   - pt with ongoing episodes of pain/bladder pressure that is causing him to call out in pain and become emotionally distressed; adjustments for opioid regimen to dilaudid given ESRD in coordination with HM;  talking with niece by phone helped calm pt, niece to coordinate family to visit; worked with pt on calming exercises while awaiting for opioid admin to relieve pain   - discussion with urology and MDT for improved symptom management

## 2025-04-09 NOTE — PLAN OF CARE
Pt remains in ICU opening eyes spontaneously. Oriented to self, reoriented to time, place, and situation as needed. BP stable. On 2-3L NC sating above 95%. Normothermic. NG tube in place. Tube feeds infusing at goal of 40ml/hr. Pt had complaints of pain. New order for one time dose of pain medication given via NG tube. Pt had complaints of feeling like he needed to void. New order for prn bladder spasm medication given. Blood sugar monitored per order. Insulin given per sliding scale. Sharma in place with continuous irritation. Urine/ fluid clear to light pink. One bowel movement. Pt updated on plan of care. No new falls, injuries, or skin breakdown this shift.

## 2025-04-09 NOTE — PROGRESS NOTES
[facility] - Intensive Care  Palliative Medicine  Progress Note    Patient Name: Jevon Rajan  MRN: [id number]  Admission Date: 4/3/2025  Hospital Length of Stay: 6 days  Code Status: Full Code   Attending Provider: Gonzalez Reagan MD  Consulting Provider: Lisa Jacinto NP  Primary Care Physician: Melia, Primary Doctor  Principal Problem:Septic shock    Patient information was obtained from relative(s) and primary team.      Assessment/Plan:     Neuro  Acute metabolic encephalopathy  - Evaluation and management per primary team   - ongoing and worsening; recommend transition to room with window and delirium precautions   - pt requires frequent calming and reassuring of safety, forgets location of hospital and events leading to hospitalization    Cardiac/Vascular  Acute bacterial endocarditis  - Mgmt per primary team/ID/cardiology; while he has a known valvular mass, he is being treated by antibiotics alone as he is not a surgical candidate. Prognosis is poor if cultures do not clear.   - Illness trajectory education provided     Renal/  BPH with urinary obstruction  - Mgmt per urology and primary; not able to take flomax due to encephalopathy and inability to give this via NG     ESRD on hemodialysis  - Nephrology consulted and following; pt has had difficulty tolerating HD so far, family aware of this and with insight that this is a large factor in pt's prognosis   - family with insight that HD is a form of life support   - has temporary line in place though potentially may need line holiday due to persistent positive cultures (have encouraged discussion with nephrology, ID, and PCCM as initial line placement was very difficult and would require coordination of timing and replacement plan)     ID  * Septic shock  - Secondary to MRSA bacteremia from infected fistula   - ID following    MRSA bacteremia  - Abx, mgmt per primary team and ID; per ID, prognosis is poor if cultures do not clear, given that he is not a  surgical candidate.   - Illness trajectory education provided to family      Palliative Care  ACP (advance care planning)  4/8/2025  - interval chart reviewed; discussion pt with MDT during ICU rounds   - visited pt at bedside in AM with bedside RN and provided comfort and redirection during episode of delirium; pt is able to be calmed with reassurance; pt remains oriented only to self at this time; can verbalize needs such as when he feels the sensation to void but not oriented to having a catheter in place   - later in morning pt's sisters Ronna and Nora at bedside; brief medical update provided and reviewed pt's prognosis; Ronna and Nora shared that they thought pt previously had a DNR in place and/or had expressed wishes against resuscitation or aggressive end of life measures, but had wished to continue HD as long as tolerated; they also shared poor condition of pt's home and lack of home support that placement would be necessary in their opinion if pt's condition were to improve to allow for discharge  - later called pt's niece, Neha, who also later added her grandmother, pt's sister Bhavna, to the call; brief medical update provided; Neha and Bhavna were not aware of pt's prior wishes for DNR; discussed full code vs DNR, potential interventions, risks, and outcomes; Neha expressed considering DNR as long as full treatment and otherwise escalation of care would continue; Mrs. Huggins however expressed not wishing to be involved with that decision for pt  - encouraged family discussion with pt's niece and sisters, Ronna, Nora, and Bhavna (if she wished to participate)   - emotional support provided to pt and family throughout all interactions   - allowed time for questions/concerns of family   - updated MDT of family discussions     4/7/25 (Dr. Joan Antunez)   - Chart and interval history reviewed; discussed pt during MDT rounds. Pt had cystoscopy and dunaway placement by urology  yesterday due to screaming and discomfort from urinary retention; found to have very enlarged prostate. Sharma placed successfully by urology staff, though this was later removed due to pt complaining of significant pain.   - Pt screaming throughout much of the morning, possibly from urinary retention vs delirium. He was not able to participate in discussion secondary to this.   - Along with Dr Jordan, called and spoke with Kenia (pt's preferred MPOA); she added her mother Erik to the call. Thorough medical update provided; shared transparent concern that pt's blood cultures are persistently positive. While hopeful that the infection starts to clear, there is a chance we may not be able to treat this infection, unfortunately. Additionally, pt has been very uncomfortable over the last 24 hours or so due to urinary retention and/or delirium. They expressed understanding of severity of both his acute and chronic illness, and the fragility that results naturally as a result of his advanced age.   - Discussed plan moving forward, as well as code status. At this time, plan remains to continue with maximal medical therapy in hopes of improvement, though they have agreed to further discuss code status as a family. At this time, would maintain full code.   - Support provided during call   - Encouraged visitation if possible, as perhaps this will be a calming presence for pt  - Will follow up with pt and family for further conversations as clinical course evolves    4/4/25 (Lisa Jacinto NP)   - Consult received; interval chart reviewed in detail; discussed pt with MDT during ICU rounds and ongoing throughout the day   - met with patient at bedside; introduction to palliative medicine team and role in current care and admission   - pt very lethargic and required encouragement and stimulation for orientation assessment; pt answers to name, able to state his 1st name, identifies he is at the hospital, and when asked  who team should call for help with his medical care he answered Kenia (which is consistent with what he told HM 4/3 as well); quickly falls back asleep   - pt unable to participate in detailed GOC/ACP discussion on his own behalf at this time   - called Jelanijackarchana (pt's niece) who shares that she has been pt's main caregiver for some time; she confirms pt is not , does not have children, and that he has 3 sisters; she also had pt's sister Bhavna join by phone for update/discussion   - Mrs. Huggins also shared that Joearchana takes care of pt and understands his current history, needs, and wishes; per Bhavna, Jelanisampson should be pt's medical decision maker while he lacks capacity, and confirms that pt's others sisters agree with this; they have asked that no other visitors or callers aside from Deshanta and pts sisters (Bhavna, Ronna, and Nora receive updates)   - Kenia and Bhavna confirm that they feel pt would wish to continue HD  - reviewed pt's current full code status, potential interventions, risks, and outcomes; Kenia and Bhavna agree for pt to remain full code, but were open to further discussion ideally when pt is able to participate in the discussion; updated pt's team of this and added this to EMR   - both shared difficulty pt has had recently and frustration with attempts to seek care to get answers to ongoing health concerns; they both shared appropriate concerns for pt's current condition and are hopeful for answers and improvement in pt's condition; they shared that they feel the  ICU team is moving quickly and working to improve his condition for which they are appreciative   - emotional support provided   - Allowed time for questions/concerns; all addressed; expressed availability of myself/palliative team for additional questions/concerns   - attempted to call pt's other sister, Ronna, as she had called unit for updates; unable to reach,    - updated MDT; ongoing communication  "and coordination with MDT         I will follow-up with patient. Please contact us if you have any additional questions.    Subjective:     Chief Complaint:   Chief Complaint   Patient presents with    Bleeding/Bruising       HPI:   From H&P: " Mr Jevon Rajan is a 85 y.o. man with ESRD with LUE AVF, HFpEF last EF 50-55%, CAD, DM who was transferred to [facility] ICU for septic shock due to MRSA bacteremia.      He was admitted at [facility] 4/1-3/2025 with sepsis, worsening to septic shock, then identified source as MRSA. 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      Upon arrival to [facility], he is moaning in pain and his LUE AVF has active pulsatile bright red blood. He does respond to his name. He denies pain anywhere other than his LUE. He is on levophed at 0.05. "     Palliative medicine consulted for goals of care discussion and advance care planning; for details of visit, see advance care planning section of plan.       Hospital Course:  No notes on file    Medications:  Continuous Infusions:  Scheduled Meds:   atorvastatin  80 mg Oral QHS    clopidogreL  75 mg Oral Daily    epoetin mj-epbx  10,000 Units Intravenous Every Mon, Wed, Fri    insulin aspart U-100  4 Units Subcutaneous TIDWM    insulin glargine U-100  14 Units Subcutaneous Daily    midodrine  10 mg Oral Q8H    mupirocin   Nasal BID    pantoprazole  40 mg Oral Daily    sodium chloride 0.9%  10 mL Intravenous Q8H    tamsulosin  0.4 mg Oral Daily     PRN Meds:  Current Facility-Administered Medications:     0.9%  NaCl infusion (for blood administration), , Intravenous, Q24H PRN    0.9% NaCl, , Intravenous, PRN    0.9% NaCl, , Intravenous, PRN    acetaminophen, 650 mg, Oral, Q4H PRN    dextrose 50%, 12.5 g, Intravenous, PRN    dextrose 50%, 25 g, Intravenous, PRN    diphenhydrAMINE, 50 mg, Oral, Q6H PRN    glucagon (human " recombinant), 1 mg, Intramuscular, PRN    glucose, 16 g, Oral, PRN    glucose, 24 g, Oral, PRN    heparin (porcine), 1,000 Units, Intravenous, PRN    insulin aspart U-100, 0-5 Units, Subcutaneous, QID (AC + HS) PRN    melatonin, 6 mg, Oral, Nightly PRN    naloxone, 0.02 mg, Intravenous, PRN    ondansetron, 4 mg, Intravenous, Q6H PRN    prochlorperazine, 5 mg, Intravenous, Q6H PRN    sodium chloride 0.9%, 250 mL, Intravenous, PRN    Pharmacy to dose Vancomycin consult, , , Once **AND** vancomycin - pharmacy to dose, , Intravenous, pharmacy to manage frequency    Objective:     Vital Signs (Most Recent):  Temp: 97.9 °F (36.6 °C) (04/08/25 1530)  Pulse: 81 (04/08/25 1545)  Resp: (!) 43 (04/08/25 1545)  BP: (!) 93/55 (04/08/25 1545)  SpO2: 100 % (04/08/25 1545) Vital Signs (24h Range):  Temp:  [94.2 °F (34.6 °C)-98.6 °F (37 °C)] 97.9 °F (36.6 °C)  Pulse:  [] 81  Resp:  [7-43] 43  SpO2:  [88 %-100 %] 100 %  BP: ()/(44-76) 93/55     Weight: 61.2 kg (134 lb 14.7 oz)  Body mass index is 21.13 kg/m².       Physical Exam  Vitals and nursing note reviewed.   Constitutional:       Comments: Arouses to name, able to answer simple questions with 1-2 word answers with stimulation, falls asleep quickly    HENT:      Head: Normocephalic and atraumatic.   Pulmonary:      Effort: Pulmonary effort is normal. No respiratory distress.      Comments: NC  Skin:     General: Skin is dry.      Coloration: Skin is pale.      Findings: Lesion present. Bruising: multiple skin lesions in various stages of healing. Erythema: pale/gray for ethnicity.  Neurological:      Mental Status: He is lethargic.      Comments: Answers to his name; less oriented overall, decreased awareness of location    Psychiatric:         Behavior: Behavior is cooperative.     Advance Care Planning  Advance Directives:   Living Will: No    LaPOST: No    Do Not Resuscitate Status: No    Medical Power of : No (Pt reported niece Melissae as who should be  called for medical decisions on admission; pts capacity has been a concern and decreased currently for further discussion; pt's sisters are legal next of kin otherwise. No prior ACP docs)      Decision Making:  Family answered questions  Goals of Care: What is most important right now is to focus on symptom/pain control, curative/life-prolongation (regardless of treatment burdens), improvement in condition but with limits to invasive therapies. Accordingly, we have decided that the best plan to meet the patient's goals includes continuing with treatment.       Significant Labs: All pertinent labs within the past 24 hours have been reviewed.  CBC:   Recent Labs   Lab 04/08/25  0243 04/08/25  1025   WBC 11.26  --    HGB 6.1* 7.2*   HCT 19.7*  --    MCV 93  --    *  --      BMP:  Recent Labs   Lab 04/08/25  0243      K 3.9      CO2 26   BUN 70*   CREATININE 8.8*   CALCIUM 8.1*     LFT:  Lab Results   Component Value Date    AST 16 04/08/2025    ALKPHOS 67 04/08/2025    BILITOT 0.2 04/08/2025     Albumin:   Albumin   Date Value Ref Range Status   04/08/2025 1.9 (L) 3.5 - 5.2 g/dL Final   03/21/2025 3.2 (L) 3.5 - 5.2 g/dL Final     Protein:   Total Protein   Date Value Ref Range Status   03/21/2025 7.5 6.0 - 8.4 g/dL Final     Lactic acid:   Lab Results   Component Value Date    LACTATE 0.9 04/03/2025    LACTATE 2.6 (H) 04/02/2025       Significant Imaging: I have reviewed all pertinent imaging results/findings within the past 24 hours.    Total visit time: 95 minutes    35 min visit time including: face to face time in discussion of symptom assessment, and exploring options and burdens of offered treatments.  This also includes non-face to face time preparing to see the patient including chart review, obtaining and/or reviewing separately obtained history, documenting clinical information in the electronic or other health record, independently interpreting results and communicating results to the  patient/family/caregiver, family discussions by phone if not able to be present, coordination of care with other specialists, and discharge planning.     60 min ACP time spent: goals of care, advanced care planning, emotional support, formulating and communicating prognosis, exploring burden/ benefit of various approaches of treatment.       Lisa Jacinto NP  Palliative Medicine  [facility]

## 2025-04-09 NOTE — ASSESSMENT & PLAN NOTE
Patient with known CAD s/p stent placement, which is controlled Will continue ASA, Plavix, and Statin and monitor for S/Sx of angina/ACS. Continue to monitor on telemetry.   - last [facility] was 1/2025: Severely calcified mid RCA stenosis unable to cross with PTCA balloons, treated initially with 0.9 laser atherectomy, followed by CSI atherectomy system with total of 5 runs (3 at low speed, 2 at high speed), pre-dilated using 2.0 compliant and 3.5 noncompliant balloon followed by 3.5 X 16 mm megatron stent.  Focal plaque rupture/erosion noted in the proximal and ostial RCA that were treated with  3.5 X 28 in the proximal RCA, 4.0 X 16 mm in the ostial RCA.  No residual stenosis post intervention.  Patient had ALAN 3 flow at the end of the procedure  - with elevated troponin likely due to septic shock  Recent Labs   Lab 04/04/25  1028   TROPONINI 2.913*   - continue asa, plavix, statin  - Cardiology consulted  - no plans for ischemic evaluation at this time

## 2025-04-09 NOTE — PROGRESS NOTES
"[facility]  [facility]  Progress Note    Patient Name: Jevon Rajan  MRN: [id number]  Patient Class: IP- Inpatient   Admission Date: 4/3/2025  Length of Stay: 6 days  Attending Physician: Gonzalez Reagan MD  Primary Care Provider: No, Primary Doctor        Subjective     Principal Problem:Septic shock        HPI:  Mr Jevon Rajan is a 85 y.o. man with ESRD with LUE AVF, HFpEF last EF 50-55%, CAD, DM who was transferred to [facility] ICU for septic shock due to MRSA bacteremia.     He was admitted at [facility] 4/1-3/2025 with sepsis, worsening to septic shock, then identified source as MRSA. 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     Upon arrival to [facility], he is moaning in pain and his LUE AVF has active pulsatile bright red blood. He does respond to his name. He denies pain anywhere other than his LUE. He is on levophed at 0.05.     Overview/Hospital Course:  Mr Jevon Rajan is a 85 y.o. man with ESRD on HD with LUE AVF that has been bleeding, CHF, CAD s/p recent stenting 1/2025 who was admitted with MRSA bacteremia and bleeding from his LUE AVF. Continued vancomycin; weaned off levophed. Vascular surgery emergently consulted; on 4/3/25 they took him to OR and noted: "well incorporated proximal and central graft without evidence of infection, resected graft and covered proximal and central remnants with multiple layers. Mid graft removed in entirety and sent for culture. Ruptured pseudoaneurysm with infected hematoma, graft completely obliterated at mid segment." Surgical cultures with Staph. Suspect AVF or chronic scratching of pruritic skin is the source of his infection. TTE showed endocarditis: EF 40-45%, G1DD, RV systolic dysfunction, large 1.9x1.2cm fixed mass on the posterior mitral valve leaflet with moder to severe regurgitation, cannot rule out posterior mitral " valve leaflet perforation. Discussed with cardiac surgery; he is high mortality risk, and surgery is not advised. ID following. Nephrology consulted; trialysis was placed for HD. Persistently positive for MRSA in blood cultures. He has had urinary retention; Urology consulted, performed bedside cystoscopy on 4/6/25 with large prostate noted. Noted to have clot retention and went urgently to OR with Urology on 4/7/25; asa and DVT ppx held.     WBC normalized. S/p clot removal with Urology, 1U PRBC ordered this morning with Hb 6.1. Soft BPs, will add midodrine for HD to step down to floor. Glucoses high, will increase insulin. Attempted to s/d but BP too low, may still need CRRT rather. 4/8 cx clear so far. Increasing insulin again. Increasing midodrine to 15 mg TID. Hb 6.8, 1U PRBC ordered.         Interval History:  NAEON.  No new issues.   CC- Fatigue  All questions answered and updates on care given.       ROS:  General: Negative for fevers   Cardiac: Negative for chest pain   Pulmonary: Negative for wheezing  GI: Negative for abdominal distention      Vitals:    04/09/25 0530 04/09/25 0542 04/09/25 0545 04/09/25 0605   BP: (!) 102/55 (!) 102/54 (!) 102/54 (!) 99/54   Pulse: 79 77 82 83   Resp: 14 10 19 (!) 29   Temp:  97.9 °F (36.6 °C)  97.7 °F (36.5 °C)   TempSrc:  Axillary  Axillary   SpO2: 95% 98% 98% 99%   Weight:       Height:              Body mass index is 21.13 kg/m².      PHYSICAL EXAM:  GENERAL APPEARANCE: alert and cooperative.     HEAD: NC/AT  CARDIAC: There is no cyanosis or pallor.   LUNGS: No apparent wheezing or stridor.  ABDOMEN: Non-distended. No guarding.  PSYCHIATRIC: No tangential speech. No Hyperactive features.        Recent Results (from the past 24 hours)   POCT glucose    Collection Time: 04/08/25  8:09 AM   Result Value Ref Range    POCT Glucose 307 (H) 70 - 110 mg/dL   Hemoglobin    Collection Time: 04/08/25 10:25 AM   Result Value Ref Range    HGB 7.2 (L) 14.0 - 18.0 gm/dL   POCT  glucose    Collection Time: 04/08/25 11:28 AM   Result Value Ref Range    POCT Glucose 268 (H) 70 - 110 mg/dL   POCT glucose    Collection Time: 04/08/25  3:56 PM   Result Value Ref Range    POCT Glucose 199 (H) 70 - 110 mg/dL   POCT glucose    Collection Time: 04/08/25 10:24 PM   Result Value Ref Range    POCT Glucose 233 (H) 70 - 110 mg/dL   Comprehensive Metabolic Panel    Collection Time: 04/09/25  3:41 AM   Result Value Ref Range    Sodium 137 136 - 145 mmol/L    Potassium 3.6 3.5 - 5.1 mmol/L    Chloride 103 95 - 110 mmol/L    CO2 24 23 - 29 mmol/L    Glucose 261 (H) 70 - 110 mg/dL    BUN 38 (H) 8 - 23 mg/dL    Creatinine 5.9 (H) 0.5 - 1.4 mg/dL    Calcium 8.0 (L) 8.7 - 10.5 mg/dL    Protein Total 5.8 (L) 6.0 - 8.4 gm/dL    Albumin 2.3 (L) 3.5 - 5.2 g/dL    Bilirubin Total 0.3 0.1 - 1.0 mg/dL    ALP 79 40 - 150 unit/L    AST 15 11 - 45 unit/L    ALT 9 (L) 10 - 44 unit/L    Anion Gap 10 8 - 16 mmol/L    eGFR 9 (L) >60 mL/min/1.73/m2   Vancomycin, Random    Collection Time: 04/09/25  3:41 AM   Result Value Ref Range    Vancomycin Random 18.6 Not established ug/ml   Phosphorus    Collection Time: 04/09/25  3:41 AM   Result Value Ref Range    Phosphorus Level 2.1 (L) 2.7 - 4.5 mg/dL   CBC with Differential    Collection Time: 04/09/25  3:41 AM   Result Value Ref Range    WBC 13.60 (H) 3.90 - 12.70 K/uL    RBC 2.27 (L) 4.60 - 6.20 M/uL    HGB 6.8 (L) 14.0 - 18.0 gm/dL    HCT 20.7 (L) 40.0 - 54.0 %    MCV 91 82 - 98 fL    MCH 30.0 27.0 - 31.0 pg    MCHC 32.9 32.0 - 36.0 g/dL    RDW 17.5 (H) 11.5 - 14.5 %    Platelet Count 151 150 - 450 K/uL    MPV 11.3 9.2 - 12.9 fL    Nucleated RBC 1 (H) <=0 /100 WBC    Neut % 82.2 (H) 38 - 73 %    Lymph % 4.4 (L) 18 - 48 %    Mono % 6.7 4 - 15 %    Eos % 3.3 <=8 %    Basophil % 0.2 <=1.9 %    Imm Grans % 3.2 (H) 0.0 - 0.5 %    Neut # 11.18 (H) 1.8 - 7.7 K/uL    Lymph # 0.60 (L) 1 - 4.8 K/uL    Mono # 0.91 0.3 - 1 K/uL    Eos # 0.45 <=0.5 K/uL    Baso # 0.03 <=0.2 K/uL    Imm Grans  # 0.43 (H) 0.00 - 0.04 K/uL       Microbiology Results (last 7 days)       Procedure Component Value Units Date/Time    Blood culture [[id number]]  (Abnormal) Collected: 04/06/25 0555    Order Status: Completed Specimen: Blood Updated: 04/09/25 0720     Blood Culture Positive - Aerobic/Pediatric Bottle      Methicillin resistant Staphylococcus aureus     Comment: <null> has been updated to reportable.        GRAM STAIN Gram positive cocci in clusters resembling Staph     Comment: Aerobic Bottle Positive   This is an appended report. These results have been appended to a previously preliminary verified report.       Narrative:      For susceptibility refer to order [id number]  ID Consult Strongly Recommended    Blood culture [[id number]]  (Abnormal)  (Susceptibility) Collected: 04/06/25 0542    Order Status: Completed Specimen: Blood Updated: 04/09/25 0719     Blood Culture Positive - Aerobic/Pediatric Bottle      Methicillin resistant Staphylococcus aureus     Comment: <null> has been updated to reportable.        GRAM STAIN Gram positive cocci in clusters resembling Staph     Comment: This is an appended report. These results have been appended to a previously preliminary verified report.       Narrative:      Aerobic Bottle Positive   ID Consult Strongly Recommended    Blood culture [[id number]]  (Normal) Collected: 04/08/25 0401    Order Status: Completed Specimen: Blood Updated: 04/09/25 0600     Blood Culture No Growth After 24 Hours    Blood culture [[id number]]  (Normal) Collected: 04/08/25 0506    Order Status: Completed Specimen: Blood Updated: 04/09/25 0600     Blood Culture No Growth After 24 Hours    Blood culture [[id number]]  (Normal) Collected: 04/04/25 1044    Order Status: Completed Specimen: Blood Updated: 04/08/25 1100     Blood Culture No Growth After 96 hours    Afb Culture Stain [[id number]] Collected: 04/03/25 1943    Order Status: Completed Specimen: Wound from Arm, Left Updated:  04/07/25 1637     ACID FAST STAIN  No acid fast bacilli seen    Afb Culture Stain [[id number]] Collected: 04/03/25 2011    Order Status: Completed Specimen: Wound from Arm, Left Updated: 04/07/25 1637     ACID FAST STAIN  No acid fast bacilli seen    Afb Culture Stain [[id number]] Collected: 04/03/25 1905    Order Status: Completed Specimen: Tissue from AV Fistula, Left Updated: 04/07/25 1623     ACID FAST STAIN  No acid fast bacilli seen    Afb Culture Stain [[id number]] Collected: 04/03/25 1954    Order Status: Completed Specimen: Wound from Arm, Left Updated: 04/07/25 1623     ACID FAST STAIN  No acid fast bacilli seen    Afb Culture Stain [[id number]] Collected: 04/03/25 1816    Order Status: Completed Specimen: Tissue from AV Fistula, Left Updated: 04/07/25 1623     ACID FAST STAIN  No acid fast bacilli seen    Afb Culture Stain [[id number]] Collected: 04/03/25 1921    Order Status: Completed Specimen: Tissue from hematoma Updated: 04/07/25 1623     ACID FAST STAIN  No acid fast bacilli seen    Afb Culture Stain [[id number]] Collected: 04/03/25 1934    Order Status: Completed Specimen: Tissue from AV Fistula, Left Updated: 04/07/25 1618     ACID FAST STAIN  No acid fast bacilli seen    Fungus culture [[id number]]  (Normal) Collected: 04/03/25 1943    Order Status: Completed Specimen: Wound from Arm, Left Updated: 04/07/25 0935     Fungal Culture Culture In Progress    Fungus culture [[id number]]  (Normal) Collected: 04/03/25 1954    Order Status: Completed Specimen: Wound from Arm, Left Updated: 04/07/25 0935     Fungal Culture Culture In Progress    AFB Culture & Smear [[id number]] Collected: 04/03/25 1816    Order Status: Completed Specimen: Tissue from AV Fistula, Left Updated: 04/07/25 0815     CULTURE, AFB  Culture In Progress    AFB Culture & Smear [[id number]] Collected: 04/03/25 1905    Order Status: Completed Specimen: Tissue from AV Fistula, Left Updated: 04/07/25 0815     CULTURE, AFB  Culture In  Progress    AFB Culture & Smear [[id number]] Collected: 04/03/25 1921    Order Status: Completed Specimen: Tissue from hematoma Updated: 04/07/25 0815     CULTURE, AFB  Culture In Progress    AFB Culture & Smear [[id number]] Collected: 04/03/25 1934    Order Status: Completed Specimen: Tissue from AV Fistula, Left Updated: 04/07/25 0815     CULTURE, AFB  Culture In Progress    AFB Culture & Smear [[id number]] Collected: 04/03/25 1943    Order Status: Completed Specimen: Wound from Arm, Left Updated: 04/07/25 0815     CULTURE, AFB  Culture In Progress    AFB Culture & Smear [[id number]] Collected: 04/03/25 1954    Order Status: Completed Specimen: Wound from Arm, Left Updated: 04/07/25 0815     CULTURE, AFB  Culture In Progress    AFB Culture & Smear [[id number]] Collected: 04/03/25 2011    Order Status: Completed Specimen: Wound from Arm, Left Updated: 04/07/25 0815     CULTURE, AFB  Culture In Progress    Culture, Anaerobic [[id number]] Collected: 04/03/25 2011    Order Status: Completed Specimen: Wound from Arm, Left Updated: 04/07/25 0748     Anaerobe Culture No Anaerobes Isolated    Culture, Anaerobic [[id number]] Collected: 04/03/25 1954    Order Status: Completed Specimen: Wound from Arm, Left Updated: 04/07/25 0747     Anaerobe Culture No Anaerobes Isolated    Culture, Anaerobic [[id number]] Collected: 04/03/25 1943    Order Status: Completed Specimen: Wound from Arm, Left Updated: 04/07/25 0747     Anaerobe Culture No Anaerobes Isolated    Culture, Anaerobic [[id number]] Collected: 04/03/25 1934    Order Status: Completed Specimen: Tissue from AV Fistula, Left Updated: 04/07/25 0746     Anaerobe Culture No Anaerobes Isolated    Culture, Anaerobic [[id number]] Collected: 04/03/25 1921    Order Status: Completed Specimen: Tissue from hematoma Updated: 04/07/25 0746     Anaerobe Culture No Anaerobes Isolated    Culture, Anaerobic [[id number]] Collected: 04/03/25 1910    Order Status: Completed Specimen: Tissue  from AV Fistula, Left Updated: 04/07/25 0745     Anaerobe Culture No Anaerobes Isolated    Culture, Anaerobic [[id number]] Collected: 04/03/25 1816    Order Status: Completed Specimen: Wound from Arm, Left Updated: 04/07/25 0744     Anaerobe Culture No Anaerobes Isolated    Blood culture [[id number]]  (Abnormal) Collected: 04/04/25 1343    Order Status: Completed Specimen: Blood Updated: 04/06/25 0657     Blood Culture Positive - Aerobic/Pediatric Bottle      Methicillin resistant Staphylococcus aureus     Comment: <null> has been updated to reportable.        GRAM STAIN Gram positive cocci in clusters resembling Staph     Comment: Aerobic Bottle Positive    This is an appended report. These results have been appended to a previously preliminary verified report.       Narrative:      For sensitivities, refer to order # [id number]      Aerobic culture [[id number]]  (Abnormal) Collected: 04/03/25 2011    Order Status: Completed Specimen: Wound from Arm, Left Updated: 04/06/25 0556     CULTURE, AEROBIC Few Methicillin resistant Staphylococcus aureus    Narrative:      For sensitivities, refer to order # [id number]    Aerobic culture [[id number]]  (Abnormal) Collected: 04/03/25 1954    Order Status: Completed Specimen: Wound from Arm, Left Updated: 04/06/25 0555     CULTURE, AEROBIC Many Methicillin resistant Staphylococcus aureus    Narrative:      For sensitivities, refer to order # [id number]    Aerobic culture [[id number]]  (Abnormal)  (Susceptibility) Collected: 04/03/25 1943    Order Status: Completed Specimen: Wound from Arm, Left Updated: 04/06/25 0553     CULTURE, AEROBIC Many Methicillin resistant Staphylococcus aureus    Aerobic culture [[id number]]  (Abnormal)  (Susceptibility) Collected: 04/03/25 1926    Order Status: Completed Specimen: Wound from Arm, Left Updated: 04/06/25 0544     CULTURE, AEROBIC Many Methicillin resistant Staphylococcus aureus    Aerobic culture [[id number]] Collected:  04/03/25 1911    Order Status: Completed Specimen: Tissue from Arm, Left Updated: 04/06/25 0543     CULTURE, AEROBIC No Growth    Aerobic culture [[id number]] Collected: 04/03/25 1816    Order Status: Completed Specimen: Wound from Arm, Left Updated: 04/06/25 0543     CULTURE, AEROBIC No Growth    Gram stain [[id number]] Collected: 04/03/25 1923    Order Status: Completed Specimen: Tissue from Arm, Left Updated: 04/04/25 0837     GRAM STAIN Rare WBC seen      Few Gram positive cocci    Gram stain [[id number]] Collected: 04/03/25 1935    Order Status: Completed Specimen: Tissue from Arm, Left Updated: 04/04/25 0836     GRAM STAIN Rare WBC seen      No organisms seen    Gram stain [[id number]] Collected: 04/03/25 1914    Order Status: Completed Specimen: Wound from Arm, Left Updated: 04/04/25 0836     GRAM STAIN Rare WBC seen      No organisms seen    Gram stain [[id number]] Collected: 04/03/25 2011    Order Status: Completed Specimen: Wound from Arm, Left Updated: 04/04/25 0835     GRAM STAIN Rare WBC seen      No organisms seen    Gram stain [[id number]] Collected: 04/03/25 1821    Order Status: Completed Specimen: Wound from Arm, Left Updated: 04/04/25 0835     GRAM STAIN No WBCs      No organisms seen    Gram stain [[id number]] Collected: 04/03/25 1943    Order Status: Completed Specimen: Wound from Arm, Left Updated: 04/04/25 0834     GRAM STAIN Rare WBC seen      No organisms seen    Gram stain [[id number]] Collected: 04/03/25 1954    Order Status: Completed Specimen: Wound from Arm, Left Updated: 04/04/25 0833     GRAM STAIN Rare WBC seen      No organisms seen    Fungus culture [[id number]] Collected: 04/03/25 1934    Order Status: Sent Specimen: Tissue from AV Fistula, Left Updated: 04/03/25 2107    Fungus culture [[id number]] Collected: 04/03/25 1935    Order Status: Canceled Specimen: Tissue from Arm, Left Updated: 04/03/25 2107    Fungus culture [[id number]] Collected: 04/03/25 2011    Order Status: Sent  Specimen: Wound from Arm, Left Updated: 04/03/25 2106    Fungus culture [[id number]] Collected: 04/03/25 1816    Order Status: Sent Specimen: Wound from Arm, Left Updated: 04/03/25 2106    Fungus culture [[id number]] Collected: 04/03/25 1904    Order Status: Sent Specimen: Tissue from AV Fistula, Left Updated: 04/03/25 2106    Fungus culture [[id number]] Collected: 04/03/25 1921    Order Status: Sent Specimen: Tissue from hematoma Updated: 04/03/25 2105                          Assessment & Plan  Septic shock  This patient has shock. The type of shock is distributive due to sepsis. The patient had the following evidence of shock: persistent hypotension and altered mental status. 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 Shock is now resolved and vasopressors are off. WBC worsening  HTN (hypertension)  Patient's blood pressure range in the last 24 hours was: BP  Min: 83/50  Max: 132/60.The patient's inpatient anti-hypertensive regimen is listed below:  Current Antihypertensives       Plan  - admitted with shock  - now off vasopressors. Continue to hold BP Rx as BP is well controlled without it   HLD (hyperlipidemia)  - continue statin  Type 2 diabetes mellitus with hyperglycemia, without long-term current use of insulin  A1c:   Lab Results   Component Value Date    HGBA1C 5.7 (H) 04/02/2025     Meds: lantus + SSI PRN to maintain goal 140-180  Tube feeds  accuchecks, hypoglycemic protocol      Coronary artery disease involving native coronary artery of native heart without angina pectoris  Patient with known CAD s/p stent placement, which is controlled Will continue ASA, Plavix, and Statin and monitor for S/Sx of angina/ACS. Continue to monitor on telemetry.   - last  "[facility] was 1/2025: Severely calcified mid RCA stenosis unable to cross with PTCA balloons, treated initially with 0.9 laser atherectomy, followed by CSI atherectomy system with total of 5 runs (3 at low speed, 2 at high speed), pre-dilated using 2.0 compliant and 3.5 noncompliant balloon followed by 3.5 X 16 mm megatron stent.  Focal plaque rupture/erosion noted in the proximal and ostial RCA that were treated with  3.5 X 28 in the proximal RCA, 4.0 X 16 mm in the ostial RCA.  No residual stenosis post intervention.  Patient had ALAN 3 flow at the end of the procedure  - with elevated troponin likely due to septic shock  Recent Labs   Lab 04/04/25  1028   TROPONINI 2.913*   - continue asa, plavix, statin  - Cardiology consulted  - no plans for ischemic evaluation at this time   ESRD on hemodialysis  - ESRD on HD via LUE AVF  - LUE AVF was actively bleeding on arrival on 4/3/25. Vascular surgery was consulted. He went emergently to the OR on 4/3/25: "Severely calcified mid RCA stenosis unable to cross with PTCA balloons, treated initially with 0.9 laser atherectomy, followed by CSI atherectomy system with total of 5 runs (3 at low speed, 2 at high speed), pre-dilated using 2.0 compliant and 3.5 noncompliant balloon followed by 3.5 X 16 mm megatron stent.  Focal plaque rupture/erosion noted in the proximal and ostial RCA that were treated with  3.5 X 28 in the proximal RCA, 4.0 X 16 mm in the ostial RCA.  No residual stenosis post intervention.  Patient had ALAN 3 flow at the end of the procedure"  - cultures from AVF= Staph   - wound care orders in place for surgical site  - Nephrology is consulted  - trialysis placed on 4/4/25 for CRRT  - he will need THDC when bacteremia is resolved   Anemia in ESRD (end-stage renal disease)  Anemia is likely due to  ESRD, blood loss . Most recent hemoglobin and hematocrit are listed below.  Recent Labs     04/07/25  0419 04/08/25  0243 04/08/25  1025 04/09/25  0341   HGB 8.3* 6.1* " "7.2* 6.8*   HCT 25.8* 19.7*  --  20.7*     Plan  - Monitor serial CBC: Daily and recheck now  - Transfuse PRBC if patient becomes hemodynamically unstable, symptomatic or H/H drops below 7/21.  - Patient has not received any PRBC transfusions to date  - Patient's anemia is currently stable  Acute metabolic encephalopathy  - he is oriented to self but otherwise confused  - CTH 4/1/25 with no acute process  - suspect this is due to sepsis  - NGT placed on 4/4/25 so that he could get his asa/plavix  - swallow- continue puree. Continue NGT until definitely improving and swallowing Rx     ACP (advance care planning)  Advance Care Planning     Date: 04/04/2025  - palliative consulted   - Mr Escoto specifically told Dr Jordan to contact Kenia Smyth for all updates  - discussed code status with Kenia. She states she has spoken with Mr Escoto's sisters and they all want full code status.          Acute bacterial endocarditis  - TTE 4/4/25: "1.9x1.2cm large fixed heterogeneous mass present on the posterior leaflet (new finding vs 9/2023 echo). There is moderate to severe regurgitation with an eccentric jet. Cannot exclude severe MR with possible PMVL perforation in association with large vegetation."  - this is in the setting of MRSA bacteremia  - ID is consulted  - repeat blood cultures until clear   - suspect source is skin/chronic scratching vs AVF infection- cultures from resected AVF with Staph   - discussed with Cardiac surgery Dr Castro 4/4/25- given age and comorbidities, he is very high risk of mortality with surgery. He recommends medical management at this point.  - on vancomycin      MRSA bacteremia  - suspect source= chronic skin scratching vs infected AVF  - cultures of AVF with Staph   - he has endocarditis  - ID consulted  - on vanc     NSTEMI (non-ST elevated myocardial infarction)  - see CAD    BPH with urinary obstruction  - noted 4/6/25 when patient became very agitated and screaming he " "couldn't urinate  - nursing unable to place dunaway/coude  - Urology consulted. Bedside cystoscopy on 4/6/25 noted clots, large prostate. Catheter was placed but was then removed due to pain  - 4/7 he is again agitated that he cannot urinate  - follow up with Urology   - tamsulosin ordered if he is awake enough to swallow     Thrombocytopenia  The likely etiology of thrombocytopenia is infection and sepsis. The patients 3 most recent labs are listed below.  Recent Labs     04/07/25  0419 04/08/25  0243 04/09/25  0341   * 134* 151     Plan  - Will transfuse if platelet count is <10k.    AV fistula infection  - LUE AVF was actively bleeding on arrival on 4/3/25. Vascular surgery was consulted. He went emergently to the OR on 4/3/25: "Severely calcified mid RCA stenosis unable to cross with PTCA balloons, treated initially with 0.9 laser atherectomy, followed by CSI atherectomy system with total of 5 runs (3 at low speed, 2 at high speed), pre-dilated using 2.0 compliant and 3.5 noncompliant balloon followed by 3.5 X 16 mm megatron stent.  Focal plaque rupture/erosion noted in the proximal and ostial RCA that were treated with  3.5 X 28 in the proximal RCA, 4.0 X 16 mm in the ostial RCA.  No residual stenosis post intervention.  Patient had ALAN 3 flow at the end of the procedure"  - cultures from AVF= Staph   - wound care orders in place for surgical site  - trialysis currently in place for HD    Emphysema lung  - emphysema noted on CT  - no signs of COPD exacerbation  Pulmonary nodules  - CT 4/3/25 with few small pulmonary nodules  - will need outpatient follow up     Gross hematuria      NSVT (nonsustained ventricular tachycardia)        VTE Risk Mitigation (From admission, onward)           Ordered     heparin (porcine) injection 1,000 Units  As needed (PRN)         04/05/25 2100     IP VTE HIGH RISK PATIENT  Once         04/03/25 1516     Place TEMO hose  Until discontinued         04/03/25 1516     Place " sequential compression device  Until discontinued         04/03/25 1516     Reason for No Pharmacological VTE Prophylaxis  Once        Question:  Reasons:  Answer:  Physician Provided (leave comment)    04/03/25 1516                    Discharge Planning   BAYRON:      Code Status: Full Code   Medical Readiness for Discharge Date:   Discharge Plan A: [place]            Critical care time spent on the evaluation and treatment of severe organ dysfunction, review of pertinent labs and imaging studies, discussions with consulting providers and discussions with patient/family: 35 minutes.            Gonzalez Reagan MD  Department of Hospital Medicine   [facility] - Intensive Care

## 2025-04-09 NOTE — ASSESSMENT & PLAN NOTE
- Nephrology consulted and following; pt has had difficulty tolerating HD so far, family aware of this and with insight that this is a large factor in pt's prognosis   - family with insight that HD is a form of life support   - has temporary line in place though potentially may need line holiday due to persistent positive cultures (have encouraged discussion with nephrology, ID, and PCCM as initial line placement was very difficult and would require coordination of timing and replacement plan)

## 2025-04-09 NOTE — EICU
eICU Physician Virtual/Remote Brief Evaluation Note      Telephone call bedside RN   Patient on continuous bladder irrigation   Complaining of severe bladder spasms  Could not be started on Pyridium due to renal function   RN requesting belladonna/opium suppository   Chart reviewed, discussed with RN   Belladonna/opium suppository ordered      ROD Salvador MD  eICU Attending  [phone]    This report has been created through the use of M-Modal dictation software. Typographical and content errors may occur with this process. While efforts are made to detect and correct such errors, in some cases errors will persist. For this reason, wording in this document should be considered in the proper context and not strictly verbatim

## 2025-04-09 NOTE — PT/OT/SLP PROGRESS
Physical Therapy Treatment    Patient Name:  Jevon Rajan   MRN:  [id number]    Recommendations:     Discharge Recommendations:  (Ongoing assessment pending pt progress)  Discharge Equipment Recommendations:  (ongoing assessment pending pt progress)  Barriers to discharge:  ICU, decreased CG support    Assessment:     Jevon Rajan is a 85 y.o. male admitted with a medical diagnosis of Septic shock.  He presents with the following impairments/functional limitations: weakness, pain, impaired cognition, impaired functional mobility, impaired endurance .    Rehab Prognosis: Fair and Poor; patient would benefit from acute skilled PT services to address these deficits and reach maximum level of function.    Recent Surgery: Procedure(s) (LRB):  CYSTOSCOPY WITH CLOT EVACUATION, FULGURATION, GARCES PLACEMENT (N/A) 2 Days Post-Op    Plan:     During this hospitalization, patient to be seen 2 x/week to address the identified rehab impairments via therapeutic activities, therapeutic exercises, neuromuscular re-education and progress toward the following goals:    Plan of Care Expires:  04/21/25    Subjective     Chief Complaint: pain, fear?  Patient/Family Comments/goals: unable to state, able to follow simple commands today  Pain/Comfort:  Pain Rating 1:  (Unable to rate)  Pain Addressed 2: Reposition, Distraction, Cessation of Activity      Objective:     Communicated with nsg prior to session.  Patient found  slld down in bed  with blood pressure cuff, peripheral IV, telemetry, pulse ox (continuous), oxygen (feeding tube) upon PT entry to room.     General Precautions: Standard, fall, aspiration, pureed diet  Orthopedic Precautions: N/A  Braces: N/A  Respiratory Status: Nasal cannula, flow 3 L/min     Functional Mobility:N/A      Treatment and education:   Pt received B LE AA/PROM in available planes  and HCS 3x 15 sec ea.  Heels floated on pillow and bottom of bed raised to prevent further sliding and maintaining HOB at 30*  /55, HR 79, SPO2 99%      AM-PAC 6 CLICK MOBILITY  Turning over in bed (including adjusting bedclothes, sheets and blankets)?: 2  Sitting down on and standing up from a chair with arms (e.g., wheelchair, bedside commode, etc.): 1  Moving from lying on back to sitting on the side of the bed?: 2  Moving to and from a bed to a chair (including a wheelchair)?: 1  Need to walk in hospital room?: 1  Climbing 3-5 steps with a railing?: 1  Basic Mobility Total Score: 8       Patient left  as above  with all lines intact..    GOALS:   Multidisciplinary Problems       Physical Therapy Goals          Problem: Physical Therapy    Goal Priority Disciplines Outcome Interventions   Physical Therapy Goal     PT, PT/OT Progressing    Description: Goals to be met by: 25     Patient will increase functional independence with mobility by performin. Supine to sit with MInimal Assistance  2. Rolling to Left and Right with Minimal Assistance.  3. Bed to chair transfer with Moderate   4. Sitting at edge of bed x10 minutes with Contact Guard Assistance  5. Lower extremity exercise program x10 reps per handout, with assistance as needed                         DME Justifications:  No DME recommended requiring DME justifications    Time Tracking:     PT Received On: 25  PT Start Time: 1158     PT Stop Time: 1208  PT Total Time (min): 10 min     Billable Minutes: Therapeutic Exercise 10    Treatment Type: Treatment  PT/PTA: PT     Number of PTA visits since last PT visit: 0     2025

## 2025-04-09 NOTE — PROGRESS NOTES
[facility]  Cardiology  Progress Note    Patient Name: Jevon Rajan  MRN: [id number]  Admission Date: 4/3/2025  Hospital Length of Stay: 6 days  Code Status: Full Code   Attending Physician: Gonzalez Reagan MD   Primary Care Physician: Melia, Primary Doctor  Expected Discharge Date:   Principal Problem:Septic shock    Subjective:     Interval Hx: pt seen in ICU, MS ashwini, UTO ROS.    Tele: SR 80s (personally reviewed and interpreted)      Past Medical History:   Diagnosis Date    BPH (benign prostatic hyperplasia)     Coronary artery disease     He has followed at the [facility] and is now transferring his care to this clinic. In 2014 he had stent to LAD. He states he has had 2 heart attacks. He does not get angina. There was 90% left anterior descending artery stenosis undergoing stenting. There was also a circumflex marginal branch stenosis of 70%.    Diabetes mellitus, type 2     ESRD (end stage renal disease) on dialysis     ESRD on HD TTS via left brachial AVF    Hypertension     Hypothyroidism, unspecified     NSTEMI (non-ST elevated myocardial infarction) 4/4/2025    Sepsis 4/2/2025    Symptomatic anemia 01/15/2024       Past Surgical History:   Procedure Laterality Date    ANGIOGRAM, CORONARY, WITH LEFT HEART CATHETERIZATION  1/13/2025    Procedure: Angiogram, Coronary, with Left Heart Cath;  Surgeon: Steve Bhat MD;  Location: [facility] CATH LAB/EP;  Service: Cardiology;;    ATHERECTOMY, CORONARY N/A 1/14/2025    Procedure: Atherectomy-coronary;  Surgeon: Giacomo Pittman MD;  Location: [facility] CATH LAB/EP;  Service: Cardiology;  Laterality: N/A;    bilateral foot surgery      CORONARY ANGIOPLASTY WITH STENT PLACEMENT N/A 1/14/2025    Procedure: ANGIOPLASTY, CORONARY ARTERY, WITH STENT INSERTION;  Surgeon: Giacomo Pittman MD;  Location: [facility] CATH LAB/EP;  Service: Cardiology;  Laterality: N/A;    CORONARY STENT PLACEMENT N/A 1/13/2025    Procedure: INSERTION, STENT, CORONARY ARTERY;  Surgeon:  Steve Bhat MD;  Location: [facility] CATH LAB/EP;  Service: Cardiology;  Laterality: N/A;    CYSTOSCOPY N/A 4/7/2025    Procedure: CYSTOSCOPY WITH CLOT EVACUATION, FULGURATION, GARCES PLACEMENT;  Surgeon: Elsie Arnold MD;  Location: [facility] OR;  Service: Urology;  Laterality: N/A;    ESOPHAGOGASTRODUODENOSCOPY N/A 2/27/2024    Procedure: EGD (ESOPHAGOGASTRODUODENOSCOPY);  Surgeon: Gemma Almendarez MD;  Location: [facility] ENDO;  Service: Endoscopy;  Laterality: N/A;    EXPLORATION, ARTERY, UPPER EXTREMITY Left 4/3/2025    Procedure: EXPLORATION, ARTERY, UPPER EXTREMITY;  Surgeon: Sunil Contreras MD;  Location: [facility] OR;  Service: Vascular;  Laterality: Left;    eyelid surgery      FISTULOGRAM Left 11/27/2019    Procedure: Fistulogram;  Surgeon: MELANY Brenner III, MD;  Location: [address];  Service: Peripheral Vascular;  Laterality: Left;    FISTULOGRAM Left 11/27/2019    Procedure: Fistulogram, AVG declot, possible permacath;  Surgeon: MELANY Brenner III, MD;  Location: [facility] CATH LAB;  Service: Peripheral Vascular;  Laterality: Left;    INSERTION OF DIALYSIS CATHETER      IVUS, CORONARY  1/13/2025    Procedure: IVUS, Coronary;  Surgeon: Steve Bhat MD;  Location: [facility] CATH LAB/EP;  Service: Cardiology;;    LEFT HEART CATHETERIZATION Left 1/14/2025    Procedure: Left heart cath;  Surgeon: Giacomo Pittman MD;  Location: [facility] CATH LAB/EP;  Service: Cardiology;  Laterality: Left;    MOH's  12/5/13    PERCUTANEOUS CORONARY INTERVENTION, ARTERY N/A 1/14/2025    Procedure: Percutaneous coronary intervention;  Surgeon: Giacomo Pittman MD;  Location: [facility] CATH LAB/EP;  Service: Cardiology;  Laterality: N/A;    PERCUTANEOUS TRANSLUMINAL BALLOON ANGIOPLASTY OF CORONARY ARTERY  1/13/2025    Procedure: Angioplasty-coronary;  Surgeon: Steve Bhat MD;  Location: [facility] CATH LAB/EP;  Service: Cardiology;;    REMOVAL, GRAFT, ARTERIOVENOUS, UPPER EXTREMITY Left 4/3/2025    Procedure: REMOVAL,  GRAFT, ARTERIOVENOUS, UPPER EXTREMITY;  Surgeon: Sunil Contreras MD;  Location: [facility] OR;  Service: Vascular;  Laterality: Left;    REVASCULARIZATION, ENDOVASCULAR, LE W/ INTRAVASCULAR LITHOTRIPSY  1/13/2025    Procedure: REVASCULARIZATION, ENDOVASCULAR, LE W/ INTRAVASCULAR LITHOTRIPSY;  Surgeon: Steve Bhat MD;  Location: [facility] CATH LAB/EP;  Service: Cardiology;;    right hand surgery      stents         Review of patient's allergies indicates:   Allergen Reactions    Ace inhibitors Hives, Itching, Shortness Of Breath, Other (See Comments) and Rash     Is not sure which medication it was    Captopril        No current facility-administered medications on file prior to encounter.     Current Outpatient Medications on File Prior to Encounter   Medication Sig    ammonium lactate 12 % Crea Apply topically 2 (two) times a day.    artificial tears,hypromellose,,GENTEAL/SUSTANE, 0.3 % Gel Apply to eye nightly.    aspirin (ECOTRIN) 81 MG EC tablet Take 1 tablet (81 mg total) by mouth once daily.    atorvastatin (LIPITOR) 80 MG tablet Take 1 tablet (80 mg total) by mouth every evening.    camphor-menthol 0.5-0.5% (SARNA) lotion Apply topically once daily. APPLY SMALL AMOUNT TOPICALLY TO AFFECTED AREA(S) AS NEEDED FOR ITCHING KEEP IN FRIDGE    carboxymethylcellulose sodium 0.5 % Drop Apply 1 drop to eye nightly as needed (dry eyes).    carvediloL (COREG) 12.5 MG tablet Take 0.5 tablets (6.25 mg total) by mouth 2 (two) times daily.    cetirizine (ZYRTEC) 5 MG tablet Take 1 tablet (5 mg total) by mouth every 48 hours.    clobetasol 0.05% (TEMOVATE) 0.05 % Oint Apply topically 2 (two) times daily.    clopidogreL (PLAVIX) 75 mg tablet Take 1 tablet (75 mg total) by mouth once daily.    erythromycin (ROMYCIN) ophthalmic ointment Place a 1/2 inch ribbon of ointment into the lower eyelid. Four timex daily for 5 days    gabapentin (NEURONTIN) 100 MG capsule Take 1 capsule (100 mg total) by mouth every evening.     hydrophilic ointment (AQUABASE) Apply topically as needed for Dry Skin. APPLY MODERATE AMOUNT TOPICALLY TO AFFECTED AREA(S) TWICE A DAY AS NEEDED FOR DRY SKIN APPLY TO AREAS OF DRY SKIN OR OVER ENTIRE BODY.    lanolin alcohol-mineral oil-white petrolatum-ceres (EUCERIN) Crea cream Apply topically 2 (two) times daily as needed.    LIDOcaine (LIDODERM) 5 % Place 1 patch onto the skin once daily. Remove & Discard patch within 12 hours or as directed by MD    loratadine (CLARITIN) 10 mg tablet Take 10 mg by mouth daily as needed for Allergies (Every other day).    nut.tx.imp.renal fxn,lac-reduc (NEPRO CARB STEADY) 0.08 gram-1.8 kcal/mL Liqd Take 240 mLs by mouth 2 (two) times a day.    ondansetron (ZOFRAN-ODT) 4 MG TbDL Take 1 tablet (4 mg total) by mouth every 8 (eight) hours as needed (nausea).    oxyCODONE-acetaminophen (PERCOCET) 5-325 mg per tablet Take 1 tablet by mouth every 6 (six) hours as needed.    pantoprazole (PROTONIX) 20 MG tablet Take 20 mg by mouth 2 (two) times daily as needed.    pramoxine 1 % Lotn Apply topically 2 (two) times daily as needed (itching).    triamcinolone acetonide 0.1% (KENALOG) 0.1 % cream Apply topically 2 (two) times daily as needed (itching).     Family History    None       Tobacco Use    Smoking status: Never    Smokeless tobacco: Never   Substance and Sexual Activity    Alcohol use: No    Drug use: No    Sexual activity: Not Currently     Review of Systems   Unable to perform ROS: Mental status change   Gastrointestinal:  Negative for melena.   Genitourinary:  Positive for hematuria.     Objective:     Vital Signs (Most Recent):  Temp: 98 °F (36.7 °C) (04/09/25 0701)  Pulse: 75 (04/09/25 0900)  Resp: (!) 21 (04/09/25 1034)  BP: (!) 110/56 (04/09/25 0900)  SpO2: 97 % (04/09/25 0900) Vital Signs (24h Range):  Temp:  [97.6 °F (36.4 °C)-98.2 °F (36.8 °C)] 98 °F (36.7 °C)  Pulse:  [] 75  Resp:  [9-47] 21  SpO2:  [71 %-100 %] 97 %  BP: ()/(48-60) 110/56     Weight: 61.2  kg (134 lb 14.7 oz)  Body mass index is 21.13 kg/m².    SpO2: 97 %         Intake/Output Summary (Last 24 hours) at 4/9/2025 1120  Last data filed at 4/9/2025 0600  Gross per 24 hour   Intake 895 ml   Output 375 ml   Net 520 ml       Lines/Drains/Airways       Central Venous Catheter Line  Duration             Trialysis (Dialysis) Catheter 04/04/25 1208 right internal jugular 4 days              Drain  Duration                  NG/OG Tube 04/04/25 1415 Rolf 10 Fr. Left nostril 4 days         Urethral Catheter 04/07/25 1558 Triple-lumen;Latex 24 Fr. 1 day              Peripheral Intravenous Line  Duration                  Peripheral IV - Single Lumen 04/02/25 1801 20 G Anterior;Distal;Right Upper Arm 6 days                   Exam unchanged vs 4/7/25  Physical Exam  Constitutional:       General: He is not in acute distress.     Appearance: He is well-developed. He is ill-appearing. He is not toxic-appearing or diaphoretic.   HENT:      Head: Normocephalic and atraumatic.   Eyes:      General: No scleral icterus.  Neck:      Thyroid: No thyromegaly.      Vascular: No JVD.      Trachea: No tracheal deviation.   Cardiovascular:      Rate and Rhythm: Normal rate and regular rhythm.      Heart sounds: S1 normal and S2 normal. Heart sounds are distant. No murmur heard.     No friction rub. No gallop.   Pulmonary:      Effort: Pulmonary effort is normal. No respiratory distress.      Breath sounds: Normal breath sounds. No stridor. No wheezing, rhonchi or rales.   Chest:      Chest wall: No tenderness.   Abdominal:      General: There is no distension.      Palpations: Abdomen is soft.   Musculoskeletal:         General: No swelling or tenderness. Normal range of motion.      Cervical back: Normal range of motion and neck supple. No rigidity.      Right lower leg: No edema.      Left lower leg: No edema.   Skin:     General: Skin is warm and dry.      Coloration: Skin is not jaundiced.   Neurological:      Comments: JESSICA    Psychiatric:      Comments: UTO          Current Medications:   atorvastatin  80 mg Oral QHS    clopidogreL  75 mg Oral Daily    epoetin mj-epbx  10,000 Units Intravenous Every Mon, Wed, Fri    erythromycin   Both Eyes Q8H    insulin aspart U-100  6 Units Subcutaneous TIDWM    insulin glargine U-100  24 Units Subcutaneous Daily    midodrine  15 mg Oral Q8H    pantoprazole  40 mg Oral Daily    sodium chloride 0.9%  10 mL Intravenous Q8H    tamsulosin  0.4 mg Oral Daily         Current Facility-Administered Medications:     0.9%  NaCl infusion (for blood administration), , Intravenous, Q24H PRN    0.9% NaCl, , Intravenous, PRN    acetaminophen, 650 mg, Oral, Q4H PRN    dextrose 50%, 12.5 g, Intravenous, PRN    dextrose 50%, 25 g, Intravenous, PRN    diphenhydrAMINE, 50 mg, Oral, Q6H PRN    glucagon (human recombinant), 1 mg, Intramuscular, PRN    glucose, 16 g, Oral, PRN    glucose, 24 g, Oral, PRN    heparin (porcine), 1,000 Units, Intravenous, PRN    HYDROmorphone, 1 mg, Intravenous, Q4H PRN    insulin aspart U-100, 0-5 Units, Subcutaneous, QID (AC + HS) PRN    melatonin, 6 mg, Oral, Nightly PRN    naloxone, 0.02 mg, Intravenous, PRN    ondansetron, 4 mg, Intravenous, Q6H PRN    oxybutynin, 5 mg, Oral, TID PRN    prochlorperazine, 5 mg, Intravenous, Q6H PRN    senna, 8.6 mg, Oral, Daily PRN    sodium chloride 0.9%, 250 mL, Intravenous, PRN    Pharmacy to dose Vancomycin consult, , , Once **AND** vancomycin - pharmacy to dose, , Intravenous, pharmacy to manage frequency    Laboratory (all labs reviewed):  CBC:  Recent Labs   Lab 04/05/25  0324 04/06/25  0317 04/07/25  0419 04/08/25  0243 04/08/25  1025 04/09/25  0341   WBC 17.18 H 15.49 H 18.35 H 11.26  --  13.60 H   HGB 8.6 L 8.2 L 8.3 L 6.1 L 7.2 L 6.8 L   HCT 26.7 L 25.8 L 25.8 L 19.7 LL  --  20.7 L   Platelet Count 109 L 118 L 141 L 134 L  --  151       CHEMISTRIES:  Recent Labs   Lab 01/16/25  0154 02/08/25  1014 02/11/25  1325 02/23/25  1638 03/21/25  9467  03/25/25  1108 04/03/25 2055 04/04/25 0247 04/04/25  1439 04/04/25 2001 04/04/25 2151 04/05/25  0324 04/05/25  1159 04/05/25  1426 04/06/25  0317 04/07/25  0419 04/08/25  0243 04/09/25  0341   Glucose 141 H 171 H 225 H 113 H 150 H  --   --   --   --   --   --   --   --   --   --   --   --   --    Sodium 140 138 138 141 141   < > 138 139   < >  --    < > 140  --  139 139 141 140 137   Potassium 4.0 4.5 4.4 5.1 4.4   < > 4.8 5.0   < >  --    < > 4.2  --  4.0 3.7 4.2 3.9 3.6   BUN 44 H 63 H 75 H 77 H 91 H   < > 70 H 75 H   < >  --    < > 58 H  --  49 H 59 H 77 H 70 H 38 H   Creatinine 10.0 H 8.6 H 8.9 H 9.8 H 10.9 H   < > 9.3 H 9.9 H   < >  --    < > 7.3 H  --  6.2 H 7.6 H 9.7 H 8.8 H 5.9 H   eGFR 5 A 5.5 A 5.3 A 4.7 A 4.1 A   < > 5 L 5 L   < >  --    < > 7 L  --  8 L 6 L 5 L 5 L 9 L   Calcium 8.9 10.2 10.1 10.9 H 10.4   < > 8.1 L 8.2 L   < >  --    < > 8.4 L  --  8.2 L 8.5 L 8.6 L 8.1 L 8.0 L   Magnesium   --   --   --   --   --    < > 2.2 2.3  --  2.2  --  2.1 2.0  --   --   --   --   --    Magnesium  --   --   --   --  2.5  --   --   --   --   --   --   --   --   --   --   --   --   --     < > = values in this interval not displayed.       CARDIAC BIOMARKERS:  Recent Labs   Lab 04/01/24 0251 04/29/24  2211 08/03/24  0015 08/05/24  0138 10/20/24  2310 10/21/24  0113 04/01/25  2220 04/02/25  0846 04/02/25  2055 04/03/25  0303 04/03/25  0718 04/03/25  1533 04/04/25  1028     --  183  --  111  --  382 H  --   --   --   --   --   --    Troponin I  --    < > 0.080 H   < > 0.044 H   < >  --   --   --   --   --   --   --    Troponin-I  --   --   --   --   --   --   --    < > 1.076 H 1.441 H 1.812 H 2.032 H 2.913 H    < > = values in this interval not displayed.       COAGS:  Recent Labs   Lab 11/28/23  2155 04/18/24  0609 01/12/25  1316 03/25/25  1108 04/03/25  1533   INR 1.1 1.0 1.0 1.0 1.1       LIPIDS/LFTS:  Recent Labs   Lab 08/11/24  0331 08/15/24  0203 04/04/25  0247 04/06/25  0317 04/07/25  0419  04/08/25  0243 04/09/25  0341   Cholesterol 110 L  --   --   --   --   --   --    Triglycerides 126  --   --   --   --   --   --    HDL 26 L  --   --   --   --   --   --    LDL Cholesterol 58.8 L  --   --   --   --   --   --    Non-HDL Cholesterol 84  --   --   --   --   --   --    AST  --    < > 23 25 20 16 15   ALT  --    < > 17 21 20 13 9 L    < > = values in this interval not displayed.       BNP:  Recent Labs   Lab 06/11/24  2239 08/03/24  0015 08/11/24  0332 01/12/25  0550 04/03/25  0303   BNP 1,110 H 624 H 2,178 H 2,043 H 2,988 H       TSH:  Recent Labs   Lab 11/27/23  0519 01/15/24  2135 07/09/24  1428 08/11/24  0332 04/02/25  0537   TSH 3.357 4.600 H 6.963 H 3.949 4.590 H       Free T4:  Recent Labs   Lab 01/15/24  2135 07/09/24  1428 04/02/25  0537   Free T4 0.81 1.01 1.19  1.19       Diagnostic Results:  ECG (personally reviewed and interpreted tracing(s)):  4/4/25 1325 SR 79, PRWP, lat ST abnl ?isch, similar to 3/25/25, lass prom than 4/2/25    Chest X-Ray (personally reviewed and interpreted image(s)): 4/4/25 NAD, aortic atherosclerosis    Echo: 4/4/25 (images prev personally reviewed and interpreted)    Left Ventricle: The left ventricle is normal in size. Normal wall thickness. Mild global hypokinesis present. There is mildly reduced systolic function with a visually estimated ejection fraction of 40 - 45%. Grade I diastolic dysfunction.    Right Ventricle: The right ventricle has moderate enlargement. Systolic function is mildly reduced.    Left Atrium: Mildly dilated Cannot exclude PFO (color Doppler with possible flow across IAS).    Aortic Valve: The aortic valve is a trileaflet valve. There is moderate aortic valve sclerosis. Mildly restricted motion. There is mild stenosis. Aortic valve area by VTI is 1.8 cm². Aortic valve peak velocity is 1.5 m/s. Mean gradient is 5 mmHg. The dimensionless index is 0.51. There is mild aortic regurgitation.    Mitral Valve: There is a 1.9x1.2cm large fixed  heterogeneous mass present on the posterior leaflet (new finding vs 9/2023 echo). There is moderate to severe regurgitation with an eccentric jet.  Cannot exclude severe MR with possible PMVL perforation in association with large vegetation.    Aorta: Aortic root is mildly to moderately dilated measuring 4.17 cm.    Pulmonary Artery: The estimated pulmonary artery systolic pressure is 42 mmHg.    PCI RCA 1/14/25:    The Ost RCA lesion was 70% stenosed with 0% stenosis post-intervention.    The Prox RCA lesion was 70% stenosed with 0% stenosis post-intervention.    The Mid RCA lesion was 99% stenosed with 0% stenosis post-intervention.    The ejection fraction was calculated to be 55%.    The pre-procedure left ventricular end diastolic pressure was 6.    The post-procedure left ventricular end diastolic pressure was 7.    Mid RCA lesion: A stent was successfully placed at 11 MAYRA for 15 sec.    Prox RCA lesion, Mid RCA lesion: A  at 12 MAYRA for 10 sec.    Ost RCA lesion, Prox RCA lesion: A stent was successfully placed.  Procedure performed:  Left heart catheterization  Coronary angiogram of the right coronary artery  Right radial artery access   Right superficial femoral artery access  CSI atherectomy of the right mid coronary artery  Laser atherectomy of the right mid coronary artery  PTCA of the right mid coronary artery  XU x3 megatron drug-eluting stent 3.5 X 16 in the mid RCA, 3.5 X 28 in the proximal RCA, 4.0 X 16 mm in the ostial RCA  Findings:  Severely calcified mid RCA stenosis unable to cross with PTCA balloons, treated initially with 0.9 laser atherectomy, followed by CSI atherectomy system with total of 5 runs (3 at low speed, 2 at high speed), pre-dilated using 2.0 compliant and 3.5 noncompliant balloon followed by 3.5 X 16 mm megatron stent.  Focal plaque rupture/erosion noted in the proximal and ostial RCA that were treated with  3.5 X 28 in the proximal RCA, 4.0 X 16 mm in the ostial RCA.  No  residual stenosis post intervention.  Patient had ALAN 3 flow at the end of the procedure  LVEDP 6 mm Hg.  EF of around 55%.  No gradient across the aortic valve.  Right SFA access.  High bifurcation.  Mild diffuse disease of the right SFA closed using Perclose closure device  Right radial artery with severe spasm and hence decision was made to switch to right groin access  Recommendations:  Continue dual antiplatelet therapy for at least 1 year.  Consider longer term anticoagulation based on risk factor profile after 1 year  High-intensity statin  Transfer back to telemetry unit    Cath/PCI LAD 1/13/25  Left Main Coronary Artery: The left main is a large caliber vessel arising normally from the left sinus of valsalva.  It bifurcates into the left anterior descending and left circumflex coronary artery.  It is free of evidence of obstructive coronary artery disease. ALAN III flow  Left Anterior Descending: The left anterior descending coronary artery is a large caliber vessel arising normally from the left main and extending to the apex.  It gives rise to small to moderate caliber diagonal arteries. Proximal LAD has a severe 80-90% calcified stenosis prior to an under expanded 2.5 mm stent in a 4.0 mm vessel. After the stent there is 40-50% stenosis. ALAN II flow  Left Circumflex: The left circumflex is a large caliber vessel arising normally from the left main coronary artery, it is non-dominant.  It gives rise to moderate caliber obtuse marginal branches. OM stent is patent. ALAN III flow  Right Coronary Artery: The right coronary artery is a large caliber vessel arising normally from the right sinus of valsalva.  It is dominant giving rise to a PDA and PLV branch. Mid RCA with a 95-99% calcified fibrotic stenosis. ALAN III flow  LVgram: LVEDP 33 mmHg  PCI procedure:  Heparin administered. ACT was closely monitored. Using a EBU 3.5 guider, this was used to cannulate the left system. Nuno blue PCI wire repshaped  outside the body. PCI wire was advanced into the distal LAD. Predilated with 2.0 mm NC, 2.5 mm NC, scoring balloon 2.5 mm, 3.0 mm and 3.5 mm NC. IVUS was advanced for vessel prep/size. Schockwave 3.0 x 12 mm advanced and multiple therapies given. Followed by a 3.5 mm NC. Advanced a 3.5 x 24 mm megatron XU and deployed at nominal pressure. Post dilated with a 4.0 mm NC with great results. After the balloon was removed.  The wire was left in place.  Repeat angiography showed no signs of dissection.  Subsequently the wire was pulled back.   Final angiography showed ALAN-3 flow.  No signs of dissection, perforation, or thrombus.  Assessment:   Successful PCI of LAD with 3.5 x 24 mm XU  mRCA 95-99% stenosis--> staged PCI of RCA  Patent Lcx stent  Plan:   - Stage PCI of RCA tomorrow PM- NPO at [facility]   - Patient tolerated procedure well. No immediate complications  - Continue DAPT  - EKG when arrives to floor  - Routine post cath protocol  - Dialysis in pm   - Maximize medical management  - Further care by the primary team    Cath/PCI OM2 12/4/23  1.  Selective left coronary Angiography.   2.  IVUS of Proximal OM2 coronary artery   3.  PCI of Proximal OM2 coronary artery with one 3.5 x 18 Michael stent   4.  Ultrasound guided right radial arterial access   5. Conscious sedation from 7:11 AM to 8:03 AM (52 minutes of sedation)   Findings:   - Coronary angiography data   Left main: Large caliber vessel, divides into the left anterior descending   and left circumflex. LM is non obstructive.   Left anterior descending: Large caliber vessel giving rise to 2 diagonals.   Patent mid LAD stents.   Left circumflex: Medium caliber vessel giving rise to 2 obtuse marginals.   OM2 has a proximal 99% ISR.   Right coronary artery: Not studied but known mid RCA lesion (see report   from last week)   - IVUS data of OM2    Pre PCI IVUS data:   Proximal reference luminal diameter: 3.6 mm   Distal reference luminal diameter: 3.4 mm   Percutaneous  Coronary Intervention   Successful PCI of Proximal OM2 coronary artery 99 % ISR stenosis was   reduced to 0 % (initial ALAN 3 flow) by pre dilatation with 3 x 15 mm   balloon followed by placement of 3.5 x 18 mm [place] stent with excellent   angiographic results with final ALAN 3 flow. IVUS guided.   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   -  Succesful PCI of Proximal OM2 coronary artery with one Michael stent  Recommendations:   - Post operative care as per protocol.   - Aspirin for life and P2Y12 inhibitor for at least a year   - Moderate to high statin therapy.   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  Cont abx.  Prognosis poor.    MRSA bacteremia  As above    Acute bacterial endocarditis  As above    NSVT (nonsustained ventricular tachycardia)  No further NSVT noted  BP too low for BBL  Consider amio as contingency    Coronary artery disease involving native coronary artery of native heart without angina pectoris  Elev trop c/w NSTEMI, likely type II, doubt ACS.  Known MV CAD/PCI (most recently in Jan 2025)  Cont ASA/Plavix/Statin  No plan for inpat isch eval    ESRD on hemodialysis  Now on CRRT  Mgmt per IM/neph    HTN (hypertension)  Med rx on hold    HLD (hyperlipidemia)  Cont statin      Type 2 diabetes mellitus with hyperglycemia, without long-term current use of insulin  Mgmt per IM        VTE Risk Mitigation (From admission, onward)           Ordered     heparin (porcine) injection 1,000 Units  As needed (PRN)         04/05/25 2100     IP VTE HIGH RISK PATIENT  Once         04/03/25 1516     Place TEMO hose  Until discontinued         04/03/25 1516     Place sequential compression device  Until discontinued         04/03/25 1516     Reason for No  Pharmacological VTE Prophylaxis  Once        Question:  Reasons:  Answer:  Physician Provided (leave comment)    04/03/25 1516                  Pt seen in ICU, critical care time 35min.  Cardiology will sign off, pls call with questions.    Apolinar Tyson MD  Cardiology  [facility] - Intensive Care     None known

## 2025-04-09 NOTE — PROGRESS NOTES
[facility] [place] [facility]  Urology  Progress Note    Patient Name: Jevon Rajan  MRN: [id number]  Admission Date: 4/3/2025  Hospital Length of Stay: 6 days  Code Status: Full Code   Attending Provider: Gonzalez Reagan MD   Primary Care Physician: Melia, Primary Doctor    Subjective:     HPI:  Urinary Retention  Patient complains of urinary retention. Onset of retention was 1 day ago and was gradual in onset. Patient currently does not have a urinary catheter in place.  300 ml of urine were scanned on bladder scanner Prior to this event voiding symptoms consisted of slow stream. Prior treatments include none. Recent medications that may have affected his voiding include none.   He still makes urine, he tells me an almost normal amount.  He is very uncomfortable at the level of the bladder.  Nursing staff attempted coude catheter was then successfully.  He agrees to allow me to place a catheter.    Interval History: complains of bladder pain.  CBI running without difficulty.    Review of Systems   Constitutional:  Negative for fever.   Respiratory:  Negative for chest tightness and wheezing.    Cardiovascular:  Negative for chest pain and palpitations.   Gastrointestinal:  Negative for abdominal pain, diarrhea, nausea and vomiting.   Genitourinary:  Positive for hematuria. Negative for difficulty urinating, dysuria and flank pain.   Musculoskeletal:  Negative for arthralgias.   Neurological:  Negative for dizziness.   Psychiatric/Behavioral:  Negative for confusion.      Objective:     Temp:  [97.6 °F (36.4 °C)-98.2 °F (36.8 °C)] 97.7 °F (36.5 °C)  Pulse:  [] 77  Resp:  [9-47] 10  SpO2:  [71 %-100 %] 96 %  BP: ()/(48-60) 99/54     Body mass index is 21.13 kg/m².      Bladder Scan Volume (mL): 331 mL (04/06/25 1520)    Drains       Drain  Duration                  NG/OG Tube 04/04/25 1415 Rolf 10 Fr. Left nostril 4 days    Trialysis (Dialysis) Catheter 04/04/25 1208 right internal jugular 4 days          "Urethral Catheter 04/07/25 1558 Triple-lumen;Latex 24 Fr. 1 day                     Physical Exam  Vitals and nursing note reviewed.   Constitutional:       Appearance: He is well-developed.   HENT:      Head: Normocephalic.   Eyes:      Conjunctiva/sclera: Conjunctivae normal.   Neck:      Thyroid: No thyromegaly.      Trachea: No tracheal deviation.   Cardiovascular:      Rate and Rhythm: Normal rate.      Heart sounds: Normal heart sounds.   Pulmonary:      Effort: Pulmonary effort is normal. No respiratory distress.      Breath sounds: Normal breath sounds. No wheezing.   Abdominal:      General: Bowel sounds are normal.      Palpations: Abdomen is soft.      Tenderness: There is no abdominal tenderness. There is no rebound.      Hernia: No hernia is present.   Genitourinary:     Comments: 24 Fr Sharma in place  CBI on minimal drip, light pink  Musculoskeletal:         General: No tenderness. Normal range of motion.      Cervical back: Normal range of motion and neck supple.   Lymphadenopathy:      Cervical: No cervical adenopathy.   Skin:     General: Skin is warm and dry.      Findings: No erythema or rash.   Neurological:      Mental Status: He is alert and oriented to person, place, and time.   Psychiatric:         Behavior: Behavior normal.         Thought Content: Thought content normal.         Judgment: Judgment normal.           Significant Labs:    BMP:  Recent Labs   Lab 04/07/25  0419 04/08/25  0243 04/09/25  0341    140 137   K 4.2 3.9 3.6    104 103   CO2 22* 26 24   BUN 77* 70* 38*   CREATININE 9.7* 8.8* 5.9*   GLUCOSE 238* 264* 261*   CALCIUM 8.6* 8.1* 8.0*       CBC:   Recent Labs   Lab 04/07/25  0419 04/08/25  0243 04/08/25  1025 04/09/25  0341   WBC 18.35* 11.26  --  13.60*   HGB 8.3* 6.1* 7.2* 6.8*   HCT 25.8* 19.7*  --  20.7*   * 134*  --  151       Blood Culture: No results for input(s): "LABBLOO" in the last 168 hours.  Urine Culture:   Recent Labs   Lab 04/02/25  0201 "   LABURIN No Significant Growth       Significant Imaging:                    Assessment/Plan:     Gross hematuria  Tolerating CBI  Wean towards off  D/C once urine is clear  Change oxybutynin to Levsin    Hold anticoagulation, if possible  following    BPH with urinary obstruction  Will need bedside cystoscopy          VTE Risk Mitigation (From admission, onward)           Ordered     heparin (porcine) injection 1,000 Units  As needed (PRN)         04/05/25 2100     IP VTE HIGH RISK PATIENT  Once         04/03/25 1516     Place TEMO hose  Until discontinued         04/03/25 1516     Place sequential compression device  Until discontinued         04/03/25 1516     Reason for No Pharmacological VTE Prophylaxis  Once        Question:  Reasons:  Answer:  Physician Provided (leave comment)    04/03/25 1516                    Agustin Kramer MD  Urology  [facility] - [place], [place] - Intensive Care

## 2025-04-09 NOTE — ASSESSMENT & PLAN NOTE
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 Most recent hemoglobin and hematocrit are listed below.  Recent Labs     04/07/25  0419 04/08/25  0243 04/08/25  1025 04/09/25  0341   HGB 8.3* 6.1* 7.2* 6.8*   HCT 25.8* 19.7*  --  20.7*     Plan  - Monitor serial CBC: Daily and recheck now  - Transfuse PRBC if patient becomes hemodynamically unstable, symptomatic or H/H drops below 7/21.  - Patient has not received any PRBC transfusions to date  - Patient's anemia is currently stable

## 2025-04-09 NOTE — ASSESSMENT & PLAN NOTE
"- ZHENGE AVF was actively bleeding on arrival on 4/3/25. Vascular surgery was consulted. He went emergently to the OR on 4/3/25: "Severely calcified mid RCA stenosis unable to cross with PTCA balloons, treated initially with 0.9 laser atherectomy, followed by CSI atherectomy system with total of 5 runs (3 at low speed, 2 at high speed), pre-dilated using 2.0 compliant and 3.5 noncompliant balloon followed by 3.5 X 16 mm megatron stent.  Focal plaque rupture/erosion noted in the proximal and ostial RCA that were treated with  3.5 X 28 in the proximal RCA, 4.0 X 16 mm in the ostial RCA.  No residual stenosis post intervention.  Patient had ALAN 3 flow at the end of the procedure"  - cultures from AVF= Staph   - wound care orders in place for surgical site  - trialysis currently in place for HD    " 1-year-old male presents to the ED with a left eyebrow laceration X this morning.  Patient was playing with his mom and hit the side of the windowsill.  No LOC reported, patient cried immediately.  Patient is at his baseline mental status.  Up-to-date with immunizations

## 2025-04-09 NOTE — ASSESSMENT & PLAN NOTE
The likely etiology of thrombocytopenia is infection and sepsis. The patients 3 most recent labs are listed below.  Recent Labs     04/07/25  0419 04/08/25  0243 04/09/25  0341   * 134* 151     Plan  - Will transfuse if platelet count is <10k.

## 2025-04-09 NOTE — PROGRESS NOTES
[place] - Intensive Care  Palliative Medicine  Progress Note    Patient Name: Jevon Rajan  MRN: [id number]  Admission Date: 4/3/2025  Hospital Length of Stay: 6 days  Code Status: Full Code   Attending Provider: Gonzalez Reagan MD  Consulting Provider: Lisa Jacinto NP  Primary Care Physician: Melia, Primary Doctor  Principal Problem:Septic shock    Patient information was obtained from patient, past medical records, ER records, and primary team.      Assessment/Plan:     Neuro  Acute metabolic encephalopathy  - Evaluation and management per primary team   - ongoing and worsening; recommend transition to room with window and delirium precautions   - pt requires frequent calming and reassuring of safety, forgets location of hospital and events leading to hospitalization  - when reoriented is able to have appropriate discussions, and is able to identify Neha garces by voice on the phone   - of note, on , pt did share with palliative team and niharsha Desjackte by phone that his sister Eileen visited him yesterday (Eileen has been  for several years)     Cardiac/Vascular  Acute bacterial endocarditis  - Mgmt per primary team/ID/cardiology; while he has a known valvular mass, he is being treated by antibiotics alone as he is not a surgical candidate. Prognosis is poor if cultures do not clear.   - Illness trajectory education provided     Renal/  BPH with urinary obstruction  - Mgmt per urology and primary; not able to take flomax due to encephalopathy and inability to give this via NG   - pt with ongoing episodes of pain/bladder pressure that is causing him to call out in pain and become emotionally distressed; adjustments for opioid regimen to dilaudid given ESRD in coordination with HM;  talking with niece by phone helped calm pt, niece to coordinate family to visit; worked with pt on calming exercises while awaiting for opioid admin to relieve pain   - discussion with urology and MDT for improved  symptom management    ESRD on hemodialysis  - Nephrology consulted and following; pt has had difficulty tolerating HD so far, family aware of this and with insight that this is a large factor in pt's prognosis   - family with insight that HD is a form of life support   - has temporary line in place though potentially may need line holiday due to persistent positive cultures (have encouraged discussion with nephrology, ID, and PCCM as initial line placement was very difficult and would require coordination of timing and replacement plan)     ID  * Septic shock  - Secondary to MRSA bacteremia from infected fistula   - ID following    AV fistula infection  - Abx, mgmt per primary team/vascular surgery/ID; s/p excision of infected graft     MRSA bacteremia  - Abx, mgmt per primary team and ID; per ID, prognosis is poor if cultures do not clear, given that he is not a surgical candidate.   - Illness trajectory education provided to family      Palliative Care  ACP (advance care planning)  4/9/2025  - interval chart reviewed; discussion pt with MDT during ICU rounds  - pt very uncomfortable/in pain throughout AM see BPH   - extensive time sent achieving pt comfort in AM; communicated with dez Solomon for medical update, along with Dr. Joan Antunez, palliative attending; also allowed Neha to talk with pt to reassure and calm   - family continues to have good insight into overall condition; hopeful for improvement but pt QOL and comfort continue to be a priority in GOC   - family continuing discussions regarding code status and overall GOC; understand HD tolerance is a big factor in prognosis currently and are open to discussions if team feels he is no longer tolerating HD; continued encouragement of shared family decision making as they all seem to have consistent goals for pt   - updated family information; pt has 3 sisters and 1 brother confirmed by Neha   - of concern, pt shared seeing/speaking with his   sister; Neha is aware that this is not typically a good indicator of prognosis which she shares is concerning for her   - reassured pt and Neha that we will continue to prioritize his comfort while continuing to try to improve overall condition   - pt is a [place], [facility] for [facility] and or senior living placement if pt were to no longer qualify for HD in the future   - emotional support provided to pt and family; answered all questions   - continued communication and coordination with MDT     2025  - interval chart reviewed; discussion pt with MDT during ICU rounds   - visited pt at bedside in AM with bedside RN and provided comfort and redirection during episode of delirium; pt is able to be calmed with reassurance; pt remains oriented only to self at this time; can verbalize needs such as when he feels the sensation to void but not oriented to having a catheter in place   - later in morning pt's sisters Ronna and Nora at bedside; brief medical update provided and reviewed pt's prognosis; Ronna and Nora shared that they thought pt previously had a DNR in place and/or had expressed wishes against resuscitation or aggressive end of life measures, but had wished to continue HD as long as tolerated; they also shared poor condition of pt's home and lack of home support that placement would be necessary in their opinion if pt's condition were to improve to allow for discharge  - later called pt's niece, Neha, who also later added her grandmother, pt's sister Bhavna, to the call; brief medical update provided; Neha and Bhavna were not aware of pt's prior wishes for DNR; discussed full code vs DNR, potential interventions, risks, and outcomes; Neha expressed considering DNR as long as full treatment and otherwise escalation of care would continue; Mrs. Huggins however expressed not wishing to be involved with that decision  for pt  - encouraged family discussion with pt's niece and sisters, Ronna, Nora, and Bhavna (if she wished to participate)   - emotional support provided to pt and family throughout all interactions   - allowed time for questions/concerns of family   - updated MDT of family discussions     4/7/25 (Dr. Joan Antunez)   - Chart and interval history reviewed; discussed pt during MDT rounds. Pt had cystoscopy and dunaway placement by urology yesterday due to screaming and discomfort from urinary retention; found to have very enlarged prostate. Dunaway placed successfully by urology staff, though this was later removed due to pt complaining of significant pain.   - Pt screaming throughout much of the morning, possibly from urinary retention vs delirium. He was not able to participate in discussion secondary to this.   - Along with Dr Jordan, called and spoke with Kenia (pt's preferred MPOA); she added her mother Erik to the call. Thorough medical update provided; shared transparent concern that pt's blood cultures are persistently positive. While hopeful that the infection starts to clear, there is a chance we may not be able to treat this infection, unfortunately. Additionally, pt has been very uncomfortable over the last 24 hours or so due to urinary retention and/or delirium. They expressed understanding of severity of both his acute and chronic illness, and the fragility that results naturally as a result of his advanced age.   - Discussed plan moving forward, as well as code status. At this time, plan remains to continue with maximal medical therapy in hopes of improvement, though they have agreed to further discuss code status as a family. At this time, would maintain full code.   - Support provided during call   - Encouraged visitation if possible, as perhaps this will be a calming presence for pt  - Will follow up with pt and family for further conversations as clinical course evolves    4/4/25  (Lisa Jacinto, NP)   - Consult received; interval chart reviewed in detail; discussed pt with MDT during ICU rounds and ongoing throughout the day   - met with patient at bedside; introduction to palliative medicine team and role in current care and admission   - pt very lethargic and required encouragement and stimulation for orientation assessment; pt answers to name, able to state his 1st name, identifies he is at the hospital, and when asked who team should call for help with his medical care he answered Kenia (which is consistent with what he told  4/3 as well); quickly falls back asleep   - pt unable to participate in detailed GOC/ACP discussion on his own behalf at this time   - called Jelanijackarchana (pt's niece) who shares that she has been pt's main caregiver for some time; she confirms pt is not , does not have children, and that he has 3 sisters; she also had pt's sister Bhavna join by phone for update/discussion   - Mrs. Huggins also shared that Kenia takes care of pt and understands his current history, needs, and wishes; per Bhavna, Kenia should be pt's medical decision maker while he lacks capacity, and confirms that pt's others sisters agree with this; they have asked that no other visitors or callers aside from Deshanta and pts sisters (BhavnaMisaela, and Nora receive updates)   - Kenia and Bhavna confirm that they feel pt would wish to continue HD  - reviewed pt's current full code status, potential interventions, risks, and outcomes; Fatimah agree for pt to remain full code, but were open to further discussion ideally when pt is able to participate in the discussion; updated pt's team of this and added this to EMR   - both shared difficulty pt has had recently and frustration with attempts to seek care to get answers to ongoing health concerns; they both shared appropriate concerns for pt's current condition and are hopeful for answers and improvement in pt's  "condition; they shared that they feel the  ICU team is moving quickly and working to improve his condition for which they are appreciative   - emotional support provided   - Allowed time for questions/concerns; all addressed; expressed availability of myself/palliative team for additional questions/concerns   - attempted to call pt's other sister, Ronna, as she had called unit for updates; unable to reach,    - updated MDT; ongoing communication and coordination with MDT         I will follow-up with patient. Please contact us if you have any additional questions.    Subjective:     Chief Complaint:   Chief Complaint   Patient presents with    Bleeding/Bruising       HPI:   From H&P: " Mr Jevon Rajan is a 85 y.o. man with ESRD with LUE AVF, HFpEF last EF 50-55%, CAD, DM who was transferred to [facility] ICU for septic shock due to MRSA bacteremia.      He was admitted at [facility] 4/1-3/2025 with sepsis, worsening to septic shock, then identified source as MRSA. 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      Upon arrival to [facility], he is moaning in pain and his LUE AVF has active pulsatile bright red blood. He does respond to his name. He denies pain anywhere other than his LUE. He is on levophed at 0.05. "     Palliative medicine consulted for goals of care discussion and advance care planning; for details of visit, see advance care planning section of plan.       Hospital Course:  No notes on file    Medications:  Continuous Infusions:  Scheduled Meds:   atorvastatin  80 mg Oral QHS    clopidogreL  75 mg Oral Daily    epoetin mj-epbx  10,000 Units Intravenous Every Mon, Wed, Fri    erythromycin   Both Eyes Q8H    insulin aspart U-100  6 Units Subcutaneous TIDWM    insulin glargine U-100  24 Units Subcutaneous Daily    midodrine  15 mg Oral Q8H    pantoprazole  40 mg Oral Daily    " sodium chloride 0.9%  10 mL Intravenous Q8H    tamsulosin  0.4 mg Oral Daily     PRN Meds:  Current Facility-Administered Medications:     0.9%  NaCl infusion (for blood administration), , Intravenous, Q24H PRN    0.9% NaCl, , Intravenous, PRN    acetaminophen, 650 mg, Oral, Q4H PRN    dextrose 50%, 12.5 g, Intravenous, PRN    dextrose 50%, 25 g, Intravenous, PRN    diphenhydrAMINE, 50 mg, Oral, Q6H PRN    glucagon (human recombinant), 1 mg, Intramuscular, PRN    glucose, 16 g, Oral, PRN    glucose, 24 g, Oral, PRN    heparin (porcine), 1,000 Units, Intravenous, PRN    HYDROmorphone, 1 mg, Intravenous, Q4H PRN    hyoscyamine, 0.125 mg, Sublingual, Q4H PRN    insulin aspart U-100, 0-5 Units, Subcutaneous, QID (AC + HS) PRN    melatonin, 6 mg, Oral, Nightly PRN    naloxone, 0.02 mg, Intravenous, PRN    ondansetron, 4 mg, Intravenous, Q6H PRN    prochlorperazine, 5 mg, Intravenous, Q6H PRN    senna, 8.6 mg, Oral, Daily PRN    sodium chloride 0.9%, 250 mL, Intravenous, PRN    Pharmacy to dose Vancomycin consult, , , Once **AND** vancomycin - pharmacy to dose, , Intravenous, pharmacy to manage frequency    Objective:     Vital Signs (Most Recent):  Temp: 98 °F (36.7 °C) (04/09/25 0701)  Pulse: 85 (04/09/25 1000)  Resp: (!) 21 (04/09/25 1034)  BP: (!) 102/53 (04/09/25 1000)  SpO2: 97 % (04/09/25 1000) Vital Signs (24h Range):  Temp:  [97.6 °F (36.4 °C)-98.2 °F (36.8 °C)] 98 °F (36.7 °C)  Pulse:  [] 85  Resp:  [9-47] 21  SpO2:  [71 %-100 %] 97 %  BP: ()/(48-60) 102/53     Weight: 61.2 kg (134 lb 14.7 oz)  Body mass index is 21.13 kg/m².     Physical Exam  Vitals and nursing note reviewed.   Constitutional:       Appearance: He is ill-appearing.      Comments: Arouses to name, able to answer simple questions with 1-2 word answers with stimulation, falls asleep quickly; varying levels of alertness and lethargy throughout the day; calling out in pain and confusion in AM but improved by later morning    HENT:       Head: Normocephalic and atraumatic.   Pulmonary:      Effort: Pulmonary effort is normal. No respiratory distress.      Comments: NC  Skin:     General: Skin is dry.      Coloration: Skin is pale.      Findings: Lesion present. Bruising: multiple skin lesions in various stages of healing. Erythema: pale/gray for ethnicity.  Neurological:      Mental Status: He is lethargic.      Comments: Answers to his name; less oriented overall, decreased awareness of location at time but able to reorient; oriented to niece by voice on phone A/P)     Psychiatric:         Behavior: Behavior is cooperative.          Advance Care Planning  Advance Directives:   Living Will: No    LaPOST: No    Do Not Resuscitate Status: No    Medical Power of : No      Decision Making:  Family answered questions and Patient answered questions  Goals of Care: What is most important right now is to focus on symptom/pain control, curative/life-prolongation (regardless of treatment burdens), improvement in condition but with limits to invasive therapies. Accordingly, we have decided that the best plan to meet the patient's goals includes continuing with treatment.       Significant Labs: All pertinent labs within the past 24 hours have been reviewed.  CBC:   Recent Labs   Lab 04/09/25  0341   WBC 13.60*   HGB 6.8*   HCT 20.7*   MCV 91        BMP:  Recent Labs   Lab 04/09/25  0341      K 3.6      CO2 24   BUN 38*   CREATININE 5.9*   CALCIUM 8.0*     LFT:  Lab Results   Component Value Date    AST 15 04/09/2025    ALKPHOS 79 04/09/2025    BILITOT 0.3 04/09/2025     Albumin:   Albumin   Date Value Ref Range Status   04/09/2025 2.3 (L) 3.5 - 5.2 g/dL Final   03/21/2025 3.2 (L) 3.5 - 5.2 g/dL Final     Protein:   Total Protein   Date Value Ref Range Status   03/21/2025 7.5 6.0 - 8.4 g/dL Final     Lactic acid:   Lab Results   Component Value Date    LACTATE 0.9 04/03/2025    LACTATE 2.6 (H) 04/02/2025     Significant Imaging: I  have reviewed all pertinent imaging results/findings within the past 24 hours.    Total visit time: 95 minutes    35 min visit time including: face to face time in discussion of symptom assessment, and exploring options and burdens of offered treatments.  This also includes non-face to face time preparing to see the patient including chart review, obtaining and/or reviewing separately obtained history, documenting clinical information in the electronic or other health record, independently interpreting results and communicating results to the patient/family/caregiver, family discussions by phone if not able to be present, coordination of care with other specialists, and discharge planning.     60 min ACP time spent: goals of care, advanced care planning, emotional support, formulating and communicating prognosis, exploring burden/ benefit of various approaches of treatment.       Lisa Jacinto, NP  Palliative Medicine  [facility] - [place] - Intensive Care

## 2025-04-09 NOTE — PROGRESS NOTES
[place] - Intensive Care  Wound Care  WOC ERI    Patient Name:  Jevon Rajan   MRN:  [id number]  Date: 4/9/2025  Diagnosis: Septic shock    History:     Past Medical History:   Diagnosis Date    BPH (benign prostatic hyperplasia)     Coronary artery disease     [facility] his care to this clinic. In 2014 he had stent to LAD. He states he has had 2 heart attacks. He does not get angina. There was 90% left anterior descending artery stenosis undergoing stenting. There was also a circumflex marginal branch stenosis of 70%.    Diabetes mellitus, type 2     ESRD (end stage renal disease) on dialysis     ESRD on HD TTS via left brachial AVF    Hypertension     Hypothyroidism, unspecified     NSTEMI (non-ST elevated myocardial infarction) 4/4/2025    Sepsis 4/2/2025    Symptomatic anemia 01/15/2024       Social History[1]    Precautions:     Allergies as of 04/03/2025 - Reviewed 04/03/2025   Allergen Reaction Noted    Ace inhibitors Hives, Itching, Shortness Of Breath, Other (See Comments), and Rash 12/04/2013    Captopril  08/12/2003       [facility] Assessment Details/Treatment     Active Problem List with Overview Notes    Diagnosis Date Noted    NSVT (nonsustained ventricular tachycardia) 04/08/2025    Thrombocytopenia 04/07/2025    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    Emphysema lung 04/07/2025    Pulmonary nodules 04/07/2025    Gross hematuria 04/07/2025    BPH with urinary obstruction 04/06/2025    ACP (advance care planning) 04/04/2025    Acute bacterial endocarditis 04/04/2025    MRSA bacteremia 04/04/2025    NSTEMI (non-ST elevated myocardial infarction) 04/04/2025    Septic shock 04/02/2025    Acute metabolic encephalopathy 04/02/2025    Senile debility 01/15/2025    Rash 08/10/2024    Dry eyes 08/10/2024    Inadequate dietary intake of protein 07/10/2024    DM2 (diabetes mellitus, type 2) 06/02/2024    Liver mass 02/26/2024    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   -LHC (12/30/14) nstemi: pLAD 90%, OM1 70% ISR, RCA li's, LVEDP 39   -resolute 3.0 x 18 XU to prox LAD post-dilated with 3.0 NC  -TTE (12/29/14): EF 55%, E/e; 9      HTN (hypertension) 12/29/2014     Dx updated per 2019 IMO Load      HLD (hyperlipidemia) 12/29/2014    Mohs defect of ala nasi 01/07/2014      Assessment:  Photodocumentation    Left upper arm- Site of removal of AVG. Scant amount of serosanguineous drainage on dressing removed. Developing buds of granulation along medial and lateral walls of wound.   Treatment/Plan:  Nursing to continue local wound care with Vashe moistened gauze to wound daily to cleanse and promote granulation.     04/09/2025       [1]   Social History  Socioeconomic History    Marital status: Single   Tobacco Use    Smoking status: Never    Smokeless tobacco: Never   Substance and Sexual Activity    Alcohol use: No    Drug use: No    Sexual activity: Not Currently     Social Drivers of Health     Financial Resource Strain: Low Risk  (4/5/2025)    Overall Financial Resource Strain (CARDIA)     Difficulty of Paying Living Expenses: Not very hard   Food Insecurity: No Food Insecurity (4/5/2025)    Hunger Vital Sign     Worried About Running Out of Food in the Last Year: Never true     Ran Out of Food in the Last Year: Never true   Transportation Needs: Patient Unable To Answer (4/3/2025)    PRAPARE - Transportation     Lack of Transportation (Medical): Patient unable to answer     Lack of  Transportation (Non-Medical): Patient unable to answer   Recent Concern: Transportation Needs - Unmet Transportation Needs (1/15/2025)    TRANSPORTATION NEEDS     Transportation : Yes, it has kept me from medical appointments or from getting my medications.   Physical Activity: Patient Unable To Answer (4/2/2025)    Exercise Vital Sign     Days of Exercise per Week: Patient unable to answer     Minutes of Exercise per Session: Patient unable to answer   Recent Concern: Physical Activity - Insufficiently Active (1/15/2025)    Exercise Vital Sign     Days of Exercise per Week: 5 days     Minutes of Exercise per Session: 20 min   Stress: No Stress Concern Present (4/5/2025)    [employer] - Occupational Stress Questionnaire     Feeling of Stress : Not at all   Housing Stability: Low Risk  (4/5/2025)    Housing Stability Vital Sign     Unable to Pay for Housing in the Last Year: No     Number of Times Moved in the Last Year: 0     Homeless in the Last Year: No

## 2025-04-09 NOTE — SUBJECTIVE & OBJECTIVE
Interval History: complains of bladder pain.  CBI running without difficulty.    Review of Systems   Constitutional:  Negative for fever.   Respiratory:  Negative for chest tightness and wheezing.    Cardiovascular:  Negative for chest pain and palpitations.   Gastrointestinal:  Negative for abdominal pain, diarrhea, nausea and vomiting.   Genitourinary:  Positive for hematuria. Negative for difficulty urinating, dysuria and flank pain.   Musculoskeletal:  Negative for arthralgias.   Neurological:  Negative for dizziness.   Psychiatric/Behavioral:  Negative for confusion.      Objective:     Temp:  [97.6 °F (36.4 °C)-98.2 °F (36.8 °C)] 97.7 °F (36.5 °C)  Pulse:  [] 77  Resp:  [9-47] 10  SpO2:  [71 %-100 %] 96 %  BP: ()/(48-60) 99/54     Body mass index is 21.13 kg/m².      Bladder Scan Volume (mL): 331 mL (04/06/25 1520)    Drains       Drain  Duration                  NG/OG Tube 04/04/25 1415 Rolf 10 Fr. Left nostril 4 days    Trialysis (Dialysis) Catheter 04/04/25 1208 right internal jugular 4 days         Urethral Catheter 04/07/25 1558 Triple-lumen;Latex 24 Fr. 1 day                     Physical Exam  Vitals and nursing note reviewed.   Constitutional:       Appearance: He is well-developed.   HENT:      Head: Normocephalic.   Eyes:      Conjunctiva/sclera: Conjunctivae normal.   Neck:      Thyroid: No thyromegaly.      Trachea: No tracheal deviation.   Cardiovascular:      Rate and Rhythm: Normal rate.      Heart sounds: Normal heart sounds.   Pulmonary:      Effort: Pulmonary effort is normal. No respiratory distress.      Breath sounds: Normal breath sounds. No wheezing.   Abdominal:      General: Bowel sounds are normal.      Palpations: Abdomen is soft.      Tenderness: There is no abdominal tenderness. There is no rebound.      Hernia: No hernia is present.   Genitourinary:     Comments: 24 Fr Sharma in place  CBI on minimal drip, light pink  Musculoskeletal:         General: No tenderness.  "Normal range of motion.      Cervical back: Normal range of motion and neck supple.   Lymphadenopathy:      Cervical: No cervical adenopathy.   Skin:     General: Skin is warm and dry.      Findings: No erythema or rash.   Neurological:      Mental Status: He is alert and oriented to person, place, and time.   Psychiatric:         Behavior: Behavior normal.         Thought Content: Thought content normal.         Judgment: Judgment normal.           Significant Labs:    BMP:  Recent Labs   Lab 04/07/25 0419 04/08/25  0243 04/09/25  0341    140 137   K 4.2 3.9 3.6    104 103   CO2 22* 26 24   BUN 77* 70* 38*   CREATININE 9.7* 8.8* 5.9*   GLUCOSE 238* 264* 261*   CALCIUM 8.6* 8.1* 8.0*       CBC:   Recent Labs   Lab 04/07/25 0419 04/08/25  0243 04/08/25  1025 04/09/25  0341   WBC 18.35* 11.26  --  13.60*   HGB 8.3* 6.1* 7.2* 6.8*   HCT 25.8* 19.7*  --  20.7*   * 134*  --  151       Blood Culture: No results for input(s): "LABBLOO" in the last 168 hours.  Urine Culture:   Recent Labs   Lab 04/02/25  2246   LABURIN No Significant Growth       Significant Imaging:                  "

## 2025-04-09 NOTE — ASSESSMENT & PLAN NOTE
4/8/2025  - interval chart reviewed; discussion pt with MDT during ICU rounds   - visited pt at bedside in AM with bedside RN and provided comfort and redirection during episode of delirium; pt is able to be calmed with reassurance; pt remains oriented only to self at this time; can verbalize needs such as when he feels the sensation to void but not oriented to having a catheter in place   - later in morning pt's sisters Ronna and Nora at bedside; brief medical update provided and reviewed pt's prognosis; Ronna and Nora shared that they thought pt previously had a DNR in place and/or had expressed wishes against resuscitation or aggressive end of life measures, but had wished to continue HD as long as tolerated; they also shared poor condition of pt's home and lack of home support that placement would be necessary in their opinion if pt's condition were to improve to allow for discharge  - later called pt's niece, Neha, who also later added her grandmother, pt's sister Bhavna, to the call; brief medical update provided; Neha and Bhavna were not aware of pt's prior wishes for DNR; discussed full code vs DNR, potential interventions, risks, and outcomes; Neha expressed considering DNR as long as full treatment and otherwise escalation of care would continue; Mrs. Huggins however expressed not wishing to be involved with that decision for pt  - encouraged family discussion with pt's niece and sisters, Ronna, Nora, and Bhavna (if she wished to participate)   - emotional support provided to pt and family throughout all interactions   - allowed time for questions/concerns of family   - updated MDT of family discussions     4/7/25 (Dr. Joan Antunez)   - Chart and interval history reviewed; discussed pt during MDT rounds. Pt had cystoscopy and sharma placement by urology yesterday due to screaming and discomfort from urinary retention; found to have very enlarged prostate. Sharma placed successfully  by urology staff, though this was later removed due to pt complaining of significant pain.   - Pt screaming throughout much of the morning, possibly from urinary retention vs delirium. He was not able to participate in discussion secondary to this.   - Along with Dr Jordna, called and spoke with Kenia (pt's preferred MPOA); she added her mother Erik to the call. Thorough medical update provided; shared transparent concern that pt's blood cultures are persistently positive. While hopeful that the infection starts to clear, there is a chance we may not be able to treat this infection, unfortunately. Additionally, pt has been very uncomfortable over the last 24 hours or so due to urinary retention and/or delirium. They expressed understanding of severity of both his acute and chronic illness, and the fragility that results naturally as a result of his advanced age.   - Discussed plan moving forward, as well as code status. At this time, plan remains to continue with maximal medical therapy in hopes of improvement, though they have agreed to further discuss code status as a family. At this time, would maintain full code.   - Support provided during call   - Encouraged visitation if possible, as perhaps this will be a calming presence for pt  - Will follow up with pt and family for further conversations as clinical course evolves    4/4/25 (Lisa Jacinto, CRIS)   - Consult received; interval chart reviewed in detail; discussed pt with MDT during ICU rounds and ongoing throughout the day   - met with patient at bedside; introduction to palliative medicine team and role in current care and admission   - pt very lethargic and required encouragement and stimulation for orientation assessment; pt answers to name, able to state his 1st name, identifies he is at the hospital, and when asked who team should call for help with his medical care he answered Kenia (which is consistent with what he told  4/3 as well);  quickly falls back asleep   - pt unable to participate in detailed GOC/ACP discussion on his own behalf at this time   - called Kenia (pt's niece) who shares that she has been pt's main caregiver for some time; she confirms pt is not , does not have children, and that he has 3 sisters; she also had pt's sister Bhavna join by phone for update/discussion   - Mrs. Huggins also shared that Kenia takes care of pt and understands his current history, needs, and wishes; per Bhavna, Kenia should be pt's medical decision maker while he lacks capacity, and confirms that pt's others sisters agree with this; they have asked that no other visitors or callers aside from Deshanta and pts sisters (Bhavna, Ronna, and Nora receive updates)   - Kenia and Bhavna confirm that they feel pt would wish to continue HD  - reviewed pt's current full code status, potential interventions, risks, and outcomes; Kenia and Bhavna agree for pt to remain full code, but were open to further discussion ideally when pt is able to participate in the discussion; updated pt's team of this and added this to EMR   - both shared difficulty pt has had recently and frustration with attempts to seek care to get answers to ongoing health concerns; they both shared appropriate concerns for pt's current condition and are hopeful for answers and improvement in pt's condition; they shared that they feel the [facility] ICU team is moving quickly and working to improve his condition for which they are appreciative   - emotional support provided   - Allowed time for questions/concerns; all addressed; expressed availability of myself/palliative team for additional questions/concerns   - attempted to call pt's other sister, Ronna, as she had called unit for updates; unable to reach,    - updated MDT; ongoing communication and coordination with MDT

## 2025-04-09 NOTE — ASSESSMENT & PLAN NOTE
- Evaluation and management per primary team   - ongoing and worsening; recommend transition to room with window and delirium precautions   - pt requires frequent calming and reassuring of safety, forgets location of hospital and events leading to hospitalization

## 2025-04-10 LAB
ABSOLUTE EOSINOPHIL (OHS): 0.49 K/UL
ABSOLUTE MONOCYTE (OHS): 1.06 K/UL (ref 0.3–1)
ABSOLUTE NEUTROPHIL COUNT (OHS): 13.77 K/UL (ref 1.8–7.7)
ALBUMIN SERPL BCP-MCNC: 2.3 G/DL (ref 3.5–5.2)
ALP SERPL-CCNC: 76 UNIT/L (ref 40–150)
ALT SERPL W/O P-5'-P-CCNC: 9 UNIT/L (ref 10–44)
ANION GAP (OHS): 13 MMOL/L (ref 8–16)
AST SERPL-CCNC: 15 UNIT/L (ref 11–45)
BASOPHILS # BLD AUTO: 0.06 K/UL
BASOPHILS NFR BLD AUTO: 0.4 %
BILIRUB SERPL-MCNC: 0.3 MG/DL (ref 0.1–1)
BUN SERPL-MCNC: 52 MG/DL (ref 8–23)
CALCIUM SERPL-MCNC: 8.6 MG/DL (ref 8.7–10.5)
CHLORIDE SERPL-SCNC: 103 MMOL/L (ref 95–110)
CO2 SERPL-SCNC: 22 MMOL/L (ref 23–29)
CREAT SERPL-MCNC: 7.8 MG/DL (ref 0.5–1.4)
ERYTHROCYTE [DISTWIDTH] IN BLOOD BY AUTOMATED COUNT: 17.1 % (ref 11.5–14.5)
GFR SERPLBLD CREATININE-BSD FMLA CKD-EPI: 6 ML/MIN/1.73/M2
GLUCOSE SERPL-MCNC: 251 MG/DL (ref 70–110)
HCT VFR BLD AUTO: 26.5 % (ref 40–54)
HGB BLD-MCNC: 8.5 GM/DL (ref 14–18)
IMM GRANULOCYTES # BLD AUTO: 0.38 K/UL (ref 0–0.04)
IMM GRANULOCYTES NFR BLD AUTO: 2.3 % (ref 0–0.5)
LYMPHOCYTES # BLD AUTO: 0.88 K/UL (ref 1–4.8)
MCH RBC QN AUTO: 29.2 PG (ref 27–31)
MCHC RBC AUTO-ENTMCNC: 32.1 G/DL (ref 32–36)
MCV RBC AUTO: 91 FL (ref 82–98)
NUCLEATED RBC (/100WBC) (OHS): 1 /100 WBC
PHOSPHATE SERPL-MCNC: 4.2 MG/DL (ref 2.7–4.5)
PLATELET # BLD AUTO: 161 K/UL (ref 150–450)
PMV BLD AUTO: 10.9 FL (ref 9.2–12.9)
POCT GLUCOSE: 198 MG/DL (ref 70–110)
POCT GLUCOSE: 247 MG/DL (ref 70–110)
POCT GLUCOSE: 247 MG/DL (ref 70–110)
POCT GLUCOSE: 262 MG/DL (ref 70–110)
POTASSIUM SERPL-SCNC: 3.7 MMOL/L (ref 3.5–5.1)
PROT SERPL-MCNC: 6.3 GM/DL (ref 6–8.4)
RBC # BLD AUTO: 2.91 M/UL (ref 4.6–6.2)
RELATIVE EOSINOPHIL (OHS): 2.9 %
RELATIVE LYMPHOCYTE (OHS): 5.3 % (ref 18–48)
RELATIVE MONOCYTE (OHS): 6.4 % (ref 4–15)
RELATIVE NEUTROPHIL (OHS): 82.7 % (ref 38–73)
SODIUM SERPL-SCNC: 138 MMOL/L (ref 136–145)
VANCOMYCIN SERPL-MCNC: 18.1 UG/ML (ref ?–80)
WBC # BLD AUTO: 16.64 K/UL (ref 3.9–12.7)

## 2025-04-10 PROCEDURE — 63600175 PHARM REV CODE 636 W HCPCS: Mod: JZ,TB | Performed by: INTERNAL MEDICINE

## 2025-04-10 PROCEDURE — 63600175 PHARM REV CODE 636 W HCPCS: Performed by: STUDENT IN AN ORGANIZED HEALTH CARE EDUCATION/TRAINING PROGRAM

## 2025-04-10 PROCEDURE — 99497 ADVNCD CARE PLAN 30 MIN: CPT | Mod: ,,, | Performed by: REGISTERED NURSE

## 2025-04-10 PROCEDURE — A4216 STERILE WATER/SALINE, 10 ML: HCPCS | Performed by: HOSPITALIST

## 2025-04-10 PROCEDURE — 80053 COMPREHEN METABOLIC PANEL: CPT | Performed by: HOSPITALIST

## 2025-04-10 PROCEDURE — 80202 ASSAY OF VANCOMYCIN: CPT | Performed by: STUDENT IN AN ORGANIZED HEALTH CARE EDUCATION/TRAINING PROGRAM

## 2025-04-10 PROCEDURE — 27000207 HC ISOLATION

## 2025-04-10 PROCEDURE — 84100 ASSAY OF PHOSPHORUS: CPT | Performed by: INTERNAL MEDICINE

## 2025-04-10 PROCEDURE — 99233 SBSQ HOSP IP/OBS HIGH 50: CPT | Mod: ,,, | Performed by: REGISTERED NURSE

## 2025-04-10 PROCEDURE — 99232 SBSQ HOSP IP/OBS MODERATE 35: CPT | Mod: ,,, | Performed by: UROLOGY

## 2025-04-10 PROCEDURE — 11000001 HC ACUTE MED/SURG PRIVATE ROOM

## 2025-04-10 PROCEDURE — 27000221 HC OXYGEN, UP TO 24 HOURS

## 2025-04-10 PROCEDURE — 63600175 PHARM REV CODE 636 W HCPCS: Performed by: REGISTERED NURSE

## 2025-04-10 PROCEDURE — 80100014 HC HEMODIALYSIS 1:1

## 2025-04-10 PROCEDURE — 85025 COMPLETE CBC W/AUTO DIFF WBC: CPT | Performed by: HOSPITALIST

## 2025-04-10 PROCEDURE — 25000003 PHARM REV CODE 250: Performed by: HOSPITALIST

## 2025-04-10 PROCEDURE — 94761 N-INVAS EAR/PLS OXIMETRY MLT: CPT

## 2025-04-10 PROCEDURE — 25000003 PHARM REV CODE 250: Performed by: STUDENT IN AN ORGANIZED HEALTH CARE EDUCATION/TRAINING PROGRAM

## 2025-04-10 PROCEDURE — 99232 SBSQ HOSP IP/OBS MODERATE 35: CPT | Mod: ,,, | Performed by: INTERNAL MEDICINE

## 2025-04-10 RX ORDER — INSULIN GLARGINE 100 [IU]/ML
20 INJECTION, SOLUTION SUBCUTANEOUS DAILY
Status: DISCONTINUED | OUTPATIENT
Start: 2025-04-10 | End: 2025-04-11

## 2025-04-10 RX ORDER — INSULIN ASPART 100 [IU]/ML
5 INJECTION, SOLUTION INTRAVENOUS; SUBCUTANEOUS
Status: DISCONTINUED | OUTPATIENT
Start: 2025-04-10 | End: 2025-04-11

## 2025-04-10 RX ADMIN — Medication 6 MG: at 09:04

## 2025-04-10 RX ADMIN — SODIUM CHLORIDE, PRESERVATIVE FREE 10 ML: 5 INJECTION INTRAVENOUS at 05:04

## 2025-04-10 RX ADMIN — INSULIN ASPART 1 UNITS: 100 INJECTION, SOLUTION INTRAVENOUS; SUBCUTANEOUS at 09:04

## 2025-04-10 RX ADMIN — INSULIN GLARGINE 20 UNITS: 100 INJECTION, SOLUTION SUBCUTANEOUS at 08:04

## 2025-04-10 RX ADMIN — MIDODRINE HYDROCHLORIDE 15 MG: 5 TABLET ORAL at 09:04

## 2025-04-10 RX ADMIN — INSULIN ASPART 5 UNITS: 100 INJECTION, SOLUTION INTRAVENOUS; SUBCUTANEOUS at 12:04

## 2025-04-10 RX ADMIN — ATORVASTATIN CALCIUM 80 MG: 40 TABLET, FILM COATED ORAL at 09:04

## 2025-04-10 RX ADMIN — CLOPIDOGREL BISULFATE 75 MG: 75 TABLET, FILM COATED ORAL at 08:04

## 2025-04-10 RX ADMIN — VANCOMYCIN HYDROCHLORIDE 500 MG: 500 INJECTION, POWDER, LYOPHILIZED, FOR SOLUTION INTRAVENOUS at 08:04

## 2025-04-10 RX ADMIN — INSULIN ASPART 2 UNITS: 100 INJECTION, SOLUTION INTRAVENOUS; SUBCUTANEOUS at 05:04

## 2025-04-10 RX ADMIN — ERYTHROMYCIN: 5 OINTMENT OPHTHALMIC at 01:04

## 2025-04-10 RX ADMIN — ERYTHROMYCIN: 5 OINTMENT OPHTHALMIC at 09:04

## 2025-04-10 RX ADMIN — INSULIN ASPART 6 UNITS: 100 INJECTION, SOLUTION INTRAVENOUS; SUBCUTANEOUS at 08:04

## 2025-04-10 RX ADMIN — HYDROMORPHONE HYDROCHLORIDE 1 MG: 1 INJECTION, SOLUTION INTRAMUSCULAR; INTRAVENOUS; SUBCUTANEOUS at 06:04

## 2025-04-10 RX ADMIN — INSULIN ASPART 5 UNITS: 100 INJECTION, SOLUTION INTRAVENOUS; SUBCUTANEOUS at 05:04

## 2025-04-10 RX ADMIN — SODIUM CHLORIDE, PRESERVATIVE FREE 10 ML: 5 INJECTION INTRAVENOUS at 01:04

## 2025-04-10 RX ADMIN — SODIUM CHLORIDE, PRESERVATIVE FREE 10 ML: 5 INJECTION INTRAVENOUS at 09:04

## 2025-04-10 RX ADMIN — INSULIN ASPART 3 UNITS: 100 INJECTION, SOLUTION INTRAVENOUS; SUBCUTANEOUS at 04:04

## 2025-04-10 RX ADMIN — HYDROMORPHONE HYDROCHLORIDE 1 MG: 1 INJECTION, SOLUTION INTRAMUSCULAR; INTRAVENOUS; SUBCUTANEOUS at 12:04

## 2025-04-10 RX ADMIN — MIDODRINE HYDROCHLORIDE 15 MG: 5 TABLET ORAL at 05:04

## 2025-04-10 RX ADMIN — ERYTHROMYCIN: 5 OINTMENT OPHTHALMIC at 05:04

## 2025-04-10 RX ADMIN — MIDODRINE HYDROCHLORIDE 15 MG: 5 TABLET ORAL at 01:04

## 2025-04-10 RX ADMIN — EPOETIN ALFA-EPBX 10000 UNITS: 10000 INJECTION, SOLUTION INTRAVENOUS; SUBCUTANEOUS at 08:04

## 2025-04-10 NOTE — ASSESSMENT & PLAN NOTE
The likely etiology of thrombocytopenia is infection and sepsis. The patients 3 most recent labs are listed below.  Recent Labs     04/09/25  0341 04/09/25 2039 04/10/25  0416    169 161     Plan  - Will transfuse if platelet count is <10k.

## 2025-04-10 NOTE — PROGRESS NOTES
"[facility] - Intensive Care  Nephrology  Progress Note    Patient Name: Jevon Rajan  MRN: [id number]  Admission Date: 4/3/2025  Hospital Length of Stay: 7 days  Attending Provider: Gonzalez Reagan MD   Primary Care Physician: Melia, Primary Doctor  Principal Problem:Septic shock    Subjective:     HPI: Mr. Rajan is an 86 yo male with HTN, T2DM, CAD, ESRD, and liver lesion who was transferred from [facility] for septic shock and impending AVG rupture. He initially presented to [facility] on 4/1 with temp 101.9 and BP 80/30s. He was transferred to our hospital on 4/3/25; it seems his AVG ruptured during transport/shortly after arrival. He emergently went to the OR yesterday with AVG extraction; infected hematoma was noted. Workup also concerning for elevated troponin and cardiac vegetations. He is no longer on pressors. Nephrology consulted for ESRD. Prior records obtained and reviewed. He receives HD TTS at [facility] under the c/o Dr. Colin. He last received HD outpatient on 4/1/25. HD was held at [facility] due to unstable vascular access. Family agreed to proceed with CRRT today.     Interval History: remains on CBI. No events overnight. Sister at bedside this morning. Patient reports to be doing okay; still complains of "needing to pee" but otherwise without complaint. Agreeable to HD today.       Review of patient's allergies indicates:   Allergen Reactions    Ace inhibitors Hives, Itching, Shortness Of Breath, Other (See Comments) and Rash     Is not sure which medication it was    Captopril      Current Facility-Administered Medications   Medication Frequency    0.9%  NaCl infusion (for blood administration) Q24H PRN    0.9% NaCl infusion PRN    acetaminophen tablet 650 mg Q4H PRN    albumin human 25% bottle 25 g Daily PRN    atorvastatin tablet 80 mg QHS    clopidogreL tablet 75 mg Daily    dextrose 50% injection 12.5 g PRN    dextrose 50% injection 25 g PRN    diphenhydrAMINE capsule 50 mg Q6H PRN    epoetin mj-epbx " injection 10,000 Units Every Tues, Thurs, Sat    erythromycin 5 mg/gram (0.5 %) ophthalmic ointment Q8H    glucagon (human recombinant) injection 1 mg PRN    glucose chewable tablet 16 g PRN    glucose chewable tablet 24 g PRN    heparin (porcine) injection 1,000 Units PRN    HYDROmorphone injection 1 mg Q4H PRN    hyoscyamine SL tablet 0.125 mg Q4H PRN    insulin aspart U-100 pen 0-5 Units QID (AC + HS) PRN    insulin aspart U-100 pen 5 Units TIDWM    insulin glargine U-100 (Lantus) pen 20 Units Daily    melatonin tablet 6 mg Nightly PRN    midodrine tablet 15 mg Q8H    naloxone 0.4 mg/mL injection 0.02 mg PRN    ondansetron injection 4 mg Q6H PRN    pantoprazole EC tablet 40 mg Daily    prochlorperazine injection Soln 5 mg Q6H PRN    senna tablet 8.6 mg Daily PRN    sodium chloride 0.9% bolus 250 mL 250 mL PRN    sodium chloride 0.9% flush 10 mL Q8H    tamsulosin 24 hr capsule 0.4 mg Daily    vancomycin (VANCOCIN) 500 mg in D5W 100 mL IVPB (MB+) Once    vancomycin - pharmacy to dose pharmacy to manage frequency       Objective:     Vital Signs (Most Recent):  Temp: 97.8 °F (36.6 °C) (04/10/25 1236)  Pulse: 88 (04/10/25 1236)  Resp: 16 (04/10/25 1236)  BP: 120/60 (04/10/25 1236)  SpO2: 98 % (04/10/25 1236) Vital Signs (24h Range):  Temp:  [97.8 °F (36.6 °C)-98.5 °F (36.9 °C)] 97.8 °F (36.6 °C)  Pulse:  [] 88  Resp:  [11-31] 16  SpO2:  [83 %-100 %] 98 %  BP: (103-135)/(53-70) 120/60     Weight: 61.2 kg (134 lb 14.7 oz) (04/04/25 1937)  Body mass index is 21.13 kg/m².  Body surface area is 1.7 meters squared.    I/O last 3 completed shifts:  In: 1535 [I.V.:10; NG/GT:1525]  Out: 1500 [Urine:1500]     Physical Exam  Vitals and nursing note reviewed.   Constitutional:       General: He is sleeping. He is not in acute distress.     Appearance: Normal appearance. He is well-developed.   HENT:      Head: Normocephalic and atraumatic.      Nose: Nose normal.      Mouth/Throat:      Mouth: Mucous membranes are moist.    Eyes:      Extraocular Movements: Extraocular movements intact.      Conjunctiva/sclera: Conjunctivae normal.   Cardiovascular:      Rate and Rhythm: Normal rate and regular rhythm.      Comments: RIJ temp HD catheter  Pulmonary:      Effort: Pulmonary effort is normal.      Breath sounds: Normal breath sounds. No wheezing or rales.   Abdominal:      General: There is no distension.      Palpations: Abdomen is soft.   Musculoskeletal:      Right lower leg: No edema.      Left lower leg: No edema.   Skin:     General: Skin is warm and dry.      Findings: Lesion (diffuse) present. No erythema or rash.   Neurological:      General: No focal deficit present.      Mental Status: He is easily aroused. Mental status is at baseline.   Psychiatric:         Mood and Affect: Mood normal.         Behavior: Behavior normal.          Significant Labs:  CBC:   Recent Labs   Lab 04/10/25  0416   WBC 16.64*   RBC 2.91*   HGB 8.5*   HCT 26.5*      MCV 91   MCH 29.2   MCHC 32.1     CMP:   Recent Labs   Lab 04/10/25  0416   CALCIUM 8.6*   ALBUMIN 2.3*      K 3.7   CO2 22*      BUN 52*   CREATININE 7.8*   ALKPHOS 76   ALT 9*   AST 15   BILITOT 0.3     All labs within the past 24 hours have been reviewed.     Significant Imaging:  Labs: Reviewed    Assessment/Plan:     ESRD  - receives HD TTS at [facility]; last received HD on 4/1  - s/p AVG resection 4/3; received CRRT from 4/4-4/5  - s/p HD on 4/8 x 3 hours; required midodrine and albumin  - planning for HD today x 3.5 hours. Will monitor BP closely. Albumin bolus PRN  - daily labs. Trend UOP    Bacteremia  - BCx from 4/8 NGTD  - ID following  - happy to coordinate a line free period if this becomes needed   - patient will need tunneled HD catheter placed prior to discharge     Secondary HPTH  - CCa and phos normal  - will trend     Anemia of CKD  - hgb 8.5; improved  - NAKIA with HD       Thank you for your consult. I will follow-up with patient. Please contact  us if you have any additional questions.    Юлия Davis MD  Nephrology  [facility] - Intensive Care

## 2025-04-10 NOTE — NURSING
[facility]  Nurses Note -- 4 Eyes      4/10/2025       Skin assessed on: Q Shift      [x] No Pressure Injuries Present    [x]Prevention Measures Documented    [] Yes LDA  for Pressure Injury Previously documented     [] Yes New Pressure Injury Discovered   [] LDA for New Pressure Injury Added      Attending RN:  Elaine ZARCO RN     Second RN:  CHARLENE Stewart

## 2025-04-10 NOTE — ASSESSMENT & PLAN NOTE
Patient with known CAD s/p stent placement, which is controlled Will continue ASA, Plavix, and Statin and monitor for S/Sx of angina/ACS. Continue to monitor on telemetry.   - last [facility] was 1/2025: Severely calcified mid RCA stenosis unable to cross with PTCA balloons, treated initially with 0.9 laser atherectomy, followed by CSI atherectomy system with total of 5 runs (3 at low speed, 2 at high speed), pre-dilated using 2.0 compliant and 3.5 noncompliant balloon followed by 3.5 X 16 mm megatron stent.  Focal plaque rupture/erosion noted in the proximal and ostial RCA that were treated with  3.5 X 28 in the proximal RCA, 4.0 X 16 mm in the ostial RCA.  No residual stenosis post intervention.  Patient had ALAN 3 flow at the end of the procedure  - with elevated troponin likely due to septic shock  Recent Labs   Lab 04/04/25  1028   TROPONINI 2.913*   - continue asa, plavix, statin  - Cardiology consulted  - no plans for ischemic evaluation at this time

## 2025-04-10 NOTE — ASSESSMENT & PLAN NOTE
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 Most recent hemoglobin and hematocrit are listed below.  Recent Labs     04/09/25  0341 04/09/25  2039 04/10/25  0416   HGB 6.8* 8.4* 8.5*   HCT 20.7* 26.2* 26.5*     Plan  - Monitor serial CBC: Daily and recheck now  - Transfuse PRBC if patient becomes hemodynamically unstable, symptomatic or H/H drops below 7/21.  - Patient has not received any PRBC transfusions to date  - Patient's anemia is currently stable

## 2025-04-10 NOTE — ASSESSMENT & PLAN NOTE
Patient's blood pressure range in the last 24 hours was: BP  Min: 102/53  Max: 135/64.The patient's inpatient anti-hypertensive regimen is listed below:  Current Antihypertensives       Plan  - admitted with shock  - now off vasopressors. Continue to hold BP Rx as BP is well controlled without it

## 2025-04-10 NOTE — PROGRESS NOTES
[facility]  Urology  Progress Note    Patient Name: Jevon Rajan  MRN: [id number]  Admission Date: 4/3/2025  Hospital Length of Stay: 7 days  Code Status: Full Code   Attending Provider: Gonzalez Reagan MD   Primary Care Physician: Melia, Primary Doctor    Subjective:     HPI:  Urinary Retention  Patient complains of urinary retention. Onset of retention was 1 day ago and was gradual in onset. Patient currently does not have a urinary catheter in place.  300 ml of urine were scanned on bladder scanner Prior to this event voiding symptoms consisted of slow stream. Prior treatments include none. Recent medications that may have affected his voiding include none.   He still makes urine, he tells me an almost normal amount.  He is very uncomfortable at the level of the bladder.  Nursing staff attempted coude catheter was then successfully.  He agrees to allow me to place a catheter.    Interval History: Sharma draining clear urine with CBI running. Pt calm currently, reportedly very agitated overnight.     Review of Systems   Constitutional:  Negative for fever.   Respiratory:  Negative for chest tightness and wheezing.    Cardiovascular:  Negative for chest pain and palpitations.   Gastrointestinal:  Negative for abdominal pain, diarrhea, nausea and vomiting.   Genitourinary:  Positive for hematuria. Negative for difficulty urinating, dysuria and flank pain.   Musculoskeletal:  Negative for arthralgias.   Neurological:  Negative for dizziness.   Psychiatric/Behavioral:  Negative for confusion.      Objective:     Temp:  [97.8 °F (36.6 °C)-98.5 °F (36.9 °C)] 98.1 °F (36.7 °C)  Pulse:  [] 90  Resp:  [11-31] 11  SpO2:  [83 %-100 %] 100 %  BP: (102-135)/(53-70) 127/63     Body mass index is 21.13 kg/m².    Date 04/10/25 0700 - 04/11/25 0659   Shift 9762-9814 1294-6058 0517-6244 24 Hour Total   INTAKE   NG/GT 80   80   Shift Total(mL/kg) 80(1.3)   80(1.3)   OUTPUT   Shift Total(mL/kg)       Weight (kg) 61.2  "61.2 61.2 61.2     Bladder Scan Volume (mL): 331 mL (04/06/25 1520)    Drains       Drain  Duration                  NG/OG Tube 04/04/25 1415 Rolf 10 Fr. Left nostril 5 days    Trialysis (Dialysis) Catheter 04/04/25 1208 right internal jugular 5 days         Urethral Catheter 04/07/25 1558 Triple-lumen;Latex 24 Fr. 2 days                     Physical Exam  Nursing note reviewed.   Constitutional:       General: He is not in acute distress.     Appearance: He is ill-appearing.   Pulmonary:      Effort: No respiratory distress.   Abdominal:      General: There is no distension.      Tenderness: There is no right CVA tenderness or left CVA tenderness.   Genitourinary:     Comments: Sharma - clear urine on moderate rate CBI  Skin:     General: Skin is warm and dry.   Neurological:      Mental Status: He is alert. He is disoriented.           Significant Labs:    BMP:  Recent Labs   Lab 04/08/25  0243 04/09/25  0341 04/10/25  0416    137 138   K 3.9 3.6 3.7    103 103   CO2 26 24 22*   BUN 70* 38* 52*   CREATININE 8.8* 5.9* 7.8*   GLUCOSE 264* 261* 251*   CALCIUM 8.1* 8.0* 8.6*       CBC:   Recent Labs   Lab 04/09/25  0341 04/09/25  2039 04/10/25  0416   WBC 13.60* 15.40* 16.64*   HGB 6.8* 8.4* 8.5*   HCT 20.7* 26.2* 26.5*    169 161       Blood Culture: No results for input(s): "LABBLOO" in the last 168 hours.  Urine Culture: No results for input(s): "LABURIN" in the last 168 hours.  Urine Studies: No results for input(s): "COLORU", "APPEARANCEUA", "PHUR", "SPECGRAV", "PROTEINUA", "GLUCUA", "KETONESU", "BILIRUBINUA", "OCCULTUA", "NITRITE", "UROBILINOGEN", "LEUKOCYTESUR", "RBCUA", "WBCUA", "BACTERIA", "SQUAMEPITHEL", "HYALINECASTS" in the last 168 hours.    Invalid input(s): "WRIGHTSUR"    Significant Imaging:  All pertinent imaging results/findings from the past 24 hours have been reviewed.                  Assessment/Plan:     Gross hematuria  Tolerating CBI  Wean CBI off - urine is clear  D/C once " urine is clear  Change oxybutynin to Levsin    Hold anticoagulation, if possible  following    BPH with urinary obstruction  Catheter in place, requiring cystoscopy to place          VTE Risk Mitigation (From admission, onward)           Ordered     heparin (porcine) injection 1,000 Units  As needed (PRN)         04/05/25 2100     IP VTE HIGH RISK PATIENT  Once         04/03/25 1516     Place TEMO hose  Until discontinued         04/03/25 1516     Place sequential compression device  Until discontinued         04/03/25 1516     Reason for No Pharmacological VTE Prophylaxis  Once        Question:  Reasons:  Answer:  Physician Provided (leave comment)    04/03/25 1516                    Diane Horn MD  Urology  [facility] - Intensive Care

## 2025-04-10 NOTE — NURSING
[facility]  Nurses Note -- 4 Eyes      4/10/2025       Skin assessed on: Q Shift      [x] No Pressure Injuries Present    [x]Prevention Measures Documented    [] Yes LDA  for Pressure Injury Previously documented     [] Yes New Pressure Injury Discovered   [] LDA for New Pressure Injury Added      Attending RN:  CHARLENE GALLO      Second RN:  CHARLENE MCWILLIAMS

## 2025-04-10 NOTE — SUBJECTIVE & OBJECTIVE
Interval History: Sharma draining clear urine with CBI running. Pt calm currently, reportedly very agitated overnight.     Review of Systems   Constitutional:  Negative for fever.   Respiratory:  Negative for chest tightness and wheezing.    Cardiovascular:  Negative for chest pain and palpitations.   Gastrointestinal:  Negative for abdominal pain, diarrhea, nausea and vomiting.   Genitourinary:  Positive for hematuria. Negative for difficulty urinating, dysuria and flank pain.   Musculoskeletal:  Negative for arthralgias.   Neurological:  Negative for dizziness.   Psychiatric/Behavioral:  Negative for confusion.      Objective:     Temp:  [97.8 °F (36.6 °C)-98.5 °F (36.9 °C)] 98.1 °F (36.7 °C)  Pulse:  [] 90  Resp:  [11-31] 11  SpO2:  [83 %-100 %] 100 %  BP: (102-135)/(53-70) 127/63     Body mass index is 21.13 kg/m².    Date 04/10/25 0700 - 04/11/25 0659   Shift 0529-8886 9052-9326 5888-1687 24 Hour Total   INTAKE   NG/GT 80   80   Shift Total(mL/kg) 80(1.3)   80(1.3)   OUTPUT   Shift Total(mL/kg)       Weight (kg) 61.2 61.2 61.2 61.2     Bladder Scan Volume (mL): 331 mL (04/06/25 1520)    Drains       Drain  Duration                  NG/OG Tube 04/04/25 1415 Rolf 10 Fr. Left nostril 5 days    Trialysis (Dialysis) Catheter 04/04/25 1208 right internal jugular 5 days         Urethral Catheter 04/07/25 1558 Triple-lumen;Latex 24 Fr. 2 days                     Physical Exam  Nursing note reviewed.   Constitutional:       General: He is not in acute distress.     Appearance: He is ill-appearing.   Pulmonary:      Effort: No respiratory distress.   Abdominal:      General: There is no distension.      Tenderness: There is no right CVA tenderness or left CVA tenderness.   Genitourinary:     Comments: Sharma - clear urine on moderate rate CBI  Skin:     General: Skin is warm and dry.   Neurological:      Mental Status: He is alert. He is disoriented.           Significant Labs:    BMP:  Recent Labs   Lab  "04/08/25  0243 04/09/25  0341 04/10/25  0416    137 138   K 3.9 3.6 3.7    103 103   CO2 26 24 22*   BUN 70* 38* 52*   CREATININE 8.8* 5.9* 7.8*   GLUCOSE 264* 261* 251*   CALCIUM 8.1* 8.0* 8.6*       CBC:   Recent Labs   Lab 04/09/25  0341 04/09/25  2039 04/10/25  0416   WBC 13.60* 15.40* 16.64*   HGB 6.8* 8.4* 8.5*   HCT 20.7* 26.2* 26.5*    169 161       Blood Culture: No results for input(s): "LABBLOO" in the last 168 hours.  Urine Culture: No results for input(s): "LABURIN" in the last 168 hours.  Urine Studies: No results for input(s): "COLORU", "APPEARANCEUA", "PHUR", "SPECGRAV", "PROTEINUA", "GLUCUA", "KETONESU", "BILIRUBINUA", "OCCULTUA", "NITRITE", "UROBILINOGEN", "LEUKOCYTESUR", "RBCUA", "WBCUA", "BACTERIA", "SQUAMEPITHEL", "HYALINECASTS" in the last 168 hours.    Invalid input(s): "WRIGHTSUR"    Significant Imaging:  All pertinent imaging results/findings from the past 24 hours have been reviewed.                "

## 2025-04-10 NOTE — PROGRESS NOTES
04/10/25 1554   Vital Signs   Temp 97.4 °F (36.3 °C)   Temp Source Oral   Pulse 92   Heart Rate Source Monitor   Resp (!) 21   SpO2 98 %   Pulse Oximetry Type Continuous   Flow (L/min) (Oxygen Therapy) 2   Device (Oxygen Therapy) nasal cannula   BP (!) 110/59   MAP (mmHg) 82   BP Location Right arm   BP Method cNIBP   Patient Position Lying   Trialysis (Dialysis) Catheter 04/04/25 1208 right internal jugular   Placement Date/Time: 04/04/25 1208   Present Prior to Hospital Arrival?: No  Hand Hygiene: Performed  Barrier Precautions: Performed  Skin Antisepsis: betadine  Location: right internal jugular  Catheter Size (Fr): 7 Fr  Insertion attempts (enter comm...   Line Necessity Review CRRT/HD   Site Assessment No drainage;No redness;No swelling;No warmth   Line Securement Device Secured with sutures   Dressing Type CHG impregnated dressing/sponge;Central line dressing;Transparent (Tegaderm)   Dressing Status Clean;Dry;Intact   Dressing Intervention Integrity maintained   Date on Dressing 04/05/25   Dressing Due to be Changed 04/12/25   Venous Patency/Care flushed w/o difficulty;blood return present;intermittent infusion cap changed;normal saline locked;deaccessed   Arterial Patency/Care flushed w/o difficulty;blood return present;intermittent infusion cap changed;normal saline locked;deaccessed   Waveform Not being transduced   Post-Hemodialysis Assessment   Blood Volume Processed (Liters) 77.7 L   Dialyzer Clearance Lightly streaked   Duration of Treatment 180 minutes   Additional Fluid Intake (mL) 500 mL   Total UF (mL) 2500 mL   Net Fluid Removal 2000   Patient Response to Treatment Tx completed, tolerated well, lines flushed and then packed with saline to filling volume with new end caps, no SOB or distress noted   Post-Treatment Weight 63.8 kg (140 lb 10.5 oz)   Treatment Weight Change -2.1

## 2025-04-10 NOTE — PROGRESS NOTES
Pharmacokinetic Assessment Follow Up: IV Vancomycin    Vancomycin serum concentration assessment(s):    The random level was drawn correctly and can be used to guide therapy at this time. The measurement is within the desired definitive target range of 15 to 20 mcg/mL.    Vancomycin Regimen Plan:    Give Vancomycin 500 mg x1 dose after HD today and obtain random level 4/12 at 0400    Drug levels (last 3 results):  Recent Labs   Lab Result Units 04/09/25  0341 04/10/25  0416   Vancomycin Random ug/ml 18.6 18.1       Pharmacy will continue to follow and monitor vancomycin.    Please contact pharmacy at extension [phone] for questions regarding this assessment.    Thank you for the consult,   Hesham Hernandez       Patient brief summary:  Jevon Rajan is a 85 y.o. male initiated on antimicrobial therapy with IV Vancomycin for treatment of bacteremia    Drug Allergies:   Review of patient's allergies indicates:   Allergen Reactions    Ace inhibitors Hives, Itching, Shortness Of Breath, Other (See Comments) and Rash     Is not sure which medication it was    Captopril        Actual Body Weight:   61.2 kg    Renal Function:   Estimated Creatinine Clearance: 5.8 mL/min (A) (based on SCr of 7.8 mg/dL (H)).,     Dialysis Method (if applicable):  intermittent HD    CBC (last 72 hours):  Recent Labs   Lab Result Units 04/08/25  0243 04/08/25  1025 04/09/25  0341 04/09/25  2039 04/10/25  0416   WBC K/uL 11.26  --  13.60* 15.40* 16.64*   HGB gm/dL 6.1* 7.2* 6.8* 8.4* 8.5*   HCT % 19.7*  --  20.7* 26.2* 26.5*   Platelet Count K/uL 134*  --  151 169 161   Lymph % % 5.7*  --  4.4* 4.0* 5.3*   Mono % % 6.6  --  6.7 6.8 6.4   Eos % % 3.9  --  3.3 3.2 2.9   Basophil % % 0.3  --  0.2 0.3 0.4       Metabolic Panel (last 72 hours):  Recent Labs   Lab Result Units 04/08/25  0243 04/09/25  0341 04/10/25  0416   Sodium mmol/L 140 137 138   Potassium mmol/L 3.9 3.6 3.7   Chloride mmol/L 104 103 103   CO2 mmol/L 26 24 22*   Glucose mg/dL  264* 261* 251*   BUN mg/dL 70* 38* 52*   Creatinine mg/dL 8.8* 5.9* 7.8*   Albumin g/dL 1.9* 2.3* 2.3*   Bilirubin Total mg/dL 0.2 0.3 0.3   ALP unit/L 67 79 76   AST unit/L 16 15 15   ALT unit/L 13 9* 9*   Phosphorus Level mg/dL 4.8* 2.1* 4.2       Vancomycin Administrations:  vancomycin given in the last 96 hours                     vancomycin (VANCOCIN) 500 mg in D5W 100 mL IVPB (MB+) (mg) 500 mg New Bag 04/07/25 2013                    Microbiologic Results:  Microbiology Results (last 7 days)       Procedure Component Value Units Date/Time    Blood culture [[id number]]  (Normal) Collected: 04/08/25 0401    Order Status: Completed Specimen: Blood Updated: 04/09/25 1801     Blood Culture No Growth After 36 Hours    Blood culture [[id number]]  (Normal) Collected: 04/08/25 0506    Order Status: Completed Specimen: Blood Updated: 04/09/25 1801     Blood Culture No Growth After 36 Hours    Blood culture [[id number]]  (Normal) Collected: 04/04/25 1044    Order Status: Completed Specimen: Blood Updated: 04/09/25 1100     Blood Culture No Growth After 5 Days    Blood culture [[id number]]  (Abnormal) Collected: 04/06/25 0555    Order Status: Completed Specimen: Blood Updated: 04/09/25 0720     Blood Culture Positive - Aerobic/Pediatric Bottle      Methicillin resistant Staphylococcus aureus     Comment: <null> has been updated to reportable.        GRAM STAIN Gram positive cocci in clusters resembling Staph     Comment: Aerobic Bottle Positive   This is an appended report. These results have been appended to a previously preliminary verified report.       Narrative:      For susceptibility refer to order [id number]  ID Consult Strongly Recommended    Blood culture [[id number]]  (Abnormal)  (Susceptibility) Collected: 04/06/25 0542    Order Status: Completed Specimen: Blood Updated: 04/09/25 0719     Blood Culture Positive - Aerobic/Pediatric Bottle      Methicillin resistant Staphylococcus aureus     Comment: <null>  has been updated to reportable.        GRAM STAIN Gram positive cocci in clusters resembling Staph     Comment: This is an appended report. These results have been appended to a previously preliminary verified report.       Narrative:      Aerobic Bottle Positive   ID Consult Strongly Recommended    Afb Culture Stain [[id number]] Collected: 04/03/25 1943    Order Status: Completed Specimen: Wound from Arm, Left Updated: 04/07/25 1637     ACID FAST STAIN  No acid fast bacilli seen    Afb Culture Stain [[id number]] Collected: 04/03/25 2011    Order Status: Completed Specimen: Wound from Arm, Left Updated: 04/07/25 1637     ACID FAST STAIN  No acid fast bacilli seen    Afb Culture Stain [[id number]] Collected: 04/03/25 1905    Order Status: Completed Specimen: Tissue from AV Fistula, Left Updated: 04/07/25 1623     ACID FAST STAIN  No acid fast bacilli seen    Afb Culture Stain [[id number]] Collected: 04/03/25 1954    Order Status: Completed Specimen: Wound from Arm, Left Updated: 04/07/25 1623     ACID FAST STAIN  No acid fast bacilli seen    Afb Culture Stain [[id number]] Collected: 04/03/25 1816    Order Status: Completed Specimen: Tissue from AV Fistula, Left Updated: 04/07/25 1623     ACID FAST STAIN  No acid fast bacilli seen    Afb Culture Stain [[id number]] Collected: 04/03/25 1921    Order Status: Completed Specimen: Tissue from hematoma Updated: 04/07/25 1623     ACID FAST STAIN  No acid fast bacilli seen    Afb Culture Stain [[id number]] Collected: 04/03/25 1934    Order Status: Completed Specimen: Tissue from AV Fistula, Left Updated: 04/07/25 1618     ACID FAST STAIN  No acid fast bacilli seen    Fungus culture [[id number]]  (Normal) Collected: 04/03/25 1943    Order Status: Completed Specimen: Wound from Arm, Left Updated: 04/07/25 0935     Fungal Culture Culture In Progress    Fungus culture [[id number]]  (Normal) Collected: 04/03/25 1954    Order Status: Completed Specimen: Wound from Arm, Left Updated:  04/07/25 0935     Fungal Culture Culture In Progress    AFB Culture & Smear [[id number]] Collected: 04/03/25 1816    Order Status: Completed Specimen: Tissue from AV Fistula, Left Updated: 04/07/25 0815     CULTURE, AFB  Culture In Progress    AFB Culture & Smear [[id number]] Collected: 04/03/25 1905    Order Status: Completed Specimen: Tissue from AV Fistula, Left Updated: 04/07/25 0815     CULTURE, AFB  Culture In Progress    AFB Culture & Smear [[id number]] Collected: 04/03/25 1921    Order Status: Completed Specimen: Tissue from hematoma Updated: 04/07/25 0815     CULTURE, AFB  Culture In Progress    AFB Culture & Smear [[id number]] Collected: 04/03/25 1934    Order Status: Completed Specimen: Tissue from AV Fistula, Left Updated: 04/07/25 0815     CULTURE, AFB  Culture In Progress    AFB Culture & Smear [[id number]] Collected: 04/03/25 1943    Order Status: Completed Specimen: Wound from Arm, Left Updated: 04/07/25 0815     CULTURE, AFB  Culture In Progress    AFB Culture & Smear [[id number]] Collected: 04/03/25 1954    Order Status: Completed Specimen: Wound from Arm, Left Updated: 04/07/25 0815     CULTURE, AFB  Culture In Progress    AFB Culture & Smear [[id number]] Collected: 04/03/25 2011    Order Status: Completed Specimen: Wound from Arm, Left Updated: 04/07/25 0815     CULTURE, AFB  Culture In Progress    Culture, Anaerobic [[id number]] Collected: 04/03/25 2011    Order Status: Completed Specimen: Wound from Arm, Left Updated: 04/07/25 0748     Anaerobe Culture No Anaerobes Isolated    Culture, Anaerobic [[id number]] Collected: 04/03/25 1954    Order Status: Completed Specimen: Wound from Arm, Left Updated: 04/07/25 0747     Anaerobe Culture No Anaerobes Isolated    Culture, Anaerobic [[id number]] Collected: 04/03/25 1943    Order Status: Completed Specimen: Wound from Arm, Left Updated: 04/07/25 0747     Anaerobe Culture No Anaerobes Isolated    Culture, Anaerobic [[id number]] Collected: 04/03/25 1934     Order Status: Completed Specimen: Tissue from AV Fistula, Left Updated: 04/07/25 0746     Anaerobe Culture No Anaerobes Isolated    Culture, Anaerobic [[id number]] Collected: 04/03/25 1921    Order Status: Completed Specimen: Tissue from hematoma Updated: 04/07/25 0746     Anaerobe Culture No Anaerobes Isolated    Culture, Anaerobic [[id number]] Collected: 04/03/25 1910    Order Status: Completed Specimen: Tissue from AV Fistula, Left Updated: 04/07/25 0745     Anaerobe Culture No Anaerobes Isolated    Culture, Anaerobic [[id number]] Collected: 04/03/25 1816    Order Status: Completed Specimen: Wound from Arm, Left Updated: 04/07/25 0744     Anaerobe Culture No Anaerobes Isolated    Blood culture [[id number]]  (Abnormal) Collected: 04/04/25 1343    Order Status: Completed Specimen: Blood Updated: 04/06/25 0657     Blood Culture Positive - Aerobic/Pediatric Bottle      Methicillin resistant Staphylococcus aureus     Comment: <null> has been updated to reportable.        GRAM STAIN Gram positive cocci in clusters resembling Staph     Comment: Aerobic Bottle Positive    This is an appended report. These results have been appended to a previously preliminary verified report.       Narrative:      For sensitivities, refer to order # [id number]      Aerobic culture [[id number]]  (Abnormal) Collected: 04/03/25 2011    Order Status: Completed Specimen: Wound from Arm, Left Updated: 04/06/25 0556     CULTURE, AEROBIC Few Methicillin resistant Staphylococcus aureus    Narrative:      For sensitivities, refer to order # [id number]    Aerobic culture [[id number]]  (Abnormal) Collected: 04/03/25 1954    Order Status: Completed Specimen: Wound from Arm, Left Updated: 04/06/25 0555     CULTURE, AEROBIC Many Methicillin resistant Staphylococcus aureus    Narrative:      For sensitivities, refer to order # [id number]    Aerobic culture [[id number]]  (Abnormal)  (Susceptibility) Collected: 04/03/25 1943    Order  Status: Completed Specimen: Wound from Arm, Left Updated: 04/06/25 0553     CULTURE, AEROBIC Many Methicillin resistant Staphylococcus aureus    Aerobic culture [[id number]]  (Abnormal)  (Susceptibility) Collected: 04/03/25 1926    Order Status: Completed Specimen: Wound from Arm, Left Updated: 04/06/25 0544     CULTURE, AEROBIC Many Methicillin resistant Staphylococcus aureus    Aerobic culture [[id number]] Collected: 04/03/25 1911    Order Status: Completed Specimen: Tissue from Arm, Left Updated: 04/06/25 0543     CULTURE, AEROBIC No Growth    Aerobic culture [[id number]] Collected: 04/03/25 1816    Order Status: Completed Specimen: Wound from Arm, Left Updated: 04/06/25 0543     CULTURE, AEROBIC No Growth    Gram stain [[id number]] Collected: 04/03/25 1923    Order Status: Completed Specimen: Tissue from Arm, Left Updated: 04/04/25 0837     GRAM STAIN Rare WBC seen      Few Gram positive cocci    Gram stain [[id number]] Collected: 04/03/25 1935    Order Status: Completed Specimen: Tissue from Arm, Left Updated: 04/04/25 0836     GRAM STAIN Rare WBC seen      No organisms seen    Gram stain [[id number]] Collected: 04/03/25 1914    Order Status: Completed Specimen: Wound from Arm, Left Updated: 04/04/25 0836     GRAM STAIN Rare WBC seen      No organisms seen    Gram stain [[id number]] Collected: 04/03/25 2011    Order Status: Completed Specimen: Wound from Arm, Left Updated: 04/04/25 0835     GRAM STAIN Rare WBC seen      No organisms seen    Gram stain [[id number]] Collected: 04/03/25 1821    Order Status: Completed Specimen: Wound from Arm, Left Updated: 04/04/25 0835     GRAM STAIN No WBCs      No organisms seen    Gram stain [[id number]] Collected: 04/03/25 1943    Order Status: Completed Specimen: Wound from Arm, Left Updated: 04/04/25 0834     GRAM STAIN Rare WBC seen      No organisms seen    Gram stain [[id number]] Collected: 04/03/25 1954    Order Status: Completed Specimen: Wound from Arm, Left Updated:  04/04/25 0833     GRAM STAIN Rare WBC seen      No organisms seen    Fungus culture [[id number]] Collected: 04/03/25 1934    Order Status: Sent Specimen: Tissue from AV Fistula, Left Updated: 04/03/25 2107    Fungus culture [[id number]] Collected: 04/03/25 1935    Order Status: Canceled Specimen: Tissue from Arm, Left Updated: 04/03/25 2107    Fungus culture [[id number]] Collected: 04/03/25 2011    Order Status: Sent Specimen: Wound from Arm, Left Updated: 04/03/25 2106    Fungus culture [[id number]] Collected: 04/03/25 1816    Order Status: Sent Specimen: Wound from Arm, Left Updated: 04/03/25 2106    Fungus culture [[id number]] Collected: 04/03/25 1904    Order Status: Sent Specimen: Tissue from AV Fistula, Left Updated: 04/03/25 2106    Fungus culture [[id number]] Collected: 04/03/25 1921    Order Status: Sent Specimen: Tissue from hematoma Updated: 04/03/25 2105

## 2025-04-10 NOTE — PLAN OF CARE
Problem: Adult Inpatient Plan of Care  Goal: Plan of Care Review  Outcome: Ongoing  Goal: Patient-Specific Goal (Individualized)  Outcome: Ongoing  Goal: Absence of Hospital-Acquired Illness or Injury  Outcome: Ongoing  Goal: Optimal Comfort and Wellbeing  Outcome: Ongoing  Goal: Readiness for Transition of Care  Outcome: Ongoing     Problem: Diabetes Comorbidity  Goal: Blood Glucose Level Within Targeted Range  Outcome: Ongoing     Problem: Sepsis/Septic Shock  Goal: Optimal Coping  Outcome: Ongoing  Goal: Absence of Bleeding  Outcome: Ongoing  Goal: Blood Glucose Level Within Targeted Range  Outcome: Ongoing  Goal: Absence of Infection Signs and Symptoms  Outcome: Ongoing  Goal: Optimal Nutrition Intake  Outcome: Ongoing     Problem: Infection  Goal: Absence of Infection Signs and Symptoms  Outcome: Ongoing     Problem: Fall Injury Risk  Goal: Absence of Fall and Fall-Related Injury  Outcome: Ongoing     Problem: Skin Injury Risk Increased  Goal: Skin Health and Integrity  Outcome: Ongoing     Problem: Wound  Goal: Optimal Coping  Outcome: Ongoing  Goal: Optimal Functional Ability  Outcome: Ongoing  Goal: Absence of Infection Signs and Symptoms  Outcome: Ongoing  Goal: Improved Oral Intake  Outcome: Ongoing  Goal: Optimal Pain Control and Function  Outcome: Ongoing  Goal: Skin Health and Integrity  Outcome: Ongoing  Goal: Optimal Wound Healing  Outcome: Ongoing     Problem: Coping Ineffective  Goal: Effective Coping  Outcome: Ongoing     Problem: Urinary Elimination Management  Goal: Effective Urinary Elimination/Continence  Outcome: Ongoing     Problem: Urinary Retention  Goal: Effective Urinary Elimination  Outcome: Ongoing

## 2025-04-10 NOTE — SUBJECTIVE & OBJECTIVE
"Interval History: remains on CBI. No events overnight. Sister at bedside this morning. Patient reports to be doing okay; still complains of "needing to pee" but otherwise without complaint. Agreeable to HD today.       Review of patient's allergies indicates:   Allergen Reactions    Ace inhibitors Hives, Itching, Shortness Of Breath, Other (See Comments) and Rash     Is not sure which medication it was    Captopril      Current Facility-Administered Medications   Medication Frequency    0.9%  NaCl infusion (for blood administration) Q24H PRN    0.9% NaCl infusion PRN    acetaminophen tablet 650 mg Q4H PRN    albumin human 25% bottle 25 g Daily PRN    atorvastatin tablet 80 mg QHS    clopidogreL tablet 75 mg Daily    dextrose 50% injection 12.5 g PRN    dextrose 50% injection 25 g PRN    diphenhydrAMINE capsule 50 mg Q6H PRN    epoetin mj-epbx injection 10,000 Units Every Tues, Thurs, Sat    erythromycin 5 mg/gram (0.5 %) ophthalmic ointment Q8H    glucagon (human recombinant) injection 1 mg PRN    glucose chewable tablet 16 g PRN    glucose chewable tablet 24 g PRN    heparin (porcine) injection 1,000 Units PRN    HYDROmorphone injection 1 mg Q4H PRN    hyoscyamine SL tablet 0.125 mg Q4H PRN    insulin aspart U-100 pen 0-5 Units QID (AC + HS) PRN    insulin aspart U-100 pen 5 Units TIDWM    insulin glargine U-100 (Lantus) pen 20 Units Daily    melatonin tablet 6 mg Nightly PRN    midodrine tablet 15 mg Q8H    naloxone 0.4 mg/mL injection 0.02 mg PRN    ondansetron injection 4 mg Q6H PRN    pantoprazole EC tablet 40 mg Daily    prochlorperazine injection Soln 5 mg Q6H PRN    senna tablet 8.6 mg Daily PRN    sodium chloride 0.9% bolus 250 mL 250 mL PRN    sodium chloride 0.9% flush 10 mL Q8H    tamsulosin 24 hr capsule 0.4 mg Daily    vancomycin (VANCOCIN) 500 mg in D5W 100 mL IVPB (MB+) Once    vancomycin - pharmacy to dose pharmacy to manage frequency       Objective:     Vital Signs (Most Recent):  Temp: 97.8 °F " (36.6 °C) (04/10/25 1236)  Pulse: 88 (04/10/25 1236)  Resp: 16 (04/10/25 1236)  BP: 120/60 (04/10/25 1236)  SpO2: 98 % (04/10/25 1236) Vital Signs (24h Range):  Temp:  [97.8 °F (36.6 °C)-98.5 °F (36.9 °C)] 97.8 °F (36.6 °C)  Pulse:  [] 88  Resp:  [11-31] 16  SpO2:  [83 %-100 %] 98 %  BP: (103-135)/(53-70) 120/60     Weight: 61.2 kg (134 lb 14.7 oz) (04/04/25 1937)  Body mass index is 21.13 kg/m².  Body surface area is 1.7 meters squared.    I/O last 3 completed shifts:  In: 1535 [I.V.:10; NG/GT:1525]  Out: 1500 [Urine:1500]     Physical Exam  Vitals and nursing note reviewed.   Constitutional:       General: He is sleeping. He is not in acute distress.     Appearance: Normal appearance. He is well-developed.   HENT:      Head: Normocephalic and atraumatic.      Nose: Nose normal.      Mouth/Throat:      Mouth: Mucous membranes are moist.   Eyes:      Extraocular Movements: Extraocular movements intact.      Conjunctiva/sclera: Conjunctivae normal.   Cardiovascular:      Rate and Rhythm: Normal rate and regular rhythm.      Comments: RIJ temp HD catheter  Pulmonary:      Effort: Pulmonary effort is normal.      Breath sounds: Normal breath sounds. No wheezing or rales.   Abdominal:      General: There is no distension.      Palpations: Abdomen is soft.   Musculoskeletal:      Right lower leg: No edema.      Left lower leg: No edema.   Skin:     General: Skin is warm and dry.      Findings: Lesion (diffuse) present. No erythema or rash.   Neurological:      General: No focal deficit present.      Mental Status: He is easily aroused. Mental status is at baseline.   Psychiatric:         Mood and Affect: Mood normal.         Behavior: Behavior normal.          Significant Labs:  CBC:   Recent Labs   Lab 04/10/25  0416   WBC 16.64*   RBC 2.91*   HGB 8.5*   HCT 26.5*      MCV 91   MCH 29.2   MCHC 32.1     CMP:   Recent Labs   Lab 04/10/25  0416   CALCIUM 8.6*   ALBUMIN 2.3*      K 3.7   CO2 22*       BUN 52*   CREATININE 7.8*   ALKPHOS 76   ALT 9*   AST 15   BILITOT 0.3     All labs within the past 24 hours have been reviewed.     Significant Imaging:  Labs: Reviewed

## 2025-04-10 NOTE — PROGRESS NOTES
"[facility]  [facility] Medicine  Progress Note    Patient Name: Jevon Rajan  MRN: [id number]  Patient Class: IP- Inpatient   Admission Date: 4/3/2025  Length of Stay: 7 days  Attending Physician: Gonzalez Reagan MD  Primary Care Provider: No, Primary Doctor        Subjective     Principal Problem:Septic shock        HPI:  Mr Jevon Rajan is a 85 y.o. man with ESRD with LUE AVF, HFpEF last EF 50-55%, CAD, DM who was transferred to [facility] ICU for septic shock due to MRSA bacteremia.     He was admitted at [facility] 4/1-3/2025 with sepsis, worsening to septic shock, then identified source as MRSA. 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     Upon arrival to [facility], he is moaning in pain and his LUE AVF has active pulsatile bright red blood. He does respond to his name. He denies pain anywhere other than his LUE. He is on levophed at 0.05.     Overview/Hospital Course:  Mr Jevon Rajan is a 85 y.o. man with ESRD on HD with LUE AVF that has been bleeding, CHF, CAD s/p recent stenting 1/2025 who was admitted with MRSA bacteremia and bleeding from his LUE AVF. Continued vancomycin; weaned off levophed. Vascular surgery emergently consulted; on 4/3/25 they took him to OR and noted: "well incorporated proximal and central graft without evidence of infection, resected graft and covered proximal and central remnants with multiple layers. Mid graft removed in entirety and sent for culture. Ruptured pseudoaneurysm with infected hematoma, graft completely obliterated at mid segment." Surgical cultures with Staph. Suspect AVF or chronic scratching of pruritic skin is the source of his infection. TTE showed endocarditis: EF 40-45%, G1DD, RV systolic dysfunction, large 1.9x1.2cm fixed mass on the posterior mitral valve leaflet with moder to severe regurgitation, cannot rule out posterior mitral " valve leaflet perforation. Discussed with cardiac surgery; he is high mortality risk, and surgery is not advised. ID following. Nephrology consulted; trialysis was placed for HD. Persistently positive for MRSA in blood cultures. He has had urinary retention; Urology consulted, performed bedside cystoscopy on 4/6/25 with large prostate noted. Noted to have clot retention and went urgently to OR with Urology on 4/7/25; asa and DVT ppx held.     WBC normalized. S/p clot removal with Urology, 1U PRBC ordered this morning with Hb 6.1. Soft BPs, will add midodrine for HD to step down to floor. Glucoses high, will increase insulin. Attempted to s/d but BP too low, may still need CRRT rather. 4/8 cx clear so far. Increasing insulin again. Increasing midodrine to 15 mg TID. Hb 6.8, 1U PRBC ordered.         Interval History:  NAEON.  No new issues.   CC- Fatigue  All questions answered and updates on care given.       ROS:  General: Negative for fevers   Cardiac: Negative for chest pain   Pulmonary: Negative for wheezing  GI: Negative for abdominal distention      Vitals:    04/10/25 0300 04/10/25 0400 04/10/25 0500 04/10/25 0600   BP: 124/63 (!) 119/59 (!) 116/56 (!) 119/59   Pulse: 103 98 93 (!) 116   Resp: 16 16 14 13   Temp:  98.5 °F (36.9 °C)     TempSrc:  Axillary     SpO2: (!) 94% 99% 99% (!) 83%   Weight:       Height:              Body mass index is 21.13 kg/m².      PHYSICAL EXAM:  GENERAL APPEARANCE: alert and cooperative.     HEAD: NC/AT  CARDIAC: There is no cyanosis or pallor.   LUNGS: No apparent wheezing or stridor.  ABDOMEN: Non-distended. No guarding.  PSYCHIATRIC: No tangential speech. No Hyperactive features.        Recent Results (from the past 24 hours)   POCT glucose    Collection Time: 04/09/25 11:43 AM   Result Value Ref Range    POCT Glucose 233 (H) 70 - 110 mg/dL   POCT glucose    Collection Time: 04/09/25  5:10 PM   Result Value Ref Range    POCT Glucose 104 70 - 110 mg/dL   CBC with Differential     Collection Time: 04/09/25  8:39 PM   Result Value Ref Range    WBC 15.40 (H) 3.90 - 12.70 K/uL    RBC 2.87 (L) 4.60 - 6.20 M/uL    HGB 8.4 (L) 14.0 - 18.0 gm/dL    HCT 26.2 (L) 40.0 - 54.0 %    MCV 91 82 - 98 fL    MCH 29.3 27.0 - 31.0 pg    MCHC 32.1 32.0 - 36.0 g/dL    RDW 17.0 (H) 11.5 - 14.5 %    Platelet Count 169 150 - 450 K/uL    MPV 11.0 9.2 - 12.9 fL    Nucleated RBC 1 (H) <=0 /100 WBC    Neut % 83.2 (H) 38 - 73 %    Lymph % 4.0 (L) 18 - 48 %    Mono % 6.8 4 - 15 %    Eos % 3.2 <=8 %    Basophil % 0.3 <=1.9 %    Imm Grans % 2.5 (H) 0.0 - 0.5 %    Neut # 12.82 (H) 1.8 - 7.7 K/uL    Lymph # 0.61 (L) 1 - 4.8 K/uL    Mono # 1.05 (H) 0.3 - 1 K/uL    Eos # 0.49 <=0.5 K/uL    Baso # 0.05 <=0.2 K/uL    Imm Grans # 0.38 (H) 0.00 - 0.04 K/uL   POCT glucose    Collection Time: 04/09/25  9:04 PM   Result Value Ref Range    POCT Glucose 147 (H) 70 - 110 mg/dL   Comprehensive Metabolic Panel    Collection Time: 04/10/25  4:16 AM   Result Value Ref Range    Sodium 138 136 - 145 mmol/L    Potassium 3.7 3.5 - 5.1 mmol/L    Chloride 103 95 - 110 mmol/L    CO2 22 (L) 23 - 29 mmol/L    Glucose 251 (H) 70 - 110 mg/dL    BUN 52 (H) 8 - 23 mg/dL    Creatinine 7.8 (H) 0.5 - 1.4 mg/dL    Calcium 8.6 (L) 8.7 - 10.5 mg/dL    Protein Total 6.3 6.0 - 8.4 gm/dL    Albumin 2.3 (L) 3.5 - 5.2 g/dL    Bilirubin Total 0.3 0.1 - 1.0 mg/dL    ALP 76 40 - 150 unit/L    AST 15 11 - 45 unit/L    ALT 9 (L) 10 - 44 unit/L    Anion Gap 13 8 - 16 mmol/L    eGFR 6 (L) >60 mL/min/1.73/m2   Vancomycin, Random    Collection Time: 04/10/25  4:16 AM   Result Value Ref Range    Vancomycin Random 18.1 Not established ug/ml   Phosphorus    Collection Time: 04/10/25  4:16 AM   Result Value Ref Range    Phosphorus Level 4.2 2.7 - 4.5 mg/dL   CBC with Differential    Collection Time: 04/10/25  4:16 AM   Result Value Ref Range    WBC 16.64 (H) 3.90 - 12.70 K/uL    RBC 2.91 (L) 4.60 - 6.20 M/uL    HGB 8.5 (L) 14.0 - 18.0 gm/dL    HCT 26.5 (L) 40.0 - 54.0 %     MCV 91 82 - 98 fL    MCH 29.2 27.0 - 31.0 pg    MCHC 32.1 32.0 - 36.0 g/dL    RDW 17.1 (H) 11.5 - 14.5 %    Platelet Count 161 150 - 450 K/uL    MPV 10.9 9.2 - 12.9 fL    Nucleated RBC 1 (H) <=0 /100 WBC    Neut % 82.7 (H) 38 - 73 %    Lymph % 5.3 (L) 18 - 48 %    Mono % 6.4 4 - 15 %    Eos % 2.9 <=8 %    Basophil % 0.4 <=1.9 %    Imm Grans % 2.3 (H) 0.0 - 0.5 %    Neut # 13.77 (H) 1.8 - 7.7 K/uL    Lymph # 0.88 (L) 1 - 4.8 K/uL    Mono # 1.06 (H) 0.3 - 1 K/uL    Eos # 0.49 <=0.5 K/uL    Baso # 0.06 <=0.2 K/uL    Imm Grans # 0.38 (H) 0.00 - 0.04 K/uL   POCT glucose    Collection Time: 04/10/25  4:17 AM   Result Value Ref Range    POCT Glucose 262 (H) 70 - 110 mg/dL       Microbiology Results (last 7 days)       Procedure Component Value Units Date/Time    Blood culture [[id number]]  (Normal) Collected: 04/08/25 0401    Order Status: Completed Specimen: Blood Updated: 04/10/25 0600     Blood Culture No Growth After 48 Hours    Blood culture [[id number]]  (Normal) Collected: 04/08/25 0506    Order Status: Completed Specimen: Blood Updated: 04/10/25 0600     Blood Culture No Growth After 48 Hours    Blood culture [[id number]]  (Normal) Collected: 04/04/25 1044    Order Status: Completed Specimen: Blood Updated: 04/09/25 1100     Blood Culture No Growth After 5 Days    Blood culture [[id number]]  (Abnormal) Collected: 04/06/25 0555    Order Status: Completed Specimen: Blood Updated: 04/09/25 0720     Blood Culture Positive - Aerobic/Pediatric Bottle      Methicillin resistant Staphylococcus aureus     Comment: <null> has been updated to reportable.        GRAM STAIN Gram positive cocci in clusters resembling Staph     Comment: Aerobic Bottle Positive   This is an appended report. These results have been appended to a previously preliminary verified report.       Narrative:      For susceptibility refer to order [id number]  ID Consult Strongly Recommended    Blood culture [[id number]]  (Abnormal)  (Susceptibility)  Collected: 04/06/25 0542    Order Status: Completed Specimen: Blood Updated: 04/09/25 0719     Blood Culture Positive - Aerobic/Pediatric Bottle      Methicillin resistant Staphylococcus aureus     Comment: <null> has been updated to reportable.        GRAM STAIN Gram positive cocci in clusters resembling Staph     Comment: This is an appended report. These results have been appended to a previously preliminary verified report.       Narrative:      Aerobic Bottle Positive   ID Consult Strongly Recommended    Afb Culture Stain [[id number]] Collected: 04/03/25 1943    Order Status: Completed Specimen: Wound from Arm, Left Updated: 04/07/25 1637     ACID FAST STAIN  No acid fast bacilli seen    Afb Culture Stain [[id number]] Collected: 04/03/25 2011    Order Status: Completed Specimen: Wound from Arm, Left Updated: 04/07/25 1637     ACID FAST STAIN  No acid fast bacilli seen    Afb Culture Stain [[id number]] Collected: 04/03/25 1905    Order Status: Completed Specimen: Tissue from AV Fistula, Left Updated: 04/07/25 1623     ACID FAST STAIN  No acid fast bacilli seen    Afb Culture Stain [[id number]] Collected: 04/03/25 1954    Order Status: Completed Specimen: Wound from Arm, Left Updated: 04/07/25 1623     ACID FAST STAIN  No acid fast bacilli seen    Afb Culture Stain [[id number]] Collected: 04/03/25 1816    Order Status: Completed Specimen: Tissue from AV Fistula, Left Updated: 04/07/25 1623     ACID FAST STAIN  No acid fast bacilli seen    Afb Culture Stain [[id number]] Collected: 04/03/25 1921    Order Status: Completed Specimen: Tissue from hematoma Updated: 04/07/25 1623     ACID FAST STAIN  No acid fast bacilli seen    Afb Culture Stain [[id number]] Collected: 04/03/25 1934    Order Status: Completed Specimen: Tissue from AV Fistula, Left Updated: 04/07/25 1618     ACID FAST STAIN  No acid fast bacilli seen    Fungus culture [[id number]]  (Normal) Collected: 04/03/25 1943    Order Status: Completed Specimen:  Wound from Arm, Left Updated: 04/07/25 0935     Fungal Culture Culture In Progress    Fungus culture [[id number]]  (Normal) Collected: 04/03/25 1954    Order Status: Completed Specimen: Wound from Arm, Left Updated: 04/07/25 0935     Fungal Culture Culture In Progress    AFB Culture & Smear [[id number]] Collected: 04/03/25 1816    Order Status: Completed Specimen: Tissue from AV Fistula, Left Updated: 04/07/25 0815     CULTURE, AFB  Culture In Progress    AFB Culture & Smear [[id number]] Collected: 04/03/25 1905    Order Status: Completed Specimen: Tissue from AV Fistula, Left Updated: 04/07/25 0815     CULTURE, AFB  Culture In Progress    AFB Culture & Smear [[id number]] Collected: 04/03/25 1921    Order Status: Completed Specimen: Tissue from hematoma Updated: 04/07/25 0815     CULTURE, AFB  Culture In Progress    AFB Culture & Smear [[id number]] Collected: 04/03/25 1934    Order Status: Completed Specimen: Tissue from AV Fistula, Left Updated: 04/07/25 0815     CULTURE, AFB  Culture In Progress    AFB Culture & Smear [[id number]] Collected: 04/03/25 1943    Order Status: Completed Specimen: Wound from Arm, Left Updated: 04/07/25 0815     CULTURE, AFB  Culture In Progress    AFB Culture & Smear [[id number]] Collected: 04/03/25 1954    Order Status: Completed Specimen: Wound from Arm, Left Updated: 04/07/25 0815     CULTURE, AFB  Culture In Progress    AFB Culture & Smear [[id number]] Collected: 04/03/25 2011    Order Status: Completed Specimen: Wound from Arm, Left Updated: 04/07/25 0815     CULTURE, AFB  Culture In Progress    Culture, Anaerobic [[id number]] Collected: 04/03/25 2011    Order Status: Completed Specimen: Wound from Arm, Left Updated: 04/07/25 0748     Anaerobe Culture No Anaerobes Isolated    Culture, Anaerobic [[id number]] Collected: 04/03/25 1954    Order Status: Completed Specimen: Wound from Arm, Left Updated: 04/07/25 0747     Anaerobe Culture No Anaerobes Isolated    Culture, Anaerobic  [[id number]] Collected: 04/03/25 1943    Order Status: Completed Specimen: Wound from Arm, Left Updated: 04/07/25 0747     Anaerobe Culture No Anaerobes Isolated    Culture, Anaerobic [[id number]] Collected: 04/03/25 1934    Order Status: Completed Specimen: Tissue from AV Fistula, Left Updated: 04/07/25 0746     Anaerobe Culture No Anaerobes Isolated    Culture, Anaerobic [[id number]] Collected: 04/03/25 1921    Order Status: Completed Specimen: Tissue from hematoma Updated: 04/07/25 0746     Anaerobe Culture No Anaerobes Isolated    Culture, Anaerobic [[id number]] Collected: 04/03/25 1910    Order Status: Completed Specimen: Tissue from AV Fistula, Left Updated: 04/07/25 0745     Anaerobe Culture No Anaerobes Isolated    Culture, Anaerobic [[id number]] Collected: 04/03/25 1816    Order Status: Completed Specimen: Wound from Arm, Left Updated: 04/07/25 0744     Anaerobe Culture No Anaerobes Isolated    Blood culture [[id number]]  (Abnormal) Collected: 04/04/25 1343    Order Status: Completed Specimen: Blood Updated: 04/06/25 0657     Blood Culture Positive - Aerobic/Pediatric Bottle      Methicillin resistant Staphylococcus aureus     Comment: <null> has been updated to reportable.        GRAM STAIN Gram positive cocci in clusters resembling Staph     Comment: Aerobic Bottle Positive    This is an appended report. These results have been appended to a previously preliminary verified report.       Narrative:      For sensitivities, refer to order # [id number]      Aerobic culture [[id number]]  (Abnormal) Collected: 04/03/25 2011    Order Status: Completed Specimen: Wound from Arm, Left Updated: 04/06/25 0556     CULTURE, AEROBIC Few Methicillin resistant Staphylococcus aureus    Narrative:      For sensitivities, refer to order # [id number]    Aerobic culture [[id number]]  (Abnormal) Collected: 04/03/25 1954    Order Status: Completed Specimen: Wound from Arm, Left Updated: 04/06/25 0555     CULTURE,  AEROBIC Many Methicillin resistant Staphylococcus aureus    Narrative:      For sensitivities, refer to order # [id number]    Aerobic culture [[id number]]  (Abnormal)  (Susceptibility) Collected: 04/03/25 1943    Order Status: Completed Specimen: Wound from Arm, Left Updated: 04/06/25 0553     CULTURE, AEROBIC Many Methicillin resistant Staphylococcus aureus    Aerobic culture [[id number]]  (Abnormal)  (Susceptibility) Collected: 04/03/25 1926    Order Status: Completed Specimen: Wound from Arm, Left Updated: 04/06/25 0544     CULTURE, AEROBIC Many Methicillin resistant Staphylococcus aureus    Aerobic culture [[id number]] Collected: 04/03/25 1911    Order Status: Completed Specimen: Tissue from Arm, Left Updated: 04/06/25 0543     CULTURE, AEROBIC No Growth    Aerobic culture [[id number]] Collected: 04/03/25 1816    Order Status: Completed Specimen: Wound from Arm, Left Updated: 04/06/25 0543     CULTURE, AEROBIC No Growth    Gram stain [[id number]] Collected: 04/03/25 1923    Order Status: Completed Specimen: Tissue from Arm, Left Updated: 04/04/25 0837     GRAM STAIN Rare WBC seen      Few Gram positive cocci    Gram stain [[id number]] Collected: 04/03/25 1935    Order Status: Completed Specimen: Tissue from Arm, Left Updated: 04/04/25 0836     GRAM STAIN Rare WBC seen      No organisms seen    Gram stain [[id number]] Collected: 04/03/25 1914    Order Status: Completed Specimen: Wound from Arm, Left Updated: 04/04/25 0836     GRAM STAIN Rare WBC seen      No organisms seen    Gram stain [[id number]] Collected: 04/03/25 2011    Order Status: Completed Specimen: Wound from Arm, Left Updated: 04/04/25 0835     GRAM STAIN Rare WBC seen      No organisms seen    Gram stain [[id number]] Collected: 04/03/25 1821    Order Status: Completed Specimen: Wound from Arm, Left Updated: 04/04/25 0835     GRAM STAIN No WBCs      No organisms seen    Gram stain [[id number]] Collected: 04/03/25 1943    Order Status: Completed  Specimen: Wound from Arm, Left Updated: 04/04/25 0834     GRAM STAIN Rare WBC seen      No organisms seen    Gram stain [[id number]] Collected: 04/03/25 1954    Order Status: Completed Specimen: Wound from Arm, Left Updated: 04/04/25 0833     GRAM STAIN Rare WBC seen      No organisms seen    Fungus culture [[id number]] Collected: 04/03/25 1934    Order Status: Sent Specimen: Tissue from AV Fistula, Left Updated: 04/03/25 2107    Fungus culture [[id number]] Collected: 04/03/25 1935    Order Status: Canceled Specimen: Tissue from Arm, Left Updated: 04/03/25 2107    Fungus culture [[id number]] Collected: 04/03/25 2011    Order Status: Sent Specimen: Wound from Arm, Left Updated: 04/03/25 2106    Fungus culture [[id number]] Collected: 04/03/25 1816    Order Status: Sent Specimen: Wound from Arm, Left Updated: 04/03/25 2106    Fungus culture [[id number]] Collected: 04/03/25 1904    Order Status: Sent Specimen: Tissue from AV Fistula, Left Updated: 04/03/25 2106    Fungus culture [[id number]] Collected: 04/03/25 1921    Order Status: Sent Specimen: Tissue from hematoma Updated: 04/03/25 2105                          Assessment & Plan  Septic shock  This patient has shock. The type of shock is distributive due to sepsis. The patient had the following evidence of shock: persistent hypotension and altered mental status. 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 Shock is now resolved and vasopressors are off. WBC worsening  HTN (hypertension)  Patient's blood pressure range in the last 24 hours was: BP  Min: 102/53  Max: 135/64.The patient's inpatient anti-hypertensive regimen is listed below:  Current Antihypertensives       Plan  - admitted with shock  - now off  "vasopressors. Continue to hold BP Rx as BP is well controlled without it   HLD (hyperlipidemia)  - continue statin  Type 2 diabetes mellitus with hyperglycemia, without long-term current use of insulin  A1c:   Lab Results   Component Value Date    HGBA1C 5.7 (H) 04/02/2025     Meds: lantus + SSI PRN to maintain goal 140-180  Tube feeds  accuchecks, hypoglycemic protocol      Coronary artery disease involving native coronary artery of native heart without angina pectoris  Patient with known CAD s/p stent placement, which is controlled Will continue ASA, Plavix, and Statin and monitor for S/Sx of angina/ACS. Continue to monitor on telemetry.   - last [facility] was 1/2025: Severely calcified mid RCA stenosis unable to cross with PTCA balloons, treated initially with 0.9 laser atherectomy, followed by CSI atherectomy system with total of 5 runs (3 at low speed, 2 at high speed), pre-dilated using 2.0 compliant and 3.5 noncompliant balloon followed by 3.5 X 16 mm megatron stent.  Focal plaque rupture/erosion noted in the proximal and ostial RCA that were treated with  3.5 X 28 in the proximal RCA, 4.0 X 16 mm in the ostial RCA.  No residual stenosis post intervention.  Patient had ALAN 3 flow at the end of the procedure  - with elevated troponin likely due to septic shock  Recent Labs   Lab 04/04/25  1028   TROPONINI 2.913*   - continue asa, plavix, statin  - Cardiology consulted  - no plans for ischemic evaluation at this time   ESRD on hemodialysis  - ESRD on HD via LUE AVF  - LUE AVF was actively bleeding on arrival on 4/3/25. Vascular surgery was consulted. He went emergently to the OR on 4/3/25: "Severely calcified mid RCA stenosis unable to cross with PTCA balloons, treated initially with 0.9 laser atherectomy, followed by CSI atherectomy system with total of 5 runs (3 at low speed, 2 at high speed), pre-dilated using 2.0 compliant and 3.5 noncompliant balloon followed by 3.5 X 16 mm megatron stent.  Focal plaque " "rupture/erosion noted in the proximal and ostial RCA that were treated with  3.5 X 28 in the proximal RCA, 4.0 X 16 mm in the ostial RCA.  No residual stenosis post intervention.  Patient had ALAN 3 flow at the end of the procedure"  - cultures from AVF= Staph   - wound care orders in place for surgical site  - Nephrology is consulted  - trialysis placed on 4/4/25 for CRRT  - he will need THDC when bacteremia is resolved   Anemia in ESRD (end-stage renal disease)  Anemia is likely due to  ESRD, blood loss . Most recent hemoglobin and hematocrit are listed below.  Recent Labs     04/09/25  0341 04/09/25  2039 04/10/25  0416   HGB 6.8* 8.4* 8.5*   HCT 20.7* 26.2* 26.5*     Plan  - Monitor serial CBC: Daily and recheck now  - Transfuse PRBC if patient becomes hemodynamically unstable, symptomatic or H/H drops below 7/21.  - Patient has not received any PRBC transfusions to date  - Patient's anemia is currently stable  Acute metabolic encephalopathy  - he is oriented to self but otherwise confused  - CTH 4/1/25 with no acute process  - suspect this is due to sepsis  - NGT placed on 4/4/25 so that he could get his asa/plavix  - swallow- continue puree. Continue NGT until definitely improving and swallowing Rx     ACP (advance care planning)  Advance Care Planning     Date: 04/04/2025  - palliative consulted   - Mr Escoto specifically told Dr Jordan to contact Kenia Smyth for all updates  - discussed code status with Kenia. She states she has spoken with Mr Escoto's sisters and they all want full code status.          Acute bacterial endocarditis  - TTE 4/4/25: "1.9x1.2cm large fixed heterogeneous mass present on the posterior leaflet (new finding vs 9/2023 echo). There is moderate to severe regurgitation with an eccentric jet. Cannot exclude severe MR with possible PMVL perforation in association with large vegetation."  - this is in the setting of MRSA bacteremia  - ID is consulted  - repeat blood " "cultures until clear   - suspect source is skin/chronic scratching vs AVF infection- cultures from resected AVF with Staph   - discussed with Cardiac surgery Dr Castro 4/4/25- given age and comorbidities, he is very high risk of mortality with surgery. He recommends medical management at this point.  - on vancomycin      MRSA bacteremia  - suspect source= chronic skin scratching vs infected AVF  - cultures of AVF with Staph   - he has endocarditis  - ID consulted  - on vanc     NSTEMI (non-ST elevated myocardial infarction)  - see CAD    BPH with urinary obstruction  - noted 4/6/25 when patient became very agitated and screaming he couldn't urinate  - nursing unable to place dunaway/coude  - Urology consulted. Bedside cystoscopy on 4/6/25 noted clots, large prostate. Catheter was placed but was then removed due to pain  - 4/7 he is again agitated that he cannot urinate  - follow up with Urology   - tamsulosin ordered if he is awake enough to swallow     Thrombocytopenia  The likely etiology of thrombocytopenia is infection and sepsis. The patients 3 most recent labs are listed below.  Recent Labs     04/09/25  0341 04/09/25  2039 04/10/25  0416    169 161     Plan  - Will transfuse if platelet count is <10k.    AV fistula infection  - LUE AVF was actively bleeding on arrival on 4/3/25. Vascular surgery was consulted. He went emergently to the OR on 4/3/25: "Severely calcified mid RCA stenosis unable to cross with PTCA balloons, treated initially with 0.9 laser atherectomy, followed by CSI atherectomy system with total of 5 runs (3 at low speed, 2 at high speed), pre-dilated using 2.0 compliant and 3.5 noncompliant balloon followed by 3.5 X 16 mm megatron stent.  Focal plaque rupture/erosion noted in the proximal and ostial RCA that were treated with  3.5 X 28 in the proximal RCA, 4.0 X 16 mm in the ostial RCA.  No residual stenosis post intervention.  Patient had ALAN 3 flow at the end of the procedure"  - " cultures from AVF= Staph   - wound care orders in place for surgical site  - trialysis currently in place for HD    Emphysema lung  - emphysema noted on CT  - no signs of COPD exacerbation  Pulmonary nodules  - CT 4/3/25 with few small pulmonary nodules  - will need outpatient follow up     Gross hematuria      NSVT (nonsustained ventricular tachycardia)        VTE Risk Mitigation (From admission, onward)           Ordered     heparin (porcine) injection 1,000 Units  As needed (PRN)         04/05/25 2100     IP VTE HIGH RISK PATIENT  Once         04/03/25 1516     Place TEMO hose  Until discontinued         04/03/25 1516     Place sequential compression device  Until discontinued         04/03/25 1516     Reason for No Pharmacological VTE Prophylaxis  Once        Question:  Reasons:  Answer:  Physician Provided (leave comment)    04/03/25 1516                    Discharge Planning   BAYRON:      Code Status: Full Code   Medical Readiness for Discharge Date:   Discharge Plan A[place] [facility] [place]            Critical care time spent on the evaluation and treatment of severe organ dysfunction, review of pertinent labs and imaging studies, discussions with consulting providers and discussions with patient/family: 35 minutes.            Gonzalez Reagan MD  Department of Hospital Medicine   [facility] [place]

## 2025-04-10 NOTE — ASSESSMENT & PLAN NOTE
Tolerating CBI  Wean CBI off - urine is clear  D/C once urine is clear  Change oxybutynin to Levsin    Hold anticoagulation, if possible  following

## 2025-04-10 NOTE — EICU
Intervention Initiated From:  [facility] / EICU    Jude intervened regarding:  Rounding (Video assessment)    Nurse Notified:  No    Doctor Notified:  No    Comments:   Virtual ICU Quality Rounds    Admit Date: 4/3/2025  Hospital Day: 6    ICU Day: 6d 6h    VICU Surveillance Screening                    Interventions    [x]II  LDA reconciliation completed          Recommendations

## 2025-04-11 LAB
ABSOLUTE EOSINOPHIL (OHS): 0.32 K/UL
ABSOLUTE MONOCYTE (OHS): 0.86 K/UL (ref 0.3–1)
ABSOLUTE NEUTROPHIL COUNT (OHS): 14.65 K/UL (ref 1.8–7.7)
ALBUMIN SERPL BCP-MCNC: 2.2 G/DL (ref 3.5–5.2)
ALP SERPL-CCNC: 88 UNIT/L (ref 40–150)
ALT SERPL W/O P-5'-P-CCNC: 10 UNIT/L (ref 10–44)
ANION GAP (OHS): 10 MMOL/L (ref 8–16)
AST SERPL-CCNC: 22 UNIT/L (ref 11–45)
BASOPHILS # BLD AUTO: 0.03 K/UL
BASOPHILS NFR BLD AUTO: 0.2 %
BILIRUB SERPL-MCNC: 0.4 MG/DL (ref 0.1–1)
BUN SERPL-MCNC: 35 MG/DL (ref 8–23)
CALCIUM SERPL-MCNC: 8.7 MG/DL (ref 8.7–10.5)
CHLORIDE SERPL-SCNC: 109 MMOL/L (ref 95–110)
CO2 SERPL-SCNC: 23 MMOL/L (ref 23–29)
CREAT SERPL-MCNC: 5.3 MG/DL (ref 0.5–1.4)
ERYTHROCYTE [DISTWIDTH] IN BLOOD BY AUTOMATED COUNT: 17.2 % (ref 11.5–14.5)
GFR SERPLBLD CREATININE-BSD FMLA CKD-EPI: 10 ML/MIN/1.73/M2
GLUCOSE SERPL-MCNC: 277 MG/DL (ref 70–110)
HCT VFR BLD AUTO: 27.2 % (ref 40–54)
HGB BLD-MCNC: 8.6 GM/DL (ref 14–18)
IMM GRANULOCYTES # BLD AUTO: 0.25 K/UL (ref 0–0.04)
IMM GRANULOCYTES NFR BLD AUTO: 1.5 % (ref 0–0.5)
LYMPHOCYTES # BLD AUTO: 0.64 K/UL (ref 1–4.8)
MCH RBC QN AUTO: 29.5 PG (ref 27–31)
MCHC RBC AUTO-ENTMCNC: 31.6 G/DL (ref 32–36)
MCV RBC AUTO: 93 FL (ref 82–98)
NUCLEATED RBC (/100WBC) (OHS): 1 /100 WBC
PHOSPHATE SERPL-MCNC: 2.9 MG/DL (ref 2.7–4.5)
PLATELET # BLD AUTO: 181 K/UL (ref 150–450)
PMV BLD AUTO: 11.3 FL (ref 9.2–12.9)
POCT GLUCOSE: 129 MG/DL (ref 70–110)
POCT GLUCOSE: 135 MG/DL (ref 70–110)
POCT GLUCOSE: 225 MG/DL (ref 70–110)
POCT GLUCOSE: 286 MG/DL (ref 70–110)
POCT GLUCOSE: 45 MG/DL (ref 70–110)
POCT GLUCOSE: 49 MG/DL (ref 70–110)
POCT GLUCOSE: 66 MG/DL (ref 70–110)
POTASSIUM SERPL-SCNC: 3.9 MMOL/L (ref 3.5–5.1)
PROT SERPL-MCNC: 6.6 GM/DL (ref 6–8.4)
RBC # BLD AUTO: 2.92 M/UL (ref 4.6–6.2)
RELATIVE EOSINOPHIL (OHS): 1.9 %
RELATIVE LYMPHOCYTE (OHS): 3.8 % (ref 18–48)
RELATIVE MONOCYTE (OHS): 5.1 % (ref 4–15)
RELATIVE NEUTROPHIL (OHS): 87.5 % (ref 38–73)
SODIUM SERPL-SCNC: 142 MMOL/L (ref 136–145)
WBC # BLD AUTO: 16.75 K/UL (ref 3.9–12.7)

## 2025-04-11 PROCEDURE — 80053 COMPREHEN METABOLIC PANEL: CPT | Performed by: HOSPITALIST

## 2025-04-11 PROCEDURE — 99233 SBSQ HOSP IP/OBS HIGH 50: CPT | Mod: ,,, | Performed by: INTERNAL MEDICINE

## 2025-04-11 PROCEDURE — 25000003 PHARM REV CODE 250: Performed by: UROLOGY

## 2025-04-11 PROCEDURE — 25000003 PHARM REV CODE 250: Performed by: STUDENT IN AN ORGANIZED HEALTH CARE EDUCATION/TRAINING PROGRAM

## 2025-04-11 PROCEDURE — G0545 PR VISIT INHERENT TO INPT OR OBS CARE, INFECTIOUS DISEASE: HCPCS | Mod: ,,, | Performed by: INTERNAL MEDICINE

## 2025-04-11 PROCEDURE — 11000001 HC ACUTE MED/SURG PRIVATE ROOM

## 2025-04-11 PROCEDURE — A4216 STERILE WATER/SALINE, 10 ML: HCPCS | Performed by: HOSPITALIST

## 2025-04-11 PROCEDURE — 99233 SBSQ HOSP IP/OBS HIGH 50: CPT | Mod: ,,,

## 2025-04-11 PROCEDURE — 99233 SBSQ HOSP IP/OBS HIGH 50: CPT | Mod: ,,, | Performed by: REGISTERED NURSE

## 2025-04-11 PROCEDURE — 99232 SBSQ HOSP IP/OBS MODERATE 35: CPT | Mod: ,,, | Performed by: INTERNAL MEDICINE

## 2025-04-11 PROCEDURE — 87040 BLOOD CULTURE FOR BACTERIA: CPT | Performed by: NURSE PRACTITIONER

## 2025-04-11 PROCEDURE — 63600175 PHARM REV CODE 636 W HCPCS: Performed by: REGISTERED NURSE

## 2025-04-11 PROCEDURE — 25000003 PHARM REV CODE 250: Performed by: HOSPITALIST

## 2025-04-11 PROCEDURE — 63600175 PHARM REV CODE 636 W HCPCS: Performed by: STUDENT IN AN ORGANIZED HEALTH CARE EDUCATION/TRAINING PROGRAM

## 2025-04-11 PROCEDURE — 27000207 HC ISOLATION

## 2025-04-11 PROCEDURE — 92526 ORAL FUNCTION THERAPY: CPT

## 2025-04-11 PROCEDURE — 99223 1ST HOSP IP/OBS HIGH 75: CPT | Mod: ,,, | Performed by: NURSE PRACTITIONER

## 2025-04-11 PROCEDURE — 99232 SBSQ HOSP IP/OBS MODERATE 35: CPT | Mod: ,,, | Performed by: STUDENT IN AN ORGANIZED HEALTH CARE EDUCATION/TRAINING PROGRAM

## 2025-04-11 PROCEDURE — 84100 ASSAY OF PHOSPHORUS: CPT | Performed by: INTERNAL MEDICINE

## 2025-04-11 PROCEDURE — 85025 COMPLETE CBC W/AUTO DIFF WBC: CPT | Performed by: HOSPITALIST

## 2025-04-11 RX ORDER — INSULIN GLARGINE 100 [IU]/ML
12 INJECTION, SOLUTION SUBCUTANEOUS DAILY
Status: DISCONTINUED | OUTPATIENT
Start: 2025-04-12 | End: 2025-04-12

## 2025-04-11 RX ORDER — LORAZEPAM 2 MG/ML
0.5 INJECTION INTRAMUSCULAR EVERY 8 HOURS PRN
Status: DISCONTINUED | OUTPATIENT
Start: 2025-04-11 | End: 2025-04-14

## 2025-04-11 RX ADMIN — SODIUM CHLORIDE, PRESERVATIVE FREE 10 ML: 5 INJECTION INTRAVENOUS at 01:04

## 2025-04-11 RX ADMIN — ERYTHROMYCIN: 5 OINTMENT OPHTHALMIC at 06:04

## 2025-04-11 RX ADMIN — HYDROMORPHONE HYDROCHLORIDE 1 MG: 1 INJECTION, SOLUTION INTRAMUSCULAR; INTRAVENOUS; SUBCUTANEOUS at 12:04

## 2025-04-11 RX ADMIN — TAMSULOSIN HYDROCHLORIDE 0.4 MG: 0.4 CAPSULE ORAL at 09:04

## 2025-04-11 RX ADMIN — SODIUM CHLORIDE, PRESERVATIVE FREE 10 ML: 5 INJECTION INTRAVENOUS at 10:04

## 2025-04-11 RX ADMIN — ERYTHROMYCIN: 5 OINTMENT OPHTHALMIC at 01:04

## 2025-04-11 RX ADMIN — MIDODRINE HYDROCHLORIDE 15 MG: 5 TABLET ORAL at 03:04

## 2025-04-11 RX ADMIN — INSULIN GLARGINE 20 UNITS: 100 INJECTION, SOLUTION SUBCUTANEOUS at 09:04

## 2025-04-11 RX ADMIN — INSULIN ASPART 3 UNITS: 100 INJECTION, SOLUTION INTRAVENOUS; SUBCUTANEOUS at 06:04

## 2025-04-11 RX ADMIN — INSULIN ASPART 5 UNITS: 100 INJECTION, SOLUTION INTRAVENOUS; SUBCUTANEOUS at 12:04

## 2025-04-11 RX ADMIN — HYDROMORPHONE HYDROCHLORIDE 1 MG: 1 INJECTION, SOLUTION INTRAMUSCULAR; INTRAVENOUS; SUBCUTANEOUS at 10:04

## 2025-04-11 RX ADMIN — MIDODRINE HYDROCHLORIDE 15 MG: 5 TABLET ORAL at 05:04

## 2025-04-11 RX ADMIN — LORAZEPAM 0.5 MG: 2 INJECTION INTRAMUSCULAR; INTRAVENOUS at 12:04

## 2025-04-11 RX ADMIN — MIDODRINE HYDROCHLORIDE 15 MG: 5 TABLET ORAL at 09:04

## 2025-04-11 RX ADMIN — DEXTROSE MONOHYDRATE 12.5 G: 25 INJECTION, SOLUTION INTRAVENOUS at 10:04

## 2025-04-11 RX ADMIN — DEXTROSE MONOHYDRATE 25 G: 25 INJECTION, SOLUTION INTRAVENOUS at 04:04

## 2025-04-11 RX ADMIN — PANTOPRAZOLE SODIUM 40 MG: 40 TABLET, DELAYED RELEASE ORAL at 09:04

## 2025-04-11 RX ADMIN — Medication 6 MG: at 11:04

## 2025-04-11 RX ADMIN — ATORVASTATIN CALCIUM 80 MG: 40 TABLET, FILM COATED ORAL at 09:04

## 2025-04-11 RX ADMIN — HYOSCYAMINE SULFATE 0.12 MG: 0.12 TABLET SUBLINGUAL at 03:04

## 2025-04-11 RX ADMIN — CLOPIDOGREL BISULFATE 75 MG: 75 TABLET, FILM COATED ORAL at 09:04

## 2025-04-11 RX ADMIN — INSULIN ASPART 5 UNITS: 100 INJECTION, SOLUTION INTRAVENOUS; SUBCUTANEOUS at 09:04

## 2025-04-11 RX ADMIN — ERYTHROMYCIN 1 INCH: 5 OINTMENT OPHTHALMIC at 10:04

## 2025-04-11 NOTE — PROGRESS NOTES
[place] - Intensive Care  Wound Care  [facility]    Patient Name:  Jevon Rajan   MRN:  [id number]  Date: 4/11/2025  Diagnosis: Septic shock    History:     Past Medical History:   Diagnosis Date    BPH (benign prostatic hyperplasia)     Coronary artery disease     [facility] care to this clinic. In 2014 he had stent to LAD. He states he has had 2 heart attacks. He does not get angina. There was 90% left anterior descending artery stenosis undergoing stenting. There was also a circumflex marginal branch stenosis of 70%.    Diabetes mellitus, type 2     ESRD (end stage renal disease) on dialysis     ESRD on HD TTS via left brachial AVF    Hypertension     Hypothyroidism, unspecified     NSTEMI (non-ST elevated myocardial infarction) 4/4/2025    Sepsis 4/2/2025    Symptomatic anemia 01/15/2024       Social History[1]    Precautions:     Allergies as of 04/03/2025 - Reviewed 04/03/2025   Allergen Reaction Noted    Ace inhibitors Hives, Itching, Shortness Of Breath, Other (See Comments), and Rash 12/04/2013    Captopril  08/12/2003       [facility] Assessment Details/Treatment     Active Problem List with Overview Notes    Diagnosis Date Noted    NSVT (nonsustained ventricular tachycardia) 04/08/2025    Thrombocytopenia 04/07/2025    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    Emphysema lung 04/07/2025    Pulmonary nodules 04/07/2025    Gross hematuria 04/07/2025    BPH with urinary obstruction 04/06/2025    ACP (advance care planning) 04/04/2025    Acute bacterial endocarditis 04/04/2025    MRSA bacteremia 04/04/2025    NSTEMI (non-ST elevated myocardial infarction) 04/04/2025    Septic shock 04/02/2025    Acute metabolic encephalopathy 04/02/2025    Senile debility 01/15/2025    Rash 08/10/2024    Dry eyes 08/10/2024    Inadequate dietary intake of protein 07/10/2024    DM2 (diabetes mellitus, type 2) 06/02/2024    Liver mass 02/26/2024    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 Small amount serosanguineous drainage on dressing. Edema in upper arm resolved. Wound with small amount non viable tissue in base. Walls of wound developing few buds of granulation.   Treatment/Plan:  Cleansed wound with Vashe. Filled wound with Vashe moistened gauze and placed skin flap from 9 o'clock over dressing in base of wound. Covered with Vashe moistened gauze and secured with cast padding and ACE wrap.   Nursing to continue changing dressing daily.     04/11/2025       [1]   Social History  Socioeconomic History    Marital status: Single   Tobacco Use    Smoking status: Never    Smokeless tobacco: Never   Substance and Sexual Activity    Alcohol use: No    Drug use: No    Sexual activity: Not Currently     Social Drivers of Health     Financial Resource Strain: Low Risk  (4/5/2025)    Overall Financial Resource Strain (CARDIA)     Difficulty of Paying Living Expenses: Not very hard   Food Insecurity: No Food Insecurity (4/5/2025)    Hunger Vital Sign     Worried About Running Out of Food in the Last Year: Never true     Ran Out of Food in the Last Year: Never true    Transportation Needs: Patient Unable To Answer (4/3/2025)    PRAPARE - Transportation     Lack of Transportation (Medical): Patient unable to answer     Lack of Transportation (Non-Medical): Patient unable to answer   Recent Concern: Transportation Needs - Unmet Transportation Needs (1/15/2025)    TRANSPORTATION NEEDS     Transportation : Yes, it has kept me from medical appointments or from getting my medications.   Physical Activity: Patient Unable To Answer (4/2/2025)    Exercise Vital Sign     Days of Exercise per Week: Patient unable to answer     Minutes of Exercise per Session: Patient unable to answer   Recent Concern: Physical Activity - Insufficiently Active (1/15/2025)    Exercise Vital Sign     Days of Exercise per Week: 5 days     Minutes of Exercise per Session: 20 min   Stress: No Stress Concern Present (4/5/2025)    [employer] - Occupational Stress Questionnaire     Feeling of Stress : Not at all   Housing Stability: Low Risk  (4/5/2025)    Housing Stability Vital Sign     Unable to Pay for Housing in the Last Year: No     Number of Times Moved in the Last Year: 0     Homeless in the Last Year: No

## 2025-04-11 NOTE — SUBJECTIVE & OBJECTIVE
Medications:  Continuous Infusions:  Scheduled Meds:   atorvastatin  80 mg Oral QHS    clopidogreL  75 mg Oral Daily    epoetin mj-epbx  10,000 Units Intravenous Every Tues, Thurs, Sat    erythromycin   Both Eyes Q8H    insulin aspart U-100  5 Units Subcutaneous TIDWM    insulin glargine U-100  20 Units Subcutaneous Daily    midodrine  15 mg Oral Q8H    pantoprazole  40 mg Oral Daily    sodium chloride 0.9%  10 mL Intravenous Q8H    tamsulosin  0.4 mg Oral Daily     PRN Meds:  Current Facility-Administered Medications:     0.9%  NaCl infusion (for blood administration), , Intravenous, Q24H PRN    0.9% NaCl, , Intravenous, PRN    acetaminophen, 650 mg, Oral, Q4H PRN    albumin human 25%, 25 g, Intravenous, Daily PRN    dextrose 50%, 12.5 g, Intravenous, PRN    dextrose 50%, 25 g, Intravenous, PRN    diphenhydrAMINE, 50 mg, Oral, Q6H PRN    glucagon (human recombinant), 1 mg, Intramuscular, PRN    glucose, 16 g, Oral, PRN    glucose, 24 g, Oral, PRN    heparin (porcine), 1,000 Units, Intravenous, PRN    HYDROmorphone, 1 mg, Intravenous, Q4H PRN    hyoscyamine, 0.125 mg, Sublingual, Q4H PRN    insulin aspart U-100, 0-5 Units, Subcutaneous, QID (AC + HS) PRN    lorazepam, 0.5 mg, Intravenous, Q8H PRN    melatonin, 6 mg, Oral, Nightly PRN    naloxone, 0.02 mg, Intravenous, PRN    ondansetron, 4 mg, Intravenous, Q6H PRN    prochlorperazine, 5 mg, Intravenous, Q6H PRN    senna, 8.6 mg, Oral, Daily PRN    sodium chloride 0.9%, 250 mL, Intravenous, PRN    Pharmacy to dose Vancomycin consult, , , Once **AND** vancomycin - pharmacy to dose, , Intravenous, pharmacy to manage frequency    Objective:     Vital Signs (Most Recent):  Temp: 98.2 °F (36.8 °C) (04/11/25 0318)  Pulse: 101 (04/11/25 0600)  Resp: 18 (04/11/25 1016)  BP: 131/66 (04/11/25 0600)  SpO2: (!) 94 % (04/11/25 0600) Vital Signs (24h Range):  Temp:  [97.4 °F (36.3 °C)-98.5 °F (36.9 °C)] 98.2 °F (36.8 °C)  Pulse:  [] 101  Resp:  [14-34] 18  SpO2:  [92 %-98  %] 94 %  BP: (110-161)/(58-82) 131/66     Weight: 61.2 kg (134 lb 14.7 oz)  Body mass index is 21.13 kg/m².     Physical Exam  Vitals and nursing note reviewed.   Constitutional:       Appearance: He is ill-appearing.      Comments: Arouses to name, able to answer simple questions with 1-2 word answers with stimulation, falls asleep quickly; varying levels of alertness and lethargy throughout the day; calling out in pain and confusion in AM but improved by later morning    HENT:      Head: Normocephalic and atraumatic.   Pulmonary:      Effort: Pulmonary effort is normal. No respiratory distress.      Comments: NC  Skin:     General: Skin is dry.      Coloration: Skin is pale.      Findings: Lesion present. Bruising: multiple skin lesions in various stages of healing. Erythema: pale/gray for ethnicity.  Neurological:      Mental Status: He is lethargic.      Comments: Answers to his name; less oriented overall, decreased awareness of location at time but able to reorient; oriented to niece by voice on phone A/P)     Psychiatric:         Behavior: Behavior is cooperative.          Advance Care Planning   Advance Directives:   Living Will: No    [place]POST: No    Do Not Resuscitate Status: No    Medical Power of : No      Decision Making:  Family answered questions and Patient answered questions  Goals of Care: What is most important right now is to focus on symptom/pain control, curative/life-prolongation (regardless of treatment burdens), improvement in condition but with limits to invasive therapies. Accordingly, we have decided that the best plan to meet the patient's goals includes continuing with treatment.       Significant Labs: All pertinent labs within the past 24 hours have been reviewed.  CBC:   Recent Labs   Lab 04/11/25 0312   WBC 16.75*   HGB 8.6*   HCT 27.2*   MCV 93        BMP:  Recent Labs   Lab 04/11/25 0312      K 3.9      CO2 23   BUN 35*   CREATININE 5.3*   CALCIUM 8.7      LFT:  Lab Results   Component Value Date    AST 22 04/11/2025    ALKPHOS 88 04/11/2025    BILITOT 0.4 04/11/2025     Albumin:   Albumin   Date Value Ref Range Status   04/11/2025 2.2 (L) 3.5 - 5.2 g/dL Final   03/21/2025 3.2 (L) 3.5 - 5.2 g/dL Final     Protein:   Total Protein   Date Value Ref Range Status   03/21/2025 7.5 6.0 - 8.4 g/dL Final     Lactic acid:   Lab Results   Component Value Date    LACTATE 0.9 04/03/2025    LACTATE 2.6 (H) 04/02/2025     Significant Imaging: I have reviewed all pertinent imaging results/findings within the past 24 hours.

## 2025-04-11 NOTE — ASSESSMENT & PLAN NOTE
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 Most recent hemoglobin and hematocrit are listed below.  Recent Labs     04/09/25  2039 04/10/25  0416 04/11/25  0312   HGB 8.4* 8.5* 8.6*   HCT 26.2* 26.5* 27.2*     Plan  - Monitor serial CBC: Daily and recheck now  - Transfuse PRBC if patient becomes hemodynamically unstable, symptomatic or H/H drops below 7/21.  - Patient has not received any PRBC transfusions to date  - Patient's anemia is currently stable

## 2025-04-11 NOTE — PROGRESS NOTES
[facility] - Intensive Care  Palliative Medicine  Progress Note    Patient Name: Jevon Rajan  MRN: [id number]  Admission Date: 4/3/2025  Hospital Length of Stay: 8 days  Code Status: Full Code   Attending Provider: Gonzalez Reagan MD  Consulting Provider: Lisa Jacinto NP  Primary Care Physician: Melia, Primary Doctor  Principal Problem:Septic shock    Patient information was obtained from primary team.      Assessment/Plan:     Neuro  Acute metabolic encephalopathy  - Evaluation and management per primary team   - pt requires frequent calming and reassuring of safety, forgets location of hospital and events leading to hospitalization; overall orientation is improving daily with treatment of infection, however also starting to show concerns for hospital delerium which was discussed with Hm; recommend room with window and delerium precautions   - when reoriented is able to have appropriate discussions, and is able to identify Neha garces by voice on the phone   - of note, on , pt did share with palliative team and dez Solomon by phone that his sister Eileen visited him yesterday (Eileen has been  for several years)   -  pt upset about seeing spider webs; pt does have tape for TG tube to nose and was watching spider man on TV at that time, but concerning since pt kept repeating despite reassurance and seemed upset/anxious about this which over some time seemed distressing to pt; discussed with HM and RN     Cardiac/Vascular  Acute bacterial endocarditis  - Mgmt per primary team/ID/cardiology; while he has a known valvular mass, he is being treated by antibiotics alone as he is not a surgical candidate. Prognosis is poor if cultures do not clear.   - Illness trajectory education provided     Renal/  BPH with urinary obstruction  - Mgmt per urology and primary; not able to take flomax due to encephalopathy and inability to give this via NG   - pt with ongoing episodes of pain/bladder pressure that  "is causing him to call out in pain and become emotionally distressed; adjustments for opioid regimen to dilaudid given ESRD in coordination with HM;  talking with niece by phone helped calm pt, niece to coordinate family to visit; worked with pt on calming exercises while awaiting for opioid admin to relieve pain   - discussion with urology and MDT for improved symptom management    ESRD on hemodialysis  - Nephrology consulted and following; pt has had difficulty tolerating HD so far, family aware of this and with insight that this is a large factor in pt's prognosis   - family with insight that HD is a form of life support     ID  * Septic shock  - Secondary to MRSA bacteremia from infected fistula   - ID following    AV fistula infection  - Abx, mgmt per primary team/vascular surgery/ID; s/p excision of infected graft     MRSA bacteremia  - Abx, mgmt per primary team and ID; per ID, prognosis is poor if cultures do not clear, given that he is not a surgical candidate.   - repeat blood cultures so far with no growth but not finalized, also some continued improvement in overall mental status showing response to current treatment   - Illness trajectory education provided to family      Palliative Care  ACP (advance care planning)  4/11/2025  - interval chart reviewed discussed pt with MDT, including nephrology and ID   - pt continues to lack capacity for medical decision making   - emotional support provided with ongoing encephalopathy and concerns for hospital delirium; overall improving slowly   - no family at bedside today for additional discussion; MDT has discussed plan to proceed with tunneled line placement on Monday; will plan for follow up discussion with family after that time     4/10/2025  - interval chart reviewed; discussion pt with MDT during ICU rounds  - pt again with ongoing discomfort in AM 2/2 bladder irrigation; pt expressing "can't do this anymore" and other statements consistent with discomfort " and contributing to need for continued GOC discussions with family   - pt's sister Ronna later visited and expressed continued gratitude for pt's care; continues to have good insight into pt's condition and potential prognosis; continues to advocate for GOC for pt's comfort; she is hopeful for continued improvement but would be open to alternate plans of care of pt continues to experience current level of discomfort to do so    - also later attempted to call pt's niece but was unable to reach; will continue attempts   - pt with planned HD 4/10; will review tolerance and discuss ongoing plans and goals of care with MDT and family     2025  - interval chart reviewed; discussion pt with MDT during ICU rounds  - pt very uncomfortable/in pain throughout AM see BPH   - extensive time sent achieving pt comfort in AM; communicated with dez Solomon for medical update, along with Dr. Joan Antunez, palliative attending; also allowed Neha to talk with pt to reassure and calm   - family continues to have good insight into overall condition; hopeful for improvement but pt QOL and comfort continue to be a priority in GOC   - family continuing discussions regarding code status and overall GOC; understand HD tolerance is a big factor in prognosis currently and are open to discussions if team feels he is no longer tolerating HD; continued encouragement of shared family decision making as they all seem to have consistent goals for pt   - updated family information; pt has 3 sisters and 1 brother confirmed by Neha   - of concern, pt shared seeing/speaking with his  sister; Neha is aware that this is not typically a good indicator of prognosis which she shares is concerning for her   - reassured pt and Neha that we will continue to prioritize his comfort while continuing to try to improve overall condition   - pt is a , [facility] for [facility]  and or assisted placement if pt were to no longer qualify for HD in the future   - emotional support provided to pt and family; answered all questions   - continued communication and coordination with MDT     4/8/2025  - interval chart reviewed; discussion pt with MDT during ICU rounds   - visited pt at bedside in AM with bedside RN and provided comfort and redirection during episode of delirium; pt is able to be calmed with reassurance; pt remains oriented only to self at this time; can verbalize needs such as when he feels the sensation to void but not oriented to having a catheter in place   - later in morning pt's sisters Ronna and Nora at bedside; brief medical update provided and reviewed pt's prognosis; Ronna and Nora shared that they thought pt previously had a DNR in place and/or had expressed wishes against resuscitation or aggressive end of life measures, but had wished to continue HD as long as tolerated; they also shared poor condition of pt's home and lack of home support that placement would be necessary in their opinion if pt's condition were to improve to allow for discharge  - later called pt's niece, Neah, who also later added her grandmother, pt's sister Bhavna, to the call; brief medical update provided; Neha and Bhavna were not aware of pt's prior wishes for DNR; discussed full code vs DNR, potential interventions, risks, and outcomes; Neha expressed considering DNR as long as full treatment and otherwise escalation of care would continue; Mrs. Huggins however expressed not wishing to be involved with that decision for pt  - encouraged family discussion with pt's niece and sisters, Ronna, Nora, and Bhavna (if she wished to participate)   - emotional support provided to pt and family throughout all interactions   - allowed time for questions/concerns of family   - updated MDT of family discussions     4/7/25 (Dr. Joan Antunez)   - Chart and interval history reviewed;  discussed pt during MDT rounds. Pt had cystoscopy and dunaway placement by urology yesterday due to screaming and discomfort from urinary retention; found to have very enlarged prostate. Dunaway placed successfully by urology staff, though this was later removed due to pt complaining of significant pain.   - Pt screaming throughout much of the morning, possibly from urinary retention vs delirium. He was not able to participate in discussion secondary to this.   - Along with Dr Jordan, called and spoke with Kenia (pt's preferred MPOA); she added her mother Erik to the call. Thorough medical update provided; shared transparent concern that pt's blood cultures are persistently positive. While hopeful that the infection starts to clear, there is a chance we may not be able to treat this infection, unfortunately. Additionally, pt has been very uncomfortable over the last 24 hours or so due to urinary retention and/or delirium. They expressed understanding of severity of both his acute and chronic illness, and the fragility that results naturally as a result of his advanced age.   - Discussed plan moving forward, as well as code status. At this time, plan remains to continue with maximal medical therapy in hopes of improvement, though they have agreed to further discuss code status as a family. At this time, would maintain full code.   - Support provided during call   - Encouraged visitation if possible, as perhaps this will be a calming presence for pt  - Will follow up with pt and family for further conversations as clinical course evolves    4/4/25 (Lisa Jacinto NP)   - Consult received; interval chart reviewed in detail; discussed pt with MDT during ICU rounds and ongoing throughout the day   - met with patient at bedside; introduction to palliative medicine team and role in current care and admission   - pt very lethargic and required encouragement and stimulation for orientation assessment; pt answers to  name, able to state his 1st name, identifies he is at the hospital, and when asked who team should call for help with his medical care he answered Kenia (which is consistent with what he told  4/3 as well); quickly falls back asleep   - pt unable to participate in detailed GOC/ACP discussion on his own behalf at this time   - called Kenia (pt's niece) who shares that she has been pt's main caregiver for some time; she confirms pt is not , does not have children, and that he has 3 sisters; she also had pt's sister Bhavna join by phone for update/discussion   - Mrs. Huggins also shared that Jelanijackarchana takes care of pt and understands his current history, needs, and wishes; per Bhavna, Joeta should be pt's medical decision maker while he lacks capacity, and confirms that pt's others sisters agree with this; they have asked that no other visitors or callers aside from Deshanta and pts sisters (Bhavna, Ronna, and Nora receive updates)   - Kenia and Bhavna confirm that they feel pt would wish to continue HD  - reviewed pt's current full code status, potential interventions, risks, and outcomes; Kenia and Bhavna agree for pt to remain full code, but were open to further discussion ideally when pt is able to participate in the discussion; updated pt's team of this and added this to EMR   - both shared difficulty pt has had recently and frustration with attempts to seek care to get answers to ongoing health concerns; they both shared appropriate concerns for pt's current condition and are hopeful for answers and improvement in pt's condition; they shared that they feel the  ICU team is moving quickly and working to improve his condition for which they are appreciative   - emotional support provided   - Allowed time for questions/concerns; all addressed; expressed availability of myself/palliative team for additional questions/concerns   - attempted to call pt's other sister, Ronna, as she had  "called unit for updates; unable to reach, LM   - updated MDT; ongoing communication and coordination with MDT         I will follow-up with patient. Please contact us if you have any additional questions.    Subjective:     Chief Complaint:   Chief Complaint   Patient presents with    Bleeding/Bruising       HPI:   From H&P: " Mr Jevon Rajan is a 85 y.o. man with ESRD with LUE AVF, HFpEF last EF 50-55%, CAD, DM who was transferred to [facility] ICU for septic shock due to MRSA bacteremia.      He was admitted at [facility] 4/1-3/2025 with sepsis, worsening to septic shock, then identified source as MRSA. 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      Upon arrival to [facility], he is moaning in pain and his LUE AVF has active pulsatile bright red blood. He does respond to his name. He denies pain anywhere other than his LUE. He is on levophed at 0.05. "     Palliative medicine consulted for goals of care discussion and advance care planning; for details of visit, see advance care planning section of plan.       Hospital Course:  No notes on file    Medications:  Continuous Infusions:  Scheduled Meds:   atorvastatin  80 mg Oral QHS    clopidogreL  75 mg Oral Daily    epoetin mj-epbx  10,000 Units Intravenous Every Tues, Thurs, Sat    erythromycin   Both Eyes Q8H    insulin aspart U-100  5 Units Subcutaneous TIDWM    insulin glargine U-100  20 Units Subcutaneous Daily    midodrine  15 mg Oral Q8H    pantoprazole  40 mg Oral Daily    sodium chloride 0.9%  10 mL Intravenous Q8H    tamsulosin  0.4 mg Oral Daily     PRN Meds:  Current Facility-Administered Medications:     0.9%  NaCl infusion (for blood administration), , Intravenous, Q24H PRN    0.9% NaCl, , Intravenous, PRN    acetaminophen, 650 mg, Oral, Q4H PRN    albumin human 25%, 25 g, Intravenous, Daily PRN    dextrose 50%, 12.5 g, Intravenous, " PRN    dextrose 50%, 25 g, Intravenous, PRN    diphenhydrAMINE, 50 mg, Oral, Q6H PRN    glucagon (human recombinant), 1 mg, Intramuscular, PRN    glucose, 16 g, Oral, PRN    glucose, 24 g, Oral, PRN    heparin (porcine), 1,000 Units, Intravenous, PRN    HYDROmorphone, 1 mg, Intravenous, Q4H PRN    hyoscyamine, 0.125 mg, Sublingual, Q4H PRN    insulin aspart U-100, 0-5 Units, Subcutaneous, QID (AC + HS) PRN    lorazepam, 0.5 mg, Intravenous, Q8H PRN    melatonin, 6 mg, Oral, Nightly PRN    naloxone, 0.02 mg, Intravenous, PRN    ondansetron, 4 mg, Intravenous, Q6H PRN    prochlorperazine, 5 mg, Intravenous, Q6H PRN    senna, 8.6 mg, Oral, Daily PRN    sodium chloride 0.9%, 250 mL, Intravenous, PRN    Pharmacy to dose Vancomycin consult, , , Once **AND** vancomycin - pharmacy to dose, , Intravenous, pharmacy to manage frequency    Objective:     Vital Signs (Most Recent):  Temp: 98.2 °F (36.8 °C) (04/11/25 0318)  Pulse: 101 (04/11/25 0600)  Resp: 18 (04/11/25 1016)  BP: 131/66 (04/11/25 0600)  SpO2: (!) 94 % (04/11/25 0600) Vital Signs (24h Range):  Temp:  [97.4 °F (36.3 °C)-98.5 °F (36.9 °C)] 98.2 °F (36.8 °C)  Pulse:  [] 101  Resp:  [14-34] 18  SpO2:  [92 %-98 %] 94 %  BP: (110-161)/(58-82) 131/66     Weight: 61.2 kg (134 lb 14.7 oz)  Body mass index is 21.13 kg/m².     Physical Exam  Vitals and nursing note reviewed.   Constitutional:       Appearance: He is ill-appearing.      Comments: Arouses to name, able to answer simple questions with 1-2 word answers with stimulation, falls asleep quickly; varying levels of alertness and lethargy throughout the day; calling out in pain and confusion in AM but improved by later morning    HENT:      Head: Normocephalic and atraumatic.   Pulmonary:      Effort: Pulmonary effort is normal. No respiratory distress.      Comments: NC  Skin:     General: Skin is dry.      Coloration: Skin is pale.      Findings: Lesion present. Bruising: multiple skin lesions in various  stages of healing. Erythema: pale/gray for ethnicity.  Neurological:      Mental Status: He is lethargic.      Comments: Answers to his name; less oriented overall, decreased awareness of location at time but able to reorient; oriented to niece by voice on phone A/P)     Psychiatric:         Behavior: Behavior is cooperative.          Advance Care Planning   Advance Directives:   Living Will: No    LaPOST: No    Do Not Resuscitate Status: No    Medical Power of : No      Decision Making:  Family answered questions and Patient answered questions  Goals of Care: What is most important right now is to focus on symptom/pain control, curative/life-prolongation (regardless of treatment burdens), improvement in condition but with limits to invasive therapies. Accordingly, we have decided that the best plan to meet the patient's goals includes continuing with treatment.       Significant Labs: All pertinent labs within the past 24 hours have been reviewed.  CBC:   Recent Labs   Lab 04/11/25 0312   WBC 16.75*   HGB 8.6*   HCT 27.2*   MCV 93        BMP:  Recent Labs   Lab 04/11/25 0312      K 3.9      CO2 23   BUN 35*   CREATININE 5.3*   CALCIUM 8.7     LFT:  Lab Results   Component Value Date    AST 22 04/11/2025    ALKPHOS 88 04/11/2025    BILITOT 0.4 04/11/2025     Albumin:   Albumin   Date Value Ref Range Status   04/11/2025 2.2 (L) 3.5 - 5.2 g/dL Final   03/21/2025 3.2 (L) 3.5 - 5.2 g/dL Final     Protein:   Total Protein   Date Value Ref Range Status   03/21/2025 7.5 6.0 - 8.4 g/dL Final     Lactic acid:   Lab Results   Component Value Date    LACTATE 0.9 04/03/2025    LACTATE 2.6 (H) 04/02/2025     Significant Imaging: I have reviewed all pertinent imaging results/findings within the past 24 hours.      Total visit time: 35 minutes    35 min visit time including: face to face time in discussion of symptom assessment, and exploring options and burdens of offered treatments.  This also  includes non-face to face time preparing to see the patient including chart review, obtaining and/or reviewing separately obtained history, documenting clinical information in the electronic or other health record, independently interpreting results and communicating results to the patient/family/caregiver, family discussions by phone if not able to be present, coordination of care with other specialists, and discharge planning.     Lisa Jacinto NP  Palliative Medicine  [facility] - [place] - Intensive Care

## 2025-04-11 NOTE — ASSESSMENT & PLAN NOTE
- Evaluation and management per primary team   - pt requires frequent calming and reassuring of safety, forgets location of hospital and events leading to hospitalization; overall orientation is improving daily with treatment of infection, however also starting to show concerns for hospital delerium which was discussed with Hm; recommend room with window and delerium precautions   - when reoriented is able to have appropriate discussions, and is able to identify Neha garces by voice on the phone   - of note, on , pt did share with palliative team and dez Solomon by phone that his sister Eileen visited him yesterday (Eileen has been  for several years)   -  pt upset about seeing spider webs; pt does have tape for TG tube to nose and was watching spider man on TV at that time, but concerning since pt kept repeating despite reassurance and seemed upset/anxious about this which over some time seemed distressing to pt; discussed with HM and RN

## 2025-04-11 NOTE — PLAN OF CARE
Nutrition Plan of Care:    Recommendation:  1. Continue with texture modified diet per SLP recommendations  2. Continue with TF of Novasource Renal at 10ml/hr and advance as tolerated to goal rate of 40ml/hr which would provide 1920 kcal, 87g protein, & 688ml free water if adequate oral intake is not obtained  3. Monitor weight/labs.   4. RD to follow to monitor nutrition status.     Goals:  Patient to consume/receive >75% of EEN prior to RD follow up   Nutrition Goal Status: continues   Communication of RD Recs: reviewed with RN     Nutrition Discharge Planning   Nutrition Discharge Planning: Too early to determine, pending clinical course     Assessment and Plan  Nutrition Problem  Inadequate energy intake     Related to (etiology):   AMS, septic shock     Signs and Symptoms (as evidenced by):   Estimated energy intake less than estimated needs        Interventions:  Texture modified diet   Enteral Nutrition Management   Collaboration with other providers     Nutrition Diagnosis Status:   Continues       Evon Beavers, MS, RDN, LDN

## 2025-04-11 NOTE — ASSESSMENT & PLAN NOTE
Patient's blood pressure range in the last 24 hours was: BP  Min: 110/59  Max: 161/76.The patient's inpatient anti-hypertensive regimen is listed below:  Current Antihypertensives       Plan  - admitted with shock  - now off vasopressors. Continue to hold BP Rx as BP is well controlled without it

## 2025-04-11 NOTE — PLAN OF CARE
Problem: SLP  Goal: SLP Goal  Description: ST. Pt will tolerate PO diet of pureed consistency with thin liquids without overt s/s of aspiration.  Pt will participate in ongoing assessment of swallow to determine least restrictive diet without overt s/s of aspiration.      Outcome: Met    Pt w/ improved RUSSELL and participation in ST session today. Pt w/ mild oral dysphagia, pocketing all pureed boluses, however, w/ verbal cues to swallow and liquid washes, the pt cleared all oral stasis. No overt s/s of aspiration across thin liquid and pureed boluses. ST recs 1:1 A for ALL intake and encouragement of intake for possible d/c of NGT 2/2 good tolerance of po intake. No further ST is required at this time, as the pt is consuming the safest and least restrictive diet.

## 2025-04-11 NOTE — PROGRESS NOTES
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 man with ESRD with LUE AVF, HFpEF, CAD, DM admitted to OSH 4/1- 4/3 with sepsis due to MRSA bacteremia, transferred to  ICU for septic shock due to MRSA bacteremia and thrombosis of AV graft on 4/3 s/p exploratory surgery of LUE with graft resection 4/3. Op findings: Ruptured pseudoaneurysm with infected hematoma, graft completely obliterated at mid segment. Suspect infected graft is the source of bacteremia.     BCx 4/2 MRSA  BCx 4/4 MRSA  BCx 4/6 MRSA  BCx 4/7: NGTD    LUE surgical cx growing s aureus    TTE: 1.9x1.2cm large fixed heterogeneous mass present on the posterior leaflet , moderate to severe regurgitation with an eccentric jet. Cannot exclude severe MR with possible PMVL perforation in association with large vegetation. Not a surgical candidate.    Persistently positive blood cultures in the setting of acute IE. Deemed a poor surgical candidate.    The good news: his blood cultures are NGTD.    Recommendations:  --continue vancomycin  --if he continues to improve, it will take the tincture of time to treat his endocarditis  -- consider eventual LTAC placement      Lamont Steele MD  Infectious Disease  [facility] [place] - Intensive Care    Subjective:     Principal Problem:Septic shock    HPI: 85 y.o. man with ESRD with LUE AVF, HFpEF, CAD, DM admitted to OSH 4/1- 4/3 with sepsis due to MRSA bacteremia, transferred to  ICU for septic shock on 4/3.    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     CXR 4/3 with likely  pulmonary edema  CT c/a/p 4/3 revealed hepatic hypodensities likely cysts as well some larger lesions that hav decreased in size. Otherwise, no clear infectious source.      ID consulted for: MRSA bacteremia   Interval History: Mr. Rajan is looking better today. More conversant. Knows that he's in the hospital.    Review of Systems   All other systems reviewed and are negative.    Objective:     Vital Signs (Most Recent):  Temp: 98.2 °F (36.8 °C) (04/11/25 0318)  Pulse: 101 (04/11/25 0600)  Resp: 15 (04/11/25 0600)  BP: 131/66 (04/11/25 0600)  SpO2: (!) 94 % (04/11/25 0600) Vital Signs (24h Range):  Temp:  [97.4 °F (36.3 °C)-98.5 °F (36.9 °C)] 98.2 °F (36.8 °C)  Pulse:  [] 101  Resp:  [14-34] 15  SpO2:  [92 %-100 %] 94 %  BP: (110-161)/(58-82) 131/66     Weight: 61.2 kg (134 lb 14.7 oz)  Body mass index is 21.13 kg/m².    Estimated Creatinine Clearance: 8.5 mL/min (A) (based on SCr of 5.3 mg/dL (H)).     Physical Exam  Vitals and nursing note reviewed.   Constitutional:       Appearance: Normal appearance.   HENT:      Head: Normocephalic and atraumatic.      Mouth/Throat:      Comments: Poor dentition, tongue coated  Pulmonary:      Breath sounds: No wheezing or rhonchi.      Comments: gynecomastia  Abdominal:      Tenderness: There is no abdominal tenderness. There is no guarding.   Musculoskeletal:      Right lower leg: No edema.      Left lower leg: No edema.   Skin:     Findings: No rash.   Neurological:      General: No focal deficit present.      Mental Status: He is alert.   Psychiatric:         Mood and Affect: Mood normal.          Significant Labs: BMP:   Recent Labs   Lab 04/11/25 0312      K 3.9      CO2 23   BUN 35*   CREATININE 5.3*   CALCIUM 8.7     CBC:   Recent Labs   Lab 04/09/25  2039 04/10/25  0416 04/11/25 0312   WBC 15.40* 16.64* 16.75*   HGB 8.4* 8.5* 8.6*   HCT 26.2* 26.5* 27.2*    161 181     Microbiology Results (last 7 days)       Procedure Component Value  Units Date/Time    Blood culture [[id number]]  (Normal) Collected: 04/08/25 0401    Order Status: Completed Specimen: Blood Updated: 04/11/25 0600     Blood Culture No Growth After 72 Hours    Blood culture [[id number]]  (Normal) Collected: 04/08/25 0506    Order Status: Completed Specimen: Blood Updated: 04/11/25 0600     Blood Culture No Growth After 72 Hours    Fungus culture [[id number]]  (Normal) Collected: 04/03/25 1816    Order Status: Completed Specimen: Wound from Arm, Left Updated: 04/10/25 2300     Fungal Culture No Fungus Isolated at 1 Week    Fungus culture [[id number]]  (Normal) Collected: 04/03/25 1934    Order Status: Completed Specimen: Tissue from AV Fistula, Left Updated: 04/10/25 2300     Fungal Culture No Fungus Isolated at 1 Week    Fungus culture [[id number]]  (Normal) Collected: 04/03/25 2011    Order Status: Completed Specimen: Wound from Arm, Left Updated: 04/10/25 2300     Fungal Culture No Fungus Isolated at 1 Week    Fungus culture [[id number]]  (Normal) Collected: 04/03/25 1904    Order Status: Completed Specimen: Tissue from AV Fistula, Left Updated: 04/10/25 2201     Fungal Culture No Fungus Isolated at 1 Week    Fungus culture [[id number]]  (Normal) Collected: 04/03/25 1921    Order Status: Completed Specimen: Tissue from hematoma Updated: 04/10/25 2201     Fungal Culture No Fungus Isolated at 1 Week    Blood culture [[id number]]  (Normal) Collected: 04/04/25 1044    Order Status: Completed Specimen: Blood Updated: 04/09/25 1100     Blood Culture No Growth After 5 Days    Blood culture [[id number]]  (Abnormal) Collected: 04/06/25 0555    Order Status: Completed Specimen: Blood Updated: 04/09/25 0720     Blood Culture Positive - Aerobic/Pediatric Bottle      Methicillin resistant Staphylococcus aureus     Comment: <null> has been updated to reportable.        GRAM STAIN Gram positive cocci in clusters resembling Staph     Comment: Aerobic Bottle Positive   This is an appended report.  These results have been appended to a previously preliminary verified report.       Narrative:      For susceptibility refer to order [id number]  ID Consult Strongly Recommended    Blood culture [[id number]]  (Abnormal)  (Susceptibility) Collected: 04/06/25 0542    Order Status: Completed Specimen: Blood Updated: 04/09/25 0719     Blood Culture Positive - Aerobic/Pediatric Bottle      Methicillin resistant Staphylococcus aureus     Comment: <null> has been updated to reportable.        GRAM STAIN Gram positive cocci in clusters resembling Staph     Comment: This is an appended report. These results have been appended to a previously preliminary verified report.       Narrative:      Aerobic Bottle Positive   ID Consult Strongly Recommended    Afb Culture Stain [[id number]] Collected: 04/03/25 1943    Order Status: Completed Specimen: Wound from Arm, Left Updated: 04/07/25 1637     ACID FAST STAIN  No acid fast bacilli seen    Afb Culture Stain [[id number]] Collected: 04/03/25 2011    Order Status: Completed Specimen: Wound from Arm, Left Updated: 04/07/25 1637     ACID FAST STAIN  No acid fast bacilli seen    Afb Culture Stain [[id number]] Collected: 04/03/25 1905    Order Status: Completed Specimen: Tissue from AV Fistula, Left Updated: 04/07/25 1623     ACID FAST STAIN  No acid fast bacilli seen    Afb Culture Stain [[id number]] Collected: 04/03/25 1954    Order Status: Completed Specimen: Wound from Arm, Left Updated: 04/07/25 1623     ACID FAST STAIN  No acid fast bacilli seen    Afb Culture Stain [[id number]] Collected: 04/03/25 1816    Order Status: Completed Specimen: Tissue from AV Fistula, Left Updated: 04/07/25 1623     ACID FAST STAIN  No acid fast bacilli seen    Afb Culture Stain [[id number]] Collected: 04/03/25 1921    Order Status: Completed Specimen: Tissue from hematoma Updated: 04/07/25 1623     ACID FAST STAIN  No acid fast bacilli seen    Afb Culture Stain [[id number]] Collected: 04/03/25  1934    Order Status: Completed Specimen: Tissue from AV Fistula, Left Updated: 04/07/25 1618     ACID FAST STAIN  No acid fast bacilli seen    Fungus culture [[id number]]  (Normal) Collected: 04/03/25 1943    Order Status: Completed Specimen: Wound from Arm, Left Updated: 04/07/25 0935     Fungal Culture Culture In Progress    Fungus culture [[id number]]  (Normal) Collected: 04/03/25 1954    Order Status: Completed Specimen: Wound from Arm, Left Updated: 04/07/25 0935     Fungal Culture Culture In Progress    AFB Culture & Smear [[id number]] Collected: 04/03/25 1816    Order Status: Completed Specimen: Tissue from AV Fistula, Left Updated: 04/07/25 0815     CULTURE, AFB  Culture In Progress    AFB Culture & Smear [[id number]] Collected: 04/03/25 1905    Order Status: Completed Specimen: Tissue from AV Fistula, Left Updated: 04/07/25 0815     CULTURE, AFB  Culture In Progress    AFB Culture & Smear [[id number]] Collected: 04/03/25 1921    Order Status: Completed Specimen: Tissue from hematoma Updated: 04/07/25 0815     CULTURE, AFB  Culture In Progress    AFB Culture & Smear [[id number]] Collected: 04/03/25 1934    Order Status: Completed Specimen: Tissue from AV Fistula, Left Updated: 04/07/25 0815     CULTURE, AFB  Culture In Progress    AFB Culture & Smear [[id number]] Collected: 04/03/25 1943    Order Status: Completed Specimen: Wound from Arm, Left Updated: 04/07/25 0815     CULTURE, AFB  Culture In Progress    AFB Culture & Smear [[id number]] Collected: 04/03/25 1954    Order Status: Completed Specimen: Wound from Arm, Left Updated: 04/07/25 0815     CULTURE, AFB  Culture In Progress    AFB Culture & Smear [[id number]] Collected: 04/03/25 2011    Order Status: Completed Specimen: Wound from Arm, Left Updated: 04/07/25 0815     CULTURE, AFB  Culture In Progress    Culture, Anaerobic [[id number]] Collected: 04/03/25 2011    Order Status: Completed Specimen: Wound from Arm, Left Updated: 04/07/25 0748      Anaerobe Culture No Anaerobes Isolated    Culture, Anaerobic [[id number]] Collected: 04/03/25 1954    Order Status: Completed Specimen: Wound from Arm, Left Updated: 04/07/25 0747     Anaerobe Culture No Anaerobes Isolated    Culture, Anaerobic [[id number]] Collected: 04/03/25 1943    Order Status: Completed Specimen: Wound from Arm, Left Updated: 04/07/25 0747     Anaerobe Culture No Anaerobes Isolated    Culture, Anaerobic [[id number]] Collected: 04/03/25 1934    Order Status: Completed Specimen: Tissue from AV Fistula, Left Updated: 04/07/25 0746     Anaerobe Culture No Anaerobes Isolated    Culture, Anaerobic [[id number]] Collected: 04/03/25 1921    Order Status: Completed Specimen: Tissue from hematoma Updated: 04/07/25 0746     Anaerobe Culture No Anaerobes Isolated    Culture, Anaerobic [[id number]] Collected: 04/03/25 1910    Order Status: Completed Specimen: Tissue from AV Fistula, Left Updated: 04/07/25 0745     Anaerobe Culture No Anaerobes Isolated    Culture, Anaerobic [[id number]] Collected: 04/03/25 1816    Order Status: Completed Specimen: Wound from Arm, Left Updated: 04/07/25 0744     Anaerobe Culture No Anaerobes Isolated    Blood culture [[id number]]  (Abnormal) Collected: 04/04/25 1343    Order Status: Completed Specimen: Blood Updated: 04/06/25 0657     Blood Culture Positive - Aerobic/Pediatric Bottle      Methicillin resistant Staphylococcus aureus     Comment: <null> has been updated to reportable.        GRAM STAIN Gram positive cocci in clusters resembling Staph     Comment: Aerobic Bottle Positive    This is an appended report. These results have been appended to a previously preliminary verified report.       Narrative:      For sensitivities, refer to order # [id number]      Aerobic culture [[id number]]  (Abnormal) Collected: 04/03/25 2011    Order Status: Completed Specimen: Wound from Arm, Left Updated: 04/06/25 0556     CULTURE, AEROBIC Few Methicillin resistant Staphylococcus  aureus    Narrative:      For sensitivities, refer to order # [id number]    Aerobic culture [[id number]]  (Abnormal) Collected: 04/03/25 1954    Order Status: Completed Specimen: Wound from Arm, Left Updated: 04/06/25 0555     CULTURE, AEROBIC Many Methicillin resistant Staphylococcus aureus    Narrative:      For sensitivities, refer to order # [id number]    Aerobic culture [[id number]]  (Abnormal)  (Susceptibility) Collected: 04/03/25 1943    Order Status: Completed Specimen: Wound from Arm, Left Updated: 04/06/25 0553     CULTURE, AEROBIC Many Methicillin resistant Staphylococcus aureus    Aerobic culture [[id number]]  (Abnormal)  (Susceptibility) Collected: 04/03/25 1926    Order Status: Completed Specimen: Wound from Arm, Left Updated: 04/06/25 0544     CULTURE, AEROBIC Many Methicillin resistant Staphylococcus aureus    Aerobic culture [[id number]] Collected: 04/03/25 1911    Order Status: Completed Specimen: Tissue from Arm, Left Updated: 04/06/25 0543     CULTURE, AEROBIC No Growth    Aerobic culture [[id number]] Collected: 04/03/25 1816    Order Status: Completed Specimen: Wound from Arm, Left Updated: 04/06/25 0543     CULTURE, AEROBIC No Growth            Significant Imaging: I have reviewed all pertinent imaging results/findings within the past 24 hours.

## 2025-04-11 NOTE — SUBJECTIVE & OBJECTIVE
Interval History: no acute events    Review of Systems   Constitutional:  Negative for activity change, appetite change, chills, diaphoresis and fever.   HENT:  Negative for congestion and rhinorrhea.    Eyes:  Negative for visual disturbance.   Respiratory:  Negative for choking and shortness of breath.    Gastrointestinal:  Negative for abdominal distention, abdominal pain, constipation, diarrhea, nausea and vomiting.   Genitourinary:  Negative for difficulty urinating, dysuria, flank pain, hematuria, scrotal swelling, testicular pain and urgency.   Skin:  Negative for color change, pallor and wound.   Neurological:  Negative for dizziness and syncope.   Psychiatric/Behavioral:  Negative for confusion and hallucinations.      Objective:     Temp:  [98.1 °F (36.7 °C)-98.5 °F (36.9 °C)] 98.3 °F (36.8 °C)  Pulse:  [] 92  Resp:  [12-34] 12  SpO2:  [92 %-100 %] 100 %  BP: (113-161)/(55-82) 113/55     Body mass index is 21.13 kg/m².    Date 04/11/25 0700 - 04/12/25 0659   Shift 7836-6504 2518-6672 2321-9443 24 Hour Total   INTAKE   NG/   160   Shift Total(mL/kg) 160(2.6)   160(2.6)   OUTPUT   Urine(mL/kg/hr) 1800(3.7) 950  2750   Shift Total(mL/kg) 1800(29.4) 950(15.5)  2750(44.9)   Weight (kg) 61.2 61.2 61.2 61.2     Bladder Scan Volume (mL): 331 mL (04/06/25 1520)    Drains       Drain  Duration                  NG/OG Tube 04/04/25 1415 Rolf 10 Fr. Left nostril 7 days    Trialysis (Dialysis) Catheter 04/04/25 1208 right internal jugular 7 days         Urethral Catheter 04/07/25 1558 Triple-lumen;Latex 24 Fr. 4 days                     Physical Exam  Constitutional:       General: He is not in acute distress.     Appearance: He is well-developed. He is not ill-appearing, toxic-appearing or diaphoretic.   HENT:      Head: Normocephalic and atraumatic.      Mouth/Throat:      Mouth: Mucous membranes are moist.   Eyes:      Conjunctiva/sclera: Conjunctivae normal.   Pulmonary:      Effort: Pulmonary effort  is normal. No respiratory distress.   Abdominal:      General: There is no distension.      Palpations: Abdomen is soft. There is no mass.      Tenderness: There is no abdominal tenderness. There is no guarding.   Musculoskeletal:         General: No swelling or deformity.      Cervical back: Neck supple.   Skin:     General: Skin is warm.      Findings: No rash.   Neurological:      Mental Status: He is alert and oriented to person, place, and time.      Gait: Gait normal.   Psychiatric:         Mood and Affect: Mood normal.         Thought Content: Thought content normal.         Judgment: Judgment normal.           Significant Labs:    BMP:  Recent Labs   Lab 04/09/25  0341 04/10/25  0416 04/11/25 0312    138 142   K 3.6 3.7 3.9    103 109   CO2 24 22* 23   BUN 38* 52* 35*   CREATININE 5.9* 7.8* 5.3*   GLUCOSE 261* 251* 277*   CALCIUM 8.0* 8.6* 8.7       CBC:   Recent Labs   Lab 04/09/25  2039 04/10/25  0416 04/11/25 0312   WBC 15.40* 16.64* 16.75*   HGB 8.4* 8.5* 8.6*   HCT 26.2* 26.5* 27.2*    161 181

## 2025-04-11 NOTE — ASSESSMENT & PLAN NOTE
The likely etiology of thrombocytopenia is infection and sepsis. The patients 3 most recent labs are listed below.  Recent Labs     04/09/25  2039 04/10/25  0416 04/11/25  0312    161 181     Plan  - Will transfuse if platelet count is <10k.

## 2025-04-11 NOTE — PROGRESS NOTES
[facility] - Intensive Care  Palliative Medicine  Progress Note    Patient Name: Jevon Rajan  MRN: [id number]  Admission Date: 4/3/2025  Hospital Length of Stay: 7 days  Code Status: Full Code   Attending Provider: Gonzalez Reagan MD  Consulting Provider: Lisa Jacinto NP  Primary Care Physician: Melia, Primary Doctor  Principal Problem:Septic shock    Patient information was obtained from patient, relative(s), and primary team.      Assessment/Plan:     Neuro  Acute metabolic encephalopathy  - Evaluation and management per primary team   - ongoing and worsening; recommend transition to room with window and delirium precautions   - pt requires frequent calming and reassuring of safety, forgets location of hospital and events leading to hospitalization  - when reoriented is able to have appropriate discussions, and is able to identify Neha garces by voice on the phone   - of note, on , pt did share with palliative team and niharsha Desjackte by phone that his sister Eileen visited him yesterday (Eileen has been  for several years)     Cardiac/Vascular  Acute bacterial endocarditis  - Mgmt per primary team/ID/cardiology; while he has a known valvular mass, he is being treated by antibiotics alone as he is not a surgical candidate. Prognosis is poor if cultures do not clear.   - Illness trajectory education provided     Renal/  BPH with urinary obstruction  - Mgmt per urology and primary; not able to take flomax due to encephalopathy and inability to give this via NG   - pt with ongoing episodes of pain/bladder pressure that is causing him to call out in pain and become emotionally distressed; adjustments for opioid regimen to dilaudid given ESRD in coordination with HM;  talking with niece by phone helped calm pt, niece to coordinate family to visit; worked with pt on calming exercises while awaiting for opioid admin to relieve pain   - discussion with urology and MDT for improved symptom management    ESRD  "on hemodialysis  - Nephrology consulted and following; pt has had difficulty tolerating HD so far, family aware of this and with insight that this is a large factor in pt's prognosis   - family with insight that HD is a form of life support   - has temporary line in place though potentially may need line holiday due to persistent positive cultures (have encouraged discussion with nephrology, ID, and PCCM as initial line placement was very difficult and would require coordination of timing and replacement plan)     ID  * Septic shock  - Secondary to MRSA bacteremia from infected fistula   - ID following    MRSA bacteremia  - Abx, mgmt per primary team and ID; per ID, prognosis is poor if cultures do not clear, given that he is not a surgical candidate.   - repeat blood cultures so far with no growth but not finalized, also some continued improvement in overall mental status showing response to current treatment   - Illness trajectory education provided to family      Palliative Care  ACP (advance care planning)  4/10/2025  - interval chart reviewed; discussion pt with MDT during ICU rounds  - pt again with ongoing discomfort in AM 2/2 bladder irrigation; pt expressing "can't do this anymore" and other statements consistent with discomfort and contributing to need for continued GOC discussions with family   - pt's sister Ronna later visited and expressed continued gratitude for pt's care; continues to have good insight into pt's condition and potential prognosis; continues to advocate for GOC for pt's comfort; she is hopeful for continued improvement but would be open to alternate plans of care of pt continues to experience current level of discomfort to do so    - also later attempted to call pt's niece but was unable to reach; will continue attempts   - pt with planned HD 4/10; will review tolerance and discuss ongoing plans and goals of care with MDT and family     4/9/2025  - interval chart reviewed; discussion " pt with MDT during ICU rounds  - pt very uncomfortable/in pain throughout AM see BPH   - extensive time sent achieving pt comfort in AM; communicated with dez Solomon for medical update, along with Dr. Joan Antunez, palliative attending; also allowed Neha to talk with pt to reassure and calm   - family continues to have good insight into overall condition; hopeful for improvement but pt QOL and comfort continue to be a priority in GOC   - family continuing discussions regarding code status and overall GOC; understand HD tolerance is a big factor in prognosis currently and are open to discussions if team feels he is no longer tolerating HD; continued encouragement of shared family decision making as they all seem to have consistent goals for pt   - updated family information; pt has 3 sisters and 1 brother confirmed by Neha   - of concern, pt shared seeing/speaking with his  sister; Neha is aware that this is not typically a good indicator of prognosis which she shares is concerning for her   - reassured pt and Neha that we will continue to prioritize his comfort while continuing to try to improve overall condition   - pt is a , [facility] for [facility] and or long term placement if pt were to no longer qualify for HD in the future   - emotional support provided to pt and family; answered all questions   - continued communication and coordination with MDT     2025  - interval chart reviewed; discussion pt with MDT during ICU rounds   - visited pt at bedside in AM with bedside RN and provided comfort and redirection during episode of delirium; pt is able to be calmed with reassurance; pt remains oriented only to self at this time; can verbalize needs such as when he feels the sensation to void but not oriented to having a catheter in place   - later in morning pt's sisters Ronna and Nora at bedside; brief medical update  provided and reviewed pt's prognosis; Ronna and Nora shared that they thought pt previously had a DNR in place and/or had expressed wishes against resuscitation or aggressive end of life measures, but had wished to continue HD as long as tolerated; they also shared poor condition of pt's home and lack of home support that placement would be necessary in their opinion if pt's condition were to improve to allow for discharge  - later called pt's niece, Neha, who also later added her grandmother, pt's sister Bhavna, to the call; brief medical update provided; Neha and Bhavna were not aware of pt's prior wishes for DNR; discussed full code vs DNR, potential interventions, risks, and outcomes; Neha expressed considering DNR as long as full treatment and otherwise escalation of care would continue; Mrs. Huggins however expressed not wishing to be involved with that decision for pt  - encouraged family discussion with pt's niece and sisters, Nora Friend, and Bhavna (if she wished to participate)   - emotional support provided to pt and family throughout all interactions   - allowed time for questions/concerns of family   - updated MDT of family discussions     4/7/25 (Dr. Joan Antunez)   - Chart and interval history reviewed; discussed pt during MDT rounds. Pt had cystoscopy and dunaway placement by urology yesterday due to screaming and discomfort from urinary retention; found to have very enlarged prostate. Dunaway placed successfully by urology staff, though this was later removed due to pt complaining of significant pain.   - Pt screaming throughout much of the morning, possibly from urinary retention vs delirium. He was not able to participate in discussion secondary to this.   - Along with Dr Jordan, called and spoke with Jelanisampson (pt's preferred MPOA); she added her mother Erik to the call. Thorough medical update provided; shared transparent concern that pt's blood cultures are persistently  positive. While hopeful that the infection starts to clear, there is a chance we may not be able to treat this infection, unfortunately. Additionally, pt has been very uncomfortable over the last 24 hours or so due to urinary retention and/or delirium. They expressed understanding of severity of both his acute and chronic illness, and the fragility that results naturally as a result of his advanced age.   - Discussed plan moving forward, as well as code status. At this time, plan remains to continue with maximal medical therapy in hopes of improvement, though they have agreed to further discuss code status as a family. At this time, would maintain full code.   - Support provided during call   - Encouraged visitation if possible, as perhaps this will be a calming presence for pt  - Will follow up with pt and family for further conversations as clinical course evolves    4/4/25 (Lisa Jacinto, CRIS)   - Consult received; interval chart reviewed in detail; discussed pt with MDT during ICU rounds and ongoing throughout the day   - met with patient at bedside; introduction to palliative medicine team and role in current care and admission   - pt very lethargic and required encouragement and stimulation for orientation assessment; pt answers to name, able to state his 1st name, identifies he is at the hospital, and when asked who team should call for help with his medical care he answered Kenia (which is consistent with what he told  4/3 as well); quickly falls back asleep   - pt unable to participate in detailed GOC/ACP discussion on his own behalf at this time   - called Kenia (pt's niece) who shares that she has been pt's main caregiver for some time; she confirms pt is not , does not have children, and that he has 3 sisters; she also had pt's sister Bhavna join by phone for update/discussion   - Mrs. Huggins also shared that Kenia takes care of pt and understands his current history, needs, and  "wishes; per Bhavna, Kenia should be pt's medical decision maker while he lacks capacity, and confirms that pt's others sisters agree with this; they have asked that no other visitors or callers aside from Deshanta and pts sisters (Ronna Huggins, and Nora receive updates)   - Kenia and Bhavna confirm that they feel pt would wish to continue HD  - reviewed pt's current full code status, potential interventions, risks, and outcomes; Kenia and Bhavna agree for pt to remain full code, but were open to further discussion ideally when pt is able to participate in the discussion; updated pt's team of this and added this to EMR   - both shared difficulty pt has had recently and frustration with attempts to seek care to get answers to ongoing health concerns; they both shared appropriate concerns for pt's current condition and are hopeful for answers and improvement in pt's condition; they shared that they feel the  ICU team is moving quickly and working to improve his condition for which they are appreciative   - emotional support provided   - Allowed time for questions/concerns; all addressed; expressed availability of myself/palliative team for additional questions/concerns   - attempted to call pt's other sister, Ronna, as she had called unit for updates; unable to reach, LM   - updated MDT; ongoing communication and coordination with MDT         I will follow-up with patient. Please contact us if you have any additional questions.    Subjective:     Chief Complaint:   Chief Complaint   Patient presents with    Bleeding/Bruising       HPI:   From H&P: " Mr Jevon Rajan is a 85 y.o. man with ESRD with LUE AVF, HFpEF last EF 50-55%, CAD, DM who was transferred to [facility] ICU for septic shock due to MRSA bacteremia.      He was admitted at [facility] 4/1-3/2025 with sepsis, worsening to septic shock, then identified source as MRSA. 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      Upon arrival to [facility], he is moaning in pain and his LUE AVF has active pulsatile bright red blood. He does respond to his name. He denies pain anywhere other than his LUE. He is on levophed at 0.05. "     Palliative medicine consulted for goals of care discussion and advance care planning; for details of visit, see advance care planning section of plan.       Hospital Course:  No notes on file    Medications:  Continuous Infusions:  Scheduled Meds:   atorvastatin  80 mg Oral QHS    clopidogreL  75 mg Oral Daily    epoetin mj-epbx  10,000 Units Intravenous Every Tues, Thurs, Sat    erythromycin   Both Eyes Q8H    insulin aspart U-100  5 Units Subcutaneous TIDWM    insulin glargine U-100  20 Units Subcutaneous Daily    midodrine  15 mg Oral Q8H    pantoprazole  40 mg Oral Daily    sodium chloride 0.9%  10 mL Intravenous Q8H    tamsulosin  0.4 mg Oral Daily    vancomycin (VANCOCIN) IV (PEDS and ADULTS)  500 mg Intravenous Once     PRN Meds:  Current Facility-Administered Medications:     0.9%  NaCl infusion (for blood administration), , Intravenous, Q24H PRN    0.9% NaCl, , Intravenous, PRN    acetaminophen, 650 mg, Oral, Q4H PRN    albumin human 25%, 25 g, Intravenous, Daily PRN    dextrose 50%, 12.5 g, Intravenous, PRN    dextrose 50%, 25 g, Intravenous, PRN    diphenhydrAMINE, 50 mg, Oral, Q6H PRN    glucagon (human recombinant), 1 mg, Intramuscular, PRN    glucose, 16 g, Oral, PRN    glucose, 24 g, Oral, PRN    heparin (porcine), 1,000 Units, Intravenous, PRN    HYDROmorphone, 1 mg, Intravenous, Q4H PRN    hyoscyamine, 0.125 mg, Sublingual, Q4H PRN    insulin aspart U-100, 0-5 Units, Subcutaneous, QID (AC + HS) PRN    melatonin, 6 mg, Oral, Nightly PRN    naloxone, 0.02 mg, Intravenous, PRN    ondansetron, 4 mg, Intravenous, Q6H PRN    prochlorperazine, 5 mg, Intravenous, Q6H PRN    senna, 8.6 mg, Oral, Daily PRN   "  sodium chloride 0.9%, 250 mL, Intravenous, PRN    Pharmacy to dose Vancomycin consult, , , Once **AND** vancomycin - pharmacy to dose, , Intravenous, pharmacy to manage frequency    Objective:     Vital Signs (Most Recent):  Temp: 98.5 °F (36.9 °C) (04/10/25 1901)  Pulse: (!) 113 (04/10/25 2000)  Resp: 20 (04/10/25 2000)  BP: 133/62 (04/10/25 2000)  SpO2: (!) 93 % (04/10/25 2000) Vital Signs (24h Range):  Temp:  [97.4 °F (36.3 °C)-98.5 °F (36.9 °C)] 98.5 °F (36.9 °C)  Pulse:  [] 113  Resp:  [11-28] 20  SpO2:  [83 %-100 %] 93 %  BP: (108-135)/(54-70) 133/62     Weight: 61.2 kg (134 lb 14.7 oz)  Body mass index is 21.13 kg/m².     Physical Exam  Vitals and nursing note reviewed.   Constitutional:       Appearance: He is ill-appearing.      Comments: Arouses to name, able to answer simple questions with 1-2 word answers with stimulation, falls asleep quickly; varying levels of alertness and lethargy throughout the day; calling out in pain and confusion in AM but improved by later morning    HENT:      Head: Normocephalic and atraumatic.   Pulmonary:      Effort: Pulmonary effort is normal. No respiratory distress.      Comments: NC  Skin:     General: Skin is dry.      Coloration: Skin is pale.      Findings: Lesion present. Bruising: multiple skin lesions in various stages of healing. Erythema: pale/gray for ethnicity.  Neurological:      Mental Status: He is lethargic.      Comments: Answers to his name; less oriented overall, decreased awareness of location at time but able to reorient; oriented to niece by voice on phone A/P)     Psychiatric:         Behavior: Behavior is cooperative.          Advance Care Planning   Advance Directives:   Living Will: No    LaPOST: No    Do Not Resuscitate Status: No    Medical Power of : No      Decision Making:  Family answered questions and Patient answered questions  Goals of Care: What is most important right now is to focus on symptom/pain control,  curative/life-prolongation (regardless of treatment burdens), improvement in condition but with limits to invasive therapies. Accordingly, we have decided that the best plan to meet the patient's goals includes continuing with treatment.       Significant Labs: All pertinent labs within the past 24 hours have been reviewed.  CBC:   Recent Labs   Lab 04/10/25  0416   WBC 16.64*   HGB 8.5*   HCT 26.5*   MCV 91        BMP:  Recent Labs   Lab 04/10/25  0416      K 3.7      CO2 22*   BUN 52*   CREATININE 7.8*   CALCIUM 8.6*     LFT:  Lab Results   Component Value Date    AST 15 04/10/2025    ALKPHOS 76 04/10/2025    BILITOT 0.3 04/10/2025     Albumin:   Albumin   Date Value Ref Range Status   04/10/2025 2.3 (L) 3.5 - 5.2 g/dL Final   03/21/2025 3.2 (L) 3.5 - 5.2 g/dL Final     Protein:   Total Protein   Date Value Ref Range Status   03/21/2025 7.5 6.0 - 8.4 g/dL Final     Lactic acid:   Lab Results   Component Value Date    LACTATE 0.9 04/03/2025    LACTATE 2.6 (H) 04/02/2025     Significant Imaging: I have reviewed all pertinent imaging results/findings within the past 24 hours.    Total visit time: 55 minutes    35 min visit time including: face to face time in discussion of symptom assessment, and exploring options and burdens of offered treatments.  This also includes non-face to face time preparing to see the patient including chart review, obtaining and/or reviewing separately obtained history, documenting clinical information in the electronic or other health record, independently interpreting results and communicating results to the patient/family/caregiver, family discussions by phone if not able to be present, coordination of care with other specialists, and discharge planning.     20 min ACP time spent: goals of care, advanced care planning, emotional support, formulating and communicating prognosis, exploring burden/ benefit of various approaches of treatment.     Lisa Jacinto,  NP  Palliative Medicine  [facility] - [place] - Intensive Care

## 2025-04-11 NOTE — NURSING
09:15, Mr. Rajan has conflicting diet orders, NPO except meds and pureed diet. He also has insulin aspart u-100 5 units with meals. POCT glucometer is every 6 hrs. He tolerated ice chips and sips of water without difficulty. No water falling out his mouth. Dry cough after taking sips. Brianne Reagan at bedside and updated on patient's status. He verbalizes understanding. Tube feeding turned off per verbal order from Dr. Reagan. Plan will be to remove NGT today if tolerates PO intake.    11:40,  speech therapy at bedside. Mr. Rajan tolerating PO pudding and water.     15:05, Mr. Rajan sleeping calmly, but becomes frightened and confused when awakened, and grabs at my arm. Patient reoriented to person, place, time, and situation. Mr. Rajan quickly falls back to sleep. WOC nurse at bedside and completes L. Upper arm wound care/dressing change. Patient asleep while wound care/dressing change completed.     15:30, Dr. Reagan updated on patient's confusion/fright when being awakened and he did not display this behavior before receiving lorazepam. Dr. Reagan verbalized understanding. No new orders received.     16:30, Dr. Arnold at the bedside and clamps/turns off CBI.     16:53, CBG 47 and 49. Dr. Reagan updated on Mr. Rajan's status. New orders received.

## 2025-04-11 NOTE — PROGRESS NOTES
"[facility] Intensive Care  [facility]  Progress Note    Patient Name: Jevon Rajan  MRN: [id number]  Patient Class: IP- Inpatient   Admission Date: 4/3/2025  Length of Stay: 8 days  Attending Physician: Gonzalez Reagan MD  Primary Care Provider: No, Primary Doctor        Subjective     Principal Problem:Septic shock        HPI:  Mr Jevon Rajan is a 85 y.o. man with ESRD with LUE AVF, HFpEF last EF 50-55%, CAD, DM who was transferred to [facility] ICU for septic shock due to MRSA bacteremia.     He was admitted at [facility] 4/1-3/2025 with sepsis, worsening to septic shock, then identified source as MRSA. 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     Upon arrival to [facility], he is moaning in pain and his LUE AVF has active pulsatile bright red blood. He does respond to his name. He denies pain anywhere other than his LUE. He is on levophed at 0.05.     Overview/Hospital Course:  Mr Jevon Rajan is a 85 y.o. man with ESRD on HD with LUE AVF that has been bleeding, CHF, CAD s/p recent stenting 1/2025 who was admitted with MRSA bacteremia and bleeding from his LUE AVF. Continued vancomycin; weaned off levophed. Vascular surgery emergently consulted; on 4/3/25 they took him to OR and noted: "well incorporated proximal and central graft without evidence of infection, resected graft and covered proximal and central remnants with multiple layers. Mid graft removed in entirety and sent for culture. Ruptured pseudoaneurysm with infected hematoma, graft completely obliterated at mid segment." Surgical cultures with Staph. Suspect AVF or chronic scratching of pruritic skin is the source of his infection. TTE showed endocarditis: EF 40-45%, G1DD, RV systolic dysfunction, large 1.9x1.2cm fixed mass on the posterior mitral valve leaflet with moder to severe regurgitation, cannot rule out posterior mitral " valve leaflet perforation. Discussed with cardiac surgery; he is high mortality risk, and surgery is not advised. ID following. Nephrology consulted; trialysis was placed for HD. Persistently positive for MRSA in blood cultures. He has had urinary retention; Urology consulted, performed bedside cystoscopy on 4/6/25 with large prostate noted. Noted to have clot retention and went urgently to OR with Urology on 4/7/25; asa and DVT ppx held.     WBC normalized. S/p clot removal with Urology, 1U PRBC ordered this morning with Hb 6.1. Soft BPs, will add midodrine for HD to step down to floor. Glucoses high, will increase insulin. Attempted to s/d but BP too low, may still need CRRT rather. 4/8 cx clear so far. Increasing insulin again. Increasing midodrine to 15 mg TID. Hb 6.8, 1U PRBC ordered.     BP appears to be recovering a bit, perhaps will tolerate reg HD, will discuss w Neph- will run him on HD Saturday, if tolerates normal HD will remove central line and give 2 day holiday and place tunnel on Monday. We will give him HD before dc to facility on Tuesday if accepted by then.         Interval History:  NAEON.  No new issues.   CC- Fatigue  All questions answered and updates on care given.       ROS:  General: Negative for fevers   Cardiac: Negative for chest pain   Pulmonary: Negative for wheezing  GI: Negative for abdominal distention      Vitals:    04/11/25 0318 04/11/25 0400 04/11/25 0500 04/11/25 0600   BP:  (!) 120/58 (!) 143/70 131/66   Pulse:  91 104 101   Resp:  17 (!) 29 15   Temp: 98.2 °F (36.8 °C)      TempSrc: Axillary      SpO2:  96% 96% (!) 94%   Weight:       Height:              Body mass index is 21.13 kg/m².      PHYSICAL EXAM:  GENERAL APPEARANCE: alert and cooperative.     HEAD: NC/AT  CARDIAC: There is no cyanosis or pallor.   LUNGS: No apparent wheezing or stridor.  ABDOMEN: Non-distended. No guarding.  PSYCHIATRIC: No tangential speech. No Hyperactive features.        Recent Results (from the  past 24 hours)   POCT glucose    Collection Time: 04/10/25 12:08 PM   Result Value Ref Range    POCT Glucose 198 (H) 70 - 110 mg/dL   POCT glucose    Collection Time: 04/10/25  5:09 PM   Result Value Ref Range    POCT Glucose 247 (H) 70 - 110 mg/dL   POCT glucose    Collection Time: 04/10/25  9:37 PM   Result Value Ref Range    POCT Glucose 247 (H) 70 - 110 mg/dL   Comprehensive Metabolic Panel    Collection Time: 04/11/25  3:12 AM   Result Value Ref Range    Sodium 142 136 - 145 mmol/L    Potassium 3.9 3.5 - 5.1 mmol/L    Chloride 109 95 - 110 mmol/L    CO2 23 23 - 29 mmol/L    Glucose 277 (H) 70 - 110 mg/dL    BUN 35 (H) 8 - 23 mg/dL    Creatinine 5.3 (H) 0.5 - 1.4 mg/dL    Calcium 8.7 8.7 - 10.5 mg/dL    Protein Total 6.6 6.0 - 8.4 gm/dL    Albumin 2.2 (L) 3.5 - 5.2 g/dL    Bilirubin Total 0.4 0.1 - 1.0 mg/dL    ALP 88 40 - 150 unit/L    AST 22 11 - 45 unit/L    ALT 10 10 - 44 unit/L    Anion Gap 10 8 - 16 mmol/L    eGFR 10 (L) >60 mL/min/1.73/m2   Phosphorus    Collection Time: 04/11/25  3:12 AM   Result Value Ref Range    Phosphorus Level 2.9 2.7 - 4.5 mg/dL   CBC with Differential    Collection Time: 04/11/25  3:12 AM   Result Value Ref Range    WBC 16.75 (H) 3.90 - 12.70 K/uL    RBC 2.92 (L) 4.60 - 6.20 M/uL    HGB 8.6 (L) 14.0 - 18.0 gm/dL    HCT 27.2 (L) 40.0 - 54.0 %    MCV 93 82 - 98 fL    MCH 29.5 27.0 - 31.0 pg    MCHC 31.6 (L) 32.0 - 36.0 g/dL    RDW 17.2 (H) 11.5 - 14.5 %    Platelet Count 181 150 - 450 K/uL    MPV 11.3 9.2 - 12.9 fL    Nucleated RBC 1 (H) <=0 /100 WBC    Neut % 87.5 (H) 38 - 73 %    Lymph % 3.8 (L) 18 - 48 %    Mono % 5.1 4 - 15 %    Eos % 1.9 <=8 %    Basophil % 0.2 <=1.9 %    Imm Grans % 1.5 (H) 0.0 - 0.5 %    Neut # 14.65 (H) 1.8 - 7.7 K/uL    Lymph # 0.64 (L) 1 - 4.8 K/uL    Mono # 0.86 0.3 - 1 K/uL    Eos # 0.32 <=0.5 K/uL    Baso # 0.03 <=0.2 K/uL    Imm Grans # 0.25 (H) 0.00 - 0.04 K/uL   POCT glucose    Collection Time: 04/11/25  6:05 AM   Result Value Ref Range    POCT  Glucose 286 (H) 70 - 110 mg/dL   POCT glucose    Collection Time: 04/11/25  8:52 AM   Result Value Ref Range    POCT Glucose 225 (H) 70 - 110 mg/dL       Microbiology Results (last 7 days)       Procedure Component Value Units Date/Time    Blood culture [[id number]]  (Normal) Collected: 04/08/25 0401    Order Status: Completed Specimen: Blood Updated: 04/11/25 0600     Blood Culture No Growth After 72 Hours    Blood culture [[id number]]  (Normal) Collected: 04/08/25 0506    Order Status: Completed Specimen: Blood Updated: 04/11/25 0600     Blood Culture No Growth After 72 Hours    Fungus culture [[id number]]  (Normal) Collected: 04/03/25 1816    Order Status: Completed Specimen: Wound from Arm, Left Updated: 04/10/25 2300     Fungal Culture No Fungus Isolated at 1 Week    Fungus culture [[id number]]  (Normal) Collected: 04/03/25 1934    Order Status: Completed Specimen: Tissue from AV Fistula, Left Updated: 04/10/25 2300     Fungal Culture No Fungus Isolated at 1 Week    Fungus culture [[id number]]  (Normal) Collected: 04/03/25 2011    Order Status: Completed Specimen: Wound from Arm, Left Updated: 04/10/25 2300     Fungal Culture No Fungus Isolated at 1 Week    Fungus culture [[id number]]  (Normal) Collected: 04/03/25 1904    Order Status: Completed Specimen: Tissue from AV Fistula, Left Updated: 04/10/25 2201     Fungal Culture No Fungus Isolated at 1 Week    Fungus culture [[id number]]  (Normal) Collected: 04/03/25 1921    Order Status: Completed Specimen: Tissue from hematoma Updated: 04/10/25 2201     Fungal Culture No Fungus Isolated at 1 Week    Blood culture [[id number]]  (Normal) Collected: 04/04/25 1044    Order Status: Completed Specimen: Blood Updated: 04/09/25 1100     Blood Culture No Growth After 5 Days    Blood culture [[id number]]  (Abnormal) Collected: 04/06/25 0555    Order Status: Completed Specimen: Blood Updated: 04/09/25 0720     Blood Culture Positive - Aerobic/Pediatric Bottle       Methicillin resistant Staphylococcus aureus     Comment: <null> has been updated to reportable.        GRAM STAIN Gram positive cocci in clusters resembling Staph     Comment: Aerobic Bottle Positive   This is an appended report. These results have been appended to a previously preliminary verified report.       Narrative:      For susceptibility refer to order [id number]  ID Consult Strongly Recommended    Blood culture [[id number]]  (Abnormal)  (Susceptibility) Collected: 04/06/25 0542    Order Status: Completed Specimen: Blood Updated: 04/09/25 0719     Blood Culture Positive - Aerobic/Pediatric Bottle      Methicillin resistant Staphylococcus aureus     Comment: <null> has been updated to reportable.        GRAM STAIN Gram positive cocci in clusters resembling Staph     Comment: This is an appended report. These results have been appended to a previously preliminary verified report.       Narrative:      Aerobic Bottle Positive   ID Consult Strongly Recommended    Afb Culture Stain [[id number]] Collected: 04/03/25 1943    Order Status: Completed Specimen: Wound from Arm, Left Updated: 04/07/25 1637     ACID FAST STAIN  No acid fast bacilli seen    Afb Culture Stain [[id number]] Collected: 04/03/25 2011    Order Status: Completed Specimen: Wound from Arm, Left Updated: 04/07/25 1637     ACID FAST STAIN  No acid fast bacilli seen    Afb Culture Stain [[id number]] Collected: 04/03/25 1905    Order Status: Completed Specimen: Tissue from AV Fistula, Left Updated: 04/07/25 1623     ACID FAST STAIN  No acid fast bacilli seen    Afb Culture Stain [[id number]] Collected: 04/03/25 1954    Order Status: Completed Specimen: Wound from Arm, Left Updated: 04/07/25 1623     ACID FAST STAIN  No acid fast bacilli seen    Afb Culture Stain [[id number]] Collected: 04/03/25 1816    Order Status: Completed Specimen: Tissue from AV Fistula, Left Updated: 04/07/25 1623     ACID FAST STAIN  No acid fast bacilli seen    Afb  Culture Stain [[id number]] Collected: 04/03/25 1921    Order Status: Completed Specimen: Tissue from hematoma Updated: 04/07/25 1623     ACID FAST STAIN  No acid fast bacilli seen    Afb Culture Stain [[id number]] Collected: 04/03/25 1934    Order Status: Completed Specimen: Tissue from AV Fistula, Left Updated: 04/07/25 1618     ACID FAST STAIN  No acid fast bacilli seen    Fungus culture [[id number]]  (Normal) Collected: 04/03/25 1943    Order Status: Completed Specimen: Wound from Arm, Left Updated: 04/07/25 0935     Fungal Culture Culture In Progress    Fungus culture [[id number]]  (Normal) Collected: 04/03/25 1954    Order Status: Completed Specimen: Wound from Arm, Left Updated: 04/07/25 0935     Fungal Culture Culture In Progress    AFB Culture & Smear [[id number]] Collected: 04/03/25 1816    Order Status: Completed Specimen: Tissue from AV Fistula, Left Updated: 04/07/25 0815     CULTURE, AFB  Culture In Progress    AFB Culture & Smear [[id number]] Collected: 04/03/25 1905    Order Status: Completed Specimen: Tissue from AV Fistula, Left Updated: 04/07/25 0815     CULTURE, AFB  Culture In Progress    AFB Culture & Smear [[id number]] Collected: 04/03/25 1921    Order Status: Completed Specimen: Tissue from hematoma Updated: 04/07/25 0815     CULTURE, AFB  Culture In Progress    AFB Culture & Smear [[id number]] Collected: 04/03/25 1934    Order Status: Completed Specimen: Tissue from AV Fistula, Left Updated: 04/07/25 0815     CULTURE, AFB  Culture In Progress    AFB Culture & Smear [[id number]] Collected: 04/03/25 1943    Order Status: Completed Specimen: Wound from Arm, Left Updated: 04/07/25 0815     CULTURE, AFB  Culture In Progress    AFB Culture & Smear [[id number]] Collected: 04/03/25 1954    Order Status: Completed Specimen: Wound from Arm, Left Updated: 04/07/25 0815     CULTURE, AFB  Culture In Progress    AFB Culture & Smear [[id number]] Collected: 04/03/25 2011    Order Status: Completed Specimen:  Wound from Arm, Left Updated: 04/07/25 0815     CULTURE, AFB  Culture In Progress    Culture, Anaerobic [[id number]] Collected: 04/03/25 2011    Order Status: Completed Specimen: Wound from Arm, Left Updated: 04/07/25 0748     Anaerobe Culture No Anaerobes Isolated    Culture, Anaerobic [[id number]] Collected: 04/03/25 1954    Order Status: Completed Specimen: Wound from Arm, Left Updated: 04/07/25 0747     Anaerobe Culture No Anaerobes Isolated    Culture, Anaerobic [[id number]] Collected: 04/03/25 1943    Order Status: Completed Specimen: Wound from Arm, Left Updated: 04/07/25 0747     Anaerobe Culture No Anaerobes Isolated    Culture, Anaerobic [[id number]] Collected: 04/03/25 1934    Order Status: Completed Specimen: Tissue from AV Fistula, Left Updated: 04/07/25 0746     Anaerobe Culture No Anaerobes Isolated    Culture, Anaerobic [[id number]] Collected: 04/03/25 1921    Order Status: Completed Specimen: Tissue from hematoma Updated: 04/07/25 0746     Anaerobe Culture No Anaerobes Isolated    Culture, Anaerobic [[id number]] Collected: 04/03/25 1910    Order Status: Completed Specimen: Tissue from AV Fistula, Left Updated: 04/07/25 0745     Anaerobe Culture No Anaerobes Isolated    Culture, Anaerobic [[id number]] Collected: 04/03/25 1816    Order Status: Completed Specimen: Wound from Arm, Left Updated: 04/07/25 0744     Anaerobe Culture No Anaerobes Isolated    Blood culture [[id number]]  (Abnormal) Collected: 04/04/25 1343    Order Status: Completed Specimen: Blood Updated: 04/06/25 0657     Blood Culture Positive - Aerobic/Pediatric Bottle      Methicillin resistant Staphylococcus aureus     Comment: <null> has been updated to reportable.        GRAM STAIN Gram positive cocci in clusters resembling Staph     Comment: Aerobic Bottle Positive    This is an appended report. These results have been appended to a previously preliminary verified report.       Narrative:      For sensitivities, refer to order #  [id number]      Aerobic culture [[id number]]  (Abnormal) Collected: 04/03/25 2011    Order Status: Completed Specimen: Wound from Arm, Left Updated: 04/06/25 0556     CULTURE, AEROBIC Few Methicillin resistant Staphylococcus aureus    Narrative:      For sensitivities, refer to order # [id number]    Aerobic culture [[id number]]  (Abnormal) Collected: 04/03/25 1954    Order Status: Completed Specimen: Wound from Arm, Left Updated: 04/06/25 0555     CULTURE, AEROBIC Many Methicillin resistant Staphylococcus aureus    Narrative:      For sensitivities, refer to order # [id number]    Aerobic culture [[id number]]  (Abnormal)  (Susceptibility) Collected: 04/03/25 1943    Order Status: Completed Specimen: Wound from Arm, Left Updated: 04/06/25 0553     CULTURE, AEROBIC Many Methicillin resistant Staphylococcus aureus    Aerobic culture [[id number]]  (Abnormal)  (Susceptibility) Collected: 04/03/25 1926    Order Status: Completed Specimen: Wound from Arm, Left Updated: 04/06/25 0544     CULTURE, AEROBIC Many Methicillin resistant Staphylococcus aureus    Aerobic culture [[id number]] Collected: 04/03/25 1911    Order Status: Completed Specimen: Tissue from Arm, Left Updated: 04/06/25 0543     CULTURE, AEROBIC No Growth    Aerobic culture [[id number]] Collected: 04/03/25 1816    Order Status: Completed Specimen: Wound from Arm, Left Updated: 04/06/25 0543     CULTURE, AEROBIC No Growth                          Assessment & Plan  Septic shock  This patient has shock. The type of shock is distributive due to sepsis. The patient had the following evidence of shock: persistent hypotension and altered mental status. 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 Shock is  "now resolved and vasopressors are off. WBC worsening  HTN (hypertension)  Patient's blood pressure range in the last 24 hours was: BP  Min: 110/59  Max: 161/76.The patient's inpatient anti-hypertensive regimen is listed below:  Current Antihypertensives       Plan  - admitted with shock  - now off vasopressors. Continue to hold BP Rx as BP is well controlled without it   HLD (hyperlipidemia)  - continue statin  Type 2 diabetes mellitus with hyperglycemia, without long-term current use of insulin  A1c:   Lab Results   Component Value Date    HGBA1C 5.7 (H) 04/02/2025     Meds: lantus + SSI PRN to maintain goal 140-180  Tube feeds  accuchecks, hypoglycemic protocol      Coronary artery disease involving native coronary artery of native heart without angina pectoris  Patient with known CAD s/p stent placement, which is controlled Will continue ASA, Plavix, and Statin and monitor for S/Sx of angina/ACS. Continue to monitor on telemetry.   - last C was 1/2025: Severely calcified mid RCA stenosis unable to cross with PTCA balloons, treated initially with 0.9 laser atherectomy, followed by CSI atherectomy system with total of 5 runs (3 at low speed, 2 at high speed), pre-dilated using 2.0 compliant and 3.5 noncompliant balloon followed by 3.5 X 16 mm megatron stent.  Focal plaque rupture/erosion noted in the proximal and ostial RCA that were treated with  3.5 X 28 in the proximal RCA, 4.0 X 16 mm in the ostial RCA.  No residual stenosis post intervention.  Patient had ALAN 3 flow at the end of the procedure  - with elevated troponin likely due to septic shock  Recent Labs   Lab 04/04/25  1028   TROPONINI 2.913*   - continue asa, plavix, statin  - Cardiology consulted  - no plans for ischemic evaluation at this time   ESRD on hemodialysis  - ESRD on HD via LUE AVF  - LUE AVF was actively bleeding on arrival on 4/3/25. Vascular surgery was consulted. He went emergently to the OR on 4/3/25: "Severely calcified mid RCA " "stenosis unable to cross with PTCA balloons, treated initially with 0.9 laser atherectomy, followed by CSI atherectomy system with total of 5 runs (3 at low speed, 2 at high speed), pre-dilated using 2.0 compliant and 3.5 noncompliant balloon followed by 3.5 X 16 mm megatron stent.  Focal plaque rupture/erosion noted in the proximal and ostial RCA that were treated with  3.5 X 28 in the proximal RCA, 4.0 X 16 mm in the ostial RCA.  No residual stenosis post intervention.  Patient had ALAN 3 flow at the end of the procedure"  - cultures from AVF= Staph   - wound care orders in place for surgical site  - Nephrology is consulted  - trialysis placed on 4/4/25 for CRRT  - he will need THDC when bacteremia is resolved   Anemia in ESRD (end-stage renal disease)  Anemia is likely due to  ESRD, blood loss . Most recent hemoglobin and hematocrit are listed below.  Recent Labs     04/09/25  2039 04/10/25  0416 04/11/25  0312   HGB 8.4* 8.5* 8.6*   HCT 26.2* 26.5* 27.2*     Plan  - Monitor serial CBC: Daily and recheck now  - Transfuse PRBC if patient becomes hemodynamically unstable, symptomatic or H/H drops below 7/21.  - Patient has not received any PRBC transfusions to date  - Patient's anemia is currently stable  Acute metabolic encephalopathy  - he is oriented to self but otherwise confused  - CTH 4/1/25 with no acute process  - suspect this is due to sepsis  - NGT placed on 4/4/25 so that he could get his asa/plavix  - swallow- continue puree. Continue NGT until definitely improving and swallowing Rx     ACP (advance care planning)  Advance Care Planning     Date: 04/04/2025  - palliative consulted   - Mr Escoto specifically told Dr Joradn to contact Kenia Smyth for all updates  - discussed code status with Kenia. She states she has spoken with Mr Escoto's sisters and they all want full code status.          Acute bacterial endocarditis  - TTE 4/4/25: "1.9x1.2cm large fixed heterogeneous mass present on " "the posterior leaflet (new finding vs 9/2023 echo). There is moderate to severe regurgitation with an eccentric jet. Cannot exclude severe MR with possible PMVL perforation in association with large vegetation."  - this is in the setting of MRSA bacteremia  - ID is consulted  - repeat blood cultures until clear   - suspect source is skin/chronic scratching vs AVF infection- cultures from resected AVF with Staph   - discussed with Cardiac surgery Dr Castro 4/4/25- given age and comorbidities, he is very high risk of mortality with surgery. He recommends medical management at this point.  - on vancomycin      MRSA bacteremia  - suspect source= chronic skin scratching vs infected AVF  - cultures of AVF with Staph   - he has endocarditis  - ID consulted  - on vanc     NSTEMI (non-ST elevated myocardial infarction)  - see CAD    BPH with urinary obstruction  - noted 4/6/25 when patient became very agitated and screaming he couldn't urinate  - nursing unable to place dunaway/coude  - Urology consulted. Bedside cystoscopy on 4/6/25 noted clots, large prostate. Catheter was placed but was then removed due to pain  - 4/7 he is again agitated that he cannot urinate  - follow up with Urology   - tamsulosin ordered if he is awake enough to swallow     Thrombocytopenia  The likely etiology of thrombocytopenia is infection and sepsis. The patients 3 most recent labs are listed below.  Recent Labs     04/09/25  2039 04/10/25  0416 04/11/25  0312    161 181     Plan  - Will transfuse if platelet count is <10k.    AV fistula infection  - LUE AVF was actively bleeding on arrival on 4/3/25. Vascular surgery was consulted. He went emergently to the OR on 4/3/25: "Severely calcified mid RCA stenosis unable to cross with PTCA balloons, treated initially with 0.9 laser atherectomy, followed by CSI atherectomy system with total of 5 runs (3 at low speed, 2 at high speed), pre-dilated using 2.0 compliant and 3.5 noncompliant balloon " "followed by 3.5 X 16 mm megatron stent.  Focal plaque rupture/erosion noted in the proximal and ostial RCA that were treated with  3.5 X 28 in the proximal RCA, 4.0 X 16 mm in the ostial RCA.  No residual stenosis post intervention.  Patient had ALAN 3 flow at the end of the procedure"  - cultures from AVF= Staph   - wound care orders in place for surgical site  - trialysis currently in place for HD    Emphysema lung  - emphysema noted on CT  - no signs of COPD exacerbation  Pulmonary nodules  - CT 4/3/25 with few small pulmonary nodules  - will need outpatient follow up     Gross hematuria      NSVT (nonsustained ventricular tachycardia)        VTE Risk Mitigation (From admission, onward)           Ordered     heparin (porcine) injection 1,000 Units  As needed (PRN)         04/05/25 2100     IP VTE HIGH RISK PATIENT  Once         04/03/25 1516     Place TEMO hose  Until discontinued         04/03/25 1516     Place sequential compression device  Until discontinued         04/03/25 1516     Reason for No Pharmacological VTE Prophylaxis  Once        Question:  Reasons:  Answer:  Physician Provided (leave comment)    04/03/25 1516                    Discharge Planning   BAYRON:      Code Status: Full Code   Medical Readiness for Discharge Date:   Discharge Plan A: [place]            Critical care time spent on the evaluation and treatment of severe organ dysfunction, review of pertinent labs and imaging studies, discussions with consulting providers and discussions with patient/family: 35 minutes.            Gonzalez Reagan MD  Department of Hospital Medicine   [facility]    "

## 2025-04-11 NOTE — ASSESSMENT & PLAN NOTE
"4/11/2025  - interval chart reviewed discussed pt with MDT, including nephrology and ID   - pt continues to lack capacity for medical decision making   - emotional support provided with ongoing encephalopathy and concerns for hospital delirium; overall improving slowly   - no family at bedside today for additional discussion; MDT has discussed plan to proceed with tunneled line placement on Monday; will plan for follow up discussion with family after that time     4/10/2025  - interval chart reviewed; discussion pt with MDT during ICU rounds  - pt again with ongoing discomfort in AM 2/2 bladder irrigation; pt expressing "can't do this anymore" and other statements consistent with discomfort and contributing to need for continued GOC discussions with family   - pt's sister Ronna later visited and expressed continued gratitude for pt's care; continues to have good insight into pt's condition and potential prognosis; continues to advocate for GOC for pt's comfort; she is hopeful for continued improvement but would be open to alternate plans of care of pt continues to experience current level of discomfort to do so    - also later attempted to call pt's niece but was unable to reach; will continue attempts   - pt with planned HD 4/10; will review tolerance and discuss ongoing plans and goals of care with MDT and family     4/9/2025  - interval chart reviewed; discussion pt with MDT during ICU rounds  - pt very uncomfortable/in pain throughout AM see BPH   - extensive time sent achieving pt comfort in AM; communicated with dez Solomon for medical update, along with Dr. Joan Antunez, palliative attending; also allowed Neha to talk with pt to reassure and calm   - family continues to have good insight into overall condition; hopeful for improvement but pt QOL and comfort continue to be a priority in GOC   - family continuing discussions regarding code status and overall GOC; understand HD tolerance is a big factor " in prognosis currently and are open to discussions if team feels he is no longer tolerating HD; continued encouragement of shared family decision making as they all seem to have consistent goals for pt   - updated family information; pt has 3 sisters and 1 brother confirmed by Neha   - of concern, pt shared seeing/speaking with his  sister; Neha is aware that this is not typically a good indicator of prognosis which she shares is concerning for her   - reassured pt and Joekacy that we will continue to prioritize his comfort while continuing to try to improve overall condition   - pt is a , [facility] for [facility] and or FDC placement if pt were to no longer qualify for HD in the future   - emotional support provided to pt and family; answered all questions   - continued communication and coordination with MDT     2025  - interval chart reviewed; discussion pt with MDT during ICU rounds   - visited pt at bedside in AM with bedside RN and provided comfort and redirection during episode of delirium; pt is able to be calmed with reassurance; pt remains oriented only to self at this time; can verbalize needs such as when he feels the sensation to void but not oriented to having a catheter in place   - later in morning pt's sisters Ronna and Nora at bedside; brief medical update provided and reviewed pt's prognosis; Ronna and Nora shared that they thought pt previously had a DNR in place and/or had expressed wishes against resuscitation or aggressive end of life measures, but had wished to continue HD as long as tolerated; they also shared poor condition of pt's home and lack of home support that placement would be necessary in their opinion if pt's condition were to improve to allow for discharge  - later called pt's niece, Neha, who also later added her grandmother, pt's sister Bhavna, to the call; brief medical update  provided; Neha and Bhavna were not aware of pt's prior wishes for DNR; discussed full code vs DNR, potential interventions, risks, and outcomes; Neha expressed considering DNR as long as full treatment and otherwise escalation of care would continue; Mrs. Huggins however expressed not wishing to be involved with that decision for pt  - encouraged family discussion with pt's niece and sisters, Ronna, Nora, and Bhavna (if she wished to participate)   - emotional support provided to pt and family throughout all interactions   - allowed time for questions/concerns of family   - updated MDT of family discussions     4/7/25 (Dr. Joan Antunez)   - Chart and interval history reviewed; discussed pt during MDT rounds. Pt had cystoscopy and dunaway placement by urology yesterday due to screaming and discomfort from urinary retention; found to have very enlarged prostate. Dunaway placed successfully by urology staff, though this was later removed due to pt complaining of significant pain.   - Pt screaming throughout much of the morning, possibly from urinary retention vs delirium. He was not able to participate in discussion secondary to this.   - Along with Dr Jordan, called and spoke with Kenia (pt's preferred MPOA); she added her mother Erik to the call. Thorough medical update provided; shared transparent concern that pt's blood cultures are persistently positive. While hopeful that the infection starts to clear, there is a chance we may not be able to treat this infection, unfortunately. Additionally, pt has been very uncomfortable over the last 24 hours or so due to urinary retention and/or delirium. They expressed understanding of severity of both his acute and chronic illness, and the fragility that results naturally as a result of his advanced age.   - Discussed plan moving forward, as well as code status. At this time, plan remains to continue with maximal medical therapy in hopes of improvement,  though they have agreed to further discuss code status as a family. At this time, would maintain full code.   - Support provided during call   - Encouraged visitation if possible, as perhaps this will be a calming presence for pt  - Will follow up with pt and family for further conversations as clinical course evolves    4/4/25 (Lisa Jacinto, CRIS)   - Consult received; interval chart reviewed in detail; discussed pt with MDT during ICU rounds and ongoing throughout the day   - met with patient at bedside; introduction to palliative medicine team and role in current care and admission   - pt very lethargic and required encouragement and stimulation for orientation assessment; pt answers to name, able to state his 1st name, identifies he is at the hospital, and when asked who team should call for help with his medical care he answered Kenia (which is consistent with what he told  4/3 as well); quickly falls back asleep   - pt unable to participate in detailed GOC/ACP discussion on his own behalf at this time   - called Kenia (pt's niece) who shares that she has been pt's main caregiver for some time; she confirms pt is not , does not have children, and that he has 3 sisters; she also had pt's sister Bhavna join by phone for update/discussion   - Mrs. Huggins also shared that Kenia takes care of pt and understands his current history, needs, and wishes; per Bhavna, Kenia should be pt's medical decision maker while he lacks capacity, and confirms that pt's others sisters agree with this; they have asked that no other visitors or callers aside from Kenia and pts sisters (Bhavna, Ronna, and Nora receive updates)   - Kenia and Bhavna confirm that they feel pt would wish to continue HD  - reviewed pt's current full code status, potential interventions, risks, and outcomes; Fatimah agree for pt to remain full code, but were open to further discussion ideally when pt is able to  participate in the discussion; updated pt's team of this and added this to EMR   - both shared difficulty pt has had recently and frustration with attempts to seek care to get answers to ongoing health concerns; they both shared appropriate concerns for pt's current condition and are hopeful for answers and improvement in pt's condition; they shared that they feel the [facility] ICU team is moving quickly and working to improve his condition for which they are appreciative   - emotional support provided   - Allowed time for questions/concerns; all addressed; expressed availability of myself/palliative team for additional questions/concerns   - attempted to call pt's other sister, Ronna, as she had called unit for updates; unable to reach,    - updated MDT; ongoing communication and coordination with MDT

## 2025-04-11 NOTE — PROGRESS NOTES
[facility]ensive [facility]  Nephrology  Progress Note    Patient Name: Jevon Rajan  MRN: [id number]  Admission Date: 4/3/2025  Hospital Length of Stay: 8 days  Attending Provider: Gonzalez Reagan MD   Primary Care Physician: Melia, Primary Doctor  Principal Problem:Septic shock    Subjective:     HPI: Mr. Rajan is an 84 yo male with HTN, T2DM, CAD, ESRD, and liver lesion who was transferred from [facility] for septic shock and impending AVG rupture. He initially presented to [facility] on 4/1 with temp 101.9 and BP 80/30s. He was transferred to our hospital on 4/3/25; it seems his AVG ruptured during transport/shortly after arrival. He emergently went to the OR yesterday with AVG extraction; infected hematoma was noted. Workup also concerning for elevated troponin and cardiac vegetations. He is no longer on pressors. Nephrology consulted for ESRD. Prior records obtained and reviewed. He receives HD TTS at [facility] under the c/o Dr. Colin. He last received HD outpatient on 4/1/25. HD was held at [facility] due to unstable vascular access. Family agreed to proceed with CRRT today.     Interval History: s/p HD yesterday with 2L removed. Did not require albumin bolus. No events overnight. Yelling in pain this afternoon. BCx NGTD.    Review of patient's allergies indicates:   Allergen Reactions    Ace inhibitors Hives, Itching, Shortness Of Breath, Other (See Comments) and Rash     Is not sure which medication it was    Captopril      Current Facility-Administered Medications   Medication Frequency    0.9%  NaCl infusion (for blood administration) Q24H PRN    0.9% NaCl infusion PRN    acetaminophen tablet 650 mg Q4H PRN    albumin human 25% bottle 25 g Daily PRN    atorvastatin tablet 80 mg QHS    clopidogreL tablet 75 mg Daily    dextrose 50% injection 12.5 g PRN    dextrose 50% injection 25 g PRN    diphenhydrAMINE capsule 50 mg Q6H PRN    epoetin mj-epbx injection 10,000 Units Every Tues, Thurs, Sat    erythromycin 5 mg/gram (0.5  %) ophthalmic ointment Q8H    glucagon (human recombinant) injection 1 mg PRN    glucose chewable tablet 16 g PRN    glucose chewable tablet 24 g PRN    heparin (porcine) injection 1,000 Units PRN    HYDROmorphone injection 1 mg Q4H PRN    hyoscyamine SL tablet 0.125 mg Q4H PRN    insulin aspart U-100 pen 0-5 Units QID (AC + HS) PRN    insulin aspart U-100 pen 5 Units TIDWM    insulin glargine U-100 (Lantus) pen 20 Units Daily    LORazepam injection 0.5 mg Q8H PRN    melatonin tablet 6 mg Nightly PRN    midodrine tablet 15 mg Q8H    naloxone 0.4 mg/mL injection 0.02 mg PRN    ondansetron injection 4 mg Q6H PRN    pantoprazole EC tablet 40 mg Daily    prochlorperazine injection Soln 5 mg Q6H PRN    senna tablet 8.6 mg Daily PRN    sodium chloride 0.9% bolus 250 mL 250 mL PRN    sodium chloride 0.9% flush 10 mL Q8H    tamsulosin 24 hr capsule 0.4 mg Daily    vancomycin - pharmacy to dose pharmacy to manage frequency       Objective:     Vital Signs (Most Recent):  Temp: 98.3 °F (36.8 °C) (04/11/25 1500)  Pulse: 97 (04/11/25 1500)  Resp: 14 (04/11/25 1500)  BP: 135/75 (04/11/25 1500)  SpO2: 95 % (04/11/25 1500) Vital Signs (24h Range):  Temp:  [98.1 °F (36.7 °C)-98.5 °F (36.9 °C)] 98.3 °F (36.8 °C)  Pulse:  [] 97  Resp:  [14-34] 14  SpO2:  [92 %-100 %] 95 %  BP: (117-161)/(58-82) 135/75     Weight: 61.2 kg (134 lb 14.7 oz) (04/04/25 1937)  Body mass index is 21.13 kg/m².  Body surface area is 1.7 meters squared.    I/O last 3 completed shifts:  In: 1621 [I.V.:10; Other:500; NG/GT:1030; IV Piggyback:81]  Out: 8700 [Urine:6200; Other:2500]     Physical Exam  Vitals and nursing note reviewed.   Constitutional:       General: He is awake.      Appearance: Normal appearance. He is well-developed.   HENT:      Head: Normocephalic and atraumatic.      Nose: Nose normal.      Mouth/Throat:      Mouth: Mucous membranes are moist.   Eyes:      Extraocular Movements: Extraocular movements intact.      Conjunctiva/sclera:  Conjunctivae normal.   Cardiovascular:      Rate and Rhythm: Regular rhythm.      Comments: RIJ temp HD catheter  Pulmonary:      Effort: Pulmonary effort is normal.      Breath sounds: Normal breath sounds.   Abdominal:      General: There is no distension.      Palpations: Abdomen is soft.   Musculoskeletal:      Right lower leg: No edema.      Left lower leg: No edema.   Skin:     General: Skin is warm and dry.      Findings: Lesion (diffuse) present. No erythema or rash.   Neurological:      General: No focal deficit present.      Mental Status: He is alert. Mental status is at baseline.   Psychiatric:         Mood and Affect: Mood normal.         Behavior: Behavior normal.          Significant Labs:  CBC:   Recent Labs   Lab 04/11/25  0312   WBC 16.75*   RBC 2.92*   HGB 8.6*   HCT 27.2*      MCV 93   MCH 29.5   MCHC 31.6*     CMP:   Recent Labs   Lab 04/11/25  0312   CALCIUM 8.7   ALBUMIN 2.2*      K 3.9   CO2 23      BUN 35*   CREATININE 5.3*   ALKPHOS 88   ALT 10   AST 22   BILITOT 0.4     All labs within the past 24 hours have been reviewed.     Significant Imaging:  Labs: Reviewed    Assessment/Plan:     ESRD  - receives HD TTS at [facility]  - s/p AVG resection 4/3; received CRRT from 4/4-4/5  - initially didn't tolerate dialysis; but no issues yesterday.   - s/p HD yesterday with 2L removed; did not require albumin  - planning for HD tomorrow with temporary dialysis catheter removal after procedure.  - IR consulted for TDC placement Mon/Tues.    Bacteremia  - BCx from 4/8 NGTD  - ID following  - will arrange for temporary dialysis catheter to be removed after dialysis tomorrow with plans for TDC on Monday    Secondary HPTH  - CCa and phos normal  - will trend     Anemia of CKD  - hgb 8.6; stable  - NAIKA with HD       Thank you for your consult. I will follow-up with patient. Please contact us if you have any additional questions.    Юлия Davis MD  Nephrology  [facility] [place] -  Intensive Care

## 2025-04-11 NOTE — EICU
Virtual ICU Quality Rounds    Admit Date: 4/3/2025  Hospital Day: 8    ICU Day: 7d 17h    VICU Surveillance Screening    Daily review of  line necessity(optional): Central line(s) in place and Urinary catheter in place    Central line type (optional): Trialysis, plan for tunneled cath    Central line indications : Dialysis/CRRT    Dunaway Indications : Ordered or placed by Urology service    LDA reconciliation : Yes    Nursing orders placed : IP GAUTAM Central Line Care, IP GAUTAM Peripheral IV Access, and Dunaway Panel      Dunaway best practices : Nurse driven dunaway removal protocol ordered and Initiate bladder management algorithm for patients with urinary retention  CBI stopped    Consider removing RUE PIV (placed 4/2)

## 2025-04-11 NOTE — ASSESSMENT & PLAN NOTE
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 man with ESRD with LUE AVF, HFpEF, CAD, DM admitted to OSH 4/1- 4/3 with sepsis due to MRSA bacteremia, transferred to  ICU for septic shock due to MRSA bacteremia and thrombosis of AV graft on 4/3 s/p exploratory surgery of LUE with graft resection 4/3. Op findings: Ruptured pseudoaneurysm with infected hematoma, graft completely obliterated at mid segment. Suspect infected graft is the source of bacteremia.     BCx 4/2 MRSA  BCx 4/4 MRSA  BCx 4/6 MRSA  BCx 4/7: NGTD    LUE surgical cx growing s aureus    TTE: 1.9x1.2cm large fixed heterogeneous mass present on the posterior leaflet , moderate to severe regurgitation with an eccentric jet. Cannot exclude severe MR with possible PMVL perforation in association with large vegetation. Not a surgical candidate.    Persistently positive blood cultures in the setting of acute IE. Deemed a poor surgical candidate.    The good news: his blood cultures are NGTD.    Recommendations:  --continue vancomycin  --if he continues to improve, it will take the tincture of time to treat his endocarditis  -- consider eventual LTAC placement

## 2025-04-11 NOTE — PLAN OF CARE
Pt remains in ICU. VS WNL. Pt c/o pain to penis with urgency to urinate; CBI catheter in place. CBI weaned to remain clear; no clots seen. PRN pain medication given. Pt's niece updated on POC via phone. TF remain at goal. PRN sleep medication, sound therapy, and calm environment invoked to promote sleep. Pt slept approx. 1.5 hours throughout the shift.   Problem: Adult Inpatient Plan of Care  Goal: Plan of Care Review  Outcome: Progressing  Goal: Patient-Specific Goal (Individualized)  Outcome: Progressing  Goal: Absence of Hospital-Acquired Illness or Injury  Outcome: Progressing  Goal: Optimal Comfort and Wellbeing  Outcome: Progressing  Goal: Readiness for Transition of Care  Outcome: Progressing     Problem: Diabetes Comorbidity  Goal: Blood Glucose Level Within Targeted Range  Outcome: Progressing     Problem: Sepsis/Septic Shock  Goal: Optimal Coping  Outcome: Progressing  Goal: Absence of Bleeding  Outcome: Progressing  Goal: Blood Glucose Level Within Targeted Range  Outcome: Progressing  Goal: Absence of Infection Signs and Symptoms  Outcome: Progressing  Goal: Optimal Nutrition Intake  Outcome: Progressing     Problem: Infection  Goal: Absence of Infection Signs and Symptoms  Outcome: Progressing     Problem: Fall Injury Risk  Goal: Absence of Fall and Fall-Related Injury  Outcome: Progressing     Problem: Skin Injury Risk Increased  Goal: Skin Health and Integrity  Outcome: Progressing     Problem: Wound  Goal: Optimal Coping  Outcome: Progressing  Goal: Optimal Functional Ability  Outcome: Progressing  Goal: Absence of Infection Signs and Symptoms  Outcome: Progressing  Goal: Improved Oral Intake  Outcome: Progressing  Goal: Optimal Pain Control and Function  Outcome: Progressing  Goal: Skin Health and Integrity  Outcome: Progressing  Goal: Optimal Wound Healing  Outcome: Progressing     Problem: Coping Ineffective  Goal: Effective Coping  Outcome: Progressing     Problem: Urinary Elimination  Management  Goal: Effective Urinary Elimination/Continence  Outcome: Progressing     Problem: Urinary Retention  Goal: Effective Urinary Elimination  Outcome: Progressing

## 2025-04-11 NOTE — NURSING
[facility] [place] 4 [place]      4/10/2025       Skin assessed on: Q Shift      [x] No Pressure Injuries Present    [x]Prevention Measures Documented    [] Yes LDA  for Pressure Injury Previously documented     [] Yes New Pressure Injury Discovered   [] LDA for New Pressure Injury Added      Attending RN:  Pat Kahn RN     Second RN:  CHARLENE Henry

## 2025-04-11 NOTE — SUBJECTIVE & OBJECTIVE
Interval History: s/p HD yesterday with 2L removed. Did not require albumin bolus. No events overnight. Yelling in pain this afternoon. BCx NGTD.    Review of patient's allergies indicates:   Allergen Reactions    Ace inhibitors Hives, Itching, Shortness Of Breath, Other (See Comments) and Rash     Is not sure which medication it was    Captopril      Current Facility-Administered Medications   Medication Frequency    0.9%  NaCl infusion (for blood administration) Q24H PRN    0.9% NaCl infusion PRN    acetaminophen tablet 650 mg Q4H PRN    albumin human 25% bottle 25 g Daily PRN    atorvastatin tablet 80 mg QHS    clopidogreL tablet 75 mg Daily    dextrose 50% injection 12.5 g PRN    dextrose 50% injection 25 g PRN    diphenhydrAMINE capsule 50 mg Q6H PRN    epoetin mj-epbx injection 10,000 Units Every Tues, Thurs, Sat    erythromycin 5 mg/gram (0.5 %) ophthalmic ointment Q8H    glucagon (human recombinant) injection 1 mg PRN    glucose chewable tablet 16 g PRN    glucose chewable tablet 24 g PRN    heparin (porcine) injection 1,000 Units PRN    HYDROmorphone injection 1 mg Q4H PRN    hyoscyamine SL tablet 0.125 mg Q4H PRN    insulin aspart U-100 pen 0-5 Units QID (AC + HS) PRN    insulin aspart U-100 pen 5 Units TIDWM    insulin glargine U-100 (Lantus) pen 20 Units Daily    LORazepam injection 0.5 mg Q8H PRN    melatonin tablet 6 mg Nightly PRN    midodrine tablet 15 mg Q8H    naloxone 0.4 mg/mL injection 0.02 mg PRN    ondansetron injection 4 mg Q6H PRN    pantoprazole EC tablet 40 mg Daily    prochlorperazine injection Soln 5 mg Q6H PRN    senna tablet 8.6 mg Daily PRN    sodium chloride 0.9% bolus 250 mL 250 mL PRN    sodium chloride 0.9% flush 10 mL Q8H    tamsulosin 24 hr capsule 0.4 mg Daily    vancomycin - pharmacy to dose pharmacy to manage frequency       Objective:     Vital Signs (Most Recent):  Temp: 98.3 °F (36.8 °C) (04/11/25 1500)  Pulse: 97 (04/11/25 1500)  Resp: 14 (04/11/25 1500)  BP: 135/75  (04/11/25 1500)  SpO2: 95 % (04/11/25 1500) Vital Signs (24h Range):  Temp:  [98.1 °F (36.7 °C)-98.5 °F (36.9 °C)] 98.3 °F (36.8 °C)  Pulse:  [] 97  Resp:  [14-34] 14  SpO2:  [92 %-100 %] 95 %  BP: (117-161)/(58-82) 135/75     Weight: 61.2 kg (134 lb 14.7 oz) (04/04/25 1937)  Body mass index is 21.13 kg/m².  Body surface area is 1.7 meters squared.    I/O last 3 completed shifts:  In: 1621 [I.V.:10; Other:500; NG/GT:1030; IV Piggyback:81]  Out: 8700 [Urine:6200; Other:2500]     Physical Exam  Vitals and nursing note reviewed.   Constitutional:       General: He is awake.      Appearance: Normal appearance. He is well-developed.   HENT:      Head: Normocephalic and atraumatic.      Nose: Nose normal.      Mouth/Throat:      Mouth: Mucous membranes are moist.   Eyes:      Extraocular Movements: Extraocular movements intact.      Conjunctiva/sclera: Conjunctivae normal.   Cardiovascular:      Rate and Rhythm: Regular rhythm.      Comments: RIJ temp HD catheter  Pulmonary:      Effort: Pulmonary effort is normal.      Breath sounds: Normal breath sounds.   Abdominal:      General: There is no distension.      Palpations: Abdomen is soft.   Musculoskeletal:      Right lower leg: No edema.      Left lower leg: No edema.   Skin:     General: Skin is warm and dry.      Findings: Lesion (diffuse) present. No erythema or rash.   Neurological:      General: No focal deficit present.      Mental Status: He is alert. Mental status is at baseline.   Psychiatric:         Mood and Affect: Mood normal.         Behavior: Behavior normal.          Significant Labs:  CBC:   Recent Labs   Lab 04/11/25 0312   WBC 16.75*   RBC 2.92*   HGB 8.6*   HCT 27.2*      MCV 93   MCH 29.5   MCHC 31.6*     CMP:   Recent Labs   Lab 04/11/25 0312   CALCIUM 8.7   ALBUMIN 2.2*      K 3.9   CO2 23      BUN 35*   CREATININE 5.3*   ALKPHOS 88   ALT 10   AST 22   BILITOT 0.4     All labs within the past 24 hours have been reviewed.      Significant Imaging:  Labs: Reviewed

## 2025-04-11 NOTE — ASSESSMENT & PLAN NOTE
"4/10/2025  - interval chart reviewed; discussion pt with MDT during ICU rounds  - pt again with ongoing discomfort in AM 2/2 bladder irrigation; pt expressing "can't do this anymore" and other statements consistent with discomfort and contributing to need for continued GOC discussions with family   - pt's sister Ronna later visited and expressed continued gratitude for pt's care; continues to have good insight into pt's condition and potential prognosis; continues to advocate for GOC for pt's comfort; she is hopeful for continued improvement but would be open to alternate plans of care of pt continues to experience current level of discomfort to do so    - also later attempted to call pt's niece but was unable to reach; will continue attempts   - pt with planned HD 4/10; will review tolerance and discuss ongoing plans and goals of care with MDT and family     2025  - interval chart reviewed; discussion pt with MDT during ICU rounds  - pt very uncomfortable/in pain throughout AM see BPH   - extensive time sent achieving pt comfort in AM; communicated with dez Solomon for medical update, along with Dr. Joan Antunez, palliative attending; also allowed eNha to talk with pt to reassure and calm   - family continues to have good insight into overall condition; hopeful for improvement but pt QOL and comfort continue to be a priority in GOC   - family continuing discussions regarding code status and overall GOC; understand HD tolerance is a big factor in prognosis currently and are open to discussions if team feels he is no longer tolerating HD; continued encouragement of shared family decision making as they all seem to have consistent goals for pt   - updated family information; pt has 3 sisters and 1 brother confirmed by Neha   - of concern, pt shared seeing/speaking with his  sister; Neha is aware that this is not typically a good indicator of prognosis which she shares is concerning for her "   - reassured pt and Neha that we will continue to prioritize his comfort while continuing to try to improve overall condition   - pt is a [place], [facility] for [facility] and or [facility] placement if pt were to no longer qualify for HD in the future   - emotional support provided to pt and family; answered all questions   - continued communication and coordination with MDT     4/8/2025  - interval chart reviewed; discussion pt with MDT during ICU rounds   - visited pt at bedside in AM with bedside RN and provided comfort and redirection during episode of delirium; pt is able to be calmed with reassurance; pt remains oriented only to self at this time; can verbalize needs such as when he feels the sensation to void but not oriented to having a catheter in place   - later in morning pt's sisters Ronna and Nora at bedside; brief medical update provided and reviewed pt's prognosis; Ronna and Nora shared that they thought pt previously had a DNR in place and/or had expressed wishes against resuscitation or aggressive end of life measures, but had wished to continue HD as long as tolerated; they also shared poor condition of pt's home and lack of home support that placement would be necessary in their opinion if pt's condition were to improve to allow for discharge  - later called pt's niece, Neha, who also later added her grandmother, pt's sister Bhavna, to the call; brief medical update provided; Neha and Bhavna were not aware of pt's prior wishes for DNR; discussed full code vs DNR, potential interventions, risks, and outcomes; Neha expressed considering DNR as long as full treatment and otherwise escalation of care would continue; Mrs. Huggins however expressed not wishing to be involved with that decision for pt  - encouraged family discussion with pt's niece and sisters, Ronna Nora, and Bhavna (if she wished to participate)   -  emotional support provided to pt and family throughout all interactions   - allowed time for questions/concerns of family   - updated MDT of family discussions     4/7/25 (Dr. Joan Antunez)   - Chart and interval history reviewed; discussed pt during MDT rounds. Pt had cystoscopy and dunaway placement by urology yesterday due to screaming and discomfort from urinary retention; found to have very enlarged prostate. Dunaway placed successfully by urology staff, though this was later removed due to pt complaining of significant pain.   - Pt screaming throughout much of the morning, possibly from urinary retention vs delirium. He was not able to participate in discussion secondary to this.   - Along with Dr Jordan, called and spoke with Kenia (pt's preferred MPOA); she added her mother Erik to the call. Thorough medical update provided; shared transparent concern that pt's blood cultures are persistently positive. While hopeful that the infection starts to clear, there is a chance we may not be able to treat this infection, unfortunately. Additionally, pt has been very uncomfortable over the last 24 hours or so due to urinary retention and/or delirium. They expressed understanding of severity of both his acute and chronic illness, and the fragility that results naturally as a result of his advanced age.   - Discussed plan moving forward, as well as code status. At this time, plan remains to continue with maximal medical therapy in hopes of improvement, though they have agreed to further discuss code status as a family. At this time, would maintain full code.   - Support provided during call   - Encouraged visitation if possible, as perhaps this will be a calming presence for pt  - Will follow up with pt and family for further conversations as clinical course evolves    4/4/25 (Lisa Jacinto NP)   - Consult received; interval chart reviewed in detail; discussed pt with MDT during ICU rounds and ongoing  throughout the day   - met with patient at bedside; introduction to palliative medicine team and role in current care and admission   - pt very lethargic and required encouragement and stimulation for orientation assessment; pt answers to name, able to state his 1st name, identifies he is at the hospital, and when asked who team should call for help with his medical care he answered Kenia (which is consistent with what he told HM 4/3 as well); quickly falls back asleep   - pt unable to participate in detailed GOC/ACP discussion on his own behalf at this time   - called Kenia (pt's niece) who shares that she has been pt's main caregiver for some time; she confirms pt is not , does not have children, and that he has 3 sisters; she also had pt's sister Bhavna join by phone for update/discussion   - Mrs. Huggins also shared that Jelanisampson takes care of pt and understands his current history, needs, and wishes; per Bhavna, Kenia should be pt's medical decision maker while he lacks capacity, and confirms that pt's others sisters agree with this; they have asked that no other visitors or callers aside from Deshanta and pts sisters (Bhavna, Ronna, and Nora receive updates)   - Kenia and Bhavna confirm that they feel pt would wish to continue HD  - reviewed pt's current full code status, potential interventions, risks, and outcomes; Kenia and Bhavna agree for pt to remain full code, but were open to further discussion ideally when pt is able to participate in the discussion; updated pt's team of this and added this to EMR   - both shared difficulty pt has had recently and frustration with attempts to seek care to get answers to ongoing health concerns; they both shared appropriate concerns for pt's current condition and are hopeful for answers and improvement in pt's condition; they shared that they feel the  ICU team is moving quickly and working to improve his condition for which they are  appreciative   - emotional support provided   - Allowed time for questions/concerns; all addressed; expressed availability of myself/palliative team for additional questions/concerns   - attempted to call pt's other sister, Ronna, as she had called unit for updates; unable to reach, LM   - updated MDT; ongoing communication and coordination with MDT

## 2025-04-11 NOTE — ASSESSMENT & PLAN NOTE
- Abx, mgmt per primary team and ID; per ID, prognosis is poor if cultures do not clear, given that he is not a surgical candidate.   - repeat blood cultures so far with no growth but not finalized, also some continued improvement in overall mental status showing response to current treatment   - Illness trajectory education provided to family

## 2025-04-11 NOTE — CONSULTS
Tunneled HD Catheter Placement Consult Note  Interventional Radiology    Consult Requested By: Юлия Che MD  Reason for Consult: ESRD on HD, request for [facility]    SUBJECTIVE:     Chief Complaint:  ESRD, request for [facility]    History of Present Illness:  Jevon Rajan is a 85 y.o. male with HTN, T2DM, CAD, ESRD  who was admitted on 4/3/25 as transfer from [facility] for septic shock and impending AVG rupture. Pt's AVG ruptured either in transport or shortly after arrival and pt was taken emergently to OR with AVG extraction with infected hematoma noted. Bcx were + for MRSA, however have cleared as of 4/8. Pt has been receiving HD via R IJ trialysis.     Interventional Radiology has been consulted for tunneled HD catheter placement for dialysis.     Review of Systems   Unable to perform ROS: Mental acuity       Scheduled Meds:   atorvastatin  80 mg Oral QHS    clopidogreL  75 mg Oral Daily    epoetin mj-epbx  10,000 Units Intravenous Every Tues, Thurs, Sat    erythromycin   Both Eyes Q8H    insulin aspart U-100  5 Units Subcutaneous TIDWM    insulin glargine U-100  20 Units Subcutaneous Daily    midodrine  15 mg Oral Q8H    pantoprazole  40 mg Oral Daily    sodium chloride 0.9%  10 mL Intravenous Q8H    tamsulosin  0.4 mg Oral Daily     Continuous Infusions:  PRN Meds:  Current Facility-Administered Medications:     0.9%  NaCl infusion (for blood administration), , Intravenous, Q24H PRN    0.9% NaCl, , Intravenous, PRN    acetaminophen, 650 mg, Oral, Q4H PRN    albumin human 25%, 25 g, Intravenous, Daily PRN    dextrose 50%, 12.5 g, Intravenous, PRN    dextrose 50%, 25 g, Intravenous, PRN    diphenhydrAMINE, 50 mg, Oral, Q6H PRN    glucagon (human recombinant), 1 mg, Intramuscular, PRN    glucose, 16 g, Oral, PRN    glucose, 24 g, Oral, PRN    heparin (porcine), 1,000 Units, Intravenous, PRN    HYDROmorphone, 1 mg, Intravenous, Q4H PRN    hyoscyamine, 0.125 mg, Sublingual, Q4H PRN    insulin aspart U-100, 0-5 Units,  Subcutaneous, QID (AC + HS) PRN    melatonin, 6 mg, Oral, Nightly PRN    naloxone, 0.02 mg, Intravenous, PRN    ondansetron, 4 mg, Intravenous, Q6H PRN    prochlorperazine, 5 mg, Intravenous, Q6H PRN    senna, 8.6 mg, Oral, Daily PRN    sodium chloride 0.9%, 250 mL, Intravenous, PRN    Pharmacy to dose Vancomycin consult, , , Once **AND** vancomycin - pharmacy to dose, , Intravenous, pharmacy to manage frequency    Review of patient's allergies indicates:   Allergen Reactions    Ace inhibitors Hives, Itching, Shortness Of Breath, Other (See Comments) and Rash     Is not sure which medication it was    Captopril        Past Medical History:   Diagnosis Date    BPH (benign prostatic hyperplasia)     Coronary artery disease     [facility]. In 2014 he had stent to LAD. He states he has had 2 heart attacks. He does not get angina. There was 90% left anterior descending artery stenosis undergoing stenting. There was also a circumflex marginal branch stenosis of 70%.    Diabetes mellitus, type 2     ESRD (end stage renal disease) on dialysis     ESRD on HD TTS via left brachial AVF    Hypertension     Hypothyroidism, unspecified     NSTEMI (non-ST elevated myocardial infarction) 4/4/2025    Sepsis 4/2/2025    Symptomatic anemia 01/15/2024     Past Surgical History:   Procedure Laterality Date    ANGIOGRAM, CORONARY, WITH LEFT HEART CATHETERIZATION  1/13/2025    Procedure: Angiogram, Coronary, with Left Heart Cath;  Surgeon: Steve Bhat MD;  Location: [facility] CATH LAB/EP;  Service: Cardiology;;    ATHERECTOMY, CORONARY N/A 1/14/2025    Procedure: Atherectomy-coronary;  Surgeon: Giacomo Pittman MD;  Location: [facility] CATH LAB/EP;  Service: Cardiology;  Laterality: N/A;    bilateral foot surgery      CORONARY ANGIOPLASTY WITH STENT PLACEMENT N/A 1/14/2025    Procedure: ANGIOPLASTY, CORONARY ARTERY, WITH STENT INSERTION;  Surgeon: Giacomo Pittman MD;   Location: [facility] CATH LAB/EP;  Service: Cardiology;  Laterality: N/A;    CORONARY STENT PLACEMENT N/A 1/13/2025    Procedure: INSERTION, STENT, CORONARY ARTERY;  Surgeon: Steve Bhat MD;  Location: [facility] CATH LAB/EP;  Service: Cardiology;  Laterality: N/A;    CYSTOSCOPY N/A 4/7/2025    Procedure: CYSTOSCOPY WITH CLOT EVACUATION, FULGURATION, GARCES PLACEMENT;  Surgeon: Elsie Arnold MD;  Location: [facility] OR;  Service: Urology;  Laterality: N/A;    ESOPHAGOGASTRODUODENOSCOPY N/A 2/27/2024    Procedure: EGD (ESOPHAGOGASTRODUODENOSCOPY);  Surgeon: Gemma Almendarez MD;  Location: [facility] ENDO;  Service: Endoscopy;  Laterality: N/A;    EXPLORATION, ARTERY, UPPER EXTREMITY Left 4/3/2025    Procedure: EXPLORATION, ARTERY, UPPER EXTREMITY;  Surgeon: Sunil Contreras MD;  Location: [facility] OR;  Service: Vascular;  Laterality: Left;    eyelid surgery      FISTULOGRAM Left 11/27/2019    Procedure: Fistulogram;  Surgeon: MELANY Brenner III, MD;  Location: [address];  Service: Peripheral Vascular;  Laterality: Left;    FISTULOGRAM Left 11/27/2019    Procedure: Fistulogram, AVG declot, possible permacath;  Surgeon: MELANY Brenner III, MD;  Location: [facility] CATH LAB;  Service: Peripheral Vascular;  Laterality: Left;    INSERTION OF DIALYSIS CATHETER      IVUS, CORONARY  1/13/2025    Procedure: IVUS, Coronary;  Surgeon: Steve Bhat MD;  Location: [facility] CATH LAB/EP;  Service: Cardiology;;    LEFT HEART CATHETERIZATION Left 1/14/2025    Procedure: Left heart cath;  Surgeon: Giacomo Pittman MD;  Location: [facility] CATH LAB/EP;  Service: Cardiology;  Laterality: Left;    MOH's  12/5/13    PERCUTANEOUS CORONARY INTERVENTION, ARTERY N/A 1/14/2025    Procedure: Percutaneous coronary intervention;  Surgeon: Giacomo Pittman MD;  Location: [facility] CATH LAB/EP;  Service: Cardiology;  Laterality: N/A;    PERCUTANEOUS TRANSLUMINAL BALLOON ANGIOPLASTY OF CORONARY ARTERY  1/13/2025    Procedure: Angioplasty-coronary;  Surgeon:  Steve Bhat MD;  Location: [facility] CATH LAB/EP;  Service: Cardiology;;    REMOVAL, GRAFT, ARTERIOVENOUS, UPPER EXTREMITY Left 4/3/2025    Procedure: REMOVAL, GRAFT, ARTERIOVENOUS, UPPER EXTREMITY;  Surgeon: Sunil Contreras MD;  Location: [facility] OR;  Service: Vascular;  Laterality: Left;    REVASCULARIZATION, ENDOVASCULAR, LE W/ INTRAVASCULAR LITHOTRIPSY  1/13/2025    Procedure: REVASCULARIZATION, ENDOVASCULAR, LE W/ INTRAVASCULAR LITHOTRIPSY;  Surgeon: Steve Bhat MD;  Location: [facility] CATH LAB/EP;  Service: Cardiology;;    right hand surgery      stents       No family history on file.  Social History[1]    OBJECTIVE:     Vital Signs (Most Recent)  Temp: 98.2 °F (36.8 °C) (04/11/25 0318)  Pulse: 101 (04/11/25 0600)  Resp: 15 (04/11/25 0600)  BP: 131/66 (04/11/25 0600)  SpO2: (!) 94 % (04/11/25 0600)    Physical Exam:  Physical Exam  Vitals and nursing note reviewed.   Constitutional:       Appearance: He is ill-appearing.   HENT:      Head: Normocephalic and atraumatic.      Mouth/Throat:      Mouth: Mucous membranes are dry.      Pharynx: Oropharynx is clear.   Cardiovascular:      Rate and Rhythm: Normal rate.   Pulmonary:      Effort: Pulmonary effort is normal. No respiratory distress.   Abdominal:      Comments: NGT present   Musculoskeletal:         General: Normal range of motion.   Skin:     General: Skin is warm and dry.   Neurological:      Mental Status: He is alert.      Comments: Oriented to self.          Laboratory  I have reviewed all pertinent lab results within the past 24 hours.    ASA/Mallampati  ASA: III  Mallampati: II    Imaging:  Recent imaging studies reviewed.     ASSESSMENT/PLAN:     Assessment:  85 y.o. male with HTN, T2DM, CAD, ESRD admitted for AVG rupture and MRSA bacteremia who has been referred to IR for tunneled HD catheter placement for dialysis.     Procedure discussed with patient who does not have decision making capacity at this time. Attempted to call pt's  MPOA Deonna Smyth with no answer.     Risks include bleeding at the puncture site, infection, catheter related thrombus, catheter dysfunction, and central vein stenosis.     The patient is a candidate for tunneled HD catheter placement under moderate sedation. Plan discussed with ordering physician.    Plan:  Will proceed with tunneled HD catheter placement under moderate sedation on Monday 4/14 presuming no change in condition/clinical decline over the weekend.   Will need to obtain consent from MPOA unless patient regains decision making capacity over the weekend.   Please keep pt NPO starting at midnight on 4/14.   Anticoagulation history reviewed. No need to hold plavix per SIR guidelines.   Coagulation labs reviewed.    Thank you for this consult. Please contact via Epic secure chat with questions.     Sheila Dillon NP  Interventional Radiology                 [1]   Social History  Tobacco Use    Smoking status: Never    Smokeless tobacco: Never   Substance Use Topics    Alcohol use: No    Drug use: No

## 2025-04-11 NOTE — SUBJECTIVE & OBJECTIVE
Interval History: Mr. Rajan is looking better today. More conversant. Knows that he's in the hospital.    Review of Systems   All other systems reviewed and are negative.    Objective:     Vital Signs (Most Recent):  Temp: 98.2 °F (36.8 °C) (04/11/25 0318)  Pulse: 101 (04/11/25 0600)  Resp: 15 (04/11/25 0600)  BP: 131/66 (04/11/25 0600)  SpO2: (!) 94 % (04/11/25 0600) Vital Signs (24h Range):  Temp:  [97.4 °F (36.3 °C)-98.5 °F (36.9 °C)] 98.2 °F (36.8 °C)  Pulse:  [] 101  Resp:  [14-34] 15  SpO2:  [92 %-100 %] 94 %  BP: (110-161)/(58-82) 131/66     Weight: 61.2 kg (134 lb 14.7 oz)  Body mass index is 21.13 kg/m².    Estimated Creatinine Clearance: 8.5 mL/min (A) (based on SCr of 5.3 mg/dL (H)).     Physical Exam  Vitals and nursing note reviewed.   Constitutional:       Appearance: Normal appearance.   HENT:      Head: Normocephalic and atraumatic.      Mouth/Throat:      Comments: Poor dentition, tongue coated  Pulmonary:      Breath sounds: No wheezing or rhonchi.      Comments: gynecomastia  Abdominal:      Tenderness: There is no abdominal tenderness. There is no guarding.   Musculoskeletal:      Right lower leg: No edema.      Left lower leg: No edema.   Skin:     Findings: No rash.   Neurological:      General: No focal deficit present.      Mental Status: He is alert.   Psychiatric:         Mood and Affect: Mood normal.          Significant Labs: BMP:   Recent Labs   Lab 04/11/25 0312      K 3.9      CO2 23   BUN 35*   CREATININE 5.3*   CALCIUM 8.7     CBC:   Recent Labs   Lab 04/09/25  2039 04/10/25  0416 04/11/25 0312   WBC 15.40* 16.64* 16.75*   HGB 8.4* 8.5* 8.6*   HCT 26.2* 26.5* 27.2*    161 181     Microbiology Results (last 7 days)       Procedure Component Value Units Date/Time    Blood culture [[id number]]  (Normal) Collected: 04/08/25 0401    Order Status: Completed Specimen: Blood Updated: 04/11/25 0600     Blood Culture No Growth After 72 Hours    Blood culture  [[id number]]  (Normal) Collected: 04/08/25 0506    Order Status: Completed Specimen: Blood Updated: 04/11/25 0600     Blood Culture No Growth After 72 Hours    Fungus culture [[id number]]  (Normal) Collected: 04/03/25 1816    Order Status: Completed Specimen: Wound from Arm, Left Updated: 04/10/25 2300     Fungal Culture No Fungus Isolated at 1 Week    Fungus culture [[id number]]  (Normal) Collected: 04/03/25 1934    Order Status: Completed Specimen: Tissue from AV Fistula, Left Updated: 04/10/25 2300     Fungal Culture No Fungus Isolated at 1 Week    Fungus culture [[id number]]  (Normal) Collected: 04/03/25 2011    Order Status: Completed Specimen: Wound from Arm, Left Updated: 04/10/25 2300     Fungal Culture No Fungus Isolated at 1 Week    Fungus culture [[id number]]  (Normal) Collected: 04/03/25 1904    Order Status: Completed Specimen: Tissue from AV Fistula, Left Updated: 04/10/25 2201     Fungal Culture No Fungus Isolated at 1 Week    Fungus culture [[id number]]  (Normal) Collected: 04/03/25 1921    Order Status: Completed Specimen: Tissue from hematoma Updated: 04/10/25 2201     Fungal Culture No Fungus Isolated at 1 Week    Blood culture [[id number]]  (Normal) Collected: 04/04/25 1044    Order Status: Completed Specimen: Blood Updated: 04/09/25 1100     Blood Culture No Growth After 5 Days    Blood culture [[id number]]  (Abnormal) Collected: 04/06/25 0555    Order Status: Completed Specimen: Blood Updated: 04/09/25 0720     Blood Culture Positive - Aerobic/Pediatric Bottle      Methicillin resistant Staphylococcus aureus     Comment: <null> has been updated to reportable.        GRAM STAIN Gram positive cocci in clusters resembling Staph     Comment: Aerobic Bottle Positive   This is an appended report. These results have been appended to a previously preliminary verified report.       Narrative:      For susceptibility refer to order [id number]  ID Consult Strongly Recommended    Blood culture  [[id number]]  (Abnormal)  (Susceptibility) Collected: 04/06/25 0542    Order Status: Completed Specimen: Blood Updated: 04/09/25 0719     Blood Culture Positive - Aerobic/Pediatric Bottle      Methicillin resistant Staphylococcus aureus     Comment: <null> has been updated to reportable.        GRAM STAIN Gram positive cocci in clusters resembling Staph     Comment: This is an appended report. These results have been appended to a previously preliminary verified report.       Narrative:      Aerobic Bottle Positive   ID Consult Strongly Recommended    Afb Culture Stain [[id number]] Collected: 04/03/25 1943    Order Status: Completed Specimen: Wound from Arm, Left Updated: 04/07/25 1637     ACID FAST STAIN  No acid fast bacilli seen    Afb Culture Stain [[id number]] Collected: 04/03/25 2011    Order Status: Completed Specimen: Wound from Arm, Left Updated: 04/07/25 1637     ACID FAST STAIN  No acid fast bacilli seen    Afb Culture Stain [[id number]] Collected: 04/03/25 1905    Order Status: Completed Specimen: Tissue from AV Fistula, Left Updated: 04/07/25 1623     ACID FAST STAIN  No acid fast bacilli seen    Afb Culture Stain [[id number]] Collected: 04/03/25 1954    Order Status: Completed Specimen: Wound from Arm, Left Updated: 04/07/25 1623     ACID FAST STAIN  No acid fast bacilli seen    Afb Culture Stain [[id number]] Collected: 04/03/25 1816    Order Status: Completed Specimen: Tissue from AV Fistula, Left Updated: 04/07/25 1623     ACID FAST STAIN  No acid fast bacilli seen    Afb Culture Stain [[id number]] Collected: 04/03/25 1921    Order Status: Completed Specimen: Tissue from hematoma Updated: 04/07/25 1623     ACID FAST STAIN  No acid fast bacilli seen    Afb Culture Stain [[id number]] Collected: 04/03/25 1934    Order Status: Completed Specimen: Tissue from AV Fistula, Left Updated: 04/07/25 1618     ACID FAST STAIN  No acid fast bacilli seen    Fungus culture [[id number]]  (Normal) Collected: 04/03/25  1943    Order Status: Completed Specimen: Wound from Arm, Left Updated: 04/07/25 0935     Fungal Culture Culture In Progress    Fungus culture [[id number]]  (Normal) Collected: 04/03/25 1954    Order Status: Completed Specimen: Wound from Arm, Left Updated: 04/07/25 0935     Fungal Culture Culture In Progress    AFB Culture & Smear [[id number]] Collected: 04/03/25 1816    Order Status: Completed Specimen: Tissue from AV Fistula, Left Updated: 04/07/25 0815     CULTURE, AFB  Culture In Progress    AFB Culture & Smear [[id number]] Collected: 04/03/25 1905    Order Status: Completed Specimen: Tissue from AV Fistula, Left Updated: 04/07/25 0815     CULTURE, AFB  Culture In Progress    AFB Culture & Smear [[id number]] Collected: 04/03/25 1921    Order Status: Completed Specimen: Tissue from hematoma Updated: 04/07/25 0815     CULTURE, AFB  Culture In Progress    AFB Culture & Smear [[id number]] Collected: 04/03/25 1934    Order Status: Completed Specimen: Tissue from AV Fistula, Left Updated: 04/07/25 0815     CULTURE, AFB  Culture In Progress    AFB Culture & Smear [[id number]] Collected: 04/03/25 1943    Order Status: Completed Specimen: Wound from Arm, Left Updated: 04/07/25 0815     CULTURE, AFB  Culture In Progress    AFB Culture & Smear [[id number]] Collected: 04/03/25 1954    Order Status: Completed Specimen: Wound from Arm, Left Updated: 04/07/25 0815     CULTURE, AFB  Culture In Progress    AFB Culture & Smear [[id number]] Collected: 04/03/25 2011    Order Status: Completed Specimen: Wound from Arm, Left Updated: 04/07/25 0815     CULTURE, AFB  Culture In Progress    Culture, Anaerobic [[id number]] Collected: 04/03/25 2011    Order Status: Completed Specimen: Wound from Arm, Left Updated: 04/07/25 0748     Anaerobe Culture No Anaerobes Isolated    Culture, Anaerobic [[id number]] Collected: 04/03/25 1954    Order Status: Completed Specimen: Wound from Arm, Left Updated: 04/07/25 0747     Anaerobe Culture No  Anaerobes Isolated    Culture, Anaerobic [[id number]] Collected: 04/03/25 1943    Order Status: Completed Specimen: Wound from Arm, Left Updated: 04/07/25 0747     Anaerobe Culture No Anaerobes Isolated    Culture, Anaerobic [[id number]] Collected: 04/03/25 1934    Order Status: Completed Specimen: Tissue from AV Fistula, Left Updated: 04/07/25 0746     Anaerobe Culture No Anaerobes Isolated    Culture, Anaerobic [[id number]] Collected: 04/03/25 1921    Order Status: Completed Specimen: Tissue from hematoma Updated: 04/07/25 0746     Anaerobe Culture No Anaerobes Isolated    Culture, Anaerobic [[id number]] Collected: 04/03/25 1910    Order Status: Completed Specimen: Tissue from AV Fistula, Left Updated: 04/07/25 0745     Anaerobe Culture No Anaerobes Isolated    Culture, Anaerobic [[id number]] Collected: 04/03/25 1816    Order Status: Completed Specimen: Wound from Arm, Left Updated: 04/07/25 0744     Anaerobe Culture No Anaerobes Isolated    Blood culture [[id number]]  (Abnormal) Collected: 04/04/25 1343    Order Status: Completed Specimen: Blood Updated: 04/06/25 0657     Blood Culture Positive - Aerobic/Pediatric Bottle      Methicillin resistant Staphylococcus aureus     Comment: <null> has been updated to reportable.        GRAM STAIN Gram positive cocci in clusters resembling Staph     Comment: Aerobic Bottle Positive    This is an appended report. These results have been appended to a previously preliminary verified report.       Narrative:      For sensitivities, refer to order # [id number]      Aerobic culture [[id number]]  (Abnormal) Collected: 04/03/25 2011    Order Status: Completed Specimen: Wound from Arm, Left Updated: 04/06/25 0556     CULTURE, AEROBIC Few Methicillin resistant Staphylococcus aureus    Narrative:      For sensitivities, refer to order # [id number]    Aerobic culture [[id number]]  (Abnormal) Collected: 04/03/25 1954    Order Status: Completed Specimen: Wound from Arm, Left  Updated: 04/06/25 0555     CULTURE, AEROBIC Many Methicillin resistant Staphylococcus aureus    Narrative:      For sensitivities, refer to order # [id number]    Aerobic culture [[id number]]  (Abnormal)  (Susceptibility) Collected: 04/03/25 1943    Order Status: Completed Specimen: Wound from Arm, Left Updated: 04/06/25 0553     CULTURE, AEROBIC Many Methicillin resistant Staphylococcus aureus    Aerobic culture [[id number]]  (Abnormal)  (Susceptibility) Collected: 04/03/25 1926    Order Status: Completed Specimen: Wound from Arm, Left Updated: 04/06/25 0544     CULTURE, AEROBIC Many Methicillin resistant Staphylococcus aureus    Aerobic culture [[id number]] Collected: 04/03/25 1911    Order Status: Completed Specimen: Tissue from Arm, Left Updated: 04/06/25 0543     CULTURE, AEROBIC No Growth    Aerobic culture [[id number]] Collected: 04/03/25 1816    Order Status: Completed Specimen: Wound from Arm, Left Updated: 04/06/25 0543     CULTURE, AEROBIC No Growth            Significant Imaging: I have reviewed all pertinent imaging results/findings within the past 24 hours.

## 2025-04-11 NOTE — PLAN OF CARE
Patient lying still in bed with eyes closed. No signs of pain or discomfort such as grimacing or furrowing of the brow. Despite multiple attempts to reorientation to situation and time, patient remained disoriented the entire shift. L. UA dressing is clean, dry, and intact. Urinary catheter in use and draining clear straw-colored urine. CBI is clamped. Last CBGs 47 and 49. Will continue to monitor. Patient tolerating oral nutrition. NGT remains in place.

## 2025-04-11 NOTE — PROGRESS NOTES
[facility]  Urology  Progress Note    Patient Name: Jevon Rajan  MRN: [id number]  Admission Date: 4/3/2025  Hospital Length of Stay: 8 days  Code Status: Full Code   Attending Provider: Gonzalez Reagan MD   Primary Care Physician: Melia, Primary Doctor    Subjective:     HPI:  Urinary Retention  Patient complains of urinary retention. Onset of retention was 1 day ago and was gradual in onset. Patient currently does not have a urinary catheter in place.  300 ml of urine were scanned on bladder scanner Prior to this event voiding symptoms consisted of slow stream. Prior treatments include none. Recent medications that may have affected his voiding include none.   He still makes urine, he tells me an almost normal amount.  He is very uncomfortable at the level of the bladder.  Nursing staff attempted coude catheter was then successfully.  He agrees to allow me to place a catheter.    Interval History: no acute events    Review of Systems   Constitutional:  Negative for activity change, appetite change, chills, diaphoresis and fever.   HENT:  Negative for congestion and rhinorrhea.    Eyes:  Negative for visual disturbance.   Respiratory:  Negative for choking and shortness of breath.    Gastrointestinal:  Negative for abdominal distention, abdominal pain, constipation, diarrhea, nausea and vomiting.   Genitourinary:  Negative for difficulty urinating, dysuria, flank pain, hematuria, scrotal swelling, testicular pain and urgency.   Skin:  Negative for color change, pallor and wound.   Neurological:  Negative for dizziness and syncope.   Psychiatric/Behavioral:  Negative for confusion and hallucinations.      Objective:     Temp:  [98.1 °F (36.7 °C)-98.5 °F (36.9 °C)] 98.3 °F (36.8 °C)  Pulse:  [] 92  Resp:  [12-34] 12  SpO2:  [92 %-100 %] 100 %  BP: (113-161)/(55-82) 113/55     Body mass index is 21.13 kg/m².    Date 04/11/25 0700 - 04/12/25 0659   Shift 4098-0478 4389-1561 2106-9095 24 Hour Total    INTAKE   NG/   160   Shift Total(mL/kg) 160(2.6)   160(2.6)   OUTPUT   Urine(mL/kg/hr) 1800(3.7) 950  2750   Shift Total(mL/kg) 1800(29.4) 950(15.5)  2750(44.9)   Weight (kg) 61.2 61.2 61.2 61.2     Bladder Scan Volume (mL): 331 mL (04/06/25 1520)    Drains       Drain  Duration                  NG/OG Tube 04/04/25 1415 Rolf 10 Fr. Left nostril 7 days    Trialysis (Dialysis) Catheter 04/04/25 1208 right internal jugular 7 days         Urethral Catheter 04/07/25 1558 Triple-lumen;Latex 24 Fr. 4 days                     Physical Exam  Constitutional:       General: He is not in acute distress.     Appearance: He is well-developed. He is not ill-appearing, toxic-appearing or diaphoretic.   HENT:      Head: Normocephalic and atraumatic.      Mouth/Throat:      Mouth: Mucous membranes are moist.   Eyes:      Conjunctiva/sclera: Conjunctivae normal.   Pulmonary:      Effort: Pulmonary effort is normal. No respiratory distress.   Abdominal:      General: There is no distension.      Palpations: Abdomen is soft. There is no mass.      Tenderness: There is no abdominal tenderness. There is no guarding.   Musculoskeletal:         General: No swelling or deformity.      Cervical back: Neck supple.   Skin:     General: Skin is warm.      Findings: No rash.   Neurological:      Mental Status: He is alert and oriented to person, place, and time.      Gait: Gait normal.   Psychiatric:         Mood and Affect: Mood normal.         Thought Content: Thought content normal.         Judgment: Judgment normal.           Significant Labs:    BMP:  Recent Labs   Lab 04/09/25  0341 04/10/25  0416 04/11/25  0312    138 142   K 3.6 3.7 3.9    103 109   CO2 24 22* 23   BUN 38* 52* 35*   CREATININE 5.9* 7.8* 5.3*   GLUCOSE 261* 251* 277*   CALCIUM 8.0* 8.6* 8.7       CBC:   Recent Labs   Lab 04/09/25  2039 04/10/25  0416 04/11/25  0312   WBC 15.40* 16.64* 16.75*   HGB 8.4* 8.5* 8.6*   HCT 26.2* 26.5* 27.2*    426  181                         Assessment/Plan:     Gross hematuria  S/p Clot evacuation with fulguration  Clamped CBI, can resume if bleeding recurs  Levsin PRN        BPH with urinary obstruction  Catheter in place, do not remove, requires cystoscopy to place dunaway          VTE Risk Mitigation (From admission, onward)           Ordered     heparin (porcine) injection 1,000 Units  As needed (PRN)         04/05/25 2100     IP VTE HIGH RISK PATIENT  Once         04/03/25 1516     Place TEMO hose  Until discontinued         04/03/25 1516     Place sequential compression device  Until discontinued         04/03/25 1516     Reason for No Pharmacological VTE Prophylaxis  Once        Question:  Reasons:  Answer:  Physician Provided (leave comment)    04/03/25 1516                    Elsie Arnold MD  Urology  [facility] - Intensive Care

## 2025-04-11 NOTE — PROGRESS NOTES
[facility] - Intensive Care  Adult Nutrition  Consult Note    SUMMARY     Recommendations    Recommendation:  1. Continue with texture modified diet per SLP recommendations  2. Continue with TF of Novasource Renal at 10ml/hr and advance as tolerated to goal rate of 40ml/hr which would provide 1920 kcal, 87g protein, & 688ml free water if adequate oral intake is not obtained  3. Monitor weight/labs.   4. RD to follow to monitor nutrition status.    Goals:  Patient to consume/receive >75% of EEN prior to RD follow up   Nutrition Goal Status: continues   Communication of RD Recs: reviewed with RN    Nutrition Discharge Planning   Nutrition Discharge Planning: Too early to determine, pending clinical course    Assessment and Plan  Nutrition Problem  Inadequate energy intake    Related to (etiology):   AMS, septic shock    Signs and Symptoms (as evidenced by):   Estimated energy intake less than estimated needs      Interventions:  Texture modified diet   Enteral Nutrition Management   Collaboration with other providers    Nutrition Diagnosis Status:   Continues     Malnutrition Assessment  Weight Loss (Malnutrition):  (12% x 6 months)       Reason for Assessment  Reason For Assessment: RD follow-up  Diagnosis:  (septic shock)  General Information Comments: Pt admitted to [facility] with weakness. Transferred to  for higher level of care. CRRT started. s/p LUE artery exploration and graft removal 4/3. Receives HD outpatient. Manas 11- L arm incision. NG tube. Noted 20lb weight loss x 6 months. Unable to assess NFPE 2/2 RD working remotely for weekend coverage. Pt on contact isolation for MRSA.    Follow up 4/11/2025:   Patient continues with texture modified diet.  SLP following patient.  TF recommendations remain in chart if needed due to inadequate oral intake.  Labs reviewed.  Malnutrition suspected.  NFPE to be performed by on site [facility] 4/10/25.  RD to continue to monitor EN support, oral  "intake and tolerance.  Commercial beverages previously ordered by another provider.         Past Medical History:   Diagnosis Date    BPH (benign prostatic hyperplasia)     Coronary artery disease     He has followed at the [facility] and is now transferring his care to this clinic. In  he had stent to LAD. He states he has had 2 heart attacks. He does not get angina. There was 90% left anterior descending artery stenosis undergoing stenting. There was also a circumflex marginal branch stenosis of 70%.    Diabetes mellitus, type 2     ESRD (end stage renal disease) on dialysis     ESRD on HD TTS via left brachial AVF    Hypertension     Hypothyroidism, unspecified     NSTEMI (non-ST elevated myocardial infarction) 2025    Sepsis 2025    Symptomatic anemia 01/15/2024        Nutrition/Diet History  Food Preferences: no Latter day or cultural food prefs identified  Spiritual, Cultural Beliefs, Mandaeism Practices, Values that Affect Care: no  Factors Affecting Nutritional Intake: chewing difficulties/inability to chew food, difficulty/impaired swallowing    Anthropometrics  Height: 5' 7" (170.2 cm)  Height (inches): 67 in  Weight: 61.2 kg (134 lb 14.7 oz)  Weight (lb): 134.92 lb  Weight Method: Bed Scale  Ideal Body Weight (IBW), Male: 148 lb  % Ideal Body Weight, Male (lb): 91.16 %  BMI (Calculated): 21.1  BMI Grade: 18.5-24.9 - normal  Usual Body Weight (UBW), k.9 kg (10/20)  % Usual Body Weight: 87.74  % Weight Change From Usual Weight: -12.45 %    Lab/Procedures/Meds  Pertinent Labs Reviewed: reviewed  BMP  Lab Results   Component Value Date     2025    K 3.9 2025     2025    CO2 23 2025    BUN 35 (H) 2025    CREATININE 5.3 (H) 2025    CALCIUM 8.7 2025    ANIONGAP 10 2025    EGFRNORACEVR 10 (L) 2025     Lab Results   Component Value Date    HGBA1C 5.7 (H) 2025     Lab Results   Component Value Date    CALCIUM 8.7 2025    " PHOS 2.9 04/11/2025     Glucose   Date Value Ref Range Status   03/21/2025 150 (H) 70 - 110 mg/dL Final       Pertinent Medications Reviewed: reviewed  Pertinent Medications Comments: aspirin, heparin, pantoprazole, senna  Scheduled Meds:   atorvastatin  80 mg Oral QHS    clopidogreL  75 mg Oral Daily    epoetin mj-epbx  10,000 Units Intravenous Every Tues, Thurs, Sat    erythromycin   Both Eyes Q8H    insulin aspart U-100  5 Units Subcutaneous TIDWM    insulin glargine U-100  20 Units Subcutaneous Daily    midodrine  15 mg Oral Q8H    pantoprazole  40 mg Oral Daily    sodium chloride 0.9%  10 mL Intravenous Q8H    tamsulosin  0.4 mg Oral Daily     Continuous Infusions:  PRN Meds:.  Current Facility-Administered Medications:     0.9%  NaCl infusion (for blood administration), , Intravenous, Q24H PRN    0.9% NaCl, , Intravenous, PRN    acetaminophen, 650 mg, Oral, Q4H PRN    albumin human 25%, 25 g, Intravenous, Daily PRN    dextrose 50%, 12.5 g, Intravenous, PRN    dextrose 50%, 25 g, Intravenous, PRN    diphenhydrAMINE, 50 mg, Oral, Q6H PRN    glucagon (human recombinant), 1 mg, Intramuscular, PRN    glucose, 16 g, Oral, PRN    glucose, 24 g, Oral, PRN    heparin (porcine), 1,000 Units, Intravenous, PRN    HYDROmorphone, 1 mg, Intravenous, Q4H PRN    hyoscyamine, 0.125 mg, Sublingual, Q4H PRN    insulin aspart U-100, 0-5 Units, Subcutaneous, QID (AC + HS) PRN    lorazepam, 0.5 mg, Intravenous, Q8H PRN    melatonin, 6 mg, Oral, Nightly PRN    naloxone, 0.02 mg, Intravenous, PRN    ondansetron, 4 mg, Intravenous, Q6H PRN    prochlorperazine, 5 mg, Intravenous, Q6H PRN    senna, 8.6 mg, Oral, Daily PRN    sodium chloride 0.9%, 250 mL, Intravenous, PRN    Pharmacy to dose Vancomycin consult, , , Once **AND** vancomycin - pharmacy to dose, , Intravenous, pharmacy to manage frequency    Estimated/Assessed Needs  Weight Used For Calorie Calculations: 61.2 kg (134 lb 14.7 oz)  Energy Calorie Requirements (kcal): 1836 (30  kcal/kg)  Energy Need Method: Kcal/kg  Protein Requirements: 73g (1.2g/kg)  Weight Used For Protein Calculations: 61.2 kg (134 lb 14.7 oz)  Estimated Fluid Requirement Method: RDA Method  RDA Method (mL): 1836  CHO Requirement: 200g    Nutrition Prescription Ordered  Current Diet Order: IDDSI level 4: pureed, renal, consistent carbohydrate  Oral Nutrition Supplement: Novasource renal    Evaluation of Received Nutrient/Fluid Intake  I/O: 4/11/2025: -4112ml since admit  Energy Calories Required: not meeting needs  Protein Required: not meeting needs  Fluid Required: not meeting needs  Comments: LBM: 4/10/2025  % Intake of Estimated Energy Needs: 25 - 50 %, dependent on TF running or not  % Meal Intake: 0 - 25 %    Nutrition Risk  Level of Risk/Frequency of Follow-up:  (2xweekly)     Monitor and Evaluation  Monitor and Evaluation: Energy intake     Nutrition Related Social Determinants of Health: SDOH: Adequate food in home environment     Nutrition Follow-Up  RD Follow-up?: Yes  Evon Beavers, MS, RDN, LDN

## 2025-04-11 NOTE — ASSESSMENT & PLAN NOTE
Patient with known CAD s/p stent placement, which is controlled Will continue ASA, Plavix, and Statin and monitor for S/Sx of angina/ACS. Continue to monitor on telemetry.   - last [facility] was 1/2025: Severely calcified mid RCA stenosis unable to cross with PTCA balloons, treated initially with 0.9 laser atherectomy, followed by CSI atherectomy system with total of 5 runs (3 at low speed, 2 at high speed), pre-dilated using 2.0 compliant and 3.5 noncompliant balloon followed by 3.5 X 16 mm megatron stent.  Focal plaque rupture/erosion noted in the proximal and ostial RCA that were treated with  3.5 X 28 in the proximal RCA, 4.0 X 16 mm in the ostial RCA.  No residual stenosis post intervention.  Patient had ALAN 3 flow at the end of the procedure  - with elevated troponin likely due to septic shock  Recent Labs   Lab 04/04/25  1028   TROPONINI 2.913*   - continue asa, plavix, statin  - Cardiology consulted  - no plans for ischemic evaluation at this time

## 2025-04-11 NOTE — ASSESSMENT & PLAN NOTE
Patient with known CAD s/p stent placement, which is controlled Will continue ASA, Plavix, and Statin and monitor for S/Sx of angina/ACS. Continue to monitor on telemetry.   - last [facility] was 1/2025: Severely calcified mid RCA stenosis unable to cross with PTCA balloons, treated initially with 0.9 laser atherectomy, followed by CSI atherectomy system with total of 5 runs (3 at low speed, 2 at high speed), pre-dilated using 2.0 compliant and 3.5 noncompliant balloon followed by 3.5 X 16 mm megatron stent.  Focal plaque rupture/erosion noted in the proximal and ostial RCA that were treated with  3.5 X 28 in the proximal RCA, 4.0 X 16 mm in the ostial RCA.  No residual stenosis post intervention.  Patient had ALAN 3 flow at the end of the procedure  - with elevated troponin likely due to septic shock  Recent Labs   Lab 04/04/25  1028   TROPONINI 2.913*   - continue asa, plavix, statin  - Cardiology consulted  - no plans for ischemic evaluation at this time

## 2025-04-11 NOTE — PT/OT/SLP PROGRESS
"Speech Language Pathology Treatment    Patient Name:  Jevon Rajan   MRN:  [id number]  Admitting Diagnosis: Septic shock    Recommendations:                 General Recommendations:  Follow-up not indicated  Diet recommendations:  Puree Diet - IDDSI Level 4, Liquid Diet Level: Thin liquids - IDDSI Level 0   Aspiration Precautions: 1 bite/sip at a time, Alternating bites/sips, Assistance with meals, HOB to 90 degrees, Meds whole 1 at a time, and Small bites/sips   General Precautions: Standard, contact, aspiration, pureed diet  Communication strategies:  provide increased time to answer    Assessment:     Jevon Rajan is a 85 y.o. male with a dx of Septic shock, who presents w/ mild oral dysphagia, pocketing all pureed boluses, however, w/ verbal cues to swallow and liquid washes, the pt cleared all oral stasis. No overt s/s of aspiration across thin liquid and pureed boluses. ST recs 1:1 A for ALL intake and encouragement of intake for possible d/c of NGT 2/2 good tolerance of po intake. No further ST is required at this time, as the pt is consuming the safest and least restrictive diet.     Subjective     Pt's nurse stating TF hold and pt tolerating whole pills and pudding this AM, however, cont pocketing noted. Upon ST's entrance, the pt was asleep, however, easily arouse to ST's greeting and was able to sustain alertness throughout entirety of session.    Patient goals: pt did not state     Pain/Comfort:  Pain Rating 1: 0/10    Respiratory Status: Room air    Objective:     Has the patient been evaluated by SLP for swallowing?   Yes  Keep patient NPO? No   Current Respiratory Status:        The pt required total A to consume the following po intake: tbsp bites of pudding (x5), as well as straw sips of water (x8). Pt w/ oral holding of pureed boluses in the anterior oral cavity, requiring verbal cues and liquid washes to clear all stasis. No overt s/s of aspiration across consistencies. Pt stating "that's enough" " following x5 bites of pudding, w/ ST terminating remainder of ST session.       ST notified pt's nurse and MD regarding diet recs.    Goals:   Multidisciplinary Problems       SLP Goals       Not on file              Multidisciplinary Problems (Resolved)          Problem: SLP    Goal Priority Disciplines Outcome   SLP Goal   (Resolved)     SLP Met   Description: ST. Pt will tolerate PO diet of pureed consistency with thin liquids without overt s/s of aspiration.  Pt will participate in ongoing assessment of swallow to determine least restrictive diet without overt s/s of aspiration.                             Plan:     Patient to be seen:  3 x/week   Plan of Care expires:  25  Plan of Care reviewed with:  patient   SLP Follow-Up:  No       Discharge recommendations:  No Therapy Indicated   Barriers to Discharge:  None    Time Tracking:     SLP Treatment Date:   25  Speech Start Time:  1124  Speech Stop Time:  1140     Speech Total Time (min):  16 min    Billable Minutes: Treatment Swallowing Dysfunction 16    2025

## 2025-04-11 NOTE — ASSESSMENT & PLAN NOTE
- Nephrology consulted and following; pt has had difficulty tolerating HD so far, family aware of this and with insight that this is a large factor in pt's prognosis   - family with insight that HD is a form of life support

## 2025-04-11 NOTE — SUBJECTIVE & OBJECTIVE
Medications:  Continuous Infusions:  Scheduled Meds:   atorvastatin  80 mg Oral QHS    clopidogreL  75 mg Oral Daily    epoetin [id number]  10,000 Units Intravenous Every Tues, Thurs, Sat    erythromycin   Both Eyes Q8H    insulin aspart U-100  5 Units Subcutaneous TIDWM    insulin glargine U-100  20 Units Subcutaneous Daily    midodrine  15 mg Oral Q8H    pantoprazole  40 mg Oral Daily    sodium chloride 0.9%  10 mL Intravenous Q8H    tamsulosin  0.4 mg Oral Daily    vancomycin (VANCOCIN) IV (PEDS and ADULTS)  500 mg Intravenous Once     PRN Meds:  Current Facility-Administered Medications:     0.9%  NaCl infusion (for blood administration), , Intravenous, Q24H PRN    0.9% NaCl, , Intravenous, PRN    acetaminophen, 650 mg, Oral, Q4H PRN    albumin human 25%, 25 g, Intravenous, Daily PRN    dextrose 50%, 12.5 g, Intravenous, PRN    dextrose 50%, 25 g, Intravenous, PRN    diphenhydrAMINE, 50 mg, Oral, Q6H PRN    glucagon (human recombinant), 1 mg, Intramuscular, PRN    glucose, 16 g, Oral, PRN    glucose, 24 g, Oral, PRN    heparin (porcine), 1,000 Units, Intravenous, PRN    HYDROmorphone, 1 mg, Intravenous, Q4H PRN    hyoscyamine, 0.125 mg, Sublingual, Q4H PRN    insulin aspart U-100, 0-5 Units, Subcutaneous, QID (AC + HS) PRN    melatonin, 6 mg, Oral, Nightly PRN    naloxone, 0.02 mg, Intravenous, PRN    ondansetron, 4 mg, Intravenous, Q6H PRN    prochlorperazine, 5 mg, Intravenous, Q6H PRN    senna, 8.6 mg, Oral, Daily PRN    sodium chloride 0.9%, 250 mL, Intravenous, PRN    Pharmacy to dose Vancomycin consult, , , Once **AND** vancomycin - pharmacy to dose, , Intravenous, pharmacy to manage frequency    Objective:     Vital Signs (Most Recent):  Temp: 98.5 °F (36.9 °C) (04/10/25 1901)  Pulse: (!) 113 (04/10/25 2000)  Resp: 20 (04/10/25 2000)  BP: 133/62 (04/10/25 2000)  SpO2: (!) 93 % (04/10/25 2000) Vital Signs (24h Range):  Temp:  [97.4 °F (36.3 °C)-98.5 °F (36.9 °C)] 98.5 °F (36.9 °C)  Pulse:  []  113  Resp:  [11-28] 20  SpO2:  [83 %-100 %] 93 %  BP: (108-135)/(54-70) 133/62     Weight: 61.2 kg (134 lb 14.7 oz)  Body mass index is 21.13 kg/m².     Physical Exam  Vitals and nursing note reviewed.   Constitutional:       Appearance: He is ill-appearing.      Comments: Arouses to name, able to answer simple questions with 1-2 word answers with stimulation, falls asleep quickly; varying levels of alertness and lethargy throughout the day; calling out in pain and confusion in AM but improved by later morning    HENT:      Head: Normocephalic and atraumatic.   Pulmonary:      Effort: Pulmonary effort is normal. No respiratory distress.      Comments: NC  Skin:     General: Skin is dry.      Coloration: Skin is pale.      Findings: Lesion present. Bruising: multiple skin lesions in various stages of healing. Erythema: pale/gray for ethnicity.  Neurological:      Mental Status: He is lethargic.      Comments: Answers to his name; less oriented overall, decreased awareness of location at time but able to reorient; oriented to niece by voice on phone A/P)     Psychiatric:         Behavior: Behavior is cooperative.          Advance Care Planning   Advance Directives:   Living Will: No    [place]: No    Do Not Resuscitate Status: No    Medical Power of : No      Decision Making:  Family answered questions and Patient answered questions  Goals of Care: What is most important right now is to focus on symptom/pain control, curative/life-prolongation (regardless of treatment burdens), improvement in condition but with limits to invasive therapies. Accordingly, we have decided that the best plan to meet the patient's goals includes continuing with treatment.       Significant Labs: All pertinent labs within the past 24 hours have been reviewed.  CBC:   Recent Labs   Lab 04/10/25  0416   WBC 16.64*   HGB 8.5*   HCT 26.5*   MCV 91        BMP:  Recent Labs   Lab 04/10/25  0416      K 3.7      CO2 22*    BUN 52*   CREATININE 7.8*   CALCIUM 8.6*     LFT:  Lab Results   Component Value Date    AST 15 04/10/2025    ALKPHOS 76 04/10/2025    BILITOT 0.3 04/10/2025     Albumin:   Albumin   Date Value Ref Range Status   04/10/2025 2.3 (L) 3.5 - 5.2 g/dL Final   03/21/2025 3.2 (L) 3.5 - 5.2 g/dL Final     Protein:   Total Protein   Date Value Ref Range Status   03/21/2025 7.5 6.0 - 8.4 g/dL Final     Lactic acid:   Lab Results   Component Value Date    LACTATE 0.9 04/03/2025    LACTATE 2.6 (H) 04/02/2025     Significant Imaging: I have reviewed all pertinent imaging results/findings within the past 24 hours.

## 2025-04-11 NOTE — PROGRESS NOTES
"[facility] Intensive Care  [facility] Medicine  Progress Note    Patient Name: Jevon Rajan  MRN: [id number]  Patient Class: IP- Inpatient   Admission Date: 4/3/2025  Length of Stay: 8 days  Attending Physician: Gonzalez Reagan MD  Primary Care Provider: No, Primary Doctor        Subjective     Principal Problem:Septic shock        HPI:  Mr Jevon Rajan is a 85 y.o. man with ESRD with LUE AVF, HFpEF last EF 50-55%, CAD, DM who was transferred to [facility] ICU for septic shock due to MRSA bacteremia.     He was admitted at [facility] 4/1-3/2025 with sepsis, worsening to septic shock, then identified source as MRSA. 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     Upon arrival to [facility], he is moaning in pain and his LUE AVF has active pulsatile bright red blood. He does respond to his name. He denies pain anywhere other than his LUE. He is on levophed at 0.05.     Overview/Hospital Course:  Mr Jevon Rajan is a 85 y.o. man with ESRD on HD with LUE AVF that has been bleeding, CHF, CAD s/p recent stenting 1/2025 who was admitted with MRSA bacteremia and bleeding from his LUE AVF. Continued vancomycin; weaned off levophed. Vascular surgery emergently consulted; on 4/3/25 they took him to OR and noted: "well incorporated proximal and central graft without evidence of infection, resected graft and covered proximal and central remnants with multiple layers. Mid graft removed in entirety and sent for culture. Ruptured pseudoaneurysm with infected hematoma, graft completely obliterated at mid segment." Surgical cultures with Staph. Suspect AVF or chronic scratching of pruritic skin is the source of his infection. TTE showed endocarditis: EF 40-45%, G1DD, RV systolic dysfunction, large 1.9x1.2cm fixed mass on the posterior mitral valve leaflet with moder to severe regurgitation, cannot rule out posterior mitral " valve leaflet perforation. Discussed with cardiac surgery; he is high mortality risk, and surgery is not advised. ID following. Nephrology consulted; trialysis was placed for HD. Persistently positive for MRSA in blood cultures. He has had urinary retention; Urology consulted, performed bedside cystoscopy on 4/6/25 with large prostate noted. Noted to have clot retention and went urgently to OR with Urology on 4/7/25; asa and DVT ppx held.     WBC normalized. S/p clot removal with Urology, 1U PRBC ordered this morning with Hb 6.1. Soft BPs, will add midodrine for HD to step down to floor. Glucoses high, will increase insulin. Attempted to s/d but BP too low, may still need CRRT rather. 4/8 cx clear so far. Increasing insulin again. Increasing midodrine to 15 mg TID. Hb 6.8, 1U PRBC ordered.     BP appears to be recovering a bit, perhaps will tolerate reg HD, will discuss w Neph.        Interval History:  NAEON.  No new issues.   CC- Fatigue  All questions answered and updates on care given.       ROS:  General: Negative for fevers   Cardiac: Negative for chest pain   Pulmonary: Negative for wheezing  GI: Negative for abdominal distention      Vitals:    04/11/25 0318 04/11/25 0400 04/11/25 0500 04/11/25 0600   BP:  (!) 120/58 (!) 143/70 131/66   Pulse:  91 104 101   Resp:  17 (!) 29 15   Temp: 98.2 °F (36.8 °C)      TempSrc: Axillary      SpO2:  96% 96% (!) 94%   Weight:       Height:              Body mass index is 21.13 kg/m².      PHYSICAL EXAM:  GENERAL APPEARANCE: alert and cooperative.     HEAD: NC/AT  CARDIAC: There is no cyanosis or pallor.   LUNGS: No apparent wheezing or stridor.  ABDOMEN: Non-distended. No guarding.  PSYCHIATRIC: No tangential speech. No Hyperactive features.        Recent Results (from the past 24 hours)   POCT glucose    Collection Time: 04/10/25 12:08 PM   Result Value Ref Range    POCT Glucose 198 (H) 70 - 110 mg/dL   POCT glucose    Collection Time: 04/10/25  5:09 PM   Result Value  Ref Range    POCT Glucose 247 (H) 70 - 110 mg/dL   POCT glucose    Collection Time: 04/10/25  9:37 PM   Result Value Ref Range    POCT Glucose 247 (H) 70 - 110 mg/dL   Comprehensive Metabolic Panel    Collection Time: 04/11/25  3:12 AM   Result Value Ref Range    Sodium 142 136 - 145 mmol/L    Potassium 3.9 3.5 - 5.1 mmol/L    Chloride 109 95 - 110 mmol/L    CO2 23 23 - 29 mmol/L    Glucose 277 (H) 70 - 110 mg/dL    BUN 35 (H) 8 - 23 mg/dL    Creatinine 5.3 (H) 0.5 - 1.4 mg/dL    Calcium 8.7 8.7 - 10.5 mg/dL    Protein Total 6.6 6.0 - 8.4 gm/dL    Albumin 2.2 (L) 3.5 - 5.2 g/dL    Bilirubin Total 0.4 0.1 - 1.0 mg/dL    ALP 88 40 - 150 unit/L    AST 22 11 - 45 unit/L    ALT 10 10 - 44 unit/L    Anion Gap 10 8 - 16 mmol/L    eGFR 10 (L) >60 mL/min/1.73/m2   Phosphorus    Collection Time: 04/11/25  3:12 AM   Result Value Ref Range    Phosphorus Level 2.9 2.7 - 4.5 mg/dL   CBC with Differential    Collection Time: 04/11/25  3:12 AM   Result Value Ref Range    WBC 16.75 (H) 3.90 - 12.70 K/uL    RBC 2.92 (L) 4.60 - 6.20 M/uL    HGB 8.6 (L) 14.0 - 18.0 gm/dL    HCT 27.2 (L) 40.0 - 54.0 %    MCV 93 82 - 98 fL    MCH 29.5 27.0 - 31.0 pg    MCHC 31.6 (L) 32.0 - 36.0 g/dL    RDW 17.2 (H) 11.5 - 14.5 %    Platelet Count 181 150 - 450 K/uL    MPV 11.3 9.2 - 12.9 fL    Nucleated RBC 1 (H) <=0 /100 WBC    Neut % 87.5 (H) 38 - 73 %    Lymph % 3.8 (L) 18 - 48 %    Mono % 5.1 4 - 15 %    Eos % 1.9 <=8 %    Basophil % 0.2 <=1.9 %    Imm Grans % 1.5 (H) 0.0 - 0.5 %    Neut # 14.65 (H) 1.8 - 7.7 K/uL    Lymph # 0.64 (L) 1 - 4.8 K/uL    Mono # 0.86 0.3 - 1 K/uL    Eos # 0.32 <=0.5 K/uL    Baso # 0.03 <=0.2 K/uL    Imm Grans # 0.25 (H) 0.00 - 0.04 K/uL   POCT glucose    Collection Time: 04/11/25  6:05 AM   Result Value Ref Range    POCT Glucose 286 (H) 70 - 110 mg/dL       Microbiology Results (last 7 days)       Procedure Component Value Units Date/Time    Blood culture [[id number]]  (Normal) Collected: 04/08/25 0401    Order Status:  Completed Specimen: Blood Updated: 04/11/25 0600     Blood Culture No Growth After 72 Hours    Blood culture [[id number]]  (Normal) Collected: 04/08/25 0506    Order Status: Completed Specimen: Blood Updated: 04/11/25 0600     Blood Culture No Growth After 72 Hours    Fungus culture [[id number]]  (Normal) Collected: 04/03/25 1816    Order Status: Completed Specimen: Wound from Arm, Left Updated: 04/10/25 2300     Fungal Culture No Fungus Isolated at 1 Week    Fungus culture [[id number]]  (Normal) Collected: 04/03/25 1934    Order Status: Completed Specimen: Tissue from AV Fistula, Left Updated: 04/10/25 2300     Fungal Culture No Fungus Isolated at 1 Week    Fungus culture [[id number]]  (Normal) Collected: 04/03/25 2011    Order Status: Completed Specimen: Wound from Arm, Left Updated: 04/10/25 2300     Fungal Culture No Fungus Isolated at 1 Week    Fungus culture [[id number]]  (Normal) Collected: 04/03/25 1904    Order Status: Completed Specimen: Tissue from AV Fistula, Left Updated: 04/10/25 2201     Fungal Culture No Fungus Isolated at 1 Week    Fungus culture [[id number]]  (Normal) Collected: 04/03/25 1921    Order Status: Completed Specimen: Tissue from hematoma Updated: 04/10/25 2201     Fungal Culture No Fungus Isolated at 1 Week    Blood culture [[id number]]  (Normal) Collected: 04/04/25 1044    Order Status: Completed Specimen: Blood Updated: 04/09/25 1100     Blood Culture No Growth After 5 Days    Blood culture [[id number]]  (Abnormal) Collected: 04/06/25 0555    Order Status: Completed Specimen: Blood Updated: 04/09/25 0720     Blood Culture Positive - Aerobic/Pediatric Bottle      Methicillin resistant Staphylococcus aureus     Comment: <null> has been updated to reportable.        GRAM STAIN Gram positive cocci in clusters resembling Staph     Comment: Aerobic Bottle Positive   This is an appended report. These results have been appended to a previously preliminary verified report.       Narrative:       For susceptibility refer to order [id number]  ID Consult Strongly Recommended    Blood culture [[id number]]  (Abnormal)  (Susceptibility) Collected: 04/06/25 0542    Order Status: Completed Specimen: Blood Updated: 04/09/25 0719     Blood Culture Positive - Aerobic/Pediatric Bottle      Methicillin resistant Staphylococcus aureus     Comment: <null> has been updated to reportable.        GRAM STAIN Gram positive cocci in clusters resembling Staph     Comment: This is an appended report. These results have been appended to a previously preliminary verified report.       Narrative:      Aerobic Bottle Positive   ID Consult Strongly Recommended    Afb Culture Stain [[id number]] Collected: 04/03/25 1943    Order Status: Completed Specimen: Wound from Arm, Left Updated: 04/07/25 1637     ACID FAST STAIN  No acid fast bacilli seen    Afb Culture Stain [[id number]] Collected: 04/03/25 2011    Order Status: Completed Specimen: Wound from Arm, Left Updated: 04/07/25 1637     ACID FAST STAIN  No acid fast bacilli seen    Afb Culture Stain [[id number]] Collected: 04/03/25 1905    Order Status: Completed Specimen: Tissue from AV Fistula, Left Updated: 04/07/25 1623     ACID FAST STAIN  No acid fast bacilli seen    Afb Culture Stain [[id number]] Collected: 04/03/25 1954    Order Status: Completed Specimen: Wound from Arm, Left Updated: 04/07/25 1623     ACID FAST STAIN  No acid fast bacilli seen    Afb Culture Stain [[id number]] Collected: 04/03/25 1816    Order Status: Completed Specimen: Tissue from AV Fistula, Left Updated: 04/07/25 1623     ACID FAST STAIN  No acid fast bacilli seen    Afb Culture Stain [[id number]] Collected: 04/03/25 1921    Order Status: Completed Specimen: Tissue from hematoma Updated: 04/07/25 1623     ACID FAST STAIN  No acid fast bacilli seen    Afb Culture Stain [[id number]] Collected: 04/03/25 1934    Order Status: Completed Specimen: Tissue from AV Fistula, Left Updated: 04/07/25 1618      ACID FAST STAIN  No acid fast bacilli seen    Fungus culture [[id number]]  (Normal) Collected: 04/03/25 1943    Order Status: Completed Specimen: Wound from Arm, Left Updated: 04/07/25 0935     Fungal Culture Culture In Progress    Fungus culture [[id number]]  (Normal) Collected: 04/03/25 1954    Order Status: Completed Specimen: Wound from Arm, Left Updated: 04/07/25 0935     Fungal Culture Culture In Progress    AFB Culture & Smear [[id number]] Collected: 04/03/25 1816    Order Status: Completed Specimen: Tissue from AV Fistula, Left Updated: 04/07/25 0815     CULTURE, AFB  Culture In Progress    AFB Culture & Smear [[id number]] Collected: 04/03/25 1905    Order Status: Completed Specimen: Tissue from AV Fistula, Left Updated: 04/07/25 0815     CULTURE, AFB  Culture In Progress    AFB Culture & Smear [[id number]] Collected: 04/03/25 1921    Order Status: Completed Specimen: Tissue from hematoma Updated: 04/07/25 0815     CULTURE, AFB  Culture In Progress    AFB Culture & Smear [[id number]] Collected: 04/03/25 1934    Order Status: Completed Specimen: Tissue from AV Fistula, Left Updated: 04/07/25 0815     CULTURE, AFB  Culture In Progress    AFB Culture & Smear [[id number]] Collected: 04/03/25 1943    Order Status: Completed Specimen: Wound from Arm, Left Updated: 04/07/25 0815     CULTURE, AFB  Culture In Progress    AFB Culture & Smear [[id number]] Collected: 04/03/25 1954    Order Status: Completed Specimen: Wound from Arm, Left Updated: 04/07/25 0815     CULTURE, AFB  Culture In Progress    AFB Culture & Smear [[id number]] Collected: 04/03/25 2011    Order Status: Completed Specimen: Wound from Arm, Left Updated: 04/07/25 0815     CULTURE, AFB  Culture In Progress    Culture, Anaerobic [[id number]] Collected: 04/03/25 2011    Order Status: Completed Specimen: Wound from Arm, Left Updated: 04/07/25 0748     Anaerobe Culture No Anaerobes Isolated    Culture, Anaerobic [[id number]] Collected: 04/03/25 1954     Order Status: Completed Specimen: Wound from Arm, Left Updated: 04/07/25 0747     Anaerobe Culture No Anaerobes Isolated    Culture, Anaerobic [[id number]] Collected: 04/03/25 1943    Order Status: Completed Specimen: Wound from Arm, Left Updated: 04/07/25 0747     Anaerobe Culture No Anaerobes Isolated    Culture, Anaerobic [[id number]] Collected: 04/03/25 1934    Order Status: Completed Specimen: Tissue from AV Fistula, Left Updated: 04/07/25 0746     Anaerobe Culture No Anaerobes Isolated    Culture, Anaerobic [[id number]] Collected: 04/03/25 1921    Order Status: Completed Specimen: Tissue from hematoma Updated: 04/07/25 0746     Anaerobe Culture No Anaerobes Isolated    Culture, Anaerobic [[id number]] Collected: 04/03/25 1910    Order Status: Completed Specimen: Tissue from AV Fistula, Left Updated: 04/07/25 0745     Anaerobe Culture No Anaerobes Isolated    Culture, Anaerobic [[id number]] Collected: 04/03/25 1816    Order Status: Completed Specimen: Wound from Arm, Left Updated: 04/07/25 0744     Anaerobe Culture No Anaerobes Isolated    Blood culture [[id number]]  (Abnormal) Collected: 04/04/25 1343    Order Status: Completed Specimen: Blood Updated: 04/06/25 0657     Blood Culture Positive - Aerobic/Pediatric Bottle      Methicillin resistant Staphylococcus aureus     Comment: <null> has been updated to reportable.        GRAM STAIN Gram positive cocci in clusters resembling Staph     Comment: Aerobic Bottle Positive    This is an appended report. These results have been appended to a previously preliminary verified report.       Narrative:      For sensitivities, refer to order # [id number]      Aerobic culture [[id number]]  (Abnormal) Collected: 04/03/25 2011    Order Status: Completed Specimen: Wound from Arm, Left Updated: 04/06/25 0556     CULTURE, AEROBIC Few Methicillin resistant Staphylococcus aureus    Narrative:      For sensitivities, refer to order # [id number]    Aerobic culture  [[id number]]  (Abnormal) Collected: 04/03/25 1954    Order Status: Completed Specimen: Wound from Arm, Left Updated: 04/06/25 0555     CULTURE, AEROBIC Many Methicillin resistant Staphylococcus aureus    Narrative:      For sensitivities, refer to order # [id number]    Aerobic culture [[id number]]  (Abnormal)  (Susceptibility) Collected: 04/03/25 1943    Order Status: Completed Specimen: Wound from Arm, Left Updated: 04/06/25 0553     CULTURE, AEROBIC Many Methicillin resistant Staphylococcus aureus    Aerobic culture [[id number]]  (Abnormal)  (Susceptibility) Collected: 04/03/25 1926    Order Status: Completed Specimen: Wound from Arm, Left Updated: 04/06/25 0544     CULTURE, AEROBIC Many Methicillin resistant Staphylococcus aureus    Aerobic culture [[id number]] Collected: 04/03/25 1911    Order Status: Completed Specimen: Tissue from Arm, Left Updated: 04/06/25 0543     CULTURE, AEROBIC No Growth    Aerobic culture [[id number]] Collected: 04/03/25 1816    Order Status: Completed Specimen: Wound from Arm, Left Updated: 04/06/25 0543     CULTURE, AEROBIC No Growth    Gram stain [[id number]] Collected: 04/03/25 1923    Order Status: Completed Specimen: Tissue from Arm, Left Updated: 04/04/25 0837     GRAM STAIN Rare WBC seen      Few Gram positive cocci    Gram stain [[id number]] Collected: 04/03/25 1935    Order Status: Completed Specimen: Tissue from Arm, Left Updated: 04/04/25 0836     GRAM STAIN Rare WBC seen      No organisms seen    Gram stain [[id number]] Collected: 04/03/25 1914    Order Status: Completed Specimen: Wound from Arm, Left Updated: 04/04/25 0836     GRAM STAIN Rare WBC seen      No organisms seen    Gram stain [[id number]] Collected: 04/03/25 2011    Order Status: Completed Specimen: Wound from Arm, Left Updated: 04/04/25 0835     GRAM STAIN Rare WBC seen      No organisms seen    Gram stain [[id number]] Collected: 04/03/25 1821    Order Status: Completed Specimen: Wound from Arm, Left  Updated: 04/04/25 0835     GRAM STAIN No WBCs      No organisms seen    Gram stain [[id number]] Collected: 04/03/25 1943    Order Status: Completed Specimen: Wound from Arm, Left Updated: 04/04/25 0834     GRAM STAIN Rare WBC seen      No organisms seen    Gram stain [[id number]] Collected: 04/03/25 1954    Order Status: Completed Specimen: Wound from Arm, Left Updated: 04/04/25 0833     GRAM STAIN Rare WBC seen      No organisms seen                          Assessment & Plan  Septic shock  This patient has shock. The type of shock is distributive due to sepsis. The patient had the following evidence of shock: persistent hypotension and altered mental status. 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 Shock is now resolved and vasopressors are off. WBC worsening  HTN (hypertension)  Patient's blood pressure range in the last 24 hours was: BP  Min: 110/59  Max: 161/76.The patient's inpatient anti-hypertensive regimen is listed below:  Current Antihypertensives       Plan  - admitted with shock  - now off vasopressors. Continue to hold BP Rx as BP is well controlled without it   HLD (hyperlipidemia)  - continue statin  Type 2 diabetes mellitus with hyperglycemia, without long-term current use of insulin  A1c:   Lab Results   Component Value Date    HGBA1C 5.7 (H) 04/02/2025     Meds: lantus + SSI PRN to maintain goal 140-180  Tube feeds  accuchecks, hypoglycemic protocol      Coronary artery disease involving native coronary artery of native heart without angina pectoris  Patient with known CAD s/p stent placement, which is controlled Will continue ASA, Plavix, and Statin and monitor for S/Sx of angina/ACS. Continue to monitor on telemetry.   - last [facility] was 1/2025:  "Severely calcified mid RCA stenosis unable to cross with PTCA balloons, treated initially with 0.9 laser atherectomy, followed by CSI atherectomy system with total of 5 runs (3 at low speed, 2 at high speed), pre-dilated using 2.0 compliant and 3.5 noncompliant balloon followed by 3.5 X 16 mm megatron stent.  Focal plaque rupture/erosion noted in the proximal and ostial RCA that were treated with  3.5 X 28 in the proximal RCA, 4.0 X 16 mm in the ostial RCA.  No residual stenosis post intervention.  Patient had ALAN 3 flow at the end of the procedure  - with elevated troponin likely due to septic shock  Recent Labs   Lab 04/04/25  1028   TROPONINI 2.913*   - continue asa, plavix, statin  - Cardiology consulted  - no plans for ischemic evaluation at this time   ESRD on hemodialysis  - ESRD on HD via LUE AVF  - LUE AVF was actively bleeding on arrival on 4/3/25. Vascular surgery was consulted. He went emergently to the OR on 4/3/25: "Severely calcified mid RCA stenosis unable to cross with PTCA balloons, treated initially with 0.9 laser atherectomy, followed by CSI atherectomy system with total of 5 runs (3 at low speed, 2 at high speed), pre-dilated using 2.0 compliant and 3.5 noncompliant balloon followed by 3.5 X 16 mm megatron stent.  Focal plaque rupture/erosion noted in the proximal and ostial RCA that were treated with  3.5 X 28 in the proximal RCA, 4.0 X 16 mm in the ostial RCA.  No residual stenosis post intervention.  Patient had ALAN 3 flow at the end of the procedure"  - cultures from AVF= Staph   - wound care orders in place for surgical site  - Nephrology is consulted  - trialysis placed on 4/4/25 for CRRT  - he will need THDC when bacteremia is resolved   Anemia in ESRD (end-stage renal disease)  Anemia is likely due to  ESRD, blood loss . Most recent hemoglobin and hematocrit are listed below.  Recent Labs     04/09/25 2039 04/10/25  0416 04/11/25  0312   HGB 8.4* 8.5* 8.6*   HCT 26.2* 26.5* 27.2* " "    Plan  - Monitor serial CBC: Daily and recheck now  - Transfuse PRBC if patient becomes hemodynamically unstable, symptomatic or H/H drops below 7/21.  - Patient has not received any PRBC transfusions to date  - Patient's anemia is currently stable  Acute metabolic encephalopathy  - he is oriented to self but otherwise confused  - CTH 4/1/25 with no acute process  - suspect this is due to sepsis  - NGT placed on 4/4/25 so that he could get his asa/plavix  - swallow- continue puree. Continue NGT until definitely improving and swallowing Rx     ACP (advance care planning)  Advance Care Planning     Date: 04/04/2025  - palliative consulted   - Mr Escoto specifically told Dr Jordan to contact Kenia Smyth for all updates  - discussed code status with Kenia. She states she has spoken with Mr Escoto's sisters and they all want full code status.          Acute bacterial endocarditis  - TTE 4/4/25: "1.9x1.2cm large fixed heterogeneous mass present on the posterior leaflet (new finding vs 9/2023 echo). There is moderate to severe regurgitation with an eccentric jet. Cannot exclude severe MR with possible PMVL perforation in association with large vegetation."  - this is in the setting of MRSA bacteremia  - ID is consulted  - repeat blood cultures until clear   - suspect source is skin/chronic scratching vs AVF infection- cultures from resected AVF with Staph   - discussed with Cardiac surgery Dr Castro 4/4/25- given age and comorbidities, he is very high risk of mortality with surgery. He recommends medical management at this point.  - on vancomycin      MRSA bacteremia  - suspect source= chronic skin scratching vs infected AVF  - cultures of AVF with Staph   - he has endocarditis  - ID consulted  - on vanc     NSTEMI (non-ST elevated myocardial infarction)  - see CAD    BPH with urinary obstruction  - noted 4/6/25 when patient became very agitated and screaming he couldn't urinate  - nursing unable to " "place dunaway/coude  - Urology consulted. Bedside cystoscopy on 4/6/25 noted clots, large prostate. Catheter was placed but was then removed due to pain  - 4/7 he is again agitated that he cannot urinate  - follow up with Urology   - tamsulosin ordered if he is awake enough to swallow     Thrombocytopenia  The likely etiology of thrombocytopenia is infection and sepsis. The patients 3 most recent labs are listed below.  Recent Labs     04/09/25  2039 04/10/25  0416 04/11/25  0312    161 181     Plan  - Will transfuse if platelet count is <10k.    AV fistula infection  - LUE AVF was actively bleeding on arrival on 4/3/25. Vascular surgery was consulted. He went emergently to the OR on 4/3/25: "Severely calcified mid RCA stenosis unable to cross with PTCA balloons, treated initially with 0.9 laser atherectomy, followed by CSI atherectomy system with total of 5 runs (3 at low speed, 2 at high speed), pre-dilated using 2.0 compliant and 3.5 noncompliant balloon followed by 3.5 X 16 mm megatron stent.  Focal plaque rupture/erosion noted in the proximal and ostial RCA that were treated with  3.5 X 28 in the proximal RCA, 4.0 X 16 mm in the ostial RCA.  No residual stenosis post intervention.  Patient had ALAN 3 flow at the end of the procedure"  - cultures from AVF= Staph   - wound care orders in place for surgical site  - trialysis currently in place for HD    Emphysema lung  - emphysema noted on CT  - no signs of COPD exacerbation  Pulmonary nodules  - CT 4/3/25 with few small pulmonary nodules  - will need outpatient follow up     Gross hematuria      NSVT (nonsustained ventricular tachycardia)        VTE Risk Mitigation (From admission, onward)           Ordered     heparin (porcine) injection 1,000 Units  As needed (PRN)         04/05/25 2100     IP VTE HIGH RISK PATIENT  Once         04/03/25 1516     Place TEMO hose  Until discontinued         04/03/25 1516     Place sequential compression device  Until " discontinued         04/03/25 1516     Reason for No Pharmacological VTE Prophylaxis  Once        Question:  Reasons:  Answer:  Physician Provided (leave comment)    04/03/25 1516                    Discharge Planning   BAYRON:      Code Status: Full Code   Medical Readiness for Discharge Date:   Discharge Plan A: Home Health [place] [place]            Critical care time spent on the evaluation and treatment of severe organ dysfunction, review of pertinent labs and imaging studies, discussions with consulting providers and discussions with patient/family: 35 minutes.            Gonzalez Reagan MD  Department of Hospital Medicine   [facility] [place] - Intensive Care

## 2025-04-12 LAB
ABSOLUTE EOSINOPHIL (OHS): 0.35 K/UL
ABSOLUTE MONOCYTE (OHS): 0.83 K/UL (ref 0.3–1)
ABSOLUTE NEUTROPHIL COUNT (OHS): 13.8 K/UL (ref 1.8–7.7)
ALBUMIN SERPL BCP-MCNC: 2.4 G/DL (ref 3.5–5.2)
ALP SERPL-CCNC: 64 UNIT/L (ref 40–150)
ALT SERPL W/O P-5'-P-CCNC: 11 UNIT/L (ref 10–44)
ANION GAP (OHS): 13 MMOL/L (ref 8–16)
AST SERPL-CCNC: 27 UNIT/L (ref 11–45)
BASOPHILS # BLD AUTO: 0.04 K/UL
BASOPHILS NFR BLD AUTO: 0.3 %
BILIRUB SERPL-MCNC: 0.9 MG/DL (ref 0.1–1)
BUN SERPL-MCNC: 50 MG/DL (ref 8–23)
CALCIUM SERPL-MCNC: 9.1 MG/DL (ref 8.7–10.5)
CHLORIDE SERPL-SCNC: 110 MMOL/L (ref 95–110)
CO2 SERPL-SCNC: 19 MMOL/L (ref 23–29)
CREAT SERPL-MCNC: 7.2 MG/DL (ref 0.5–1.4)
ERYTHROCYTE [DISTWIDTH] IN BLOOD BY AUTOMATED COUNT: 17.3 % (ref 11.5–14.5)
GFR SERPLBLD CREATININE-BSD FMLA CKD-EPI: 7 ML/MIN/1.73/M2
GLUCOSE SERPL-MCNC: 104 MG/DL (ref 70–110)
HCT VFR BLD AUTO: 27.5 % (ref 40–54)
HGB BLD-MCNC: 8.6 GM/DL (ref 14–18)
IMM GRANULOCYTES # BLD AUTO: 0.17 K/UL (ref 0–0.04)
IMM GRANULOCYTES NFR BLD AUTO: 1.1 % (ref 0–0.5)
LYMPHOCYTES # BLD AUTO: 0.59 K/UL (ref 1–4.8)
MCH RBC QN AUTO: 29 PG (ref 27–31)
MCHC RBC AUTO-ENTMCNC: 31.3 G/DL (ref 32–36)
MCV RBC AUTO: 93 FL (ref 82–98)
NUCLEATED RBC (/100WBC) (OHS): 0 /100 WBC
PHOSPHATE SERPL-MCNC: 3.6 MG/DL (ref 2.7–4.5)
PLATELET # BLD AUTO: 203 K/UL (ref 150–450)
PMV BLD AUTO: 11.5 FL (ref 9.2–12.9)
POCT GLUCOSE: 132 MG/DL (ref 70–110)
POCT GLUCOSE: 136 MG/DL (ref 70–110)
POCT GLUCOSE: 158 MG/DL (ref 70–110)
POCT GLUCOSE: 226 MG/DL (ref 70–110)
POCT GLUCOSE: 95 MG/DL (ref 70–110)
POTASSIUM SERPL-SCNC: 3.7 MMOL/L (ref 3.5–5.1)
PROT SERPL-MCNC: 6.8 GM/DL (ref 6–8.4)
RBC # BLD AUTO: 2.97 M/UL (ref 4.6–6.2)
RELATIVE EOSINOPHIL (OHS): 2.2 %
RELATIVE LYMPHOCYTE (OHS): 3.7 % (ref 18–48)
RELATIVE MONOCYTE (OHS): 5.3 % (ref 4–15)
RELATIVE NEUTROPHIL (OHS): 87.4 % (ref 38–73)
SODIUM SERPL-SCNC: 142 MMOL/L (ref 136–145)
TROPONIN I SERPL DL<=0.01 NG/ML-MCNC: 0.81 NG/ML
TROPONIN I SERPL DL<=0.01 NG/ML-MCNC: 0.91 NG/ML
VANCOMYCIN SERPL-MCNC: 20.1 UG/ML (ref ?–80)
WBC # BLD AUTO: 15.78 K/UL (ref 3.9–12.7)

## 2025-04-12 PROCEDURE — 94761 N-INVAS EAR/PLS OXIMETRY MLT: CPT

## 2025-04-12 PROCEDURE — 27000207 HC ISOLATION

## 2025-04-12 PROCEDURE — 99232 SBSQ HOSP IP/OBS MODERATE 35: CPT | Mod: ,,, | Performed by: STUDENT IN AN ORGANIZED HEALTH CARE EDUCATION/TRAINING PROGRAM

## 2025-04-12 PROCEDURE — 25000003 PHARM REV CODE 250: Performed by: HOSPITALIST

## 2025-04-12 PROCEDURE — A4216 STERILE WATER/SALINE, 10 ML: HCPCS | Performed by: HOSPITALIST

## 2025-04-12 PROCEDURE — 36415 COLL VENOUS BLD VENIPUNCTURE: CPT | Performed by: HOSPITALIST

## 2025-04-12 PROCEDURE — 80053 COMPREHEN METABOLIC PANEL: CPT | Performed by: HOSPITALIST

## 2025-04-12 PROCEDURE — 80100014 HC HEMODIALYSIS 1:1

## 2025-04-12 PROCEDURE — 99233 SBSQ HOSP IP/OBS HIGH 50: CPT | Mod: ,,, | Performed by: INTERNAL MEDICINE

## 2025-04-12 PROCEDURE — 85025 COMPLETE CBC W/AUTO DIFF WBC: CPT | Performed by: HOSPITALIST

## 2025-04-12 PROCEDURE — 84484 ASSAY OF TROPONIN QUANT: CPT | Performed by: STUDENT IN AN ORGANIZED HEALTH CARE EDUCATION/TRAINING PROGRAM

## 2025-04-12 PROCEDURE — 84100 ASSAY OF PHOSPHORUS: CPT | Performed by: INTERNAL MEDICINE

## 2025-04-12 PROCEDURE — 63600175 PHARM REV CODE 636 W HCPCS: Performed by: STUDENT IN AN ORGANIZED HEALTH CARE EDUCATION/TRAINING PROGRAM

## 2025-04-12 PROCEDURE — G0545 PR VISIT INHERENT TO INPT OR OBS CARE, INFECTIOUS DISEASE: HCPCS | Mod: ,,, | Performed by: INTERNAL MEDICINE

## 2025-04-12 PROCEDURE — 11000001 HC ACUTE MED/SURG PRIVATE ROOM

## 2025-04-12 PROCEDURE — 25000003 PHARM REV CODE 250: Performed by: STUDENT IN AN ORGANIZED HEALTH CARE EDUCATION/TRAINING PROGRAM

## 2025-04-12 PROCEDURE — 63600175 PHARM REV CODE 636 W HCPCS: Mod: JZ,TB | Performed by: INTERNAL MEDICINE

## 2025-04-12 PROCEDURE — 80202 ASSAY OF VANCOMYCIN: CPT | Performed by: STUDENT IN AN ORGANIZED HEALTH CARE EDUCATION/TRAINING PROGRAM

## 2025-04-12 PROCEDURE — 93010 ELECTROCARDIOGRAM REPORT: CPT | Mod: ,,, | Performed by: INTERNAL MEDICINE

## 2025-04-12 PROCEDURE — 63600175 PHARM REV CODE 636 W HCPCS: Performed by: REGISTERED NURSE

## 2025-04-12 PROCEDURE — 93005 ELECTROCARDIOGRAM TRACING: CPT

## 2025-04-12 RX ORDER — INSULIN GLARGINE 100 [IU]/ML
8 INJECTION, SOLUTION SUBCUTANEOUS DAILY
Status: DISCONTINUED | OUTPATIENT
Start: 2025-04-13 | End: 2025-04-13

## 2025-04-12 RX ORDER — QUETIAPINE FUMARATE 25 MG/1
25 TABLET, FILM COATED ORAL ONCE
Status: COMPLETED | OUTPATIENT
Start: 2025-04-12 | End: 2025-04-12

## 2025-04-12 RX ORDER — HALOPERIDOL LACTATE 5 MG/ML
2 INJECTION, SOLUTION INTRAMUSCULAR EVERY 4 HOURS PRN
Status: DISCONTINUED | OUTPATIENT
Start: 2025-04-12 | End: 2025-04-29 | Stop reason: HOSPADM

## 2025-04-12 RX ADMIN — ERYTHROMYCIN: 5 OINTMENT OPHTHALMIC at 09:04

## 2025-04-12 RX ADMIN — INSULIN ASPART 2 UNITS: 100 INJECTION, SOLUTION INTRAVENOUS; SUBCUTANEOUS at 12:04

## 2025-04-12 RX ADMIN — ERYTHROMYCIN 1 INCH: 5 OINTMENT OPHTHALMIC at 05:04

## 2025-04-12 RX ADMIN — QUETIAPINE FUMARATE 25 MG: 25 TABLET ORAL at 04:04

## 2025-04-12 RX ADMIN — MIDODRINE HYDROCHLORIDE 15 MG: 5 TABLET ORAL at 05:04

## 2025-04-12 RX ADMIN — CLOPIDOGREL BISULFATE 75 MG: 75 TABLET, FILM COATED ORAL at 08:04

## 2025-04-12 RX ADMIN — ERYTHROMYCIN: 5 OINTMENT OPHTHALMIC at 02:04

## 2025-04-12 RX ADMIN — TAMSULOSIN HYDROCHLORIDE 0.4 MG: 0.4 CAPSULE ORAL at 08:04

## 2025-04-12 RX ADMIN — MIDODRINE HYDROCHLORIDE 15 MG: 5 TABLET ORAL at 09:04

## 2025-04-12 RX ADMIN — HYDROMORPHONE HYDROCHLORIDE 1 MG: 1 INJECTION, SOLUTION INTRAMUSCULAR; INTRAVENOUS; SUBCUTANEOUS at 11:04

## 2025-04-12 RX ADMIN — MIDODRINE HYDROCHLORIDE 15 MG: 5 TABLET ORAL at 02:04

## 2025-04-12 RX ADMIN — HALOPERIDOL LACTATE 2 MG: 5 INJECTION, SOLUTION INTRAMUSCULAR at 07:04

## 2025-04-12 RX ADMIN — LORAZEPAM 0.5 MG: 2 INJECTION INTRAMUSCULAR; INTRAVENOUS at 04:04

## 2025-04-12 RX ADMIN — SODIUM CHLORIDE, PRESERVATIVE FREE 10 ML: 5 INJECTION INTRAVENOUS at 02:04

## 2025-04-12 RX ADMIN — PANTOPRAZOLE SODIUM 40 MG: 40 TABLET, DELAYED RELEASE ORAL at 08:04

## 2025-04-12 RX ADMIN — ATORVASTATIN CALCIUM 80 MG: 40 TABLET, FILM COATED ORAL at 09:04

## 2025-04-12 RX ADMIN — LORAZEPAM 0.5 MG: 2 INJECTION INTRAMUSCULAR; INTRAVENOUS at 12:04

## 2025-04-12 RX ADMIN — SODIUM CHLORIDE, PRESERVATIVE FREE 10 ML: 5 INJECTION INTRAVENOUS at 09:04

## 2025-04-12 RX ADMIN — EPOETIN ALFA-EPBX 10000 UNITS: 10000 INJECTION, SOLUTION INTRAVENOUS; SUBCUTANEOUS at 03:04

## 2025-04-12 NOTE — SUBJECTIVE & OBJECTIVE
"Interval History: Mr. Rajan is hanging in there. He complaints of chest pain where his leads are attached. "I have to pee," he exclaims he (has a Sharma catheter).    Review of Systems   Cardiovascular:  Positive for chest pain.   All other systems reviewed and are negative.    Objective:     Vital Signs (Most Recent):  Temp: 98.9 °F (37.2 °C) (04/12/25 0800)  Pulse: 100 (04/12/25 0800)  Resp: 20 (04/12/25 0800)  BP: 124/60 (04/12/25 0800)  SpO2: 100 % (04/12/25 0800) Vital Signs (24h Range):  Temp:  [98 °F (36.7 °C)-98.9 °F (37.2 °C)] 98.9 °F (37.2 °C)  Pulse:  [] 100  Resp:  [12-39] 20  SpO2:  [95 %-100 %] 100 %  BP: (113-146)/(55-82) 124/60     Weight: 61.2 kg (134 lb 14.7 oz)  Body mass index is 21.13 kg/m².    Estimated Creatinine Clearance: 6.3 mL/min (A) (based on SCr of 7.2 mg/dL (H)).     Physical Exam  Vitals and nursing note reviewed.   Constitutional:       Appearance: Normal appearance.   HENT:      Head: Normocephalic and atraumatic.      Mouth/Throat:      Mouth: Mucous membranes are moist.      Comments: Tongue   Eyes:      Pupils: Pupils are equal, round, and reactive to light.   Abdominal:      Tenderness: There is no abdominal tenderness. There is no guarding or rebound.      Hernia: No hernia is present.   Musculoskeletal:         General: Deformity present.      Comments: LUE clean with decent granulation   Neurological:      General: No focal deficit present.      Mental Status: He is alert.   Psychiatric:         Mood and Affect: Mood normal.          Significant Labs: CBC:   Recent Labs   Lab 04/11/25 0312 04/12/25  0409   WBC 16.75* 15.78*   HGB 8.6* 8.6*   HCT 27.2* 27.5*    203     Microbiology Results (last 7 days)       Procedure Component Value Units Date/Time    Blood culture [[id number]]  (Normal) Collected: 04/08/25 0401    Order Status: Completed Specimen: Blood Updated: 04/12/25 0602     Blood Culture No Growth After 96 hours    Blood culture [[id number]]  " (Normal) Collected: 04/08/25 0506    Order Status: Completed Specimen: Blood Updated: 04/12/25 0602     Blood Culture No Growth After 96 hours    Blood culture [[id number]]  (Normal) Collected: 04/11/25 1335    Order Status: Completed Specimen: Blood Updated: 04/11/25 2002     Blood Culture No Growth After 6 Hours    Blood culture [[id number]]  (Normal) Collected: 04/11/25 1335    Order Status: Completed Specimen: Blood from Other (Specify) Updated: 04/11/25 2002     Blood Culture No Growth After 6 Hours    Fungus culture [[id number]]  (Normal) Collected: 04/03/25 1816    Order Status: Completed Specimen: Wound from Arm, Left Updated: 04/10/25 2300     Fungal Culture No Fungus Isolated at 1 Week    Fungus culture [[id number]]  (Normal) Collected: 04/03/25 1934    Order Status: Completed Specimen: Tissue from AV Fistula, Left Updated: 04/10/25 2300     Fungal Culture No Fungus Isolated at 1 Week    Fungus culture [[id number]]  (Normal) Collected: 04/03/25 2011    Order Status: Completed Specimen: Wound from Arm, Left Updated: 04/10/25 2300     Fungal Culture No Fungus Isolated at 1 Week    Fungus culture [[id number]]  (Normal) Collected: 04/03/25 1904    Order Status: Completed Specimen: Tissue from AV Fistula, Left Updated: 04/10/25 2201     Fungal Culture No Fungus Isolated at 1 Week    Fungus culture [[id number]]  (Normal) Collected: 04/03/25 1921    Order Status: Completed Specimen: Tissue from hematoma Updated: 04/10/25 2201     Fungal Culture No Fungus Isolated at 1 Week    Blood culture [[id number]]  (Normal) Collected: 04/04/25 1044    Order Status: Completed Specimen: Blood Updated: 04/09/25 1100     Blood Culture No Growth After 5 Days    Blood culture [[id number]]  (Abnormal) Collected: 04/06/25 0555    Order Status: Completed Specimen: Blood Updated: 04/09/25 0720     Blood Culture Positive - Aerobic/Pediatric Bottle      Methicillin resistant Staphylococcus aureus     Comment: <null> has been updated to  reportable.        GRAM STAIN Gram positive cocci in clusters resembling Staph     Comment: Aerobic Bottle Positive   This is an appended report. These results have been appended to a previously preliminary verified report.       Narrative:      For susceptibility refer to order [id number]  ID Consult Strongly Recommended    Blood culture [[id number]]  (Abnormal)  (Susceptibility) Collected: 04/06/25 0542    Order Status: Completed Specimen: Blood Updated: 04/09/25 0719     Blood Culture Positive - Aerobic/Pediatric Bottle      Methicillin resistant Staphylococcus aureus     Comment: <null> has been updated to reportable.        GRAM STAIN Gram positive cocci in clusters resembling Staph     Comment: This is an appended report. These results have been appended to a previously preliminary verified report.       Narrative:      Aerobic Bottle Positive   ID Consult Strongly Recommended    Afb Culture Stain [[id number]] Collected: 04/03/25 1943    Order Status: Completed Specimen: Wound from Arm, Left Updated: 04/07/25 1637     ACID FAST STAIN  No acid fast bacilli seen    Afb Culture Stain [[id number]] Collected: 04/03/25 2011    Order Status: Completed Specimen: Wound from Arm, Left Updated: 04/07/25 1637     ACID FAST STAIN  No acid fast bacilli seen    Afb Culture Stain [[id number]] Collected: 04/03/25 1905    Order Status: Completed Specimen: Tissue from AV Fistula, Left Updated: 04/07/25 1623     ACID FAST STAIN  No acid fast bacilli seen    Afb Culture Stain [[id number]] Collected: 04/03/25 1954    Order Status: Completed Specimen: Wound from Arm, Left Updated: 04/07/25 1623     ACID FAST STAIN  No acid fast bacilli seen    Afb Culture Stain [[id number]] Collected: 04/03/25 1816    Order Status: Completed Specimen: Tissue from AV Fistula, Left Updated: 04/07/25 1623     ACID FAST STAIN  No acid fast bacilli seen    Afb Culture Stain [[id number]] Collected: 04/03/25 1921    Order Status: Completed Specimen:  Tissue from hematoma Updated: 04/07/25 1623     ACID FAST STAIN  No acid fast bacilli seen    Afb Culture Stain [[id number]] Collected: 04/03/25 1934    Order Status: Completed Specimen: Tissue from AV Fistula, Left Updated: 04/07/25 1618     ACID FAST STAIN  No acid fast bacilli seen    Fungus culture [[id number]]  (Normal) Collected: 04/03/25 1943    Order Status: Completed Specimen: Wound from Arm, Left Updated: 04/07/25 0935     Fungal Culture Culture In Progress    Fungus culture [[id number]]  (Normal) Collected: 04/03/25 1954    Order Status: Completed Specimen: Wound from Arm, Left Updated: 04/07/25 0935     Fungal Culture Culture In Progress    AFB Culture & Smear [[id number]] Collected: 04/03/25 1816    Order Status: Completed Specimen: Tissue from AV Fistula, Left Updated: 04/07/25 0815     CULTURE, AFB  Culture In Progress    AFB Culture & Smear [[id number]] Collected: 04/03/25 1905    Order Status: Completed Specimen: Tissue from AV Fistula, Left Updated: 04/07/25 0815     CULTURE, AFB  Culture In Progress    AFB Culture & Smear [[id number]] Collected: 04/03/25 1921    Order Status: Completed Specimen: Tissue from hematoma Updated: 04/07/25 0815     CULTURE, AFB  Culture In Progress    AFB Culture & Smear [[id number]] Collected: 04/03/25 1934    Order Status: Completed Specimen: Tissue from AV Fistula, Left Updated: 04/07/25 0815     CULTURE, AFB  Culture In Progress    AFB Culture & Smear [[id number]] Collected: 04/03/25 1943    Order Status: Completed Specimen: Wound from Arm, Left Updated: 04/07/25 0815     CULTURE, AFB  Culture In Progress    AFB Culture & Smear [[id number]] Collected: 04/03/25 1954    Order Status: Completed Specimen: Wound from Arm, Left Updated: 04/07/25 0815     CULTURE, AFB  Culture In Progress    AFB Culture & Smear [[id number]] Collected: 04/03/25 2011    Order Status: Completed Specimen: Wound from Arm, Left Updated: 04/07/25 0815     CULTURE, AFB  Culture In Progress     Culture, Anaerobic [[id number]] Collected: 04/03/25 2011    Order Status: Completed Specimen: Wound from Arm, Left Updated: 04/07/25 0748     Anaerobe Culture No Anaerobes Isolated    Culture, Anaerobic [[id number]] Collected: 04/03/25 1954    Order Status: Completed Specimen: Wound from Arm, Left Updated: 04/07/25 0747     Anaerobe Culture No Anaerobes Isolated    Culture, Anaerobic [[id number]] Collected: 04/03/25 1943    Order Status: Completed Specimen: Wound from Arm, Left Updated: 04/07/25 0747     Anaerobe Culture No Anaerobes Isolated    Culture, Anaerobic [[id number]] Collected: 04/03/25 1934    Order Status: Completed Specimen: Tissue from AV Fistula, Left Updated: 04/07/25 0746     Anaerobe Culture No Anaerobes Isolated    Culture, Anaerobic [[id number]] Collected: 04/03/25 1921    Order Status: Completed Specimen: Tissue from hematoma Updated: 04/07/25 0746     Anaerobe Culture No Anaerobes Isolated    Culture, Anaerobic [[id number]] Collected: 04/03/25 1910    Order Status: Completed Specimen: Tissue from AV Fistula, Left Updated: 04/07/25 0745     Anaerobe Culture No Anaerobes Isolated    Culture, Anaerobic [[id number]] Collected: 04/03/25 1816    Order Status: Completed Specimen: Wound from Arm, Left Updated: 04/07/25 0744     Anaerobe Culture No Anaerobes Isolated    Blood culture [[id number]]  (Abnormal) Collected: 04/04/25 1343    Order Status: Completed Specimen: Blood Updated: 04/06/25 0657     Blood Culture Positive - Aerobic/Pediatric Bottle      Methicillin resistant Staphylococcus aureus     Comment: <null> has been updated to reportable.        GRAM STAIN Gram positive cocci in clusters resembling Staph     Comment: Aerobic Bottle Positive    This is an appended report. These results have been appended to a previously preliminary verified report.       Narrative:      For sensitivities, refer to order # [id number]      Aerobic culture [[id number]]  (Abnormal) Collected: 04/03/25 2011     Order Status: Completed Specimen: Wound from Arm, Left Updated: 04/06/25 0556     CULTURE, AEROBIC Few Methicillin resistant Staphylococcus aureus    Narrative:      For sensitivities, refer to order # [id number]    Aerobic culture [[id number]]  (Abnormal) Collected: 04/03/25 1954    Order Status: Completed Specimen: Wound from Arm, Left Updated: 04/06/25 0555     CULTURE, AEROBIC Many Methicillin resistant Staphylococcus aureus    Narrative:      For sensitivities, refer to order # [id number]    Aerobic culture [[id number]]  (Abnormal)  (Susceptibility) Collected: 04/03/25 1943    Order Status: Completed Specimen: Wound from Arm, Left Updated: 04/06/25 0553     CULTURE, AEROBIC Many Methicillin resistant Staphylococcus aureus    Aerobic culture [[id number]]  (Abnormal)  (Susceptibility) Collected: 04/03/25 1926    Order Status: Completed Specimen: Wound from Arm, Left Updated: 04/06/25 0544     CULTURE, AEROBIC Many Methicillin resistant Staphylococcus aureus    Aerobic culture [[id number]] Collected: 04/03/25 1911    Order Status: Completed Specimen: Tissue from Arm, Left Updated: 04/06/25 0543     CULTURE, AEROBIC No Growth    Aerobic culture [[id number]] Collected: 04/03/25 1816    Order Status: Completed Specimen: Wound from Arm, Left Updated: 04/06/25 0543     CULTURE, AEROBIC No Growth            Significant Imaging: I have reviewed all pertinent imaging results/findings within the past 24 hours.

## 2025-04-12 NOTE — PROGRESS NOTES
"[facility]  [facility] Medicine  Progress Note    Patient Name: Jevon Rajan  MRN: [id number]  Patient Class: IP- Inpatient   Admission Date: 4/3/2025  Length of Stay: 9 days  Attending Physician: Gonzalez Reagan MD  Primary Care Provider: No, Primary Doctor        Subjective     Principal Problem:Septic shock        HPI:  Mr Jevon Rajan is a 85 y.o. man with ESRD with LUE AVF, HFpEF last EF 50-55%, CAD, DM who was transferred to [facility] ICU for septic shock due to MRSA bacteremia.     He was admitted at [facility] 4/1-3/2025 with sepsis, worsening to septic shock, then identified source as MRSA. 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     Upon arrival to [facility], he is moaning in pain and his LUE AVF has active pulsatile bright red blood. He does respond to his name. He denies pain anywhere other than his LUE. He is on levophed at 0.05.     Overview/Hospital Course:  Mr Jevon Rajan is a 85 y.o. man with ESRD on HD with LUE AVF that has been bleeding, CHF, CAD s/p recent stenting 1/2025 who was admitted with MRSA bacteremia and bleeding from his LUE AVF. Continued vancomycin; weaned off levophed. Vascular surgery emergently consulted; on 4/3/25 they took him to OR and noted: "well incorporated proximal and central graft without evidence of infection, resected graft and covered proximal and central remnants with multiple layers. Mid graft removed in entirety and sent for culture. Ruptured pseudoaneurysm with infected hematoma, graft completely obliterated at mid segment." Surgical cultures with Staph. Suspect AVF or chronic scratching of pruritic skin is the source of his infection. TTE showed endocarditis: EF 40-45%, G1DD, RV systolic dysfunction, large 1.9x1.2cm fixed mass on the posterior mitral valve leaflet with moder to severe regurgitation, cannot rule out posterior mitral " valve leaflet perforation. Discussed with cardiac surgery; he is high mortality risk, and surgery is not advised. ID following. Nephrology consulted; trialysis was placed for HD. Persistently positive for MRSA in blood cultures. He has had urinary retention; Urology consulted, performed bedside cystoscopy on 4/6/25 with large prostate noted. Noted to have clot retention and went urgently to OR with Urology on 4/7/25; asa and DVT ppx held.     WBC normalized. S/p clot removal with Urology, 1U PRBC ordered this morning with Hb 6.1. Soft BPs, will add midodrine for HD to step down to floor. Glucoses high, will increase insulin. Attempted to s/d but BP too low, may still need CRRT rather. 4/8 cx clear so far. Increasing insulin again. Increasing midodrine to 15 mg TID. Hb 6.8, 1U PRBC ordered.     BP appears to be recovering a bit, perhaps will tolerate reg HD, will discuss w Neph- will run him on HD Saturday, if tolerates normal HD will remove central line and give 2 day holiday and place tunnel on Monday. We will give him HD before dc to facility on Tuesday if accepted by then.         Interval History:  NAEON.  No new issues.   CC- Fatigue  All questions answered and updates on care given.       ROS:  General: Negative for fevers   Cardiac: Negative for chest pain   Pulmonary: Negative for wheezing  GI: Negative for abdominal distention      Vitals:    04/12/25 0433 04/12/25 0500 04/12/25 0530 04/12/25 0600   BP:  126/63  (!) 146/68   BP Location:  Right arm  Right arm   Pulse: 108 104 102 105   Resp:  (!) 22  15   Temp:       TempSrc:       SpO2:  100%  99%   Weight:       Height:              Body mass index is 21.13 kg/m².      PHYSICAL EXAM:  GENERAL APPEARANCE: alert and cooperative.     HEAD: NC/AT  CARDIAC: There is no cyanosis or pallor.   LUNGS: No apparent wheezing or stridor.  ABDOMEN: Non-distended. No guarding.  PSYCHIATRIC: No tangential speech. No Hyperactive features.        Recent Results (from the  past 24 hours)   POCT glucose    Collection Time: 04/11/25  8:52 AM   Result Value Ref Range    POCT Glucose 225 (H) 70 - 110 mg/dL   POCT glucose    Collection Time: 04/11/25 12:12 PM   Result Value Ref Range    POCT Glucose 135 (H) 70 - 110 mg/dL   Blood culture    Collection Time: 04/11/25  1:35 PM    Specimen: Other (Specify); Blood   Result Value Ref Range    Blood Culture No Growth After 6 Hours    Blood culture    Collection Time: 04/11/25  1:35 PM    Specimen: Blood   Result Value Ref Range    Blood Culture No Growth After 6 Hours    POCT glucose    Collection Time: 04/11/25  4:43 PM   Result Value Ref Range    POCT Glucose 45 (LL) 70 - 110 mg/dL   POCT glucose    Collection Time: 04/11/25  4:45 PM   Result Value Ref Range    POCT Glucose 49 (LL) 70 - 110 mg/dL   POCT glucose    Collection Time: 04/11/25  6:13 PM   Result Value Ref Range    POCT Glucose 129 (H) 70 - 110 mg/dL   POCT glucose    Collection Time: 04/11/25 10:36 PM   Result Value Ref Range    POCT Glucose 66 (L) 70 - 110 mg/dL   Comprehensive Metabolic Panel    Collection Time: 04/12/25  4:09 AM   Result Value Ref Range    Sodium 142 136 - 145 mmol/L    Potassium 3.7 3.5 - 5.1 mmol/L    Chloride 110 95 - 110 mmol/L    CO2 19 (L) 23 - 29 mmol/L    Glucose 104 70 - 110 mg/dL    BUN 50 (H) 8 - 23 mg/dL    Creatinine 7.2 (H) 0.5 - 1.4 mg/dL    Calcium 9.1 8.7 - 10.5 mg/dL    Protein Total 6.8 6.0 - 8.4 gm/dL    Albumin 2.4 (L) 3.5 - 5.2 g/dL    Bilirubin Total 0.9 0.1 - 1.0 mg/dL    ALP 64 40 - 150 unit/L    AST 27 11 - 45 unit/L    ALT 11 10 - 44 unit/L    Anion Gap 13 8 - 16 mmol/L    eGFR 7 (L) >60 mL/min/1.73/m2   Vancomycin, Random    Collection Time: 04/12/25  4:09 AM   Result Value Ref Range    Vancomycin Random 20.1 Not established ug/ml   Phosphorus    Collection Time: 04/12/25  4:09 AM   Result Value Ref Range    Phosphorus Level 3.6 2.7 - 4.5 mg/dL   CBC with Differential    Collection Time: 04/12/25  4:09 AM   Result Value Ref Range     WBC 15.78 (H) 3.90 - 12.70 K/uL    RBC 2.97 (L) 4.60 - 6.20 M/uL    HGB 8.6 (L) 14.0 - 18.0 gm/dL    HCT 27.5 (L) 40.0 - 54.0 %    MCV 93 82 - 98 fL    MCH 29.0 27.0 - 31.0 pg    MCHC 31.3 (L) 32.0 - 36.0 g/dL    RDW 17.3 (H) 11.5 - 14.5 %    Platelet Count 203 150 - 450 K/uL    MPV 11.5 9.2 - 12.9 fL    Nucleated RBC 0 <=0 /100 WBC    Neut % 87.4 (H) 38 - 73 %    Lymph % 3.7 (L) 18 - 48 %    Mono % 5.3 4 - 15 %    Eos % 2.2 <=8 %    Basophil % 0.3 <=1.9 %    Imm Grans % 1.1 (H) 0.0 - 0.5 %    Neut # 13.80 (H) 1.8 - 7.7 K/uL    Lymph # 0.59 (L) 1 - 4.8 K/uL    Mono # 0.83 0.3 - 1 K/uL    Eos # 0.35 <=0.5 K/uL    Baso # 0.04 <=0.2 K/uL    Imm Grans # 0.17 (H) 0.00 - 0.04 K/uL   POCT glucose    Collection Time: 04/12/25  8:08 AM   Result Value Ref Range    POCT Glucose 136 (H) 70 - 110 mg/dL       Microbiology Results (last 7 days)       Procedure Component Value Units Date/Time    Blood culture [[id number]]  (Normal) Collected: 04/08/25 0401    Order Status: Completed Specimen: Blood Updated: 04/12/25 0602     Blood Culture No Growth After 96 hours    Blood culture [[id number]]  (Normal) Collected: 04/08/25 0506    Order Status: Completed Specimen: Blood Updated: 04/12/25 0602     Blood Culture No Growth After 96 hours    Blood culture [[id number]]  (Normal) Collected: 04/11/25 1335    Order Status: Completed Specimen: Blood Updated: 04/11/25 2002     Blood Culture No Growth After 6 Hours    Blood culture [[id number]]  (Normal) Collected: 04/11/25 1335    Order Status: Completed Specimen: Blood from Other (Specify) Updated: 04/11/25 2002     Blood Culture No Growth After 6 Hours    Fungus culture [[id number]]  (Normal) Collected: 04/03/25 1816    Order Status: Completed Specimen: Wound from Arm, Left Updated: 04/10/25 2300     Fungal Culture No Fungus Isolated at 1 Week    Fungus culture [[id number]]  (Normal) Collected: 04/03/25 1934    Order Status: Completed Specimen: Tissue from AV Fistula, Left Updated: 04/10/25  2300     Fungal Culture No Fungus Isolated at 1 Week    Fungus culture [[id number]]  (Normal) Collected: 04/03/25 2011    Order Status: Completed Specimen: Wound from Arm, Left Updated: 04/10/25 2300     Fungal Culture No Fungus Isolated at 1 Week    Fungus culture [[id number]]  (Normal) Collected: 04/03/25 1904    Order Status: Completed Specimen: Tissue from AV Fistula, Left Updated: 04/10/25 2201     Fungal Culture No Fungus Isolated at 1 Week    Fungus culture [[id number]]  (Normal) Collected: 04/03/25 1921    Order Status: Completed Specimen: Tissue from hematoma Updated: 04/10/25 2201     Fungal Culture No Fungus Isolated at 1 Week    Blood culture [[id number]]  (Normal) Collected: 04/04/25 1044    Order Status: Completed Specimen: Blood Updated: 04/09/25 1100     Blood Culture No Growth After 5 Days    Blood culture [[id number]]  (Abnormal) Collected: 04/06/25 0555    Order Status: Completed Specimen: Blood Updated: 04/09/25 0720     Blood Culture Positive - Aerobic/Pediatric Bottle      Methicillin resistant Staphylococcus aureus     Comment: <null> has been updated to reportable.        GRAM STAIN Gram positive cocci in clusters resembling Staph     Comment: Aerobic Bottle Positive   This is an appended report. These results have been appended to a previously preliminary verified report.       Narrative:      For susceptibility refer to order [id number]  ID Consult Strongly Recommended    Blood culture [[id number]]  (Abnormal)  (Susceptibility) Collected: 04/06/25 0542    Order Status: Completed Specimen: Blood Updated: 04/09/25 0719     Blood Culture Positive - Aerobic/Pediatric Bottle      Methicillin resistant Staphylococcus aureus     Comment: <null> has been updated to reportable.        GRAM STAIN Gram positive cocci in clusters resembling Staph     Comment: This is an appended report. These results have been appended to a previously preliminary verified report.       Narrative:      Aerobic  Bottle Positive   ID Consult Strongly Recommended    Afb Culture Stain [[id number]] Collected: 04/03/25 1943    Order Status: Completed Specimen: Wound from Arm, Left Updated: 04/07/25 1637     ACID FAST STAIN  No acid fast bacilli seen    Afb Culture Stain [[id number]] Collected: 04/03/25 2011    Order Status: Completed Specimen: Wound from Arm, Left Updated: 04/07/25 1637     ACID FAST STAIN  No acid fast bacilli seen    Afb Culture Stain [[id number]] Collected: 04/03/25 1905    Order Status: Completed Specimen: Tissue from AV Fistula, Left Updated: 04/07/25 1623     ACID FAST STAIN  No acid fast bacilli seen    Afb Culture Stain [[id number]] Collected: 04/03/25 1954    Order Status: Completed Specimen: Wound from Arm, Left Updated: 04/07/25 1623     ACID FAST STAIN  No acid fast bacilli seen    Afb Culture Stain [[id number]] Collected: 04/03/25 1816    Order Status: Completed Specimen: Tissue from AV Fistula, Left Updated: 04/07/25 1623     ACID FAST STAIN  No acid fast bacilli seen    Afb Culture Stain [[id number]] Collected: 04/03/25 1921    Order Status: Completed Specimen: Tissue from hematoma Updated: 04/07/25 1623     ACID FAST STAIN  No acid fast bacilli seen    Afb Culture Stain [[id number]] Collected: 04/03/25 1934    Order Status: Completed Specimen: Tissue from AV Fistula, Left Updated: 04/07/25 1618     ACID FAST STAIN  No acid fast bacilli seen    Fungus culture [[id number]]  (Normal) Collected: 04/03/25 1943    Order Status: Completed Specimen: Wound from Arm, Left Updated: 04/07/25 0935     Fungal Culture Culture In Progress    Fungus culture [[id number]]  (Normal) Collected: 04/03/25 1954    Order Status: Completed Specimen: Wound from Arm, Left Updated: 04/07/25 0935     Fungal Culture Culture In Progress    AFB Culture & Smear [[id number]] Collected: 04/03/25 1816    Order Status: Completed Specimen: Tissue from AV Fistula, Left Updated: 04/07/25 0815     CULTURE, AFB  Culture In Progress    AFB  Culture & Smear [[id number]] Collected: 04/03/25 1905    Order Status: Completed Specimen: Tissue from AV Fistula, Left Updated: 04/07/25 0815     CULTURE, AFB  Culture In Progress    AFB Culture & Smear [[id number]] Collected: 04/03/25 1921    Order Status: Completed Specimen: Tissue from hematoma Updated: 04/07/25 0815     CULTURE, AFB  Culture In Progress    AFB Culture & Smear [[id number]] Collected: 04/03/25 1934    Order Status: Completed Specimen: Tissue from AV Fistula, Left Updated: 04/07/25 0815     CULTURE, AFB  Culture In Progress    AFB Culture & Smear [[id number]] Collected: 04/03/25 1943    Order Status: Completed Specimen: Wound from Arm, Left Updated: 04/07/25 0815     CULTURE, AFB  Culture In Progress    AFB Culture & Smear [[id number]] Collected: 04/03/25 1954    Order Status: Completed Specimen: Wound from Arm, Left Updated: 04/07/25 0815     CULTURE, AFB  Culture In Progress    AFB Culture & Smear [[id number]] Collected: 04/03/25 2011    Order Status: Completed Specimen: Wound from Arm, Left Updated: 04/07/25 0815     CULTURE, AFB  Culture In Progress    Culture, Anaerobic [[id number]] Collected: 04/03/25 2011    Order Status: Completed Specimen: Wound from Arm, Left Updated: 04/07/25 0748     Anaerobe Culture No Anaerobes Isolated    Culture, Anaerobic [[id number]] Collected: 04/03/25 1954    Order Status: Completed Specimen: Wound from Arm, Left Updated: 04/07/25 0747     Anaerobe Culture No Anaerobes Isolated    Culture, Anaerobic [[id number]] Collected: 04/03/25 1943    Order Status: Completed Specimen: Wound from Arm, Left Updated: 04/07/25 0747     Anaerobe Culture No Anaerobes Isolated    Culture, Anaerobic [[id number]] Collected: 04/03/25 1934    Order Status: Completed Specimen: Tissue from AV Fistula, Left Updated: 04/07/25 0746     Anaerobe Culture No Anaerobes Isolated    Culture, Anaerobic [[id number]] Collected: 04/03/25 1921    Order Status: Completed Specimen: Tissue from  hematoma Updated: 04/07/25 0746     Anaerobe Culture No Anaerobes Isolated    Culture, Anaerobic [[id number]] Collected: 04/03/25 1910    Order Status: Completed Specimen: Tissue from AV Fistula, Left Updated: 04/07/25 0745     Anaerobe Culture No Anaerobes Isolated    Culture, Anaerobic [[id number]] Collected: 04/03/25 1816    Order Status: Completed Specimen: Wound from Arm, Left Updated: 04/07/25 0744     Anaerobe Culture No Anaerobes Isolated    Blood culture [[id number]]  (Abnormal) Collected: 04/04/25 1343    Order Status: Completed Specimen: Blood Updated: 04/06/25 0657     Blood Culture Positive - Aerobic/Pediatric Bottle      Methicillin resistant Staphylococcus aureus     Comment: <null> has been updated to reportable.        GRAM STAIN Gram positive cocci in clusters resembling Staph     Comment: Aerobic Bottle Positive    This is an appended report. These results have been appended to a previously preliminary verified report.       Narrative:      For sensitivities, refer to order # [id number]      Aerobic culture [[id number]]  (Abnormal) Collected: 04/03/25 2011    Order Status: Completed Specimen: Wound from Arm, Left Updated: 04/06/25 0556     CULTURE, AEROBIC Few Methicillin resistant Staphylococcus aureus    Narrative:      For sensitivities, refer to order # [id number]    Aerobic culture [[id number]]  (Abnormal) Collected: 04/03/25 1954    Order Status: Completed Specimen: Wound from Arm, Left Updated: 04/06/25 0555     CULTURE, AEROBIC Many Methicillin resistant Staphylococcus aureus    Narrative:      For sensitivities, refer to order # [id number]    Aerobic culture [[id number]]  (Abnormal)  (Susceptibility) Collected: 04/03/25 1943    Order Status: Completed Specimen: Wound from Arm, Left Updated: 04/06/25 0553     CULTURE, AEROBIC Many Methicillin resistant Staphylococcus aureus    Aerobic culture [[id number]]  (Abnormal)  (Susceptibility) Collected: 04/03/25 1926    Order  Status: Completed Specimen: Wound from Arm, Left Updated: 04/06/25 0544     CULTURE, AEROBIC Many Methicillin resistant Staphylococcus aureus    Aerobic culture [[id number]] Collected: 04/03/25 1911    Order Status: Completed Specimen: Tissue from Arm, Left Updated: 04/06/25 0543     CULTURE, AEROBIC No Growth    Aerobic culture [[id number]] Collected: 04/03/25 1816    Order Status: Completed Specimen: Wound from Arm, Left Updated: 04/06/25 0543     CULTURE, AEROBIC No Growth                          Assessment & Plan  Septic shock  This patient has shock. The type of shock is distributive due to sepsis. The patient had the following evidence of shock: persistent hypotension and altered mental status. 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 Shock is now resolved and vasopressors are off. WBC worsening  HTN (hypertension)  Patient's blood pressure range in the last 24 hours was: BP  Min: 113/63  Max: 146/68.The patient's inpatient anti-hypertensive regimen is listed below:  Current Antihypertensives       Plan  - admitted with shock  - now off vasopressors. Continue to hold BP Rx as BP is well controlled without it   HLD (hyperlipidemia)  - continue statin  Type 2 diabetes mellitus with hyperglycemia, without long-term current use of insulin  A1c:   Lab Results   Component Value Date    HGBA1C 5.7 (H) 04/02/2025     Meds: lantus + SSI PRN to maintain goal 140-180  Tube feeds  accuchecks, hypoglycemic protocol      Coronary artery disease involving native coronary artery of native heart without angina pectoris  Patient with known CAD s/p stent placement, which is controlled Will continue ASA, Plavix, and Statin and monitor for S/Sx of angina/ACS. Continue to monitor  "on telemetry.   - last [facility] was 1/2025: Severely calcified mid RCA stenosis unable to cross with PTCA balloons, treated initially with 0.9 laser atherectomy, followed by CSI atherectomy system with total of 5 runs (3 at low speed, 2 at high speed), pre-dilated using 2.0 compliant and 3.5 noncompliant balloon followed by 3.5 X 16 mm megatron stent.  Focal plaque rupture/erosion noted in the proximal and ostial RCA that were treated with  3.5 X 28 in the proximal RCA, 4.0 X 16 mm in the ostial RCA.  No residual stenosis post intervention.  Patient had ALAN 3 flow at the end of the procedure  - with elevated troponin likely due to septic shock  No results for input(s): "TROPONINI", "TROPONINIHS" in the last 168 hours.  - continue asa, plavix, statin  - Cardiology consulted  - no plans for ischemic evaluation at this time   ESRD on hemodialysis  - ESRD on HD via LUE AVF  - LUE AVF was actively bleeding on arrival on 4/3/25. Vascular surgery was consulted. He went emergently to the OR on 4/3/25: "Severely calcified mid RCA stenosis unable to cross with PTCA balloons, treated initially with 0.9 laser atherectomy, followed by CSI atherectomy system with total of 5 runs (3 at low speed, 2 at high speed), pre-dilated using 2.0 compliant and 3.5 noncompliant balloon followed by 3.5 X 16 mm megatron stent.  Focal plaque rupture/erosion noted in the proximal and ostial RCA that were treated with  3.5 X 28 in the proximal RCA, 4.0 X 16 mm in the ostial RCA.  No residual stenosis post intervention.  Patient had ALAN 3 flow at the end of the procedure"  - cultures from AVF= Staph   - wound care orders in place for surgical site  - Nephrology is consulted  - trialysis placed on 4/4/25 for CRRT  - he will need THDC when bacteremia is resolved   Anemia in ESRD (end-stage renal disease)  Anemia is likely due to  ESRD, blood loss . Most recent hemoglobin and hematocrit are listed below.  Recent Labs     04/10/25  0416 04/11/25  0312 " "04/12/25  0409   HGB 8.5* 8.6* 8.6*   HCT 26.5* 27.2* 27.5*     Plan  - Monitor serial CBC: Daily and recheck now  - Transfuse PRBC if patient becomes hemodynamically unstable, symptomatic or H/H drops below 7/21.  - Patient has not received any PRBC transfusions to date  - Patient's anemia is currently stable  Acute metabolic encephalopathy  - he is oriented to self but otherwise confused  - CTH 4/1/25 with no acute process  - suspect this is due to sepsis  - NGT placed on 4/4/25 so that he could get his asa/plavix  - swallow- continue puree. Continue NGT until definitely improving and swallowing Rx     ACP (advance care planning)  Advance Care Planning     Date: 04/04/2025  - palliative consulted   - Mr Escoto specifically told Dr Jordan to contact Kenia Smyth for all updates  - discussed code status with Kenia. She states she has spoken with Mr Escoto's sisters and they all want full code status.          Acute bacterial endocarditis  - TTE 4/4/25: "1.9x1.2cm large fixed heterogeneous mass present on the posterior leaflet (new finding vs 9/2023 echo). There is moderate to severe regurgitation with an eccentric jet. Cannot exclude severe MR with possible PMVL perforation in association with large vegetation."  - this is in the setting of MRSA bacteremia  - ID is consulted  - repeat blood cultures until clear   - suspect source is skin/chronic scratching vs AVF infection- cultures from resected AVF with Staph   - discussed with Cardiac surgery Dr Castro 4/4/25- given age and comorbidities, he is very high risk of mortality with surgery. He recommends medical management at this point.  - on vancomycin      MRSA bacteremia  - suspect source= chronic skin scratching vs infected AVF  - cultures of AVF with Staph   - he has endocarditis  - ID consulted  - on vanc     NSTEMI (non-ST elevated myocardial infarction)  - see CAD    BPH with urinary obstruction  - noted 4/6/25 when patient became very agitated " "and screaming he couldn't urinate  - nursing unable to place dunaway/coude  - Urology consulted. Bedside cystoscopy on 4/6/25 noted clots, large prostate. Catheter was placed but was then removed due to pain  - 4/7 he is again agitated that he cannot urinate  - follow up with Urology   - tamsulosin ordered if he is awake enough to swallow     Thrombocytopenia  The likely etiology of thrombocytopenia is infection and sepsis. The patients 3 most recent labs are listed below.  Recent Labs     04/10/25  0416 04/11/25  0312 04/12/25  0409    181 203     Plan  - Will transfuse if platelet count is <10k.    AV fistula infection  - LUE AVF was actively bleeding on arrival on 4/3/25. Vascular surgery was consulted. He went emergently to the OR on 4/3/25: "Severely calcified mid RCA stenosis unable to cross with PTCA balloons, treated initially with 0.9 laser atherectomy, followed by CSI atherectomy system with total of 5 runs (3 at low speed, 2 at high speed), pre-dilated using 2.0 compliant and 3.5 noncompliant balloon followed by 3.5 X 16 mm megatron stent.  Focal plaque rupture/erosion noted in the proximal and ostial RCA that were treated with  3.5 X 28 in the proximal RCA, 4.0 X 16 mm in the ostial RCA.  No residual stenosis post intervention.  Patient had ALAN 3 flow at the end of the procedure"  - cultures from AVF= Staph   - wound care orders in place for surgical site  - trialysis currently in place for HD    Emphysema lung  - emphysema noted on CT  - no signs of COPD exacerbation  Pulmonary nodules  - CT 4/3/25 with few small pulmonary nodules  - will need outpatient follow up     Gross hematuria      NSVT (nonsustained ventricular tachycardia)        VTE Risk Mitigation (From admission, onward)           Ordered     heparin (porcine) injection 1,000 Units  As needed (PRN)         04/05/25 2100     IP VTE HIGH RISK PATIENT  Once         04/03/25 1516     Place TEMO hose  Until discontinued         04/03/25 1516 "     Place sequential compression device  Until discontinued         04/03/25 1516     Reason for No Pharmacological VTE Prophylaxis  Once        Question:  Reasons:  Answer:  Physician Provided (leave comment)    04/03/25 1516                    Discharge Planning   BAYRON: 4/15/2025     Code Status: Full Code   Medical Readiness for Discharge Date:   Discharge Plan A: [place]            Critical care time spent on the evaluation and treatment of severe organ dysfunction, review of pertinent labs and imaging studies, discussions with consulting providers and discussions with patient/family: 35 minutes.            Gonzalez Reagan MD  Department of Hospital Medicine   [facility] - [place] - Intensive Care

## 2025-04-12 NOTE — NURSING
Pt extremely agitated, rolling in bed during dialysis. PRN ativan give IV. Dr. Merary dockery. Haldol PRN order in.

## 2025-04-12 NOTE — ASSESSMENT & PLAN NOTE
ESRD on HD     HD Bac[facility] ROGERS  Sp AVG resection 4/3  Last HD 4/11    Plan/Recommendation  HD today  Plan for right IJ trialysis line removal after with IR TDC placement 4/14  -Keep MAP > 65  -Keep hemoglobin > 7  -Strict ins and outs  -Avoid nephrotoxic agents if possible and renally dose medications  -Avoid drastic hemodynamic changes if possible    #Secondary hyperparathyroidism  Calcium   Date Value Ref Range Status   04/12/2025 9.1 8.7 - 10.5 mg/dL Final     Phosphorus Level   Date Value Ref Range Status   04/12/2025 3.6 2.7 - 4.5 mg/dL Final     PTH, Intact   Date Value Ref Range Status   01/17/2024 117.7 (H) 9.0 - 77.0 pg/mL Final   Continue to monitor

## 2025-04-12 NOTE — PROGRESS NOTES
Pharmacokinetic Assessment Follow Up: IV Vancomycin    Vancomycin serum concentration assessment(s):    The random level was drawn correctly and can be used to guide therapy at this time. The measurement is above the desired definitive target range of 15 to 20 mcg/mL.    Vancomycin Regimen Plan:    Re-dose when the random level is less than 20 mcg/mL, next level to be drawn at 0400 on 4/15/25    Drug levels (last 3 results):  Recent Labs   Lab Result Units 04/10/25  0416 04/12/25  0409   Vancomycin Random ug/ml 18.1 20.1       Pharmacy will continue to follow and monitor vancomycin.    Please contact pharmacy at extension [phone] for questions regarding this assessment.    Thank you for the consult,   Severino Belle       Patient brief summary:  Jevon Rajan is a 85 y.o. male initiated on antimicrobial therapy with IV Vancomycin for treatment of bacteremia    The patient's current regimen is random pulse dosing    Drug Allergies:   Review of patient's allergies indicates:   Allergen Reactions    Ace inhibitors Hives, Itching, Shortness Of Breath, Other (See Comments) and Rash     Is not sure which medication it was    Captopril        Actual Body Weight:   61.2 kg    Renal Function:   Estimated Creatinine Clearance: 6.3 mL/min (A) (based on SCr of 7.2 mg/dL (H)).,     Dialysis Method (if applicable):  intermittent HD    CBC (last 72 hours):  Recent Labs   Lab Result Units 04/09/25  2039 04/10/25  0416 04/11/25  0312 04/12/25  0409   WBC K/uL 15.40* 16.64* 16.75* 15.78*   HGB gm/dL 8.4* 8.5* 8.6* 8.6*   HCT % 26.2* 26.5* 27.2* 27.5*   Platelet Count K/uL 169 161 181 203   Lymph % % 4.0* 5.3* 3.8* 3.7*   Mono % % 6.8 6.4 5.1 5.3   Eos % % 3.2 2.9 1.9 2.2   Basophil % % 0.3 0.4 0.2 0.3       Metabolic Panel (last 72 hours):  Recent Labs   Lab Result Units 04/10/25  0416 04/11/25  0312 04/12/25  0409   Sodium mmol/L 138 142 142   Potassium mmol/L 3.7 3.9 3.7   Chloride mmol/L 103 109 110   CO2 mmol/L 22* 23 19*    Glucose mg/dL 251* 277* 104   BUN mg/dL 52* 35* 50*   Creatinine mg/dL 7.8* 5.3* 7.2*   Albumin g/dL 2.3* 2.2* 2.4*   Bilirubin Total mg/dL 0.3 0.4 0.9   ALP unit/L 76 88 64   AST unit/L 15 22 27   ALT unit/L 9* 10 11   Phosphorus Level mg/dL 4.2 2.9 3.6       Vancomycin Administrations:  vancomycin given in the last 96 hours                     vancomycin (VANCOCIN) 500 mg in D5W 100 mL IVPB (MB+) ()  Restarted 04/10/25 2021     500 mg New Bag  2017                    Microbiologic Results:  Microbiology Results (last 7 days)       Procedure Component Value Units Date/Time    Blood culture [[id number]]  (Normal) Collected: 04/11/25 1335    Order Status: Completed Specimen: Blood Updated: 04/11/25 2002     Blood Culture No Growth After 6 Hours    Blood culture [[id number]]  (Normal) Collected: 04/11/25 1335    Order Status: Completed Specimen: Blood from Other (Specify) Updated: 04/11/25 2002     Blood Culture No Growth After 6 Hours    Blood culture [[id number]]  (Normal) Collected: 04/08/25 0401    Order Status: Completed Specimen: Blood Updated: 04/11/25 0600     Blood Culture No Growth After 72 Hours    Blood culture [[id number]]  (Normal) Collected: 04/08/25 0506    Order Status: Completed Specimen: Blood Updated: 04/11/25 0600     Blood Culture No Growth After 72 Hours    Fungus culture [[id number]]  (Normal) Collected: 04/03/25 1816    Order Status: Completed Specimen: Wound from Arm, Left Updated: 04/10/25 2300     Fungal Culture No Fungus Isolated at 1 Week    Fungus culture [[id number]]  (Normal) Collected: 04/03/25 1934    Order Status: Completed Specimen: Tissue from AV Fistula, Left Updated: 04/10/25 2300     Fungal Culture No Fungus Isolated at 1 Week    Fungus culture [[id number]]  (Normal) Collected: 04/03/25 2011    Order Status: Completed Specimen: Wound from Arm, Left Updated: 04/10/25 2300     Fungal Culture No Fungus Isolated at 1 Week    Fungus culture [[id number]]  (Normal) Collected:  04/03/25 1904    Order Status: Completed Specimen: Tissue from AV Fistula, Left Updated: 04/10/25 2201     Fungal Culture No Fungus Isolated at 1 Week    Fungus culture [[id number]]  (Normal) Collected: 04/03/25 1921    Order Status: Completed Specimen: Tissue from hematoma Updated: 04/10/25 2201     Fungal Culture No Fungus Isolated at 1 Week    Blood culture [[id number]]  (Normal) Collected: 04/04/25 1044    Order Status: Completed Specimen: Blood Updated: 04/09/25 1100     Blood Culture No Growth After 5 Days    Blood culture [[id number]]  (Abnormal) Collected: 04/06/25 0555    Order Status: Completed Specimen: Blood Updated: 04/09/25 0720     Blood Culture Positive - Aerobic/Pediatric Bottle      Methicillin resistant Staphylococcus aureus     Comment: <null> has been updated to reportable.        GRAM STAIN Gram positive cocci in clusters resembling Staph     Comment: Aerobic Bottle Positive   This is an appended report. These results have been appended to a previously preliminary verified report.       Narrative:      For susceptibility refer to order [id number]  ID Consult Strongly Recommended    Blood culture [[id number]]  (Abnormal)  (Susceptibility) Collected: 04/06/25 0542    Order Status: Completed Specimen: Blood Updated: 04/09/25 0719     Blood Culture Positive - Aerobic/Pediatric Bottle      Methicillin resistant Staphylococcus aureus     Comment: <null> has been updated to reportable.        GRAM STAIN Gram positive cocci in clusters resembling Staph     Comment: This is an appended report. These results have been appended to a previously preliminary verified report.       Narrative:      Aerobic Bottle Positive   ID Consult Strongly Recommended    Afb Culture Stain [[id number]] Collected: 04/03/25 1943    Order Status: Completed Specimen: Wound from Arm, Left Updated: 04/07/25 1637     ACID FAST STAIN  No acid fast bacilli seen    Afb Culture Stain [[id number]] Collected: 04/03/25 2011    Order  Status: Completed Specimen: Wound from Arm, Left Updated: 04/07/25 1637     ACID FAST STAIN  No acid fast bacilli seen    Afb Culture Stain [[id number]] Collected: 04/03/25 1905    Order Status: Completed Specimen: Tissue from AV Fistula, Left Updated: 04/07/25 1623     ACID FAST STAIN  No acid fast bacilli seen    Afb Culture Stain [[id number]] Collected: 04/03/25 1954    Order Status: Completed Specimen: Wound from Arm, Left Updated: 04/07/25 1623     ACID FAST STAIN  No acid fast bacilli seen    Afb Culture Stain [[id number]] Collected: 04/03/25 1816    Order Status: Completed Specimen: Tissue from AV Fistula, Left Updated: 04/07/25 1623     ACID FAST STAIN  No acid fast bacilli seen    Afb Culture Stain [[id number]] Collected: 04/03/25 1921    Order Status: Completed Specimen: Tissue from hematoma Updated: 04/07/25 1623     ACID FAST STAIN  No acid fast bacilli seen    Afb Culture Stain [[id number]] Collected: 04/03/25 1934    Order Status: Completed Specimen: Tissue from AV Fistula, Left Updated: 04/07/25 1618     ACID FAST STAIN  No acid fast bacilli seen    Fungus culture [[id number]]  (Normal) Collected: 04/03/25 1943    Order Status: Completed Specimen: Wound from Arm, Left Updated: 04/07/25 0935     Fungal Culture Culture In Progress    Fungus culture [[id number]]  (Normal) Collected: 04/03/25 1954    Order Status: Completed Specimen: Wound from Arm, Left Updated: 04/07/25 0935     Fungal Culture Culture In Progress    AFB Culture & Smear [[id number]] Collected: 04/03/25 1816    Order Status: Completed Specimen: Tissue from AV Fistula, Left Updated: 04/07/25 0815     CULTURE, AFB  Culture In Progress    AFB Culture & Smear [[id number]] Collected: 04/03/25 1905    Order Status: Completed Specimen: Tissue from AV Fistula, Left Updated: 04/07/25 0815     CULTURE, AFB  Culture In Progress    AFB Culture & Smear [[id number]] Collected: 04/03/25 1921    Order Status: Completed Specimen: Tissue from hematoma  Updated: 04/07/25 0815     CULTURE, AFB  Culture In Progress    AFB Culture & Smear [[id number]] Collected: 04/03/25 1934    Order Status: Completed Specimen: Tissue from AV Fistula, Left Updated: 04/07/25 0815     CULTURE, AFB  Culture In Progress    AFB Culture & Smear [[id number]] Collected: 04/03/25 1943    Order Status: Completed Specimen: Wound from Arm, Left Updated: 04/07/25 0815     CULTURE, AFB  Culture In Progress    AFB Culture & Smear [[id number]] Collected: 04/03/25 1954    Order Status: Completed Specimen: Wound from Arm, Left Updated: 04/07/25 0815     CULTURE, AFB  Culture In Progress    AFB Culture & Smear [[id number]] Collected: 04/03/25 2011    Order Status: Completed Specimen: Wound from Arm, Left Updated: 04/07/25 0815     CULTURE, AFB  Culture In Progress    Culture, Anaerobic [[id number]] Collected: 04/03/25 2011    Order Status: Completed Specimen: Wound from Arm, Left Updated: 04/07/25 0748     Anaerobe Culture No Anaerobes Isolated    Culture, Anaerobic [[id number]] Collected: 04/03/25 1954    Order Status: Completed Specimen: Wound from Arm, Left Updated: 04/07/25 0747     Anaerobe Culture No Anaerobes Isolated    Culture, Anaerobic [[id number]] Collected: 04/03/25 1943    Order Status: Completed Specimen: Wound from Arm, Left Updated: 04/07/25 0747     Anaerobe Culture No Anaerobes Isolated    Culture, Anaerobic [[id number]] Collected: 04/03/25 1934    Order Status: Completed Specimen: Tissue from AV Fistula, Left Updated: 04/07/25 0746     Anaerobe Culture No Anaerobes Isolated    Culture, Anaerobic [[id number]] Collected: 04/03/25 1921    Order Status: Completed Specimen: Tissue from hematoma Updated: 04/07/25 0746     Anaerobe Culture No Anaerobes Isolated    Culture, Anaerobic [[id number]] Collected: 04/03/25 1910    Order Status: Completed Specimen: Tissue from AV Fistula, Left Updated: 04/07/25 0745     Anaerobe Culture No Anaerobes Isolated    Culture, Anaerobic [[id number]]  Collected: 04/03/25 1816    Order Status: Completed Specimen: Wound from Arm, Left Updated: 04/07/25 0744     Anaerobe Culture No Anaerobes Isolated    Blood culture [[id number]]  (Abnormal) Collected: 04/04/25 1343    Order Status: Completed Specimen: Blood Updated: 04/06/25 0657     Blood Culture Positive - Aerobic/Pediatric Bottle      Methicillin resistant Staphylococcus aureus     Comment: <null> has been updated to reportable.        GRAM STAIN Gram positive cocci in clusters resembling Staph     Comment: Aerobic Bottle Positive    This is an appended report. These results have been appended to a previously preliminary verified report.       Narrative:      For sensitivities, refer to order # [id number]      Aerobic culture [[id number]]  (Abnormal) Collected: 04/03/25 2011    Order Status: Completed Specimen: Wound from Arm, Left Updated: 04/06/25 0556     CULTURE, AEROBIC Few Methicillin resistant Staphylococcus aureus    Narrative:      For sensitivities, refer to order # [id number]    Aerobic culture [[id number]]  (Abnormal) Collected: 04/03/25 1954    Order Status: Completed Specimen: Wound from Arm, Left Updated: 04/06/25 0555     CULTURE, AEROBIC Many Methicillin resistant Staphylococcus aureus    Narrative:      For sensitivities, refer to order # [id number]    Aerobic culture [[id number]]  (Abnormal)  (Susceptibility) Collected: 04/03/25 1943    Order Status: Completed Specimen: Wound from Arm, Left Updated: 04/06/25 0553     CULTURE, AEROBIC Many Methicillin resistant Staphylococcus aureus    Aerobic culture [[id number]]  (Abnormal)  (Susceptibility) Collected: 04/03/25 1926    Order Status: Completed Specimen: Wound from Arm, Left Updated: 04/06/25 0544     CULTURE, AEROBIC Many Methicillin resistant Staphylococcus aureus    Aerobic culture [[id number]] Collected: 04/03/25 1911    Order Status: Completed Specimen: Tissue from Arm, Left Updated: 04/06/25 0543     CULTURE, AEROBIC No  Growth    Aerobic culture [[id number]] Collected: 04/03/25 1816    Order Status: Completed Specimen: Wound from Arm, Left Updated: 04/06/25 0543     CULTURE, AEROBIC No Growth

## 2025-04-12 NOTE — PROGRESS NOTES
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 Rajan is a 85 y.o. man with ESRD with LUE AVF, HFpEF, CAD, DM admitted to OSH 4/1- 4/3 with sepsis due to MRSA bacteremia, transferred to  ICU for septic shock due to MRSA bacteremia and thrombosis of AV graft on 4/3, s/p exploratory surgery of LUE with graft resection 4/3.     Op findings: Ruptured pseudoaneurysm with infected hematoma, graft completely obliterated at mid segment. Suspect infected graft is the source of bacteremia.     BCx 4/2 MRSA  BCx 4/4 MRSA  BCx 4/6 MRSA  BCx 4/7: NGTD  BCx 4/11: NGTD    Graft: MRSA    TTE: 1.9x1.2cm large fixed heterogeneous mass present on the posterior leaflet , moderate to severe regurgitation with an eccentric jet. Cannot exclude severe MR with possible PMVL perforation in association with large vegetation. Not a surgical candidate.     Deemed a poor surgical candidate.    The good news: his blood cultures are finally NGTD.    Recommendations:  --continue vancomycin  --line holiday after HD today with tunneled cath planned for Monday  --if he continues to improve, it will take the tincture of time to treat his endocarditis (i.e., >/= 6 weeks)  --consider eventual LTAC placement    Lamont Steele MD  Infectious Disease  [facility] - Intensive Care    Subjective:     Principal Problem:Septic shock    HPI: 85 y.o. man with ESRD with LUE AVF, HFpEF, CAD, DM admitted to OSH 4/1- 4/3 with sepsis due to MRSA bacteremia, transferred to  ICU for septic shock on 4/3.    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     CXR 4/3 with likely pulmonary edema  CT c/a/p 4/3 revealed hepatic hypodensities likely cysts as well some larger lesions that hav decreased in size. Otherwise, no clear infectious source.      ID consulted for: MRSA bacteremia   Interval History: Mr. Rajan is hanging in there. He complaints of chest pain where his leads are attached. "I have to pee," he exclaims he (has a Hsarma catheter).    Review of Systems   Cardiovascular:  Positive for chest pain.   All other systems reviewed and are negative.    Objective:     Vital Signs (Most Recent):  Temp: 98.9 °F (37.2 °C) (04/12/25 0800)  Pulse: 100 (04/12/25 0800)  Resp: 20 (04/12/25 0800)  BP: 124/60 (04/12/25 0800)  SpO2: 100 % (04/12/25 0800) Vital Signs (24h Range):  Temp:  [98 °F (36.7 °C)-98.9 °F (37.2 °C)] 98.9 °F (37.2 °C)  Pulse:  [] 100  Resp:  [12-39] 20  SpO2:  [95 %-100 %] 100 %  BP: (113-146)/(55-82) 124/60     Weight: 61.2 kg (134 lb 14.7 oz)  Body mass index is 21.13 kg/m².    Estimated Creatinine Clearance: 6.3 mL/min (A) (based on SCr of 7.2 mg/dL (H)).     Physical Exam  Vitals and nursing note reviewed.   Constitutional:       Appearance: Normal appearance.   HENT:      Head: Normocephalic and atraumatic.      Mouth/Throat:      Mouth: Mucous membranes are moist.      Comments: Tongue   Eyes:      Pupils: Pupils are equal, round, and reactive to light.   Abdominal:      Tenderness: There is no abdominal tenderness. There is no guarding or rebound.      Hernia: No hernia is present.   Musculoskeletal:         General: Deformity present.      Comments: LUE clean with decent granulation   Neurological:      General: No focal deficit present.      Mental Status: He is alert.   Psychiatric:         Mood and Affect: Mood normal.          Significant Labs: CBC:   Recent Labs   Lab 04/11/25  0312 04/12/25  0409   WBC 16.75* 15.78*   HGB 8.6* 8.6*   HCT 27.2* 27.5*    203     Microbiology Results (last 7 days)       " Procedure Component Value Units Date/Time    Blood culture [[id number]]  (Normal) Collected: 04/08/25 0401    Order Status: Completed Specimen: Blood Updated: 04/12/25 0602     Blood Culture No Growth After 96 hours    Blood culture [[id number]]  (Normal) Collected: 04/08/25 0506    Order Status: Completed Specimen: Blood Updated: 04/12/25 0602     Blood Culture No Growth After 96 hours    Blood culture [[id number]]  (Normal) Collected: 04/11/25 1335    Order Status: Completed Specimen: Blood Updated: 04/11/25 2002     Blood Culture No Growth After 6 Hours    Blood culture [[id number]]  (Normal) Collected: 04/11/25 1335    Order Status: Completed Specimen: Blood from Other (Specify) Updated: 04/11/25 2002     Blood Culture No Growth After 6 Hours    Fungus culture [[id number]]  (Normal) Collected: 04/03/25 1816    Order Status: Completed Specimen: Wound from Arm, Left Updated: 04/10/25 2300     Fungal Culture No Fungus Isolated at 1 Week    Fungus culture [[id number]]  (Normal) Collected: 04/03/25 1934    Order Status: Completed Specimen: Tissue from AV Fistula, Left Updated: 04/10/25 2300     Fungal Culture No Fungus Isolated at 1 Week    Fungus culture [[id number]]  (Normal) Collected: 04/03/25 2011    Order Status: Completed Specimen: Wound from Arm, Left Updated: 04/10/25 2300     Fungal Culture No Fungus Isolated at 1 Week    Fungus culture [[id number]]  (Normal) Collected: 04/03/25 1904    Order Status: Completed Specimen: Tissue from AV Fistula, Left Updated: 04/10/25 2201     Fungal Culture No Fungus Isolated at 1 Week    Fungus culture [[id number]]  (Normal) Collected: 04/03/25 1921    Order Status: Completed Specimen: Tissue from hematoma Updated: 04/10/25 2201     Fungal Culture No Fungus Isolated at 1 Week    Blood culture [[id number]]  (Normal) Collected: 04/04/25 1044    Order Status: Completed Specimen: Blood Updated: 04/09/25 1100     Blood Culture No Growth After 5 Days    Blood culture [[id number]]   (Abnormal) Collected: 04/06/25 0555    Order Status: Completed Specimen: Blood Updated: 04/09/25 0720     Blood Culture Positive - Aerobic/Pediatric Bottle      Methicillin resistant Staphylococcus aureus     Comment: <null> has been updated to reportable.        GRAM STAIN Gram positive cocci in clusters resembling Staph     Comment: Aerobic Bottle Positive   This is an appended report. These results have been appended to a previously preliminary verified report.       Narrative:      For susceptibility refer to order [id number]  ID Consult Strongly Recommended    Blood culture [[id number]]  (Abnormal)  (Susceptibility) Collected: 04/06/25 0542    Order Status: Completed Specimen: Blood Updated: 04/09/25 0719     Blood Culture Positive - Aerobic/Pediatric Bottle      Methicillin resistant Staphylococcus aureus     Comment: <null> has been updated to reportable.        GRAM STAIN Gram positive cocci in clusters resembling Staph     Comment: This is an appended report. These results have been appended to a previously preliminary verified report.       Narrative:      Aerobic Bottle Positive   ID Consult Strongly Recommended    Afb Culture Stain [[id number]] Collected: 04/03/25 1943    Order Status: Completed Specimen: Wound from Arm, Left Updated: 04/07/25 1637     ACID FAST STAIN  No acid fast bacilli seen    Afb Culture Stain [[id number]] Collected: 04/03/25 2011    Order Status: Completed Specimen: Wound from Arm, Left Updated: 04/07/25 1637     ACID FAST STAIN  No acid fast bacilli seen    Afb Culture Stain [[id number]] Collected: 04/03/25 1905    Order Status: Completed Specimen: Tissue from AV Fistula, Left Updated: 04/07/25 1623     ACID FAST STAIN  No acid fast bacilli seen    Afb Culture Stain [[id number]] Collected: 04/03/25 1954    Order Status: Completed Specimen: Wound from Arm, Left Updated: 04/07/25 1623     ACID FAST STAIN  No acid fast bacilli seen    Afb Culture Stain [[id number]] Collected:  04/03/25 1816    Order Status: Completed Specimen: Tissue from AV Fistula, Left Updated: 04/07/25 1623     ACID FAST STAIN  No acid fast bacilli seen    Afb Culture Stain [[id number]] Collected: 04/03/25 1921    Order Status: Completed Specimen: Tissue from hematoma Updated: 04/07/25 1623     ACID FAST STAIN  No acid fast bacilli seen    Afb Culture Stain [[id number]] Collected: 04/03/25 1934    Order Status: Completed Specimen: Tissue from AV Fistula, Left Updated: 04/07/25 1618     ACID FAST STAIN  No acid fast bacilli seen    Fungus culture [[id number]]  (Normal) Collected: 04/03/25 1943    Order Status: Completed Specimen: Wound from Arm, Left Updated: 04/07/25 0935     Fungal Culture Culture In Progress    Fungus culture [[id number]]  (Normal) Collected: 04/03/25 1954    Order Status: Completed Specimen: Wound from Arm, Left Updated: 04/07/25 0935     Fungal Culture Culture In Progress    AFB Culture & Smear [[id number]] Collected: 04/03/25 1816    Order Status: Completed Specimen: Tissue from AV Fistula, Left Updated: 04/07/25 0815     CULTURE, AFB  Culture In Progress    AFB Culture & Smear [[id number]] Collected: 04/03/25 1905    Order Status: Completed Specimen: Tissue from AV Fistula, Left Updated: 04/07/25 0815     CULTURE, AFB  Culture In Progress    AFB Culture & Smear [[id number]] Collected: 04/03/25 1921    Order Status: Completed Specimen: Tissue from hematoma Updated: 04/07/25 0815     CULTURE, AFB  Culture In Progress    AFB Culture & Smear [[id number]] Collected: 04/03/25 1934    Order Status: Completed Specimen: Tissue from AV Fistula, Left Updated: 04/07/25 0815     CULTURE, AFB  Culture In Progress    AFB Culture & Smear [[id number]] Collected: 04/03/25 1943    Order Status: Completed Specimen: Wound from Arm, Left Updated: 04/07/25 0815     CULTURE, AFB  Culture In Progress    AFB Culture & Smear [[id number]] Collected: 04/03/25 1954    Order Status: Completed Specimen: Wound from Arm, Left  Updated: 04/07/25 0815     CULTURE, AFB  Culture In Progress    AFB Culture & Smear [[id number]] Collected: 04/03/25 2011    Order Status: Completed Specimen: Wound from Arm, Left Updated: 04/07/25 0815     CULTURE, AFB  Culture In Progress    Culture, Anaerobic [[id number]] Collected: 04/03/25 2011    Order Status: Completed Specimen: Wound from Arm, Left Updated: 04/07/25 0748     Anaerobe Culture No Anaerobes Isolated    Culture, Anaerobic [[id number]] Collected: 04/03/25 1954    Order Status: Completed Specimen: Wound from Arm, Left Updated: 04/07/25 0747     Anaerobe Culture No Anaerobes Isolated    Culture, Anaerobic [[id number]] Collected: 04/03/25 1943    Order Status: Completed Specimen: Wound from Arm, Left Updated: 04/07/25 0747     Anaerobe Culture No Anaerobes Isolated    Culture, Anaerobic [[id number]] Collected: 04/03/25 1934    Order Status: Completed Specimen: Tissue from AV Fistula, Left Updated: 04/07/25 0746     Anaerobe Culture No Anaerobes Isolated    Culture, Anaerobic [[id number]] Collected: 04/03/25 1921    Order Status: Completed Specimen: Tissue from hematoma Updated: 04/07/25 0746     Anaerobe Culture No Anaerobes Isolated    Culture, Anaerobic [[id number]] Collected: 04/03/25 1910    Order Status: Completed Specimen: Tissue from AV Fistula, Left Updated: 04/07/25 0745     Anaerobe Culture No Anaerobes Isolated    Culture, Anaerobic [[id number]] Collected: 04/03/25 1816    Order Status: Completed Specimen: Wound from Arm, Left Updated: 04/07/25 0744     Anaerobe Culture No Anaerobes Isolated    Blood culture [[id number]]  (Abnormal) Collected: 04/04/25 1343    Order Status: Completed Specimen: Blood Updated: 04/06/25 0657     Blood Culture Positive - Aerobic/Pediatric Bottle      Methicillin resistant Staphylococcus aureus     Comment: <null> has been updated to reportable.        GRAM STAIN Gram positive cocci in clusters resembling Staph     Comment: Aerobic Bottle Positive    This is  an appended report. These results have been appended to a previously preliminary verified report.       Narrative:      For sensitivities, refer to order # [id number]      Aerobic culture [[id number]]  (Abnormal) Collected: 04/03/25 2011    Order Status: Completed Specimen: Wound from Arm, Left Updated: 04/06/25 0556     CULTURE, AEROBIC Few Methicillin resistant Staphylococcus aureus    Narrative:      For sensitivities, refer to order # [id number]    Aerobic culture [[id number]]  (Abnormal) Collected: 04/03/25 1954    Order Status: Completed Specimen: Wound from Arm, Left Updated: 04/06/25 0555     CULTURE, AEROBIC Many Methicillin resistant Staphylococcus aureus    Narrative:      For sensitivities, refer to order # [id number]    Aerobic culture [[id number]]  (Abnormal)  (Susceptibility) Collected: 04/03/25 1943    Order Status: Completed Specimen: Wound from Arm, Left Updated: 04/06/25 0553     CULTURE, AEROBIC Many Methicillin resistant Staphylococcus aureus    Aerobic culture [[id number]]  (Abnormal)  (Susceptibility) Collected: 04/03/25 1926    Order Status: Completed Specimen: Wound from Arm, Left Updated: 04/06/25 0544     CULTURE, AEROBIC Many Methicillin resistant Staphylococcus aureus    Aerobic culture [[id number]] Collected: 04/03/25 1911    Order Status: Completed Specimen: Tissue from Arm, Left Updated: 04/06/25 0543     CULTURE, AEROBIC No Growth    Aerobic culture [[id number]] Collected: 04/03/25 1816    Order Status: Completed Specimen: Wound from Arm, Left Updated: 04/06/25 0543     CULTURE, AEROBIC No Growth            Significant Imaging: I have reviewed all pertinent imaging results/findings within the past 24 hours.

## 2025-04-12 NOTE — ASSESSMENT & PLAN NOTE
Patient's blood pressure range in the last 24 hours was: BP  Min: 113/63  Max: 146/68.The patient's inpatient anti-hypertensive regimen is listed below:  Current Antihypertensives       Plan  - admitted with shock  - now off vasopressors. Continue to hold BP Rx as BP is well controlled without it

## 2025-04-12 NOTE — PROGRESS NOTES
[facility] - Intensive Care  Nephrology  Progress Note    Patient Name: Jevon Rajan  MRN: [id number]  Admission Date: 4/3/2025  Hospital Length of Stay: 9 days  Attending Provider: Gonzalez Reagan MD   Primary Care Physician: Melia, Primary Doctor  Principal Problem:Septic shock    Subjective:     HPI: Mr. Rajan is an 84 yo male with HTN, T2DM, CAD, ESRD, and liver lesion who was transferred from [facility] for septic shock and impending AVG rupture. He initially presented to [facility] on 4/1 with temp 101.9 and BP 80/30s. He was transferred to our hospital on 4/3/25; it seems his AVG ruptured during transport/shortly after arrival. He emergently went to the OR yesterday with AVG extraction; infected hematoma was noted. Workup also concerning for elevated troponin and cardiac vegetations. He is no longer on pressors. Nephrology consulted for ESRD. Prior records obtained and reviewed. He receives HD TTS at [facility] under the c/o Dr. Colin. He last received HD outpatient on 4/1/25. HD was held at [facility] due to unstable vascular access. Family agreed to proceed with CRRT today.     Interval History: no acute events overnight.    Review of patient's allergies indicates:   Allergen Reactions    Ace inhibitors Hives, Itching, Shortness Of Breath, Other (See Comments) and Rash     Is not sure which medication it was    Captopril      Current Facility-Administered Medications   Medication Frequency    0.9%  NaCl infusion (for blood administration) Q24H PRN    0.9% NaCl infusion PRN    acetaminophen tablet 650 mg Q4H PRN    albumin human 25% bottle 25 g Daily PRN    atorvastatin tablet 80 mg QHS    clopidogreL tablet 75 mg Daily    dextrose 50% injection 12.5 g PRN    dextrose 50% injection 25 g PRN    diphenhydrAMINE capsule 50 mg Q6H PRN    epoetin mj-epbx injection 10,000 Units Every Tues, Thurs, Sat    erythromycin 5 mg/gram (0.5 %) ophthalmic ointment Q8H    glucagon (human recombinant) injection 1 mg PRN    glucose chewable  tablet 16 g PRN    glucose chewable tablet 24 g PRN    heparin (porcine) injection 1,000 Units PRN    HYDROmorphone injection 1 mg Q4H PRN    hyoscyamine SL tablet 0.125 mg Q4H PRN    insulin aspart U-100 pen 0-5 Units QID (AC + HS) PRN    [START ON 4/13/2025] insulin glargine U-100 (Lantus) pen 8 Units Daily    LORazepam injection 0.5 mg Q8H PRN    melatonin tablet 6 mg Nightly PRN    midodrine tablet 15 mg Q8H    naloxone 0.4 mg/mL injection 0.02 mg PRN    ondansetron injection 4 mg Q6H PRN    pantoprazole EC tablet 40 mg Daily    prochlorperazine injection Soln 5 mg Q6H PRN    senna tablet 8.6 mg Daily PRN    sodium chloride 0.9% bolus 250 mL 250 mL PRN    sodium chloride 0.9% flush 10 mL Q8H    tamsulosin 24 hr capsule 0.4 mg Daily    vancomycin - pharmacy to dose pharmacy to manage frequency       Objective:     Vital Signs (Most Recent):  Temp: 98.9 °F (37.2 °C) (04/12/25 0800)  Pulse: 100 (04/12/25 0800)  Resp: 20 (04/12/25 0800)  BP: 124/60 (04/12/25 0800)  SpO2: 100 % (04/12/25 0800) Vital Signs (24h Range):  Temp:  [98 °F (36.7 °C)-98.9 °F (37.2 °C)] 98.9 °F (37.2 °C)  Pulse:  [] 100  Resp:  [12-39] 20  SpO2:  [95 %-100 %] 100 %  BP: (113-146)/(55-82) 124/60     Weight: 61.2 kg (134 lb 14.7 oz) (04/04/25 1937)  Body mass index is 21.13 kg/m².  Body surface area is 1.7 meters squared.    I/O last 3 completed shifts:  In: 361 [P.O.:120; NG/GT:160; IV Piggyback:81]  Out: 5925 [Urine:5925]     Physical Exam  Constitutional:       General: He is not in acute distress.     Appearance: He is ill-appearing. He is not toxic-appearing.   HENT:      Head: Normocephalic and atraumatic.      Nose: Nose normal.      Mouth/Throat:      Mouth: Mucous membranes are moist.   Neck:      Comments: Right IJ trialysis line  Cardiovascular:      Rate and Rhythm: Tachycardia present.   Pulmonary:      Effort: Pulmonary effort is normal. No respiratory distress.      Breath sounds: No wheezing.   Musculoskeletal:      Right  lower leg: No edema.      Left lower leg: No edema.   Neurological:      Mental Status: He is alert.          Significant Labs:  All labs within the past 24 hours have been reviewed.     Significant Imaging:  Labs: Reviewed  Assessment/Plan:     Cardiac/Vascular  Acute bacterial endocarditis  Blood cultures from 4/8 cleared  ID following    Renal/  ESRD on hemodialysis  ESRD on HD     HD [facility]  Sp AVG resection 4/3  Last HD 4/11    Plan/Recommendation  HD today  Plan for right IJ trialysis line removal after with IR TDC placement 4/14  -Keep MAP > 65  -Keep hemoglobin > 7  -Strict ins and outs  -Avoid nephrotoxic agents if possible and renally dose medications  -Avoid drastic hemodynamic changes if possible    #Secondary hyperparathyroidism  Calcium   Date Value Ref Range Status   04/12/2025 9.1 8.7 - 10.5 mg/dL Final     Phosphorus Level   Date Value Ref Range Status   04/12/2025 3.6 2.7 - 4.5 mg/dL Final     PTH, Intact   Date Value Ref Range Status   01/17/2024 117.7 (H) 9.0 - 77.0 pg/mL Final   Continue to monitor      Oncology  Anemia in ESRD (end-stage renal disease)  #Anemia  HGB   Date Value Ref Range Status   04/12/2025 8.6 (L) 14.0 - 18.0 gm/dL Final     Iron   Date Value Ref Range Status   08/11/2024 39 (L) 45 - 160 ug/dL Final     Transferrin   Date Value Ref Range Status   08/11/2024 139 (L) 200 - 375 mg/dL Final     TIBC   Date Value Ref Range Status   08/11/2024 206 (L) 250 - 450 ug/dL Final     Saturated Iron   Date Value Ref Range Status   08/11/2024 19 (L) 20 - 50 % Final     Ferritin   Date Value Ref Range Status   08/11/2024 979 (H) 20.0 - 300.0 ng/mL Final     NAKIA ordered, hb not at goal  Iron on hold due to bacteremia. Follow up outpatient        Thank you for your consult. I will follow-up with patient. Please contact us if you have any additional questions.    Antoine Lauren MD  Nephrology  [facility] - Intensive Care

## 2025-04-12 NOTE — ASSESSMENT & PLAN NOTE
"Patient with known CAD s/p stent placement, which is controlled Will continue ASA, Plavix, and Statin and monitor for S/Sx of angina/ACS. Continue to monitor on telemetry.   - last [place] was 1/2025: Severely calcified mid RCA stenosis unable to cross with PTCA balloons, treated initially with 0.9 laser atherectomy, followed by CSI atherectomy system with total of 5 runs (3 at low speed, 2 at high speed), pre-dilated using 2.0 compliant and 3.5 noncompliant balloon followed by 3.5 X 16 mm megatron stent.  Focal plaque rupture/erosion noted in the proximal and ostial RCA that were treated with  3.5 X 28 in the proximal RCA, 4.0 X 16 mm in the ostial RCA.  No residual stenosis post intervention.  Patient had ALAN 3 flow at the end of the procedure  - with elevated troponin likely due to septic shock  No results for input(s): "TROPONINI", "TROPONINIHS" in the last 168 hours.  - continue asa, plavix, statin  - Cardiology consulted  - no plans for ischemic evaluation at this time   "

## 2025-04-12 NOTE — SUBJECTIVE & OBJECTIVE
Interval History: no acute events overnight.    Review of patient's allergies indicates:   Allergen Reactions    Ace inhibitors Hives, Itching, Shortness Of Breath, Other (See Comments) and Rash     Is not sure which medication it was    Captopril      Current Facility-Administered Medications   Medication Frequency    0.9%  NaCl infusion (for blood administration) Q24H PRN    0.9% NaCl infusion PRN    acetaminophen tablet 650 mg Q4H PRN    albumin human 25% bottle 25 g Daily PRN    atorvastatin tablet 80 mg QHS    clopidogreL tablet 75 mg Daily    dextrose 50% injection 12.5 g PRN    dextrose 50% injection 25 g PRN    diphenhydrAMINE capsule 50 mg Q6H PRN    epoetin mj-epbx injection 10,000 Units Every Tues, Thurs, Sat    erythromycin 5 mg/gram (0.5 %) ophthalmic ointment Q8H    glucagon (human recombinant) injection 1 mg PRN    glucose chewable tablet 16 g PRN    glucose chewable tablet 24 g PRN    heparin (porcine) injection 1,000 Units PRN    HYDROmorphone injection 1 mg Q4H PRN    hyoscyamine SL tablet 0.125 mg Q4H PRN    insulin aspart U-100 pen 0-5 Units QID (AC + HS) PRN    [START ON 4/13/2025] insulin glargine U-100 (Lantus) pen 8 Units Daily    LORazepam injection 0.5 mg Q8H PRN    melatonin tablet 6 mg Nightly PRN    midodrine tablet 15 mg Q8H    naloxone 0.4 mg/mL injection 0.02 mg PRN    ondansetron injection 4 mg Q6H PRN    pantoprazole EC tablet 40 mg Daily    prochlorperazine injection Soln 5 mg Q6H PRN    senna tablet 8.6 mg Daily PRN    sodium chloride 0.9% bolus 250 mL 250 mL PRN    sodium chloride 0.9% flush 10 mL Q8H    tamsulosin 24 hr capsule 0.4 mg Daily    vancomycin - pharmacy to dose pharmacy to manage frequency       Objective:     Vital Signs (Most Recent):  Temp: 98.9 °F (37.2 °C) (04/12/25 0800)  Pulse: 100 (04/12/25 0800)  Resp: 20 (04/12/25 0800)  BP: 124/60 (04/12/25 0800)  SpO2: 100 % (04/12/25 0800) Vital Signs (24h Range):  Temp:  [98 °F (36.7 °C)-98.9 °F (37.2 °C)] 98.9 °F  (37.2 °C)  Pulse:  [] 100  Resp:  [12-39] 20  SpO2:  [95 %-100 %] 100 %  BP: (113-146)/(55-82) 124/60     Weight: 61.2 kg (134 lb 14.7 oz) (04/04/25 1937)  Body mass index is 21.13 kg/m².  Body surface area is 1.7 meters squared.    I/O last 3 completed shifts:  In: 361 [P.O.:120; NG/GT:160; IV Piggyback:81]  Out: 5925 [Urine:5925]     Physical Exam  Constitutional:       General: He is not in acute distress.     Appearance: He is ill-appearing. He is not toxic-appearing.   HENT:      Head: Normocephalic and atraumatic.      Nose: Nose normal.      Mouth/Throat:      Mouth: Mucous membranes are moist.   Neck:      Comments: Right IJ trialysis line  Cardiovascular:      Rate and Rhythm: Tachycardia present.   Pulmonary:      Effort: Pulmonary effort is normal. No respiratory distress.      Breath sounds: No wheezing.   Musculoskeletal:      Right lower leg: No edema.      Left lower leg: No edema.   Neurological:      Mental Status: He is alert.          Significant Labs:  All labs within the past 24 hours have been reviewed.     Significant Imaging:  Labs: Reviewed

## 2025-04-12 NOTE — PLAN OF CARE
Pt remains in ICU on contact isolation. Confused and hallucinating throughout day. Vitals stable. ST on the monitor. Afebrile. HD treatment today. ~1000mls removed. CBI paused. UOP clear, no evidence of clots. NG tube removed. Trialysis catheter removed. PIV placed by ultrasound. Alarms on and audible. Pt within view from nurses station.     Problem: Adult Inpatient Plan of Care  Goal: Plan of Care Review  Outcome: Progressing  Goal: Patient-Specific Goal (Individualized)  Outcome: Progressing  Goal: Absence of Hospital-Acquired Illness or Injury  Outcome: Progressing  Goal: Optimal Comfort and Wellbeing  Outcome: Progressing  Goal: Readiness for Transition of Care  Outcome: Progressing     Problem: Diabetes Comorbidity  Goal: Blood Glucose Level Within Targeted Range  Outcome: Progressing     Problem: Sepsis/Septic Shock  Goal: Optimal Coping  Outcome: Progressing  Goal: Absence of Bleeding  Outcome: Progressing  Goal: Blood Glucose Level Within Targeted Range  Outcome: Progressing  Goal: Absence of Infection Signs and Symptoms  Outcome: Progressing  Goal: Optimal Nutrition Intake  Outcome: Progressing     Problem: Infection  Goal: Absence of Infection Signs and Symptoms  Outcome: Progressing     Problem: Fall Injury Risk  Goal: Absence of Fall and Fall-Related Injury  Outcome: Progressing     Problem: Skin Injury Risk Increased  Goal: Skin Health and Integrity  Outcome: Progressing     Problem: Wound  Goal: Optimal Coping  Outcome: Progressing  Goal: Optimal Functional Ability  Outcome: Progressing  Goal: Absence of Infection Signs and Symptoms  Outcome: Progressing  Goal: Improved Oral Intake  Outcome: Progressing  Goal: Optimal Pain Control and Function  Outcome: Progressing  Goal: Skin Health and Integrity  Outcome: Progressing  Goal: Optimal Wound Healing  Outcome: Progressing     Problem: Coping Ineffective  Goal: Effective Coping  Outcome: Progressing     Problem: Urinary Elimination Management  Goal:  Effective Urinary Elimination/Continence  Outcome: Progressing     Problem: Urinary Retention  Goal: Effective Urinary Elimination  Outcome: Progressing

## 2025-04-12 NOTE — NURSING
"Patient confused.patient yelling "I have to pee". Sharma irrigated.blood tinged urine returned. Patient has been pulling at cath. CBI restarted due to blood tinged urine.  "

## 2025-04-12 NOTE — ASSESSMENT & PLAN NOTE
The likely etiology of thrombocytopenia is infection and sepsis. The patients 3 most recent labs are listed below.  Recent Labs     04/10/25  0416 04/11/25  0312 04/12/25  0409    181 203     Plan  - Will transfuse if platelet count is <10k.

## 2025-04-12 NOTE — NURSING
Pt c/o chest pain at this time. When asked to describe it, he could now say, but pointed to his sternum. Dr. Reagan notified. 12-lead EKG performed and mused. Vitals stable. Monitor reveals sinus tachycardia. Orders placed.

## 2025-04-12 NOTE — ASSESSMENT & PLAN NOTE
#Anemia  HGB   Date Value Ref Range Status   04/12/2025 8.6 (L) 14.0 - 18.0 gm/dL Final     Iron   Date Value Ref Range Status   08/11/2024 39 (L) 45 - 160 ug/dL Final     Transferrin   Date Value Ref Range Status   08/11/2024 139 (L) 200 - 375 mg/dL Final     TIBC   Date Value Ref Range Status   08/11/2024 206 (L) 250 - 450 ug/dL Final     Saturated Iron   Date Value Ref Range Status   08/11/2024 19 (L) 20 - 50 % Final     Ferritin   Date Value Ref Range Status   08/11/2024 979 (H) 20.0 - 300.0 ng/mL Final     NAKIA ordered, hb not at goal  Iron on hold due to bacteremia. Follow up outpatient

## 2025-04-12 NOTE — PLAN OF CARE
Weekend discharge planning review completed. No changes to discharge plan today; will follow up on next business day to continue coordination of appropriate discharge services.

## 2025-04-12 NOTE — ASSESSMENT & PLAN NOTE
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 Rajan is a 85 y.o. man with ESRD with LUE AVF, HFpEF, CAD, DM admitted to OSH 4/1- 4/3 with sepsis due to MRSA bacteremia, transferred to  ICU for septic shock due to MRSA bacteremia and thrombosis of AV graft on 4/3, s/p exploratory surgery of LUE with graft resection 4/3.     Op findings: Ruptured pseudoaneurysm with infected hematoma, graft completely obliterated at mid segment. Suspect infected graft is the source of bacteremia.     BCx 4/2 MRSA  BCx 4/4 MRSA  BCx 4/6 MRSA  BCx 4/7: NGTD  BCx 4/11: NGTD    Graft: MRSA    TTE: 1.9x1.2cm large fixed heterogeneous mass present on the posterior leaflet , moderate to severe regurgitation with an eccentric jet. Cannot exclude severe MR with possible PMVL perforation in association with large vegetation. Not a surgical candidate.     Deemed a poor surgical candidate.    The good news: his blood cultures are finally NGTD.    Recommendations:  --continue vancomycin  --line holiday after HD today with tunneled cath planned for Monday  --if he continues to improve, it will take the tincture of time to treat his endocarditis  --consider eventual LTAC placement

## 2025-04-12 NOTE — ASSESSMENT & PLAN NOTE
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 Most recent hemoglobin and hematocrit are listed below.  Recent Labs     04/10/25  0416 04/11/25  0312 04/12/25  0409   HGB 8.5* 8.6* 8.6*   HCT 26.5* 27.2* 27.5*     Plan  - Monitor serial CBC: Daily and recheck now  - Transfuse PRBC if patient becomes hemodynamically unstable, symptomatic or H/H drops below 7/21.  - Patient has not received any PRBC transfusions to date  - Patient's anemia is currently stable

## 2025-04-12 NOTE — EICU
Virtual ICU Quality Rounds    Admit Date: 4/3/2025  Hospital Day: 9    ICU Day: 8d 20h    24H Vital Sign Range:  Temp:  [98 °F (36.7 °C)-98.9 °F (37.2 °C)]   Pulse:  []   Resp:  [12-39]   BP: (113-146)/(55-82)   SpO2:  [95 %-100 %]     VICU Surveillance Screening

## 2025-04-13 LAB
ABSOLUTE EOSINOPHIL (OHS): 0.22 K/UL
ABSOLUTE MONOCYTE (OHS): 0.77 K/UL (ref 0.3–1)
ABSOLUTE NEUTROPHIL COUNT (OHS): 9.28 K/UL (ref 1.8–7.7)
ALBUMIN SERPL BCP-MCNC: 2.6 G/DL (ref 3.5–5.2)
ALP SERPL-CCNC: 63 UNIT/L (ref 40–150)
ALT SERPL W/O P-5'-P-CCNC: 9 UNIT/L (ref 10–44)
ANION GAP (OHS): 14 MMOL/L (ref 8–16)
AST SERPL-CCNC: 25 UNIT/L (ref 11–45)
BACTERIA BLD CULT: NORMAL
BACTERIA BLD CULT: NORMAL
BASOPHILS # BLD AUTO: 0.04 K/UL
BASOPHILS NFR BLD AUTO: 0.4 %
BILIRUB SERPL-MCNC: 1.2 MG/DL (ref 0.1–1)
BUN SERPL-MCNC: 31 MG/DL (ref 8–23)
CALCIUM SERPL-MCNC: 8.9 MG/DL (ref 8.7–10.5)
CHLORIDE SERPL-SCNC: 102 MMOL/L (ref 95–110)
CO2 SERPL-SCNC: 23 MMOL/L (ref 23–29)
CREAT SERPL-MCNC: 5.6 MG/DL (ref 0.5–1.4)
ERYTHROCYTE [DISTWIDTH] IN BLOOD BY AUTOMATED COUNT: 16.8 % (ref 11.5–14.5)
GFR SERPLBLD CREATININE-BSD FMLA CKD-EPI: 9 ML/MIN/1.73/M2
GLUCOSE SERPL-MCNC: 160 MG/DL (ref 70–110)
HCT VFR BLD AUTO: 27.5 % (ref 40–54)
HGB BLD-MCNC: 8.8 GM/DL (ref 14–18)
IMM GRANULOCYTES # BLD AUTO: 0.06 K/UL (ref 0–0.04)
IMM GRANULOCYTES NFR BLD AUTO: 0.5 % (ref 0–0.5)
LYMPHOCYTES # BLD AUTO: 0.7 K/UL (ref 1–4.8)
MCH RBC QN AUTO: 28.9 PG (ref 27–31)
MCHC RBC AUTO-ENTMCNC: 32 G/DL (ref 32–36)
MCV RBC AUTO: 91 FL (ref 82–98)
NUCLEATED RBC (/100WBC) (OHS): 1 /100 WBC
PLATELET # BLD AUTO: 210 K/UL (ref 150–450)
PMV BLD AUTO: 11 FL (ref 9.2–12.9)
POCT GLUCOSE: 160 MG/DL (ref 70–110)
POCT GLUCOSE: 184 MG/DL (ref 70–110)
POCT GLUCOSE: 192 MG/DL (ref 70–110)
POCT GLUCOSE: 200 MG/DL (ref 70–110)
POCT GLUCOSE: 203 MG/DL (ref 70–110)
POTASSIUM SERPL-SCNC: 4.2 MMOL/L (ref 3.5–5.1)
PROT SERPL-MCNC: 7.6 GM/DL (ref 6–8.4)
RBC # BLD AUTO: 3.04 M/UL (ref 4.6–6.2)
RELATIVE EOSINOPHIL (OHS): 2 %
RELATIVE LYMPHOCYTE (OHS): 6.3 % (ref 18–48)
RELATIVE MONOCYTE (OHS): 7 % (ref 4–15)
RELATIVE NEUTROPHIL (OHS): 83.8 % (ref 38–73)
SODIUM SERPL-SCNC: 139 MMOL/L (ref 136–145)
WBC # BLD AUTO: 11.07 K/UL (ref 3.9–12.7)

## 2025-04-13 PROCEDURE — 94761 N-INVAS EAR/PLS OXIMETRY MLT: CPT

## 2025-04-13 PROCEDURE — 25000003 PHARM REV CODE 250: Performed by: STUDENT IN AN ORGANIZED HEALTH CARE EDUCATION/TRAINING PROGRAM

## 2025-04-13 PROCEDURE — 63600175 PHARM REV CODE 636 W HCPCS: Performed by: STUDENT IN AN ORGANIZED HEALTH CARE EDUCATION/TRAINING PROGRAM

## 2025-04-13 PROCEDURE — 25000003 PHARM REV CODE 250: Performed by: HOSPITALIST

## 2025-04-13 PROCEDURE — 99233 SBSQ HOSP IP/OBS HIGH 50: CPT | Mod: ,,, | Performed by: INTERNAL MEDICINE

## 2025-04-13 PROCEDURE — 27000207 HC ISOLATION

## 2025-04-13 PROCEDURE — G0545 PR VISIT INHERENT TO INPT OR OBS CARE, INFECTIOUS DISEASE: HCPCS | Mod: ,,, | Performed by: INTERNAL MEDICINE

## 2025-04-13 PROCEDURE — 80053 COMPREHEN METABOLIC PANEL: CPT | Performed by: HOSPITALIST

## 2025-04-13 PROCEDURE — 25000003 PHARM REV CODE 250: Performed by: UROLOGY

## 2025-04-13 PROCEDURE — 85025 COMPLETE CBC W/AUTO DIFF WBC: CPT | Performed by: HOSPITALIST

## 2025-04-13 PROCEDURE — 36415 COLL VENOUS BLD VENIPUNCTURE: CPT | Performed by: HOSPITALIST

## 2025-04-13 PROCEDURE — 11000001 HC ACUTE MED/SURG PRIVATE ROOM

## 2025-04-13 PROCEDURE — 99232 SBSQ HOSP IP/OBS MODERATE 35: CPT | Mod: ,,, | Performed by: UROLOGY

## 2025-04-13 PROCEDURE — 63600175 PHARM REV CODE 636 W HCPCS: Performed by: REGISTERED NURSE

## 2025-04-13 PROCEDURE — A4216 STERILE WATER/SALINE, 10 ML: HCPCS | Performed by: HOSPITALIST

## 2025-04-13 RX ORDER — DIPHENHYDRAMINE HYDROCHLORIDE 50 MG/ML
25 INJECTION, SOLUTION INTRAMUSCULAR; INTRAVENOUS ONCE
Status: CANCELLED | OUTPATIENT
Start: 2025-04-13

## 2025-04-13 RX ORDER — INSULIN GLARGINE 100 [IU]/ML
5 INJECTION, SOLUTION SUBCUTANEOUS DAILY
Status: DISCONTINUED | OUTPATIENT
Start: 2025-04-13 | End: 2025-04-21

## 2025-04-13 RX ADMIN — ERYTHROMYCIN 1 INCH: 5 OINTMENT OPHTHALMIC at 06:04

## 2025-04-13 RX ADMIN — Medication 6 MG: at 09:04

## 2025-04-13 RX ADMIN — CLOPIDOGREL BISULFATE 75 MG: 75 TABLET, FILM COATED ORAL at 09:04

## 2025-04-13 RX ADMIN — DIPHENHYDRAMINE HYDROCHLORIDE 50 MG: 25 CAPSULE ORAL at 12:04

## 2025-04-13 RX ADMIN — TAMSULOSIN HYDROCHLORIDE 0.4 MG: 0.4 CAPSULE ORAL at 09:04

## 2025-04-13 RX ADMIN — HYDROMORPHONE HYDROCHLORIDE 1 MG: 1 INJECTION, SOLUTION INTRAMUSCULAR; INTRAVENOUS; SUBCUTANEOUS at 07:04

## 2025-04-13 RX ADMIN — MIDODRINE HYDROCHLORIDE 15 MG: 5 TABLET ORAL at 02:04

## 2025-04-13 RX ADMIN — HALOPERIDOL LACTATE 2 MG: 5 INJECTION, SOLUTION INTRAMUSCULAR at 10:04

## 2025-04-13 RX ADMIN — HYDROMORPHONE HYDROCHLORIDE 1 MG: 1 INJECTION, SOLUTION INTRAMUSCULAR; INTRAVENOUS; SUBCUTANEOUS at 10:04

## 2025-04-13 RX ADMIN — SODIUM CHLORIDE, PRESERVATIVE FREE 10 ML: 5 INJECTION INTRAVENOUS at 06:04

## 2025-04-13 RX ADMIN — HYOSCYAMINE SULFATE 0.12 MG: 0.12 TABLET SUBLINGUAL at 07:04

## 2025-04-13 RX ADMIN — MIDODRINE HYDROCHLORIDE 15 MG: 5 TABLET ORAL at 06:04

## 2025-04-13 RX ADMIN — INSULIN GLARGINE 5 UNITS: 100 INJECTION, SOLUTION SUBCUTANEOUS at 09:04

## 2025-04-13 RX ADMIN — MIDODRINE HYDROCHLORIDE 15 MG: 5 TABLET ORAL at 09:04

## 2025-04-13 RX ADMIN — DIPHENHYDRAMINE HYDROCHLORIDE 50 MG: 25 CAPSULE ORAL at 09:04

## 2025-04-13 RX ADMIN — HALOPERIDOL LACTATE 2 MG: 5 INJECTION, SOLUTION INTRAMUSCULAR at 03:04

## 2025-04-13 RX ADMIN — HYOSCYAMINE SULFATE 0.12 MG: 0.12 TABLET SUBLINGUAL at 10:04

## 2025-04-13 RX ADMIN — SODIUM CHLORIDE, PRESERVATIVE FREE 10 ML: 5 INJECTION INTRAVENOUS at 09:04

## 2025-04-13 RX ADMIN — ERYTHROMYCIN: 5 OINTMENT OPHTHALMIC at 02:04

## 2025-04-13 RX ADMIN — ATORVASTATIN CALCIUM 80 MG: 40 TABLET, FILM COATED ORAL at 09:04

## 2025-04-13 RX ADMIN — PANTOPRAZOLE SODIUM 40 MG: 40 TABLET, DELAYED RELEASE ORAL at 09:04

## 2025-04-13 RX ADMIN — DIPHENHYDRAMINE HYDROCHLORIDE 50 MG: 25 CAPSULE ORAL at 06:04

## 2025-04-13 RX ADMIN — ERYTHROMYCIN 1 INCH: 5 OINTMENT OPHTHALMIC at 09:04

## 2025-04-13 RX ADMIN — INSULIN ASPART 2 UNITS: 100 INJECTION, SOLUTION INTRAVENOUS; SUBCUTANEOUS at 09:04

## 2025-04-13 NOTE — NURSING
[facility], [facility]  Nurses Note -- 4 Eyes      4/13/2025       Skin assessed on: Q Shift      [x] No Pressure Injuries Present    [x]Prevention Measures Documented    [] Yes LDA  for Pressure Injury Previously documented     [] Yes New Pressure Injury Discovered   [] LDA for New Pressure Injury Added      Attending RN:  Asuncion Mendoza RN     Second RN:  Xiao Arango RN

## 2025-04-13 NOTE — SUBJECTIVE & OBJECTIVE
"Interval History: Mr. Rajan looks the best I've seen him to date. His Trialysis cathter was removed and he' on a line holiday. Other than his left shoulder itching, he is without complaints.    Review of Systems   Skin:  Positive for wound.   All other systems reviewed and are negative.    Objective:     Vital Signs (Most Recent):  Temp: 97.8 °F (36.6 °C) (04/13/25 0701)  Pulse: 96 (04/13/25 0813)  Resp: (!) 35 (04/13/25 0813)  BP: (!) 99/59 (04/13/25 0800)  SpO2: 99 % (04/13/25 0813) Vital Signs (24h Range):  Temp:  [97.5 °F (36.4 °C)-98.5 °F (36.9 °C)] 97.8 °F (36.6 °C)  Pulse:  [] 96  Resp:  [10-47] 35  SpO2:  [94 %-100 %] 99 %  BP: ()/(44-64) 99/59     Weight: 61.2 kg (134 lb 14.7 oz)  Body mass index is 21.13 kg/m².    Estimated Creatinine Clearance: 8 mL/min (A) (based on SCr of 5.6 mg/dL (H)).     Physical Exam  Vitals and nursing note reviewed.   Constitutional:       Appearance: Normal appearance.   HENT:      Head: Normocephalic and atraumatic.      Mouth/Throat:      Mouth: Mucous membranes are moist.   Eyes:      Extraocular Movements: Extraocular movements intact.      Pupils: Pupils are equal, round, and reactive to light.   Cardiovascular:      Rate and Rhythm: Normal rate.      Heart sounds: Murmur heard.   Abdominal:      Tenderness: There is no abdominal tenderness. There is no right CVA tenderness, left CVA tenderness or guarding.   Musculoskeletal:         General: No swelling.   Skin:     Findings: No erythema.   Neurological:      General: No focal deficit present.      Mental Status: He is alert.   Psychiatric:         Mood and Affect: Mood normal.         Behavior: Behavior normal.          Significant Labs: Blood Culture: No results for input(s): "LABBLOO" in the last 4320 hours.  BMP:   Recent Labs   Lab 04/13/25  0456      K 4.2      CO2 23   BUN 31*   CREATININE 5.6*   CALCIUM 8.9     CBC:   Recent Labs   Lab 04/12/25  0409 04/13/25  0456   WBC 15.78* 11.07   HGB " 8.6* 8.8*   HCT 27.5* 27.5*    210     Microbiology Results (last 7 days)       Procedure Component Value Units Date/Time    Blood culture [[id number]]  (Normal) Collected: 04/08/25 0506    Order Status: Completed Specimen: Blood Updated: 04/13/25 0600     Blood Culture No Growth After 5 Days    Blood culture [[id number]]  (Normal) Collected: 04/08/25 0401    Order Status: Completed Specimen: Blood Updated: 04/13/25 0600     Blood Culture No Growth After 5 Days    Blood culture [[id number]]  (Normal) Collected: 04/11/25 1335    Order Status: Completed Specimen: Blood Updated: 04/13/25 0202     Blood Culture No Growth After 36 Hours    Blood culture [[id number]]  (Normal) Collected: 04/11/25 1335    Order Status: Completed Specimen: Blood from Other (Specify) Updated: 04/13/25 0202     Blood Culture No Growth After 36 Hours    Fungus culture [[id number]]  (Normal) Collected: 04/03/25 1816    Order Status: Completed Specimen: Wound from Arm, Left Updated: 04/10/25 2300     Fungal Culture No Fungus Isolated at 1 Week    Fungus culture [[id number]]  (Normal) Collected: 04/03/25 1934    Order Status: Completed Specimen: Tissue from AV Fistula, Left Updated: 04/10/25 2300     Fungal Culture No Fungus Isolated at 1 Week    Fungus culture [[id number]]  (Normal) Collected: 04/03/25 2011    Order Status: Completed Specimen: Wound from Arm, Left Updated: 04/10/25 2300     Fungal Culture No Fungus Isolated at 1 Week    Fungus culture [[id number]]  (Normal) Collected: 04/03/25 1904    Order Status: Completed Specimen: Tissue from AV Fistula, Left Updated: 04/10/25 2201     Fungal Culture No Fungus Isolated at 1 Week    Fungus culture [[id number]]  (Normal) Collected: 04/03/25 1921    Order Status: Completed Specimen: Tissue from hematoma Updated: 04/10/25 2201     Fungal Culture No Fungus Isolated at 1 Week    Blood culture [[id number]]  (Normal) Collected: 04/04/25 1044    Order Status: Completed Specimen: Blood Updated:  04/09/25 1100     Blood Culture No Growth After 5 Days    Blood culture [[id number]]  (Abnormal) Collected: 04/06/25 0555    Order Status: Completed Specimen: Blood Updated: 04/09/25 0720     Blood Culture Positive - Aerobic/Pediatric Bottle      Methicillin resistant Staphylococcus aureus     Comment: <null> has been updated to reportable.        GRAM STAIN Gram positive cocci in clusters resembling Staph     Comment: Aerobic Bottle Positive   This is an appended report. These results have been appended to a previously preliminary verified report.       Narrative:      For susceptibility refer to order [id number]  ID Consult Strongly Recommended    Blood culture [[id number]]  (Abnormal)  (Susceptibility) Collected: 04/06/25 0542    Order Status: Completed Specimen: Blood Updated: 04/09/25 0719     Blood Culture Positive - Aerobic/Pediatric Bottle      Methicillin resistant Staphylococcus aureus     Comment: <null> has been updated to reportable.        GRAM STAIN Gram positive cocci in clusters resembling Staph     Comment: This is an appended report. These results have been appended to a previously preliminary verified report.       Narrative:      Aerobic Bottle Positive   ID Consult Strongly Recommended    Afb Culture Stain [[id number]] Collected: 04/03/25 1943    Order Status: Completed Specimen: Wound from Arm, Left Updated: 04/07/25 1637     ACID FAST STAIN  No acid fast bacilli seen    Afb Culture Stain [[id number]] Collected: 04/03/25 2011    Order Status: Completed Specimen: Wound from Arm, Left Updated: 04/07/25 1637     ACID FAST STAIN  No acid fast bacilli seen    Afb Culture Stain [[id number]] Collected: 04/03/25 1905    Order Status: Completed Specimen: Tissue from AV Fistula, Left Updated: 04/07/25 1623     ACID FAST STAIN  No acid fast bacilli seen    Afb Culture Stain [[id number]] Collected: 04/03/25 1954    Order Status: Completed Specimen: Wound from Arm, Left Updated: 04/07/25 1623     ACID  FAST STAIN  No acid fast bacilli seen    Afb Culture Stain [[id number]] Collected: 04/03/25 1816    Order Status: Completed Specimen: Tissue from AV Fistula, Left Updated: 04/07/25 1623     ACID FAST STAIN  No acid fast bacilli seen    Afb Culture Stain [[id number]] Collected: 04/03/25 1921    Order Status: Completed Specimen: Tissue from hematoma Updated: 04/07/25 1623     ACID FAST STAIN  No acid fast bacilli seen    Afb Culture Stain [[id number]] Collected: 04/03/25 1934    Order Status: Completed Specimen: Tissue from AV Fistula, Left Updated: 04/07/25 1618     ACID FAST STAIN  No acid fast bacilli seen    Fungus culture [[id number]]  (Normal) Collected: 04/03/25 1943    Order Status: Completed Specimen: Wound from Arm, Left Updated: 04/07/25 0935     Fungal Culture Culture In Progress    Fungus culture [[id number]]  (Normal) Collected: 04/03/25 1954    Order Status: Completed Specimen: Wound from Arm, Left Updated: 04/07/25 0935     Fungal Culture Culture In Progress    AFB Culture & Smear [[id number]] Collected: 04/03/25 1816    Order Status: Completed Specimen: Tissue from AV Fistula, Left Updated: 04/07/25 0815     CULTURE, AFB  Culture In Progress    AFB Culture & Smear [[id number]] Collected: 04/03/25 1905    Order Status: Completed Specimen: Tissue from AV Fistula, Left Updated: 04/07/25 0815     CULTURE, AFB  Culture In Progress    AFB Culture & Smear [[id number]] Collected: 04/03/25 1921    Order Status: Completed Specimen: Tissue from hematoma Updated: 04/07/25 0815     CULTURE, AFB  Culture In Progress    AFB Culture & Smear [[id number]] Collected: 04/03/25 1934    Order Status: Completed Specimen: Tissue from AV Fistula, Left Updated: 04/07/25 0815     CULTURE, AFB  Culture In Progress    AFB Culture & Smear [[id number]] Collected: 04/03/25 1943    Order Status: Completed Specimen: Wound from Arm, Left Updated: 04/07/25 0815     CULTURE, AFB  Culture In Progress    AFB Culture & Smear [[id number]]  Collected: 04/03/25 1954    Order Status: Completed Specimen: Wound from Arm, Left Updated: 04/07/25 0815     CULTURE, AFB  Culture In Progress    AFB Culture & Smear [[id number]] Collected: 04/03/25 2011    Order Status: Completed Specimen: Wound from Arm, Left Updated: 04/07/25 0815     CULTURE, AFB  Culture In Progress    Culture, Anaerobic [[id number]] Collected: 04/03/25 2011    Order Status: Completed Specimen: Wound from Arm, Left Updated: 04/07/25 0748     Anaerobe Culture No Anaerobes Isolated    Culture, Anaerobic [[id number]] Collected: 04/03/25 1954    Order Status: Completed Specimen: Wound from Arm, Left Updated: 04/07/25 0747     Anaerobe Culture No Anaerobes Isolated    Culture, Anaerobic [[id number]] Collected: 04/03/25 1943    Order Status: Completed Specimen: Wound from Arm, Left Updated: 04/07/25 0747     Anaerobe Culture No Anaerobes Isolated    Culture, Anaerobic [[id number]] Collected: 04/03/25 1934    Order Status: Completed Specimen: Tissue from AV Fistula, Left Updated: 04/07/25 0746     Anaerobe Culture No Anaerobes Isolated    Culture, Anaerobic [[id number]] Collected: 04/03/25 1921    Order Status: Completed Specimen: Tissue from hematoma Updated: 04/07/25 0746     Anaerobe Culture No Anaerobes Isolated    Culture, Anaerobic [[id number]] Collected: 04/03/25 1910    Order Status: Completed Specimen: Tissue from AV Fistula, Left Updated: 04/07/25 0745     Anaerobe Culture No Anaerobes Isolated    Culture, Anaerobic [[id number]] Collected: 04/03/25 1816    Order Status: Completed Specimen: Wound from Arm, Left Updated: 04/07/25 0744     Anaerobe Culture No Anaerobes Isolated            Significant Imaging: I have reviewed all pertinent imaging results/findings within the past 24 hours.

## 2025-04-13 NOTE — NURSING
[facility], [facility]  Nurses Note -- 4 Eyes      4/12/2025       Skin assessed on: Q Shift      [x] No Pressure Injuries Present    [x]Prevention Measures Documented    [] Yes LDA  for Pressure Injury Previously documented     [] Yes New Pressure Injury Discovered   [] LDA for New Pressure Injury Added      Attending RN:  Xiao Arango RN     Second RN:  Alexia Morales RN

## 2025-04-13 NOTE — CLINICAL REVIEW
Nephrology Chart Review      Intake/Output Summary (Last 24 hours) at 4/13/2025 1053  Last data filed at 4/13/2025 1025  Gross per 24 hour   Intake 120 ml   Output 2721 ml   Net -2601 ml       Vitals:    04/13/25 0813 04/13/25 0900 04/13/25 1000 04/13/25 1025   BP:  (!) 102/59 130/67    BP Location:       Pulse: 96 99 (!) 126    Resp: (!) 35 (!) 32 (!) 24 (!) 30   Temp:       TempSrc:       SpO2: 99% 100% 100%    Weight:       Height:           Recent Labs   Lab 04/10/25  0416 04/11/25  0312 04/12/25  0409 04/13/25  0456    142 142 139   K 3.7 3.9 3.7 4.2    109 110 102   CO2 22* 23 19* 23   BUN 52* 35* 50* 31*   CREATININE 7.8* 5.3* 7.2* 5.6*   GLUCOSE 251* 277* 104 160*   CALCIUM 8.6* 8.7 9.1 8.9   PHOS 4.2 2.9 3.6  --      TDC 4/14 and HD 4/14      No other related issues identified. Please call Nephrology as needed; We will continue to follow.      Antoine Lauren MD  Nephrology [facility] [place]

## 2025-04-13 NOTE — PROGRESS NOTES
Vancomycin Consult Follow-up:  Patient reviewed, Scr decreased to 5.6mg/d no new levels, continue current therapy: dialysis qMWF; Next levels due: 4/15 @ 04:00

## 2025-04-13 NOTE — PROGRESS NOTES
"[facility]  [facility] Medicine  Progress Note    Patient Name: Jevon Rajan  MRN: [id number]  Patient Class: IP- Inpatient   Admission Date: 4/3/2025  Length of Stay: 10 days  Attending Physician: Gonzalez Reagan MD  Primary Care Provider: No, Primary Doctor        Subjective     Principal Problem:Septic shock        HPI:  Mr Jevon Rajan is a 85 y.o. man with ESRD with LUE AVF, HFpEF last EF 50-55%, CAD, DM who was transferred to [facility] ICU for septic shock due to MRSA bacteremia.     He was admitted at [facility] 4/1-3/2025 with sepsis, worsening to septic shock, then identified source as MRSA. 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     Upon arrival to [facility], he is moaning in pain and his LUE AVF has active pulsatile bright red blood. He does respond to his name. He denies pain anywhere other than his LUE. He is on levophed at 0.05.     Overview/Hospital Course:  Mr Jevon Rajan is a 85 y.o. man with ESRD on HD with LUE AVF that has been bleeding, CHF, CAD s/p recent stenting 1/2025 who was admitted with MRSA bacteremia and bleeding from his LUE AVF. Continued vancomycin; weaned off levophed. Vascular surgery emergently consulted; on 4/3/25 they took him to OR and noted: "well incorporated proximal and central graft without evidence of infection, resected graft and covered proximal and central remnants with multiple layers. Mid graft removed in entirety and sent for culture. Ruptured pseudoaneurysm with infected hematoma, graft completely obliterated at mid segment." Surgical cultures with Staph. Suspect AVF or chronic scratching of pruritic skin is the source of his infection. TTE showed endocarditis: EF 40-45%, G1DD, RV systolic dysfunction, large 1.9x1.2cm fixed mass on the posterior mitral valve leaflet with moder to severe regurgitation, cannot rule out posterior " mitral valve leaflet perforation. Discussed with cardiac surgery; he is high mortality risk, and surgery is not advised. ID following. Nephrology consulted; trialysis was placed for HD. Persistently positive for MRSA in blood cultures. He has had urinary retention; Urology consulted, performed bedside cystoscopy on 4/6/25 with large prostate noted. Noted to have clot retention and went urgently to OR with Urology on 4/7/25; asa and DVT ppx held.     WBC normalized. S/p clot removal with Urology, 1U PRBC ordered this morning with Hb 6.1. Soft BPs, will add midodrine for HD to step down to floor. Glucoses high, will increase insulin. Attempted to s/d but BP too low, may still need CRRT rather. 4/8 cx clear so far. Increasing insulin again. Increasing midodrine to 15 mg TID. Hb 6.8, 1U PRBC ordered.     BP appears to be recovering a bit, perhaps will tolerate reg HD, will discuss w Neph- will run him on HD Saturday, if tolerates normal HD will remove central line and give 2 day holiday and place tunnel on Monday. We will give him HD before dc to facility on Tuesday if accepted by then.     WBC has normalized for the first time, now tolerating reg HD, and Blcx remain clear. Central line removed. NGT removed. Will step down.         Interval History:  NAEON.  No new issues.   CC- Fatigue  All questions answered and updates on care given.       ROS:  General: Negative for fevers   Cardiac: Negative for chest pain   Pulmonary: Negative for wheezing  GI: Negative for abdominal distention      Vitals:    04/13/25 0411 04/13/25 0500 04/13/25 0600 04/13/25 0813   BP:  (!) 111/53 (!) 116/58    BP Location:  Right arm Right arm    Pulse: 97 107 105 96   Resp:  (!) 30 (!) 27 (!) 35   Temp:       TempSrc:       SpO2:  96% 99% 99%   Weight:       Height:              Body mass index is 21.13 kg/m².      PHYSICAL EXAM:  GENERAL APPEARANCE: alert and cooperative.     HEAD: NC/AT  CARDIAC: There is no cyanosis or pallor.   LUNGS: No  apparent wheezing or stridor.  ABDOMEN: Non-distended. No guarding.  PSYCHIATRIC: No tangential speech. No Hyperactive features.        Recent Results (from the past 24 hours)   POCT glucose    Collection Time: 04/12/25 12:12 PM   Result Value Ref Range    POCT Glucose 226 (H) 70 - 110 mg/dL   Troponin I    Collection Time: 04/12/25 12:16 PM   Result Value Ref Range    Troponin-I 0.811 (H) <=0.026 ng/mL   POCT glucose    Collection Time: 04/12/25  5:45 PM   Result Value Ref Range    POCT Glucose 158 (H) 70 - 110 mg/dL   POCT glucose    Collection Time: 04/12/25  9:44 PM   Result Value Ref Range    POCT Glucose 160 (H) 70 - 110 mg/dL   POCT glucose    Collection Time: 04/13/25  4:53 AM   Result Value Ref Range    POCT Glucose 192 (H) 70 - 110 mg/dL   Comprehensive Metabolic Panel    Collection Time: 04/13/25  4:56 AM   Result Value Ref Range    Sodium 139 136 - 145 mmol/L    Potassium 4.2 3.5 - 5.1 mmol/L    Chloride 102 95 - 110 mmol/L    CO2 23 23 - 29 mmol/L    Glucose 160 (H) 70 - 110 mg/dL    BUN 31 (H) 8 - 23 mg/dL    Creatinine 5.6 (H) 0.5 - 1.4 mg/dL    Calcium 8.9 8.7 - 10.5 mg/dL    Protein Total 7.6 6.0 - 8.4 gm/dL    Albumin 2.6 (L) 3.5 - 5.2 g/dL    Bilirubin Total 1.2 (H) 0.1 - 1.0 mg/dL    ALP 63 40 - 150 unit/L    AST 25 11 - 45 unit/L    ALT 9 (L) 10 - 44 unit/L    Anion Gap 14 8 - 16 mmol/L    eGFR 9 (L) >60 mL/min/1.73/m2   CBC with Differential    Collection Time: 04/13/25  4:56 AM   Result Value Ref Range    WBC 11.07 3.90 - 12.70 K/uL    RBC 3.04 (L) 4.60 - 6.20 M/uL    HGB 8.8 (L) 14.0 - 18.0 gm/dL    HCT 27.5 (L) 40.0 - 54.0 %    MCV 91 82 - 98 fL    MCH 28.9 27.0 - 31.0 pg    MCHC 32.0 32.0 - 36.0 g/dL    RDW 16.8 (H) 11.5 - 14.5 %    Platelet Count 210 150 - 450 K/uL    MPV 11.0 9.2 - 12.9 fL    Nucleated RBC 1 (H) <=0 /100 WBC    Neut % 83.8 (H) 38 - 73 %    Lymph % 6.3 (L) 18 - 48 %    Mono % 7.0 4 - 15 %    Eos % 2.0 <=8 %    Basophil % 0.4 <=1.9 %    Imm Grans % 0.5 0.0 - 0.5 %    Neut #  9.28 (H) 1.8 - 7.7 K/uL    Lymph # 0.70 (L) 1 - 4.8 K/uL    Mono # 0.77 0.3 - 1 K/uL    Eos # 0.22 <=0.5 K/uL    Baso # 0.04 <=0.2 K/uL    Imm Grans # 0.06 (H) 0.00 - 0.04 K/uL       Microbiology Results (last 7 days)       Procedure Component Value Units Date/Time    Blood culture [[id number]]  (Normal) Collected: 04/08/25 0506    Order Status: Completed Specimen: Blood Updated: 04/13/25 0600     Blood Culture No Growth After 5 Days    Blood culture [[id number]]  (Normal) Collected: 04/08/25 0401    Order Status: Completed Specimen: Blood Updated: 04/13/25 0600     Blood Culture No Growth After 5 Days    Blood culture [[id number]]  (Normal) Collected: 04/11/25 1335    Order Status: Completed Specimen: Blood Updated: 04/13/25 0202     Blood Culture No Growth After 36 Hours    Blood culture [[id number]]  (Normal) Collected: 04/11/25 1335    Order Status: Completed Specimen: Blood from Other (Specify) Updated: 04/13/25 0202     Blood Culture No Growth After 36 Hours    Fungus culture [[id number]]  (Normal) Collected: 04/03/25 1816    Order Status: Completed Specimen: Wound from Arm, Left Updated: 04/10/25 2300     Fungal Culture No Fungus Isolated at 1 Week    Fungus culture [[id number]]  (Normal) Collected: 04/03/25 1934    Order Status: Completed Specimen: Tissue from AV Fistula, Left Updated: 04/10/25 2300     Fungal Culture No Fungus Isolated at 1 Week    Fungus culture [[id number]]  (Normal) Collected: 04/03/25 2011    Order Status: Completed Specimen: Wound from Arm, Left Updated: 04/10/25 2300     Fungal Culture No Fungus Isolated at 1 Week    Fungus culture [[id number]]  (Normal) Collected: 04/03/25 1904    Order Status: Completed Specimen: Tissue from AV Fistula, Left Updated: 04/10/25 2201     Fungal Culture No Fungus Isolated at 1 Week    Fungus culture [[id number]]  (Normal) Collected: 04/03/25 1921    Order Status: Completed Specimen: Tissue from hematoma Updated: 04/10/25 2201     Fungal Culture No  Fungus Isolated at 1 Week    Blood culture [[id number]]  (Normal) Collected: 04/04/25 1044    Order Status: Completed Specimen: Blood Updated: 04/09/25 1100     Blood Culture No Growth After 5 Days    Blood culture [[id number]]  (Abnormal) Collected: 04/06/25 0555    Order Status: Completed Specimen: Blood Updated: 04/09/25 0720     Blood Culture Positive - Aerobic/Pediatric Bottle      Methicillin resistant Staphylococcus aureus     Comment: <null> has been updated to reportable.        GRAM STAIN Gram positive cocci in clusters resembling Staph     Comment: Aerobic Bottle Positive   This is an appended report. These results have been appended to a previously preliminary verified report.       Narrative:      For susceptibility refer to order [id number]  ID Consult Strongly Recommended    Blood culture [[id number]]  (Abnormal)  (Susceptibility) Collected: 04/06/25 0542    Order Status: Completed Specimen: Blood Updated: 04/09/25 0719     Blood Culture Positive - Aerobic/Pediatric Bottle      Methicillin resistant Staphylococcus aureus     Comment: <null> has been updated to reportable.        GRAM STAIN Gram positive cocci in clusters resembling Staph     Comment: This is an appended report. These results have been appended to a previously preliminary verified report.       Narrative:      Aerobic Bottle Positive   ID Consult Strongly Recommended    Afb Culture Stain [[id number]] Collected: 04/03/25 1943    Order Status: Completed Specimen: Wound from Arm, Left Updated: 04/07/25 1637     ACID FAST STAIN  No acid fast bacilli seen    Afb Culture Stain [[id number]] Collected: 04/03/25 2011    Order Status: Completed Specimen: Wound from Arm, Left Updated: 04/07/25 1637     ACID FAST STAIN  No acid fast bacilli seen    Afb Culture Stain [[id number]] Collected: 04/03/25 1905    Order Status: Completed Specimen: Tissue from AV Fistula, Left Updated: 04/07/25 1623     ACID FAST STAIN  No acid fast bacilli seen     Afb Culture Stain [[id number]] Collected: 04/03/25 1954    Order Status: Completed Specimen: Wound from Arm, Left Updated: 04/07/25 1623     ACID FAST STAIN  No acid fast bacilli seen    Afb Culture Stain [[id number]] Collected: 04/03/25 1816    Order Status: Completed Specimen: Tissue from AV Fistula, Left Updated: 04/07/25 1623     ACID FAST STAIN  No acid fast bacilli seen    Afb Culture Stain [[id number]] Collected: 04/03/25 1921    Order Status: Completed Specimen: Tissue from hematoma Updated: 04/07/25 1623     ACID FAST STAIN  No acid fast bacilli seen    Afb Culture Stain [[id number]] Collected: 04/03/25 1934    Order Status: Completed Specimen: Tissue from AV Fistula, Left Updated: 04/07/25 1618     ACID FAST STAIN  No acid fast bacilli seen    Fungus culture [[id number]]  (Normal) Collected: 04/03/25 1943    Order Status: Completed Specimen: Wound from Arm, Left Updated: 04/07/25 0935     Fungal Culture Culture In Progress    Fungus culture [[id number]]  (Normal) Collected: 04/03/25 1954    Order Status: Completed Specimen: Wound from Arm, Left Updated: 04/07/25 0935     Fungal Culture Culture In Progress    AFB Culture & Smear [[id number]] Collected: 04/03/25 1816    Order Status: Completed Specimen: Tissue from AV Fistula, Left Updated: 04/07/25 0815     CULTURE, AFB  Culture In Progress    AFB Culture & Smear [[id number]] Collected: 04/03/25 1905    Order Status: Completed Specimen: Tissue from AV Fistula, Left Updated: 04/07/25 0815     CULTURE, AFB  Culture In Progress    AFB Culture & Smear [[id number]] Collected: 04/03/25 1921    Order Status: Completed Specimen: Tissue from hematoma Updated: 04/07/25 0815     CULTURE, AFB  Culture In Progress    AFB Culture & Smear [[id number]] Collected: 04/03/25 1934    Order Status: Completed Specimen: Tissue from AV Fistula, Left Updated: 04/07/25 0815     CULTURE, AFB  Culture In Progress    AFB Culture & Smear [[id number]] Collected: 04/03/25 1943    Order  Status: Completed Specimen: Wound from Arm, Left Updated: 04/07/25 0815     CULTURE, AFB  Culture In Progress    AFB Culture & Smear [[id number]] Collected: 04/03/25 1954    Order Status: Completed Specimen: Wound from Arm, Left Updated: 04/07/25 0815     CULTURE, AFB  Culture In Progress    AFB Culture & Smear [[id number]] Collected: 04/03/25 2011    Order Status: Completed Specimen: Wound from Arm, Left Updated: 04/07/25 0815     CULTURE, AFB  Culture In Progress    Culture, Anaerobic [[id number]] Collected: 04/03/25 2011    Order Status: Completed Specimen: Wound from Arm, Left Updated: 04/07/25 0748     Anaerobe Culture No Anaerobes Isolated    Culture, Anaerobic [[id number]] Collected: 04/03/25 1954    Order Status: Completed Specimen: Wound from Arm, Left Updated: 04/07/25 0747     Anaerobe Culture No Anaerobes Isolated    Culture, Anaerobic [[id number]] Collected: 04/03/25 1943    Order Status: Completed Specimen: Wound from Arm, Left Updated: 04/07/25 0747     Anaerobe Culture No Anaerobes Isolated    Culture, Anaerobic [[id number]] Collected: 04/03/25 1934    Order Status: Completed Specimen: Tissue from AV Fistula, Left Updated: 04/07/25 0746     Anaerobe Culture No Anaerobes Isolated    Culture, Anaerobic [[id number]] Collected: 04/03/25 1921    Order Status: Completed Specimen: Tissue from hematoma Updated: 04/07/25 0746     Anaerobe Culture No Anaerobes Isolated    Culture, Anaerobic [[id number]] Collected: 04/03/25 1910    Order Status: Completed Specimen: Tissue from AV Fistula, Left Updated: 04/07/25 0745     Anaerobe Culture No Anaerobes Isolated    Culture, Anaerobic [[id number]] Collected: 04/03/25 1816    Order Status: Completed Specimen: Wound from Arm, Left Updated: 04/07/25 0744     Anaerobe Culture No Anaerobes Isolated                          Assessment & Plan  Septic shock  This patient has shock. The type of shock is distributive due to sepsis. The patient had the following evidence of  shock: persistent hypotension and altered mental status. 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 Shock is now resolved and vasopressors are off. WBC worsening  HTN (hypertension)  Patient's blood pressure range in the last 24 hours was: BP  Min: 77/52  Max: 131/73.The patient's inpatient anti-hypertensive regimen is listed below:  Current Antihypertensives       Plan  - admitted with shock  - now off vasopressors. Continue to hold BP Rx as BP is well controlled without it   HLD (hyperlipidemia)  - continue statin  Type 2 diabetes mellitus with hyperglycemia, without long-term current use of insulin  A1c:   Lab Results   Component Value Date    HGBA1C 5.7 (H) 04/02/2025     Meds: lantus + SSI PRN to maintain goal 140-180  Tube feeds  accuchecks, hypoglycemic protocol      Coronary artery disease involving native coronary artery of native heart without angina pectoris  Patient with known CAD s/p stent placement, which is controlled Will continue ASA, Plavix, and Statin and monitor for S/Sx of angina/ACS. Continue to monitor on telemetry.   - last [facility] was 1/2025: Severely calcified mid RCA stenosis unable to cross with PTCA balloons, treated initially with 0.9 laser atherectomy, followed by CSI atherectomy system with total of 5 runs (3 at low speed, 2 at high speed), pre-dilated using 2.0 compliant and 3.5 noncompliant balloon followed by 3.5 X 16 mm megatron stent.  Focal plaque rupture/erosion noted in the proximal and ostial RCA that were treated with  3.5 X 28 in the proximal RCA, 4.0 X 16 mm in the ostial RCA.  No residual stenosis post intervention.  Patient had ALAN 3 flow at the end of the procedure  - with elevated troponin likely due to septic shock  Recent  "Labs   Lab 04/12/25  1216   TROPONINI 0.811*     - continue asa, plavix, statin  - Cardiology consulted  - no plans for ischemic evaluation at this time   ESRD on hemodialysis  - ESRD on HD via LUE AVF  - LUE AVF was actively bleeding on arrival on 4/3/25. Vascular surgery was consulted. He went emergently to the OR on 4/3/25: "Severely calcified mid RCA stenosis unable to cross with PTCA balloons, treated initially with 0.9 laser atherectomy, followed by CSI atherectomy system with total of 5 runs (3 at low speed, 2 at high speed), pre-dilated using 2.0 compliant and 3.5 noncompliant balloon followed by 3.5 X 16 mm megatron stent.  Focal plaque rupture/erosion noted in the proximal and ostial RCA that were treated with  3.5 X 28 in the proximal RCA, 4.0 X 16 mm in the ostial RCA.  No residual stenosis post intervention.  Patient had ALAN 3 flow at the end of the procedure"  - cultures from AVF= Staph   - wound care orders in place for surgical site  - Nephrology is consulted  - trialysis placed on 4/4/25 for CRRT  - he will need THDC when bacteremia is resolved   Anemia in ESRD (end-stage renal disease)  Anemia is likely due to  ESRD, blood loss . Most recent hemoglobin and hematocrit are listed below.  Recent Labs     04/11/25  0312 04/12/25  0409 04/13/25  0456   HGB 8.6* 8.6* 8.8*   HCT 27.2* 27.5* 27.5*     Plan  - Monitor serial CBC: Daily and recheck now  - Transfuse PRBC if patient becomes hemodynamically unstable, symptomatic or H/H drops below 7/21.  - Patient has not received any PRBC transfusions to date  - Patient's anemia is currently stable  Acute metabolic encephalopathy  - he is oriented to self but otherwise confused  - CTH 4/1/25 with no acute process  - suspect this is due to sepsis  - NGT placed on 4/4/25 so that he could get his asa/plavix  - swallow- continue puree. Continue NGT until definitely improving and swallowing Rx     ACP (advance care planning)  Advance Care Planning     Date: " "04/04/2025  - palliative consulted   - Mr Escoto specifically told Dr Jordan to contact Kenia Smyth for all updates  - discussed code status with Kenia. She states she has spoken with Mr Escoto's sisters and they all want full code status.          Acute bacterial endocarditis  - TTE 4/4/25: "1.9x1.2cm large fixed heterogeneous mass present on the posterior leaflet (new finding vs 9/2023 echo). There is moderate to severe regurgitation with an eccentric jet. Cannot exclude severe MR with possible PMVL perforation in association with large vegetation."  - this is in the setting of MRSA bacteremia  - ID is consulted  - repeat blood cultures until clear   - suspect source is skin/chronic scratching vs AVF infection- cultures from resected AVF with Staph   - discussed with Cardiac surgery Dr Castro 4/4/25- given age and comorbidities, he is very high risk of mortality with surgery. He recommends medical management at this point.  - on vancomycin      MRSA bacteremia  - suspect source= chronic skin scratching vs infected AVF  - cultures of AVF with Staph   - he has endocarditis  - ID consulted  - on vanc     NSTEMI (non-ST elevated myocardial infarction)  - see CAD    BPH with urinary obstruction  - noted 4/6/25 when patient became very agitated and screaming he couldn't urinate  - nursing unable to place dunaway/coude  - Urology consulted. Bedside cystoscopy on 4/6/25 noted clots, large prostate. Catheter was placed but was then removed due to pain  - 4/7 he is again agitated that he cannot urinate  - follow up with Urology   - tamsulosin ordered if he is awake enough to swallow     Thrombocytopenia  The likely etiology of thrombocytopenia is infection and sepsis. The patients 3 most recent labs are listed below.  Recent Labs     04/11/25  0312 04/12/25  0409 04/13/25  0456    203 210     Plan  - Will transfuse if platelet count is <10k.    AV fistula infection  - LUE AVF was actively bleeding on " "arrival on 4/3/25. Vascular surgery was consulted. He went emergently to the OR on 4/3/25: "Severely calcified mid RCA stenosis unable to cross with PTCA balloons, treated initially with 0.9 laser atherectomy, followed by CSI atherectomy system with total of 5 runs (3 at low speed, 2 at high speed), pre-dilated using 2.0 compliant and 3.5 noncompliant balloon followed by 3.5 X 16 mm megatron stent.  Focal plaque rupture/erosion noted in the proximal and ostial RCA that were treated with  3.5 X 28 in the proximal RCA, 4.0 X 16 mm in the ostial RCA.  No residual stenosis post intervention.  Patient had ALAN 3 flow at the end of the procedure"  - cultures from AVF= Staph   - wound care orders in place for surgical site  - trialysis currently in place for HD    Emphysema lung  - emphysema noted on CT  - no signs of COPD exacerbation  Pulmonary nodules  - CT 4/3/25 with few small pulmonary nodules  - will need outpatient follow up     Gross hematuria      NSVT (nonsustained ventricular tachycardia)        VTE Risk Mitigation (From admission, onward)           Ordered     heparin (porcine) injection 1,000 Units  As needed (PRN)         04/05/25 2100     IP VTE HIGH RISK PATIENT  Once         04/03/25 1516     Place TEMO hose  Until discontinued         04/03/25 1516     Place sequential compression device  Until discontinued         04/03/25 1516     Reason for No Pharmacological VTE Prophylaxis  Once        Question:  Reasons:  Answer:  Physician Provided (leave comment)    04/03/25 1516                    Discharge Planning   BAYRON: 4/15/2025     Code Status: Full Code   Medical Readiness for Discharge Date:   Discharge Plan A: [place] [facility]            Critical care time spent on the evaluation and treatment of severe organ dysfunction, review of pertinent labs and imaging studies, discussions with consulting providers and discussions with patient/family: 35 minutes.            Gonzalez A " MD [place]  Department of Hospital Medicine   [facility] - [place], [place] - Intensive Care

## 2025-04-13 NOTE — ASSESSMENT & PLAN NOTE
S/p Clot evacuation with fulguration  Clamped CBI, can resume if bleeding recurs  Levsin PRN

## 2025-04-13 NOTE — ASSESSMENT & PLAN NOTE
Patient with known CAD s/p stent placement, which is controlled Will continue ASA, Plavix, and Statin and monitor for S/Sx of angina/ACS. Continue to monitor on telemetry.   - last [facility] was 1/2025: Severely calcified mid RCA stenosis unable to cross with PTCA balloons, treated initially with 0.9 laser atherectomy, followed by CSI atherectomy system with total of 5 runs (3 at low speed, 2 at high speed), pre-dilated using 2.0 compliant and 3.5 noncompliant balloon followed by 3.5 X 16 mm megatron stent.  Focal plaque rupture/erosion noted in the proximal and ostial RCA that were treated with  3.5 X 28 in the proximal RCA, 4.0 X 16 mm in the ostial RCA.  No residual stenosis post intervention.  Patient had ALAN 3 flow at the end of the procedure  - with elevated troponin likely due to septic shock  Recent Labs   Lab 04/12/25  1216   TROPONINI 0.811*     - continue asa, plavix, statin  - Cardiology consulted  - no plans for ischemic evaluation at this time

## 2025-04-13 NOTE — SUBJECTIVE & OBJECTIVE
"Interval History:     Complaints of bladder discomfort but dunaway is draining well  No clots CBI off  Output is minimal; ESRD    Review of Systems  Objective:     Temp:  [97.5 °F (36.4 °C)-98.5 °F (36.9 °C)] 97.8 °F (36.6 °C)  Pulse:  [] 126  Resp:  [10-47] 30  SpO2:  [96 %-100 %] 100 %  BP: ()/(44-67) 130/67     Body mass index is 21.13 kg/m².    Date 04/13/25 0700 - 04/14/25 0659   Shift 0676-5991 7092-7292 3838-1356 24 Hour Total   INTAKE   P.O. 120   120   Shift Total(mL/kg) 120(2)   120(2)   OUTPUT   Urine(mL/kg/hr) 20   20   Shift Total(mL/kg) 20(0.3)   20(0.3)   Weight (kg) 61.2 61.2 61.2 61.2     Bladder Scan Volume (mL): 331 mL (04/06/25 1520)    Drains       Drain  Duration                  Urethral Catheter 04/07/25 1558 Triple-lumen;Latex 24 Fr. 5 days                     Physical Exam      Ao*4  resp normal rate' breathing not labored  cv normal rate  Ab nondistended  Ext no edema  Bladder not distended  3 way dunaway in place  Clear nikki urine   RN irrigated and the dunaway irrigates well    Significant Labs:    BMP:  Recent Labs   Lab 04/11/25 0312 04/12/25 0409 04/13/25  0456    142 139   K 3.9 3.7 4.2    110 102   CO2 23 19* 23   BUN 35* 50* 31*   CREATININE 5.3* 7.2* 5.6*   GLUCOSE 277* 104 160*   CALCIUM 8.7 9.1 8.9       CBC:   Recent Labs   Lab 04/11/25 0312 04/12/25 0409 04/13/25  0456   WBC 16.75* 15.78* 11.07   HGB 8.6* 8.6* 8.8*   HCT 27.2* 27.5* 27.5*    203 210       Urine Culture: No results for input(s): "LABURIN" in the last 168 hours.    Significant Imaging:  Bladder scan minimal                "

## 2025-04-13 NOTE — ASSESSMENT & PLAN NOTE
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 Rajan is a 85 y.o. man with ESRD with LUE AVF, HFpEF, CAD, DM admitted to OSH 4/1- 4/3 with sepsis due to MRSA bacteremia, transferred to  ICU for septic shock due to MRSA bacteremia and thrombosis of AV graft on 4/3, s/p exploratory surgery of LUE with graft resection 4/3.     Op findings: Ruptured pseudoaneurysm with infected hematoma, graft completely obliterated at mid segment. Suspect infected graft is the source of bacteremia.     BCx 4/2 MRSA  BCx 4/4 MRSA  BCx 4/6 MRSA  BCx 4/7: NGTD  BCx 4/11: NGTD    Graft: MRSA    TTE: 1.9x1.2cm large fixed heterogeneous mass present on the posterior leaflet , moderate to severe regurgitation with an eccentric jet. Cannot exclude severe MR with possible PMVL perforation in association with large vegetation. Not a surgical candidate.     Deemed a poor surgical candidate.    The good news: his blood cultures are finally NGTD!    Recommendations:  --continue vancomycin, with a target trough of 15-20  - tunneled cath planned for Monday  --anticipating completing 6 weeks of IV vancomycin from clearance (EOC: 5/19/25)  --consider eventual LTAC placement

## 2025-04-13 NOTE — ASSESSMENT & PLAN NOTE
The likely etiology of thrombocytopenia is infection and sepsis. The patients 3 most recent labs are listed below.  Recent Labs     04/11/25  0312 04/12/25  0409 04/13/25  0456    203 210     Plan  - Will transfuse if platelet count is <10k.

## 2025-04-13 NOTE — EICU
VICU Day Rounds Checklist    24H Vital Sign Range:  Temp:  [97.5 °F (36.4 °C)-98.5 °F (36.9 °C)]   Pulse:  []   Resp:  [10-47]   BP: ()/(44-64)   SpO2:  [94 %-100 %]     Video rounds and LDA reconciliation

## 2025-04-13 NOTE — PROGRESS NOTES
[facility] - [place] Intensive Care  Urology  Progress Note    Patient Name: Jevon Rajan  MRN: [id number]  Admission Date: 4/3/2025  Hospital Length of Stay: 10 days  Code Status: Full Code   Attending Provider: Gonzalez Reagan MD   Primary Care Physician: Melia, Primary Doctor    Subjective:     HPI:  Urinary Retention  Patient complains of urinary retention. Onset of retention was 1 day ago and was gradual in onset. Patient currently does not have a urinary catheter in place.  300 ml of urine were scanned on bladder scanner Prior to this event voiding symptoms consisted of slow stream. Prior treatments include none. Recent medications that may have affected his voiding include none.   He still makes urine, he tells me an almost normal amount.  He is very uncomfortable at the level of the bladder.  Nursing staff attempted coude catheter was then successfully.  He agrees to allow me to place a catheter.    Interval History:     Complaints of bladder discomfort but dunaway is draining well  No clots CBI off  Output is minimal; ESRD    Review of Systems  Objective:     Temp:  [97.5 °F (36.4 °C)-98.5 °F (36.9 °C)] 97.8 °F (36.6 °C)  Pulse:  [] 126  Resp:  [10-47] 30  SpO2:  [96 %-100 %] 100 %  BP: ()/(44-67) 130/67     Body mass index is 21.13 kg/m².    Date 04/13/25 0700 - 04/14/25 0659   Shift 9848-7002 3312-0887 3248-7250 24 Hour Total   INTAKE   P.O. 120   120   Shift Total(mL/kg) 120(2)   120(2)   OUTPUT   Urine(mL/kg/hr) 20   20   Shift Total(mL/kg) 20(0.3)   20(0.3)   Weight (kg) 61.2 61.2 61.2 61.2     Bladder Scan Volume (mL): 331 mL (04/06/25 1520)    Drains       Drain  Duration                  Urethral Catheter 04/07/25 1558 Triple-lumen;Latex 24 Fr. 5 days                     Physical Exam      Ao*4  resp normal rate' breathing not labored  cv normal rate  Ab nondistended  Ext no edema  Bladder not distended  3 way dunaway in place  Clear nikki urine   RN irrigated and the dunaway irrigates well    Significant  "Labs:    BMP:  Recent Labs   Lab 04/11/25  0312 04/12/25  0409 04/13/25  0456    142 139   K 3.9 3.7 4.2    110 102   CO2 23 19* 23   BUN 35* 50* 31*   CREATININE 5.3* 7.2* 5.6*   GLUCOSE 277* 104 160*   CALCIUM 8.7 9.1 8.9       CBC:   Recent Labs   Lab 04/11/25 0312 04/12/25  0409 04/13/25  0456   WBC 16.75* 15.78* 11.07   HGB 8.6* 8.6* 8.8*   HCT 27.2* 27.5* 27.5*    203 210       Urine Culture: No results for input(s): "LABURIN" in the last 168 hours.    Significant Imaging:  Bladder scan minimal                  Assessment/Plan:     Gross hematuria  S/p Clot evacuation with fulguration  Clamped CBI, can resume if bleeding recurs  Levsin PRN            BPH with urinary obstruction  Catheter in place, do not remove, requires cystoscopy to place dunaway  [facility] urology team will see pt tomorrow and decide when dunaway can be removed          VTE Risk Mitigation (From admission, onward)           Ordered     heparin (porcine) injection 1,000 Units  As needed (PRN)         04/05/25 2100     IP VTE HIGH RISK PATIENT  Once         04/03/25 1516     Place TEMO hose  Until discontinued         04/03/25 1516     Place sequential compression device  Until discontinued         04/03/25 1516     Reason for No Pharmacological VTE Prophylaxis  Once        Question:  Reasons:  Answer:  Physician Provided (leave comment)    04/03/25 1516                    Marcello Santiago MD  [place]rology  [facility] [place]  "

## 2025-04-13 NOTE — PLAN OF CARE
Problem: Diabetes Comorbidity  Goal: Blood Glucose Level Within Targeted Range  Outcome: Progressing     Problem: Sepsis/Septic Shock  Goal: Optimal Coping  Outcome: Progressing  Goal: Absence of Bleeding  Outcome: Progressing  Goal: Blood Glucose Level Within Targeted Range  Outcome: Progressing  Goal: Absence of Infection Signs and Symptoms  Outcome: Progressing  Goal: Optimal Nutrition Intake  Outcome: Progressing     Problem: Infection  Goal: Absence of Infection Signs and Symptoms  Outcome: Progressing     Problem: Fall Injury Risk  Goal: Absence of Fall and Fall-Related Injury  Outcome: Progressing     Problem: Skin Injury Risk Increased  Goal: Skin Health and Integrity  Outcome: Progressing

## 2025-04-13 NOTE — PROGRESS NOTES
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 Rajan is a 85 y.o. man with ESRD with LUE AVF, HFpEF, CAD, DM admitted to OSH 4/1- 4/3 with sepsis due to MRSA bacteremia, transferred to [facility] ICU for septic shock due to MRSA bacteremia and thrombosis of AV graft on 4/3, s/p exploratory surgery of LUE with graft resection 4/3.     Op findings: Ruptured pseudoaneurysm with infected hematoma, graft completely obliterated at mid segment. Suspect infected graft is the source of bacteremia.     BCx 4/2 MRSA  BCx 4/4 MRSA  BCx 4/6 MRSA  BCx 4/7: NGTD  BCx 4/11: NGTD    Graft: MRSA    TTE: 1.9x1.2cm large fixed heterogeneous mass present on the posterior leaflet , moderate to severe regurgitation with an eccentric jet. Cannot exclude severe MR with possible PMVL perforation in association with large vegetation. Not a surgical candidate.     Deemed a poor surgical candidate.    The good news: his blood cultures are finally NGTD!    Recommendations:  --continue vancomycin, with a target trough of 15-20  - tunneled cath planned for Monday  --anticipating completing 6 weeks of IV vancomycin from clearance (EOC: 5/19/25)  --consider eventual LTAC placement    Thank you for your consult. ID will sign off. Please contact us if you have any additional questions.    Lamont Steele MD  Infectious Disease  [facility] [place] [facility]    Subjective:     Principal Problem:Septic shock    HPI: 85 y.o. man with ESRD with LUE AVF, HFpEF, CAD, DM admitted to OSH 4/1- 4/3 with sepsis due to MRSA bacteremia, transferred to [facility] ICU for septic shock on 4/3.    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     CXR 4/3 with likely pulmonary edema  CT c/a/p 4/3 revealed hepatic hypodensities likely cysts as well some larger lesions that hav decreased in size. Otherwise, no clear infectious source.      ID consulted for: MRSA bacteremia   Interval History: Mr. Rajan looks the best I've seen him to date. His Trialysis cathter was removed and he' on a line holiday. Other than his left shoulder itching, he is without complaints.    Review of Systems   Skin:  Positive for wound.   All other systems reviewed and are negative.    Objective:     Vital Signs (Most Recent):  Temp: 97.8 °F (36.6 °C) (04/13/25 0701)  Pulse: 96 (04/13/25 0813)  Resp: (!) 35 (04/13/25 0813)  BP: (!) 99/59 (04/13/25 0800)  SpO2: 99 % (04/13/25 0813) Vital Signs (24h Range):  Temp:  [97.5 °F (36.4 °C)-98.5 °F (36.9 °C)] 97.8 °F (36.6 °C)  Pulse:  [] 96  Resp:  [10-47] 35  SpO2:  [94 %-100 %] 99 %  BP: ()/(44-64) 99/59     Weight: 61.2 kg (134 lb 14.7 oz)  Body mass index is 21.13 kg/m².    Estimated Creatinine Clearance: 8 mL/min (A) (based on SCr of 5.6 mg/dL (H)).     Physical Exam  Vitals and nursing note reviewed.   Constitutional:       Appearance: Normal appearance.   HENT:      Head: Normocephalic and atraumatic.      Mouth/Throat:      Mouth: Mucous membranes are moist.   Eyes:      Extraocular Movements: Extraocular movements intact.      Pupils: Pupils are equal, round, and reactive to light.   Cardiovascular:      Rate and Rhythm: Normal rate.      Heart sounds: Murmur heard.   Abdominal:      Tenderness: There is no abdominal tenderness. There is no right CVA tenderness, left CVA tenderness or guarding.   Musculoskeletal:         General: No swelling.   Skin:     Findings: No erythema.   Neurological:      General: No focal deficit present.      Mental Status: He is alert.   Psychiatric:         Mood and Affect: Mood normal.          "Behavior: Behavior normal.          Significant Labs: Blood Culture: No results for input(s): "LABBLOO" in the last 4320 hours.  BMP:   Recent Labs   Lab 04/13/25  0456      K 4.2      CO2 23   BUN 31*   CREATININE 5.6*   CALCIUM 8.9     CBC:   Recent Labs   Lab 04/12/25  0409 04/13/25  0456   WBC 15.78* 11.07   HGB 8.6* 8.8*   HCT 27.5* 27.5*    210     Microbiology Results (last 7 days)       Procedure Component Value Units Date/Time    Blood culture [[id number]]  (Normal) Collected: 04/08/25 0506    Order Status: Completed Specimen: Blood Updated: 04/13/25 0600     Blood Culture No Growth After 5 Days    Blood culture [[id number]]  (Normal) Collected: 04/08/25 0401    Order Status: Completed Specimen: Blood Updated: 04/13/25 0600     Blood Culture No Growth After 5 Days    Blood culture [[id number]]  (Normal) Collected: 04/11/25 1335    Order Status: Completed Specimen: Blood Updated: 04/13/25 0202     Blood Culture No Growth After 36 Hours    Blood culture [[id number]]  (Normal) Collected: 04/11/25 1335    Order Status: Completed Specimen: Blood from Other (Specify) Updated: 04/13/25 0202     Blood Culture No Growth After 36 Hours    Fungus culture [[id number]]  (Normal) Collected: 04/03/25 1816    Order Status: Completed Specimen: Wound from Arm, Left Updated: 04/10/25 2300     Fungal Culture No Fungus Isolated at 1 Week    Fungus culture [[id number]]  (Normal) Collected: 04/03/25 1934    Order Status: Completed Specimen: Tissue from AV Fistula, Left Updated: 04/10/25 2300     Fungal Culture No Fungus Isolated at 1 Week    Fungus culture [[id number]]  (Normal) Collected: 04/03/25 2011    Order Status: Completed Specimen: Wound from Arm, Left Updated: 04/10/25 2300     Fungal Culture No Fungus Isolated at 1 Week    Fungus culture [[id number]]  (Normal) Collected: 04/03/25 1904    Order Status: Completed Specimen: Tissue from AV Fistula, Left Updated: 04/10/25 2201     Fungal Culture No Fungus " Isolated at 1 Week    Fungus culture [[id number]]  (Normal) Collected: 04/03/25 1921    Order Status: Completed Specimen: Tissue from hematoma Updated: 04/10/25 2201     Fungal Culture No Fungus Isolated at 1 Week    Blood culture [[id number]]  (Normal) Collected: 04/04/25 1044    Order Status: Completed Specimen: Blood Updated: 04/09/25 1100     Blood Culture No Growth After 5 Days    Blood culture [[id number]]  (Abnormal) Collected: 04/06/25 0555    Order Status: Completed Specimen: Blood Updated: 04/09/25 0720     Blood Culture Positive - Aerobic/Pediatric Bottle      Methicillin resistant Staphylococcus aureus     Comment: <null> has been updated to reportable.        GRAM STAIN Gram positive cocci in clusters resembling Staph     Comment: Aerobic Bottle Positive   This is an appended report. These results have been appended to a previously preliminary verified report.       Narrative:      For susceptibility refer to order [id number]  ID Consult Strongly Recommended    Blood culture [[id number]]  (Abnormal)  (Susceptibility) Collected: 04/06/25 0542    Order Status: Completed Specimen: Blood Updated: 04/09/25 0719     Blood Culture Positive - Aerobic/Pediatric Bottle      Methicillin resistant Staphylococcus aureus     Comment: <null> has been updated to reportable.        GRAM STAIN Gram positive cocci in clusters resembling Staph     Comment: This is an appended report. These results have been appended to a previously preliminary verified report.       Narrative:      Aerobic Bottle Positive   ID Consult Strongly Recommended    Afb Culture Stain [[id number]] Collected: 04/03/25 1943    Order Status: Completed Specimen: Wound from Arm, Left Updated: 04/07/25 1637     ACID FAST STAIN  No acid fast bacilli seen    Afb Culture Stain [[id number]] Collected: 04/03/25 2011    Order Status: Completed Specimen: Wound from Arm, Left Updated: 04/07/25 1637     ACID FAST STAIN  No acid fast bacilli seen    Afb  Culture Stain [[id number]] Collected: 04/03/25 1905    Order Status: Completed Specimen: Tissue from AV Fistula, Left Updated: 04/07/25 1623     ACID FAST STAIN  No acid fast bacilli seen    Afb Culture Stain [[id number]] Collected: 04/03/25 1954    Order Status: Completed Specimen: Wound from Arm, Left Updated: 04/07/25 1623     ACID FAST STAIN  No acid fast bacilli seen    Afb Culture Stain [[id number]] Collected: 04/03/25 1816    Order Status: Completed Specimen: Tissue from AV Fistula, Left Updated: 04/07/25 1623     ACID FAST STAIN  No acid fast bacilli seen    Afb Culture Stain [[id number]] Collected: 04/03/25 1921    Order Status: Completed Specimen: Tissue from hematoma Updated: 04/07/25 1623     ACID FAST STAIN  No acid fast bacilli seen    Afb Culture Stain [[id number]] Collected: 04/03/25 1934    Order Status: Completed Specimen: Tissue from AV Fistula, Left Updated: 04/07/25 1618     ACID FAST STAIN  No acid fast bacilli seen    Fungus culture [[id number]]  (Normal) Collected: 04/03/25 1943    Order Status: Completed Specimen: Wound from Arm, Left Updated: 04/07/25 0935     Fungal Culture Culture In Progress    Fungus culture [[id number]]  (Normal) Collected: 04/03/25 1954    Order Status: Completed Specimen: Wound from Arm, Left Updated: 04/07/25 0935     Fungal Culture Culture In Progress    AFB Culture & Smear [[id number]] Collected: 04/03/25 1816    Order Status: Completed Specimen: Tissue from AV Fistula, Left Updated: 04/07/25 0815     CULTURE, AFB  Culture In Progress    AFB Culture & Smear [[id number]] Collected: 04/03/25 1905    Order Status: Completed Specimen: Tissue from AV Fistula, Left Updated: 04/07/25 0815     CULTURE, AFB  Culture In Progress    AFB Culture & Smear [[id number]] Collected: 04/03/25 1921    Order Status: Completed Specimen: Tissue from hematoma Updated: 04/07/25 0815     CULTURE, AFB  Culture In Progress    AFB Culture & Smear [[id number]] Collected: 04/03/25 1934    Order  Status: Completed Specimen: Tissue from AV Fistula, Left Updated: 04/07/25 0815     CULTURE, AFB  Culture In Progress    AFB Culture & Smear [[id number]] Collected: 04/03/25 1943    Order Status: Completed Specimen: Wound from Arm, Left Updated: 04/07/25 0815     CULTURE, AFB  Culture In Progress    AFB Culture & Smear [[id number]] Collected: 04/03/25 1954    Order Status: Completed Specimen: Wound from Arm, Left Updated: 04/07/25 0815     CULTURE, AFB  Culture In Progress    AFB Culture & Smear [[id number]] Collected: 04/03/25 2011    Order Status: Completed Specimen: Wound from Arm, Left Updated: 04/07/25 0815     CULTURE, AFB  Culture In Progress    Culture, Anaerobic [[id number]] Collected: 04/03/25 2011    Order Status: Completed Specimen: Wound from Arm, Left Updated: 04/07/25 0748     Anaerobe Culture No Anaerobes Isolated    Culture, Anaerobic [[id number]] Collected: 04/03/25 1954    Order Status: Completed Specimen: Wound from Arm, Left Updated: 04/07/25 0747     Anaerobe Culture No Anaerobes Isolated    Culture, Anaerobic [[id number]] Collected: 04/03/25 1943    Order Status: Completed Specimen: Wound from Arm, Left Updated: 04/07/25 0747     Anaerobe Culture No Anaerobes Isolated    Culture, Anaerobic [[id number]] Collected: 04/03/25 1934    Order Status: Completed Specimen: Tissue from AV Fistula, Left Updated: 04/07/25 0746     Anaerobe Culture No Anaerobes Isolated    Culture, Anaerobic [[id number]] Collected: 04/03/25 1921    Order Status: Completed Specimen: Tissue from hematoma Updated: 04/07/25 0746     Anaerobe Culture No Anaerobes Isolated    Culture, Anaerobic [[id number]] Collected: 04/03/25 1910    Order Status: Completed Specimen: Tissue from AV Fistula, Left Updated: 04/07/25 0745     Anaerobe Culture No Anaerobes Isolated    Culture, Anaerobic [[id number]] Collected: 04/03/25 1816    Order Status: Completed Specimen: Wound from Arm, Left Updated: 04/07/25 0744     Anaerobe Culture No  Anaerobes Isolated            Significant Imaging: I have reviewed all pertinent imaging results/findings within the past 24 hours.

## 2025-04-13 NOTE — EICU
Intervention Initiated From:  [facility] / EICU    Jude intervened regarding:  Rounding (Video assessment)    Nurse Notified:  No    Doctor Notified:  No    VICU Night Rounds Checklist  24H Vital Sign Range:  Temp:  [97.5 °F (36.4 °C)-98.9 °F (37.2 °C)]   Pulse:  []   Resp:  [10-39]   BP: ()/(52-78)   SpO2:  [94 %-100 %]     Video rounds and LDA reconciliation

## 2025-04-13 NOTE — ASSESSMENT & PLAN NOTE
Patient's blood pressure range in the last 24 hours was: BP  Min: 77/52  Max: 131/73.The patient's inpatient anti-hypertensive regimen is listed below:  Current Antihypertensives       Plan  - admitted with shock  - now off vasopressors. Continue to hold BP Rx as BP is well controlled without it

## 2025-04-13 NOTE — ASSESSMENT & PLAN NOTE
Catheter in place, do not remove, requires cystoscopy to place dunaway  [facility] urology team will see pt tomorrow and decide when dunaway can be removed

## 2025-04-13 NOTE — ASSESSMENT & PLAN NOTE
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 Most recent hemoglobin and hematocrit are listed below.  Recent Labs     04/11/25  0312 04/12/25  0409 04/13/25  0456   HGB 8.6* 8.6* 8.8*   HCT 27.2* 27.5* 27.5*     Plan  - Monitor serial CBC: Daily and recheck now  - Transfuse PRBC if patient becomes hemodynamically unstable, symptomatic or H/H drops below 7/21.  - Patient has not received any PRBC transfusions to date  - Patient's anemia is currently stable

## 2025-04-13 NOTE — PLAN OF CARE
"Remains in ICU as boarder.  Pt yelling at start of shift; haldol given with little effect; dilaudid given X 1 and effective.  Pt cont to c/o itchy skin, lotion applied, but pt complains that "it hurts like alcohol was poured on it." Bladder spasm medication given X 1 and effective.  Urology MD at bedside this shift states to cont to keep CBI paused, flush dunaway as needed and bladder scans per protocol.  Stated utilize bladder spasm meds when pt yells about needing to pee.  May have to exchange dunaway at later time.  Pt stepped down in level of care this shift.  POC reviewed with pt and family; understanding expressed.     Problem: Adult Inpatient Plan of Care  Goal: Plan of Care Review  Outcome: Progressing  Goal: Patient-Specific Goal (Individualized)  Outcome: Progressing  Goal: Absence of Hospital-Acquired Illness or Injury  Outcome: Progressing  Goal: Optimal Comfort and Wellbeing  Outcome: Progressing  Goal: Readiness for Transition of Care  Outcome: Progressing     Problem: Diabetes Comorbidity  Goal: Blood Glucose Level Within Targeted Range  Outcome: Progressing     Problem: Sepsis/Septic Shock  Goal: Optimal Coping  Outcome: Progressing  Goal: Absence of Bleeding  Outcome: Progressing  Goal: Blood Glucose Level Within Targeted Range  Outcome: Progressing  Goal: Absence of Infection Signs and Symptoms  Outcome: Progressing  Goal: Optimal Nutrition Intake  Outcome: Progressing     Problem: Infection  Goal: Absence of Infection Signs and Symptoms  Outcome: Progressing     Problem: Fall Injury Risk  Goal: Absence of Fall and Fall-Related Injury  Outcome: Progressing     Problem: Skin Injury Risk Increased  Goal: Skin Health and Integrity  Outcome: Progressing     Problem: Wound  Goal: Optimal Coping  Outcome: Progressing  Goal: Optimal Functional Ability  Outcome: Progressing  Goal: Absence of Infection Signs and Symptoms  Outcome: Progressing  Goal: Improved Oral Intake  Outcome: Progressing  Goal: Optimal Pain " Control and Function  Outcome: Progressing  Goal: Skin Health and Integrity  Outcome: Progressing  Goal: Optimal Wound Healing  Outcome: Progressing     Problem: Coping Ineffective  Goal: Effective Coping  Outcome: Progressing     Problem: Urinary Elimination Management  Goal: Effective Urinary Elimination/Continence  Outcome: Progressing     Problem: Urinary Retention  Goal: Effective Urinary Elimination  Outcome: Progressing

## 2025-04-14 LAB
ABSOLUTE EOSINOPHIL (OHS): 0.38 K/UL
ABSOLUTE EOSINOPHIL (OHS): 0.4 K/UL
ABSOLUTE MONOCYTE (OHS): 0.86 K/UL (ref 0.3–1)
ABSOLUTE MONOCYTE (OHS): 0.91 K/UL (ref 0.3–1)
ABSOLUTE NEUTROPHIL COUNT (OHS): 11.77 K/UL (ref 1.8–7.7)
ABSOLUTE NEUTROPHIL COUNT (OHS): 9.96 K/UL (ref 1.8–7.7)
ALBUMIN SERPL BCP-MCNC: 2.4 G/DL (ref 3.5–5.2)
ALBUMIN SERPL BCP-MCNC: 2.5 G/DL (ref 3.5–5.2)
ALLENS TEST: ABNORMAL
ALP SERPL-CCNC: 58 UNIT/L (ref 40–150)
ALP SERPL-CCNC: 66 UNIT/L (ref 40–150)
ALT SERPL W/O P-5'-P-CCNC: 7 UNIT/L (ref 10–44)
ALT SERPL W/O P-5'-P-CCNC: 9 UNIT/L (ref 10–44)
ANION GAP (OHS): 13 MMOL/L (ref 8–16)
ANION GAP (OHS): 16 MMOL/L (ref 8–16)
AST SERPL-CCNC: 22 UNIT/L (ref 11–45)
AST SERPL-CCNC: 22 UNIT/L (ref 11–45)
BASOPHILS # BLD AUTO: 0.04 K/UL
BASOPHILS # BLD AUTO: 0.05 K/UL
BASOPHILS NFR BLD AUTO: 0.3 %
BASOPHILS NFR BLD AUTO: 0.4 %
BILIRUB SERPL-MCNC: 0.8 MG/DL (ref 0.1–1)
BILIRUB SERPL-MCNC: 0.8 MG/DL (ref 0.1–1)
BUN SERPL-MCNC: 19 MG/DL (ref 8–23)
BUN SERPL-MCNC: 47 MG/DL (ref 8–23)
CALCIUM SERPL-MCNC: 8 MG/DL (ref 8.7–10.5)
CALCIUM SERPL-MCNC: 8.7 MG/DL (ref 8.7–10.5)
CHLORIDE SERPL-SCNC: 100 MMOL/L (ref 95–110)
CHLORIDE SERPL-SCNC: 102 MMOL/L (ref 95–110)
CO2 SERPL-SCNC: 22 MMOL/L (ref 23–29)
CO2 SERPL-SCNC: 27 MMOL/L (ref 23–29)
CREAT SERPL-MCNC: 4.1 MG/DL (ref 0.5–1.4)
CREAT SERPL-MCNC: 7.8 MG/DL (ref 0.5–1.4)
DELSYS: ABNORMAL
ERYTHROCYTE [DISTWIDTH] IN BLOOD BY AUTOMATED COUNT: 16.2 % (ref 11.5–14.5)
ERYTHROCYTE [DISTWIDTH] IN BLOOD BY AUTOMATED COUNT: 16.3 % (ref 11.5–14.5)
FLOW: 4
GFR SERPLBLD CREATININE-BSD FMLA CKD-EPI: 13 ML/MIN/1.73/M2
GFR SERPLBLD CREATININE-BSD FMLA CKD-EPI: 6 ML/MIN/1.73/M2
GLUCOSE SERPL-MCNC: 111 MG/DL (ref 70–110)
GLUCOSE SERPL-MCNC: 169 MG/DL (ref 70–110)
HCO3 UR-SCNC: 27.4 MMOL/L (ref 24–28)
HCT VFR BLD AUTO: 25.2 % (ref 40–54)
HCT VFR BLD AUTO: 25.6 % (ref 40–54)
HGB BLD-MCNC: 8 GM/DL (ref 14–18)
HGB BLD-MCNC: 8.2 GM/DL (ref 14–18)
IMM GRANULOCYTES # BLD AUTO: 0.09 K/UL (ref 0–0.04)
IMM GRANULOCYTES # BLD AUTO: 0.1 K/UL (ref 0–0.04)
IMM GRANULOCYTES NFR BLD AUTO: 0.7 % (ref 0–0.5)
IMM GRANULOCYTES NFR BLD AUTO: 0.7 % (ref 0–0.5)
LACTATE SERPL-SCNC: 1.9 MMOL/L (ref 0.5–2.2)
LYMPHOCYTES # BLD AUTO: 0.7 K/UL (ref 1–4.8)
LYMPHOCYTES # BLD AUTO: 0.79 K/UL (ref 1–4.8)
MAGNESIUM SERPL-MCNC: 1.7 MG/DL (ref 1.6–2.6)
MCH RBC QN AUTO: 28.7 PG (ref 27–31)
MCH RBC QN AUTO: 29 PG (ref 27–31)
MCHC RBC AUTO-ENTMCNC: 31.7 G/DL (ref 32–36)
MCHC RBC AUTO-ENTMCNC: 32 G/DL (ref 32–36)
MCV RBC AUTO: 90 FL (ref 82–98)
MCV RBC AUTO: 91 FL (ref 82–98)
MODE: ABNORMAL
NUCLEATED RBC (/100WBC) (OHS): 0 /100 WBC
NUCLEATED RBC (/100WBC) (OHS): 0 /100 WBC
PCO2 BLDA: 37.5 MMHG (ref 35–45)
PH SMN: 7.47 [PH] (ref 7.35–7.45)
PHOSPHATE SERPL-MCNC: 2.8 MG/DL (ref 2.7–4.5)
PLATELET # BLD AUTO: 220 K/UL (ref 150–450)
PLATELET # BLD AUTO: 232 K/UL (ref 150–450)
PMV BLD AUTO: 10.9 FL (ref 9.2–12.9)
PMV BLD AUTO: 11.4 FL (ref 9.2–12.9)
PO2 BLDA: 33 MMHG (ref 40–60)
POC BE: 4 MMOL/L
POC SATURATED O2: 69 % (ref 95–100)
POC TCO2: 29 MMOL/L (ref 24–29)
POCT GLUCOSE: 159 MG/DL (ref 70–110)
POCT GLUCOSE: 181 MG/DL (ref 70–110)
POCT GLUCOSE: 187 MG/DL (ref 70–110)
POCT GLUCOSE: 99 MG/DL (ref 70–110)
POTASSIUM SERPL-SCNC: 3.5 MMOL/L (ref 3.5–5.1)
POTASSIUM SERPL-SCNC: 4 MMOL/L (ref 3.5–5.1)
PROT SERPL-MCNC: 6.9 GM/DL (ref 6–8.4)
PROT SERPL-MCNC: 7.3 GM/DL (ref 6–8.4)
RBC # BLD AUTO: 2.79 M/UL (ref 4.6–6.2)
RBC # BLD AUTO: 2.83 M/UL (ref 4.6–6.2)
RELATIVE EOSINOPHIL (OHS): 2.9 %
RELATIVE EOSINOPHIL (OHS): 3.1 %
RELATIVE LYMPHOCYTE (OHS): 5.7 % (ref 18–48)
RELATIVE LYMPHOCYTE (OHS): 5.8 % (ref 18–48)
RELATIVE MONOCYTE (OHS): 6.2 % (ref 4–15)
RELATIVE MONOCYTE (OHS): 7.5 % (ref 4–15)
RELATIVE NEUTROPHIL (OHS): 82.5 % (ref 38–73)
RELATIVE NEUTROPHIL (OHS): 84.2 % (ref 38–73)
SAMPLE: ABNORMAL
SITE: ABNORMAL
SODIUM SERPL-SCNC: 140 MMOL/L (ref 136–145)
SODIUM SERPL-SCNC: 140 MMOL/L (ref 136–145)
SP02: 100
WBC # BLD AUTO: 12.09 K/UL (ref 3.9–12.7)
WBC # BLD AUTO: 13.96 K/UL (ref 3.9–12.7)

## 2025-04-14 PROCEDURE — 63600175 PHARM REV CODE 636 W HCPCS: Performed by: INTERNAL MEDICINE

## 2025-04-14 PROCEDURE — 80053 COMPREHEN METABOLIC PANEL: CPT | Performed by: HOSPITALIST

## 2025-04-14 PROCEDURE — 25000003 PHARM REV CODE 250: Performed by: UROLOGY

## 2025-04-14 PROCEDURE — 36415 COLL VENOUS BLD VENIPUNCTURE: CPT | Performed by: STUDENT IN AN ORGANIZED HEALTH CARE EDUCATION/TRAINING PROGRAM

## 2025-04-14 PROCEDURE — 0JH63XZ INSERTION OF TUNNELED VASCULAR ACCESS DEVICE INTO CHEST SUBCUTANEOUS TISSUE AND FASCIA, PERCUTANEOUS APPROACH: ICD-10-PCS | Performed by: INTERNAL MEDICINE

## 2025-04-14 PROCEDURE — 63600175 PHARM REV CODE 636 W HCPCS: Performed by: HOSPITALIST

## 2025-04-14 PROCEDURE — 99232 SBSQ HOSP IP/OBS MODERATE 35: CPT | Mod: ,,, | Performed by: STUDENT IN AN ORGANIZED HEALTH CARE EDUCATION/TRAINING PROGRAM

## 2025-04-14 PROCEDURE — 80053 COMPREHEN METABOLIC PANEL: CPT | Performed by: STUDENT IN AN ORGANIZED HEALTH CARE EDUCATION/TRAINING PROGRAM

## 2025-04-14 PROCEDURE — 85025 COMPLETE CBC W/AUTO DIFF WBC: CPT | Performed by: STUDENT IN AN ORGANIZED HEALTH CARE EDUCATION/TRAINING PROGRAM

## 2025-04-14 PROCEDURE — 83605 ASSAY OF LACTIC ACID: CPT | Performed by: STUDENT IN AN ORGANIZED HEALTH CARE EDUCATION/TRAINING PROGRAM

## 2025-04-14 PROCEDURE — 90935 HEMODIALYSIS ONE EVALUATION: CPT

## 2025-04-14 PROCEDURE — 83735 ASSAY OF MAGNESIUM: CPT | Performed by: STUDENT IN AN ORGANIZED HEALTH CARE EDUCATION/TRAINING PROGRAM

## 2025-04-14 PROCEDURE — 63600175 PHARM REV CODE 636 W HCPCS: Performed by: REGISTERED NURSE

## 2025-04-14 PROCEDURE — 27100108

## 2025-04-14 PROCEDURE — 63600175 PHARM REV CODE 636 W HCPCS: Performed by: STUDENT IN AN ORGANIZED HEALTH CARE EDUCATION/TRAINING PROGRAM

## 2025-04-14 PROCEDURE — 84100 ASSAY OF PHOSPHORUS: CPT | Performed by: STUDENT IN AN ORGANIZED HEALTH CARE EDUCATION/TRAINING PROGRAM

## 2025-04-14 PROCEDURE — 25000003 PHARM REV CODE 250: Performed by: STUDENT IN AN ORGANIZED HEALTH CARE EDUCATION/TRAINING PROGRAM

## 2025-04-14 PROCEDURE — 25000003 PHARM REV CODE 250: Performed by: HOSPITALIST

## 2025-04-14 PROCEDURE — 27000207 HC ISOLATION

## 2025-04-14 PROCEDURE — 85025 COMPLETE CBC W/AUTO DIFF WBC: CPT | Performed by: HOSPITALIST

## 2025-04-14 PROCEDURE — 82803 BLOOD GASES ANY COMBINATION: CPT

## 2025-04-14 PROCEDURE — A4216 STERILE WATER/SALINE, 10 ML: HCPCS | Performed by: HOSPITALIST

## 2025-04-14 PROCEDURE — 94761 N-INVAS EAR/PLS OXIMETRY MLT: CPT | Mod: XB

## 2025-04-14 PROCEDURE — 02H633Z INSERTION OF INFUSION DEVICE INTO RIGHT ATRIUM, PERCUTANEOUS APPROACH: ICD-10-PCS | Performed by: INTERNAL MEDICINE

## 2025-04-14 PROCEDURE — 99900035 HC TECH TIME PER 15 MIN (STAT)

## 2025-04-14 PROCEDURE — 36415 COLL VENOUS BLD VENIPUNCTURE: CPT | Performed by: HOSPITALIST

## 2025-04-14 PROCEDURE — 11000001 HC ACUTE MED/SURG PRIVATE ROOM

## 2025-04-14 RX ORDER — SODIUM CHLORIDE 9 MG/ML
INJECTION, SOLUTION INTRAVENOUS ONCE
Status: CANCELLED | OUTPATIENT
Start: 2025-04-14 | End: 2025-04-14

## 2025-04-14 RX ORDER — FENTANYL CITRATE 50 UG/ML
INJECTION, SOLUTION INTRAMUSCULAR; INTRAVENOUS
Status: COMPLETED | OUTPATIENT
Start: 2025-04-14 | End: 2025-04-14

## 2025-04-14 RX ORDER — CEFAZOLIN SODIUM 1 G/3ML
INJECTION, POWDER, FOR SOLUTION INTRAMUSCULAR; INTRAVENOUS
Status: COMPLETED | OUTPATIENT
Start: 2025-04-14 | End: 2025-04-14

## 2025-04-14 RX ORDER — LIDOCAINE HYDROCHLORIDE 10 MG/ML
INJECTION, SOLUTION INFILTRATION; PERINEURAL
Status: COMPLETED | OUTPATIENT
Start: 2025-04-14 | End: 2025-04-14

## 2025-04-14 RX ADMIN — ERYTHROMYCIN: 5 OINTMENT OPHTHALMIC at 10:04

## 2025-04-14 RX ADMIN — ATORVASTATIN CALCIUM 80 MG: 40 TABLET, FILM COATED ORAL at 10:04

## 2025-04-14 RX ADMIN — HEPARIN SODIUM 3200 UNITS: 1000 INJECTION INTRAVENOUS; SUBCUTANEOUS at 10:04

## 2025-04-14 RX ADMIN — LIDOCAINE HYDROCHLORIDE 6 ML: 10 INJECTION, SOLUTION INFILTRATION; PERINEURAL at 10:04

## 2025-04-14 RX ADMIN — CLOPIDOGREL BISULFATE 75 MG: 75 TABLET, FILM COATED ORAL at 08:04

## 2025-04-14 RX ADMIN — LIDOCAINE HYDROCHLORIDE 5 ML: 10 INJECTION, SOLUTION INFILTRATION; PERINEURAL at 10:04

## 2025-04-14 RX ADMIN — MIDODRINE HYDROCHLORIDE 15 MG: 5 TABLET ORAL at 10:04

## 2025-04-14 RX ADMIN — INSULIN GLARGINE 5 UNITS: 100 INJECTION, SOLUTION SUBCUTANEOUS at 08:04

## 2025-04-14 RX ADMIN — ERYTHROMYCIN: 5 OINTMENT OPHTHALMIC at 02:04

## 2025-04-14 RX ADMIN — PANTOPRAZOLE SODIUM 40 MG: 40 TABLET, DELAYED RELEASE ORAL at 08:04

## 2025-04-14 RX ADMIN — SODIUM CHLORIDE, PRESERVATIVE FREE 10 ML: 5 INJECTION INTRAVENOUS at 02:04

## 2025-04-14 RX ADMIN — FENTANYL CITRATE 50 MCG: 50 INJECTION, SOLUTION INTRAMUSCULAR; INTRAVENOUS at 10:04

## 2025-04-14 RX ADMIN — HALOPERIDOL LACTATE 2 MG: 5 INJECTION, SOLUTION INTRAMUSCULAR at 12:04

## 2025-04-14 RX ADMIN — HYOSCYAMINE SULFATE 0.12 MG: 0.12 TABLET SUBLINGUAL at 11:04

## 2025-04-14 RX ADMIN — HYDROMORPHONE HYDROCHLORIDE 1 MG: 1 INJECTION, SOLUTION INTRAMUSCULAR; INTRAVENOUS; SUBCUTANEOUS at 12:04

## 2025-04-14 RX ADMIN — HYOSCYAMINE SULFATE 0.12 MG: 0.12 TABLET SUBLINGUAL at 02:04

## 2025-04-14 RX ADMIN — HYDROMORPHONE HYDROCHLORIDE 1 MG: 1 INJECTION, SOLUTION INTRAMUSCULAR; INTRAVENOUS; SUBCUTANEOUS at 03:04

## 2025-04-14 RX ADMIN — MIDODRINE HYDROCHLORIDE 15 MG: 5 TABLET ORAL at 02:04

## 2025-04-14 RX ADMIN — MIDODRINE HYDROCHLORIDE 15 MG: 5 TABLET ORAL at 06:04

## 2025-04-14 RX ADMIN — CEFAZOLIN SODIUM 1 G: 1 POWDER, FOR SOLUTION INTRAMUSCULAR; INTRAVENOUS at 09:04

## 2025-04-14 RX ADMIN — ERYTHROMYCIN 1 INCH: 5 OINTMENT OPHTHALMIC at 06:04

## 2025-04-14 RX ADMIN — TAMSULOSIN HYDROCHLORIDE 0.4 MG: 0.4 CAPSULE ORAL at 08:04

## 2025-04-14 RX ADMIN — SODIUM CHLORIDE, PRESERVATIVE FREE 10 ML: 5 INJECTION INTRAVENOUS at 06:04

## 2025-04-14 RX ADMIN — NALXONE HYDROCHLORIDE 0.02 MG: 0.4 INJECTION INTRAMUSCULAR; INTRAVENOUS; SUBCUTANEOUS at 06:04

## 2025-04-14 NOTE — ASSESSMENT & PLAN NOTE
ESRD on HD     HD TTS, Bacilio Betancourt AVG resection 4/3    TDC right chest wall 4/14/2025    Plan/Recommendation  HD today and tomorrow-continue TTS schedule unless otherwise needed  -Keep MAP > 65  -Keep hemoglobin > 7  -Strict ins and outs  -Avoid nephrotoxic agents if possible and renally dose medications  -Avoid drastic hemodynamic changes if possible    #Secondary hyperparathyroidism  Calcium   Date Value Ref Range Status   04/14/2025 8.7 8.7 - 10.5 mg/dL Final     Phosphorus Level   Date Value Ref Range Status   04/12/2025 3.6 2.7 - 4.5 mg/dL Final     PTH, Intact   Date Value Ref Range Status   01/17/2024 117.7 (H) 9.0 - 77.0 pg/mL Final   Continue to monitor

## 2025-04-14 NOTE — NURSING
[facility], [facility]  Nurses Note -- 4 Eyes      4/13/2025       Skin assessed on: Q Shift      [x] No Pressure Injuries Present    [x]Prevention Measures Documented    [] Yes LDA  for Pressure Injury Previously documented     [] Yes New Pressure Injury Discovered   [] LDA for New Pressure Injury Added      Attending RN:  Xiao Arango RN     Second RN:  Asuncion Mendoza RN

## 2025-04-14 NOTE — PROGRESS NOTES
"[facility]  Progress Note    Patient Name: Jevon Rajan  MRN: [id number]  Patient Class: IP- Inpatient   Admission Date: 4/3/2025  Length of Stay: 11 days  Attending Physician: Tena Palma MD  Primary Care Provider: Melia, Primary Doctor        Subjective     Principal Problem:Septic shock        HPI:  Mr Jevon Rajan is a 85 y.o. man with ESRD with LUE AVF, HFpEF last EF 50-55%, CAD, DM who was transferred to [facility] ICU for septic shock due to MRSA bacteremia.     He was admitted at [facility] 4/1-3/2025 with sepsis, worsening to septic shock, then identified source as MRSA. 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     Upon arrival to [facility], he is moaning in pain and his LUE AVF has active pulsatile bright red blood. He does respond to his name. He denies pain anywhere other than his LUE. He is on levophed at 0.05.     Overview/Hospital Course:  Mr Jevon Rajan is a 85 y.o. man with ESRD on HD with LUE AVF that has been bleeding, CHF, CAD s/p recent stenting 1/2025 who was admitted with MRSA bacteremia and bleeding from his LUE AVF. Continued vancomycin; weaned off levophed. Vascular surgery emergently consulted; on 4/3/25 they took him to OR and noted: "well incorporated proximal and central graft without evidence of infection, resected graft and covered proximal and central remnants with multiple layers. Mid graft removed in entirety and sent for culture. Ruptured pseudoaneurysm with infected hematoma, graft completely obliterated at mid segment." Surgical cultures with Staph. Suspect AVF or chronic scratching of pruritic skin is the source of his infection. TTE showed endocarditis: EF 40-45%, G1DD, RV systolic dysfunction, large 1.9x1.2cm fixed mass on the posterior mitral valve leaflet with moder to severe regurgitation, cannot rule out posterior " mitral valve leaflet perforation. Discussed with cardiac surgery; he is high mortality risk, and surgery is not advised. ID following. Nephrology consulted; trialysis was placed for HD. Persistently positive for MRSA in blood cultures. He has had urinary retention; Urology consulted, performed bedside cystoscopy on 4/6/25 with large prostate noted. Noted to have clot retention and went urgently to OR with Urology on 4/7/25; asa and DVT ppx held.     WBC normalized. S/p clot removal with Urology, 1U PRBC ordered this morning with Hb 6.1. Soft BPs, will add midodrine for HD to step down to floor. Glucoses high, will increase insulin. Attempted to s/d but BP too low, may still need CRRT rather. 4/8 cx clear so far. Increasing insulin again. Increasing midodrine to 15 mg TID. Hb 6.8, 1U PRBC ordered.     BP appears to be recovering a bit, perhaps will tolerate reg HD, will discuss w Neph- will run him on HD Saturday, if tolerates normal HD will remove central line and give 2 day holiday and place tunnel on Monday. We will give him HD before dc to facility on Tuesday if accepted by then.     WBC has normalized for the first time, now tolerating reg HD, and Blcx remain clear. Central line removed. NGT removed. Will step down.     Interval History:  No acute event overnight.  Patient seen in bed receiving HD today.  Minimally interactive.  Tunneled HD cath on 04/14    Review of Systems  Objective:     Vital Signs (Most Recent):  Temp: 98.1 °F (36.7 °C) (04/14/25 1501)  Pulse: 101 (04/14/25 1630)  Resp: (!) 23 (04/14/25 1630)  BP: 118/61 (04/14/25 1630)  SpO2: 100 % (04/14/25 1630) Vital Signs (24h Range):  Temp:  [97.1 °F (36.2 °C)-98.7 °F (37.1 °C)] 98.1 °F (36.7 °C)  Pulse:  [] 101  Resp:  [12-36] 23  SpO2:  [94 %-100 %] 100 %  BP: ()/(51-68) 118/61     Weight: 61.2 kg (134 lb 14.7 oz)  Body mass index is 21.13 kg/m².    Intake/Output Summary (Last 24 hours) at 4/14/2025 1727  Last data filed at 4/14/2025  1449  Gross per 24 hour   Intake 10 ml   Output 50 ml   Net -40 ml         Physical Exam  Constitutional:       General: He is not in acute distress.     Appearance: He is ill-appearing. He is not toxic-appearing or diaphoretic.   Cardiovascular:      Rate and Rhythm: Normal rate and regular rhythm.   Pulmonary:      Effort: Pulmonary effort is normal.      Breath sounds: Normal breath sounds.   Abdominal:      General: Bowel sounds are normal.      Palpations: Abdomen is soft.   Neurological:      Mental Status: He is disoriented.               Significant Labs: All pertinent labs within the past 24 hours have been reviewed.    Significant Imaging: I have reviewed all pertinent imaging results/findings within the past 24 hours.      Assessment & Plan  Septic shock  This patient has shock. The type of shock is distributive due to sepsis. The patient had the following evidence of shock: persistent hypotension and altered mental status. 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 Shock is now resolved and vasopressors are off. WBC back to normal  HTN (hypertension)  Patient's blood pressure range in the last 24 hours was: BP  Min: 90/51  Max: 130/62.The patient's inpatient anti-hypertensive regimen is listed below:  Current Antihypertensives       Plan  - admitted with shock  - now off vasopressors. Continue to hold BP Rx as BP is well controlled without it   HLD (hyperlipidemia)  - continue statin  Type 2 diabetes mellitus with hyperglycemia, without long-term current use of insulin  A1c:   Lab Results   Component Value Date    HGBA1C 5.7 (H) 04/02/2025     Meds: lantus + SSI PRN to maintain  "goal 140-180  Tube feeds  accuchecks, hypoglycemic protocol      Coronary artery disease involving native coronary artery of native heart without angina pectoris  Patient with known CAD s/p stent placement, which is controlled Will continue ASA, Plavix, and Statin and monitor for S/Sx of angina/ACS. Continue to monitor on telemetry.   - last [facility] was 1/2025: Severely calcified mid RCA stenosis unable to cross with PTCA balloons, treated initially with 0.9 laser atherectomy, followed by CSI atherectomy system with total of 5 runs (3 at low speed, 2 at high speed), pre-dilated using 2.0 compliant and 3.5 noncompliant balloon followed by 3.5 X 16 mm megatron stent.  Focal plaque rupture/erosion noted in the proximal and ostial RCA that were treated with  3.5 X 28 in the proximal RCA, 4.0 X 16 mm in the ostial RCA.  No residual stenosis post intervention.  Patient had ALAN 3 flow at the end of the procedure  - with elevated troponin likely due to septic shock  Recent Labs   Lab 04/12/25  1216   TROPONINI 0.811*     - continue asa, plavix, statin  - Cardiology consulted  - no plans for ischemic evaluation at this time   ESRD on hemodialysis  - ESRD on HD via LUE AVF  - LUE AVF was actively bleeding on arrival on 4/3/25. Vascular surgery was consulted. He went emergently to the OR on 4/3/25: "Severely calcified mid RCA stenosis unable to cross with PTCA balloons, treated initially with 0.9 laser atherectomy, followed by CSI atherectomy system with total of 5 runs (3 at low speed, 2 at high speed), pre-dilated using 2.0 compliant and 3.5 noncompliant balloon followed by 3.5 X 16 mm megatron stent.  Focal plaque rupture/erosion noted in the proximal and ostial RCA that were treated with  3.5 X 28 in the proximal RCA, 4.0 X 16 mm in the ostial RCA.  No residual stenosis post intervention.  Patient had ALAN 3 flow at the end of the procedure"  - cultures from AVF= Staph   - wound care orders in place for surgical site  - " "Nephrology is consulted  - trialysis placed on 4/4/25 for CRRT  - he will need THDC when bacteremia is resolved   Anemia in ESRD (end-stage renal disease)  Anemia is likely due to  ESRD, blood loss . Most recent hemoglobin and hematocrit are listed below.  Recent Labs     04/12/25  0409 04/13/25  0456 04/14/25  0447   HGB 8.6* 8.8* 8.0*   HCT 27.5* 27.5* 25.2*     Plan  - Monitor serial CBC: Daily and recheck now  - Transfuse PRBC if patient becomes hemodynamically unstable, symptomatic or H/H drops below 7/21.  - Patient has not received any PRBC transfusions to date  - Patient's anemia is currently stable  Acute metabolic encephalopathy  - he is oriented to self but otherwise confused  - CTH 4/1/25 with no acute process  - suspect this is due to sepsis  - NGT placed on 4/4/25 so that he could get his asa/plavix  - Diet advanced on 04/14    ACP (advance care planning)  Advance Care Planning     Date: 04/04/2025  - palliative consulted   - Mr Escoto specifically told Dr Jordan to contact Kenia Smyth for all updates  - discussed code status with Kenia. She states she has spoken with Mr Escoto's sisters and they all want full code status.          Acute bacterial endocarditis  - TTE 4/4/25: "1.9x1.2cm large fixed heterogeneous mass present on the posterior leaflet (new finding vs 9/2023 echo). There is moderate to severe regurgitation with an eccentric jet. Cannot exclude severe MR with possible PMVL perforation in association with large vegetation."  - this is in the setting of MRSA bacteremia  - ID is consulted  - repeat blood cultures until clear   - suspect source is skin/chronic scratching vs AVF infection- cultures from resected AVF with Staph   - discussed with Cardiac surgery Dr Castro 4/4/25- given age and comorbidities, he is very high risk of mortality with surgery. He recommends medical management at this point.  - on vancomycin      MRSA bacteremia  - suspect source:  Graft infection s/p " "exploratory surgery of LUE with graft resection 4/3  -graft holiday with TDC placed on 04/14  - cultures of AVF with Staph   - he has endocarditis  - ID consulted  - on vanc ;anticipating completing 6 weeks of IV vancomycin from clearance (EOC: 5/19/25)     NSTEMI (non-ST elevated myocardial infarction)  - see CAD    BPH with urinary obstruction  - noted 4/6/25 when patient became very agitated and screaming he couldn't urinate  - nursing unable to place dunaway/coude  - Urology consulted. Bedside cystoscopy on 4/6/25 noted clots, large prostate. Catheter was placed but was then removed due to pain  - 4/7 he is again agitated that he cannot urinate  - follow up with Urology   - tamsulosin ordered if he is awake enough to swallow     Thrombocytopenia  The likely etiology of thrombocytopenia is infection and sepsis. The patients 3 most recent labs are listed below.  Recent Labs     04/12/25  0409 04/13/25  0456 04/14/25  0447    210 232     Plan  - Will transfuse if platelet count is <10k.    AV fistula infection  - LUE AVF was actively bleeding on arrival on 4/3/25. Vascular surgery was consulted. He went emergently to the OR on 4/3/25: "Severely calcified mid RCA stenosis unable to cross with PTCA balloons, treated initially with 0.9 laser atherectomy, followed by CSI atherectomy system with total of 5 runs (3 at low speed, 2 at high speed), pre-dilated using 2.0 compliant and 3.5 noncompliant balloon followed by 3.5 X 16 mm megatron stent.  Focal plaque rupture/erosion noted in the proximal and ostial RCA that were treated with  3.5 X 28 in the proximal RCA, 4.0 X 16 mm in the ostial RCA.  No residual stenosis post intervention.  Patient had ALAN 3 flow at the end of the procedure"  - cultures from AVF= Staph   - wound care orders in place for surgical site  - trialysis currently in place for HD    Emphysema lung  - emphysema noted on CT  - no signs of COPD exacerbation  Pulmonary nodules  - CT 4/3/25 with few " small pulmonary nodules  - will need outpatient follow up     Gross hematuria  S/p clot evacuation with a fulguration by Urology  Currently resolved  Continue cap irrigation of 3 way catheter  Continue Levsin for bladder discomfort    NSVT (nonsustained ventricular tachycardia)      VTE Risk Mitigation (From admission, onward)           Ordered     heparin (porcine) injection 1,000 Units  As needed (PRN)         04/05/25 2100     IP VTE HIGH RISK PATIENT  Once         04/03/25 1516     Place TEMO hose  Until discontinued         04/03/25 1516     Place sequential compression device  Until discontinued         04/03/25 1516     Reason for No Pharmacological VTE Prophylaxis  Once        Question:  Reasons:  Answer:  Physician Provided (leave comment)    04/03/25 1516                    Discharge Planning   BAYRON: 4/15/2025     Code Status: Full Code   Medical Readiness for Discharge Date:   Discharge Plan A:  (tbd)            Critical care time spent on the evaluation and treatment of severe organ dysfunction, review of pertinent labs and imaging studies, discussions with consulting providers and discussions with patient/family: more than 35 minutes.            Tena Palma MD  Department of Hospital Medicine   [facility] - Intensive Care

## 2025-04-14 NOTE — PROCEDURES
VIR procedure note      Pre Op Diagnosis: ESRD  Post Op Diagnosis: Same    Procedure: Tunneled HD cath placement    Procedure performed by:  William Pop MD     Written Informed Consent Obtained: Yes  Specimen Removed: No  Estimated Blood Loss: Minimal    Findings:     Successful placement of dual lumen 14.5 Fr tunneled HD catheter with the tip in the right atrium.      Plan:    Catheter can be used immediately.      William Pop MD  VIR

## 2025-04-14 NOTE — SUBJECTIVE & OBJECTIVE
Interval History: tolerating dunaway    Review of Systems   Constitutional:  Negative for activity change, appetite change and fever.   HENT:  Negative for congestion and rhinorrhea.    Eyes:  Negative for visual disturbance.   Respiratory:  Negative for choking and shortness of breath.    Genitourinary:  Positive for difficulty urinating. Negative for dysuria, flank pain, hematuria, scrotal swelling, testicular pain and urgency.     Objective:     Temp:  [97.1 °F (36.2 °C)-98.9 °F (37.2 °C)] 98.4 °F (36.9 °C)  Pulse:  [] 97  Resp:  [12-36] 23  SpO2:  [94 %-100 %] 98 %  BP: ()/(51-68) 104/54     Body mass index is 21.13 kg/m².      Bladder Scan Volume (mL): 331 mL (04/06/25 1520)    Drains       Drain  Duration                  Urethral Catheter 04/07/25 1558 Triple-lumen;Latex 24 Fr. 6 days         Hemodialysis Catheter 04/14/25 1011 right internal jugular <1 day                     Physical Exam  Constitutional:       General: He is not in acute distress.     Appearance: He is well-developed. He is not ill-appearing, toxic-appearing or diaphoretic.   HENT:      Head: Normocephalic and atraumatic.      Mouth/Throat:      Mouth: Mucous membranes are moist.   Eyes:      Conjunctiva/sclera: Conjunctivae normal.   Pulmonary:      Effort: Pulmonary effort is normal. No respiratory distress.   Abdominal:      General: Abdomen is flat. There is no distension.      Palpations: There is no mass.   Genitourinary:     Comments: Dunaway nikki colored urine  Musculoskeletal:         General: No swelling or deformity.      Cervical back: Neck supple.   Skin:     Findings: No rash.   Neurological:      Mental Status: He is alert. Mental status is at baseline.   Psychiatric:         Mood and Affect: Mood normal.         Thought Content: Thought content normal.         Judgment: Judgment normal.           Significant Labs:    BMP:  Recent Labs   Lab 04/12/25  0409 04/13/25  0456 04/14/25  0447    139 140   K 3.7 4.2  4.0    102 102   CO2 19* 23 22*   BUN 50* 31* 47*   CREATININE 7.2* 5.6* 7.8*   GLUCOSE 104 160* 169*   CALCIUM 9.1 8.9 8.7       CBC:   Recent Labs   Lab 04/12/25  0409 04/13/25  0456 04/14/25  0447   WBC 15.78* 11.07 12.09   HGB 8.6* 8.8* 8.0*   HCT 27.5* 27.5* 25.2*    210 232

## 2025-04-14 NOTE — ASSESSMENT & PLAN NOTE
S/p Clot evacuation with fulguration  Resolved  Cap Irrigation port of 3 way catheter  Levsin PRN for bladder discomfort

## 2025-04-14 NOTE — NURSING
[facility] [place]  Nurses Note -- 4 Eyes      4/14/2025       Skin assessed on: Q Shift      [x] No Pressure Injuries Present    [x]Prevention Measures Documented    [] Yes LDA  for Pressure Injury Previously documented     [] Yes New Pressure Injury Discovered   [] LDA for New Pressure Injury Added      Attending RN:  Elaine ZARCO RN     Second RN:  CHARLENE Hagen

## 2025-04-14 NOTE — PLAN OF CARE
Problem: Diabetes Comorbidity  Goal: Blood Glucose Level Within Targeted Range  Outcome: Progressing     Problem: Sepsis/Septic Shock  Goal: Optimal Coping  Outcome: Progressing  Goal: Absence of Bleeding  Outcome: Progressing  Goal: Blood Glucose Level Within Targeted Range  Outcome: Progressing  Goal: Absence of Infection Signs and Symptoms  Outcome: Progressing  Goal: Optimal Nutrition Intake  Outcome: Progressing

## 2025-04-14 NOTE — CARE UPDATE
Patient remains encephalopathic and unable to provide procedural consent.   Called [facility] listed in chart Deonna Smyth. Voicemail left with call back number for IR dept. Requested call back to discuss/obtain consent for THDC this am.       Sheila Dillon NP  Interventional Radiology

## 2025-04-14 NOTE — ASSESSMENT & PLAN NOTE
Catheter in place, do not remove, requires cystoscopy to place dunaway  When patient out of ICU okay to remove dunaway for voiding trial, early in the AM preferred in case patient will need to be brought back to the operating room for dunaway placement

## 2025-04-14 NOTE — ASSESSMENT & PLAN NOTE
Patient's blood pressure range in the last 24 hours was: BP  Min: 90/51  Max: 130/62.The patient's inpatient anti-hypertensive regimen is listed below:  Current Antihypertensives       Plan  - admitted with shock  - now off vasopressors. Continue to hold BP Rx as BP is well controlled without it

## 2025-04-14 NOTE — ASSESSMENT & PLAN NOTE
- he is oriented to self but otherwise confused  - CTH 4/1/25 with no acute process  - suspect this is due to sepsis  - NGT placed on 4/4/25 so that he could get his asa/plavix  - Diet advanced on 04/14

## 2025-04-14 NOTE — SUBJECTIVE & OBJECTIVE
Interval History: TDC placed this AM, plan for HD today and tomorrow      Review of patient's allergies indicates:   Allergen Reactions    Ace inhibitors Hives, Itching, Shortness Of Breath, Other (See Comments) and Rash     Is not sure which medication it was    Captopril      Current Facility-Administered Medications   Medication Frequency    0.9%  NaCl infusion (for blood administration) Q24H PRN    0.9% NaCl infusion PRN    acetaminophen tablet 650 mg Q4H PRN    albumin human 25% bottle 25 g Daily PRN    atorvastatin tablet 80 mg QHS    clopidogreL tablet 75 mg Daily    dextrose 50% injection 12.5 g PRN    dextrose 50% injection 25 g PRN    diphenhydrAMINE capsule 50 mg Q6H PRN    epoetin mj-epbx injection 10,000 Units Every Tues, Thurs, Sat    erythromycin 5 mg/gram (0.5 %) ophthalmic ointment Q8H    glucagon (human recombinant) injection 1 mg PRN    glucose chewable tablet 16 g PRN    glucose chewable tablet 24 g PRN    haloperidol lactate injection 2 mg Q4H PRN    heparin (porcine) injection 1,000 Units PRN    HYDROmorphone injection 1 mg Q4H PRN    hyoscyamine SL tablet 0.125 mg Q4H PRN    insulin aspart U-100 pen 0-5 Units QID (AC + HS) PRN    insulin glargine U-100 (Lantus) pen 5 Units Daily    LORazepam injection 0.5 mg Q8H PRN    melatonin tablet 6 mg Nightly PRN    midodrine tablet 15 mg Q8H    naloxone 0.4 mg/mL injection 0.02 mg PRN    ondansetron injection 4 mg Q6H PRN    pantoprazole EC tablet 40 mg Daily    prochlorperazine injection Soln 5 mg Q6H PRN    senna tablet 8.6 mg Daily PRN    sodium chloride 0.9% bolus 250 mL 250 mL PRN    sodium chloride 0.9% flush 10 mL Q8H    tamsulosin 24 hr capsule 0.4 mg Daily    vancomycin - pharmacy to dose pharmacy to manage frequency       Objective:     Vital Signs (Most Recent):  Temp: 98.7 °F (37.1 °C) (04/14/25 0701)  Pulse: 94 (04/14/25 1020)  Resp: 20 (04/14/25 1004)  BP: 110/66 (04/14/25 1020)  SpO2: (!) 94 % (04/14/25 1020) Vital Signs (24h  Range):  Temp:  [97.1 °F (36.2 °C)-98.9 °F (37.2 °C)] 98.7 °F (37.1 °C)  Pulse:  [] 94  Resp:  [12-36] 20  SpO2:  [94 %-100 %] 94 %  BP: ()/(51-68) 110/66     Weight: 61.2 kg (134 lb 14.7 oz) (04/04/25 1937)  Body mass index is 21.13 kg/m².  Body surface area is 1.7 meters squared.    I/O last 3 completed shifts:  In: 120 [P.O.:120]  Out: 270 [Urine:270]     Physical Exam  Constitutional:       General: He is not in acute distress.     Appearance: He is ill-appearing. He is not toxic-appearing.   HENT:      Head: Normocephalic and atraumatic.      Nose: Nose normal.      Mouth/Throat:      Mouth: Mucous membranes are moist.   Cardiovascular:      Rate and Rhythm: Tachycardia present.      Comments: TDC   Pulmonary:      Effort: Pulmonary effort is normal. No respiratory distress.      Breath sounds: No wheezing.   Musculoskeletal:      Right lower leg: No edema.      Left lower leg: No edema.   Neurological:      Mental Status: He is alert.          Significant Labs:  All labs within the past 24 hours have been reviewed.     Significant Imaging:  Labs: Reviewed

## 2025-04-14 NOTE — ASSESSMENT & PLAN NOTE
- suspect source:  Graft infection s/p exploratory surgery of LUE with graft resection 4/3  -graft holiday with TDC placed on 04/14  - cultures of AVF with Staph   - he has endocarditis  - ID consulted  - on vanc ;anticipating completing 6 weeks of IV vancomycin from clearance (EOC: 5/19/25)

## 2025-04-14 NOTE — EICU
Virtual ICU Quality Rounds    Admit Date: 4/3/2025  Hospital Day: 11    ICU Day: 10d 19h    24H Vital Sign Range:  Temp:  [97.1 °F (36.2 °C)-98.9 °F (37.2 °C)]   Pulse:  []   Resp:  [12-36]   BP: ()/(51-68)   SpO2:  [94 %-100 %]     VICU Surveillance Screening                    LDA reconciliation : Yes    Nursing orders placed : IP GAUTAM Peripheral IV Access

## 2025-04-14 NOTE — ASSESSMENT & PLAN NOTE
S/p clot evacuation with a fulguration by Urology  Currently resolved  Continue cap irrigation of 3 way catheter  Continue Levsin for bladder discomfort

## 2025-04-14 NOTE — ASSESSMENT & PLAN NOTE
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 Most recent hemoglobin and hematocrit are listed below.  Recent Labs     04/12/25  0409 04/13/25  0456 04/14/25  0447   HGB 8.6* 8.8* 8.0*   HCT 27.5* 27.5* 25.2*     Plan  - Monitor serial CBC: Daily and recheck now  - Transfuse PRBC if patient becomes hemodynamically unstable, symptomatic or H/H drops below 7/21.  - Patient has not received any PRBC transfusions to date  - Patient's anemia is currently stable

## 2025-04-14 NOTE — PROGRESS NOTES
Vancomycin consult follow-up:    Patient reviewed, dialysis scheduled every Tues, Thurs, Sat, no new levels, no dose today; Next levels due: random due 4/15/2025 at 0300

## 2025-04-14 NOTE — ASSESSMENT & PLAN NOTE
#Anemia  HGB   Date Value Ref Range Status   04/14/2025 8.0 (L) 14.0 - 18.0 gm/dL Final     Iron   Date Value Ref Range Status   08/11/2024 39 (L) 45 - 160 ug/dL Final     Transferrin   Date Value Ref Range Status   08/11/2024 139 (L) 200 - 375 mg/dL Final     TIBC   Date Value Ref Range Status   08/11/2024 206 (L) 250 - 450 ug/dL Final     Saturated Iron   Date Value Ref Range Status   08/11/2024 19 (L) 20 - 50 % Final     Ferritin   Date Value Ref Range Status   08/11/2024 979 (H) 20.0 - 300.0 ng/mL Final     NAKIA ordered, hb not at goal  Iron on hold due to bacteremia. Follow up outpatient

## 2025-04-14 NOTE — PLAN OF CARE
Changes in medical condition or discharge plan:  Perm cath placement    Does patient need new DME? tbd    Follow up appts needed: tbd    Medically stable: No at present    Estimated Discharge Date: tbd    DC planning ongoing at this time.  Will discuss with MD in [place] on tomorrow.       04/14/25 8966   Discharge Reassessment   Assessment Type Discharge Planning Reassessment   Did the patient's condition or plan change since previous assessment? Yes   Discharge Plan discussed with:   (in [facility] this am)   Discharge Plan A   (tbd)   Discharge Plan B Hospice/home   Why the patient remains in the hospital Requires continued medical care

## 2025-04-14 NOTE — PROGRESS NOTES
[facility] Intensive Care  Urology  Progress Note    Patient Name: Jevon Rajan  MRN: [id number]  Admission Date: 4/3/2025  Hospital Length of Stay: 11 days  Code Status: Full Code   Attending Provider: Tena Palma MD   Primary Care Physician: Melia, Primary Doctor    Subjective:     HPI:  Urinary Retention  Patient complains of urinary retention. Onset of retention was 1 day ago and was gradual in onset. Patient currently does not have a urinary catheter in place.  300 ml of urine were scanned on bladder scanner Prior to this event voiding symptoms consisted of slow stream. Prior treatments include none. Recent medications that may have affected his voiding include none.   He still makes urine, he tells me an almost normal amount.  He is very uncomfortable at the level of the bladder.  Nursing staff attempted coude catheter was then successfully.  He agrees to allow me to place a catheter.    Interval History: tolerating dunaway    Review of Systems   Constitutional:  Negative for activity change, appetite change and fever.   HENT:  Negative for congestion and rhinorrhea.    Eyes:  Negative for visual disturbance.   Respiratory:  Negative for choking and shortness of breath.    Genitourinary:  Positive for difficulty urinating. Negative for dysuria, flank pain, hematuria, scrotal swelling, testicular pain and urgency.     Objective:     Temp:  [97.1 °F (36.2 °C)-98.9 °F (37.2 °C)] 98.4 °F (36.9 °C)  Pulse:  [] 97  Resp:  [12-36] 23  SpO2:  [94 %-100 %] 98 %  BP: ()/(51-68) 104/54     Body mass index is 21.13 kg/m².      Bladder Scan Volume (mL): 331 mL (04/06/25 1520)    Drains       Drain  Duration                  Urethral Catheter 04/07/25 1558 Triple-lumen;Latex 24 Fr. 6 days         Hemodialysis Catheter 04/14/25 1011 right internal jugular <1 day                     Physical Exam  Constitutional:       General: He is not in acute distress.     Appearance: He is well-developed. He is not  ill-appearing, toxic-appearing or diaphoretic.   HENT:      Head: Normocephalic and atraumatic.      Mouth/Throat:      Mouth: Mucous membranes are moist.   Eyes:      Conjunctiva/sclera: Conjunctivae normal.   Pulmonary:      Effort: Pulmonary effort is normal. No respiratory distress.   Abdominal:      General: Abdomen is flat. There is no distension.      Palpations: There is no mass.   Genitourinary:     Comments: Dunaway nikki colored urine  Musculoskeletal:         General: No swelling or deformity.      Cervical back: Neck supple.   Skin:     Findings: No rash.   Neurological:      Mental Status: He is alert. Mental status is at baseline.   Psychiatric:         Mood and Affect: Mood normal.         Thought Content: Thought content normal.         Judgment: Judgment normal.           Significant Labs:    BMP:  Recent Labs   Lab 04/12/25  0409 04/13/25 0456 04/14/25 0447    139 140   K 3.7 4.2 4.0    102 102   CO2 19* 23 22*   BUN 50* 31* 47*   CREATININE 7.2* 5.6* 7.8*   GLUCOSE 104 160* 169*   CALCIUM 9.1 8.9 8.7       CBC:   Recent Labs   Lab 04/12/25  0409 04/13/25 0456 04/14/25  0447   WBC 15.78* 11.07 12.09   HGB 8.6* 8.8* 8.0*   HCT 27.5* 27.5* 25.2*    210 232                 Assessment/Plan:     Gross hematuria  S/p Clot evacuation with fulguration  Resolved  Cap Irrigation port of 3 way catheter  Levsin PRN for bladder discomfort            BPH with urinary obstruction  Catheter in place, do not remove, requires cystoscopy to place dunaway  When patient out of ICU okay to remove dunaway for voiding trial, early in the AM preferred in case patient will need to be brought back to the operating room for dunaway placement          VTE Risk Mitigation (From admission, onward)           Ordered     heparin (porcine) injection 1,000 Units  As needed (PRN)         04/05/25 2100     IP VTE HIGH RISK PATIENT  Once         04/03/25 1516     Place TEMO hose  Until discontinued         04/03/25 1516      Place sequential compression device  Until discontinued         04/03/25 1516     Reason for No Pharmacological VTE Prophylaxis  Once        Question:  Reasons:  Answer:  Physician Provided (leave comment)    04/03/25 1516                    Elsie Arnold MD  Urology  [facility] [place] - Intensive Care   High Dose Vitamin A Pregnancy And Lactation Text: High dose vitamin A therapy is contraindicated during pregnancy and breast feeding.

## 2025-04-14 NOTE — ASSESSMENT & PLAN NOTE
The likely etiology of thrombocytopenia is infection and sepsis. The patients 3 most recent labs are listed below.  Recent Labs     04/12/25  0409 04/13/25  0456 04/14/25  0447    210 232     Plan  - Will transfuse if platelet count is <10k.

## 2025-04-14 NOTE — ASSESSMENT & PLAN NOTE
This patient has shock. The type of shock is distributive due to sepsis. The patient had the following evidence of shock: persistent hypotension and altered mental status. 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 Shock is now resolved and vasopressors are off. WBC back to normal

## 2025-04-14 NOTE — H&P
VIR consult note           History of Present Illness:  Jevon Rajan is a 85 y.o. male with HTN, T2DM, CAD, ESRD  who was admitted on 4/3/25 as transfer from [facility] for septic shock and impending AVG rupture. Pt's AVG ruptured either in transport or shortly after arrival and pt was taken emergently to OR with AVG extraction with infected hematoma noted. Bcx were + for MRSA, however have cleared as of 4/8. Pt has been receiving HD via R IJ trialysis. Interventional Radiology has been consulted for tunneled HD catheter placement for dialysis.     Admission H&P reviewed.  Past Medical History:   Diagnosis Date    BPH (benign prostatic hyperplasia)     Coronary artery disease     [facility] his care to this clinic. In 2014 he had stent to LAD. He states he has had 2 heart attacks. He does not get angina. There was 90% left anterior descending artery stenosis undergoing stenting. There was also a circumflex marginal branch stenosis of 70%.    Diabetes mellitus, type 2     ESRD (end stage renal disease) on dialysis     ESRD on HD TTS via left brachial AVF    Hypertension     Hypothyroidism, unspecified     NSTEMI (non-ST elevated myocardial infarction) 4/4/2025    Sepsis 4/2/2025    Symptomatic anemia 01/15/2024     Past Surgical History:   Procedure Laterality Date    ANGIOGRAM, CORONARY, WITH LEFT HEART CATHETERIZATION  1/13/2025    Procedure: Angiogram, Coronary, with Left Heart Cath;  Surgeon: Steve Bhat MD;  Location: [facility] CATH LAB/EP;  Service: Cardiology;;    ATHERECTOMY, CORONARY N/A 1/14/2025    Procedure: Atherectomy-coronary;  Surgeon: Giacomo Pittman MD;  Location: [facility] CATH LAB/EP;  Service: Cardiology;  Laterality: N/A;    bilateral foot surgery      CORONARY ANGIOPLASTY WITH STENT PLACEMENT N/A 1/14/2025    Procedure: ANGIOPLASTY, CORONARY ARTERY, WITH STENT INSERTION;  Surgeon: Giacomo Pittman MD;  Location: [facility] CATH LAB/EP;  Service: Cardiology;   Laterality: N/A;    CORONARY STENT PLACEMENT N/A 1/13/2025    Procedure: INSERTION, STENT, CORONARY ARTERY;  Surgeon: Steve Bhat MD;  Location: [facility] CATH LAB/EP;  Service: Cardiology;  Laterality: N/A;    CYSTOSCOPY N/A 4/7/2025    Procedure: CYSTOSCOPY WITH CLOT EVACUATION, FULGURATION, GARCES PLACEMENT;  Surgeon: Elsie Arnold MD;  Location: [facility] OR;  Service: Urology;  Laterality: N/A;    ESOPHAGOGASTRODUODENOSCOPY N/A 2/27/2024    Procedure: EGD (ESOPHAGOGASTRODUODENOSCOPY);  Surgeon: Gemma Almendarez MD;  Location: [facility] ENDO;  Service: Endoscopy;  Laterality: N/A;    EXPLORATION, ARTERY, UPPER EXTREMITY Left 4/3/2025    Procedure: EXPLORATION, ARTERY, UPPER EXTREMITY;  Surgeon: Sunil Contreras MD;  Location: [facility] OR;  Service: Vascular;  Laterality: Left;    eyelid surgery      FISTULOGRAM Left 11/27/2019    Procedure: Fistulogram;  Surgeon: MELANY Brenner III, MD;  Location: [address];  Service: Peripheral Vascular;  Laterality: Left;    FISTULOGRAM Left 11/27/2019    Procedure: Fistulogram, AVG declot, possible permacath;  Surgeon: MELANY Brenner III, MD;  Location: [facility] CATH LAB;  Service: Peripheral Vascular;  Laterality: Left;    INSERTION OF DIALYSIS CATHETER      IVUS, CORONARY  1/13/2025    Procedure: IVUS, Coronary;  Surgeon: Steve hBat MD;  Location: [facility] CATH LAB/EP;  Service: Cardiology;;    LEFT HEART CATHETERIZATION Left 1/14/2025    Procedure: Left heart cath;  Surgeon: Giacomo Pittman MD;  Location: [facility] CATH LAB/EP;  Service: Cardiology;  Laterality: Left;    MOH's  12/5/13    PERCUTANEOUS CORONARY INTERVENTION, ARTERY N/A 1/14/2025    Procedure: Percutaneous coronary intervention;  Surgeon: Giacomo Pittman MD;  Location: [facility] CATH LAB/EP;  Service: Cardiology;  Laterality: N/A;    PERCUTANEOUS TRANSLUMINAL BALLOON ANGIOPLASTY OF CORONARY ARTERY  1/13/2025    Procedure: Angioplasty-coronary;  Surgeon: Steve Bhat MD;  Location: [facility] CATH  LAB/EP;  Service: Cardiology;;    REMOVAL, GRAFT, ARTERIOVENOUS, UPPER EXTREMITY Left 4/3/2025    Procedure: REMOVAL, GRAFT, ARTERIOVENOUS, UPPER EXTREMITY;  Surgeon: Sunil Contreras MD;  Location: [facility] OR;  Service: Vascular;  Laterality: Left;    REVASCULARIZATION, ENDOVASCULAR, LE W/ INTRAVASCULAR LITHOTRIPSY  1/13/2025    Procedure: REVASCULARIZATION, ENDOVASCULAR, LE W/ INTRAVASCULAR LITHOTRIPSY;  Surgeon: Steve Bhat MD;  Location: [facility] CATH LAB/EP;  Service: Cardiology;;    right hand surgery      stents         Review of Systems:   As documented in primary team H&P    Home Meds:   Prior to Admission medications    Medication Sig Start Date End Date Taking? Authorizing Provider   ammonium lactate 12 % Crea Apply topically 2 (two) times a day. 12/18/24   Provider, Historical   artificial tears,hypromellose,,GENTEAL/SUSTANE, 0.3 % Gel Apply to eye nightly. 8/9/24   Provider, Historical   aspirin (ECOTRIN) 81 MG EC tablet Take 1 tablet (81 mg total) by mouth once daily. 1/16/25 2/15/25  Nadja Brown MD   atorvastatin (LIPITOR) 80 MG tablet Take 1 tablet (80 mg total) by mouth every evening. 8/28/24 11/26/24  Jeremiah Adam MD   camphor-menthol 0.5-0.5% (SARNA) lotion Apply topically once daily. APPLY SMALL AMOUNT TOPICALLY TO AFFECTED AREA(S) AS NEEDED FOR ITCHING KEEP IN FRIDGE 1/16/25   Nadja Brown MD   carboxymethylcellulose sodium 0.5 % Drop Apply 1 drop to eye nightly as needed (dry eyes). 8/9/24   Provider, Historical   carvediloL (COREG) 12.5 MG tablet Take 0.5 tablets (6.25 mg total) by mouth 2 (two) times daily. 1/16/25 2/15/25  Nadja Brown MD   cetirizine (ZYRTEC) 5 MG tablet Take 1 tablet (5 mg total) by mouth every 48 hours. 2/23/25 3/25/25  Bubba Early Jr., MD   clobetasol 0.05% (TEMOVATE) 0.05 % Oint Apply topically 2 (two) times daily. 2/28/25   Sebastien Jessica MD   clopidogreL (PLAVIX) 75 mg tablet Take 1 tablet (75 mg total) by mouth once  daily. 6/3/24 6/3/25  Malini Talavera MD   erythromycin (ROMYCIN) ophthalmic ointment Place a 1/2 inch ribbon of ointment into the lower eyelid. Four timex daily for 5 days 2/23/25   Bubba Early Jr., MD   gabapentin (NEURONTIN) 100 MG capsule Take 1 capsule (100 mg total) by mouth every evening. 1/7/25 1/7/26  Shania Colin MD   hydrophilic ointment (AQUABASE) Apply topically as needed for Dry Skin. APPLY MODERATE AMOUNT TOPICALLY TO AFFECTED AREA(S) TWICE A DAY AS NEEDED FOR DRY SKIN APPLY TO AREAS OF DRY SKIN OR OVER ENTIRE BODY. 1/9/25   Stacey Judge, MATEUSZ   lanolin alcohol-mineral oil-white petrolatum-ceres (EUCERIN) Crea cream Apply topically 2 (two) times daily as needed. 12/27/24   Provider, Historical   LIDOcaine (LIDODERM) 5 % Place 1 patch onto the skin once daily. Remove & Discard patch within 12 hours or as directed by MD 2/23/25   Bubba Early Jr., MD   loratadine (CLARITIN) 10 mg tablet Take 10 mg by mouth daily as needed for Allergies (Every other day).    Provider, Historical   nut.tx.imp.renal fxn,lac-reduc (NEPRO CARB STEADY) 0.08 gram-1.8 kcal/mL Liqd Take 240 mLs by mouth 2 (two) times a day. 4/16/24   Provider, Historical   ondansetron (ZOFRAN-ODT) 4 MG TbDL Take 1 tablet (4 mg total) by mouth every 8 (eight) hours as needed (nausea). 1/10/24   Noel Keys, DO   oxyCODONE-acetaminophen (PERCOCET) 5-325 mg per tablet Take 1 tablet by mouth every 6 (six) hours as needed. 2/9/25   Noel Keys, DO   pantoprazole (PROTONIX) 20 MG tablet Take 20 mg by mouth 2 (two) times daily as needed. 4/18/24   Provider, Historical   pramoxine 1 % Lotn Apply topically 2 (two) times daily as needed (itching). 12/27/24   Provider, Historical   triamcinolone acetonide 0.1% (KENALOG) 0.1 % cream Apply topically 2 (two) times daily as needed (itching). 8/23/24   Provider, Historical     Scheduled Meds:    atorvastatin  80 mg Oral QHS    clopidogreL  75 mg Oral Daily    epoetin  mj-epbx  10,000 Units Intravenous Every Tues, Thurs, Sat    erythromycin   Both Eyes Q8H    insulin glargine U-100  5 Units Subcutaneous Daily    midodrine  15 mg Oral Q8H    pantoprazole  40 mg Oral Daily    sodium chloride 0.9%  10 mL Intravenous Q8H    tamsulosin  0.4 mg Oral Daily     Continuous Infusions:   PRN Meds:  Current Facility-Administered Medications:     0.9%  NaCl infusion (for blood administration), , Intravenous, Q24H PRN    0.9% NaCl, , Intravenous, PRN    acetaminophen, 650 mg, Oral, Q4H PRN    albumin human 25%, 25 g, Intravenous, Daily PRN    ceFAZolin (Ancef) IV (PEDS and ADULTS), , Intravenous, PRN    dextrose 50%, 12.5 g, Intravenous, PRN    dextrose 50%, 25 g, Intravenous, PRN    diphenhydrAMINE, 50 mg, Oral, Q6H PRN    glucagon (human recombinant), 1 mg, Intramuscular, PRN    glucose, 16 g, Oral, PRN    glucose, 24 g, Oral, PRN    haloperidol lactate, 2 mg, Intravenous, Q4H PRN    heparin (porcine), 1,000 Units, Intravenous, PRN    HYDROmorphone, 1 mg, Intravenous, Q4H PRN    hyoscyamine, 0.125 mg, Sublingual, Q4H PRN    insulin aspart U-100, 0-5 Units, Subcutaneous, QID (AC + HS) PRN    lorazepam, 0.5 mg, Intravenous, Q8H PRN    melatonin, 6 mg, Oral, Nightly PRN    naloxone, 0.02 mg, Intravenous, PRN    ondansetron, 4 mg, Intravenous, Q6H PRN    prochlorperazine, 5 mg, Intravenous, Q6H PRN    senna, 8.6 mg, Oral, Daily PRN    sodium chloride 0.9%, 250 mL, Intravenous, PRN    Pharmacy to dose Vancomycin consult, , , Once **AND** vancomycin - pharmacy to dose, , Intravenous, pharmacy to manage frequency       Allergies:   Review of patient's allergies indicates:   Allergen Reactions    Ace inhibitors Hives, Itching, Shortness Of Breath, Other (See Comments) and Rash     Is not sure which medication it was    Captopril             Vitals:  Temp: 98.7 °F (37.1 °C) (04/14/25 0701)  Pulse: 93 (04/14/25 0949)  Resp: 18 (04/14/25 0949)  BP: 112/68 (04/14/25 0949)  SpO2: 95 % (04/14/25 0949)  "    Physical Exam:  ASA: III  Mallampati: II    General: Ill appearing.   Mental Status:  oriented to person   HEENT: normocephalic, atraumatic  Chest: unlabored breathing  Heart: regular heart rate  Abdomen: Soft nontender.  Extremity: moves all extremities    Labs:  No results for input(s): "INR", "PT", "PTT" in the last 168 hours.    Recent Labs   Lab 04/14/25  0447   WBC 12.09   HGB 8.0*   HCT 25.2*   MCV 90         Recent Labs   Lab 04/14/25  0447      K 4.0      CO2 22*   BUN 47*   CREATININE 7.8*   CALCIUM 8.7   ALT 9*   AST 22   ALBUMIN 2.4*   BILITOT 0.8       Imaging    All recent imaging Independently reviewed by myself.         ASSESSMENT/PLAN:      Plan/recommendations:  - Patient will undergo tunneled HD cath placement.  - Sedation Plan: local, up to moderate sedation.          William Pop MD  VIR     "

## 2025-04-14 NOTE — SUBJECTIVE & OBJECTIVE
Interval History:  No acute event overnight.  Patient seen in bed receiving HD today.  Minimally interactive.  Tunneled HD cath on 04/14    Review of Systems  Objective:     Vital Signs (Most Recent):  Temp: 98.1 °F (36.7 °C) (04/14/25 1501)  Pulse: 101 (04/14/25 1630)  Resp: (!) 23 (04/14/25 1630)  BP: 118/61 (04/14/25 1630)  SpO2: 100 % (04/14/25 1630) Vital Signs (24h Range):  Temp:  [97.1 °F (36.2 °C)-98.7 °F (37.1 °C)] 98.1 °F (36.7 °C)  Pulse:  [] 101  Resp:  [12-36] 23  SpO2:  [94 %-100 %] 100 %  BP: ()/(51-68) 118/61     Weight: 61.2 kg (134 lb 14.7 oz)  Body mass index is 21.13 kg/m².    Intake/Output Summary (Last 24 hours) at 4/14/2025 1727  Last data filed at 4/14/2025 1449  Gross per 24 hour   Intake 10 ml   Output 50 ml   Net -40 ml         Physical Exam  Constitutional:       General: He is not in acute distress.     Appearance: He is ill-appearing. He is not toxic-appearing or diaphoretic.   Cardiovascular:      Rate and Rhythm: Normal rate and regular rhythm.   Pulmonary:      Effort: Pulmonary effort is normal.      Breath sounds: Normal breath sounds.   Abdominal:      General: Bowel sounds are normal.      Palpations: Abdomen is soft.   Neurological:      Mental Status: He is disoriented.               Significant Labs: All pertinent labs within the past 24 hours have been reviewed.    Significant Imaging: I have reviewed all pertinent imaging results/findings within the past 24 hours.

## 2025-04-14 NOTE — ASSESSMENT & PLAN NOTE
Patient with known CAD s/p stent placement, which is controlled Will continue ASA, Plavix, and Statin and monitor for S/Sx of angina/ACS. Continue to monitor on telemetry.   - last [facility] was 1/2025: Severely calcified mid RCA stenosis unable to cross with PTCA balloons, treated initially with 0.9 laser atherectomy, followed by CSI atherectomy system with total of 5 runs (3 at low speed, 2 at high speed), pre-dilated using 2.0 compliant and 3.5 noncompliant balloon followed by 3.5 X 16 mm megatron stent.  Focal plaque rupture/erosion noted in the proximal and ostial RCA that were treated with  3.5 X 28 in the proximal RCA, 4.0 X 16 mm in the ostial RCA.  No residual stenosis post intervention.  Patient had ALAN 3 flow at the end of the procedure  - with elevated troponin likely due to septic shock  Recent Labs   Lab 04/12/25  1216   TROPONINI 0.811*     - continue asa, plavix, statin  - Cardiology consulted  - no plans for ischemic evaluation at this time

## 2025-04-14 NOTE — EICU
Intervention Initiated From:  Bedside    Jude intervened regarding:  Rounding (Video assessment)    VICU Night Rounds Checklist  24H Vital Sign Range:  Temp:  [97.8 °F (36.6 °C)-98.9 °F (37.2 °C)]   Pulse:  []   Resp:  [12-47]   BP: ()/(44-67)   SpO2:  [96 %-100 %]     Video rounds and LDA reconciliation

## 2025-04-14 NOTE — PROGRESS NOTES
[facility] - Intensive Care  Nephrology  Progress Note    Patient Name: Jevon Rajan  MRN: [id number]  Admission Date: 4/3/2025  Hospital Length of Stay: 11 days  Attending Provider: Tena Palma MD   Primary Care Physician: Melia, Primary Doctor  Principal Problem:Septic shock    Subjective:     HPI: Mr. Rajan is an 84 yo male with HTN, T2DM, CAD, ESRD, and liver lesion who [facility] shock and impending AVG rupture. [facility] with temp 101.9 and BP 80/30s. He was transferred to our hospital on 4/3/25; it seems his AVG ruptured during transport/shortly after arrival. He emergently went to the OR yesterday with AVG extraction; infected hematoma was noted. Workup also concerning for elevated troponin and cardiac vegetations. He is no longer on pressors. Nephrology consulted for ESRD. Prior records obtained and reviewed. He receives HD TTS at [facility] under the c/o Dr. Colin. He last received HD outpatient on 4/1/25. HD was held at [facility] due to unstable vascular access. Family agreed to proceed with CRRT today.     Interval History: TDC placed this AM, plan for HD today and tomorrow      Review of patient's allergies indicates:   Allergen Reactions    Ace inhibitors Hives, Itching, Shortness Of Breath, Other (See Comments) and Rash     Is not sure which medication it was    Captopril      Current Facility-Administered Medications   Medication Frequency    0.9%  NaCl infusion (for blood administration) Q24H PRN    0.9% NaCl infusion PRN    acetaminophen tablet 650 mg Q4H PRN    albumin human 25% bottle 25 g Daily PRN    atorvastatin tablet 80 mg QHS    clopidogreL tablet 75 mg Daily    dextrose 50% injection 12.5 g PRN    dextrose 50% injection 25 g PRN    diphenhydrAMINE capsule 50 mg Q6H PRN    epoetin mj-epbx injection 10,000 Units Every Tues, Thurs, Sat    erythromycin 5 mg/gram (0.5 %) ophthalmic ointment Q8H    glucagon (human recombinant) injection 1  mg PRN    glucose chewable tablet 16 g PRN    glucose chewable tablet 24 g PRN    haloperidol lactate injection 2 mg Q4H PRN    heparin (porcine) injection 1,000 Units PRN    HYDROmorphone injection 1 mg Q4H PRN    hyoscyamine SL tablet 0.125 mg Q4H PRN    insulin aspart U-100 pen 0-5 Units QID (AC + HS) PRN    insulin glargine U-100 (Lantus) pen 5 Units Daily    LORazepam injection 0.5 mg Q8H PRN    melatonin tablet 6 mg Nightly PRN    midodrine tablet 15 mg Q8H    naloxone 0.4 mg/mL injection 0.02 mg PRN    ondansetron injection 4 mg Q6H PRN    pantoprazole EC tablet 40 mg Daily    prochlorperazine injection Soln 5 mg Q6H PRN    senna tablet 8.6 mg Daily PRN    sodium chloride 0.9% bolus 250 mL 250 mL PRN    sodium chloride 0.9% flush 10 mL Q8H    tamsulosin 24 hr capsule 0.4 mg Daily    vancomycin - pharmacy to dose pharmacy to manage frequency       Objective:     Vital Signs (Most Recent):  Temp: 98.7 °F (37.1 °C) (04/14/25 0701)  Pulse: 94 (04/14/25 1020)  Resp: 20 (04/14/25 1004)  BP: 110/66 (04/14/25 1020)  SpO2: (!) 94 % (04/14/25 1020) Vital Signs (24h Range):  Temp:  [97.1 °F (36.2 °C)-98.9 °F (37.2 °C)] 98.7 °F (37.1 °C)  Pulse:  [] 94  Resp:  [12-36] 20  SpO2:  [94 %-100 %] 94 %  BP: ()/(51-68) 110/66     Weight: 61.2 kg (134 lb 14.7 oz) (04/04/25 1937)  Body mass index is 21.13 kg/m².  Body surface area is 1.7 meters squared.    I/O last 3 completed shifts:  In: 120 [P.O.:120]  Out: 270 [Urine:270]     Physical Exam  Constitutional:       General: He is not in acute distress.     Appearance: He is ill-appearing. He is not toxic-appearing.   HENT:      Head: Normocephalic and atraumatic.      Nose: Nose normal.      Mouth/Throat:      Mouth: Mucous membranes are moist.   Cardiovascular:      Rate and Rhythm: Tachycardia present.      Comments: TDC   Pulmonary:      Effort: Pulmonary effort is normal. No respiratory distress.      Breath sounds: No wheezing.   Musculoskeletal:      Right  lower leg: No edema.      Left lower leg: No edema.   Neurological:      Mental Status: He is alert.          Significant Labs:  All labs within the past 24 hours have been reviewed.     Significant Imaging:  Labs: Reviewed  Assessment/Plan:     Cardiac/Vascular  Acute bacterial endocarditis  Blood cultures from 4/8 cleared  ID following  [facility] - ECO 5/19/2025    Renal/  ESRD on hemodialysis  ESRD on HD     HD TTS, [facility]  Sp AVG resection 4/3    TDC right chest wall 4/14/2025    Plan/Recommendation  HD today and tomorrow-continue TTS schedule unless otherwise needed  -Keep MAP > 65  -Keep hemoglobin > 7  -Strict ins and outs  -Avoid nephrotoxic agents if possible and renally dose medications  -Avoid drastic hemodynamic changes if possible    #Secondary hyperparathyroidism  Calcium   Date Value Ref Range Status   04/14/2025 8.7 8.7 - 10.5 mg/dL Final     Phosphorus Level   Date Value Ref Range Status   04/12/2025 3.6 2.7 - 4.5 mg/dL Final     PTH, Intact   Date Value Ref Range Status   01/17/2024 117.7 (H) 9.0 - 77.0 pg/mL Final   Continue to monitor      Oncology  Anemia in ESRD (end-stage renal disease)  #Anemia  HGB   Date Value Ref Range Status   04/14/2025 8.0 (L) 14.0 - 18.0 gm/dL Final     Iron   Date Value Ref Range Status   08/11/2024 39 (L) 45 - 160 ug/dL Final     Transferrin   Date Value Ref Range Status   08/11/2024 139 (L) 200 - 375 mg/dL Final     TIBC   Date Value Ref Range Status   08/11/2024 206 (L) 250 - 450 ug/dL Final     Saturated Iron   Date Value Ref Range Status   08/11/2024 19 (L) 20 - 50 % Final     Ferritin   Date Value Ref Range Status   08/11/2024 979 (H) 20.0 - 300.0 ng/mL Final     NAKIA ordered, hb not at goal  Iron on hold due to bacteremia. Follow up outpatient        Thank you for your consult. I will follow-up with patient. Please contact us if you have any additional questions.    Antoine Lauren MD  Nephrology  [facility] - Intensive Care

## 2025-04-15 LAB
ABSOLUTE EOSINOPHIL (OHS): 0.34 K/UL
ABSOLUTE MONOCYTE (OHS): 0.73 K/UL (ref 0.3–1)
ABSOLUTE NEUTROPHIL COUNT (OHS): 10.04 K/UL (ref 1.8–7.7)
ALBUMIN SERPL BCP-MCNC: 2.5 G/DL (ref 3.5–5.2)
ALP SERPL-CCNC: 65 UNIT/L (ref 40–150)
ALT SERPL W/O P-5'-P-CCNC: 9 UNIT/L (ref 10–44)
ANION GAP (OHS): 13 MMOL/L (ref 8–16)
AST SERPL-CCNC: 28 UNIT/L (ref 11–45)
BASOPHILS # BLD AUTO: 0.06 K/UL
BASOPHILS NFR BLD AUTO: 0.5 %
BILIRUB SERPL-MCNC: 0.9 MG/DL (ref 0.1–1)
BUN SERPL-MCNC: 26 MG/DL (ref 8–23)
CALCIUM SERPL-MCNC: 8.5 MG/DL (ref 8.7–10.5)
CHLORIDE SERPL-SCNC: 100 MMOL/L (ref 95–110)
CO2 SERPL-SCNC: 26 MMOL/L (ref 23–29)
CREAT SERPL-MCNC: 5.6 MG/DL (ref 0.5–1.4)
ERYTHROCYTE [DISTWIDTH] IN BLOOD BY AUTOMATED COUNT: 16.2 % (ref 11.5–14.5)
GFR SERPLBLD CREATININE-BSD FMLA CKD-EPI: 9 ML/MIN/1.73/M2
GLUCOSE SERPL-MCNC: 91 MG/DL (ref 70–110)
HCT VFR BLD AUTO: 24.8 % (ref 40–54)
HGB BLD-MCNC: 7.9 GM/DL (ref 14–18)
IMM GRANULOCYTES # BLD AUTO: 0.08 K/UL (ref 0–0.04)
IMM GRANULOCYTES NFR BLD AUTO: 0.7 % (ref 0–0.5)
LYMPHOCYTES # BLD AUTO: 0.83 K/UL (ref 1–4.8)
MCH RBC QN AUTO: 28.6 PG (ref 27–31)
MCHC RBC AUTO-ENTMCNC: 31.9 G/DL (ref 32–36)
MCV RBC AUTO: 90 FL (ref 82–98)
NUCLEATED RBC (/100WBC) (OHS): 0 /100 WBC
OHS QRS DURATION: 84 MS
OHS QTC CALCULATION: 485 MS
PLATELET # BLD AUTO: 219 K/UL (ref 150–450)
PMV BLD AUTO: 11.5 FL (ref 9.2–12.9)
POCT GLUCOSE: 129 MG/DL (ref 70–110)
POCT GLUCOSE: 180 MG/DL (ref 70–110)
POTASSIUM SERPL-SCNC: 4.1 MMOL/L (ref 3.5–5.1)
PROT SERPL-MCNC: 7.3 GM/DL (ref 6–8.4)
RBC # BLD AUTO: 2.76 M/UL (ref 4.6–6.2)
RELATIVE EOSINOPHIL (OHS): 2.8 %
RELATIVE LYMPHOCYTE (OHS): 6.9 % (ref 18–48)
RELATIVE MONOCYTE (OHS): 6 % (ref 4–15)
RELATIVE NEUTROPHIL (OHS): 83.1 % (ref 38–73)
SODIUM SERPL-SCNC: 139 MMOL/L (ref 136–145)
VANCOMYCIN SERPL-MCNC: 14.5 UG/ML (ref ?–80)
WBC # BLD AUTO: 12.08 K/UL (ref 3.9–12.7)

## 2025-04-15 PROCEDURE — 85025 COMPLETE CBC W/AUTO DIFF WBC: CPT | Performed by: HOSPITALIST

## 2025-04-15 PROCEDURE — 94761 N-INVAS EAR/PLS OXIMETRY MLT: CPT

## 2025-04-15 PROCEDURE — 99497 ADVNCD CARE PLAN 30 MIN: CPT | Mod: ,,, | Performed by: REGISTERED NURSE

## 2025-04-15 PROCEDURE — 63600175 PHARM REV CODE 636 W HCPCS: Performed by: STUDENT IN AN ORGANIZED HEALTH CARE EDUCATION/TRAINING PROGRAM

## 2025-04-15 PROCEDURE — 11000001 HC ACUTE MED/SURG PRIVATE ROOM

## 2025-04-15 PROCEDURE — 25000003 PHARM REV CODE 250

## 2025-04-15 PROCEDURE — A4216 STERILE WATER/SALINE, 10 ML: HCPCS | Performed by: HOSPITALIST

## 2025-04-15 PROCEDURE — 80053 COMPREHEN METABOLIC PANEL: CPT | Performed by: HOSPITALIST

## 2025-04-15 PROCEDURE — 25000003 PHARM REV CODE 250: Performed by: HOSPITALIST

## 2025-04-15 PROCEDURE — 80202 ASSAY OF VANCOMYCIN: CPT | Performed by: STUDENT IN AN ORGANIZED HEALTH CARE EDUCATION/TRAINING PROGRAM

## 2025-04-15 PROCEDURE — 25000003 PHARM REV CODE 250: Performed by: STUDENT IN AN ORGANIZED HEALTH CARE EDUCATION/TRAINING PROGRAM

## 2025-04-15 PROCEDURE — 99498 ADVNCD CARE PLAN ADDL 30 MIN: CPT | Mod: ,,, | Performed by: REGISTERED NURSE

## 2025-04-15 PROCEDURE — 27000207 HC ISOLATION

## 2025-04-15 PROCEDURE — 99232 SBSQ HOSP IP/OBS MODERATE 35: CPT | Mod: ,,, | Performed by: UROLOGY

## 2025-04-15 PROCEDURE — 99233 SBSQ HOSP IP/OBS HIGH 50: CPT | Mod: ,,, | Performed by: REGISTERED NURSE

## 2025-04-15 PROCEDURE — 99232 SBSQ HOSP IP/OBS MODERATE 35: CPT | Mod: ,,, | Performed by: STUDENT IN AN ORGANIZED HEALTH CARE EDUCATION/TRAINING PROGRAM

## 2025-04-15 PROCEDURE — 63600175 PHARM REV CODE 636 W HCPCS: Mod: JZ,TB | Performed by: INTERNAL MEDICINE

## 2025-04-15 RX ORDER — QUETIAPINE FUMARATE 25 MG/1
25 TABLET, FILM COATED ORAL NIGHTLY
Status: DISCONTINUED | OUTPATIENT
Start: 2025-04-15 | End: 2025-04-29 | Stop reason: HOSPADM

## 2025-04-15 RX ADMIN — ERYTHROMYCIN: 5 OINTMENT OPHTHALMIC at 05:04

## 2025-04-15 RX ADMIN — ATORVASTATIN CALCIUM 80 MG: 40 TABLET, FILM COATED ORAL at 10:04

## 2025-04-15 RX ADMIN — HALOPERIDOL LACTATE 2 MG: 5 INJECTION, SOLUTION INTRAMUSCULAR at 12:04

## 2025-04-15 RX ADMIN — SODIUM CHLORIDE, PRESERVATIVE FREE 10 ML: 5 INJECTION INTRAVENOUS at 05:04

## 2025-04-15 RX ADMIN — DIPHENHYDRAMINE HYDROCHLORIDE 50 MG: 25 CAPSULE ORAL at 12:04

## 2025-04-15 RX ADMIN — MIDODRINE HYDROCHLORIDE 15 MG: 5 TABLET ORAL at 05:04

## 2025-04-15 RX ADMIN — INSULIN GLARGINE 5 UNITS: 100 INJECTION, SOLUTION SUBCUTANEOUS at 08:04

## 2025-04-15 RX ADMIN — Medication 6 MG: at 12:04

## 2025-04-15 RX ADMIN — Medication 6 MG: at 10:04

## 2025-04-15 RX ADMIN — ERYTHROMYCIN: 5 OINTMENT OPHTHALMIC at 03:04

## 2025-04-15 RX ADMIN — ERYTHROMYCIN: 5 OINTMENT OPHTHALMIC at 10:04

## 2025-04-15 RX ADMIN — PANTOPRAZOLE SODIUM 40 MG: 40 TABLET, DELAYED RELEASE ORAL at 08:04

## 2025-04-15 RX ADMIN — MIDODRINE HYDROCHLORIDE 15 MG: 5 TABLET ORAL at 10:04

## 2025-04-15 RX ADMIN — SODIUM CHLORIDE, PRESERVATIVE FREE 10 ML: 5 INJECTION INTRAVENOUS at 10:04

## 2025-04-15 RX ADMIN — TAMSULOSIN HYDROCHLORIDE 0.4 MG: 0.4 CAPSULE ORAL at 08:04

## 2025-04-15 RX ADMIN — CLOPIDOGREL BISULFATE 75 MG: 75 TABLET, FILM COATED ORAL at 08:04

## 2025-04-15 RX ADMIN — EPOETIN ALFA-EPBX 10000 UNITS: 10000 INJECTION, SOLUTION INTRAVENOUS; SUBCUTANEOUS at 08:04

## 2025-04-15 RX ADMIN — QUETIAPINE FUMARATE 25 MG: 25 TABLET ORAL at 10:04

## 2025-04-15 RX ADMIN — SODIUM CHLORIDE, PRESERVATIVE FREE 10 ML: 5 INJECTION INTRAVENOUS at 03:04

## 2025-04-15 NOTE — SUBJECTIVE & OBJECTIVE
Interval History:  Patient was unresponsive with agonal breathing overnight; rapid response called.  Patient was Ambu bagged but did not lose pulse.  Dilaudid discontinued.  Patient seen in bed this morning; confused, delirious and complaining of bugs.  Currently hemodynamically stable    Review of Systems  Objective:     Vital Signs (Most Recent):  Temp: 97.8 °F (36.6 °C) (04/15/25 1101)  Pulse: 95 (04/15/25 1200)  Resp: (!) 22 (04/15/25 1200)  BP: (!) 103/57 (04/15/25 1200)  SpO2: (!) 92 % (04/15/25 1200) Vital Signs (24h Range):  Temp:  [97.8 °F (36.6 °C)-98.9 °F (37.2 °C)] 97.8 °F (36.6 °C)  Pulse:  [] 95  Resp:  [12-59] 22  SpO2:  [87 %-100 %] 92 %  BP: ()/(51-74) 103/57     Weight: 61.2 kg (134 lb 14.7 oz)  Body mass index is 21.13 kg/m².    Intake/Output Summary (Last 24 hours) at 4/15/2025 1317  Last data filed at 4/15/2025 0044  Gross per 24 hour   Intake 210 ml   Output 20 ml   Net 190 ml         Physical Exam  Constitutional:       General: He is not in acute distress.     Appearance: He is ill-appearing. He is not toxic-appearing or diaphoretic.   Cardiovascular:      Rate and Rhythm: Normal rate and regular rhythm.      Pulses: Normal pulses.      Heart sounds: Normal heart sounds.   Pulmonary:      Effort: Pulmonary effort is normal. No respiratory distress.      Breath sounds: Normal breath sounds. No stridor.   Abdominal:      General: Bowel sounds are normal.      Palpations: Abdomen is soft.   Neurological:      Mental Status: He is disoriented.               Significant Labs: All pertinent labs within the past 24 hours have been reviewed.    Significant Imaging: I have reviewed all pertinent imaging results/findings within the past 24 hours.

## 2025-04-15 NOTE — SUBJECTIVE & OBJECTIVE
Interval History: during HD session - 2hr 15 mins in became apneic. HD stopped and code blue activated though never lost pulse per records. Improved with narcan. Dilaudid stopped.    Review of patient's allergies indicates:   Allergen Reactions    Ace inhibitors Hives, Itching, Shortness Of Breath, Other (See Comments) and Rash     Is not sure which medication it was    Captopril      Current Facility-Administered Medications   Medication Frequency    0.9%  NaCl infusion (for blood administration) Q24H PRN    0.9% NaCl infusion PRN    acetaminophen tablet 650 mg Q4H PRN    albumin human 25% bottle 25 g Daily PRN    atorvastatin tablet 80 mg QHS    clopidogreL tablet 75 mg Daily    dextrose 50% injection 12.5 g PRN    dextrose 50% injection 25 g PRN    diphenhydrAMINE capsule 50 mg Q6H PRN    epoetin mj-epbx injection 10,000 Units Every Tues, Thurs, Sat    erythromycin 5 mg/gram (0.5 %) ophthalmic ointment Q8H    glucagon (human recombinant) injection 1 mg PRN    glucose chewable tablet 16 g PRN    glucose chewable tablet 24 g PRN    haloperidol lactate injection 2 mg Q4H PRN    heparin (porcine) injection 1,000 Units PRN    hyoscyamine SL tablet 0.125 mg Q4H PRN    insulin aspart U-100 pen 0-5 Units QID (AC + HS) PRN    insulin glargine U-100 (Lantus) pen 5 Units Daily    melatonin tablet 6 mg Nightly PRN    midodrine tablet 15 mg Q8H    naloxone 0.4 mg/mL injection 0.02 mg PRN    ondansetron injection 4 mg Q6H PRN    pantoprazole EC tablet 40 mg Daily    prochlorperazine injection Soln 5 mg Q6H PRN    senna tablet 8.6 mg Daily PRN    sodium chloride 0.9% bolus 250 mL 250 mL PRN    sodium chloride 0.9% flush 10 mL Q8H    tamsulosin 24 hr capsule 0.4 mg Daily    vancomycin - pharmacy to dose pharmacy to manage frequency       Objective:     Vital Signs (Most Recent):  Temp: 98.9 °F (37.2 °C) (04/15/25 0701)  Pulse: 96 (04/15/25 0900)  Resp: (!) 30 (04/15/25 0900)  BP: (!) 103/51 (04/15/25 0900)  SpO2: (!) 92 %  (04/15/25 0900) Vital Signs (24h Range):  Temp:  [98 °F (36.7 °C)-98.9 °F (37.2 °C)] 98.9 °F (37.2 °C)  Pulse:  [] 96  Resp:  [12-59] 30  SpO2:  [87 %-100 %] 92 %  BP: ()/(51-74) 103/51     Weight: 61.2 kg (134 lb 14.7 oz) (04/04/25 1937)  Body mass index is 21.13 kg/m².  Body surface area is 1.7 meters squared.    I/O last 3 completed shifts:  In: 210 [P.O.:200; I.V.:10]  Out: 70 [Urine:70]     Physical Exam  Constitutional:       General: He is not in acute distress.     Appearance: He is ill-appearing and toxic-appearing.   HENT:      Head: Normocephalic and atraumatic.      Nose: Nose normal.   Cardiovascular:      Heart sounds: Murmur heard.      Comments: Right chest wall TDC  Pulmonary:      Breath sounds: Rales present.          Significant Labs:  All labs within the past 24 hours have been reviewed.     Significant Imaging:  Labs: Reviewed

## 2025-04-15 NOTE — ASSESSMENT & PLAN NOTE
"- TTE 4/4/25: "1.9x1.2cm large fixed heterogeneous mass present on the posterior leaflet (new finding vs 9/2023 echo). There is moderate to severe regurgitation with an eccentric jet. Cannot exclude severe MR with possible PMVL perforation in association with large vegetation."  - this is in the setting of MRSA bacteremia  - ID is consulted  - repeat blood cultures until clear   - suspect source is skin/chronic scratching vs AVF infection- cultures from resected AVF with Staph   - discussed with Cardiac surgery Dr Castro 4/4/25- given age and comorbidities, he is very high risk of mortality with surgery. He recommends medical management at this point.  - on vancomycin      " Scheduled for:  Colonoscopy [id number] / [phone]  Provider Name:  Dr. Kota Lam  Date:  1/14/2022  Location:  [facility] (formerly [facility]3]  Sedation:  MAC  Time:  10:45 am, arrival 9:45 am  Prep:  Suprep  Meds/Allergies Reconciled?:  Physician reviewed  Diagnosis with codes:  Screening

## 2025-04-15 NOTE — ASSESSMENT & PLAN NOTE
The likely etiology of thrombocytopenia is infection and sepsis. The patients 3 most recent labs are listed below.  Recent Labs     04/14/25  0447 04/14/25  1947 04/15/25  0350    220 219     Plan  - Will transfuse if platelet count is <10k.

## 2025-04-15 NOTE — PROGRESS NOTES
Pharmacokinetic Assessment Follow Up: IV Vancomycin    Vancomycin serum concentration assessment(s):    The random level was drawn correctly and can be used to guide therapy at this time. The measurement is below the desired definitive target range of 15 to 20 mcg/mL.    Vancomycin Regimen Plan:    Vancomycin 750mg scheduled to be administered post HD    Re-dose when the random level is less than 20 mcg/mL, next level to be drawn at 0400 on 4/17/25    Drug levels (last 3 results):  Recent Labs   Lab Result Units 04/15/25  0350   Vancomycin Random ug/ml 14.5       Pharmacy will continue to follow and monitor vancomycin.    Please contact pharmacy at extension [phone] for questions regarding this assessment.    Thank you for the consult,   Severino Belle       Patient brief summary:  Jevon Rajan is a 85 y.o. male initiated on antimicrobial therapy with IV Vancomycin for treatment of bacteremia    The patient's current regimen is random pulse dosing    Drug Allergies:   Review of patient's allergies indicates:   Allergen Reactions    Ace inhibitors Hives, Itching, Shortness Of Breath, Other (See Comments) and Rash     Is not sure which medication it was    Captopril        Actual Body Weight:   61.2 kg    Renal Function:   Estimated Creatinine Clearance: 8 mL/min (A) (based on SCr of 5.6 mg/dL (H)).,     Dialysis Method (if applicable):  intermittent HD    CBC (last 72 hours):  Recent Labs   Lab Result Units 04/13/25  0456 04/14/25  0447 04/14/25  1947 04/15/25  0350   WBC K/uL 11.07 12.09 13.96* 12.08   HGB gm/dL 8.8* 8.0* 8.2* 7.9*   HCT % 27.5* 25.2* 25.6* 24.8*   Platelet Count K/uL 210 232 220 219   Lymph % % 6.3* 5.8* 5.7* 6.9*   Mono % % 7.0 7.5 6.2 6.0   Eos % % 2.0 3.1 2.9 2.8   Basophil % % 0.4 0.4 0.3 0.5       Metabolic Panel (last 72 hours):  Recent Labs   Lab Result Units 04/13/25  0456 04/14/25  0447 04/14/25  1933 04/15/25  0350   Sodium mmol/L 139 140 140 139   Potassium mmol/L 4.2 4.0 3.5 4.1    Chloride mmol/L 102 102 100 100   CO2 mmol/L 23 22* 27 26   Glucose mg/dL 160* 169* 111* 91   BUN mg/dL 31* 47* 19 26*   Creatinine mg/dL 5.6* 7.8* 4.1* 5.6*   Albumin g/dL 2.6* 2.4* 2.5* 2.5*   Bilirubin Total mg/dL 1.2* 0.8 0.8 0.9   ALP unit/L 63 58 66 65   AST unit/L 25 22 22 28   ALT unit/L 9* 9* 7* 9*   Magnesium  mg/dL  --   --  1.7  --    Phosphorus Level mg/dL  --   --  2.8  --        Vancomycin Administrations:  vancomycin given in the last 96 hours        No antibiotic orders with administrations found.                    Microbiologic Results:  Microbiology Results (last 7 days)       Procedure Component Value Units Date/Time    Blood culture [[id number]]  (Normal) Collected: 04/11/25 1335    Order Status: Completed Specimen: Blood Updated: 04/14/25 1401     Blood Culture No Growth After 72 Hours    Blood culture [[id number]]  (Normal) Collected: 04/11/25 1335    Order Status: Completed Specimen: Blood from Other (Specify) Updated: 04/14/25 1401     Blood Culture No Growth After 72 Hours    Blood culture [[id number]]  (Normal) Collected: 04/08/25 0506    Order Status: Completed Specimen: Blood Updated: 04/13/25 0600     Blood Culture No Growth After 5 Days    Blood culture [[id number]]  (Normal) Collected: 04/08/25 0401    Order Status: Completed Specimen: Blood Updated: 04/13/25 0600     Blood Culture No Growth After 5 Days    Fungus culture [[id number]]  (Normal) Collected: 04/03/25 1816    Order Status: Completed Specimen: Wound from Arm, Left Updated: 04/10/25 2300     Fungal Culture No Fungus Isolated at 1 Week    Fungus culture [[id number]]  (Normal) Collected: 04/03/25 1934    Order Status: Completed Specimen: Tissue from AV Fistula, Left Updated: 04/10/25 2300     Fungal Culture No Fungus Isolated at 1 Week    Fungus culture [[id number]]  (Normal) Collected: 04/03/25 2011    Order Status: Completed Specimen: Wound from Arm, Left Updated: 04/10/25 2300     Fungal Culture No Fungus Isolated at 1  Week    Fungus culture [[id number]]  (Normal) Collected: 04/03/25 1904    Order Status: Completed Specimen: Tissue from AV Fistula, Left Updated: 04/10/25 2201     Fungal Culture No Fungus Isolated at 1 Week    Fungus culture [[id number]]  (Normal) Collected: 04/03/25 1921    Order Status: Completed Specimen: Tissue from hematoma Updated: 04/10/25 2201     Fungal Culture No Fungus Isolated at 1 Week    Blood culture [[id number]]  (Normal) Collected: 04/04/25 1044    Order Status: Completed Specimen: Blood Updated: 04/09/25 1100     Blood Culture No Growth After 5 Days    Blood culture [[id number]]  (Abnormal) Collected: 04/06/25 0555    Order Status: Completed Specimen: Blood Updated: 04/09/25 0720     Blood Culture Positive - Aerobic/Pediatric Bottle      Methicillin resistant Staphylococcus aureus     Comment: <null> has been updated to reportable.        GRAM STAIN Gram positive cocci in clusters resembling Staph     Comment: Aerobic Bottle Positive   This is an appended report. These results have been appended to a previously preliminary verified report.       Narrative:      For susceptibility refer to order [id number]  ID Consult Strongly Recommended    Blood culture [[id number]]  (Abnormal)  (Susceptibility) Collected: 04/06/25 0542    Order Status: Completed Specimen: Blood Updated: 04/09/25 0719     Blood Culture Positive - Aerobic/Pediatric Bottle      Methicillin resistant Staphylococcus aureus     Comment: <null> has been updated to reportable.        GRAM STAIN Gram positive cocci in clusters resembling Staph     Comment: This is an appended report. These results have been appended to a previously preliminary verified report.       Narrative:      Aerobic Bottle Positive   ID Consult Strongly Recommended

## 2025-04-15 NOTE — PROGRESS NOTES
[facility] - Intensive Care  Nephrology  Progress Note    Patient Name: Jevon Rajan  MRN: [id number]  Admission Date: 4/3/2025  Hospital Length of Stay: 12 days  Attending Provider: Tena Palma MD   Primary Care Physician: Melia, Primary Doctor  Principal Problem:Septic shock    Subjective:     HPI: Mr. Rajan is an 86 yo male with HTN, T2DM, CAD, ESRD, and liver lesion who was transferred from [facility] for septic shock and impending AVG rupture. He initially presented to [facility] on 4/1 with temp 101.9 and BP 80/30s. He was transferred to our hospital on 4/3/25; it seems his AVG ruptured during transport/shortly after arrival. He emergently went to the OR yesterday with AVG extraction; infected hematoma was noted. Workup also concerning for elevated troponin and cardiac vegetations. He is no longer on pressors. Nephrology consulted for ESRD. Prior records obtained and reviewed. He receives HD TTS at [facility] (formerly [facility]3] under the c/o Dr. Colin. He last received HD outpatient on 4/1/25. HD was held at [facility] due to unstable vascular access. Family agreed to proceed with CRRT today.     Interval History: during HD session - 2hr 15 mins in became apneic. HD stopped and code blue activated though never lost pulse per records. Improved with narcan. Dilaudid stopped.    Review of patient's allergies indicates:   Allergen Reactions    Ace inhibitors Hives, Itching, Shortness Of Breath, Other (See Comments) and Rash     Is not sure which medication it was    Captopril      Current Facility-Administered Medications   Medication Frequency    0.9%  NaCl infusion (for blood administration) Q24H PRN    0.9% NaCl infusion PRN    acetaminophen tablet 650 mg Q4H PRN    albumin human 25% bottle 25 g Daily PRN    atorvastatin tablet 80 mg QHS    clopidogreL tablet 75 mg Daily    dextrose 50% injection 12.5 g PRN    dextrose 50% injection 25 g PRN    diphenhydrAMINE capsule 50 mg Q6H PRN    epoetin mj-epbx injection 10,000 Units Every Tues,  Thurs, Sat    erythromycin 5 mg/gram (0.5 %) ophthalmic ointment Q8H    glucagon (human recombinant) injection 1 mg PRN    glucose chewable tablet 16 g PRN    glucose chewable tablet 24 g PRN    haloperidol lactate injection 2 mg Q4H PRN    heparin (porcine) injection 1,000 Units PRN    hyoscyamine SL tablet 0.125 mg Q4H PRN    insulin aspart U-100 pen 0-5 Units QID (AC + HS) PRN    insulin glargine U-100 (Lantus) pen 5 Units Daily    melatonin tablet 6 mg Nightly PRN    midodrine tablet 15 mg Q8H    naloxone 0.4 mg/mL injection 0.02 mg PRN    ondansetron injection 4 mg Q6H PRN    pantoprazole EC tablet 40 mg Daily    prochlorperazine injection Soln 5 mg Q6H PRN    senna tablet 8.6 mg Daily PRN    sodium chloride 0.9% bolus 250 mL 250 mL PRN    sodium chloride 0.9% flush 10 mL Q8H    tamsulosin 24 hr capsule 0.4 mg Daily    vancomycin - pharmacy to dose pharmacy to manage frequency       Objective:     Vital Signs (Most Recent):  Temp: 98.9 °F (37.2 °C) (04/15/25 0701)  Pulse: 96 (04/15/25 0900)  Resp: (!) 30 (04/15/25 0900)  BP: (!) 103/51 (04/15/25 0900)  SpO2: (!) 92 % (04/15/25 0900) Vital Signs (24h Range):  Temp:  [98 °F (36.7 °C)-98.9 °F (37.2 °C)] 98.9 °F (37.2 °C)  Pulse:  [] 96  Resp:  [12-59] 30  SpO2:  [87 %-100 %] 92 %  BP: ()/(51-74) 103/51     Weight: 61.2 kg (134 lb 14.7 oz) (04/04/25 1937)  Body mass index is 21.13 kg/m².  Body surface area is 1.7 meters squared.    I/O last 3 completed shifts:  In: 210 [P.O.:200; I.V.:10]  Out: 70 [Urine:70]     Physical Exam  Constitutional:       General: He is not in acute distress.     Appearance: He is ill-appearing and toxic-appearing.   HENT:      Head: Normocephalic and atraumatic.      Nose: Nose normal.   Cardiovascular:      Heart sounds: Murmur heard.      Comments: Right chest wall TDC  Pulmonary:      Breath sounds: Rales present.          Significant Labs:  All labs within the past 24 hours have been reviewed.     Significant  Imaging:  Labs: Reviewed  Assessment/Plan:     Cardiac/Vascular  Acute bacterial endocarditis  Blood cultures from 4/8 cleared  ID following    Renal/  ESRD on hemodialysis  ESRD on HD     HD TTS, [facility]  Sp AVG resection 4/3    TDC right chest wall 4/14/2025  HD 4/14 2 hr 15 min completed before apneic episode, code blue activated though never lost pulse. Per notes improved with narcan and overnight attending suspicious for opiate induced apnea. Pain medications decreased    Plan/Recommendation  Hold HD today, labs reviewed and no acute need. Will assess HD needs and safety tomorrow.  Agree with palliative discussions given recurring events. Discussed plan with primary team and palliative care team  -Keep MAP > 65  -Keep hemoglobin > 7  -Strict ins and outs  -Avoid nephrotoxic agents if possible and renally dose medications  -Avoid drastic hemodynamic changes if possible    #Secondary hyperparathyroidism  Calcium   Date Value Ref Range Status   04/15/2025 8.5 (L) 8.7 - 10.5 mg/dL Final     Phosphorus Level   Date Value Ref Range Status   04/14/2025 2.8 2.7 - 4.5 mg/dL Final     PTH, Intact   Date Value Ref Range Status   01/17/2024 117.7 (H) 9.0 - 77.0 pg/mL Final   Continue to monitor      Oncology  Anemia in ESRD (end-stage renal disease)  #Anemia  HGB   Date Value Ref Range Status   04/15/2025 7.9 (L) 14.0 - 18.0 gm/dL Final     Iron   Date Value Ref Range Status   08/11/2024 39 (L) 45 - 160 ug/dL Final     Transferrin   Date Value Ref Range Status   08/11/2024 139 (L) 200 - 375 mg/dL Final     TIBC   Date Value Ref Range Status   08/11/2024 206 (L) 250 - 450 ug/dL Final     Saturated Iron   Date Value Ref Range Status   08/11/2024 19 (L) 20 - 50 % Final     Ferritin   Date Value Ref Range Status   08/11/2024 979 (H) 20.0 - 300.0 ng/mL Final     NAKIA ordered, hb not at goal  Iron on hold due to bacteremia. Follow up outpatient      Critical care time spent on the evaluation and treatment of severe organ  dysfunction, review of pertinent labs and imaging studies, discussions with consulting providers and discussions with patient/family: 30 minutes.    Thank you for your consult. I will follow-up with patient. Please contact us if you have any additional questions.    Antoine Lauren MD  Nephrology  [facility] - Intensive Care

## 2025-04-15 NOTE — CLINICAL REVIEW
Sepsis Screen (most recent)       Sepsis Screen (IP) - 04/15/25 1043       Is the patient's history or complaint suggestive of a possible infection? Yes  -    Are there at least two of the following signs and symptoms present? Yes  -    Sepsis signs/symptoms - Tachycardia Tachycardia     >90  -    Sepsis signs/symptoms - Tachypnea Tachypnea     >20  -    Sepsis signs/symptoms - WBC WBC < 4,000 or WBC > 12,000  -    Are any of the following organ dysfunction criteria present and not considered to be due to a chronic condition? Yes   ESRD -    Organ Dysfunction Criteria - O2 O2 Saturation < 95% on room air  -    Initiate Sepsis Protocol No  -    Reason sepsis not considered Pt. receiving appropriate management  -              User Key  (r) = Recorded By, (t) = Taken By, (c) = Cosigned By      Initials Name    vEon Oscar RN

## 2025-04-15 NOTE — NURSING
Patient had difficulty of breathing and screamed. Patient was unresponsive. Code initiated by the primary RN. Hemodialysis stopped and blood returned. Additional 200mL of bolus saline on top of 250mL volume rinse back saline given. Oxygen given by nasal cannula at 5L per min. narcan given by IVTT. 30 mins post administration of narcan Patient is  responsive oriented to self. See rapid response notes. Dr. Lauren informed

## 2025-04-15 NOTE — ASSESSMENT & PLAN NOTE
Advance Care Planning     Date: 04/04/2025  - palliative consulted   - Mr Escoto specifically told Dr Jordan to contact Kenia Smyth for all updates  - discussed code status with Kenia. She states she has spoken with Mr Escoto's sisters and they all want full code status.   -anticipated DC to SNF when medically stable enough to tolerated HD and possible nursing home placement

## 2025-04-15 NOTE — ASSESSMENT & PLAN NOTE
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 Most recent hemoglobin and hematocrit are listed below.  Recent Labs     04/14/25  0447 04/14/25  1947 04/15/25  0350   HGB 8.0* 8.2* 7.9*   HCT 25.2* 25.6* 24.8*     Plan  - Monitor serial CBC: Daily and recheck now  - Transfuse PRBC if patient becomes hemodynamically unstable, symptomatic or H/H drops below 7/21.  - Patient has not received any PRBC transfusions to date  - Patient's anemia is currently stable

## 2025-04-15 NOTE — NURSING
[facility] [place]es [place] -- 4 Eyes      4/14/2025      Skin assessed on: Q Shift      [x] No Pressure Injuries Present    [x]Prevention Measures Documented    [] Yes LDA  for Pressure Injury Previously documented     [] Yes New Pressure Injury Discovered   [] LDA for New Pressure Injury Added      Attending RN:  CHARLENE Stewart     Second RN:  CHARLENE Henry

## 2025-04-15 NOTE — ASSESSMENT & PLAN NOTE
#Anemia  HGB   Date Value Ref Range Status   04/15/2025 7.9 (L) 14.0 - 18.0 gm/dL Final     Iron   Date Value Ref Range Status   08/11/2024 39 (L) 45 - 160 ug/dL Final     Transferrin   Date Value Ref Range Status   08/11/2024 139 (L) 200 - 375 mg/dL Final     TIBC   Date Value Ref Range Status   08/11/2024 206 (L) 250 - 450 ug/dL Final     Saturated Iron   Date Value Ref Range Status   08/11/2024 19 (L) 20 - 50 % Final     Ferritin   Date Value Ref Range Status   08/11/2024 979 (H) 20.0 - 300.0 ng/mL Final     NAKIA ordered, hb not at goal  Iron on hold due to bacteremia. Follow up outpatient

## 2025-04-15 NOTE — ASSESSMENT & PLAN NOTE
-Noted episodes of intermittent agitation with tactile and visual hallucinations  -Continue correction of metabolic derangements  -Maintain Delirium precautions: Maintain regular sleep cycle. Early ambulation. Minimal interruptions overnight. Re-orient patient frequently. Maintain adequate bowel regimen, hydration and electrolyte replenishments  -aspiration precautions  -trial of Seroquel 25 mg HS

## 2025-04-15 NOTE — SUBJECTIVE & OBJECTIVE
Interval History: Off CBI. Small amount of yellow urine in bag. Denies dunaway issues.     Review of Systems   Constitutional:  Negative for activity change, appetite change and fever.   HENT:  Negative for congestion and rhinorrhea.    Eyes:  Negative for visual disturbance.   Respiratory:  Negative for choking and shortness of breath.    Genitourinary:  Positive for difficulty urinating. Negative for dysuria, flank pain, hematuria, scrotal swelling, testicular pain and urgency.     Objective:     Temp:  [98 °F (36.7 °C)-98.9 °F (37.2 °C)] 98.9 °F (37.2 °C)  Pulse:  [] 94  Resp:  [12-59] 20  SpO2:  [87 %-100 %] 98 %  BP: ()/(51-68) 99/58     Body mass index is 21.13 kg/m².      Bladder Scan Volume (mL): 331 mL (04/06/25 1520)    Drains       Drain  Duration                  Urethral Catheter 04/07/25 1558 Triple-lumen;Latex 24 Fr. 7 days         Hemodialysis Catheter 04/14/25 1011 right internal jugular <1 day                     Physical Exam  Constitutional:       General: He is not in acute distress.     Appearance: He is well-developed. He is not ill-appearing, toxic-appearing or diaphoretic.   HENT:      Head: Normocephalic and atraumatic.      Mouth/Throat:      Mouth: Mucous membranes are moist.   Eyes:      Conjunctiva/sclera: Conjunctivae normal.   Pulmonary:      Effort: Pulmonary effort is normal. No respiratory distress.   Abdominal:      General: Abdomen is flat. There is no distension.      Palpations: There is no mass.      Tenderness: There is no right CVA tenderness or left CVA tenderness.   Genitourinary:     Comments: Dunaway nikki colored urine  Musculoskeletal:         General: No swelling or deformity.      Cervical back: Neck supple.   Skin:     Findings: No rash.   Neurological:      Mental Status: He is alert. Mental status is at baseline.   Psychiatric:         Mood and Affect: Mood normal.           Significant Labs:    BMP:  Recent Labs   Lab 04/14/25  0447 04/14/25 1933  "04/15/25  0350    140 139   K 4.0 3.5 4.1    100 100   CO2 22* 27 26   BUN 47* 19 26*   CREATININE 7.8* 4.1* 5.6*   GLUCOSE 169* 111* 91   CALCIUM 8.7 8.0* 8.5*       CBC:   Recent Labs   Lab 04/14/25  0447 04/14/25  1947 04/15/25  0350   WBC 12.09 13.96* 12.08   HGB 8.0* 8.2* 7.9*   HCT 25.2* 25.6* 24.8*    220 219       Blood Culture: No results for input(s): "LABBLOO" in the last 168 hours.  Urine Culture: No results for input(s): "LABURIN" in the last 168 hours.  Urine Studies: No results for input(s): "COLORU", "APPEARANCEUA", "PHUR", "SPECGRAV", "PROTEINUA", "GLUCUA", "KETONESU", "BILIRUBINUA", "OCCULTUA", "NITRITE", "UROBILINOGEN", "LEUKOCYTESUR", "RBCUA", "WBCUA", "BACTERIA", "SQUAMEPITHEL", "HYALINECASTS" in the last 168 hours.    Invalid input(s): "WRIGHTSUR"    Significant Imaging:  All pertinent imaging results/findings from the past 24 hours have been reviewed.                "

## 2025-04-15 NOTE — SUBJECTIVE & OBJECTIVE
Medications:  Continuous Infusions:  Scheduled Meds:   atorvastatin  80 mg Oral QHS    clopidogreL  75 mg Oral Daily    epoetin mj-epbx  10,000 Units Intravenous Every Tues, Thurs, Sat    erythromycin   Both Eyes Q8H    insulin glargine U-100  5 Units Subcutaneous Daily    midodrine  15 mg Oral Q8H    pantoprazole  40 mg Oral Daily    QUEtiapine  25 mg Oral QHS    sodium chloride 0.9%  10 mL Intravenous Q8H    tamsulosin  0.4 mg Oral Daily     PRN Meds:  Current Facility-Administered Medications:     0.9%  NaCl infusion (for blood administration), , Intravenous, Q24H PRN    0.9% NaCl, , Intravenous, PRN    acetaminophen, 650 mg, Oral, Q4H PRN    albumin human 25%, 25 g, Intravenous, Daily PRN    dextrose 50%, 12.5 g, Intravenous, PRN    dextrose 50%, 25 g, Intravenous, PRN    glucagon (human recombinant), 1 mg, Intramuscular, PRN    glucose, 16 g, Oral, PRN    glucose, 24 g, Oral, PRN    haloperidol lactate, 2 mg, Intravenous, Q4H PRN    heparin (porcine), 1,000 Units, Intravenous, PRN    hyoscyamine, 0.125 mg, Sublingual, Q4H PRN    insulin aspart U-100, 0-5 Units, Subcutaneous, QID (AC + HS) PRN    melatonin, 6 mg, Oral, Nightly PRN    naloxone, 0.02 mg, Intravenous, PRN    ondansetron, 4 mg, Intravenous, Q6H PRN    prochlorperazine, 5 mg, Intravenous, Q6H PRN    senna, 8.6 mg, Oral, Daily PRN    sodium chloride 0.9%, 250 mL, Intravenous, PRN    Pharmacy to dose Vancomycin consult, , , Once **AND** vancomycin - pharmacy to dose, , Intravenous, pharmacy to manage frequency    Objective:     Vital Signs (Most Recent):  Temp: 97.8 °F (36.6 °C) (04/15/25 1101)  Pulse: 95 (04/15/25 1200)  Resp: (!) 22 (04/15/25 1200)  BP: (!) 103/57 (04/15/25 1200)  SpO2: (!) 92 % (04/15/25 1200) Vital Signs (24h Range):  Temp:  [97.8 °F (36.6 °C)-98.9 °F (37.2 °C)] 97.8 °F (36.6 °C)  Pulse:  [] 95  Resp:  [12-59] 22  SpO2:  [87 %-100 %] 92 %  BP: ()/(51-74) 103/57     Weight: 61.2 kg (134 lb 14.7 oz)  Body mass index is  21.13 kg/m².     Physical Exam  Vitals and nursing note reviewed.   Constitutional:       Appearance: He is ill-appearing.      Comments: Arouses to name, able to answer simple questions with 1-2 word answers with stimulation, falls asleep quickly; varying levels of alertness and lethargy throughout the day; calling out in pain and confusion in AM but improved by later morning    HENT:      Head: Normocephalic and atraumatic.   Pulmonary:      Effort: Pulmonary effort is normal. No respiratory distress.      Comments: NC  Skin:     General: Skin is dry.      Coloration: Skin is pale.      Findings: Lesion present. Bruising: multiple skin lesions in various stages of healing. Erythema: pale/gray for ethnicity.  Neurological:      Mental Status: He is lethargic.      Comments: Answers to his name; less oriented overall; increased concerns for delirium    Psychiatric:         Mood and Affect: Mood is anxious.         Behavior: Behavior is agitated.         Thought Content: Thought content is paranoid.         Cognition and Memory: He exhibits impaired recent memory.      Comments: Pt fluctuates between lethargy and agitation; reports seeing bugs that are not there; forgets discussions/reassurances that happen a few minutes prior           Advance Care Planning   Advance Directives:   Living Will: No    LaPOST: No    Do Not Resuscitate Status: No    Medical Power of : No      Decision Making:  Family answered questions and Patient answered questions  Goals of Care: What is most important right now is to focus on symptom/pain control, curative/life-prolongation (regardless of treatment burdens), improvement in condition but with limits to invasive therapies. Accordingly, we have decided that the best plan to meet the patient's goals includes continuing with treatment.       Significant Labs: All pertinent labs within the past 24 hours have been reviewed.  CBC:   Recent Labs   Lab 04/15/25  0350   WBC 12.08   HGB  7.9*   HCT 24.8*   MCV 90        BMP:  Recent Labs   Lab 04/14/25  1933 04/15/25  0350    139   K 3.5 4.1    100   CO2 27 26   BUN 19 26*   CREATININE 4.1* 5.6*   CALCIUM 8.0* 8.5*   MG 1.7  --      LFT:  Lab Results   Component Value Date    AST 28 04/15/2025    ALKPHOS 65 04/15/2025    BILITOT 0.9 04/15/2025     Albumin:   Albumin   Date Value Ref Range Status   04/15/2025 2.5 (L) 3.5 - 5.2 g/dL Final   03/21/2025 3.2 (L) 3.5 - 5.2 g/dL Final     Protein:   Total Protein   Date Value Ref Range Status   03/21/2025 7.5 6.0 - 8.4 g/dL Final     Lactic acid:   Lab Results   Component Value Date    LACTATE 1.9 04/14/2025    LACTATE 0.9 04/03/2025     Significant Imaging: I have reviewed all pertinent imaging results/findings within the past 24 hours.

## 2025-04-15 NOTE — PROGRESS NOTES
[facility] - Intensive Care  Palliative Medicine  Progress Note    Patient Name: Jevon Rajan  MRN: [id number]  Admission Date: 4/3/2025  Hospital Length of Stay: 12 days  Code Status: Full Code   Attending Provider: Tena Palma MD  Consulting Provider: Lisa Jacinto NP  Primary Care Physician: Melia, Primary Doctor  Principal Problem:Septic shock    Patient information was obtained from patient, relative(s), and primary team.      Assessment/Plan:     Neuro  Acute metabolic encephalopathy with Hospital Delirum  - Evaluation and management per primary team   - pt requires frequent calming and reassuring of safety, forgets location of hospital and events leading to hospitalization; overall orientation is improving daily with treatment of infection, however also starting to show concerns for hospital delerium which was discussed with Hm; recommend room with window and delerium precautions   - when reoriented is able to have appropriate discussions, and is able to identify Neha garces by voice on the phone   - of note, on , pt did share with palliative team and dez Solomon by phone that his sister Eileen visited him yesterday (Eileen has been  for several years)   -  pt upset about seeing spider webs; pt does have tape for TG tube to nose and was watching spider man on TV at that time, but concerning since pt kept repeating despite reassurance and seemed upset/anxious about this which over some time seemed distressing to pt; discussed with HM and RN   - increasing concerns re-discussed with HM, now Dr. Palma, and RN; plan to move pt's room, delirium precautions, Dr. Palma to order/manage mood/pysch and sleep med regimen, recommended stopping benadryl, achieve improved day/night cycles   - niece shares that pt had been taking benadryl daily for some time prior to admission for HD pruritus; shared list of medications to avoid in elderly via email per her request ([email or web address])      Cardiac/Vascular  Acute bacterial endocarditis  - Mgmt per primary team/ID/cardiology; while he has a known valvular mass, he is being treated by antibiotics alone as he is not a surgical candidate. Prognosis is poor if cultures do not clear.   - Illness trajectory education provided     Renal/  BPH with urinary obstruction  - Mgmt per urology and primary; not able to take flomax due to encephalopathy and inability to give this via NG   - pt with ongoing episodes of pain/bladder pressure that is causing him to call out in pain and become emotionally distressed; adjustments for opioid regimen to dilaudid given ESRD in coordination with HM;  talking with niece by phone helped calm pt, niece to coordinate family to visit; worked with pt on calming exercises while awaiting for opioid admin to relieve pain   - discussion with urology and MDT for improved symptom management    ESRD on hemodialysis  - Nephrology consulted and following; pt had difficulty tolerating HD initially on admission, but improved; additional event 4/14 unclear if related to HD  - family aware of this and with insight that this is a large factor in pt's prognosis   - family with insight that HD is a form of life support; wish to continue as long as pt tolerates/candidate     ID  * Septic shock  - Secondary to MRSA bacteremia from infected fistula   - ID following    MRSA bacteremia  - Abx, mgmt per primary team and ID; per ID, prognosis is poor if cultures do not clear, given that he is not a surgical candidate.   - repeat blood cultures so far with no growth but not finalized, also some continued improvement in overall mental status showing response to current treatment   - Illness trajectory education provided to family      Palliative Care  ACP (advance care planning)  4/15/2025  - interval chart reviewed; discussed pt with MDT during ICU rounds   - ongoing concerns for hospital delirium discussed with MDT (see delirium)   - extensive  "discussion with HM regarding care through admission, family dynamics and GOC  - called Senthil garces; provided brief medical update and continued GOC discussion   - discussed delirium and need for improvement before exploring next steps of SNF vs LTACH; niece with preference for [facility] if possible, [facility]; [facility]   - Senthil expressed difficulty she is having with not being able to visit pt in person as she has a 2 year old son that she is unable to bring to ICU that she is the sole caregiver for; she expresses continued availability by phone at any time in pt's care to aid in calming pt (which has been very effective so far)   - emotional support provided; answered all questions   - updated MDT     4/11/2025  - interval chart reviewed discussed pt with MDT, including nephrology and ID   - pt continues to lack capacity for medical decision making   - emotional support provided with ongoing encephalopathy and concerns for hospital delirium; overall improving slowly   - no family at bedside today for additional discussion; MDT has discussed plan to proceed with tunneled line placement on Monday; will plan for follow up discussion with family after that time     4/10/2025  - interval chart reviewed; discussion pt with MDT during ICU rounds  - pt again with ongoing discomfort in AM 2/2 bladder irrigation; pt expressing "can't do this anymore" and other statements consistent with discomfort and contributing to need for continued GOC discussions with family   - pt's sister Ronna later visited and expressed continued gratitude for pt's care; continues to have good insight into pt's condition and potential prognosis; continues to advocate for GOC for pt's comfort; she is hopeful for continued improvement but would be open to alternate plans of care of pt continues to experience current level of discomfort to do so    - also later attempted to call " pt's niece but was unable to reach; will continue attempts   - pt with planned HD 4/10; will review tolerance and discuss ongoing plans and goals of care with MDT and family     2025  - interval chart reviewed; discussion pt with MDT during ICU rounds  - pt very uncomfortable/in pain throughout AM see BPH   - extensive time sent achieving pt comfort in AM; communicated with dez Jelanidana for medical update, along with Dr. Joan Antunez, palliative attending; also allowed Jelanidana to talk with pt to reassure and calm   - family continues to have good insight into overall condition; hopeful for improvement but pt QOL and comfort continue to be a priority in GOC   - family continuing discussions regarding code status and overall GOC; understand HD tolerance is a big factor in prognosis currently and are open to discussions if team feels he is no longer tolerating HD; continued encouragement of shared family decision making as they all seem to have consistent goals for pt   - updated family information; pt has 3 sisters and 1 brother confirmed by Neha   - of concern, pt shared seeing/speaking with his  sister; Neha is aware that this is not typically a good indicator of prognosis which she shares is concerning for her   - reassured pt and Neha that we will continue to prioritize his comfort while continuing to try to improve overall condition   - pt is a , [facility] for [facility] and or penitentiary placement if pt were to no longer qualify for HD in the future   - emotional support provided to pt and family; answered all questions   - continued communication and coordination with MDT     2025  - interval chart reviewed; discussion pt with MDT during ICU rounds   - visited pt at bedside in AM with bedside RN and provided comfort and redirection during episode of delirium; pt is able to be calmed with reassurance; pt remains oriented  only to self at this time; can verbalize needs such as when he feels the sensation to void but not oriented to having a catheter in place   - later in morning pt's sisters Ronna and Nora at bedside; brief medical update provided and reviewed pt's prognosis; Ronna and Nora shared that they thought pt previously had a DNR in place and/or had expressed wishes against resuscitation or aggressive end of life measures, but had wished to continue HD as long as tolerated; they also shared poor condition of pt's home and lack of home support that placement would be necessary in their opinion if pt's condition were to improve to allow for discharge  - later called pt's niece, Neha, who also later added her grandmother, pt's sister Bhavna, to the call; brief medical update provided; Neha and Bhavna were not aware of pt's prior wishes for DNR; discussed full code vs DNR, potential interventions, risks, and outcomes; Neha expressed considering DNR as long as full treatment and otherwise escalation of care would continue; Mrs. Huggins however expressed not wishing to be involved with that decision for pt  - encouraged family discussion with pt's niece and sisters, Nora Friend, and Bhavna (if she wished to participate)   - emotional support provided to pt and family throughout all interactions   - allowed time for questions/concerns of family   - updated MDT of family discussions     4/7/25 (Dr. Joan Antunez)   - Chart and interval history reviewed; discussed pt during MDT rounds. Pt had cystoscopy and dunaway placement by urology yesterday due to screaming and discomfort from urinary retention; found to have very enlarged prostate. Dunaway placed successfully by urology staff, though this was later removed due to pt complaining of significant pain.   - Pt screaming throughout much of the morning, possibly from urinary retention vs delirium. He was not able to participate in discussion secondary to this.   -  Along with Dr Jordan, called and spoke with Kenia (pt's preferred MPOA); she added her mother Erik to the call. Thorough medical update provided; shared transparent concern that pt's blood cultures are persistently positive. While hopeful that the infection starts to clear, there is a chance we may not be able to treat this infection, unfortunately. Additionally, pt has been very uncomfortable over the last 24 hours or so due to urinary retention and/or delirium. They expressed understanding of severity of both his acute and chronic illness, and the fragility that results naturally as a result of his advanced age.   - Discussed plan moving forward, as well as code status. At this time, plan remains to continue with maximal medical therapy in hopes of improvement, though they have agreed to further discuss code status as a family. At this time, would maintain full code.   - Support provided during call   - Encouraged visitation if possible, as perhaps this will be a calming presence for pt  - Will follow up with pt and family for further conversations as clinical course evolves    4/4/25 (Lisa Jacinto NP)   - Consult received; interval chart reviewed in detail; discussed pt with MDT during ICU rounds and ongoing throughout the day   - met with patient at bedside; introduction to palliative medicine team and role in current care and admission   - pt very lethargic and required encouragement and stimulation for orientation assessment; pt answers to name, able to state his 1st name, identifies he is at the hospital, and when asked who team should call for help with his medical care he answered Kenia (which is consistent with what he told  4/3 as well); quickly falls back asleep   - pt unable to participate in detailed GOC/ACP discussion on his own behalf at this time   - called Kenia (pt's niece) who shares that she has been pt's main caregiver for some time; she confirms pt is not , does not  "have children, and that he has 3 sisters; she also had pt's sister Bhavna join by phone for update/discussion   - Mrs. Huggins also shared that Kenia takes care of pt and understands his current history, needs, and wishes; per Bhavna, Kenia should be pt's medical decision maker while he lacks capacity, and confirms that pt's others sisters agree with this; they have asked that no other visitors or callers aside from Deshanta and pts sisters (Bhavna, Ronna, and Nora receive updates)   - Kenia and Bhavna confirm that they feel pt would wish to continue HD  - reviewed pt's current full code status, potential interventions, risks, and outcomes; Jelanisampson and hBavna agree for pt to remain full code, but were open to further discussion ideally when pt is able to participate in the discussion; updated pt's team of this and added this to EMR   - both shared difficulty pt has had recently and frustration with attempts to seek care to get answers to ongoing health concerns; they both shared appropriate concerns for pt's current condition and are hopeful for answers and improvement in pt's condition; they shared that they feel the [facility] ICU team is moving quickly and working to improve his condition for which they are appreciative   - emotional support provided   - Allowed time for questions/concerns; all addressed; expressed availability of myself/palliative team for additional questions/concerns   - attempted to call pt's other sister, Ronna, as she had called unit for updates; unable to reach,    - updated MDT; ongoing communication and coordination with MDT         I will follow-up with patient. Please contact us if you have any additional questions.    Subjective:     Chief Complaint:   Chief Complaint   Patient presents with    Bleeding/Bruising       HPI:   From H&P: " Mr Jevon Rajan is a 85 y.o. man with ESRD with LUE AVF, HFpEF last EF 50-55%, CAD, DM who was transferred to [facility] ICU for septic " "shock due to MRSA bacteremia.      He was admitted at [facility] 4/1-3/2025 with sepsis, worsening to septic shock, then identified source as MRSA. 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      Upon arrival to [facility], he is moaning in pain and his LUE AVF has active pulsatile bright red blood. He does respond to his name. He denies pain anywhere other than his LUE. He is on levophed at 0.05. "     Palliative medicine consulted for goals of care discussion and advance care planning; for details of visit, see advance care planning section of plan.       Hospital Course:  No notes on file    Medications:  Continuous Infusions:  Scheduled Meds:   atorvastatin  80 mg Oral QHS    clopidogreL  75 mg Oral Daily    epoetin mj-epbx  10,000 Units Intravenous Every Tues, Thurs, Sat    erythromycin   Both Eyes Q8H    insulin glargine U-100  5 Units Subcutaneous Daily    midodrine  15 mg Oral Q8H    pantoprazole  40 mg Oral Daily    QUEtiapine  25 mg Oral QHS    sodium chloride 0.9%  10 mL Intravenous Q8H    tamsulosin  0.4 mg Oral Daily     PRN Meds:  Current Facility-Administered Medications:     0.9%  NaCl infusion (for blood administration), , Intravenous, Q24H PRN    0.9% NaCl, , Intravenous, PRN    acetaminophen, 650 mg, Oral, Q4H PRN    albumin human 25%, 25 g, Intravenous, Daily PRN    dextrose 50%, 12.5 g, Intravenous, PRN    dextrose 50%, 25 g, Intravenous, PRN    glucagon (human recombinant), 1 mg, Intramuscular, PRN    glucose, 16 g, Oral, PRN    glucose, 24 g, Oral, PRN    haloperidol lactate, 2 mg, Intravenous, Q4H PRN    heparin (porcine), 1,000 Units, Intravenous, PRN    hyoscyamine, 0.125 mg, Sublingual, Q4H PRN    insulin aspart U-100, 0-5 Units, Subcutaneous, QID (AC + HS) PRN    melatonin, 6 mg, Oral, Nightly PRN    naloxone, 0.02 mg, Intravenous, PRN    ondansetron, 4 mg, Intravenous, " Q6H PRN    prochlorperazine, 5 mg, Intravenous, Q6H PRN    senna, 8.6 mg, Oral, Daily PRN    sodium chloride 0.9%, 250 mL, Intravenous, PRN    Pharmacy to dose Vancomycin consult, , , Once **AND** vancomycin - pharmacy to dose, , Intravenous, pharmacy to manage frequency    Objective:     Vital Signs (Most Recent):  Temp: 97.8 °F (36.6 °C) (04/15/25 1101)  Pulse: 95 (04/15/25 1200)  Resp: (!) 22 (04/15/25 1200)  BP: (!) 103/57 (04/15/25 1200)  SpO2: (!) 92 % (04/15/25 1200) Vital Signs (24h Range):  Temp:  [97.8 °F (36.6 °C)-98.9 °F (37.2 °C)] 97.8 °F (36.6 °C)  Pulse:  [] 95  Resp:  [12-59] 22  SpO2:  [87 %-100 %] 92 %  BP: ()/(51-74) 103/57     Weight: 61.2 kg (134 lb 14.7 oz)  Body mass index is 21.13 kg/m².     Physical Exam  Vitals and nursing note reviewed.   Constitutional:       Appearance: He is ill-appearing.      Comments: Arouses to name, able to answer simple questions with 1-2 word answers with stimulation, falls asleep quickly; varying levels of alertness and lethargy throughout the day; calling out in pain and confusion in AM but improved by later morning    HENT:      Head: Normocephalic and atraumatic.   Pulmonary:      Effort: Pulmonary effort is normal. No respiratory distress.      Comments: NC  Skin:     General: Skin is dry.      Coloration: Skin is pale.      Findings: Lesion present. Bruising: multiple skin lesions in various stages of healing. Erythema: pale/gray for ethnicity.  Neurological:      Mental Status: He is lethargic.      Comments: Answers to his name; less oriented overall; increased concerns for delirium    Psychiatric:         Mood and Affect: Mood is anxious.         Behavior: Behavior is agitated.         Thought Content: Thought content is paranoid.         Cognition and Memory: He exhibits impaired recent memory.      Comments: Pt fluctuates between lethargy and agitation; reports seeing bugs that are not there; forgets discussions/reassurances that happen a  few minutes prior           Advance Care Planning   Advance Directives:   Living Will: No    LaPOST: No    Do Not Resuscitate Status: No    Medical Power of : No      Decision Making:  Family answered questions and Patient answered questions  Goals of Care: What is most important right now is to focus on symptom/pain control, curative/life-prolongation (regardless of treatment burdens), improvement in condition but with limits to invasive therapies. Accordingly, we have decided that the best plan to meet the patient's goals includes continuing with treatment.       Significant Labs: All pertinent labs within the past 24 hours have been reviewed.  CBC:   Recent Labs   Lab 04/15/25  0350   WBC 12.08   HGB 7.9*   HCT 24.8*   MCV 90        BMP:  Recent Labs   Lab 04/14/25  1933 04/15/25  0350    139   K 3.5 4.1    100   CO2 27 26   BUN 19 26*   CREATININE 4.1* 5.6*   CALCIUM 8.0* 8.5*   MG 1.7  --      LFT:  Lab Results   Component Value Date    AST 28 04/15/2025    ALKPHOS 65 04/15/2025    BILITOT 0.9 04/15/2025     Albumin:   Albumin   Date Value Ref Range Status   04/15/2025 2.5 (L) 3.5 - 5.2 g/dL Final   03/21/2025 3.2 (L) 3.5 - 5.2 g/dL Final     Protein:   Total Protein   Date Value Ref Range Status   03/21/2025 7.5 6.0 - 8.4 g/dL Final     Lactic acid:   Lab Results   Component Value Date    LACTATE 1.9 04/14/2025    LACTATE 0.9 04/03/2025     Significant Imaging: I have reviewed all pertinent imaging results/findings within the past 24 hours.    Total visit time: 81 minutes    35 min visit time including: face to face time in discussion of symptom assessment, and exploring options and burdens of offered treatments.  This also includes non-face to face time preparing to see the patient including chart review, obtaining and/or reviewing separately obtained history, documenting clinical information in the electronic or other health record, independently interpreting results and  communicating results to the patient/family/caregiver, family discussions by phone if not able to be present, coordination of care with other specialists, and discharge planning.     46 min ACP time spent: goals of care, advanced care planning, emotional support, formulating and communicating prognosis, exploring burden/ benefit of various approaches of treatment.     Lisa Jacinto NP  Palliative Medicine  [facility]    Lisa Jacinto NP  Palliative Medicine  [facility] [place] - Intensive Care

## 2025-04-15 NOTE — PROGRESS NOTES
[facility]  Adult Nutrition  Progress Note    SUMMARY     Recommendations    Recommendation:  1. Continue with texture modified diet per SLP recommendations  2. Continue with TF of Novasource Renal at 10ml/hr and advance as tolerated to goal rate of 40ml/hr which would provide 1920 kcal, 87g protein, & 688ml free water if adequate oral intake is not obtained  3. Monitor weight/labs.   4. RD to follow to monitor nutrition status.    Goals:  Patient to consume/receive >75% of EEN prior to RD follow up   Nutrition Goal Status: continues   Communication of RD Recs: reviewed with RN    Nutrition Discharge Planning   Nutrition Discharge Planning: Diet texture per SLP    Assessment and Plan  Nutrition Problem  Inadequate energy intake    Related to (etiology):   AMS, septic shock    Signs and Symptoms (as evidenced by):   Estimated energy intake less than estimated needs      Interventions:  Texture modified diet   Enteral Nutrition Management   Collaboration with other providers    Nutrition Diagnosis Status:   Continues     Malnutrition Assessment  Weight Loss (Malnutrition):  (12% x 6 months)       Reason for Assessment  Reason For Assessment: RD follow-up  Diagnosis:  (septic shock)  General Information Comments: Pt admitted to [facility] with weakness. Transferred to  for higher level of care. CRRT started. s/p LUE artery exploration and graft removal 4/3. Receives HD outpatient. Manas 11- L arm incision. NG tube. Noted 20lb weight loss x 6 months. Unable to assess NFPE 2/2 RD working remotely for weekend coverage. Pt on contact isolation for MRSA.    Follow up 4/11/2025:   Patient continues with texture modified diet.  SLP following patient.  TF recommendations remain in chart if needed due to inadequate oral intake.  Labs reviewed.  Malnutrition suspected.  NFPE to be performed by on site [facility] 4/10/25.  RD to continue to monitor EN support, oral intake and tolerance.   "Commercial beverages previously ordered by another provider.     Follow up 4/15/2025:  Patient is on a texture modified IDDSI level 4 diet with intake varying between 25-75%.  Patient is consuming some snack type intake.  Labs reviewed.  NKFA.  LBM:  4/15/2025.  TF is not active at this time.  RD to continue to monitor po intake and tolerance at each follow up visit.           Past Medical History:   Diagnosis Date    BPH (benign prostatic hyperplasia)     Coronary artery disease     [facility]. In  he had stent to LAD. He states he has had 2 heart attacks. He does not get angina. There was 90% left anterior descending artery stenosis undergoing stenting. There was also a circumflex marginal branch stenosis of 70%.    Diabetes mellitus, type 2     ESRD (end stage renal disease) on dialysis     ESRD on HD TTS via left brachial AVF    Hypertension     Hypothyroidism, unspecified     NSTEMI (non-ST elevated myocardial infarction) 2025    Sepsis 2025    Symptomatic anemia 01/15/2024        Nutrition/Diet History  Food Preferences: no Muslim or cultural food prefs identified  Spiritual, Cultural Beliefs, Rastafari Practices, Values that Affect Care: no  Factors Affecting Nutritional Intake: chewing difficulties/inability to chew food, difficulty/impaired swallowing    Anthropometrics  Height: 5' 7" (170.2 cm)  Height (inches): 67 in  Weight: 61.2 kg (134 lb 14.7 oz)  Weight (lb): 134.92 lb  Weight Method: Bed Scale  Ideal Body Weight (IBW), Male: 148 lb  % Ideal Body Weight, Male (lb): 91.16 %  BMI (Calculated): 21.1  BMI Grade: 18.5-24.9 - normal  Usual Body Weight (UBW), k.9 kg (10/20)  % Usual Body Weight: 87.74  % Weight Change From Usual Weight: -12.45 %    Lab/Procedures/Meds  Pertinent Labs Reviewed: reviewed  BMP  Lab Results   Component Value Date     04/15/2025    K 4.1 04/15/2025     04/15/2025    CO2 26 " 04/15/2025    BUN 26 (H) 04/15/2025    CREATININE 5.6 (H) 04/15/2025    CALCIUM 8.5 (L) 04/15/2025    ANIONGAP 13 04/15/2025    EGFRNORACEVR 9 (L) 04/15/2025     Lab Results   Component Value Date    HGBA1C 5.7 (H) 04/02/2025     Lab Results   Component Value Date    CALCIUM 8.5 (L) 04/15/2025    PHOS 2.8 04/14/2025     Glucose   Date Value Ref Range Status   03/21/2025 150 (H) 70 - 110 mg/dL Final       Pertinent Medications Reviewed: reviewed  Pertinent Medications Comments: aspirin, heparin, pantoprazole, senna  Scheduled Meds:   atorvastatin  80 mg Oral QHS    clopidogreL  75 mg Oral Daily    epoetin mj-epbx  10,000 Units Intravenous Every Tues, Thurs, Sat    erythromycin   Both Eyes Q8H    insulin glargine U-100  5 Units Subcutaneous Daily    midodrine  15 mg Oral Q8H    pantoprazole  40 mg Oral Daily    QUEtiapine  25 mg Oral QHS    sodium chloride 0.9%  10 mL Intravenous Q8H    tamsulosin  0.4 mg Oral Daily     Continuous Infusions:  PRN Meds:.  Current Facility-Administered Medications:     0.9%  NaCl infusion (for blood administration), , Intravenous, Q24H PRN    0.9% NaCl, , Intravenous, PRN    acetaminophen, 650 mg, Oral, Q4H PRN    albumin human 25%, 25 g, Intravenous, Daily PRN    dextrose 50%, 12.5 g, Intravenous, PRN    dextrose 50%, 25 g, Intravenous, PRN    glucagon (human recombinant), 1 mg, Intramuscular, PRN    glucose, 16 g, Oral, PRN    glucose, 24 g, Oral, PRN    haloperidol lactate, 2 mg, Intravenous, Q4H PRN    heparin (porcine), 1,000 Units, Intravenous, PRN    hyoscyamine, 0.125 mg, Sublingual, Q4H PRN    insulin aspart U-100, 0-5 Units, Subcutaneous, QID (AC + HS) PRN    melatonin, 6 mg, Oral, Nightly PRN    naloxone, 0.02 mg, Intravenous, PRN    ondansetron, 4 mg, Intravenous, Q6H PRN    prochlorperazine, 5 mg, Intravenous, Q6H PRN    senna, 8.6 mg, Oral, Daily PRN    sodium chloride 0.9%, 250 mL, Intravenous, PRN    Pharmacy to dose Vancomycin consult, , , Once **AND** vancomycin -  pharmacy to dose, , Intravenous, pharmacy to manage frequency    Estimated/Assessed Needs  Weight Used For Calorie Calculations: 61.2 kg (134 lb 14.7 oz)  Energy Calorie Requirements (kcal): 1836 (30 kcal/kg)  Energy Need Method: Kcal/kg  Protein Requirements: 73g (1.2g/kg)  Weight Used For Protein Calculations: 61.2 kg (134 lb 14.7 oz)  Estimated Fluid Requirement Method: RDA Method  RDA Method (mL): 1836  CHO Requirement: 200g    Nutrition Prescription Ordered  Current Diet Order: IDDSI level 4: pureed, renal, consistent carbohydrate  Oral Nutrition Supplement: Novasource renal    Evaluation of Received Nutrient/Fluid Intake  % Kcal Needs: 25-75%  % Protein Needs: 25-75%  I/O: 4/15/25: -11.8L since admit  Energy Calories Required: not meeting needs  Protein Required: not meeting needs  Fluid Required: not meeting needs  Comments: LBM: 4/15/2025  % Intake of Estimated Energy Needs: Other: 25-75%  % Meal Intake: Other: 25-75%    Nutrition Risk  Level of Risk/Frequency of Follow-up:  (2xweekly)     Monitor and Evaluation  Monitor and Evaluation: Energy intake     Nutrition Related Social Determinants of Health: SDOH: Adequate food in home environment     Nutrition Follow-Up  RD Follow-up?: Yes  vEon Beavers, MS, RDN, LDN

## 2025-04-15 NOTE — ASSESSMENT & PLAN NOTE
"4/15/2025  - interval chart reviewed; discussed pt with MDT during ICU rounds   - ongoing concerns for hospital delirium discussed with MDT (see delirium)   - extensive discussion with HM regarding care through admission, family dynamics and GOC  - called Senthil garces; provided brief medical update and continued GOC discussion   - discussed delirium and need for improvement before exploring next steps of SNF vs LTACH; niece with preference for [facility] facilities if possible, [facility]; [facility]   - Senthil expressed difficulty she is having with not being able to visit pt in person as she has a 2 year old son that she is unable to bring to ICU that she is the sole caregiver for; she expresses continued availability by phone at any time in pt's care to aid in calming pt (which has been very effective so far)   - emotional support provided; answered all questions   - updated MDT     4/11/2025  - interval chart reviewed discussed pt with MDT, including nephrology and ID   - pt continues to lack capacity for medical decision making   - emotional support provided with ongoing encephalopathy and concerns for hospital delirium; overall improving slowly   - no family at bedside today for additional discussion; MDT has discussed plan to proceed with tunneled line placement on Monday; will plan for follow up discussion with family after that time     4/10/2025  - interval chart reviewed; discussion pt with MDT during ICU rounds  - pt again with ongoing discomfort in AM 2/2 bladder irrigation; pt expressing "can't do this anymore" and other statements consistent with discomfort and contributing to need for continued GOC discussions with family   - pt's sister Ronna later visited and expressed continued gratitude for pt's care; continues to have good insight into pt's condition and potential prognosis; continues to advocate for GOC for pt's comfort; she is hopeful for " continued improvement but would be open to alternate plans of care of pt continues to experience current level of discomfort to do so    - also later attempted to call pt's niece but was unable to reach; will continue attempts   - pt with planned HD 4/10; will review tolerance and discuss ongoing plans and goals of care with MDT and family     2025  - interval chart reviewed; discussion pt with MDT during ICU rounds  - pt very uncomfortable/in pain throughout AM see BPH   - extensive time sent achieving pt comfort in AM; communicated with dez Solomon for medical update, along with Dr. Joan Antunez, palliative attending; also allowed Neha to talk with pt to reassure and calm   - family continues to have good insight into overall condition; hopeful for improvement but pt QOL and comfort continue to be a priority in GOC   - family continuing discussions regarding code status and overall GOC; understand HD tolerance is a big factor in prognosis currently and are open to discussions if team feels he is no longer tolerating HD; continued encouragement of shared family decision making as they all seem to have consistent goals for pt   - updated family information; pt has 3 sisters and 1 brother confirmed by Neha   - of concern, pt shared seeing/speaking with his  sister; Neha is aware that this is not typically a good indicator of prognosis which she shares is concerning for her   - reassured pt and Neha that we will continue to prioritize his comfort while continuing to try to improve overall condition   - pt is a , [facility] with [facility] for [facility] and or retirement placement if pt were to no longer qualify for HD in the future   - emotional support provided to pt and family; answered all questions   - continued communication and coordination with MDT     2025  - interval chart reviewed; discussion pt with MDT during ICU rounds   -  visited pt at bedside in AM with bedside RN and provided comfort and redirection during episode of delirium; pt is able to be calmed with reassurance; pt remains oriented only to self at this time; can verbalize needs such as when he feels the sensation to void but not oriented to having a catheter in place   - later in morning pt's sisters Ronna and Nora at bedside; brief medical update provided and reviewed pt's prognosis; Ronna and Nora shared that they thought pt previously had a DNR in place and/or had expressed wishes against resuscitation or aggressive end of life measures, but had wished to continue HD as long as tolerated; they also shared poor condition of pt's home and lack of home support that placement would be necessary in their opinion if pt's condition were to improve to allow for discharge  - later called pt's niece, Neha, who also later added her grandmother, pt's sister Bhavna, to the call; brief medical update provided; Neha and Bhavna were not aware of pt's prior wishes for DNR; discussed full code vs DNR, potential interventions, risks, and outcomes; Neha expressed considering DNR as long as full treatment and otherwise escalation of care would continue; Mrs. Huggins however expressed not wishing to be involved with that decision for pt  - encouraged family discussion with pt's niece and sisters, Nora Friend, and Bhavna (if she wished to participate)   - emotional support provided to pt and family throughout all interactions   - allowed time for questions/concerns of family   - updated MDT of family discussions     4/7/25 (Dr. Joan Antunez)   - Chart and interval history reviewed; discussed pt during MDT rounds. Pt had cystoscopy and dunaway placement by urology yesterday due to screaming and discomfort from urinary retention; found to have very enlarged prostate. Dunaway placed successfully by urology staff, though this was later removed due to pt complaining of  significant pain.   - Pt screaming throughout much of the morning, possibly from urinary retention vs delirium. He was not able to participate in discussion secondary to this.   - Along with Dr Jordan, called and spoke with Kenia (pt's preferred MPOA); she added her mother Erik to the call. Thorough medical update provided; shared transparent concern that pt's blood cultures are persistently positive. While hopeful that the infection starts to clear, there is a chance we may not be able to treat this infection, unfortunately. Additionally, pt has been very uncomfortable over the last 24 hours or so due to urinary retention and/or delirium. They expressed understanding of severity of both his acute and chronic illness, and the fragility that results naturally as a result of his advanced age.   - Discussed plan moving forward, as well as code status. At this time, plan remains to continue with maximal medical therapy in hopes of improvement, though they have agreed to further discuss code status as a family. At this time, would maintain full code.   - Support provided during call   - Encouraged visitation if possible, as perhaps this will be a calming presence for pt  - Will follow up with pt and family for further conversations as clinical course evolves    4/4/25 (Lisa Jacinto NP)   - Consult received; interval chart reviewed in detail; discussed pt with MDT during ICU rounds and ongoing throughout the day   - met with patient at bedside; introduction to palliative medicine team and role in current care and admission   - pt very lethargic and required encouragement and stimulation for orientation assessment; pt answers to name, able to state his 1st name, identifies he is at the hospital, and when asked who team should call for help with his medical care he answered Kenia (which is consistent with what he told  4/3 as well); quickly falls back asleep   - pt unable to participate in detailed GOC/ACP  discussion on his own behalf at this time   - called Kenia (pt's niece) who shares that she has been pt's main caregiver for some time; she confirms pt is not , does not have children, and that he has 3 sisters; she also had pt's sister Bhavna join by phone for update/discussion   - Mrs. Huggins also shared that Kenia takes care of pt and understands his current history, needs, and wishes; per Bhavna, Kenia should be pt's medical decision maker while he lacks capacity, and confirms that pt's others sisters agree with this; they have asked that no other visitors or callers aside from Deshanta and pts sisters (Bhavna, Ronna, and Nora receive updates)   - Kenia and Bhavna confirm that they feel pt would wish to continue HD  - reviewed pt's current full code status, potential interventions, risks, and outcomes; Kenia and Bhavna agree for pt to remain full code, but were open to further discussion ideally when pt is able to participate in the discussion; updated pt's team of this and added this to EMR   - both shared difficulty pt has had recently and frustration with attempts to seek care to get answers to ongoing health concerns; they both shared appropriate concerns for pt's current condition and are hopeful for answers and improvement in pt's condition; they shared that they feel the  ICU team is moving quickly and working to improve his condition for which they are appreciative   - emotional support provided   - Allowed time for questions/concerns; all addressed; expressed availability of myself/palliative team for additional questions/concerns   - attempted to call pt's other sister, Ronna, as she had called unit for updates; unable to reach,    - updated MDT; ongoing communication and coordination with MDT

## 2025-04-15 NOTE — NURSING
Pt receiving dialysis. Pt yelled, upon entry into room, pt was right side lying, unresponsive, rigid, agonally breathing. Code blue called overhead. Pulse was not lost. Pt was bagged. Dialysis RN rinsed blood back. Pt tachycardic 125s prior to event then dropped to 80s with bigeminy PACs present. Dr. Jones to bedside. eICU called to bedside. Dialysis was not restarted 464 ml removed over 2:15. Pt became responsive and following commands, 0.2 mg narcan given with no additional response.

## 2025-04-15 NOTE — ASSESSMENT & PLAN NOTE
Patient's blood pressure range in the last 24 hours was: BP  Min: 95/74  Max: 130/66.The patient's inpatient anti-hypertensive regimen is listed below:  Current Antihypertensives       Plan  - admitted with shock  - now off vasopressors. Continue to hold BP Rx as BP is well controlled without it

## 2025-04-15 NOTE — PLAN OF CARE
Pt remains in ICU. (Read previous note on event at beginning of shift). Niece updated via phone call. VS WNL. O2 weaned to RA. Pt attempt climbing out of bed and taking off gown, oxygen, and pulse ox; restraints were placed. Pt c/o itching and inability to sleep with repeated requests PRN medications given.   Problem: Adult Inpatient Plan of Care  Goal: Plan of Care Review  Outcome: Progressing  Goal: Patient-Specific Goal (Individualized)  Outcome: Progressing  Goal: Absence of Hospital-Acquired Illness or Injury  Outcome: Progressing  Goal: Optimal Comfort and Wellbeing  Outcome: Progressing  Goal: Readiness for Transition of Care  Outcome: Progressing     Problem: Diabetes Comorbidity  Goal: Blood Glucose Level Within Targeted Range  Outcome: Progressing     Problem: Sepsis/Septic Shock  Goal: Optimal Coping  Outcome: Progressing  Goal: Absence of Bleeding  Outcome: Progressing  Goal: Blood Glucose Level Within Targeted Range  Outcome: Progressing  Goal: Absence of Infection Signs and Symptoms  Outcome: Progressing  Goal: Optimal Nutrition Intake  Outcome: Progressing     Problem: Infection  Goal: Absence of Infection Signs and Symptoms  Outcome: Progressing     Problem: Fall Injury Risk  Goal: Absence of Fall and Fall-Related Injury  Outcome: Progressing     Problem: Skin Injury Risk Increased  Goal: Skin Health and Integrity  Outcome: Progressing     Problem: Wound  Goal: Optimal Coping  Outcome: Progressing  Goal: Optimal Functional Ability  Outcome: Progressing  Goal: Absence of Infection Signs and Symptoms  Outcome: Progressing  Goal: Improved Oral Intake  Outcome: Progressing  Goal: Optimal Pain Control and Function  Outcome: Progressing  Goal: Skin Health and Integrity  Outcome: Progressing  Goal: Optimal Wound Healing  Outcome: Progressing     Problem: Coping Ineffective  Goal: Effective Coping  Outcome: Progressing     Problem: Urinary Elimination Management  Goal: Effective Urinary  Elimination/Continence  Outcome: Progressing     Problem: Urinary Retention  Goal: Effective Urinary Elimination  Outcome: Progressing     Problem: Restraint, Nonviolent  Goal: Absence of Harm or Injury  Outcome: Progressing

## 2025-04-15 NOTE — PLAN OF CARE
Changes in medical condition or discharge plan:  Perm cath placed.  HD tomorrow and MD will assess for tolerance.  DC planning ongoing.    Does patient need new DME? tbd    Follow up appts needed: Patient will need followup appointments with PCP and addtional appointments as ordered by the discharging provider.      Medically stable: Not at this time    Estimated Discharge Date: unknown       04/15/25 1412   Discharge Reassessment   Assessment Type Discharge Planning Reassessment   Did the patient's condition or plan change since previous assessment? Yes   Discharge Plan discussed with:   (in SIBR)   Communicated BAYRON with patient/caregiver Date not available/Unable to determine   Discharge Plan A Home Health  (resume OP HD)   Discharge Plan B   (tbd)   DME Needed Upon Discharge    (tbd)   Transition of Care Barriers None   Why the patient remains in the hospital Requires continued medical care

## 2025-04-15 NOTE — ASSESSMENT & PLAN NOTE
Patient with known CAD s/p stent placement, which is controlled Will continue ASA, Plavix, and Statin and monitor for S/Sx of angina/ACS. Continue to monitor on telemetry.   - last [facility] was 1/2025: Severely calcified mid RCA stenosis unable to cross with PTCA balloons, treated initially with 0.9 laser atherectomy, followed by CSI atherectomy system with total of 5 runs (3 at low speed, 2 at high speed), pre-dilated using 2.0 compliant and 3.5 noncompliant balloon followed by 3.5 X 16 mm megatron stent.  Focal plaque rupture/erosion noted in the proximal and ostial RCA that were treated with  3.5 X 28 in the proximal RCA, 4.0 X 16 mm in the ostial RCA.  No residual stenosis post intervention.  Patient had ALAN 3 flow at the end of the procedure  - with elevated troponin likely due to septic shock  Recent Labs   Lab 04/12/25  1216   TROPONINI 0.811*     - continue asa, plavix, statin  - Cardiology consulted  - no plans for ischemic evaluation at this time

## 2025-04-15 NOTE — EICU
Intervention Initiated From:  Bedside    Jude intervened regarding:  BP, ECG Change, HR, and Respiratory      Comments: Called to the bedside after the pt briefly went unresponsive with a low bp and agonal breathing.  There was no loss of pulse and his CRRT was turned off.  MD at the bedside.  , /66 (90), O2 99%, RR 17.

## 2025-04-15 NOTE — EICU
Intervention Initiated From:  COR / HUMERAU    Jude intervened regarding:  Rounding (Video assessment)    VICU Night Rounds Checklist  24H Vital Sign Range:  Temp:  [97.1 °F (36.2 °C)-98.7 °F (37.1 °C)]   Pulse:  []   Resp:  [12-36]   BP: ()/(51-68)   SpO2:  [87 %-100 %]     Video rounds and LDA reconciliation

## 2025-04-15 NOTE — PROGRESS NOTES
"[facility]ensive [facility]  [facility] Medicine  Progress Note    Patient Name: Jevon Rajan  MRN: [id number]  Patient Class: IP- Inpatient   Admission Date: 4/3/2025  Length of Stay: 12 days  Attending Physician: Tena Palma MD  Primary Care Provider: Melia, Primary Doctor        Subjective     Principal Problem:Septic shock        HPI:  Mr Jevon Rajan is a 85 y.o. man with ESRD with LUE AVF, HFpEF last EF 50-55%, CAD, DM who was transferred to [facility] ICU for septic shock due to MRSA bacteremia.     He was admitted at [facility] 4/1-3/2025 with sepsis, worsening to septic shock, then identified source as MRSA. 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     Upon arrival to [facility], he is moaning in pain and his LUE AVF has active pulsatile bright red blood. He does respond to his name. He denies pain anywhere other than his LUE. He is on levophed at 0.05.     Overview/Hospital Course:  Mr Jevon Rajan is a 85 y.o. man with ESRD on HD with LUE AVF that has been bleeding, CHF, CAD s/p recent stenting 1/2025 who was admitted with MRSA bacteremia and bleeding from his LUE AVF. Continued vancomycin; weaned off levophed. Vascular surgery emergently consulted; on 4/3/25 they took him to OR and noted: "well incorporated proximal and central graft without evidence of infection, resected graft and covered proximal and central remnants with multiple layers. Mid graft removed in entirety and sent for culture. Ruptured pseudoaneurysm with infected hematoma, graft completely obliterated at mid segment." Surgical cultures with Staph. Suspect AVF or chronic scratching of pruritic skin is the source of his infection. TTE showed endocarditis: EF 40-45%, G1DD, RV systolic dysfunction, large 1.9x1.2cm fixed mass on the posterior mitral valve leaflet with moder to severe regurgitation, cannot rule out posterior " mitral valve leaflet perforation. Discussed with cardiac surgery; he is high mortality risk, and surgery is not advised. ID following. Nephrology consulted; trialysis was placed for HD. Persistently positive for MRSA in blood cultures. He has had urinary retention; Urology consulted, performed bedside cystoscopy on 4/6/25 with large prostate noted. Noted to have clot retention and went urgently to OR with Urology on 4/7/25; asa and DVT ppx held.     WBC normalized. S/p clot removal with Urology, 1U PRBC ordered this morning with Hb 6.1. Soft BPs, will add midodrine for HD to step down to floor. Glucoses high, will increase insulin. Attempted to s/d but BP too low, may still need CRRT rather. 4/8 cx clear so far. Increasing insulin again. Increasing midodrine to 15 mg TID. Hb 6.8, 1U PRBC ordered.     BP appears to be recovering a bit, perhaps will tolerate reg HD, will discuss w Neph- will run him on HD Saturday, if tolerates normal HD will remove central line and give 2 day holiday and place tunnel on Monday. We will give him HD before dc to facility on Tuesday if accepted by then.     WBC has normalized for the first time, now tolerating reg HD, and Blcx remain clear. Central line removed. NGT removed. Will step down.     Interval History:  Patient was unresponsive with agonal breathing overnight; rapid response called.  Patient was Ambu bagged but did not lose pulse.  Dilaudid discontinued.  Patient seen in bed this morning; confused, delirious and complaining of bugs.  Currently hemodynamically stable    Review of Systems  Objective:     Vital Signs (Most Recent):  Temp: 97.8 °F (36.6 °C) (04/15/25 1101)  Pulse: 95 (04/15/25 1200)  Resp: (!) 22 (04/15/25 1200)  BP: (!) 103/57 (04/15/25 1200)  SpO2: (!) 92 % (04/15/25 1200) Vital Signs (24h Range):  Temp:  [97.8 °F (36.6 °C)-98.9 °F (37.2 °C)] 97.8 °F (36.6 °C)  Pulse:  [] 95  Resp:  [12-59] 22  SpO2:  [87 %-100 %] 92 %  BP: ()/(51-74) 103/57      Weight: 61.2 kg (134 lb 14.7 oz)  Body mass index is 21.13 kg/m².    Intake/Output Summary (Last 24 hours) at 4/15/2025 1317  Last data filed at 4/15/2025 0044  Gross per 24 hour   Intake 210 ml   Output 20 ml   Net 190 ml         Physical Exam  Constitutional:       General: He is not in acute distress.     Appearance: He is ill-appearing. He is not toxic-appearing or diaphoretic.   Cardiovascular:      Rate and Rhythm: Normal rate and regular rhythm.      Pulses: Normal pulses.      Heart sounds: Normal heart sounds.   Pulmonary:      Effort: Pulmonary effort is normal. No respiratory distress.      Breath sounds: Normal breath sounds. No stridor.   Abdominal:      General: Bowel sounds are normal.      Palpations: Abdomen is soft.   Neurological:      Mental Status: He is disoriented.               Significant Labs: All pertinent labs within the past 24 hours have been reviewed.    Significant Imaging: I have reviewed all pertinent imaging results/findings within the past 24 hours.      Assessment & Plan  Septic shock  This patient has shock. The type of shock is distributive due to sepsis. The patient had the following evidence of shock: persistent hypotension and altered mental status. 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 Shock is now resolved and vasopressors are off. WBC back to normal  HTN (hypertension)  Patient's blood pressure range in the last 24 hours was: BP  Min: 95/74  Max: 130/66.The patient's inpatient anti-hypertensive regimen is listed below:  Current Antihypertensives       Plan  - admitted with shock  - now off vasopressors. Continue to hold BP Rx as BP is  "well controlled without it   HLD (hyperlipidemia)  - continue statin  Type 2 diabetes mellitus with hyperglycemia, without long-term current use of insulin  A1c:   Lab Results   Component Value Date    HGBA1C 5.7 (H) 04/02/2025     Meds: lantus + SSI PRN to maintain goal 140-180  Tube feeds  accuchecks, hypoglycemic protocol      Coronary artery disease involving native coronary artery of native heart without angina pectoris  Patient with known CAD s/p stent placement, which is controlled Will continue ASA, Plavix, and Statin and monitor for S/Sx of angina/ACS. Continue to monitor on telemetry.   - last [facility] was 1/2025: Severely calcified mid RCA stenosis unable to cross with PTCA balloons, treated initially with 0.9 laser atherectomy, followed by CSI atherectomy system with total of 5 runs (3 at low speed, 2 at high speed), pre-dilated using 2.0 compliant and 3.5 noncompliant balloon followed by 3.5 X 16 mm megatron stent.  Focal plaque rupture/erosion noted in the proximal and ostial RCA that were treated with  3.5 X 28 in the proximal RCA, 4.0 X 16 mm in the ostial RCA.  No residual stenosis post intervention.  Patient had ALAN 3 flow at the end of the procedure  - with elevated troponin likely due to septic shock  Recent Labs   Lab 04/12/25  1216   TROPONINI 0.811*     - continue asa, plavix, statin  - Cardiology consulted  - no plans for ischemic evaluation at this time   ESRD on hemodialysis  - ESRD on HD via LUE AVF  - LUE AVF was actively bleeding on arrival on 4/3/25. Vascular surgery was consulted. He went emergently to the OR on 4/3/25: "Severely calcified mid RCA stenosis unable to cross with PTCA balloons, treated initially with 0.9 laser atherectomy, followed by CSI atherectomy system with total of 5 runs (3 at low speed, 2 at high speed), pre-dilated using 2.0 compliant and 3.5 noncompliant balloon followed by 3.5 X 16 mm megatron stent.  Focal plaque rupture/erosion noted in the proximal and ostial " "RCA that were treated with  3.5 X 28 in the proximal RCA, 4.0 X 16 mm in the ostial RCA.  No residual stenosis post intervention.  Patient had ALAN 3 flow at the end of the procedure"  - cultures from AVF= Staph   - wound care orders in place for surgical site  - Nephrology is consulted  - Rt IJ trialysis placed on 4/4/25 for CRRT;  - THDC placed on 04/14  Anemia in ESRD (end-stage renal disease)  Anemia is likely due to  ESRD, blood loss . Most recent hemoglobin and hematocrit are listed below.  Recent Labs     04/14/25  0447 04/14/25  1947 04/15/25  0350   HGB 8.0* 8.2* 7.9*   HCT 25.2* 25.6* 24.8*     Plan  - Monitor serial CBC: Daily and recheck now  - Transfuse PRBC if patient becomes hemodynamically unstable, symptomatic or H/H drops below 7/21.  - Patient has not received any PRBC transfusions to date  - Patient's anemia is currently stable  Acute metabolic encephalopathy with Hospital Delirum  -Noted episodes of intermittent agitation with tactile and visual hallucinations  -Continue correction of metabolic derangements  -Maintain Delirium precautions: Maintain regular sleep cycle. Early ambulation. Minimal interruptions overnight. Re-orient patient frequently. Maintain adequate bowel regimen, hydration and electrolyte replenishments  -aspiration precautions  -trial of Seroquel 25 mg HS      ACP (advance care planning)  Advance Care Planning     Date: 04/04/2025  - palliative consulted   - Mr Escoto specifically told Dr Jordan to contact Kenia Smyth for all updates  - discussed code status with Kenia. She states she has spoken with Mr Escoto's sisters and they all want full code status.   -anticipated DC to SNF when medically stable enough to tolerated HD and possible nursing home placement         Acute bacterial endocarditis  - TTE 4/4/25: "1.9x1.2cm large fixed heterogeneous mass present on the posterior leaflet (new finding vs 9/2023 echo). There is moderate to severe regurgitation with " "an eccentric jet. Cannot exclude severe MR with possible PMVL perforation in association with large vegetation."  - this is in the setting of MRSA bacteremia  - ID is consulted  - repeat blood cultures until clear   - suspect source is skin/chronic scratching vs AVF infection- cultures from resected AVF with Staph   - discussed with Cardiac surgery Dr Castro 4/4/25- given age and comorbidities, he is very high risk of mortality with surgery. He recommends medical management at this point.  - on vancomycin      MRSA bacteremia  - suspect source:  Graft infection s/p exploratory surgery of LUE with graft resection 4/3  -graft holiday with TDC placed on 04/14  - cultures of AVF with Staph   - he has endocarditis  - ID consulted  - on vanc ;anticipating completing 6 weeks of IV vancomycin from clearance (EOC: 5/19/25)     NSTEMI (non-ST elevated myocardial infarction)  - see CAD    BPH with urinary obstruction  - noted 4/6/25 when patient became very agitated and screaming he couldn't urinate  - nursing unable to place dunaway/coude  - Urology consulted. Bedside cystoscopy on 4/6/25 noted clots, large prostate. Catheter was placed but was then removed due to pain  - 4/7 he is again agitated that he cannot urinate  - follow up with Urology   - tamsulosin ordered if he is awake enough to swallow     Thrombocytopenia  The likely etiology of thrombocytopenia is infection and sepsis. The patients 3 most recent labs are listed below.  Recent Labs     04/14/25  0447 04/14/25  1947 04/15/25  0350    220 219     Plan  - Will transfuse if platelet count is <10k.    AV fistula infection  - LUE AVF was actively bleeding on arrival on 4/3/25. Vascular surgery was consulted. He went emergently to the OR on 4/3/25: "Severely calcified mid RCA stenosis unable to cross with PTCA balloons, treated initially with 0.9 laser atherectomy, followed by CSI atherectomy system with total of 5 runs (3 at low speed, 2 at high speed), " "pre-dilated using 2.0 compliant and 3.5 noncompliant balloon followed by 3.5 X 16 mm megatron stent.  Focal plaque rupture/erosion noted in the proximal and ostial RCA that were treated with  3.5 X 28 in the proximal RCA, 4.0 X 16 mm in the ostial RCA.  No residual stenosis post intervention.  Patient had ALAN 3 flow at the end of the procedure"  - cultures from AVF= Staph   - wound care orders in place for surgical site  - trialysis currently in place for HD    Emphysema lung  - emphysema noted on CT  - no signs of COPD exacerbation  Pulmonary nodules  - CT 4/3/25 with few small pulmonary nodules  - will need outpatient follow up     Gross hematuria  S/p clot evacuation with a fulguration by Urology  Currently resolved  Continue cap irrigation of 3 way catheter  Continue Levsin for bladder discomfort  -Sharma to be removed when patient is transferred to the floor; we will defer removal to urology    NSVT (nonsustained ventricular tachycardia)      VTE Risk Mitigation (From admission, onward)           Ordered     heparin (porcine) injection 1,000 Units  As needed (PRN)         04/05/25 2100     IP VTE HIGH RISK PATIENT  Once         04/03/25 1516     Place TEMO hose  Until discontinued         04/03/25 1516     Place sequential compression device  Until discontinued         04/03/25 1516     Reason for No Pharmacological VTE Prophylaxis  Once        Question:  Reasons:  Answer:  Physician Provided (leave comment)    04/03/25 1516                    Discharge Planning   BAYRON: 4/15/2025     Code Status: Full Code   Medical Readiness for Discharge Date:   Discharge Plan A:  (tbd)            Critical care time spent on the evaluation and treatment of severe organ dysfunction, review of pertinent labs and imaging studies, discussions with consulting providers and discussions with patient/family: more than 35 minutes.            Tena Palma MD  Department of Hospital Medicine   [facility] - Intensive Care    "

## 2025-04-15 NOTE — ASSESSMENT & PLAN NOTE
- Evaluation and management per primary team   - pt requires frequent calming and reassuring of safety, forgets location of hospital and events leading to hospitalization; overall orientation is improving daily with treatment of infection, however also starting to show concerns for hospital delerium which was discussed with Hm; recommend room with window and delerium precautions   - when reoriented is able to have appropriate discussions, and is able to identify Neha garces by voice on the phone   - of note, on , pt did share with palliative team and dez Solomon by phone that his sister Eileen visited him yesterday (Eileen has been  for several years)   -  pt upset about seeing spider webs; pt does have tape for TG tube to nose and was watching spider man on TV at that time, but concerning since pt kept repeating despite reassurance and seemed upset/anxious about this which over some time seemed distressing to pt; discussed with HM and RN   - increasing concerns re-discussed with HM, now Dr. Palma, and RN; plan to move pt's room, delirium precautions, Dr. Palma to order/manage mood/pysch and sleep med regimen, recommended stopping benadryl, achieve improved day/night cycles   - dez shares that pt had been taking benadryl daily for some time prior to admission for HD pruritus; shared list of medications to avoid in elderly via email per her request ([email or web address])

## 2025-04-15 NOTE — ASSESSMENT & PLAN NOTE
- Nephrology consulted and following; pt had difficulty tolerating HD initially on admission, but improved; additional event 4/14 unclear if related to HD  - family aware of this and with insight that this is a large factor in pt's prognosis   - family with insight that HD is a form of life support; wish to continue as long as pt tolerates/candidate

## 2025-04-15 NOTE — PROGRESS NOTES
[facility] [place] [facility]  Urology  Progress Note    Patient Name: Jevon Rajan  MRN: [id number]  Admission Date: 4/3/2025  Hospital Length of Stay: 12 days  Code Status: Full Code   Attending Provider: Tena Palma MD   Primary Care Physician: Melia, Primary Doctor    Subjective:     HPI:  Urinary Retention  Patient complains of urinary retention. Onset of retention was 1 day ago and was gradual in onset. Patient currently does not have a urinary catheter in place.  300 ml of urine were scanned on bladder scanner Prior to this event voiding symptoms consisted of slow stream. Prior treatments include none. Recent medications that may have affected his voiding include none.   He still makes urine, he tells me an almost normal amount.  He is very uncomfortable at the level of the bladder.  Nursing staff attempted coude catheter was then successfully.  He agrees to allow me to place a catheter.    Interval History: Off CBI. Small amount of yellow urine in bag. Denies dunaway issues.     Review of Systems   Constitutional:  Negative for activity change, appetite change and fever.   HENT:  Negative for congestion and rhinorrhea.    Eyes:  Negative for visual disturbance.   Respiratory:  Negative for choking and shortness of breath.    Genitourinary:  Positive for difficulty urinating. Negative for dysuria, flank pain, hematuria, scrotal swelling, testicular pain and urgency.     Objective:     Temp:  [98 °F (36.7 °C)-98.9 °F (37.2 °C)] 98.9 °F (37.2 °C)  Pulse:  [] 94  Resp:  [12-59] 20  SpO2:  [87 %-100 %] 98 %  BP: ()/(51-68) 99/58     Body mass index is 21.13 kg/m².      Bladder Scan Volume (mL): 331 mL (04/06/25 1520)    Drains       Drain  Duration                  Urethral Catheter 04/07/25 1558 Triple-lumen;Latex 24 Fr. 7 days         Hemodialysis Catheter 04/14/25 1011 right internal jugular <1 day                     Physical Exam  Constitutional:       General: He is not in acute distress.      "Appearance: He is well-developed. He is not ill-appearing, toxic-appearing or diaphoretic.   HENT:      Head: Normocephalic and atraumatic.      Mouth/Throat:      Mouth: Mucous membranes are moist.   Eyes:      Conjunctiva/sclera: Conjunctivae normal.   Pulmonary:      Effort: Pulmonary effort is normal. No respiratory distress.   Abdominal:      General: Abdomen is flat. There is no distension.      Palpations: There is no mass.      Tenderness: There is no right CVA tenderness or left CVA tenderness.   Genitourinary:     Comments: Sharma nikki colored urine  Musculoskeletal:         General: No swelling or deformity.      Cervical back: Neck supple.   Skin:     Findings: No rash.   Neurological:      Mental Status: He is alert. Mental status is at baseline.   Psychiatric:         Mood and Affect: Mood normal.           Significant Labs:    BMP:  Recent Labs   Lab 04/14/25  0447 04/14/25  1933 04/15/25  0350    140 139   K 4.0 3.5 4.1    100 100   CO2 22* 27 26   BUN 47* 19 26*   CREATININE 7.8* 4.1* 5.6*   GLUCOSE 169* 111* 91   CALCIUM 8.7 8.0* 8.5*       CBC:   Recent Labs   Lab 04/14/25  0447 04/14/25  1947 04/15/25  0350   WBC 12.09 13.96* 12.08   HGB 8.0* 8.2* 7.9*   HCT 25.2* 25.6* 24.8*    220 219       Blood Culture: No results for input(s): "LABBLOO" in the last 168 hours.  Urine Culture: No results for input(s): "LABURIN" in the last 168 hours.  Urine Studies: No results for input(s): "COLORU", "APPEARANCEUA", "PHUR", "SPECGRAV", "PROTEINUA", "GLUCUA", "KETONESU", "BILIRUBINUA", "OCCULTUA", "NITRITE", "UROBILINOGEN", "LEUKOCYTESUR", "RBCUA", "WBCUA", "BACTERIA", "SQUAMEPITHEL", "HYALINECASTS" in the last 168 hours.    Invalid input(s): "WRIGHTSUR"    Significant Imaging:  All pertinent imaging results/findings from the past 24 hours have been reviewed.                  Assessment/Plan:     Gross hematuria  S/p Clot evacuation with fulguration  Resolved  Cap irrigation port of 3 way " catheter  Levsin PRN for bladder discomfort            BPH with urinary obstruction  Catheter in place, do not remove, requires cystoscopy to place dunaway  When patient out of ICU okay to remove dunaway for voiding trial, early in the AM preferred in case patient will need to be brought back to the operating room for dunaway placement          VTE Risk Mitigation (From admission, onward)           Ordered     heparin (porcine) injection 1,000 Units  As needed (PRN)         04/05/25 2100     IP VTE HIGH RISK PATIENT  Once         04/03/25 1516     Place TEMO hose  Until discontinued         04/03/25 1516     Place sequential compression device  Until discontinued         04/03/25 1516     Reason for No Pharmacological VTE Prophylaxis  Once        Question:  Reasons:  Answer:  Physician Provided (leave comment)    04/03/25 1516                    Diane Horn MD  Urology  [facility] - Intensive Care

## 2025-04-15 NOTE — PLAN OF CARE
Problem: Adult Inpatient Plan of Care  Goal: Plan of Care Review  Outcome: Progressing  Goal: Patient-Specific Goal (Individualized)  Outcome: Progressing  Goal: Absence of Hospital-Acquired Illness or Injury  Outcome: Progressing  Goal: Optimal Comfort and Wellbeing  Outcome: Progressing  Goal: Readiness for Transition of Care  Outcome: Progressing     Problem: Diabetes Comorbidity  Goal: Blood Glucose Level Within Targeted Range  Outcome: Progressing     Problem: Sepsis/Septic Shock  Goal: Optimal Coping  Outcome: Progressing  Goal: Absence of Bleeding  Outcome: Progressing  Goal: Blood Glucose Level Within Targeted Range  Outcome: Progressing  Goal: Absence of Infection Signs and Symptoms  Outcome: Progressing  Goal: Optimal Nutrition Intake  Outcome: Progressing     Problem: Infection  Goal: Absence of Infection Signs and Symptoms  Outcome: Progressing     Problem: Fall Injury Risk  Goal: Absence of Fall and Fall-Related Injury  Outcome: Progressing     Problem: Skin Injury Risk Increased  Goal: Skin Health and Integrity  Outcome: Progressing     Problem: Wound  Goal: Optimal Coping  Outcome: Progressing  Goal: Optimal Functional Ability  Outcome: Progressing  Goal: Absence of Infection Signs and Symptoms  Outcome: Progressing  Goal: Improved Oral Intake  Outcome: Progressing  Goal: Optimal Pain Control and Function  Outcome: Progressing  Goal: Skin Health and Integrity  Outcome: Progressing  Goal: Optimal Wound Healing  Outcome: Progressing     Problem: Coping Ineffective  Goal: Effective Coping  Outcome: Progressing     Problem: Urinary Elimination Management  Goal: Effective Urinary Elimination/Continence  Outcome: Progressing     Problem: Urinary Retention  Goal: Effective Urinary Elimination  Outcome: Progressing     Problem: Restraint, Nonviolent  Goal: Absence of Harm or Injury  Outcome: Progressing

## 2025-04-15 NOTE — PT/OT/SLP PROGRESS
Physical Therapy      Patient Name:  Jevon Rajan   MRN:  [id number]    Patient not seen today secondary to Patient ill (Comment). Will follow-up .

## 2025-04-15 NOTE — CARE UPDATE
Responded to a code blue overhead.     A code blue was called for unresponsiveness and agonal breathing. Patient transiently became unresponsiveness and bagging was initiated. He did not lose pulse. On arrival to the bedside, patient was starting to become more responsive. He was able to move all extremities and was able to say his name. Patient was connected to dialysis during this episode. BP WNL, patient is tachycardic to the 110s. POC glucose was 99.     VBG obtained.   Recent Labs   Lab 04/14/25  1905   PH 7.472*   PO2 33*   PCO2 37.5   HCO3 27.4   BE 4*       Patient has been receiving dilaudid (last dose 1 mg at 1212),  and haldol (last dose 2 mg at 1212). A dose of narcan was given, no significant response initially but patient later became more responsive and was able to say his name/follow commands more appropriately. Will hold off on head imaging for now given that he returned to his baseline. Concern that this is related to medication side effect versus related to dialysis.     - Discontinue dilaudid   - Monitor mental status

## 2025-04-15 NOTE — ASSESSMENT & PLAN NOTE
"- ESRD on HD via LUE AVF  - LUE AVF was actively bleeding on arrival on 4/3/25. Vascular surgery was consulted. He went emergently to the OR on 4/3/25: "Severely calcified mid RCA stenosis unable to cross with PTCA balloons, treated initially with 0.9 laser atherectomy, followed by CSI atherectomy system with total of 5 runs (3 at low speed, 2 at high speed), pre-dilated using 2.0 compliant and 3.5 noncompliant balloon followed by 3.5 X 16 mm megatron stent.  Focal plaque rupture/erosion noted in the proximal and ostial RCA that were treated with  3.5 X 28 in the proximal RCA, 4.0 X 16 mm in the ostial RCA.  No residual stenosis post intervention.  Patient had ALAN 3 flow at the end of the procedure"  - cultures from AVF= Staph   - wound care orders in place for surgical site  - Nephrology is consulted  - Rt IJ trialysis placed on 4/4/25 for CRRT;  - THDC placed on 04/14  " Patient/Caregiver provided printed discharge information.

## 2025-04-15 NOTE — ASSESSMENT & PLAN NOTE
S/p clot evacuation with a fulguration by Urology  Currently resolved  Continue cap irrigation of 3 way catheter  Continue Levsin for bladder discomfort  -Sharma to be removed when patient is transferred to the floor; we will defer removal to urology

## 2025-04-16 LAB
ABSOLUTE EOSINOPHIL (OHS): 0.48 K/UL
ABSOLUTE MONOCYTE (OHS): 0.72 K/UL (ref 0.3–1)
ABSOLUTE NEUTROPHIL COUNT (OHS): 9.05 K/UL (ref 1.8–7.7)
ALBUMIN SERPL BCP-MCNC: 2.5 G/DL (ref 3.5–5.2)
ALP SERPL-CCNC: 67 UNIT/L (ref 40–150)
ALT SERPL W/O P-5'-P-CCNC: 5 UNIT/L (ref 10–44)
ANION GAP (OHS): 16 MMOL/L (ref 8–16)
AST SERPL-CCNC: 25 UNIT/L (ref 11–45)
BACTERIA BLD CULT: NORMAL
BACTERIA BLD CULT: NORMAL
BASOPHILS # BLD AUTO: 0.06 K/UL
BASOPHILS NFR BLD AUTO: 0.5 %
BILIRUB SERPL-MCNC: 0.8 MG/DL (ref 0.1–1)
BUN SERPL-MCNC: 38 MG/DL (ref 8–23)
CALCIUM SERPL-MCNC: 8.7 MG/DL (ref 8.7–10.5)
CHLORIDE SERPL-SCNC: 102 MMOL/L (ref 95–110)
CO2 SERPL-SCNC: 23 MMOL/L (ref 23–29)
CREAT SERPL-MCNC: 7.9 MG/DL (ref 0.5–1.4)
ERYTHROCYTE [DISTWIDTH] IN BLOOD BY AUTOMATED COUNT: 16.6 % (ref 11.5–14.5)
GFR SERPLBLD CREATININE-BSD FMLA CKD-EPI: 6 ML/MIN/1.73/M2
GLUCOSE SERPL-MCNC: 96 MG/DL (ref 70–110)
HCT VFR BLD AUTO: 26.8 % (ref 40–54)
HGB BLD-MCNC: 8.1 GM/DL (ref 14–18)
IMM GRANULOCYTES # BLD AUTO: 0.05 K/UL (ref 0–0.04)
IMM GRANULOCYTES NFR BLD AUTO: 0.4 % (ref 0–0.5)
LYMPHOCYTES # BLD AUTO: 0.8 K/UL (ref 1–4.8)
MCH RBC QN AUTO: 28.2 PG (ref 27–31)
MCHC RBC AUTO-ENTMCNC: 30.2 G/DL (ref 32–36)
MCV RBC AUTO: 93 FL (ref 82–98)
NUCLEATED RBC (/100WBC) (OHS): 0 /100 WBC
PLATELET # BLD AUTO: 217 K/UL (ref 150–450)
PMV BLD AUTO: 11.1 FL (ref 9.2–12.9)
POCT GLUCOSE: 109 MG/DL (ref 70–110)
POCT GLUCOSE: 121 MG/DL (ref 70–110)
POCT GLUCOSE: 141 MG/DL (ref 70–110)
POCT GLUCOSE: 210 MG/DL (ref 70–110)
POTASSIUM SERPL-SCNC: 4.1 MMOL/L (ref 3.5–5.1)
PROT SERPL-MCNC: 6.9 GM/DL (ref 6–8.4)
RBC # BLD AUTO: 2.87 M/UL (ref 4.6–6.2)
RELATIVE EOSINOPHIL (OHS): 4.3 %
RELATIVE LYMPHOCYTE (OHS): 7.2 % (ref 18–48)
RELATIVE MONOCYTE (OHS): 6.5 % (ref 4–15)
RELATIVE NEUTROPHIL (OHS): 81.1 % (ref 38–73)
SODIUM SERPL-SCNC: 141 MMOL/L (ref 136–145)
VANCOMYCIN SERPL-MCNC: 13.5 UG/ML (ref ?–80)
WBC # BLD AUTO: 11.16 K/UL (ref 3.9–12.7)

## 2025-04-16 PROCEDURE — 25000003 PHARM REV CODE 250: Performed by: HOSPITALIST

## 2025-04-16 PROCEDURE — 99497 ADVNCD CARE PLAN 30 MIN: CPT | Mod: ,,, | Performed by: REGISTERED NURSE

## 2025-04-16 PROCEDURE — 85025 COMPLETE CBC W/AUTO DIFF WBC: CPT | Performed by: HOSPITALIST

## 2025-04-16 PROCEDURE — 25000003 PHARM REV CODE 250: Performed by: STUDENT IN AN ORGANIZED HEALTH CARE EDUCATION/TRAINING PROGRAM

## 2025-04-16 PROCEDURE — A4216 STERILE WATER/SALINE, 10 ML: HCPCS | Performed by: HOSPITALIST

## 2025-04-16 PROCEDURE — 99232 SBSQ HOSP IP/OBS MODERATE 35: CPT | Mod: ,,, | Performed by: STUDENT IN AN ORGANIZED HEALTH CARE EDUCATION/TRAINING PROGRAM

## 2025-04-16 PROCEDURE — 63600175 PHARM REV CODE 636 W HCPCS: Performed by: STUDENT IN AN ORGANIZED HEALTH CARE EDUCATION/TRAINING PROGRAM

## 2025-04-16 PROCEDURE — 25000003 PHARM REV CODE 250

## 2025-04-16 PROCEDURE — 99232 SBSQ HOSP IP/OBS MODERATE 35: CPT | Mod: ,,,

## 2025-04-16 PROCEDURE — 80100014 HC HEMODIALYSIS 1:1

## 2025-04-16 PROCEDURE — 99233 SBSQ HOSP IP/OBS HIGH 50: CPT | Mod: ,,, | Performed by: REGISTERED NURSE

## 2025-04-16 PROCEDURE — 11000001 HC ACUTE MED/SURG PRIVATE ROOM

## 2025-04-16 PROCEDURE — 80053 COMPREHEN METABOLIC PANEL: CPT | Performed by: HOSPITALIST

## 2025-04-16 PROCEDURE — 80202 ASSAY OF VANCOMYCIN: CPT

## 2025-04-16 PROCEDURE — 27000207 HC ISOLATION

## 2025-04-16 PROCEDURE — 94761 N-INVAS EAR/PLS OXIMETRY MLT: CPT

## 2025-04-16 RX ORDER — SODIUM CHLORIDE 9 MG/ML
INJECTION, SOLUTION INTRAVENOUS ONCE
Status: CANCELLED | OUTPATIENT
Start: 2025-04-16 | End: 2025-04-16

## 2025-04-16 RX ORDER — SODIUM CHLORIDE 9 MG/ML
INJECTION, SOLUTION INTRAVENOUS
Status: CANCELLED | OUTPATIENT
Start: 2025-04-16

## 2025-04-16 RX ADMIN — INSULIN ASPART 2 UNITS: 100 INJECTION, SOLUTION INTRAVENOUS; SUBCUTANEOUS at 04:04

## 2025-04-16 RX ADMIN — ERYTHROMYCIN: 5 OINTMENT OPHTHALMIC at 02:04

## 2025-04-16 RX ADMIN — PANTOPRAZOLE SODIUM 40 MG: 40 TABLET, DELAYED RELEASE ORAL at 09:04

## 2025-04-16 RX ADMIN — TAMSULOSIN HYDROCHLORIDE 0.4 MG: 0.4 CAPSULE ORAL at 09:04

## 2025-04-16 RX ADMIN — CLOPIDOGREL BISULFATE 75 MG: 75 TABLET, FILM COATED ORAL at 09:04

## 2025-04-16 RX ADMIN — SODIUM CHLORIDE, PRESERVATIVE FREE 10 ML: 5 INJECTION INTRAVENOUS at 06:04

## 2025-04-16 RX ADMIN — SODIUM CHLORIDE, PRESERVATIVE FREE 10 ML: 5 INJECTION INTRAVENOUS at 09:04

## 2025-04-16 RX ADMIN — QUETIAPINE FUMARATE 25 MG: 25 TABLET ORAL at 09:04

## 2025-04-16 RX ADMIN — ERYTHROMYCIN: 5 OINTMENT OPHTHALMIC at 09:04

## 2025-04-16 RX ADMIN — ERYTHROMYCIN: 5 OINTMENT OPHTHALMIC at 06:04

## 2025-04-16 RX ADMIN — MIDODRINE HYDROCHLORIDE 15 MG: 5 TABLET ORAL at 09:04

## 2025-04-16 RX ADMIN — INSULIN GLARGINE 5 UNITS: 100 INJECTION, SOLUTION SUBCUTANEOUS at 09:04

## 2025-04-16 RX ADMIN — ATORVASTATIN CALCIUM 80 MG: 40 TABLET, FILM COATED ORAL at 09:04

## 2025-04-16 RX ADMIN — MIDODRINE HYDROCHLORIDE 15 MG: 5 TABLET ORAL at 02:04

## 2025-04-16 RX ADMIN — Medication 6 MG: at 09:04

## 2025-04-16 RX ADMIN — HALOPERIDOL LACTATE 2 MG: 5 INJECTION, SOLUTION INTRAMUSCULAR at 03:04

## 2025-04-16 RX ADMIN — MIDODRINE HYDROCHLORIDE 15 MG: 5 TABLET ORAL at 06:04

## 2025-04-16 NOTE — PT/OT/SLP PROGRESS
Physical Therapy      Patient Name:  Jevon Rajan   MRN:  [id number]    Patient not seen today secondary to Dialysis. Will follow-up .

## 2025-04-16 NOTE — ASSESSMENT & PLAN NOTE
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 Most recent hemoglobin and hematocrit are listed below.  Recent Labs     04/14/25  1947 04/15/25  0350 04/16/25  0314   HGB 8.2* 7.9* 8.1*   HCT 25.6* 24.8* 26.8*     Plan  - Monitor serial CBC: Daily and recheck now  - Transfuse PRBC if patient becomes hemodynamically unstable, symptomatic or H/H drops below 7/21.  - Patient has not received any PRBC transfusions to date  - Patient's anemia is currently stable

## 2025-04-16 NOTE — EICU
Intervention Initiated From:  COR / HUMERAU    Jude intervened regarding:  Rounding (Video assessment)    VICU Night Rounds Checklist  24H Vital Sign Range:  Temp:  [97.6 °F (36.4 °C)-98.9 °F (37.2 °C)]   Pulse:  []   Resp:  [12-59]   BP: ()/(51-74)   SpO2:  [89 %-100 %]     Video rounds and LDA reconciliation

## 2025-04-16 NOTE — NURSING
[facility] [place]  Nurses Note -- 4 Eyes      4/16/2025       Skin assessed on: Q Shift      [x] No Pressure Injuries Present    [x]Prevention Measures Documented    [] Yes LDA  for Pressure Injury Previously documented     [] Yes New Pressure Injury Discovered   [] LDA for New Pressure Injury Added      Attending RN:  Wellington Marcum RN     Second RN:  CHARLENE Hastings

## 2025-04-16 NOTE — PROGRESS NOTES
[facility] - Intensive Care  Nephrology  Progress Note    Patient Name: Jevon Rajan  MRN: [id number]  Admission Date: 4/3/2025  Hospital Length of Stay: 13 days  Attending Provider: Tena Palma MD   Primary Care Physician: Melia, Primary Doctor  Principal Problem:Septic shock    Subjective:     HPI: Mr. Rajan is an 86 yo male with HTN, T2DM, CAD, ESRD, and liver lesion who was transferred from [facility] for septic shock and impending AVG rupture. He initially presented to [facility] on 4/1 with temp 101.9 and BP 80/30s. He was transferred to our hospital on 4/3/25; it seems his AVG ruptured during transport/shortly after arrival. He emergently went to the OR yesterday with AVG extraction; infected hematoma was noted. Workup also concerning for elevated troponin and cardiac vegetations. He is no longer on pressors. Nephrology consulted for ESRD. Prior records obtained and reviewed. He receives HD TTS at [facility] under the c/o Dr. Colin. He last received HD outpatient on 4/1/25. HD was held at [facility] due to unstable vascular access. Family agreed to proceed with CRRT today.     Interval History: much improved mentation this AM    Review of patient's allergies indicates:   Allergen Reactions    Ace inhibitors Hives, Itching, Shortness Of Breath, Other (See Comments) and Rash     Is not sure which medication it was    Captopril      Current Facility-Administered Medications   Medication Frequency    0.9%  NaCl infusion (for blood administration) Q24H PRN    0.9% NaCl infusion PRN    acetaminophen tablet 650 mg Q4H PRN    albumin human 25% bottle 25 g Daily PRN    atorvastatin tablet 80 mg QHS    clopidogreL tablet 75 mg Daily    dextrose 50% injection 12.5 g PRN    dextrose 50% injection 25 g PRN    epoetin mj-epbx injection 10,000 Units Every Tues, Thurs, Sat    erythromycin 5 mg/gram (0.5 %) ophthalmic ointment Q8H    glucagon (human recombinant) injection 1 mg PRN    glucose chewable tablet 16 g PRN    glucose  chewable tablet 24 g PRN    haloperidol lactate injection 2 mg Q4H PRN    heparin (porcine) injection 1,000 Units PRN    hyoscyamine SL tablet 0.125 mg Q4H PRN    insulin aspart U-100 pen 0-5 Units QID (AC + HS) PRN    insulin glargine U-100 (Lantus) pen 5 Units Daily    melatonin tablet 6 mg Nightly PRN    midodrine tablet 15 mg Q8H    naloxone 0.4 mg/mL injection 0.02 mg PRN    ondansetron injection 4 mg Q6H PRN    pantoprazole EC tablet 40 mg Daily    prochlorperazine injection Soln 5 mg Q6H PRN    QUEtiapine tablet 25 mg QHS    senna tablet 8.6 mg Daily PRN    sodium chloride 0.9% bolus 250 mL 250 mL PRN    sodium chloride 0.9% flush 10 mL Q8H    tamsulosin 24 hr capsule 0.4 mg Daily    vancomycin - pharmacy to dose pharmacy to manage frequency       Objective:     Vital Signs (Most Recent):  Temp: 98 °F (36.7 °C) (04/16/25 0701)  Pulse: 97 (04/16/25 0800)  Resp: (!) 30 (04/16/25 0800)  BP: (!) 134/100 (04/16/25 0800)  SpO2: 98 % (04/16/25 0800) Vital Signs (24h Range):  Temp:  [97.5 °F (36.4 °C)-98.5 °F (36.9 °C)] 98 °F (36.7 °C)  Pulse:  [] 97  Resp:  [13-42] 30  SpO2:  [92 %-100 %] 98 %  BP: ()/() 134/100     Weight: 61.2 kg (134 lb 14.7 oz) (04/04/25 1937)  Body mass index is 21.13 kg/m².  Body surface area is 1.7 meters squared.    I/O last 3 completed shifts:  In: 300 [P.O.:300]  Out: 60 [Urine:60]     Physical Exam  Constitutional:       General: He is not in acute distress.     Appearance: He is not ill-appearing or toxic-appearing.   HENT:      Head: Normocephalic and atraumatic.      Nose: Nose normal. No congestion.      Mouth/Throat:      Mouth: Mucous membranes are moist.   Eyes:      Pupils: Pupils are equal, round, and reactive to light.   Cardiovascular:      Rate and Rhythm: Tachycardia present.      Heart sounds: Murmur heard.      Comments: Right chest wall TDC  Pulmonary:      Effort: Pulmonary effort is normal.   Neurological:      Mental Status: He is alert.           Significant Labs:  All labs within the past 24 hours have been reviewed.     Significant Imaging:  Labs: Reviewed  Assessment/Plan:     Cardiac/Vascular  Acute bacterial endocarditis  Blood cultures from 4/8 cleared  ID following    Renal/  ESRD on hemodialysis  ESRD on HD     HD KEN, [facility]  Sp AVG resection 4/3    TDC right chest wall 4/14/2025  HD 4/14 2 hr 15 min completed before apneic episode, code blue activated though never lost pulse. Per notes improved with narcan and overnight attending suspicious for opiate induced apnea. Pain medications decreased    Plan/Recommendation  HD trial today in ICU   -Keep MAP > 65  -Keep hemoglobin > 7  -Strict ins and outs  -Avoid nephrotoxic agents if possible and renally dose medications  -Avoid drastic hemodynamic changes if possible    #Secondary hyperparathyroidism  Calcium   Date Value Ref Range Status   04/16/2025 8.7 8.7 - 10.5 mg/dL Final     Phosphorus Level   Date Value Ref Range Status   04/14/2025 2.8 2.7 - 4.5 mg/dL Final     PTH, Intact   Date Value Ref Range Status   01/17/2024 117.7 (H) 9.0 - 77.0 pg/mL Final   Continue to monitor      Oncology  Anemia in ESRD (end-stage renal disease)  #Anemia  HGB   Date Value Ref Range Status   04/16/2025 8.1 (L) 14.0 - 18.0 gm/dL Final     Iron   Date Value Ref Range Status   08/11/2024 39 (L) 45 - 160 ug/dL Final     Transferrin   Date Value Ref Range Status   08/11/2024 139 (L) 200 - 375 mg/dL Final     TIBC   Date Value Ref Range Status   08/11/2024 206 (L) 250 - 450 ug/dL Final     Saturated Iron   Date Value Ref Range Status   08/11/2024 19 (L) 20 - 50 % Final     Ferritin   Date Value Ref Range Status   08/11/2024 979 (H) 20.0 - 300.0 ng/mL Final     NAKIA ordered, hb not at goal  Iron on hold due to bacteremia. Follow up outpatient        Thank you for your consult. I will follow-up with patient. Please contact us if you have any additional questions.    Antoine Lauren MD  Nephrology  [facility] - Intensive  Care

## 2025-04-16 NOTE — ASSESSMENT & PLAN NOTE
-Noted episodes of intermittent agitation with tactile and visual hallucinations  -Continue correction of metabolic derangements  -Maintain Delirium precautions: Maintain regular sleep cycle. Early ambulation. Minimal interruptions overnight. Re-orient patient frequently. Maintain adequate bowel regimen, hydration and electrolyte replenishments  -aspiration precautions  -continue Seroquel 25 mg HS

## 2025-04-16 NOTE — PROGRESS NOTES
[facility] - Intensive Care  Wound Care  WOC ERI    Patient Name:  Jevon Rajan   MRN:  [id number]  Date: 4/16/2025  Diagnosis: Septic shock    History:     Past Medical History:   Diagnosis Date    BPH (benign prostatic hyperplasia)     Coronary artery disease     [facility] to this clinic. In 2014 he had stent to LAD. He states he has had 2 heart attacks. He does not get angina. There was 90% left anterior descending artery stenosis undergoing stenting. There was also a circumflex marginal branch stenosis of 70%.    Diabetes mellitus, type 2     ESRD (end stage renal disease) on dialysis     ESRD on HD TTS via left brachial AVF    Hypertension     Hypothyroidism, unspecified     NSTEMI (non-ST elevated myocardial infarction) 4/4/2025    Sepsis 4/2/2025    Symptomatic anemia 01/15/2024       Social History[1]    Precautions:     Allergies as of 04/03/2025 - Reviewed 04/03/2025   Allergen Reaction Noted    Ace inhibitors Hives, Itching, Shortness Of Breath, Other (See Comments), and Rash 12/04/2013    Captopril  08/12/2003       [facility] Assessment Details/Treatment     Active Problem List with Overview Notes    Diagnosis Date Noted    NSVT (nonsustained ventricular tachycardia) 04/08/2025    Thrombocytopenia 04/07/2025    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    Emphysema lung 04/07/2025    Pulmonary nodules 04/07/2025    Gross hematuria 04/07/2025    BPH with urinary obstruction 04/06/2025    ACP (advance care planning) 04/04/2025    Acute bacterial endocarditis 04/04/2025    MRSA bacteremia 04/04/2025    NSTEMI (non-ST elevated myocardial infarction) 04/04/2025    Septic shock 04/02/2025    Acute metabolic encephalopathy with Hospital Delirum 04/02/2025    Senile debility 01/15/2025    Rash 08/10/2024    Dry eyes 08/10/2024    Inadequate dietary intake of protein 07/10/2024    DM2 (diabetes mellitus, type 2) 06/02/2024    Liver mass 02/26/2024    Anemia in ESRD (end-stage  renal disease) 01/15/2024    Aortic atherosclerosis 08/30/2023    Thrombosis of kidney dialysis arteriovenous graft 11/27/2019    ESRD on dialysis 11/27/2019    AV graft thrombosis 11/26/2019    Renal mass, left 11/25/2019    ESRD on hemodialysis 11/24/2019    Type 2 diabetes mellitus with hyperglycemia, without long-term current use of insulin 12/30/2014    Coronary artery disease involving native coronary artery of native heart without angina pectoris 12/30/2014     S/p PCI in 1999 @ [facility].   -[facility] (12/30/14) nstemi: pLAD 90%, OM1 70% ISR, RCA li's, LVEDP 39   -resolute 3.0 x 18 XU to prox LAD post-dilated with 3.0 NC  -TTE (12/29/14): EF 55%, E/e; 9      HTN (hypertension) 12/29/2014     Dx updated per 2019 IMO Load      HLD (hyperlipidemia) 12/29/2014    Mohs defect of ala nasi 01/07/2014      POD # 13 Excision of LUE AV graft  Assessment:  Photodocumentation    Left upper arm- Wound luther and granulating with beefy red base. Opening 10 cm(L) with 1 cm undermining at 12 o'clock. Able to pull wound edges together now with edema resolved in upper arm.   Treatment/Plan:  Cleansed wound with Vashe. Filled undermining at 12 o'clock with Vashe moistened gauze and covered remainder of wound with Vashe moistened gauze. Pulled wound edges towards each other to promote healing/decrease size of deficit. Covered with dry gauze and wrapped with cast padding and ACE wrap.   Nursing to continue changing dressing daily.     04/16/2025       [1]   Social History  Socioeconomic History    Marital status: Single   Tobacco Use    Smoking status: Never    Smokeless tobacco: Never   Substance and Sexual Activity    Alcohol use: No    Drug use: No    Sexual activity: Not Currently     Social Drivers of Health     Financial Resource Strain: Low Risk  (4/5/2025)    Overall Financial Resource Strain (CARDIA)     Difficulty of Paying Living Expenses: Not very hard   Food Insecurity: No Food Insecurity (4/5/2025)     Hunger Vital Sign     Worried About Running Out of Food in the Last Year: Never true     Ran Out of Food in the Last Year: Never true   Transportation Needs: Patient Unable To Answer (4/3/2025)    PRAPARE - Transportation     Lack of Transportation (Medical): Patient unable to answer     Lack of Transportation (Non-Medical): Patient unable to answer   Recent Concern: Transportation Needs - Unmet Transportation Needs (1/15/2025)    TRANSPORTATION NEEDS     Transportation : Yes, it has kept me from medical appointments or from getting my medications.   Physical Activity: Patient Unable To Answer (4/2/2025)    Exercise Vital Sign     Days of Exercise per Week: Patient unable to answer     Minutes of Exercise per Session: Patient unable to answer   Recent Concern: Physical Activity - Insufficiently Active (1/15/2025)    Exercise Vital Sign     Days of Exercise per Week: 5 days     Minutes of Exercise per Session: 20 min   Stress: No Stress Concern Present (4/5/2025)    [employer] - Occupational Stress Questionnaire     Feeling of Stress : Not at all   Housing Stability: Low Risk  (4/5/2025)    Housing Stability Vital Sign     Unable to Pay for Housing in the Last Year: No     Number of Times Moved in the Last Year: 0     Homeless in the Last Year: No

## 2025-04-16 NOTE — ASSESSMENT & PLAN NOTE
#Anemia  HGB   Date Value Ref Range Status   04/16/2025 8.1 (L) 14.0 - 18.0 gm/dL Final     Iron   Date Value Ref Range Status   08/11/2024 39 (L) 45 - 160 ug/dL Final     Transferrin   Date Value Ref Range Status   08/11/2024 139 (L) 200 - 375 mg/dL Final     TIBC   Date Value Ref Range Status   08/11/2024 206 (L) 250 - 450 ug/dL Final     Saturated Iron   Date Value Ref Range Status   08/11/2024 19 (L) 20 - 50 % Final     Ferritin   Date Value Ref Range Status   08/11/2024 979 (H) 20.0 - 300.0 ng/mL Final     NAKIA ordered, hb not at goal  Iron on hold due to bacteremia. Follow up outpatient

## 2025-04-16 NOTE — SUBJECTIVE & OBJECTIVE
Interval History: resting comfortably, no dunaway issues    Review of Systems   Unable to perform ROS: Other   Genitourinary:  Positive for difficulty urinating. Negative for hematuria.     Objective:     Temp:  [97.5 °F (36.4 °C)-98.5 °F (36.9 °C)] 97.9 °F (36.6 °C)  Pulse:  [] 93  Resp:  [10-42] 10  SpO2:  [95 %-100 %] 98 %  BP: ()/() 110/53     Body mass index is 21.13 kg/m².    Date 04/16/25 0700 - 04/17/25 0659   Shift 4847-8361 3609-2651 3103-6907 24 Hour Total   INTAKE   Other 500   500   Shift Total(mL/kg) 500(8.2)   500(8.2)   OUTPUT   Other 2000   2000   Shift Total(mL/kg) 2000(32.7)   2000(32.7)   Weight (kg) 61.2 61.2 61.2 61.2     Bladder Scan Volume (mL): 331 mL (04/06/25 1520)    Drains       Drain  Duration                  Urethral Catheter 04/07/25 1558 Triple-lumen;Latex 24 Fr. 9 days         Hemodialysis Catheter 04/14/25 1011 right internal jugular 2 days                     Physical Exam  Constitutional:       General: He is not in acute distress.     Appearance: He is well-developed.   HENT:      Head: Normocephalic and atraumatic.      Mouth/Throat:      Mouth: Mucous membranes are moist.   Eyes:      Conjunctiva/sclera: Conjunctivae normal.   Pulmonary:      Effort: Pulmonary effort is normal. No respiratory distress.   Abdominal:      General: Abdomen is flat. There is no distension.   Genitourinary:     Comments: Dunaway clear yellow urine  Musculoskeletal:         General: No swelling or deformity.      Cervical back: Neck supple.   Skin:     Findings: No rash.   Neurological:      Mental Status: He is alert and oriented to person, place, and time.           Significant Labs:    BMP:  Recent Labs   Lab 04/14/25  1933 04/15/25  0350 04/16/25  0314    139 141   K 3.5 4.1 4.1    100 102   CO2 27 26 23   BUN 19 26* 38*   CREATININE 4.1* 5.6* 7.9*   GLUCOSE 111* 91 96   CALCIUM 8.0* 8.5* 8.7       CBC:   Recent Labs   Lab 04/14/25  1947 04/15/25  0350 04/16/25  0314    WBC 13.96* 12.08 11.16   HGB 8.2* 7.9* 8.1*   HCT 25.6* 24.8* 26.8*    219 217

## 2025-04-16 NOTE — PROGRESS NOTES
Pharmacokinetic Assessment Follow Up: IV Vancomycin    Vancomycin serum concentration assessment(s):    The random level was drawn correctly and can be used to guide therapy at this time. The measurement is below the desired definitive target range of 15 to 20 mcg/mL.    Vancomycin Regimen Plan:  Vancomycin 750mg to be administered post HD if patient has HD.    Re-dose when the random level is less than 20 mcg/mL, next level to be drawn at 0400 on 4/17/25    Drug levels (last 3 results):  Recent Labs   Lab Result Units 04/15/25  0350 04/16/25  0314   Vancomycin Random ug/ml 14.5 13.5       Pharmacy will continue to follow and monitor vancomycin.    Please contact pharmacy at extension [phone] for questions regarding this assessment.    Thank you for the consult,   Severino Belle       Patient brief summary:  Jevon Rajan is a 85 y.o. male initiated on antimicrobial therapy with IV Vancomycin for treatment of bacteremia    The patient's current regimen is random pulse dosing    Drug Allergies:   Review of patient's allergies indicates:   Allergen Reactions    Ace inhibitors Hives, Itching, Shortness Of Breath, Other (See Comments) and Rash     Is not sure which medication it was    Captopril        Actual Body Weight:   61.2 kg    Renal Function:   Estimated Creatinine Clearance: 5.7 mL/min (A) (based on SCr of 7.9 mg/dL (H)).,     Dialysis Method (if applicable):  intermittent HD    CBC (last 72 hours):  Recent Labs   Lab Result Units 04/13/25  0456 04/14/25  0447 04/14/25  1947 04/15/25  0350 04/16/25  0314   WBC K/uL 11.07 12.09 13.96* 12.08 11.16   HGB gm/dL 8.8* 8.0* 8.2* 7.9* 8.1*   HCT % 27.5* 25.2* 25.6* 24.8* 26.8*   Platelet Count K/uL 210 232 220 219 217   Lymph % % 6.3* 5.8* 5.7* 6.9* 7.2*   Mono % % 7.0 7.5 6.2 6.0 6.5   Eos % % 2.0 3.1 2.9 2.8 4.3   Basophil % % 0.4 0.4 0.3 0.5 0.5       Metabolic Panel (last 72 hours):  Recent Labs   Lab Result Units 04/13/25  0456 04/14/25  0447 04/14/25  1934  04/15/25  0350 04/16/25  0314   Sodium mmol/L 139 140 140 139 141   Potassium mmol/L 4.2 4.0 3.5 4.1 4.1   Chloride mmol/L 102 102 100 100 102   CO2 mmol/L 23 22* 27 26 23   Glucose mg/dL 160* 169* 111* 91 96   BUN mg/dL 31* 47* 19 26* 38*   Creatinine mg/dL 5.6* 7.8* 4.1* 5.6* 7.9*   Albumin g/dL 2.6* 2.4* 2.5* 2.5* 2.5*   Bilirubin Total mg/dL 1.2* 0.8 0.8 0.9 0.8   ALP unit/L 63 58 66 65 67   AST unit/L 25 22 22 28 25   ALT unit/L 9* 9* 7* 9* 5*   Magnesium  mg/dL  --   --  1.7  --   --    Phosphorus Level mg/dL  --   --  2.8  --   --        Vancomycin Administrations:  vancomycin given in the last 96 hours        No antibiotic orders with administrations found.                    Microbiologic Results:  Microbiology Results (last 7 days)       Procedure Component Value Units Date/Time    Blood culture [[id number]]  (Normal) Collected: 04/11/25 1335    Order Status: Completed Specimen: Blood from Other (Specify) Updated: 04/15/25 1400     Blood Culture No Growth After 96 hours    Blood culture [[id number]]  (Normal) Collected: 04/11/25 1335    Order Status: Completed Specimen: Blood Updated: 04/15/25 1400     Blood Culture No Growth After 96 hours    Blood culture [[id number]]  (Normal) Collected: 04/08/25 0506    Order Status: Completed Specimen: Blood Updated: 04/13/25 0600     Blood Culture No Growth After 5 Days    Blood culture [[id number]]  (Normal) Collected: 04/08/25 0401    Order Status: Completed Specimen: Blood Updated: 04/13/25 0600     Blood Culture No Growth After 5 Days    Fungus culture [[id number]]  (Normal) Collected: 04/03/25 1816    Order Status: Completed Specimen: Wound from Arm, Left Updated: 04/10/25 2300     Fungal Culture No Fungus Isolated at 1 Week    Fungus culture [[id number]]  (Normal) Collected: 04/03/25 1934    Order Status: Completed Specimen: Tissue from AV Fistula, Left Updated: 04/10/25 2309     Fungal Culture No Fungus Isolated at 1 Week    Fungus culture [[id number]]   (Normal) Collected: 04/03/25 2011    Order Status: Completed Specimen: Wound from Arm, Left Updated: 04/10/25 2300     Fungal Culture No Fungus Isolated at 1 Week    Fungus culture [[id number]]  (Normal) Collected: 04/03/25 1904    Order Status: Completed Specimen: Tissue from AV Fistula, Left Updated: 04/10/25 2201     Fungal Culture No Fungus Isolated at 1 Week    Fungus culture [[id number]]  (Normal) Collected: 04/03/25 1921    Order Status: Completed Specimen: Tissue from hematoma Updated: 04/10/25 2201     Fungal Culture No Fungus Isolated at 1 Week    Blood culture [[id number]]  (Normal) Collected: 04/04/25 1044    Order Status: Completed Specimen: Blood Updated: 04/09/25 1100     Blood Culture No Growth After 5 Days    Blood culture [[id number]]  (Abnormal) Collected: 04/06/25 0555    Order Status: Completed Specimen: Blood Updated: 04/09/25 0720     Blood Culture Positive - Aerobic/Pediatric Bottle      Methicillin resistant Staphylococcus aureus     Comment: <null> has been updated to reportable.        GRAM STAIN Gram positive cocci in clusters resembling Staph     Comment: Aerobic Bottle Positive   This is an appended report. These results have been appended to a previously preliminary verified report.       Narrative:      For susceptibility refer to order [id number]  ID Consult Strongly Recommended    Blood culture [[id number]]  (Abnormal)  (Susceptibility) Collected: 04/06/25 0542    Order Status: Completed Specimen: Blood Updated: 04/09/25 0719     Blood Culture Positive - Aerobic/Pediatric Bottle      Methicillin resistant Staphylococcus aureus     Comment: <null> has been updated to reportable.        GRAM STAIN Gram positive cocci in clusters resembling Staph     Comment: This is an appended report. These results have been appended to a previously preliminary verified report.       Narrative:      Aerobic Bottle Positive   ID Consult Strongly Recommended

## 2025-04-16 NOTE — ASSESSMENT & PLAN NOTE
"- ESRD on HD via LUE AVF  - LUE AVF was actively bleeding on arrival on 4/3/25. Vascular surgery was consulted. He went emergently to the OR on 4/3/25: "Severely calcified mid RCA stenosis unable to cross with PTCA balloons, treated initially with 0.9 laser atherectomy, followed by CSI atherectomy system with total of 5 runs (3 at low speed, 2 at high speed), pre-dilated using 2.0 compliant and 3.5 noncompliant balloon followed by 3.5 X 16 mm megatron stent.  Focal plaque rupture/erosion noted in the proximal and ostial RCA that were treated with  3.5 X 28 in the proximal RCA, 4.0 X 16 mm in the ostial RCA.  No residual stenosis post intervention.  Patient had AALN 3 flow at the end of the procedure"  - cultures from AVF= Staph   - wound care orders in place for surgical site  - Nephrology is consulted  - Rt IJ trialysis placed on 4/4/25 for CRRT;  - THDC placed on 04/14  "

## 2025-04-16 NOTE — ASSESSMENT & PLAN NOTE
The likely etiology of thrombocytopenia is infection and sepsis. The patients 3 most recent labs are listed below.  Recent Labs     04/14/25  1947 04/15/25  0350 04/16/25  0314    219 217     Plan  - Will transfuse if platelet count is <10k.

## 2025-04-16 NOTE — NURSING
[facility] [place]  Nurses Note -- 4 Eyes      4/15/2025       Skin assessed on: Q Shift      [x] No Pressure Injuries Present    [x]Prevention Measures Documented    [] Yes LDA  for Pressure Injury Previously documented     [] Yes New Pressure Injury Discovered   [] LDA for New Pressure Injury Added      Attending RN:  Jhony Monroe RN     Second RN:  CHARLENE Henry

## 2025-04-16 NOTE — PROGRESS NOTES
[facility] [place] Intensive Care  Urology  Progress Note    Patient Name: Jevon Rajan  MRN: [id number]  Admission Date: 4/3/2025  Hospital Length of Stay: 13 days  Code Status: Full Code   Attending Provider: Tena Palma MD   Primary Care Physician: Melia, Primary Doctor    Subjective:     HPI:  Urinary Retention  Patient complains of urinary retention. Onset of retention was 1 day ago and was gradual in onset. Patient currently does not have a urinary catheter in place.  300 ml of urine were scanned on bladder scanner Prior to this event voiding symptoms consisted of slow stream. Prior treatments include none. Recent medications that may have affected his voiding include none.   He still makes urine, he tells me an almost normal amount.  He is very uncomfortable at the level of the bladder.  Nursing staff attempted coude catheter was then successfully.  He agrees to allow me to place a catheter.    Interval History: resting comfortably, no dunaway issues    Review of Systems   Unable to perform ROS: Other   Genitourinary:  Positive for difficulty urinating. Negative for hematuria.     Objective:     Temp:  [97.5 °F (36.4 °C)-98.5 °F (36.9 °C)] 97.9 °F (36.6 °C)  Pulse:  [] 93  Resp:  [10-42] 10  SpO2:  [95 %-100 %] 98 %  BP: ()/() 110/53     Body mass index is 21.13 kg/m².    Date 04/16/25 0700 - 04/17/25 0659   Shift 9975-8050 6155-4448 9288-6428 24 Hour Total   INTAKE   Other 500   500   Shift Total(mL/kg) 500(8.2)   500(8.2)   OUTPUT   Other 2000   2000   Shift Total(mL/kg) 2000(32.7)   2000(32.7)   Weight (kg) 61.2 61.2 61.2 61.2     Bladder Scan Volume (mL): 331 mL (04/06/25 1520)    Drains       Drain  Duration                  Urethral Catheter 04/07/25 1558 Triple-lumen;Latex 24 Fr. 9 days         Hemodialysis Catheter 04/14/25 1011 right internal jugular 2 days                     Physical Exam  Constitutional:       General: He is not in acute distress.     Appearance: He is well-developed.    HENT:      Head: Normocephalic and atraumatic.      Mouth/Throat:      Mouth: Mucous membranes are moist.   Eyes:      Conjunctiva/sclera: Conjunctivae normal.   Pulmonary:      Effort: Pulmonary effort is normal. No respiratory distress.   Abdominal:      General: Abdomen is flat. There is no distension.   Genitourinary:     Comments: Dunaway clear yellow urine  Musculoskeletal:         General: No swelling or deformity.      Cervical back: Neck supple.   Skin:     Findings: No rash.   Neurological:      Mental Status: He is alert and oriented to person, place, and time.           Significant Labs:    BMP:  Recent Labs   Lab 04/14/25  1933 04/15/25  0350 04/16/25  0314    139 141   K 3.5 4.1 4.1    100 102   CO2 27 26 23   BUN 19 26* 38*   CREATININE 4.1* 5.6* 7.9*   GLUCOSE 111* 91 96   CALCIUM 8.0* 8.5* 8.7       CBC:   Recent Labs   Lab 04/14/25  1947 04/15/25  0350 04/16/25  0314   WBC 13.96* 12.08 11.16   HGB 8.2* 7.9* 8.1*   HCT 25.6* 24.8* 26.8*    219 217                     Assessment/Plan:     Gross hematuria  S/p Clot evacuation with fulguration  Resolved  Cap irrigation port of 3 way catheter  Levsin PRN for bladder discomfort            BPH with urinary obstruction  Catheter in place, do not remove, requires cystoscopy to place dunaway  When patient out of ICU okay to remove dunaway for voiding trial, early in the AM preferred in case patient will need to be brought back to the operating room for dunaway placement          VTE Risk Mitigation (From admission, onward)           Ordered     heparin (porcine) injection 1,000 Units  As needed (PRN)         04/05/25 2100     IP VTE HIGH RISK PATIENT  Once         04/03/25 1516     Place TEMO hose  Until discontinued         04/03/25 1516     Place sequential compression device  Until discontinued         04/03/25 1516     Reason for No Pharmacological VTE Prophylaxis  Once        Question:  Reasons:  Answer:  Physician Provided (leave comment)     04/03/25 1516                    Elsie Arnold MD  Urology  [facility] - Intensive Care

## 2025-04-16 NOTE — SUBJECTIVE & OBJECTIVE
Interval History:  Encephalopathy much improved compared to the previous day.  Patient seen in bed receiving HD; quite pleasant and interactive.  No new complaints.  Anticipated DC to SNF in 1-2 days    Review of Systems  Objective:     Vital Signs (Most Recent):  Temp: (P) 98 °F (36.7 °C) (04/16/25 1230)  Pulse: 97 (04/16/25 0800)  Resp: (!) 30 (04/16/25 0800)  BP: (!) 134/100 (04/16/25 0800)  SpO2: 98 % (04/16/25 0800) Vital Signs (24h Range):  Temp:  [97.5 °F (36.4 °C)-98.5 °F (36.9 °C)] (P) 98 °F (36.7 °C)  Pulse:  [89-98] 97  Resp:  [13-42] 30  SpO2:  [95 %-100 %] 98 %  BP: ()/() 134/100     Weight: 61.2 kg (134 lb 14.7 oz)  Body mass index is 21.13 kg/m².    Intake/Output Summary (Last 24 hours) at 4/16/2025 1302  Last data filed at 4/16/2025 1230  Gross per 24 hour   Intake 600 ml   Output 2060 ml   Net -1460 ml         Physical Exam  Constitutional:       General: He is not in acute distress.     Appearance: He is not ill-appearing, toxic-appearing or diaphoretic.   Cardiovascular:      Rate and Rhythm: Normal rate and regular rhythm.      Heart sounds: No murmur heard.     No friction rub.   Pulmonary:      Effort: Pulmonary effort is normal.      Breath sounds: Normal breath sounds.   Abdominal:      General: There is no distension.      Palpations: Abdomen is soft. There is no mass.   Neurological:      General: No focal deficit present.               Significant Labs: All pertinent labs within the past 24 hours have been reviewed.    Significant Imaging: I have reviewed all pertinent imaging results/findings within the past 24 hours.

## 2025-04-16 NOTE — PROGRESS NOTES
"[facility] Intensive Care  [facility] Medicine  Progress Note    Patient Name: Jevon Rajan  MRN: [id number]  Patient Class: IP- Inpatient   Admission Date: 4/3/2025  Length of Stay: 13 days  Attending Physician: Tena Palma MD  Primary Care Provider: Melia, Primary Doctor        Subjective     Principal Problem:Septic shock        HPI:  Mr Jevon Rajan is a 85 y.o. man with ESRD with LUE AVF, HFpEF last EF 50-55%, CAD, DM who was transferred to [facility] ICU for septic shock due to MRSA bacteremia.     He was admitted at [facility] 4/1-3/2025 with sepsis, worsening to septic shock, then identified source as MRSA. 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     Upon arrival to [facility], he is moaning in pain and his LUE AVF has active pulsatile bright red blood. He does respond to his name. He denies pain anywhere other than his LUE. He is on levophed at 0.05.     Overview/Hospital Course:  Mr Jevon Rajan is a 85 y.o. man with ESRD on HD with LUE AVF that has been bleeding, CHF, CAD s/p recent stenting 1/2025 who was admitted with MRSA bacteremia and bleeding from his LUE AVF. Continued vancomycin; weaned off levophed. Vascular surgery emergently consulted; on 4/3/25 they took him to OR and noted: "well incorporated proximal and central graft without evidence of infection, resected graft and covered proximal and central remnants with multiple layers. Mid graft removed in entirety and sent for culture. Ruptured pseudoaneurysm with infected hematoma, graft completely obliterated at mid segment." Surgical cultures with Staph. Suspect AVF or chronic scratching of pruritic skin is the source of his infection. TTE showed endocarditis: EF 40-45%, G1DD, RV systolic dysfunction, large 1.9x1.2cm fixed mass on the posterior mitral valve leaflet with moder to severe regurgitation, cannot rule out posterior " mitral valve leaflet perforation. Discussed with cardiac surgery; he is high mortality risk, and surgery is not advised. ID following. Nephrology consulted; trialysis was placed for HD. Persistently positive for MRSA in blood cultures. He has had urinary retention; Urology consulted, performed bedside cystoscopy on 4/6/25 with large prostate noted. Noted to have clot retention and went urgently to OR with Urology on 4/7/25; asa and DVT ppx held.     WBC normalized. S/p clot removal with Urology, 1U PRBC ordered this morning with Hb 6.1. Soft BPs, will add midodrine for HD to step down to floor. Glucoses high, will increase insulin. Attempted to s/d but BP too low, may still need CRRT rather. 4/8 cx clear so far. Increasing insulin again. Increasing midodrine to 15 mg TID. Hb 6.8, 1U PRBC ordered.     BP appears to be recovering a bit, perhaps will tolerate reg HD, will discuss w Neph- will run him on HD Saturday, if tolerates normal HD will remove central line and give 2 day holiday and place tunnel on Monday. We will give him HD before dc to facility on Tuesday if accepted by then.     WBC has normalized for the first time, now tolerating reg HD, and Blcx remain clear. Central line removed. NGT removed. Will step down.     Interval History:  Encephalopathy much improved compared to the previous day.  Patient seen in bed receiving HD; quite pleasant and interactive.  No new complaints.  Anticipated DC to SNF in 1-2 days    Review of Systems  Objective:     Vital Signs (Most Recent):  Temp: (P) 98 °F (36.7 °C) (04/16/25 1230)  Pulse: 97 (04/16/25 0800)  Resp: (!) 30 (04/16/25 0800)  BP: (!) 134/100 (04/16/25 0800)  SpO2: 98 % (04/16/25 0800) Vital Signs (24h Range):  Temp:  [97.5 °F (36.4 °C)-98.5 °F (36.9 °C)] (P) 98 °F (36.7 °C)  Pulse:  [89-98] 97  Resp:  [13-42] 30  SpO2:  [95 %-100 %] 98 %  BP: ()/() 134/100     Weight: 61.2 kg (134 lb 14.7 oz)  Body mass index is 21.13 kg/m².    Intake/Output  Summary (Last 24 hours) at 4/16/2025 1302  Last data filed at 4/16/2025 1230  Gross per 24 hour   Intake 600 ml   Output 2060 ml   Net -1460 ml         Physical Exam  Constitutional:       General: He is not in acute distress.     Appearance: He is not ill-appearing, toxic-appearing or diaphoretic.   Cardiovascular:      Rate and Rhythm: Normal rate and regular rhythm.      Heart sounds: No murmur heard.     No friction rub.   Pulmonary:      Effort: Pulmonary effort is normal.      Breath sounds: Normal breath sounds.   Abdominal:      General: There is no distension.      Palpations: Abdomen is soft. There is no mass.   Neurological:      General: No focal deficit present.               Significant Labs: All pertinent labs within the past 24 hours have been reviewed.    Significant Imaging: I have reviewed all pertinent imaging results/findings within the past 24 hours.      Assessment & Plan  Septic shock  This patient has shock. The type of shock is distributive due to sepsis. The patient had the following evidence of shock: persistent hypotension and altered mental status. 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 Shock is now resolved and vasopressors are off. WBC back to normal  HTN (hypertension)  Patient's blood pressure range in the last 24 hours was: BP  Min: 99/51  Max: 134/100.The patient's inpatient anti-hypertensive regimen is listed below:  Current Antihypertensives       Plan  - admitted with shock  - now off vasopressors. Continue to hold BP Rx as BP is well controlled without it   HLD (hyperlipidemia)  - continue statin  Type 2 diabetes mellitus with hyperglycemia,  "without long-term current use of insulin  A1c:   Lab Results   Component Value Date    HGBA1C 5.7 (H) 04/02/2025     Meds: lantus + SSI PRN to maintain goal 140-180  Tube feeds  accuchecks, hypoglycemic protocol      Coronary artery disease involving native coronary artery of native heart without angina pectoris  Patient with known CAD s/p stent placement, which is controlled Will continue ASA, Plavix, and Statin and monitor for S/Sx of angina/ACS. Continue to monitor on telemetry.   - last [facility] 1/2025: Severely calcified mid RCA stenosis unable to cross with PTCA balloons, treated initially with 0.9 laser atherectomy, followed by CSI atherectomy system with total of 5 runs (3 at low speed, 2 at high speed), pre-dilated using 2.0 compliant and 3.5 noncompliant balloon followed by 3.5 X 16 mm megatron stent.  Focal plaque rupture/erosion noted in the proximal and ostial RCA that were treated with  3.5 X 28 in the proximal RCA, 4.0 X 16 mm in the ostial RCA.  No residual stenosis post intervention.  Patient had ALAN 3 flow at the end of the procedure  - with elevated troponin likely due to septic shock  Recent Labs   Lab 04/12/25  1216   TROPONINI 0.811*     - continue asa, plavix, statin  - Cardiology consulted  - no plans for ischemic evaluation at this time   ESRD on hemodialysis  - ESRD on HD via LUE AVF  - LUE AVF was actively bleeding on arrival on 4/3/25. Vascular surgery was consulted. He went emergently to the OR on 4/3/25: "Severely calcified mid RCA stenosis unable to cross with PTCA balloons, treated initially with 0.9 laser atherectomy, followed by CSI atherectomy system with total of 5 runs (3 at low speed, 2 at high speed), pre-dilated using 2.0 compliant and 3.5 noncompliant balloon followed by 3.5 X 16 mm megatron stent.  Focal plaque rupture/erosion noted in the proximal and ostial RCA that were treated with  3.5 X 28 in the proximal RCA, 4.0 X 16 mm in the ostial RCA.  No residual stenosis post " "intervention.  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 Most recent hemoglobin and hematocrit are listed below.  Recent Labs     04/14/25  1947 04/15/25  0350 04/16/25  0314   HGB 8.2* 7.9* 8.1*   HCT 25.6* 24.8* 26.8*     Plan  - Monitor serial CBC: Daily and recheck now  - Transfuse PRBC if patient becomes hemodynamically unstable, symptomatic or H/H drops below 7/21.  - Patient has not received any PRBC transfusions to date  - Patient's anemia is currently stable  Acute metabolic encephalopathy with Hospital Delirum  -Noted episodes of intermittent agitation with tactile and visual hallucinations  -Continue correction of metabolic derangements  -Maintain Delirium precautions: Maintain regular sleep cycle. Early ambulation. Minimal interruptions overnight. Re-orient patient frequently. Maintain adequate bowel regimen, hydration and electrolyte replenishments  -aspiration precautions  -continue Seroquel 25 mg HS      ACP (advance care planning)  Advance Care Planning     Date: 04/04/2025  - palliative consulted   - Mr Escoto specifically told Dr Jordan to contact Kenia Smyth for all updates  - discussed code status with Kenia. She states she has spoken with Mr Escoto's sisters and they all want full code status.   -anticipated DC to SNF when medically stable enough to tolerated HD and possible nursing home placement         Acute bacterial endocarditis  - TTE 4/4/25: "1.9x1.2cm large fixed heterogeneous mass present on the posterior leaflet (new finding vs 9/2023 echo). There is moderate to severe regurgitation with an eccentric jet. Cannot exclude severe MR with possible PMVL perforation in association with large vegetation."  - " "this is in the setting of MRSA bacteremia  - ID is consulted  - repeat blood cultures until clear   - suspect source is skin/chronic scratching vs AVF infection- cultures from resected AVF with Staph   - discussed with Cardiac surgery Dr Castro 4/4/25- given age and comorbidities, he is very high risk of mortality with surgery. He recommends medical management at this point.  - on vancomycin      MRSA bacteremia  - suspect source:  Graft infection s/p exploratory surgery of LUE with graft resection 4/3  -graft holiday with TDC placed on 04/14  - cultures of AVF with Staph   - he has endocarditis  - ID consulted  - on vanc ;anticipating completing 6 weeks of IV vancomycin from clearance (EOC: 5/19/25)     NSTEMI (non-ST elevated myocardial infarction)  - see CAD    BPH with urinary obstruction  - noted 4/6/25 when patient became very agitated and screaming he couldn't urinate  - nursing unable to place dunaway/coude  - Urology consulted. Bedside cystoscopy on 4/6/25 noted clots, large prostate. Catheter was placed but was then removed due to pain  - 4/7 he is again agitated that he cannot urinate  - follow up with Urology   - tamsulosin ordered if he is awake enough to swallow     Thrombocytopenia  The likely etiology of thrombocytopenia is infection and sepsis. The patients 3 most recent labs are listed below.  Recent Labs     04/14/25  1947 04/15/25  0350 04/16/25  0314    219 217     Plan  - Will transfuse if platelet count is <10k.    AV fistula infection  - LUE AVF was actively bleeding on arrival on 4/3/25. Vascular surgery was consulted. He went emergently to the OR on 4/3/25: "Severely calcified mid RCA stenosis unable to cross with PTCA balloons, treated initially with 0.9 laser atherectomy, followed by CSI atherectomy system with total of 5 runs (3 at low speed, 2 at high speed), pre-dilated using 2.0 compliant and 3.5 noncompliant balloon followed by 3.5 X 16 mm megatron stent.  Focal plaque " "rupture/erosion noted in the proximal and ostial RCA that were treated with  3.5 X 28 in the proximal RCA, 4.0 X 16 mm in the ostial RCA.  No residual stenosis post intervention.  Patient had ALAN 3 flow at the end of the procedure"  - cultures from AVF= Staph   - wound care orders in place for surgical site  - trialysis currently in place for HD    Emphysema lung  - emphysema noted on CT  - no signs of COPD exacerbation  Pulmonary nodules  - CT 4/3/25 with few small pulmonary nodules  - will need outpatient follow up     Gross hematuria  S/p clot evacuation with a fulguration by Urology  Currently resolved  Continue cap irrigation of 3 way catheter  Continue Levsin for bladder discomfort  -Sharma to be removed when patient is transferred to the floor; we will defer removal to urology    NSVT (nonsustained ventricular tachycardia)      VTE Risk Mitigation (From admission, onward)           Ordered     heparin (porcine) injection 1,000 Units  As needed (PRN)         04/05/25 2100     IP VTE HIGH RISK PATIENT  Once         04/03/25 1516     Place TEMO hose  Until discontinued         04/03/25 1516     Place sequential compression device  Until discontinued         04/03/25 1516     Reason for No Pharmacological VTE Prophylaxis  Once        Question:  Reasons:  Answer:  Physician Provided (leave comment)    04/03/25 1516                    Discharge Planning   BAYRON: 4/18/2025     Code Status: Full Code   Medical Readiness for Discharge Date:   Discharge Plan A: Home Health (resume OP HD)            Critical care time spent on the evaluation and treatment of severe organ dysfunction, review of pertinent labs and imaging studies, discussions with consulting providers and discussions with patient/family: more than 35 minutes.            Tena Palma MD  Department of Hospital Medicine   [facility] - Intensive Care    "

## 2025-04-16 NOTE — ASSESSMENT & PLAN NOTE
Patient with known CAD s/p stent placement, which is controlled Will continue ASA, Plavix, and Statin and monitor for S/Sx of angina/ACS. Continue to monitor on telemetry.   - last [facility] was 1/2025: Severely calcified mid RCA stenosis unable to cross with PTCA balloons, treated initially with 0.9 laser atherectomy, followed by CSI atherectomy system with total of 5 runs (3 at low speed, 2 at high speed), pre-dilated using 2.0 compliant and 3.5 noncompliant balloon followed by 3.5 X 16 mm megatron stent.  Focal plaque rupture/erosion noted in the proximal and ostial RCA that were treated with  3.5 X 28 in the proximal RCA, 4.0 X 16 mm in the ostial RCA.  No residual stenosis post intervention.  Patient had ALAN 3 flow at the end of the procedure  - with elevated troponin likely due to septic shock  Recent Labs   Lab 04/12/25  1216   TROPONINI 0.811*     - continue asa, plavix, statin  - Cardiology consulted  - no plans for ischemic evaluation at this time

## 2025-04-16 NOTE — PLAN OF CARE
Problem: Adult Inpatient Plan of Care  Goal: Optimal Comfort and Wellbeing  Outcome: Progressing     Problem: Diabetes Comorbidity  Goal: Blood Glucose Level Within Targeted Range  Outcome: Progressing     Problem: Sepsis/Septic Shock  Goal: Optimal Coping  Outcome: Progressing     Problem: Coping Ineffective  Goal: Effective Coping  Outcome: Progressing     Problem: Urinary Elimination Management  Goal: Effective Urinary Elimination/Continence  Outcome: Progressing     Problem: Urinary Retention  Goal: Effective Urinary Elimination  Outcome: Progressing

## 2025-04-16 NOTE — ASSESSMENT & PLAN NOTE
ESRD on HD     HD TTS, Bac[facility]  Sp AVG resection 4/3    TDC right chest wall 4/14/2025  HD 4/14 2 hr 15 min completed before apneic episode, code blue activated though never lost pulse. Per notes improved with narcan and overnight attending suspicious for opiate induced apnea. Pain medications decreased    Plan/Recommendation  HD trial today in ICU   -Keep MAP > 65  -Keep hemoglobin > 7  -Strict ins and outs  -Avoid nephrotoxic agents if possible and renally dose medications  -Avoid drastic hemodynamic changes if possible    #Secondary hyperparathyroidism  Calcium   Date Value Ref Range Status   04/16/2025 8.7 8.7 - 10.5 mg/dL Final     Phosphorus Level   Date Value Ref Range Status   04/14/2025 2.8 2.7 - 4.5 mg/dL Final     PTH, Intact   Date Value Ref Range Status   01/17/2024 117.7 (H) 9.0 - 77.0 pg/mL Final   Continue to monitor

## 2025-04-16 NOTE — ASSESSMENT & PLAN NOTE
Patient's blood pressure range in the last 24 hours was: BP  Min: 99/51  Max: 134/100.The patient's inpatient anti-hypertensive regimen is listed below:  Current Antihypertensives       Plan  - admitted with shock  - now off vasopressors. Continue to hold BP Rx as BP is well controlled without it

## 2025-04-16 NOTE — SUBJECTIVE & OBJECTIVE
Interval History: much improved mentation this AM    Review of patient's allergies indicates:   Allergen Reactions    Ace inhibitors Hives, Itching, Shortness Of Breath, Other (See Comments) and Rash     Is not sure which medication it was    Captopril      Current Facility-Administered Medications   Medication Frequency    0.9%  NaCl infusion (for blood administration) Q24H PRN    0.9% NaCl infusion PRN    acetaminophen tablet 650 mg Q4H PRN    albumin human 25% bottle 25 g Daily PRN    atorvastatin tablet 80 mg QHS    clopidogreL tablet 75 mg Daily    dextrose 50% injection 12.5 g PRN    dextrose 50% injection 25 g PRN    epoetin mj-epbx injection 10,000 Units Every Tues, Thurs, Sat    erythromycin 5 mg/gram (0.5 %) ophthalmic ointment Q8H    glucagon (human recombinant) injection 1 mg PRN    glucose chewable tablet 16 g PRN    glucose chewable tablet 24 g PRN    haloperidol lactate injection 2 mg Q4H PRN    heparin (porcine) injection 1,000 Units PRN    hyoscyamine SL tablet 0.125 mg Q4H PRN    insulin aspart U-100 pen 0-5 Units QID (AC + HS) PRN    insulin glargine U-100 (Lantus) pen 5 Units Daily    melatonin tablet 6 mg Nightly PRN    midodrine tablet 15 mg Q8H    naloxone 0.4 mg/mL injection 0.02 mg PRN    ondansetron injection 4 mg Q6H PRN    pantoprazole EC tablet 40 mg Daily    prochlorperazine injection Soln 5 mg Q6H PRN    QUEtiapine tablet 25 mg QHS    senna tablet 8.6 mg Daily PRN    sodium chloride 0.9% bolus 250 mL 250 mL PRN    sodium chloride 0.9% flush 10 mL Q8H    tamsulosin 24 hr capsule 0.4 mg Daily    vancomycin - pharmacy to dose pharmacy to manage frequency       Objective:     Vital Signs (Most Recent):  Temp: 98 °F (36.7 °C) (04/16/25 0701)  Pulse: 97 (04/16/25 0800)  Resp: (!) 30 (04/16/25 0800)  BP: (!) 134/100 (04/16/25 0800)  SpO2: 98 % (04/16/25 0800) Vital Signs (24h Range):  Temp:  [97.5 °F (36.4 °C)-98.5 °F (36.9 °C)] 98 °F (36.7 °C)  Pulse:  [] 97  Resp:  [13-42] 30  SpO2:   [92 %-100 %] 98 %  BP: ()/() 134/100     Weight: 61.2 kg (134 lb 14.7 oz) (04/04/25 1937)  Body mass index is 21.13 kg/m².  Body surface area is 1.7 meters squared.    I/O last 3 completed shifts:  In: 300 [P.O.:300]  Out: 60 [Urine:60]     Physical Exam  Constitutional:       General: He is not in acute distress.     Appearance: He is not ill-appearing or toxic-appearing.   HENT:      Head: Normocephalic and atraumatic.      Nose: Nose normal. No congestion.      Mouth/Throat:      Mouth: Mucous membranes are moist.   Eyes:      Pupils: Pupils are equal, round, and reactive to light.   Cardiovascular:      Rate and Rhythm: Tachycardia present.      Heart sounds: Murmur heard.      Comments: Right chest wall TDC  Pulmonary:      Effort: Pulmonary effort is normal.   Neurological:      Mental Status: He is alert.          Significant Labs:  All labs within the past 24 hours have been reviewed.     Significant Imaging:  Labs: Reviewed

## 2025-04-17 PROBLEM — L29.89 UREMIC PRURITUS: Status: ACTIVE | Noted: 2025-04-17

## 2025-04-17 LAB
ABSOLUTE EOSINOPHIL (OHS): 0.48 K/UL
ABSOLUTE MONOCYTE (OHS): 0.87 K/UL (ref 0.3–1)
ABSOLUTE NEUTROPHIL COUNT (OHS): 10.99 K/UL (ref 1.8–7.7)
ALBUMIN SERPL BCP-MCNC: 2.7 G/DL (ref 3.5–5.2)
ALP SERPL-CCNC: 81 UNIT/L (ref 40–150)
ALT SERPL W/O P-5'-P-CCNC: 8 UNIT/L (ref 10–44)
ANION GAP (OHS): 15 MMOL/L (ref 8–16)
AST SERPL-CCNC: 31 UNIT/L (ref 11–45)
BASOPHILS # BLD AUTO: 0.06 K/UL
BASOPHILS NFR BLD AUTO: 0.5 %
BILIRUB SERPL-MCNC: 1.1 MG/DL (ref 0.1–1)
BUN SERPL-MCNC: 24 MG/DL (ref 8–23)
CALCIUM SERPL-MCNC: 8.9 MG/DL (ref 8.7–10.5)
CHLORIDE SERPL-SCNC: 97 MMOL/L (ref 95–110)
CO2 SERPL-SCNC: 25 MMOL/L (ref 23–29)
CREAT SERPL-MCNC: 5.6 MG/DL (ref 0.5–1.4)
ERYTHROCYTE [DISTWIDTH] IN BLOOD BY AUTOMATED COUNT: 16.3 % (ref 11.5–14.5)
GFR SERPLBLD CREATININE-BSD FMLA CKD-EPI: 9 ML/MIN/1.73/M2
GLUCOSE SERPL-MCNC: 112 MG/DL (ref 70–110)
HCT VFR BLD AUTO: 28.4 % (ref 40–54)
HGB BLD-MCNC: 9 GM/DL (ref 14–18)
IMM GRANULOCYTES # BLD AUTO: 0.06 K/UL (ref 0–0.04)
IMM GRANULOCYTES NFR BLD AUTO: 0.5 % (ref 0–0.5)
LYMPHOCYTES # BLD AUTO: 0.71 K/UL (ref 1–4.8)
MCH RBC QN AUTO: 28.8 PG (ref 27–31)
MCHC RBC AUTO-ENTMCNC: 31.7 G/DL (ref 32–36)
MCV RBC AUTO: 91 FL (ref 82–98)
NUCLEATED RBC (/100WBC) (OHS): 0 /100 WBC
PLATELET # BLD AUTO: 214 K/UL (ref 150–450)
PMV BLD AUTO: 10.8 FL (ref 9.2–12.9)
POCT GLUCOSE: 115 MG/DL (ref 70–110)
POCT GLUCOSE: 131 MG/DL (ref 70–110)
POCT GLUCOSE: 167 MG/DL (ref 70–110)
POCT GLUCOSE: 73 MG/DL (ref 70–110)
POTASSIUM SERPL-SCNC: 4.1 MMOL/L (ref 3.5–5.1)
PROT SERPL-MCNC: 7.8 GM/DL (ref 6–8.4)
RBC # BLD AUTO: 3.13 M/UL (ref 4.6–6.2)
RELATIVE EOSINOPHIL (OHS): 3.6 %
RELATIVE LYMPHOCYTE (OHS): 5.4 % (ref 18–48)
RELATIVE MONOCYTE (OHS): 6.6 % (ref 4–15)
RELATIVE NEUTROPHIL (OHS): 83.4 % (ref 38–73)
SODIUM SERPL-SCNC: 137 MMOL/L (ref 136–145)
VANCOMYCIN SERPL-MCNC: 10.8 UG/ML (ref ?–80)
WBC # BLD AUTO: 13.17 K/UL (ref 3.9–12.7)

## 2025-04-17 PROCEDURE — 85025 COMPLETE CBC W/AUTO DIFF WBC: CPT | Performed by: HOSPITALIST

## 2025-04-17 PROCEDURE — 25000003 PHARM REV CODE 250: Performed by: STUDENT IN AN ORGANIZED HEALTH CARE EDUCATION/TRAINING PROGRAM

## 2025-04-17 PROCEDURE — 97530 THERAPEUTIC ACTIVITIES: CPT

## 2025-04-17 PROCEDURE — 99233 SBSQ HOSP IP/OBS HIGH 50: CPT | Mod: 25,,, | Performed by: REGISTERED NURSE

## 2025-04-17 PROCEDURE — 82040 ASSAY OF SERUM ALBUMIN: CPT | Performed by: HOSPITALIST

## 2025-04-17 PROCEDURE — 36415 COLL VENOUS BLD VENIPUNCTURE: CPT | Performed by: HOSPITALIST

## 2025-04-17 PROCEDURE — 11000001 HC ACUTE MED/SURG PRIVATE ROOM

## 2025-04-17 PROCEDURE — 25000003 PHARM REV CODE 250: Performed by: HOSPITALIST

## 2025-04-17 PROCEDURE — 99232 SBSQ HOSP IP/OBS MODERATE 35: CPT | Mod: ,,, | Performed by: UROLOGY

## 2025-04-17 PROCEDURE — A4216 STERILE WATER/SALINE, 10 ML: HCPCS | Performed by: HOSPITALIST

## 2025-04-17 PROCEDURE — 99232 SBSQ HOSP IP/OBS MODERATE 35: CPT | Mod: ,,, | Performed by: STUDENT IN AN ORGANIZED HEALTH CARE EDUCATION/TRAINING PROGRAM

## 2025-04-17 PROCEDURE — 80202 ASSAY OF VANCOMYCIN: CPT | Performed by: STUDENT IN AN ORGANIZED HEALTH CARE EDUCATION/TRAINING PROGRAM

## 2025-04-17 PROCEDURE — 94761 N-INVAS EAR/PLS OXIMETRY MLT: CPT

## 2025-04-17 PROCEDURE — 99498 ADVNCD CARE PLAN ADDL 30 MIN: CPT | Mod: ,,, | Performed by: REGISTERED NURSE

## 2025-04-17 PROCEDURE — 63600175 PHARM REV CODE 636 W HCPCS

## 2025-04-17 PROCEDURE — 25000003 PHARM REV CODE 250

## 2025-04-17 PROCEDURE — 99497 ADVNCD CARE PLAN 30 MIN: CPT | Mod: 25,,, | Performed by: REGISTERED NURSE

## 2025-04-17 RX ORDER — CAPSAICIN 0.03 G/100G
CREAM TOPICAL 2 TIMES DAILY
Status: DISCONTINUED | OUTPATIENT
Start: 2025-04-17 | End: 2025-04-29 | Stop reason: HOSPADM

## 2025-04-17 RX ORDER — HYDROXYZINE HYDROCHLORIDE 25 MG/1
25 TABLET, FILM COATED ORAL 3 TIMES DAILY PRN
Status: DISCONTINUED | OUTPATIENT
Start: 2025-04-17 | End: 2025-04-29 | Stop reason: HOSPADM

## 2025-04-17 RX ADMIN — INSULIN GLARGINE 5 UNITS: 100 INJECTION, SOLUTION SUBCUTANEOUS at 09:04

## 2025-04-17 RX ADMIN — SODIUM CHLORIDE, PRESERVATIVE FREE 10 ML: 5 INJECTION INTRAVENOUS at 05:04

## 2025-04-17 RX ADMIN — HYDROXYZINE HYDROCHLORIDE 25 MG: 25 TABLET ORAL at 10:04

## 2025-04-17 RX ADMIN — SODIUM CHLORIDE, PRESERVATIVE FREE 10 ML: 5 INJECTION INTRAVENOUS at 09:04

## 2025-04-17 RX ADMIN — HYDROXYZINE HYDROCHLORIDE 25 MG: 25 TABLET ORAL at 01:04

## 2025-04-17 RX ADMIN — SODIUM CHLORIDE, PRESERVATIVE FREE 10 ML: 5 INJECTION INTRAVENOUS at 02:04

## 2025-04-17 RX ADMIN — MIDODRINE HYDROCHLORIDE 15 MG: 5 TABLET ORAL at 09:04

## 2025-04-17 RX ADMIN — CLOPIDOGREL BISULFATE 75 MG: 75 TABLET, FILM COATED ORAL at 09:04

## 2025-04-17 RX ADMIN — Medication 6 MG: at 10:04

## 2025-04-17 RX ADMIN — ERYTHROMYCIN: 5 OINTMENT OPHTHALMIC at 05:04

## 2025-04-17 RX ADMIN — VANCOMYCIN HYDROCHLORIDE 500 MG: 500 INJECTION, POWDER, LYOPHILIZED, FOR SOLUTION INTRAVENOUS at 06:04

## 2025-04-17 RX ADMIN — ATORVASTATIN CALCIUM 80 MG: 40 TABLET, FILM COATED ORAL at 09:04

## 2025-04-17 RX ADMIN — ERYTHROMYCIN: 5 OINTMENT OPHTHALMIC at 09:04

## 2025-04-17 RX ADMIN — MIDODRINE HYDROCHLORIDE 15 MG: 5 TABLET ORAL at 02:04

## 2025-04-17 RX ADMIN — ERYTHROMYCIN: 5 OINTMENT OPHTHALMIC at 02:04

## 2025-04-17 RX ADMIN — CAPSAICIN: 0.25 CREAM TOPICAL at 09:04

## 2025-04-17 RX ADMIN — QUETIAPINE FUMARATE 25 MG: 25 TABLET ORAL at 09:04

## 2025-04-17 RX ADMIN — PANTOPRAZOLE SODIUM 40 MG: 40 TABLET, DELAYED RELEASE ORAL at 09:04

## 2025-04-17 RX ADMIN — TAMSULOSIN HYDROCHLORIDE 0.4 MG: 0.4 CAPSULE ORAL at 09:04

## 2025-04-17 RX ADMIN — MIDODRINE HYDROCHLORIDE 15 MG: 5 TABLET ORAL at 06:04

## 2025-04-17 NOTE — ASSESSMENT & PLAN NOTE
- Abx, mgmt per primary team/vascular surgery/ID; s/p excision of infected graft   - pt now with tunneled line placed this admission by IR for continued HD

## 2025-04-17 NOTE — ASSESSMENT & PLAN NOTE
- urology following; required CBI and dunaway this admission   - very difficult catheter placement per MDT; urology with planned involvement in any further dunaway needs

## 2025-04-17 NOTE — ASSESSMENT & PLAN NOTE
Patient with known CAD s/p stent placement, which is controlled Will continue ASA, Plavix, and Statin and monitor for S/Sx of angina/ACS. Continue to monitor on telemetry.   - last [facility] was 1/2025: Severely calcified mid RCA stenosis unable to cross with PTCA balloons, treated initially with 0.9 laser atherectomy, followed by CSI atherectomy system with total of 5 runs (3 at low speed, 2 at high speed), pre-dilated using 2.0 compliant and 3.5 noncompliant balloon followed by 3.5 X 16 mm megatron stent.  Focal plaque rupture/erosion noted in the proximal and ostial RCA that were treated with  3.5 X 28 in the proximal RCA, 4.0 X 16 mm in the ostial RCA.  No residual stenosis post intervention.  Patient had ALAN 3 flow at the end of the procedure  - with elevated troponin likely due to septic shock  Recent Labs   Lab 04/12/25  1216   TROPONINI 0.811*     - continue asa, plavix, statin  - Cardiology consulted  - no plans for ischemic evaluation at this time

## 2025-04-17 NOTE — PROGRESS NOTES
Pharmacokinetic Assessment Follow Up: IV Vancomycin    Vancomycin serum concentration assessment(s):    The random level was drawn correctly and can be used to guide therapy at this time. The measurement is below the desired definitive target range of 15 to 20 mcg/mL.    Vancomycin Regimen Plan:    Re-dose when the random level is less than 20 mcg/mL, next level to be drawn at 0400 on 4/18/25    Drug levels (last 3 results):  Recent Labs   Lab Result Units 04/15/25  0350 04/16/25  0314 04/17/25  0326   Vancomycin Random ug/ml 14.5 13.5 10.8       Pharmacy will continue to follow and monitor vancomycin.    Please contact pharmacy at extension [phone] for questions regarding this assessment.    Thank you for the consult,   Severino Belle       Patient brief summary:  Jevon Rajan is a 85 y.o. male initiated on antimicrobial therapy with IV Vancomycin for treatment of  MRSA, Bacteremia    The patient's current regimen is random pulse dosing    Drug Allergies:   Review of patient's allergies indicates:   Allergen Reactions    Ace inhibitors Hives, Itching, Shortness Of Breath, Other (See Comments) and Rash     Is not sure which medication it was    Captopril        Actual Body Weight:   61.2 kg    Renal Function:   Estimated Creatinine Clearance: 8 mL/min (A) (based on SCr of 5.6 mg/dL (H)).,     Dialysis Method (if applicable):  intermittent HD    CBC (last 72 hours):  Recent Labs   Lab Result Units 04/14/25  1947 04/15/25  0350 04/16/25  0314 04/17/25  0326   WBC K/uL 13.96* 12.08 11.16 13.17*   HGB gm/dL 8.2* 7.9* 8.1* 9.0*   HCT % 25.6* 24.8* 26.8* 28.4*   Platelet Count K/uL 220 219 217 214   Lymph % % 5.7* 6.9* 7.2* 5.4*   Mono % % 6.2 6.0 6.5 6.6   Eos % % 2.9 2.8 4.3 3.6   Basophil % % 0.3 0.5 0.5 0.5       Metabolic Panel (last 72 hours):  Recent Labs   Lab Result Units 04/14/25  1933 04/15/25  0350 04/16/25  0314 04/17/25  0326   Sodium mmol/L 140 139 141 137   Potassium mmol/L 3.5 4.1 4.1 4.1   Chloride  mmol/L 100 100 102 97   CO2 mmol/L 27 26 23 25   Glucose mg/dL 111* 91 96 112*   BUN mg/dL 19 26* 38* 24*   Creatinine mg/dL 4.1* 5.6* 7.9* 5.6*   Albumin g/dL 2.5* 2.5* 2.5* 2.7*   Bilirubin Total mg/dL 0.8 0.9 0.8 1.1*   ALP unit/L 66 65 67 81   AST unit/L 22 28 25 31   ALT unit/L 7* 9* 5* 8*   Magnesium  mg/dL 1.7  --   --   --    Phosphorus Level mg/dL 2.8  --   --   --        Vancomycin Administrations:  vancomycin given in the last 96 hours        No antibiotic orders with administrations found.                    Microbiologic Results:  Microbiology Results (last 7 days)       Procedure Component Value Units Date/Time    Blood culture [[id number]]  (Normal) Collected: 04/11/25 1335    Order Status: Completed Specimen: Blood Updated: 04/16/25 1401     Blood Culture No Growth After 5 Days    Blood culture [[id number]]  (Normal) Collected: 04/11/25 1335    Order Status: Completed Specimen: Blood from Other (Specify) Updated: 04/16/25 1401     Blood Culture No Growth After 5 Days    Blood culture [[id number]]  (Normal) Collected: 04/08/25 0506    Order Status: Completed Specimen: Blood Updated: 04/13/25 0600     Blood Culture No Growth After 5 Days    Blood culture [[id number]]  (Normal) Collected: 04/08/25 0401    Order Status: Completed Specimen: Blood Updated: 04/13/25 0600     Blood Culture No Growth After 5 Days    Fungus culture [[id number]]  (Normal) Collected: 04/03/25 1816    Order Status: Completed Specimen: Wound from Arm, Left Updated: 04/10/25 2300     Fungal Culture No Fungus Isolated at 1 Week    Fungus culture [[id number]]  (Normal) Collected: 04/03/25 1934    Order Status: Completed Specimen: Tissue from AV Fistula, Left Updated: 04/10/25 2300     Fungal Culture No Fungus Isolated at 1 Week    Fungus culture [[id number]]  (Normal) Collected: 04/03/25 2011    Order Status: Completed Specimen: Wound from Arm, Left Updated: 04/10/25 2300     Fungal Culture No Fungus Isolated at 1 Week    Fungus  culture [[id number]]  (Normal) Collected: 04/03/25 1904    Order Status: Completed Specimen: Tissue from AV Fistula, Left Updated: 04/10/25 2201     Fungal Culture No Fungus Isolated at 1 Week    Fungus culture [[id number]]  (Normal) Collected: 04/03/25 1921    Order Status: Completed Specimen: Tissue from hematoma Updated: 04/10/25 2201     Fungal Culture No Fungus Isolated at 1 Week

## 2025-04-17 NOTE — EICU
Intervention Initiated From:  COR / HUMERAU    Jude intervened regarding:  Rounding (Video assessment)    VICU Night Rounds Checklist  24H Vital Sign Range:  Temp:  [97.5 °F (36.4 °C)-98.5 °F (36.9 °C)]   Pulse:  []   Resp:  [10-42]   BP: ()/()   SpO2:  [95 %-100 %]     Video rounds and LDA reconciliation

## 2025-04-17 NOTE — PROGRESS NOTES
[facility] [place]  Nurses Note -- 4 Eyes      4/16/2025       Skin assessed on: Q Shift      [x] No Pressure Injuries Present    [x]Prevention Measures Documented    [] Yes LDA  for Pressure Injury Previously documented     [] Yes New Pressure Injury Discovered   [] LDA for New Pressure Injury Added      Attending RN:  Iron Dickinson RN     Second RN:  CHARLENE Paris

## 2025-04-17 NOTE — ASSESSMENT & PLAN NOTE
4/17/2025  - interval chart reviewed discussion pt with Mdt   - discussed with MDT concerns for pt's varied direction on preferred MPOA and overall concern that pt has potentially lacked even understanding of the concepts of MPOA; given variation in responses and encephalopathy to some degree since admission it is my recommendation and agreement of MDT to defer to pt's legal surrogate decision makers who are his 4 siblings; plan for and encouragement of shared family decision making, including 2 nieces (Teodora and Senthil)  who have played an active role in pt's ou tpt care per siblings   - met with pt and sisters Bhavna and Ronna at bedside   - brief medical update provided to sisters  - discussed above decision maker decision by MDT; Ronna and Bhavna are happy to hear this and are hopeful for continued shared family decision making   - revisited prior code status discussion that has been ongoing throughout admission; 2/4 siblings, Ronna and Bhavna, continue to advocate that pt had previously expressed wishes for what aligns with DNR code status; they confirm all 4 siblings are in agreement with this decision; confirmed that DNR order would be placed   - reviewed efforts for improving delirium   - reviewed likely plan/needs for SNF vs LTACH for 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 and prior bacteremia and PT/OT due to deconditioning; they are in agreement with this plan; [facility] and alternatively facilities closer to their [facility], would consider [facility] as well   - siblings with good insight that pt's ability to safely live independently at home in the future is unlikely   - emotional support provided; answered all questions; expressed continued palliative availability throughout admission   - updated MDT     4/16/2025  - interval chart reviewed; pt discussed with MDT   - pt with significant improvement in mental status since initiation of mood and sleep med  regimen, delirium precautions, and transition to a room with window in coordination with Dr. Palma,  on 4/15  - re-introduction to myself and palliative medicine team, re-discussion role in admission and care; pt does share recognizing me from care but does not recall details of discussions   - pt still with some capacity concerns, will plan for continued assessment and coordination with HM/MDT for determining if/when pt has capacity for medical decision making; at current time I feel pt does have some understanding of basics of care and can make needs known, but is lacking capacity for complex medical decision making   - I do feel pt may have capacity for simple wishes such as preferred MPOA but will allow for consistent improvement in mental status before I would feel comfortable with pt formally appointing an MPOA   - previously in admission, pt reported to then , Dr. Lyles, that his niece Senthil is who team should call for medical decisions; pt now saying that niece Teodora is who he would wish to appoint but wants Senthil to receive updates etc; pt confirms updates can be provided to his 4 siblings and to these 2 nieces   - clarified with pt that legal surrogate decision makers currently would be his 4 siblings, if he were to lack capacity; expressed hope that pt would continue to have improved mental status and could make his own decisions and appoint MPOA  - pt reports living in his own home independently prior to admission; he feels that prior to becoming ill and seeking care weeks prior to admission he did not have difficulty caring for himself with PRN assistance from family   - shares that he had previously explored [facility] options for nursing home/assisted living but OOP cost affected decision, with preference to make necessary improvements to home for safety and hire care assistance when needed as alternative   - [facility] reports [facility] and has active [facility] social work team    - assisted pt in calling dez Ndiaye as he wished she bring his cell phone and some items from home; updated Dashpalmer of pt's improved status   - also attempted to call pt's sister Ronna, who has visited several times during admission, to update on improved mental status and to attempt to get Teodora garces's number for update as well; LM and provided return call number     Please see multiple prior palliative/ACP notes from myself and Dr. Joan Antunez for prior GOC/ACP discussions

## 2025-04-17 NOTE — PROGRESS NOTES
[facility] - Intensive Care  Palliative Medicine  Progress Note    Patient Name: Jevon Rajan  MRN: [id number]  Admission Date: 4/3/2025  Hospital Length of Stay: 13 days  Code Status: Full Code   Attending Provider: Tena Palma MD  Consulting Provider: Lisa Jacinto NP  Primary Care Physician: Melia, Primary Doctor  Principal Problem:Septic shock    Patient information was obtained from patient, relative(s), and primary team.      Assessment/Plan:     Neuro  Acute metabolic encephalopathy with Hospital Delirum  - significant improvement 4/16; improvement in orientation and ability to participate in discussions appropriately   - pt and staff report full nights sleep for first time this admission   - occasionally having short episodes of confusion but quickly redirected/reoriented   - pt able to give niece instructions/details of where items in home were that he wished brought to hospital; seems to have fair current orientation and distant memory, understandable difficulty with memory of events of hospitalization given sepsis encephalopathy and prior delirium   - still some concern for capacity, especially with detailed and complex medical discussions/decisions   - will continued to evaluate capacity and discuss with MDT as pt has given various direction about wishes regarding MPOA this admission (see ACP)     Cardiac/Vascular  Acute bacterial endocarditis  - Mgmt per primary team/ID/cardiology  - pt with prior/related  MRSA bacteremia and sepsis   - valvular mass, he is being treated by antibiotics alone as he is not a surgical candidate  - discussed with pt directly on 4/16, will assess memory and understanding of this discussion again with future visits  - dicussed need for prolonged abx therapy and likely need for SNF vs LTACH following admission, pt agreeable   - Illness trajectory education provided     Renal/  BPH with urinary obstruction  - urology following; required CBI and dunaway this admission   -  very difficult catheter placement per MDT; urology with planned involvement in any further dunaway needs     ESRD on hemodialysis  - Nephrology following; pt had difficulty tolerating HD initially on admission, but improved; additional event 4/14 unclear if related to HD  - 4/16 discussed pt with nephrology, Dr. Lauren, with plan to proceed with HD and assess tolerance   - family aware of this and with insight that this is a large factor in pt's prognosis   - family with insight that HD is a form of life support; wish to continue as long as pt tolerates/candidate     ID  AV fistula infection  - Abx, mgmt per primary team/vascular surgery/ID; s/p excision of infected graft   - pt now with tunneled line placed this admission by IR for continued HD     Palliative Care  ACP (advance care planning)  4/16/2025  - interval chart reviewed; pt discussed with MDT   - pt with significant improvement in mental status since initiation of mood and sleep med regimen, delirium precautions, and transition to a room with window in coordination with Dr. Palma  on 4/15  - re-introduction to myself and palliative medicine team, re-discussion role in admission and care; pt does share recognizing me from care but does not recall details of discussions   - pt still with some capacity concerns, will plan for continued assessment and coordination with HM/MDT for determining if/when pt has capacity for medical decision making; at current time I feel pt does have some understanding of basics of care and can make needs known, but is lacking capacity for complex medical decision making   - I do feel pt may have capacity for simple wishes such as preferred MPOA but will allow for consistent improvement in mental status before I would feel comfortable with pt formally appointing an MPOA   - previously in admission, pt reported to then , Dr. Lyles, that his niece Senthil is who team should call for medical decisions; pt now saying that niece  "Teodora is who he would wish to appoint but wants Senthil to receive updates etc; pt confirms updates can be provided to his 4 siblings and to these 2 nieces   - clarified with pt that legal surrogate decision makers currently would be his 4 siblings, if he were to lack capacity; expressed hope that pt would continue to have improved mental status and could make his own decisions and appoint MPOA  - pt reports living in his own home independently prior to admission; he feels that prior to becoming ill and seeking care weeks prior to admission he did not have difficulty caring for himself with PRN assistance from family   - shares that he had previously explored [facility] for nursing home/assisted living but OOP cost affected decision, with preference to make necessary improvements to home for safety and hire care assistance when needed as alternative   - [facility] and has [facility] team   - assisted pt in calling dez Ndiaye as he wished she bring his cell phone and some items from home; updated Dashante of pt's improved status   - also attempted to call pt's sister Ronna, who has visited several times during admission, to update on improved mental status and to attempt to get Teodora garces's number for update as well; LM and provided return call number     Please see multiple prior palliative/ACP notes from myself and Dr. Joan Antunez for prior GOC/ACP discussions         I will follow-up with patient. Please contact us if you have any additional questions.    Subjective:     Chief Complaint:   Chief Complaint   Patient presents with    Bleeding/Bruising       HPI:   From H&P: " Mr Jevon Rajan is a 85 y.o. man with ESRD with LUE AVF, HFpEF last EF 50-55%, CAD, DM who was transferred to [facility] ICU for septic shock due to MRSA bacteremia.      He was admitted at [facility] 4/1-3/2025 with sepsis, worsening to septic shock, then identified " "source as MRSA. 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      Upon arrival to [facility], he is moaning in pain and his LUE AVF has active pulsatile bright red blood. He does respond to his name. He denies pain anywhere other than his LUE. He is on levophed at 0.05. "     Palliative medicine consulted for goals of care discussion and advance care planning; for details of visit, see advance care planning section of plan.       Hospital Course:  No notes on file    Medications:  Continuous Infusions:  Scheduled Meds:   atorvastatin  80 mg Oral QHS    clopidogreL  75 mg Oral Daily    epoetin mj-epbx  10,000 Units Intravenous Every Tues, Thurs, Sat    erythromycin   Both Eyes Q8H    insulin glargine U-100  5 Units Subcutaneous Daily    midodrine  15 mg Oral Q8H    pantoprazole  40 mg Oral Daily    QUEtiapine  25 mg Oral QHS    sodium chloride 0.9%  10 mL Intravenous Q8H    tamsulosin  0.4 mg Oral Daily     PRN Meds:  Current Facility-Administered Medications:     0.9%  NaCl infusion (for blood administration), , Intravenous, Q24H PRN    0.9% NaCl, , Intravenous, PRN    acetaminophen, 650 mg, Oral, Q4H PRN    albumin human 25%, 25 g, Intravenous, Daily PRN    dextrose 50%, 12.5 g, Intravenous, PRN    dextrose 50%, 25 g, Intravenous, PRN    glucagon (human recombinant), 1 mg, Intramuscular, PRN    glucose, 16 g, Oral, PRN    glucose, 24 g, Oral, PRN    haloperidol lactate, 2 mg, Intravenous, Q4H PRN    heparin (porcine), 1,000 Units, Intravenous, PRN    hyoscyamine, 0.125 mg, Sublingual, Q4H PRN    insulin aspart U-100, 0-5 Units, Subcutaneous, QID (AC + HS) PRN    melatonin, 6 mg, Oral, Nightly PRN    naloxone, 0.02 mg, Intravenous, PRN    ondansetron, 4 mg, Intravenous, Q6H PRN    prochlorperazine, 5 mg, Intravenous, Q6H PRN    senna, 8.6 mg, Oral, Daily PRN    sodium chloride 0.9%, 250 mL, Intravenous, PRN    " Pharmacy to dose Vancomycin consult, , , Once **AND** vancomycin - pharmacy to dose, , Intravenous, pharmacy to manage frequency    Objective:     Vital Signs (Most Recent):  Temp: 97.7 °F (36.5 °C) (04/16/25 2000)  Pulse: 88 (04/16/25 2000)  Resp: (!) 23 (04/16/25 2000)  BP: 106/63 (04/16/25 2000)  SpO2: 99 % (04/16/25 2000) Vital Signs (24h Range):  Temp:  [97.5 °F (36.4 °C)-98.5 °F (36.9 °C)] 97.7 °F (36.5 °C)  Pulse:  [] 88  Resp:  [10-42] 23  SpO2:  [95 %-100 %] 99 %  BP: ()/() 106/63     Weight: 61.2 kg (134 lb 14.7 oz)  Body mass index is 21.13 kg/m².     Physical Exam  Vitals and nursing note reviewed.   Constitutional:       General: He is awake.      Appearance: He is ill-appearing.      Comments: Significant improvement in delirium, cooperation, calmness, and ability to participate in discussions    HENT:      Head: Normocephalic and atraumatic.   Pulmonary:      Effort: Pulmonary effort is normal. No respiratory distress.      Comments: NC  Skin:     General: Skin is dry.      Coloration: Skin is pale.      Findings: Lesion present. Bruising: multiple skin lesions in various stages of healing. Erythema: pale/gray for ethnicity.  Neurological:      Mental Status: He is alert.      Comments: Oriented to person, family, location of hospital, relative situation but does not recall events of hospitalization, oriented to year    Psychiatric:         Mood and Affect: Mood is anxious.         Behavior: Behavior is agitated. Behavior is cooperative.         Thought Content: Thought content is paranoid.         Cognition and Memory: He exhibits impaired recent memory.          Advance Care Planning   Advance Directives:   Living Will: No    LaPOST: No    Do Not Resuscitate Status: No    Medical Power of : No (previously had shared with MDT that he wished niece Senthil to act as MPOA; now states niece Teodora; 4 siblings are legal surrogate decision makers otherwise and reccomended (see  ACP))      Decision Making:  Family answered questions and Patient answered questions  Goals of Care: What is most important right now is to focus on symptom/pain control, curative/life-prolongation (regardless of treatment burdens), improvement in condition but with limits to invasive therapies. Accordingly, we have decided that the best plan to meet the patient's goals includes continuing with treatment.       Significant Labs: All pertinent labs within the past 24 hours have been reviewed.  CBC:   Recent Labs   Lab 04/16/25 0314   WBC 11.16   HGB 8.1*   HCT 26.8*   MCV 93        BMP:  Recent Labs   Lab 04/16/25 0314      K 4.1      CO2 23   BUN 38*   CREATININE 7.9*   CALCIUM 8.7     LFT:  Lab Results   Component Value Date    AST 25 04/16/2025    ALKPHOS 67 04/16/2025    BILITOT 0.8 04/16/2025     Albumin:   Albumin   Date Value Ref Range Status   04/16/2025 2.5 (L) 3.5 - 5.2 g/dL Final   03/21/2025 3.2 (L) 3.5 - 5.2 g/dL Final     Protein:   Total Protein   Date Value Ref Range Status   03/21/2025 7.5 6.0 - 8.4 g/dL Final     Lactic acid:   Lab Results   Component Value Date    LACTATE 1.9 04/14/2025    LACTATE 0.9 04/03/2025     Significant Imaging: I have reviewed all pertinent imaging results/findings within the past 24 hours.    Total visit time: 65 minutes    35 min visit time including: face to face time in discussion of symptom assessment, and exploring options and burdens of offered treatments.  This also includes non-face to face time preparing to see the patient including chart review, obtaining and/or reviewing separately obtained history, documenting clinical information in the electronic or other health record, independently interpreting results and communicating results to the patient/family/caregiver, family discussions by phone if not able to be present, coordination of care with other specialists, and discharge planning.     30 min ACP time spent: goals of care, advanced  care planning, emotional support, formulating and communicating prognosis, exploring burden/ benefit of various approaches of treatment.     Lisa Jacinto NP  Palliative Medicine  [facility] [place] [facility] [facility]

## 2025-04-17 NOTE — ASSESSMENT & PLAN NOTE
- significant improvement 4/16; improvement in orientation and ability to participate in discussions appropriately and niece able to confirm accuracy of information shared by pt   - by the afternoon 4/16 pt resumed delirium and is now requiring bedside sitter   - pt does not have capacity for medical decision making

## 2025-04-17 NOTE — PROGRESS NOTES
[facility] - Intensive Care  Nephrology  Progress Note    Patient Name: Jevon Rajan  MRN: [id number]  Admission Date: 4/3/2025  Hospital Length of Stay: 14 days  Attending Provider: Tena Palma MD   Primary Care Physician: Melia, Primary Doctor  Principal Problem:Septic shock    Subjective:     HPI: Mr. Rajan is an 84 yo male with HTN, T2DM, CAD, ESRD, and liver lesion who was transferred from [facility] for septic shock and impending AVG rupture. He initially presented to [facility] on 4/1 with temp 101.9 and BP 80/30s. He was transferred to our hospital on 4/3/25; it seems his AVG ruptured during transport/shortly after arrival. He emergently went to the OR yesterday with AVG extraction; infected hematoma was noted. Workup also concerning for elevated troponin and cardiac vegetations. He is no longer on pressors. Nephrology consulted for ESRD. Prior records obtained and reviewed. He receives HD TTS at [facility] under the c/o Dr. Colin. He last received HD outpatient on 4/1/25. HD was held at [facility] due to unstable vascular access. Family agreed to proceed with CRRT today.     Interval History: no acute events overnight, more delirious today     Review of patient's allergies indicates:   Allergen Reactions    Ace inhibitors Hives, Itching, Shortness Of Breath, Other (See Comments) and Rash     Is not sure which medication it was    Captopril      Current Facility-Administered Medications   Medication Frequency    0.9%  NaCl infusion (for blood administration) Q24H PRN    0.9% NaCl infusion PRN    acetaminophen tablet 650 mg Q4H PRN    albumin human 25% bottle 25 g Daily PRN    atorvastatin tablet 80 mg QHS    clopidogreL tablet 75 mg Daily    dextrose 50% injection 12.5 g PRN    dextrose 50% injection 25 g PRN    [START ON 4/18/2025] epoetin mj-epbx injection 10,000 Units Every Mon, Wed, Fri    erythromycin 5 mg/gram (0.5 %) ophthalmic ointment Q8H    glucagon (human recombinant) injection 1 mg PRN    glucose chewable  tablet 16 g PRN    glucose chewable tablet 24 g PRN    haloperidol lactate injection 2 mg Q4H PRN    heparin (porcine) injection 1,000 Units PRN    hyoscyamine SL tablet 0.125 mg Q4H PRN    insulin aspart U-100 pen 0-5 Units QID (AC + HS) PRN    insulin glargine U-100 (Lantus) pen 5 Units Daily    melatonin tablet 6 mg Nightly PRN    midodrine tablet 15 mg Q8H    naloxone 0.4 mg/mL injection 0.02 mg PRN    ondansetron injection 4 mg Q6H PRN    pantoprazole EC tablet 40 mg Daily    prochlorperazine injection Soln 5 mg Q6H PRN    QUEtiapine tablet 25 mg QHS    senna tablet 8.6 mg Daily PRN    sodium chloride 0.9% bolus 250 mL 250 mL PRN    sodium chloride 0.9% flush 10 mL Q8H    tamsulosin 24 hr capsule 0.4 mg Daily    vancomycin - pharmacy to dose pharmacy to manage frequency       Objective:     Vital Signs (Most Recent):  Temp: 97.9 °F (36.6 °C) (04/17/25 0800)  Pulse: 92 (04/17/25 1000)  Resp: 16 (04/17/25 1000)  BP: (!) 105/53 (04/17/25 1000)  SpO2: 97 % (04/17/25 1000) Vital Signs (24h Range):  Temp:  [97.7 °F (36.5 °C)-98 °F (36.7 °C)] 97.9 °F (36.6 °C)  Pulse:  [] 92  Resp:  [10-39] 16  SpO2:  [94 %-100 %] 97 %  BP: ()/(47-72) 105/53     Weight: 61.2 kg (134 lb 14.7 oz) (04/04/25 1937)  Body mass index is 21.13 kg/m².  Body surface area is 1.7 meters squared.    I/O last 3 completed shifts:  In: 500 [Other:500]  Out: 2125 [Urine:125; Other:2000]     Physical Exam  Constitutional:       General: He is not in acute distress.     Appearance: He is not ill-appearing or toxic-appearing.   HENT:      Head: Normocephalic and atraumatic.      Nose: Nose normal. No congestion.      Mouth/Throat:      Mouth: Mucous membranes are moist.   Eyes:      Pupils: Pupils are equal, round, and reactive to light.   Cardiovascular:      Rate and Rhythm: Tachycardia present.      Heart sounds: Murmur heard.      Comments: Right chest wall TDC  Pulmonary:      Effort: Pulmonary effort is normal.   Neurological:       Mental Status: He is alert.          Significant Labs:  All labs within the past 24 hours have been reviewed.     Significant Imaging:  Labs: Reviewed  Assessment/Plan:     Cardiac/Vascular  Acute bacterial endocarditis  Blood cultures from 4/8 cleared  ID following    Renal/  ESRD on hemodialysis  ESRD on HD     HD [facility], [facility]  Sp AVG resection 4/3    TDC right chest wall 4/14/2025  HD 4/14 2 hr 15 min completed before apneic episode, code blue activated though never lost pulse. Per notes improved with narcan and overnight attending suspicious for opiate induced apnea. Pain medications decreased  HD tolerated 4/16/2025 without issues      Plan/Recommendation  Okay to step down from ICU  HD MWF as per usual schedule.   -Keep MAP > 65  -Keep hemoglobin > 7  -Strict ins and outs  -Avoid nephrotoxic agents if possible and renally dose medications  -Avoid drastic hemodynamic changes if possible    #Secondary hyperparathyroidism  Calcium   Date Value Ref Range Status   04/17/2025 8.9 8.7 - 10.5 mg/dL Final     Phosphorus Level   Date Value Ref Range Status   04/14/2025 2.8 2.7 - 4.5 mg/dL Final     PTH, Intact   Date Value Ref Range Status   01/17/2024 117.7 (H) 9.0 - 77.0 pg/mL Final   Continue to monitor      Oncology  Anemia in ESRD (end-stage renal disease)  #Anemia  HGB   Date Value Ref Range Status   04/17/2025 9.0 (L) 14.0 - 18.0 gm/dL Final     Iron   Date Value Ref Range Status   08/11/2024 39 (L) 45 - 160 ug/dL Final     Transferrin   Date Value Ref Range Status   08/11/2024 139 (L) 200 - 375 mg/dL Final     TIBC   Date Value Ref Range Status   08/11/2024 206 (L) 250 - 450 ug/dL Final     Saturated Iron   Date Value Ref Range Status   08/11/2024 19 (L) 20 - 50 % Final     Ferritin   Date Value Ref Range Status   08/11/2024 979 (H) 20.0 - 300.0 ng/mL Final     NAKIA ordered, hb not at goal  Iron on hold due to bacteremia. Follow up outpatient        Thank you for your consult. I will follow-up with  patient. Please contact us if you have any additional questions.    Antoine Lauren MD  Nephrology  [facility] [place] [facility] [facility]

## 2025-04-17 NOTE — NURSING
[facility] [place]  Nurses Note -- 4 Eyes      4/17/2025       Skin assessed on: Q Shift      [x] No Pressure Injuries Present    [x]Prevention Measures Documented    [] Yes LDA  for Pressure Injury Previously documented     [] Yes New Pressure Injury Discovered   [] LDA for New Pressure Injury Added      Attending RN:  Wellington Marcum RN     Second RN:  CHARLENE Perdue

## 2025-04-17 NOTE — SUBJECTIVE & OBJECTIVE
Medications:  Continuous Infusions:  Scheduled Meds:   atorvastatin  80 mg Oral QHS    capsaicin   Topical (Top) BID    clopidogreL  75 mg Oral Daily    [START ON 4/18/2025] epoetin mj-epbx  10,000 Units Intravenous Every Mon, Wed, Fri    erythromycin   Both Eyes Q8H    insulin glargine U-100  5 Units Subcutaneous Daily    midodrine  15 mg Oral Q8H    pantoprazole  40 mg Oral Daily    QUEtiapine  25 mg Oral QHS    sodium chloride 0.9%  10 mL Intravenous Q8H    tamsulosin  0.4 mg Oral Daily     PRN Meds:  Current Facility-Administered Medications:     0.9%  NaCl infusion (for blood administration), , Intravenous, Q24H PRN    0.9% NaCl, , Intravenous, PRN    acetaminophen, 650 mg, Oral, Q4H PRN    albumin human 25%, 25 g, Intravenous, Daily PRN    dextrose 50%, 12.5 g, Intravenous, PRN    dextrose 50%, 25 g, Intravenous, PRN    glucagon (human recombinant), 1 mg, Intramuscular, PRN    glucose, 16 g, Oral, PRN    glucose, 24 g, Oral, PRN    haloperidol lactate, 2 mg, Intravenous, Q4H PRN    heparin (porcine), 1,000 Units, Intravenous, PRN    hydrOXYzine HCL, 25 mg, Oral, TID PRN    hyoscyamine, 0.125 mg, Sublingual, Q4H PRN    insulin aspart U-100, 0-5 Units, Subcutaneous, QID (AC + HS) PRN    melatonin, 6 mg, Oral, Nightly PRN    naloxone, 0.02 mg, Intravenous, PRN    ondansetron, 4 mg, Intravenous, Q6H PRN    prochlorperazine, 5 mg, Intravenous, Q6H PRN    senna, 8.6 mg, Oral, Daily PRN    sodium chloride 0.9%, 250 mL, Intravenous, PRN    Pharmacy to dose Vancomycin consult, , , Once **AND** vancomycin - pharmacy to dose, , Intravenous, pharmacy to manage frequency    Objective:     Vital Signs (Most Recent):  Temp: 97.9 °F (36.6 °C) (04/17/25 0800)  Pulse: 92 (04/17/25 1000)  Resp: 16 (04/17/25 1000)  BP: (!) 105/53 (04/17/25 1000)  SpO2: 97 % (04/17/25 1000) Vital Signs (24h Range):  Temp:  [97.7 °F (36.5 °C)-97.9 °F (36.6 °C)] 97.9 °F (36.6 °C)  Pulse:  [] 92  Resp:  [10-39] 16  SpO2:  [94 %-100 %] 97  %  BP: ()/(47-63) 105/53     Weight: 61.2 kg (134 lb 14.7 oz)  Body mass index is 21.13 kg/m².     Physical Exam  Vitals and nursing note reviewed.   Constitutional:       General: He is awake.      Appearance: He is ill-appearing.      Comments: Intermittent periods of alertness and lethargy; very restless and confused; redirectable for short periods of time    HENT:      Head: Normocephalic and atraumatic.   Pulmonary:      Effort: Pulmonary effort is normal. No respiratory distress.      Comments: NC  Skin:     General: Skin is dry.      Coloration: Skin is pale.      Findings: Lesion present. Bruising: multiple skin lesions in various stages of healing. Erythema: pale/gray for ethnicity.  Neurological:      Mental Status: He is lethargic and disoriented.   Psychiatric:         Behavior: Behavior is agitated. Behavior is cooperative.         Cognition and Memory: Memory is impaired.          Advance Care Planning   Advance Directives:   Living Will: No    LaPOST: No    Do Not Resuscitate Status: No    Medical Power of : No (previously had shared with MDT that he wished dez Ndiaye to act as MPOA; now states dez Araiza; 4 siblings are legal surrogate decision makers otherwise and reccomended (see ACP))      Decision Making:  Family answered questions and Patient answered questions  Goals of Care: What is most important right now is to focus on symptom/pain control, curative/life-prolongation (regardless of treatment burdens), improvement in condition but with limits to invasive therapies. Accordingly, we have decided that the best plan to meet the patient's goals includes continuing with treatment.       Significant Labs: All pertinent labs within the past 24 hours have been reviewed.  CBC:   Recent Labs   Lab 04/17/25  0326   WBC 13.17*   HGB 9.0*   HCT 28.4*   MCV 91        BMP:  Recent Labs   Lab 04/17/25  0326      K 4.1   CL 97   CO2 25   BUN 24*   CREATININE 5.6*   CALCIUM  8.9     LFT:  Lab Results   Component Value Date    AST 31 04/17/2025    ALKPHOS 81 04/17/2025    BILITOT 1.1 (H) 04/17/2025     Albumin:   Albumin   Date Value Ref Range Status   04/17/2025 2.7 (L) 3.5 - 5.2 g/dL Final   03/21/2025 3.2 (L) 3.5 - 5.2 g/dL Final     Protein:   Total Protein   Date Value Ref Range Status   03/21/2025 7.5 6.0 - 8.4 g/dL Final     Lactic acid:   Lab Results   Component Value Date    LACTATE 1.9 04/14/2025    LACTATE 0.9 04/03/2025     Significant Imaging: I have reviewed all pertinent imaging results/findings within the past 24 hours.

## 2025-04-17 NOTE — ASSESSMENT & PLAN NOTE
- Nephrology following; pt had difficulty tolerating HD initially on admission, but improved; additional event 4/14 unclear if related to HD  - pt has tolerated HD since  - family aware of this and with insight that this is a large factor in pt's prognosis   - family with insight that HD is a form of life support; wish to continue as long as pt tolerates/candidate

## 2025-04-17 NOTE — SUBJECTIVE & OBJECTIVE
Interval History:  No acute event overnight.  Patient seen this morning still complaining of severe pruritus.  Further information obtained indicated [facility].  Discussed with the family, trying to facilitate medication brought to pharmacy for administration.     Review of Systems  Objective:     Vital Signs (Most Recent):  Temp: 97.9 °F (36.6 °C) (04/17/25 1200)  Pulse: 89 (04/17/25 1500)  Resp: (!) 24 (04/17/25 1500)  BP: 104/60 (04/17/25 1500)  SpO2: 99 % (04/17/25 1500) Vital Signs (24h Range):  Temp:  [97.7 °F (36.5 °C)-97.9 °F (36.6 °C)] 97.9 °F (36.6 °C)  Pulse:  [] 89  Resp:  [10-39] 24  SpO2:  [91 %-100 %] 99 %  BP: ()/(47-82) 104/60     Weight: 61.2 kg (134 lb 14.7 oz)  Body mass index is 21.13 kg/m².    Intake/Output Summary (Last 24 hours) at 4/17/2025 1602  Last data filed at 4/17/2025 1000  Gross per 24 hour   Intake 90.28 ml   Output 75 ml   Net 15.28 ml         Physical Exam  Constitutional:       General: He is in acute distress.      Appearance: He is ill-appearing. He is not toxic-appearing or diaphoretic.   Cardiovascular:      Rate and Rhythm: Normal rate and regular rhythm.      Pulses: Normal pulses.      Heart sounds: Normal heart sounds. No murmur heard.  Pulmonary:      Effort: Pulmonary effort is normal. No respiratory distress.      Breath sounds: Normal breath sounds. No stridor.   Abdominal:      General: Abdomen is flat.      Palpations: Abdomen is soft.   Neurological:      Mental Status: Mental status is at baseline.               Significant Labs: All pertinent labs within the past 24 hours have been reviewed.    Significant Imaging: I have reviewed all pertinent imaging results/findings within the past 24 hours.

## 2025-04-17 NOTE — ASSESSMENT & PLAN NOTE
Patient complaining of severe intractable itching;[facility]  Start hydroxyzine  Ordered capsaicin cream  We will consider trial of gabapentin if Korsuva is not available and hemodynamic allows

## 2025-04-17 NOTE — PROGRESS NOTES
[facility]  [place]  Progress Note    Patient Name: Jevon Rajan  MRN: [id number]  Admission Date: 4/3/2025  Hospital Length of Stay: 14 days  Code Status: Full Code   Attending Provider: Tena Palma MD   Primary Care Physician: Melia, Primary Doctor    Subjective:     HPI:  Urinary Retention  Patient complains of urinary retention. Onset of retention was 1 day ago and was gradual in onset. Patient currently does not have a urinary catheter in place.  300 ml of urine were scanned on bladder scanner Prior to this event voiding symptoms consisted of slow stream. Prior treatments include none. Recent medications that may have affected his voiding include none.   He still makes urine, he tells me an almost normal amount.  He is very uncomfortable at the level of the bladder.  Nursing staff attempted coude catheter was then successfully.  He agrees to allow me to place a catheter.    Interval History: Sharma draining well, no blood.     Review of Systems   Unable to perform ROS: Other   Genitourinary:  Positive for difficulty urinating. Negative for hematuria.     Objective:     Temp:  [97.7 °F (36.5 °C)-98 °F (36.7 °C)] 97.7 °F (36.5 °C)  Pulse:  [] 86  Resp:  [10-39] 23  SpO2:  [94 %-100 %] 97 %  BP: ()/() 115/55     Body mass index is 21.13 kg/m².      Bladder Scan Volume (mL): 331 mL (04/06/25 1520)    Drains       Drain  Duration                  Urethral Catheter 04/07/25 1558 Triple-lumen;Latex 24 Fr. 9 days         Hemodialysis Catheter 04/14/25 1011 right internal jugular 2 days                     Physical Exam  Constitutional:       General: He is not in acute distress.     Appearance: He is well-developed.   HENT:      Head: Normocephalic and atraumatic.      Mouth/Throat:      Mouth: Mucous membranes are moist.   Eyes:      Conjunctiva/sclera: Conjunctivae normal.   Pulmonary:      Effort: Pulmonary effort is normal. No respiratory distress.   Abdominal:      General:  "Abdomen is flat. There is no distension.      Tenderness: There is no right CVA tenderness or left CVA tenderness.   Genitourinary:     Comments: Dunaway clear yellow urine  Musculoskeletal:         General: No swelling or deformity.      Cervical back: Neck supple.   Skin:     Findings: No rash.   Neurological:      General: No focal deficit present.      Mental Status: He is alert.           Significant Labs:    BMP:  Recent Labs   Lab 04/15/25  0350 04/16/25 0314 04/17/25  0326    141 137   K 4.1 4.1 4.1    102 97   CO2 26 23 25   BUN 26* 38* 24*   CREATININE 5.6* 7.9* 5.6*   GLUCOSE 91 96 112*   CALCIUM 8.5* 8.7 8.9       CBC:   Recent Labs   Lab 04/15/25  0350 04/16/25  0314 04/17/25  0326   WBC 12.08 11.16 13.17*   HGB 7.9* 8.1* 9.0*   HCT 24.8* 26.8* 28.4*    217 214       Blood Culture: No results for input(s): "LABBLOO" in the last 168 hours.  Urine Culture: No results for input(s): "LABURIN" in the last 168 hours.  Urine Studies: No results for input(s): "COLORU", "APPEARANCEUA", "PHUR", "SPECGRAV", "PROTEINUA", "GLUCUA", "KETONESU", "BILIRUBINUA", "OCCULTUA", "NITRITE", "UROBILINOGEN", "LEUKOCYTESUR", "RBCUA", "WBCUA", "BACTERIA", "SQUAMEPITHEL", "HYALINECASTS" in the last 168 hours.    Invalid input(s): "WRIGHTSUR"    Significant Imaging:  All pertinent imaging results/findings from the past 24 hours have been reviewed.            Assessment/Plan:     Gross hematuria  S/p Clot evacuation with fulguration  Resolved  Cap irrigation port of 3 way catheter  Levsin PRN for bladder discomfort            BPH with urinary obstruction  Catheter in place, requires cystoscopy to place dunaway. Keep in place while in ICU.   When patient out of ICU, consider voiding trial, early in the AM preferred in case patient will need to be brought back to the operating room for dunaway placement          VTE Risk Mitigation (From admission, onward)           Ordered     heparin (porcine) injection 1,000 Units  As " needed (PRN)         04/05/25 2100     IP VTE HIGH RISK PATIENT  Once         04/03/25 1516     Place TEMO hose  Until discontinued         04/03/25 1516     Place sequential compression device  Until discontinued         04/03/25 1516     Reason for No Pharmacological VTE Prophylaxis  Once        Question:  Reasons:  Answer:  Physician Provided (leave comment)    04/03/25 1516                    Diane Horn MD  Urology  [facility] [place] - Intensive Care

## 2025-04-17 NOTE — ASSESSMENT & PLAN NOTE
Catheter in place, requires cystoscopy to place dunaway. Keep in place while in ICU.   When patient out of ICU, consider voiding trial, early in the AM preferred in case patient will need to be brought back to the operating room for dunaway placement

## 2025-04-17 NOTE — ASSESSMENT & PLAN NOTE
- significant improvement 4/16; improvement in orientation and ability to participate in discussions appropriately   - pt and staff report full nights sleep for first time this admission   - occasionally having short episodes of confusion but quickly redirected/reoriented   - pt able to give niece instructions/details of where items in home were that he wished brought to hospital; seems to have fair current orientation and distant memory, understandable difficulty with memory of events of hospitalization given sepsis encephalopathy and prior delirium   - still some concern for capacity, especially with detailed and complex medical discussions/decisions   - will continued to evaluate capacity and discuss with MDT as pt has given various direction about wishes regarding MPOA this admission (see ACP)

## 2025-04-17 NOTE — PT/OT/SLP PROGRESS
Physical Therapy Treatment    Patient Name:  Jevon Rajan   MRN:  [id number]    Recommendations:     Discharge Recommendations:  (Ongoing assessment pending pt progress)  Discharge Equipment Recommendations:  (Ongoing assessment pending pt progress)  Barriers to discharge:  ICU    Assessment:     Jevon Rajan is a 85 y.o. male admitted with a medical diagnosis of Septic shock.  He presents with the following impairments/functional limitations: decreased safety awareness, impaired cognition, impaired cardiopulmonary response to activity, impaired endurance, impaired balance, weakness, impaired self care skills, impaired functional mobility, decreased coordination .    Rehab Prognosis: Fair; patient would benefit from acute skilled PT services to address these deficits and reach maximum level of function.    Recent Surgery: Procedure(s) (LRB):  CYSTOSCOPY WITH CLOT EVACUATION, FULGURATION, DUNAWAY PLACEMENT (N/A) 10 Days Post-Op    Plan:     During this hospitalization, patient to be seen 2 x/week to address the identified rehab impairments via therapeutic activities, therapeutic exercises, neuromuscular re-education and progress toward the following goals:    Plan of Care Expires:  04/21/25    Subjective     Chief Complaint: originally pt c/o itching.  Sitting EOB, pt stated that he felt dizzy and couldn't breathe  Patient/Family Comments/goals: Pt agreeable to sit up at EOB with education and encouragement  Pain/Comfort:  Pain Rating 1: 0/10      Objective:     Communicated with nsg prior to session.  Patient found  slid down in bed  with blood pressure cuff, dunaway catheter, telemetry, pulse ox (continuous) (safety sitter) upon PT entry to room.     General Precautions: Standard, fall, aspiration, pureed diet  Orthopedic Precautions: N/A  Braces: N/A  Respiratory Status: Room air     Functional Mobility:  Bed Mobility:     Scooting: minimum assistance  Supine to Sit: contact guard assistance  Sit to Supine: contact  guard assistance  Balance: Fair      AM-PAC 6 CLICK MOBILITY  Turning over in bed (including adjusting bedclothes, sheets and blankets)?: 3  Sitting down on and standing up from a chair with arms (e.g., wheelchair, bedside commode, etc.): 1  Moving from lying on back to sitting on the side of the bed?: 3  Moving to and from a bed to a chair (including a wheelchair)?: 1  Need to walk in hospital room?: 1  Climbing 3-5 steps with a railing?: 1  Basic Mobility Total Score: 10       Treatment & Education:  Pt performed Ap's x 10 reps.  Dangle protocol: pt sat at EOB with Min A stated that he felt dizzy then c/o difficulty breathing.  /83, Unable to obtain SPO2 sitting at EOB, HR 91.  Pt returned to supine  and repositioned by bridging with Min A.  Educated pt on Pursed lip breathing, Needs reinforcement.  Pt performed AP's x 10 reps with Max VC/TC.  SPO2 on recovery 90>96%   Patient left HOB elevated with all lines intact, call button in reach, nsg notified, and safety sitter present..    GOALS:   Multidisciplinary Problems       Physical Therapy Goals          Problem: Physical Therapy    Goal Priority Disciplines Outcome Interventions   Physical Therapy Goal     PT, PT/OT Progressing    Description: Goals to be met by: 25     Patient will increase functional independence with mobility by performin. Supine to sit with MInimal Assistance  2. Rolling to Left and Right with Minimal Assistance.  3. Bed to chair transfer with Moderate   4. Sitting at edge of bed x10 minutes with Contact Guard Assistance  5. Lower extremity exercise program x10 reps per handout, with assistance as needed                         DME Justifications:  No DME recommended requiring DME justifications    Time Tracking:     PT Received On: 25  PT Start Time: 1435     PT Stop Time: 1458  PT Total Time (min): 23 min     Billable Minutes: Therapeutic Activity 23    Treatment Type: Treatment  PT/PTA: PT     Number of PTA visits  since last PT visit: 0     04/17/2025

## 2025-04-17 NOTE — PROGRESS NOTES
"[facility]  [facility] Medicine  Progress Note    Patient Name: Jevon Rajan  MRN: [id number]  Patient Class: IP- Inpatient   Admission Date: 4/3/2025  Length of Stay: 14 days  Attending Physician: Tena Palma MD  Primary Care Provider: Melia, Primary Doctor        Subjective     Principal Problem:Septic shock        HPI:  Mr Jevon Rajan is a 85 y.o. man with ESRD with LUE AVF, HFpEF last EF 50-55%, CAD, DM who was transferred to [facility] ICU for septic shock due to MRSA bacteremia.     He was admitted at [facility] 4/1-3/2025 with sepsis, worsening to septic shock, then identified source as MRSA. 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     Upon arrival to [facility], he is moaning in pain and his LUE AVF has active pulsatile bright red blood. He does respond to his name. He denies pain anywhere other than his LUE. He is on levophed at 0.05.     Overview/Hospital Course:  Mr Jevon Rajan is a 85 y.o. man with ESRD on HD with LUE AVF that has been bleeding, CHF, CAD s/p recent stenting 1/2025 who was admitted with MRSA bacteremia and bleeding from his LUE AVF. Continued vancomycin; weaned off levophed. Vascular surgery emergently consulted; on 4/3/25 they took him to OR and noted: "well incorporated proximal and central graft without evidence of infection, resected graft and covered proximal and central remnants with multiple layers. Mid graft removed in entirety and sent for culture. Ruptured pseudoaneurysm with infected hematoma, graft completely obliterated at mid segment." Surgical cultures with Staph. Suspect AVF or chronic scratching of pruritic skin is the source of his infection. TTE showed endocarditis: EF 40-45%, G1DD, RV systolic dysfunction, large 1.9x1.2cm fixed mass on the posterior mitral valve leaflet with moder to severe regurgitation, cannot rule out posterior " mitral valve leaflet perforation. Discussed with cardiac surgery; he is high mortality risk, and surgery is not advised. ID following. Nephrology consulted; trialysis was placed for HD. Persistently positive for MRSA in blood cultures. He has had urinary retention; Urology consulted, performed bedside cystoscopy on 4/6/25 with large prostate noted. Noted to have clot retention and went urgently to OR with Urology on 4/7/25; asa and DVT ppx held.     WBC normalized. S/p clot removal with Urology, 1U PRBC ordered this morning with Hb 6.1. Soft BPs, will add midodrine for HD to step down to floor. Glucoses high, will increase insulin. Attempted to s/d but BP too low, may still need CRRT rather. 4/8 cx clear so far. Increasing insulin again. Increasing midodrine to 15 mg TID. Hb 6.8, 1U PRBC ordered.     BP appears to be recovering a bit, perhaps will tolerate reg HD, will discuss w Neph- will run him on HD Saturday, if tolerates normal HD will remove central line and give 2 day holiday and place tunnel on Monday. We will give him HD before dc to facility on Tuesday if accepted by then.     WBC has normalized for the first time, now tolerating reg HD, and Blcx remain clear. Central line removed. NGT removed. Will step down.     Interval History:  No acute event overnight.  Patient seen this morning still complaining of severe pruritus.  Further information obtained indicated [facility].  Discussed with the family, trying to facilitate medication brought to pharmacy for administration.     Review of Systems  Objective:     Vital Signs (Most Recent):  Temp: 97.9 °F (36.6 °C) (04/17/25 1200)  Pulse: 89 (04/17/25 1500)  Resp: (!) 24 (04/17/25 1500)  BP: 104/60 (04/17/25 1500)  SpO2: 99 % (04/17/25 1500) Vital Signs (24h Range):  Temp:  [97.7 °F (36.5 °C)-97.9 °F (36.6 °C)] 97.9 °F (36.6 °C)  Pulse:  [] 89  Resp:  [10-39] 24  SpO2:  [91 %-100 %] 99 %  BP:  ()/(47-82) 104/60     Weight: 61.2 kg (134 lb 14.7 oz)  Body mass index is 21.13 kg/m².    Intake/Output Summary (Last 24 hours) at 4/17/2025 1602  Last data filed at 4/17/2025 1000  Gross per 24 hour   Intake 90.28 ml   Output 75 ml   Net 15.28 ml         Physical Exam  Constitutional:       General: He is in acute distress.      Appearance: He is ill-appearing. He is not toxic-appearing or diaphoretic.   Cardiovascular:      Rate and Rhythm: Normal rate and regular rhythm.      Pulses: Normal pulses.      Heart sounds: Normal heart sounds. No murmur heard.  Pulmonary:      Effort: Pulmonary effort is normal. No respiratory distress.      Breath sounds: Normal breath sounds. No stridor.   Abdominal:      General: Abdomen is flat.      Palpations: Abdomen is soft.   Neurological:      Mental Status: Mental status is at baseline.               Significant Labs: All pertinent labs within the past 24 hours have been reviewed.    Significant Imaging: I have reviewed all pertinent imaging results/findings within the past 24 hours.      Assessment & Plan  Septic shock  This patient has shock. The type of shock is distributive due to sepsis. The patient had the following evidence of shock: persistent hypotension and altered mental status. 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 Shock is now resolved and vasopressors are off. WBC back to normal  HTN (hypertension)  Patient's blood pressure range in the last 24 hours was: BP  Min: 93/52  Max: 127/57.The patient's inpatient anti-hypertensive regimen is listed below:  Current Antihypertensives       Plan  - admitted with shock  - now off  "vasopressors. Continue to hold BP Rx as BP is well controlled without it   HLD (hyperlipidemia)  - continue statin  Type 2 diabetes mellitus with hyperglycemia, without long-term current use of insulin  A1c:   Lab Results   Component Value Date    HGBA1C 5.7 (H) 04/02/2025     Meds: lantus + SSI PRN to maintain goal 140-180  Tube feeds  accuchecks, hypoglycemic protocol      Coronary artery disease involving native coronary artery of native heart without angina pectoris  Patient with known CAD s/p stent placement, which is controlled Will continue ASA, Plavix, and Statin and monitor for S/Sx of angina/ACS. Continue to monitor on telemetry.   - last [facility] was 1/2025: Severely calcified mid RCA stenosis unable to cross with PTCA balloons, treated initially with 0.9 laser atherectomy, followed by CSI atherectomy system with total of 5 runs (3 at low speed, 2 at high speed), pre-dilated using 2.0 compliant and 3.5 noncompliant balloon followed by 3.5 X 16 mm megatron stent.  Focal plaque rupture/erosion noted in the proximal and ostial RCA that were treated with  3.5 X 28 in the proximal RCA, 4.0 X 16 mm in the ostial RCA.  No residual stenosis post intervention.  Patient had ALAN 3 flow at the end of the procedure  - with elevated troponin likely due to septic shock  Recent Labs   Lab 04/12/25  1216   TROPONINI 0.811*     - continue asa, plavix, statin  - Cardiology consulted  - no plans for ischemic evaluation at this time   ESRD on hemodialysis  - ESRD on HD via LUE AVF  - LUE AVF was actively bleeding on arrival on 4/3/25. Vascular surgery was consulted. He went emergently to the OR on 4/3/25: "Severely calcified mid RCA stenosis unable to cross with PTCA balloons, treated initially with 0.9 laser atherectomy, followed by CSI atherectomy system with total of 5 runs (3 at low speed, 2 at high speed), pre-dilated using 2.0 compliant and 3.5 noncompliant balloon followed by 3.5 X 16 mm megatron stent.  Focal plaque " "rupture/erosion noted in the proximal and ostial RCA that were treated with  3.5 X 28 in the proximal RCA, 4.0 X 16 mm in the ostial RCA.  No residual stenosis post intervention.  Patient had ALAN 3 flow at the end of the procedure"  - cultures from AVF= Staph   - wound care orders in place for surgical site  - Nephrology is consulted  - Rt IJ trialysis placed on 4/4/25 for CRRT;  - THDC placed on 04/14  Anemia in ESRD (end-stage renal disease)  Anemia is likely due to  ESRD, blood loss . Most recent hemoglobin and hematocrit are listed below.  Recent Labs     04/15/25  0350 04/16/25  0314 04/17/25  0326   HGB 7.9* 8.1* 9.0*   HCT 24.8* 26.8* 28.4*     Plan  - Monitor serial CBC: Daily and recheck now  - Transfuse PRBC if patient becomes hemodynamically unstable, symptomatic or H/H drops below 7/21.  - Patient has not received any PRBC transfusions to date  - Patient's anemia is currently stable  Acute metabolic encephalopathy with Hospital Delirum  -Noted episodes of intermittent agitation with tactile and visual hallucinations  -Continue correction of metabolic derangements  -Maintain Delirium precautions: Maintain regular sleep cycle. Early ambulation. Minimal interruptions overnight. Re-orient patient frequently. Maintain adequate bowel regimen, hydration and electrolyte replenishments  -aspiration precautions  -continue Seroquel 25 mg HS      ACP (advance care planning)  Advance Care Planning     Date: 04/04/2025  - palliative consulted   - Mr Escoto specifically told Dr Jordan to contact Kenia Smyth for all updates  - discussed code status with Kenia. She states she has spoken with Mr Escoto's sisters and they all want full code status.   -anticipated DC to SNF when medically stable enough to tolerated HD and possible nursing home placement         Acute bacterial endocarditis  - TTE 4/4/25: "1.9x1.2cm large fixed heterogeneous mass present on the posterior leaflet (new finding vs 9/2023 echo). " "There is moderate to severe regurgitation with an eccentric jet. Cannot exclude severe MR with possible PMVL perforation in association with large vegetation."  - this is in the setting of MRSA bacteremia  - ID is consulted  - repeat blood cultures until clear   - suspect source is skin/chronic scratching vs AVF infection- cultures from resected AVF with Staph   - discussed with Cardiac surgery Dr Castro 4/4/25- given age and comorbidities, he is very high risk of mortality with surgery. He recommends medical management at this point.  - on vancomycin      MRSA bacteremia  - suspect source:  Graft infection s/p exploratory surgery of LUE with graft resection 4/3  -graft holiday with TDC placed on 04/14  - cultures of AVF with Staph   - he has endocarditis  - ID consulted  - on vanc ;anticipating completing 6 weeks of IV vancomycin from clearance (EOC: 5/19/25)     NSTEMI (non-ST elevated myocardial infarction)  - see CAD    BPH with urinary obstruction  - noted 4/6/25 when patient became very agitated and screaming he couldn't urinate  - nursing unable to place dunaway/coude  - Urology consulted. Bedside cystoscopy on 4/6/25 noted clots, large prostate. Catheter was placed but was then removed due to pain  - 4/7 he is again agitated that he cannot urinate  - follow up with Urology   - tamsulosin ordered if he is awake enough to swallow     Thrombocytopenia  The likely etiology of thrombocytopenia is infection and sepsis. The patients 3 most recent labs are listed below.  Recent Labs     04/15/25  0350 04/16/25  0314 04/17/25  0326    217 214     Plan  - Will transfuse if platelet count is <10k.    AV fistula infection  - LUE AVF was actively bleeding on arrival on 4/3/25. Vascular surgery was consulted. He went emergently to the OR on 4/3/25: "Severely calcified mid RCA stenosis unable to cross with PTCA balloons, treated initially with 0.9 laser atherectomy, followed by CSI atherectomy system with total of 5 " "runs (3 at low speed, 2 at high speed), pre-dilated using 2.0 compliant and 3.5 noncompliant balloon followed by 3.5 X 16 mm megatron stent.  Focal plaque rupture/erosion noted in the proximal and ostial RCA that were treated with  3.5 X 28 in the proximal RCA, 4.0 X 16 mm in the ostial RCA.  No residual stenosis post intervention.  Patient had ALAN 3 flow at the end of the procedure"  - cultures from AVF= Staph   - wound care orders in place for surgical site  - trialysis currently in place for HD    Emphysema lung  - emphysema noted on CT  - no signs of COPD exacerbation  Pulmonary nodules  - CT 4/3/25 with few small pulmonary nodules  - will need outpatient follow up     Gross hematuria  S/p clot evacuation with a fulguration by Urology  Currently resolved  Continue cap irrigation of 3 way catheter  Continue Levsin for bladder discomfort  -Sharma to be removed when patient is transferred to the floor; we will defer removal to urology    NSVT (nonsustained ventricular tachycardia)      Uremic pruritus  Patient complaining of severe intractable itching;[facility]  Start hydroxyzine  Ordered capsaicin cream  We will consider trial of gabapentin if Korsuva is not available and hemodynamic allows      VTE Risk Mitigation (From admission, onward)           Ordered     heparin (porcine) injection 1,000 Units  As needed (PRN)         04/05/25 2100     IP VTE HIGH RISK PATIENT  Once         04/03/25 1516     Place TEMO hose  Until discontinued         04/03/25 1516     Place sequential compression device  Until discontinued         04/03/25 1516     Reason for No Pharmacological VTE Prophylaxis  Once        Question:  Reasons:  Answer:  Physician Provided (leave comment)    04/03/25 1516                    Discharge Planning   BAYRON: 4/22/2025     Code Status: DNR   Medical Readiness for Discharge Date:   Discharge Plan A: Home Health (and continue OP HD)            Critical care time spent on the evaluation " and treatment of severe organ dysfunction, review of pertinent labs and imaging studies, discussions with consulting providers and discussions with patient/family: more than 35 minutes.            Tena Palma MD  Department of Hospital Medicine   [facility] - Intensive Care

## 2025-04-17 NOTE — EICU
Virtual ICU Quality Rounds    Admit Date: 4/3/2025  Hospital Day: 14    ICU Day: 13d 17h    24H Vital Sign Range:  Temp:  [97.7 °F (36.5 °C)-98 °F (36.7 °C)]   Pulse:  []   Resp:  [10-39]   BP: ()/(52-72)   SpO2:  [94 %-100 %]     VICU Surveillance Screening    Daily review of  line necessity(optional): Urinary catheter in place            Dunaway Indications : Ordered or placed by Urology service and Post operative order specific to the udnaway catheter

## 2025-04-17 NOTE — ASSESSMENT & PLAN NOTE
- Nephrology following; pt had difficulty tolerating HD initially on admission, but improved; additional event 4/14 unclear if related to HD  - 4/16 discussed pt with nephrology, Dr. Lauren, with plan to proceed with HD and assess tolerance   - family aware of this and with insight that this is a large factor in pt's prognosis   - family with insight that HD is a form of life support; wish to continue as long as pt tolerates/candidate

## 2025-04-17 NOTE — ASSESSMENT & PLAN NOTE
"- pt with chronic uremic pruritus that likely lead to wound of AV fistula with pt frequently scratching   - [facility] reported [facility] (assuming Kursuva) for pt as OP due to severity; unfortunately not available at facility per MDT; consider coordination with [facility] as family shared they felt this was approved for pt; given inpatient status potential for administering to pt here as a non formulary medication if family is able to obtain; discussed with sisterHuong to discuss with family to attempt to contact pt's nephrology team   - pt previously was taking benadryl at home (discussed meds to avoid in elderly with niece and provided educational materials); discussed options with , nephrology and palliative medicine colleagues   -  to order hydroxyzine( less risk of delirium although no antihistamine is ideal, discussed with team and family) and Capsaicin cream   "

## 2025-04-17 NOTE — ASSESSMENT & PLAN NOTE
#Anemia  HGB   Date Value Ref Range Status   04/17/2025 9.0 (L) 14.0 - 18.0 gm/dL Final     Iron   Date Value Ref Range Status   08/11/2024 39 (L) 45 - 160 ug/dL Final     Transferrin   Date Value Ref Range Status   08/11/2024 139 (L) 200 - 375 mg/dL Final     TIBC   Date Value Ref Range Status   08/11/2024 206 (L) 250 - 450 ug/dL Final     Saturated Iron   Date Value Ref Range Status   08/11/2024 19 (L) 20 - 50 % Final     Ferritin   Date Value Ref Range Status   08/11/2024 979 (H) 20.0 - 300.0 ng/mL Final     NAKIA ordered, hb not at goal  Iron on hold due to bacteremia. Follow up outpatient

## 2025-04-17 NOTE — ASSESSMENT & PLAN NOTE
- Mgmt per primary team/ID/cardiology  - pt with prior/related  MRSA bacteremia and sepsis   - valvular mass, he is being treated by antibiotics alone as he is not a surgical candidate  - discussed with pt directly on 4/16, will assess memory and understanding of this discussion again with future visits  - dicussed need for prolonged abx therapy and likely need for SNF vs LTACH following admission, pt agreeable   - Illness trajectory education provided

## 2025-04-17 NOTE — ASSESSMENT & PLAN NOTE
The likely etiology of thrombocytopenia is infection and sepsis. The patients 3 most recent labs are listed below.  Recent Labs     04/15/25  0350 04/16/25  0314 04/17/25  0326    217 214     Plan  - Will transfuse if platelet count is <10k.

## 2025-04-17 NOTE — SUBJECTIVE & OBJECTIVE
Medications:  Continuous Infusions:  Scheduled Meds:   atorvastatin  80 mg Oral QHS    clopidogreL  75 mg Oral Daily    epoetin mj-epbx  10,000 Units Intravenous Every Tues, Thurs, Sat    erythromycin   Both Eyes Q8H    insulin glargine U-100  5 Units Subcutaneous Daily    midodrine  15 mg Oral Q8H    pantoprazole  40 mg Oral Daily    QUEtiapine  25 mg Oral QHS    sodium chloride 0.9%  10 mL Intravenous Q8H    tamsulosin  0.4 mg Oral Daily     PRN Meds:  Current Facility-Administered Medications:     0.9%  NaCl infusion (for blood administration), , Intravenous, Q24H PRN    0.9% NaCl, , Intravenous, PRN    acetaminophen, 650 mg, Oral, Q4H PRN    albumin human 25%, 25 g, Intravenous, Daily PRN    dextrose 50%, 12.5 g, Intravenous, PRN    dextrose 50%, 25 g, Intravenous, PRN    glucagon (human recombinant), 1 mg, Intramuscular, PRN    glucose, 16 g, Oral, PRN    glucose, 24 g, Oral, PRN    haloperidol lactate, 2 mg, Intravenous, Q4H PRN    heparin (porcine), 1,000 Units, Intravenous, PRN    hyoscyamine, 0.125 mg, Sublingual, Q4H PRN    insulin aspart U-100, 0-5 Units, Subcutaneous, QID (AC + HS) PRN    melatonin, 6 mg, Oral, Nightly PRN    naloxone, 0.02 mg, Intravenous, PRN    ondansetron, 4 mg, Intravenous, Q6H PRN    prochlorperazine, 5 mg, Intravenous, Q6H PRN    senna, 8.6 mg, Oral, Daily PRN    sodium chloride 0.9%, 250 mL, Intravenous, PRN    Pharmacy to dose Vancomycin consult, , , Once **AND** vancomycin - pharmacy to dose, , Intravenous, pharmacy to manage frequency    Objective:     Vital Signs (Most Recent):  Temp: 97.7 °F (36.5 °C) (04/16/25 2000)  Pulse: 88 (04/16/25 2000)  Resp: (!) 23 (04/16/25 2000)  BP: 106/63 (04/16/25 2000)  SpO2: 99 % (04/16/25 2000) Vital Signs (24h Range):  Temp:  [97.5 °F (36.4 °C)-98.5 °F (36.9 °C)] 97.7 °F (36.5 °C)  Pulse:  [] 88  Resp:  [10-42] 23  SpO2:  [95 %-100 %] 99 %  BP: ()/() 106/63     Weight: 61.2 kg (134 lb 14.7 oz)  Body mass index is 21.13  kg/m².     Physical Exam  Vitals and nursing note reviewed.   Constitutional:       General: He is awake.      Appearance: He is ill-appearing.      Comments: Significant improvement in delirium, cooperation, calmness, and ability to participate in discussions    HENT:      Head: Normocephalic and atraumatic.   Pulmonary:      Effort: Pulmonary effort is normal. No respiratory distress.      Comments: NC  Skin:     General: Skin is dry.      Coloration: Skin is pale.      Findings: Lesion present. Bruising: multiple skin lesions in various stages of healing. Erythema: pale/gray for ethnicity.  Neurological:      Mental Status: He is alert.      Comments: Oriented to person, family, location of hospital, relative situation but does not recall events of hospitalization, oriented to year    Psychiatric:         Mood and Affect: Mood is anxious.         Behavior: Behavior is agitated. Behavior is cooperative.         Thought Content: Thought content is paranoid.         Cognition and Memory: He exhibits impaired recent memory.          Advance Care Planning   Advance Directives:   Living Will: No    LaPOST: No    Do Not Resuscitate Status: No    Medical Power of : No (previously had shared with MDT that he wished dez Ndiaye to act as MPOA; now states dez Araiza; 4 siblings are legal surrogate decision makers otherwise and reccomended (see ACP))      Decision Making:  Family answered questions and Patient answered questions  Goals of Care: What is most important right now is to focus on symptom/pain control, curative/life-prolongation (regardless of treatment burdens), improvement in condition but with limits to invasive therapies. Accordingly, we have decided that the best plan to meet the patient's goals includes continuing with treatment.       Significant Labs: All pertinent labs within the past 24 hours have been reviewed.  CBC:   Recent Labs   Lab 04/16/25  0314   WBC 11.16   HGB 8.1*   HCT 26.8*    MCV 93        BMP:  Recent Labs   Lab 04/16/25  0314      K 4.1      CO2 23   BUN 38*   CREATININE 7.9*   CALCIUM 8.7     LFT:  Lab Results   Component Value Date    AST 25 04/16/2025    ALKPHOS 67 04/16/2025    BILITOT 0.8 04/16/2025     Albumin:   Albumin   Date Value Ref Range Status   04/16/2025 2.5 (L) 3.5 - 5.2 g/dL Final   03/21/2025 3.2 (L) 3.5 - 5.2 g/dL Final     Protein:   Total Protein   Date Value Ref Range Status   03/21/2025 7.5 6.0 - 8.4 g/dL Final     Lactic acid:   Lab Results   Component Value Date    LACTATE 1.9 04/14/2025    LACTATE 0.9 04/03/2025     Significant Imaging: I have reviewed all pertinent imaging results/findings within the past 24 hours.

## 2025-04-17 NOTE — SUBJECTIVE & OBJECTIVE
"Interval History: Sharma draining well, no blood.     Review of Systems   Unable to perform ROS: Other   Genitourinary:  Positive for difficulty urinating. Negative for hematuria.     Objective:     Temp:  [97.7 °F (36.5 °C)-98 °F (36.7 °C)] 97.7 °F (36.5 °C)  Pulse:  [] 86  Resp:  [10-39] 23  SpO2:  [94 %-100 %] 97 %  BP: ()/() 115/55     Body mass index is 21.13 kg/m².      Bladder Scan Volume (mL): 331 mL (04/06/25 1520)    Drains       Drain  Duration                  Urethral Catheter 04/07/25 1558 Triple-lumen;Latex 24 Fr. 9 days         Hemodialysis Catheter 04/14/25 1011 right internal jugular 2 days                     Physical Exam  Constitutional:       General: He is not in acute distress.     Appearance: He is well-developed.   HENT:      Head: Normocephalic and atraumatic.      Mouth/Throat:      Mouth: Mucous membranes are moist.   Eyes:      Conjunctiva/sclera: Conjunctivae normal.   Pulmonary:      Effort: Pulmonary effort is normal. No respiratory distress.   Abdominal:      General: Abdomen is flat. There is no distension.      Tenderness: There is no right CVA tenderness or left CVA tenderness.   Genitourinary:     Comments: Sharma clear yellow urine  Musculoskeletal:         General: No swelling or deformity.      Cervical back: Neck supple.   Skin:     Findings: No rash.   Neurological:      General: No focal deficit present.      Mental Status: He is alert.           Significant Labs:    BMP:  Recent Labs   Lab 04/15/25  0350 04/16/25  0314 04/17/25  0326    141 137   K 4.1 4.1 4.1    102 97   CO2 26 23 25   BUN 26* 38* 24*   CREATININE 5.6* 7.9* 5.6*   GLUCOSE 91 96 112*   CALCIUM 8.5* 8.7 8.9       CBC:   Recent Labs   Lab 04/15/25  0350 04/16/25  0314 04/17/25  0326   WBC 12.08 11.16 13.17*   HGB 7.9* 8.1* 9.0*   HCT 24.8* 26.8* 28.4*    217 214       Blood Culture: No results for input(s): "LABBLOO" in the last 168 hours.  Urine Culture: No results for " "input(s): "LABURIN" in the last 168 hours.  Urine Studies: No results for input(s): "COLORU", "APPEARANCEUA", "PHUR", "SPECGRAV", "PROTEINUA", "GLUCUA", "KETONESU", "BILIRUBINUA", "OCCULTUA", "NITRITE", "UROBILINOGEN", "LEUKOCYTESUR", "RBCUA", "WBCUA", "BACTERIA", "SQUAMEPITHEL", "HYALINECASTS" in the last 168 hours.    Invalid input(s): "WRIGHTSUR"    Significant Imaging:  All pertinent imaging results/findings from the past 24 hours have been reviewed.          "

## 2025-04-17 NOTE — PROGRESS NOTES
[facility]  Palliative Medicine  Progress Note    Patient Name: Jevon Rajan  MRN: [id number]  Admission Date: 4/3/2025  Hospital Length of Stay: 14 days  Code Status: DNR   Attending Provider: Tena Palma MD  Consulting Provider: Lisa Jacinto NP  Primary Care Physician: Melia, Primary Doctor  Principal Problem:Septic shock    Patient information was obtained from relative(s) and primary team.      Assessment/Plan:   Palliative Care  Advance Care Planning   4/17/2025  - interval chart reviewed discussion pt with Mdt   - discussed with MDT concerns for pt's varied direction on preferred MPOA and overall concern that pt has potentially lacked even understanding of the concepts of MPOA; given variation in responses and encephalopathy to some degree since admission it is my recommendation and agreement of MDT to defer to pt's legal surrogate decision makers who are his 4 siblings; plan for and encouragement of shared family decision making, including 2 nieces (Teodora and Senthil)  who have played an active role in pt's ou tpt care per siblings   - met with pt and sisters Bhavna and Ronna at bedside   - brief medical update provided to sisters  - discussed above decision maker decision by MDT; Ronna and Bhavna are happy to hear this and are hopeful for continued shared family decision making   - revisited prior code status discussion that has been ongoing throughout admission; 2/4 siblings, Ronna and Bhavna, continue to advocate that pt had previously expressed wishes for what aligns with DNR code status; they confirm all 4 siblings are in agreement with this decision; confirmed that DNR order would be placed   - reviewed efforts for improving delirium   - reviewed likely plan/needs for SNF vs LTACH for 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 and prior bacteremia and PT/OT due to deconditioning; they are in agreement with this plan; they wish prioritize [facility] facilities in options and  alternatively facilities closer to their home, would consider [facility] facilities as well   - siblings with good insight that pt's ability to safely live independently at home in the future is unlikely   - emotional support provided; answered all questions; expressed continued palliative availability throughout admission   - updated MDT     4/16/2025  - interval chart reviewed; pt discussed with MDT   - pt with significant improvement in mental status since initiation of mood and sleep med regimen, delirium precautions, and transition to a room with window in coordination with Dr. Palma,  on 4/15  - re-introduction to myself and palliative medicine team, re-discussion role in admission and care; pt does share recognizing me from care but does not recall details of discussions   - pt still with some capacity concerns, will plan for continued assessment and coordination with /MDT for determining if/when pt has capacity for medical decision making; at current time I feel pt does have some understanding of basics of care and can make needs known, but is lacking capacity for complex medical decision making   - I do feel pt may have capacity for simple wishes such as preferred MPOA but will allow for consistent improvement in mental status before I would feel comfortable with pt formally appointing an MPOA   - previously in admission, pt reported to then , Dr. Lyles, that his niece Senthil is who team should call for medical decisions; pt now saying that niece Teodora is who he would wish to appoint but wants Senthil to receive updates etc; pt confirms updates can be provided to his 4 siblings and to these 2 nieces   - clarified with pt that legal surrogate decision makers currently would be his 4 siblings, if he were to lack capacity; expressed hope that pt would continue to have improved mental status and could make his own decisions and appoint MPOA  - pt reports living in his own home independently prior to  "admission; he feels that prior to becoming ill and seeking care weeks prior to admission he did not have difficulty caring for himself with PRN assistance from family   - shares that he had previously explored [facility] options for nursing home/assisted living but OOP cost affected decision, with preference to make necessary improvements to home for safety and hire care assistance when needed as alternative   - [facility] and has [facility]   - assisted pt in calling dez dNiaye as he wished she bring his cell phone and some items from home; updated Dashante of pt's improved status   - also attempted to call pt's sister Ronna, who has visited several times during admission, to update on improved mental status and to attempt to get Nawaf garcesy's number for update as well; LM and provided return call number     Please see multiple prior palliative/ACP notes from myself and Dr. Joan Antunez for prior GOC/ACP discussions     Neuro  Acute metabolic encephalopathy with Hospital Delirum  - significant improvement 4/16; improvement in orientation and ability to participate in discussions appropriately and niece able to confirm accuracy of information shared by pt   - by the afternoon 4/16 pt resumed delirium and is now requiring bedside sitter   - pt does not have capacity for medical decision making     Derm  Uremic pruritus  - pt with chronic uremic pruritus that likely lead to wound of AV fistula with pt frequently scratching   - niece reported [facility] was authorizing "injection" (assuming Kursuva) for pt as OP due to severity; unfortunately not available at facility per MDT; consider coordination with [facility] as family shared they felt this was approved for pt; given inpatient status potential for administering to pt here as a non formulary medication if family is able to obtain; discussed with sisterHuong to discuss with family to attempt to contact pt's nephrology team "   - pt previously was taking benadryl at home (discussed meds to avoid in elderly with niece and provided educational materials); discussed options with HM, nephrology and palliative medicine colleagues   - HM to order hydroxyzine( less risk of delirium although no antihistamine is ideal, discussed with team and family) and Capsaicin cream     Cardiac/Vascular  Acute bacterial endocarditis  - Mgmt per primary team/ID/cardiology  - pt with prior/related  MRSA bacteremia and sepsis   - valvular mass, he is being treated by antibiotics alone as he is not a surgical candidate  - discussed with pt directly on 4/16, will assess memory and understanding of this discussion again with future visits  - dicussed need for prolonged abx therapy and likely need for SNF vs LTACH following admission, pt agreeable   - Illness trajectory education provided     Renal/  BPH with urinary obstruction  - urology following; required CBI and dunaway this admission   - very difficult catheter placement per MDT; urology with planned involvement in any further dunaway needs     ESRD on hemodialysis  - Nephrology following; pt had difficulty tolerating HD initially on admission, but improved; additional event 4/14 unclear if related to HD  - pt has tolerated HD since  - family aware of this and with insight that this is a large factor in pt's prognosis   - family with insight that HD is a form of life support; wish to continue as long as pt tolerates/candidate     ID  AV fistula infection  - Abx, mgmt per primary team/vascular surgery/ID; s/p excision of infected graft   - pt now with tunneled line placed this admission by IR for continued HD     MRSA bacteremia  - Abx, mgmt per primary team and ID; per ID, prognosis is poor if cultures do not clear, given that he is not a surgical candidate.   - repeat blood cultures so far with no growth but not finalized, also some continued improvement in overall mental status showing response to current  "treatment   - Illness trajectory education provided to family            I will follow-up with patient. Please contact us if you have any additional questions.    Subjective:     Chief Complaint:   Chief Complaint   Patient presents with    Bleeding/Bruising       HPI:   From H&P: " Mr Jevon Rajan is a 85 y.o. man with ESRD with LUE AVF, HFpEF last EF 50-55%, CAD, DM who was transferred to [facility] ICU for septic shock due to MRSA bacteremia.      He was admitted at [facility] 4/1-3/2025 with sepsis, worsening to septic shock, then identified source as MRSA. 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      Upon arrival to [facility], he is moaning in pain and his LUE AVF has active pulsatile bright red blood. He does respond to his name. He denies pain anywhere other than his LUE. He is on levophed at 0.05. "     Palliative medicine consulted for goals of care discussion and advance care planning; for details of visit, see advance care planning section of plan.       Hospital Course:  No notes on file    Medications:  Continuous Infusions:  Scheduled Meds:   atorvastatin  80 mg Oral QHS    capsaicin   Topical (Top) BID    clopidogreL  75 mg Oral Daily    [START ON 4/18/2025] epoetin [id number]  10,000 Units Intravenous Every Mon, Wed, Fri    erythromycin   Both Eyes Q8H    insulin glargine U-100  5 Units Subcutaneous Daily    midodrine  15 mg Oral Q8H    pantoprazole  40 mg Oral Daily    QUEtiapine  25 mg Oral QHS    sodium chloride 0.9%  10 mL Intravenous Q8H    tamsulosin  0.4 mg Oral Daily     PRN Meds:  Current Facility-Administered Medications:     0.9%  NaCl infusion (for blood administration), , Intravenous, Q24H PRN    0.9% NaCl, , Intravenous, PRN    acetaminophen, 650 mg, Oral, Q4H PRN    albumin human 25%, 25 g, Intravenous, Daily PRN    dextrose 50%, 12.5 g, Intravenous, PRN    dextrose " 50%, 25 g, Intravenous, PRN    glucagon (human recombinant), 1 mg, Intramuscular, PRN    glucose, 16 g, Oral, PRN    glucose, 24 g, Oral, PRN    haloperidol lactate, 2 mg, Intravenous, Q4H PRN    heparin (porcine), 1,000 Units, Intravenous, PRN    hydrOXYzine HCL, 25 mg, Oral, TID PRN    hyoscyamine, 0.125 mg, Sublingual, Q4H PRN    insulin aspart U-100, 0-5 Units, Subcutaneous, QID (AC + HS) PRN    melatonin, 6 mg, Oral, Nightly PRN    naloxone, 0.02 mg, Intravenous, PRN    ondansetron, 4 mg, Intravenous, Q6H PRN    prochlorperazine, 5 mg, Intravenous, Q6H PRN    senna, 8.6 mg, Oral, Daily PRN    sodium chloride 0.9%, 250 mL, Intravenous, PRN    Pharmacy to dose Vancomycin consult, , , Once **AND** vancomycin - pharmacy to dose, , Intravenous, pharmacy to manage frequency    Objective:     Vital Signs (Most Recent):  Temp: 97.9 °F (36.6 °C) (04/17/25 0800)  Pulse: 92 (04/17/25 1000)  Resp: 16 (04/17/25 1000)  BP: (!) 105/53 (04/17/25 1000)  SpO2: 97 % (04/17/25 1000) Vital Signs (24h Range):  Temp:  [97.7 °F (36.5 °C)-97.9 °F (36.6 °C)] 97.9 °F (36.6 °C)  Pulse:  [] 92  Resp:  [10-39] 16  SpO2:  [94 %-100 %] 97 %  BP: ()/(47-63) 105/53     Weight: 61.2 kg (134 lb 14.7 oz)  Body mass index is 21.13 kg/m².     Physical Exam  Vitals and nursing note reviewed.   Constitutional:       General: He is awake.      Appearance: He is ill-appearing.      Comments: Intermittent periods of alertness and lethargy; very restless and confused; redirectable for short periods of time    HENT:      Head: Normocephalic and atraumatic.   Pulmonary:      Effort: Pulmonary effort is normal. No respiratory distress.      Comments: NC  Skin:     General: Skin is dry.      Coloration: Skin is pale.      Findings: Lesion present. Bruising: multiple skin lesions in various stages of healing. Erythema: pale/gray for ethnicity.  Neurological:      Mental Status: He is lethargic and disoriented.   Psychiatric:         Behavior:  Behavior is agitated. Behavior is cooperative.         Cognition and Memory: Memory is impaired.          Advance Care Planning   Advance Directives:   Living Will: No    LaPOST: No    Do Not Resuscitate Status: No    Medical Power of : No (previously had shared with MDT that he wished dez Ndiaye to act as MPOA; now states dez Araiza; 4 siblings are legal surrogate decision makers otherwise and reccomended (see ACP))      Decision Making:  Family answered questions and Patient answered questions  Goals of Care: What is most important right now is to focus on symptom/pain control, curative/life-prolongation (regardless of treatment burdens), improvement in condition but with limits to invasive therapies. Accordingly, we have decided that the best plan to meet the patient's goals includes continuing with treatment.       Significant Labs: All pertinent labs within the past 24 hours have been reviewed.  CBC:   Recent Labs   Lab 04/17/25  0326   WBC 13.17*   HGB 9.0*   HCT 28.4*   MCV 91        BMP:  Recent Labs   Lab 04/17/25  0326      K 4.1   CL 97   CO2 25   BUN 24*   CREATININE 5.6*   CALCIUM 8.9     LFT:  Lab Results   Component Value Date    AST 31 04/17/2025    ALKPHOS 81 04/17/2025    BILITOT 1.1 (H) 04/17/2025     Albumin:   Albumin   Date Value Ref Range Status   04/17/2025 2.7 (L) 3.5 - 5.2 g/dL Final   03/21/2025 3.2 (L) 3.5 - 5.2 g/dL Final     Protein:   Total Protein   Date Value Ref Range Status   03/21/2025 7.5 6.0 - 8.4 g/dL Final     Lactic acid:   Lab Results   Component Value Date    LACTATE 1.9 04/14/2025    LACTATE 0.9 04/03/2025     Significant Imaging: I have reviewed all pertinent imaging results/findings within the past 24 hours.    Total visit time: 95 minutes    35 min visit time including: face to face time in discussion of symptom assessment, and exploring options and burdens of offered treatments.  This also includes non-face to face time preparing to see the  patient including chart review, obtaining and/or reviewing separately obtained history, documenting clinical information in the electronic or other health record, independently interpreting results and communicating results to the patient/family/caregiver, family discussions by phone if not able to be present, coordination of care with other specialists, and discharge planning.     60 min ACP time spent: goals of care, advanced care planning, emotional support, formulating and communicating prognosis, exploring burden/ benefit of various approaches of treatment.         Lisa Jacinto NP  Palliative Medicine  [facility]

## 2025-04-17 NOTE — ASSESSMENT & PLAN NOTE
ESRD on HD     HD TTS, [facility]  Sp AVG resection 4/3    TDC right chest wall 4/14/2025  HD 4/14 2 hr 15 min completed before apneic episode, code blue activated though never lost pulse. Per notes improved with narcan and overnight attending suspicious for opiate induced apnea. Pain medications decreased  HD tolerated 4/16/2025 without issues      Plan/Recommendation  Okay to step down from ICU  HD MWF as per usual schedule.   -Keep MAP > 65  -Keep hemoglobin > 7  -Strict ins and outs  -Avoid nephrotoxic agents if possible and renally dose medications  -Avoid drastic hemodynamic changes if possible    #Secondary hyperparathyroidism  Calcium   Date Value Ref Range Status   04/17/2025 8.9 8.7 - 10.5 mg/dL Final     Phosphorus Level   Date Value Ref Range Status   04/14/2025 2.8 2.7 - 4.5 mg/dL Final     PTH, Intact   Date Value Ref Range Status   01/17/2024 117.7 (H) 9.0 - 77.0 pg/mL Final   Continue to monitor

## 2025-04-17 NOTE — ASSESSMENT & PLAN NOTE
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 Most recent hemoglobin and hematocrit are listed below.  Recent Labs     04/15/25  0350 04/16/25  0314 04/17/25  0326   HGB 7.9* 8.1* 9.0*   HCT 24.8* 26.8* 28.4*     Plan  - Monitor serial CBC: Daily and recheck now  - Transfuse PRBC if patient becomes hemodynamically unstable, symptomatic or H/H drops below 7/21.  - Patient has not received any PRBC transfusions to date  - Patient's anemia is currently stable

## 2025-04-17 NOTE — ASSESSMENT & PLAN NOTE
4/16/2025  - interval chart reviewed; pt discussed with MDT   - pt with significant improvement in mental status since initiation of mood and sleep med regimen, delirium precautions, and transition to a room with window in coordination with Dr. Palma,  on 4/15  - re-introduction to myself and palliative medicine team, re-discussion role in admission and care; pt does share recognizing me from care but does not recall details of discussions   - pt still with some capacity concerns, will plan for continued assessment and coordination with /MDT for determining if/when pt has capacity for medical decision making; at current time I feel pt does have some understanding of basics of care and can make needs known, but is lacking capacity for complex medical decision making   - I do feel pt may have capacity for simple wishes such as preferred MPOA but will allow for consistent improvement in mental status before I would feel comfortable with pt formally appointing an MPOA   - previously in admission, pt reported to then , Dr. Lyles, that his niece Senthil is who team should call for medical decisions; pt now saying that niece Teodora is who he would wish to appoint but wants Senthil to receive updates etc; pt confirms updates can be provided to his 4 siblings and to these 2 nieces   - clarified with pt that legal surrogate decision makers currently would be his 4 siblings, if he were to lack capacity; expressed hope that pt would continue to have improved mental status and could make his own decisions and appoint MPOA  - pt reports living in his own home independently prior to admission; he feels that prior to becoming ill and seeking care weeks prior to admission he did not have difficulty caring for himself with PRN assistance from family   - shares that he had previously explored [facility] for nursing home/assisted living but OOP cost affected decision, with preference to make necessary improvements to home  for safety and hire care assistance when needed as alternative   - [facility] and has [facility]   - assisted pt in calling dez Ndiaye as he wished she bring his cell phone and some items from home; updated Dashante of pt's improved status   - also attempted to call pt's sister Ronna, who has visited several times during admission, to update on improved mental status and to attempt to get Nawaf garcesy's number for update as well; LM and provided return call number     Please see multiple prior palliative/ACP notes from myself and Dr. Joan Antunez for prior GOC/ACP discussions

## 2025-04-17 NOTE — ASSESSMENT & PLAN NOTE
Patient's blood pressure range in the last 24 hours was: BP  Min: 93/52  Max: 127/57.The patient's inpatient anti-hypertensive regimen is listed below:  Current Antihypertensives       Plan  - admitted with shock  - now off vasopressors. Continue to hold BP Rx as BP is well controlled without it

## 2025-04-17 NOTE — PLAN OF CARE
Changes in medical condition or discharge plan:  Patient able to step down to tele/med surg    Does patient need new DME? no    Follow up appts needed: Patient will need followup appointments with PCP and addtional appointments as ordered by the discharging provider.      Medically stable: no    Estimated Discharge Date: tbd    SNF is preferred DC plan per family.  Palliative care NP, Lisa spoke with family for choices.  She provided the patient a choice of post acute providers and offered a list of CMS rated SNF's.   Patient/family chose the following as their preferred providers:  [facility] near [place]  Referral sent to:  [facility], [facility], [facility] and [facility]  .        04/17/25 9074   Discharge Reassessment   Assessment Type Discharge Planning Reassessment   Did the patient's condition or plan change since previous assessment? Yes   Discharge Plan discussed with:   (in MDT SIBR this am)   Communicated BAYRON with patient/caregiver Yes   Discharge Plan A Home Health  (and continue OP HD)   Discharge Plan B Home Health  (and follow up with OP HD)   DME Needed Upon Discharge    (tbd)   Transition of Care Barriers None   Why the patient remains in the hospital Requires continued medical care

## 2025-04-17 NOTE — ASSESSMENT & PLAN NOTE
"- ESRD on HD via LUE AVF  - LUE AVF was actively bleeding on arrival on 4/3/25. Vascular surgery was consulted. He went emergently to the OR on 4/3/25: "Severely calcified mid RCA stenosis unable to cross with PTCA balloons, treated initially with 0.9 laser atherectomy, followed by CSI atherectomy system with total of 5 runs (3 at low speed, 2 at high speed), pre-dilated using 2.0 compliant and 3.5 noncompliant balloon followed by 3.5 X 16 mm megatron stent.  Focal plaque rupture/erosion noted in the proximal and ostial RCA that were treated with  3.5 X 28 in the proximal RCA, 4.0 X 16 mm in the ostial RCA.  No residual stenosis post intervention.  Patient had ALAN 3 flow at the end of the procedure"  - cultures from AVF= Staph   - wound care orders in place for surgical site  - Nephrology is consulted  - Rt IJ trialysis placed on 4/4/25 for CRRT;  - THDC placed on 04/14  "

## 2025-04-17 NOTE — SUBJECTIVE & OBJECTIVE
Interval History: no acute events overnight, more delirious today     Review of patient's allergies indicates:   Allergen Reactions    Ace inhibitors Hives, Itching, Shortness Of Breath, Other (See Comments) and Rash     Is not sure which medication it was    Captopril      Current Facility-Administered Medications   Medication Frequency    0.9%  NaCl infusion (for blood administration) Q24H PRN    0.9% NaCl infusion PRN    acetaminophen tablet 650 mg Q4H PRN    albumin human 25% bottle 25 g Daily PRN    atorvastatin tablet 80 mg QHS    clopidogreL tablet 75 mg Daily    dextrose 50% injection 12.5 g PRN    dextrose 50% injection 25 g PRN    [START ON 4/18/2025] epoetin mj-epbx injection 10,000 Units Every Mon, Wed, Fri    erythromycin 5 mg/gram (0.5 %) ophthalmic ointment Q8H    glucagon (human recombinant) injection 1 mg PRN    glucose chewable tablet 16 g PRN    glucose chewable tablet 24 g PRN    haloperidol lactate injection 2 mg Q4H PRN    heparin (porcine) injection 1,000 Units PRN    hyoscyamine SL tablet 0.125 mg Q4H PRN    insulin aspart U-100 pen 0-5 Units QID (AC + HS) PRN    insulin glargine U-100 (Lantus) pen 5 Units Daily    melatonin tablet 6 mg Nightly PRN    midodrine tablet 15 mg Q8H    naloxone 0.4 mg/mL injection 0.02 mg PRN    ondansetron injection 4 mg Q6H PRN    pantoprazole EC tablet 40 mg Daily    prochlorperazine injection Soln 5 mg Q6H PRN    QUEtiapine tablet 25 mg QHS    senna tablet 8.6 mg Daily PRN    sodium chloride 0.9% bolus 250 mL 250 mL PRN    sodium chloride 0.9% flush 10 mL Q8H    tamsulosin 24 hr capsule 0.4 mg Daily    vancomycin - pharmacy to dose pharmacy to manage frequency       Objective:     Vital Signs (Most Recent):  Temp: 97.9 °F (36.6 °C) (04/17/25 0800)  Pulse: 92 (04/17/25 1000)  Resp: 16 (04/17/25 1000)  BP: (!) 105/53 (04/17/25 1000)  SpO2: 97 % (04/17/25 1000) Vital Signs (24h Range):  Temp:  [97.7 °F (36.5 °C)-98 °F (36.7 °C)] 97.9 °F (36.6 °C)  Pulse:   [] 92  Resp:  [10-39] 16  SpO2:  [94 %-100 %] 97 %  BP: ()/(47-72) 105/53     Weight: 61.2 kg (134 lb 14.7 oz) (04/04/25 1937)  Body mass index is 21.13 kg/m².  Body surface area is 1.7 meters squared.    I/O last 3 completed shifts:  In: 500 [Other:500]  Out: 2125 [Urine:125; Other:2000]     Physical Exam  Constitutional:       General: He is not in acute distress.     Appearance: He is not ill-appearing or toxic-appearing.   HENT:      Head: Normocephalic and atraumatic.      Nose: Nose normal. No congestion.      Mouth/Throat:      Mouth: Mucous membranes are moist.   Eyes:      Pupils: Pupils are equal, round, and reactive to light.   Cardiovascular:      Rate and Rhythm: Tachycardia present.      Heart sounds: Murmur heard.      Comments: Right chest wall TDC  Pulmonary:      Effort: Pulmonary effort is normal.   Neurological:      Mental Status: He is alert.          Significant Labs:  All labs within the past 24 hours have been reviewed.     Significant Imaging:  Labs: Reviewed

## 2025-04-18 PROBLEM — E44.0 MODERATE MALNUTRITION: Status: ACTIVE | Noted: 2025-04-18

## 2025-04-18 LAB
ABSOLUTE EOSINOPHIL (OHS): 0.81 K/UL
ABSOLUTE MONOCYTE (OHS): 0.98 K/UL (ref 0.3–1)
ABSOLUTE NEUTROPHIL COUNT (OHS): 10.28 K/UL (ref 1.8–7.7)
ALBUMIN SERPL BCP-MCNC: 2.7 G/DL (ref 3.5–5.2)
ALP SERPL-CCNC: 78 UNIT/L (ref 40–150)
ALT SERPL W/O P-5'-P-CCNC: 5 UNIT/L (ref 10–44)
ANION GAP (OHS): 18 MMOL/L (ref 8–16)
AST SERPL-CCNC: 28 UNIT/L (ref 11–45)
BASOPHILS # BLD AUTO: 0.08 K/UL
BASOPHILS NFR BLD AUTO: 0.6 %
BILIRUB SERPL-MCNC: 1.1 MG/DL (ref 0.1–1)
BUN SERPL-MCNC: 35 MG/DL (ref 8–23)
CALCIUM SERPL-MCNC: 9 MG/DL (ref 8.7–10.5)
CHLORIDE SERPL-SCNC: 98 MMOL/L (ref 95–110)
CO2 SERPL-SCNC: 23 MMOL/L (ref 23–29)
CREAT SERPL-MCNC: 8.2 MG/DL (ref 0.5–1.4)
ERYTHROCYTE [DISTWIDTH] IN BLOOD BY AUTOMATED COUNT: 17.2 % (ref 11.5–14.5)
GFR SERPLBLD CREATININE-BSD FMLA CKD-EPI: 6 ML/MIN/1.73/M2
GLUCOSE SERPL-MCNC: 60 MG/DL (ref 70–110)
HCT VFR BLD AUTO: 26.7 % (ref 40–54)
HGB BLD-MCNC: 8.6 GM/DL (ref 14–18)
HOLD SPECIMEN: NORMAL
IMM GRANULOCYTES # BLD AUTO: 0.06 K/UL (ref 0–0.04)
IMM GRANULOCYTES NFR BLD AUTO: 0.5 % (ref 0–0.5)
LYMPHOCYTES # BLD AUTO: 0.86 K/UL (ref 1–4.8)
MCH RBC QN AUTO: 29.3 PG (ref 27–31)
MCHC RBC AUTO-ENTMCNC: 32.2 G/DL (ref 32–36)
MCV RBC AUTO: 91 FL (ref 82–98)
NUCLEATED RBC (/100WBC) (OHS): 0 /100 WBC
PLATELET # BLD AUTO: 254 K/UL (ref 150–450)
PMV BLD AUTO: 11.1 FL (ref 9.2–12.9)
POCT GLUCOSE: 101 MG/DL (ref 70–110)
POCT GLUCOSE: 113 MG/DL (ref 70–110)
POCT GLUCOSE: 133 MG/DL (ref 70–110)
POCT GLUCOSE: 66 MG/DL (ref 70–110)
POCT GLUCOSE: 87 MG/DL (ref 70–110)
POCT GLUCOSE: 97 MG/DL (ref 70–110)
POTASSIUM SERPL-SCNC: 4.1 MMOL/L (ref 3.5–5.1)
PROT SERPL-MCNC: 7.6 GM/DL (ref 6–8.4)
RBC # BLD AUTO: 2.94 M/UL (ref 4.6–6.2)
RELATIVE EOSINOPHIL (OHS): 6.2 %
RELATIVE LYMPHOCYTE (OHS): 6.6 % (ref 18–48)
RELATIVE MONOCYTE (OHS): 7.5 % (ref 4–15)
RELATIVE NEUTROPHIL (OHS): 78.6 % (ref 38–73)
SODIUM SERPL-SCNC: 139 MMOL/L (ref 136–145)
VANCOMYCIN SERPL-MCNC: 16.5 UG/ML (ref ?–80)
WBC # BLD AUTO: 13.07 K/UL (ref 3.9–12.7)

## 2025-04-18 PROCEDURE — 63600175 PHARM REV CODE 636 W HCPCS: Performed by: STUDENT IN AN ORGANIZED HEALTH CARE EDUCATION/TRAINING PROGRAM

## 2025-04-18 PROCEDURE — 25000003 PHARM REV CODE 250: Performed by: STUDENT IN AN ORGANIZED HEALTH CARE EDUCATION/TRAINING PROGRAM

## 2025-04-18 PROCEDURE — 63600175 PHARM REV CODE 636 W HCPCS: Mod: JZ,TB | Performed by: STUDENT IN AN ORGANIZED HEALTH CARE EDUCATION/TRAINING PROGRAM

## 2025-04-18 PROCEDURE — 25000003 PHARM REV CODE 250

## 2025-04-18 PROCEDURE — 80202 ASSAY OF VANCOMYCIN: CPT

## 2025-04-18 PROCEDURE — 25000003 PHARM REV CODE 250: Performed by: REGISTERED NURSE

## 2025-04-18 PROCEDURE — 82040 ASSAY OF SERUM ALBUMIN: CPT | Performed by: HOSPITALIST

## 2025-04-18 PROCEDURE — 99232 SBSQ HOSP IP/OBS MODERATE 35: CPT | Mod: ,,, | Performed by: STUDENT IN AN ORGANIZED HEALTH CARE EDUCATION/TRAINING PROGRAM

## 2025-04-18 PROCEDURE — 36415 COLL VENOUS BLD VENIPUNCTURE: CPT | Performed by: HOSPITALIST

## 2025-04-18 PROCEDURE — 94761 N-INVAS EAR/PLS OXIMETRY MLT: CPT

## 2025-04-18 PROCEDURE — 27000221 HC OXYGEN, UP TO 24 HOURS

## 2025-04-18 PROCEDURE — 99233 SBSQ HOSP IP/OBS HIGH 50: CPT | Mod: ,,, | Performed by: STUDENT IN AN ORGANIZED HEALTH CARE EDUCATION/TRAINING PROGRAM

## 2025-04-18 PROCEDURE — 25000003 PHARM REV CODE 250: Performed by: HOSPITALIST

## 2025-04-18 PROCEDURE — 85025 COMPLETE CBC W/AUTO DIFF WBC: CPT | Performed by: HOSPITALIST

## 2025-04-18 PROCEDURE — 63600175 PHARM REV CODE 636 W HCPCS

## 2025-04-18 PROCEDURE — 11000001 HC ACUTE MED/SURG PRIVATE ROOM

## 2025-04-18 PROCEDURE — A4216 STERILE WATER/SALINE, 10 ML: HCPCS | Performed by: HOSPITALIST

## 2025-04-18 PROCEDURE — 80100014 HC HEMODIALYSIS 1:1

## 2025-04-18 RX ORDER — SEVELAMER CARBONATE 800 MG/1
800 TABLET, FILM COATED ORAL
Status: DISCONTINUED | OUTPATIENT
Start: 2025-04-18 | End: 2025-04-18

## 2025-04-18 RX ORDER — SEVELAMER CARBONATE 800 MG/1
800 TABLET, FILM COATED ORAL
Status: DISCONTINUED | OUTPATIENT
Start: 2025-04-18 | End: 2025-04-21

## 2025-04-18 RX ORDER — LORAZEPAM 2 MG/ML
1 INJECTION INTRAMUSCULAR ONCE
Status: DISCONTINUED | OUTPATIENT
Start: 2025-04-18 | End: 2025-04-21

## 2025-04-18 RX ADMIN — VANCOMYCIN HYDROCHLORIDE 500 MG: 500 INJECTION, POWDER, LYOPHILIZED, FOR SOLUTION INTRAVENOUS at 08:04

## 2025-04-18 RX ADMIN — MIDODRINE HYDROCHLORIDE 15 MG: 5 TABLET ORAL at 06:04

## 2025-04-18 RX ADMIN — QUETIAPINE FUMARATE 25 MG: 25 TABLET ORAL at 08:04

## 2025-04-18 RX ADMIN — MIDODRINE HYDROCHLORIDE 15 MG: 5 TABLET ORAL at 08:04

## 2025-04-18 RX ADMIN — CLOPIDOGREL BISULFATE 75 MG: 75 TABLET, FILM COATED ORAL at 08:04

## 2025-04-18 RX ADMIN — ERYTHROMYCIN: 5 OINTMENT OPHTHALMIC at 08:04

## 2025-04-18 RX ADMIN — HALOPERIDOL LACTATE 2 MG: 5 INJECTION, SOLUTION INTRAMUSCULAR at 08:04

## 2025-04-18 RX ADMIN — ATORVASTATIN CALCIUM 80 MG: 40 TABLET, FILM COATED ORAL at 08:04

## 2025-04-18 RX ADMIN — SODIUM CHLORIDE, PRESERVATIVE FREE 10 ML: 5 INJECTION INTRAVENOUS at 05:04

## 2025-04-18 RX ADMIN — DEXTROSE MONOHYDRATE 12.5 G: 25 INJECTION, SOLUTION INTRAVENOUS at 05:04

## 2025-04-18 RX ADMIN — STANDARDIZED SENNA CONCENTRATE 8.6 MG: 8.6 TABLET ORAL at 07:04

## 2025-04-18 RX ADMIN — SEVELAMER CARBONATE 800 MG: 800 TABLET, FILM COATED ORAL at 07:04

## 2025-04-18 RX ADMIN — HYDROXYZINE HYDROCHLORIDE 25 MG: 25 TABLET ORAL at 02:04

## 2025-04-18 RX ADMIN — PANTOPRAZOLE SODIUM 40 MG: 40 TABLET, DELAYED RELEASE ORAL at 08:04

## 2025-04-18 RX ADMIN — HYDROXYZINE HYDROCHLORIDE 25 MG: 25 TABLET ORAL at 08:04

## 2025-04-18 RX ADMIN — EPOETIN ALFA-EPBX 10000 UNITS: 10000 INJECTION, SOLUTION INTRAVENOUS; SUBCUTANEOUS at 09:04

## 2025-04-18 RX ADMIN — CAPSAICIN: 0.25 CREAM TOPICAL at 08:04

## 2025-04-18 RX ADMIN — MIDODRINE HYDROCHLORIDE 15 MG: 5 TABLET ORAL at 02:04

## 2025-04-18 RX ADMIN — TAMSULOSIN HYDROCHLORIDE 0.4 MG: 0.4 CAPSULE ORAL at 08:04

## 2025-04-18 RX ADMIN — ERYTHROMYCIN: 5 OINTMENT OPHTHALMIC at 02:04

## 2025-04-18 RX ADMIN — HYDROXYZINE HYDROCHLORIDE 25 MG: 25 TABLET ORAL at 07:04

## 2025-04-18 RX ADMIN — ERYTHROMYCIN: 5 OINTMENT OPHTHALMIC at 05:04

## 2025-04-18 NOTE — SUBJECTIVE & OBJECTIVE
Interval History: Sharma draining clear yellow.    Review of Systems  Objective:     Temp:  [97.8 °F (36.6 °C)-98.3 °F (36.8 °C)] 98.3 °F (36.8 °C)  Pulse:  [] 95  Resp:  [14-92] 15  SpO2:  [91 %-99 %] 98 %  BP: ()/(47-82) 114/55     Body mass index is 21.13 kg/m².      Bladder Scan Volume (mL): 331 mL (04/06/25 1520)    Drains       Drain  Duration                  Urethral Catheter 04/07/25 1558 Triple-lumen;Latex 24 Fr. 10 days         Hemodialysis Catheter 04/14/25 1011 right internal jugular 3 days                     Physical Exam  Constitutional:       General: He is not in acute distress.     Appearance: He is well-developed.   HENT:      Head: Normocephalic and atraumatic.      Mouth/Throat:      Mouth: Mucous membranes are moist.   Eyes:      Conjunctiva/sclera: Conjunctivae normal.   Pulmonary:      Effort: Pulmonary effort is normal. No respiratory distress.   Abdominal:      General: Abdomen is flat. There is no distension.      Tenderness: There is no right CVA tenderness or left CVA tenderness.   Genitourinary:     Comments: Sharma clear yellow urine  Musculoskeletal:         General: No swelling or deformity.      Cervical back: Neck supple.   Skin:     Findings: No rash.   Neurological:      General: No focal deficit present.      Mental Status: He is alert.           Significant Labs:    BMP:  Recent Labs   Lab 04/16/25  0314 04/17/25  0326 04/18/25  0516    137 139   K 4.1 4.1 4.1    97 98   CO2 23 25 23   BUN 38* 24* 35*   CREATININE 7.9* 5.6* 8.2*   GLUCOSE 96 112* 60*   CALCIUM 8.7 8.9 9.0       CBC:   Recent Labs   Lab 04/16/25  0314 04/17/25  0326 04/18/25  0353   WBC 11.16 13.17* 13.07*   HGB 8.1* 9.0* 8.6*   HCT 26.8* 28.4* 26.7*    214 254       All pertinent labs results from the past 24 hours have been reviewed.    Significant Imaging:  All pertinent imaging results/findings from the past 24 hours have been reviewed.

## 2025-04-18 NOTE — ASSESSMENT & PLAN NOTE
Catheter in place, requires cystoscopy to place dunaway. Keep in place while in ICU.   Consider voiding trial prior to discharge - early in the AM preferred in case patient will need to be brought back to the operating room for dunaway placement

## 2025-04-18 NOTE — SUBJECTIVE & OBJECTIVE
Interval History:  Patient was hypoglycemic overnight with blood glucose of 60.  Received D50.  Still complaining of itching, hydroxyzine helped somewhat.     Review of Systems  Objective:     Vital Signs (Most Recent):  Temp: 98.3 °F (36.8 °C) (04/18/25 0000)  Pulse: 95 (04/18/25 0500)  Resp: 15 (04/18/25 0500)  BP: (!) 114/55 (04/18/25 0500)  SpO2: 98 % (04/18/25 0500) Vital Signs (24h Range):  Temp:  [97.8 °F (36.6 °C)-98.3 °F (36.8 °C)] 98.3 °F (36.8 °C)  Pulse:  [] 95  Resp:  [13-92] 15  SpO2:  [91 %-99 %] 98 %  BP: ()/(47-82) 114/55     Weight: 61.2 kg (134 lb 14.7 oz)  Body mass index is 21.13 kg/m².    Intake/Output Summary (Last 24 hours) at 4/18/2025 0726  Last data filed at 4/17/2025 1000  Gross per 24 hour   Intake 90.28 ml   Output --   Net 90.28 ml         Physical Exam  Constitutional:       General: He is not in acute distress.     Appearance: He is ill-appearing. He is not toxic-appearing or diaphoretic.   Cardiovascular:      Rate and Rhythm: Normal rate and regular rhythm.      Pulses: Normal pulses.      Heart sounds: Normal heart sounds. No murmur heard.  Abdominal:      General: Bowel sounds are normal. There is no distension.      Palpations: Abdomen is soft. There is no mass.   Skin:     Comments: Multiple excoriation marks               Significant Labs: All pertinent labs within the past 24 hours have been reviewed.    Significant Imaging: I have reviewed all pertinent imaging results/findings within the past 24 hours.

## 2025-04-18 NOTE — PROGRESS NOTES
Pharmacokinetic Assessment Follow Up: IV Vancomycin    Vancomycin serum concentration assessment(s):    The random level was drawn correctly and can be used to guide therapy at this time. The measurement is within the desired definitive target range of 15 to 20 mcg/mL.    Vancomycin Regimen Plan:    Give Vancomycin 500 mg x1 dose after HD today and obtain random level 4/21 at 0400    Drug levels (last 3 results):  Recent Labs   Lab Result Units 04/16/25 0314 04/17/25 0326 04/18/25  0353   Vancomycin Random ug/ml 13.5 10.8 16.5       Pharmacy will continue to follow and monitor vancomycin.    Please contact pharmacy at extension [phone] for questions regarding this assessment.    Thank you for the consult,   Hesham Hernandez       Patient brief summary:  Jevon Rajan is a 85 y.o. male initiated on antimicrobial therapy with IV Vancomycin for treatment of bacteremia    Drug Allergies:   Review of patient's allergies indicates:   Allergen Reactions    Ace inhibitors Hives, Itching, Shortness Of Breath, Other (See Comments) and Rash     Is not sure which medication it was    Captopril        Actual Body Weight:   61.2 kg    Renal Function:   Estimated Creatinine Clearance: 8 mL/min (A) (based on SCr of 5.6 mg/dL (H)).,     Dialysis Method (if applicable):  intermittent HD    CBC (last 72 hours):  Recent Labs   Lab Result Units 04/16/25 0314 04/17/25 0326 04/18/25  0353   WBC K/uL 11.16 13.17* 13.07*   HGB gm/dL 8.1* 9.0* 8.6*   HCT % 26.8* 28.4* 26.7*   Platelet Count K/uL 217 214 254   Lymph % % 7.2* 5.4* 6.6*   Mono % % 6.5 6.6 7.5   Eos % % 4.3 3.6 6.2   Basophil % % 0.5 0.5 0.6       Metabolic Panel (last 72 hours):  Recent Labs   Lab Result Units 04/16/25 0314 04/17/25  0326   Sodium mmol/L 141 137   Potassium mmol/L 4.1 4.1   Chloride mmol/L 102 97   CO2 mmol/L 23 25   Glucose mg/dL 96 112*   BUN mg/dL 38* 24*   Creatinine mg/dL 7.9* 5.6*   Albumin g/dL 2.5* 2.7*   Bilirubin Total mg/dL 0.8 1.1*   ALP  unit/L 67 81   AST unit/L 25 31   ALT unit/L 5* 8*       Vancomycin Administrations:  vancomycin given in the last 96 hours                     vancomycin (VANCOCIN) 500 mg in D5W 100 mL IVPB (MB+) (mg) 500 mg New Bag 04/17/25 0646                    Microbiologic Results:  Microbiology Results (last 7 days)       Procedure Component Value Units Date/Time    Fungus culture [[id number]]  (Normal) Collected: 04/03/25 1816    Order Status: Completed Specimen: Wound from Arm, Left Updated: 04/17/25 2300     Fungal Culture No Fungus Isolated at 2 Weeks    Fungus culture [[id number]]  (Normal) Collected: 04/03/25 1934    Order Status: Completed Specimen: Tissue from AV Fistula, Left Updated: 04/17/25 2300     Fungal Culture No Fungus Isolated at 2 Weeks    Fungus culture [[id number]]  (Normal) Collected: 04/03/25 2011    Order Status: Completed Specimen: Wound from Arm, Left Updated: 04/17/25 2300     Fungal Culture No Fungus Isolated at 2 Weeks    Fungus culture [[id number]]  (Normal) Collected: 04/03/25 1904    Order Status: Completed Specimen: Tissue from AV Fistula, Left Updated: 04/17/25 2201     Fungal Culture No Fungus Isolated at 2 Weeks    Fungus culture [[id number]]  (Normal) Collected: 04/03/25 1921    Order Status: Completed Specimen: Tissue from hematoma Updated: 04/17/25 2201     Fungal Culture No Fungus Isolated at 2 Weeks    Blood culture [[id number]]  (Normal) Collected: 04/11/25 1335    Order Status: Completed Specimen: Blood Updated: 04/16/25 1401     Blood Culture No Growth After 5 Days    Blood culture [[id number]]  (Normal) Collected: 04/11/25 1335    Order Status: Completed Specimen: Blood from Other (Specify) Updated: 04/16/25 1401     Blood Culture No Growth After 5 Days    Blood culture [[id number]]  (Normal) Collected: 04/08/25 0506    Order Status: Completed Specimen: Blood Updated: 04/13/25 0600     Blood Culture No Growth After 5 Days    Blood culture [[id number]]  (Normal) Collected:  04/08/25 0401    Order Status: Completed Specimen: Blood Updated: 04/13/25 0600     Blood Culture No Growth After 5 Days

## 2025-04-18 NOTE — ASSESSMENT & PLAN NOTE
#Anemia  HGB   Date Value Ref Range Status   04/18/2025 8.6 (L) 14.0 - 18.0 gm/dL Final     Iron   Date Value Ref Range Status   08/11/2024 39 (L) 45 - 160 ug/dL Final     Transferrin   Date Value Ref Range Status   08/11/2024 139 (L) 200 - 375 mg/dL Final     TIBC   Date Value Ref Range Status   08/11/2024 206 (L) 250 - 450 ug/dL Final     Saturated Iron   Date Value Ref Range Status   08/11/2024 19 (L) 20 - 50 % Final     Ferritin   Date Value Ref Range Status   08/11/2024 979 (H) 20.0 - 300.0 ng/mL Final     NAKIA ordered, hb not at goal  Iron on hold due to bacteremia. Follow up outpatient

## 2025-04-18 NOTE — PLAN OF CARE
Problem: Adult Inpatient Plan of Care  Goal: Absence of Hospital-Acquired Illness or Injury  Outcome: Progressing  Goal: Optimal Comfort and Wellbeing  Outcome: Progressing  Goal: Readiness for Transition of Care  Outcome: Met     Problem: Diabetes Comorbidity  Goal: Blood Glucose Level Within Targeted Range  Outcome: Progressing     Problem: Sepsis/Septic Shock  Goal: Optimal Coping  Outcome: Not Progressing  Goal: Absence of Bleeding  Outcome: Met  Goal: Blood Glucose Level Within Targeted Range  Outcome: Progressing  Goal: Absence of Infection Signs and Symptoms  Outcome: Progressing  Goal: Optimal Nutrition Intake  Outcome: Not Progressing     Problem: Infection  Goal: Absence of Infection Signs and Symptoms  Outcome: Progressing     Problem: Fall Injury Risk  Goal: Absence of Fall and Fall-Related Injury  Outcome: Met     Problem: Skin Injury Risk Increased  Goal: Skin Health and Integrity  Outcome: Progressing     Problem: Wound  Goal: Optimal Coping  Outcome: Progressing  Goal: Optimal Functional Ability  Outcome: Not Progressing  Goal: Absence of Infection Signs and Symptoms  Outcome: Progressing  Goal: Improved Oral Intake  Outcome: Not Progressing  Goal: Optimal Pain Control and Function  Outcome: Progressing  Goal: Skin Health and Integrity  Outcome: Not Progressing  Goal: Optimal Wound Healing  Outcome: Not Progressing

## 2025-04-18 NOTE — PROGRESS NOTES
"[facility] Intensive Care  [facility] Medicine  Progress Note    Patient Name: Jevon Rajan  MRN: [id number]  Patient Class: IP- Inpatient   Admission Date: 4/3/2025  Length of Stay: 15 days  Attending Physician: Tena Palma MD  Primary Care Provider: Melia, Primary Doctor        Subjective     Principal Problem:Septic shock        HPI:  Mr Jevon Rajan is a 85 y.o. man with ESRD with LUE AVF, HFpEF last EF 50-55%, CAD, DM who was transferred to [facility] ICU for septic shock due to MRSA bacteremia.     He was admitted at [facility] 4/1-3/2025 with sepsis, worsening to septic shock, then identified source as MRSA. 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     Upon arrival to [facility], he is moaning in pain and his LUE AVF has active pulsatile bright red blood. He does respond to his name. He denies pain anywhere other than his LUE. He is on levophed at 0.05.     Overview/Hospital Course:  Mr Jevon Rajan is a 85 y.o. man with ESRD on HD with LUE AVF that has been bleeding, CHF, CAD s/p recent stenting 1/2025 who was admitted with MRSA bacteremia and bleeding from his LUE AVF. Continued vancomycin; weaned off levophed. Vascular surgery emergently consulted; on 4/3/25 they took him to OR and noted: "well incorporated proximal and central graft without evidence of infection, resected graft and covered proximal and central remnants with multiple layers. Mid graft removed in entirety and sent for culture. Ruptured pseudoaneurysm with infected hematoma, graft completely obliterated at mid segment." Surgical cultures with Staph. Suspect AVF or chronic scratching of pruritic skin is the source of his infection. TTE showed endocarditis: EF 40-45%, G1DD, RV systolic dysfunction, large 1.9x1.2cm fixed mass on the posterior mitral valve leaflet with moder to severe regurgitation, cannot rule out posterior " mitral valve leaflet perforation. Discussed with cardiac surgery; he is high mortality risk, and surgery is not advised. ID following. Nephrology consulted; trialysis was placed for HD. Persistently positive for MRSA in blood cultures. He has had urinary retention; Urology consulted, performed bedside cystoscopy on 4/6/25 with large prostate noted. Noted to have clot retention and went urgently to OR with Urology on 4/7/25; asa and DVT ppx held.     WBC normalized. S/p clot removal with Urology, 1U PRBC ordered this morning with Hb 6.1. Soft BPs, will add midodrine for HD to step down to floor. Glucoses high, will increase insulin. Attempted to s/d but BP too low, may still need CRRT rather. 4/8 cx clear so far. Increasing insulin again. Increasing midodrine to 15 mg TID. Hb 6.8, 1U PRBC ordered.     BP appears to be recovering a bit, perhaps will tolerate reg HD, will discuss w Neph- will run him on HD Saturday, if tolerates normal HD will remove central line and give 2 day holiday and place tunnel on Monday. We will give him HD before dc to facility on Tuesday if accepted by then.     WBC has normalized for the first time, now tolerating reg HD, and Blcx remain clear. Central line removed. NGT removed. Will step down.     Interval History:  Patient was hypoglycemic overnight with blood glucose of 60.  Received D50.  Still complaining of itching, hydroxyzine helped somewhat.     Review of Systems  Objective:     Vital Signs (Most Recent):  Temp: 98.3 °F (36.8 °C) (04/18/25 0000)  Pulse: 95 (04/18/25 0500)  Resp: 15 (04/18/25 0500)  BP: (!) 114/55 (04/18/25 0500)  SpO2: 98 % (04/18/25 0500) Vital Signs (24h Range):  Temp:  [97.8 °F (36.6 °C)-98.3 °F (36.8 °C)] 98.3 °F (36.8 °C)  Pulse:  [] 95  Resp:  [13-92] 15  SpO2:  [91 %-99 %] 98 %  BP: ()/(47-82) 114/55     Weight: 61.2 kg (134 lb 14.7 oz)  Body mass index is 21.13 kg/m².    Intake/Output Summary (Last 24 hours) at 4/18/2025 7848  Last data filed at  4/17/2025 1000  Gross per 24 hour   Intake 90.28 ml   Output --   Net 90.28 ml         Physical Exam  Constitutional:       General: He is not in acute distress.     Appearance: He is ill-appearing. He is not toxic-appearing or diaphoretic.   Cardiovascular:      Rate and Rhythm: Normal rate and regular rhythm.      Pulses: Normal pulses.      Heart sounds: Normal heart sounds. No murmur heard.  Abdominal:      General: Bowel sounds are normal. There is no distension.      Palpations: Abdomen is soft. There is no mass.   Skin:     Comments: Multiple excoriation marks               Significant Labs: All pertinent labs within the past 24 hours have been reviewed.    Significant Imaging: I have reviewed all pertinent imaging results/findings within the past 24 hours.      Assessment & Plan  Septic shock  This patient has shock. The type of shock is distributive due to sepsis. The patient had the following evidence of shock: persistent hypotension and altered mental status. 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 Shock is now resolved and vasopressors are off. WBC back to normal  HTN (hypertension)  Patient's blood pressure range in the last 24 hours was: BP  Min: 92/50  Max: 128/55.The patient's inpatient anti-hypertensive regimen is listed below:  Current Antihypertensives       Plan  - admitted with shock  - now off vasopressors. Continue to hold BP Rx as BP is well controlled without it   HLD (hyperlipidemia)  - continue statin  Type 2 diabetes mellitus with hyperglycemia, without long-term current use of insulin  A1c:   Lab Results   Component Value Date    HGBA1C 5.7 (H) 04/02/2025  "    Meds: lantus + SSI PRN to maintain goal 140-180  Tube feeds  accuchecks, hypoglycemic protocol      Coronary artery disease involving native coronary artery of native heart without angina pectoris  Patient with known CAD s/p stent placement, which is controlled Will continue ASA, Plavix, and Statin and monitor for S/Sx of angina/ACS. Continue to monitor on telemetry.   - last [facility] was 1/2025: Severely calcified mid RCA stenosis unable to cross with PTCA balloons, treated initially with 0.9 laser atherectomy, followed by CSI atherectomy system with total of 5 runs (3 at low speed, 2 at high speed), pre-dilated using 2.0 compliant and 3.5 noncompliant balloon followed by 3.5 X 16 mm megatron stent.  Focal plaque rupture/erosion noted in the proximal and ostial RCA that were treated with  3.5 X 28 in the proximal RCA, 4.0 X 16 mm in the ostial RCA.  No residual stenosis post intervention.  Patient had ALAN 3 flow at the end of the procedure  - with elevated troponin likely due to septic shock  Recent Labs   Lab 04/12/25  1216   TROPONINI 0.811*     - continue asa, plavix, statin  - Cardiology consulted  - no plans for ischemic evaluation at this time   ESRD on hemodialysis  - ESRD on HD via LUE AVF  - LUE AVF was actively bleeding on arrival on 4/3/25. Vascular surgery was consulted. He went emergently to the OR on 4/3/25: "Severely calcified mid RCA stenosis unable to cross with PTCA balloons, treated initially with 0.9 laser atherectomy, followed by CSI atherectomy system with total of 5 runs (3 at low speed, 2 at high speed), pre-dilated using 2.0 compliant and 3.5 noncompliant balloon followed by 3.5 X 16 mm megatron stent.  Focal plaque rupture/erosion noted in the proximal and ostial RCA that were treated with  3.5 X 28 in the proximal RCA, 4.0 X 16 mm in the ostial RCA.  No residual stenosis post intervention.  Patient had ALAN 3 flow at the end of the procedure"  - cultures from AVF= Staph   - wound care " "orders in place for surgical site  - Nephrology is consulted  - Rt IJ trialysis placed on 4/4/25 for CRRT;  - THDC placed on 04/14  Anemia in ESRD (end-stage renal disease)  Anemia is likely due to  ESRD, blood loss . Most recent hemoglobin and hematocrit are listed below.  Recent Labs     04/16/25  0314 04/17/25  0326 04/18/25  0353   HGB 8.1* 9.0* 8.6*   HCT 26.8* 28.4* 26.7*     Plan  - Monitor serial CBC: Daily and recheck now  - Transfuse PRBC if patient becomes hemodynamically unstable, symptomatic or H/H drops below 7/21.  - Patient has not received any PRBC transfusions to date  - Patient's anemia is currently stable  Acute metabolic encephalopathy with Hospital Delirum  -Noted episodes of intermittent agitation with tactile and visual hallucinations  -Continue correction of metabolic derangements  -Maintain Delirium precautions: Maintain regular sleep cycle. Early ambulation. Minimal interruptions overnight. Re-orient patient frequently. Maintain adequate bowel regimen, hydration and electrolyte replenishments  -aspiration precautions  -continue Seroquel 25 mg HS      ACP (advance care planning)  Advance Care Planning     Date: 04/04/2025  - palliative consulted   - Mr Escoto specifically told Dr Jordan to contact Kenia Smyth for all updates  - discussed code status with Kenia. She states she has spoken with Mr Escoto's sisters and they all want full code status.   -anticipated DC to SNF when medically stable enough to tolerated HD and possible nursing home placement         Acute bacterial endocarditis  - TTE 4/4/25: "1.9x1.2cm large fixed heterogeneous mass present on the posterior leaflet (new finding vs 9/2023 echo). There is moderate to severe regurgitation with an eccentric jet. Cannot exclude severe MR with possible PMVL perforation in association with large vegetation."  - this is in the setting of MRSA bacteremia  - ID is consulted  - repeat blood cultures until clear   - suspect " "source is skin/chronic scratching vs AVF infection- cultures from resected AVF with Staph   - discussed with Cardiac surgery Dr Castro 4/4/25- given age and comorbidities, he is very high risk of mortality with surgery. He recommends medical management at this point.  - on vancomycin      MRSA bacteremia  - suspect source:  Graft infection s/p exploratory surgery of LUE with graft resection 4/3  -graft holiday with TDC placed on 04/14  - cultures of AVF with Staph   - he has endocarditis  - ID consulted  - on vanc ;anticipating completing 6 weeks of IV vancomycin from clearance (EOC: 5/19/25)     NSTEMI (non-ST elevated myocardial infarction)  - see CAD    BPH with urinary obstruction  - noted 4/6/25 when patient became very agitated and screaming he couldn't urinate  - nursing unable to place dunaway/coude  - Urology consulted. Bedside cystoscopy on 4/6/25 noted clots, large prostate. Catheter was placed but was then removed due to pain  - 4/7 he is again agitated that he cannot urinate  - follow up with Urology   - tamsulosin ordered if he is awake enough to swallow     Thrombocytopenia  The likely etiology of thrombocytopenia is infection and sepsis. The patients 3 most recent labs are listed below.  Recent Labs     04/16/25  0314 04/17/25  0326 04/18/25  0353    214 254     Plan  - Will transfuse if platelet count is <10k.    AV fistula infection  - LUE AVF was actively bleeding on arrival on 4/3/25. Vascular surgery was consulted. He went emergently to the OR on 4/3/25: "Severely calcified mid RCA stenosis unable to cross with PTCA balloons, treated initially with 0.9 laser atherectomy, followed by CSI atherectomy system with total of 5 runs (3 at low speed, 2 at high speed), pre-dilated using 2.0 compliant and 3.5 noncompliant balloon followed by 3.5 X 16 mm megatron stent.  Focal plaque rupture/erosion noted in the proximal and ostial RCA that were treated with  3.5 X 28 in the proximal RCA, 4.0 X 16 mm " "in the ostial RCA.  No residual stenosis post intervention.  Patient had ALAN 3 flow at the end of the procedure"  - cultures from AVF= Staph   - wound care orders in place for surgical site  - trialysis currently in place for HD    Emphysema lung  - emphysema noted on CT  - no signs of COPD exacerbation  Pulmonary nodules  - CT 4/3/25 with few small pulmonary nodules  - will need outpatient follow up     Gross hematuria  S/p clot evacuation with a fulguration by Urology  Currently resolved  Continue cap irrigation of 3 way catheter  Continue Levsin for bladder discomfort  -Sharma to be removed when patient is transferred to the floor; we will defer removal to urology    NSVT (nonsustained ventricular tachycardia)      Uremic pruritus  Patient complaining of severe intractable itching;[facility]  Start hydroxyzine  Ordered capsaicin cream  We will consider trial of gabapentin if Korsuva is not available and hemodynamic  and delirium allows  Will defer increased dialysis frequency; changing Kp/f ratios and other adjustments to nephrology      VTE Risk Mitigation (From admission, onward)           Ordered     heparin (porcine) injection 1,000 Units  As needed (PRN)         04/05/25 2100     IP VTE HIGH RISK PATIENT  Once         04/03/25 1516     Place TEMO hose  Until discontinued         04/03/25 1516     Place sequential compression device  Until discontinued         04/03/25 1516     Reason for No Pharmacological VTE Prophylaxis  Once        Question:  Reasons:  Answer:  Physician Provided (leave comment)    04/03/25 1516                    Discharge Planning   BAYRON: 4/22/2025     Code Status: DNR   Medical Readiness for Discharge Date:   Discharge Plan A: Home Health (and continue OP HD)            Critical care time spent on the evaluation and treatment of severe organ dysfunction, review of pertinent labs and imaging studies, discussions with consulting providers and discussions with " patient/family: more than 35 minutes.            Tena Palma MD  Department of Hospital Medicine   [facility] [place] - Intensive Care

## 2025-04-18 NOTE — PROGRESS NOTES
[facility] [place] Intensive Care  Adult Nutrition  Progress Note    SUMMARY     Recommendations    Recommendation:  1. Continue with texture modified diet per SLP recommendations  2. TF of Novasource Renal at 10ml/hr and advance as tolerated to goal rate of 40ml/hr which would provide 1920 kcal, 87g protein, & 688ml free water if adequate oral intake is not obtained  3. Monitor weight/labs.   4. RD to follow to monitor nutrition status.    Goals:  Patient to consume/receive >75% of EEN prior to RD follow up   Nutrition Goal Status: continues   Communication of RD Recs: reviewed with RN    Nutrition Discharge Planning   Nutrition Discharge Planning: Diet texture per SLP    Assessment and Plan  Nutrition Problem  Inadequate energy intake    Related to (etiology):   AMS, septic shock    Signs and Symptoms (as evidenced by):   Estimated energy intake less than estimated needs      Interventions:  Texture modified diet   Enteral Nutrition Management   Collaboration with other providers    Nutrition Diagnosis Status:   Continues     Malnutrition Type:  Context: chronic illness  Level: moderate     Related to (etiology):   Increased energy needs     Signs and Symptoms (as evidenced by):   Estimated intake of food less than estimated needs      Malnutrition Characteristic Summary:  Weight Loss (Malnutrition): greater than 7.5% in 3 months  Energy Intake (Malnutrition): less than or equal to 75% for greater than or equal to 1 month        Interventions (treatment strategy):  1. Continue on texture modified diet per SLP recommendations 2. Continue with Commercial beverage medical food supplement therapy: Novasource renal  3. Monitor labs and weights     Nutrition Diagnosis Status:   New    Malnutrition Assessment  Weight Loss (Malnutrition): greater than 7.5% in 3 months  Energy Intake (Malnutrition): less than or equal to 75% for greater than or equal to 1 month       Reason for Assessment  Reason For Assessment: RD  follow-up  Diagnosis:  (septic shock)  General Information Comments: Pt admitted to [facility] with weakness. Transferred to  for higher level of care. CRRT started. s/p LUE artery exploration and graft removal 4/3. Receives HD outpatient. Manas 11- L arm incision. NG tube. Noted 20lb weight loss x 6 months. Unable to assess NFPE 2/2 RD working remotely for weekend coverage. Pt on contact isolation for MRSA.    Follow up 4/11/2025:   Patient continues with texture modified diet.  SLP following patient.  TF recommendations remain in chart if needed due to inadequate oral intake.  Labs reviewed.  Malnutrition suspected.  NFPE to be performed by on site RD.  NKFA.  LBML 4/10/25.  RD to continue to monitor EN support, oral intake and tolerance.  Commercial beverages previously ordered by another provider.     Follow up 4/15/2025:  Patient is on a texture modified IDDSI level 4 diet with intake varying between 25-75%.  Patient is consuming some snack type intake.  Labs reviewed.  NKFA.  LBM:  4/15/2025.  TF is not active at this time.  RD to continue to monitor po intake and tolerance at each follow up visit.       Follow up 4/18/25:  Patient continues on a texture modified diet.  Central line and NGT removed.  PO intake remains decreased at 25-50%.  Labs reviewed.  NKFA.  LBM:  4/15/25.  RD to continue to monitor po intake and tolerance.  Patient is positive for malnutrition due to decreased po intake and weight loss.  Commercial beverages recommended and provided.             Past Medical History:   Diagnosis Date    BPH (benign prostatic hyperplasia)     Coronary artery disease     [facility]. In 2014 he had stent to LAD. He states he has had 2 heart attacks. He does not get angina. There was 90% left anterior descending artery stenosis undergoing stenting. There was also a circumflex marginal branch stenosis of 70%.    Diabetes  "mellitus, type 2     ESRD (end stage renal disease) on dialysis     ESRD on HD TTS via left brachial AVF    Hypertension     Hypothyroidism, unspecified     NSTEMI (non-ST elevated myocardial infarction) 2025    Sepsis 2025    Symptomatic anemia 01/15/2024        Nutrition/Diet History  Food Preferences: no Restorationist or cultural food prefs identified  Spiritual, Cultural Beliefs, Spiritism Practices, Values that Affect Care: no  Factors Affecting Nutritional Intake: chewing difficulties/inability to chew food, difficulty/impaired swallowing    Anthropometrics  Height: 5' 7" (170.2 cm)  Height (inches): 67 in  Weight: 61.2 kg (134 lb 14.7 oz)  Weight (lb): 134.92 lb  Weight Method: Bed Scale  Ideal Body Weight (IBW), Male: 148 lb  % Ideal Body Weight, Male (lb): 91.16 %  BMI (Calculated): 21.1  BMI Grade: 18.5-24.9 - normal  Usual Body Weight (UBW), k kg  % Usual Body Weight: 90.19  % Weight Change From Usual Weight: -10 %  Wt Readings from Last 20 Encounters:   25 61.2 kg (134 lb 14.7 oz)   25 65.3 kg (143 lb 15.4 oz)   25 63.5 kg (140 lb)   25 68 kg (150 lb)   25 68 kg (150 lb)   25 68.6 kg (151 lb 3.2 oz)   25 68 kg (150 lb)   25 68 kg (150 lb)   25 68 kg (150 lb)   25 71.2 kg (157 lb)   25 71.4 kg (157 lb 6.5 oz)   25 69.9 kg (154 lb)   25 70 kg (154 lb 5.1 oz)   25 70 kg (154 lb 5.2 oz)   25 69.6 kg (153 lb 7 oz)   24 68 kg (150 lb)   10/20/24 69.9 kg (154 lb)   24 69.9 kg (154 lb 1.6 oz)   24 67.2 kg (148 lb 2.4 oz)   24 63.5 kg (140 lb)       Lab/Procedures/Meds  Pertinent Labs Reviewed: reviewed  BMP  Lab Results   Component Value Date     2025    K 4.1 2025    CL 98 2025    CO2 23 2025    BUN 35 (H) 2025    CREATININE 8.2 (H) 2025    CALCIUM 9.0 2025    ANIONGAP 18 (H) 2025    EGFRNORACEVR 6 (L) 2025     Lab Results "   Component Value Date    HGBA1C 5.7 (H) 04/02/2025     Lab Results   Component Value Date    CALCIUM 9.0 04/18/2025    PHOS 2.8 04/14/2025     Glucose   Date Value Ref Range Status   03/21/2025 150 (H) 70 - 110 mg/dL Final       Pertinent Medications Reviewed: reviewed  Pertinent Medications Comments: aspirin, heparin, pantoprazole, senna  Scheduled Meds:   atorvastatin  80 mg Oral QHS    capsaicin   Topical (Top) BID    clopidogreL  75 mg Oral Daily    epoetin [id number]  10,000 Units Intravenous Every Mon, Wed, Fri    erythromycin   Both Eyes Q8H    insulin glargine U-100  5 Units Subcutaneous Daily    lorazepam  1 mg Intravenous Once    midodrine  15 mg Oral Q8H    pantoprazole  40 mg Oral Daily    QUEtiapine  25 mg Oral QHS    sevelamer carbonate  800 mg Oral TID WM    sodium chloride 0.9%  10 mL Intravenous Q8H    tamsulosin  0.4 mg Oral Daily    vancomycin (VANCOCIN) IV (PEDS and ADULTS)  500 mg Intravenous Once     Continuous Infusions:  PRN Meds:.  Current Facility-Administered Medications:     0.9%  NaCl infusion (for blood administration), , Intravenous, Q24H PRN    0.9% NaCl, , Intravenous, PRN    acetaminophen, 650 mg, Oral, Q4H PRN    albumin human 25%, 25 g, Intravenous, Daily PRN    dextrose 50%, 12.5 g, Intravenous, PRN    dextrose 50%, 25 g, Intravenous, PRN    glucagon (human recombinant), 1 mg, Intramuscular, PRN    glucose, 16 g, Oral, PRN    glucose, 24 g, Oral, PRN    haloperidol lactate, 2 mg, Intravenous, Q4H PRN    heparin (porcine), 1,000 Units, Intravenous, PRN    hydrOXYzine HCL, 25 mg, Oral, TID PRN    hyoscyamine, 0.125 mg, Sublingual, Q4H PRN    insulin aspart U-100, 0-5 Units, Subcutaneous, QID (AC + HS) PRN    melatonin, 6 mg, Oral, Nightly PRN    naloxone, 0.02 mg, Intravenous, PRN    ondansetron, 4 mg, Intravenous, Q6H PRN    prochlorperazine, 5 mg, Intravenous, Q6H PRN    senna, 8.6 mg, Oral, Daily PRN    sodium chloride 0.9%, 250 mL, Intravenous, PRN    Pharmacy to dose  Vancomycin consult, , , Once **AND** vancomycin - pharmacy to dose, , Intravenous, pharmacy to manage frequency    Estimated/Assessed Needs  Weight Used For Calorie Calculations: 61.2 kg (134 lb 14.7 oz)  Energy Calorie Requirements (kcal): 1836 (30 kcal/kg)  Energy Need Method: Kcal/kg  Protein Requirements: 73g (1.2g/kg)  Weight Used For Protein Calculations: 61.2 kg (134 lb 14.7 oz)  Estimated Fluid Requirement Method: RDA Method  RDA Method (mL): 1836  CHO Requirement: 200g    Nutrition Prescription Ordered  Current Diet Order: IDDSI level 4: pureed, renal, consistent carbohydrate  Oral Nutrition Supplement: Novasource renal    Evaluation of Received Nutrient/Fluid Intake  % Kcal Needs: 25-50%  % Protein Needs: 25-50%  I/O: 4/18/25: -13.3L since admit  Energy Calories Required: not meeting needs  Protein Required: not meeting needs  Fluid Required: not meeting needs  Comments: LBM: 4/15/25  % Intake of Estimated Energy Needs: Other: 25-75%  % Meal Intake: Other: 25-75%    Nutrition Risk  Level of Risk/Frequency of Follow-up:  (2xweekly)     Monitor and Evaluation  Monitor and Evaluation: Energy intake     Nutrition Related Social Determinants of Health: SDOH: Adequate food in home environment     Nutrition Follow-Up  RD Follow-up?: Yes  Evon Beavers MS, RDN, LDN

## 2025-04-18 NOTE — ASSESSMENT & PLAN NOTE
Patient's blood pressure range in the last 24 hours was: BP  Min: 92/50  Max: 128/55.The patient's inpatient anti-hypertensive regimen is listed below:  Current Antihypertensives       Plan  - admitted with shock  - now off vasopressors. Continue to hold BP Rx as BP is well controlled without it

## 2025-04-18 NOTE — ASSESSMENT & PLAN NOTE
The likely etiology of thrombocytopenia is infection and sepsis. The patients 3 most recent labs are listed below.  Recent Labs     04/16/25  0314 04/17/25  0326 04/18/25  0353    214 254     Plan  - Will transfuse if platelet count is <10k.

## 2025-04-18 NOTE — ASSESSMENT & PLAN NOTE
Malnutrition Type:  Context: chronic illness  Level: moderate    Related to (etiology):   Increased energy needs    Signs and Symptoms (as evidenced by):   Estimated intake of food less than estimated needs     Malnutrition Characteristic Summary:  Weight Loss (Malnutrition): greater than 7.5% in 3 months  Energy Intake (Malnutrition): less than or equal to 75% for greater than or equal to 1 month      Interventions (treatment strategy):  1. Continue on texture modified diet per SLP recommendations 2. Continue with Commercial beverage medical food supplement therapy: Novasource renal  3. Monitor labs and weights    Nutrition Diagnosis Status:   New

## 2025-04-18 NOTE — PROGRESS NOTES
[facility] - Intensive Care  Nephrology  Progress Note    Patient Name: Jevon Rajan  MRN: [id number]  Admission Date: 4/3/2025  Hospital Length of Stay: 15 days  Attending Provider: Tena Palma MD   Primary Care Physician: Melia, Primary Doctor  Principal Problem:Septic shock    Subjective:     HPI: Mr. Rajan is an 86 yo male with HTN, T2DM, CAD, ESRD, and liver lesion who was transferred from [facility] for septic shock and impending AVG rupture. He initially presented to [facility] on 4/1 with temp 101.9 and BP 80/30s. He was transferred to our hospital on 4/3/25; it seems his AVG ruptured during transport/shortly after arrival. He emergently went to the OR yesterday with AVG extraction; infected hematoma was noted. Workup also concerning for elevated troponin and cardiac vegetations. He is no longer on pressors. Nephrology consulted for ESRD. Prior records obtained and reviewed. He receives HD TTS at [facility] under the c/o Dr. Colin. He last received HD outpatient on 4/1/25. HD was held at [facility] due to unstable vascular access. Family agreed to proceed with CRRT today.     Interval History: no acute events overnight, waxing and waning mental status. Lucid this morning and complaining of puritis  Review of patient's allergies indicates:   Allergen Reactions    Ace inhibitors Hives, Itching, Shortness Of Breath, Other (See Comments) and Rash     Is not sure which medication it was    Captopril      Current Facility-Administered Medications   Medication Frequency    0.9%  NaCl infusion (for blood administration) Q24H PRN    0.9% NaCl infusion PRN    acetaminophen tablet 650 mg Q4H PRN    albumin human 25% bottle 25 g Daily PRN    atorvastatin tablet 80 mg QHS    capsaicin 0.025 % cream BID    clopidogreL tablet 75 mg Daily    dextrose 50% injection 12.5 g PRN    dextrose 50% injection 25 g PRN    epoetin mj-epbx injection 10,000 Units Every Mon, Wed, Fri    erythromycin 5 mg/gram (0.5 %) ophthalmic ointment Q8H     glucagon (human recombinant) injection 1 mg PRN    glucose chewable tablet 16 g PRN    glucose chewable tablet 24 g PRN    haloperidol lactate injection 2 mg Q4H PRN    heparin (porcine) injection 1,000 Units PRN    hydrOXYzine HCL tablet 25 mg TID PRN    hyoscyamine SL tablet 0.125 mg Q4H PRN    insulin aspart U-100 pen 0-5 Units QID (AC + HS) PRN    insulin glargine U-100 (Lantus) pen 5 Units Daily    LORazepam injection 1 mg Once    melatonin tablet 6 mg Nightly PRN    midodrine tablet 15 mg Q8H    naloxone 0.4 mg/mL injection 0.02 mg PRN    ondansetron injection 4 mg Q6H PRN    pantoprazole EC tablet 40 mg Daily    prochlorperazine injection Soln 5 mg Q6H PRN    QUEtiapine tablet 25 mg QHS    senna tablet 8.6 mg Daily PRN    sevelamer carbonate tablet 800 mg TID WM    sodium chloride 0.9% bolus 250 mL 250 mL PRN    sodium chloride 0.9% flush 10 mL Q8H    tamsulosin 24 hr capsule 0.4 mg Daily    vancomycin (VANCOCIN) 500 mg in D5W 100 mL IVPB (MB+) Once    vancomycin - pharmacy to dose pharmacy to manage frequency       Objective:     Vital Signs (Most Recent):  Temp: 98.5 °F (36.9 °C) (04/18/25 0730)  Pulse: 92 (04/18/25 1000)  Resp: 15 (04/18/25 1000)  BP: 137/66 (04/18/25 1000)  SpO2: 95 % (04/18/25 1000) Vital Signs (24h Range):  Temp:  [97.8 °F (36.6 °C)-98.5 °F (36.9 °C)] 98.5 °F (36.9 °C)  Pulse:  [] 92  Resp:  [14-92] 15  SpO2:  [91 %-99 %] 95 %  BP: ()/(47-82) 137/66     Weight: 61.2 kg (134 lb 14.7 oz) (04/04/25 1937)  Body mass index is 21.13 kg/m².  Body surface area is 1.7 meters squared.    I/O last 3 completed shifts:  In: 90.3 [IV Piggyback:90.3]  Out: -      Physical Exam  Constitutional:       General: He is not in acute distress.     Appearance: He is not ill-appearing or toxic-appearing.   HENT:      Head: Normocephalic and atraumatic.      Nose: Nose normal. No congestion.      Mouth/Throat:      Mouth: Mucous membranes are moist.   Eyes:      Pupils: Pupils are equal, round, and  reactive to light.   Cardiovascular:      Rate and Rhythm: Tachycardia present.      Heart sounds: Murmur heard.      Comments: Right chest wall TDC  Pulmonary:      Effort: Pulmonary effort is normal.   Neurological:      Mental Status: He is alert.          Significant Labs:  All labs within the past 24 hours have been reviewed.     Significant Imaging:  Labs: Reviewed  Assessment/Plan:     Cardiac/Vascular  Acute bacterial endocarditis  Blood cultures from 4/8 cleared  ID following    Renal/  ESRD on hemodialysis  ESRD on HD     HD TTS, [facility]  Sp AVG resection 4/3    TDC right chest wall 4/14/2025  HD 4/14 2 hr 15 min completed before apneic episode, code blue activated though never lost pulse. Per notes improved with narcan and overnight attending suspicious for opiate induced apnea. Pain medications decreased  HD tolerated 4/16/2025 without issues    Plan/Recommendation  HD MWF as per usual schedule. HD today  -Keep MAP > 65  -Keep hemoglobin > 7  -Strict ins and outs  -Avoid nephrotoxic agents if possible and renally dose medications  -Avoid drastic hemodynamic changes if possible    #Secondary hyperparathyroidism  Calcium   Date Value Ref Range Status   04/18/2025 9.0 8.7 - 10.5 mg/dL Final     Phosphorus Level   Date Value Ref Range Status   04/14/2025 2.8 2.7 - 4.5 mg/dL Final     PTH, Intact   Date Value Ref Range Status   01/17/2024 117.7 (H) 9.0 - 77.0 pg/mL Final   Continue to monitor      Oncology  Anemia in ESRD (end-stage renal disease)  #Anemia  HGB   Date Value Ref Range Status   04/18/2025 8.6 (L) 14.0 - 18.0 gm/dL Final     Iron   Date Value Ref Range Status   08/11/2024 39 (L) 45 - 160 ug/dL Final     Transferrin   Date Value Ref Range Status   08/11/2024 139 (L) 200 - 375 mg/dL Final     TIBC   Date Value Ref Range Status   08/11/2024 206 (L) 250 - 450 ug/dL Final     Saturated Iron   Date Value Ref Range Status   08/11/2024 19 (L) 20 - 50 % Final     Ferritin   Date Value Ref Range  Status   08/11/2024 979 (H) 20.0 - 300.0 ng/mL Final     NAKIA ordered, hb not at goal  Iron on hold due to bacteremia. Follow up outpatient        Thank you for your consult. I will follow-up with patient. Please contact us if you have any additional questions.    Antoine Lauren MD  Nephrology  [facility] - Intensive Care

## 2025-04-18 NOTE — SUBJECTIVE & OBJECTIVE
Interval History: no acute events overnight, waxing and waning mental status. Lucid this morning and complaining of puritis  Review of patient's allergies indicates:   Allergen Reactions    Ace inhibitors Hives, Itching, Shortness Of Breath, Other (See Comments) and Rash     Is not sure which medication it was    Captopril      Current Facility-Administered Medications   Medication Frequency    0.9%  NaCl infusion (for blood administration) Q24H PRN    0.9% NaCl infusion PRN    acetaminophen tablet 650 mg Q4H PRN    albumin human 25% bottle 25 g Daily PRN    atorvastatin tablet 80 mg QHS    capsaicin 0.025 % cream BID    clopidogreL tablet 75 mg Daily    dextrose 50% injection 12.5 g PRN    dextrose 50% injection 25 g PRN    epoetin mj-epbx injection 10,000 Units Every Mon, Wed, Fri    erythromycin 5 mg/gram (0.5 %) ophthalmic ointment Q8H    glucagon (human recombinant) injection 1 mg PRN    glucose chewable tablet 16 g PRN    glucose chewable tablet 24 g PRN    haloperidol lactate injection 2 mg Q4H PRN    heparin (porcine) injection 1,000 Units PRN    hydrOXYzine HCL tablet 25 mg TID PRN    hyoscyamine SL tablet 0.125 mg Q4H PRN    insulin aspart U-100 pen 0-5 Units QID (AC + HS) PRN    insulin glargine U-100 (Lantus) pen 5 Units Daily    LORazepam injection 1 mg Once    melatonin tablet 6 mg Nightly PRN    midodrine tablet 15 mg Q8H    naloxone 0.4 mg/mL injection 0.02 mg PRN    ondansetron injection 4 mg Q6H PRN    pantoprazole EC tablet 40 mg Daily    prochlorperazine injection Soln 5 mg Q6H PRN    QUEtiapine tablet 25 mg QHS    senna tablet 8.6 mg Daily PRN    sevelamer carbonate tablet 800 mg TID WM    sodium chloride 0.9% bolus 250 mL 250 mL PRN    sodium chloride 0.9% flush 10 mL Q8H    tamsulosin 24 hr capsule 0.4 mg Daily    vancomycin (VANCOCIN) 500 mg in D5W 100 mL IVPB (MB+) Once    vancomycin - pharmacy to dose pharmacy to manage frequency       Objective:     Vital Signs (Most Recent):  Temp: 98.5  °F (36.9 °C) (04/18/25 0730)  Pulse: 92 (04/18/25 1000)  Resp: 15 (04/18/25 1000)  BP: 137/66 (04/18/25 1000)  SpO2: 95 % (04/18/25 1000) Vital Signs (24h Range):  Temp:  [97.8 °F (36.6 °C)-98.5 °F (36.9 °C)] 98.5 °F (36.9 °C)  Pulse:  [] 92  Resp:  [14-92] 15  SpO2:  [91 %-99 %] 95 %  BP: ()/(47-82) 137/66     Weight: 61.2 kg (134 lb 14.7 oz) (04/04/25 1937)  Body mass index is 21.13 kg/m².  Body surface area is 1.7 meters squared.    I/O last 3 completed shifts:  In: 90.3 [IV Piggyback:90.3]  Out: -      Physical Exam  Constitutional:       General: He is not in acute distress.     Appearance: He is not ill-appearing or toxic-appearing.   HENT:      Head: Normocephalic and atraumatic.      Nose: Nose normal. No congestion.      Mouth/Throat:      Mouth: Mucous membranes are moist.   Eyes:      Pupils: Pupils are equal, round, and reactive to light.   Cardiovascular:      Rate and Rhythm: Tachycardia present.      Heart sounds: Murmur heard.      Comments: Right chest wall TDC  Pulmonary:      Effort: Pulmonary effort is normal.   Neurological:      Mental Status: He is alert.          Significant Labs:  All labs within the past 24 hours have been reviewed.     Significant Imaging:  Labs: Reviewed

## 2025-04-18 NOTE — ASSESSMENT & PLAN NOTE
Patient complaining of severe intractable itching;[facility]  Start hydroxyzine  Ordered capsaicin cream  We will consider trial of gabapentin if Korsuva is not available and hemodynamic  and delirium allows  Will defer increased dialysis frequency; changing Kp/f ratios and other adjustments to nephrology

## 2025-04-18 NOTE — ASSESSMENT & PLAN NOTE
Patient with known CAD s/p stent placement, which is controlled Will continue ASA, Plavix, and Statin and monitor for S/Sx of angina/ACS. Continue to monitor on telemetry.   - last [facility] was 1/2025: Severely calcified mid RCA stenosis unable to cross with PTCA balloons, treated initially with 0.9 laser atherectomy, followed by CSI atherectomy system with total of 5 runs (3 at low speed, 2 at high speed), pre-dilated using 2.0 compliant and 3.5 noncompliant balloon followed by 3.5 X 16 mm megatron stent.  Focal plaque rupture/erosion noted in the proximal and ostial RCA that were treated with  3.5 X 28 in the proximal RCA, 4.0 X 16 mm in the ostial RCA.  No residual stenosis post intervention.  Patient had ALAN 3 flow at the end of the procedure  - with elevated troponin likely due to septic shock  Recent Labs   Lab 04/12/25  1216   TROPONINI 0.811*     - continue asa, plavix, statin  - Cardiology consulted  - no plans for ischemic evaluation at this time

## 2025-04-18 NOTE — PLAN OF CARE
Nutrition Plan of Care:    Recommendations     Recommendation:  1. Continue with texture modified diet per SLP recommendations  2. TF of Novasource Renal at 10ml/hr and advance as tolerated to goal rate of 40ml/hr which would provide 1920 kcal, 87g protein, & 688ml free water if adequate oral intake is not obtained  3. Monitor weight/labs.   4. RD to follow to monitor nutrition status.     Goals:  Patient to consume/receive >75% of EEN prior to RD follow up   Nutrition Goal Status: continues   Communication of RD Recs: reviewed with RN     Nutrition Discharge Planning   Nutrition Discharge Planning: Diet texture per SLP     Assessment and Plan  Nutrition Problem  Inadequate energy intake     Related to (etiology):   AMS, septic shock     Signs and Symptoms (as evidenced by):   Estimated energy intake less than estimated needs        Interventions:  Texture modified diet   Enteral Nutrition Management   Collaboration with other providers     Nutrition Diagnosis Status:   Continues      Malnutrition Type:  Context: chronic illness  Level: moderate     Related to (etiology):   Increased energy needs     Signs and Symptoms (as evidenced by):   Estimated intake of food less than estimated needs      Malnutrition Characteristic Summary:  Weight Loss (Malnutrition): greater than 7.5% in 3 months  Energy Intake (Malnutrition): less than or equal to 75% for greater than or equal to 1 month        Interventions (treatment strategy):  1. Continue on texture modified diet per SLP recommendations   2. Continue with Commercial beverage medical food supplement therapy: Novasource renal    3. Monitor labs and weights     Nutrition Diagnosis Status:   Stevie Beavers MS, RDN, LDN

## 2025-04-18 NOTE — PROGRESS NOTES
[facility] [place] Intensive Care  Urology  Progress Note    Patient Name: Jevon Rajan  MRN: [id number]  Admission Date: 4/3/2025  Hospital Length of Stay: 15 days  Code Status: DNR   Attending Provider: Tena Palma MD   Primary Care Physician: Melia, Primary Doctor    Subjective:     HPI:  Urinary Retention  Patient complains of urinary retention. Onset of retention was 1 day ago and was gradual in onset. Patient currently does not have a urinary catheter in place.  300 ml of urine were scanned on bladder scanner Prior to this event voiding symptoms consisted of slow stream. Prior treatments include none. Recent medications that may have affected his voiding include none.   He still makes urine, he tells me an almost normal amount.  He is very uncomfortable at the level of the bladder.  Nursing staff attempted coude catheter was then successfully.  He agrees to allow me to place a catheter.    Interval History: Sharma draining clear yellow.    Review of Systems  Objective:     Temp:  [97.8 °F (36.6 °C)-98.3 °F (36.8 °C)] 98.3 °F (36.8 °C)  Pulse:  [] 95  Resp:  [14-92] 15  SpO2:  [91 %-99 %] 98 %  BP: ()/(47-82) 114/55     Body mass index is 21.13 kg/m².      Bladder Scan Volume (mL): 331 mL (04/06/25 1520)    Drains       Drain  Duration                  Urethral Catheter 04/07/25 1558 Triple-lumen;Latex 24 Fr. 10 days         Hemodialysis Catheter 04/14/25 1011 right internal jugular 3 days                     Physical Exam  Constitutional:       General: He is not in acute distress.     Appearance: He is well-developed.   HENT:      Head: Normocephalic and atraumatic.      Mouth/Throat:      Mouth: Mucous membranes are moist.   Eyes:      Conjunctiva/sclera: Conjunctivae normal.   Pulmonary:      Effort: Pulmonary effort is normal. No respiratory distress.   Abdominal:      General: Abdomen is flat. There is no distension.      Tenderness: There is no right CVA tenderness or left CVA tenderness.    Genitourinary:     Comments: Dunaway clear yellow urine  Musculoskeletal:         General: No swelling or deformity.      Cervical back: Neck supple.   Skin:     Findings: No rash.   Neurological:      General: No focal deficit present.      Mental Status: He is alert.           Significant Labs:    BMP:  Recent Labs   Lab 04/16/25  0314 04/17/25  0326 04/18/25  0516    137 139   K 4.1 4.1 4.1    97 98   CO2 23 25 23   BUN 38* 24* 35*   CREATININE 7.9* 5.6* 8.2*   GLUCOSE 96 112* 60*   CALCIUM 8.7 8.9 9.0       CBC:   Recent Labs   Lab 04/16/25  0314 04/17/25  0326 04/18/25  0353   WBC 11.16 13.17* 13.07*   HGB 8.1* 9.0* 8.6*   HCT 26.8* 28.4* 26.7*    214 254       All pertinent labs results from the past 24 hours have been reviewed.    Significant Imaging:  All pertinent imaging results/findings from the past 24 hours have been reviewed.                  Assessment/Plan:     Gross hematuria  S/p Clot evacuation with fulguration  Resolved  Cap irrigation port of 3 way catheter  Levsin PRN for bladder discomfort            BPH with urinary obstruction  Catheter in place, requires cystoscopy to place dunaway. Keep in place while in ICU.   Consider voiding trial prior to discharge - early in the AM preferred in case patient will need to be brought back to the operating room for dunaway placement          VTE Risk Mitigation (From admission, onward)           Ordered     heparin (porcine) injection 1,000 Units  As needed (PRN)         04/05/25 2100     IP VTE HIGH RISK PATIENT  Once         04/03/25 1516     Place TEMO hose  Until discontinued         04/03/25 1516     Place sequential compression device  Until discontinued         04/03/25 1516     Reason for No Pharmacological VTE Prophylaxis  Once        Question:  Reasons:  Answer:  Physician Provided (leave comment)    04/03/25 1516                    Glenroy Porter MD  Urology  [facility] - Intensive Care

## 2025-04-18 NOTE — ASSESSMENT & PLAN NOTE
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 Most recent hemoglobin and hematocrit are listed below.  Recent Labs     04/16/25  0314 04/17/25  0326 04/18/25  0353   HGB 8.1* 9.0* 8.6*   HCT 26.8* 28.4* 26.7*     Plan  - Monitor serial CBC: Daily and recheck now  - Transfuse PRBC if patient becomes hemodynamically unstable, symptomatic or H/H drops below 7/21.  - Patient has not received any PRBC transfusions to date  - Patient's anemia is currently stable

## 2025-04-18 NOTE — PROGRESS NOTES
[facility] [place]  Nurses Note -- 4 Eyes      4/17/2025       Skin assessed on: Q Shift      [x] No Pressure Injuries Present    [x]Prevention Measures Documented    [] Yes LDA  for Pressure Injury Previously documented     [] Yes New Pressure Injury Discovered   [] LDA for New Pressure Injury Added      Attending RN:  Iron Dickinson RN     Second RN:  CHARLENE Paris

## 2025-04-18 NOTE — ASSESSMENT & PLAN NOTE
ESRD on HD     HD TTS, [facility]  Sp AVG resection 4/3    TDC right chest wall 4/14/2025  HD 4/14 2 hr 15 min completed before apneic episode, code blue activated though never lost pulse. Per notes improved with narcan and overnight attending suspicious for opiate induced apnea. Pain medications decreased  HD tolerated 4/16/2025 without issues    Plan/Recommendation  HD MWF as per usual schedule. HD today  -Keep MAP > 65  -Keep hemoglobin > 7  -Strict ins and outs  -Avoid nephrotoxic agents if possible and renally dose medications  -Avoid drastic hemodynamic changes if possible    #Secondary hyperparathyroidism  Calcium   Date Value Ref Range Status   04/18/2025 9.0 8.7 - 10.5 mg/dL Final     Phosphorus Level   Date Value Ref Range Status   04/14/2025 2.8 2.7 - 4.5 mg/dL Final     PTH, Intact   Date Value Ref Range Status   01/17/2024 117.7 (H) 9.0 - 77.0 pg/mL Final   Continue to monitor

## 2025-04-18 NOTE — EICU
Virtual ICU Quality Rounds    Admit Date: 4/3/2025  Hospital Day: 15    ICU Day: 14d 17h    24H Vital Sign Range:  Temp:  [97.8 °F (36.6 °C)-98.3 °F (36.8 °C)]   Pulse:  []   Resp:  [14-92]   BP: ()/(47-82)   SpO2:  [91 %-99 %]     VICU Surveillance Screening

## 2025-04-19 LAB
ABSOLUTE EOSINOPHIL (OHS): 0.77 K/UL
ABSOLUTE MONOCYTE (OHS): 1.17 K/UL (ref 0.3–1)
ABSOLUTE NEUTROPHIL COUNT (OHS): 8.5 K/UL (ref 1.8–7.7)
ALBUMIN SERPL BCP-MCNC: 2.8 G/DL (ref 3.5–5.2)
ALP SERPL-CCNC: 78 UNIT/L (ref 40–150)
ALT SERPL W/O P-5'-P-CCNC: 8 UNIT/L (ref 10–44)
ANION GAP (OHS): 15 MMOL/L (ref 8–16)
AST SERPL-CCNC: 29 UNIT/L (ref 11–45)
BASOPHILS # BLD AUTO: 0.05 K/UL
BASOPHILS NFR BLD AUTO: 0.4 %
BILIRUB SERPL-MCNC: 1 MG/DL (ref 0.1–1)
BUN SERPL-MCNC: 24 MG/DL (ref 8–23)
CALCIUM SERPL-MCNC: 9 MG/DL (ref 8.7–10.5)
CHLORIDE SERPL-SCNC: 98 MMOL/L (ref 95–110)
CO2 SERPL-SCNC: 22 MMOL/L (ref 23–29)
CREAT SERPL-MCNC: 6.3 MG/DL (ref 0.5–1.4)
ERYTHROCYTE [DISTWIDTH] IN BLOOD BY AUTOMATED COUNT: 16.2 % (ref 11.5–14.5)
GFR SERPLBLD CREATININE-BSD FMLA CKD-EPI: 8 ML/MIN/1.73/M2
GLUCOSE SERPL-MCNC: 67 MG/DL (ref 70–110)
HCT VFR BLD AUTO: 27.7 % (ref 40–54)
HGB BLD-MCNC: 8.5 GM/DL (ref 14–18)
IMM GRANULOCYTES # BLD AUTO: 0.06 K/UL (ref 0–0.04)
IMM GRANULOCYTES NFR BLD AUTO: 0.5 % (ref 0–0.5)
LYMPHOCYTES # BLD AUTO: 0.72 K/UL (ref 1–4.8)
MCH RBC QN AUTO: 28.5 PG (ref 27–31)
MCHC RBC AUTO-ENTMCNC: 30.7 G/DL (ref 32–36)
MCV RBC AUTO: 93 FL (ref 82–98)
NUCLEATED RBC (/100WBC) (OHS): 0 /100 WBC
PLATELET # BLD AUTO: 205 K/UL (ref 150–450)
PMV BLD AUTO: 11.2 FL (ref 9.2–12.9)
POCT GLUCOSE: 144 MG/DL (ref 70–110)
POCT GLUCOSE: 80 MG/DL (ref 70–110)
POCT GLUCOSE: 86 MG/DL (ref 70–110)
POTASSIUM SERPL-SCNC: 4 MMOL/L (ref 3.5–5.1)
PROT SERPL-MCNC: 7.6 GM/DL (ref 6–8.4)
RBC # BLD AUTO: 2.98 M/UL (ref 4.6–6.2)
RELATIVE EOSINOPHIL (OHS): 6.8 %
RELATIVE LYMPHOCYTE (OHS): 6.4 % (ref 18–48)
RELATIVE MONOCYTE (OHS): 10.4 % (ref 4–15)
RELATIVE NEUTROPHIL (OHS): 75.5 % (ref 38–73)
SODIUM SERPL-SCNC: 135 MMOL/L (ref 136–145)
WBC # BLD AUTO: 11.27 K/UL (ref 3.9–12.7)

## 2025-04-19 PROCEDURE — 25000003 PHARM REV CODE 250: Performed by: HOSPITALIST

## 2025-04-19 PROCEDURE — 25000003 PHARM REV CODE 250: Performed by: REGISTERED NURSE

## 2025-04-19 PROCEDURE — 85025 COMPLETE CBC W/AUTO DIFF WBC: CPT | Performed by: HOSPITALIST

## 2025-04-19 PROCEDURE — 36415 COLL VENOUS BLD VENIPUNCTURE: CPT | Performed by: HOSPITALIST

## 2025-04-19 PROCEDURE — 94761 N-INVAS EAR/PLS OXIMETRY MLT: CPT

## 2025-04-19 PROCEDURE — 25000003 PHARM REV CODE 250: Performed by: STUDENT IN AN ORGANIZED HEALTH CARE EDUCATION/TRAINING PROGRAM

## 2025-04-19 PROCEDURE — 25000003 PHARM REV CODE 250

## 2025-04-19 PROCEDURE — 12000002 HC ACUTE/MED SURGE SEMI-PRIVATE ROOM

## 2025-04-19 PROCEDURE — 80053 COMPREHEN METABOLIC PANEL: CPT | Performed by: HOSPITALIST

## 2025-04-19 RX ADMIN — ERYTHROMYCIN: 5 OINTMENT OPHTHALMIC at 05:04

## 2025-04-19 RX ADMIN — TAMSULOSIN HYDROCHLORIDE 0.4 MG: 0.4 CAPSULE ORAL at 09:04

## 2025-04-19 RX ADMIN — STANDARDIZED SENNA CONCENTRATE 8.6 MG: 8.6 TABLET ORAL at 05:04

## 2025-04-19 RX ADMIN — ATORVASTATIN CALCIUM 80 MG: 40 TABLET, FILM COATED ORAL at 08:04

## 2025-04-19 RX ADMIN — PANTOPRAZOLE SODIUM 40 MG: 40 TABLET, DELAYED RELEASE ORAL at 09:04

## 2025-04-19 RX ADMIN — MIDODRINE HYDROCHLORIDE 15 MG: 5 TABLET ORAL at 05:04

## 2025-04-19 RX ADMIN — CLOPIDOGREL BISULFATE 75 MG: 75 TABLET, FILM COATED ORAL at 09:04

## 2025-04-19 RX ADMIN — QUETIAPINE FUMARATE 25 MG: 25 TABLET ORAL at 08:04

## 2025-04-19 RX ADMIN — CAPSAICIN: 0.25 CREAM TOPICAL at 09:04

## 2025-04-19 RX ADMIN — INSULIN GLARGINE 5 UNITS: 100 INJECTION, SOLUTION SUBCUTANEOUS at 09:04

## 2025-04-19 RX ADMIN — HYDROXYZINE HYDROCHLORIDE 25 MG: 25 TABLET ORAL at 08:04

## 2025-04-19 RX ADMIN — MIDODRINE HYDROCHLORIDE 15 MG: 5 TABLET ORAL at 08:04

## 2025-04-19 NOTE — ASSESSMENT & PLAN NOTE
"Patient with known CAD s/p stent placement, which is controlled Will continue ASA, Plavix, and Statin and monitor for S/Sx of angina/ACS. Continue to monitor on telemetry.   - last [facility] was 1/2025: Severely calcified mid RCA stenosis unable to cross with PTCA balloons, treated initially with 0.9 laser atherectomy, followed by CSI atherectomy system with total of 5 runs (3 at low speed, 2 at high speed), pre-dilated using 2.0 compliant and 3.5 noncompliant balloon followed by 3.5 X 16 mm megatron stent.  Focal plaque rupture/erosion noted in the proximal and ostial RCA that were treated with  3.5 X 28 in the proximal RCA, 4.0 X 16 mm in the ostial RCA.  No residual stenosis post intervention.  Patient had ALAN 3 flow at the end of the procedure  - with elevated troponin likely due to septic shock  No results for input(s): "TROPONINI", "TROPONINIHS" in the last 168 hours.    - continue asa, plavix, statin  - Cardiology consulted  - no plans for ischemic evaluation at this time   "

## 2025-04-19 NOTE — CLINICAL REVIEW
Nephrology Chart Review      Intake/Output Summary (Last 24 hours) at 4/19/2025 0901  Last data filed at 4/19/2025 0835  Gross per 24 hour   Intake 718.32 ml   Output 2040 ml   Net -1321.68 ml       Vitals:    04/19/25 0400 04/19/25 0500 04/19/25 0700 04/19/25 0800   BP: (!) 105/57 102/76 125/64 130/63   BP Location:    Right arm   Patient Position:    Lying   Pulse: 93 86 91 90   Resp: 16 14 15 14   Temp: 98.3 °F (36.8 °C)      TempSrc:       SpO2: 96% 100% 99% 98%   Weight:       Height:           Recent Labs   Lab 04/14/25  1933 04/15/25  0350 04/17/25  0326 04/18/25  0516 04/19/25  0336      < > 137 139 135*   K 3.5   < > 4.1 4.1 4.0      < > 97 98 98   CO2 27   < > 25 23 22*   BUN 19   < > 24* 35* 24*   CREATININE 4.1*   < > 5.6* 8.2* 6.3*   GLUCOSE 111*   < > 112* 60* 67*   CALCIUM 8.0*   < > 8.9 9.0 9.0   PHOS 2.8  --   --   --   --     < > = values in this interval not displayed.           No other related issues identified. Please call Nephrology as needed; We will continue to follow.      Antoine Lauren MD  Nephrology [facility]

## 2025-04-19 NOTE — PROGRESS NOTES
Vancomycin consult follow-up:    Patient reviewed, renal function is trending down, no new levels, HD MWF, Random due 4/21/2025  at 0400.

## 2025-04-19 NOTE — NURSING
[facility] [place]  Nurses Note -- 4 Eyes      4/18/2025       Skin assessed on: Q Shift      [x] No Pressure Injuries Present    [x]Prevention Measures Documented    [] Yes LDA  for Pressure Injury Previously documented     [] Yes New Pressure Injury Discovered   [] LDA for New Pressure Injury Added      Attending RN:  Jhony Monroe RN     Second RN:  CHARLENE Nelson

## 2025-04-19 NOTE — EICU
Virtual ICU Quality Rounds    Admit Date: 4/3/2025  Hospital Day: 16    ICU Day: 15d 23h    24H Vital Sign Range:  Temp:  [98.2 °F (36.8 °C)-98.5 °F (36.9 °C)]   Pulse:  [86-99]   Resp:  [10-49]   BP: ()/(45-76)   SpO2:  [96 %-100 %]     VICU Surveillance Screening    Daily review of  line necessity(optional): Central line(s) in place and Urinary catheter in place    Central line type (optional): Dialysis (tunneled)    Central line indications : Dialysis/CRRT    Dunaway Indications : Ordered or placed by Urology service    LDA reconciliation : Yes        Central line best practices : Consider removal if patient has no central line indications for over 24hrs and Consider removal patient has a new fever and/or central line is over 7 days old     Dunaway best practices : Nurse driven dunaway removal protocol ordered, Prompt to consider removal if no urine output in past 24hrs or ESRD, Prompt alternatives(external catheters, in/ou cath, bladder scanning), Dunaway securement device in place with an intact seal visualized, and Sheet clips are used to prevent dependent loops, keep the bag below the bladder, and keep the bag off the ground

## 2025-04-19 NOTE — NURSING
Went into pt room to obtain blood glucose and administer ordered meds. Pt refusing med and blood glucose sample at this time. Pt dinner tray set up, but pt does not want to eat at this time.

## 2025-04-19 NOTE — SUBJECTIVE & OBJECTIVE
Interval History:  No acute event overnight.  Patient seen in bed this morning sleeping.  No new complaints      Review of Systems  Objective:     Vital Signs (Most Recent):  Temp: 98.4 °F (36.9 °C) (04/19/25 0800)  Pulse: 86 (04/19/25 1300)  Resp: 19 (04/19/25 1300)  BP: (!) 99/48 (04/19/25 1300)  SpO2: 99 % (04/19/25 1300) Vital Signs (24h Range):  Temp:  [98.2 °F (36.8 °C)-98.5 °F (36.9 °C)] 98.4 °F (36.9 °C)  Pulse:  [86-99] 86  Resp:  [10-24] 19  SpO2:  [96 %-100 %] 99 %  BP: ()/(45-76) 99/48     Weight: 61.2 kg (134 lb 14.7 oz)  Body mass index is 21.13 kg/m².    Intake/Output Summary (Last 24 hours) at 4/19/2025 1355  Last data filed at 4/19/2025 1013  Gross per 24 hour   Intake 673.32 ml   Output 2040 ml   Net -1366.68 ml         Physical Exam  Constitutional:       General: He is not in acute distress.     Appearance: He is ill-appearing. He is not toxic-appearing or diaphoretic.   Cardiovascular:      Rate and Rhythm: Normal rate and regular rhythm.      Pulses: Normal pulses.      Heart sounds: Normal heart sounds. No murmur heard.  Abdominal:      General: Abdomen is flat. Bowel sounds are normal.      Palpations: Abdomen is soft.   Neurological:      Mental Status: Mental status is at baseline.               Significant Labs: All pertinent labs within the past 24 hours have been reviewed.    Significant Imaging: I have reviewed all pertinent imaging results/findings within the past 24 hours.

## 2025-04-19 NOTE — ASSESSMENT & PLAN NOTE
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 Most recent hemoglobin and hematocrit are listed below.  Recent Labs     04/17/25  0326 04/18/25  0353 04/19/25  0336   HGB 9.0* 8.6* 8.5*   HCT 28.4* 26.7* 27.7*     Plan  - Monitor serial CBC: Daily and recheck now  - Transfuse PRBC if patient becomes hemodynamically unstable, symptomatic or H/H drops below 7/21.  - Patient has not received any PRBC transfusions to date  - Patient's anemia is currently stable

## 2025-04-19 NOTE — ASSESSMENT & PLAN NOTE
Nutrition consulted. Most recent weight and BMI monitored-     Measurements:  Wt Readings from Last 1 Encounters:   04/18/25 61.2 kg (134 lb 14.7 oz)   Body mass index is 21.13 kg/m².    Patient has been screened and assessed by RD.    Malnutrition Type:  Context: chronic illness  Level: moderate    Malnutrition Characteristic Summary:  Weight Loss (Malnutrition): greater than 7.5% in 3 months  Energy Intake (Malnutrition): less than or equal to 75% for greater than or equal to 1 month    Interventions/Recommendations (treatment strategy):  1. Continue on texture modified diet per SLP recommendations 2. Continue with Commercial beverage medical food supplement therapy: Novasource renal  3. Monitor labs and weights

## 2025-04-19 NOTE — ASSESSMENT & PLAN NOTE
Patient complaining of severe intractable itching;[facility]  Start hydroxyzine  Ordered capsaicin cream   Korsuva  not available; [facility]; can consider gabapentin  hemodynamic  and delirium allows  Will defer increased dialysis frequency; changing Kp/f ratios and other adjustments to nephrology

## 2025-04-19 NOTE — PLAN OF CARE
Problem: Diabetes Comorbidity  Goal: Blood Glucose Level Within Targeted Range  Outcome: Progressing     Problem: Sepsis/Septic Shock  Goal: Optimal Coping  Outcome: Progressing  Goal: Blood Glucose Level Within Targeted Range  Outcome: Progressing  Goal: Absence of Infection Signs and Symptoms  Outcome: Progressing     Problem: Wound  Goal: Optimal Coping  Outcome: Progressing  Goal: Optimal Pain Control and Function  Outcome: Progressing     Problem: Coping Ineffective  Goal: Effective Coping  Outcome: Progressing   No acute events to report. Pt had another good night, slept approx 8 hrs w/o prns.

## 2025-04-19 NOTE — EICU
Intervention Initiated From:  COR / HUMERAU    Jude intervened regarding:  Rounding (Video assessment)    VICU Night Rounds Checklist  24H Vital Sign Range:  Temp:  [98.2 °F (36.8 °C)-98.5 °F (36.9 °C)]   Pulse:  []   Resp:  []   BP: (100-151)/(50-69)   SpO2:  [93 %-100 %]     Video rounds and LDA reconciliation

## 2025-04-19 NOTE — ASSESSMENT & PLAN NOTE
Patient's blood pressure range in the last 24 hours was: BP  Min: 91/45  Max: 130/63.The patient's inpatient anti-hypertensive regimen is listed below:  Current Antihypertensives       Plan  - admitted with shock  - now off vasopressors. Continue to hold BP Rx as BP is well controlled without it

## 2025-04-19 NOTE — ASSESSMENT & PLAN NOTE
The likely etiology of thrombocytopenia is infection and sepsis. The patients 3 most recent labs are listed below.  Recent Labs     04/17/25  0326 04/18/25  0353 04/19/25  0336    254 205     Plan  - Will transfuse if platelet count is <10k.

## 2025-04-19 NOTE — PROGRESS NOTES
"[facility] Intensive Care  [facility] Medicine  Progress Note    Patient Name: Jevon Rajan  MRN: [id number]  Patient Class: IP- Inpatient   Admission Date: 4/3/2025  Length of Stay: 16 days  Attending Physician: Tena Palma MD  Primary Care Provider: Melia, Primary Doctor        Subjective     Principal Problem:Septic shock        HPI:  Mr Jevon Rajan is a 85 y.o. man with ESRD with LUE AVF, HFpEF last EF 50-55%, CAD, DM who was transferred to [facility] ICU for septic shock due to MRSA bacteremia.     He was admitted at [facility] 4/1-3/2025 with sepsis, worsening to septic shock, then identified source as MRSA. 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     Upon arrival to [facility], he is moaning in pain and his LUE AVF has active pulsatile bright red blood. He does respond to his name. He denies pain anywhere other than his LUE. He is on levophed at 0.05.     Overview/Hospital Course:  Mr Jevon Rajan is a 85 y.o. man with ESRD on HD with LUE AVF that has been bleeding, CHF, CAD s/p recent stenting 1/2025 who was admitted with MRSA bacteremia and bleeding from his LUE AVF. Continued vancomycin; weaned off levophed. Vascular surgery emergently consulted; on 4/3/25 they took him to OR and noted: "well incorporated proximal and central graft without evidence of infection, resected graft and covered proximal and central remnants with multiple layers. Mid graft removed in entirety and sent for culture. Ruptured pseudoaneurysm with infected hematoma, graft completely obliterated at mid segment." Surgical cultures with Staph. Suspect AVF or chronic scratching of pruritic skin is the source of his infection. TTE showed endocarditis: EF 40-45%, G1DD, RV systolic dysfunction, large 1.9x1.2cm fixed mass on the posterior mitral valve leaflet with moder to severe regurgitation, cannot rule out posterior " mitral valve leaflet perforation. Discussed with cardiac surgery; he is high mortality risk, and surgery is not advised. ID following. Nephrology consulted; trialysis was placed for HD. Persistently positive for MRSA in blood cultures. He has had urinary retention; Urology consulted, performed bedside cystoscopy on 4/6/25 with large prostate noted. Noted to have clot retention and went urgently to OR with Urology on 4/7/25; asa and DVT ppx held.     WBC normalized. S/p clot removal with Urology, 1U PRBC ordered this morning with Hb 6.1. Soft BPs, will add midodrine for HD to step down to floor. Glucoses high, will increase insulin. Attempted to s/d but BP too low, may still need CRRT rather. 4/8 cx clear so far. Increasing insulin again. Increasing midodrine to 15 mg TID. Hb 6.8, 1U PRBC ordered.     BP appears to be recovering a bit, perhaps will tolerate reg HD, will discuss w Neph- will run him on HD Saturday, if tolerates normal HD will remove central line and give 2 day holiday and place tunnel on Monday. We will give him HD before dc to facility on Tuesday if accepted by then.     WBC has normalized for the first time, now tolerating reg HD, and Blcx remain clear. Central line removed. NGT removed. Will step down.     Interval History:  No acute event overnight.  Patient seen in bed this morning sleeping.  No new complaints      Review of Systems  Objective:     Vital Signs (Most Recent):  Temp: 98.4 °F (36.9 °C) (04/19/25 0800)  Pulse: 86 (04/19/25 1300)  Resp: 19 (04/19/25 1300)  BP: (!) 99/48 (04/19/25 1300)  SpO2: 99 % (04/19/25 1300) Vital Signs (24h Range):  Temp:  [98.2 °F (36.8 °C)-98.5 °F (36.9 °C)] 98.4 °F (36.9 °C)  Pulse:  [86-99] 86  Resp:  [10-24] 19  SpO2:  [96 %-100 %] 99 %  BP: ()/(45-76) 99/48     Weight: 61.2 kg (134 lb 14.7 oz)  Body mass index is 21.13 kg/m².    Intake/Output Summary (Last 24 hours) at 4/19/2025 1355  Last data filed at 4/19/2025 1013  Gross per 24 hour   Intake  673.32 ml   Output 2040 ml   Net -1366.68 ml         Physical Exam  Constitutional:       General: He is not in acute distress.     Appearance: He is ill-appearing. He is not toxic-appearing or diaphoretic.   Cardiovascular:      Rate and Rhythm: Normal rate and regular rhythm.      Pulses: Normal pulses.      Heart sounds: Normal heart sounds. No murmur heard.  Abdominal:      General: Abdomen is flat. Bowel sounds are normal.      Palpations: Abdomen is soft.   Neurological:      Mental Status: Mental status is at baseline.               Significant Labs: All pertinent labs within the past 24 hours have been reviewed.    Significant Imaging: I have reviewed all pertinent imaging results/findings within the past 24 hours.      Assessment & Plan  Septic shock  This patient has shock. The type of shock is distributive due to sepsis. The patient had the following evidence of shock: persistent hypotension and altered mental status. 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 Shock is now resolved and vasopressors are off. WBC back to normal  HTN (hypertension)  Patient's blood pressure range in the last 24 hours was: BP  Min: 91/45  Max: 130/63.The patient's inpatient anti-hypertensive regimen is listed below:  Current Antihypertensives       Plan  - admitted with shock  - now off vasopressors. Continue to hold BP Rx as BP is well controlled without it   HLD (hyperlipidemia)  - continue statin  Type 2 diabetes mellitus with hyperglycemia, without long-term current use of insulin  A1c:   Lab Results   Component Value Date    HGBA1C 5.7 (H) 04/02/2025     Meds: lantus + SSI PRN to maintain goal  "140-180  Tube feeds  accuchecks, hypoglycemic protocol      Coronary artery disease involving native coronary artery of native heart without angina pectoris  Patient with known CAD s/p stent placement, which is controlled Will continue ASA, Plavix, and Statin and monitor for S/Sx of angina/ACS. Continue to monitor on telemetry.   - last [facility] was 1/2025: Severely calcified mid RCA stenosis unable to cross with PTCA balloons, treated initially with 0.9 laser atherectomy, followed by CSI atherectomy system with total of 5 runs (3 at low speed, 2 at high speed), pre-dilated using 2.0 compliant and 3.5 noncompliant balloon followed by 3.5 X 16 mm megatron stent.  Focal plaque rupture/erosion noted in the proximal and ostial RCA that were treated with  3.5 X 28 in the proximal RCA, 4.0 X 16 mm in the ostial RCA.  No residual stenosis post intervention.  Patient had ALAN 3 flow at the end of the procedure  - with elevated troponin likely due to septic shock  No results for input(s): "TROPONINI", "TROPONINIHS" in the last 168 hours.    - continue asa, plavix, statin  - Cardiology consulted  - no plans for ischemic evaluation at this time   ESRD on hemodialysis  - ESRD on HD via LUE AVF  - LUE AVF was actively bleeding on arrival on 4/3/25. Vascular surgery was consulted. He went emergently to the OR on 4/3/25: "Severely calcified mid RCA stenosis unable to cross with PTCA balloons, treated initially with 0.9 laser atherectomy, followed by CSI atherectomy system with total of 5 runs (3 at low speed, 2 at high speed), pre-dilated using 2.0 compliant and 3.5 noncompliant balloon followed by 3.5 X 16 mm megatron stent.  Focal plaque rupture/erosion noted in the proximal and ostial RCA that were treated with  3.5 X 28 in the proximal RCA, 4.0 X 16 mm in the ostial RCA.  No residual stenosis post intervention.  Patient had ALAN 3 flow at the end of the procedure"  - cultures from AVF= Staph   - wound care orders in place for " "surgical site  - Nephrology is consulted  - Rt IJ trialysis placed on 4/4/25 for CRRT;  - THDC placed on 04/14  Anemia in ESRD (end-stage renal disease)  Anemia is likely due to  ESRD, blood loss . Most recent hemoglobin and hematocrit are listed below.  Recent Labs     04/17/25  0326 04/18/25  0353 04/19/25  0336   HGB 9.0* 8.6* 8.5*   HCT 28.4* 26.7* 27.7*     Plan  - Monitor serial CBC: Daily and recheck now  - Transfuse PRBC if patient becomes hemodynamically unstable, symptomatic or H/H drops below 7/21.  - Patient has not received any PRBC transfusions to date  - Patient's anemia is currently stable  Acute metabolic encephalopathy with Hospital Delirum  -Noted episodes of intermittent agitation with tactile and visual hallucinations  -Continue correction of metabolic derangements  -Maintain Delirium precautions: Maintain regular sleep cycle. Early ambulation. Minimal interruptions overnight. Re-orient patient frequently. Maintain adequate bowel regimen, hydration and electrolyte replenishments  -aspiration precautions  -continue Seroquel 25 mg HS      ACP (advance care planning)  Advance Care Planning     Date: 04/04/2025  - palliative consulted   - Mr Escoto specifically told Dr Jordan to contact Kenia Smyth for all updates  - discussed code status with Kenia. She states she has spoken with Mr Escoto's sisters and they all want full code status.   -anticipated DC to SNF when medically stable enough to tolerated HD and possible nursing home placement         Acute bacterial endocarditis  - TTE 4/4/25: "1.9x1.2cm large fixed heterogeneous mass present on the posterior leaflet (new finding vs 9/2023 echo). There is moderate to severe regurgitation with an eccentric jet. Cannot exclude severe MR with possible PMVL perforation in association with large vegetation."  - this is in the setting of MRSA bacteremia  - ID is consulted  - repeat blood cultures until clear   - suspect source is skin/chronic " "scratching vs AVF infection- cultures from resected AVF with Staph   - discussed with Cardiac surgery Dr Castro 4/4/25- given age and comorbidities, he is very high risk of mortality with surgery. He recommends medical management at this point.  - on vancomycin      MRSA bacteremia  - suspect source:  Graft infection s/p exploratory surgery of LUE with graft resection 4/3  -graft holiday with TDC placed on 04/14  - cultures of AVF with Staph   - he has endocarditis  - ID consulted  - on vanc ;anticipating completing 6 weeks of IV vancomycin from clearance (EOC: 5/19/25)     NSTEMI (non-ST elevated myocardial infarction)  - see CAD    BPH with urinary obstruction  - noted 4/6/25 when patient became very agitated and screaming he couldn't urinate  - nursing unable to place dunaway/coude  - Urology consulted. Bedside cystoscopy on 4/6/25 noted clots, large prostate. Catheter was placed but was then removed due to pain  - 4/7 he is again agitated that he cannot urinate  - follow up with Urology   - tamsulosin ordered if he is awake enough to swallow     Thrombocytopenia  The likely etiology of thrombocytopenia is infection and sepsis. The patients 3 most recent labs are listed below.  Recent Labs     04/17/25  0326 04/18/25  0353 04/19/25  0336    254 205     Plan  - Will transfuse if platelet count is <10k.    AV fistula infection  - LUE AVF was actively bleeding on arrival on 4/3/25. Vascular surgery was consulted. He went emergently to the OR on 4/3/25: "Severely calcified mid RCA stenosis unable to cross with PTCA balloons, treated initially with 0.9 laser atherectomy, followed by CSI atherectomy system with total of 5 runs (3 at low speed, 2 at high speed), pre-dilated using 2.0 compliant and 3.5 noncompliant balloon followed by 3.5 X 16 mm megatron stent.  Focal plaque rupture/erosion noted in the proximal and ostial RCA that were treated with  3.5 X 28 in the proximal RCA, 4.0 X 16 mm in the ostial RCA.  No " "residual stenosis post intervention.  Patient had ALAN 3 flow at the end of the procedure"  - cultures from AVF= Staph   - wound care orders in place for surgical site  - trialysis currently in place for HD    Emphysema lung  - emphysema noted on CT  - no signs of COPD exacerbation  Pulmonary nodules  - CT 4/3/25 with few small pulmonary nodules  - will need outpatient follow up     Gross hematuria  S/p clot evacuation with a fulguration by Urology  Currently resolved  Continue cap irrigation of 3 way catheter  Continue Levsin for bladder discomfort  -Sharma to be removed when patient is transferred to the floor; we will defer removal to urology    NSVT (nonsustained ventricular tachycardia)      Uremic pruritus  Patient complaining of severe intractable itching;[facility]  Start hydroxyzine  Ordered capsaicin cream   Korsuva  not available; [facility]; can consider gabapentin  hemodynamic  and delirium allows  Will defer increased dialysis frequency; changing Kp/f ratios and other adjustments to nephrology      Moderate malnutrition  Nutrition consulted. Most recent weight and BMI monitored-     Measurements:  Wt Readings from Last 1 Encounters:   04/18/25 61.2 kg (134 lb 14.7 oz)   Body mass index is 21.13 kg/m².    Patient has been screened and assessed by RD.    Malnutrition Type:  Context: chronic illness  Level: moderate    Malnutrition Characteristic Summary:  Weight Loss (Malnutrition): greater than 7.5% in 3 months  Energy Intake (Malnutrition): less than or equal to 75% for greater than or equal to 1 month    Interventions/Recommendations (treatment strategy):  1. Continue on texture modified diet per SLP recommendations 2. Continue with Commercial beverage medical food supplement therapy: Novasource renal  3. Monitor labs and weights    VTE Risk Mitigation (From admission, onward)           Ordered     heparin (porcine) injection 1,000 Units  As needed (PRN)         " 04/05/25 2100     IP VTE HIGH RISK PATIENT  Once         04/03/25 1516     Place TEMO hose  Until discontinued         04/03/25 1516     Place sequential compression device  Until discontinued         04/03/25 1516     Reason for No Pharmacological VTE Prophylaxis  Once        Question:  Reasons:  Answer:  Physician Provided (leave comment)    04/03/25 1516                    Discharge Planning   BAYRON: 4/22/2025     Code Status: DNR   Medical Readiness for Discharge Date:   Discharge Plan A: Home Health (and continue OP HD)            Critical care time spent on the evaluation and treatment of severe organ dysfunction, review of pertinent labs and imaging studies, discussions with consulting providers and discussions with patient/family: more than 35 minutes.            Tena Palma MD  Department of Hospital Medicine   [facility] - Intensive Care

## 2025-04-19 NOTE — PLAN OF CARE
Pt slept majority of shift. Attempted multiple times to keep pt engaged and awake to prevent further delirium with altered sleep cycles, but pt remained falling asleep. Easily arousable, but not willing to stay awake and participate this shift. No distress.    Problem: Adult Inpatient Plan of Care  Goal: Plan of Care Review  Outcome: Progressing  Goal: Patient-Specific Goal (Individualized)  Outcome: Progressing  Goal: Absence of Hospital-Acquired Illness or Injury  Outcome: Progressing  Goal: Optimal Comfort and Wellbeing  Outcome: Progressing     Problem: Diabetes Comorbidity  Goal: Blood Glucose Level Within Targeted Range  Outcome: Progressing     Problem: Sepsis/Septic Shock  Goal: Optimal Coping  Outcome: Progressing  Goal: Blood Glucose Level Within Targeted Range  Outcome: Progressing  Goal: Absence of Infection Signs and Symptoms  Outcome: Progressing  Goal: Optimal Nutrition Intake  Outcome: Progressing     Problem: Infection  Goal: Absence of Infection Signs and Symptoms  Outcome: Progressing     Problem: Skin Injury Risk Increased  Goal: Skin Health and Integrity  Outcome: Progressing     Problem: Wound  Goal: Optimal Coping  Outcome: Progressing  Goal: Optimal Functional Ability  Outcome: Progressing  Goal: Absence of Infection Signs and Symptoms  Outcome: Progressing  Goal: Improved Oral Intake  Outcome: Progressing  Goal: Optimal Pain Control and Function  Outcome: Progressing  Goal: Skin Health and Integrity  Outcome: Progressing  Goal: Optimal Wound Healing  Outcome: Progressing     Problem: Coping Ineffective  Goal: Effective Coping  Outcome: Progressing

## 2025-04-20 LAB
ABSOLUTE EOSINOPHIL (OHS): 0.85 K/UL
ABSOLUTE MONOCYTE (OHS): 1.08 K/UL (ref 0.3–1)
ABSOLUTE NEUTROPHIL COUNT (OHS): 9 K/UL (ref 1.8–7.7)
ALBUMIN SERPL BCP-MCNC: 2.8 G/DL (ref 3.5–5.2)
ALP SERPL-CCNC: 78 UNIT/L (ref 40–150)
ALT SERPL W/O P-5'-P-CCNC: 9 UNIT/L (ref 10–44)
ANION GAP (OHS): 17 MMOL/L (ref 8–16)
AST SERPL-CCNC: 30 UNIT/L (ref 11–45)
BASOPHILS # BLD AUTO: 0.04 K/UL
BASOPHILS NFR BLD AUTO: 0.3 %
BILIRUB SERPL-MCNC: 1 MG/DL (ref 0.1–1)
BUN SERPL-MCNC: 39 MG/DL (ref 8–23)
CALCIUM SERPL-MCNC: 9 MG/DL (ref 8.7–10.5)
CHLORIDE SERPL-SCNC: 98 MMOL/L (ref 95–110)
CO2 SERPL-SCNC: 22 MMOL/L (ref 23–29)
CREAT SERPL-MCNC: 9.5 MG/DL (ref 0.5–1.4)
ERYTHROCYTE [DISTWIDTH] IN BLOOD BY AUTOMATED COUNT: 15.9 % (ref 11.5–14.5)
GFR SERPLBLD CREATININE-BSD FMLA CKD-EPI: 5 ML/MIN/1.73/M2
GLUCOSE SERPL-MCNC: 135 MG/DL (ref 70–110)
HCT VFR BLD AUTO: 27.4 % (ref 40–54)
HGB BLD-MCNC: 8.6 GM/DL (ref 14–18)
HOLD SPECIMEN: NORMAL
IMM GRANULOCYTES # BLD AUTO: 0.05 K/UL (ref 0–0.04)
IMM GRANULOCYTES NFR BLD AUTO: 0.4 % (ref 0–0.5)
LYMPHOCYTES # BLD AUTO: 0.76 K/UL (ref 1–4.8)
MCH RBC QN AUTO: 28.6 PG (ref 27–31)
MCHC RBC AUTO-ENTMCNC: 31.4 G/DL (ref 32–36)
MCV RBC AUTO: 91 FL (ref 82–98)
NUCLEATED RBC (/100WBC) (OHS): 0 /100 WBC
PLATELET # BLD AUTO: 170 K/UL (ref 150–450)
PMV BLD AUTO: 10.3 FL (ref 9.2–12.9)
POCT GLUCOSE: 103 MG/DL (ref 70–110)
POCT GLUCOSE: 79 MG/DL (ref 70–110)
POTASSIUM SERPL-SCNC: 4.4 MMOL/L (ref 3.5–5.1)
PROT SERPL-MCNC: 7.7 GM/DL (ref 6–8.4)
RBC # BLD AUTO: 3.01 M/UL (ref 4.6–6.2)
RELATIVE EOSINOPHIL (OHS): 7.2 %
RELATIVE LYMPHOCYTE (OHS): 6.5 % (ref 18–48)
RELATIVE MONOCYTE (OHS): 9.2 % (ref 4–15)
RELATIVE NEUTROPHIL (OHS): 76.4 % (ref 38–73)
SODIUM SERPL-SCNC: 137 MMOL/L (ref 136–145)
WBC # BLD AUTO: 11.78 K/UL (ref 3.9–12.7)

## 2025-04-20 PROCEDURE — 85025 COMPLETE CBC W/AUTO DIFF WBC: CPT | Performed by: HOSPITALIST

## 2025-04-20 PROCEDURE — 94761 N-INVAS EAR/PLS OXIMETRY MLT: CPT

## 2025-04-20 PROCEDURE — 25000003 PHARM REV CODE 250: Performed by: HOSPITALIST

## 2025-04-20 PROCEDURE — 80053 COMPREHEN METABOLIC PANEL: CPT | Performed by: HOSPITALIST

## 2025-04-20 PROCEDURE — 25000003 PHARM REV CODE 250

## 2025-04-20 PROCEDURE — 36415 COLL VENOUS BLD VENIPUNCTURE: CPT | Performed by: HOSPITALIST

## 2025-04-20 PROCEDURE — 21400001 HC TELEMETRY ROOM

## 2025-04-20 PROCEDURE — 99232 SBSQ HOSP IP/OBS MODERATE 35: CPT | Mod: ,,, | Performed by: PHYSICIAN ASSISTANT

## 2025-04-20 PROCEDURE — 25000003 PHARM REV CODE 250: Performed by: STUDENT IN AN ORGANIZED HEALTH CARE EDUCATION/TRAINING PROGRAM

## 2025-04-20 RX ADMIN — MIDODRINE HYDROCHLORIDE 15 MG: 5 TABLET ORAL at 05:04

## 2025-04-20 RX ADMIN — HYDROXYZINE HYDROCHLORIDE 25 MG: 25 TABLET ORAL at 03:04

## 2025-04-20 RX ADMIN — MIDODRINE HYDROCHLORIDE 15 MG: 5 TABLET ORAL at 12:04

## 2025-04-20 RX ADMIN — CLOPIDOGREL BISULFATE 75 MG: 75 TABLET, FILM COATED ORAL at 09:04

## 2025-04-20 RX ADMIN — CAPSAICIN: 0.25 CREAM TOPICAL at 08:04

## 2025-04-20 RX ADMIN — SEVELAMER CARBONATE 800 MG: 800 TABLET, FILM COATED ORAL at 12:04

## 2025-04-20 RX ADMIN — SEVELAMER CARBONATE 800 MG: 800 TABLET, FILM COATED ORAL at 09:04

## 2025-04-20 RX ADMIN — PANTOPRAZOLE SODIUM 40 MG: 40 TABLET, DELAYED RELEASE ORAL at 09:04

## 2025-04-20 RX ADMIN — Medication 6 MG: at 12:04

## 2025-04-20 RX ADMIN — CAPSAICIN: 0.25 CREAM TOPICAL at 12:04

## 2025-04-20 RX ADMIN — ATORVASTATIN CALCIUM 80 MG: 40 TABLET, FILM COATED ORAL at 08:04

## 2025-04-20 RX ADMIN — Medication 6 MG: at 08:04

## 2025-04-20 RX ADMIN — CAPSAICIN: 0.25 CREAM TOPICAL at 09:04

## 2025-04-20 RX ADMIN — TAMSULOSIN HYDROCHLORIDE 0.4 MG: 0.4 CAPSULE ORAL at 09:04

## 2025-04-20 RX ADMIN — MIDODRINE HYDROCHLORIDE 15 MG: 5 TABLET ORAL at 09:04

## 2025-04-20 RX ADMIN — INSULIN GLARGINE 5 UNITS: 100 INJECTION, SOLUTION SUBCUTANEOUS at 09:04

## 2025-04-20 RX ADMIN — HYDROXYZINE HYDROCHLORIDE 25 MG: 25 TABLET ORAL at 08:04

## 2025-04-20 RX ADMIN — QUETIAPINE FUMARATE 25 MG: 25 TABLET ORAL at 08:04

## 2025-04-20 RX ADMIN — SEVELAMER CARBONATE 800 MG: 800 TABLET, FILM COATED ORAL at 05:04

## 2025-04-20 NOTE — NURSING
[facility]  Nurses Note -- 4 Eyes      4/19/2025       Skin assessed on: Q Shift      [x] No Pressure Injuries Present    []Prevention Measures Documented    [] Yes LDA  for Pressure Injury Previously documented     [] Yes New Pressure Injury Discovered   [] LDA for New Pressure Injury Added      Attending RN:  Jhony Monroe RN     Second RN:  CHARLENE Sue

## 2025-04-20 NOTE — NURSING
Went to go take patient's before dinner blood sugar. Patient refused. Educated patient on the importance of tracking blood sugar. Patient still refused. Notified MD.

## 2025-04-20 NOTE — PROGRESS NOTES
"[facility] Medicine  Progress Note    Patient Name: Jevon Rajan  MRN: [id number]  Patient Class: IP- Inpatient   Admission Date: 4/3/2025  Length of Stay: 17 days  Attending Physician: Tena Palma MD  Primary Care Provider: Melia, Primary Doctor        Subjective     Principal Problem:Septic shock        HPI:  Mr Jevon Rajan is a 85 y.o. man with ESRD with LUE AVF, HFpEF last EF 50-55%, CAD, DM who was transferred to [facility] ICU for septic shock due to MRSA bacteremia.     He was admitted at [facility] 4/1-3/2025 with sepsis, worsening to septic shock, then identified source as MRSA. 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     Upon arrival to [facility], he is moaning in pain and his LUE AVF has active pulsatile bright red blood. He does respond to his name. He denies pain anywhere other than his LUE. He is on levophed at 0.05.     Overview/Hospital Course:  Mr Jevon Rajan is a 85 y.o. man with ESRD on HD with LUE AVF that has been bleeding, CHF, CAD s/p recent stenting 1/2025 who was admitted with MRSA bacteremia and bleeding from his LUE AVF. Continued vancomycin; weaned off levophed. Vascular surgery emergently consulted; on 4/3/25 they took him to OR and noted: "well incorporated proximal and central graft without evidence of infection, resected graft and covered proximal and central remnants with multiple layers. Mid graft removed in entirety and sent for culture. Ruptured pseudoaneurysm with infected hematoma, graft completely obliterated at mid segment." Surgical cultures with Staph. Suspect AVF or chronic scratching of pruritic skin is the source of his infection. TTE showed endocarditis: EF 40-45%, G1DD, RV systolic dysfunction, large 1.9x1.2cm fixed mass on the posterior mitral valve leaflet with moder to severe regurgitation, cannot rule out posterior mitral " valve leaflet perforation. Discussed with cardiac surgery; he is high mortality risk, and surgery is not advised. ID following. Nephrology consulted; trialysis was placed for HD. Persistently positive for MRSA in blood cultures. He has had urinary retention; Urology consulted, performed bedside cystoscopy on 4/6/25 with large prostate noted. Noted to have clot retention and went urgently to OR with Urology on 4/7/25; asa and DVT ppx held.     WBC normalized. S/p clot removal with Urology, 1U PRBC ordered this morning with Hb 6.1. Soft BPs, will add midodrine for HD to step down to floor. Glucoses high, will increase insulin. Attempted to s/d but BP too low, may still need CRRT rather. 4/8 cx clear so far. Increasing insulin again. Increasing midodrine to 15 mg TID. Hb 6.8, 1U PRBC ordered.     BP appears to be recovering a bit, perhaps will tolerate reg HD, will discuss w Neph- will run him on HD Saturday, if tolerates normal HD will remove central line and give 2 day holiday and place tunnel on Monday. We will give him HD before dc to facility on Tuesday if accepted by then.     WBC has normalized for the first time, now tolerating reg HD, and Blcx remain clear. Central line removed. NGT removed. Will step down.     Interval History:  No acute event overnight.  Patient intermittently refusing Accu-Cheks and medications.  Noted multiple episodes of hypoglycemia; patient counseled and re-oriented at bedside.     Review of Systems  Objective:     Vital Signs (Most Recent):  Temp: 97.6 °F (36.4 °C) (04/20/25 1106)  Pulse: 90 (04/20/25 1106)  Resp: 18 (04/20/25 1106)  BP: (!) 100/58 (04/20/25 1106)  SpO2: 96 % (04/20/25 1106) Vital Signs (24h Range):  Temp:  [97.6 °F (36.4 °C)-98.8 °F (37.1 °C)] 97.6 °F (36.4 °C)  Pulse:  [] 90  Resp:  [17-22] 18  SpO2:  [96 %-100 %] 96 %  BP: ()/(48-71) 100/58     Weight: 61.2 kg (134 lb 14.7 oz)  Body mass index is 21.13 kg/m².    Intake/Output Summary (Last 24 hours) at  4/20/2025 1127  Last data filed at 4/20/2025 0930  Gross per 24 hour   Intake 0 ml   Output 10 ml   Net -10 ml         Physical Exam  Cardiovascular:      Rate and Rhythm: Regular rhythm. Bradycardia present.      Pulses: Normal pulses.      Heart sounds: Normal heart sounds. No murmur heard.  Pulmonary:      Effort: Pulmonary effort is normal. No respiratory distress.      Breath sounds: Normal breath sounds. No stridor.   Abdominal:      General: Abdomen is flat. Bowel sounds are normal.      Palpations: Abdomen is soft.   Neurological:      Mental Status: Mental status is at baseline.               Significant Labs: All pertinent labs within the past 24 hours have been reviewed.    Significant Imaging: I have reviewed all pertinent imaging results/findings within the past 24 hours.      Assessment & Plan  Septic shock  This patient has shock. The type of shock is distributive due to sepsis. The patient had the following evidence of shock: persistent hypotension and altered mental status. 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 Shock is now resolved and vasopressors are off. WBC back to normal  HTN (hypertension)  Patient's blood pressure range in the last 24 hours was: BP  Min: 91/71  Max: 120/58.The patient's inpatient anti-hypertensive regimen is listed below:  Current Antihypertensives       Plan  - admitted with shock  - now off vasopressors. Continue to hold BP Rx as BP is well controlled without it   HLD (hyperlipidemia)  - continue statin  Type 2 diabetes mellitus with hyperglycemia, without long-term current use of insulin  A1c:   Lab Results   Component Value Date    HGBA1C  "5.7 (H) 04/02/2025     Meds: lantus + SSI PRN to maintain goal 140-180  Tube feeds  accuchecks, hypoglycemic protocol      Coronary artery disease involving native coronary artery of native heart without angina pectoris  Patient with known CAD s/p stent placement, which is controlled Will continue ASA, Plavix, and Statin and monitor for S/Sx of angina/ACS. Continue to monitor on telemetry.   - last [facility] was 1/2025: Severely calcified mid RCA stenosis unable to cross with PTCA balloons, treated initially with 0.9 laser atherectomy, followed by CSI atherectomy system with total of 5 runs (3 at low speed, 2 at high speed), pre-dilated using 2.0 compliant and 3.5 noncompliant balloon followed by 3.5 X 16 mm megatron stent.  Focal plaque rupture/erosion noted in the proximal and ostial RCA that were treated with  3.5 X 28 in the proximal RCA, 4.0 X 16 mm in the ostial RCA.  No residual stenosis post intervention.  Patient had ALAN 3 flow at the end of the procedure  - with elevated troponin likely due to septic shock  No results for input(s): "TROPONINI", "TROPONINIHS" in the last 168 hours.    - continue asa, plavix, statin  - Cardiology consulted  - no plans for ischemic evaluation at this time   ESRD on hemodialysis  - ESRD on HD via LUE AVF  - LUE AVF was actively bleeding on arrival on 4/3/25. Vascular surgery was consulted. He went emergently to the OR on 4/3/25: "Severely calcified mid RCA stenosis unable to cross with PTCA balloons, treated initially with 0.9 laser atherectomy, followed by CSI atherectomy system with total of 5 runs (3 at low speed, 2 at high speed), pre-dilated using 2.0 compliant and 3.5 noncompliant balloon followed by 3.5 X 16 mm megatron stent.  Focal plaque rupture/erosion noted in the proximal and ostial RCA that were treated with  3.5 X 28 in the proximal RCA, 4.0 X 16 mm in the ostial RCA.  No residual stenosis post intervention.  Patient had ALAN 3 flow at the end of the procedure"  - " "cultures from AVF= Staph   - wound care orders in place for surgical site  - Nephrology is consulted  - Rt IJ trialysis placed on 4/4/25 for CRRT;  - THDC placed on 04/14  Anemia in ESRD (end-stage renal disease)  Anemia is likely due to  ESRD, blood loss . Most recent hemoglobin and hematocrit are listed below.  Recent Labs     04/18/25  0353 04/19/25  0336 04/20/25  0847   HGB 8.6* 8.5* 8.6*   HCT 26.7* 27.7* 27.4*     Plan  - Monitor serial CBC: Daily and recheck now  - Transfuse PRBC if patient becomes hemodynamically unstable, symptomatic or H/H drops below 7/21.  - Patient has not received any PRBC transfusions to date  - Patient's anemia is currently stable  Acute metabolic encephalopathy with Hospital Delirum  -Noted episodes of intermittent agitation with tactile and visual hallucinations  -Continue correction of metabolic derangements  -Maintain Delirium precautions: Maintain regular sleep cycle. Early ambulation. Minimal interruptions overnight. Re-orient patient frequently. Maintain adequate bowel regimen, hydration and electrolyte replenishments  -aspiration precautions  -continue Seroquel 25 mg HS      ACP (advance care planning)  Advance Care Planning     Date: 04/04/2025  - palliative consulted   - Mr Escoto specifically told Dr Jordan to contact Kenia Smyth for all updates  - discussed code status with Kenia. She states she has spoken with Mr Escoto's sisters and they all want full code status.   -anticipated DC to SNF when medically stable enough to tolerated HD and possible nursing home placement         Acute bacterial endocarditis  - TTE 4/4/25: "1.9x1.2cm large fixed heterogeneous mass present on the posterior leaflet (new finding vs 9/2023 echo). There is moderate to severe regurgitation with an eccentric jet. Cannot exclude severe MR with possible PMVL perforation in association with large vegetation."  - this is in the setting of MRSA bacteremia  - ID is consulted  - repeat " "blood cultures until clear   - suspect source is skin/chronic scratching vs AVF infection- cultures from resected AVF with Staph   - discussed with Cardiac surgery Dr Castro 4/4/25- given age and comorbidities, he is very high risk of mortality with surgery. He recommends medical management at this point.  - on vancomycin      MRSA bacteremia  - suspect source:  Graft infection s/p exploratory surgery of LUE with graft resection 4/3  -graft holiday with TDC placed on 04/14  - cultures of AVF with Staph   - he has endocarditis  - ID consulted  - on vanc ;anticipating completing 6 weeks of IV vancomycin from clearance (EOC: 5/19/25)     NSTEMI (non-ST elevated myocardial infarction)  - see CAD    BPH with urinary obstruction  - noted 4/6/25 when patient became very agitated and screaming he couldn't urinate  - nursing unable to place dunaway/coude  - Urology consulted. Bedside cystoscopy on 4/6/25 noted clots, large prostate. Catheter was placed but was then removed due to pain  - 4/7 he is again agitated that he cannot urinate  - follow up with Urology   - tamsulosin ordered if he is awake enough to swallow     Thrombocytopenia  The likely etiology of thrombocytopenia is infection and sepsis. The patients 3 most recent labs are listed below.  Recent Labs     04/18/25  0353 04/19/25  0336 04/20/25  0847    205 170     Plan  - Will transfuse if platelet count is <10k.    AV fistula infection  - LUE AVF was actively bleeding on arrival on 4/3/25. Vascular surgery was consulted. He went emergently to the OR on 4/3/25: "Severely calcified mid RCA stenosis unable to cross with PTCA balloons, treated initially with 0.9 laser atherectomy, followed by CSI atherectomy system with total of 5 runs (3 at low speed, 2 at high speed), pre-dilated using 2.0 compliant and 3.5 noncompliant balloon followed by 3.5 X 16 mm megatron stent.  Focal plaque rupture/erosion noted in the proximal and ostial RCA that were treated with  3.5 " "X 28 in the proximal RCA, 4.0 X 16 mm in the ostial RCA.  No residual stenosis post intervention.  Patient had ALAN 3 flow at the end of the procedure"  - cultures from AVF= Staph   - wound care orders in place for surgical site  - trialysis currently in place for HD    Emphysema lung  - emphysema noted on CT  - no signs of COPD exacerbation  Pulmonary nodules  - CT 4/3/25 with few small pulmonary nodules  - will need outpatient follow up     Gross hematuria  S/p clot evacuation with a fulguration by Urology  Currently resolved  Continue cap irrigation of 3 way catheter  Continue Levsin for bladder discomfort  -Sharma to be removed when patient is transferred to the floor; we will defer removal to urology    NSVT (nonsustained ventricular tachycardia)      Uremic pruritus  Patient complaining of severe intractable itching;[facility]  Start hydroxyzine  Ordered capsaicin cream   Korsuva  not available; [facility]; can consider gabapentin  hemodynamic  and delirium allows  Will defer increased dialysis frequency; changing Kp/f ratios and other adjustments to nephrology      Moderate malnutrition  Nutrition consulted. Most recent weight and BMI monitored-     Measurements:  Wt Readings from Last 1 Encounters:   04/18/25 61.2 kg (134 lb 14.7 oz)   Body mass index is 21.13 kg/m².    Patient has been screened and assessed by RD.    Malnutrition Type:  Context: chronic illness  Level: moderate    Malnutrition Characteristic Summary:  Weight Loss (Malnutrition): greater than 7.5% in 3 months  Energy Intake (Malnutrition): less than or equal to 75% for greater than or equal to 1 month    Interventions/Recommendations (treatment strategy):  1. Continue on texture modified diet per SLP recommendations 2. Continue with Commercial beverage medical food supplement therapy: Novasource renal  3. Monitor labs and weights    VTE Risk Mitigation (From admission, onward)           Ordered     " heparin (porcine) injection 1,000 Units  As needed (PRN)         04/05/25 2100     IP VTE HIGH RISK PATIENT  Once         04/03/25 1516     Place TEMO hose  Until discontinued         04/03/25 1516     Place sequential compression device  Until discontinued         04/03/25 1516     Reason for No Pharmacological VTE Prophylaxis  Once        Question:  Reasons:  Answer:  Physician Provided (leave comment)    04/03/25 1516                    Discharge Planning   BAYRON: 4/22/2025     Code Status: DNR   Medical Readiness for Discharge Date:   Discharge Plan A: Home Health (and continue OP HD)                        Tena Palma MD  Department of Hospital Medicine   [facility]

## 2025-04-20 NOTE — SUBJECTIVE & OBJECTIVE
Interval History:  No acute event overnight.  Patient intermittently refusing Accu-Cheks and medications.  Noted multiple episodes of hypoglycemia; patient counseled and re-oriented at bedside.     Review of Systems  Objective:     Vital Signs (Most Recent):  Temp: 97.6 °F (36.4 °C) (04/20/25 1106)  Pulse: 90 (04/20/25 1106)  Resp: 18 (04/20/25 1106)  BP: (!) 100/58 (04/20/25 1106)  SpO2: 96 % (04/20/25 1106) Vital Signs (24h Range):  Temp:  [97.6 °F (36.4 °C)-98.8 °F (37.1 °C)] 97.6 °F (36.4 °C)  Pulse:  [] 90  Resp:  [17-22] 18  SpO2:  [96 %-100 %] 96 %  BP: ()/(48-71) 100/58     Weight: 61.2 kg (134 lb 14.7 oz)  Body mass index is 21.13 kg/m².    Intake/Output Summary (Last 24 hours) at 4/20/2025 1127  Last data filed at 4/20/2025 0930  Gross per 24 hour   Intake 0 ml   Output 10 ml   Net -10 ml         Physical Exam  Cardiovascular:      Rate and Rhythm: Regular rhythm. Bradycardia present.      Pulses: Normal pulses.      Heart sounds: Normal heart sounds. No murmur heard.  Pulmonary:      Effort: Pulmonary effort is normal. No respiratory distress.      Breath sounds: Normal breath sounds. No stridor.   Abdominal:      General: Abdomen is flat. Bowel sounds are normal.      Palpations: Abdomen is soft.   Neurological:      Mental Status: Mental status is at baseline.               Significant Labs: All pertinent labs within the past 24 hours have been reviewed.    Significant Imaging: I have reviewed all pertinent imaging results/findings within the past 24 hours.

## 2025-04-20 NOTE — NURSING
[facility], [facility]  Nurses Note -- 4 Eyes      4/20/2025       Skin assessed on: Q Shift      [x] No Pressure Injuries Present    [x]Prevention Measures Documented    [] Yes LDA  for Pressure Injury Previously documented     [] Yes New Pressure Injury Discovered   [] LDA for New Pressure Injury Added      Attending RN:  Ele Perez LPN     Second RN:  PERRY Dill

## 2025-04-20 NOTE — ASSESSMENT & PLAN NOTE
The likely etiology of thrombocytopenia is infection and sepsis. The patients 3 most recent labs are listed below.  Recent Labs     04/18/25  0353 04/19/25  0336 04/20/25  0847    205 170     Plan  - Will transfuse if platelet count is <10k.

## 2025-04-20 NOTE — ASSESSMENT & PLAN NOTE
"Patient with known CAD s/p stent placement, which is controlled Will continue ASA, Plavix, and Statin and monitor for S/Sx of angina/ACS. Continue to monitor on telemetry.   - last [facility] was 1/2025: Severely calcified mid RCA stenosis unable to cross with PTCA balloons, treated initially with 0.9 laser atherectomy, followed by CSI atherectomy system with total of 5 runs (3 at low speed, 2 at high speed), pre-dilated using 2.0 compliant and 3.5 noncompliant balloon followed by 3.5 X 16 mm megatron stent.  Focal plaque rupture/erosion noted in the proximal and ostial RCA that were treated with  3.5 X 28 in the proximal RCA, 4.0 X 16 mm in the ostial RCA.  No residual stenosis post intervention.  Patient had ALAN 3 flow at the end of the procedure  - with elevated troponin likely due to septic shock  No results for input(s): "TROPONINI", "TROPONINIHS" in the last 168 hours.    - continue asa, plavix, statin  - Cardiology consulted  - no plans for ischemic evaluation at this time   "

## 2025-04-20 NOTE — PLAN OF CARE
Problem: Adult Inpatient Plan of Care  Goal: Plan of Care Review  Outcome: Progressing  Goal: Patient-Specific Goal (Individualized)  Outcome: Progressing  Goal: Absence of Hospital-Acquired Illness or Injury  Outcome: Progressing  Goal: Optimal Comfort and Wellbeing  Outcome: Progressing     Problem: Diabetes Comorbidity  Goal: Blood Glucose Level Within Targeted Range  Outcome: Progressing     Problem: Sepsis/Septic Shock  Goal: Optimal Coping  Outcome: Progressing  Goal: Blood Glucose Level Within Targeted Range  Outcome: Progressing  Goal: Absence of Infection Signs and Symptoms  Outcome: Progressing  Goal: Optimal Nutrition Intake  Outcome: Progressing     Problem: Infection  Goal: Absence of Infection Signs and Symptoms  Outcome: Progressing     Problem: Skin Injury Risk Increased  Goal: Skin Health and Integrity  Outcome: Progressing     Problem: Wound  Goal: Optimal Coping  Outcome: Progressing  Goal: Optimal Functional Ability  Outcome: Progressing  Goal: Absence of Infection Signs and Symptoms  Outcome: Progressing  Goal: Improved Oral Intake  Outcome: Progressing  Goal: Optimal Pain Control and Function  Outcome: Progressing  Goal: Skin Health and Integrity  Outcome: Progressing  Goal: Optimal Wound Healing  Outcome: Progressing     Problem: Coping Ineffective  Goal: Effective Coping  Outcome: Progressing     Problem: Urinary Elimination Management  Goal: Effective Urinary Elimination/Continence  Outcome: Progressing     Problem: Urinary Retention  Goal: Effective Urinary Elimination  Outcome: Progressing

## 2025-04-20 NOTE — ASSESSMENT & PLAN NOTE
Patient's blood pressure range in the last 24 hours was: BP  Min: 91/71  Max: 120/58.The patient's inpatient anti-hypertensive regimen is listed below:  Current Antihypertensives       Plan  - admitted with shock  - now off vasopressors. Continue to hold BP Rx as BP is well controlled without it

## 2025-04-20 NOTE — NURSING
Pt transferred to room 407 in [facility]. Report given to PERRY Dill. Harshil made aware of transfer to [place].

## 2025-04-20 NOTE — NURSING
Went to go take patient's before breakfast blood sugar. Patient refused. Educated patient on the importance of tracking blood sugar. Patient still refused. Notified MD.

## 2025-04-20 NOTE — ASSESSMENT & PLAN NOTE
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 Most recent hemoglobin and hematocrit are listed below.  Recent Labs     04/18/25  0353 04/19/25  0336 04/20/25  0847   HGB 8.6* 8.5* 8.6*   HCT 26.7* 27.7* 27.4*     Plan  - Monitor serial CBC: Daily and recheck now  - Transfuse PRBC if patient becomes hemodynamically unstable, symptomatic or H/H drops below 7/21.  - Patient has not received any PRBC transfusions to date  - Patient's anemia is currently stable

## 2025-04-20 NOTE — NURSING
Administered morning meds to patient. Patient tolerated well with no issues. Went to go take patient's before breakfast blood sugar. Patient refused. Educated patient on the importance of tracking blood sugar. Patient still refused. Notified MD. Patient complained of itching. Gave PRN hydroxyzine. When reassessed, patient stated full relief was obtained. Went to go take patient's before dinner blood sugar. Patient refused. Educated patient on the importance of tracking blood sugar. Patient still refused. Notified MD. Went to try to take patient's before dinner blood sugar again and patient allowed nurse to take it. Patient AAOx1 and is disoriented to place, time, and situation. Patient did not complain of any pain throughout shift. Patient not showing signs of distress. Bed in lowest position, wheels locked, call bell in reach, side rails up x2.

## 2025-04-20 NOTE — NURSING
[facility] [place] -- 4 Eyes      4/20/2025       Skin assessed on: Q Shift      [x] No Pressure Injuries Present    [x]Prevention Measures Documented    [] Yes LDA  for Pressure Injury Previously documented     [] Yes New Pressure Injury Discovered   [] LDA for New Pressure Injury Added      Attending RN:  Fuentes Hernandez LPN     Second RN:  Jhony CHANG

## 2025-04-21 LAB
ABSOLUTE EOSINOPHIL (OHS): 1.03 K/UL
ABSOLUTE MONOCYTE (OHS): 1.19 K/UL (ref 0.3–1)
ABSOLUTE NEUTROPHIL COUNT (OHS): 7.67 K/UL (ref 1.8–7.7)
ALBUMIN SERPL BCP-MCNC: 2.9 G/DL (ref 3.5–5.2)
ALP SERPL-CCNC: 78 UNIT/L (ref 40–150)
ALT SERPL W/O P-5'-P-CCNC: <5 UNIT/L (ref 10–44)
ANION GAP (OHS): 18 MMOL/L (ref 8–16)
AST SERPL-CCNC: 35 UNIT/L (ref 11–45)
BASOPHILS # BLD AUTO: 0.04 K/UL
BASOPHILS NFR BLD AUTO: 0.4 %
BILIRUB SERPL-MCNC: 1.1 MG/DL (ref 0.1–1)
BUN SERPL-MCNC: 48 MG/DL (ref 8–23)
CALCIUM SERPL-MCNC: 9.6 MG/DL (ref 8.7–10.5)
CHLORIDE SERPL-SCNC: 98 MMOL/L (ref 95–110)
CO2 SERPL-SCNC: 20 MMOL/L (ref 23–29)
CREAT SERPL-MCNC: 11 MG/DL (ref 0.5–1.4)
ERYTHROCYTE [DISTWIDTH] IN BLOOD BY AUTOMATED COUNT: 16 % (ref 11.5–14.5)
GFR SERPLBLD CREATININE-BSD FMLA CKD-EPI: 4 ML/MIN/1.73/M2
GLUCOSE SERPL-MCNC: 62 MG/DL (ref 70–110)
HCT VFR BLD AUTO: 29.9 % (ref 40–54)
HGB BLD-MCNC: 9.5 GM/DL (ref 14–18)
IMM GRANULOCYTES # BLD AUTO: 0.06 K/UL (ref 0–0.04)
IMM GRANULOCYTES NFR BLD AUTO: 0.5 % (ref 0–0.5)
LYMPHOCYTES # BLD AUTO: 0.97 K/UL (ref 1–4.8)
MCH RBC QN AUTO: 28.7 PG (ref 27–31)
MCHC RBC AUTO-ENTMCNC: 31.8 G/DL (ref 32–36)
MCV RBC AUTO: 90 FL (ref 82–98)
NUCLEATED RBC (/100WBC) (OHS): 0 /100 WBC
PHOSPHATE SERPL-MCNC: 8.4 MG/DL (ref 2.7–4.5)
PLATELET # BLD AUTO: 137 K/UL (ref 150–450)
PMV BLD AUTO: 11.5 FL (ref 9.2–12.9)
POCT GLUCOSE: 119 MG/DL (ref 70–110)
POCT GLUCOSE: 64 MG/DL (ref 70–110)
POCT GLUCOSE: 67 MG/DL (ref 70–110)
POCT GLUCOSE: 76 MG/DL (ref 70–110)
POCT GLUCOSE: 77 MG/DL (ref 70–110)
POTASSIUM SERPL-SCNC: 4.7 MMOL/L (ref 3.5–5.1)
PROT SERPL-MCNC: 7.9 GM/DL (ref 6–8.4)
RBC # BLD AUTO: 3.31 M/UL (ref 4.6–6.2)
RELATIVE EOSINOPHIL (OHS): 9.4 %
RELATIVE LYMPHOCYTE (OHS): 8.9 % (ref 18–48)
RELATIVE MONOCYTE (OHS): 10.9 % (ref 4–15)
RELATIVE NEUTROPHIL (OHS): 69.9 % (ref 38–73)
SODIUM SERPL-SCNC: 136 MMOL/L (ref 136–145)
VANCOMYCIN SERPL-MCNC: 20.3 UG/ML (ref ?–80)
WBC # BLD AUTO: 10.96 K/UL (ref 3.9–12.7)

## 2025-04-21 PROCEDURE — 63600175 PHARM REV CODE 636 W HCPCS: Mod: JZ,TB | Performed by: STUDENT IN AN ORGANIZED HEALTH CARE EDUCATION/TRAINING PROGRAM

## 2025-04-21 PROCEDURE — 80202 ASSAY OF VANCOMYCIN: CPT

## 2025-04-21 PROCEDURE — 25000003 PHARM REV CODE 250: Performed by: STUDENT IN AN ORGANIZED HEALTH CARE EDUCATION/TRAINING PROGRAM

## 2025-04-21 PROCEDURE — 80100014 HC HEMODIALYSIS 1:1

## 2025-04-21 PROCEDURE — 80053 COMPREHEN METABOLIC PANEL: CPT | Performed by: HOSPITALIST

## 2025-04-21 PROCEDURE — 25000003 PHARM REV CODE 250

## 2025-04-21 PROCEDURE — 21400001 HC TELEMETRY ROOM

## 2025-04-21 PROCEDURE — 25000003 PHARM REV CODE 250: Performed by: INTERNAL MEDICINE

## 2025-04-21 PROCEDURE — 99232 SBSQ HOSP IP/OBS MODERATE 35: CPT | Mod: ,,, | Performed by: STUDENT IN AN ORGANIZED HEALTH CARE EDUCATION/TRAINING PROGRAM

## 2025-04-21 PROCEDURE — 25000003 PHARM REV CODE 250: Performed by: HOSPITALIST

## 2025-04-21 PROCEDURE — 99232 SBSQ HOSP IP/OBS MODERATE 35: CPT | Mod: ,,, | Performed by: INTERNAL MEDICINE

## 2025-04-21 PROCEDURE — 85025 COMPLETE CBC W/AUTO DIFF WBC: CPT | Performed by: HOSPITALIST

## 2025-04-21 PROCEDURE — 84100 ASSAY OF PHOSPHORUS: CPT | Performed by: PHYSICIAN ASSISTANT

## 2025-04-21 PROCEDURE — 99233 SBSQ HOSP IP/OBS HIGH 50: CPT | Mod: ,,, | Performed by: REGISTERED NURSE

## 2025-04-21 PROCEDURE — 36415 COLL VENOUS BLD VENIPUNCTURE: CPT | Performed by: HOSPITALIST

## 2025-04-21 RX ORDER — SEVELAMER CARBONATE 800 MG/1
1600 TABLET, FILM COATED ORAL
Status: DISCONTINUED | OUTPATIENT
Start: 2025-04-21 | End: 2025-04-29 | Stop reason: HOSPADM

## 2025-04-21 RX ADMIN — PANTOPRAZOLE SODIUM 40 MG: 40 TABLET, DELAYED RELEASE ORAL at 07:04

## 2025-04-21 RX ADMIN — TAMSULOSIN HYDROCHLORIDE 0.4 MG: 0.4 CAPSULE ORAL at 07:04

## 2025-04-21 RX ADMIN — MIDODRINE HYDROCHLORIDE 15 MG: 5 TABLET ORAL at 05:04

## 2025-04-21 RX ADMIN — HYDROXYZINE HYDROCHLORIDE 25 MG: 25 TABLET ORAL at 09:04

## 2025-04-21 RX ADMIN — QUETIAPINE FUMARATE 25 MG: 25 TABLET ORAL at 09:04

## 2025-04-21 RX ADMIN — SODIUM CHLORIDE 500 ML: 9 INJECTION, SOLUTION INTRAVENOUS at 03:04

## 2025-04-21 RX ADMIN — MIDODRINE HYDROCHLORIDE 15 MG: 5 TABLET ORAL at 09:04

## 2025-04-21 RX ADMIN — CLOPIDOGREL BISULFATE 75 MG: 75 TABLET, FILM COATED ORAL at 07:04

## 2025-04-21 RX ADMIN — CAPSAICIN: 0.25 CREAM TOPICAL at 09:04

## 2025-04-21 RX ADMIN — EPOETIN ALFA-EPBX 10000 UNITS: 10000 INJECTION, SOLUTION INTRAVENOUS; SUBCUTANEOUS at 01:04

## 2025-04-21 RX ADMIN — Medication 6 MG: at 09:04

## 2025-04-21 RX ADMIN — MIDODRINE HYDROCHLORIDE 15 MG: 5 TABLET ORAL at 03:04

## 2025-04-21 RX ADMIN — ATORVASTATIN CALCIUM 80 MG: 40 TABLET, FILM COATED ORAL at 09:04

## 2025-04-21 RX ADMIN — SEVELAMER CARBONATE 1600 MG: 800 TABLET, FILM COATED ORAL at 11:04

## 2025-04-21 RX ADMIN — SEVELAMER CARBONATE 800 MG: 800 TABLET, FILM COATED ORAL at 07:04

## 2025-04-21 NOTE — ASSESSMENT & PLAN NOTE
"- TTE 4/4/25: "1.9x1.2cm large fixed heterogeneous mass present on the posterior leaflet (new finding vs 9/2023 echo). There is moderate to severe regurgitation with an eccentric jet. Cannot exclude severe MR with possible PMVL perforation in association with large vegetation."  - this is in the setting of MRSA bacteremia  - ID is consulted  - repeat blood cultures until clear   - suspect source is skin/chronic scratching vs AVF infection- cultures from resected AVF with Staph   - discussed with Cardiac surgery Dr Castro 4/4/25- given age and comorbidities, he is very high risk of mortality with surgery. He recommends medical management at this point.  - on vancomycin      " glucose

## 2025-04-21 NOTE — NURSING
While in dialysis, patient became hypotensive. BP 84/36 HR 96. Notified Dr. Antunez. Gave 500mL bolus of NS. Gave 15mg of midodrine. Immediately after bolus, BP 87/48 . After 5 minutes, /55 HR 98. Dr. Antunez notified.

## 2025-04-21 NOTE — NURSING
Patient's before breakfast blood sugar was 67. Gave apple juice and patient's sugar went up to 76. Patient taken to dialysis via bed. While in dialysis, patient became hypotensive. BP 84/36 HR 96. Notified Dr. Antunez. Gave 500mL bolus of NS. Gave 15mg of midodrine. Immediately after bolus, BP 87/48 . After 5 minutes, /55 HR 98. Dr. Antunez notified. Chest xray ordered. Patient AAOx1 and is disoriented to place, time, and situation. Patient did not complain of any pain throughout shift. Patient not showing signs of distress. Bed in lowest position, wheels locked, call bell in reach, side rails up x3.

## 2025-04-21 NOTE — ASSESSMENT & PLAN NOTE
ESRD on HD     HD KEN, Bacilio Ashley  Sp AVG resection 4/3    TDC right chest wall 4/14/2025    Plan/Recommendation  HD MWF as per usual schedule. HD tomorrow  -Keep MAP > 65  -Keep hemoglobin > 7  -Strict ins and outs  -Avoid nephrotoxic agents if possible and renally dose medications  -Avoid drastic hemodynamic changes if possible    #Secondary hyperparathyroidism  Calcium   Date Value Ref Range Status   04/20/2025 9.0 8.7 - 10.5 mg/dL Final     Phosphorus Level   Date Value Ref Range Status   04/14/2025 2.8 2.7 - 4.5 mg/dL Final     PTH, Intact   Date Value Ref Range Status   01/17/2024 117.7 (H) 9.0 - 77.0 pg/mL Final   Continue to monitor

## 2025-04-21 NOTE — ASSESSMENT & PLAN NOTE
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 Most recent hemoglobin and hematocrit are listed below.  Recent Labs     04/19/25  0336 04/20/25  0847 04/21/25  0437   HGB 8.5* 8.6* 9.5*   HCT 27.7* 27.4* 29.9*     Plan  - Monitor serial CBC: Daily and recheck now  - Transfuse PRBC if patient becomes hemodynamically unstable, symptomatic or H/H drops below 7/21.  - Patient has not received any PRBC transfusions to date  - Patient's anemia is currently stable

## 2025-04-21 NOTE — PROGRESS NOTES
[facility] Surg  Nephrology  Progress Note    Patient Name: Jevon Rajan  MRN: [id number]  Admission Date: 4/3/2025  Hospital Length of Stay: 18 days  Attending Provider: Jc Antunez MD   Primary Care Physician: No, Primary Doctor  Principal Problem:Septic shock    Subjective:     HPI: Mr. Rajan is an 86 yo male with HTN, T2DM, CAD, ESRD, and liver lesion who was transferred from [facility] for septic shock and impending AVG rupture. He initially presented to [facility] on 4/1 with temp 101.9 and BP 80/30s. He was transferred to our hospital on 4/3/25; it seems his AVG ruptured during transport/shortly after arrival. He emergently went to the OR yesterday with AVG extraction; infected hematoma was noted. Workup also concerning for elevated troponin and cardiac vegetations. He is no longer on pressors. Nephrology consulted for ESRD. Prior records obtained and reviewed. He receives HD TTS at [facility] under the c/o Dr. Colin. He last received HD outpatient on 4/1/25. HD was held at [facility] due to unstable vascular access. Family agreed to proceed with CRRT today.     Interval History: no events overnight. Sitting on side of bed this morning. Asking for jeans and tshirt. Aware he won't be leaving hospital today; reports he needs his clothes so he can leave his hospital room for dialysis.     Review of patient's allergies indicates:   Allergen Reactions    Ace inhibitors Hives, Itching, Shortness Of Breath, Other (See Comments) and Rash     Is not sure which medication it was    Captopril      Current Facility-Administered Medications   Medication Frequency    0.9%  NaCl infusion (for blood administration) Q24H PRN    0.9% NaCl infusion PRN    acetaminophen tablet 650 mg Q4H PRN    albumin human 25% bottle 25 g Daily PRN    atorvastatin tablet 80 mg QHS    capsaicin 0.025 % cream BID    clopidogreL tablet 75 mg Daily    dextrose 50% injection 12.5 g PRN    dextrose 50% injection 25 g PRN    epoetin mj-epbx injection  10,000 Units Every Mon, Wed, Fri    glucagon (human recombinant) injection 1 mg PRN    glucose chewable tablet 16 g PRN    glucose chewable tablet 24 g PRN    haloperidol lactate injection 2 mg Q4H PRN    heparin (porcine) injection 1,000 Units PRN    hydrOXYzine HCL tablet 25 mg TID PRN    hyoscyamine SL tablet 0.125 mg Q4H PRN    insulin aspart U-100 pen 0-5 Units QID (AC + HS) PRN    melatonin tablet 6 mg Nightly PRN    midodrine tablet 15 mg Q8H    naloxone 0.4 mg/mL injection 0.02 mg PRN    ondansetron injection 4 mg Q6H PRN    pantoprazole EC tablet 40 mg Daily    prochlorperazine injection Soln 5 mg Q6H PRN    QUEtiapine tablet 25 mg QHS    senna tablet 8.6 mg Daily PRN    sevelamer carbonate tablet 1,600 mg TID WM    sodium chloride 0.9% bolus 250 mL 250 mL PRN    tamsulosin 24 hr capsule 0.4 mg Daily    vancomycin - pharmacy to dose pharmacy to manage frequency       Objective:     Vital Signs (Most Recent):  Temp: 97.6 °F (36.4 °C) (04/21/25 1106)  Pulse: 93 (04/21/25 1149)  Resp: 18 (04/21/25 1106)  BP: 125/70 (04/21/25 1106)  SpO2: 95 % (04/21/25 1106) Vital Signs (24h Range):  Temp:  [97.4 °F (36.3 °C)-98.1 °F (36.7 °C)] 97.6 °F (36.4 °C)  Pulse:  [] 93  Resp:  [17-18] 18  SpO2:  [95 %-99 %] 95 %  BP: ()/(54-70) 125/70     Weight: 61.2 kg (134 lb 14.7 oz) (04/18/25 1303)  Body mass index is 21.13 kg/m².  Body surface area is 1.7 meters squared.    No intake/output data recorded.     Physical Exam  Vitals and nursing note reviewed.   Constitutional:       General: He is awake.      Appearance: Normal appearance. He is well-developed.   HENT:      Head: Normocephalic and atraumatic.      Nose: Nose normal.      Mouth/Throat:      Mouth: Mucous membranes are moist.   Eyes:      Extraocular Movements: Extraocular movements intact.      Conjunctiva/sclera: Conjunctivae normal.   Cardiovascular:      Rate and Rhythm: Normal rate and regular rhythm.      Comments: TDC  Pulmonary:      Effort:  Pulmonary effort is normal.      Breath sounds: Normal breath sounds.   Abdominal:      General: There is no distension.      Palpations: Abdomen is soft.   Musculoskeletal:      Right lower leg: No edema.      Left lower leg: No edema.   Skin:     General: Skin is warm and dry.      Findings: Lesion (diffuse) present. No erythema or rash.   Neurological:      Mental Status: He is alert.   Psychiatric:         Mood and Affect: Mood normal.         Behavior: Behavior normal.          Significant Labs:  CBC:   Recent Labs   Lab 04/21/25  0437   WBC 10.96   RBC 3.31*   HGB 9.5*   HCT 29.9*   *   MCV 90   MCH 28.7   MCHC 31.8*     CMP:   Recent Labs   Lab 04/21/25  0437   CALCIUM 9.6   ALBUMIN 2.9*      K 4.7   CO2 20*   CL 98   BUN 48*   CREATININE 11.0*   ALKPHOS 78   ALT <5*   AST 35   BILITOT 1.1*     All labs within the past 24 hours have been reviewed.     Significant Imaging:  Labs: Reviewed    Assessment/Plan:     ESRD  - receives HD TTS at [facility]  - s/p AVG resection 4/3; received CRRT from 4/4-4/5  - [facility] TDC placed 4/14  - last received HD on Friday  - HD today but will need to get patient back on TTS schedule prior to discharge. Will f/u with SW/CM to see if his pending SNF will let him stay TTS or require a change to MWF    Secondary HPTH  - phos 8.4; increased sevelamer to 1600mg TID qAC  - renal diet    Anemia of CKD  - hgb 9.5; below goal  - NAKIA with HD    Hypotension  - appears stable on midodrine TID  - consider stopping tamsulosin if able       Thank you for your consult. I will follow-up with patient. Please contact us if you have any additional questions.    Юлия Davis MD  Nephrology  [facility] Surg

## 2025-04-21 NOTE — SUBJECTIVE & OBJECTIVE
Medications:  Continuous Infusions:  Scheduled Meds:   atorvastatin  80 mg Oral QHS    capsaicin   Topical (Top) BID    clopidogreL  75 mg Oral Daily    epoetin mj-epbx  10,000 Units Intravenous Every Mon, Wed, Fri    midodrine  15 mg Oral Q8H    pantoprazole  40 mg Oral Daily    QUEtiapine  25 mg Oral QHS    sevelamer carbonate  1,600 mg Oral TID WM    tamsulosin  0.4 mg Oral Daily     PRN Meds:  Current Facility-Administered Medications:     0.9%  NaCl infusion (for blood administration), , Intravenous, Q24H PRN    0.9% NaCl, , Intravenous, PRN    acetaminophen, 650 mg, Oral, Q4H PRN    albumin human 25%, 25 g, Intravenous, Daily PRN    dextrose 50%, 12.5 g, Intravenous, PRN    dextrose 50%, 25 g, Intravenous, PRN    glucagon (human recombinant), 1 mg, Intramuscular, PRN    glucose, 16 g, Oral, PRN    glucose, 24 g, Oral, PRN    haloperidol lactate, 2 mg, Intravenous, Q4H PRN    heparin (porcine), 1,000 Units, Intravenous, PRN    hydrOXYzine HCL, 25 mg, Oral, TID PRN    hyoscyamine, 0.125 mg, Sublingual, Q4H PRN    insulin aspart U-100, 0-5 Units, Subcutaneous, QID (AC + HS) PRN    melatonin, 6 mg, Oral, Nightly PRN    naloxone, 0.02 mg, Intravenous, PRN    ondansetron, 4 mg, Intravenous, Q6H PRN    prochlorperazine, 5 mg, Intravenous, Q6H PRN    senna, 8.6 mg, Oral, Daily PRN    sodium chloride 0.9%, 250 mL, Intravenous, PRN    Pharmacy to dose Vancomycin consult, , , Once **AND** vancomycin - pharmacy to dose, , Intravenous, pharmacy to manage frequency    Objective:     Vital Signs (Most Recent):  Temp: 97.6 °F (36.4 °C) (04/21/25 1106)  Pulse: (!) 127 (04/21/25 1505)  Resp: 18 (04/21/25 1106)  BP: 125/70 (04/21/25 1106)  SpO2: 95 % (04/21/25 1106) Vital Signs (24h Range):  Temp:  [97.4 °F (36.3 °C)-98.1 °F (36.7 °C)] 97.6 °F (36.4 °C)  Pulse:  [] 127  Resp:  [17-18] 18  SpO2:  [95 %-99 %] 95 %  BP: (105-125)/(55-70) 125/70     Weight: 61.2 kg (134 lb 14.7 oz)  Body mass index is 21.13 kg/m².      Physical Exam  Vitals and nursing note reviewed.   Constitutional:       General: He is awake.      Appearance: He is ill-appearing.   HENT:      Head: Normocephalic and atraumatic.   Pulmonary:      Effort: Pulmonary effort is normal. No respiratory distress.   Skin:     General: Skin is dry.      Findings: Bruising: multiple skin lesions in various stages of healing.   Neurological:      Mental Status: He is alert. He is disoriented.      Comments: Oriented to self, location of hospital, and family   But holding phone with disconnected call sound to ear    Not at baseline, but improved cooperativeness   Psychiatric:         Behavior: Behavior is agitated. Behavior is cooperative.         Cognition and Memory: Memory is impaired.          Advance Care Planning   Advance Directives:   Living Will: No    LaPOST: No    Do Not Resuscitate Status: No    Medical Power of : No (previously had shared with MDT that he wished niharsha Ndiaye to act as MPOA; now states niece Teodora; 4 siblings are legal surrogate decision makers otherwise and reccomended (see ACP))      Decision Making:  Family answered questions and Patient answered questions  Goals of Care: What is most important right now is to focus on symptom/pain control, curative/life-prolongation (regardless of treatment burdens), improvement in condition but with limits to invasive therapies. Accordingly, we have decided that the best plan to meet the patient's goals includes continuing with treatment.       Significant Labs: All pertinent labs within the past 24 hours have been reviewed.  CBC:   Recent Labs   Lab 04/21/25  0437   WBC 10.96   HGB 9.5*   HCT 29.9*   MCV 90   *     BMP:  Recent Labs   Lab 04/21/25  0437      K 4.7   CL 98   CO2 20*   BUN 48*   CREATININE 11.0*   CALCIUM 9.6     LFT:  Lab Results   Component Value Date    AST 35 04/21/2025    ALKPHOS 78 04/21/2025    BILITOT 1.1 (H) 04/21/2025     Albumin:   Albumin   Date Value  Ref Range Status   04/21/2025 2.9 (L) 3.5 - 5.2 g/dL Final   03/21/2025 3.2 (L) 3.5 - 5.2 g/dL Final     Protein:   Total Protein   Date Value Ref Range Status   03/21/2025 7.5 6.0 - 8.4 g/dL Final     Lactic acid:   Lab Results   Component Value Date    LACTATE 1.9 04/14/2025    LACTATE 0.9 04/03/2025     Significant Imaging: I have reviewed all pertinent imaging results/findings within the past 24 hours.

## 2025-04-21 NOTE — PROGRESS NOTES
[facility] Surg  Nephrology  Progress Note    Patient Name: Jevon Rajan  MRN: [id number]  Admission Date: 4/3/2025  Hospital Length of Stay: 18 days  Attending Provider: Tena Palma MD   Primary Care Physician: Melia, Primary Doctor  Principal Problem:Septic shock    Subjective:     HPI: Mr. Rajan is an 86 yo male with HTN, T2DM, CAD, ESRD, and liver lesion who was transferred from [facility] for septic shock and impending AVG rupture. He initially presented to [facility] on 4/1 with temp 101.9 and BP 80/30s. He was transferred to our hospital on 4/3/25; it seems his AVG ruptured during transport/shortly after arrival. He emergently went to the OR yesterday with AVG extraction; infected hematoma was noted. Workup also concerning for elevated troponin and cardiac vegetations. He is no longer on pressors. Nephrology consulted for ESRD. Prior records obtained and reviewed. He receives HD TTS at [facility] [place] under the c/o Dr. Colin. He last received HD outpatient on 4/1/25. HD was held at [facility] due to unstable vascular access. Family agreed to proceed with CRRT today.     Interval History: No events overnight. Normal appetite, no complaints.     Review of patient's allergies indicates:   Allergen Reactions    Ace inhibitors Hives, Itching, Shortness Of Breath, Other (See Comments) and Rash     Is not sure which medication it was    Captopril      Current Facility-Administered Medications   Medication Frequency    0.9%  NaCl infusion (for blood administration) Q24H PRN    0.9% NaCl infusion PRN    acetaminophen tablet 650 mg Q4H PRN    albumin human 25% bottle 25 g Daily PRN    atorvastatin tablet 80 mg QHS    capsaicin 0.025 % cream BID    clopidogreL tablet 75 mg Daily    dextrose 50% injection 12.5 g PRN    dextrose 50% injection 25 g PRN    epoetin mj-epbx injection 10,000 Units Every Mon, Wed, Fri    glucagon (human recombinant) injection 1 mg PRN    glucose chewable tablet 16 g PRN    glucose chewable tablet 24 g  PRN    haloperidol lactate injection 2 mg Q4H PRN    heparin (porcine) injection 1,000 Units PRN    hydrOXYzine HCL tablet 25 mg TID PRN    hyoscyamine SL tablet 0.125 mg Q4H PRN    insulin aspart U-100 pen 0-5 Units QID (AC + HS) PRN    insulin glargine U-100 (Lantus) pen 5 Units Daily    LORazepam injection 1 mg Once    melatonin tablet 6 mg Nightly PRN    midodrine tablet 15 mg Q8H    naloxone 0.4 mg/mL injection 0.02 mg PRN    ondansetron injection 4 mg Q6H PRN    pantoprazole EC tablet 40 mg Daily    prochlorperazine injection Soln 5 mg Q6H PRN    QUEtiapine tablet 25 mg QHS    senna tablet 8.6 mg Daily PRN    sevelamer carbonate tablet 800 mg TID WM    sodium chloride 0.9% bolus 250 mL 250 mL PRN    tamsulosin 24 hr capsule 0.4 mg Daily    vancomycin - pharmacy to dose pharmacy to manage frequency       Objective:     Vital Signs (Most Recent):  Temp: 97.7 °F (36.5 °C) (04/20/25 2332)  Pulse: 94 (04/20/25 2335)  Resp: 18 (04/20/25 2332)  BP: (!) 105/55 (04/20/25 2332)  SpO2: 99 % (04/20/25 2332) Vital Signs (24h Range):  Temp:  [97.6 °F (36.4 °C)-98.8 °F (37.1 °C)] 97.7 °F (36.5 °C)  Pulse:  [] 94  Resp:  [17-20] 18  SpO2:  [95 %-99 %] 99 %  BP: ()/(54-66) 105/55     Weight: 61.2 kg (134 lb 14.7 oz) (04/18/25 1303)  Body mass index is 21.13 kg/m².  Body surface area is 1.7 meters squared.    I/O last 3 completed shifts:  In: 173.3 [P.O.:75; IV Piggyback:98.3]  Out: 10 [Urine:10]     Physical Exam  Vitals and nursing note reviewed.   Constitutional:       Appearance: Normal appearance.   HENT:      Head: Normocephalic.      Nose: Nose normal.      Mouth/Throat:      Mouth: Mucous membranes are moist.   Eyes:      Extraocular Movements: Extraocular movements intact.      Conjunctiva/sclera: Conjunctivae normal.      Pupils: Pupils are equal, round, and reactive to light.   Cardiovascular:      Rate and Rhythm: Normal rate and regular rhythm.      Pulses: Normal pulses.   Pulmonary:      Effort:  Pulmonary effort is normal.      Breath sounds: Normal breath sounds.   Abdominal:      Palpations: Abdomen is soft.   Musculoskeletal:      Cervical back: Normal range of motion.   Neurological:      General: No focal deficit present.      Mental Status: He is alert.   Psychiatric:         Mood and Affect: Mood normal.          Significant Labs:  CBC:   Recent Labs   Lab 04/20/25  0847   WBC 11.78   RBC 3.01*   HGB 8.6*   HCT 27.4*      MCV 91   MCH 28.6   MCHC 31.4*     CMP:   Recent Labs   Lab 04/20/25  0847   CALCIUM 9.0   ALBUMIN 2.8*      K 4.4   CO2 22*   CL 98   BUN 39*   CREATININE 9.5*   ALKPHOS 78   ALT 9*   AST 30   BILITOT 1.0     All labs within the past 24 hours have been reviewed.     Significant Imaging:  Labs: Reviewed    Assessment/Plan:     Cardiac/Vascular  Acute bacterial endocarditis  Blood cultures from 4/8 cleared  ID following    Renal/  ESRD on hemodialysis  ESRD on HD     HD KEN, Bacilio Ashley  Sp AVG resection 4/3    TDC right chest wall 4/14/2025    Plan/Recommendation  HD MWF as per usual schedule. HD tomorrow  -Keep MAP > 65  -Keep hemoglobin > 7  -Strict ins and outs  -Avoid nephrotoxic agents if possible and renally dose medications  -Avoid drastic hemodynamic changes if possible    #Secondary hyperparathyroidism  Calcium   Date Value Ref Range Status   04/20/2025 9.0 8.7 - 10.5 mg/dL Final     Phosphorus Level   Date Value Ref Range Status   04/14/2025 2.8 2.7 - 4.5 mg/dL Final     PTH, Intact   Date Value Ref Range Status   01/17/2024 117.7 (H) 9.0 - 77.0 pg/mL Final   Continue to monitor      Oncology  Anemia in ESRD (end-stage renal disease)  #Anemia  HGB   Date Value Ref Range Status   04/20/2025 8.6 (L) 14.0 - 18.0 gm/dL Final     Iron   Date Value Ref Range Status   08/11/2024 39 (L) 45 - 160 ug/dL Final     Transferrin   Date Value Ref Range Status   08/11/2024 139 (L) 200 - 375 mg/dL Final     TIBC   Date Value Ref Range Status   08/11/2024 206 (L) 250 - 450  ug/dL Final     Saturated Iron   Date Value Ref Range Status   08/11/2024 19 (L) 20 - 50 % Final     Ferritin   Date Value Ref Range Status   08/11/2024 979 (H) 20.0 - 300.0 ng/mL Final     NAKIA ordered, hb not at goal  Iron on hold due to bacteremia. Follow up outpatient        Thank you for your consult. I will follow-up with patient. Please contact us if you have any additional questions.    BERENICE Watkins  Nephrology  [facility] - [place] - Med Surg

## 2025-04-21 NOTE — ASSESSMENT & PLAN NOTE
#Anemia  HGB   Date Value Ref Range Status   04/20/2025 8.6 (L) 14.0 - 18.0 gm/dL Final     Iron   Date Value Ref Range Status   08/11/2024 39 (L) 45 - 160 ug/dL Final     Transferrin   Date Value Ref Range Status   08/11/2024 139 (L) 200 - 375 mg/dL Final     TIBC   Date Value Ref Range Status   08/11/2024 206 (L) 250 - 450 ug/dL Final     Saturated Iron   Date Value Ref Range Status   08/11/2024 19 (L) 20 - 50 % Final     Ferritin   Date Value Ref Range Status   08/11/2024 979 (H) 20.0 - 300.0 ng/mL Final     NAKIA ordered, hb not at goal  Iron on hold due to bacteremia. Follow up outpatient

## 2025-04-21 NOTE — PROGRESS NOTES
"[facility] Medicine  Progress Note    Patient Name: Jevon Rajan  MRN: [id number]  Patient Class: IP- Inpatient   Admission Date: 4/3/2025  Length of Stay: 18 days  Attending Physician: Jc Antunez MD  Primary Care Provider: Melia, Primary Doctor        Subjective     Principal Problem:Septic shock        HPI:  Mr Jevon Rajan is a 85 y.o. man with ESRD with LUE AVF, HFpEF last EF 50-55%, CAD, DM who was transferred to [facility] ICU for septic shock due to MRSA bacteremia.     He was admitted at [facility] 4/1-3/2025 with sepsis, worsening to septic shock, then identified source as MRSA. 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     Upon arrival to [facility], he is moaning in pain and his LUE AVF has active pulsatile bright red blood. He does respond to his name. He denies pain anywhere other than his LUE. He is on levophed at 0.05.     Overview/Hospital Course:  Mr Jevon Rajan is a 85 y.o. man with ESRD on HD with LUE AVF that has been bleeding, CHF, CAD s/p recent stenting 1/2025 who was admitted with MRSA bacteremia and bleeding from his LUE AVF. Continued vancomycin; weaned off levophed. Vascular surgery emergently consulted; on 4/3/25 they took him to OR and noted: "well incorporated proximal and central graft without evidence of infection, resected graft and covered proximal and central remnants with multiple layers. Mid graft removed in entirety and sent for culture. Ruptured pseudoaneurysm with infected hematoma, graft completely obliterated at mid segment." Surgical cultures with Staph. Suspect AVF or chronic scratching of pruritic skin is the source of his infection. TTE showed endocarditis: EF 40-45%, G1DD, RV systolic dysfunction, large 1.9x1.2cm fixed mass on the posterior mitral valve leaflet with moder to severe regurgitation, cannot rule out posterior mitral " valve leaflet perforation. Discussed with cardiac surgery; he is high mortality risk, and surgery is not advised. ID following. Nephrology consulted; trialysis was placed for HD. Persistently positive for MRSA in blood cultures. He has had urinary retention; Urology consulted, performed bedside cystoscopy on 4/6/25 with large prostate noted. Noted to have clot retention and went urgently to OR with Urology on 4/7/25; asa and DVT ppx held.     WBC normalized. S/p clot removal with Urology, 1U PRBC ordered this morning with Hb 6.1. Soft BPs, will add midodrine for HD to step down to floor. Glucoses high, will increase insulin. Attempted to s/d but BP too low, may still need CRRT rather. 4/8 cx clear so far. Increasing insulin again. Increasing midodrine to 15 mg TID. Hb 6.8, 1U PRBC ordered.     BP appears to be recovering a bit, perhaps will tolerate reg HD, will discuss w Neph- will run him on HD Saturday, if tolerates normal HD will remove central line and give 2 day holiday and place tunnel on Monday. We will give him HD before dc to facility on Tuesday if accepted by then.     WBC has normalized for the first time, now tolerating reg HD, and Blcx remain clear. Central line removed. NGT removed. Will step down.     Interval History:  no acute issues, he is still pleasantly confused. Lantus stopped.     Review of Systems  Objective:     Vital Signs (Most Recent):  Temp: 97.6 °F (36.4 °C) (04/21/25 1106)  Pulse: (!) 127 (04/21/25 1505)  Resp: 18 (04/21/25 1106)  BP: 125/70 (04/21/25 1106)  SpO2: 95 % (04/21/25 1106) Vital Signs (24h Range):  Temp:  [97.4 °F (36.3 °C)-98.1 °F (36.7 °C)] 97.6 °F (36.4 °C)  Pulse:  [] 127  Resp:  [17-18] 18  SpO2:  [95 %-99 %] 95 %  BP: ()/(54-70) 125/70     Weight: 61.2 kg (134 lb 14.7 oz)  Body mass index is 21.13 kg/m².    Intake/Output Summary (Last 24 hours) at 4/21/2025 1507  Last data filed at 4/21/2025 0940  Gross per 24 hour   Intake 60 ml   Output 50 ml   Net 10  ml         Physical Exam  Vitals and nursing note reviewed.   Cardiovascular:      Rate and Rhythm: Regular rhythm. Bradycardia present.      Pulses: Normal pulses.      Heart sounds: Normal heart sounds. No murmur heard.  Pulmonary:      Effort: Pulmonary effort is normal. No respiratory distress.      Breath sounds: Normal breath sounds. No stridor.   Abdominal:      General: Abdomen is flat. Bowel sounds are normal.      Palpations: Abdomen is soft.   Neurological:      Mental Status: He is alert. Mental status is at baseline. He is disoriented.               Significant Labs: All pertinent labs within the past 24 hours have been reviewed.    Significant Imaging: I have reviewed all pertinent imaging results/findings within the past 24 hours.      Assessment & Plan  Septic shock  This patient has shock. The type of shock is distributive due to sepsis. The patient had the following evidence of shock: persistent hypotension and altered mental status. 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 Shock is now resolved and vasopressors are off. WBC back to normal  HTN (hypertension)  Patient's blood pressure range in the last 24 hours was: BP  Min: 97/54  Max: 125/70.The patient's inpatient anti-hypertensive regimen is listed below:  Current Antihypertensives       Plan  - admitted with shock  - now off vasopressors. Continue to hold BP Rx as BP is well controlled without it   HLD (hyperlipidemia)  - continue statin  Type 2 diabetes mellitus with hyperglycemia, without long-term current use of insulin  A1c:   Lab Results   Component Value Date    HGBA1C 5.7 (H) 04/02/2025     Meds: lantus + SSI  "PRN to maintain goal 140-180  Tube feeds  accuchecks, hypoglycemic protocol      Coronary artery disease involving native coronary artery of native heart without angina pectoris  Patient with known CAD s/p stent placement, which is controlled Will continue ASA, Plavix, and Statin and monitor for S/Sx of angina/ACS. Continue to monitor on telemetry.   - last [facility] was 1/2025: Severely calcified mid RCA stenosis unable to cross with PTCA balloons, treated initially with 0.9 laser atherectomy, followed by CSI atherectomy system with total of 5 runs (3 at low speed, 2 at high speed), pre-dilated using 2.0 compliant and 3.5 noncompliant balloon followed by 3.5 X 16 mm megatron stent.  Focal plaque rupture/erosion noted in the proximal and ostial RCA that were treated with  3.5 X 28 in the proximal RCA, 4.0 X 16 mm in the ostial RCA.  No residual stenosis post intervention.  Patient had ALAN 3 flow at the end of the procedure  - with elevated troponin likely due to septic shock  No results for input(s): "TROPONINI", "TROPONINIHS" in the last 168 hours.    - continue asa, plavix, statin  - Cardiology consulted  - no plans for ischemic evaluation at this time   ESRD on hemodialysis  - ESRD on HD via LUE AVF  - LUE AVF was actively bleeding on arrival on 4/3/25. Vascular surgery was consulted. He went emergently to the OR on 4/3/25: "Severely calcified mid RCA stenosis unable to cross with PTCA balloons, treated initially with 0.9 laser atherectomy, followed by CSI atherectomy system with total of 5 runs (3 at low speed, 2 at high speed), pre-dilated using 2.0 compliant and 3.5 noncompliant balloon followed by 3.5 X 16 mm megatron stent.  Focal plaque rupture/erosion noted in the proximal and ostial RCA that were treated with  3.5 X 28 in the proximal RCA, 4.0 X 16 mm in the ostial RCA.  No residual stenosis post intervention.  Patient had ALAN 3 flow at the end of the procedure"  - cultures from AVF= Staph   - wound care " "orders in place for surgical site  - Nephrology is consulted  - Rt IJ trialysis placed on 4/4/25 for CRRT;  - THDC placed on 04/14  Anemia in ESRD (end-stage renal disease)  Anemia is likely due to  ESRD, blood loss . Most recent hemoglobin and hematocrit are listed below.  Recent Labs     04/19/25  0336 04/20/25  0847 04/21/25  0437   HGB 8.5* 8.6* 9.5*   HCT 27.7* 27.4* 29.9*     Plan  - Monitor serial CBC: Daily and recheck now  - Transfuse PRBC if patient becomes hemodynamically unstable, symptomatic or H/H drops below 7/21.  - Patient has not received any PRBC transfusions to date  - Patient's anemia is currently stable  Acute metabolic encephalopathy with Hospital Delirum  -Noted episodes of intermittent agitation with tactile and visual hallucinations  -Continue correction of metabolic derangements  -Maintain Delirium precautions: Maintain regular sleep cycle. Early ambulation. Minimal interruptions overnight. Re-orient patient frequently. Maintain adequate bowel regimen, hydration and electrolyte replenishments  -aspiration precautions  -continue Seroquel 25 mg HS      ACP (advance care planning)  Advance Care Planning     Date: 04/04/2025  - palliative consulted   - Mr Escoto specifically told Dr Jordan to contact Kenia Smyth for all updates  - discussed code status with Kenia. She states she has spoken with Mr Escoto's sisters and they all want full code status.   -anticipated DC to SNF when medically stable enough to tolerated HD and possible nursing home placement         Acute bacterial endocarditis  - TTE 4/4/25: "1.9x1.2cm large fixed heterogeneous mass present on the posterior leaflet (new finding vs 9/2023 echo). There is moderate to severe regurgitation with an eccentric jet. Cannot exclude severe MR with possible PMVL perforation in association with large vegetation."  - this is in the setting of MRSA bacteremia  - ID is consulted  - repeat blood cultures until clear   - suspect " "source is skin/chronic scratching vs AVF infection- cultures from resected AVF with Staph   - discussed with Cardiac surgery Dr Castro 4/4/25- given age and comorbidities, he is very high risk of mortality with surgery. He recommends medical management at this point.  - on vancomycin      MRSA bacteremia  - suspect source:  Graft infection s/p exploratory surgery of LUE with graft resection 4/3  -graft holiday with TDC placed on 04/14  - cultures of AVF with Staph   - he has endocarditis  - ID consulted  - on vanc ;anticipating completing 6 weeks of IV vancomycin from clearance (EOC: 5/19/25)     NSTEMI (non-ST elevated myocardial infarction)  - see CAD    BPH with urinary obstruction  - noted 4/6/25 when patient became very agitated and screaming he couldn't urinate  - nursing unable to place dunaway/coude  - Urology consulted. Bedside cystoscopy on 4/6/25 noted clots, large prostate. Catheter was placed but was then removed due to pain  - 4/7 he is again agitated that he cannot urinate  - follow up with Urology   - tamsulosin ordered if he is awake enough to swallow     Thrombocytopenia  The likely etiology of thrombocytopenia is infection and sepsis. The patients 3 most recent labs are listed below.  Recent Labs     04/19/25  0336 04/20/25  0847 04/21/25  0437    170 137*     Plan  - Will transfuse if platelet count is <10k.    AV fistula infection  - LUE AVF was actively bleeding on arrival on 4/3/25. Vascular surgery was consulted. He went emergently to the OR on 4/3/25: "Severely calcified mid RCA stenosis unable to cross with PTCA balloons, treated initially with 0.9 laser atherectomy, followed by CSI atherectomy system with total of 5 runs (3 at low speed, 2 at high speed), pre-dilated using 2.0 compliant and 3.5 noncompliant balloon followed by 3.5 X 16 mm megatron stent.  Focal plaque rupture/erosion noted in the proximal and ostial RCA that were treated with  3.5 X 28 in the proximal RCA, 4.0 X 16 mm " "in the ostial RCA.  No residual stenosis post intervention.  Patient had ALAN 3 flow at the end of the procedure"  - cultures from AVF= Staph   - wound care orders in place for surgical site  - trialysis currently in place for HD    Emphysema lung  - emphysema noted on CT  - no signs of COPD exacerbation  Pulmonary nodules  - CT 4/3/25 with few small pulmonary nodules  - will need outpatient follow up     Gross hematuria  S/p clot evacuation with a fulguration by Urology  Currently resolved  Continue cap irrigation of 3 way catheter  Continue Levsin for bladder discomfort  -Sharma to be removed when patient is transferred to the floor; we will defer removal to urology    NSVT (nonsustained ventricular tachycardia)      Uremic pruritus  Patient complaining of severe intractable itching;[facility]  Start hydroxyzine  Ordered capsaicin cream   Korsuva  not available; [facility]; can consider gabapentin  hemodynamic  and delirium allows  Will defer increased dialysis frequency; changing Kp/f ratios and other adjustments to nephrology      Moderate malnutrition  Nutrition consulted. Most recent weight and BMI monitored-     Measurements:  Wt Readings from Last 1 Encounters:   04/18/25 61.2 kg (134 lb 14.7 oz)   Body mass index is 21.13 kg/m².    Patient has been screened and assessed by RD.    Malnutrition Type:  Context: chronic illness  Level: moderate    Malnutrition Characteristic Summary:  Weight Loss (Malnutrition): greater than 7.5% in 3 months  Energy Intake (Malnutrition): less than or equal to 75% for greater than or equal to 1 month    Interventions/Recommendations (treatment strategy):  1. Continue on texture modified diet per SLP recommendations 2. Continue with Commercial beverage medical food supplement therapy: Novasource renal  3. Monitor labs and weights    VTE Risk Mitigation (From admission, onward)           Ordered     heparin (porcine) injection 1,000 Units  As " needed (PRN)         04/05/25 2100     IP VTE HIGH RISK PATIENT  Once         04/03/25 1516     Place TEMO hose  Until discontinued         04/03/25 1516     Place sequential compression device  Until discontinued         04/03/25 1516     Reason for No Pharmacological VTE Prophylaxis  Once        Question:  Reasons:  Answer:  Physician Provided (leave comment)    04/03/25 1516                    Discharge Planning   BAYRON: 4/22/2025     Code Status: DNR   Medical Readiness for Discharge Date:   Discharge Plan A: Skilled Nursing Facility   Discharge Delays: (!) Patient and Family Barriers                    Jc Antunez MD  Department of Hospital Medicine   [facility] Surg

## 2025-04-21 NOTE — ASSESSMENT & PLAN NOTE
- ongoing waxing and waning of mental status and delirium; seems to be gradually improving overall  - pt continues to not have capacity for medical decision making

## 2025-04-21 NOTE — ASSESSMENT & PLAN NOTE
The likely etiology of thrombocytopenia is infection and sepsis. The patients 3 most recent labs are listed below.  Recent Labs     04/19/25  0336 04/20/25  0847 04/21/25  0437    170 137*     Plan  - Will transfuse if platelet count is <10k.

## 2025-04-21 NOTE — PLAN OF CARE
"Changes in medical condition or discharge plan: Per MD, pt is medically stable for discharge.    Does patient need new DME? None    Follow up appts needed: TBD    Medically stable: Yes    Estimated Discharge Date:  pending placement    CM spoke to pt's Deonna garces and informed her that [facility] accepted pt. Deonna stated "That's far and could you send referrals to [place] and [place], [place]." CM sent the referrals and reiterated the importance of selecting a facility. Deonna verbally expressed understanding.    Pt will resume dialysis with [facility] Tuesday, Thursday and Saturday.    Accepting:  [facility] ( family declined)  [place]- per admissions, pt will need to change dialysis location.    Unable to accept SNF pt's. Finalizing Medicare provider number for CMS by end of the week  [facility]    Left Message- admissions out until tomorrow  [place]     04/21/25 1156   Discharge Reassessment   Assessment Type Discharge Planning Reassessment   Did the patient's condition or plan change since previous assessment? No   Discharge Plan discussed with: Caregiver   Name(s) and Number(s) Deonna garces [phone]   Communicated BAYRON with patient/caregiver Yes   Discharge Plan A Skilled Nursing Facility   Discharge Plan B Home with family   DME Needed Upon Discharge  other (see comments)  (tbd)   Transition of Care Barriers None   Why the patient remains in the hospital Requires continued medical care   Post-Acute Status   Coverage [employer] MANAGED MEDICARE - [facility]   Patient choice form signed by patient/caregiver List from System Post-Acute Care   Discharge Delays (!) Patient and Family Barriers       "

## 2025-04-21 NOTE — NURSING
[facility], [facility] - [place], [place]  Nurses Note -- 4 Eyes      4/21/2025       Skin assessed on: Q Shift      [x] No Pressure Injuries Present    [x]Prevention Measures Documented    [] Yes LDA  for Pressure Injury Previously documented     [] Yes New Pressure Injury Discovered   [] LDA for New Pressure Injury Added      Attending RN:  Ele Perez LPN     Second RN:  PERRY Dill

## 2025-04-21 NOTE — SUBJECTIVE & OBJECTIVE
Interval History: tolerating dunaway catheter    Review of Systems   Unable to perform ROS: Other     Objective:     Temp:  [97.4 °F (36.3 °C)-98.1 °F (36.7 °C)] 97.6 °F (36.4 °C)  Pulse:  [] 93  Resp:  [17-18] 18  SpO2:  [95 %-99 %] 95 %  BP: ()/(54-70) 125/70     Body mass index is 21.13 kg/m².    Date 04/21/25 0700 - 04/22/25 0659   Shift 4454-3641 7985-6056 8227-7037 24 Hour Total   INTAKE   P.O. 60   60   Shift Total(mL/kg) 60(1)   60(1)   OUTPUT   Urine(mL/kg/hr) 50   50   Shift Total(mL/kg) 50(0.8)   50(0.8)   Weight (kg) 61.2 61.2 61.2 61.2     Bladder Scan Volume (mL): 331 mL (04/06/25 1520)    Drains       Drain  Duration                  Urethral Catheter 04/07/25 1558 Triple-lumen;Latex 24 Fr. 13 days         Hemodialysis Catheter 04/14/25 1011 right internal jugular 7 days                     Physical Exam  Constitutional:       General: He is not in acute distress.     Appearance: He is well-developed. He is not ill-appearing, toxic-appearing or diaphoretic.   HENT:      Head: Normocephalic and atraumatic.      Mouth/Throat:      Mouth: Mucous membranes are moist.   Eyes:      Conjunctiva/sclera: Conjunctivae normal.   Pulmonary:      Effort: Pulmonary effort is normal. No respiratory distress.   Abdominal:      General: Abdomen is flat. There is no distension.   Genitourinary:     Comments: Dunaway yellow urine  Musculoskeletal:         General: No swelling or deformity.      Cervical back: Neck supple.   Skin:     Findings: No rash.   Neurological:      Mental Status: He is alert. Mental status is at baseline.           Significant Labs:    BMP:  Recent Labs   Lab 04/19/25  0336 04/20/25  0847 04/21/25  0437   * 137 136   K 4.0 4.4 4.7   CL 98 98 98   CO2 22* 22* 20*   BUN 24* 39* 48*   CREATININE 6.3* 9.5* 11.0*   GLUCOSE 67* 135* 62*   CALCIUM 9.0 9.0 9.6       CBC:   Recent Labs   Lab 04/19/25  0336 04/20/25  0847 04/21/25  0437   WBC 11.27 11.78 10.96   HGB 8.5* 8.6* 9.5*   HCT  27.7* 27.4* 29.9*    170 137*

## 2025-04-21 NOTE — NURSING
[facility], [facility]  Nurses Note -- 4 Eyes      4/20/2025       Skin assessed on: Q Shift      [x] No Pressure Injuries Present    [x]Prevention Measures Documented    [] Yes LDA  for Pressure Injury Previously documented     [] Yes New Pressure Injury Discovered   [] LDA for New Pressure Injury Added      Attending RN:  Fuentes Hernandez LPN     Second RN:  Ele LPN

## 2025-04-21 NOTE — CARE UPDATE
Notified by RN that patient's BP was low in 80s during HD as well as oxygen on 2 liters. Went to assess patient at bedside, denies shortness of breath at the time and given bolus with improvement to systolic 100s and asymptomatic. Giving midodrine (held earlier due to /70). Definitely needs midodrine prior to HD going forward. Check CXR.     Jc Antunez MD  Department of Hospital Medicine  [facility] [place]

## 2025-04-21 NOTE — PROGRESS NOTES
[facility] Surg  Urology  Progress Note    Patient Name: Jevon Rajan  MRN: [id number]  Admission Date: 4/3/2025  Hospital Length of Stay: 18 days  Code Status: DNR   Attending Provider: Jc Antunez MD   Primary Care Physician: Melia, Primary Doctor    Subjective:     HPI:  Urinary Retention  Patient complains of urinary retention. Onset of retention was 1 day ago and was gradual in onset. Patient currently does not have a urinary catheter in place.  300 ml of urine were scanned on bladder scanner Prior to this event voiding symptoms consisted of slow stream. Prior treatments include none. Recent medications that may have affected his voiding include none.   He still makes urine, he tells me an almost normal amount.  He is very uncomfortable at the level of the bladder.  Nursing staff attempted coude catheter was then successfully.  He agrees to allow me to place a catheter.    Interval History: tolerating dunaway catheter    Review of Systems   Unable to perform ROS: Other     Objective:     Temp:  [97.4 °F (36.3 °C)-98.1 °F (36.7 °C)] 97.6 °F (36.4 °C)  Pulse:  [] 93  Resp:  [17-18] 18  SpO2:  [95 %-99 %] 95 %  BP: ()/(54-70) 125/70     Body mass index is 21.13 kg/m².    Date 04/21/25 0700 - 04/22/25 0659   Shift 7477-4240 0508-2409 5387-6938 24 Hour Total   INTAKE   P.O. 60   60   Shift Total(mL/kg) 60(1)   60(1)   OUTPUT   Urine(mL/kg/hr) 50   50   Shift Total(mL/kg) 50(0.8)   50(0.8)   Weight (kg) 61.2 61.2 61.2 61.2     Bladder Scan Volume (mL): 331 mL (04/06/25 1520)    Drains       Drain  Duration                  Urethral Catheter 04/07/25 1558 Triple-lumen;Latex 24 Fr. 13 days         Hemodialysis Catheter 04/14/25 1011 right internal jugular 7 days                     Physical Exam  Constitutional:       General: He is not in acute distress.     Appearance: He is well-developed. He is not ill-appearing, toxic-appearing or diaphoretic.   HENT:      Head: Normocephalic and atraumatic.       Mouth/Throat:      Mouth: Mucous membranes are moist.   Eyes:      Conjunctiva/sclera: Conjunctivae normal.   Pulmonary:      Effort: Pulmonary effort is normal. No respiratory distress.   Abdominal:      General: Abdomen is flat. There is no distension.   Genitourinary:     Comments: Dunaway yellow urine  Musculoskeletal:         General: No swelling or deformity.      Cervical back: Neck supple.   Skin:     Findings: No rash.   Neurological:      Mental Status: He is alert. Mental status is at baseline.           Significant Labs:    BMP:  Recent Labs   Lab 04/19/25  0336 04/20/25  0847 04/21/25  0437   * 137 136   K 4.0 4.4 4.7   CL 98 98 98   CO2 22* 22* 20*   BUN 24* 39* 48*   CREATININE 6.3* 9.5* 11.0*   GLUCOSE 67* 135* 62*   CALCIUM 9.0 9.0 9.6       CBC:   Recent Labs   Lab 04/19/25  0336 04/20/25  0847 04/21/25  0437   WBC 11.27 11.78 10.96   HGB 8.5* 8.6* 9.5*   HCT 27.7* 27.4* 29.9*    170 137*                     Assessment/Plan:     Gross hematuria  S/p Clot evacuation with fulguration  Resolved  Cap irrigation port of 3 way catheter  Levsin PRN for bladder discomfort            BPH with urinary obstruction  Catheter in place, requires cystoscopy to place dunaway.      Consider voiding trial prior to discharge - early in the AM preferred in case patient will need to be brought back to the operating room for dunaway placement          VTE Risk Mitigation (From admission, onward)           Ordered     heparin (porcine) injection 1,000 Units  As needed (PRN)         04/05/25 2100     IP VTE HIGH RISK PATIENT  Once         04/03/25 1516     Place TEMO hose  Until discontinued         04/03/25 1516     Place sequential compression device  Until discontinued         04/03/25 1516     Reason for No Pharmacological VTE Prophylaxis  Once        Question:  Reasons:  Answer:  Physician Provided (leave comment)    04/03/25 1516                    Elsie Arnold MD  Urology  [facility] S[facility]

## 2025-04-21 NOTE — ASSESSMENT & PLAN NOTE
Catheter in place, requires cystoscopy to place dunaway.      Consider voiding trial prior to discharge - early in the AM preferred in case patient will need to be brought back to the operating room for dunaway placement

## 2025-04-21 NOTE — SUBJECTIVE & OBJECTIVE
Interval History: No events overnight. Normal appetite, no complaints.     Review of patient's allergies indicates:   Allergen Reactions    Ace inhibitors Hives, Itching, Shortness Of Breath, Other (See Comments) and Rash     Is not sure which medication it was    Captopril      Current Facility-Administered Medications   Medication Frequency    0.9%  NaCl infusion (for blood administration) Q24H PRN    0.9% NaCl infusion PRN    acetaminophen tablet 650 mg Q4H PRN    albumin human 25% bottle 25 g Daily PRN    atorvastatin tablet 80 mg QHS    capsaicin 0.025 % cream BID    clopidogreL tablet 75 mg Daily    dextrose 50% injection 12.5 g PRN    dextrose 50% injection 25 g PRN    epoetin mj-epbx injection 10,000 Units Every Mon, Wed, Fri    glucagon (human recombinant) injection 1 mg PRN    glucose chewable tablet 16 g PRN    glucose chewable tablet 24 g PRN    haloperidol lactate injection 2 mg Q4H PRN    heparin (porcine) injection 1,000 Units PRN    hydrOXYzine HCL tablet 25 mg TID PRN    hyoscyamine SL tablet 0.125 mg Q4H PRN    insulin aspart U-100 pen 0-5 Units QID (AC + HS) PRN    insulin glargine U-100 (Lantus) pen 5 Units Daily    LORazepam injection 1 mg Once    melatonin tablet 6 mg Nightly PRN    midodrine tablet 15 mg Q8H    naloxone 0.4 mg/mL injection 0.02 mg PRN    ondansetron injection 4 mg Q6H PRN    pantoprazole EC tablet 40 mg Daily    prochlorperazine injection Soln 5 mg Q6H PRN    QUEtiapine tablet 25 mg QHS    senna tablet 8.6 mg Daily PRN    sevelamer carbonate tablet 800 mg TID WM    sodium chloride 0.9% bolus 250 mL 250 mL PRN    tamsulosin 24 hr capsule 0.4 mg Daily    vancomycin - pharmacy to dose pharmacy to manage frequency       Objective:     Vital Signs (Most Recent):  Temp: 97.7 °F (36.5 °C) (04/20/25 2332)  Pulse: 94 (04/20/25 2335)  Resp: 18 (04/20/25 2332)  BP: (!) 105/55 (04/20/25 2332)  SpO2: 99 % (04/20/25 2332) Vital Signs (24h Range):  Temp:  [97.6 °F (36.4 °C)-98.8 °F (37.1  °C)] 97.7 °F (36.5 °C)  Pulse:  [] 94  Resp:  [17-20] 18  SpO2:  [95 %-99 %] 99 %  BP: ()/(54-66) 105/55     Weight: 61.2 kg (134 lb 14.7 oz) (04/18/25 1303)  Body mass index is 21.13 kg/m².  Body surface area is 1.7 meters squared.    I/O last 3 completed shifts:  In: 173.3 [P.O.:75; IV Piggyback:98.3]  Out: 10 [Urine:10]     Physical Exam  Vitals and nursing note reviewed.   Constitutional:       Appearance: Normal appearance.   HENT:      Head: Normocephalic.      Nose: Nose normal.      Mouth/Throat:      Mouth: Mucous membranes are moist.   Eyes:      Extraocular Movements: Extraocular movements intact.      Conjunctiva/sclera: Conjunctivae normal.      Pupils: Pupils are equal, round, and reactive to light.   Cardiovascular:      Rate and Rhythm: Normal rate and regular rhythm.      Pulses: Normal pulses.   Pulmonary:      Effort: Pulmonary effort is normal.      Breath sounds: Normal breath sounds.   Abdominal:      Palpations: Abdomen is soft.   Musculoskeletal:      Cervical back: Normal range of motion.   Neurological:      General: No focal deficit present.      Mental Status: He is alert.   Psychiatric:         Mood and Affect: Mood normal.          Significant Labs:  CBC:   Recent Labs   Lab 04/20/25  0847   WBC 11.78   RBC 3.01*   HGB 8.6*   HCT 27.4*      MCV 91   MCH 28.6   MCHC 31.4*     CMP:   Recent Labs   Lab 04/20/25  0847   CALCIUM 9.0   ALBUMIN 2.8*      K 4.4   CO2 22*   CL 98   BUN 39*   CREATININE 9.5*   ALKPHOS 78   ALT 9*   AST 30   BILITOT 1.0     All labs within the past 24 hours have been reviewed.     Significant Imaging:  Labs: Reviewed

## 2025-04-21 NOTE — ASSESSMENT & PLAN NOTE
Patient's blood pressure range in the last 24 hours was: BP  Min: 97/54  Max: 125/70.The patient's inpatient anti-hypertensive regimen is listed below:  Current Antihypertensives       Plan  - admitted with shock  - now off vasopressors. Continue to hold BP Rx as BP is well controlled without it

## 2025-04-21 NOTE — PT/OT/SLP PROGRESS
Physical Therapy      Patient Name:  Jevon Rajan   MRN:  [id number]    Patient not seen today secondary to Dialysis. Will follow-up .

## 2025-04-21 NOTE — SUBJECTIVE & OBJECTIVE
Interval History: no events overnight. Sitting on side of bed this morning. Asking for jeans and tshirt. Aware he won't be leaving hospital today; reports he needs his clothes so he can leave his hospital room for dialysis.     Review of patient's allergies indicates:   Allergen Reactions    Ace inhibitors Hives, Itching, Shortness Of Breath, Other (See Comments) and Rash     Is not sure which medication it was    Captopril      Current Facility-Administered Medications   Medication Frequency    0.9%  NaCl infusion (for blood administration) Q24H PRN    0.9% NaCl infusion PRN    acetaminophen tablet 650 mg Q4H PRN    albumin human 25% bottle 25 g Daily PRN    atorvastatin tablet 80 mg QHS    capsaicin 0.025 % cream BID    clopidogreL tablet 75 mg Daily    dextrose 50% injection 12.5 g PRN    dextrose 50% injection 25 g PRN    epoetin mj-epbx injection 10,000 Units Every Mon, Wed, Fri    glucagon (human recombinant) injection 1 mg PRN    glucose chewable tablet 16 g PRN    glucose chewable tablet 24 g PRN    haloperidol lactate injection 2 mg Q4H PRN    heparin (porcine) injection 1,000 Units PRN    hydrOXYzine HCL tablet 25 mg TID PRN    hyoscyamine SL tablet 0.125 mg Q4H PRN    insulin aspart U-100 pen 0-5 Units QID (AC + HS) PRN    melatonin tablet 6 mg Nightly PRN    midodrine tablet 15 mg Q8H    naloxone 0.4 mg/mL injection 0.02 mg PRN    ondansetron injection 4 mg Q6H PRN    pantoprazole EC tablet 40 mg Daily    prochlorperazine injection Soln 5 mg Q6H PRN    QUEtiapine tablet 25 mg QHS    senna tablet 8.6 mg Daily PRN    sevelamer carbonate tablet 1,600 mg TID WM    sodium chloride 0.9% bolus 250 mL 250 mL PRN    tamsulosin 24 hr capsule 0.4 mg Daily    vancomycin - pharmacy to dose pharmacy to manage frequency       Objective:     Vital Signs (Most Recent):  Temp: 97.6 °F (36.4 °C) (04/21/25 1106)  Pulse: 93 (04/21/25 1149)  Resp: 18 (04/21/25 1106)  BP: 125/70 (04/21/25 1106)  SpO2: 95 % (04/21/25 1106)  Vital Signs (24h Range):  Temp:  [97.4 °F (36.3 °C)-98.1 °F (36.7 °C)] 97.6 °F (36.4 °C)  Pulse:  [] 93  Resp:  [17-18] 18  SpO2:  [95 %-99 %] 95 %  BP: ()/(54-70) 125/70     Weight: 61.2 kg (134 lb 14.7 oz) (04/18/25 1303)  Body mass index is 21.13 kg/m².  Body surface area is 1.7 meters squared.    No intake/output data recorded.     Physical Exam  Vitals and nursing note reviewed.   Constitutional:       General: He is awake.      Appearance: Normal appearance. He is well-developed.   HENT:      Head: Normocephalic and atraumatic.      Nose: Nose normal.      Mouth/Throat:      Mouth: Mucous membranes are moist.   Eyes:      Extraocular Movements: Extraocular movements intact.      Conjunctiva/sclera: Conjunctivae normal.   Cardiovascular:      Rate and Rhythm: Normal rate and regular rhythm.      Comments: TDC  Pulmonary:      Effort: Pulmonary effort is normal.      Breath sounds: Normal breath sounds.   Abdominal:      General: There is no distension.      Palpations: Abdomen is soft.   Musculoskeletal:      Right lower leg: No edema.      Left lower leg: No edema.   Skin:     General: Skin is warm and dry.      Findings: Lesion (diffuse) present. No erythema or rash.   Neurological:      Mental Status: He is alert.   Psychiatric:         Mood and Affect: Mood normal.         Behavior: Behavior normal.          Significant Labs:  CBC:   Recent Labs   Lab 04/21/25  0437   WBC 10.96   RBC 3.31*   HGB 9.5*   HCT 29.9*   *   MCV 90   MCH 28.7   MCHC 31.8*     CMP:   Recent Labs   Lab 04/21/25  0437   CALCIUM 9.6   ALBUMIN 2.9*      K 4.7   CO2 20*   CL 98   BUN 48*   CREATININE 11.0*   ALKPHOS 78   ALT <5*   AST 35   BILITOT 1.1*     All labs within the past 24 hours have been reviewed.     Significant Imaging:  Labs: Reviewed

## 2025-04-21 NOTE — SUBJECTIVE & OBJECTIVE
Interval History:  no acute issues, he is still pleasantly confused. Lantus stopped.     Review of Systems  Objective:     Vital Signs (Most Recent):  Temp: 97.6 °F (36.4 °C) (04/21/25 1106)  Pulse: (!) 127 (04/21/25 1505)  Resp: 18 (04/21/25 1106)  BP: 125/70 (04/21/25 1106)  SpO2: 95 % (04/21/25 1106) Vital Signs (24h Range):  Temp:  [97.4 °F (36.3 °C)-98.1 °F (36.7 °C)] 97.6 °F (36.4 °C)  Pulse:  [] 127  Resp:  [17-18] 18  SpO2:  [95 %-99 %] 95 %  BP: ()/(54-70) 125/70     Weight: 61.2 kg (134 lb 14.7 oz)  Body mass index is 21.13 kg/m².    Intake/Output Summary (Last 24 hours) at 4/21/2025 1507  Last data filed at 4/21/2025 0940  Gross per 24 hour   Intake 60 ml   Output 50 ml   Net 10 ml         Physical Exam  Vitals and nursing note reviewed.   Cardiovascular:      Rate and Rhythm: Regular rhythm. Bradycardia present.      Pulses: Normal pulses.      Heart sounds: Normal heart sounds. No murmur heard.  Pulmonary:      Effort: Pulmonary effort is normal. No respiratory distress.      Breath sounds: Normal breath sounds. No stridor.   Abdominal:      General: Abdomen is flat. Bowel sounds are normal.      Palpations: Abdomen is soft.   Neurological:      Mental Status: He is alert. Mental status is at baseline. He is disoriented.               Significant Labs: All pertinent labs within the past 24 hours have been reviewed.    Significant Imaging: I have reviewed all pertinent imaging results/findings within the past 24 hours.

## 2025-04-21 NOTE — PROGRESS NOTES
Pharmacokinetic Assessment Follow Up: IV Vancomycin    Vancomycin serum concentration assessment(s):    The random level was drawn correctly and can be used to guide therapy at this time. The measurement is within the desired definitive target range of 15 to 20 mcg/mL.    Vancomycin Regimen Plan:    No dose today.  Re-dose when the random level is less than 20 mcg/mL, next level to be drawn at 0400 on 4/23/2025    Drug levels (last 3 results):  Recent Labs   Lab Result Units 04/21/25  0437   Vancomycin Random ug/ml 20.3       Pharmacy will continue to follow and monitor vancomycin.    Please contact pharmacy at extension [phone] for questions regarding this assessment.    Thank you for the consult,   Ezekiel Beaver Jr       Patient brief summary:  Jevon Rajan is a 85 y.o. male initiated on antimicrobial therapy with IV Vancomycin for treatment of bacteremia    Drug Allergies:   Review of patient's allergies indicates:   Allergen Reactions    Ace inhibitors Hives, Itching, Shortness Of Breath, Other (See Comments) and Rash     Is not sure which medication it was    Captopril        Actual Body Weight:   61.2 kg    Renal Function:   Estimated Creatinine Clearance: 4.1 mL/min (A) (based on SCr of 11 mg/dL (H)).,     Dialysis Method (if applicable):  intermittent HD    CBC (last 72 hours):  Recent Labs   Lab Result Units 04/19/25  0336 04/20/25  0847 04/21/25  0437   WBC K/uL 11.27 11.78 10.96   HGB gm/dL 8.5* 8.6* 9.5*   HCT % 27.7* 27.4* 29.9*   Platelet Count K/uL 205 170 137*   Lymph % % 6.4* 6.5* 8.9*   Mono % % 10.4 9.2 10.9   Eos % % 6.8 7.2 9.4*   Basophil % % 0.4 0.3 0.4       Metabolic Panel (last 72 hours):  Recent Labs   Lab Result Units 04/19/25  0336 04/20/25  0847 04/21/25  0437   Sodium mmol/L 135* 137 136   Potassium mmol/L 4.0 4.4 4.7   Chloride mmol/L 98 98 98   CO2 mmol/L 22* 22* 20*   Glucose mg/dL 67* 135* 62*   BUN mg/dL 24* 39* 48*   Creatinine mg/dL 6.3* 9.5* 11.0*   Albumin g/dL 2.8* 2.8*  2.9*   Bilirubin Total mg/dL 1.0 1.0 1.1*   ALP unit/L 78 78 78   AST unit/L 29 30 35   ALT unit/L 8* 9* <5*   Phosphorus Level mg/dL  --   --  8.4*       Vancomycin Administrations:  vancomycin given in the last 96 hours                     vancomycin (VANCOCIN) 500 mg in D5W 100 mL IVPB (MB+) (mg) 500 mg New Bag 04/18/25 2006                    Microbiologic Results:  Microbiology Results (last 7 days)       Procedure Component Value Units Date/Time    Fungus culture [[id number]]  (Normal) Collected: 04/03/25 1816    Order Status: Completed Specimen: Wound from Arm, Left Updated: 04/17/25 2300     Fungal Culture No Fungus Isolated at 2 Weeks    Fungus culture [[id number]]  (Normal) Collected: 04/03/25 1934    Order Status: Completed Specimen: Tissue from AV Fistula, Left Updated: 04/17/25 2300     Fungal Culture No Fungus Isolated at 2 Weeks    Fungus culture [[id number]]  (Normal) Collected: 04/03/25 2011    Order Status: Completed Specimen: Wound from Arm, Left Updated: 04/17/25 2300     Fungal Culture No Fungus Isolated at 2 Weeks    Fungus culture [[id number]]  (Normal) Collected: 04/03/25 1904    Order Status: Completed Specimen: Tissue from AV Fistula, Left Updated: 04/17/25 2201     Fungal Culture No Fungus Isolated at 2 Weeks    Fungus culture [[id number]]  (Normal) Collected: 04/03/25 1921    Order Status: Completed Specimen: Tissue from hematoma Updated: 04/17/25 2201     Fungal Culture No Fungus Isolated at 2 Weeks    Blood culture [[id number]]  (Normal) Collected: 04/11/25 1335    Order Status: Completed Specimen: Blood Updated: 04/16/25 1401     Blood Culture No Growth After 5 Days    Blood culture [[id number]]  (Normal) Collected: 04/11/25 1335    Order Status: Completed Specimen: Blood from Other (Specify) Updated: 04/16/25 1401     Blood Culture No Growth After 5 Days

## 2025-04-21 NOTE — ASSESSMENT & PLAN NOTE
4/22/2025  - interval chart reviewed; pt discussed with HM   - met with pt at bedside; delirium with some improvement, especially with pts cooperation and less anxious/paranoid   - continues to lack capacity for medical decision making   - pt does not recall events of hospitalization and lacks insight into health care conditions or needs   - pt does not recall family visiting during hospitalization; reminded him that his sisters and brother have visited several times; informed pt that currently his siblings are making his medical decisions which he expressed approval for   - discussed plan for SNF  - emotional support provided; allowed time for questions   - have asked palliative , Margaret, to follow up with siblings for completion of LAPOST prior to discharge   4/17/2025  - interval chart reviewed discussion pt with Mdt   - discussed with MDT concerns for pt's varied direction on preferred MPOA and overall concern that pt has potentially lacked even understanding of the concepts of MPOA; given variation in responses and encephalopathy to some degree since admission it is my recommendation and agreement of MDT to defer to pt's legal surrogate decision makers who are his 4 siblings; plan for and encouragement of shared family decision making, including 2 nieces (Teodora and Senthil)  who have played an active role in pt's ou tpt care per siblings   - met with pt and sisters Bhavna and Ronna at bedside   - brief medical update provided to sisters  - discussed above decision maker decision by MDT; Ronna and Bhavna are happy to hear this and are hopeful for continued shared family decision making   - revisited prior code status discussion that has been ongoing throughout admission; 2/4 siblings, Ronna and Bhavna, continue to advocate that pt had previously expressed wishes for what aligns with DNR code status; they confirm all 4 siblings are in agreement with this decision; confirmed that DNR order  would be placed   - reviewed efforts for improving delirium   - reviewed likely plan/needs for SNF vs LTACH for 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 and prior bacteremia and PT/OT due to deconditioning; they are in agreement with this plan; they wish prioritize [facility] facilities in options and alternatively facilities closer to their home, would consider [facility] as [facility]   - siblings with good insight that pt's ability to safely live independently at home in the future is unlikely   - emotional support provided; answered all questions; expressed continued palliative availability throughout admission   - updated MDT     4/16/2025  - interval chart reviewed; pt discussed with MDT   - pt with significant improvement in mental status since initiation of mood and sleep med regimen, delirium precautions, and transition to a room with window in coordination with Dr. Palma,  on 4/15  - re-introduction to myself and palliative medicine team, re-discussion role in admission and care; pt does share recognizing me from care but does not recall details of discussions   - pt still with some capacity concerns, will plan for continued assessment and coordination with HM/MDT for determining if/when pt has capacity for medical decision making; at current time I feel pt does have some understanding of basics of care and can make needs known, but is lacking capacity for complex medical decision making   - I do feel pt may have capacity for simple wishes such as preferred MPOA but will allow for consistent improvement in mental status before I would feel comfortable with pt formally appointing an MPOA   - previously in admission, pt reported to then , Dr. Lyles, that his niece Senthil is who team should call for medical decisions; pt now saying that niece Teodora is who he would wish to appoint but wants Senthil to receive updates etc; pt confirms updates can be provided to his 4 siblings and to these 2 nieces   -  clarified with pt that legal surrogate decision makers currently would be his 4 siblings, if he were to lack capacity; expressed hope that pt would continue to have improved mental status and could make his own decisions and appoint MPOA  - pt reports living in his own home independently prior to admission; he feels that prior to becoming ill and seeking care weeks prior to admission he did not have difficulty caring for himself with PRN assistance from family   - shares that he had previously explored [facility] options for nursing home/assisted living but OOP cost affected decision, with preference to make necessary improvements to home for safety and hire care assistance when needed as alternative   - [facility] and has active [facility] social work team   - assisted pt in calling dez Ndiaye as he wished she bring his cell phone and some items from home; updated Dashante of pt's improved status   - also attempted to call pt's sister Ronna, who has visited several times during admission, to update on improved mental status and to attempt to get Teodora garces's number for update as well; LM and provided return call number     Please see multiple prior palliative/ACP notes from myself and Dr. Joan Antunez for prior GOC/ACP discussions

## 2025-04-22 LAB
ABSOLUTE EOSINOPHIL (OHS): 0.8 K/UL
ABSOLUTE MONOCYTE (OHS): 1.19 K/UL (ref 0.3–1)
ABSOLUTE NEUTROPHIL COUNT (OHS): 7.52 K/UL (ref 1.8–7.7)
ALBUMIN SERPL BCP-MCNC: 3 G/DL (ref 3.5–5.2)
ALP SERPL-CCNC: 82 UNIT/L (ref 40–150)
ALT SERPL W/O P-5'-P-CCNC: <5 UNIT/L (ref 10–44)
ANION GAP (OHS): 17 MMOL/L (ref 8–16)
AST SERPL-CCNC: 32 UNIT/L (ref 11–45)
BASOPHILS # BLD AUTO: 0.06 K/UL
BASOPHILS NFR BLD AUTO: 0.6 %
BILIRUB SERPL-MCNC: 1 MG/DL (ref 0.1–1)
BUN SERPL-MCNC: 35 MG/DL (ref 8–23)
CALCIUM SERPL-MCNC: 9.3 MG/DL (ref 8.7–10.5)
CHLORIDE SERPL-SCNC: 96 MMOL/L (ref 95–110)
CO2 SERPL-SCNC: 23 MMOL/L (ref 23–29)
CREAT SERPL-MCNC: 9 MG/DL (ref 0.5–1.4)
ERYTHROCYTE [DISTWIDTH] IN BLOOD BY AUTOMATED COUNT: 16.1 % (ref 11.5–14.5)
FUNGUS SPEC CULT: NORMAL
FUNGUS SPEC CULT: NORMAL
GFR SERPLBLD CREATININE-BSD FMLA CKD-EPI: 5 ML/MIN/1.73/M2
GLUCOSE SERPL-MCNC: 75 MG/DL (ref 70–110)
HCT VFR BLD AUTO: 30.5 % (ref 40–54)
HGB BLD-MCNC: 9.3 GM/DL (ref 14–18)
IMM GRANULOCYTES # BLD AUTO: 0.04 K/UL (ref 0–0.04)
IMM GRANULOCYTES NFR BLD AUTO: 0.4 % (ref 0–0.5)
LYMPHOCYTES # BLD AUTO: 0.86 K/UL (ref 1–4.8)
MCH RBC QN AUTO: 28.6 PG (ref 27–31)
MCHC RBC AUTO-ENTMCNC: 30.5 G/DL (ref 32–36)
MCV RBC AUTO: 94 FL (ref 82–98)
NUCLEATED RBC (/100WBC) (OHS): 0 /100 WBC
PLATELET # BLD AUTO: 177 K/UL (ref 150–450)
PMV BLD AUTO: 11.1 FL (ref 9.2–12.9)
POCT GLUCOSE: 118 MG/DL (ref 70–110)
POCT GLUCOSE: 123 MG/DL (ref 70–110)
POCT GLUCOSE: 200 MG/DL (ref 70–110)
POCT GLUCOSE: 228 MG/DL (ref 70–110)
POCT GLUCOSE: 98 MG/DL (ref 70–110)
POTASSIUM SERPL-SCNC: 4.3 MMOL/L (ref 3.5–5.1)
PROT SERPL-MCNC: 8.4 GM/DL (ref 6–8.4)
RBC # BLD AUTO: 3.25 M/UL (ref 4.6–6.2)
RELATIVE EOSINOPHIL (OHS): 7.6 %
RELATIVE LYMPHOCYTE (OHS): 8.2 % (ref 18–48)
RELATIVE MONOCYTE (OHS): 11.4 % (ref 4–15)
RELATIVE NEUTROPHIL (OHS): 71.8 % (ref 38–73)
SODIUM SERPL-SCNC: 136 MMOL/L (ref 136–145)
WBC # BLD AUTO: 10.47 K/UL (ref 3.9–12.7)

## 2025-04-22 PROCEDURE — 85025 COMPLETE CBC W/AUTO DIFF WBC: CPT | Performed by: HOSPITALIST

## 2025-04-22 PROCEDURE — 36415 COLL VENOUS BLD VENIPUNCTURE: CPT | Performed by: HOSPITALIST

## 2025-04-22 PROCEDURE — 82040 ASSAY OF SERUM ALBUMIN: CPT | Performed by: HOSPITALIST

## 2025-04-22 PROCEDURE — 25000003 PHARM REV CODE 250: Performed by: STUDENT IN AN ORGANIZED HEALTH CARE EDUCATION/TRAINING PROGRAM

## 2025-04-22 PROCEDURE — 97530 THERAPEUTIC ACTIVITIES: CPT

## 2025-04-22 PROCEDURE — 21400001 HC TELEMETRY ROOM

## 2025-04-22 PROCEDURE — 25000003 PHARM REV CODE 250

## 2025-04-22 PROCEDURE — 99232 SBSQ HOSP IP/OBS MODERATE 35: CPT | Mod: ,,, | Performed by: UROLOGY

## 2025-04-22 PROCEDURE — 25000003 PHARM REV CODE 250: Performed by: HOSPITALIST

## 2025-04-22 PROCEDURE — 99232 SBSQ HOSP IP/OBS MODERATE 35: CPT | Mod: ,,, | Performed by: INTERNAL MEDICINE

## 2025-04-22 RX ADMIN — INSULIN ASPART 1 UNITS: 100 INJECTION, SOLUTION INTRAVENOUS; SUBCUTANEOUS at 09:04

## 2025-04-22 RX ADMIN — CLOPIDOGREL BISULFATE 75 MG: 75 TABLET, FILM COATED ORAL at 09:04

## 2025-04-22 RX ADMIN — QUETIAPINE FUMARATE 25 MG: 25 TABLET ORAL at 09:04

## 2025-04-22 RX ADMIN — MIDODRINE HYDROCHLORIDE 15 MG: 5 TABLET ORAL at 09:04

## 2025-04-22 RX ADMIN — MIDODRINE HYDROCHLORIDE 15 MG: 5 TABLET ORAL at 01:04

## 2025-04-22 RX ADMIN — ATORVASTATIN CALCIUM 80 MG: 40 TABLET, FILM COATED ORAL at 09:04

## 2025-04-22 RX ADMIN — MIDODRINE HYDROCHLORIDE 15 MG: 5 TABLET ORAL at 05:04

## 2025-04-22 RX ADMIN — CAPSAICIN: 0.25 CREAM TOPICAL at 10:04

## 2025-04-22 RX ADMIN — ACETAMINOPHEN 650 MG: 325 TABLET ORAL at 10:04

## 2025-04-22 RX ADMIN — TAMSULOSIN HYDROCHLORIDE 0.4 MG: 0.4 CAPSULE ORAL at 09:04

## 2025-04-22 RX ADMIN — CAPSAICIN: 0.25 CREAM TOPICAL at 09:04

## 2025-04-22 RX ADMIN — PANTOPRAZOLE SODIUM 40 MG: 40 TABLET, DELAYED RELEASE ORAL at 09:04

## 2025-04-22 NOTE — ASSESSMENT & PLAN NOTE
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 Most recent hemoglobin and hematocrit are listed below.  Recent Labs     04/20/25  0847 04/21/25  0437 04/22/25  0604   HGB 8.6* 9.5* 9.3*   HCT 27.4* 29.9* 30.5*     Plan  - Monitor serial CBC: Daily and recheck now  - Transfuse PRBC if patient becomes hemodynamically unstable, symptomatic or H/H drops below 7/21.  - Patient has not received any PRBC transfusions to date  - Patient's anemia is currently stable

## 2025-04-22 NOTE — ASSESSMENT & PLAN NOTE
Patient's blood pressure range in the last 24 hours was: BP  Min: 92/50  Max: 125/70.The patient's inpatient anti-hypertensive regimen is listed below:  Current Antihypertensives       Plan  - admitted with shock  - now off vasopressors. Continue to hold BP Rx as BP is well controlled without it

## 2025-04-22 NOTE — ASSESSMENT & PLAN NOTE
"Patient with known CAD s/p stent placement, which is controlled Will continue ASA, Plavix, and Statin and monitor for S/Sx of angina/ACS. Continue to monitor on telemetry.   - last [facility] was 1/2025: Severely calcified mid RCA stenosis unable to cross with PTCA balloons, treated initially with 0.9 laser atherectomy, followed by CSI atherectomy system with total of 5 runs (3 at low speed, 2 at high speed), pre-dilated using 2.0 compliant and 3.5 noncompliant balloon followed by 3.5 X 16 mm megatron stent.  Focal plaque rupture/erosion noted in the proximal and ostial RCA that were treated with  3.5 X 28 in the proximal RCA, 4.0 X 16 mm in the ostial RCA.  No residual stenosis post intervention.  Patient had ALAN 3 flow at the end of the procedure  - with elevated troponin likely due to septic shock  No results for input(s): "TROPONINI", "TROPONINIHS" in the last 168 hours.    - continue asa, plavix, statin  - Cardiology consulted  - no plans for ischemic evaluation at this time   "

## 2025-04-22 NOTE — PT/OT/SLP PROGRESS
Physical Therapy Treatment    Patient Name:  Jevon Rajan   MRN:  [id number]    Recommendations:     Discharge Recommendations: Moderate Intensity Therapy  Discharge Equipment Recommendations: to be determined by next level of care  Barriers to discharge:  Pt is at increased risk for falls and readmission if he returns home at present time.    Assessment:     Jevon Rajan is a 85 y.o. male admitted with a medical diagnosis of Septic shock.  He presents with the following impairments/functional limitations: weakness, gait instability, impaired endurance, impaired balance, impaired coordination, decreased safety awareness, impaired cognition, impaired self care skills, impaired functional mobility, decreased coordination .    Rehab Prognosis: Good and Fair; patient would benefit from acute skilled PT services to address these deficits and reach maximum level of function.    Recent Surgery: Procedure(s) (LRB):  CYSTOSCOPY WITH CLOT EVACUATION, FULGURATION, DUNAWAY PLACEMENT (N/A) 15 Days Post-Op    Plan:     During this hospitalization, patient to be seen 3 x/week to address the identified rehab impairments via gait training, therapeutic activities, therapeutic exercises, neuromuscular re-education and progress toward the following goals:    Plan of Care Expires:  05/06/25    Subjective     Chief Complaint: food is terrible  Patient/Family Comments/goals: Pt agreeable to treatment  Pain/Comfort:  Pain Rating 1: 0/10      Objective:     Communicated with nsg prior to session.  Patient found  slid down in bed  with bed alarm, dunaway catheter, telemetry upon PT entry to room.     General Precautions: Standard, fall, aspiration  Orthopedic Precautions: N/A  Braces: N/A  Respiratory Status: Room air     Functional Mobility:  Bed Mobility:     Scooting: moderate assistance and with VC/TC for bridging to HOB in supine x 2 trials  Bridging: contact guard assistance  Supine to Sit: contact guard assistance  Sit to Supine: moderate  "assistance  Transfers:     Sit to Stand:  minimum assistance with rolling walker and Vc/TC for hand placement and forward weight shift over MEHRAN  Gait: Gait training with RW and Min A x 3 sidesteps with VC/TC for increased trunk extension  Balance: Fair+<> Fair sit, Fair stand      AM-PAC 6 CLICK MOBILITY  Turning over in bed (including adjusting bedclothes, sheets and blankets)?: 3  Sitting down on and standing up from a chair with arms (e.g., wheelchair, bedside commode, etc.): 3  Moving from lying on back to sitting on the side of the bed?: 3  Moving to and from a bed to a chair (including a wheelchair)?: 3  Need to walk in hospital room?: 1  Climbing 3-5 steps with a railing?: 1  Basic Mobility Total Score: 14       Treatment & Education:  Pt sat at EOB with SBA<>Min A for  balance and Max A for feeding activities and to perform LAQ's.  Educated pt on POC, and to "call before you fall"      Patient left HOB elevated with all lines intact and nsg notified..    GOALS:   Multidisciplinary Problems       Physical Therapy Goals          Problem: Physical Therapy    Goal Priority Disciplines Outcome Interventions   Physical Therapy Goal     PT, PT/OT Progressing    Description: Goals to be met by: 25     Patient will increase functional independence with mobility by performin. Supine to sit with MInimal Assistance  2. Rolling to Left and Right with Minimal Assistance.  3. Bed to chair transfer with Moderate   4. Sitting at edge of bed x10 minutes with Contact Guard Assistance  5. Lower extremity exercise program x10 reps per handout, with assistance as needed  Goals met as of 25.    Updated Goals:   Supervision with bed mobility  Supervision with transfers  Supervision with RW for ambulation x 50'                         DME Justifications:  No DME recommended requiring DME justifications    Time Tracking:     PT Received On: 25  PT Start Time: 1115     PT Stop Time: 1148  PT Total Time (min): 33 " min     Billable Minutes: Therapeutic Activity 33    Treatment Type: Treatment  PT/PTA: PT     Number of PTA visits since last PT visit: 0     04/22/2025

## 2025-04-22 NOTE — PROGRESS NOTES
[facility]  Urology  Progress Note    Patient Name: Jevon Rajan  MRN: [id number]  Admission Date: 4/3/2025  Hospital Length of Stay: 19 days  Code Status: DNR   Attending Provider: Tena Palma MD   Primary Care Physician: Melia, Primary Doctor    Subjective:     HPI:  Urinary Retention  Patient complains of urinary retention. Onset of retention was 1 day ago and was gradual in onset. Patient currently does not have a urinary catheter in place.  300 ml of urine were scanned on bladder scanner Prior to this event voiding symptoms consisted of slow stream. Prior treatments include none. Recent medications that may have affected his voiding include none.   He still makes urine, he tells me an almost normal amount.  He is very uncomfortable at the level of the bladder.  Nursing staff attempted coude catheter was then successfully.  He agrees to allow me to place a catheter.    Interval History: no acute events, tolerating Sharma    Review of Systems   Constitutional:  Negative for fever.   Respiratory:  Negative for chest tightness and wheezing.    Cardiovascular:  Negative for chest pain and palpitations.   Gastrointestinal:  Negative for abdominal pain, diarrhea, nausea and vomiting.   Genitourinary:  Negative for difficulty urinating, dysuria, flank pain and hematuria.   Musculoskeletal:  Negative for arthralgias.   Neurological:  Negative for dizziness.   Psychiatric/Behavioral:  Negative for confusion.      Objective:     Temp:  [96.7 °F (35.9 °C)-97.7 °F (36.5 °C)] 97.5 °F (36.4 °C)  Pulse:  [] 90  Resp:  [18-19] 19  SpO2:  [95 %-99 %] 98 %  BP: ()/(48-81) 113/63     Body mass index is 21.13 kg/m².      Bladder Scan Volume (mL): 331 mL (04/06/25 1520)    Drains       Drain  Duration                  Urethral Catheter 04/07/25 1558 Triple-lumen;Latex 24 Fr. 14 days         Hemodialysis Catheter 04/14/25 1011 right internal jugular 7 days                     Physical Exam  Vitals and nursing  "note reviewed.   Constitutional:       Appearance: He is well-developed.   HENT:      Head: Normocephalic.   Eyes:      Conjunctiva/sclera: Conjunctivae normal.   Neck:      Thyroid: No thyromegaly.      Trachea: No tracheal deviation.   Cardiovascular:      Rate and Rhythm: Normal rate.      Heart sounds: Normal heart sounds.   Pulmonary:      Effort: Pulmonary effort is normal. No respiratory distress.      Breath sounds: Normal breath sounds. No wheezing.   Abdominal:      General: Bowel sounds are normal.      Palpations: Abdomen is soft.      Tenderness: There is no abdominal tenderness. There is no rebound.      Hernia: No hernia is present.   Musculoskeletal:         General: No tenderness. Normal range of motion.      Cervical back: Normal range of motion and neck supple.   Lymphadenopathy:      Cervical: No cervical adenopathy.   Skin:     General: Skin is warm and dry.      Findings: No erythema or rash.   Neurological:      Mental Status: He is alert and oriented to person, place, and time.   Psychiatric:         Behavior: Behavior normal.         Thought Content: Thought content normal.         Judgment: Judgment normal.           Significant Labs:    BMP:  Recent Labs   Lab 04/20/25  0847 04/21/25  0437 04/22/25  0559    136 136   K 4.4 4.7 4.3   CL 98 98 96   CO2 22* 20* 23   BUN 39* 48* 35*   CREATININE 9.5* 11.0* 9.0*   GLUCOSE 135* 62* 75   CALCIUM 9.0 9.6 9.3       CBC:   Recent Labs   Lab 04/20/25  0847 04/21/25  0437 04/22/25  0604   WBC 11.78 10.96 10.47   HGB 8.6* 9.5* 9.3*   HCT 27.4* 29.9* 30.5*    137* 177       Blood Culture: No results for input(s): "LABBLOO" in the last 168 hours.  Urine Culture: No results for input(s): "LABURIN" in the last 168 hours.    Significant Imaging:                    Assessment/Plan:     Gross hematuria  S/p Clot evacuation with fulguration  Resolved  Cap irrigation port of 3 way catheter  Levsin PRN for bladder discomfort            BPH with " urinary obstruction  Catheter in place, requires cystoscopy to place dunaway.      Consider voiding trial prior to discharge - early in the AM preferred in case patient will need to be brought back to the operating room for dunaway placement          VTE Risk Mitigation (From admission, onward)           Ordered     heparin (porcine) injection 1,000 Units  As needed (PRN)         04/05/25 2100     IP VTE HIGH RISK PATIENT  Once         04/03/25 1516     Place TEMO hose  Until discontinued         04/03/25 1516     Place sequential compression device  Until discontinued         04/03/25 1516     Reason for No Pharmacological VTE Prophylaxis  Once        Question:  Reasons:  Answer:  Physician Provided (leave comment)    04/03/25 1516                    Agustin Kramer MD  Urology  [facility]

## 2025-04-22 NOTE — NURSING
[facility], [facility]  Nurses Note -- 4 Eyes      4/22/2025       Skin assessed on: Q Shift      [x] No Pressure Injuries Present    [x]Prevention Measures Documented    [] Yes LDA  for Pressure Injury Previously documented     [] Yes New Pressure Injury Discovered   [] LDA for New Pressure Injury Added      Attending RN:  Ele Perez LPN     Second RN:  PERRY Dill

## 2025-04-22 NOTE — NURSING
Patient complained of 3/10 pain in his penis. Gave PRN tylenol. When reassessed, patient stated pain was 0/10. OT ordered. PT worked with patient. Patient tolerated well. Patient did not eat a single thing today, but blood sugar has been increasing all day. Attempted to coax patient into eating, but patient refused. Before breakfast blood sugar was 98. Before lunch blood sugar was 118. Before dinner blood sugar was 200. Notified MD. Patient AAOx1 and is disoriented to place, time, and situation. Patient not showing signs of distress. Bed in lowest position, wheels locked, call bell in reach, side rails up x3.

## 2025-04-22 NOTE — NURSING
[facility]  [place]es [place] -- 4 Eyes      4/21/2025       Skin assessed on: Q Shift      [x] No Pressure Injuries Present    [x]Prevention Measures Documented    [] Yes LDA  for Pressure Injury Previously documented     [] Yes New Pressure Injury Discovered   [] LDA for New Pressure Injury Added      Attending RN:  Fuentes Henrandez LPN     Second RN:  Ele LPN

## 2025-04-22 NOTE — PLAN OF CARE
Problem: Physical Therapy  Goal: Physical Therapy Goal  Description: Goals to be met by: 25     Patient will increase functional independence with mobility by performin. Supine to sit with MInimal Assistance  2. Rolling to Left and Right with Minimal Assistance.  3. Bed to chair transfer with Moderate   4. Sitting at edge of bed x10 minutes with Contact Guard Assistance  5. Lower extremity exercise program x10 reps per handout, with assistance as needed  Goals met as of 25.    Updated Goals:   Supervision with bed mobility  Supervision with transfers  Supervision with RW for ambulation x 50'    Outcome: Progressing   Moderate Intensity Therapy recommended

## 2025-04-22 NOTE — SUBJECTIVE & OBJECTIVE
Interval History:  Episode of hypoglycemia noted overnight.  Patient seen in bed this morning, complaining of penile pain.  Medically stable for discharge, placement pending    Review of Systems  Objective:     Vital Signs (Most Recent):  Temp: 97.5 °F (36.4 °C) (04/22/25 0748)  Pulse: 90 (04/22/25 0748)  Resp: 19 (04/22/25 0748)  BP: 113/63 (04/22/25 0748)  SpO2: 98 % (04/22/25 0748) Vital Signs (24h Range):  Temp:  [96.7 °F (35.9 °C)-97.7 °F (36.5 °C)] 97.5 °F (36.4 °C)  Pulse:  [] 90  Resp:  [18-19] 19  SpO2:  [95 %-99 %] 98 %  BP: ()/(48-81) 113/63     Weight: 61.2 kg (134 lb 14.7 oz)  Body mass index is 21.13 kg/m².    Intake/Output Summary (Last 24 hours) at 4/22/2025 1101  Last data filed at 4/22/2025 0810  Gross per 24 hour   Intake 60 ml   Output 1642 ml   Net -1582 ml         Physical Exam  Constitutional:       General: He is not in acute distress.     Appearance: He is not ill-appearing, toxic-appearing or diaphoretic.   HENT:      Head: Normocephalic and atraumatic.   Cardiovascular:      Rate and Rhythm: Normal rate and regular rhythm.      Pulses: Normal pulses.      Heart sounds: Normal heart sounds. No murmur heard.     No friction rub.   Pulmonary:      Effort: Pulmonary effort is normal. No respiratory distress.      Breath sounds: Normal breath sounds. No stridor.   Abdominal:      General: Abdomen is flat.      Palpations: Abdomen is soft.   Neurological:      Mental Status: Mental status is at baseline.               Significant Labs: CBC:   Recent Labs   Lab 04/21/25  0437 04/22/25  0604   WBC 10.96 10.47   HGB 9.5* 9.3*   HCT 29.9* 30.5*   * 177     CMP:   Recent Labs   Lab 04/21/25  0437 04/22/25  0559    136   K 4.7 4.3   CL 98 96   CO2 20* 23   BUN 48* 35*   CREATININE 11.0* 9.0*   CALCIUM 9.6 9.3   ALBUMIN 2.9* 3.0*   BILITOT 1.1* 1.0   ALKPHOS 78 82   AST 35 32   ALT <5* <5*   ANIONGAP 18* 17*       Significant Imaging:   X-Ray Chest AP Portable   Final Result       As above.         Electronically signed by: Epifanio Lassiter   Date:    04/21/2025   Time:    17:35      IR Tunneled Catheter Insert w/o Port   Final Result      Insertion of right -sided supradiaphragmatic dual- lumen tunneled dialysis catheter, with tip in the expected location of the right atrium.      Plan:      The catheter may be used immediately.      _______________________________________________________________         Electronically signed by: William Pop   Date:    04/14/2025   Time:    10:21      XR NG/OG tube placement check, non-radiologist performed   Final Result      See above         Electronically signed by: Israel Carias MD   Date:    04/04/2025   Time:    14:39      X-Ray Chest 1 View   Final Result      Allowing for a poorer inspiratory depth level on the current exam and some differences in patient positioning, there has been no significant detrimental interval change in the appearance of the chest since 04/03/2025.  No post procedure pneumothorax, with vascular placement as above.         Electronically signed by: Israel Burks MD   Date:    04/04/2025   Time:    13:21

## 2025-04-22 NOTE — PLAN OF CARE
EDOUARD f/u with [facility]'s Admission Department to determine if patient's information for SNF had been reviewed.      EDOUARD advised by Admissions staff that the information was not seen in EPIC.  SW resubmitted information via EPIC.

## 2025-04-22 NOTE — PROGRESS NOTES
Vancomycin consult follow-up:    Patient reviewed, dialysis scheduled every Mon, Wed, Fri, no new levels, continue current therapy; Next levels due: random due 4/23/2025 at 0400

## 2025-04-22 NOTE — SUBJECTIVE & OBJECTIVE
Interval History: no acute events, tolerating Sharma    Review of Systems   Constitutional:  Negative for fever.   Respiratory:  Negative for chest tightness and wheezing.    Cardiovascular:  Negative for chest pain and palpitations.   Gastrointestinal:  Negative for abdominal pain, diarrhea, nausea and vomiting.   Genitourinary:  Negative for difficulty urinating, dysuria, flank pain and hematuria.   Musculoskeletal:  Negative for arthralgias.   Neurological:  Negative for dizziness.   Psychiatric/Behavioral:  Negative for confusion.      Objective:     Temp:  [96.7 °F (35.9 °C)-97.7 °F (36.5 °C)] 97.5 °F (36.4 °C)  Pulse:  [] 90  Resp:  [18-19] 19  SpO2:  [95 %-99 %] 98 %  BP: ()/(48-81) 113/63     Body mass index is 21.13 kg/m².      Bladder Scan Volume (mL): 331 mL (04/06/25 1520)    Drains       Drain  Duration                  Urethral Catheter 04/07/25 1558 Triple-lumen;Latex 24 Fr. 14 days         Hemodialysis Catheter 04/14/25 1011 right internal jugular 7 days                     Physical Exam  Vitals and nursing note reviewed.   Constitutional:       Appearance: He is well-developed.   HENT:      Head: Normocephalic.   Eyes:      Conjunctiva/sclera: Conjunctivae normal.   Neck:      Thyroid: No thyromegaly.      Trachea: No tracheal deviation.   Cardiovascular:      Rate and Rhythm: Normal rate.      Heart sounds: Normal heart sounds.   Pulmonary:      Effort: Pulmonary effort is normal. No respiratory distress.      Breath sounds: Normal breath sounds. No wheezing.   Abdominal:      General: Bowel sounds are normal.      Palpations: Abdomen is soft.      Tenderness: There is no abdominal tenderness. There is no rebound.      Hernia: No hernia is present.   Musculoskeletal:         General: No tenderness. Normal range of motion.      Cervical back: Normal range of motion and neck supple.   Lymphadenopathy:      Cervical: No cervical adenopathy.   Skin:     General: Skin is warm and dry.       "Findings: No erythema or rash.   Neurological:      Mental Status: He is alert and oriented to person, place, and time.   Psychiatric:         Behavior: Behavior normal.         Thought Content: Thought content normal.         Judgment: Judgment normal.           Significant Labs:    BMP:  Recent Labs   Lab 04/20/25  0847 04/21/25  0437 04/22/25  0559    136 136   K 4.4 4.7 4.3   CL 98 98 96   CO2 22* 20* 23   BUN 39* 48* 35*   CREATININE 9.5* 11.0* 9.0*   GLUCOSE 135* 62* 75   CALCIUM 9.0 9.6 9.3       CBC:   Recent Labs   Lab 04/20/25  0847 04/21/25  0437 04/22/25  0604   WBC 11.78 10.96 10.47   HGB 8.6* 9.5* 9.3*   HCT 27.4* 29.9* 30.5*    137* 177       Blood Culture: No results for input(s): "LABBLOO" in the last 168 hours.  Urine Culture: No results for input(s): "LABURIN" in the last 168 hours.    Significant Imaging:                  "

## 2025-04-22 NOTE — PROGRESS NOTES
"[facility] Medicine  Progress Note    Patient Name: Jevon Rajan  MRN: [id number]  Patient Class: IP- Inpatient   Admission Date: 4/3/2025  Length of Stay: 19 days  Attending Physician: Tena Palma MD  Primary Care Provider: Melia, Primary Doctor        Subjective     Principal Problem:Septic shock        HPI:  Mr Jevon Rajan is a 85 y.o. man with ESRD with LUE AVF, HFpEF last EF 50-55%, CAD, DM who was transferred to [facility] ICU for septic shock due to MRSA bacteremia.     He was admitted at [facility] 4/1-3/2025 with sepsis, worsening to septic shock, then identified source as MRSA. 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     Upon arrival to [facility], he is moaning in pain and his LUE AVF has active pulsatile bright red blood. He does respond to his name. He denies pain anywhere other than his LUE. He is on levophed at 0.05.     Overview/Hospital Course:  Mr Jevon Rajan is a 85 y.o. man with ESRD on HD with LUE AVF that has been bleeding, CHF, CAD s/p recent stenting 1/2025 who was admitted with MRSA bacteremia and bleeding from his LUE AVF. Continued vancomycin; weaned off levophed. Vascular surgery emergently consulted; on 4/3/25 they took him to OR and noted: "well incorporated proximal and central graft without evidence of infection, resected graft and covered proximal and central remnants with multiple layers. Mid graft removed in entirety and sent for culture. Ruptured pseudoaneurysm with infected hematoma, graft completely obliterated at mid segment." Surgical cultures with Staph. Suspect AVF or chronic scratching of pruritic skin is the source of his infection. TTE showed endocarditis: EF 40-45%, G1DD, RV systolic dysfunction, large 1.9x1.2cm fixed mass on the posterior mitral valve leaflet with moder to severe regurgitation, cannot rule out posterior mitral " valve leaflet perforation. Discussed with cardiac surgery; he is high mortality risk, and surgery is not advised. ID following. Nephrology consulted; trialysis was placed for HD. Persistently positive for MRSA in blood cultures. He has had urinary retention; Urology consulted, performed bedside cystoscopy on 4/6/25 with large prostate noted. Noted to have clot retention and went urgently to OR with Urology on 4/7/25; asa and DVT ppx held.     WBC normalized. S/p clot removal with Urology, 1U PRBC ordered this morning with Hb 6.1. Soft BPs, will add midodrine for HD to step down to floor. Glucoses high, will increase insulin. Attempted to s/d but BP too low, may still need CRRT rather. 4/8 cx clear so far. Increasing insulin again. Increasing midodrine to 15 mg TID. Hb 6.8, 1U PRBC ordered.     BP appears to be recovering a bit, perhaps will tolerate reg HD, will discuss w Neph- will run him on HD Saturday, if tolerates normal HD will remove central line and give 2 day holiday and place tunnel on Monday. We will give him HD before dc to facility on Tuesday if accepted by then.     WBC has normalized for the first time, now tolerating reg HD, and Blcx remain clear. Central line removed. NGT removed. Will step down.     Interval History:  Episode of hypoglycemia noted overnight.  Patient seen in bed this morning, complaining of penile pain.  Medically stable for discharge, placement pending    Review of Systems  Objective:     Vital Signs (Most Recent):  Temp: 97.5 °F (36.4 °C) (04/22/25 0748)  Pulse: 90 (04/22/25 0748)  Resp: 19 (04/22/25 0748)  BP: 113/63 (04/22/25 0748)  SpO2: 98 % (04/22/25 0748) Vital Signs (24h Range):  Temp:  [96.7 °F (35.9 °C)-97.7 °F (36.5 °C)] 97.5 °F (36.4 °C)  Pulse:  [] 90  Resp:  [18-19] 19  SpO2:  [95 %-99 %] 98 %  BP: ()/(48-81) 113/63     Weight: 61.2 kg (134 lb 14.7 oz)  Body mass index is 21.13 kg/m².    Intake/Output Summary (Last 24 hours) at 4/22/2025 1101  Last data  filed at 4/22/2025 0810  Gross per 24 hour   Intake 60 ml   Output 1642 ml   Net -1582 ml         Physical Exam  Constitutional:       General: He is not in acute distress.     Appearance: He is not ill-appearing, toxic-appearing or diaphoretic.   HENT:      Head: Normocephalic and atraumatic.   Cardiovascular:      Rate and Rhythm: Normal rate and regular rhythm.      Pulses: Normal pulses.      Heart sounds: Normal heart sounds. No murmur heard.     No friction rub.   Pulmonary:      Effort: Pulmonary effort is normal. No respiratory distress.      Breath sounds: Normal breath sounds. No stridor.   Abdominal:      General: Abdomen is flat.      Palpations: Abdomen is soft.   Neurological:      Mental Status: Mental status is at baseline.               Significant Labs: CBC:   Recent Labs   Lab 04/21/25 0437 04/22/25  0604   WBC 10.96 10.47   HGB 9.5* 9.3*   HCT 29.9* 30.5*   * 177     CMP:   Recent Labs   Lab 04/21/25 0437 04/22/25  0559    136   K 4.7 4.3   CL 98 96   CO2 20* 23   BUN 48* 35*   CREATININE 11.0* 9.0*   CALCIUM 9.6 9.3   ALBUMIN 2.9* 3.0*   BILITOT 1.1* 1.0   ALKPHOS 78 82   AST 35 32   ALT <5* <5*   ANIONGAP 18* 17*       Significant Imaging:   X-Ray Chest AP Portable   Final Result      As above.         Electronically signed by: Epifanio Lassiter   Date:    04/21/2025   Time:    17:35      IR Tunneled Catheter Insert w/o Port   Final Result      Insertion of right -sided supradiaphragmatic dual- lumen tunneled dialysis catheter, with tip in the expected location of the right atrium.      Plan:      The catheter may be used immediately.      _______________________________________________________________         Electronically signed by: William Pop   Date:    04/14/2025   Time:    10:21      XR NG/OG tube placement check, non-radiologist performed   Final Result      See above         Electronically signed by: Israel Carias MD   Date:    04/04/2025   Time:    14:39      X-Ray  Chest 1 View   Final Result      Allowing for a poorer inspiratory depth level on the current exam and some differences in patient positioning, there has been no significant detrimental interval change in the appearance of the chest since 04/03/2025.  No post procedure pneumothorax, with vascular placement as above.         Electronically signed by: Israel Burks MD   Date:    04/04/2025   Time:    13:21              Assessment & Plan  Septic shock  This patient has shock. The type of shock is distributive due to sepsis. The patient had the following evidence of shock: persistent hypotension and altered mental status. 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 Shock is now resolved and vasopressors are off. WBC back to normal  HTN (hypertension)  Patient's blood pressure range in the last 24 hours was: BP  Min: 92/50  Max: 125/70.The patient's inpatient anti-hypertensive regimen is listed below:  Current Antihypertensives       Plan  - admitted with shock  - now off vasopressors. Continue to hold BP Rx as BP is well controlled without it   HLD (hyperlipidemia)  - continue statin  Type 2 diabetes mellitus with hyperglycemia, without long-term current use of insulin  A1c:   Lab Results   Component Value Date    HGBA1C 5.7 (H) 04/02/2025     Meds: lantus + SSI PRN to maintain goal 140-180  Tube feeds  accuchecks, hypoglycemic protocol      Coronary artery disease involving native coronary artery of native heart without angina pectoris  Patient with known CAD s/p stent placement, which is controlled Will continue ASA, Plavix, and Statin and monitor for S/Sx of angina/ACS. Continue to monitor on  "telemetry.   - last [facility] was 1/2025: Severely calcified mid RCA stenosis unable to cross with PTCA balloons, treated initially with 0.9 laser atherectomy, followed by CSI atherectomy system with total of 5 runs (3 at low speed, 2 at high speed), pre-dilated using 2.0 compliant and 3.5 noncompliant balloon followed by 3.5 X 16 mm megatron stent.  Focal plaque rupture/erosion noted in the proximal and ostial RCA that were treated with  3.5 X 28 in the proximal RCA, 4.0 X 16 mm in the ostial RCA.  No residual stenosis post intervention.  Patient had ALAN 3 flow at the end of the procedure  - with elevated troponin likely due to septic shock  No results for input(s): "TROPONINI", "TROPONINIHS" in the last 168 hours.    - continue asa, plavix, statin  - Cardiology consulted  - no plans for ischemic evaluation at this time   ESRD on hemodialysis  - ESRD on HD via LUE AVF  - LUE AVF was actively bleeding on arrival on 4/3/25. Vascular surgery was consulted. He went emergently to the OR on 4/3/25: "Severely calcified mid RCA stenosis unable to cross with PTCA balloons, treated initially with 0.9 laser atherectomy, followed by CSI atherectomy system with total of 5 runs (3 at low speed, 2 at high speed), pre-dilated using 2.0 compliant and 3.5 noncompliant balloon followed by 3.5 X 16 mm megatron stent.  Focal plaque rupture/erosion noted in the proximal and ostial RCA that were treated with  3.5 X 28 in the proximal RCA, 4.0 X 16 mm in the ostial RCA.  No residual stenosis post intervention.  Patient had ALAN 3 flow at the end of the procedure"  - cultures from AVF= Staph   - wound care orders in place for surgical site  - Nephrology is consulted  - Rt IJ trialysis placed on 4/4/25 for CRRT;  - THDC placed on 04/14  Anemia in ESRD (end-stage renal disease)  Anemia is likely due to  ESRD, blood loss . Most recent hemoglobin and hematocrit are listed below.  Recent Labs     04/20/25  0847 04/21/25  0437 04/22/25  0604   HGB " "8.6* 9.5* 9.3*   HCT 27.4* 29.9* 30.5*     Plan  - Monitor serial CBC: Daily and recheck now  - Transfuse PRBC if patient becomes hemodynamically unstable, symptomatic or H/H drops below 7/21.  - Patient has not received any PRBC transfusions to date  - Patient's anemia is currently stable  Acute metabolic encephalopathy with Hospital Delirum  -Noted episodes of intermittent agitation with tactile and visual hallucinations  -Continue correction of metabolic derangements  -Maintain Delirium precautions: Maintain regular sleep cycle. Early ambulation. Minimal interruptions overnight. Re-orient patient frequently. Maintain adequate bowel regimen, hydration and electrolyte replenishments  -aspiration precautions  -continue Seroquel 25 mg HS      ACP (advance care planning)  Advance Care Planning     Date: 04/04/2025  - palliative consulted   - Mr Escoto specifically told Dr Jordan to contact Kenia Smyth for all updates  - discussed code status with Kenia. She states she has spoken with Mr Escoto's sisters and they all want full code status.   -anticipated DC to SNF when medically stable enough to tolerated HD and possible nursing home placement         Acute bacterial endocarditis  - TTE 4/4/25: "1.9x1.2cm large fixed heterogeneous mass present on the posterior leaflet (new finding vs 9/2023 echo). There is moderate to severe regurgitation with an eccentric jet. Cannot exclude severe MR with possible PMVL perforation in association with large vegetation."  - this is in the setting of MRSA bacteremia  - ID is consulted  - repeat blood cultures until clear   - suspect source is skin/chronic scratching vs AVF infection- cultures from resected AVF with Staph   - discussed with Cardiac surgery Dr Castro 4/4/25- given age and comorbidities, he is very high risk of mortality with surgery. He recommends medical management at this point.  - on vancomycin      MRSA bacteremia  - suspect source:  Graft infection " "s/p exploratory surgery of LUE with graft resection 4/3  -graft holiday with TDC placed on 04/14  - cultures of AVF with Staph   - he has endocarditis  - ID consulted  - on vanc ;anticipating completing 6 weeks of IV vancomycin from clearance (EOC: 5/19/25)     NSTEMI (non-ST elevated myocardial infarction)  - see CAD    BPH with urinary obstruction  - noted 4/6/25 when patient became very agitated and screaming he couldn't urinate  - nursing unable to place dunaway/coude  - Urology consulted. Bedside cystoscopy on 4/6/25 noted clots, large prostate. Catheter was placed but was then removed due to pain  - 4/7 he is again agitated that he cannot urinate  - follow up with Urology   - tamsulosin ordered if he is awake enough to swallow     Thrombocytopenia  The likely etiology of thrombocytopenia is infection and sepsis. The patients 3 most recent labs are listed below.  Recent Labs     04/20/25  0847 04/21/25  0437 04/22/25  0604    137* 177     Plan  - Will transfuse if platelet count is <10k.    AV fistula infection  - LUE AVF was actively bleeding on arrival on 4/3/25. Vascular surgery was consulted. He went emergently to the OR on 4/3/25: "Severely calcified mid RCA stenosis unable to cross with PTCA balloons, treated initially with 0.9 laser atherectomy, followed by CSI atherectomy system with total of 5 runs (3 at low speed, 2 at high speed), pre-dilated using 2.0 compliant and 3.5 noncompliant balloon followed by 3.5 X 16 mm megatron stent.  Focal plaque rupture/erosion noted in the proximal and ostial RCA that were treated with  3.5 X 28 in the proximal RCA, 4.0 X 16 mm in the ostial RCA.  No residual stenosis post intervention.  Patient had ALAN 3 flow at the end of the procedure"  - cultures from AVF= Staph   - wound care orders in place for surgical site  - trialysis currently in place for HD    Emphysema lung  - emphysema noted on CT  - no signs of COPD exacerbation  Pulmonary nodules  - CT 4/3/25 " with few small pulmonary nodules  - will need outpatient follow up     Gross hematuria  S/p clot evacuation with a fulguration by Urology  Currently resolved  Continue cap irrigation of 3 way catheter  Continue Levsin for bladder discomfort  -Sharma to be removed when patient is transferred to the floor; we will defer removal to urology    NSVT (nonsustained ventricular tachycardia)      Uremic pruritus  Patient complaining of severe intractable itching;[facility]  Start hydroxyzine  Ordered capsaicin cream   Korsuva  not available; [facility] never [facility]; can consider gabapentin  hemodynamic  and delirium allows  Will defer increased dialysis frequency; changing Kp/f ratios and other adjustments to nephrology      Moderate malnutrition  Nutrition consulted. Most recent weight and BMI monitored-     Measurements:  Wt Readings from Last 1 Encounters:   04/18/25 61.2 kg (134 lb 14.7 oz)   Body mass index is 21.13 kg/m².    Patient has been screened and assessed by RD.    Malnutrition Type:  Context: chronic illness  Level: moderate    Malnutrition Characteristic Summary:  Weight Loss (Malnutrition): greater than 7.5% in 3 months  Energy Intake (Malnutrition): less than or equal to 75% for greater than or equal to 1 month    Interventions/Recommendations (treatment strategy):  1. Continue on texture modified diet per SLP recommendations 2. Continue with Commercial beverage medical food supplement therapy: Novasource renal  3. Monitor labs and weights    VTE Risk Mitigation (From admission, onward)           Ordered     heparin (porcine) injection 1,000 Units  As needed (PRN)         04/05/25 2100     IP VTE HIGH RISK PATIENT  Once         04/03/25 1516     Place TEMO hose  Until discontinued         04/03/25 1516     Place sequential compression device  Until discontinued         04/03/25 1516     Reason for No Pharmacological VTE Prophylaxis  Once        Question:  Reasons:  Answer:  Physician  Provided (leave comment)    04/03/25 1516                    Discharge Planning   BAYRON: 4/22/2025     Code Status: DNR   Medical Readiness for Discharge Date:   Discharge Plan A: Skilled Nursing Facility   Discharge Delays: (!) Patient and Family Barriers                    Tena Palma MD  Department of Hospital Medicine   [facility]

## 2025-04-22 NOTE — PROGRESS NOTES
"[facility] [place] - Med Surg  Palliative Medicine  Progress Note    Patient Name: Jevon Rajan  MRN: [id number]  Admission Date: 4/3/2025  Hospital Length of Stay: 19 days  Code Status: DNR   Attending Provider: Tena Palma MD  Consulting Provider: Lisa Jacinto NP  Primary Care Physician: Melia, Primary Doctor  Principal Problem:Septic shock    Patient information was obtained from patient and primary team.      Assessment/Plan:     Neuro  Acute metabolic encephalopathy with Hospital Delirum  - ongoing waxing and waning of mental status and delirium; seems to be gradually improving overall  - pt continues to not have capacity for medical decision making     Derm  Uremic pruritus  - pt with chronic uremic pruritus that likely lead to wound of AV fistula with pt frequently scratching   - niece reported [facility] was authorizing "injection" (assuming Kursuva) for pt as OP due to severity; unfortunately not available at facility per MDT; consider coordination with [facility] as family shared they felt this was approved for pt; given inpatient status potential for administering to pt here as a non formulary medication if family is able to obtain; discussed with sisterHuong to discuss with family to attempt to contact pt's nephrology team   - pt previously was taking benadryl at home (discussed meds to avoid in elderly with niece and provided educational materials); discussed options with , nephrology and palliative medicine colleagues   - HM to order hydroxyzine( less risk of delirium although no antihistamine is ideal, discussed with team and family) and Capsaicin cream     Cardiac/Vascular  Acute bacterial endocarditis  - Mgmt per primary team/ID/cardiology  - pt with prior/related  MRSA bacteremia and sepsis   - valvular mass, he is being treated by antibiotics alone as he is not a surgical candidate  - discussed with pt directly on 4/16, will assess memory and understanding of this discussion again with future visits  - " dicussed need for prolonged abx therapy and likely need for SNF vs LTACH following admission, pt agreeable   - Illness trajectory education provided     Renal/  BPH with urinary obstruction  - urology following; required CBI and dunaway this admission   - very difficult catheter placement per MDT; urology with planned involvement in any further dunaway needs     ESRD on hemodialysis  - Nephrology following; pt had difficulty tolerating HD initially on admission, but improved; additional event 4/14 unclear if related to HD  - pt has tolerated HD since  - family aware of this and with insight that this is a large factor in pt's prognosis   - family with insight that HD is a form of life support; wish to continue as long as pt tolerates/candidate     ID  * Septic shock  - Secondary to MRSA bacteremia from infected fistula   - ID following    AV fistula infection  - Abx, mgmt per primary team/vascular surgery/ID; s/p excision of infected graft   - pt now with tunneled line placed this admission by IR for continued HD     MRSA bacteremia  - Abx, mgmt per primary team and ID; per ID, prognosis is poor if cultures do not clear, given that he is not a surgical candidate.   - repeat blood cultures so far with no growth but not finalized, also some continued improvement in overall mental status showing response to current treatment   - Illness trajectory education provided to family      Palliative Care  ACP (advance care planning)  4/22/2025  - interval chart reviewed; pt discussed with HM   - met with pt at bedside; delirium with some improvement, especially with pts cooperation and less anxious/paranoid   - continues to lack capacity for medical decision making   - pt does not recall events of hospitalization and lacks insight into health care conditions or needs   - pt does not recall family visiting during hospitalization; reminded him that his sisters and brother have visited several times; informed pt that currently his  siblings are making his medical decisions which he expressed approval for   - discussed plan for SNF  - emotional support provided; allowed time for questions   - have asked palliative , Margaret, to follow up with siblings for completion of LAPOST prior to discharge   4/17/2025  - interval chart reviewed discussion pt with Mdt   - discussed with MDT concerns for pt's varied direction on preferred MPOA and overall concern that pt has potentially lacked even understanding of the concepts of MPOA; given variation in responses and encephalopathy to some degree since admission it is my recommendation and agreement of MDT to defer to pt's legal surrogate decision makers who are his 4 siblings; plan for and encouragement of shared family decision making, including 2 nieces (Teodora and Senthil)  who have played an active role in pt's ou tpt care per siblings   - met with pt and sisters Bhavna and Ronna at bedside   - brief medical update provided to sisters  - discussed above decision maker decision by MDT; Ronna and Bhavna are happy to hear this and are hopeful for continued shared family decision making   - revisited prior code status discussion that has been ongoing throughout admission; 2/4 siblings, Ronna and Bhavna, continue to advocate that pt had previously expressed wishes for what aligns with DNR code status; they confirm all 4 siblings are in agreement with this decision; confirmed that DNR order would be placed   - reviewed efforts for improving delirium   - reviewed likely plan/needs for SNF vs LTACH for 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 and prior bacteremia and PT/OT due to deconditioning; they are in agreement with this plan; they wish prioritize [facility] facilities in options and alternatively facilities closer to their home, would consider [facility] as well   - siblings with good insight that pt's ability to safely live independently at home in the future is unlikely   - emotional  support provided; answered all questions; expressed continued palliative availability throughout admission   - updated MDT     4/16/2025  - interval chart reviewed; pt discussed with MDT   - pt with significant improvement in mental status since initiation of mood and sleep med regimen, delirium precautions, and transition to a room with window in coordination with Dr. Palma,  on 4/15  - re-introduction to myself and palliative medicine team, re-discussion role in admission and care; pt does share recognizing me from care but does not recall details of discussions   - pt still with some capacity concerns, will plan for continued assessment and coordination with /MDT for determining if/when pt has capacity for medical decision making; at current time I feel pt does have some understanding of basics of care and can make needs known, but is lacking capacity for complex medical decision making   - I do feel pt may have capacity for simple wishes such as preferred MPOA but will allow for consistent improvement in mental status before I would feel comfortable with pt formally appointing an MPOA   - previously in admission, pt reported to then , Dr. Lyles, that his niece Senthil is who team should call for medical decisions; pt now saying that niece Teodora is who he would wish to appoint but wants Senthil to receive updates etc; pt confirms updates can be provided to his 4 siblings and to these 2 nieces   - clarified with pt that legal surrogate decision makers currently would be his 4 siblings, if he were to lack capacity; expressed hope that pt would continue to have improved mental status and could make his own decisions and appoint MPOA  - pt reports living in his own home independently prior to admission; he feels that prior to becoming ill and seeking care weeks prior to admission he did not have difficulty caring for himself with PRN assistance from family   - shares that he had previously explored [facility]  "options for nursing home/assisted living but OOP cost affected decision, with preference to make necessary improvements to home for safety and hire care assistance when needed as alternative   - [facility] and has active [facility] social work team   - assisted pt in calling dez Ndiaye as he wished she bring his cell phone and some items from home; updated Senthil of pt's improved status   - also attempted to call pt's sister Ronna, who has visited several times during admission, to update on improved mental status and to attempt to get Teodora garces's number for update as well; LM and provided return call number     Please see multiple prior palliative/ACP notes from myself and Dr. Joan Antunez for prior GOC/ACP discussions         I will follow-up with patient. Please contact us if you have any additional questions.    Subjective:     Chief Complaint:   Chief Complaint   Patient presents with    Bleeding/Bruising       HPI:   From H&P: " Mr Jevon Rajan is a 85 y.o. man with ESRD with LUE AVF, HFpEF last EF 50-55%, CAD, DM who was transferred to [facility] ICU for septic shock due to MRSA bacteremia.      He was [facility] at [facility] 4/1-3/2025 with sepsis, worsening to septic shock, then identified source as MRSA. 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      Upon arrival to [facility], he is moaning in pain and his LUE AVF has active pulsatile bright red blood. He does respond to his name. He denies pain anywhere other than his LUE. He is on levophed at 0.05. "     Palliative medicine consulted for goals of care discussion and advance care planning; for details of visit, see advance care planning section of plan.       Hospital Course:  No notes on file    Medications:  Continuous Infusions:  Scheduled Meds:   atorvastatin  80 mg Oral QHS    capsaicin   " Topical (Top) BID    clopidogreL  75 mg Oral Daily    epoetin mj-epbx  10,000 Units Intravenous Every Mon, Wed, Fri    midodrine  15 mg Oral Q8H    pantoprazole  40 mg Oral Daily    QUEtiapine  25 mg Oral QHS    sevelamer carbonate  1,600 mg Oral TID WM    tamsulosin  0.4 mg Oral Daily     PRN Meds:  Current Facility-Administered Medications:     0.9%  NaCl infusion (for blood administration), , Intravenous, Q24H PRN    0.9% NaCl, , Intravenous, PRN    acetaminophen, 650 mg, Oral, Q4H PRN    albumin human 25%, 25 g, Intravenous, Daily PRN    dextrose 50%, 12.5 g, Intravenous, PRN    dextrose 50%, 25 g, Intravenous, PRN    glucagon (human recombinant), 1 mg, Intramuscular, PRN    glucose, 16 g, Oral, PRN    glucose, 24 g, Oral, PRN    haloperidol lactate, 2 mg, Intravenous, Q4H PRN    heparin (porcine), 1,000 Units, Intravenous, PRN    hydrOXYzine HCL, 25 mg, Oral, TID PRN    hyoscyamine, 0.125 mg, Sublingual, Q4H PRN    insulin aspart U-100, 0-5 Units, Subcutaneous, QID (AC + HS) PRN    melatonin, 6 mg, Oral, Nightly PRN    naloxone, 0.02 mg, Intravenous, PRN    ondansetron, 4 mg, Intravenous, Q6H PRN    prochlorperazine, 5 mg, Intravenous, Q6H PRN    senna, 8.6 mg, Oral, Daily PRN    sodium chloride 0.9%, 250 mL, Intravenous, PRN    Pharmacy to dose Vancomycin consult, , , Once **AND** vancomycin - pharmacy to dose, , Intravenous, pharmacy to manage frequency    Objective:     Vital Signs (Most Recent):  Temp: 97.6 °F (36.4 °C) (04/21/25 1106)  Pulse: (!) 127 (04/21/25 1505)  Resp: 18 (04/21/25 1106)  BP: 125/70 (04/21/25 1106)  SpO2: 95 % (04/21/25 1106) Vital Signs (24h Range):  Temp:  [97.4 °F (36.3 °C)-98.1 °F (36.7 °C)] 97.6 °F (36.4 °C)  Pulse:  [] 127  Resp:  [17-18] 18  SpO2:  [95 %-99 %] 95 %  BP: (105-125)/(55-70) 125/70     Weight: 61.2 kg (134 lb 14.7 oz)  Body mass index is 21.13 kg/m².     Physical Exam  Vitals and nursing note reviewed.   Constitutional:       General: He is awake.       Appearance: He is ill-appearing.   HENT:      Head: Normocephalic and atraumatic.   Pulmonary:      Effort: Pulmonary effort is normal. No respiratory distress.   Skin:     General: Skin is dry.      Findings: Bruising: multiple skin lesions in various stages of healing.   Neurological:      Mental Status: He is alert. He is disoriented.      Comments: Oriented to self, location of hospital, and family   But holding phone with disconnected call sound to ear    Not at baseline, but improved cooperativeness   Psychiatric:         Behavior: Behavior is agitated. Behavior is cooperative.         Cognition and Memory: Memory is impaired.          Advance Care Planning   Advance Directives:   Living Will: No    LaPOST: No    Do Not Resuscitate Status: No    Medical Power of : No (previously had shared with MDT that he wished dez Ndiaye to act as MPOA; now states dez Araiza; 4 siblings are legal surrogate decision makers otherwise and reccomended (see ACP))      Decision Making:  Family answered questions and Patient answered questions  Goals of Care: What is most important right now is to focus on symptom/pain control, curative/life-prolongation (regardless of treatment burdens), improvement in condition but with limits to invasive therapies. Accordingly, we have decided that the best plan to meet the patient's goals includes continuing with treatment.       Significant Labs: All pertinent labs within the past 24 hours have been reviewed.  CBC:   Recent Labs   Lab 04/21/25  0437   WBC 10.96   HGB 9.5*   HCT 29.9*   MCV 90   *     BMP:  Recent Labs   Lab 04/21/25  0437      K 4.7   CL 98   CO2 20*   BUN 48*   CREATININE 11.0*   CALCIUM 9.6     LFT:  Lab Results   Component Value Date    AST 35 04/21/2025    ALKPHOS 78 04/21/2025    BILITOT 1.1 (H) 04/21/2025     Albumin:   Albumin   Date Value Ref Range Status   04/21/2025 2.9 (L) 3.5 - 5.2 g/dL Final   03/21/2025 3.2 (L) 3.5 - 5.2 g/dL Final      Protein:   Total Protein   Date Value Ref Range Status   03/21/2025 7.5 6.0 - 8.4 g/dL Final     Lactic acid:   Lab Results   Component Value Date    LACTATE 1.9 04/14/2025    LACTATE 0.9 04/03/2025     Significant Imaging: I have reviewed all pertinent imaging results/findings within the past 24 hours.    Total visit time: 35 minutes    35 min visit time including: face to face time in discussion of symptom assessment, and exploring options and burdens of offered treatments.  This also includes non-face to face time preparing to see the patient including chart review, obtaining and/or reviewing separately obtained history, documenting clinical information in the electronic or other health record, independently interpreting results and communicating results to the patient/family/caregiver, family discussions by phone if not able to be present, coordination of care with other specialists, and discharge planning.     ACP time spent included in visit time: goals of care, advanced care planning, emotional support, formulating and communicating prognosis, exploring burden/ benefit of various approaches of treatment.     Lisa Jacinto NP  Palliative Medicine  [facility] [place] [facility] Surg

## 2025-04-22 NOTE — ASSESSMENT & PLAN NOTE
The likely etiology of thrombocytopenia is infection and sepsis. The patients 3 most recent labs are listed below.  Recent Labs     04/20/25  0847 04/21/25  0437 04/22/25  0604    137* 177     Plan  - Will transfuse if platelet count is <10k.

## 2025-04-23 LAB
ABSOLUTE EOSINOPHIL (OHS): 0.77 K/UL
ABSOLUTE MONOCYTE (OHS): 0.98 K/UL (ref 0.3–1)
ABSOLUTE NEUTROPHIL COUNT (OHS): 7.33 K/UL (ref 1.8–7.7)
ALBUMIN SERPL BCP-MCNC: 2.8 G/DL (ref 3.5–5.2)
ALP SERPL-CCNC: 77 UNIT/L (ref 40–150)
ALT SERPL W/O P-5'-P-CCNC: 5 UNIT/L (ref 10–44)
ANION GAP (OHS): 14 MMOL/L (ref 8–16)
ANION GAP (OHS): 15 MMOL/L (ref 8–16)
AST SERPL-CCNC: 25 UNIT/L (ref 11–45)
BASOPHILS # BLD AUTO: 0.03 K/UL
BASOPHILS NFR BLD AUTO: 0.3 %
BILIRUB SERPL-MCNC: 0.8 MG/DL (ref 0.1–1)
BUN SERPL-MCNC: 14 MG/DL (ref 8–23)
BUN SERPL-MCNC: 49 MG/DL (ref 8–23)
CALCIUM SERPL-MCNC: 9.2 MG/DL (ref 8.7–10.5)
CALCIUM SERPL-MCNC: 9.2 MG/DL (ref 8.7–10.5)
CHLORIDE SERPL-SCNC: 97 MMOL/L (ref 95–110)
CHLORIDE SERPL-SCNC: 97 MMOL/L (ref 95–110)
CO2 SERPL-SCNC: 25 MMOL/L (ref 23–29)
CO2 SERPL-SCNC: 26 MMOL/L (ref 23–29)
CREAT SERPL-MCNC: 11.2 MG/DL (ref 0.5–1.4)
CREAT SERPL-MCNC: 4.8 MG/DL (ref 0.5–1.4)
ERYTHROCYTE [DISTWIDTH] IN BLOOD BY AUTOMATED COUNT: 16.1 % (ref 11.5–14.5)
GFR SERPLBLD CREATININE-BSD FMLA CKD-EPI: 11 ML/MIN/1.73/M2
GFR SERPLBLD CREATININE-BSD FMLA CKD-EPI: 4 ML/MIN/1.73/M2
GLUCOSE SERPL-MCNC: 129 MG/DL (ref 70–110)
GLUCOSE SERPL-MCNC: 150 MG/DL (ref 70–110)
HCT VFR BLD AUTO: 28.9 % (ref 40–54)
HGB BLD-MCNC: 8.8 GM/DL (ref 14–18)
HOLD SPECIMEN: NORMAL
HOLD SPECIMEN: NORMAL
IMM GRANULOCYTES # BLD AUTO: 0.04 K/UL (ref 0–0.04)
IMM GRANULOCYTES NFR BLD AUTO: 0.4 % (ref 0–0.5)
LYMPHOCYTES # BLD AUTO: 0.75 K/UL (ref 1–4.8)
MAGNESIUM SERPL-MCNC: 1.9 MG/DL (ref 1.6–2.6)
MCH RBC QN AUTO: 28.3 PG (ref 27–31)
MCHC RBC AUTO-ENTMCNC: 30.4 G/DL (ref 32–36)
MCV RBC AUTO: 93 FL (ref 82–98)
NUCLEATED RBC (/100WBC) (OHS): 0 /100 WBC
PHOSPHATE SERPL-MCNC: 7.9 MG/DL (ref 2.7–4.5)
PLATELET # BLD AUTO: 170 K/UL (ref 150–450)
PMV BLD AUTO: 11.8 FL (ref 9.2–12.9)
POCT GLUCOSE: 119 MG/DL (ref 70–110)
POCT GLUCOSE: 143 MG/DL (ref 70–110)
POCT GLUCOSE: 190 MG/DL (ref 70–110)
POTASSIUM SERPL-SCNC: 3.7 MMOL/L (ref 3.5–5.1)
POTASSIUM SERPL-SCNC: 4.3 MMOL/L (ref 3.5–5.1)
PROT SERPL-MCNC: 7.8 GM/DL (ref 6–8.4)
RBC # BLD AUTO: 3.11 M/UL (ref 4.6–6.2)
RELATIVE EOSINOPHIL (OHS): 7.8 %
RELATIVE LYMPHOCYTE (OHS): 7.6 % (ref 18–48)
RELATIVE MONOCYTE (OHS): 9.9 % (ref 4–15)
RELATIVE NEUTROPHIL (OHS): 74 % (ref 38–73)
SODIUM SERPL-SCNC: 137 MMOL/L (ref 136–145)
SODIUM SERPL-SCNC: 137 MMOL/L (ref 136–145)
VANCOMYCIN SERPL-MCNC: 17.4 UG/ML (ref ?–80)
WBC # BLD AUTO: 9.9 K/UL (ref 3.9–12.7)

## 2025-04-23 PROCEDURE — C1752 CATH,HEMODIALYSIS,SHORT-TERM: HCPCS

## 2025-04-23 PROCEDURE — 97168 OT RE-EVAL EST PLAN CARE: CPT

## 2025-04-23 PROCEDURE — 63600175 PHARM REV CODE 636 W HCPCS: Mod: JZ,TB | Performed by: STUDENT IN AN ORGANIZED HEALTH CARE EDUCATION/TRAINING PROGRAM

## 2025-04-23 PROCEDURE — 97530 THERAPEUTIC ACTIVITIES: CPT

## 2025-04-23 PROCEDURE — 84100 ASSAY OF PHOSPHORUS: CPT | Performed by: INTERNAL MEDICINE

## 2025-04-23 PROCEDURE — 80053 COMPREHEN METABOLIC PANEL: CPT | Performed by: HOSPITALIST

## 2025-04-23 PROCEDURE — 63600175 PHARM REV CODE 636 W HCPCS: Performed by: INTERNAL MEDICINE

## 2025-04-23 PROCEDURE — 94761 N-INVAS EAR/PLS OXIMETRY MLT: CPT

## 2025-04-23 PROCEDURE — 25000003 PHARM REV CODE 250: Performed by: HOSPITALIST

## 2025-04-23 PROCEDURE — 63600175 PHARM REV CODE 636 W HCPCS: Performed by: STUDENT IN AN ORGANIZED HEALTH CARE EDUCATION/TRAINING PROGRAM

## 2025-04-23 PROCEDURE — 25000003 PHARM REV CODE 250: Performed by: INTERNAL MEDICINE

## 2025-04-23 PROCEDURE — 25000003 PHARM REV CODE 250

## 2025-04-23 PROCEDURE — 21400001 HC TELEMETRY ROOM

## 2025-04-23 PROCEDURE — 36415 COLL VENOUS BLD VENIPUNCTURE: CPT | Performed by: HOSPITALIST

## 2025-04-23 PROCEDURE — 80202 ASSAY OF VANCOMYCIN: CPT

## 2025-04-23 PROCEDURE — 90935 HEMODIALYSIS ONE EVALUATION: CPT

## 2025-04-23 PROCEDURE — 36415 COLL VENOUS BLD VENIPUNCTURE: CPT | Performed by: STUDENT IN AN ORGANIZED HEALTH CARE EDUCATION/TRAINING PROGRAM

## 2025-04-23 PROCEDURE — 83735 ASSAY OF MAGNESIUM: CPT | Performed by: STUDENT IN AN ORGANIZED HEALTH CARE EDUCATION/TRAINING PROGRAM

## 2025-04-23 PROCEDURE — 85025 COMPLETE CBC W/AUTO DIFF WBC: CPT | Performed by: HOSPITALIST

## 2025-04-23 PROCEDURE — 63600175 PHARM REV CODE 636 W HCPCS

## 2025-04-23 PROCEDURE — 97535 SELF CARE MNGMENT TRAINING: CPT

## 2025-04-23 PROCEDURE — 25000003 PHARM REV CODE 250: Performed by: STUDENT IN AN ORGANIZED HEALTH CARE EDUCATION/TRAINING PROGRAM

## 2025-04-23 PROCEDURE — 99232 SBSQ HOSP IP/OBS MODERATE 35: CPT | Mod: ,,, | Performed by: PHYSICIAN ASSISTANT

## 2025-04-23 PROCEDURE — P9047 ALBUMIN (HUMAN), 25%, 50ML: HCPCS | Performed by: INTERNAL MEDICINE

## 2025-04-23 RX ORDER — MAGNESIUM SULFATE 1 G/100ML
1 INJECTION INTRAVENOUS ONCE
Status: COMPLETED | OUTPATIENT
Start: 2025-04-23 | End: 2025-04-23

## 2025-04-23 RX ADMIN — CAPSAICIN: 0.25 CREAM TOPICAL at 08:04

## 2025-04-23 RX ADMIN — EPOETIN ALFA-EPBX 10000 UNITS: 10000 INJECTION, SOLUTION INTRAVENOUS; SUBCUTANEOUS at 08:04

## 2025-04-23 RX ADMIN — MAGNESIUM SULFATE 1 G: 1 INJECTION INTRAVENOUS at 08:04

## 2025-04-23 RX ADMIN — ALBUMIN (HUMAN) 25 G: 12.5 SOLUTION INTRAVENOUS at 04:04

## 2025-04-23 RX ADMIN — PANTOPRAZOLE SODIUM 40 MG: 40 TABLET, DELAYED RELEASE ORAL at 08:04

## 2025-04-23 RX ADMIN — TAMSULOSIN HYDROCHLORIDE 0.4 MG: 0.4 CAPSULE ORAL at 08:04

## 2025-04-23 RX ADMIN — SEVELAMER CARBONATE 1600 MG: 800 TABLET, FILM COATED ORAL at 12:04

## 2025-04-23 RX ADMIN — MIDODRINE HYDROCHLORIDE 15 MG: 5 TABLET ORAL at 06:04

## 2025-04-23 RX ADMIN — VANCOMYCIN HYDROCHLORIDE 500 MG: 500 INJECTION, POWDER, LYOPHILIZED, FOR SOLUTION INTRAVENOUS at 06:04

## 2025-04-23 RX ADMIN — QUETIAPINE FUMARATE 25 MG: 25 TABLET ORAL at 08:04

## 2025-04-23 RX ADMIN — ATORVASTATIN CALCIUM 80 MG: 40 TABLET, FILM COATED ORAL at 08:04

## 2025-04-23 RX ADMIN — CLOPIDOGREL BISULFATE 75 MG: 75 TABLET, FILM COATED ORAL at 08:04

## 2025-04-23 RX ADMIN — MIDODRINE HYDROCHLORIDE 15 MG: 5 TABLET ORAL at 09:04

## 2025-04-23 RX ADMIN — Medication 6 MG: at 09:04

## 2025-04-23 RX ADMIN — MIDODRINE HYDROCHLORIDE 15 MG: 5 TABLET ORAL at 02:04

## 2025-04-23 RX ADMIN — SEVELAMER CARBONATE 1600 MG: 800 TABLET, FILM COATED ORAL at 08:04

## 2025-04-23 NOTE — PROGRESS NOTES
Pharmacokinetic Assessment Follow Up: IV Vancomycin    Vancomycin serum concentration assessment(s):    The random level was drawn correctly and can be used to guide therapy at this time. The measurement is within the desired definitive target range of 15 to 20 mcg/mL.    Vancomycin Regimen Plan:    Give Vancomycin 500 mg x1 dose after HD and obtain random level 4/25 at 0400    Drug levels (last 3 results):  Recent Labs   Lab Result Units 04/21/25 0437 04/23/25  0431   Vancomycin Random ug/ml 20.3 17.4       Pharmacy will continue to follow and monitor vancomycin.    Please contact pharmacy at extension [phone] for questions regarding this assessment.    Thank you for the consult,   Hesham Hernandez       Patient brief summary:  Jevon Rajan is a 85 y.o. male initiated on antimicrobial therapy with IV Vancomycin for treatment of bacteremia    Drug Allergies:   Review of patient's allergies indicates:   Allergen Reactions    Ace inhibitors Hives, Itching, Shortness Of Breath, Other (See Comments) and Rash     Is not sure which medication it was    Captopril        Actual Body Weight:   61.2 kg    Renal Function:   Estimated Creatinine Clearance: 5 mL/min (A) (based on SCr of 9 mg/dL (H)).,     Dialysis Method (if applicable):  intermittent HD    CBC (last 72 hours):  Recent Labs   Lab Result Units 04/20/25  0847 04/21/25 0437 04/22/25  0604 04/23/25  0431   WBC K/uL 11.78 10.96 10.47 9.90   HGB gm/dL 8.6* 9.5* 9.3* 8.8*   HCT % 27.4* 29.9* 30.5* 28.9*   Platelet Count K/uL 170 137* 177 170   Lymph % % 6.5* 8.9* 8.2* 7.6*   Mono % % 9.2 10.9 11.4 9.9   Eos % % 7.2 9.4* 7.6 7.8   Basophil % % 0.3 0.4 0.6 0.3       Metabolic Panel (last 72 hours):  Recent Labs   Lab Result Units 04/20/25  0847 04/21/25  0437 04/22/25  0559 04/23/25  0431   Sodium mmol/L 137 136 136  --    Potassium mmol/L 4.4 4.7 4.3  --    Chloride mmol/L 98 98 96  --    CO2 mmol/L 22* 20* 23  --    Glucose mg/dL 135* 62* 75  --    BUN mg/dL 39*  48* 35*  --    Creatinine mg/dL 9.5* 11.0* 9.0*  --    Albumin g/dL 2.8* 2.9* 3.0*  --    Bilirubin Total mg/dL 1.0 1.1* 1.0  --    ALP unit/L 78 78 82  --    AST unit/L 30 35 32  --    ALT unit/L 9* <5* <5*  --    Phosphorus Level mg/dL  --  8.4*  --  7.9*       Vancomycin Administrations:  vancomycin given in the last 96 hours        No antibiotic orders with administrations found.                    Microbiologic Results:  Microbiology Results (last 7 days)       Procedure Component Value Units Date/Time    Fungus culture [[id number]]  (Normal) Collected: 04/03/25 1943    Order Status: Completed Specimen: Wound from Arm, Left Updated: 04/22/25 0947     Fungal Culture No Fungus Isolated at 2 Weeks    Fungus culture [[id number]]  (Normal) Collected: 04/03/25 1954    Order Status: Completed Specimen: Wound from Arm, Left Updated: 04/22/25 0947     Fungal Culture No Fungus Isolated at 2 Weeks    Fungus culture [[id number]]  (Normal) Collected: 04/03/25 1816    Order Status: Completed Specimen: Wound from Arm, Left Updated: 04/17/25 2300     Fungal Culture No Fungus Isolated at 2 Weeks    Fungus culture [[id number]]  (Normal) Collected: 04/03/25 1934    Order Status: Completed Specimen: Tissue from AV Fistula, Left Updated: 04/17/25 2300     Fungal Culture No Fungus Isolated at 2 Weeks    Fungus culture [[id number]]  (Normal) Collected: 04/03/25 2011    Order Status: Completed Specimen: Wound from Arm, Left Updated: 04/17/25 2300     Fungal Culture No Fungus Isolated at 2 Weeks    Fungus culture [[id number]]  (Normal) Collected: 04/03/25 1904    Order Status: Completed Specimen: Tissue from AV Fistula, Left Updated: 04/17/25 2201     Fungal Culture No Fungus Isolated at 2 Weeks    Fungus culture [[id number]]  (Normal) Collected: 04/03/25 1921    Order Status: Completed Specimen: Tissue from hematoma Updated: 04/17/25 2201     Fungal Culture No Fungus Isolated at 2 Weeks    Blood culture [[id number]]  (Normal)  Collected: 04/11/25 1335    Order Status: Completed Specimen: Blood Updated: 04/16/25 1401     Blood Culture No Growth After 5 Days    Blood culture [[id number]]  (Normal) Collected: 04/11/25 1335    Order Status: Completed Specimen: Blood from Other (Specify) Updated: 04/16/25 1401     Blood Culture No Growth After 5 Days

## 2025-04-23 NOTE — NURSING
Bed alarm goes off and fall risk monitoring alarmed at the bedside. Pt trying to get out of the bed and asking for pants and boots. Pt is alert and confused. Pt was easily redirected and put him back to the bed. Room reoriented. Placed boots. Bed alarm on. Side rails up. Fall risk monitoring at the bedside. Bed in low position. Care ongoing.

## 2025-04-23 NOTE — PT/OT/SLP RE-EVAL
Occupational Therapy   Re-evaluation    Name: Jevon Rajan  MRN: [id number]  Admitting Diagnosis:  Septic shock  Recent Surgery: Procedure(s) (LRB):  CYSTOSCOPY WITH CLOT EVACUATION, FULGURATION, GARCES PLACEMENT (N/A) 16 Days Post-Op    Recommendations:     Discharge Recommendations: Moderate Intensity Therapy  Discharge Equipment Recommendations: to be determined by next level of care  Barriers to discharge:   (delayed/impaired command following, confusion, increased need of assist, deconditioned)    Assessment:     Jevon Rajan is a 85 y.o. male with a medical diagnosis of Septic shock.  He presents with The primary encounter diagnosis was Thrombosis of arteriovenous graft, initial encounter. Diagnoses of Septic shock, Chest pain, MRSA bacteremia, ESRD on dialysis, ESRD on hemodialysis, Elevated troponin, NSTEMI (non-ST elevated myocardial infarction), Urinary retention, Anemia in ESRD (end-stage renal disease), Infection of arteriovenous fistula, initial encounter [T82.7XXA], ACP (advance care planning), Acute bacterial endocarditis, BPH with urinary obstruction, and Acute metabolic encephalopathy were also pertinent to this visit. .  Performance deficits affecting function are weakness, impaired endurance, impaired self care skills, impaired functional mobility, impaired balance, impaired cognition, decreased upper extremity function, decreased lower extremity function, decreased safety awareness, decreased ROM, impaired skin, impaired cardiopulmonary response to activity.       OT re-eval completed. Limited by soft BP and confusion with impaired sequencing and attention to task; pt able to perform standing and side steps with CGA-Min A. /58 after therex. Pt would benefit from skilled OT to maximize function prior to d/c. Recommend DEVAUGHN once medically stable.    Rehab Prognosis:  Good; patient would benefit from acute skilled OT services to address these deficits and reach maximum level of function.    "    Plan:     Patient to be seen 3 x/week to address the above listed problems via self-care/home management, therapeutic activities, therapeutic exercises  Plan of Care Expires: 05/07/25  Plan of Care Reviewed with: patient    Subjective     Chief Complaint: no complaints  Patient/Family stated goals: "I'm ready to go" and then "can I lie back down?" Pt on phone at OT entry but hung up and handed turned off phone to OT to place on table; pt talking out loud at end of session and responding to OT's questions that he's on the phone talking with phone noted to be on table  Communicated with: RN prior to session.  Pain/Comfort:  Pain Rating 1: 0/10  Pain Addressed 1: Reposition  Pain Rating Post-Intervention 1: 0/10    Objective:     Communicated with: RN prior to session.  Patient found HOB elevated with: bed alarm, telemetry, peripheral IV, dunaway catheter (R dialysis cath) upon OT entry to room.    General Precautions: Standard, fall, aspiration  Orthopedic Precautions: N/A  Braces: N/A  Respiratory Status: Room air    Occupational Performance:    Bed Mobility:    Patient completed Scooting/Bridging with stand by assistance  Patient completed Supine to Sit with stand by assistance  Patient completed Sit to Supine with stand by assistance    Functional Mobility/Transfers:  Patient completed 2x Sit <> Stand Transfer with contact guard assistance  with  hand-held assist   Functional Mobility: Pt tolerated sitting eob for ADLs and assessment with SBA static/dynamic balance. Side steps with Min A/HHA. BP monitored throughout: 97/55 in supine, 93/51 reassessed in supine, 100/51 after guided therex supine with hob elevated to 70 deg, 103/55 seated eob, 118/58 seated eob after guided therex. No adverse s/s. RN notified    Activities of Daily Living:  Feeding:  contact guard assistance for hand under hand guidance to reduce spillage and promote full ROM to bring drink 1/3 filled to mouth with B coordination to coordinate " straw with R/L  Upper Body Dressing: moderate assistance to drape gown over back and sequence sleeves  Lower Body Dressing: stand by assistance seated eob to doff/re-don socks    Cognitive/Visual Perceptual:  Cognitive/Psychosocial Skills:     -       Oriented to: Person   -       Follows Commands/attention:Easily distracted, Follows one-step commands, and Follows two-step commands  -       Communication: clear/fluent  -       Memory: Impaired STM  -       Safety awareness/insight to disability: impaired   -       Mood/Affect/Coping skills/emotional control: Cooperative and Pleasant    Physical Exam:  Balance:    -       Fair+ sit, Fair- stand  Dominant hand:    -       R  Upper Extremity Range of Motion:     -       Right Upper Extremity: grossly WFL; shldr flx and ER to 65% of range  -       Left Upper Extremity: grossly WFL; shldr flx and ER to 65% of range  Upper Extremity Strength:    -       Right Upper Extremity: 3+/5  -       Left Upper Extremity: 3+/5   Strength:    -       Right Upper Extremity: impaired  -       Left Upper Extremity: impaired  Gross motor coordination:   WFL    AMPA 6 Click:  AMPA Total Score: 15    Treatment & Education:  Patient educated on role of OT/ POC development. Co-eval with PT to maximize function and safety. Occupational profile developed via interview. Patient guided to transition eob for assessment. Initiated ADL / functional mobility training as above with instruction on direct simple cues for sequencing and safety awareness during mobility. Educated patient on importance of out of bed mobility, movement/repositioning throughout the day, and call button use. Answered questions within scope, and all needs met.     Patient left HOB elevated with door left open, all lines intact, call button in reach, bed alarm on, RN notified, and [employer] present    GOALS:   Multidisciplinary Problems       Occupational Therapy Goals          Problem: Occupational Therapy    Goal Priority  Disciplines Outcome Interventions   Occupational Therapy Goal     OT, PT/OT Progressing    Description: Goals to be met by: 5/7/2025     Patient will increase functional independence with ADLs by performing:    Feeding with Set-up Assistance.  UE Dressing with Supervision.  LE Dressing with Stand-by Assistance.  Grooming while standing at sink with Stand-by Assistance.  Toileting from bedside commode with Stand-by Assistance for hygiene and clothing management.   Step transfer with Stand-by Assistance  Toilet transfer to bedside commode with Stand-by Assistance.                       History:     Past Medical History:   Diagnosis Date    BPH (benign prostatic hyperplasia)     Coronary artery disease     He has followed at the [facility] and is now transferring his care to this clinic. In 2014 he had stent to LAD. He states he has had 2 heart attacks. He does not get angina. There was 90% left anterior descending artery stenosis undergoing stenting. There was also a circumflex marginal branch stenosis of 70%.    Diabetes mellitus, type 2     ESRD (end stage renal disease) on dialysis     ESRD on HD TTS via left brachial AVF    Hypertension     Hypothyroidism, unspecified     NSTEMI (non-ST elevated myocardial infarction) 4/4/2025    Sepsis 4/2/2025    Symptomatic anemia 01/15/2024         Past Surgical History:   Procedure Laterality Date    ANGIOGRAM, CORONARY, WITH LEFT HEART CATHETERIZATION  1/13/2025    Procedure: Angiogram, Coronary, with Left Heart Cath;  Surgeon: Steve Bhat MD;  Location: [facility] CATH LAB/EP;  Service: Cardiology;;    ATHERECTOMY, CORONARY N/A 1/14/2025    Procedure: Atherectomy-coronary;  Surgeon: Giacomo Pittman MD;  Location: [facility] CATH LAB/EP;  Service: Cardiology;  Laterality: N/A;    bilateral foot surgery      CORONARY ANGIOPLASTY WITH STENT PLACEMENT N/A 1/14/2025    Procedure: ANGIOPLASTY, CORONARY ARTERY, WITH STENT INSERTION;  Surgeon: Giacomo Pittman MD;  Location: [facility] CATH  LAB/EP;  Service: Cardiology;  Laterality: N/A;    CORONARY STENT PLACEMENT N/A 1/13/2025    Procedure: INSERTION, STENT, CORONARY ARTERY;  Surgeon: Steve Bhat MD;  Location: [facility] CATH LAB/EP;  Service: Cardiology;  Laterality: N/A;    CYSTOSCOPY N/A 4/7/2025    Procedure: CYSTOSCOPY WITH CLOT EVACUATION, FULGURATION, GARCES PLACEMENT;  Surgeon: Elsie Arnold MD;  Location: [facility] OR;  Service: Urology;  Laterality: N/A;    ESOPHAGOGASTRODUODENOSCOPY N/A 2/27/2024    Procedure: EGD (ESOPHAGOGASTRODUODENOSCOPY);  Surgeon: Gemma Almendarez MD;  Location: [facility] ENDO;  Service: Endoscopy;  Laterality: N/A;    EXPLORATION, ARTERY, UPPER EXTREMITY Left 4/3/2025    Procedure: EXPLORATION, ARTERY, UPPER EXTREMITY;  Surgeon: Sunil Contreras MD;  Location: [facility] OR;  Service: Vascular;  Laterality: Left;    eyelid surgery      FISTULOGRAM Left 11/27/2019    Procedure: Fistulogram;  Surgeon: MELANY Brenner III, MD;  Location: [address];  Service: Peripheral Vascular;  Laterality: Left;    FISTULOGRAM Left 11/27/2019    Procedure: Fistulogram, AVG declot, possible permacath;  Surgeon: MELANY Brenner III, MD;  Location: [facility] CATH LAB;  Service: Peripheral Vascular;  Laterality: Left;    INSERTION OF DIALYSIS CATHETER      IVUS, CORONARY  1/13/2025    Procedure: IVUS, Coronary;  Surgeon: Steve Bhat MD;  Location: [facility] CATH LAB/EP;  Service: Cardiology;;    LEFT HEART CATHETERIZATION Left 1/14/2025    Procedure: Left heart cath;  Surgeon: Giacomo Pittman MD;  Location: [facility] CATH LAB/EP;  Service: Cardiology;  Laterality: Left;    MOH's  12/5/13    PERCUTANEOUS CORONARY INTERVENTION, ARTERY N/A 1/14/2025    Procedure: Percutaneous coronary intervention;  Surgeon: Giacomo Pittman MD;  Location: [facility] CATH LAB/EP;  Service: Cardiology;  Laterality: N/A;    PERCUTANEOUS TRANSLUMINAL BALLOON ANGIOPLASTY OF CORONARY ARTERY  1/13/2025    Procedure: Angioplasty-coronary;  Surgeon: Radha Stratton  MD Steve;  Location: [facility] CATH LAB/EP;  Service: Cardiology;;    REMOVAL, GRAFT, ARTERIOVENOUS, UPPER EXTREMITY Left 4/3/2025    Procedure: REMOVAL, GRAFT, ARTERIOVENOUS, UPPER EXTREMITY;  Surgeon: Sunil Contreras MD;  Location: [facility] OR;  Service: Vascular;  Laterality: Left;    REVASCULARIZATION, ENDOVASCULAR, LE W/ INTRAVASCULAR LITHOTRIPSY  1/13/2025    Procedure: REVASCULARIZATION, ENDOVASCULAR, LE W/ INTRAVASCULAR LITHOTRIPSY;  Surgeon: Steve Bhat MD;  Location: [facility] CATH LAB/EP;  Service: Cardiology;;    right hand surgery      stents         Time Tracking:     OT Date of Treatment: 04/23/25  OT Start Time: 1131  OT Stop Time: 1210  OT Total Time (min): 39 min    Billable Minutes:Re-eval 10  Self Care/Home Management 10  Therapeutic Activity 19    4/23/2025

## 2025-04-23 NOTE — ASSESSMENT & PLAN NOTE
Patient's blood pressure range in the last 24 hours was: BP  Min: 95/52  Max: 114/59.The patient's inpatient anti-hypertensive regimen is listed below:  Current Antihypertensives       Plan  - admitted with shock  - now off vasopressors. Continue to hold BP Rx as BP is well controlled without it

## 2025-04-23 NOTE — SUBJECTIVE & OBJECTIVE
Interval History: No events overnight. Normal appetite, no complaints.     Review of patient's allergies indicates:   Allergen Reactions    Ace inhibitors Hives, Itching, Shortness Of Breath, Other (See Comments) and Rash     Is not sure which medication it was    Captopril      Current Facility-Administered Medications   Medication Frequency    0.9%  NaCl infusion (for blood administration) Q24H PRN    0.9% NaCl infusion PRN    acetaminophen tablet 650 mg Q4H PRN    albumin human 25% bottle 25 g Daily PRN    atorvastatin tablet 80 mg QHS    capsaicin 0.025 % cream BID    clopidogreL tablet 75 mg Daily    dextrose 50% injection 12.5 g PRN    dextrose 50% injection 25 g PRN    epoetin mj-epbx injection 10,000 Units Every Mon, Wed, Fri    glucagon (human recombinant) injection 1 mg PRN    glucose chewable tablet 16 g PRN    glucose chewable tablet 24 g PRN    haloperidol lactate injection 2 mg Q4H PRN    heparin (porcine) injection 1,000 Units PRN    hydrOXYzine HCL tablet 25 mg TID PRN    hyoscyamine SL tablet 0.125 mg Q4H PRN    insulin aspart U-100 pen 0-5 Units QID (AC + HS) PRN    melatonin tablet 6 mg Nightly PRN    midodrine tablet 15 mg Q8H    naloxone 0.4 mg/mL injection 0.02 mg PRN    ondansetron injection 4 mg Q6H PRN    pantoprazole EC tablet 40 mg Daily    prochlorperazine injection Soln 5 mg Q6H PRN    QUEtiapine tablet 25 mg QHS    senna tablet 8.6 mg Daily PRN    sevelamer carbonate tablet 1,600 mg TID WM    sodium chloride 0.9% bolus 250 mL 250 mL PRN    tamsulosin 24 hr capsule 0.4 mg Daily    vancomycin (VANCOCIN) 500 mg in D5W 100 mL IVPB (MB+) Once    vancomycin - pharmacy to dose pharmacy to manage frequency       Objective:     Vital Signs (Most Recent):  Temp: 97.9 °F (36.6 °C) (04/23/25 1057)  Pulse: 98 (04/23/25 1503)  Resp: 18 (04/23/25 1057)  BP: (!) 90/54 (04/23/25 1345)  SpO2: 98 % (04/23/25 1057) Vital Signs (24h Range):  Temp:  [97.2 °F (36.2 °C)-98.3 °F (36.8 °C)] 97.9 °F (36.6  °C)  Pulse:  [86-98] 98  Resp:  [18-19] 18  SpO2:  [96 %-100 %] 98 %  BP: ()/(51-59) 90/54     Weight: 61.2 kg (134 lb 14.7 oz) (04/18/25 1303)  Body mass index is 21.13 kg/m².  Body surface area is 1.7 meters squared.    I/O last 3 completed shifts:  In: 60 [P.O.:60]  Out: 75 [Urine:75]     Physical Exam  Vitals and nursing note reviewed.   HENT:      Head: Normocephalic.      Nose: Nose normal.      Mouth/Throat:      Mouth: Mucous membranes are moist.   Eyes:      Extraocular Movements: Extraocular movements intact.      Conjunctiva/sclera: Conjunctivae normal.      Pupils: Pupils are equal, round, and reactive to light.   Cardiovascular:      Rate and Rhythm: Normal rate.      Pulses: Normal pulses.   Pulmonary:      Effort: Pulmonary effort is normal.      Breath sounds: Normal breath sounds.   Abdominal:      Palpations: Abdomen is soft.   Musculoskeletal:      Cervical back: Normal range of motion.      Right lower leg: No edema.   Skin:     General: Skin is warm.   Neurological:      General: No focal deficit present.      Mental Status: He is alert.        Significant Labs:  CBC:   Recent Labs   Lab 04/23/25  0431   WBC 9.90   RBC 3.11*   HGB 8.8*   HCT 28.9*      MCV 93   MCH 28.3   MCHC 30.4*     CMP:   Recent Labs   Lab 04/23/25  0431   CALCIUM 9.2   ALBUMIN 2.8*      K 4.3   CO2 25   CL 97   BUN 49*   CREATININE 11.2*   ALKPHOS 77   ALT 5*   AST 25   BILITOT 0.8     All labs within the past 24 hours have been reviewed.     Significant Imaging:  Labs: Reviewed

## 2025-04-23 NOTE — NURSING
Pt is alert and oriented to self. Pt take 3 bites of food with assisted feeding, drink boost and took medicine one at a time without aspiration. HOB elevated. Bed in low position. Fall risk monitoring at the bedside. Bed alarm set. Care ongoing.

## 2025-04-23 NOTE — PLAN OF CARE
Changes in medical condition or discharge plan:    Does patient need new DME? None    Follow up appts needed: TBD    Medically stable: Yes    Estimated Discharge Date:  pending dialysis date and time change.    GARCIA spoke to Kitty,  and she stated [facility],  stated they could accept pt MWF but they won't have the time until mid morning tomorrow.CM will follow up.    GARCIA spoke to Danielle with [facility] admissions and she stated the  hours are 8-4pm.    GARCIA spoke to pt's dez Deonna and informed her that [facility] is willing to accept pt but working on the time for dialysis and admissions will contact her to complete admission paper work. Pt's dez verbally expressed understanding.     04/23/25 1440   Discharge Reassessment   Assessment Type Discharge Planning Reassessment   Did the patient's condition or plan change since previous assessment? Yes   Discharge Plan discussed with: Caregiver   Name(s) and Number(s) Deonna garces [phone]   Communicated BAYRON with patient/caregiver Yes   Discharge Plan A Skilled Nursing Facility   Discharge Plan B Home with family   DME Needed Upon Discharge  other (see comments)   Transition of Care Barriers None   Why the patient remains in the hospital Placement issues   Post-Acute Status   Coverage [employer] MANAGED MEDICARE - [employer] hospitals HMO PPO SPECIAL NEEDS   Discharge Delays (!) Dialysis Set-up

## 2025-04-23 NOTE — PROGRESS NOTES
[facility]  Nephrology  Progress Note    Patient Name: Jevon Rajan  MRN: [id number]  Admission Date: 4/3/2025  Hospital Length of Stay: 19 days  Attending Provider: Tena Palma MD   Primary Care Physician: Melia, Primary Doctor  Principal Problem:Septic shock    Subjective:     HPI: Mr. Rajan is an 84 yo male with HTN, T2DM, CAD, ESRD, and liver lesion who was transferred from [facility] for septic shock and impending AVG rupture. He initially presented to [facility] on 4/1 with temp 101.9 and BP 80/30s. He was transferred to our hospital on 4/3/25; it seems his AVG ruptured during transport/shortly after arrival. He emergently went to the OR yesterday with AVG extraction; infected hematoma was noted. Workup also concerning for elevated troponin and cardiac vegetations. He is no longer on pressors. Nephrology consulted for ESRD. Prior records obtained and reviewed. He receives HD TTS at [facility] under the c/o Dr. Colin. He last received HD outpatient on 4/1/25. HD was held at [facility] due to unstable vascular access. Family agreed to proceed with CRRT today.     Interval History: s/p HD yesterday with 1L removed; treatment ended early due to low BP. Received 500cc bolus and midodrine with improvement in BP. No events overnight. Patient doing well this morning. No SOB.       Review of patient's allergies indicates:   Allergen Reactions    Ace inhibitors Hives, Itching, Shortness Of Breath, Other (See Comments) and Rash     Is not sure which medication it was    Captopril      Current Facility-Administered Medications   Medication Frequency    0.9%  NaCl infusion (for blood administration) Q24H PRN    0.9% NaCl infusion PRN    acetaminophen tablet 650 mg Q4H PRN    albumin human 25% bottle 25 g Daily PRN    atorvastatin tablet 80 mg QHS    capsaicin 0.025 % cream BID    clopidogreL tablet 75 mg Daily    dextrose 50% injection 12.5 g PRN    dextrose 50% injection 25 g PRN    epoetin mj-epbx injection 10,000  Units Every Mon, Wed, Fri    glucagon (human recombinant) injection 1 mg PRN    glucose chewable tablet 16 g PRN    glucose chewable tablet 24 g PRN    haloperidol lactate injection 2 mg Q4H PRN    heparin (porcine) injection 1,000 Units PRN    hydrOXYzine HCL tablet 25 mg TID PRN    hyoscyamine SL tablet 0.125 mg Q4H PRN    insulin aspart U-100 pen 0-5 Units QID (AC + HS) PRN    melatonin tablet 6 mg Nightly PRN    midodrine tablet 15 mg Q8H    naloxone 0.4 mg/mL injection 0.02 mg PRN    ondansetron injection 4 mg Q6H PRN    pantoprazole EC tablet 40 mg Daily    prochlorperazine injection Soln 5 mg Q6H PRN    QUEtiapine tablet 25 mg QHS    senna tablet 8.6 mg Daily PRN    sevelamer carbonate tablet 1,600 mg TID WM    sodium chloride 0.9% bolus 250 mL 250 mL PRN    tamsulosin 24 hr capsule 0.4 mg Daily    vancomycin - pharmacy to dose pharmacy to manage frequency       Objective:     Vital Signs (Most Recent):  Temp: 97.5 °F (36.4 °C) (04/22/25 1948)  Pulse: 89 (04/22/25 1948)  Resp: 18 (04/22/25 1948)  BP: (!) 95/52 (04/22/25 1948)  SpO2: 97 % (04/22/25 1948) Vital Signs (24h Range):  Temp:  [97 °F (36.1 °C)-98.3 °F (36.8 °C)] 97.5 °F (36.4 °C)  Pulse:  [83-97] 89  Resp:  [18-19] 18  SpO2:  [96 %-99 %] 97 %  BP: ()/(52-63) 95/52     Weight: 61.2 kg (134 lb 14.7 oz) (04/18/25 1303)  Body mass index is 21.13 kg/m².  Body surface area is 1.7 meters squared.    I/O last 3 completed shifts:  In: 120 [P.O.:120]  Out: 1742 [Urine:100; Other:1642]     Physical Exam  Vitals and nursing note reviewed.   Constitutional:       General: He is awake.      Appearance: Normal appearance. He is well-developed.   HENT:      Head: Normocephalic and atraumatic.      Nose: Nose normal.      Mouth/Throat:      Mouth: Mucous membranes are moist.   Eyes:      Extraocular Movements: Extraocular movements intact.      Conjunctiva/sclera: Conjunctivae normal.   Cardiovascular:      Rate and Rhythm: Normal rate and regular rhythm.       Comments: TDC  Pulmonary:      Effort: Pulmonary effort is normal.      Breath sounds: Normal breath sounds.   Abdominal:      General: There is no distension.      Palpations: Abdomen is soft.   Musculoskeletal:      Right lower leg: No edema.      Left lower leg: No edema.   Skin:     General: Skin is warm and dry.      Findings: Lesion (diffuse) present. No erythema or rash.   Neurological:      Mental Status: He is alert.   Psychiatric:         Mood and Affect: Mood normal.         Behavior: Behavior normal.          Significant Labs:  CBC:   Recent Labs   Lab 04/22/25  0604   WBC 10.47   RBC 3.25*   HGB 9.3*   HCT 30.5*      MCV 94   MCH 28.6   MCHC 30.5*     CMP:   Recent Labs   Lab 04/22/25  0559   CALCIUM 9.3   ALBUMIN 3.0*      K 4.3   CO2 23   CL 96   BUN 35*   CREATININE 9.0*   ALKPHOS 82   ALT <5*   AST 32   BILITOT 1.0     All labs within the past 24 hours have been reviewed.     Significant Imaging:  Labs: Reviewed    Assessment/Plan:     ESRD  - receives HD TTS at [facility]  - s/p AVG resection 4/3; received CRRT from 4/4-4/5  - [facility] TDC placed 4/14  - s/p HD on Monday; ended early due to hypotension  - no emergent need for HD today  - planning for HD tomorrow. Ensure midodrine is given prior to HD  -will need to get patient back on TTS schedule prior to discharge. Will f/u with SW/CM to see if his pending SNF will let him stay TTS or require a change to MWF    Secondary HPTH  - CCa normal; will repeat phos in AM  - increased sevelamer to 1600mg TID qAC yesterday  - renal diet    Anemia of CKD  - hgb 9.3; below goal  - NAKIA with HD    Hypotension  - stable today on midodrine TID  - consider stopping tamsulosin if able       Thank you for your consult. I will follow-up with patient. Please contact us if you have any additional questions.    Юлия Davis MD  Nephrology  [facility] [place] - Med Surg

## 2025-04-23 NOTE — ASSESSMENT & PLAN NOTE
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 Most recent hemoglobin and hematocrit are listed below.  Recent Labs     04/21/25  0437 04/22/25  0604 04/23/25  0431   HGB 9.5* 9.3* 8.8*   HCT 29.9* 30.5* 28.9*     Plan  - Monitor serial CBC: Daily and recheck now  - Transfuse PRBC if patient becomes hemodynamically unstable, symptomatic or H/H drops below 7/21.  - Patient has not received any PRBC transfusions to date  - Patient's anemia is currently stable

## 2025-04-23 NOTE — OP NOTE
[facility] Surg   Operative Note    SUMMARY     Patient: Jevon Rajan    Surgery Date: 4/3/2025     Surgeons and Role:     * Sunil Contreras MD - Primary     * Lamont Wasserman MD - Co-Surgeon    Assisting Surgeon: None    Pre-op Diagnosis:  Thrombosis of arteriovenous graft, initial encounter [T82.868A]  ESRD on dialysis [N18.6, Z99.2]  ESRD on hemodialysis [N18.6, Z99.2]    Post-op Diagnosis:  Post-Op Diagnosis Codes:     * Thrombosis of arteriovenous graft, initial encounter [T82.868A]     * ESRD on dialysis [N18.6, Z99.2]     * ESRD on hemodialysis [N18.6, Z99.2]    Procedure(s) (LRB):  EXPLORATION, ARTERY, UPPER EXTREMITY (Left)  REMOVAL, GRAFT, ARTERIOVENOUS, UPPER EXTREMITY (Left)      Procedures Performed: avg graft excision    Anesthesia: General/MAC    Findings of the procedure: ruptured AVG    Estimated Blood Loss: 50 mL         Specimens:   Specimen (24h ago, onward)      None          Description of procedure:    The Patient was identified in the preoperative holding area. The patient was taken to the operating room and placed supine on the table. General Anesthesia was induced. The left rosette was/were prepped and draped in the usual standard fashion.  A surgical pause was conducted confirming correct patient, correct site, correct pre-operative preparations.  Ultrasound was used to robb the graft proximally and venous outflow identifying a venous outflow stent. There was no fluid collection surrounding the proximal and central aspects of the accessed. The wounds of the graft were covered with Ioban. Incision was made horizontally over the proximal graft and encircled with a right angle. There was no evidence of infection and the tissue was well incorporated. The graft was ligated and oversewn with 2 layers proximally. The graft was divided and the proximal aspect was covered with multiple layers of tissue. We irrigated prior to closing over the graft with vancomycin irrigation and  placed vancomycin powder. Hemostasis was achieved with electrocautery and pressure. We then expose the central aspect of the access were no infection was encountered. The graft was oversewn with a stick tie centrally and divided. Irrigation and vancomycin powder was used prior to closing over the central aspect of the graft remnant. We then covered these incisions with sterile dressings after closing them in multiple layers with Vicryl and skin with staples. We then turned our attention to the ruptured pseudoaneurysm and expressed infected hematoma and purulence. Cultures were taken and specimens were sent as well as graft remnants previously removed. We opened the wound and removed remaining graft. This concluded my portion of the case.       All instrument counts were correct.  The patient tolerated the procedure well and was brought to the PACU for recovery.

## 2025-04-23 NOTE — NURSING
Dressing on right IJ hemodialysis catheter was coming off. Spoke with dialysis nurse. Per dialysis nurse, okay to reinforce dressing with tegaderm and dressing will be changed today when patient goes to dialysis. Tegaderm placed on dressing to reinforce.

## 2025-04-23 NOTE — SUBJECTIVE & OBJECTIVE
Interval History:  No acute event overnight.  Patient seen in bed this morning.  No new complaints.  Medically ready for discharge, pending placement    Review of Systems  Objective:     Vital Signs (Most Recent):  Temp: 97.9 °F (36.6 °C) (04/23/25 1057)  Pulse: 90 (04/23/25 1105)  Resp: 18 (04/23/25 1057)  BP: (!) 98/52 (04/23/25 1057)  SpO2: 98 % (04/23/25 1057) Vital Signs (24h Range):  Temp:  [97.2 °F (36.2 °C)-98.3 °F (36.8 °C)] 97.9 °F (36.6 °C)  Pulse:  [86-95] 90  Resp:  [18-19] 18  SpO2:  [96 %-100 %] 98 %  BP: ()/(51-59) 98/52     Weight: 61.2 kg (134 lb 14.7 oz)  Body mass index is 21.13 kg/m².    Intake/Output Summary (Last 24 hours) at 4/23/2025 1354  Last data filed at 4/23/2025 0859  Gross per 24 hour   Intake 0 ml   Output 75 ml   Net -75 ml         Physical Exam  Constitutional:       General: He is not in acute distress.     Appearance: He is not ill-appearing, toxic-appearing or diaphoretic.   Cardiovascular:      Rate and Rhythm: Normal rate and regular rhythm.      Pulses: Normal pulses.      Heart sounds: Normal heart sounds.   Pulmonary:      Effort: Pulmonary effort is normal. No respiratory distress.      Breath sounds: Normal breath sounds. No stridor.   Neurological:      Mental Status: Mental status is at baseline. He is disoriented.               Significant Labs: All pertinent labs within the past 24 hours have been reviewed.    Significant Imaging: I have reviewed all pertinent imaging results/findings within the past 24 hours.

## 2025-04-23 NOTE — NURSING
Patient brought back from dialysis via bed. Patient AAOx1. Patient not showing signs of distress.

## 2025-04-23 NOTE — PROGRESS NOTES
"[facility]  Progress Note    Patient Name: Jevon Rajan  MRN: [id number]  Patient Class: IP- Inpatient   Admission Date: 4/3/2025  Length of Stay: 20 days  Attending Physician: Tena Palma MD  Primary Care Provider: Melia, Primary Doctor        Subjective     Principal Problem:Septic shock        HPI:  Mr Jevon Rajan is a 85 y.o. man with ESRD with LUE AVF, HFpEF last EF 50-55%, CAD, DM who was transferred to [facility] ICU for septic shock due to MRSA bacteremia.     He was admitted at [facility] 4/1-3/2025 with sepsis, worsening to septic shock, then identified source as MRSA. 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     Upon arrival to [facility], he is moaning in pain and his LUE AVF has active pulsatile bright red blood. He does respond to his name. He denies pain anywhere other than his LUE. He is on levophed at 0.05.     Overview/Hospital Course:  Mr Jevon Rajan is a 85 y.o. man with ESRD on HD with LUE AVF that has been bleeding, CHF, CAD s/p recent stenting 1/2025 who was admitted with MRSA bacteremia and bleeding from his LUE AVF. Continued vancomycin; weaned off levophed. Vascular surgery emergently consulted; on 4/3/25 they took him to OR and noted: "well incorporated proximal and central graft without evidence of infection, resected graft and covered proximal and central remnants with multiple layers. Mid graft removed in entirety and sent for culture. Ruptured pseudoaneurysm with infected hematoma, graft completely obliterated at mid segment." Surgical cultures with Staph. Suspect AVF or chronic scratching of pruritic skin is the source of his infection. TTE showed endocarditis: EF 40-45%, G1DD, RV systolic dysfunction, large 1.9x1.2cm fixed mass on the posterior mitral valve leaflet with moder to severe regurgitation, cannot rule out posterior mitral " valve leaflet perforation. Discussed with cardiac surgery; he is high mortality risk, and surgery is not advised. ID following. Nephrology consulted; trialysis was placed for HD. Persistently positive for MRSA in blood cultures. He has had urinary retention; Urology consulted, performed bedside cystoscopy on 4/6/25 with large prostate noted. Noted to have clot retention and went urgently to OR with Urology on 4/7/25; asa and DVT ppx held.     WBC normalized. S/p clot removal with Urology, 1U PRBC ordered this morning with Hb 6.1. Soft BPs, will add midodrine for HD to step down to floor. Glucoses high, will increase insulin. Attempted to s/d but BP too low, may still need CRRT rather. 4/8 cx clear so far. Increasing insulin again. Increasing midodrine to 15 mg TID. Hb 6.8, 1U PRBC ordered.     BP appears to be recovering a bit, perhaps will tolerate reg HD, will discuss w Neph- will run him on HD Saturday, if tolerates normal HD will remove central line and give 2 day holiday and place tunnel on Monday. We will give him HD before dc to facility on Tuesday if accepted by then.     WBC has normalized for the first time, now tolerating reg HD, and Blcx remain clear. Central line removed. NGT removed. Will step down.     Interval History:  No acute event overnight.  Patient seen in bed this morning.  No new complaints.  Medically ready for discharge, pending placement    Review of Systems  Objective:     Vital Signs (Most Recent):  Temp: 97.9 °F (36.6 °C) (04/23/25 1057)  Pulse: 90 (04/23/25 1105)  Resp: 18 (04/23/25 1057)  BP: (!) 98/52 (04/23/25 1057)  SpO2: 98 % (04/23/25 1057) Vital Signs (24h Range):  Temp:  [97.2 °F (36.2 °C)-98.3 °F (36.8 °C)] 97.9 °F (36.6 °C)  Pulse:  [86-95] 90  Resp:  [18-19] 18  SpO2:  [96 %-100 %] 98 %  BP: ()/(51-59) 98/52     Weight: 61.2 kg (134 lb 14.7 oz)  Body mass index is 21.13 kg/m².    Intake/Output Summary (Last 24 hours) at 4/23/2025 1399  Last data filed at 4/23/2025  0859  Gross per 24 hour   Intake 0 ml   Output 75 ml   Net -75 ml         Physical Exam  Constitutional:       General: He is not in acute distress.     Appearance: He is not ill-appearing, toxic-appearing or diaphoretic.   Cardiovascular:      Rate and Rhythm: Normal rate and regular rhythm.      Pulses: Normal pulses.      Heart sounds: Normal heart sounds.   Pulmonary:      Effort: Pulmonary effort is normal. No respiratory distress.      Breath sounds: Normal breath sounds. No stridor.   Neurological:      Mental Status: Mental status is at baseline. He is disoriented.               Significant Labs: All pertinent labs within the past 24 hours have been reviewed.    Significant Imaging: I have reviewed all pertinent imaging results/findings within the past 24 hours.      Assessment & Plan  Septic shock  This patient has shock. The type of shock is distributive due to sepsis. The patient had the following evidence of shock: persistent hypotension and altered mental status. 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 Shock is now resolved and vasopressors are off. WBC back to normal  HTN (hypertension)  Patient's blood pressure range in the last 24 hours was: BP  Min: 95/52  Max: 114/59.The patient's inpatient anti-hypertensive regimen is listed below:  Current Antihypertensives       Plan  - admitted with shock  - now off vasopressors. Continue to hold BP Rx as BP is well controlled without it   HLD (hyperlipidemia)  - continue statin  Type 2 diabetes mellitus with hyperglycemia, without long-term current use of insulin  A1c:   Lab Results   Component Value Date    HGBA1C 5.7 (H)  "04/02/2025     Meds: lantus + SSI PRN to maintain goal 140-180  Tube feeds  accuchecks, hypoglycemic protocol      Coronary artery disease involving native coronary artery of native heart without angina pectoris  Patient with known CAD s/p stent placement, which is controlled Will continue ASA, Plavix, and Statin and monitor for S/Sx of angina/ACS. Continue to monitor on telemetry.   - last [facility] was 1/2025: Severely calcified mid RCA stenosis unable to cross with PTCA balloons, treated initially with 0.9 laser atherectomy, followed by CSI atherectomy system with total of 5 runs (3 at low speed, 2 at high speed), pre-dilated using 2.0 compliant and 3.5 noncompliant balloon followed by 3.5 X 16 mm megatron stent.  Focal plaque rupture/erosion noted in the proximal and ostial RCA that were treated with  3.5 X 28 in the proximal RCA, 4.0 X 16 mm in the ostial RCA.  No residual stenosis post intervention.  Patient had ALAN 3 flow at the end of the procedure  - with elevated troponin likely due to septic shock  No results for input(s): "TROPONINI", "TROPONINIHS" in the last 168 hours.    - continue asa, plavix, statin  - Cardiology consulted  - no plans for ischemic evaluation at this time   ESRD on hemodialysis  - ESRD on HD via LUE AVF  - LUE AVF was actively bleeding on arrival on 4/3/25. Vascular surgery was consulted. He went emergently to the OR on 4/3/25: "Severely calcified mid RCA stenosis unable to cross with PTCA balloons, treated initially with 0.9 laser atherectomy, followed by CSI atherectomy system with total of 5 runs (3 at low speed, 2 at high speed), pre-dilated using 2.0 compliant and 3.5 noncompliant balloon followed by 3.5 X 16 mm megatron stent.  Focal plaque rupture/erosion noted in the proximal and ostial RCA that were treated with  3.5 X 28 in the proximal RCA, 4.0 X 16 mm in the ostial RCA.  No residual stenosis post intervention.  Patient had ALAN 3 flow at the end of the procedure"  - cultures " "from AVF= Staph   - wound care orders in place for surgical site  - Nephrology is consulted  - Rt IJ trialysis placed on 4/4/25 for CRRT;  - THDC placed on 04/14  Anemia in ESRD (end-stage renal disease)  Anemia is likely due to  ESRD, blood loss . Most recent hemoglobin and hematocrit are listed below.  Recent Labs     04/21/25  0437 04/22/25  0604 04/23/25  0431   HGB 9.5* 9.3* 8.8*   HCT 29.9* 30.5* 28.9*     Plan  - Monitor serial CBC: Daily and recheck now  - Transfuse PRBC if patient becomes hemodynamically unstable, symptomatic or H/H drops below 7/21.  - Patient has not received any PRBC transfusions to date  - Patient's anemia is currently stable  Acute metabolic encephalopathy with Hospital Delirum  -Noted episodes of intermittent agitation with tactile and visual hallucinations  -Continue correction of metabolic derangements  -Maintain Delirium precautions: Maintain regular sleep cycle. Early ambulation. Minimal interruptions overnight. Re-orient patient frequently. Maintain adequate bowel regimen, hydration and electrolyte replenishments  -aspiration precautions  -continue Seroquel 25 mg HS      ACP (advance care planning)  Advance Care Planning     Date: 04/04/2025  - palliative consulted   - Mr Escoto specifically told Dr Jordan to contact Kenia Smyth for all updates  - discussed code status with Kenia. She states she has spoken with Mr Escoto's sisters and they all want full code status.   -anticipated DC to SNF when medically stable enough to tolerated HD and possible nursing home placement         Acute bacterial endocarditis  - TTE 4/4/25: "1.9x1.2cm large fixed heterogeneous mass present on the posterior leaflet (new finding vs 9/2023 echo). There is moderate to severe regurgitation with an eccentric jet. Cannot exclude severe MR with possible PMVL perforation in association with large vegetation."  - this is in the setting of MRSA bacteremia  - ID is consulted  - repeat blood " "cultures until clear   - suspect source is skin/chronic scratching vs AVF infection- cultures from resected AVF with Staph   - discussed with Cardiac surgery Dr Castro 4/4/25- given age and comorbidities, he is very high risk of mortality with surgery. He recommends medical management at this point.  - on vancomycin      MRSA bacteremia  - suspect source:  Graft infection s/p exploratory surgery of LUE with graft resection 4/3  -graft holiday with TDC placed on 04/14  - cultures of AVF with Staph   - he has endocarditis  - ID consulted  - on vanc ;anticipating completing 6 weeks of IV vancomycin from clearance (EOC: 5/19/25)     NSTEMI (non-ST elevated myocardial infarction)  - see CAD    BPH with urinary obstruction  - noted 4/6/25 when patient became very agitated and screaming he couldn't urinate  - nursing unable to place dunaway/coude  - Urology consulted. Bedside cystoscopy on 4/6/25 noted clots, large prostate. Catheter was placed but was then removed due to pain  - 4/7 he is again agitated that he cannot urinate  - follow up with Urology   - tamsulosin ordered if he is awake enough to swallow     Thrombocytopenia  The likely etiology of thrombocytopenia is infection and sepsis. The patients 3 most recent labs are listed below.  Recent Labs     04/21/25  0437 04/22/25  0604 04/23/25  0431   * 177 170     Plan  - Will transfuse if platelet count is <10k.    AV fistula infection  - LUE AVF was actively bleeding on arrival on 4/3/25. Vascular surgery was consulted. He went emergently to the OR on 4/3/25: "Severely calcified mid RCA stenosis unable to cross with PTCA balloons, treated initially with 0.9 laser atherectomy, followed by CSI atherectomy system with total of 5 runs (3 at low speed, 2 at high speed), pre-dilated using 2.0 compliant and 3.5 noncompliant balloon followed by 3.5 X 16 mm megatron stent.  Focal plaque rupture/erosion noted in the proximal and ostial RCA that were treated with  3.5 X 28 " "in the proximal RCA, 4.0 X 16 mm in the ostial RCA.  No residual stenosis post intervention.  Patient had ALAN 3 flow at the end of the procedure"  - cultures from AVF= Staph   - wound care orders in place for surgical site  - trialysis currently in place for HD    Emphysema lung  - emphysema noted on CT  - no signs of COPD exacerbation  Pulmonary nodules  - CT 4/3/25 with few small pulmonary nodules  - will need outpatient follow up     Gross hematuria  S/p clot evacuation with a fulguration by Urology  Currently resolved  Continue cap irrigation of 3 way catheter  Continue Levsin for bladder discomfort  -Sharma to be removed when patient is transferred to the floor; we will defer removal to urology    NSVT (nonsustained ventricular tachycardia)      Uremic pruritus  Patient complaining of severe intractable itching;[facility]  Start hydroxyzine  Ordered capsaicin cream   Korsuva  not available; [facility]; can consider gabapentin  hemodynamic  and delirium allows  Will defer increased dialysis frequency; changing Kp/f ratios and other adjustments to nephrology      Moderate malnutrition  Nutrition consulted. Most recent weight and BMI monitored-     Measurements:  Wt Readings from Last 1 Encounters:   04/18/25 61.2 kg (134 lb 14.7 oz)   Body mass index is 21.13 kg/m².    Patient has been screened and assessed by RD.    Malnutrition Type:  Context: chronic illness  Level: moderate    Malnutrition Characteristic Summary:  Weight Loss (Malnutrition): greater than 7.5% in 3 months  Energy Intake (Malnutrition): less than or equal to 75% for greater than or equal to 1 month    Interventions/Recommendations (treatment strategy):  1. Continue on texture modified diet per SLP recommendations 2. Continue with Commercial beverage medical food supplement therapy: Novasource renal  3. Monitor labs and weights    VTE Risk Mitigation (From admission, onward)           Ordered     heparin " (porcine) injection 1,000 Units  As needed (PRN)         04/05/25 2100     IP VTE HIGH RISK PATIENT  Once         04/03/25 1516     Place TEMO hose  Until discontinued         04/03/25 1516     Place sequential compression device  Until discontinued         04/03/25 1516     Reason for No Pharmacological VTE Prophylaxis  Once        Question:  Reasons:  Answer:  Physician Provided (leave comment)    04/03/25 1516                    Discharge Planning   BAYRON: 4/24/2025     Code Status: DNR   Medical Readiness for Discharge Date:   Discharge Plan A: Skilled Nursing Facility   Discharge Delays: (!) Patient and Family Barriers            Please place Justification for DME        Tena Palma MD  Department of Hospital Medicine   [facility]

## 2025-04-23 NOTE — ASSESSMENT & PLAN NOTE
"Patient with known CAD s/p stent placement, which is controlled Will continue ASA, Plavix, and Statin and monitor for S/Sx of angina/ACS. Continue to monitor on telemetry.   - last [facility] was 1/2025: Severely calcified mid RCA stenosis unable to cross with PTCA balloons, treated initially with 0.9 laser atherectomy, followed by CSI atherectomy system with total of 5 runs (3 at low speed, 2 at high speed), pre-dilated using 2.0 compliant and 3.5 noncompliant balloon followed by 3.5 X 16 mm megatron stent.  Focal plaque rupture/erosion noted in the proximal and ostial RCA that were treated with  3.5 X 28 in the proximal RCA, 4.0 X 16 mm in the ostial RCA.  No residual stenosis post intervention.  Patient had ALAN 3 flow at the end of the procedure  - with elevated troponin likely due to septic shock  No results for input(s): "TROPONINI", "TROPONINIHS" in the last 168 hours.    - continue asa, plavix, statin  - Cardiology consulted  - no plans for ischemic evaluation at this time   "

## 2025-04-23 NOTE — NURSING
[facility], [facility]  Nurses Note -- 4 Eyes      4/23/2025       Skin assessed on: Q Shift      [x] No Pressure Injuries Present    [x]Prevention Measures Documented    [] Yes LDA  for Pressure Injury Previously documented     [] Yes New Pressure Injury Discovered   [] LDA for New Pressure Injury Added      Attending RN:  Ele Perez LPN     Second RN:  CHARLENE Elkins

## 2025-04-23 NOTE — NURSING
[facility], [facility]  Nurses Note -- 4 Eyes      4/22/2025       Skin assessed on: Q Shift      [x] No Pressure Injuries Present    [x]Prevention Measures Documented    [] Yes LDA  for Pressure Injury Previously documented     [] Yes New Pressure Injury Discovered   [] LDA for New Pressure Injury Added      Attending RN:  Severo Bowden RN     Second RN:  PERRY Marlow       Humira Counseling:  I discussed with the patient the risks of adalimumab including but not limited to myelosuppression, immunosuppression, autoimmune hepatitis, demyelinating diseases, lymphoma, and serious infections.  The patient understands that monitoring is required including a PPD at baseline and must alert us or the primary physician if symptoms of infection or other concerning signs are noted.

## 2025-04-23 NOTE — ASSESSMENT & PLAN NOTE
The likely etiology of thrombocytopenia is infection and sepsis. The patients 3 most recent labs are listed below.  Recent Labs     04/21/25  0437 04/22/25  0604 04/23/25  0431   * 177 170     Plan  - Will transfuse if platelet count is <10k.

## 2025-04-23 NOTE — NURSING
Dressing on right IJ hemodialysis catheter was coming off. Spoke with dialysis nurse. Per dialysis nurse, okay to reinforce dressing with tegaderm and dressing will be changed today when patient goes to dialysis. Tegaderm placed on dressing to reinforce. OT worked with patient. Patient tolerated well. Patient taken to dialysis via bed. Patient brought back from dialysis. Patient AAOx1 and is disoriented to place, time, and situation. Patient not showing signs of distress. Bed in lowest position, wheels locked, call bell in reach, side rails up x3.

## 2025-04-23 NOTE — PLAN OF CARE
Problem: Occupational Therapy  Goal: Occupational Therapy Goal  Description: Goals to be met by: 5/7/2025     Patient will increase functional independence with ADLs by performing:    Feeding with Set-up Assistance.  UE Dressing with Supervision.  LE Dressing with Stand-by Assistance.  Grooming while standing at sink with Stand-by Assistance.  Toileting from bedside commode with Stand-by Assistance for hygiene and clothing management.   Step transfer with Stand-by Assistance  Toilet transfer to bedside commode with Stand-by Assistance.    Outcome: Progressing     OT re-eval completed. Limited by soft BP and confusion with impaired sequencing and attention to task; pt able to perform standing and side steps with CGA-Min A. /58 after therex. Pt would benefit from skilled OT to maximize function prior to d/c. Recommend DEVAUGHN once medically stable.

## 2025-04-23 NOTE — PROGRESS NOTES
[facility]  Nephrology  Progress Note    Patient Name: Jevon Rajan  MRN: [id number]  Admission Date: 4/3/2025  Hospital Length of Stay: 20 days  Attending Provider: Tena Palma MD   Primary Care Physician: Melia, Primary Doctor  Principal Problem:Septic shock    Subjective:     HPI: Mr. Rajan is an 84 yo male with HTN, T2DM, CAD, ESRD, and liver lesion who was transferred from [facility] for septic shock and impending AVG rupture. He initially presented to [facility] on 4/1 with temp 101.9 and BP 80/30s. He was transferred to our hospital on 4/3/25; it seems his AVG ruptured during transport/shortly after arrival. He emergently went to the OR yesterday with AVG extraction; infected hematoma was noted. Workup also concerning for elevated troponin and cardiac vegetations. He is no longer on pressors. Nephrology consulted for ESRD. Prior records obtained and reviewed. He receives HD TTS at [facility] under the c/o Dr. Colin. He last received HD outpatient on 4/1/25. HD was held at [facility] due to unstable vascular access. Family agreed to proceed with CRRT today.     Interval History: No events overnight. Normal appetite, no complaints.     Review of patient's allergies indicates:   Allergen Reactions    Ace inhibitors Hives, Itching, Shortness Of Breath, Other (See Comments) and Rash     Is not sure which medication it was    Captopril      Current Facility-Administered Medications   Medication Frequency    0.9%  NaCl infusion (for blood administration) Q24H PRN    0.9% NaCl infusion PRN    acetaminophen tablet 650 mg Q4H PRN    albumin human 25% bottle 25 g Daily PRN    atorvastatin tablet 80 mg QHS    capsaicin 0.025 % cream BID    clopidogreL tablet 75 mg Daily    dextrose 50% injection 12.5 g PRN    dextrose 50% injection 25 g PRN    epoetin mj-epbx injection 10,000 Units Every Mon, Wed, Fri    glucagon (human recombinant) injection 1 mg PRN    glucose chewable tablet 16 g PRN    glucose chewable tablet 24 g  PRN    haloperidol lactate injection 2 mg Q4H PRN    heparin (porcine) injection 1,000 Units PRN    hydrOXYzine HCL tablet 25 mg TID PRN    hyoscyamine SL tablet 0.125 mg Q4H PRN    insulin aspart U-100 pen 0-5 Units QID (AC + HS) PRN    melatonin tablet 6 mg Nightly PRN    midodrine tablet 15 mg Q8H    naloxone 0.4 mg/mL injection 0.02 mg PRN    ondansetron injection 4 mg Q6H PRN    pantoprazole EC tablet 40 mg Daily    prochlorperazine injection Soln 5 mg Q6H PRN    QUEtiapine tablet 25 mg QHS    senna tablet 8.6 mg Daily PRN    sevelamer carbonate tablet 1,600 mg TID WM    sodium chloride 0.9% bolus 250 mL 250 mL PRN    tamsulosin 24 hr capsule 0.4 mg Daily    vancomycin (VANCOCIN) 500 mg in D5W 100 mL IVPB (MB+) Once    vancomycin - pharmacy to dose pharmacy to manage frequency       Objective:     Vital Signs (Most Recent):  Temp: 97.9 °F (36.6 °C) (04/23/25 1057)  Pulse: 98 (04/23/25 1503)  Resp: 18 (04/23/25 1057)  BP: (!) 90/54 (04/23/25 1345)  SpO2: 98 % (04/23/25 1057) Vital Signs (24h Range):  Temp:  [97.2 °F (36.2 °C)-98.3 °F (36.8 °C)] 97.9 °F (36.6 °C)  Pulse:  [86-98] 98  Resp:  [18-19] 18  SpO2:  [96 %-100 %] 98 %  BP: ()/(51-59) 90/54     Weight: 61.2 kg (134 lb 14.7 oz) (04/18/25 1303)  Body mass index is 21.13 kg/m².  Body surface area is 1.7 meters squared.    I/O last 3 completed shifts:  In: 60 [P.O.:60]  Out: 75 [Urine:75]     Physical Exam  Vitals and nursing note reviewed.   HENT:      Head: Normocephalic.      Nose: Nose normal.      Mouth/Throat:      Mouth: Mucous membranes are moist.   Eyes:      Extraocular Movements: Extraocular movements intact.      Conjunctiva/sclera: Conjunctivae normal.      Pupils: Pupils are equal, round, and reactive to light.   Cardiovascular:      Rate and Rhythm: Normal rate.      Pulses: Normal pulses.   Pulmonary:      Effort: Pulmonary effort is normal.      Breath sounds: Normal breath sounds.   Abdominal:      Palpations: Abdomen is soft.    Musculoskeletal:      Cervical back: Normal range of motion.      Right lower leg: No edema.   Skin:     General: Skin is warm.   Neurological:      General: No focal deficit present.      Mental Status: He is alert.        Significant Labs:  CBC:   Recent Labs   Lab 04/23/25  0431   WBC 9.90   RBC 3.11*   HGB 8.8*   HCT 28.9*      MCV 93   MCH 28.3   MCHC 30.4*     CMP:   Recent Labs   Lab 04/23/25  0431   CALCIUM 9.2   ALBUMIN 2.8*      K 4.3   CO2 25   CL 97   BUN 49*   CREATININE 11.2*   ALKPHOS 77   ALT 5*   AST 25   BILITOT 0.8     All labs within the past 24 hours have been reviewed.     Significant Imaging:  Labs: Reviewed    Assessment/Plan:     ESRD  - receives HD TTS at [facility]  - s/p AVG resection 4/3; received CRRT from 4/4-4/5  - [facility] TDC placed 4/14  - s/p HD on Monday; ended early due to hypotension  - plan for HD today, UF goal 1.5L  - Ensure midodrine is given prior to HD  - HD outpatient schedule changed to [facility]     Secondary HPTH  - CCa normal.  - phos 7.9, above goal  - continue sevelamer to 1600mg TID  - renal diet     Anemia of CKD  - hgb 8.8; below goal  - NAKIA with HD     Hypotension  - stable today on midodrine TID  - consider stopping tamsulosin if able          Thank you for your consult. I will follow-up with patient. Please contact us if you have any additional questions.    BERENICE Watkins  Nephrology  [facility] - Med Surg

## 2025-04-23 NOTE — SUBJECTIVE & OBJECTIVE
Interval History: s/p HD yesterday with 1L removed; treatment ended early due to low BP. Received 500cc bolus and midodrine with improvement in BP. No events overnight. Patient doing well this morning. No SOB.       Review of patient's allergies indicates:   Allergen Reactions    Ace inhibitors Hives, Itching, Shortness Of Breath, Other (See Comments) and Rash     Is not sure which medication it was    Captopril      Current Facility-Administered Medications   Medication Frequency    0.9%  NaCl infusion (for blood administration) Q24H PRN    0.9% NaCl infusion PRN    acetaminophen tablet 650 mg Q4H PRN    albumin human 25% bottle 25 g Daily PRN    atorvastatin tablet 80 mg QHS    capsaicin 0.025 % cream BID    clopidogreL tablet 75 mg Daily    dextrose 50% injection 12.5 g PRN    dextrose 50% injection 25 g PRN    epoetin mj-epbx injection 10,000 Units Every Mon, Wed, Fri    glucagon (human recombinant) injection 1 mg PRN    glucose chewable tablet 16 g PRN    glucose chewable tablet 24 g PRN    haloperidol lactate injection 2 mg Q4H PRN    heparin (porcine) injection 1,000 Units PRN    hydrOXYzine HCL tablet 25 mg TID PRN    hyoscyamine SL tablet 0.125 mg Q4H PRN    insulin aspart U-100 pen 0-5 Units QID (AC + HS) PRN    melatonin tablet 6 mg Nightly PRN    midodrine tablet 15 mg Q8H    naloxone 0.4 mg/mL injection 0.02 mg PRN    ondansetron injection 4 mg Q6H PRN    pantoprazole EC tablet 40 mg Daily    prochlorperazine injection Soln 5 mg Q6H PRN    QUEtiapine tablet 25 mg QHS    senna tablet 8.6 mg Daily PRN    sevelamer carbonate tablet 1,600 mg TID WM    sodium chloride 0.9% bolus 250 mL 250 mL PRN    tamsulosin 24 hr capsule 0.4 mg Daily    vancomycin - pharmacy to dose pharmacy to manage frequency       Objective:     Vital Signs (Most Recent):  Temp: 97.5 °F (36.4 °C) (04/22/25 1948)  Pulse: 89 (04/22/25 1948)  Resp: 18 (04/22/25 1948)  BP: (!) 95/52 (04/22/25 1948)  SpO2: 97 % (04/22/25 1948) Vital Signs  (24h Range):  Temp:  [97 °F (36.1 °C)-98.3 °F (36.8 °C)] 97.5 °F (36.4 °C)  Pulse:  [83-97] 89  Resp:  [18-19] 18  SpO2:  [96 %-99 %] 97 %  BP: ()/(52-63) 95/52     Weight: 61.2 kg (134 lb 14.7 oz) (04/18/25 1303)  Body mass index is 21.13 kg/m².  Body surface area is 1.7 meters squared.    I/O last 3 completed shifts:  In: 120 [P.O.:120]  Out: 1742 [Urine:100; Other:1642]     Physical Exam  Vitals and nursing note reviewed.   Constitutional:       General: He is awake.      Appearance: Normal appearance. He is well-developed.   HENT:      Head: Normocephalic and atraumatic.      Nose: Nose normal.      Mouth/Throat:      Mouth: Mucous membranes are moist.   Eyes:      Extraocular Movements: Extraocular movements intact.      Conjunctiva/sclera: Conjunctivae normal.   Cardiovascular:      Rate and Rhythm: Normal rate and regular rhythm.      Comments: TDC  Pulmonary:      Effort: Pulmonary effort is normal.      Breath sounds: Normal breath sounds.   Abdominal:      General: There is no distension.      Palpations: Abdomen is soft.   Musculoskeletal:      Right lower leg: No edema.      Left lower leg: No edema.   Skin:     General: Skin is warm and dry.      Findings: Lesion (diffuse) present. No erythema or rash.   Neurological:      Mental Status: He is alert.   Psychiatric:         Mood and Affect: Mood normal.         Behavior: Behavior normal.          Significant Labs:  CBC:   Recent Labs   Lab 04/22/25  0604   WBC 10.47   RBC 3.25*   HGB 9.3*   HCT 30.5*      MCV 94   MCH 28.6   MCHC 30.5*     CMP:   Recent Labs   Lab 04/22/25  0559   CALCIUM 9.3   ALBUMIN 3.0*      K 4.3   CO2 23   CL 96   BUN 35*   CREATININE 9.0*   ALKPHOS 82   ALT <5*   AST 32   BILITOT 1.0     All labs within the past 24 hours have been reviewed.     Significant Imaging:  Labs: Reviewed

## 2025-04-23 NOTE — PLAN OF CARE
Problem: Adult Inpatient Plan of Care  Goal: Plan of Care Review  Outcome: Progressing  Flowsheets (Taken 4/23/2025 0256)  Plan of Care Reviewed With: patient  Goal: Optimal Comfort and Wellbeing  Outcome: Progressing  Intervention: Monitor Pain and Promote Comfort  Flowsheets (Taken 4/23/2025 0256)  Pain Management Interventions:   pillow support provided   quiet environment facilitated   position adjusted     Problem: Diabetes Comorbidity  Goal: Blood Glucose Level Within Targeted Range  Outcome: Progressing     Problem: Sepsis/Septic Shock  Goal: Optimal Coping  Outcome: Progressing

## 2025-04-23 NOTE — PLAN OF CARE
CM Mgr notified [facility] admissions via inbasket that dialysis days can be changed to MWF but CM has to call back tomorrow to get the time.

## 2025-04-24 LAB
ABSOLUTE EOSINOPHIL (OHS): 0.64 K/UL
ABSOLUTE MONOCYTE (OHS): 0.96 K/UL (ref 0.3–1)
ABSOLUTE NEUTROPHIL COUNT (OHS): 5.82 K/UL (ref 1.8–7.7)
ALBUMIN SERPL BCP-MCNC: 3.2 G/DL (ref 3.5–5.2)
ALP SERPL-CCNC: 72 UNIT/L (ref 40–150)
ALT SERPL W/O P-5'-P-CCNC: <5 UNIT/L (ref 10–44)
ANION GAP (OHS): 13 MMOL/L (ref 8–16)
AST SERPL-CCNC: 25 UNIT/L (ref 11–45)
BASOPHILS # BLD AUTO: 0.06 K/UL
BASOPHILS NFR BLD AUTO: 0.7 %
BILIRUB SERPL-MCNC: 0.8 MG/DL (ref 0.1–1)
BUN SERPL-MCNC: 20 MG/DL (ref 8–23)
CALCIUM SERPL-MCNC: 8.9 MG/DL (ref 8.7–10.5)
CHLORIDE SERPL-SCNC: 98 MMOL/L (ref 95–110)
CO2 SERPL-SCNC: 25 MMOL/L (ref 23–29)
CREAT SERPL-MCNC: 6.1 MG/DL (ref 0.5–1.4)
ERYTHROCYTE [DISTWIDTH] IN BLOOD BY AUTOMATED COUNT: 15.9 % (ref 11.5–14.5)
GFR SERPLBLD CREATININE-BSD FMLA CKD-EPI: 8 ML/MIN/1.73/M2
GLUCOSE SERPL-MCNC: 147 MG/DL (ref 70–110)
HCT VFR BLD AUTO: 26.8 % (ref 40–54)
HGB BLD-MCNC: 8.1 GM/DL (ref 14–18)
IMM GRANULOCYTES # BLD AUTO: 0.05 K/UL (ref 0–0.04)
IMM GRANULOCYTES NFR BLD AUTO: 0.6 % (ref 0–0.5)
LYMPHOCYTES # BLD AUTO: 0.82 K/UL (ref 1–4.8)
MCH RBC QN AUTO: 28.3 PG (ref 27–31)
MCHC RBC AUTO-ENTMCNC: 30.2 G/DL (ref 32–36)
MCV RBC AUTO: 94 FL (ref 82–98)
NUCLEATED RBC (/100WBC) (OHS): 0 /100 WBC
PLATELET # BLD AUTO: 149 K/UL (ref 150–450)
PMV BLD AUTO: 11.8 FL (ref 9.2–12.9)
POCT GLUCOSE: 124 MG/DL (ref 70–110)
POCT GLUCOSE: 128 MG/DL (ref 70–110)
POCT GLUCOSE: 150 MG/DL (ref 70–110)
POTASSIUM SERPL-SCNC: 3.9 MMOL/L (ref 3.5–5.1)
PROT SERPL-MCNC: 7.8 GM/DL (ref 6–8.4)
RBC # BLD AUTO: 2.86 M/UL (ref 4.6–6.2)
RELATIVE EOSINOPHIL (OHS): 7.7 %
RELATIVE LYMPHOCYTE (OHS): 9.8 % (ref 18–48)
RELATIVE MONOCYTE (OHS): 11.5 % (ref 4–15)
RELATIVE NEUTROPHIL (OHS): 69.7 % (ref 38–73)
SODIUM SERPL-SCNC: 136 MMOL/L (ref 136–145)
WBC # BLD AUTO: 8.35 K/UL (ref 3.9–12.7)

## 2025-04-24 PROCEDURE — 25000003 PHARM REV CODE 250: Performed by: INTERNAL MEDICINE

## 2025-04-24 PROCEDURE — 25000003 PHARM REV CODE 250

## 2025-04-24 PROCEDURE — 25000003 PHARM REV CODE 250: Performed by: STUDENT IN AN ORGANIZED HEALTH CARE EDUCATION/TRAINING PROGRAM

## 2025-04-24 PROCEDURE — 21400001 HC TELEMETRY ROOM

## 2025-04-24 PROCEDURE — 97530 THERAPEUTIC ACTIVITIES: CPT

## 2025-04-24 PROCEDURE — 25000003 PHARM REV CODE 250: Performed by: HOSPITALIST

## 2025-04-24 PROCEDURE — 36415 COLL VENOUS BLD VENIPUNCTURE: CPT | Performed by: HOSPITALIST

## 2025-04-24 PROCEDURE — 94761 N-INVAS EAR/PLS OXIMETRY MLT: CPT

## 2025-04-24 PROCEDURE — 63600175 PHARM REV CODE 636 W HCPCS: Performed by: STUDENT IN AN ORGANIZED HEALTH CARE EDUCATION/TRAINING PROGRAM

## 2025-04-24 PROCEDURE — 85025 COMPLETE CBC W/AUTO DIFF WBC: CPT | Performed by: HOSPITALIST

## 2025-04-24 PROCEDURE — 97116 GAIT TRAINING THERAPY: CPT

## 2025-04-24 PROCEDURE — 80053 COMPREHEN METABOLIC PANEL: CPT | Performed by: HOSPITALIST

## 2025-04-24 RX ADMIN — HYDROXYZINE HYDROCHLORIDE 25 MG: 25 TABLET ORAL at 01:04

## 2025-04-24 RX ADMIN — CAPSAICIN: 0.25 CREAM TOPICAL at 09:04

## 2025-04-24 RX ADMIN — MIDODRINE HYDROCHLORIDE 15 MG: 5 TABLET ORAL at 06:04

## 2025-04-24 RX ADMIN — QUETIAPINE FUMARATE 25 MG: 25 TABLET ORAL at 10:04

## 2025-04-24 RX ADMIN — SEVELAMER CARBONATE 1600 MG: 800 TABLET, FILM COATED ORAL at 12:04

## 2025-04-24 RX ADMIN — CLOPIDOGREL BISULFATE 75 MG: 75 TABLET, FILM COATED ORAL at 09:04

## 2025-04-24 RX ADMIN — TAMSULOSIN HYDROCHLORIDE 0.4 MG: 0.4 CAPSULE ORAL at 09:04

## 2025-04-24 RX ADMIN — HYDROXYZINE HYDROCHLORIDE 25 MG: 25 TABLET ORAL at 06:04

## 2025-04-24 RX ADMIN — PANTOPRAZOLE SODIUM 40 MG: 40 TABLET, DELAYED RELEASE ORAL at 09:04

## 2025-04-24 RX ADMIN — CAPSAICIN: 0.25 CREAM TOPICAL at 10:04

## 2025-04-24 RX ADMIN — MIDODRINE HYDROCHLORIDE 15 MG: 5 TABLET ORAL at 10:04

## 2025-04-24 RX ADMIN — MIDODRINE HYDROCHLORIDE 15 MG: 5 TABLET ORAL at 02:04

## 2025-04-24 RX ADMIN — ATORVASTATIN CALCIUM 80 MG: 40 TABLET, FILM COATED ORAL at 10:04

## 2025-04-24 NOTE — NURSING
BMP and Mg ordered as per Dr Jones. Pt received awake and oriented x 2, while sitting upright on bed and eating his meal. No complains at this moment. Care ongoing.

## 2025-04-24 NOTE — ASSESSMENT & PLAN NOTE
The likely etiology of thrombocytopenia is infection and sepsis. The patients 3 most recent labs are listed below.  Recent Labs     04/22/25  0604 04/23/25  0431 04/24/25  0536    170 149*     Plan  - Will transfuse if platelet count is <10k.

## 2025-04-24 NOTE — PLAN OF CARE
[facility]  at 6:30 am on MWF. GARCIA explained that the  works from 8-4pm. Fanta stated they have another [facility] resident that comes at 6:45 am.    GARCIA left a message for [facility] with [facility] admissions. GARCIA will follow up.    GARCIA spoke to [facility] and she stated pt could attend dialysis @ 630 am with [facility]. GARCIA will contact Fanta in the morning. Auth has been received.

## 2025-04-24 NOTE — PROGRESS NOTES
Vancomycin consult follow-up:    Patient reviewed, dialysis scheduled every Mon, Wed, Fri, no new levels, continue current therapy; Next levels due: random due 4/25/2025 at 0400

## 2025-04-24 NOTE — NURSING
Pt has an episode of 7 beats V-tach. No complains as of the moment. Pt is sleeping. Dr Hernandez notified. No orders at this time. Care ongoing.

## 2025-04-24 NOTE — PT/OT/SLP PROGRESS
"Physical Therapy Treatment    Patient Name:  Jevon Rajan   MRN:  [id number]    Recommendations:     Discharge Recommendations: Moderate Intensity Therapy  Discharge Equipment Recommendations: to be determined by next level of care      Assessment:     Jevon Rajan is a 85 y.o. male admitted with a medical diagnosis of Septic shock.  He presents with the following impairments/functional limitations: weakness, gait instability, impaired endurance, impaired balance, impaired coordination, decreased safety awareness, impaired cognition, impaired self care skills, impaired functional mobility, decreased coordination.    Rehab Prognosis: Good and Fair; patient would benefit from acute skilled PT services to address these deficits and reach maximum level of function.    Recent Surgery: Procedure(s) (LRB):  CYSTOSCOPY WITH CLOT EVACUATION, FULGURATION, DUNAWAY PLACEMENT (N/A) 17 Days Post-Op    Plan:     During this hospitalization, patient to be seen 3 x/week to address the identified rehab impairments via gait training, therapeutic activities, therapeutic exercises, neuromuscular re-education and progress toward the following goals:    Plan of Care Expires:  05/06/25    Subjective     Chief Complaint: "I want some coffee"  Patient/Family Comments/goals: Pt agreeable to PT treatment.  Pain/Comfort:  Pain Rating 1: 0/10      Objective:     Communicated with nurseDelio prior to session.  Patient found sitting edge of bed with bed alarm, dunaway catheter, telemetry upon PT entry to room.     General Precautions: Standard, fall, aspiration  Orthopedic Precautions: N/A  Braces: N/A  Respiratory Status: Room air     Functional Mobility:  Transfers:     Sit to Stand:  contact guard assistance with rolling walker  Gait: 15' w/RW CGA/min A.      AM-PAC 6 CLICK MOBILITY  Turning over in bed (including adjusting bedclothes, sheets and blankets)?: 4  Sitting down on and standing up from a chair with arms (e.g., wheelchair, bedside " commode, etc.): 3  Moving from lying on back to sitting on the side of the bed?: 3  Moving to and from a bed to a chair (including a wheelchair)?: 3  Need to walk in hospital room?: 3  Climbing 3-5 steps with a railing?: 3  Basic Mobility Total Score: 19       Treatment & Education:  OOB, ambulated short distance then assisted to B/S chair.    Patient left  reclined in chair  with all lines intact, call button in reach, chair alarm on, nurse notified, and all needs in reach; cup of coffee .    GOALS:   Multidisciplinary Problems       Physical Therapy Goals          Problem: Physical Therapy    Goal Priority Disciplines Outcome Interventions   Physical Therapy Goal     PT, PT/OT Progressing    Description: Goals to be met by: 25     Patient will increase functional independence with mobility by performin. Supine to sit with MInimal Assistance  2. Rolling to Left and Right with Minimal Assistance.  3. Bed to chair transfer with Moderate   4. Sitting at edge of bed x10 minutes with Contact Guard Assistance  5. Lower extremity exercise program x10 reps per handout, with assistance as needed  Goals met as of 25.    Updated Goals:   Supervision with bed mobility  Supervision with transfers  Supervision with RW for ambulation x 50'                         Time Tracking:     PT Received On: 25  PT Start Time: 1017     PT Stop Time: 1040  PT Total Time (min): 23 min     Billable Minutes: Gait Training 10 and Therapeutic Activity 13    Treatment Type: Treatment  PT/PTA: PT     Number of PTA visits since last PT visit: 0     2025

## 2025-04-24 NOTE — PT/OT/SLP PROGRESS
"Occupational Therapy   Treatment    Name: Jevon Rajan  MRN: [id number]  Admitting Diagnosis:  Septic shock  17 Days Post-Op    Recommendations:     Discharge Recommendations: Moderate Intensity Therapy  Discharge Equipment Recommendations:  to be determined by next level of care  Barriers to discharge:   (delayed/impaired command following, confusion, increased need of assist, deconditioned)    Assessment:     Jevon Rajan is a 85 y.o. male with a medical diagnosis of Septic shock.  He presents with The primary encounter diagnosis was Thrombosis of arteriovenous graft, initial encounter. Diagnoses of Septic shock, Chest pain, MRSA bacteremia, ESRD on dialysis, ESRD on hemodialysis, Elevated troponin, NSTEMI (non-ST elevated myocardial infarction), Urinary retention, Anemia in ESRD (end-stage renal disease), Infection of arteriovenous fistula, initial encounter [T82.7XXA], ACP (advance care planning), Acute bacterial endocarditis, BPH with urinary obstruction, and Acute metabolic encephalopathy were also pertinent to this visit. . Performance deficits affecting function are weakness, impaired endurance, impaired self care skills, impaired functional mobility, impaired balance, impaired cognition, decreased upper extremity function, decreased lower extremity function, decreased safety awareness, decreased ROM, impaired skin, impaired cardiopulmonary response to activity.     Rehab Prognosis:  Good; patient would benefit from acute skilled OT services to address these deficits and reach maximum level of function.       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  Patient found HOB elevated with bed alarm, telemetry, peripheral IV, dunaway catheter, Avasys upon OT entry to room.    General Precautions: Standard, fall, aspiration    Orthopedic Precautions:N/A  Braces: N/A  Respiratory Status: Room air     Occupational Performance:     Bed Mobility:    Patient completed Scooting/Bridging with stand by assistance  Patient completed Supine to Sit with stand by assistance  Patient completed Sit to Supine with stand by assistance    Functional Mobility/Transfers:  Patient completed 4x Sit <> Stand Transfer with contact guard assistance  with  rolling walker   Functional Mobility: Pt performed side steps with RW and CGA with brief Min A for WR mgmt; 5 forward/backward steps with Min A and CGA. Pt initiating seated rest breaks between stands. Vitals monitored and stable throughout: % SpO2 RA, 109-112 bpm during mobility. Edu on increasing activity tolerance and safe t/f techniques, hand placement, navigation of environment. Direct cueing for initiation, attention to task, redirection, sequencing steps.     Activities of Daily Living:  Feeding:  edu on benefit of increasing PO intake with uneaten breakfast noted; pt deferred all drinks and food offered  Upper Body Dressing: minimum assistance to don/doff gown with vc/tcs for sequencing/threading sleeves and attention to task      [facility] 6 Click ADL: 18    Treatment & Education:  ADL/functional mobility training as above.  Pt A&Ox2 to self and place; reorientation to time and situation.  All needs met.    Patient left HOB elevated with door left open, all lines intact, call button in reach, bed alarm on, RN notified, and Avasys present    GOALS:   Multidisciplinary Problems       Occupational Therapy Goals          Problem: Occupational Therapy    " Goal Priority Disciplines Outcome Interventions   Occupational Therapy Goal     OT, PT/OT Progressing    Description: Goals to be met by: 5/7/2025     Patient will increase functional independence with ADLs by performing:    Feeding with Set-up Assistance.  UE Dressing with Supervision.  LE Dressing with Stand-by Assistance.  Grooming while standing at sink with Stand-by Assistance.  Toileting from bedside commode with Stand-by Assistance for hygiene and clothing management.   Step transfer with Stand-by Assistance  Toilet transfer to bedside commode with Stand-by Assistance.                         Time Tracking:     OT Date of Treatment: 04/24/25  OT Start Time: 0933  OT Stop Time: 0956  OT Total Time (min): 23 min    Billable Minutes:Therapeutic Activity 23    OT/MATTY: OT          4/24/2025

## 2025-04-24 NOTE — PLAN OF CARE
Problem: Physical Therapy  Goal: Physical Therapy Goal  Description: Goals to be met by: 25     Patient will increase functional independence with mobility by performin. Supine to sit with MInimal Assistance  2. Rolling to Left and Right with Minimal Assistance.  3. Bed to chair transfer with Moderate   4. Sitting at edge of bed x10 minutes with Contact Guard Assistance  5. Lower extremity exercise program x10 reps per handout, with assistance as needed  Goals met as of 25.    Updated Goals:   Supervision with bed mobility  Supervision with transfers  Supervision with RW for ambulation x 50'    Outcome: Progressing   OOB to chair with chair alarm.

## 2025-04-24 NOTE — ASSESSMENT & PLAN NOTE
"Patient with known CAD s/p stent placement, which is controlled Will continue ASA, Plavix, and Statin and monitor for S/Sx of angina/ACS. Continue to monitor on telemetry.   - last [facility] was 1/2025: Severely calcified mid RCA stenosis unable to cross with PTCA balloons, treated initially with 0.9 laser atherectomy, followed by CSI atherectomy system with total of 5 runs (3 at low speed, 2 at high speed), pre-dilated using 2.0 compliant and 3.5 noncompliant balloon followed by 3.5 X 16 mm megatron stent.  Focal plaque rupture/erosion noted in the proximal and ostial RCA that were treated with  3.5 X 28 in the proximal RCA, 4.0 X 16 mm in the ostial RCA.  No residual stenosis post intervention.  Patient had ALAN 3 flow at the end of the procedure  - with elevated troponin likely due to septic shock  No results for input(s): "TROPONINI", "TROPONINIHS" in the last 168 hours.    - continue asa, plavix, statin  - Cardiology consulted  - no plans for ischemic evaluation at this time   "

## 2025-04-24 NOTE — PROGRESS NOTES
"[facility] Medicine  Progress Note    Patient Name: Jevon Rajan  MRN: [id number]  Patient Class: IP- Inpatient   Admission Date: 4/3/2025  Length of Stay: 21 days  Attending Physician: Tean Palma MD  Primary Care Provider: Melia, Primary Doctor        Subjective     Principal Problem:Septic shock        HPI:  Mr Jevon Rajan is a 85 y.o. man with ESRD with LUE AVF, HFpEF last EF 50-55%, CAD, DM who was transferred to [facility] ICU for septic shock due to MRSA bacteremia.     He was admitted at [facility] 4/1-3/2025 with sepsis, worsening to septic shock, then identified source as MRSA. 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     Upon arrival to [facility], he is moaning in pain and his LUE AVF has active pulsatile bright red blood. He does respond to his name. He denies pain anywhere other than his LUE. He is on levophed at 0.05.     Overview/Hospital Course:  Mr Jevon Rajan is a 85 y.o. man with ESRD on HD with LUE AVF that has been bleeding, CHF, CAD s/p recent stenting 1/2025 who was admitted with MRSA bacteremia and bleeding from his LUE AVF. Continued vancomycin; weaned off levophed. Vascular surgery emergently consulted; on 4/3/25 they took him to OR and noted: "well incorporated proximal and central graft without evidence of infection, resected graft and covered proximal and central remnants with multiple layers. Mid graft removed in entirety and sent for culture. Ruptured pseudoaneurysm with infected hematoma, graft completely obliterated at mid segment." Surgical cultures with Staph. Suspect AVF or chronic scratching of pruritic skin is the source of his infection. TTE showed endocarditis: EF 40-45%, G1DD, RV systolic dysfunction, large 1.9x1.2cm fixed mass on the posterior mitral valve leaflet with moder to severe regurgitation, cannot rule out posterior mitral " valve leaflet perforation. Discussed with cardiac surgery; he is high mortality risk, and surgery is not advised. ID following. Nephrology consulted; trialysis was placed for HD. Persistently positive for MRSA in blood cultures. He has had urinary retention; Urology consulted, performed bedside cystoscopy on 4/6/25 with large prostate noted. Noted to have clot retention and went urgently to OR with Urology on 4/7/25; asa and DVT ppx held.     WBC normalized. S/p clot removal with Urology, 1U PRBC ordered this morning with Hb 6.1. Soft BPs, will add midodrine for HD to step down to floor. Glucoses high, will increase insulin. Attempted to s/d but BP too low, may still need CRRT rather. 4/8 cx clear so far. Increasing insulin again. Increasing midodrine to 15 mg TID. Hb 6.8, 1U PRBC ordered.     BP appears to be recovering a bit, perhaps will tolerate reg HD, will discuss w Neph- will run him on HD Saturday, if tolerates normal HD will remove central line and give 2 day holiday and place tunnel on Monday. We will give him HD before dc to facility on Tuesday if accepted by then.     WBC has normalized for the first time, now tolerating reg HD, and Blcx remain clear. Central line removed. NGT removed. Will step down.     Interval History:  No acute event overnight, patient seen sitting in a chair this morning.  Medically stable for discharge; pending placement.    Review of Systems  Objective:     Vital Signs (Most Recent):  Temp: 98.4 °F (36.9 °C) (04/24/25 1626)  Pulse: 87 (04/24/25 1626)  Resp: 18 (04/24/25 1626)  BP: (!) 97/57 (04/24/25 1626)  SpO2: 98 % (04/24/25 1626) Vital Signs (24h Range):  Temp:  [98 °F (36.7 °C)-98.6 °F (37 °C)] 98.4 °F (36.9 °C)  Pulse:  [] 87  Resp:  [18-19] 18  SpO2:  [94 %-100 %] 98 %  BP: ()/(50-71) 97/57     Weight: 61.2 kg (134 lb 14.7 oz)  Body mass index is 21.13 kg/m².    Intake/Output Summary (Last 24 hours) at 4/24/2025 1810  Last data filed at 4/24/2025 1230  Gross  per 24 hour   Intake 120 ml   Output --   Net 120 ml         Physical Exam  Constitutional:       General: He is not in acute distress.     Appearance: He is not ill-appearing or diaphoretic.   Cardiovascular:      Rate and Rhythm: Normal rate and regular rhythm.      Pulses: Normal pulses.      Heart sounds: Normal heart sounds. No murmur heard.  Pulmonary:      Effort: Pulmonary effort is normal. No respiratory distress.      Breath sounds: Normal breath sounds. No stridor.   Abdominal:      General: There is no distension.      Palpations: Abdomen is soft.   Neurological:      Mental Status: Mental status is at baseline.               Significant Labs: All pertinent labs within the past 24 hours have been reviewed.    Significant Imaging: I have reviewed all pertinent imaging results/findings within the past 24 hours.      Assessment & Plan  Septic shock  This patient has shock. The type of shock is distributive due to sepsis. The patient had the following evidence of shock: persistent hypotension and altered mental status. 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 Shock is now resolved and vasopressors are off. WBC back to normal  HTN (hypertension)  Patient's blood pressure range in the last 24 hours was: BP  Min: 89/50  Max: 118/58.The patient's inpatient anti-hypertensive regimen is listed below:  Current Antihypertensives       Plan  - admitted with shock  - now off vasopressors. Continue to hold BP Rx as BP is well controlled without it   HLD (hyperlipidemia)  - continue statin  Type 2 diabetes mellitus with hyperglycemia, without long-term current use of insulin  A1c:   Lab  "Results   Component Value Date    HGBA1C 5.7 (H) 04/02/2025     Meds: lantus + SSI PRN to maintain goal 140-180  Tube feeds  accuchecks, hypoglycemic protocol      Coronary artery disease involving native coronary artery of native heart without angina pectoris  Patient with known CAD s/p stent placement, which is controlled Will continue ASA, Plavix, and Statin and monitor for S/Sx of angina/ACS. Continue to monitor on telemetry.   - last [facility] was 1/2025: Severely calcified mid RCA stenosis unable to cross with PTCA balloons, treated initially with 0.9 laser atherectomy, followed by CSI atherectomy system with total of 5 runs (3 at low speed, 2 at high speed), pre-dilated using 2.0 compliant and 3.5 noncompliant balloon followed by 3.5 X 16 mm megatron stent.  Focal plaque rupture/erosion noted in the proximal and ostial RCA that were treated with  3.5 X 28 in the proximal RCA, 4.0 X 16 mm in the ostial RCA.  No residual stenosis post intervention.  Patient had ALAN 3 flow at the end of the procedure  - with elevated troponin likely due to septic shock  No results for input(s): "TROPONINI", "TROPONINIHS" in the last 168 hours.    - continue asa, plavix, statin  - Cardiology consulted  - no plans for ischemic evaluation at this time   ESRD on hemodialysis  - ESRD on HD via LUE AVF  - LUE AVF was actively bleeding on arrival on 4/3/25. Vascular surgery was consulted. He went emergently to the OR on 4/3/25: "Severely calcified mid RCA stenosis unable to cross with PTCA balloons, treated initially with 0.9 laser atherectomy, followed by CSI atherectomy system with total of 5 runs (3 at low speed, 2 at high speed), pre-dilated using 2.0 compliant and 3.5 noncompliant balloon followed by 3.5 X 16 mm megatron stent.  Focal plaque rupture/erosion noted in the proximal and ostial RCA that were treated with  3.5 X 28 in the proximal RCA, 4.0 X 16 mm in the ostial RCA.  No residual stenosis post intervention.  Patient had ALAN " "3 flow at the end of the procedure"  - cultures from AVF= Staph   - wound care orders in place for surgical site  - Nephrology is consulted  - Rt IJ trialysis placed on 4/4/25 for CRRT;  - THDC placed on 04/14  Anemia in ESRD (end-stage renal disease)  Anemia is likely due to  ESRD, blood loss . Most recent hemoglobin and hematocrit are listed below.  Recent Labs     04/22/25  0604 04/23/25  0431 04/24/25  0536   HGB 9.3* 8.8* 8.1*   HCT 30.5* 28.9* 26.8*     Plan  - Monitor serial CBC: Daily and recheck now  - Transfuse PRBC if patient becomes hemodynamically unstable, symptomatic or H/H drops below 7/21.  - Patient has not received any PRBC transfusions to date  - Patient's anemia is currently stable  Acute metabolic encephalopathy with Hospital Delirum  -Noted episodes of intermittent agitation with tactile and visual hallucinations  -Continue correction of metabolic derangements  -Maintain Delirium precautions: Maintain regular sleep cycle. Early ambulation. Minimal interruptions overnight. Re-orient patient frequently. Maintain adequate bowel regimen, hydration and electrolyte replenishments  -aspiration precautions  -continue Seroquel 25 mg HS      ACP (advance care planning)  Advance Care Planning     Date: 04/04/2025  - palliative consulted   - Mr Escoto specifically told Dr Jordan to contact Kenia Smyth for all updates  - discussed code status with Kenia. She states she has spoken with Mr Escoto's sisters and they all want full code status.   -anticipated DC to SNF when medically stable enough to tolerated HD and possible nursing home placement         Acute bacterial endocarditis  - TTE 4/4/25: "1.9x1.2cm large fixed heterogeneous mass present on the posterior leaflet (new finding vs 9/2023 echo). There is moderate to severe regurgitation with an eccentric jet. Cannot exclude severe MR with possible PMVL perforation in association with large vegetation."  - this is in the setting of MRSA " "bacteremia  - ID is consulted  - repeat blood cultures until clear   - suspect source is skin/chronic scratching vs AVF infection- cultures from resected AVF with Staph   - discussed with Cardiac surgery Dr Castro 4/4/25- given age and comorbidities, he is very high risk of mortality with surgery. He recommends medical management at this point.  - on vancomycin      MRSA bacteremia  - suspect source:  Graft infection s/p exploratory surgery of LUE with graft resection 4/3  -graft holiday with TDC placed on 04/14  - cultures of AVF with Staph   - he has endocarditis  - ID consulted  - on vanc ;anticipating completing 6 weeks of IV vancomycin from clearance (EOC: 5/19/25)     NSTEMI (non-ST elevated myocardial infarction)  - see CAD    BPH with urinary obstruction  - noted 4/6/25 when patient became very agitated and screaming he couldn't urinate  - nursing unable to place dunaway/coude  - Urology consulted. Bedside cystoscopy on 4/6/25 noted clots, large prostate. Catheter was placed but was then removed due to pain  - 4/7 he is again agitated that he cannot urinate  - follow up with Urology   - tamsulosin ordered if he is awake enough to swallow     Thrombocytopenia  The likely etiology of thrombocytopenia is infection and sepsis. The patients 3 most recent labs are listed below.  Recent Labs     04/22/25  0604 04/23/25  0431 04/24/25  0536    170 149*     Plan  - Will transfuse if platelet count is <10k.    AV fistula infection  - LUE AVF was actively bleeding on arrival on 4/3/25. Vascular surgery was consulted. He went emergently to the OR on 4/3/25: "Severely calcified mid RCA stenosis unable to cross with PTCA balloons, treated initially with 0.9 laser atherectomy, followed by CSI atherectomy system with total of 5 runs (3 at low speed, 2 at high speed), pre-dilated using 2.0 compliant and 3.5 noncompliant balloon followed by 3.5 X 16 mm megatron stent.  Focal plaque rupture/erosion noted in the proximal " "and ostial RCA that were treated with  3.5 X 28 in the proximal RCA, 4.0 X 16 mm in the ostial RCA.  No residual stenosis post intervention.  Patient had ALAN 3 flow at the end of the procedure"  - cultures from AVF= Staph   - wound care orders in place for surgical site  - trialysis currently in place for HD    Emphysema lung  - emphysema noted on CT  - no signs of COPD exacerbation  Pulmonary nodules  - CT 4/3/25 with few small pulmonary nodules  - will need outpatient follow up     Gross hematuria  S/p clot evacuation with a fulguration by Urology  Currently resolved  Continue cap irrigation of 3 way catheter  Continue Levsin for bladder discomfort  -Sharma to be removed when patient is transferred to the floor; we will defer removal to urology    NSVT (nonsustained ventricular tachycardia)      Uremic pruritus  Patient complaining of severe intractable itching;[facility]  Start hydroxyzine  Ordered capsaicin cream   Korsuva  not available; [facility] never [facility]; can consider gabapentin  hemodynamic  and delirium allows  Will defer increased dialysis frequency; changing Kp/f ratios and other adjustments to nephrology      Moderate malnutrition  Nutrition consulted. Most recent weight and BMI monitored-     Measurements:  Wt Readings from Last 1 Encounters:   04/18/25 61.2 kg (134 lb 14.7 oz)   Body mass index is 21.13 kg/m².    Patient has been screened and assessed by RD.    Malnutrition Type:  Context: chronic illness  Level: moderate    Malnutrition Characteristic Summary:  Weight Loss (Malnutrition): greater than 7.5% in 3 months  Energy Intake (Malnutrition): less than or equal to 75% for greater than or equal to 1 month    Interventions/Recommendations (treatment strategy):  1. Continue on texture modified diet per SLP recommendations 2. Continue with Commercial beverage medical food supplement therapy: Novasource renal  3. Monitor labs and weights    VTE Risk Mitigation (From " admission, onward)           Ordered     heparin (porcine) injection 1,000 Units  As needed (PRN)         04/05/25 2100     IP VTE HIGH RISK PATIENT  Once         04/03/25 1516     Place TEMO hose  Until discontinued         04/03/25 1516     Place sequential compression device  Until discontinued         04/03/25 1516     Reason for No Pharmacological VTE Prophylaxis  Once        Question:  Reasons:  Answer:  Physician Provided (leave comment)    04/03/25 1516                    Discharge Planning   BAYRON: 4/24/2025     Code Status: DNR   Medical Readiness for Discharge Date:   Discharge Plan A: Skilled Nursing Facility   Discharge Delays: (!) Dialysis Set-up            Please place Justification for DME        Tena Palma MD  Department of Hospital Medicine   [facility]

## 2025-04-24 NOTE — ASSESSMENT & PLAN NOTE
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 Most recent hemoglobin and hematocrit are listed below.  Recent Labs     04/22/25  0604 04/23/25  0431 04/24/25  0536   HGB 9.3* 8.8* 8.1*   HCT 30.5* 28.9* 26.8*     Plan  - Monitor serial CBC: Daily and recheck now  - Transfuse PRBC if patient becomes hemodynamically unstable, symptomatic or H/H drops below 7/21.  - Patient has not received any PRBC transfusions to date  - Patient's anemia is currently stable

## 2025-04-24 NOTE — PLAN OF CARE
Problem: Adult Inpatient Plan of Care  Goal: Plan of Care Review  Outcome: Progressing  Goal: Patient-Specific Goal (Individualized)  Outcome: Progressing  Goal: Absence of Hospital-Acquired Illness or Injury  Outcome: Progressing  Goal: Optimal Comfort and Wellbeing  Outcome: Progressing     Problem: Diabetes Comorbidity  Goal: Blood Glucose Level Within Targeted Range  Outcome: Progressing     Problem: Sepsis/Septic Shock  Goal: Optimal Coping  Outcome: Progressing  Goal: Blood Glucose Level Within Targeted Range  Outcome: Progressing  Goal: Absence of Infection Signs and Symptoms  Outcome: Progressing  Goal: Optimal Nutrition Intake  Outcome: Progressing     Problem: Infection  Goal: Absence of Infection Signs and Symptoms  Outcome: Progressing

## 2025-04-24 NOTE — PLAN OF CARE
Pt free from fall/injury all night. Alert and oriented x2. On RA not in distress. Safety precaution done. Due meds given, whole pill given one at a time, pt tolerated well. Pt's need attended. Wound care dressing done as ordered. Cleanse pt done. Bed alarm set. Fall risk monitoring at the bedside. Bed in low position. Care ongoing.     Problem: Adult Inpatient Plan of Care  Goal: Plan of Care Review  Outcome: Progressing  Flowsheets (Taken 4/24/2025 0420)  Plan of Care Reviewed With: patient  Goal: Optimal Comfort and Wellbeing  Outcome: Progressing  Intervention: Monitor Pain and Promote Comfort  Flowsheets (Taken 4/24/2025 0420)  Pain Management Interventions:   pillow support provided   quiet environment facilitated     Problem: Diabetes Comorbidity  Goal: Blood Glucose Level Within Targeted Range  Outcome: Progressing     Problem: Infection  Goal: Absence of Infection Signs and Symptoms  Outcome: Progressing

## 2025-04-24 NOTE — ASSESSMENT & PLAN NOTE
Patient's blood pressure range in the last 24 hours was: BP  Min: 89/50  Max: 118/58.The patient's inpatient anti-hypertensive regimen is listed below:  Current Antihypertensives       Plan  - admitted with shock  - now off vasopressors. Continue to hold BP Rx as BP is well controlled without it

## 2025-04-24 NOTE — SUBJECTIVE & OBJECTIVE
Interval History:  No acute event overnight, patient seen sitting in a chair this morning.  Medically stable for discharge; pending placement.    Review of Systems  Objective:     Vital Signs (Most Recent):  Temp: 98.4 °F (36.9 °C) (04/24/25 1626)  Pulse: 87 (04/24/25 1626)  Resp: 18 (04/24/25 1626)  BP: (!) 97/57 (04/24/25 1626)  SpO2: 98 % (04/24/25 1626) Vital Signs (24h Range):  Temp:  [98 °F (36.7 °C)-98.6 °F (37 °C)] 98.4 °F (36.9 °C)  Pulse:  [] 87  Resp:  [18-19] 18  SpO2:  [94 %-100 %] 98 %  BP: ()/(50-71) 97/57     Weight: 61.2 kg (134 lb 14.7 oz)  Body mass index is 21.13 kg/m².    Intake/Output Summary (Last 24 hours) at 4/24/2025 1810  Last data filed at 4/24/2025 1230  Gross per 24 hour   Intake 120 ml   Output --   Net 120 ml         Physical Exam  Constitutional:       General: He is not in acute distress.     Appearance: He is not ill-appearing or diaphoretic.   Cardiovascular:      Rate and Rhythm: Normal rate and regular rhythm.      Pulses: Normal pulses.      Heart sounds: Normal heart sounds. No murmur heard.  Pulmonary:      Effort: Pulmonary effort is normal. No respiratory distress.      Breath sounds: Normal breath sounds. No stridor.   Abdominal:      General: There is no distension.      Palpations: Abdomen is soft.   Neurological:      Mental Status: Mental status is at baseline.               Significant Labs: All pertinent labs within the past 24 hours have been reviewed.    Significant Imaging: I have reviewed all pertinent imaging results/findings within the past 24 hours.

## 2025-04-24 NOTE — NURSING
[facility], [facility]  Nurses Note -- 4 Eyes      4/23/2025       Skin assessed on: Q Shift      [x] No Pressure Injuries Present    [x]Prevention Measures Documented    [] Yes LDA  for Pressure Injury Previously documented     [] Yes New Pressure Injury Discovered   [] LDA for New Pressure Injury Added      Attending RN:  Severo Bowden RN     Second RN:  PERRY Marlow

## 2025-04-25 LAB
ABSOLUTE EOSINOPHIL (OHS): 0.65 K/UL
ABSOLUTE MONOCYTE (OHS): 0.95 K/UL (ref 0.3–1)
ABSOLUTE NEUTROPHIL COUNT (OHS): 5.03 K/UL (ref 1.8–7.7)
ALBUMIN SERPL BCP-MCNC: 3.1 G/DL (ref 3.5–5.2)
ALP SERPL-CCNC: 77 UNIT/L (ref 40–150)
ALT SERPL W/O P-5'-P-CCNC: 5 UNIT/L (ref 10–44)
ANION GAP (OHS): 14 MMOL/L (ref 8–16)
AST SERPL-CCNC: 23 UNIT/L (ref 11–45)
BASOPHILS # BLD AUTO: 0.05 K/UL
BASOPHILS NFR BLD AUTO: 0.6 %
BILIRUB SERPL-MCNC: 0.9 MG/DL (ref 0.1–1)
BUN SERPL-MCNC: 30 MG/DL (ref 8–23)
CALCIUM SERPL-MCNC: 9.4 MG/DL (ref 8.7–10.5)
CHLORIDE SERPL-SCNC: 98 MMOL/L (ref 95–110)
CO2 SERPL-SCNC: 23 MMOL/L (ref 23–29)
CREAT SERPL-MCNC: 8.3 MG/DL (ref 0.5–1.4)
ERYTHROCYTE [DISTWIDTH] IN BLOOD BY AUTOMATED COUNT: 16.3 % (ref 11.5–14.5)
GFR SERPLBLD CREATININE-BSD FMLA CKD-EPI: 6 ML/MIN/1.73/M2
GLUCOSE SERPL-MCNC: 111 MG/DL (ref 70–110)
HCT VFR BLD AUTO: 28.1 % (ref 40–54)
HGB BLD-MCNC: 8.6 GM/DL (ref 14–18)
IMM GRANULOCYTES # BLD AUTO: 0.04 K/UL (ref 0–0.04)
IMM GRANULOCYTES NFR BLD AUTO: 0.5 % (ref 0–0.5)
LYMPHOCYTES # BLD AUTO: 1.04 K/UL (ref 1–4.8)
MCH RBC QN AUTO: 28.5 PG (ref 27–31)
MCHC RBC AUTO-ENTMCNC: 30.6 G/DL (ref 32–36)
MCV RBC AUTO: 93 FL (ref 82–98)
NUCLEATED RBC (/100WBC) (OHS): 0 /100 WBC
PLATELET # BLD AUTO: 151 K/UL (ref 150–450)
PMV BLD AUTO: 10.7 FL (ref 9.2–12.9)
POCT GLUCOSE: 139 MG/DL (ref 70–110)
POCT GLUCOSE: 146 MG/DL (ref 70–110)
POCT GLUCOSE: 150 MG/DL (ref 70–110)
POCT GLUCOSE: 86 MG/DL (ref 70–110)
POTASSIUM SERPL-SCNC: 4.1 MMOL/L (ref 3.5–5.1)
PROT SERPL-MCNC: 7.8 GM/DL (ref 6–8.4)
RBC # BLD AUTO: 3.02 M/UL (ref 4.6–6.2)
RELATIVE EOSINOPHIL (OHS): 8.4 %
RELATIVE LYMPHOCYTE (OHS): 13.4 % (ref 18–48)
RELATIVE MONOCYTE (OHS): 12.2 % (ref 4–15)
RELATIVE NEUTROPHIL (OHS): 64.9 % (ref 38–73)
SARS-COV-2 RDRP RESP QL NAA+PROBE: NEGATIVE
SODIUM SERPL-SCNC: 135 MMOL/L (ref 136–145)
VANCOMYCIN SERPL-MCNC: 18.1 UG/ML (ref ?–80)
WBC # BLD AUTO: 7.76 K/UL (ref 3.9–12.7)

## 2025-04-25 PROCEDURE — 63600175 PHARM REV CODE 636 W HCPCS: Performed by: STUDENT IN AN ORGANIZED HEALTH CARE EDUCATION/TRAINING PROGRAM

## 2025-04-25 PROCEDURE — 99232 SBSQ HOSP IP/OBS MODERATE 35: CPT | Mod: ,,, | Performed by: UROLOGY

## 2025-04-25 PROCEDURE — 99233 SBSQ HOSP IP/OBS HIGH 50: CPT | Mod: 25,,, | Performed by: REGISTERED NURSE

## 2025-04-25 PROCEDURE — 25000003 PHARM REV CODE 250: Performed by: HOSPITALIST

## 2025-04-25 PROCEDURE — 25000003 PHARM REV CODE 250: Performed by: STUDENT IN AN ORGANIZED HEALTH CARE EDUCATION/TRAINING PROGRAM

## 2025-04-25 PROCEDURE — 94761 N-INVAS EAR/PLS OXIMETRY MLT: CPT

## 2025-04-25 PROCEDURE — U0002 COVID-19 LAB TEST NON-CDC: HCPCS

## 2025-04-25 PROCEDURE — 80100016 HC MAINTENANCE HEMODIALYSIS

## 2025-04-25 PROCEDURE — 30200315 PPD INTRADERMAL TEST REV CODE 302

## 2025-04-25 PROCEDURE — 85025 COMPLETE CBC W/AUTO DIFF WBC: CPT | Performed by: HOSPITALIST

## 2025-04-25 PROCEDURE — 99497 ADVNCD CARE PLAN 30 MIN: CPT | Mod: 25,,, | Performed by: REGISTERED NURSE

## 2025-04-25 PROCEDURE — 11000001 HC ACUTE MED/SURG PRIVATE ROOM

## 2025-04-25 PROCEDURE — 25000003 PHARM REV CODE 250

## 2025-04-25 PROCEDURE — 86580 TB INTRADERMAL TEST: CPT

## 2025-04-25 PROCEDURE — 36415 COLL VENOUS BLD VENIPUNCTURE: CPT | Performed by: HOSPITALIST

## 2025-04-25 PROCEDURE — 82040 ASSAY OF SERUM ALBUMIN: CPT | Performed by: HOSPITALIST

## 2025-04-25 PROCEDURE — 80202 ASSAY OF VANCOMYCIN: CPT

## 2025-04-25 RX ORDER — CAPSAICIN 0.03 G/100G
CREAM TOPICAL 2 TIMES DAILY
Qty: 25 G | Refills: 0 | Status: SHIPPED | OUTPATIENT
Start: 2025-04-25 | End: 2025-04-29

## 2025-04-25 RX ORDER — TAMSULOSIN HYDROCHLORIDE 0.4 MG/1
0.4 CAPSULE ORAL DAILY
Qty: 30 CAPSULE | Refills: 11 | Status: SHIPPED | OUTPATIENT
Start: 2025-04-25 | End: 2025-04-29

## 2025-04-25 RX ORDER — SEVELAMER CARBONATE 800 MG/1
1600 TABLET, FILM COATED ORAL
Qty: 180 TABLET | Refills: 11 | Status: SHIPPED | OUTPATIENT
Start: 2025-04-25 | End: 2025-04-29 | Stop reason: HOSPADM

## 2025-04-25 RX ORDER — HYDROXYZINE HYDROCHLORIDE 25 MG/1
25 TABLET, FILM COATED ORAL 3 TIMES DAILY PRN
Qty: 90 TABLET | Refills: 0 | Status: SHIPPED | OUTPATIENT
Start: 2025-04-25 | End: 2025-04-29

## 2025-04-25 RX ORDER — MIDODRINE HYDROCHLORIDE 5 MG/1
10 TABLET ORAL EVERY 8 HOURS
Qty: 180 TABLET | Refills: 11 | Status: SHIPPED | OUTPATIENT
Start: 2025-04-25 | End: 2025-04-29

## 2025-04-25 RX ADMIN — HALOPERIDOL LACTATE 2 MG: 5 INJECTION, SOLUTION INTRAMUSCULAR at 01:04

## 2025-04-25 RX ADMIN — CAPSAICIN: 0.25 CREAM TOPICAL at 10:04

## 2025-04-25 RX ADMIN — Medication 6 MG: at 09:04

## 2025-04-25 RX ADMIN — TUBERCULIN PURIFIED PROTEIN DERIVATIVE 5 UNITS: 5 INJECTION, SOLUTION INTRADERMAL at 01:04

## 2025-04-25 RX ADMIN — ATORVASTATIN CALCIUM 80 MG: 40 TABLET, FILM COATED ORAL at 09:04

## 2025-04-25 RX ADMIN — MIDODRINE HYDROCHLORIDE 15 MG: 5 TABLET ORAL at 02:04

## 2025-04-25 RX ADMIN — Medication: at 04:04

## 2025-04-25 RX ADMIN — CLOPIDOGREL BISULFATE 75 MG: 75 TABLET, FILM COATED ORAL at 10:04

## 2025-04-25 RX ADMIN — TAMSULOSIN HYDROCHLORIDE 0.4 MG: 0.4 CAPSULE ORAL at 10:04

## 2025-04-25 RX ADMIN — QUETIAPINE FUMARATE 25 MG: 25 TABLET ORAL at 09:04

## 2025-04-25 RX ADMIN — MIDODRINE HYDROCHLORIDE 15 MG: 5 TABLET ORAL at 06:04

## 2025-04-25 RX ADMIN — PANTOPRAZOLE SODIUM 40 MG: 40 TABLET, DELAYED RELEASE ORAL at 10:04

## 2025-04-25 RX ADMIN — HYDROXYZINE HYDROCHLORIDE 25 MG: 25 TABLET ORAL at 01:04

## 2025-04-25 RX ADMIN — MIDODRINE HYDROCHLORIDE 15 MG: 5 TABLET ORAL at 10:04

## 2025-04-25 NOTE — NURSING
Pt laying supine with legs bent. Shouting and yelling out. Alert and oriented x2. (Self and place). Sharma catheter clean and in place, collection bag presents with hematuria. Pt repositioned in bed. Bed in lowest position, personal items and call light within reach, instructed to call for help if needed.    [facility], [facility]  Nurses Note -- 4 Eyes        April 24, 2025        Skin assessed on: Q Shift Change        [x] No Pressure Injuries Present                 [x]Prevention Measures Documented     [] Yes LDA  for Pressure Injury Previously documented      [] Yes New Pressure Injury Discovered              [] LDA for New Pressure Injury Added        Attending RN:  Sahara Corbett LPN      Second RN:  CHARLENE Kebede

## 2025-04-25 NOTE — ASSESSMENT & PLAN NOTE
"- ESRD on HD via LUE AVF  - LUE AVF was actively bleeding on arrival on 4/3/25. Vascular surgery was consulted. He went emergently to the OR on 4/3/25: "Severely calcified mid RCA stenosis unable to cross with PTCA balloons, treated initially with 0.9 laser atherectomy, followed by CSI atherectomy system with total of 5 runs (3 at low speed, 2 at high speed), pre-dilated using 2.0 compliant and 3.5 noncompliant balloon followed by 3.5 X 16 mm megatron stent.  Focal plaque rupture/erosion noted in the proximal and ostial RCA that were treated with  3.5 X 28 in the proximal RCA, 4.0 X 16 mm in the ostial RCA.  No residual stenosis post intervention.  Patient had ALAN 3 flow at the end of the procedure"  - cultures from AVF= Staph   - wound care orders in place for surgical site  - Nephrology is consulted  - Rt IJ trialysis placed on 4/4/25 for CRRT;  - THDC placed on 04/14  -currently anticipating DC to long-term care with the hospice  "

## 2025-04-25 NOTE — PROGRESS NOTES
[facility]  Urology  Progress Note    Patient Name: Jevon Rajan  MRN: [id number]  Admission Date: 4/3/2025  Hospital Length of Stay: 22 days  Code Status: DNR   Attending Provider: Tena Palma MD   Primary Care Physician: Melia, Primary Doctor    Subjective:     HPI:  Urinary Retention  Patient complains of urinary retention. Onset of retention was 1 day ago and was gradual in onset. Patient currently does not have a urinary catheter in place.  300 ml of urine were scanned on bladder scanner Prior to this event voiding symptoms consisted of slow stream. Prior treatments include none. Recent medications that may have affected his voiding include none.   He still makes urine, he tells me an almost normal amount.  He is very uncomfortable at the level of the bladder.  Nursing staff attempted coude catheter was then successfully.  He agrees to allow me to place a catheter.    Interval History: no new events.  Tolerating Sharma    Review of Systems   Unable to perform ROS    Objective:     Temp:  [97.7 °F (36.5 °C)-98.5 °F (36.9 °C)] 98 °F (36.7 °C)  Pulse:  [] 84  Resp:  [18] 18  SpO2:  [97 %-100 %] 97 %  BP: ()/(50-68) 108/60     Body mass index is 21.13 kg/m².    Date 04/25/25 0700 - 04/26/25 0659   Shift 8576-3179 4670-5854 0910-1279 24 Hour Total   INTAKE   P.O. 120   120   Shift Total(mL/kg) 120(2)   120(2)   OUTPUT   Shift Total(mL/kg)       Weight (kg) 61.2 61.2 61.2 61.2     Bladder Scan Volume (mL): 331 mL (04/06/25 1520)    Drains       Drain  Duration                  Urethral Catheter 04/07/25 1558 Triple-lumen;Latex 24 Fr. 17 days         Hemodialysis Catheter 04/14/25 1011 right internal jugular 10 days                     Physical Exam  Vitals and nursing note reviewed.   Constitutional:       Appearance: He is well-developed.   HENT:      Head: Normocephalic.   Eyes:      Conjunctiva/sclera: Conjunctivae normal.   Neck:      Thyroid: No thyromegaly.      Trachea: No tracheal  "deviation.   Cardiovascular:      Rate and Rhythm: Normal rate.      Heart sounds: Normal heart sounds.   Pulmonary:      Effort: Pulmonary effort is normal. No respiratory distress.      Breath sounds: Normal breath sounds. No wheezing.   Abdominal:      General: Bowel sounds are normal.      Palpations: Abdomen is soft.      Tenderness: There is no abdominal tenderness. There is no rebound.      Hernia: No hernia is present.   Genitourinary:     Comments: Dunaway in place with yellow urine  Musculoskeletal:         General: No tenderness. Normal range of motion.      Cervical back: Normal range of motion and neck supple.   Lymphadenopathy:      Cervical: No cervical adenopathy.   Skin:     General: Skin is warm and dry.      Findings: No erythema or rash.   Neurological:      Mental Status: He is alert and oriented to person, place, and time.   Psychiatric:         Behavior: Behavior normal.         Thought Content: Thought content normal.         Judgment: Judgment normal.           Significant Labs:    BMP:  Recent Labs   Lab 04/23/25  1937 04/24/25  0536 04/25/25  0546    136 135*   K 3.7 3.9 4.1   CL 97 98 98   CO2 26 25 23   BUN 14 20 30*   CREATININE 4.8* 6.1* 8.3*   GLUCOSE 129* 147* 111*   CALCIUM 9.2 8.9 9.4       CBC:   Recent Labs   Lab 04/23/25  0431 04/24/25  0536 04/25/25  0546   WBC 9.90 8.35 7.76   HGB 8.8* 8.1* 8.6*   HCT 28.9* 26.8* 28.1*    149* 151       Blood Culture: No results for input(s): "LABBLOO" in the last 168 hours.  Urine Culture: No results for input(s): "LABURIN" in the last 168 hours.    Significant Imaging:                    Assessment/Plan:     Gross hematuria  S/p Clot evacuation with fulguration  Resolved  Cap irrigation port of 3 way catheter  Levsin PRN for bladder discomfort            BPH with urinary obstruction  Catheter in place, requires cystoscopy to place dunaway.      Consider voiding trial prior to discharge - early in the AM preferred in case patient " will need to be brought back to the operating room for dunaway placement          VTE Risk Mitigation (From admission, onward)           Ordered     heparin (porcine) injection 1,000 Units  As needed (PRN)         04/05/25 2100     IP VTE HIGH RISK PATIENT  Once         04/03/25 1516     Place TEMO hose  Until discontinued         04/03/25 1516     Place sequential compression device  Until discontinued         04/03/25 1516     Reason for No Pharmacological VTE Prophylaxis  Once        Question:  Reasons:  Answer:  Physician Provided (leave comment)    04/03/25 1516                    Agustin Kramer MD  Urology  [facility]

## 2025-04-25 NOTE — ASSESSMENT & PLAN NOTE
- nephrology now recommending stopping HD due to pt's ongoing mental and physical status and safety concerns   - family expressed insight into this and agreeable to shelter care with hospice

## 2025-04-25 NOTE — PLAN OF CARE
Problem: Adult Inpatient Plan of Care  Goal: Plan of Care Review  Outcome: Progressing  Flowsheets (Taken 4/25/2025 8101)  Plan of Care Reviewed With:   patient   spouse     Problem: Adult Inpatient Plan of Care  Goal: Patient-Specific Goal (Individualized)  Outcome: Progressing  Flowsheets (Taken 4/25/2025 1429)  Individualized Care Needs: none  Anxieties, Fears or Concerns: none  Patient/Family-Specific Goals (Include Timeframe): Hospice

## 2025-04-25 NOTE — PLAN OF CARE
"Changes in medical condition or discharge plan: Per nephrology, pt can no longer receive dialysis. NP Lisa with palliative spoke with family and they agreed to hospice.     Does patient need new DME? TBD    Follow up appts needed: TBD    Medically stable: yes    Estimated Discharge Date:  Pending placement.    CM sent referrals via Epic. CM will follow up as needed.    Waiting on Medicare number, can't accept pt's:  [facility]    Will not accept without :  [facility]    CM spoke to pt's Deonna garces and inquired if they have access to . Deonna stated "No, I was trying to get it but I was unable to get it." CM explained that the [facility] will not accept pt without the .    CM informed her that pt will need banking statements for January, February and March. Deonna stated" I will try to get the banking statements". CM will follow up on Monday.       04/25/25 1335   Discharge Reassessment   Assessment Type Discharge Planning Reassessment   Did the patient's condition or plan change since previous assessment? Yes   Discharge Plan discussed with: Caregiver   Name(s) and Number(s) Deonna [phone]   Communicated BAYRON with patient/caregiver Yes   Discharge Plan B New Nursing Home placement - [facility] care facility   DME Needed Upon Discharge  none   Transition of Care Barriers None   Why the patient remains in the hospital Other (see comment)  (Plan changed to hospice)   Post-Acute Status   Post-Acute Authorization Placement   Post-Acute Placement Status Pending payor review/awaiting authorization (if required)   Coverage [employer] MANAGED MEDICARE - [facility] SPECIAL NEEDS   Patient choice form signed by patient/caregiver List from System Post-Acute Care   Discharge Delays (!) Post-Acute Set-up       "

## 2025-04-25 NOTE — ASSESSMENT & PLAN NOTE
#Anemia  HGB   Date Value Ref Range Status   04/25/2025 8.6 (L) 14.0 - 18.0 gm/dL Final     Iron   Date Value Ref Range Status   08/11/2024 39 (L) 45 - 160 ug/dL Final     Transferrin   Date Value Ref Range Status   08/11/2024 139 (L) 200 - 375 mg/dL Final     TIBC   Date Value Ref Range Status   08/11/2024 206 (L) 250 - 450 ug/dL Final     Saturated Iron   Date Value Ref Range Status   08/11/2024 19 (L) 20 - 50 % Final     Ferritin   Date Value Ref Range Status   08/11/2024 979 (H) 20.0 - 300.0 ng/mL Final     Hold therapy, see ESRD

## 2025-04-25 NOTE — ASSESSMENT & PLAN NOTE
Advance Care Planning     Date: 04/04/2025  - palliative consulted   - Mr Escoto specifically told Dr Jordan to contact Kenia Smyth for all updates  - discussed code status with Kenia. She states she has spoken with Mr Escoto's sisters and they all want full code status.   -anticipated DC to SNF cancelled; patient to be DC to snf care with hospice

## 2025-04-25 NOTE — ASSESSMENT & PLAN NOTE
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 Most recent hemoglobin and hematocrit are listed below.  Recent Labs     04/23/25  0431 04/24/25  0536 04/25/25  0546   HGB 8.8* 8.1* 8.6*   HCT 28.9* 26.8* 28.1*     Plan  - Monitor serial CBC: Daily and recheck now  - Transfuse PRBC if patient becomes hemodynamically unstable, symptomatic or H/H drops below 7/21.  - Patient has not received any PRBC transfusions to date  - Patient's anemia is currently stable

## 2025-04-25 NOTE — SUBJECTIVE & OBJECTIVE
Medications:  Continuous Infusions:  Scheduled Meds:   atorvastatin  80 mg Oral QHS    capsaicin   Topical (Top) BID    clopidogreL  75 mg Oral Daily    epoetin mj-epbx  10,000 Units Intravenous Every Mon, Wed, Fri    midodrine  15 mg Oral Q8H    pantoprazole  40 mg Oral Daily    QUEtiapine  25 mg Oral QHS    sevelamer carbonate  1,600 mg Oral TID WM    tamsulosin  0.4 mg Oral Daily     PRN Meds:  Current Facility-Administered Medications:     0.9%  NaCl infusion (for blood administration), , Intravenous, Q24H PRN    0.9% NaCl, , Intravenous, PRN    acetaminophen, 650 mg, Oral, Q4H PRN    albumin human 25%, 25 g, Intravenous, Daily PRN    dextrose 50%, 12.5 g, Intravenous, PRN    dextrose 50%, 25 g, Intravenous, PRN    diphenhydrAMINE-zinc acetate 1-0.1%, , Topical (Top), TID PRN    glucagon (human recombinant), 1 mg, Intramuscular, PRN    glucose, 16 g, Oral, PRN    glucose, 24 g, Oral, PRN    haloperidol lactate, 2 mg, Intravenous, Q4H PRN    heparin (porcine), 1,000 Units, Intravenous, PRN    hydrOXYzine HCL, 25 mg, Oral, TID PRN    hyoscyamine, 0.125 mg, Sublingual, Q4H PRN    insulin aspart U-100, 0-5 Units, Subcutaneous, QID (AC + HS) PRN    melatonin, 6 mg, Oral, Nightly PRN    naloxone, 0.02 mg, Intravenous, PRN    ondansetron, 4 mg, Intravenous, Q6H PRN    prochlorperazine, 5 mg, Intravenous, Q6H PRN    senna, 8.6 mg, Oral, Daily PRN    sodium chloride 0.9%, 250 mL, Intravenous, PRN    Pharmacy to dose Vancomycin consult, , , Once **AND** vancomycin - pharmacy to dose, , Intravenous, pharmacy to manage frequency    Objective:     Vital Signs (Most Recent):  Temp: 97.9 °F (36.6 °C) (04/25/25 1154)  Pulse: 84 (04/25/25 1154)  Resp: 18 (04/25/25 1154)  BP: (!) 111/49 (04/25/25 1154)  SpO2: 100 % (04/25/25 1154) Vital Signs (24h Range):  Temp:  [97.7 °F (36.5 °C)-98.5 °F (36.9 °C)] 97.9 °F (36.6 °C)  Pulse:  [] 84  Resp:  [18] 18  SpO2:  [97 %-100 %] 100 %  BP: ()/(49-68) 111/49     Weight: 61.2  kg (134 lb 14.7 oz)  Body mass index is 21.13 kg/m².     Physical Exam  Vitals and nursing note reviewed.   Constitutional:       General: He is awake.      Appearance: He is ill-appearing.   HENT:      Head: Normocephalic and atraumatic.   Pulmonary:      Effort: Pulmonary effort is normal. No respiratory distress.   Skin:     General: Skin is dry.      Findings: Bruising: multiple skin lesions in various stages of healing.   Neurological:      Mental Status: He is alert. He is disoriented.      Comments: Oriented to self, location of hospital, repeating statements   Not at baseline, but improved cooperativeness   Psychiatric:         Behavior: Behavior is agitated. Behavior is cooperative.         Cognition and Memory: Memory is impaired.          Advance Care Planning   Advance Directives:   Living Will: No    LaPOST: No (needs prior to discharge; palliative SW to coordinate with hospice agency)    Do Not Resuscitate Status: Yes    Medical Power of : No (previously had shared with MDT that he wished dez Ndiaye to act as MPOA; now states niharsha Araiza; 4 siblings are legal surrogate decision makers otherwise and reccomended (see ACP))      Decision Making:  Family answered questions and Patient answered questions  Goals of Care: What is most important right now is to focus on symptom/pain control, curative/life-prolongation (regardless of treatment burdens), improvement in condition but with limits to invasive therapies. Accordingly, we have decided that the best plan to meet the patient's goals includes enrolling in hospice.        Significant Labs: All pertinent labs within the past 24 hours have been reviewed.  CBC:   Recent Labs   Lab 04/25/25  0546   WBC 7.76   HGB 8.6*   HCT 28.1*   MCV 93        BMP:  Recent Labs   Lab 04/25/25  0546   *   K 4.1   CL 98   CO2 23   BUN 30*   CREATININE 8.3*   CALCIUM 9.4     LFT:  Lab Results   Component Value Date    AST 23 04/25/2025    ALKPHOS 77  04/25/2025    BILITOT 0.9 04/25/2025     Albumin:   Albumin   Date Value Ref Range Status   04/25/2025 3.1 (L) 3.5 - 5.2 g/dL Final   03/21/2025 3.2 (L) 3.5 - 5.2 g/dL Final     Protein:   Total Protein   Date Value Ref Range Status   03/21/2025 7.5 6.0 - 8.4 g/dL Final     Lactic acid:   Lab Results   Component Value Date    LACTATE 1.9 04/14/2025    LACTATE 0.9 04/03/2025     Significant Imaging: I have reviewed all pertinent imaging results/findings within the past 24 hours.

## 2025-04-25 NOTE — SUBJECTIVE & OBJECTIVE
Interval History:  No acute event overnight.  The patient had episode of agitation during HD; palliative care every consulted; discussed with the family.  Planned discharge to SNF cancelled.  Currently anticipating DC to halfway care with hospice     Review of Systems  Objective:     Vital Signs (Most Recent):  Temp: 97.9 °F (36.6 °C) (04/25/25 1154)  Pulse: 84 (04/25/25 1154)  Resp: 18 (04/25/25 1154)  BP: (!) 111/49 (04/25/25 1154)  SpO2: 100 % (04/25/25 1154) Vital Signs (24h Range):  Temp:  [97.7 °F (36.5 °C)-98.5 °F (36.9 °C)] 97.9 °F (36.6 °C)  Pulse:  [] 84  Resp:  [18] 18  SpO2:  [97 %-100 %] 100 %  BP: ()/(49-68) 111/49     Weight: 61.2 kg (134 lb 14.7 oz)  Body mass index is 21.13 kg/m².    Intake/Output Summary (Last 24 hours) at 4/25/2025 1453  Last data filed at 4/25/2025 1154  Gross per 24 hour   Intake 460 ml   Output 50 ml   Net 410 ml         Physical Exam  Constitutional:       General: He is not in acute distress.     Appearance: He is not ill-appearing, toxic-appearing or diaphoretic.   Cardiovascular:      Rate and Rhythm: Normal rate and regular rhythm.      Pulses: Normal pulses.      Heart sounds: Normal heart sounds. No murmur heard.  Pulmonary:      Effort: Pulmonary effort is normal. No respiratory distress.      Breath sounds: Normal breath sounds.   Abdominal:      General: There is no distension.      Palpations: Abdomen is soft. There is mass.   Neurological:      Mental Status: He is disoriented.               Significant Labs: All pertinent labs within the past 24 hours have been reviewed.    Significant Imaging: I have reviewed all pertinent imaging results/findings within the past 24 hours.

## 2025-04-25 NOTE — ASSESSMENT & PLAN NOTE
Patient's blood pressure range in the last 24 hours was: BP  Min: 97/57  Max: 121/57.The patient's inpatient anti-hypertensive regimen is listed below:  Current Antihypertensives       Plan  - admitted with shock  - now off vasopressors. Continue to hold BP Rx as BP is well controlled without it

## 2025-04-25 NOTE — PLAN OF CARE
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]  Yes    Emphysema lung [J43.9]  Yes    Pulmonary nodules [R91.8]  Yes    Gross hematuria [R31.0]  No    BPH with urinary obstruction [N40.1, N13.8]  Yes    ACP (advance care planning) [Z71.89]  Not Applicable    Acute bacterial endocarditis [I33.0]  Yes    MRSA bacteremia [R78.81, B95.62]  Yes    NSTEMI (non-ST elevated myocardial infarction) [I21.4]  Yes    Acute metabolic encephalopathy with Hospital Delirum [G93.41]  Yes    Anemia in ESRD (end-stage renal disease) [N18.6, D63.1]  Yes    ESRD on hemodialysis [N18.6, Z99.2]  Not Applicable    Type 2 diabetes mellitus with hyperglycemia, without long-term current use of insulin [E11.65]  Yes    Coronary artery disease involving native coronary artery of native heart without angina pectoris [I25.10]  Yes     S/p PCI in 1999 @ [facility].   -[facility] (12/30/14) nstemi: pLAD 90%, OM1 70% ISR, RCA li's, LVEDP 39   -resolute 3.0 x 18 XU to prox LAD post-dilated with 3.0 NC  -TTE (12/29/14): EF 55%, E/e; 9      HTN (hypertension) [I10]  Yes     Dx updated per 2019 IMO Load      HLD (hyperlipidemia) [E78.5]  Yes     Chronic      Resolved Hospital Problems    Diagnosis Date Resolved POA    Hypothyroidism due to acquired atrophy of thyroid [E03.4] 04/03/2025 Yes       Patient is homebound due to:  Septic shock    Allergies:  Review of patient's allergies indicates:   Allergen  Reactions    Ace inhibitors Hives, Itching, Shortness Of Breath, Other (See Comments) and Rash     Is not sure which medication it was    Captopril        Vitals     Every shift (Skilled Nursing patients)    Diet: Renal on HD, cardiac consistent carbohydrate diet    Acitivities:      - Up in a chair each morning as tolerated   - Ambulate with assistance to bathroom   - Scheduled walks once each shift (every 8 hours)   - May ambulate independently   - May use walker, cane, or self-propelled wheelchair   - Weight bearing: as tolerated    LABS:  Per facility protocol    Nursing Precautions:     - Aspiration precautions:             - Total assistance with meals            -  Upright 90 degrees befor during and after meals             -  Suction at bedside          - Fall precautions per nursing home protocol   - Seizure precaution per senior living protocol   - Decubitus precautions:        -  for positioning   - Pressure reducing foam mattress   - Turn patient every two hours. Use wedge pillows to anchor patient    CONSULTS:      Physical Therapy to evaluate and treat     Occupational Therapy to evaluate and treat     Psychiatry to evaluate and follow patients for delirium    MISCELLANEOUS CARE     Routine Skin for Bedridden Patients:  Apply moisture barrier cream to all    skin folds and wet areas in perineal area daily and after baths and                           all bowel movements.                           DIABETES CARE:       Check blood sugar:  .    Fingerstick blood sugar AC and HS   Fingerstick blood sugar every 6 hours if unable to eat      Report CBG < 60 or > 400 to physician.                                          Insulin Sliding Scale          Glucose  Novolog Insulin Subcutaneous        0 - 60   Orange juice or glucose tablet, hold insulin      No insulin   201-250  2 units   251-300  4 units   301-350  6 units   351-400  8 units   >400   10 units then call physician      Medications:  Discontinue all previous medication orders, if any. See new list below.    IV Vancomycin 500 mg  to be given with HD  Last Dose Given :4/23/2025         End Date: 5/19/2025        Medication List        START taking these medications      capsaicin 0.025 % cream  Commonly known as: ZOSTRIX  Apply topically 2 (two) times daily. Apply to the skin when itching     diphenhydrAMINE-zinc acetate 1-0.1% cream  Commonly known as: BENADRYL  Apply topically 3 (three) times daily as needed for Itching. Apply to the skin while itching     hydrOXYzine HCL 25 MG tablet  Commonly known as: ATARAX  Take 1 tablet (25 mg total) by mouth 3 (three) times daily as needed for Itching.     midodrine 5 MG Tab  Commonly known as: PROAMATINE  Take 2 tablets (10 mg total) by mouth every 8 (eight) hours.     sevelamer carbonate 800 mg Tab  Commonly known as: RENVELA  Take 2 tablets (1,600 mg total) by mouth 3 (three) times daily with meals.     tamsulosin 0.4 mg Cap  Commonly known as: FLOMAX  Take 1 capsule (0.4 mg total) by mouth once daily.            CONTINUE taking these medications      ammonium lactate 12 % Crea  Apply topically 2 (two) times a day.     artificial tears(hypromellose)(GENTEAL/SUSTANE) 0.3 % Gel  Apply to eye nightly.     aspirin 81 MG EC tablet  Commonly known as: ECOTRIN  Take 1 tablet (81 mg total) by mouth once daily.     atorvastatin 80 MG tablet  Commonly known as: LIPITOR  Take 1 tablet (80 mg total) by mouth every evening.     camphor-menthol 0.5-0.5% lotion  Commonly known as: SARNA  Apply topically once daily. APPLY SMALL AMOUNT TOPICALLY TO AFFECTED AREA(S) AS NEEDED FOR ITCHING KEEP IN FRIDGE     carboxymethylcellulose sodium 0.5 % Drop  Apply 1 drop to eye nightly as needed (dry eyes).     clobetasol 0.05% 0.05 % Oint  Commonly known as: TEMOVATE  Apply topically 2 (two) times daily.     clopidogreL 75 mg tablet  Commonly known as: PLAVIX  Take 1 tablet (75 mg total) by mouth once daily.     erythromycin  ophthalmic ointment  Commonly known as: ROMYCIN  Place a 1/2 inch ribbon of ointment into the lower eyelid. Four timex daily for 5 days     EUCERIN cream  Generic drug: lanolin alcohol-mineral oil-white petrolatum-ceres  Apply topically 2 (two) times daily as needed.     gabapentin 100 MG capsule  Commonly known as: NEURONTIN  Take 1 capsule (100 mg total) by mouth every evening.     hydrophilic ointment  Commonly known as: AQUABASE  Apply topically as needed for Dry Skin. APPLY MODERATE AMOUNT TOPICALLY TO AFFECTED AREA(S) TWICE A DAY AS NEEDED FOR DRY SKIN APPLY TO AREAS OF DRY SKIN OR OVER ENTIRE BODY.     LIDOcaine 5 %  Commonly known as: LIDODERM  Place 1 patch onto the skin once daily. Remove & Discard patch within 12 hours or as directed by MD MCCABE CARB STEADY 0.08 gram-1.8 kcal/mL Liqd  Generic drug: nut.tx.imp.renal fxn,lac-reduc  Take 240 mLs by mouth 2 (two) times a day.     ondansetron 4 MG Tbdl  Commonly known as: ZOFRAN-ODT  Take 1 tablet (4 mg total) by mouth every 8 (eight) hours as needed (nausea).     pantoprazole 20 MG tablet  Commonly known as: PROTONIX  Take 20 mg by mouth 2 (two) times daily as needed.     pramoxine 1 % Lotn  Apply topically 2 (two) times daily as needed (itching).     triamcinolone acetonide 0.1% 0.1 % cream  Commonly known as: KENALOG  Apply topically 2 (two) times daily as needed (itching).            STOP taking these medications      carvediloL 12.5 MG tablet  Commonly known as: COREG     cetirizine 5 MG tablet  Commonly known as: ZYRTEC     loratadine 10 mg tablet  Commonly known as: CLARITIN     oxyCODONE-acetaminophen 5-325 mg per tablet  Commonly known as: PERCOCET                    _________________________________  Tena Palma MD  04/25/2025

## 2025-04-25 NOTE — PLAN OF CARE
Problem: Sepsis/Septic Shock  Goal: Optimal Coping  4/25/2025 1434 by Jacki Iraheta RN  Outcome: Progressing  4/25/2025 1428 by Jacki Iraheta RN  Outcome: Progressing  Intervention: Optimize Psychosocial Adjustment to Illness  Flowsheets (Taken 4/25/2025 1434)  Supportive Measures: verbalization of feelings encouraged  Family/Support System Care: presence promoted  Goal: Blood Glucose Level Within Targeted Range  4/25/2025 1434 by Jacki Iraheta RN  Outcome: Progressing  4/25/2025 1428 by Jacki Iraheta RN  Outcome: Progressing  Intervention: Optimize Glycemic Control  Flowsheets (Taken 4/25/2025 1434)  Glycemic Management: blood glucose monitored  Goal: Absence of Infection Signs and Symptoms  4/25/2025 1434 by Jacik Iraheta RN  Outcome: Progressing  4/25/2025 1428 by Jacki Iraheta RN  Outcome: Progressing  Intervention: Initiate Sepsis Management  Flowsheets (Taken 4/25/2025 1434)  Infection Prevention: equipment surfaces disinfected  Infection Management: aseptic technique maintained  Intervention: Promote Stabilization  Flowsheets (Taken 4/25/2025 1434)  Fluid/Electrolyte Management: electrolyte supplement initiated  Lung Protection Measures: fluid excess minimized  Goal: Optimal Nutrition Intake  4/25/2025 1434 by Jacki Iraheta RN  Outcome: Progressing  4/25/2025 1428 by Jacki Iraheta RN  Outcome: Progressing

## 2025-04-25 NOTE — PT/OT/SLP PROGRESS
Occupational Therapy      Patient Name:  Jevon Rajan   MRN:  [id number]    Patient not seen today secondary to Dialysis. Will follow-up later as able.    4/25/2025

## 2025-04-25 NOTE — PROGRESS NOTES
[facility] - Intensive Care  Adult Nutrition  Progress Note    SUMMARY     Recommendations    Recommendation:  1. Continue with diabetic, texture modified diet per SLP recommendations  2. TF of Novasource Renal at 10ml/hr and advance as tolerated to goal rate of 40ml/hr which would provide 1920 kcal, 87g protein, & 688ml free water if adequate oral intake is not obtained  3. Monitor weight/labs.   4. RD to follow to monitor nutrition status.    Goals:  Patient to consume/receive >75% of EEN prior to RD follow up   Nutrition Goal Status: continues   Communication of RD Recs: reviewed with RN    Nutrition Discharge Planning   Nutrition Discharge Planning: Diet texture per SLP    Assessment and Plan  Nutrition Problem  Inadequate energy intake    Related to (etiology):   AMS, septic shock    Signs and Symptoms (as evidenced by):   Estimated energy intake less than estimated needs      Interventions:  Texture modified diet   Enteral Nutrition Management   Collaboration with other providers    Nutrition Diagnosis Status:   Continues     Malnutrition Type:  Context: chronic illness  Level: moderate     Related to (etiology):   Increased energy needs     Signs and Symptoms (as evidenced by):   Estimated intake of food less than estimated needs      Malnutrition Characteristic Summary:  Weight Loss (Malnutrition): greater than 7.5% in 3 months  Energy Intake (Malnutrition): less than or equal to 75% for greater than or equal to 1 month        Interventions (treatment strategy):  1. Continue on texture modified diet per SLP recommendations 2. Continue with Commercial beverage medical food supplement therapy: Novasource renal  3. Monitor labs and weights     Nutrition Diagnosis Status:   New    Malnutrition Assessment  Weight Loss (Malnutrition): greater than 7.5% in 3 months  Energy Intake (Malnutrition): less than or equal to 75% for greater than or equal to 1 month       Reason for Assessment  Reason For Assessment: RD  follow-up  Diagnosis:  (septic shock)  General Information Comments: Pt admitted to [facility] with weakness. Transferred to  for higher level of care. CRRT started. s/p LUE artery exploration and graft removal 4/3. Receives HD outpatient. Manas 11- L arm incision. NG tube. Noted 20lb weight loss x 6 months. Unable to assess NFPE 2/2 RD working remotely for weekend coverage. Pt on contact isolation for MRSA.    Follow up 4/11/2025:   Patient continues with texture modified diet.  SLP following patient.  TF recommendations remain in chart if needed due to inadequate oral intake.  Labs reviewed.  Malnutrition suspected.  NFPE to be performed by on site RD.  NKFA.  LBML 4/10/25.  RD to continue to monitor EN support, oral intake and tolerance.  Commercial beverages previously ordered by another provider.     Follow up 4/15/2025:  Patient is on a texture modified IDDSI level 4 diet with intake varying between 25-75%.  Patient is consuming some snack type intake.  Labs reviewed.  NKFA.  LBM:  4/15/2025.  TF is not active at this time.  RD to continue to monitor po intake and tolerance at each follow up visit.       Follow up 4/18/25:  Patient continues on a texture modified diet.  Central line and NGT removed.  PO intake remains decreased at 25-50%.  Labs reviewed.  NKFA.  LBM:  4/15/25.  RD to continue to monitor po intake and tolerance.  Patient is positive for malnutrition due to decreased po intake and weight loss.  Commercial beverages recommended and provided.     Follow up: Patient is now on a diabetic oral diet with 25% of meal being consumed.  Patient is DNR and will discharge with hospice care per chart notes.  Labs reviewed.  NKFA.  LBM: 4/23/25.  RD to continue to monitor and provide appropriate nutrition therapies at follow up visits.         Past Medical History:   Diagnosis Date    BPH (benign prostatic hyperplasia)     Coronary artery disease     [facility]  "transferring his care to this clinic. In  he had stent to LAD. He states he has had 2 heart attacks. He does not get angina. There was 90% left anterior descending artery stenosis undergoing stenting. There was also a circumflex marginal branch stenosis of 70%.    Diabetes mellitus, type 2     ESRD (end stage renal disease) on dialysis     ESRD on HD TTS via left brachial AVF    Hypertension     Hypothyroidism, unspecified     NSTEMI (non-ST elevated myocardial infarction) 2025    Sepsis 2025    Symptomatic anemia 01/15/2024        Nutrition/Diet History  Food Preferences: no Sikhism or cultural food prefs identified  Spiritual, Cultural Beliefs, Scientologist Practices, Values that Affect Care: no  Factors Affecting Nutritional Intake: chewing difficulties/inability to chew food, difficulty/impaired swallowing    Anthropometrics  Height: 5' 7" (170.2 cm)  Height (inches): 67 in  Weight: 61.2 kg (134 lb 14.7 oz)  Weight (lb): 134.92 lb  Weight Method: Bed Scale  Ideal Body Weight (IBW), Male: 148 lb  % Ideal Body Weight, Male (lb): 91.16 %  BMI (Calculated): 21.1  BMI Grade: 18.5-24.9 - normal  Usual Body Weight (UBW), k kg  % Usual Body Weight: 90.19  % Weight Change From Usual Weight: -10 %  Wt Readings from Last 20 Encounters:   25 61.2 kg (134 lb 14.7 oz)   25 65.3 kg (143 lb 15.4 oz)   25 63.5 kg (140 lb)   25 68 kg (150 lb)   25 68 kg (150 lb)   25 68.6 kg (151 lb 3.2 oz)   25 68 kg (150 lb)   25 68 kg (150 lb)   25 68 kg (150 lb)   25 71.2 kg (157 lb)   25 71.4 kg (157 lb 6.5 oz)   25 69.9 kg (154 lb)   25 70 kg (154 lb 5.1 oz)   25 70 kg (154 lb 5.2 oz)   25 69.6 kg (153 lb 7 oz)   24 68 kg (150 lb)   10/20/24 69.9 kg (154 lb)   24 69.9 kg (154 lb 1.6 oz)   24 67.2 kg (148 lb 2.4 oz)   24 63.5 kg (140 lb)       Lab/Procedures/Meds  Pertinent Labs Reviewed: reviewed  BMP  Lab " Results   Component Value Date     (L) 04/25/2025    K 4.1 04/25/2025    CL 98 04/25/2025    CO2 23 04/25/2025    BUN 30 (H) 04/25/2025    CREATININE 8.3 (H) 04/25/2025    CALCIUM 9.4 04/25/2025    ANIONGAP 14 04/25/2025    EGFRNORACEVR 6 (L) 04/25/2025     Lab Results   Component Value Date    HGBA1C 5.7 (H) 04/02/2025     Lab Results   Component Value Date    CALCIUM 9.4 04/25/2025    PHOS 7.9 (H) 04/23/2025     Glucose   Date Value Ref Range Status   03/21/2025 150 (H) 70 - 110 mg/dL Final       Pertinent Medications Reviewed: reviewed  Pertinent Medications Comments: aspirin, heparin, pantoprazole, senna  Scheduled Meds:   atorvastatin  80 mg Oral QHS    capsaicin   Topical (Top) BID    clopidogreL  75 mg Oral Daily    epoetin mj-epbx  10,000 Units Intravenous Every Mon, Wed, Fri    midodrine  15 mg Oral Q8H    pantoprazole  40 mg Oral Daily    QUEtiapine  25 mg Oral QHS    sevelamer carbonate  1,600 mg Oral TID WM    tamsulosin  0.4 mg Oral Daily     Continuous Infusions:  PRN Meds:.  Current Facility-Administered Medications:     0.9%  NaCl infusion (for blood administration), , Intravenous, Q24H PRN    0.9% NaCl, , Intravenous, PRN    acetaminophen, 650 mg, Oral, Q4H PRN    albumin human 25%, 25 g, Intravenous, Daily PRN    dextrose 50%, 12.5 g, Intravenous, PRN    dextrose 50%, 25 g, Intravenous, PRN    diphenhydrAMINE-zinc acetate 1-0.1%, , Topical (Top), TID PRN    glucagon (human recombinant), 1 mg, Intramuscular, PRN    glucose, 16 g, Oral, PRN    glucose, 24 g, Oral, PRN    haloperidol lactate, 2 mg, Intravenous, Q4H PRN    heparin (porcine), 1,000 Units, Intravenous, PRN    hydrOXYzine HCL, 25 mg, Oral, TID PRN    hyoscyamine, 0.125 mg, Sublingual, Q4H PRN    insulin aspart U-100, 0-5 Units, Subcutaneous, QID (AC + HS) PRN    melatonin, 6 mg, Oral, Nightly PRN    naloxone, 0.02 mg, Intravenous, PRN    ondansetron, 4 mg, Intravenous, Q6H PRN    prochlorperazine, 5 mg, Intravenous, Q6H PRN     senna, 8.6 mg, Oral, Daily PRN    sodium chloride 0.9%, 250 mL, Intravenous, PRN    Pharmacy to dose Vancomycin consult, , , Once **AND** vancomycin - pharmacy to dose, , Intravenous, pharmacy to manage frequency    Estimated/Assessed Needs  Weight Used For Calorie Calculations: 61.2 kg (134 lb 14.7 oz)  Energy Calorie Requirements (kcal): 1836 (30 kcal/kg)  Energy Need Method: Kcal/kg  Protein Requirements: 73g (1.2g/kg)  Weight Used For Protein Calculations: 61.2 kg (134 lb 14.7 oz)  Estimated Fluid Requirement Method: RDA Method  RDA Method (mL): 1836  CHO Requirement: 200g    Nutrition Prescription Ordered  Current Diet Order: Diabetic  Oral Nutrition Supplement: Novasource renal    Evaluation of Received Nutrient/Fluid Intake  % Kcal Needs: 25%  % Protein Needs: 25%  I/O: 4/25/25: -15.4ml since admit  Energy Calories Required: not meeting needs  Protein Required: not meeting needs  Fluid Required: not meeting needs  Comments: LBM: 4/23/25  % Intake of Estimated Energy Needs: 25 - 50 %  % Meal Intake: 25 - 50 %    Nutrition Risk  Level of Risk/Frequency of Follow-up: low - moderate (Follow up: 1x per week)     Monitor and Evaluation  Monitor and Evaluation: Energy intake     Nutrition Related Social Determinants of Health: SDOH: Adequate food in home environment     Nutrition Follow-Up  RD Follow-up?: Yes  Evon Beavers, MS, RDN, LDN

## 2025-04-25 NOTE — SUBJECTIVE & OBJECTIVE
Interval History: no new events.  Tolerating Sharma    Review of Systems   Unable to perform ROS    Objective:     Temp:  [97.7 °F (36.5 °C)-98.5 °F (36.9 °C)] 98 °F (36.7 °C)  Pulse:  [] 84  Resp:  [18] 18  SpO2:  [97 %-100 %] 97 %  BP: ()/(50-68) 108/60     Body mass index is 21.13 kg/m².    Date 04/25/25 0700 - 04/26/25 0659   Shift 3818-0043 6765-5798 8097-4322 24 Hour Total   INTAKE   P.O. 120   120   Shift Total(mL/kg) 120(2)   120(2)   OUTPUT   Shift Total(mL/kg)       Weight (kg) 61.2 61.2 61.2 61.2     Bladder Scan Volume (mL): 331 mL (04/06/25 1520)    Drains       Drain  Duration                  Urethral Catheter 04/07/25 1558 Triple-lumen;Latex 24 Fr. 17 days         Hemodialysis Catheter 04/14/25 1011 right internal jugular 10 days                     Physical Exam  Vitals and nursing note reviewed.   Constitutional:       Appearance: He is well-developed.   HENT:      Head: Normocephalic.   Eyes:      Conjunctiva/sclera: Conjunctivae normal.   Neck:      Thyroid: No thyromegaly.      Trachea: No tracheal deviation.   Cardiovascular:      Rate and Rhythm: Normal rate.      Heart sounds: Normal heart sounds.   Pulmonary:      Effort: Pulmonary effort is normal. No respiratory distress.      Breath sounds: Normal breath sounds. No wheezing.   Abdominal:      General: Bowel sounds are normal.      Palpations: Abdomen is soft.      Tenderness: There is no abdominal tenderness. There is no rebound.      Hernia: No hernia is present.   Genitourinary:     Comments: Sharma in place with yellow urine  Musculoskeletal:         General: No tenderness. Normal range of motion.      Cervical back: Normal range of motion and neck supple.   Lymphadenopathy:      Cervical: No cervical adenopathy.   Skin:     General: Skin is warm and dry.      Findings: No erythema or rash.   Neurological:      Mental Status: He is alert and oriented to person, place, and time.   Psychiatric:         Behavior: Behavior  "normal.         Thought Content: Thought content normal.         Judgment: Judgment normal.           Significant Labs:    BMP:  Recent Labs   Lab 04/23/25  1937 04/24/25  0536 04/25/25  0546    136 135*   K 3.7 3.9 4.1   CL 97 98 98   CO2 26 25 23   BUN 14 20 30*   CREATININE 4.8* 6.1* 8.3*   GLUCOSE 129* 147* 111*   CALCIUM 9.2 8.9 9.4       CBC:   Recent Labs   Lab 04/23/25  0431 04/24/25  0536 04/25/25  0546   WBC 9.90 8.35 7.76   HGB 8.8* 8.1* 8.6*   HCT 28.9* 26.8* 28.1*    149* 151       Blood Culture: No results for input(s): "LABBLOO" in the last 168 hours.  Urine Culture: No results for input(s): "LABURIN" in the last 168 hours.    Significant Imaging:                  "

## 2025-04-25 NOTE — PT/OT/SLP PROGRESS
Physical Therapy      Patient Name:  Jevon Rajan   MRN:  [id number]    Patient not seen today secondary to Dialysis. Scheduled for discharge today.

## 2025-04-25 NOTE — SUBJECTIVE & OBJECTIVE
Interval History: assessed while on dialysis. Initially patient was asleep when I entered the HD unit. While assessing other patients he awoke and was immediately very agitated. Despite reassurance he was in a safe place and he was in the hosptial, he was gasping for air and moving while connected to HD via TDC.     Called family to discuss his care. My concern is that his mental status makes further HD unsafe as he needs to stay still to ensure HD goes well. I called two sisters and one niece to discuss his care and stated that I was stopping HD for now and recommended hospice as risks outweights benfits. Further discussed care with Malinda CHEUNG at his dialysis unit (Dr. Colin patient) and she voiced similar concerns and at his last assessment sat with him as a 1:1.     Review of patient's allergies indicates:   Allergen Reactions    Ace inhibitors Hives, Itching, Shortness Of Breath, Other (See Comments) and Rash     Is not sure which medication it was    Captopril      Current Facility-Administered Medications   Medication Frequency    0.9%  NaCl infusion (for blood administration) Q24H PRN    0.9% NaCl infusion PRN    acetaminophen tablet 650 mg Q4H PRN    albumin human 25% bottle 25 g Daily PRN    atorvastatin tablet 80 mg QHS    capsaicin 0.025 % cream BID    clopidogreL tablet 75 mg Daily    dextrose 50% injection 12.5 g PRN    dextrose 50% injection 25 g PRN    diphenhydrAMINE-zinc acetate 1-0.1% cream TID PRN    epoetin mj-epbx injection 10,000 Units Every Mon, Wed, Fri    glucagon (human recombinant) injection 1 mg PRN    glucose chewable tablet 16 g PRN    glucose chewable tablet 24 g PRN    haloperidol lactate injection 2 mg Q4H PRN    heparin (porcine) injection 1,000 Units PRN    hydrOXYzine HCL tablet 25 mg TID PRN    hyoscyamine SL tablet 0.125 mg Q4H PRN    insulin aspart U-100 pen 0-5 Units QID (AC + HS) PRN    melatonin tablet 6 mg Nightly PRN    midodrine tablet 15 mg Q8H    naloxone 0.4  mg/mL injection 0.02 mg PRN    ondansetron injection 4 mg Q6H PRN    pantoprazole EC tablet 40 mg Daily    prochlorperazine injection Soln 5 mg Q6H PRN    QUEtiapine tablet 25 mg QHS    senna tablet 8.6 mg Daily PRN    sevelamer carbonate tablet 1,600 mg TID WM    sodium chloride 0.9% bolus 250 mL 250 mL PRN    tamsulosin 24 hr capsule 0.4 mg Daily    tuberculin injection 5 Units Once    vancomycin - pharmacy to dose pharmacy to manage frequency       Objective:     Vital Signs (Most Recent):  Temp: 97.9 °F (36.6 °C) (04/25/25 1154)  Pulse: 84 (04/25/25 1154)  Resp: 18 (04/25/25 1154)  BP: (!) 111/49 (04/25/25 1154)  SpO2: 100 % (04/25/25 1154) Vital Signs (24h Range):  Temp:  [97.7 °F (36.5 °C)-98.5 °F (36.9 °C)] 97.9 °F (36.6 °C)  Pulse:  [] 84  Resp:  [18] 18  SpO2:  [97 %-100 %] 100 %  BP: ()/(49-68) 111/49     Weight: 61.2 kg (134 lb 14.7 oz) (04/18/25 1303)  Body mass index is 21.13 kg/m².  Body surface area is 1.7 meters squared.    I/O last 3 completed shifts:  In: 220 [P.O.:220]  Out: 50 [Urine:50]     Physical Exam  Constitutional:       General: He is in acute distress.      Appearance: He is ill-appearing and toxic-appearing.   HENT:      Head: Normocephalic and atraumatic.      Nose: Nose normal.      Mouth/Throat:      Mouth: Mucous membranes are moist.   Cardiovascular:      Comments: Right chest wall TDC  Pulmonary:      Breath sounds: Normal breath sounds.   Musculoskeletal:      Right lower leg: No edema.      Left lower leg: No edema.          Significant Labs:  All labs within the past 24 hours have been reviewed.     Significant Imaging:  Labs: Reviewed

## 2025-04-25 NOTE — ASSESSMENT & PLAN NOTE
- ongoing waxing and waning of mental status and delirium  - pt continues to not have capacity for medical decision making

## 2025-04-25 NOTE — PLAN OF CARE
Problem: Sepsis/Septic Shock  Goal: Absence of Infection Signs and Symptoms  4/25/2025 1434 by Jacki Iraheta, RN  Outcome: Progressing  4/25/2025 1428 by Jacki Iraheta, RN  Outcome: Progressing  Intervention: Initiate Sepsis Management  Flowsheets (Taken 4/25/2025 1434)  Infection Prevention: equipment surfaces disinfected  Infection Management: aseptic technique maintained  Intervention: Promote Stabilization  Flowsheets (Taken 4/25/2025 1434)  Fluid/Electrolyte Management: electrolyte supplement initiated  Lung Protection Measures: fluid excess minimized

## 2025-04-25 NOTE — ASSESSMENT & PLAN NOTE
"Patient with known CAD s/p stent placement, which is controlled Will continue ASA, Plavix, and Statin and monitor for S/Sx of angina/ACS. Continue to monitor on telemetry.   - last [facility] 1/2025: Severely calcified mid RCA stenosis unable to cross with PTCA balloons, treated initially with 0.9 laser atherectomy, followed by CSI atherectomy system with total of 5 runs (3 at low speed, 2 at high speed), pre-dilated using 2.0 compliant and 3.5 noncompliant balloon followed by 3.5 X 16 mm megatron stent.  Focal plaque rupture/erosion noted in the proximal and ostial RCA that were treated with  3.5 X 28 in the proximal RCA, 4.0 X 16 mm in the ostial RCA.  No residual stenosis post intervention.  Patient had ALAN 3 flow at the end of the procedure  - with elevated troponin likely due to septic shock  No results for input(s): "TROPONINI", "TROPONINIHS" in the last 168 hours.    - continue asa, plavix, statin  - Cardiology consulted  - no plans for ischemic evaluation at this time   "

## 2025-04-25 NOTE — PROGRESS NOTES
[facility] Surg  Nephrology  Progress Note    Patient Name: Jevon Rajan  MRN: [id number]  Admission Date: 4/3/2025  Hospital Length of Stay: 22 days  Attending Provider: Tena Palma MD   Primary Care Physician: Melia, Primary Doctor  Principal Problem:Septic shock    Subjective:     HPI: Mr. Rajan is an 86 yo male with HTN, T2DM, CAD, ESRD, and liver lesion who was transferred from [facility] for septic shock and impending AVG rupture. He initially presented to [facility] on 4/1 with temp 101.9 and BP 80/30s. He was transferred to our hospital on 4/3/25; it seems his AVG ruptured during transport/shortly after arrival. He emergently went to the OR yesterday with AVG extraction; infected hematoma was noted. Workup also concerning for elevated troponin and cardiac vegetations. He is no longer on pressors. Nephrology consulted for ESRD. Prior records obtained and reviewed. He receives HD TTS at [facility] under the c/o Dr. Colin. He last received HD outpatient on 4/1/25. HD was held at [facility] due to unstable vascular access. Family agreed to proceed with CRRT today.     Interval History: assessed while on dialysis. Initially patient was asleep when I entered the HD unit. While assessing other patients he awoke and was immediately very agitated. Despite reassurance he was in a safe place and he was in the hosptial, he was gasping for air and moving while connected to HD via TDC.     Called family to discuss his care. My concern is that his mental status makes further HD unsafe as he needs to stay still to ensure HD goes well. I called two sisters and one niece to discuss his care and stated that I was stopping HD for now and recommended hospice as risks outweights benfits. Further discussed care with Malinda CHEUNG at his dialysis unit (Dr. Colin patient) and she voiced similar concerns and at his last assessment sat with him as a 1:1.     Review of patient's allergies indicates:   Allergen Reactions    Ace inhibitors  Hives, Itching, Shortness Of Breath, Other (See Comments) and Rash     Is not sure which medication it was    Captopril      Current Facility-Administered Medications   Medication Frequency    0.9%  NaCl infusion (for blood administration) Q24H PRN    0.9% NaCl infusion PRN    acetaminophen tablet 650 mg Q4H PRN    albumin human 25% bottle 25 g Daily PRN    atorvastatin tablet 80 mg QHS    capsaicin 0.025 % cream BID    clopidogreL tablet 75 mg Daily    dextrose 50% injection 12.5 g PRN    dextrose 50% injection 25 g PRN    diphenhydrAMINE-zinc acetate 1-0.1% cream TID PRN    epoetin mj-epbx injection 10,000 Units Every Mon, Wed, Fri    glucagon (human recombinant) injection 1 mg PRN    glucose chewable tablet 16 g PRN    glucose chewable tablet 24 g PRN    haloperidol lactate injection 2 mg Q4H PRN    heparin (porcine) injection 1,000 Units PRN    hydrOXYzine HCL tablet 25 mg TID PRN    hyoscyamine SL tablet 0.125 mg Q4H PRN    insulin aspart U-100 pen 0-5 Units QID (AC + HS) PRN    melatonin tablet 6 mg Nightly PRN    midodrine tablet 15 mg Q8H    naloxone 0.4 mg/mL injection 0.02 mg PRN    ondansetron injection 4 mg Q6H PRN    pantoprazole EC tablet 40 mg Daily    prochlorperazine injection Soln 5 mg Q6H PRN    QUEtiapine tablet 25 mg QHS    senna tablet 8.6 mg Daily PRN    sevelamer carbonate tablet 1,600 mg TID WM    sodium chloride 0.9% bolus 250 mL 250 mL PRN    tamsulosin 24 hr capsule 0.4 mg Daily    tuberculin injection 5 Units Once    vancomycin - pharmacy to dose pharmacy to manage frequency       Objective:     Vital Signs (Most Recent):  Temp: 97.9 °F (36.6 °C) (04/25/25 1154)  Pulse: 84 (04/25/25 1154)  Resp: 18 (04/25/25 1154)  BP: (!) 111/49 (04/25/25 1154)  SpO2: 100 % (04/25/25 1154) Vital Signs (24h Range):  Temp:  [97.7 °F (36.5 °C)-98.5 °F (36.9 °C)] 97.9 °F (36.6 °C)  Pulse:  [] 84  Resp:  [18] 18  SpO2:  [97 %-100 %] 100 %  BP: ()/(49-68) 111/49     Weight: 61.2 kg (134 lb 14.7  oz) (04/18/25 1303)  Body mass index is 21.13 kg/m².  Body surface area is 1.7 meters squared.    I/O last 3 completed shifts:  In: 220 [P.O.:220]  Out: 50 [Urine:50]     Physical Exam  Constitutional:       General: He is in acute distress.      Appearance: He is ill-appearing and toxic-appearing.   HENT:      Head: Normocephalic and atraumatic.      Nose: Nose normal.      Mouth/Throat:      Mouth: Mucous membranes are moist.   Cardiovascular:      Comments: Right chest wall TDC  Pulmonary:      Breath sounds: Normal breath sounds.   Musculoskeletal:      Right lower leg: No edema.      Left lower leg: No edema.          Significant Labs:  All labs within the past 24 hours have been reviewed.     Significant Imaging:  Labs: Reviewed  Assessment/Plan:     Cardiac/Vascular  Acute bacterial endocarditis  -    Renal/  ESRD on hemodialysis  ESRD on HD     HD [facility]  Sp AVG resection 4/3    TDC right chest wall 4/14/2025    Plan/Recommendation  Hold further dialysis. At this time due to his mental status waxing and waning delerium/dementia further HD sessions unsafe. Appreciate palliative care's assistance with family discussions.     The main concern is that further HD sessions endanger his life because he cannot remain still on HD. If access breaks he will exsanguinate and HD centers are not equipped to handle such an emergency.     #Secondary hyperparathyroidism  Calcium   Date Value Ref Range Status   04/25/2025 9.4 8.7 - 10.5 mg/dL Final     Phosphorus Level   Date Value Ref Range Status   04/23/2025 7.9 (H) 2.7 - 4.5 mg/dL Final     PTH, Intact   Date Value Ref Range Status   01/17/2024 117.7 (H) 9.0 - 77.0 pg/mL Final   Continue to monitor      Oncology  Anemia in ESRD (end-stage renal disease)  #Anemia  HGB   Date Value Ref Range Status   04/25/2025 8.6 (L) 14.0 - 18.0 gm/dL Final     Iron   Date Value Ref Range Status   08/11/2024 39 (L) 45 - 160 ug/dL Final     Transferrin   Date Value Ref Range  Status   08/11/2024 139 (L) 200 - 375 mg/dL Final     TIBC   Date Value Ref Range Status   08/11/2024 206 (L) 250 - 450 ug/dL Final     Saturated Iron   Date Value Ref Range Status   08/11/2024 19 (L) 20 - 50 % Final     Ferritin   Date Value Ref Range Status   08/11/2024 979 (H) 20.0 - 300.0 ng/mL Final     Hold therapy, see ESRD      Critical care time spent on the evaluation and treatment of severe organ dysfunction, review of pertinent labs and imaging studies, discussions with consulting providers and discussions with patient/family: 45 minutes.    Thank you for your consult. I will follow-up with patient. Please contact us if you have any additional questions.    Antoine Lauren MD  Nephrology  [facility] Surg

## 2025-04-25 NOTE — PROGRESS NOTES
"[facility] Medicine  Progress Note    Patient Name: Jevon Rajan  MRN: [id number]  Patient Class: IP- Inpatient   Admission Date: 4/3/2025  Length of Stay: 22 days  Attending Physician: Tena Palma MD  Primary Care Provider: Melia, Primary Doctor        Subjective     Principal Problem:Septic shock        HPI:  Mr Jevon Rajan is a 85 y.o. man with ESRD with LUE AVF, HFpEF last EF 50-55%, CAD, DM who was transferred to [facility] ICU for septic shock due to MRSA bacteremia.     He was admitted at [facility] 4/1-3/2025 with sepsis, worsening to septic shock, then identified source as MRSA. 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     Upon arrival to [facility], he is moaning in pain and his LUE AVF has active pulsatile bright red blood. He does respond to his name. He denies pain anywhere other than his LUE. He is on levophed at 0.05.     Overview/Hospital Course:  Mr Jevon Rajan is a 85 y.o. man with ESRD on HD with LUE AVF that has been bleeding, CHF, CAD s/p recent stenting 1/2025 who was admitted with MRSA bacteremia and bleeding from his LUE AVF. Continued vancomycin; weaned off levophed. Vascular surgery emergently consulted; on 4/3/25 they took him to OR and noted: "well incorporated proximal and central graft without evidence of infection, resected graft and covered proximal and central remnants with multiple layers. Mid graft removed in entirety and sent for culture. Ruptured pseudoaneurysm with infected hematoma, graft completely obliterated at mid segment." Surgical cultures with Staph. Suspect AVF or chronic scratching of pruritic skin is the source of his infection. TTE showed endocarditis: EF 40-45%, G1DD, RV systolic dysfunction, large 1.9x1.2cm fixed mass on the posterior mitral valve leaflet with moder to severe regurgitation, cannot rule out posterior mitral " valve leaflet perforation. Discussed with cardiac surgery; he is high mortality risk, and surgery is not advised. ID following. Nephrology consulted; trialysis was placed for HD. Persistently positive for MRSA in blood cultures. He has had urinary retention; Urology consulted, performed bedside cystoscopy on 4/6/25 with large prostate noted. Noted to have clot retention and went urgently to OR with Urology on 4/7/25; asa and DVT ppx held.     WBC normalized. S/p clot removal with Urology, 1U PRBC ordered this morning with Hb 6.1. Soft BPs, will add midodrine for HD to step down to floor. Glucoses high, will increase insulin. Attempted to s/d but BP too low, may still need CRRT rather. 4/8 cx clear so far. Increasing insulin again. Increasing midodrine to 15 mg TID. Hb 6.8, 1U PRBC ordered.     BP appears to be recovering a bit, perhaps will tolerate reg HD, will discuss w Neph- will run him on HD Saturday, if tolerates normal HD will remove central line and give 2 day holiday and place tunnel on Monday. We will give him HD before dc to facility on Tuesday if accepted by then.     WBC has normalized for the first time, now tolerating reg HD, and Blcx remain clear. Central line removed. NGT removed. Will step down.     Interval History:  No acute event overnight.  The patient had episode of agitation during HD; palliative care every consulted; discussed with the family.  Planned discharge to SNF cancelled.  Currently anticipating DC to longterm care with hospice     Review of Systems  Objective:     Vital Signs (Most Recent):  Temp: 97.9 °F (36.6 °C) (04/25/25 1154)  Pulse: 84 (04/25/25 1154)  Resp: 18 (04/25/25 1154)  BP: (!) 111/49 (04/25/25 1154)  SpO2: 100 % (04/25/25 1154) Vital Signs (24h Range):  Temp:  [97.7 °F (36.5 °C)-98.5 °F (36.9 °C)] 97.9 °F (36.6 °C)  Pulse:  [] 84  Resp:  [18] 18  SpO2:  [97 %-100 %] 100 %  BP: ()/(49-68) 111/49     Weight: 61.2 kg (134 lb 14.7 oz)  Body mass index is  21.13 kg/m².    Intake/Output Summary (Last 24 hours) at 4/25/2025 1453  Last data filed at 4/25/2025 1154  Gross per 24 hour   Intake 460 ml   Output 50 ml   Net 410 ml         Physical Exam  Constitutional:       General: He is not in acute distress.     Appearance: He is not ill-appearing, toxic-appearing or diaphoretic.   Cardiovascular:      Rate and Rhythm: Normal rate and regular rhythm.      Pulses: Normal pulses.      Heart sounds: Normal heart sounds. No murmur heard.  Pulmonary:      Effort: Pulmonary effort is normal. No respiratory distress.      Breath sounds: Normal breath sounds.   Abdominal:      General: There is no distension.      Palpations: Abdomen is soft. There is mass.   Neurological:      Mental Status: He is disoriented.               Significant Labs: All pertinent labs within the past 24 hours have been reviewed.    Significant Imaging: I have reviewed all pertinent imaging results/findings within the past 24 hours.      Assessment & Plan  Septic shock  This patient has shock. The type of shock is distributive due to sepsis. The patient had the following evidence of shock: persistent hypotension and altered mental status. 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 Shock is now resolved and vasopressors are off. WBC back to normal  HTN (hypertension)  Patient's blood pressure range in the last 24 hours was: BP  Min: 97/57  Max: 121/57.The patient's inpatient anti-hypertensive regimen is listed below:  Current Antihypertensives       Plan  - admitted with shock  - now off vasopressors. Continue to hold BP Rx as BP is well controlled without it   HLD  "(hyperlipidemia)  - continue statin  Type 2 diabetes mellitus with hyperglycemia, without long-term current use of insulin  A1c:   Lab Results   Component Value Date    HGBA1C 5.7 (H) 04/02/2025     Meds: lantus + SSI PRN to maintain goal 140-180  Tube feeds  accuchecks, hypoglycemic protocol      Coronary artery disease involving native coronary artery of native heart without angina pectoris  Patient with known CAD s/p stent placement, which is controlled Will continue ASA, Plavix, and Statin and monitor for S/Sx of angina/ACS. Continue to monitor on telemetry.   - last [facility] was 1/2025: Severely calcified mid RCA stenosis unable to cross with PTCA balloons, treated initially with 0.9 laser atherectomy, followed by CSI atherectomy system with total of 5 runs (3 at low speed, 2 at high speed), pre-dilated using 2.0 compliant and 3.5 noncompliant balloon followed by 3.5 X 16 mm megatron stent.  Focal plaque rupture/erosion noted in the proximal and ostial RCA that were treated with  3.5 X 28 in the proximal RCA, 4.0 X 16 mm in the ostial RCA.  No residual stenosis post intervention.  Patient had ALAN 3 flow at the end of the procedure  - with elevated troponin likely due to septic shock  No results for input(s): "TROPONINI", "TROPONINIHS" in the last 168 hours.    - continue asa, plavix, statin  - Cardiology consulted  - no plans for ischemic evaluation at this time   ESRD on hemodialysis  - ESRD on HD via LUE AVF  - LUE AVF was actively bleeding on arrival on 4/3/25. Vascular surgery was consulted. He went emergently to the OR on 4/3/25: "Severely calcified mid RCA stenosis unable to cross with PTCA balloons, treated initially with 0.9 laser atherectomy, followed by CSI atherectomy system with total of 5 runs (3 at low speed, 2 at high speed), pre-dilated using 2.0 compliant and 3.5 noncompliant balloon followed by 3.5 X 16 mm megatron stent.  Focal plaque rupture/erosion noted in the proximal and ostial RCA that were " "treated with  3.5 X 28 in the proximal RCA, 4.0 X 16 mm in the ostial RCA.  No residual stenosis post intervention.  Patient had ALAN 3 flow at the end of the procedure"  - cultures from AVF= Staph   - wound care orders in place for surgical site  - Nephrology is consulted  - Rt IJ trialysis placed on 4/4/25 for CRRT;  - THDC placed on 04/14  -currently anticipating DC to [facility] care with the hospice  Anemia in ESRD (end-stage renal disease)  Anemia is likely due to  ESRD, blood loss . Most recent hemoglobin and hematocrit are listed below.  Recent Labs     04/23/25  0431 04/24/25  0536 04/25/25  0546   HGB 8.8* 8.1* 8.6*   HCT 28.9* 26.8* 28.1*     Plan  - Monitor serial CBC: Daily and recheck now  - Transfuse PRBC if patient becomes hemodynamically unstable, symptomatic or H/H drops below 7/21.  - Patient has not received any PRBC transfusions to date  - Patient's anemia is currently stable  Acute metabolic encephalopathy with Hospital Delirum  -Noted episodes of intermittent agitation with tactile and visual hallucinations  -Continue correction of metabolic derangements  -Maintain Delirium precautions: Maintain regular sleep cycle. Early ambulation. Minimal interruptions overnight. Re-orient patient frequently. Maintain adequate bowel regimen, hydration and electrolyte replenishments  -aspiration precautions  -continue Seroquel 25 mg HS      ACP (advance care planning)  Advance Care Planning     Date: 04/04/2025  - palliative consulted   - Mr Escoto specifically told Dr Jordan to contact Kenia Smyth for all updates  - discussed code status with Kenia. She states she has spoken with Mr Escoto's sisters and they all want full code status.   -anticipated DC to SNF cancelled; patient to be DC to [facility] care with hospice         Acute bacterial endocarditis  - TTE 4/4/25: "1.9x1.2cm large fixed heterogeneous mass present on the posterior leaflet (new finding vs 9/2023 echo). There is moderate to " "severe regurgitation with an eccentric jet. Cannot exclude severe MR with possible PMVL perforation in association with large vegetation."  - this is in the setting of MRSA bacteremia  - ID is consulted  - repeat blood cultures until clear   - suspect source is skin/chronic scratching vs AVF infection- cultures from resected AVF with Staph   - discussed with Cardiac surgery Dr Castro 4/4/25- given age and comorbidities, he is very high risk of mortality with surgery. He recommends medical management at this point.  - on vancomycin      MRSA bacteremia  - suspect source:  Graft infection s/p exploratory surgery of LUE with graft resection 4/3  -graft holiday with TDC placed on 04/14  - cultures of AVF with Staph   - he has endocarditis  - ID consulted  - on vanc ;anticipating completing 6 weeks of IV vancomycin from clearance (EOC: 5/19/25)     NSTEMI (non-ST elevated myocardial infarction)  - see CAD    BPH with urinary obstruction  - noted 4/6/25 when patient became very agitated and screaming he couldn't urinate  - nursing unable to place dunaway/coude  - Urology consulted. Bedside cystoscopy on 4/6/25 noted clots, large prostate. Catheter was placed but was then removed due to pain  - 4/7 he is again agitated that he cannot urinate  - follow up with Urology   - tamsulosin ordered if he is awake enough to swallow     Thrombocytopenia  The likely etiology of thrombocytopenia is infection and sepsis. The patients 3 most recent labs are listed below.  Recent Labs     04/23/25  0431 04/24/25  0536 04/25/25  0546    149* 151     Plan  - Will transfuse if platelet count is <10k.    AV fistula infection  - LUE AVF was actively bleeding on arrival on 4/3/25. Vascular surgery was consulted. He went emergently to the OR on 4/3/25: "Severely calcified mid RCA stenosis unable to cross with PTCA balloons, treated initially with 0.9 laser atherectomy, followed by CSI atherectomy system with total of 5 runs (3 at low speed, " "2 at high speed), pre-dilated using 2.0 compliant and 3.5 noncompliant balloon followed by 3.5 X 16 mm megatron stent.  Focal plaque rupture/erosion noted in the proximal and ostial RCA that were treated with  3.5 X 28 in the proximal RCA, 4.0 X 16 mm in the ostial RCA.  No residual stenosis post intervention.  Patient had ALAN 3 flow at the end of the procedure"  - cultures from AVF= Staph   - wound care orders in place for surgical site  - trialysis currently in place for HD    Emphysema lung  - emphysema noted on CT  - no signs of COPD exacerbation  Pulmonary nodules  - CT 4/3/25 with few small pulmonary nodules  - will need outpatient follow up     Gross hematuria  S/p clot evacuation with a fulguration by Urology  Currently resolved  Continue cap irrigation of 3 way catheter  Continue Levsin for bladder discomfort  -Sharma to be removed when patient is transferred to the floor; we will defer removal to urology    NSVT (nonsustained ventricular tachycardia)      Uremic pruritus  Patient complaining of severe intractable itching;[facility]  Start hydroxyzine  Ordered capsaicin cream   Korsuva  not available; [facility]; can consider gabapentin  hemodynamic  and delirium allows  Will defer increased dialysis frequency; changing Kp/f ratios and other adjustments to nephrology      Moderate malnutrition  Nutrition consulted. Most recent weight and BMI monitored-     Measurements:  Wt Readings from Last 1 Encounters:   04/18/25 61.2 kg (134 lb 14.7 oz)   Body mass index is 21.13 kg/m².    Patient has been screened and assessed by RD.    Malnutrition Type:  Context: chronic illness  Level: moderate    Malnutrition Characteristic Summary:  Weight Loss (Malnutrition): greater than 7.5% in 3 months  Energy Intake (Malnutrition): less than or equal to 75% for greater than or equal to 1 month    Interventions/Recommendations (treatment strategy):  1. Continue on texture modified diet " per SLP recommendations 2. Continue with Commercial beverage medical food supplement therapy: Novasource renal  3. Monitor labs and weights    VTE Risk Mitigation (From admission, onward)           Ordered     heparin (porcine) injection 1,000 Units  As needed (PRN)         04/05/25 2100     IP VTE HIGH RISK PATIENT  Once         04/03/25 1516     Place TEMO hose  Until discontinued         04/03/25 1516     Place sequential compression device  Until discontinued         04/03/25 1516     Reason for No Pharmacological VTE Prophylaxis  Once        Question:  Reasons:  Answer:  Physician Provided (leave comment)    04/03/25 1516                    Discharge Planning   BAYRON: 4/25/2025     Code Status: DNR   Medical Readiness for Discharge Date: 4/25/2025  Discharge Plan A: Skilled Nursing Facility   Discharge Delays: (!) Post-Acute Set-up            Please place Justification for DME        Tena Palma MD  Department of Hospital Medicine   [facility] S[facility]

## 2025-04-25 NOTE — PLAN OF CARE
Nutrition Plan of Care:    Recommendation:  1. Continue with diabetic, texture modified diet per SLP recommendations  2. TF of Novasource Renal at 10ml/hr and advance as tolerated to goal rate of 40ml/hr which would provide 1920 kcal, 87g protein, & 688ml free water if adequate oral intake is not obtained  3. Monitor weight/labs.   4. RD to follow to monitor nutrition status.     Goals:  Patient to consume/receive >75% of EEN prior to RD follow up   Nutrition Goal Status: continues   Communication of RD Recs: reviewed with RN     Nutrition Discharge Planning   Nutrition Discharge Planning: Diet texture per SLP     Assessment and Plan  Nutrition Problem  Inadequate energy intake     Related to (etiology):   AMS, septic shock     Signs and Symptoms (as evidenced by):   Estimated energy intake less than estimated needs        Interventions:  Texture modified diet   Enteral Nutrition Management   Collaboration with other providers     Nutrition Diagnosis Status:   Continues      Malnutrition Type:  Context: chronic illness  Level: moderate     Related to (etiology):   Increased energy needs     Signs and Symptoms (as evidenced by):   Estimated intake of food less than estimated needs      Malnutrition Characteristic Summary:  Weight Loss (Malnutrition): greater than 7.5% in 3 months  Energy Intake (Malnutrition): less than or equal to 75% for greater than or equal to 1 month        Interventions (treatment strategy):  1. Continue on texture modified diet per SLP recommendations 2. Continue with Commercial beverage medical food supplement therapy: Novasource renal  3. Monitor labs and weights     Nutrition Diagnosis Status:   Stevie Beavers MS, RDN, LDN

## 2025-04-25 NOTE — PROGRESS NOTES
[facility]  Palliative Medicine  Progress Note    Patient Name: Jevon Rajan  MRN: [id number]  Admission Date: 4/3/2025  Hospital Length of Stay: 22 days  Code Status: DNR   Attending Provider: Tena Palma MD  Consulting Provider: Lisa Jacinto NP  Primary Care Physician: Melia, Primary Doctor  Principal Problem:Septic shock    Patient information was obtained from relative(s) and primary team.      Assessment/Plan:   Palliative Care  Advance Care Planning   4/25/2025  - interval chart reviewed; pt discussed with MDT  - Dr. Lauren, nephrology shared discussion with pt's sisters and niece regarding recommendation and plan to discontinue HD and transition to comfort focused care with hospice   - called and spoke with pt's sisterRonna and niharsha Ndiaye about discussion with Dr. Lauren; attempted to call Nora and Bhavna (sisters) but unable to reach   - they shared agreement with stopping HD, DNR, transition to hospice, and need for detention placement; preference for VA facility or facility near their home   - emotional support provided; allowed time for questions; Ms. Friend expressed thanks and appreciation of teams care for her brother and consistent communication with family   - visited pt at bedside; pt is cooperative but continues to lack capacity for medical decision making; pt denies pains or needs at that time, no non-verbal indications of pain or discomfort   - nephew and great nieces visiting at bedside today; pt requesting a hooded sweatshirt which nephew plans to go to the store and purchase pt some items that would aid in his comfort   - recommend transition to comfort focused care and discontinuing treatments and interventions that pt is not agreeable to   - pt does not currently have end of life symptom needs or discomfort, but will likely increase as time without HD goes on; if placement is not in place prior to worsening symptoms recommend consultation with inpatient hospice team;  "this was discussed with family and they were agreeable to this   - goal of getting pt closer to them if it can be done safely and without disrupting pt comfort, however him getting the care he needs to keep him comfortable takes precedent per family     Please see multiple prior palliative/ACP notes from myself and Dr. Joan Antunez for prior GOC/ACP discussions     Neuro  Acute metabolic encephalopathy with Hospital Delirum  - ongoing waxing and waning of mental status and delirium  - pt continues to not have capacity for medical decision making     Renal/  ESRD on hemodialysis  - nephrology now recommending stopping HD due to pt's ongoing mental and physical status and safety concerns   - family expressed insight into this and agreeable to skilled nursing care with hospice       I will follow-up with patient. Please contact us if you have any additional questions.    Subjective:     Chief Complaint:   Chief Complaint   Patient presents with    Bleeding/Bruising       HPI:   From H&P: " Mr Jevon Rajan is a 85 y.o. man with ESRD with LUE AVF, HFpEF last EF 50-55%, CAD, DM who was transferred to [facility] ICU for septic shock due to MRSA bacteremia.      He was admitted at [facility] 4/1-3/2025 with sepsis, worsening to septic shock, then identified source as MRSA. 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      Upon arrival to [facility], he is moaning in pain and his LUE AVF has active pulsatile bright red blood. He does respond to his name. He denies pain anywhere other than his LUE. He is on levophed at 0.05. "     Palliative medicine consulted for goals of care discussion and advance care planning; for details of visit, see advance care planning section of plan.       Hospital Course:  No notes on file    Medications:  Continuous Infusions:  Scheduled Meds:   atorvastatin  80 mg Oral QHS    " capsaicin   Topical (Top) BID    clopidogreL  75 mg Oral Daily    epoetin mj-epbx  10,000 Units Intravenous Every Mon, Wed, Fri    midodrine  15 mg Oral Q8H    pantoprazole  40 mg Oral Daily    QUEtiapine  25 mg Oral QHS    sevelamer carbonate  1,600 mg Oral TID WM    tamsulosin  0.4 mg Oral Daily     PRN Meds:  Current Facility-Administered Medications:     0.9%  NaCl infusion (for blood administration), , Intravenous, Q24H PRN    0.9% NaCl, , Intravenous, PRN    acetaminophen, 650 mg, Oral, Q4H PRN    albumin human 25%, 25 g, Intravenous, Daily PRN    dextrose 50%, 12.5 g, Intravenous, PRN    dextrose 50%, 25 g, Intravenous, PRN    diphenhydrAMINE-zinc acetate 1-0.1%, , Topical (Top), TID PRN    glucagon (human recombinant), 1 mg, Intramuscular, PRN    glucose, 16 g, Oral, PRN    glucose, 24 g, Oral, PRN    haloperidol lactate, 2 mg, Intravenous, Q4H PRN    heparin (porcine), 1,000 Units, Intravenous, PRN    hydrOXYzine HCL, 25 mg, Oral, TID PRN    hyoscyamine, 0.125 mg, Sublingual, Q4H PRN    insulin aspart U-100, 0-5 Units, Subcutaneous, QID (AC + HS) PRN    melatonin, 6 mg, Oral, Nightly PRN    naloxone, 0.02 mg, Intravenous, PRN    ondansetron, 4 mg, Intravenous, Q6H PRN    prochlorperazine, 5 mg, Intravenous, Q6H PRN    senna, 8.6 mg, Oral, Daily PRN    sodium chloride 0.9%, 250 mL, Intravenous, PRN    Pharmacy to dose Vancomycin consult, , , Once **AND** vancomycin - pharmacy to dose, , Intravenous, pharmacy to manage frequency    Objective:     Vital Signs (Most Recent):  Temp: 97.9 °F (36.6 °C) (04/25/25 1154)  Pulse: 84 (04/25/25 1154)  Resp: 18 (04/25/25 1154)  BP: (!) 111/49 (04/25/25 1154)  SpO2: 100 % (04/25/25 1154) Vital Signs (24h Range):  Temp:  [97.7 °F (36.5 °C)-98.5 °F (36.9 °C)] 97.9 °F (36.6 °C)  Pulse:  [] 84  Resp:  [18] 18  SpO2:  [97 %-100 %] 100 %  BP: ()/(49-68) 111/49     Weight: 61.2 kg (134 lb 14.7 oz)  Body mass index is 21.13 kg/m².     Physical Exam  Vitals and  nursing note reviewed.   Constitutional:       General: He is awake.      Appearance: He is ill-appearing.   HENT:      Head: Normocephalic and atraumatic.   Pulmonary:      Effort: Pulmonary effort is normal. No respiratory distress.   Skin:     General: Skin is dry.      Findings: Bruising: multiple skin lesions in various stages of healing.   Neurological:      Mental Status: He is alert. He is disoriented.      Comments: Oriented to self, location of hospital, repeating statements   Not at baseline, but improved cooperativeness   Psychiatric:         Behavior: Behavior is agitated. Behavior is cooperative.         Cognition and Memory: Memory is impaired.          Advance Care Planning   Advance Directives:   Living Will: No    LaPOST: No (needs prior to discharge; palliative SW to coordinate with hospice agency)    Do Not Resuscitate Status: Yes    Medical Power of : No (previously had shared with MDT that he wished dez Ndiaye to act as MPOA; now states dez Araiza; 4 siblings are legal surrogate decision makers otherwise and reccomended (see ACP))      Decision Making:  Family answered questions and Patient answered questions  Goals of Care: What is most important right now is to focus on symptom/pain control, curative/life-prolongation (regardless of treatment burdens), improvement in condition but with limits to invasive therapies. Accordingly, we have decided that the best plan to meet the patient's goals includes enrolling in hospice.        Significant Labs: All pertinent labs within the past 24 hours have been reviewed.  CBC:   Recent Labs   Lab 04/25/25  0546   WBC 7.76   HGB 8.6*   HCT 28.1*   MCV 93        BMP:  Recent Labs   Lab 04/25/25  0546   *   K 4.1   CL 98   CO2 23   BUN 30*   CREATININE 8.3*   CALCIUM 9.4     LFT:  Lab Results   Component Value Date    AST 23 04/25/2025    ALKPHOS 77 04/25/2025    BILITOT 0.9 04/25/2025     Albumin:   Albumin   Date Value Ref Range  Status   04/25/2025 3.1 (L) 3.5 - 5.2 g/dL Final   03/21/2025 3.2 (L) 3.5 - 5.2 g/dL Final     Protein:   Total Protein   Date Value Ref Range Status   03/21/2025 7.5 6.0 - 8.4 g/dL Final     Lactic acid:   Lab Results   Component Value Date    LACTATE 1.9 04/14/2025    LACTATE 0.9 04/03/2025     Significant Imaging: I have reviewed all pertinent imaging results/findings within the past 24 hours.    Total visit time: 55 minutes    35 min visit time including: face to face time in discussion of symptom assessment, and exploring options and burdens of offered treatments.  This also includes non-face to face time preparing to see the patient including chart review, obtaining and/or reviewing separately obtained history, documenting clinical information in the electronic or other health record, independently interpreting results and communicating results to the patient/family/caregiver, family discussions by phone if not able to be present, coordination of care with other specialists, and discharge planning.     20 min ACP time spent: goals of care, advanced care planning, emotional support, formulating and communicating prognosis, exploring burden/ benefit of various approaches of treatment.     Lisa Jacinto NP  Palliative Medicine  [facility] [place] [facility]

## 2025-04-25 NOTE — PROGRESS NOTES
Pharmacokinetic Assessment Follow Up: IV Vancomycin    Vancomycin serum concentration assessment(s):    The random level was drawn correctly and can be used to guide therapy at this time. The measurement is within the desired definitive target range of 10 to 20 mcg/mL.    Vancomycin Regimen Plan:    Give 500 mg after dialysis today.  Re-dose when the random level is less than 20 mcg/mL, next level to be drawn at 0400 on 4/28/2025    Drug levels (last 3 results):  Recent Labs   Lab Result Units 04/23/25  0431 04/25/25  0546   Vancomycin Random ug/ml 17.4 18.1       Pharmacy will continue to follow and monitor vancomycin.    Please contact pharmacy at extension [phone] for questions regarding this assessment.    Thank you for the consult,   Ezekiel Beaver Jr       Patient brief summary:  eJvon Rajan is a 85 y.o. male initiated on antimicrobial therapy with IV Vancomycin for treatment of bacteremia    Drug Allergies:   Review of patient's allergies indicates:   Allergen Reactions    Ace inhibitors Hives, Itching, Shortness Of Breath, Other (See Comments) and Rash     Is not sure which medication it was    Captopril        Actual Body Weight:   61.2 kg    Renal Function:   Estimated Creatinine Clearance: 5.4 mL/min (A) (based on SCr of 8.3 mg/dL (H)).,     Dialysis Method (if applicable):  intermittent HD    CBC (last 72 hours):  Recent Labs   Lab Result Units 04/23/25 0431 04/24/25  0536 04/25/25  0546   WBC K/uL 9.90 8.35 7.76   HGB gm/dL 8.8* 8.1* 8.6*   HCT % 28.9* 26.8* 28.1*   Platelet Count K/uL 170 149* 151   Lymph % % 7.6* 9.8* 13.4*   Mono % % 9.9 11.5 12.2   Eos % % 7.8 7.7 8.4*   Basophil % % 0.3 0.7 0.6       Metabolic Panel (last 72 hours):  Recent Labs   Lab Result Units 04/23/25 0431 04/23/25  1937 04/24/25  0536 04/25/25  0546   Sodium mmol/L 137 137 136 135*   Potassium mmol/L 4.3 3.7 3.9 4.1   Chloride mmol/L 97 97 98 98   CO2 mmol/L 25 26 25 23   Glucose mg/dL 150* 129* 147* 111*   BUN mg/dL 49*  14 20 30*   Creatinine mg/dL 11.2* 4.8* 6.1* 8.3*   Albumin g/dL 2.8*  --  3.2* 3.1*   Bilirubin Total mg/dL 0.8  --  0.8 0.9   ALP unit/L 77  --  72 77   AST unit/L 25  --  25 23   ALT unit/L 5*  --  <5* 5*   Magnesium  mg/dL  --  1.9  --   --    Phosphorus Level mg/dL 7.9*  --   --   --        Vancomycin Administrations:  vancomycin given in the last 96 hours                     vancomycin (VANCOCIN) 500 mg in D5W 100 mL IVPB (MB+) (mg) 500 mg New Bag 04/23/25 1852                    Microbiologic Results:  Microbiology Results (last 7 days)       Procedure Component Value Units Date/Time    Fungus culture [[id number]]  (Normal) Collected: 04/03/25 1816    Order Status: Completed Specimen: Wound from Arm, Left Updated: 04/24/25 2301     Fungal Culture No Fungus Isolated at 3 Weeks    Fungus culture [[id number]]  (Normal) Collected: 04/03/25 1934    Order Status: Completed Specimen: Tissue from AV Fistula, Left Updated: 04/24/25 2301     Fungal Culture No Fungus Isolated at 3 Weeks    Fungus culture [[id number]]  (Normal) Collected: 04/03/25 2011    Order Status: Completed Specimen: Wound from Arm, Left Updated: 04/24/25 2301     Fungal Culture No Fungus Isolated at 3 Weeks    Fungus culture [[id number]]  (Normal) Collected: 04/03/25 1904    Order Status: Completed Specimen: Tissue from AV Fistula, Left Updated: 04/24/25 2201     Fungal Culture No Fungus Isolated at 3 Weeks    Fungus culture [[id number]]  (Normal) Collected: 04/03/25 1921    Order Status: Completed Specimen: Tissue from hematoma Updated: 04/24/25 2201     Fungal Culture No Fungus Isolated at 3 Weeks    Fungus culture [[id number]]  (Normal) Collected: 04/03/25 1943    Order Status: Completed Specimen: Wound from Arm, Left Updated: 04/22/25 0947     Fungal Culture No Fungus Isolated at 2 Weeks    Fungus culture [[id number]]  (Normal) Collected: 04/03/25 1954    Order Status: Completed Specimen: Wound from Arm, Left Updated: 04/22/25 0947     Fungal  Culture No Fungus Isolated at 2 Weeks

## 2025-04-25 NOTE — ASSESSMENT & PLAN NOTE
The likely etiology of thrombocytopenia is infection and sepsis. The patients 3 most recent labs are listed below.  Recent Labs     04/23/25  0431 04/24/25  0536 04/25/25  0546    149* 151     Plan  - Will transfuse if platelet count is <10k.

## 2025-04-25 NOTE — ASSESSMENT & PLAN NOTE
ESRD on HD     HD TTS, [facility]  Sp AVG resection 4/3    TDC right chest wall 4/14/2025    Plan/Recommendation  Hold further dialysis. At this time due to his mental status waxing and waning delerium/dementia further HD sessions unsafe. Appreciate palliative care's assistance with family discussions.     The main concern is that further HD sessions endanger his life because he cannot remain still on HD. If access breaks he will exsanguinate and HD centers are not equipped to handle such an emergency.     #Secondary hyperparathyroidism  Calcium   Date Value Ref Range Status   04/25/2025 9.4 8.7 - 10.5 mg/dL Final     Phosphorus Level   Date Value Ref Range Status   04/23/2025 7.9 (H) 2.7 - 4.5 mg/dL Final     PTH, Intact   Date Value Ref Range Status   01/17/2024 117.7 (H) 9.0 - 77.0 pg/mL Final   Continue to monitor

## 2025-04-25 NOTE — NURSING
[facility]  Rapid Response Nurse Intervention/ Task    Date of Visit: 4/24/25  Time of Visit: 2200       INTERVENTIONS/ TASK Completed:     PIV placed to R UA using US guidance and assistance from primary RN. Blood return present. LDA placed in chart.

## 2025-04-25 NOTE — ASSESSMENT & PLAN NOTE
4/25/2025  - interval chart reviewed; pt discussed with MDT  - Dr. Lauren, nephrology shared discussion with pt's sisters and niece regarding recommendation and plan to discontinue HD and transition to comfort focused care with hospice   - called and spoke with pt's sisterRonna and niece Senthil about discussion with Dr. Lauren; attempted to call Nora and Bhavna (sisters) but unable to reach   - they shared agreement with stopping HD, DNR, transition to hospice, and need for intermediate placement; preference for VA facility or facility near their home   - emotional support provided; allowed time for questions; Ms. Friend expressed thanks and appreciation of teams care for her brother and consistent communication with family   - visited pt at bedside; pt is cooperative but continues to lack capacity for medical decision making; pt denies pains or needs at that time, no non-verbal indications of pain or discomfort   - nephew and great nieces visiting at bedside today; pt requesting a hooded sweatshirt which nephew plans to go to the store and purchase pt some items that would aid in his comfort   - recommend transition to comfort focused care and discontinuing treatments and interventions that pt is not agreeable to   - pt does not currently have end of life symptom needs or discomfort, but will likely increase as time without HD goes on; if placement is not in place prior to worsening symptoms recommend consultation with inpatient hospice team; this was discussed with family and they were agreeable to this   - goal of getting pt closer to them if it can be done safely and without disrupting pt comfort, however him getting the care he needs to keep him comfortable takes precedent per family     Please see multiple prior palliative/ACP notes from myself and Dr. Jona Antunez for prior GOC/ACP discussions

## 2025-04-26 LAB
ABSOLUTE EOSINOPHIL (OHS): 0.44 K/UL
ABSOLUTE MONOCYTE (OHS): 0.97 K/UL (ref 0.3–1)
ABSOLUTE NEUTROPHIL COUNT (OHS): 5.57 K/UL (ref 1.8–7.7)
ALBUMIN SERPL BCP-MCNC: 3.1 G/DL (ref 3.5–5.2)
ALP SERPL-CCNC: 70 UNIT/L (ref 40–150)
ALT SERPL W/O P-5'-P-CCNC: 5 UNIT/L (ref 10–44)
ANION GAP (OHS): 15 MMOL/L (ref 8–16)
AST SERPL-CCNC: 24 UNIT/L (ref 11–45)
BASOPHILS # BLD AUTO: 0.07 K/UL
BASOPHILS NFR BLD AUTO: 0.9 %
BILIRUB SERPL-MCNC: 0.9 MG/DL (ref 0.1–1)
BUN SERPL-MCNC: 25 MG/DL (ref 8–23)
CALCIUM SERPL-MCNC: 8.9 MG/DL (ref 8.7–10.5)
CHLORIDE SERPL-SCNC: 97 MMOL/L (ref 95–110)
CO2 SERPL-SCNC: 24 MMOL/L (ref 23–29)
CREAT SERPL-MCNC: 7.1 MG/DL (ref 0.5–1.4)
ERYTHROCYTE [DISTWIDTH] IN BLOOD BY AUTOMATED COUNT: 16.5 % (ref 11.5–14.5)
GFR SERPLBLD CREATININE-BSD FMLA CKD-EPI: 7 ML/MIN/1.73/M2
GLUCOSE SERPL-MCNC: 117 MG/DL (ref 70–110)
HCT VFR BLD AUTO: 28.4 % (ref 40–54)
HGB BLD-MCNC: 8.6 GM/DL (ref 14–18)
IMM GRANULOCYTES # BLD AUTO: 0.04 K/UL (ref 0–0.04)
IMM GRANULOCYTES NFR BLD AUTO: 0.5 % (ref 0–0.5)
LYMPHOCYTES # BLD AUTO: 1.1 K/UL (ref 1–4.8)
MCH RBC QN AUTO: 28.7 PG (ref 27–31)
MCHC RBC AUTO-ENTMCNC: 30.3 G/DL (ref 32–36)
MCV RBC AUTO: 95 FL (ref 82–98)
NUCLEATED RBC (/100WBC) (OHS): 0 /100 WBC
PLATELET # BLD AUTO: 180 K/UL (ref 150–450)
PMV BLD AUTO: 11.9 FL (ref 9.2–12.9)
POCT GLUCOSE: 113 MG/DL (ref 70–110)
POCT GLUCOSE: 114 MG/DL (ref 70–110)
POCT GLUCOSE: 137 MG/DL (ref 70–110)
POCT GLUCOSE: 137 MG/DL (ref 70–110)
POTASSIUM SERPL-SCNC: 4.6 MMOL/L (ref 3.5–5.1)
PROT SERPL-MCNC: 7.9 GM/DL (ref 6–8.4)
RBC # BLD AUTO: 3 M/UL (ref 4.6–6.2)
RELATIVE EOSINOPHIL (OHS): 5.4 %
RELATIVE LYMPHOCYTE (OHS): 13.4 % (ref 18–48)
RELATIVE MONOCYTE (OHS): 11.8 % (ref 4–15)
RELATIVE NEUTROPHIL (OHS): 68 % (ref 38–73)
SODIUM SERPL-SCNC: 136 MMOL/L (ref 136–145)
WBC # BLD AUTO: 8.19 K/UL (ref 3.9–12.7)

## 2025-04-26 PROCEDURE — 63600175 PHARM REV CODE 636 W HCPCS: Performed by: INTERNAL MEDICINE

## 2025-04-26 PROCEDURE — 99233 SBSQ HOSP IP/OBS HIGH 50: CPT | Mod: ,,, | Performed by: STUDENT IN AN ORGANIZED HEALTH CARE EDUCATION/TRAINING PROGRAM

## 2025-04-26 PROCEDURE — 80053 COMPREHEN METABOLIC PANEL: CPT | Performed by: HOSPITALIST

## 2025-04-26 PROCEDURE — 11000001 HC ACUTE MED/SURG PRIVATE ROOM

## 2025-04-26 PROCEDURE — 36415 COLL VENOUS BLD VENIPUNCTURE: CPT | Performed by: HOSPITALIST

## 2025-04-26 PROCEDURE — 25000003 PHARM REV CODE 250: Performed by: HOSPITALIST

## 2025-04-26 PROCEDURE — 85025 COMPLETE CBC W/AUTO DIFF WBC: CPT | Performed by: HOSPITALIST

## 2025-04-26 PROCEDURE — 25000003 PHARM REV CODE 250: Performed by: STUDENT IN AN ORGANIZED HEALTH CARE EDUCATION/TRAINING PROGRAM

## 2025-04-26 PROCEDURE — 63600175 PHARM REV CODE 636 W HCPCS: Performed by: STUDENT IN AN ORGANIZED HEALTH CARE EDUCATION/TRAINING PROGRAM

## 2025-04-26 PROCEDURE — 94761 N-INVAS EAR/PLS OXIMETRY MLT: CPT

## 2025-04-26 PROCEDURE — 25000003 PHARM REV CODE 250

## 2025-04-26 RX ORDER — LORAZEPAM 2 MG/ML
1 INJECTION INTRAMUSCULAR ONCE
Status: COMPLETED | OUTPATIENT
Start: 2025-04-26 | End: 2025-04-26

## 2025-04-26 RX ADMIN — CAPSAICIN: 0.25 CREAM TOPICAL at 08:04

## 2025-04-26 RX ADMIN — HYDROXYZINE HYDROCHLORIDE 25 MG: 25 TABLET ORAL at 01:04

## 2025-04-26 RX ADMIN — Medication: at 03:04

## 2025-04-26 RX ADMIN — CAPSAICIN: 0.25 CREAM TOPICAL at 09:04

## 2025-04-26 RX ADMIN — MIDODRINE HYDROCHLORIDE 15 MG: 5 TABLET ORAL at 09:04

## 2025-04-26 RX ADMIN — HALOPERIDOL LACTATE 2 MG: 5 INJECTION, SOLUTION INTRAMUSCULAR at 03:04

## 2025-04-26 RX ADMIN — ATORVASTATIN CALCIUM 80 MG: 40 TABLET, FILM COATED ORAL at 09:04

## 2025-04-26 RX ADMIN — LORAZEPAM 1 MG: 2 INJECTION INTRAMUSCULAR; INTRAVENOUS at 04:04

## 2025-04-26 RX ADMIN — QUETIAPINE FUMARATE 25 MG: 25 TABLET ORAL at 09:04

## 2025-04-26 RX ADMIN — Medication 6 MG: at 09:04

## 2025-04-26 NOTE — PLAN OF CARE
0400 Notified Dr Quintero that patient has non-direct able behavior, climbing out the bed without assistance every 15 minutes or so, yelling, hallucinating, increasingly agitated, complaining of itching . Patient received Haldol, Benadryl cream, and hydroxyzine. New order written for Ativan. Patient in bed, eye closed chest rising and falling no distress noted.   Patient is free of falls, bed alarm on and fall monitoring camera in room. Continue with plan of care.   Problem: Adult Inpatient Plan of Care  Goal: Plan of Care Review  Outcome: Progressing  Goal: Patient-Specific Goal (Individualized)  Outcome: Progressing  Goal: Optimal Comfort and Wellbeing  Outcome: Progressing     Problem: Diabetes Comorbidity  Goal: Blood Glucose Level Within Targeted Range  Outcome: Progressing     Problem: Infection  Goal: Absence of Infection Signs and Symptoms  Outcome: Progressing     Problem: Wound  Goal: Optimal Coping  Outcome: Progressing     Problem: Fall Injury Risk  Goal: Absence of Fall and Fall-Related Injury  Outcome: Progressing

## 2025-04-26 NOTE — ASSESSMENT & PLAN NOTE
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 Most recent hemoglobin and hematocrit are listed below.  Recent Labs     04/24/25  0536 04/25/25  0546 04/26/25  0611   HGB 8.1* 8.6* 8.6*   HCT 26.8* 28.1* 28.4*     Plan  - Monitor serial CBC: Daily and recheck now  - Transfuse PRBC if patient becomes hemodynamically unstable, symptomatic or H/H drops below 7/21.  - Patient has not received any PRBC transfusions to date  - Patient's anemia is currently stable

## 2025-04-26 NOTE — NURSING
[facility], [facility] - [place]  Nurses Note -- 4 Eyes      4/26/2025       Skin assessed on: Q Shift      [x] No Pressure Injuries Present    [x]Prevention Measures Documented    [] Yes LDA  for Pressure Injury Previously documented     [] Yes New Pressure Injury Discovered   [] LDA for New Pressure Injury Added      Attending RN:  Ele Perez LPN     Second RN:  CHARLENE Fierro

## 2025-04-26 NOTE — NURSING
MD was notified that patient refusing all medications states these meds isn't his medications. He also states that he isn't at [facility]. Confused conversations about equipment in the room has changed, we aren't nurses and the pharmacist isn't a pharmacist

## 2025-04-26 NOTE — NURSING
[facility], [facility] - [place], [place]  Nurses Note -- 4 Eyes    4-25-25      Skin assessed on: Q Shift      [x] No Pressure Injuries Present    []Prevention Measures Documented    [] Yes LDA  for Pressure Injury Previously documented     [] Yes New Pressure Injury Discovered   [] LDA for New Pressure Injury Added      Attending RN:  Sri Weathers RN     Second RN:  Jacki Iraheta RN

## 2025-04-26 NOTE — ASSESSMENT & PLAN NOTE
- noted 4/6/25 when patient became very agitated and screaming he couldn't urinate  - nursing unable to place dunaway/coude  - Urology consulted. Bedside cystoscopy on 4/6/25 noted clots, large prostate. Catheter was placed but was then removed due to pain  - 4/7 he is again agitated that he cannot urinate  - follow up with Urology   - tamsulosin ordered if he is awake enough to swallow      Render Risk Assessment In Note?: no Patient Management Risk Assessment: Moderate Recommendation Preamble: The following recommendations were made during the visit: Biopsy Detail Level: Zone

## 2025-04-26 NOTE — PLAN OF CARE
Problem: Adult Inpatient Plan of Care  Goal: Plan of Care Review  Outcome: Progressing  Goal: Patient-Specific Goal (Individualized)  Outcome: Progressing  Goal: Absence of Hospital-Acquired Illness or Injury  Outcome: Progressing  Goal: Optimal Comfort and Wellbeing  Outcome: Progressing     Problem: Diabetes Comorbidity  Goal: Blood Glucose Level Within Targeted Range  Outcome: Progressing     Problem: Sepsis/Septic Shock  Goal: Optimal Coping  Outcome: Progressing  Goal: Blood Glucose Level Within Targeted Range  Outcome: Progressing  Goal: Absence of Infection Signs and Symptoms  Outcome: Progressing  Goal: Optimal Nutrition Intake  Outcome: Progressing     Problem: Infection  Goal: Absence of Infection Signs and Symptoms  Outcome: Progressing     Problem: Skin Injury Risk Increased  Goal: Skin Health and Integrity  Outcome: Progressing     Problem: Wound  Goal: Optimal Coping  Outcome: Progressing  Goal: Optimal Functional Ability  Outcome: Progressing  Goal: Absence of Infection Signs and Symptoms  Outcome: Progressing  Goal: Improved Oral Intake  Outcome: Progressing  Goal: Optimal Pain Control and Function  Outcome: Progressing  Goal: Skin Health and Integrity  Outcome: Progressing  Goal: Optimal Wound Healing  Outcome: Progressing     Problem: Coping Ineffective  Goal: Effective Coping  Outcome: Progressing     Problem: Urinary Elimination Management  Goal: Effective Urinary Elimination/Continence  Outcome: Progressing     Problem: Urinary Retention  Goal: Effective Urinary Elimination  Outcome: Progressing     Problem: Fall Injury Risk  Goal: Absence of Fall and Fall-Related Injury  Outcome: Progressing

## 2025-04-26 NOTE — ASSESSMENT & PLAN NOTE
Advance Care Planning     Date: 04/04/2025  - palliative consulted   - Mr Escoto specifically told Dr Jordan to contact Kenia Smyth for all updates  - discussed code status with Kenia. She states she has spoken with Mr Escoto's sisters and they all want full code status.   -anticipated DC to SNF cancelled; patient to be DC to detention care with hospice

## 2025-04-26 NOTE — NURSING
Consult to [facility] medicine placed. Patient AAOx1 and is only oriented to self. Patient did not complain of any pain throughout shift. Patient not showing signs of distress. Bed in lowest position, wheels locked, call bell in reach, side rails up x3.

## 2025-04-26 NOTE — PROGRESS NOTES
"[facility] Medicine  Progress Note    Patient Name: Jevon Rajan  MRN: [id number]  Patient Class: IP- Inpatient   Admission Date: 4/3/2025  Length of Stay: 23 days  Attending Physician: Tena Palma MD  Primary Care Provider: Melia, Primary Doctor        Subjective     Principal Problem:Septic shock        HPI:  Mr Jevon Rajan is a 85 y.o. man with ESRD with LUE AVF, HFpEF last EF 50-55%, CAD, DM who was transferred to [facility] ICU for septic shock due to MRSA bacteremia.     He was admitted at [facility] 4/1-3/2025 with sepsis, worsening to septic shock, then identified source as MRSA. 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     Upon arrival to [facility], he is moaning in pain and his LUE AVF has active pulsatile bright red blood. He does respond to his name. He denies pain anywhere other than his LUE. He is on levophed at 0.05.     Overview/Hospital Course:  Mr Jevon Rajan is a 85 y.o. man with ESRD on HD with LUE AVF that has been bleeding, CHF, CAD s/p recent stenting 1/2025 who was admitted with MRSA bacteremia and bleeding from his LUE AVF. Continued vancomycin; weaned off levophed. Vascular surgery emergently consulted; on 4/3/25 they took him to OR and noted: "well incorporated proximal and central graft without evidence of infection, resected graft and covered proximal and central remnants with multiple layers. Mid graft removed in entirety and sent for culture. Ruptured pseudoaneurysm with infected hematoma, graft completely obliterated at mid segment." Surgical cultures with Staph. Suspect AVF or chronic scratching of pruritic skin is the source of his infection. TTE showed endocarditis: EF 40-45%, G1DD, RV systolic dysfunction, large 1.9x1.2cm fixed mass on the posterior mitral valve leaflet with moder to severe regurgitation, cannot rule out posterior mitral " valve leaflet perforation. Discussed with cardiac surgery; he is high mortality risk, and surgery is not advised. ID following. Nephrology consulted; trialysis was placed for HD. Persistently positive for MRSA in blood cultures. He has had urinary retention; Urology consulted, performed bedside cystoscopy on 4/6/25 with large prostate noted. Noted to have clot retention and went urgently to OR with Urology on 4/7/25; asa and DVT ppx held.     WBC normalized. S/p clot removal with Urology, 1U PRBC ordered this morning with Hb 6.1. Soft BPs, will add midodrine for HD to step down to floor. Glucoses high, will increase insulin. Attempted to s/d but BP too low, may still need CRRT rather. 4/8 cx clear so far. Increasing insulin again. Increasing midodrine to 15 mg TID. Hb 6.8, 1U PRBC ordered.     BP appears to be recovering a bit, perhaps will tolerate reg HD, will discuss w Neph- will run him on HD Saturday, if tolerates normal HD will remove central line and give 2 day holiday and place tunnel on Monday. We will give him HD before dc to facility on Tuesday if accepted by then.     WBC has normalized for the first time, now tolerating reg HD, and Blcx remain clear. Central line removed. NGT removed. Will step down.     Interval History:  No acute event overnight.  Patient seen in bed.  No new complaints.  [facility]     Review of Systems  Objective:     Vital Signs (Most Recent):  Temp: 98.6 °F (37 °C) (04/26/25 0705)  Pulse: 86 (04/26/25 0853)  Resp: 18 (04/26/25 0705)  BP: 110/67 (04/26/25 0705)  SpO2: 97 % (04/26/25 0853) Vital Signs (24h Range):  Temp:  [97.5 °F (36.4 °C)-98.7 °F (37.1 °C)] 98.6 °F (37 °C)  Pulse:  [] 86  Resp:  [16-18] 18  SpO2:  [96 %-100 %] 97 %  BP: ()/(49-67) 110/67     Weight: 61.2 kg (134 lb 14.7 oz)  Body mass index is 21.13 kg/m².    Intake/Output Summary (Last 24 hours) at 4/26/2025 1004  Last data filed at 4/25/2025 2000  Gross per 24  hour   Intake 335.74 ml   Output 0 ml   Net 335.74 ml         Physical Exam  Constitutional:       General: He is not in acute distress.     Appearance: He is not ill-appearing, toxic-appearing or diaphoretic.   Cardiovascular:      Rate and Rhythm: Normal rate and regular rhythm.   Pulmonary:      Breath sounds: Normal breath sounds.   Abdominal:      General: Bowel sounds are normal.      Palpations: Abdomen is soft.   Skin:     Comments: Multiple excoriation marks   Neurological:      Mental Status: He is disoriented.               Significant Labs: All pertinent labs within the past 24 hours have been reviewed.    Significant Imaging: I have reviewed all pertinent imaging results/findings within the past 24 hours.      Assessment & Plan  Septic shock  This patient has shock. The type of shock is distributive due to sepsis. The patient had the following evidence of shock: persistent hypotension and altered mental status. 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 Shock is now resolved and vasopressors are off. WBC back to normal  HTN (hypertension)  Patient's blood pressure range in the last 24 hours was: BP  Min: 96/51  Max: 114/66.The patient's inpatient anti-hypertensive regimen is listed below:  Current Antihypertensives       Plan  - admitted with shock  - now off vasopressors. Continue to hold BP Rx as BP is well controlled without it   HLD (hyperlipidemia)  - continue statin  Type 2 diabetes mellitus with hyperglycemia, without long-term current use of insulin  A1c:   Lab Results   Component Value Date    HGBA1C 5.7 (H) 04/02/2025     Meds: lantus + SSI PRN to maintain goal  "140-180  Tube feeds  accuchecks, hypoglycemic protocol      Coronary artery disease involving native coronary artery of native heart without angina pectoris  Patient with known CAD s/p stent placement, which is controlled Will continue ASA, Plavix, and Statin and monitor for S/Sx of angina/ACS. Continue to monitor on telemetry.   - last [facility] was 1/2025: Severely calcified mid RCA stenosis unable to cross with PTCA balloons, treated initially with 0.9 laser atherectomy, followed by CSI atherectomy system with total of 5 runs (3 at low speed, 2 at high speed), pre-dilated using 2.0 compliant and 3.5 noncompliant balloon followed by 3.5 X 16 mm megatron stent.  Focal plaque rupture/erosion noted in the proximal and ostial RCA that were treated with  3.5 X 28 in the proximal RCA, 4.0 X 16 mm in the ostial RCA.  No residual stenosis post intervention.  Patient had ALAN 3 flow at the end of the procedure  - with elevated troponin likely due to septic shock  No results for input(s): "TROPONINI", "TROPONINIHS" in the last 168 hours.    - continue asa, plavix, statin  - Cardiology consulted  - no plans for ischemic evaluation at this time   ESRD on hemodialysis  - ESRD on HD via LUE AVF  - LUE AVF was actively bleeding on arrival on 4/3/25. Vascular surgery was consulted. He went emergently to the OR on 4/3/25: "Severely calcified mid RCA stenosis unable to cross with PTCA balloons, treated initially with 0.9 laser atherectomy, followed by CSI atherectomy system with total of 5 runs (3 at low speed, 2 at high speed), pre-dilated using 2.0 compliant and 3.5 noncompliant balloon followed by 3.5 X 16 mm megatron stent.  Focal plaque rupture/erosion noted in the proximal and ostial RCA that were treated with  3.5 X 28 in the proximal RCA, 4.0 X 16 mm in the ostial RCA.  No residual stenosis post intervention.  Patient had ALAN 3 flow at the end of the procedure"  - cultures from AVF= Staph   - wound care orders in place for " "surgical site  - Nephrology is consulted  - Rt IJ trialysis placed on 4/4/25 for CRRT;  - THDC placed on 04/14  -[facility]  Anemia in ESRD (end-stage renal disease)  Anemia is likely due to  ESRD, blood loss . Most recent hemoglobin and hematocrit are listed below.  Recent Labs     04/24/25  0536 04/25/25  0546 04/26/25  0611   HGB 8.1* 8.6* 8.6*   HCT 26.8* 28.1* 28.4*     Plan  - Monitor serial CBC: Daily and recheck now  - Transfuse PRBC if patient becomes hemodynamically unstable, symptomatic or H/H drops below 7/21.  - Patient has not received any PRBC transfusions to date  - Patient's anemia is currently stable  Acute metabolic encephalopathy with Hospital Delirum  -Noted episodes of intermittent agitation with tactile and visual hallucinations  -Continue correction of metabolic derangements  -Maintain Delirium precautions: Maintain regular sleep cycle. Early ambulation. Minimal interruptions overnight. Re-orient patient frequently. Maintain adequate bowel regimen, hydration and electrolyte replenishments  -aspiration precautions  -continue Seroquel 25 mg HS      ACP (advance care planning)  Advance Care Planning     Date: 04/04/2025  - palliative consulted   - Mr Escoto specifically told Dr Jordan to contact Kenia Smyth for all updates  - discussed code status with Kenia. She states she has spoken with Mr Escoto's sisters and they all want full code status.   -anticipated DC to SNF cancelled; [facility]         Acute bacterial endocarditis  - TTE 4/4/25: "1.9x1.2cm large fixed heterogeneous mass present on the posterior leaflet (new finding vs 9/2023 echo). There is moderate to severe regurgitation with an eccentric jet. Cannot exclude severe MR with possible PMVL perforation in association with large vegetation."  - this is in the setting of MRSA bacteremia  - ID is consulted  - repeat blood cultures until " "clear   - suspect source is skin/chronic scratching vs AVF infection- cultures from resected AVF with Staph   - discussed with Cardiac surgery Dr Castro 4/4/25- given age and comorbidities, he is very high risk of mortality with surgery. He recommends medical management at this point.  - on vancomycin      MRSA bacteremia  - suspect source:  Graft infection s/p exploratory surgery of LUE with graft resection 4/3  -graft holiday with TDC placed on 04/14  - cultures of AVF with Staph   - he has endocarditis  - ID consulted  - on vanc ;anticipating completing 6 weeks of IV vancomycin from clearance (EOC: 5/19/25)     NSTEMI (non-ST elevated myocardial infarction)  - see CAD    BPH with urinary obstruction  - noted 4/6/25 when patient became very agitated and screaming he couldn't urinate  - nursing unable to place dunaway/coude  - Urology consulted. Bedside cystoscopy on 4/6/25 noted clots, large prostate. Catheter was placed but was then removed due to pain  - 4/7 he is again agitated that he cannot urinate  - follow up with Urology   - tamsulosin ordered if he is awake enough to swallow     Thrombocytopenia  The likely etiology of thrombocytopenia is infection and sepsis. The patients 3 most recent labs are listed below.  Recent Labs     04/24/25  0536 04/25/25  0546 04/26/25  0611   * 151 180     Plan  - Will transfuse if platelet count is <10k.    AV fistula infection  - LUE AVF was actively bleeding on arrival on 4/3/25. Vascular surgery was consulted. He went emergently to the OR on 4/3/25: "Severely calcified mid RCA stenosis unable to cross with PTCA balloons, treated initially with 0.9 laser atherectomy, followed by CSI atherectomy system with total of 5 runs (3 at low speed, 2 at high speed), pre-dilated using 2.0 compliant and 3.5 noncompliant balloon followed by 3.5 X 16 mm megatron stent.  Focal plaque rupture/erosion noted in the proximal and ostial RCA that were treated with  3.5 X 28 in the " "proximal RCA, 4.0 X 16 mm in the ostial RCA.  No residual stenosis post intervention.  Patient had ALAN 3 flow at the end of the procedure"  - cultures from AVF= Staph   - wound care orders in place for surgical site  - trialysis currently in place for HD    Emphysema lung  - emphysema noted on CT  - no signs of COPD exacerbation  Pulmonary nodules  - CT 4/3/25 with few small pulmonary nodules  - will need outpatient follow up     Gross hematuria  S/p clot evacuation with a fulguration by Urology  Currently resolved  Continue cap irrigation of 3 way catheter  Continue Levsin for bladder discomfort  -Sharma to be removed when patient is transferred to the floor; we will defer removal to urology    NSVT (nonsustained ventricular tachycardia)      Uremic pruritus  Patient complaining of severe intractable itching;[facility]  Start hydroxyzine  Ordered capsaicin cream   Korsuva  not available; [facility]; can consider gabapentin  hemodynamic  and delirium allows  Will defer increased dialysis frequency; changing Kp/f ratios and other adjustments to nephrology      Moderate malnutrition  Nutrition consulted. Most recent weight and BMI monitored-     Measurements:  Wt Readings from Last 1 Encounters:   04/18/25 61.2 kg (134 lb 14.7 oz)   Body mass index is 21.13 kg/m².    Patient has been screened and assessed by RD.    Malnutrition Type:  Context: chronic illness  Level: moderate    Malnutrition Characteristic Summary:  Weight Loss (Malnutrition): greater than 7.5% in 3 months  Energy Intake (Malnutrition): less than or equal to 75% for greater than or equal to 1 month    Interventions/Recommendations (treatment strategy):  1. Continue on texture modified diet per SLP recommendations 2. Continue with Commercial beverage medical food supplement therapy: Novasource renal  3. Monitor labs and weights    VTE Risk Mitigation (From admission, onward)           Ordered     heparin (porcine) " injection 1,000 Units  As needed (PRN)         04/05/25 2100     IP VTE HIGH RISK PATIENT  Once         04/03/25 1516     Place TEMO hose  Until discontinued         04/03/25 1516     Place sequential compression device  Until discontinued         04/03/25 1516     Reason for No Pharmacological VTE Prophylaxis  Once        Question:  Reasons:  Answer:  Physician Provided (leave comment)    04/03/25 1516                    Discharge Planning   BAYRON: 4/25/2025     Code Status: DNR   Medical Readiness for Discharge Date: 4/25/2025  Discharge Plan A: Skilled Nursing Facility   Discharge Delays: (!) Post-Acute Set-up            Please place Justification for DME        Tena Palma MD  Department of Hospital Medicine   [facility]

## 2025-04-26 NOTE — ASSESSMENT & PLAN NOTE
"Patient with known CAD s/p stent placement, which is controlled Will continue ASA, Plavix, and Statin and monitor for S/Sx of angina/ACS. Continue to monitor on telemetry.   - last [facility] was 1/2025: Severely calcified mid RCA stenosis unable to cross with PTCA balloons, treated initially with 0.9 laser atherectomy, followed by CSI atherectomy system with total of 5 runs (3 at low speed, 2 at high speed), pre-dilated using 2.0 compliant and 3.5 noncompliant balloon followed by 3.5 X 16 mm megatron stent.  Focal plaque rupture/erosion noted in the proximal and ostial RCA that were treated with  3.5 X 28 in the proximal RCA, 4.0 X 16 mm in the ostial RCA.  No residual stenosis post intervention.  Patient had ALAN 3 flow at the end of the procedure  - with elevated troponin likely due to septic shock  No results for input(s): "TROPONINI", "TROPONINIHS" in the last 168 hours.    - continue asa, plavix, statin  - Cardiology consulted  - no plans for ischemic evaluation at this time   "

## 2025-04-26 NOTE — SUBJECTIVE & OBJECTIVE
Interval History:  No acute event overnight.  Patient seen in bed.  No new complaints.  Anticipated discharge to [facility] hospice on 4/28     Review of Systems  Objective:     Vital Signs (Most Recent):  Temp: 98.6 °F (37 °C) (04/26/25 0705)  Pulse: 86 (04/26/25 0853)  Resp: 18 (04/26/25 0705)  BP: 110/67 (04/26/25 0705)  SpO2: 97 % (04/26/25 0853) Vital Signs (24h Range):  Temp:  [97.5 °F (36.4 °C)-98.7 °F (37.1 °C)] 98.6 °F (37 °C)  Pulse:  [] 86  Resp:  [16-18] 18  SpO2:  [96 %-100 %] 97 %  BP: ()/(49-67) 110/67     Weight: 61.2 kg (134 lb 14.7 oz)  Body mass index is 21.13 kg/m².    Intake/Output Summary (Last 24 hours) at 4/26/2025 1004  Last data filed at 4/25/2025 2000  Gross per 24 hour   Intake 335.74 ml   Output 0 ml   Net 335.74 ml         Physical Exam  Constitutional:       General: He is not in acute distress.     Appearance: He is not ill-appearing, toxic-appearing or diaphoretic.   Cardiovascular:      Rate and Rhythm: Normal rate and regular rhythm.   Pulmonary:      Breath sounds: Normal breath sounds.   Abdominal:      General: Bowel sounds are normal.      Palpations: Abdomen is soft.   Skin:     Comments: Multiple excoriation marks   Neurological:      Mental Status: He is disoriented.               Significant Labs: All pertinent labs within the past 24 hours have been reviewed.    Significant Imaging: I have reviewed all pertinent imaging results/findings within the past 24 hours.

## 2025-04-26 NOTE — ASSESSMENT & PLAN NOTE
"- ESRD on HD via LUE AVF  - LUE AVF was actively bleeding on arrival on 4/3/25. Vascular surgery was consulted. He went emergently to the OR on 4/3/25: "Severely calcified mid RCA stenosis unable to cross with PTCA balloons, treated initially with 0.9 laser atherectomy, followed by CSI atherectomy system with total of 5 runs (3 at low speed, 2 at high speed), pre-dilated using 2.0 compliant and 3.5 noncompliant balloon followed by 3.5 X 16 mm megatron stent.  Focal plaque rupture/erosion noted in the proximal and ostial RCA that were treated with  3.5 X 28 in the proximal RCA, 4.0 X 16 mm in the ostial RCA.  No residual stenosis post intervention.  Patient had ALAN 3 flow at the end of the procedure"  - cultures from AVF= Staph   - wound care orders in place for surgical site  - Nephrology is consulted  - Rt IJ trialysis placed on 4/4/25 for CRRT;  - THDC placed on 04/14  -currently anticipating DC to nursing home care with the hospice  "

## 2025-04-26 NOTE — PROGRESS NOTES
[facility]  Urology  Progress Note    Patient Name: Jevon Rajan  MRN: [id number]  Admission Date: 4/3/2025  Hospital Length of Stay: 23 days  Code Status: DNR   Attending Provider: Tena Palma MD   Primary Care Physician: Melia, Primary Doctor    Subjective:     HPI:  Urinary Retention  Patient complains of urinary retention. Onset of retention was 1 day ago and was gradual in onset. Patient currently does not have a urinary catheter in place.  300 ml of urine were scanned on bladder scanner Prior to this event voiding symptoms consisted of slow stream. Prior treatments include none. Recent medications that may have affected his voiding include none.   He still makes urine, he tells me an almost normal amount.  He is very uncomfortable at the level of the bladder.  Nursing staff attempted coude catheter was then successfully.  He agrees to allow me to place a catheter.    Interval History: dunaway since removed, no urinary complaints overnight.    Review of Systems   Unable to perform ROS: Other     Objective:     Temp:  [97.5 °F (36.4 °C)-98.7 °F (37.1 °C)] 98.6 °F (37 °C)  Pulse:  [] 86  Resp:  [16-18] 18  SpO2:  [96 %-100 %] 97 %  BP: ()/(49-67) 110/67     Body mass index is 21.13 kg/m².        Drains       Drain  Duration                  Hemodialysis Catheter 04/14/25 1011 right internal jugular 11 days                     Physical Exam  Constitutional:       General: He is not in acute distress.     Appearance: He is well-developed.   HENT:      Head: Normocephalic and atraumatic.      Mouth/Throat:      Mouth: Mucous membranes are moist.   Eyes:      Conjunctiva/sclera: Conjunctivae normal.   Pulmonary:      Effort: Pulmonary effort is normal. No respiratory distress.   Abdominal:      General: Abdomen is flat. There is no distension.      Palpations: Abdomen is soft.   Musculoskeletal:         General: No swelling or deformity.      Cervical back: Neck supple.   Skin:     General: Skin  is warm and dry.      Findings: No rash.   Neurological:      Mental Status: He is alert.           Significant Labs:    BMP:  Recent Labs   Lab 04/24/25  0536 04/25/25  0546 04/26/25  0611    135* 136   K 3.9 4.1 4.6   CL 98 98 97   CO2 25 23 24   BUN 20 30* 25*   CREATININE 6.1* 8.3* 7.1*   GLUCOSE 147* 111* 117*   CALCIUM 8.9 9.4 8.9       CBC:   Recent Labs   Lab 04/24/25  0536 04/25/25  0546 04/26/25  0611   WBC 8.35 7.76 8.19   HGB 8.1* 8.6* 8.6*   HCT 26.8* 28.1* 28.4*   * 151 180                 Assessment/Plan:     Gross hematuria  S/p Clot evacuation with fulguration  Resolved            BPH with urinary obstruction  Catheter in place, requires cystoscopy to place dunaway.      Dunaway removed yesterday, on dialysis, does not make much urine, avoid dunaway due to prior trauma  Stop levsin   Flomax is tolerated        VTE Risk Mitigation (From admission, onward)           Ordered     heparin (porcine) injection 1,000 Units  As needed (PRN)         04/05/25 2100     IP VTE HIGH RISK PATIENT  Once         04/03/25 1516     Place TEMO hose  Until discontinued         04/03/25 1516     Place sequential compression device  Until discontinued         04/03/25 1516     Reason for No Pharmacological VTE Prophylaxis  Once        Question:  Reasons:  Answer:  Physician Provided (leave comment)    04/03/25 1516                    Elsie Arnold MD  Urology  [facility]

## 2025-04-26 NOTE — SUBJECTIVE & OBJECTIVE
Interval History: dunaway since removed, no urinary complaints overnight.    Review of Systems   Unable to perform ROS: Other     Objective:     Temp:  [97.5 °F (36.4 °C)-98.7 °F (37.1 °C)] 98.6 °F (37 °C)  Pulse:  [] 86  Resp:  [16-18] 18  SpO2:  [96 %-100 %] 97 %  BP: ()/(49-67) 110/67     Body mass index is 21.13 kg/m².        Drains       Drain  Duration                  Hemodialysis Catheter 04/14/25 1011 right internal jugular 11 days                     Physical Exam  Constitutional:       General: He is not in acute distress.     Appearance: He is well-developed.   HENT:      Head: Normocephalic and atraumatic.      Mouth/Throat:      Mouth: Mucous membranes are moist.   Eyes:      Conjunctiva/sclera: Conjunctivae normal.   Pulmonary:      Effort: Pulmonary effort is normal. No respiratory distress.   Abdominal:      General: Abdomen is flat. There is no distension.      Palpations: Abdomen is soft.   Musculoskeletal:         General: No swelling or deformity.      Cervical back: Neck supple.   Skin:     General: Skin is warm and dry.      Findings: No rash.   Neurological:      Mental Status: He is alert.           Significant Labs:    BMP:  Recent Labs   Lab 04/24/25  0536 04/25/25  0546 04/26/25  0611    135* 136   K 3.9 4.1 4.6   CL 98 98 97   CO2 25 23 24   BUN 20 30* 25*   CREATININE 6.1* 8.3* 7.1*   GLUCOSE 147* 111* 117*   CALCIUM 8.9 9.4 8.9       CBC:   Recent Labs   Lab 04/24/25  0536 04/25/25  0546 04/26/25  0611   WBC 8.35 7.76 8.19   HGB 8.1* 8.6* 8.6*   HCT 26.8* 28.1* 28.4*   * 151 180

## 2025-04-26 NOTE — ASSESSMENT & PLAN NOTE
Catheter in place, requires cystoscopy to place dunaway.      Dunaway removed yesterday, on dialysis, does not make much urine, avoid dunaway due to prior trauma

## 2025-04-26 NOTE — ASSESSMENT & PLAN NOTE
The likely etiology of thrombocytopenia is infection and sepsis. The patients 3 most recent labs are listed below.  Recent Labs     04/24/25  0536 04/25/25  0546 04/26/25  0611   * 151 180     Plan  - Will transfuse if platelet count is <10k.

## 2025-04-26 NOTE — ASSESSMENT & PLAN NOTE
Patient's blood pressure range in the last 24 hours was: BP  Min: 96/51  Max: 114/66.The patient's inpatient anti-hypertensive regimen is listed below:  Current Antihypertensives       Plan  - admitted with shock  - now off vasopressors. Continue to hold BP Rx as BP is well controlled without it

## 2025-04-27 LAB
ABSOLUTE EOSINOPHIL (OHS): 0.57 K/UL
ABSOLUTE MONOCYTE (OHS): 1.05 K/UL (ref 0.3–1)
ABSOLUTE NEUTROPHIL COUNT (OHS): 5.43 K/UL (ref 1.8–7.7)
ALBUMIN SERPL BCP-MCNC: 3.2 G/DL (ref 3.5–5.2)
ALP SERPL-CCNC: 77 UNIT/L (ref 40–150)
ALT SERPL W/O P-5'-P-CCNC: <5 UNIT/L (ref 10–44)
ANION GAP (OHS): 15 MMOL/L (ref 8–16)
AST SERPL-CCNC: 24 UNIT/L (ref 11–45)
BASOPHILS # BLD AUTO: 0.05 K/UL
BASOPHILS NFR BLD AUTO: 0.6 %
BILIRUB SERPL-MCNC: 0.7 MG/DL (ref 0.1–1)
BUN SERPL-MCNC: 38 MG/DL (ref 8–23)
CALCIUM SERPL-MCNC: 9.4 MG/DL (ref 8.7–10.5)
CHLORIDE SERPL-SCNC: 99 MMOL/L (ref 95–110)
CO2 SERPL-SCNC: 25 MMOL/L (ref 23–29)
CREAT SERPL-MCNC: 9.4 MG/DL (ref 0.5–1.4)
ERYTHROCYTE [DISTWIDTH] IN BLOOD BY AUTOMATED COUNT: 16.4 % (ref 11.5–14.5)
GFR SERPLBLD CREATININE-BSD FMLA CKD-EPI: 5 ML/MIN/1.73/M2
GLUCOSE SERPL-MCNC: 113 MG/DL (ref 70–110)
HCT VFR BLD AUTO: 29.2 % (ref 40–54)
HGB BLD-MCNC: 8.7 GM/DL (ref 14–18)
IMM GRANULOCYTES # BLD AUTO: 0.03 K/UL (ref 0–0.04)
IMM GRANULOCYTES NFR BLD AUTO: 0.4 % (ref 0–0.5)
LYMPHOCYTES # BLD AUTO: 0.88 K/UL (ref 1–4.8)
MCH RBC QN AUTO: 28 PG (ref 27–31)
MCHC RBC AUTO-ENTMCNC: 29.8 G/DL (ref 32–36)
MCV RBC AUTO: 94 FL (ref 82–98)
NUCLEATED RBC (/100WBC) (OHS): 0 /100 WBC
PLATELET # BLD AUTO: 188 K/UL (ref 150–450)
PMV BLD AUTO: 11.1 FL (ref 9.2–12.9)
POCT GLUCOSE: 131 MG/DL (ref 70–110)
POCT GLUCOSE: 138 MG/DL (ref 70–110)
POCT GLUCOSE: 139 MG/DL (ref 70–110)
POCT GLUCOSE: 145 MG/DL (ref 70–110)
POTASSIUM SERPL-SCNC: 4.4 MMOL/L (ref 3.5–5.1)
PROT SERPL-MCNC: 8 GM/DL (ref 6–8.4)
RBC # BLD AUTO: 3.11 M/UL (ref 4.6–6.2)
RELATIVE EOSINOPHIL (OHS): 7.1 %
RELATIVE LYMPHOCYTE (OHS): 11 % (ref 18–48)
RELATIVE MONOCYTE (OHS): 13.1 % (ref 4–15)
RELATIVE NEUTROPHIL (OHS): 67.8 % (ref 38–73)
SODIUM SERPL-SCNC: 139 MMOL/L (ref 136–145)
WBC # BLD AUTO: 8.01 K/UL (ref 3.9–12.7)

## 2025-04-27 PROCEDURE — 99232 SBSQ HOSP IP/OBS MODERATE 35: CPT | Mod: ,,, | Performed by: STUDENT IN AN ORGANIZED HEALTH CARE EDUCATION/TRAINING PROGRAM

## 2025-04-27 PROCEDURE — 25000003 PHARM REV CODE 250: Performed by: STUDENT IN AN ORGANIZED HEALTH CARE EDUCATION/TRAINING PROGRAM

## 2025-04-27 PROCEDURE — 25000003 PHARM REV CODE 250: Performed by: HOSPITALIST

## 2025-04-27 PROCEDURE — 82310 ASSAY OF CALCIUM: CPT | Performed by: HOSPITALIST

## 2025-04-27 PROCEDURE — 25000003 PHARM REV CODE 250

## 2025-04-27 PROCEDURE — 63600175 PHARM REV CODE 636 W HCPCS: Performed by: STUDENT IN AN ORGANIZED HEALTH CARE EDUCATION/TRAINING PROGRAM

## 2025-04-27 PROCEDURE — 36415 COLL VENOUS BLD VENIPUNCTURE: CPT | Performed by: HOSPITALIST

## 2025-04-27 PROCEDURE — 94761 N-INVAS EAR/PLS OXIMETRY MLT: CPT

## 2025-04-27 PROCEDURE — 25000003 PHARM REV CODE 250: Performed by: INTERNAL MEDICINE

## 2025-04-27 PROCEDURE — 11000001 HC ACUTE MED/SURG PRIVATE ROOM

## 2025-04-27 PROCEDURE — 85025 COMPLETE CBC W/AUTO DIFF WBC: CPT | Performed by: HOSPITALIST

## 2025-04-27 RX ADMIN — CAPSAICIN: 0.25 CREAM TOPICAL at 10:04

## 2025-04-27 RX ADMIN — CLOPIDOGREL BISULFATE 75 MG: 75 TABLET, FILM COATED ORAL at 08:04

## 2025-04-27 RX ADMIN — Medication: at 03:04

## 2025-04-27 RX ADMIN — PANTOPRAZOLE SODIUM 40 MG: 40 TABLET, DELAYED RELEASE ORAL at 08:04

## 2025-04-27 RX ADMIN — MIDODRINE HYDROCHLORIDE 15 MG: 5 TABLET ORAL at 05:04

## 2025-04-27 RX ADMIN — ATORVASTATIN CALCIUM 80 MG: 40 TABLET, FILM COATED ORAL at 08:04

## 2025-04-27 RX ADMIN — CAPSAICIN: 0.25 CREAM TOPICAL at 08:04

## 2025-04-27 RX ADMIN — HALOPERIDOL LACTATE 2 MG: 5 INJECTION, SOLUTION INTRAMUSCULAR at 10:04

## 2025-04-27 RX ADMIN — Medication 6 MG: at 08:04

## 2025-04-27 RX ADMIN — SEVELAMER CARBONATE 1600 MG: 800 TABLET, FILM COATED ORAL at 04:04

## 2025-04-27 RX ADMIN — HYDROXYZINE HYDROCHLORIDE 25 MG: 25 TABLET ORAL at 10:04

## 2025-04-27 RX ADMIN — SEVELAMER CARBONATE 1600 MG: 800 TABLET, FILM COATED ORAL at 12:04

## 2025-04-27 RX ADMIN — MIDODRINE HYDROCHLORIDE 15 MG: 5 TABLET ORAL at 01:04

## 2025-04-27 RX ADMIN — QUETIAPINE FUMARATE 25 MG: 25 TABLET ORAL at 08:04

## 2025-04-27 RX ADMIN — TAMSULOSIN HYDROCHLORIDE 0.4 MG: 0.4 CAPSULE ORAL at 08:04

## 2025-04-27 RX ADMIN — SEVELAMER CARBONATE 1600 MG: 800 TABLET, FILM COATED ORAL at 08:04

## 2025-04-27 RX ADMIN — MIDODRINE HYDROCHLORIDE 15 MG: 5 TABLET ORAL at 09:04

## 2025-04-27 NOTE — PROGRESS NOTES
Vancomycin Consult Follow-up:  Patient reviewed, no new levels, dialysis qMWF, continue current therapy; Next levels due: 4/28 @ 04:00

## 2025-04-27 NOTE — NURSING
[facility], [facility]  Nurses Note -- 4 Eyes      4/26/2025       Skin assessed on: Q Shift      [x] No Pressure Injuries Present    [x]Prevention Measures Documented    [] Yes LDA  for Pressure Injury Previously documented     [] Yes New Pressure Injury Discovered   [] LDA for New Pressure Injury Added      Attending RN:  Severo Bowden RN     Second RN:  PERRY Marlow

## 2025-04-27 NOTE — PLAN OF CARE
Pt's free from fall/injury all night. Pt is alert and oriented x 1. On RA not in distress. Due meds given, pt able to tolerate the whole pill one at a time. Pt has an episode of confusion and trying to get out of the bed last night. Pt easily to redirect and placed back on the bed. Reorient the place, situation done. Pt's need attended. Change dressing done on WILLIS, pt tolerated well. CHG done. Safety precaution done. Fall risk monitoring at the bedside. Side rails up x3. Bed in low position. Bed alarm set. Care ongoing.     Problem: Adult Inpatient Plan of Care  Goal: Plan of Care Review  Outcome: Progressing  Flowsheets (Taken 4/27/2025 0505)  Plan of Care Reviewed With: patient  Goal: Optimal Comfort and Wellbeing  Outcome: Progressing  Intervention: Monitor Pain and Promote Comfort  Flowsheets (Taken 4/27/2025 0505)  Pain Management Interventions:   pillow support provided   position adjusted   quiet environment facilitated     Problem: Sepsis/Septic Shock  Goal: Optimal Coping  Outcome: Progressing  Goal: Blood Glucose Level Within Targeted Range  Outcome: Progressing

## 2025-04-27 NOTE — ASSESSMENT & PLAN NOTE
Advance Care Planning     Date: 04/04/2025  - palliative consulted   - Mr Escoto specifically told Dr Jordan to contact Kenia Smyth for all updates  - discussed code status with Kenia. She states she has spoken with Mr Escoto's sisters and they all want full code status.   -anticipated DC to SNF cancelled; patient to be DC to long term care with hospice

## 2025-04-27 NOTE — ASSESSMENT & PLAN NOTE
Dunaway removed,on dialysis, does not make much urine, abdomen soft   avoid dunaway due to prior trauma

## 2025-04-27 NOTE — ASSESSMENT & PLAN NOTE
The likely etiology of thrombocytopenia is infection and sepsis. The patients 3 most recent labs are listed below.  Recent Labs     04/25/25  0546 04/26/25  0611 04/27/25  0505    180 188     Plan  - Will transfuse if platelet count is <10k.

## 2025-04-27 NOTE — SUBJECTIVE & OBJECTIVE
Interval History: no urinary complaints, resting comfortably    Review of Systems   Unable to perform ROS: Other     Objective:     Temp:  [97.5 °F (36.4 °C)-98.3 °F (36.8 °C)] 97.7 °F (36.5 °C)  Pulse:  [80-99] 91  Resp:  [18] 18  SpO2:  [96 %-100 %] 96 %  BP: ()/(44-64) 101/61     Body mass index is 21.13 kg/m².    Date 04/27/25 0700 - 04/28/25 0659   Shift 5418-5877 7112-1240 0262-3008 24 Hour Total   INTAKE   P.O. 360   360   Shift Total(mL/kg) 360(5.9)   360(5.9)   OUTPUT   Shift Total(mL/kg)       Weight (kg) 61.2 61.2 61.2 61.2     Bladder Scan Volume (mL): 331 mL (04/06/25 1520)    Drains       Drain  Duration                  Hemodialysis Catheter 04/14/25 1011 right internal jugular 13 days                     Physical Exam  Constitutional:       General: He is not in acute distress.     Appearance: He is well-developed. He is not ill-appearing, toxic-appearing or diaphoretic.   HENT:      Head: Normocephalic and atraumatic.      Mouth/Throat:      Mouth: Mucous membranes are moist.   Eyes:      Conjunctiva/sclera: Conjunctivae normal.   Pulmonary:      Effort: Pulmonary effort is normal. No respiratory distress.   Abdominal:      General: Abdomen is flat. There is no distension.      Palpations: Abdomen is soft.      Comments: Suprapubic area soft and non distended     Musculoskeletal:         General: No swelling or deformity.      Cervical back: Neck supple.   Skin:     General: Skin is dry.      Findings: No rash.   Neurological:      Mental Status: He is alert.   Psychiatric:         Behavior: Behavior normal.           Significant Labs:    BMP:  Recent Labs   Lab 04/25/25  0546 04/26/25  0611 04/27/25  0505   * 136 139   K 4.1 4.6 4.4   CL 98 97 99   CO2 23 24 25   BUN 30* 25* 38*   CREATININE 8.3* 7.1* 9.4*   GLUCOSE 111* 117* 113*   CALCIUM 9.4 8.9 9.4       CBC:   Recent Labs   Lab 04/25/25  0546 04/26/25  0611 04/27/25  0505   WBC 7.76 8.19 8.01   HGB 8.6* 8.6* 8.7*   HCT 28.1* 28.4*  29.2*    078 233

## 2025-04-27 NOTE — CONSULTS
Thank you for your consult to [facility]. We have reviewed the patient chart. This patient does not meet criteria for virtual hospital medicine service at this time due to Patient is unlikely to participate well with the telemedicine platform due to documentation that the patient is awake, alert, and orientated x1.  Patient's we will need to be fully orientated to be considered a candidate for virtual hospital medicine.  Will not assume care of the patient at this time.    Raul Lomeli NP

## 2025-04-27 NOTE — ASSESSMENT & PLAN NOTE
"- ESRD on HD via LUE AVF  - LUE AVF was actively bleeding on arrival on 4/3/25. Vascular surgery was consulted. He went emergently to the OR on 4/3/25: "Severely calcified mid RCA stenosis unable to cross with PTCA balloons, treated initially with 0.9 laser atherectomy, followed by CSI atherectomy system with total of 5 runs (3 at low speed, 2 at high speed), pre-dilated using 2.0 compliant and 3.5 noncompliant balloon followed by 3.5 X 16 mm megatron stent.  Focal plaque rupture/erosion noted in the proximal and ostial RCA that were treated with  3.5 X 28 in the proximal RCA, 4.0 X 16 mm in the ostial RCA.  No residual stenosis post intervention.  Patient had ALAN 3 flow at the end of the procedure"  - cultures from AVF= Staph   - wound care orders in place for surgical site  - Nephrology is consulted  - Rt IJ trialysis placed on 4/4/25 for CRRT;  - THDC placed on 04/14  -currently anticipating DC to [facility] care with the hospice  "

## 2025-04-27 NOTE — TELEMEDICINE CONSULT
Thank you for your consult to [facility]. We have reviewed the patient chart. This patient does not meet criteria for [facility] service at this time due to Patient is unlikely to participate well with the telemedicine platform due to documentation of patient being awake, alert, and oriented x1. Patients will need to be fully orientated.  Will not assume care of the patient at this time.      Raul Lomeli, CRIS

## 2025-04-27 NOTE — SUBJECTIVE & OBJECTIVE
Interval History:  No acute event overnight.  Patient seen sitting in a chair this morning.  Anticipated DC to hospice on 04/28     Review of Systems  Objective:     Vital Signs (Most Recent):  Temp: 97.7 °F (36.5 °C) (04/27/25 1046)  Pulse: 91 (04/27/25 1046)  Resp: 18 (04/27/25 1046)  BP: 101/61 (04/27/25 1046)  SpO2: 96 % (04/27/25 1046) Vital Signs (24h Range):  Temp:  [97.5 °F (36.4 °C)-98.3 °F (36.8 °C)] 97.7 °F (36.5 °C)  Pulse:  [80-99] 91  Resp:  [18] 18  SpO2:  [96 %-100 %] 96 %  BP: ()/(44-64) 101/61     Weight: 61.2 kg (134 lb 14.7 oz)  Body mass index is 21.13 kg/m².    Intake/Output Summary (Last 24 hours) at 4/27/2025 1306  Last data filed at 4/27/2025 1247  Gross per 24 hour   Intake 480 ml   Output --   Net 480 ml         Physical Exam  Constitutional:       General: He is not in acute distress.     Appearance: He is not ill-appearing.   Cardiovascular:      Rate and Rhythm: Normal rate and regular rhythm.   Pulmonary:      Effort: Pulmonary effort is normal. No respiratory distress.      Breath sounds: Normal breath sounds. No stridor.   Abdominal:      General: Bowel sounds are normal. There is no distension.      Palpations: Abdomen is soft.   Neurological:      Mental Status: Mental status is at baseline.               Significant Labs: All pertinent labs within the past 24 hours have been reviewed.    Significant Imaging: I have reviewed all pertinent imaging results/findings within the past 24 hours.

## 2025-04-27 NOTE — ASSESSMENT & PLAN NOTE
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 Most recent hemoglobin and hematocrit are listed below.  Recent Labs     04/25/25  0546 04/26/25  0611 04/27/25  0505   HGB 8.6* 8.6* 8.7*   HCT 28.1* 28.4* 29.2*     Plan  - Monitor serial CBC: Daily and recheck now  - Transfuse PRBC if patient becomes hemodynamically unstable, symptomatic or H/H drops below 7/21.  - Patient has not received any PRBC transfusions to date  - Patient's anemia is currently stable

## 2025-04-27 NOTE — ASSESSMENT & PLAN NOTE
Patient's blood pressure range in the last 24 hours was: BP  Min: 80/44  Max: 101/61.The patient's inpatient anti-hypertensive regimen is listed below:  Current Antihypertensives       Plan  - admitted with shock  - now off vasopressors. Continue to hold BP Rx as BP is well controlled without it

## 2025-04-27 NOTE — PROGRESS NOTES
"[facility] Medicine  Progress Note    Patient Name: Jevon Rajan  MRN: [id number]  Patient Class: IP- Inpatient   Admission Date: 4/3/2025  Length of Stay: 24 days  Attending Physician: Tena Palma MD  Primary Care Provider: Melia, Primary Doctor        Subjective     Principal Problem:Septic shock        HPI:  Mr Jevon Rajan is a 85 y.o. man with ESRD with LUE AVF, HFpEF last EF 50-55%, CAD, DM who was transferred to [facility] ICU for septic shock due to MRSA bacteremia.     He was admitted at [facility] 4/1-3/2025 with sepsis, worsening to septic shock, then identified source as MRSA. 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     Upon arrival to [facility], he is moaning in pain and his LUE AVF has active pulsatile bright red blood. He does respond to his name. He denies pain anywhere other than his LUE. He is on levophed at 0.05.     Overview/Hospital Course:  Mr Jevon Rajan is a 85 y.o. man with ESRD on HD with LUE AVF that has been bleeding, CHF, CAD s/p recent stenting 1/2025 who was admitted with MRSA bacteremia and bleeding from his LUE AVF. Continued vancomycin; weaned off levophed. Vascular surgery emergently consulted; on 4/3/25 they took him to OR and noted: "well incorporated proximal and central graft without evidence of infection, resected graft and covered proximal and central remnants with multiple layers. Mid graft removed in entirety and sent for culture. Ruptured pseudoaneurysm with infected hematoma, graft completely obliterated at mid segment." Surgical cultures with Staph. Suspect AVF or chronic scratching of pruritic skin is the source of his infection. TTE showed endocarditis: EF 40-45%, G1DD, RV systolic dysfunction, large 1.9x1.2cm fixed mass on the posterior mitral valve leaflet with moder to severe regurgitation, cannot rule out posterior mitral " valve leaflet perforation. Discussed with cardiac surgery; he is high mortality risk, and surgery is not advised. ID following. Nephrology consulted; trialysis was placed for HD. Persistently positive for MRSA in blood cultures. He has had urinary retention; Urology consulted, performed bedside cystoscopy on 4/6/25 with large prostate noted. Noted to have clot retention and went urgently to OR with Urology on 4/7/25; asa and DVT ppx held.     WBC normalized. S/p clot removal with Urology, 1U PRBC ordered this morning with Hb 6.1. Soft BPs, will add midodrine for HD to step down to floor. Glucoses high, will increase insulin. Attempted to s/d but BP too low, may still need CRRT rather. 4/8 cx clear so far. Increasing insulin again. Increasing midodrine to 15 mg TID. Hb 6.8, 1U PRBC ordered.     BP appears to be recovering a bit, perhaps will tolerate reg HD, will discuss w Neph- will run him on HD Saturday, if tolerates normal HD will remove central line and give 2 day holiday and place tunnel on Monday. We will give him HD before dc to facility on Tuesday if accepted by then.     WBC has normalized for the first time, now tolerating reg HD, and Blcx remain clear. Central line removed. NGT removed. Will step down.     Interval History:  No acute event overnight.  Patient seen sitting in a chair this morning.  Anticipated DC to hospice on 04/28     Review of Systems  Objective:     Vital Signs (Most Recent):  Temp: 97.7 °F (36.5 °C) (04/27/25 1046)  Pulse: 91 (04/27/25 1046)  Resp: 18 (04/27/25 1046)  BP: 101/61 (04/27/25 1046)  SpO2: 96 % (04/27/25 1046) Vital Signs (24h Range):  Temp:  [97.5 °F (36.4 °C)-98.3 °F (36.8 °C)] 97.7 °F (36.5 °C)  Pulse:  [80-99] 91  Resp:  [18] 18  SpO2:  [96 %-100 %] 96 %  BP: ()/(44-64) 101/61     Weight: 61.2 kg (134 lb 14.7 oz)  Body mass index is 21.13 kg/m².    Intake/Output Summary (Last 24 hours) at 4/27/2025 1306  Last data filed at 4/27/2025 1247  Gross per 24 hour    Intake 480 ml   Output --   Net 480 ml         Physical Exam  Constitutional:       General: He is not in acute distress.     Appearance: He is not ill-appearing.   Cardiovascular:      Rate and Rhythm: Normal rate and regular rhythm.   Pulmonary:      Effort: Pulmonary effort is normal. No respiratory distress.      Breath sounds: Normal breath sounds. No stridor.   Abdominal:      General: Bowel sounds are normal. There is no distension.      Palpations: Abdomen is soft.   Neurological:      Mental Status: Mental status is at baseline.               Significant Labs: All pertinent labs within the past 24 hours have been reviewed.    Significant Imaging: I have reviewed all pertinent imaging results/findings within the past 24 hours.      Assessment & Plan  Septic shock  This patient has shock. The type of shock is distributive due to sepsis. The patient had the following evidence of shock: persistent hypotension and altered mental status. 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 Shock is now resolved and vasopressors are off. WBC back to normal  HTN (hypertension)  Patient's blood pressure range in the last 24 hours was: BP  Min: 80/44  Max: 101/61.The patient's inpatient anti-hypertensive regimen is listed below:  Current Antihypertensives       Plan  - admitted with shock  - now off vasopressors. Continue to hold BP Rx as BP is well controlled without it   HLD (hyperlipidemia)  - continue statin  Type 2 diabetes mellitus with hyperglycemia, without long-term current use of insulin  A1c:   Lab Results   Component Value Date    HGBA1C 5.7 (H) 04/02/2025     Meds: lantus + SSI PRN to  "maintain goal 140-180  Tube feeds  accuchecks, hypoglycemic protocol      Coronary artery disease involving native coronary artery of native heart without angina pectoris  Patient with known CAD s/p stent placement, which is controlled Will continue ASA, Plavix, and Statin and monitor for S/Sx of angina/ACS. Continue to monitor on telemetry.   - last [facility] was 1/2025: Severely calcified mid RCA stenosis unable to cross with PTCA balloons, treated initially with 0.9 laser atherectomy, followed by CSI atherectomy system with total of 5 runs (3 at low speed, 2 at high speed), pre-dilated using 2.0 compliant and 3.5 noncompliant balloon followed by 3.5 X 16 mm megatron stent.  Focal plaque rupture/erosion noted in the proximal and ostial RCA that were treated with  3.5 X 28 in the proximal RCA, 4.0 X 16 mm in the ostial RCA.  No residual stenosis post intervention.  Patient had ALAN 3 flow at the end of the procedure  - with elevated troponin likely due to septic shock  No results for input(s): "TROPONINI", "TROPONINIHS" in the last 168 hours.    - continue asa, plavix, statin  - Cardiology consulted  - no plans for ischemic evaluation at this time   ESRD on hemodialysis  - ESRD on HD via LUE AVF  - LUE AVF was actively bleeding on arrival on 4/3/25. Vascular surgery was consulted. He went emergently to the OR on 4/3/25: "Severely calcified mid RCA stenosis unable to cross with PTCA balloons, treated initially with 0.9 laser atherectomy, followed by CSI atherectomy system with total of 5 runs (3 at low speed, 2 at high speed), pre-dilated using 2.0 compliant and 3.5 noncompliant balloon followed by 3.5 X 16 mm megatron stent.  Focal plaque rupture/erosion noted in the proximal and ostial RCA that were treated with  3.5 X 28 in the proximal RCA, 4.0 X 16 mm in the ostial RCA.  No residual stenosis post intervention.  Patient had ALAN 3 flow at the end of the procedure"  - cultures from AVF= Staph   - wound care orders in " "place for surgical site  - Nephrology is consulted  - Rt IJ trialysis placed on 4/4/25 for CRRT;  - THDC placed on 04/14  -currently anticipating DC to nursing home care with the hospice  Anemia in ESRD (end-stage renal disease)  Anemia is likely due to  ESRD, blood loss . Most recent hemoglobin and hematocrit are listed below.  Recent Labs     04/25/25  0546 04/26/25  0611 04/27/25  0505   HGB 8.6* 8.6* 8.7*   HCT 28.1* 28.4* 29.2*     Plan  - Monitor serial CBC: Daily and recheck now  - Transfuse PRBC if patient becomes hemodynamically unstable, symptomatic or H/H drops below 7/21.  - Patient has not received any PRBC transfusions to date  - Patient's anemia is currently stable  Acute metabolic encephalopathy with Hospital Delirum  -Noted episodes of intermittent agitation with tactile and visual hallucinations  -Continue correction of metabolic derangements  -Maintain Delirium precautions: Maintain regular sleep cycle. Early ambulation. Minimal interruptions overnight. Re-orient patient frequently. Maintain adequate bowel regimen, hydration and electrolyte replenishments  -aspiration precautions  -continue Seroquel 25 mg HS      ACP (advance care planning)  Advance Care Planning     Date: 04/04/2025  - palliative consulted   - Mr Escoto specifically told Dr Jordan to contact Kenia Smyth for all updates  - discussed code status with Kenia. She states she has spoken with Mr Escoto's sisters and they all want full code status.   -anticipated DC to SNF cancelled; patient to be DC to nursing home care with hospice         Acute bacterial endocarditis  - TTE 4/4/25: "1.9x1.2cm large fixed heterogeneous mass present on the posterior leaflet (new finding vs 9/2023 echo). There is moderate to severe regurgitation with an eccentric jet. Cannot exclude severe MR with possible PMVL perforation in association with large vegetation."  - this is in the setting of MRSA bacteremia  - ID is consulted  - repeat blood " "cultures until clear   - suspect source is skin/chronic scratching vs AVF infection- cultures from resected AVF with Staph   - discussed with Cardiac surgery Dr Castro 4/4/25- given age and comorbidities, he is very high risk of mortality with surgery. He recommends medical management at this point.  - on vancomycin      MRSA bacteremia  - suspect source:  Graft infection s/p exploratory surgery of LUE with graft resection 4/3  -graft holiday with TDC placed on 04/14  - cultures of AVF with Staph   - he has endocarditis  - ID consulted  - on vanc ;anticipating completing 6 weeks of IV vancomycin from clearance (EOC: 5/19/25)     NSTEMI (non-ST elevated myocardial infarction)  - see CAD    BPH with urinary obstruction  - noted 4/6/25 when patient became very agitated and screaming he couldn't urinate  - nursing unable to place dunaway/coude  - Urology consulted. Bedside cystoscopy on 4/6/25 noted clots, large prostate. Catheter was placed but was then removed due to pain  - 4/7 he is again agitated that he cannot urinate  - follow up with Urology   - tamsulosin ordered if he is awake enough to swallow     Thrombocytopenia  The likely etiology of thrombocytopenia is infection and sepsis. The patients 3 most recent labs are listed below.  Recent Labs     04/25/25  0546 04/26/25  0611 04/27/25  0505    180 188     Plan  - Will transfuse if platelet count is <10k.    AV fistula infection  - LUE AVF was actively bleeding on arrival on 4/3/25. Vascular surgery was consulted. He went emergently to the OR on 4/3/25: "Severely calcified mid RCA stenosis unable to cross with PTCA balloons, treated initially with 0.9 laser atherectomy, followed by CSI atherectomy system with total of 5 runs (3 at low speed, 2 at high speed), pre-dilated using 2.0 compliant and 3.5 noncompliant balloon followed by 3.5 X 16 mm megatron stent.  Focal plaque rupture/erosion noted in the proximal and ostial RCA that were treated with  3.5 X 28 " "in the proximal RCA, 4.0 X 16 mm in the ostial RCA.  No residual stenosis post intervention.  Patient had ALAN 3 flow at the end of the procedure"  - cultures from AVF= Staph   - wound care orders in place for surgical site  - trialysis currently in place for HD    Emphysema lung  - emphysema noted on CT  - no signs of COPD exacerbation  Pulmonary nodules  - CT 4/3/25 with few small pulmonary nodules  - will need outpatient follow up     Gross hematuria  S/p clot evacuation with a fulguration by Urology  Currently resolved  Continue cap irrigation of 3 way catheter  Continue Levsin for bladder discomfort  -Sharma to be removed when patient is transferred to the floor; we will defer removal to urology    NSVT (nonsustained ventricular tachycardia)      Uremic pruritus  Patient complaining of severe intractable itching;[facility]  Start hydroxyzine  Ordered capsaicin cream   Korsuva  not available; [facility]; can consider gabapentin  hemodynamic  and delirium allows  Will defer increased dialysis frequency; changing Kp/f ratios and other adjustments to nephrology      Moderate malnutrition  Nutrition consulted. Most recent weight and BMI monitored-     Measurements:  Wt Readings from Last 1 Encounters:   04/18/25 61.2 kg (134 lb 14.7 oz)   Body mass index is 21.13 kg/m².    Patient has been screened and assessed by RD.    Malnutrition Type:  Context: chronic illness  Level: moderate    Malnutrition Characteristic Summary:  Weight Loss (Malnutrition): greater than 7.5% in 3 months  Energy Intake (Malnutrition): less than or equal to 75% for greater than or equal to 1 month    Interventions/Recommendations (treatment strategy):  1. Continue on texture modified diet per SLP recommendations 2. Continue with Commercial beverage medical food supplement therapy: Novasource renal  3. Monitor labs and weights    VTE Risk Mitigation (From admission, onward)           Ordered     heparin " (porcine) injection 1,000 Units  As needed (PRN)         04/05/25 2100     IP VTE HIGH RISK PATIENT  Once         04/03/25 1516     Place TEMO hose  Until discontinued         04/03/25 1516     Place sequential compression device  Until discontinued         04/03/25 1516     Reason for No Pharmacological VTE Prophylaxis  Once        Question:  Reasons:  Answer:  Physician Provided (leave comment)    04/03/25 1516                    Discharge Planning   BAYRON: 4/29/2025     Code Status: DNR   Medical Readiness for Discharge Date: 4/25/2025  Discharge Plan A: Skilled Nursing Facility   Discharge Delays: (!) Post-Acute Set-up            Please place Justification for DME        Tena Palma MD  Department of Hospital Medicine   [facility]

## 2025-04-27 NOTE — PROGRESS NOTES
[facility]  Urology  Progress Note    Patient Name: Jevon Rajan  MRN: [id number]  Admission Date: 4/3/2025  Hospital Length of Stay: 24 days  Code Status: DNR   Attending Provider: Tena Palma MD   Primary Care Physician: Melia, Primary Doctor    Subjective:     HPI:  Urinary Retention  Patient complains of urinary retention. Onset of retention was 1 day ago and was gradual in onset. Patient currently does not have a urinary catheter in place.  300 ml of urine were scanned on bladder scanner Prior to this event voiding symptoms consisted of slow stream. Prior treatments include none. Recent medications that may have affected his voiding include none.   He still makes urine, he tells me an almost normal amount.  He is very uncomfortable at the level of the bladder.  Nursing staff attempted coude catheter was then successfully.  He agrees to allow me to place a catheter.    Interval History: no urinary complaints, resting comfortably    Review of Systems   Unable to perform ROS: Other     Objective:     Temp:  [97.5 °F (36.4 °C)-98.3 °F (36.8 °C)] 97.7 °F (36.5 °C)  Pulse:  [80-99] 91  Resp:  [18] 18  SpO2:  [96 %-100 %] 96 %  BP: ()/(44-64) 101/61     Body mass index is 21.13 kg/m².    Date 04/27/25 0700 - 04/28/25 0659   Shift 8616-6318 2532-0662 4269-7116 24 Hour Total   INTAKE   P.O. 360   360   Shift Total(mL/kg) 360(5.9)   360(5.9)   OUTPUT   Shift Total(mL/kg)       Weight (kg) 61.2 61.2 61.2 61.2     Bladder Scan Volume (mL): 331 mL (04/06/25 1520)    Drains       Drain  Duration                  Hemodialysis Catheter 04/14/25 1011 right internal jugular 13 days                     Physical Exam  Constitutional:       General: He is not in acute distress.     Appearance: He is well-developed. He is not ill-appearing, toxic-appearing or diaphoretic.   HENT:      Head: Normocephalic and atraumatic.      Mouth/Throat:      Mouth: Mucous membranes are moist.   Eyes:      Conjunctiva/sclera:  Conjunctivae normal.   Pulmonary:      Effort: Pulmonary effort is normal. No respiratory distress.   Abdominal:      General: Abdomen is flat. There is no distension.      Palpations: Abdomen is soft.      Comments: Suprapubic area soft and non distended     Musculoskeletal:         General: No swelling or deformity.      Cervical back: Neck supple.   Skin:     General: Skin is dry.      Findings: No rash.   Neurological:      Mental Status: He is alert.   Psychiatric:         Behavior: Behavior normal.           Significant Labs:    BMP:  Recent Labs   Lab 04/25/25  0546 04/26/25  0611 04/27/25  0505   * 136 139   K 4.1 4.6 4.4   CL 98 97 99   CO2 23 24 25   BUN 30* 25* 38*   CREATININE 8.3* 7.1* 9.4*   GLUCOSE 111* 117* 113*   CALCIUM 9.4 8.9 9.4       CBC:   Recent Labs   Lab 04/25/25  0546 04/26/25  0611 04/27/25  0505   WBC 7.76 8.19 8.01   HGB 8.6* 8.6* 8.7*   HCT 28.1* 28.4* 29.2*    180 188                   Assessment/Plan:     Gross hematuria  S/p Clot evacuation with fulguration  Resolved            BPH with urinary obstruction       Dunaway removed,on dialysis, does not make much urine, abdomen soft   avoid dunaway due to prior trauma          VTE Risk Mitigation (From admission, onward)           Ordered     heparin (porcine) injection 1,000 Units  As needed (PRN)         04/05/25 2100     IP VTE HIGH RISK PATIENT  Once         04/03/25 1516     Place TEMO hose  Until discontinued         04/03/25 1516     Place sequential compression device  Until discontinued         04/03/25 1516     Reason for No Pharmacological VTE Prophylaxis  Once        Question:  Reasons:  Answer:  Physician Provided (leave comment)    04/03/25 1516                    Elsie Arnold MD  Urology  [facility]

## 2025-04-27 NOTE — ASSESSMENT & PLAN NOTE
"Patient with known CAD s/p stent placement, which is controlled Will continue ASA, Plavix, and Statin and monitor for S/Sx of angina/ACS. Continue to monitor on telemetry.   - last [facility] was 1/2025: Severely calcified mid RCA stenosis unable to cross with PTCA balloons, treated initially with 0.9 laser atherectomy, followed by CSI atherectomy system with total of 5 runs (3 at low speed, 2 at high speed), pre-dilated using 2.0 compliant and 3.5 noncompliant balloon followed by 3.5 X 16 mm megatron stent.  Focal plaque rupture/erosion noted in the proximal and ostial RCA that were treated with  3.5 X 28 in the proximal RCA, 4.0 X 16 mm in the ostial RCA.  No residual stenosis post intervention.  Patient had ALAN 3 flow at the end of the procedure  - with elevated troponin likely due to septic shock  No results for input(s): "TROPONINI", "TROPONINIHS" in the last 168 hours.    - continue asa, plavix, statin  - Cardiology consulted  - no plans for ischemic evaluation at this time   "

## 2025-04-28 PROBLEM — Z51.5 END OF LIFE CARE: Status: RESOLVED | Noted: 2025-04-28 | Resolved: 2025-04-28

## 2025-04-28 PROBLEM — Z51.5 COMFORT MEASURES ONLY STATUS: Status: ACTIVE | Noted: 2025-04-28

## 2025-04-28 PROBLEM — Z51.5 END OF LIFE CARE: Status: ACTIVE | Noted: 2025-04-28

## 2025-04-28 LAB
ABSOLUTE EOSINOPHIL (OHS): 0.66 K/UL
ABSOLUTE MONOCYTE (OHS): 0.92 K/UL (ref 0.3–1)
ABSOLUTE NEUTROPHIL COUNT (OHS): 6.01 K/UL (ref 1.8–7.7)
ALBUMIN SERPL BCP-MCNC: 3.1 G/DL (ref 3.5–5.2)
ALP SERPL-CCNC: 77 UNIT/L (ref 40–150)
ALT SERPL W/O P-5'-P-CCNC: 6 UNIT/L (ref 10–44)
ANION GAP (OHS): 16 MMOL/L (ref 8–16)
AST SERPL-CCNC: 21 UNIT/L (ref 11–45)
BASOPHILS # BLD AUTO: 0.07 K/UL
BASOPHILS NFR BLD AUTO: 0.8 %
BILIRUB SERPL-MCNC: 0.7 MG/DL (ref 0.1–1)
BUN SERPL-MCNC: 43 MG/DL (ref 8–23)
CALCIUM SERPL-MCNC: 9.2 MG/DL (ref 8.7–10.5)
CHLORIDE SERPL-SCNC: 99 MMOL/L (ref 95–110)
CO2 SERPL-SCNC: 24 MMOL/L (ref 23–29)
CREAT SERPL-MCNC: 11.3 MG/DL (ref 0.5–1.4)
ERYTHROCYTE [DISTWIDTH] IN BLOOD BY AUTOMATED COUNT: 16.4 % (ref 11.5–14.5)
GFR SERPLBLD CREATININE-BSD FMLA CKD-EPI: 4 ML/MIN/1.73/M2
GLUCOSE SERPL-MCNC: 153 MG/DL (ref 70–110)
HCT VFR BLD AUTO: 27.4 % (ref 40–54)
HGB BLD-MCNC: 8.1 GM/DL (ref 14–18)
HOLD SPECIMEN: NORMAL
IMM GRANULOCYTES # BLD AUTO: 0.04 K/UL (ref 0–0.04)
IMM GRANULOCYTES NFR BLD AUTO: 0.5 % (ref 0–0.5)
LYMPHOCYTES # BLD AUTO: 1.13 K/UL (ref 1–4.8)
MCH RBC QN AUTO: 27.8 PG (ref 27–31)
MCHC RBC AUTO-ENTMCNC: 29.6 G/DL (ref 32–36)
MCV RBC AUTO: 94 FL (ref 82–98)
NUCLEATED RBC (/100WBC) (OHS): 0 /100 WBC
PLATELET # BLD AUTO: 195 K/UL (ref 150–450)
PMV BLD AUTO: 12 FL (ref 9.2–12.9)
POCT GLUCOSE: 121 MG/DL (ref 70–110)
POCT GLUCOSE: 170 MG/DL (ref 70–110)
POCT GLUCOSE: 197 MG/DL (ref 70–110)
POTASSIUM SERPL-SCNC: 4.4 MMOL/L (ref 3.5–5.1)
PROT SERPL-MCNC: 7.7 GM/DL (ref 6–8.4)
RBC # BLD AUTO: 2.91 M/UL (ref 4.6–6.2)
RELATIVE EOSINOPHIL (OHS): 7.5 %
RELATIVE LYMPHOCYTE (OHS): 12.8 % (ref 18–48)
RELATIVE MONOCYTE (OHS): 10.4 % (ref 4–15)
RELATIVE NEUTROPHIL (OHS): 68 % (ref 38–73)
SODIUM SERPL-SCNC: 139 MMOL/L (ref 136–145)
VANCOMYCIN SERPL-MCNC: 15.5 UG/ML (ref ?–80)
WBC # BLD AUTO: 8.83 K/UL (ref 3.9–12.7)

## 2025-04-28 PROCEDURE — 11000001 HC ACUTE MED/SURG PRIVATE ROOM

## 2025-04-28 PROCEDURE — 36415 COLL VENOUS BLD VENIPUNCTURE: CPT | Performed by: HOSPITALIST

## 2025-04-28 PROCEDURE — 25000003 PHARM REV CODE 250: Performed by: INTERNAL MEDICINE

## 2025-04-28 PROCEDURE — 94761 N-INVAS EAR/PLS OXIMETRY MLT: CPT

## 2025-04-28 PROCEDURE — 99498 ADVNCD CARE PLAN ADDL 30 MIN: CPT | Mod: ,,, | Performed by: REGISTERED NURSE

## 2025-04-28 PROCEDURE — 36415 COLL VENOUS BLD VENIPUNCTURE: CPT

## 2025-04-28 PROCEDURE — 80053 COMPREHEN METABOLIC PANEL: CPT | Performed by: HOSPITALIST

## 2025-04-28 PROCEDURE — 99233 SBSQ HOSP IP/OBS HIGH 50: CPT | Mod: 25,,, | Performed by: REGISTERED NURSE

## 2025-04-28 PROCEDURE — 25000003 PHARM REV CODE 250: Performed by: HOSPITALIST

## 2025-04-28 PROCEDURE — 63600175 PHARM REV CODE 636 W HCPCS: Performed by: STUDENT IN AN ORGANIZED HEALTH CARE EDUCATION/TRAINING PROGRAM

## 2025-04-28 PROCEDURE — 85025 COMPLETE CBC W/AUTO DIFF WBC: CPT | Performed by: HOSPITALIST

## 2025-04-28 PROCEDURE — 25000003 PHARM REV CODE 250: Performed by: STUDENT IN AN ORGANIZED HEALTH CARE EDUCATION/TRAINING PROGRAM

## 2025-04-28 PROCEDURE — 25000003 PHARM REV CODE 250

## 2025-04-28 PROCEDURE — 80202 ASSAY OF VANCOMYCIN: CPT

## 2025-04-28 PROCEDURE — 99232 SBSQ HOSP IP/OBS MODERATE 35: CPT | Mod: ,,, | Performed by: STUDENT IN AN ORGANIZED HEALTH CARE EDUCATION/TRAINING PROGRAM

## 2025-04-28 PROCEDURE — 63600175 PHARM REV CODE 636 W HCPCS: Performed by: REGISTERED NURSE

## 2025-04-28 PROCEDURE — 99497 ADVNCD CARE PLAN 30 MIN: CPT | Mod: 25,,, | Performed by: REGISTERED NURSE

## 2025-04-28 RX ORDER — LORAZEPAM 2 MG/ML
1 INJECTION INTRAMUSCULAR EVERY 4 HOURS PRN
Status: DISCONTINUED | OUTPATIENT
Start: 2025-04-28 | End: 2025-04-29 | Stop reason: HOSPADM

## 2025-04-28 RX ORDER — MORPHINE SULFATE 4 MG/ML
2 INJECTION, SOLUTION INTRAMUSCULAR; INTRAVENOUS EVERY 4 HOURS PRN
Refills: 0 | Status: DISCONTINUED | OUTPATIENT
Start: 2025-04-28 | End: 2025-04-29 | Stop reason: HOSPADM

## 2025-04-28 RX ORDER — GLYCOPYRROLATE 0.2 MG/ML
0.1 INJECTION INTRAMUSCULAR; INTRAVENOUS 3 TIMES DAILY PRN
Status: DISCONTINUED | OUTPATIENT
Start: 2025-04-28 | End: 2025-04-29 | Stop reason: HOSPADM

## 2025-04-28 RX ADMIN — CLOPIDOGREL BISULFATE 75 MG: 75 TABLET, FILM COATED ORAL at 08:04

## 2025-04-28 RX ADMIN — QUETIAPINE FUMARATE 25 MG: 25 TABLET ORAL at 08:04

## 2025-04-28 RX ADMIN — CAPSAICIN: 0.25 CREAM TOPICAL at 08:04

## 2025-04-28 RX ADMIN — MIDODRINE HYDROCHLORIDE 15 MG: 5 TABLET ORAL at 08:04

## 2025-04-28 RX ADMIN — MIDODRINE HYDROCHLORIDE 15 MG: 5 TABLET ORAL at 02:04

## 2025-04-28 RX ADMIN — Medication 6 MG: at 08:04

## 2025-04-28 RX ADMIN — MIDODRINE HYDROCHLORIDE 15 MG: 5 TABLET ORAL at 05:04

## 2025-04-28 RX ADMIN — PANTOPRAZOLE SODIUM 40 MG: 40 TABLET, DELAYED RELEASE ORAL at 08:04

## 2025-04-28 RX ADMIN — HALOPERIDOL LACTATE 2 MG: 5 INJECTION, SOLUTION INTRAMUSCULAR at 09:04

## 2025-04-28 RX ADMIN — HYDROXYZINE HYDROCHLORIDE 25 MG: 25 TABLET ORAL at 12:04

## 2025-04-28 RX ADMIN — SEVELAMER CARBONATE 1600 MG: 800 TABLET, FILM COATED ORAL at 08:04

## 2025-04-28 RX ADMIN — SEVELAMER CARBONATE 1600 MG: 800 TABLET, FILM COATED ORAL at 11:04

## 2025-04-28 RX ADMIN — CAPSAICIN: 0.25 CREAM TOPICAL at 09:04

## 2025-04-28 RX ADMIN — LORAZEPAM 1 MG: 2 INJECTION INTRAMUSCULAR; INTRAVENOUS at 10:04

## 2025-04-28 RX ADMIN — TAMSULOSIN HYDROCHLORIDE 0.4 MG: 0.4 CAPSULE ORAL at 08:04

## 2025-04-28 RX ADMIN — HALOPERIDOL LACTATE 2 MG: 5 INJECTION, SOLUTION INTRAMUSCULAR at 12:04

## 2025-04-28 RX ADMIN — SEVELAMER CARBONATE 1600 MG: 800 TABLET, FILM COATED ORAL at 04:04

## 2025-04-28 NOTE — ASSESSMENT & PLAN NOTE
Advance Care Planning     Date: 04/04/2025  - palliative consulted   - Mr Escoto specifically told Dr Jordan to contact Kenia Smyth for all updates  - discussed code status with Kenia. She states she has spoken with Mr Escoto's sisters and they all want full code status.   -anticipated DC to SNF cancelled; patient to be DC to intermediate care with hospice

## 2025-04-28 NOTE — ASSESSMENT & PLAN NOTE
"Patient with known CAD s/p stent placement, which is controlled Will continue ASA, Plavix, and Statin and monitor for S/Sx of angina/ACS. Continue to monitor on telemetry.   - last [facility] was 1/2025: Severely calcified mid RCA stenosis unable to cross with PTCA balloons, treated initially with 0.9 laser atherectomy, followed by CSI atherectomy system with total of 5 runs (3 at low speed, 2 at high speed), pre-dilated using 2.0 compliant and 3.5 noncompliant balloon followed by 3.5 X 16 mm megatron stent.  Focal plaque rupture/erosion noted in the proximal and ostial RCA that were treated with  3.5 X 28 in the proximal RCA, 4.0 X 16 mm in the ostial RCA.  No residual stenosis post intervention.  Patient had ALAN 3 flow at the end of the procedure  - with elevated troponin likely due to septic shock  No results for input(s): "TROPONINI", "TROPONINIHS" in the last 168 hours.    - continue asa, plavix, statin  - Cardiology consulted  - no plans for ischemic evaluation at this time   "

## 2025-04-28 NOTE — PLAN OF CARE
CM sent hospice referrals via [employer].    Patient has declined to select a preferred provider and elects placement with the first accepting in network provider that is available to provide services as ordered by the physician.     Informed patient and family that placement is based on medical acceptance, insurance authorization and staff availability.       04/28/25 1424   Post-Acute Status   Post-Acute Authorization Hospice   Hospice Status Pending post acute provider review/more information requested   Coverage [employer] MANAGED MEDICARE - [employer] [facility] HMO PPO SPECIAL NEEDS   Patient choice form signed by patient/caregiver List from System Post-Acute Care   Discharge Delays (!) Post-Acute Set-up   Discharge Plan   Discharge Plan A Inpatient Hospice   Discharge Plan B Hospice/home

## 2025-04-28 NOTE — PROGRESS NOTES
"[facility] Medicine  Progress Note    Patient Name: Jevon Rajan  MRN: [id number]  Patient Class: IP- Inpatient   Admission Date: 4/3/2025  Length of Stay: 25 days  Attending Physician: Tena Palma MD  Primary Care Provider: Melia, Primary Doctor        Subjective     Principal Problem:Comfort measures only status        HPI:  Mr Jevon Rajan is a 85 y.o. man with ESRD with LUE AVF, HFpEF last EF 50-55%, CAD, DM who was transferred to [facility] ICU for septic shock due to MRSA bacteremia.     [place] was [place] 4/1-3/2025 with sepsis, worsening to septic shock, then identified source as MRSA. 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     Upon arrival to [facility], he is moaning in pain and his LUE AVF has active pulsatile bright red blood. He does respond to his name. He denies pain anywhere other than his LUE. He is on levophed at 0.05.     Overview/Hospital Course:  Mr Jevon Rajan is a 85 y.o. man with ESRD on HD with LUE AVF that has been bleeding, CHF, CAD s/p recent stenting 1/2025 who was admitted with MRSA bacteremia and bleeding from his LUE AVF. Continued vancomycin; weaned off levophed. Vascular surgery emergently consulted; on 4/3/25 they took him to OR and noted: "well incorporated proximal and central graft without evidence of infection, resected graft and covered proximal and central remnants with multiple layers. Mid graft removed in entirety and sent for culture. Ruptured pseudoaneurysm with infected hematoma, graft completely obliterated at mid segment." Surgical cultures with Staph. Suspect AVF or chronic scratching of pruritic skin is the source of his infection. TTE showed endocarditis: EF 40-45%, G1DD, RV systolic dysfunction, large 1.9x1.2cm fixed mass on the posterior mitral valve leaflet with moder to severe regurgitation, cannot rule out " posterior mitral valve leaflet perforation. Discussed with cardiac surgery; he is high mortality risk, and surgery is not advised. ID following. Nephrology consulted; trialysis was placed for HD. Persistently positive for MRSA in blood cultures. He has had urinary retention; Urology consulted, performed bedside cystoscopy on 4/6/25 with large prostate noted. Noted to have clot retention and went urgently to OR with Urology on 4/7/25; asa and DVT ppx held.     WBC normalized. S/p clot removal with Urology, 1U PRBC ordered this morning with Hb 6.1. Soft BPs, will add midodrine for HD to step down to floor. Glucoses high, will increase insulin. Attempted to s/d but BP too low, may still need CRRT rather. 4/8 cx clear so far. Increasing insulin again. Increasing midodrine to 15 mg TID. Hb 6.8, 1U PRBC ordered.     BP appears to be recovering a bit, perhaps will tolerate reg HD, will discuss w Neph- will run him on HD Saturday, if tolerates normal HD will remove central line and give 2 day holiday and place tunnel on Monday. We will give him HD before dc to facility on Tuesday if accepted by then.     WBC has normalized for the first time, now tolerating reg HD, and Blcx remain clear. Central line removed. NGT removed. Will step down.     Interval History:  No acute event overnight.  Patient seen in bed this morning.  Transitioned to comfort care      Review of Systems  Objective:     Vital Signs (Most Recent):  Temp: 98.1 °F (36.7 °C) (04/28/25 1602)  Pulse: 95 (04/28/25 1602)  Resp: 18 (04/28/25 1112)  BP: (!) 97/59 (04/28/25 1602)  SpO2: 97 % (04/28/25 1602) Vital Signs (24h Range):  Temp:  [97.5 °F (36.4 °C)-98.3 °F (36.8 °C)] 98.1 °F (36.7 °C)  Pulse:  [] 95  Resp:  [18] 18  SpO2:  [96 %-100 %] 97 %  BP: ()/(51-60) 97/59     Weight: 61.2 kg (134 lb 14.7 oz)  Body mass index is 21.13 kg/m².    Intake/Output Summary (Last 24 hours) at 4/28/2025 1618  Last data filed at 4/28/2025 1329  Gross per 24 hour    Intake 240 ml   Output --   Net 240 ml         Physical Exam  Constitutional:       General: He is not in acute distress.     Appearance: He is not ill-appearing, toxic-appearing or diaphoretic.   Cardiovascular:      Rate and Rhythm: Normal rate and regular rhythm.      Heart sounds: No murmur heard.  Pulmonary:      Effort: Pulmonary effort is normal. No respiratory distress.      Breath sounds: Normal breath sounds. No stridor.   Abdominal:      General: There is no distension.      Palpations: Abdomen is soft. There is no mass.   Skin:     Comments: Multiple excoriation marks   Neurological:      Mental Status: Mental status is at baseline.               Significant Labs: All pertinent labs within the past 24 hours have been reviewed.    Significant Imaging: I have reviewed all pertinent imaging results/findings within the past 24 hours.      Assessment & Plan  Septic shock  This patient has shock. The type of shock is distributive due to sepsis. The patient had the following evidence of shock: persistent hypotension and altered mental status. 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 Shock is now resolved and vasopressors are off. WBC back to normal  HTN (hypertension)  Patient's blood pressure range in the last 24 hours was: BP  Min: 92/51  Max: 102/60.The patient's inpatient anti-hypertensive regimen is listed below:  Current Antihypertensives       Plan  - admitted with shock  - now off vasopressors. Continue to hold BP Rx as BP is well controlled without it   HLD (hyperlipidemia)  - continue statin  Type 2 diabetes mellitus with hyperglycemia, without long-term current use of  "insulin  A1c:   Lab Results   Component Value Date    HGBA1C 5.7 (H) 04/02/2025     Meds: lantus + SSI PRN to maintain goal 140-180  Tube feeds  accuchecks, hypoglycemic protocol      Coronary artery disease involving native coronary artery of native heart without angina pectoris  Patient with known CAD s/p stent placement, which is controlled Will continue ASA, Plavix, and Statin and monitor for S/Sx of angina/ACS. Continue to monitor on telemetry.   - last [facility] was 1/2025: Severely calcified mid RCA stenosis unable to cross with PTCA balloons, treated initially with 0.9 laser atherectomy, followed by CSI atherectomy system with total of 5 runs (3 at low speed, 2 at high speed), pre-dilated using 2.0 compliant and 3.5 noncompliant balloon followed by 3.5 X 16 mm megatron stent.  Focal plaque rupture/erosion noted in the proximal and ostial RCA that were treated with  3.5 X 28 in the proximal RCA, 4.0 X 16 mm in the ostial RCA.  No residual stenosis post intervention.  Patient had ALAN 3 flow at the end of the procedure  - with elevated troponin likely due to septic shock  No results for input(s): "TROPONINI", "TROPONINIHS" in the last 168 hours.    - continue asa, plavix, statin  - Cardiology consulted  - no plans for ischemic evaluation at this time   ESRD on hemodialysis  - ESRD on HD via LUE AVF  - LUE AVF was actively bleeding on arrival on 4/3/25. Vascular surgery was consulted. He went emergently to the OR on 4/3/25: "Severely calcified mid RCA stenosis unable to cross with PTCA balloons, treated initially with 0.9 laser atherectomy, followed by CSI atherectomy system with total of 5 runs (3 at low speed, 2 at high speed), pre-dilated using 2.0 compliant and 3.5 noncompliant balloon followed by 3.5 X 16 mm megatron stent.  Focal plaque rupture/erosion noted in the proximal and ostial RCA that were treated with  3.5 X 28 in the proximal RCA, 4.0 X 16 mm in the ostial RCA.  No residual stenosis post " "intervention.  Patient had ALAN 3 flow at the end of the procedure"  - cultures from AVF= Staph   - wound care orders in place for surgical site  - Nephrology is consulted  - Rt IJ trialysis placed on 4/4/25 for CRRT;  - THDC placed on 04/14  -currently [facility]  Anemia in ESRD (end-stage renal disease)  Anemia is likely due to  ESRD, blood loss . Most recent hemoglobin and hematocrit are listed below.  Recent Labs     04/26/25  0611 04/27/25  0505 04/28/25  0314   HGB 8.6* 8.7* 8.1*   HCT 28.4* 29.2* 27.4*     Plan  - Monitor serial CBC: Daily and recheck now  - Transfuse PRBC if patient becomes hemodynamically unstable, symptomatic or H/H drops below 7/21.  - Patient has not received any PRBC transfusions to date  - Patient's anemia is currently stable  Acute metabolic encephalopathy with Hospital Delirum  -Noted episodes of intermittent agitation with tactile and visual hallucinations  -Continue correction of metabolic derangements  -Maintain Delirium precautions: Maintain regular sleep cycle. Early ambulation. Minimal interruptions overnight. Re-orient patient frequently. Maintain adequate bowel regimen, hydration and electrolyte replenishments  -aspiration precautions  -continue Seroquel 25 mg HS      ACP (advance care planning)  Advance Care Planning     Date: 04/04/2025  - palliative consulted   - Mr Escoto specifically told Dr Jordan to contact Kenia Smyth for all updates  - discussed code status with Kenia. She states she has spoken with Mr Escoto's sisters and they all want full code status.   -anticipated DC to SNF cancelled; [facility] with [facility]         Acute bacterial endocarditis  - TTE 4/4/25: "1.9x1.2cm large fixed heterogeneous mass present on the posterior leaflet (new finding vs 9/2023 echo). There is moderate to severe regurgitation with an eccentric jet. Cannot exclude severe MR with possible PMVL perforation in " "association with large vegetation."  - this is in the setting of MRSA bacteremia  - ID is consulted  - repeat blood cultures until clear   - suspect source is skin/chronic scratching vs AVF infection- cultures from resected AVF with Staph   - discussed with Cardiac surgery Dr Castro 4/4/25- given age and comorbidities, he is very high risk of mortality with surgery. He recommends medical management at this point.  - on vancomycin      MRSA bacteremia  - suspect source:  Graft infection s/p exploratory surgery of LUE with graft resection 4/3  -graft holiday with TDC placed on 04/14  - cultures of AVF with Staph   - he has endocarditis  - ID consulted  - on vanc ;anticipating completing 6 weeks of IV vancomycin from clearance (EOC: 5/19/25)     NSTEMI (non-ST elevated myocardial infarction)  - see CAD    BPH with urinary obstruction  - noted 4/6/25 when patient became very agitated and screaming he couldn't urinate  - nursing unable to place dunaway/coude  - Urology consulted. Bedside cystoscopy on 4/6/25 noted clots, large prostate. Catheter was placed but was then removed due to pain  - 4/7 he is again agitated that he cannot urinate  - follow up with Urology   - tamsulosin ordered if he is awake enough to swallow     Thrombocytopenia  The likely etiology of thrombocytopenia is infection and sepsis. The patients 3 most recent labs are listed below.  Recent Labs     04/26/25  0611 04/27/25  0505 04/28/25  0314    188 195     Plan  - Will transfuse if platelet count is <10k.    AV fistula infection  - LUE AVF was actively bleeding on arrival on 4/3/25. Vascular surgery was consulted. He went emergently to the OR on 4/3/25: "Severely calcified mid RCA stenosis unable to cross with PTCA balloons, treated initially with 0.9 laser atherectomy, followed by CSI atherectomy system with total of 5 runs (3 at low speed, 2 at high speed), pre-dilated using 2.0 compliant and 3.5 noncompliant balloon followed by 3.5 X 16 mm " "megatron stent.  Focal plaque rupture/erosion noted in the proximal and ostial RCA that were treated with  3.5 X 28 in the proximal RCA, 4.0 X 16 mm in the ostial RCA.  No residual stenosis post intervention.  Patient had ALAN 3 flow at the end of the procedure"  - cultures from AVF= Staph   - wound care orders in place for surgical site  -s/p trialysis currently in place for HD  -currently in full comfort care    Emphysema lung  - emphysema noted on CT  - no signs of COPD exacerbation  -currently full comfort care  Pulmonary nodules  - CT 4/3/25 with few small pulmonary nodules  - will need outpatient follow up     Gross hematuria  S/p clot evacuation with a fulguration by Urology  Currently resolved  Continue cap irrigation of 3 way catheter  Continue Levsin for bladder discomfort  -Sharma to be removed when patient is transferred to the floor; we will defer removal to urology  -currently full comfort care    NSVT (nonsustained ventricular tachycardia)      Uremic pruritus  Patient complaining of severe intractable itching;[facility]  Start hydroxyzine  Ordered capsaicin cream   Korsuva  not available; was never [facility]; can consider gabapentin  hemodynamic  and delirium allows  Will defer increased dialysis frequency; changing Kp/f ratios and other adjustments to nephrology  -currently full comfort care      Moderate malnutrition  Nutrition consulted. Most recent weight and BMI monitored-     Measurements:  Wt Readings from Last 1 Encounters:   04/18/25 61.2 kg (134 lb 14.7 oz)   Body mass index is 21.13 kg/m².    Patient has been screened and assessed by RD.    Malnutrition Type:  Context: chronic illness  Level: moderate    Malnutrition Characteristic Summary:  Weight Loss (Malnutrition): greater than 7.5% in 3 months  Energy Intake (Malnutrition): less than or equal to 75% for greater than or equal to 1 month    Interventions/Recommendations (treatment strategy):  1. Continue on " texture modified diet per SLP recommendations 2. Continue with Commercial beverage medical food supplement therapy: [employer] renal  3. Monitor labs and weights    Comfort measures only status      VTE Risk Mitigation (From admission, onward)           Ordered     heparin (porcine) injection 1,000 Units  As needed (PRN)         04/05/25 2100     IP VTE HIGH RISK PATIENT  Once         04/03/25 1516     Place TEMO hose  Until discontinued         04/03/25 1516     Place sequential compression device  Until discontinued         04/03/25 1516     Reason for No Pharmacological VTE Prophylaxis  Once        Question:  Reasons:  Answer:  Physician Provided (leave comment)    04/03/25 1516                    Discharge Planning   BAYRON: 4/28/2025     Code Status: DNR   Medical Readiness for Discharge Date: 4/25/2025  Discharge Plan A: Inpatient Hospice   Discharge Delays: (!) Post-Acute Set-up                    Tena Palma MD  Department of Hospital Medicine   [facility]

## 2025-04-28 NOTE — ASSESSMENT & PLAN NOTE
Discharging today with hospice    If there are any concerns or issues we can assist with please message me, will be happy to assist however I can.

## 2025-04-28 NOTE — PROGRESS NOTES
[facility]  Palliative Medicine  Progress Note    Patient Name: Jevon Rajan  MRN: [id number]  Admission Date: 4/3/2025  Hospital Length of Stay: 25 days  Code Status: DNR   Attending Provider: Tena Palma MD  Consulting Provider: Lisa Jacinto NP  Primary Care Physician: Melia, Primary Doctor  Principal Problem:Septic shock    Patient information was obtained from patient, relative(s), primary team, and outside palliative team .      Assessment/Plan:   Palliative Care  Advance Care Planning   4/28/2025  - discussed pt with multiple members of MDT via secure chat and phone; some confusion over MPOA/legal surrogate decision maker/makers  - clarified with team that pt's siblings are legal surrogate decision makers; have consulted with palliative attending, Dr. Antunez, to confirm this as accurate   - pt has had alternating levels of capacity throughout admission and has not expressed consistent wishes for his preferred MPOA therefore decision making is defaulted to legal surrogate decision makers which are his 4 living siblings Ronna, Bhavna, Nora, and Jason  - it has been clarified at multiple points in admission with multiple family members and pt that there are no known existing ACP/MPOA documents for pt; pt has consistently lacked capacity for completion of ACP documents previously in admission, currently lacks capacity, and is not expected to regain capacity as he is now ESRD and no longer qualifies for HD   - have continuously encouraged shared family decision making with all participating family members, as it is evident that Mr. Escoto is loved by many who have expressed interest in him and his care throughout admission   - per MDT request, followed up with Senthil garces, regarding concerns regarding code status and decision making for pt  - informed Senthil of default to legal surrogate decision making by pt's siblings; confirmed that her grandmother, Bhavna, was involved in  discussion/decisions for current DNR status during prior visit/discussions; reviewed again pt's lack of candidacy for HD, that this means pt is at the end of his life and that attempted to resuscitate him without an option to change this would be inhumane and not recommended by MDT; she expressed improved understanding and is supportive of siblings decision for DNR and comfort focused care   - she expressed difficulty with weight of pt's affairs being on her shoulders as siblings are elderly/have own care needs; she is struggling to gain access to pt's financial records to assist in penitentiary placement, but is continuing these efforts   - discussed inpatient hospice would likely also be an option in care soon and that pt would continue to get the care he needs despite location; confirmed pt's comfort and symptom management is of top priority; reassured that despite decision making hierarchy family has been consistent with wishes for pt's comfort and QOL   - emotional support provided; answered questions   - multiple discussions and continued coordination with MDT regarding care needs, dispo, and placement   - assisted bedside RN with redirection and acute symptom management   - discussed pt with Dr. Rodriguez, with [facility] (pts prior home palliative team in coordination with [facility] care); shared his insight into pt's VA benefits with case management team; he is also agrees with current plan of care and pt's appropriateness for inpatient hospice   - recommendation provided to CM and primary team for evaluation of inpatient hospice     4/25/2025  - interval chart reviewed; pt discussed with MDT  - Dr. Lauren, nephrology shared discussion with pt's sisters and niece regarding recommendation and plan to discontinue HD and transition to comfort focused care with hospice   - called and spoke with pt's sister, Ronna and niece Senthil about discussion with Dr. Lauren; attempted to call Jazmin (sisters) but  unable to reach   - they shared agreement with stopping HD, DNR, transition to hospice, and need for intermediate placement; preference for VA facility or facility near their home   - emotional support provided; allowed time for questions; Ms. Friend expressed thanks and appreciation of teams care for her brother and consistent communication with family   - visited pt at bedside; pt is cooperative but continues to lack capacity for medical decision making; pt denies pains or needs at that time, no non-verbal indications of pain or discomfort   - nephew and great nieces visiting at bedside today; pt requesting a hooded sweatshirt which nephew plans to go to the store and purchase pt some items that would aid in his comfort   - recommend transition to comfort focused care and discontinuing treatments and interventions that pt is not agreeable to   - pt does not currently have end of life symptom needs or discomfort, but will likely increase as time without HD goes on; if placement is not in place prior to worsening symptoms recommend consultation with inpatient hospice team; this was discussed with family and they were agreeable to this   - goal of getting pt closer to them if it can be done safely and without disrupting pt comfort, however him getting the care he needs to keep him comfortable takes precedent per family     Please see multiple prior palliative/ACP notes from myself and Dr. Joan Antunez for prior GOC/ACP discussions     Comfort measures only status  - pt now comfort care status; family in agreement with comfort care while awaiting hospice placement   - pt now qualifies for inpatient hospice due to acute symptom management needs for restlessness and agitation; intermediate with hospice would also be appropriate depending on level of care available and if symptoms continue to be well managed with current PRN regimen   - comfort orders placed including IV morphine, ativan, and robinul;  PRN IV haldol also  "continues to be available as it has been required during admission   - symptoms and symptom management needs will continue to increase as days from HD continue  - comfort feeds ordered and discussed with HM, bedside RN, and family (family understand risk and prioritize pt's comfort in multiple discussions); pt specifically requesting a burger, which RN is coordinating with dietary (pt has done well with a turkey sandwich so far); pt ok to have any preferred foods or beverages to provide comfort     Neuro  Acute metabolic encephalopathy with Hospital Delirum  - ongoing waxing and waning of mental status and delirium  - pt continues to not have capacity for medical decision making   - pt with increased agitation, restlessness, and AMS leading to pt scratching himself causing bleeding and overall concern for pt's comfort and safety due to pt being very anxious and fall risk; required PRN IV haldol 4/28 afternoon in addition to baseline regimen; IV ativan now also available PRN as part as comfort care orders   - mental status will likely continue to decline and need for acute symptom management will increase; pt is inpatient hospice appropriate     Renal/  ESRD on hemodialysis  - nephrology now recommending stopping HD due to pt's ongoing mental and physical status and safety concerns   - family expressed insight into this and agreeable to [facility] care with hospice   - further discussed discontinuation of HD as it relates to pt's end of life care and appropriateness for DNR with dez Ndiaye on 4/28, who expressed concerns to staff regarding DNR status ( See ACP)   - with pt no longer qualifying for HD and increased related symptom management needs, pt is inpatient hospice appropriate       I will follow-up with patient. Please contact us if you have any additional questions.    Subjective:     Chief Complaint:   Chief Complaint   Patient presents with    Bleeding/Bruising       HPI:   From H&P: " Mr Jevon Rajan " "is a 85 y.o. man with ESRD with LUE AVF, HFpEF last EF 50-55%, CAD, DM who was transferred to [facility] ICU for septic shock due to MRSA bacteremia.      He was admitted at [facility] 4/1-3/2025 with sepsis, worsening to septic shock, then identified source as MRSA. 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      Upon arrival to [facility], he is moaning in pain and his LUE AVF has active pulsatile bright red blood. He does respond to his name. He denies pain anywhere other than his LUE. He is on levophed at 0.05. "     Palliative medicine consulted for goals of care discussion and advance care planning; for details of visit, see advance care planning section of plan.       Hospital Course:  No notes on file    Medications:  Continuous Infusions:  Scheduled Meds:   capsaicin   Topical (Top) BID    clopidogreL  75 mg Oral Daily    midodrine  15 mg Oral Q8H    pantoprazole  40 mg Oral Daily    QUEtiapine  25 mg Oral QHS    sevelamer carbonate  1,600 mg Oral TID WM    tamsulosin  0.4 mg Oral Daily     PRN Meds:  Current Facility-Administered Medications:     0.9%  NaCl infusion (for blood administration), , Intravenous, Q24H PRN    0.9% NaCl, , Intravenous, PRN    acetaminophen, 650 mg, Oral, Q4H PRN    albumin human 25%, 25 g, Intravenous, Daily PRN    dextrose 50%, 12.5 g, Intravenous, PRN    dextrose 50%, 25 g, Intravenous, PRN    diphenhydrAMINE-zinc acetate 1-0.1%, , Topical (Top), TID PRN    glucagon (human recombinant), 1 mg, Intramuscular, PRN    glucose, 16 g, Oral, PRN    glucose, 24 g, Oral, PRN    glycopyrrolate, 0.1 mg, Intravenous, TID PRN    haloperidol lactate, 2 mg, Intravenous, Q4H PRN    heparin (porcine), 1,000 Units, Intravenous, PRN    hydrOXYzine HCL, 25 mg, Oral, TID PRN    insulin aspart U-100, 0-5 Units, Subcutaneous, QID (AC + HS) PRN    lorazepam, 1 mg, Intravenous, Q4H PRN   "  melatonin, 6 mg, Oral, Nightly PRN    morphine, 2 mg, Intravenous, Q4H PRN    naloxone, 0.02 mg, Intravenous, PRN    ondansetron, 4 mg, Intravenous, Q6H PRN    prochlorperazine, 5 mg, Intravenous, Q6H PRN    senna, 8.6 mg, Oral, Daily PRN    sodium chloride 0.9%, 250 mL, Intravenous, PRN    Objective:     Vital Signs (Most Recent):  Temp: 98.3 °F (36.8 °C) (04/28/25 0814)  Pulse: 93 (04/28/25 1112)  Resp: 18 (04/28/25 1112)  BP: (!) 93/53 (04/28/25 1112)  SpO2: 99 % (04/28/25 1112) Vital Signs (24h Range):  Temp:  [97.5 °F (36.4 °C)-98.3 °F (36.8 °C)] 98.3 °F (36.8 °C)  Pulse:  [] 93  Resp:  [18] 18  SpO2:  [96 %-100 %] 99 %  BP: ()/(51-60) 93/53     Weight: 61.2 kg (134 lb 14.7 oz)  Body mass index is 21.13 kg/m².     Physical Exam  Vitals and nursing note reviewed.   Constitutional:       General: He is awake.      Appearance: He is cachectic. He is ill-appearing.      Comments: Increased agitation, pt being polite but restless and anxious    HENT:      Head: Atraumatic. Head laceration: temporal wasting.   Pulmonary:      Effort: Pulmonary effort is normal. No respiratory distress.   Skin:     General: Skin is dry.      Findings: Lesion (abraisons related to itching/scratching) present. Bruising: multiple skin lesions in various stages of healing.  Neurological:      Mental Status: He is alert. He is disoriented.      Comments: Oriented to self, location of hospital, repeating statements; doesn't recall discussions/answers from moments earlier    Psychiatric:         Mood and Affect: Mood is anxious.         Behavior: Behavior is agitated.         Cognition and Memory: Memory is impaired.          Advance Care Planning   Advance Directives:   Living Will: No    LaPOST: No (needs prior to discharge; palliative SW to coordinate with hospice agency)    Do Not Resuscitate Status: Yes    Medical Power of : No (4 siblings are legal surrogate decision makers; pt has not had consistent  statements/wishes regarding preffered MPOA or consistent mental status for capacity for appointing)      Decision Making:  Family answered questions and Patient answered questions  Goals of Care: What is most important right now is to focus on symptom/pain control, curative/life-prolongation (regardless of treatment burdens), improvement in condition but with limits to invasive therapies. Accordingly, we have decided that the best plan to meet the patient's goals includes enrolling in hospice.        Significant Labs: All pertinent labs within the past 24 hours have been reviewed.  CBC:   Recent Labs   Lab 04/28/25 0314   WBC 8.83   HGB 8.1*   HCT 27.4*   MCV 94        BMP:  Recent Labs   Lab 04/28/25 0314      K 4.4   CL 99   CO2 24   BUN 43*   CREATININE 11.3*   CALCIUM 9.2     LFT:  Lab Results   Component Value Date    AST 21 04/28/2025    ALKPHOS 77 04/28/2025    BILITOT 0.7 04/28/2025     Albumin:   Albumin   Date Value Ref Range Status   04/28/2025 3.1 (L) 3.5 - 5.2 g/dL Final   03/21/2025 3.2 (L) 3.5 - 5.2 g/dL Final     Protein:   Total Protein   Date Value Ref Range Status   03/21/2025 7.5 6.0 - 8.4 g/dL Final     Lactic acid:   Lab Results   Component Value Date    LACTATE 1.9 04/14/2025    LACTATE 0.9 04/03/2025     Significant Imaging: I have reviewed all pertinent imaging results/findings within the past 24 hours.    Total visit time: 125 minutes    35 min visit time including: face to face time in discussion of symptom assessment, and exploring options and burdens of offered treatments.  This also includes non-face to face time preparing to see the patient including chart review, obtaining and/or reviewing separately obtained history, documenting clinical information in the electronic or other health record, independently interpreting results and communicating results to the patient/family/caregiver, family discussions by phone if not able to be present, coordination of care with other  specialists, and discharge planning.     90 min ACP time spent: goals of care, advanced care planning, emotional support, formulating and communicating prognosis, exploring burden/ benefit of various approaches of treatment.      Lisa Jacinto NP  Palliative Medicine  [facility]

## 2025-04-28 NOTE — ASSESSMENT & PLAN NOTE
- ongoing waxing and waning of mental status and delirium  - pt continues to not have capacity for medical decision making   - pt with increased agitation, restlessness, and AMS leading to pt scratching himself causing bleeding and overall concern for pt's comfort and safety due to pt being very anxious and fall risk; required PRN IV haldol 4/28 afternoon in addition to baseline regimen; IV ativan now also available PRN as part as comfort care orders   - mental status will likely continue to decline and need for acute symptom management will increase; pt is inpatient hospice appropriate

## 2025-04-28 NOTE — PROGRESS NOTES
[facility]  Nephrology  Progress Note    Patient Name: Jevon Rajan  MRN: [id number]  Admission Date: 4/3/2025  Hospital Length of Stay: 25 days  Attending Provider: Tena Palma MD   Primary Care Physician: Melia, Primary Doctor  Principal Problem:Septic shock    Subjective:     HPI: Mr. Rajan is an 86 yo male with HTN, T2DM, CAD, ESRD, and liver lesion who was transferred from [facility] for septic shock and impending AVG rupture. He initially presented to [facility] on 4/1 with temp 101.9 and BP 80/30s. He was transferred to our hospital on 4/3/25; it seems his AVG ruptured during transport/shortly after arrival. He emergently went to the OR yesterday with AVG extraction; infected hematoma was noted. Workup also concerning for elevated troponin and cardiac vegetations. He is no longer on pressors. Nephrology consulted for ESRD. Prior records obtained and reviewed. He receives HD TTS at [facility] under the c/o Dr. Colin. He last received HD outpatient on 4/1/25. HD was held at [facility] due to unstable vascular access. Family agreed to proceed with CRRT today.     Interval History: sleeping comfortably in room. No complaints    Review of patient's allergies indicates:   Allergen Reactions    Ace inhibitors Hives, Itching, Shortness Of Breath, Other (See Comments) and Rash     Is not sure which medication it was    Captopril      Current Facility-Administered Medications   Medication Frequency    0.9%  NaCl infusion (for blood administration) Q24H PRN    0.9% NaCl infusion PRN    acetaminophen tablet 650 mg Q4H PRN    albumin human 25% bottle 25 g Daily PRN    atorvastatin tablet 80 mg QHS    capsaicin 0.025 % cream BID    clopidogreL tablet 75 mg Daily    dextrose 50% injection 12.5 g PRN    dextrose 50% injection 25 g PRN    diphenhydrAMINE-zinc acetate 1-0.1% cream TID PRN    epoetin mj-epbx injection 10,000 Units Every Mon, Wed, Fri    glucagon (human recombinant) injection 1 mg PRN    glucose chewable  tablet 16 g PRN    glucose chewable tablet 24 g PRN    haloperidol lactate injection 2 mg Q4H PRN    heparin (porcine) injection 1,000 Units PRN    hydrOXYzine HCL tablet 25 mg TID PRN    insulin aspart U-100 pen 0-5 Units QID (AC + HS) PRN    melatonin tablet 6 mg Nightly PRN    midodrine tablet 15 mg Q8H    naloxone 0.4 mg/mL injection 0.02 mg PRN    ondansetron injection 4 mg Q6H PRN    pantoprazole EC tablet 40 mg Daily    prochlorperazine injection Soln 5 mg Q6H PRN    QUEtiapine tablet 25 mg QHS    senna tablet 8.6 mg Daily PRN    sevelamer carbonate tablet 1,600 mg TID WM    sodium chloride 0.9% bolus 250 mL 250 mL PRN    tamsulosin 24 hr capsule 0.4 mg Daily    vancomycin - pharmacy to dose pharmacy to manage frequency       Objective:     Vital Signs (Most Recent):  Temp: 98.3 °F (36.8 °C) (04/28/25 0814)  Pulse: 91 (04/28/25 0814)  Resp: 18 (04/28/25 0814)  BP: (!) 96/54 (04/28/25 0814)  SpO2: 98 % (04/28/25 0814) Vital Signs (24h Range):  Temp:  [97.5 °F (36.4 °C)-98.3 °F (36.8 °C)] 98.3 °F (36.8 °C)  Pulse:  [] 91  Resp:  [18] 18  SpO2:  [96 %-100 %] 98 %  BP: ()/(51-62) 96/54     Weight: 61.2 kg (134 lb 14.7 oz) (04/18/25 1303)  Body mass index is 21.13 kg/m².  Body surface area is 1.7 meters squared.    I/O last 3 completed shifts:  In: 480 [P.O.:480]  Out: -      Physical Exam  Constitutional:       General: He is not in acute distress.     Appearance: He is ill-appearing. He is not toxic-appearing.   HENT:      Head: Normocephalic and atraumatic.      Nose: Nose normal.      Mouth/Throat:      Mouth: Mucous membranes are moist.   Neurological:      Mental Status: He is alert.          Significant Labs:  All labs within the past 24 hours have been reviewed.     Significant Imaging:  Labs: Reviewed  Assessment/Plan:     Renal/  ESRD on hemodialysis  ESRD on HD     HD KEN, Bacilio Betancourt AVG resection 4/3    TDC right chest wall 4/14/2025    Plan/Recommendation  Holding further  dialysis, patient discharging with [facility] hospice care.     Palliative Care  ACP (advance care planning)  Discharging today with hospice    If there are any concerns or issues we can assist with please message me, will be happy to assist however I can.         Thank you for your consult. I will sign off. Please contact us if you have any additional questions.    Antoine Lauren MD  Nephrology  [facility]

## 2025-04-28 NOTE — SUBJECTIVE & OBJECTIVE
Interval History: sleeping comfortably in room. No complaints    Review of patient's allergies indicates:   Allergen Reactions    Ace inhibitors Hives, Itching, Shortness Of Breath, Other (See Comments) and Rash     Is not sure which medication it was    Captopril      Current Facility-Administered Medications   Medication Frequency    0.9%  NaCl infusion (for blood administration) Q24H PRN    0.9% NaCl infusion PRN    acetaminophen tablet 650 mg Q4H PRN    albumin human 25% bottle 25 g Daily PRN    atorvastatin tablet 80 mg QHS    capsaicin 0.025 % cream BID    clopidogreL tablet 75 mg Daily    dextrose 50% injection 12.5 g PRN    dextrose 50% injection 25 g PRN    diphenhydrAMINE-zinc acetate 1-0.1% cream TID PRN    epoetin mj-epbx injection 10,000 Units Every Mon, Wed, Fri    glucagon (human recombinant) injection 1 mg PRN    glucose chewable tablet 16 g PRN    glucose chewable tablet 24 g PRN    haloperidol lactate injection 2 mg Q4H PRN    heparin (porcine) injection 1,000 Units PRN    hydrOXYzine HCL tablet 25 mg TID PRN    insulin aspart U-100 pen 0-5 Units QID (AC + HS) PRN    melatonin tablet 6 mg Nightly PRN    midodrine tablet 15 mg Q8H    naloxone 0.4 mg/mL injection 0.02 mg PRN    ondansetron injection 4 mg Q6H PRN    pantoprazole EC tablet 40 mg Daily    prochlorperazine injection Soln 5 mg Q6H PRN    QUEtiapine tablet 25 mg QHS    senna tablet 8.6 mg Daily PRN    sevelamer carbonate tablet 1,600 mg TID WM    sodium chloride 0.9% bolus 250 mL 250 mL PRN    tamsulosin 24 hr capsule 0.4 mg Daily    vancomycin - pharmacy to dose pharmacy to manage frequency       Objective:     Vital Signs (Most Recent):  Temp: 98.3 °F (36.8 °C) (04/28/25 0814)  Pulse: 91 (04/28/25 0814)  Resp: 18 (04/28/25 0814)  BP: (!) 96/54 (04/28/25 0814)  SpO2: 98 % (04/28/25 0814) Vital Signs (24h Range):  Temp:  [97.5 °F (36.4 °C)-98.3 °F (36.8 °C)] 98.3 °F (36.8 °C)  Pulse:  [] 91  Resp:  [18] 18  SpO2:  [96 %-100 %] 98  %  BP: ()/(51-62) 96/54     Weight: 61.2 kg (134 lb 14.7 oz) (04/18/25 1303)  Body mass index is 21.13 kg/m².  Body surface area is 1.7 meters squared.    I/O last 3 completed shifts:  In: 480 [P.O.:480]  Out: -      Physical Exam  Constitutional:       General: He is not in acute distress.     Appearance: He is ill-appearing. He is not toxic-appearing.   HENT:      Head: Normocephalic and atraumatic.      Nose: Nose normal.      Mouth/Throat:      Mouth: Mucous membranes are moist.   Neurological:      Mental Status: He is alert.          Significant Labs:  All labs within the past 24 hours have been reviewed.     Significant Imaging:  Labs: Reviewed

## 2025-04-28 NOTE — PLAN OF CARE
Patient's fall monitoring camera and bed  alarmed due to patient sitting up in bed once since the fall. Otherwise not trying to get OOB. Sister/Ronna notified of update. Bed alarm on, call light in reach. Free of falls. Continue with plan of care as ordered.    Problem: Adult Inpatient Plan of Care  Goal: Plan of Care Review  Outcome: Progressing  Goal: Patient-Specific Goal (Individualized)  Outcome: Progressing  Goal: Optimal Comfort and Wellbeing  Outcome: Progressing     Problem: Sepsis/Septic Shock  Goal: Optimal Coping  Outcome: Progressing     Problem: Skin Injury Risk Increased  Goal: Skin Health and Integrity  Outcome: Progressing     Problem: Wound  Goal: Improved Oral Intake  Outcome: Progressing     Problem: Coping Ineffective  Goal: Effective Coping  Outcome: Progressing     Problem: Fall Injury Risk  Goal: Absence of Fall and Fall-Related Injury  Outcome: Progressing

## 2025-04-28 NOTE — PROGRESS NOTES
Therapy with Vancomycin complete and/or consult discontinued by provider.  Pharmacy will sign off, please re-consult as needed.  Thank You

## 2025-04-28 NOTE — NURSING
[facility]  Nurses Note -- 4 Eyes      4/27/2025       Skin assessed on: Q Shift      [x] No Pressure Injuries Present    [x]Prevention Measures Documented    [] Yes LDA  for Pressure Injury Previously documented     [] Yes New Pressure Injury Discovered   [] LDA for New Pressure Injury Added      Attending RN:  Severo Bowden RN     Second RN:  CHARLENE Fierro

## 2025-04-28 NOTE — NURSING
Pt is alert and confused, reported itchiness all over the body. Scheduled cream and prn atrax given. Pt is now irritable and agitated due to itchiness. Haldol given as prn.     Pt noted resting on bed. RR is even and unlabored. Fall risk monitoring at the bedside. Bed in low position. Side rails up x3. Bed alarm set. Care ongoing.

## 2025-04-28 NOTE — NURSING
Patient was calm, cooperative and following orders.Patient sat up for lunch in recliner for a couple hours without the fall monitoring camera alarming.  Fall monitoring camera alarmed, went into room and patient was sitting on his bottom on the floor. He denies being hurt. Easily Assisted patient back up to bed. Bed alarm on. Continue to monitor. Notified MD in person. No additional orders.

## 2025-04-28 NOTE — PROGRESS NOTES
Pharmacokinetic Assessment Follow Up: IV Vancomycin    Vancomycin serum concentration assessment(s):    The random level was drawn correctly and can be used to guide therapy at this time. The measurement is within the desired definitive target range of 15 to 20 mcg/mL.    Vancomycin Regimen Plan:    Give 500 mg after dialysis today.  Re-dose when the random level is less than 20 mcg/mL, next level to be drawn at 0400 on 4/30/2025    Drug levels (last 3 results):  Recent Labs   Lab Result Units 04/28/25  0632   Vancomycin Random ug/ml 15.5       Pharmacy will continue to follow and monitor vancomycin.    Please contact pharmacy at extension [phone] for questions regarding this assessment.    Thank you for the consult,   Ezekiel Beaver Jr       Patient brief summary:  Jevon Rajan is a 85 y.o. male initiated on antimicrobial therapy with IV Vancomycin for treatment of bacteremia    Drug Allergies:   Review of patient's allergies indicates:   Allergen Reactions    Ace inhibitors Hives, Itching, Shortness Of Breath, Other (See Comments) and Rash     Is not sure which medication it was    Captopril        Actual Body Weight:   61.2 kg    Renal Function:   Estimated Creatinine Clearance: 4 mL/min (A) (based on SCr of 11.3 mg/dL (H)).,     Dialysis Method (if applicable):  intermittent HD    CBC (last 72 hours):  Recent Labs   Lab Result Units 04/26/25  0611 04/27/25  0505 04/28/25  0314   WBC K/uL 8.19 8.01 8.83   HGB gm/dL 8.6* 8.7* 8.1*   HCT % 28.4* 29.2* 27.4*   Platelet Count K/uL 180 188 195   Lymph % % 13.4* 11.0* 12.8*   Mono % % 11.8 13.1 10.4   Eos % % 5.4 7.1 7.5   Basophil % % 0.9 0.6 0.8       Metabolic Panel (last 72 hours):  Recent Labs   Lab Result Units 04/26/25  0611 04/27/25  0505 04/28/25  0314   Sodium mmol/L 136 139 139   Potassium mmol/L 4.6 4.4 4.4   Chloride mmol/L 97 99 99   CO2 mmol/L 24 25 24   Glucose mg/dL 117* 113* 153*   BUN mg/dL 25* 38* 43*   Creatinine mg/dL 7.1* 9.4* 11.3*   Albumin  g/dL 3.1* 3.2* 3.1*   Bilirubin Total mg/dL 0.9 0.7 0.7   ALP unit/L 70 77 77   AST unit/L 24 24 21   ALT unit/L 5* <5* 6*       Vancomycin Administrations:  vancomycin given in the last 96 hours        No antibiotic orders with administrations found.                    Microbiologic Results:  Microbiology Results (last 7 days)       Procedure Component Value Units Date/Time    Fungus culture [[id number]]  (Normal) Collected: 04/03/25 1816    Order Status: Completed Specimen: Wound from Arm, Left Updated: 04/24/25 2301     Fungal Culture No Fungus Isolated at 3 Weeks    Fungus culture [[id number]]  (Normal) Collected: 04/03/25 1934    Order Status: Completed Specimen: Tissue from AV Fistula, Left Updated: 04/24/25 2301     Fungal Culture No Fungus Isolated at 3 Weeks    Fungus culture [[id number]]  (Normal) Collected: 04/03/25 2011    Order Status: Completed Specimen: Wound from Arm, Left Updated: 04/24/25 2301     Fungal Culture No Fungus Isolated at 3 Weeks    Fungus culture [[id number]]  (Normal) Collected: 04/03/25 1904    Order Status: Completed Specimen: Tissue from AV Fistula, Left Updated: 04/24/25 2201     Fungal Culture No Fungus Isolated at 3 Weeks    Fungus culture [[id number]]  (Normal) Collected: 04/03/25 1921    Order Status: Completed Specimen: Tissue from hematoma Updated: 04/24/25 2201     Fungal Culture No Fungus Isolated at 3 Weeks    Fungus culture [[id number]]  (Normal) Collected: 04/03/25 1943    Order Status: Completed Specimen: Wound from Arm, Left Updated: 04/22/25 0947     Fungal Culture No Fungus Isolated at 2 Weeks    Fungus culture [[id number]]  (Normal) Collected: 04/03/25 1954    Order Status: Completed Specimen: Wound from Arm, Left Updated: 04/22/25 0947     Fungal Culture No Fungus Isolated at 2 Weeks

## 2025-04-28 NOTE — PLAN OF CARE
"Changes in medical condition or discharge plan:    Does patient need new DME? TBD    Follow up appts needed: None    Medically stable: Yes    Estimated Discharge Date:  pending placement inpatient hospice vs [facility] with hospice    GARCIA left a message for sister Friend to contact CM. CM spoke to Deonna and explained pt may meet criteria for inpatient hospice and did she have a preference. Deonna stated " Something close to home". CM explained there is only two inpatient facilities in the [place] include [facility] beds at the [facility]. Deonna verbally expressed understanding and agreed to [facility]. Referral sent.    Deonna stated" I'm calling around to check on bank statements". CM explained that if she couldn't get banking statements for nursing home placement, pt couldn't go to a nursing home. CM inquired if pt could discharge home with hospice. Deonna stated " No one is able to care for pt at home". CM will follow up as needed.    3:32 pm  CM spoke to Ronna and she agreed to [facility].  provide [place] with her contact information.     04/28/25 1426   Discharge Reassessment   Assessment Type Discharge Planning Reassessment   Did the patient's condition or plan change since previous assessment? Yes   Discharge Plan discussed with: Caregiver   Name(s) and Number(s) Deonna garces [phone]   Communicated BAYRON with patient/caregiver Yes   Discharge Plan A Inpatient Hospice   Discharge Plan B Hospice/home   DME Needed Upon Discharge  none   Transition of Care Barriers Other (see comments)  (need banking statments for nursing home with hospice)   Why the patient remains in the hospital Other (see comment)  (Need banking statements)   Post-Acute Status   Coverage [employer] MANAGED MEDICARE - [employer] [facility] HMO PPO SPECIAL NEEDS   Discharge Delays (!) Post-Acute Set-up       "

## 2025-04-28 NOTE — ASSESSMENT & PLAN NOTE
- nephrology now recommending stopping HD due to pt's ongoing mental and physical status and safety concerns   - family expressed insight into this and agreeable to skilled nursing care with hospice   - further discussed discontinuation of HD as it relates to pt's end of life care and appropriateness for DNR with dez Ndiaye on 4/28, who expressed concerns to staff regarding DNR status ( See ACP)   - with pt no longer qualifying for HD and increased related symptom management needs, pt is inpatient hospice appropriate

## 2025-04-28 NOTE — PLAN OF CARE
Problem: Adult Inpatient Plan of Care  Goal: Plan of Care Review  Outcome: Progressing  Flowsheets (Taken 4/28/2025 0318)  Plan of Care Reviewed With: patient  Goal: Optimal Comfort and Wellbeing  Outcome: Progressing  Intervention: Monitor Pain and Promote Comfort  Flowsheets (Taken 4/28/2025 0318)  Pain Management Interventions:   pillow support provided   quiet environment facilitated   position adjusted     Problem: Diabetes Comorbidity  Goal: Blood Glucose Level Within Targeted Range  Outcome: Progressing     Problem: Sepsis/Septic Shock  Goal: Blood Glucose Level Within Targeted Range  Outcome: Progressing  Intervention: Optimize Glycemic Control  Flowsheets (Taken 4/28/2025 0318)  Glycemic Management: blood glucose monitored

## 2025-04-28 NOTE — ASSESSMENT & PLAN NOTE
"- ESRD on HD via LUE AVF  - LUE AVF was actively bleeding on arrival on 4/3/25. Vascular surgery was consulted. He went emergently to the OR on 4/3/25: "Severely calcified mid RCA stenosis unable to cross with PTCA balloons, treated initially with 0.9 laser atherectomy, followed by CSI atherectomy system with total of 5 runs (3 at low speed, 2 at high speed), pre-dilated using 2.0 compliant and 3.5 noncompliant balloon followed by 3.5 X 16 mm megatron stent.  Focal plaque rupture/erosion noted in the proximal and ostial RCA that were treated with  3.5 X 28 in the proximal RCA, 4.0 X 16 mm in the ostial RCA.  No residual stenosis post intervention.  Patient had ALAN 3 flow at the end of the procedure"  - cultures from AVF= Staph   - wound care orders in place for surgical site  - Nephrology is consulted  - Rt IJ trialysis placed on 4/4/25 for CRRT;  - THDC placed on 04/14  -currently anticipating DC to [facility] care with the hospice  "

## 2025-04-28 NOTE — ASSESSMENT & PLAN NOTE
4/28/2025  - discussed pt with multiple members of MDT via secure chat and phone; some confusion over MPOA/legal surrogate decision maker/makers  - clarified with team that pt's siblings are legal surrogate decision makers; have consulted with palliative attending, Dr. Antunez, to confirm this as accurate   - pt has had alternating levels of capacity throughout admission and has not expressed consistent wishes for his preferred MPOA therefore decision making is defaulted to legal surrogate decision makers which are his 4 living siblings Ronna, Bhavna, Nora, and Jason  - it has been clarified at multiple points in admission with multiple family members and pt that there are no known existing ACP/MPOA documents for pt; pt has consistently lacked capacity for completion of ACP documents previously in admission, currently lacks capacity, and is not expected to regain capacity as he is now ESRD and no longer qualifies for HD   - have continuously encouraged shared family decision making with all participating family members, as it is evident that Mr. Escoto is loved by many who have expressed interest in him and his care throughout admission   - per MDT request, followed up with Senthil garces, regarding concerns regarding code status and decision making for pt  - informed Senthil of default to legal surrogate decision making by pt's siblings; confirmed that her grandmother, Bhavna, was involved in discussion/decisions for current DNR status during prior visit/discussions; reviewed again pt's lack of candidacy for HD, that this means pt is at the end of his life and that attempted to resuscitate him without an option to change this would be inhumane and not recommended by MDT; she expressed improved understanding and is supportive of siblings decision for DNR and comfort focused care   - she expressed difficulty with weight of pt's affairs being on her shoulders as siblings are elderly/have own care needs; she is  struggling to gain access to pt's financial records to assist in prison placement, but is continuing these efforts   - discussed inpatient hospice would likely also be an option in care soon and that pt would continue to get the care he needs despite location; confirmed pt's comfort and symptom management is of top priority; reassured that despite decision making hierarchy family has been consistent with wishes for pt's comfort and QOL   - emotional support provided; answered questions   - multiple discussions and continued coordination with MDT regarding care needs, dispo, and placement   - assisted bedside RN with redirection and acute symptom management   - discussed pt with Dr. Rodriguez, with [facility] ([facility] prior home palliative team [facility] with [facility]); shared his insight into pt's VA benefits with case management team; he is also agrees with current plan of care and pt's appropriateness for inpatient hospice   - recommendation provided to CM and primary team for evaluation of inpatient hospice     4/25/2025  - interval chart reviewed; pt discussed with MDT  - Dr. Lauren, nephrology shared discussion with pt's sisters and niece regarding recommendation and plan to discontinue HD and transition to comfort focused care with hospice   - called and spoke with pt's sisterRonna and dez Ndiaye about discussion with Dr. Lauren; attempted to call Nora and Bhavna (sisters) but unable to reach   - they shared agreement with stopping HD, DNR, transition to hospice, and need for prison placement; preference for [facility] facility or facility near their home   - emotional support provided; allowed time for questions; Ms. Friend expressed thanks and appreciation of teams care for her brother and consistent communication with family   - visited pt at bedside; pt is cooperative but continues to lack capacity for medical decision making; pt denies pains or needs at that time, no non-verbal indications of  pain or discomfort   - nephew and great nieces visiting at bedside today; pt requesting a hooded sweatshirt which nephew plans to go to the store and purchase pt some items that would aid in his comfort   - recommend transition to comfort focused care and discontinuing treatments and interventions that pt is not agreeable to   - pt does not currently have end of life symptom needs or discomfort, but will likely increase as time without HD goes on; if placement is not in place prior to worsening symptoms recommend consultation with inpatient hospice team; this was discussed with family and they were agreeable to this   - goal of getting pt closer to them if it can be done safely and without disrupting pt comfort, however him getting the care he needs to keep him comfortable takes precedent per family     Please see multiple prior palliative/ACP notes from myself and Dr. Joan Antunez for prior GOC/ACP discussions

## 2025-04-28 NOTE — ASSESSMENT & PLAN NOTE
Patient complaining of severe intractable itching;[facility] Korsuva [facility] the [facility]  Start hydroxyzine  Ordered capsaicin cream   Korsuva  not available; was never prescribed [facility] the [facility]; can consider gabapentin  hemodynamic  and delirium allows  Will defer increased dialysis frequency; changing Kp/f ratios and other adjustments to nephrology  -currently full comfort care

## 2025-04-28 NOTE — ASSESSMENT & PLAN NOTE
- pt now comfort care status; family in agreement with comfort care while awaiting hospice placement   - pt now qualifies for inpatient hospice due to acute symptom management needs for restlessness and agitation; snf with hospice would also be appropriate depending on level of care available and if symptoms continue to be well managed with current PRN regimen   - comfort orders placed including IV morphine, ativan, and robinul;  PRN IV haldol also continues to be available as it has been required during admission   - symptoms and symptom management needs will continue to increase as days from HD continue  - comfort feeds ordered and discussed with HM, bedside RN, and family (family understand risk and prioritize pt's comfort in multiple discussions); pt specifically requesting a burger, which RN is coordinating with dietary (pt has done well with a turkey sandwich so far); pt ok to have any preferred foods or beverages to provide comfort

## 2025-04-28 NOTE — SUBJECTIVE & OBJECTIVE
Interval History: no abdominal pain or urinary complaints    Review of Systems   Constitutional:  Negative for activity change, appetite change, chills, diaphoresis and fever.   HENT:  Negative for congestion and rhinorrhea.    Eyes:  Negative for visual disturbance.   Respiratory:  Negative for choking and shortness of breath.    Gastrointestinal:  Negative for abdominal distention, abdominal pain, constipation, diarrhea, nausea and vomiting.   Genitourinary:  Positive for difficulty urinating. Negative for dysuria, flank pain, hematuria, scrotal swelling, testicular pain and urgency.   Skin:  Negative for color change, pallor and wound.   Neurological:  Negative for dizziness and syncope.   Psychiatric/Behavioral:  Positive for agitation. Negative for hallucinations.      Objective:     Temp:  [97.5 °F (36.4 °C)-98.3 °F (36.8 °C)] 98.1 °F (36.7 °C)  Pulse:  [] 95  Resp:  [18] 18  SpO2:  [96 %-100 %] 97 %  BP: ()/(51-60) 97/59     Body mass index is 21.13 kg/m².    Date 04/28/25 0700 - 04/29/25 0659   Shift 8850-3893 5681-4732 8491-6270 24 Hour Total   INTAKE   P.O. 120   120   Shift Total(mL/kg) 120(2)   120(2)   OUTPUT   Shift Total(mL/kg)       Weight (kg) 61.2 61.2 61.2 61.2     Bladder Scan Volume (mL): 331 mL (04/06/25 1520)    Drains       Drain  Duration                  Hemodialysis Catheter 04/14/25 1011 right internal jugular 14 days                     Physical Exam  Constitutional:       General: He is not in acute distress.     Appearance: He is well-developed. He is not ill-appearing, toxic-appearing or diaphoretic.   HENT:      Head: Normocephalic and atraumatic.      Mouth/Throat:      Mouth: Mucous membranes are moist.   Eyes:      Conjunctiva/sclera: Conjunctivae normal.   Pulmonary:      Effort: Pulmonary effort is normal. No respiratory distress.   Abdominal:      General: Abdomen is flat. There is no distension.      Palpations: Abdomen is soft. There is no mass.      Tenderness:  There is no right CVA tenderness or left CVA tenderness.   Musculoskeletal:         General: No swelling or deformity.      Cervical back: Neck supple.   Skin:     Findings: No rash.   Neurological:      Mental Status: He is alert and oriented to person, place, and time.           Significant Labs:    BMP:  Recent Labs   Lab 04/26/25  0611 04/27/25  0505 04/28/25  0314    139 139   K 4.6 4.4 4.4   CL 97 99 99   CO2 24 25 24   BUN 25* 38* 43*   CREATININE 7.1* 9.4* 11.3*   GLUCOSE 117* 113* 153*   CALCIUM 8.9 9.4 9.2       CBC:   Recent Labs   Lab 04/26/25  0611 04/27/25  0505 04/28/25  0314   WBC 8.19 8.01 8.83   HGB 8.6* 8.7* 8.1*   HCT 28.4* 29.2* 27.4*    188 195

## 2025-04-28 NOTE — SUBJECTIVE & OBJECTIVE
Medications:  Continuous Infusions:  Scheduled Meds:   capsaicin   Topical (Top) BID    clopidogreL  75 mg Oral Daily    midodrine  15 mg Oral Q8H    pantoprazole  40 mg Oral Daily    QUEtiapine  25 mg Oral QHS    sevelamer carbonate  1,600 mg Oral TID WM    tamsulosin  0.4 mg Oral Daily     PRN Meds:  Current Facility-Administered Medications:     0.9%  NaCl infusion (for blood administration), , Intravenous, Q24H PRN    0.9% NaCl, , Intravenous, PRN    acetaminophen, 650 mg, Oral, Q4H PRN    albumin human 25%, 25 g, Intravenous, Daily PRN    dextrose 50%, 12.5 g, Intravenous, PRN    dextrose 50%, 25 g, Intravenous, PRN    diphenhydrAMINE-zinc acetate 1-0.1%, , Topical (Top), TID PRN    glucagon (human recombinant), 1 mg, Intramuscular, PRN    glucose, 16 g, Oral, PRN    glucose, 24 g, Oral, PRN    glycopyrrolate, 0.1 mg, Intravenous, TID PRN    haloperidol lactate, 2 mg, Intravenous, Q4H PRN    heparin (porcine), 1,000 Units, Intravenous, PRN    hydrOXYzine HCL, 25 mg, Oral, TID PRN    insulin aspart U-100, 0-5 Units, Subcutaneous, QID (AC + HS) PRN    lorazepam, 1 mg, Intravenous, Q4H PRN    melatonin, 6 mg, Oral, Nightly PRN    morphine, 2 mg, Intravenous, Q4H PRN    naloxone, 0.02 mg, Intravenous, PRN    ondansetron, 4 mg, Intravenous, Q6H PRN    prochlorperazine, 5 mg, Intravenous, Q6H PRN    senna, 8.6 mg, Oral, Daily PRN    sodium chloride 0.9%, 250 mL, Intravenous, PRN    Objective:     Vital Signs (Most Recent):  Temp: 98.3 °F (36.8 °C) (04/28/25 0814)  Pulse: 93 (04/28/25 1112)  Resp: 18 (04/28/25 1112)  BP: (!) 93/53 (04/28/25 1112)  SpO2: 99 % (04/28/25 1112) Vital Signs (24h Range):  Temp:  [97.5 °F (36.4 °C)-98.3 °F (36.8 °C)] 98.3 °F (36.8 °C)  Pulse:  [] 93  Resp:  [18] 18  SpO2:  [96 %-100 %] 99 %  BP: ()/(51-60) 93/53     Weight: 61.2 kg (134 lb 14.7 oz)  Body mass index is 21.13 kg/m².     Physical Exam  Vitals and nursing note reviewed.   Constitutional:       General: He is awake.       Appearance: He is cachectic. He is ill-appearing.      Comments: Increased agitation, pt being polite but restless and anxious    HENT:      Head: Atraumatic. Head laceration: temporal wasting.   Pulmonary:      Effort: Pulmonary effort is normal. No respiratory distress.   Skin:     General: Skin is dry.      Findings: Lesion (abraisons related to itching/scratching) present. Bruising: multiple skin lesions in various stages of healing.  Neurological:      Mental Status: He is alert. He is disoriented.      Comments: Oriented to self, location of hospital, repeating statements; doesn't recall discussions/answers from moments earlier    Psychiatric:         Mood and Affect: Mood is anxious.         Behavior: Behavior is agitated.         Cognition and Memory: Memory is impaired.          Advance Care Planning   Advance Directives:   Living Will: No    LaPOST: No (needs prior to discharge; palliative SW to coordinate with hospice agency)    Do Not Resuscitate Status: Yes    Medical Power of : No (4 siblings are legal surrogate decision makers; pt has not had consistent statements/wishes regarding preffered MPOA or consistent mental status for capacity for appointing)      Decision Making:  Family answered questions and Patient answered questions  Goals of Care: What is most important right now is to focus on symptom/pain control, curative/life-prolongation (regardless of treatment burdens), improvement in condition but with limits to invasive therapies. Accordingly, we have decided that the best plan to meet the patient's goals includes enrolling in hospice.        Significant Labs: All pertinent labs within the past 24 hours have been reviewed.  CBC:   Recent Labs   Lab 04/28/25 0314   WBC 8.83   HGB 8.1*   HCT 27.4*   MCV 94        BMP:  Recent Labs   Lab 04/28/25 0314      K 4.4   CL 99   CO2 24   BUN 43*   CREATININE 11.3*   CALCIUM 9.2     LFT:  Lab Results   Component Value Date     AST 21 04/28/2025    ALKPHOS 77 04/28/2025    BILITOT 0.7 04/28/2025     Albumin:   Albumin   Date Value Ref Range Status   04/28/2025 3.1 (L) 3.5 - 5.2 g/dL Final   03/21/2025 3.2 (L) 3.5 - 5.2 g/dL Final     Protein:   Total Protein   Date Value Ref Range Status   03/21/2025 7.5 6.0 - 8.4 g/dL Final     Lactic acid:   Lab Results   Component Value Date    LACTATE 1.9 04/14/2025    LACTATE 0.9 04/03/2025     Significant Imaging: I have reviewed all pertinent imaging results/findings within the past 24 hours.

## 2025-04-28 NOTE — ASSESSMENT & PLAN NOTE
"- LENA AVF was actively bleeding on arrival on 4/3/25. Vascular surgery was consulted. He went emergently to the OR on 4/3/25: "Severely calcified mid RCA stenosis unable to cross with PTCA balloons, treated initially with 0.9 laser atherectomy, followed by CSI atherectomy system with total of 5 runs (3 at low speed, 2 at high speed), pre-dilated using 2.0 compliant and 3.5 noncompliant balloon followed by 3.5 X 16 mm megatron stent.  Focal plaque rupture/erosion noted in the proximal and ostial RCA that were treated with  3.5 X 28 in the proximal RCA, 4.0 X 16 mm in the ostial RCA.  No residual stenosis post intervention.  Patient had ALAN 3 flow at the end of the procedure"  - cultures from AVF= Staph   - wound care orders in place for surgical site  -s/p trialysis currently in place for HD  -currently in full comfort care    "

## 2025-04-28 NOTE — ASSESSMENT & PLAN NOTE
S/p clot evacuation with a fulguration by Urology  Currently resolved  Continue cap irrigation of 3 way catheter  Continue Levsin for bladder discomfort  -Sharma to be removed when patient is transferred to the floor; we will defer removal to urology  -currently full comfort care

## 2025-04-28 NOTE — ASSESSMENT & PLAN NOTE
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 Most recent hemoglobin and hematocrit are listed below.  Recent Labs     04/26/25  0611 04/27/25  0505 04/28/25  0314   HGB 8.6* 8.7* 8.1*   HCT 28.4* 29.2* 27.4*     Plan  - Monitor serial CBC: Daily and recheck now  - Transfuse PRBC if patient becomes hemodynamically unstable, symptomatic or H/H drops below 7/21.  - Patient has not received any PRBC transfusions to date  - Patient's anemia is currently stable

## 2025-04-28 NOTE — PROGRESS NOTES
[facility]  Urology  Progress Note    Patient Name: Jevon Rajan  MRN: [id number]  Admission Date: 4/3/2025  Hospital Length of Stay: 25 days  Code Status: DNR   Attending Provider: Tena Palma MD   Primary Care Physician: Melia, Primary Doctor    Subjective:     HPI:  Urinary Retention  Patient complains of urinary retention. Onset of retention was 1 day ago and was gradual in onset. Patient currently does not have a urinary catheter in place.  300 ml of urine were scanned on bladder scanner Prior to this event voiding symptoms consisted of slow stream. Prior treatments include none. Recent medications that may have affected his voiding include none.   He still makes urine, he tells me an almost normal amount.  He is very uncomfortable at the level of the bladder.  Nursing staff attempted coude catheter was then successfully.  He agrees to allow me to place a catheter.    Interval History: no abdominal pain or urinary complaints    Review of Systems   Constitutional:  Negative for activity change, appetite change, chills, diaphoresis and fever.   HENT:  Negative for congestion and rhinorrhea.    Eyes:  Negative for visual disturbance.   Respiratory:  Negative for choking and shortness of breath.    Gastrointestinal:  Negative for abdominal distention, abdominal pain, constipation, diarrhea, nausea and vomiting.   Genitourinary:  Positive for difficulty urinating. Negative for dysuria, flank pain, hematuria, scrotal swelling, testicular pain and urgency.   Skin:  Negative for color change, pallor and wound.   Neurological:  Negative for dizziness and syncope.   Psychiatric/Behavioral:  Positive for agitation. Negative for hallucinations.      Objective:     Temp:  [97.5 °F (36.4 °C)-98.3 °F (36.8 °C)] 98.1 °F (36.7 °C)  Pulse:  [] 95  Resp:  [18] 18  SpO2:  [96 %-100 %] 97 %  BP: ()/(51-60) 97/59     Body mass index is 21.13 kg/m².    Date 04/28/25 0700 - 04/29/25 0659   Shift 0909-8258  7026-3432 0983-0883 24 Hour Total   INTAKE   P.O. 120   120   Shift Total(mL/kg) 120(2)   120(2)   OUTPUT   Shift Total(mL/kg)       Weight (kg) 61.2 61.2 61.2 61.2     Bladder Scan Volume (mL): 331 mL (04/06/25 1520)    Drains       Drain  Duration                  Hemodialysis Catheter 04/14/25 1011 right internal jugular 14 days                     Physical Exam  Constitutional:       General: He is not in acute distress.     Appearance: He is well-developed. He is not ill-appearing, toxic-appearing or diaphoretic.   HENT:      Head: Normocephalic and atraumatic.      Mouth/Throat:      Mouth: Mucous membranes are moist.   Eyes:      Conjunctiva/sclera: Conjunctivae normal.   Pulmonary:      Effort: Pulmonary effort is normal. No respiratory distress.   Abdominal:      General: Abdomen is flat. There is no distension.      Palpations: Abdomen is soft. There is no mass.      Tenderness: There is no right CVA tenderness or left CVA tenderness.   Musculoskeletal:         General: No swelling or deformity.      Cervical back: Neck supple.   Skin:     Findings: No rash.   Neurological:      Mental Status: He is alert and oriented to person, place, and time.           Significant Labs:    BMP:  Recent Labs   Lab 04/26/25  0611 04/27/25  0505 04/28/25  0314    139 139   K 4.6 4.4 4.4   CL 97 99 99   CO2 24 25 24   BUN 25* 38* 43*   CREATININE 7.1* 9.4* 11.3*   GLUCOSE 117* 113* 153*   CALCIUM 8.9 9.4 9.2       CBC:   Recent Labs   Lab 04/26/25  0611 04/27/25  0505 04/28/25  0314   WBC 8.19 8.01 8.83   HGB 8.6* 8.7* 8.1*   HCT 28.4* 29.2* 27.4*    188 195                     Assessment/Plan:     Gross hematuria  S/p Clot evacuation with fulguration  Resolved            BPH with urinary obstruction       Dunaway removed,on dialysis, does not make much urine, abdomen soft   avoid dunaway due to prior trauma          VTE Risk Mitigation (From admission, onward)           Ordered     heparin (porcine) injection  1,000 Units  As needed (PRN)         04/05/25 2100     IP VTE HIGH RISK PATIENT  Once         04/03/25 1516     Place TEMO hose  Until discontinued         04/03/25 1516     Place sequential compression device  Until discontinued         04/03/25 1516     Reason for No Pharmacological VTE Prophylaxis  Once        Question:  Reasons:  Answer:  Physician Provided (leave comment)    04/03/25 1516                    Elsie Arnold MD  Urology  [facility]

## 2025-04-28 NOTE — ASSESSMENT & PLAN NOTE
Patient's blood pressure range in the last 24 hours was: BP  Min: 92/51  Max: 102/60.The patient's inpatient anti-hypertensive regimen is listed below:  Current Antihypertensives       Plan  - admitted with shock  - now off vasopressors. Continue to hold BP Rx as BP is well controlled without it

## 2025-04-28 NOTE — ASSESSMENT & PLAN NOTE
The likely etiology of thrombocytopenia is infection and sepsis. The patients 3 most recent labs are listed below.  Recent Labs     04/26/25  0611 04/27/25  0505 04/28/25  0314    188 195     Plan  - Will transfuse if platelet count is <10k.

## 2025-04-28 NOTE — SUBJECTIVE & OBJECTIVE
Interval History:  No acute event overnight.  Patient seen in bed this morning.  Transitioned to comfort care      Review of Systems  Objective:     Vital Signs (Most Recent):  Temp: 98.1 °F (36.7 °C) (04/28/25 1602)  Pulse: 95 (04/28/25 1602)  Resp: 18 (04/28/25 1112)  BP: (!) 97/59 (04/28/25 1602)  SpO2: 97 % (04/28/25 1602) Vital Signs (24h Range):  Temp:  [97.5 °F (36.4 °C)-98.3 °F (36.8 °C)] 98.1 °F (36.7 °C)  Pulse:  [] 95  Resp:  [18] 18  SpO2:  [96 %-100 %] 97 %  BP: ()/(51-60) 97/59     Weight: 61.2 kg (134 lb 14.7 oz)  Body mass index is 21.13 kg/m².    Intake/Output Summary (Last 24 hours) at 4/28/2025 1618  Last data filed at 4/28/2025 1329  Gross per 24 hour   Intake 240 ml   Output --   Net 240 ml         Physical Exam  Constitutional:       General: He is not in acute distress.     Appearance: He is not ill-appearing, toxic-appearing or diaphoretic.   Cardiovascular:      Rate and Rhythm: Normal rate and regular rhythm.      Heart sounds: No murmur heard.  Pulmonary:      Effort: Pulmonary effort is normal. No respiratory distress.      Breath sounds: Normal breath sounds. No stridor.   Abdominal:      General: There is no distension.      Palpations: Abdomen is soft. There is no mass.   Skin:     Comments: Multiple excoriation marks   Neurological:      Mental Status: Mental status is at baseline.               Significant Labs: All pertinent labs within the past 24 hours have been reviewed.    Significant Imaging: I have reviewed all pertinent imaging results/findings within the past 24 hours.

## 2025-04-28 NOTE — ASSESSMENT & PLAN NOTE
ESRD on HD     HD TTS, Bacilio Betancourt AVG resection 4/3    TDC right chest wall 4/14/2025    Plan/Recommendation  Holding further dialysis, patient discharging with [facility] hospice care.

## 2025-04-29 VITALS
DIASTOLIC BLOOD PRESSURE: 51 MMHG | BODY MASS INDEX: 21.18 KG/M2 | SYSTOLIC BLOOD PRESSURE: 100 MMHG | OXYGEN SATURATION: 99 % | HEIGHT: 67 IN | HEART RATE: 92 BPM | WEIGHT: 134.94 LBS | RESPIRATION RATE: 18 BRPM | TEMPERATURE: 98 F

## 2025-04-29 PROBLEM — N18.6 END-STAGE RENAL DISEASE (ESRD): Status: ACTIVE | Noted: 2025-04-29

## 2025-04-29 LAB
FUNGUS SPEC CULT: NORMAL
POCT GLUCOSE: 129 MG/DL (ref 70–110)
POCT GLUCOSE: 177 MG/DL (ref 70–110)

## 2025-04-29 PROCEDURE — 25000003 PHARM REV CODE 250: Performed by: STUDENT IN AN ORGANIZED HEALTH CARE EDUCATION/TRAINING PROGRAM

## 2025-04-29 PROCEDURE — 25000003 PHARM REV CODE 250: Performed by: HOSPITALIST

## 2025-04-29 PROCEDURE — 25000003 PHARM REV CODE 250: Performed by: INTERNAL MEDICINE

## 2025-04-29 RX ORDER — QUETIAPINE FUMARATE 25 MG/1
25 TABLET, FILM COATED ORAL NIGHTLY
Start: 2025-04-29 | End: 2026-04-29

## 2025-04-29 RX ORDER — HYDROXYZINE HYDROCHLORIDE 25 MG/1
25 TABLET, FILM COATED ORAL 3 TIMES DAILY PRN
Start: 2025-04-29 | End: 2025-05-29

## 2025-04-29 RX ORDER — MORPHINE SULFATE 2 MG/ML
2 INJECTION, SOLUTION INTRAMUSCULAR; INTRAVENOUS
Start: 2025-04-29

## 2025-04-29 RX ORDER — SENNOSIDES 8.6 MG/1
1 TABLET ORAL DAILY PRN
Start: 2025-04-29

## 2025-04-29 RX ORDER — TAMSULOSIN HYDROCHLORIDE 0.4 MG/1
0.4 CAPSULE ORAL DAILY
Start: 2025-04-29 | End: 2026-04-29

## 2025-04-29 RX ORDER — LORAZEPAM 2 MG/ML
1 INJECTION INTRAMUSCULAR EVERY 4 HOURS PRN
Start: 2025-04-29

## 2025-04-29 RX ORDER — GLYCOPYRROLATE 0.2 MG/ML
0.1 INJECTION INTRAMUSCULAR; INTRAVENOUS 3 TIMES DAILY PRN
Start: 2025-04-29

## 2025-04-29 RX ORDER — MIDODRINE HYDROCHLORIDE 5 MG/1
10 TABLET ORAL EVERY 8 HOURS
Start: 2025-04-29 | End: 2026-04-29

## 2025-04-29 RX ORDER — CAPSAICIN 0.03 G/100G
CREAM TOPICAL 4 TIMES DAILY PRN
Start: 2025-04-29

## 2025-04-29 RX ORDER — HALOPERIDOL LACTATE 5 MG/ML
2 INJECTION, SOLUTION INTRAMUSCULAR EVERY 4 HOURS PRN
Start: 2025-04-29 | End: 2026-04-29

## 2025-04-29 RX ADMIN — SEVELAMER CARBONATE 1600 MG: 800 TABLET, FILM COATED ORAL at 11:04

## 2025-04-29 RX ADMIN — MIDODRINE HYDROCHLORIDE 15 MG: 5 TABLET ORAL at 05:04

## 2025-04-29 RX ADMIN — PANTOPRAZOLE SODIUM 40 MG: 40 TABLET, DELAYED RELEASE ORAL at 07:04

## 2025-04-29 RX ADMIN — TAMSULOSIN HYDROCHLORIDE 0.4 MG: 0.4 CAPSULE ORAL at 07:04

## 2025-04-29 RX ADMIN — CAPSAICIN: 0.25 CREAM TOPICAL at 07:04

## 2025-04-29 RX ADMIN — SEVELAMER CARBONATE 1600 MG: 800 TABLET, FILM COATED ORAL at 07:04

## 2025-04-29 RX ADMIN — CLOPIDOGREL BISULFATE 75 MG: 75 TABLET, FILM COATED ORAL at 07:04

## 2025-04-29 NOTE — NURSING
Patient more calm and cooperative with care; requesting hamburger and fries. Bed alarm and fall monitoring camera in place; rings when patient puts legs up on side of bed; easily redirected; no medication required.

## 2025-04-29 NOTE — CONSULTS
Thank you for your consult to [facility]. We have reviewed the patient chart. This patient does meet criteria for [facility] service at this time.  Will assume care on 04/29/25 at 6AM.  Patient will need to return to bedside team if becomes unresponsive prior to discharge to inpatient hospice.     Alma Delia Best MD

## 2025-04-29 NOTE — NURSING
Called report to Gray Montoya/Mirela;/[phone].   Performed dressing change on left arm as ordered. Dc with peripheral IV as ordered per Hospice.   Patient dc via stretcher with transporters. No distress noted

## 2025-04-29 NOTE — PLAN OF CARE
Problem: Adult Inpatient Plan of Care  Goal: Plan of Care Review  Outcome: Progressing  Goal: Patient-Specific Goal (Individualized)  Outcome: Progressing  Goal: Absence of Hospital-Acquired Illness or Injury  Outcome: Progressing  Goal: Optimal Comfort and Wellbeing  Outcome: Progressing     Problem: Diabetes Comorbidity  Goal: Blood Glucose Level Within Targeted Range  Outcome: Progressing     Problem: Sepsis/Septic Shock  Goal: Optimal Coping  Outcome: Progressing  Goal: Blood Glucose Level Within Targeted Range  Outcome: Progressing  Goal: Absence of Infection Signs and Symptoms  Outcome: Progressing  Goal: Optimal Nutrition Intake  Outcome: Progressing     Problem: Infection  Goal: Absence of Infection Signs and Symptoms  Outcome: Progressing     Problem: Skin Injury Risk Increased  Goal: Skin Health and Integrity  Outcome: Progressing     Problem: Wound  Goal: Optimal Coping  Outcome: Progressing  Goal: Optimal Functional Ability  Outcome: Progressing  Goal: Absence of Infection Signs and Symptoms  Outcome: Progressing  Goal: Improved Oral Intake  Outcome: Progressing  Goal: Optimal Pain Control and Function  Outcome: Progressing  Goal: Skin Health and Integrity  Outcome: Progressing  Goal: Optimal Wound Healing  Outcome: Progressing     Problem: Coping Ineffective  Goal: Effective Coping  Outcome: Progressing     Problem: Urinary Elimination Management  Goal: Effective Urinary Elimination/Continence  Outcome: Progressing     Problem: Urinary Retention  Goal: Effective Urinary Elimination  Outcome: Progressing     Problem: Fall Injury Risk  Goal: Absence of Fall and Fall-Related Injury  Outcome: Progressing     Problem: Palliative Care  Goal: Enhanced Quality of Life  Outcome: Progressing

## 2025-04-29 NOTE — PLAN OF CARE
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]  Yes    Emphysema lung [J43.9]  Yes    Pulmonary nodules [R91.8]  Yes    Gross hematuria [R31.0]  No    BPH with urinary obstruction [N40.1, N13.8]  Yes    ACP (advance care planning) [Z71.89]  Not Applicable    Acute bacterial endocarditis [I33.0]  Yes    MRSA bacteremia [R78.81, B95.62]  Yes    NSTEMI (non-ST elevated myocardial infarction) [I21.4]  Yes    Septic shock [A41.9, R65.21]  Yes    Acute metabolic encephalopathy with Hospital Delirum [G93.41]  Yes    Anemia in ESRD (end-stage renal disease) [N18.6, D63.1]  Yes    ESRD on hemodialysis [N18.6, Z99.2]  Not Applicable    Type 2 diabetes mellitus with hyperglycemia, without long-term current use of insulin [E11.65]  Yes    Coronary artery disease involving native coronary artery of native heart without angina pectoris [I25.10]  Yes     S/p PCI in 1999 @ [facility].   -[facility] (12/30/14) nstemi: pLAD 90%, OM1 70% ISR, RCA li's, LVEDP 39   -resolute 3.0 x 18 UX to prox LAD post-dilated with 3.0 NC  -TTE (12/29/14): EF 55%, E/e; 9      HTN (hypertension) [I10]  Yes     Dx updated per 2019 IMO Load      HLD (hyperlipidemia) [E78.5]  Yes     Chronic      Resolved Hospital Problems    Diagnosis Date Resolved POA    End of life care [Z51.5] 04/28/2025 Not Applicable    Hypothyroidism due to acquired atrophy of thyroid [E03.4] 04/03/2025 Yes       Hospice Qualifying Diagnoses:        Patient has a life expectancy < 6 months due to:  Primary Hospice Diagnosis:  ESRD - hemodialysis discontinued  Comorbid Conditions  Contributing to Decline:     bacterial endocarditis, MRSA bacteremia, HFpEF    Vital Signs: Routine per Hospice Protocol.    Code Status: DNR    Allergies:   Review of patient's allergies indicates:   Allergen Reactions    Ace inhibitors Hives, Itching, Shortness Of Breath, Other (See Comments) and Rash     Is not sure which medication it was    Captopril        Diet: po comfort feeds     Activities: As tolerated    Goals of Care Treatment Preferences:  Code Status: DNR          What is most important right now is to focus on symptom/pain control, curative/life-prolongation (regardless of treatment burdens), improvement in condition but with limits to invasive therapies.  Accordingly, we have decided that the best plan to meet the patient's goals includes continuing with treatment.      Nursing: Per Hospice Routine.       Routine Skin for Bedridden Patients: Apply moisture barrier cream to all skin folds and   wet areas in perineal area daily and after baths and all bowel movements.      HD :   - Sterile dressing changes are done weekly and as needed.   - Use chlor-hexadine scrub to cleanse site, apply Biopatch to insertion site,       apply securement device dressing   - Posi-flow caps are changed weekly and after EVERY lab draw.    Oxygen: RA currently; Oxygen by nasal cannula at 2L/min prn dyspnea     Wound Care:  Local wound care to left upper arm incision daily-  Clean with vashe, apply 3M Cavilon No Sting Film Barrier to periwound skin, repack with Vashe moistened gauze, wrap with cast padding and secure with ace.         Medications:        Medication List        START taking these medications      capsaicin 0.025 % cream  Commonly known as: ZOSTRIX  Apply topically 4 (four) times daily as needed (itching). Apply to the skin when itching     diphenhydrAMINE-zinc acetate 1-0.1% cream  Commonly known as: BENADRYL  Apply topically 3 (three) times daily as needed for Itching. Apply to the skin  while itching     glycopyrrolate 0.2 mg/mL injection  Commonly known as: ROBINUL  Inject 0.5 mLs (0.1 mg total) into the vein 3 (three) times daily as needed (secretions).     haloperidol lactate 5 mg/mL injection  Commonly known as: HALDOL  Inject 0.4 mLs (2 mg total) into the vein every 4 (four) hours as needed (agitation).     hydrOXYzine HCL 25 MG tablet  Commonly known as: ATARAX  Take 1 tablet (25 mg total) by mouth 3 (three) times daily as needed for Itching or Anxiety.     LORazepam 2 mg/mL injection  Commonly known as: ATIVAN  Inject 0.5 mLs (1 mg total) into the vein every 4 (four) hours as needed (anxiety).     midodrine 5 MG Tab  Commonly known as: PROAMATINE  Take 2 tablets (10 mg total) by mouth every 8 (eight) hours.     morphine 2 mg/mL Soln  Inject 1 mL (2 mg total) into the vein every 2 (two) hours as needed (dyspnea or pain).     QUEtiapine 25 MG Tab  Commonly known as: SEROQUEL  Take 1 tablet (25 mg total) by mouth every evening.     senna 8.6 mg tablet  Commonly known as: SENOKOT  Take 1 tablet by mouth daily as needed for Constipation.     tamsulosin 0.4 mg Cap  Commonly known as: FLOMAX  Take 1 capsule (0.4 mg total) by mouth once daily.            CHANGE how you take these medications      artificial tears(hypromellose)(GENTEAL/SUSTANE) 0.3 % Gel  Place 2 drops into both eyes 4 (four) times daily as needed (Dry eyes).  What changed:   how much to take  how to take this  when to take this  reasons to take this            CONTINUE taking these medications      ammonium lactate 12 % Crea  Apply topically 2 (two) times a day.     camphor-menthol 0.5-0.5% lotion  Commonly known as: SARNA  Apply topically once daily. APPLY SMALL AMOUNT TOPICALLY TO AFFECTED AREA(S) AS NEEDED FOR ITCHING KEEP IN FRIDGE     clopidogreL 75 mg tablet  Commonly known as: PLAVIX  Take 1 tablet (75 mg total) by mouth once daily.     ondansetron 4 MG Tbdl  Commonly known as: ZOFRAN-ODT  Take 1 tablet (4 mg total) by mouth  every 8 (eight) hours as needed (nausea).     pantoprazole 20 MG tablet  Commonly known as: PROTONIX  Take 20 mg by mouth 2 (two) times daily as needed.     pramoxine 1 % Lotn  Apply topically 2 (two) times daily as needed (itching).            STOP taking these medications      aspirin 81 MG EC tablet  Commonly known as: ECOTRIN     atorvastatin 80 MG tablet  Commonly known as: LIPITOR     carboxymethylcellulose sodium 0.5 % Drop     carvediloL 12.5 MG tablet  Commonly known as: COREG     cetirizine 5 MG tablet  Commonly known as: ZYRTEC     clobetasol 0.05% 0.05 % Oint  Commonly known as: TEMOVATE     erythromycin ophthalmic ointment  Commonly known as: ROMYCIN     EUCERIN cream  Generic drug: lanolin alcohol-mineral oil-white petrolatum-ceres     gabapentin 100 MG capsule  Commonly known as: NEURONTIN     hydrophilic ointment  Commonly known as: AQUABASE     LIDOcaine 5 %  Commonly known as: LIDODERM     loratadine 10 mg tablet  Commonly known as: CLARITIN     NEPRO CARB STEADY 0.08 gram-1.8 kcal/mL Liqd  Generic drug: nut.tx.imp.renal fxn,lac-reduc     oxyCODONE-acetaminophen 5-325 mg per tablet  Commonly known as: PERCOCET     triamcinolone acetonide 0.1% 0.1 % cream  Commonly known as: KENALOG                Future Orders:  Hospice Medical Director may dictate new orders for comfortable care measures & sign death certificate.        _________________________________  Alma Delia Best MD  04/29/2025

## 2025-04-29 NOTE — NURSING
[facility], [facility] [place]  Nurses Note -- 4 Eyes      4/28/2025       Skin assessed on: Q Shift      [x] No Pressure Injuries Present    []Prevention Measures Documented    [] Yes LDA  for Pressure Injury Previously documented     [] Yes New Pressure Injury Discovered   [] LDA for New Pressure Injury Added      Attending RN:  Sri Weathers RN     Second RN:  Severo CHANG

## 2025-04-29 NOTE — NURSING
Patient increasingly agitated about itching saying that he has bugs biting  him. Applied lotion and prescribe cream on body and redirected every 15 minutes or so. Admin Haldol as ordered. Continue to monitor.

## 2025-04-29 NOTE — PLAN OF CARE
Case Management Final Discharge Note    Discharge Disposition: Inpatient hospice at [facility] [phone]    New DME ordered / company name: None    Relevant SDOH / Transition of Care Barriers:  None    Person available to provide assistance at home when needed and their contact information: Ronna Young, [phone].     Scheduled followup appointment: Patient to follow up with facility MD    Referrals placed: None    Transportation: Ambulance requested to transport patient to facility         Patient and family educated on discharge services and updated on DC plan. Bedside RN notified, patient clear to discharge from Case Management Perspective.    04/29/25 1256   Final Note   Assessment Type Final Discharge Note   Anticipated Discharge Disposition [facility] Resources/Appts/Education Provided Provided patient/caregiver with written discharge plan information;Post-Acute resouces added to AVS   Post-Acute Status   Post-Acute Authorization Hospice   Post-Acute Placement Status Discharge Plan Changed   Hospice Status Set-up Complete/Auth obtained   Coverage [employer] MANAGED MEDICARE - [employer] [facility] HMO PPO SPECIAL NEEDS -   Discharge Delays None known at this time

## 2025-04-29 NOTE — PLAN OF CARE
Patient has been awake , alert and answering simple questions appropriately for dinner eating the hamburger, fries, and chocolate boost on ice per his request. Tolerated well. No complaints of pain, N/V, SOB. No persistent complaints of itching in the evening. Bed alarm on, fall monitoring camera on. Continue with plan of care as ordered. No distress noted. Bed side report of the above given to oncoming nurse Joann CHANG  Problem: Adult Inpatient Plan of Care  Goal: Plan of Care Review  Outcome: Progressing  Goal: Patient-Specific Goal (Individualized)  Outcome: Progressing  Goal: Optimal Comfort and Wellbeing  Outcome: Progressing     Problem: Sepsis/Septic Shock  Goal: Optimal Coping  Outcome: Progressing     Problem: Skin Injury Risk Increased  Goal: Skin Health and Integrity  Outcome: Progressing     Problem: Coping Ineffective  Goal: Effective Coping  Outcome: Progressing     Problem: Fall Injury Risk  Goal: Absence of Fall and Fall-Related Injury  Outcome: Progressing     Problem: Palliative Care  Goal: Enhanced Quality of Life  Outcome: Progressing

## 2025-04-29 NOTE — PLAN OF CARE
9:20am:  left for Mahamed, 1+ [phone], with [facility], [phone].    9:27am: GARCIA spoke to Mahamed and discussed patient's case.  CM clarified discharge plan and provided contact information for patient's sister, ,Ronna, [phone].  Mahamed stated she will contact patient's sister regarding signing consents and she or colleague, Candis, will follow up with GARCIA.  CM to continue to assess for and until discharge.     10:22am: GARCIA spoke to Candis; she stated she will visit patient bedside today to complete evaluation.    11:00am: GARCIA spoke to patient's sister, Ronna.  She stated she is getting ready to head to [place] to complete consent forms.  GARCIA informed her that patient will be discharging to facility once forms are completed; she verbalized understanding.

## 2025-04-29 NOTE — NURSING
[facility], [facility]  Nurses Note -- 4 Eyes      4/28/2025       Skin assessed on: Q Shift      [x] No Pressure Injuries Present    [x]Prevention Measures Documented    [] Yes LDA  for Pressure Injury Previously documented     [] Yes New Pressure Injury Discovered   [] LDA for New Pressure Injury Added      Attending RN:  Joann Walls RN     Second RN:  CHARLENE Fierro

## 2025-04-30 LAB — POCT GLUCOSE: 170 MG/DL (ref 70–110)

## (undated) DEVICE — CATH FOGARTY THRU-LUMENARTERIA

## (undated) DEVICE — SEE MEDLINE ITEM 156894

## (undated) DEVICE — SOL IRR SOD CHL .9% POUR

## (undated) DEVICE — GUIDE LAUNCHER 6FR JR 4.0

## (undated) DEVICE — HEMOSTAT SURGICEL PWD 3G

## (undated) DEVICE — GUIDEWIRE PROWATER .014X180CM

## (undated) DEVICE — DRAPE HAND STERILE

## (undated) DEVICE — Device

## (undated) DEVICE — GUIDEWIRE STF .035X180CM ANG

## (undated) DEVICE — CATH EMERGE MR 8 X 3.00

## (undated) DEVICE — COVER SNAP KAP 26IN

## (undated) DEVICE — KIT INTRODUCER W/GUIDEWIRE

## (undated) DEVICE — CATH INTROCAN SAFETY 20GX1.75

## (undated) DEVICE — DRAPE ANGIO BRACH 38X44IN

## (undated) DEVICE — WIRE PTCE CHOICE PT .014X182

## (undated) DEVICE — BLADE SURG CARBON STEEL SZ11

## (undated) DEVICE — HEMOSTAT VASC BAND REG 24CM

## (undated) DEVICE — SPONGE LAP 18X18 PREWASHED

## (undated) DEVICE — COVER TABLE 44X90 STERILE

## (undated) DEVICE — KIT GLIDESHEATH SLEND 6FR 10CM

## (undated) DEVICE — SUT VICRYL 3-0 27 SH

## (undated) DEVICE — SPIKE SHORT LG BORE 1-WAY 2IN

## (undated) DEVICE — GUIDEWIRE GRANDSLAM JTIP 180CM

## (undated) DEVICE — GLOVE SIGNATURE ESSNTL LTX 7.5

## (undated) DEVICE — LUBRICANT VIPERSLIDE 100ML BAG

## (undated) DEVICE — CATH MICRO CORSAIR PRO 135CM

## (undated) DEVICE — DRAPE STERI INSTRUMENT 1018

## (undated) DEVICE — PAD DEFIB CADENCE ADULT R2

## (undated) DEVICE — CATH NC EMERGE MR 2.5X12MM

## (undated) DEVICE — CATH NC EMERGE MR 3.5X12MM

## (undated) DEVICE — ADHESIVE DERMABOND MINI HV

## (undated) DEVICE — OMNIPAQUE 350 200ML

## (undated) DEVICE — VISIPAQUE 320 200ML +PK

## (undated) DEVICE — STOCKINET 4INX48

## (undated) DEVICE — GUIDE LAUNCHER 6FR EBU 3.5

## (undated) DEVICE — CATH TURNPIKE LP 135CM

## (undated) DEVICE — HEMOSTAT VASC BAND LONG 27CM

## (undated) DEVICE — TUBE ESWAB SAMP COLL DEL FLEX

## (undated) DEVICE — COVER BAND BAG 28 X 12

## (undated) DEVICE — ELECTRODE REM PLYHSV RETURN 9

## (undated) DEVICE — SUT SILK 3-0 SH 18IN BLACK

## (undated) DEVICE — CATH IV JELCO 22GAX1

## (undated) DEVICE — CATH EMERGE MR 12 X 2.00

## (undated) DEVICE — GUIDEWIRE SION BLUE STR 190CM

## (undated) DEVICE — ADAPTER PACING CABLE

## (undated) DEVICE — KIT MICROINTRO 4F .018X40X7CM

## (undated) DEVICE — SYR 50CC LL

## (undated) DEVICE — CATH LASER POINT ELCA 9 X 80

## (undated) DEVICE — SHEATH PINNACLE 6FRX6CM

## (undated) DEVICE — CATH SHOCKWAVE C2+ IVL 3.0X12M

## (undated) DEVICE — CATH DIAMONDBACK CORONARY 360

## (undated) DEVICE — SOL NS 1000CC

## (undated) DEVICE — COVERS PROBE NR-48 STERILE

## (undated) DEVICE — BANDAGE MATRIX HK LOOP 4IN 5YD

## (undated) DEVICE — COVER INSTR ELASTIC BAND 40X20

## (undated) DEVICE — NDL HYPO REG 25G X 1 1/2

## (undated) DEVICE — GUIDEWIRE EMERALD .035IN 260CM

## (undated) DEVICE — ELECTRODE LOOP CUTTING BIPOLAR

## (undated) DEVICE — CATH EMERGE MR 15 X 3.00

## (undated) DEVICE — DEVICE PERCLOSE SUT CLSR 6FR

## (undated) DEVICE — SUT MONOCRYL 4.0 PS2 CP496G

## (undated) DEVICE — SUT LIGACLIP SMALL XTRA

## (undated) DEVICE — COVER OVERHEAD SURG LT BLUE

## (undated) DEVICE — CATH NC EMERGE MR 3X8MM

## (undated) DEVICE — CATH EAGLE EYE ST .014X20X150

## (undated) DEVICE — COVER PROXIMA MAYO STAND

## (undated) DEVICE — INFLATOR ENCORE 26 BLLN INFL

## (undated) DEVICE — SPONGE GAUZE 16PLY 4X4

## (undated) DEVICE — BOWL STERILE LARGE 32OZ

## (undated) DEVICE — DRAPE U SPLIT SHEET 54X76IN

## (undated) DEVICE — SUT 3-0 12-18IN SILK

## (undated) DEVICE — DRESSING TRANS 4X4 TEGADERM

## (undated) DEVICE — CANISTER SUCTION RIGID 2000CC

## (undated) DEVICE — CONTAINER SPECIMEN OR STER 4OZ

## (undated) DEVICE — CATH GUIDE LINER  V3 6F

## (undated) DEVICE — MAT QUICK 40X30 FLOOR FLUID LF

## (undated) DEVICE — CATH ANGIO GUIDEZILLA 6FR

## (undated) DEVICE — DRESSING ABSRBNT ISLAND 3.6X8

## (undated) DEVICE — GLOVE SIGNATURE ESSNTL LTX 6.5

## (undated) DEVICE — LOOP VESSEL BLUE MINI 2/CARD

## (undated) DEVICE — CATH INTROCAN CANNULA 14GA X

## (undated) DEVICE — CATH EMERGE MR 12 X 2.50

## (undated) DEVICE — SUT 2-0 12-18IN SILK

## (undated) DEVICE — CATH ANGIOJET PROXI-90CM

## (undated) DEVICE — CATH NC EMERGE MR 3.5X8X143

## (undated) DEVICE — SHEATH 6FR 35CM

## (undated) DEVICE — DRESSING ADHESIVE ISLAND 3 X 6

## (undated) DEVICE — KIT POWER PULSE

## (undated) DEVICE — APPLICATOR CHLORAPREP ORN 26ML

## (undated) DEVICE — SUPPORT ULNA NERVE PROTECTOR

## (undated) DEVICE — GUIDEWIRE ADVNTG 018X180CM ANG

## (undated) DEVICE — COVER PROBE US 5.5X58L NON LTX

## (undated) DEVICE — BANDAGE ROLL COTTN 4.5INX4.1YD

## (undated) DEVICE — CATH OPTITORQUE TIGER 5F 100CM

## (undated) DEVICE — BOOT SUTURE AID

## (undated) DEVICE — SYR 10CC LUER LOCK

## (undated) DEVICE — GUIDEWIRE ZIPWIRE .035 D 150CM

## (undated) DEVICE — HEMOSTAT SURGICEL 4X8IN

## (undated) DEVICE — VISE RADIFOCUS MULTI TORQUE

## (undated) DEVICE — DRAPE OPTIMA MAJOR PEDIATRIC

## (undated) DEVICE — BLANKET WARMING UPPER BODY

## (undated) DEVICE — BLLN MUSTANG 8 X 100 X 75

## (undated) DEVICE — SYR MED RAD 150ML

## (undated) DEVICE — CATH ULTRAVERSE 035 7X120X75

## (undated) DEVICE — KIT PROBE COVER WITH GEL

## (undated) DEVICE — SYR 3CC LUER LOC

## (undated) DEVICE — VALVE GUARDIAN II HEMOSTASIS

## (undated) DEVICE — COLLECTOR SPECIMEN ANAEROBIC

## (undated) DEVICE — CATH BLLN DURADO 7 X 4

## (undated) DEVICE — CATH ANGIOSCULPT EVO 2X10MM

## (undated) DEVICE — CATH ANGIOSCULPT EVO 3X10MM

## (undated) DEVICE — GOWN NONREINF SET-IN SLV XL

## (undated) DEVICE — SOL WATER STERILE IRR 500ML

## (undated) DEVICE — CATH ANG PIGTAIL 4FR INFINITY

## (undated) DEVICE — CATH 5FR BALLOON PT HEART PACE

## (undated) DEVICE — CLIP MED TICALL

## (undated) DEVICE — SYS LABLNG CORECT MED 4 FLG

## (undated) DEVICE — SOL NACL IRR 3000ML

## (undated) DEVICE — SYR ONLY LUER LOCK 20CC

## (undated) DEVICE — SOL NACL 0.9% IV INJ 1000ML

## (undated) DEVICE — TOWEL OR DISP STRL BLUE 4/PK

## (undated) DEVICE — SUT VICRYL CTD 2-0 GI 27 SH

## (undated) DEVICE — CATH NC EMERGE MR 4X8X143

## (undated) DEVICE — GUIDEWIRE RUNTHROUGH EF 180CM

## (undated) DEVICE — GEL AQUASONIC 100 STERILE20GM

## (undated) DEVICE — SET MICRO PUNCT 4FR/MPIS-401

## (undated) DEVICE — CATH NC EMERGE MR 3X15MM

## (undated) DEVICE — SUT PROLENE 6-0 24 C-1 C-1

## (undated) DEVICE — PACK CATH LAB

## (undated) DEVICE — SUT 7/0 24IN PROLENE BL MO

## (undated) DEVICE — SUT 4-0 12-18IN SILK BLACK

## (undated) DEVICE — GUIDEWIRE VIPER COR .014X325CM

## (undated) DEVICE — SET DECANTER MEDICHOICE

## (undated) DEVICE — KIT LEFT HEART MANIFOLD CUSTOM